# Patient Record
Sex: FEMALE | Race: WHITE | Employment: OTHER | RURAL
[De-identification: names, ages, dates, MRNs, and addresses within clinical notes are randomized per-mention and may not be internally consistent; named-entity substitution may affect disease eponyms.]

---

## 2017-01-16 RX ORDER — OXYCODONE AND ACETAMINOPHEN 5; 325 MG/1; MG/1
1 TABLET ORAL
Qty: 120 TAB | Refills: 0 | Status: CANCELLED | OUTPATIENT
Start: 2017-01-16

## 2017-01-17 DIAGNOSIS — G89.4 CHRONIC PAIN SYNDROME: Primary | ICD-10-CM

## 2017-01-17 RX ORDER — OXYCODONE AND ACETAMINOPHEN 5; 325 MG/1; MG/1
1 TABLET ORAL
Qty: 90 TAB | Refills: 0 | Status: SHIPPED | OUTPATIENT
Start: 2017-01-17 | End: 2017-08-15

## 2017-01-17 NOTE — TELEPHONE ENCOUNTER
Patient has been advised of med change per Dr Eveline Burton. Patient also advised that office notes have been faxed to Dr Hamilton Parent office to set up pain management appointment. I have asked her to call our office if she does not hear from them.

## 2017-01-17 NOTE — TELEPHONE ENCOUNTER
Needs to be set up for pain clinic can go to [facility] we will be slowly reducing her Percocet to take only 3 times a day now

## 2017-01-17 NOTE — TELEPHONE ENCOUNTER
Spoke with patient. She has not seen pain specialist yet stated someone here was going to set up appointment since she does not know where to go.

## 2017-02-13 ENCOUNTER — OFFICE VISIT (OUTPATIENT)
Dept: FAMILY MEDICINE CLINIC | Age: 77
End: 2017-02-13

## 2017-02-13 VITALS
HEIGHT: 66 IN | OXYGEN SATURATION: 95 % | SYSTOLIC BLOOD PRESSURE: 143 MMHG | HEART RATE: 96 BPM | WEIGHT: 167.2 LBS | BODY MASS INDEX: 26.87 KG/M2 | DIASTOLIC BLOOD PRESSURE: 59 MMHG | TEMPERATURE: 97.5 F | RESPIRATION RATE: 18 BRPM

## 2017-02-13 DIAGNOSIS — M54.5 CHRONIC BILATERAL LOW BACK PAIN, WITH SCIATICA PRESENCE UNSPECIFIED: ICD-10-CM

## 2017-02-13 DIAGNOSIS — M25.512 BILATERAL SHOULDER PAIN, UNSPECIFIED CHRONICITY: ICD-10-CM

## 2017-02-13 DIAGNOSIS — G89.29 CHRONIC BILATERAL LOW BACK PAIN, WITH SCIATICA PRESENCE UNSPECIFIED: ICD-10-CM

## 2017-02-13 DIAGNOSIS — Z79.4 TYPE 2 DIABETES MELLITUS WITH HYPERGLYCEMIA, WITH LONG-TERM CURRENT USE OF INSULIN (HCC): Primary | ICD-10-CM

## 2017-02-13 DIAGNOSIS — E11.65 TYPE 2 DIABETES MELLITUS WITH HYPERGLYCEMIA, WITH LONG-TERM CURRENT USE OF INSULIN (HCC): Primary | ICD-10-CM

## 2017-02-13 DIAGNOSIS — M25.511 BILATERAL SHOULDER PAIN, UNSPECIFIED CHRONICITY: ICD-10-CM

## 2017-02-13 DIAGNOSIS — Z71.6 ENCOUNTER FOR SMOKING CESSATION COUNSELING: ICD-10-CM

## 2017-02-13 RX ORDER — IBUPROFEN 200 MG
1 TABLET ORAL EVERY 24 HOURS
Qty: 30 PATCH | Refills: 0 | Status: SHIPPED | OUTPATIENT
Start: 2017-02-13 | End: 2017-03-15

## 2017-02-13 RX ORDER — GABAPENTIN 100 MG/1
100 CAPSULE ORAL 3 TIMES DAILY
Qty: 90 CAP | Refills: 1 | Status: SHIPPED | OUTPATIENT
Start: 2017-02-13 | End: 2017-05-17 | Stop reason: SDUPTHER

## 2017-02-13 NOTE — MR AVS SNAPSHOT
Visit Information Date & Time Provider Department Dept. Phone Encounter #  
 2/13/2017 11:00 AM Meseret Schroeder NP [address] [phone] [id number] Upcoming Health Maintenance Date Due  
 FOOT EXAM Q1 7/20/1950 EYE EXAM RETINAL OR DILATED Q1 7/20/1950 DTaP/Tdap/Td series (1 - Tdap) 7/20/1960 ZOSTER VACCINE AGE 60> 7/20/1999 GLAUCOMA SCREENING Q2Y 7/20/2004 OSTEOPOROSIS SCREENING (DEXA) 7/20/2004 Pneumococcal 65+ Low/Medium Risk (1 of 2 - PCV13) 7/20/2004 HEMOGLOBIN A1C Q6M 5/10/2017 MICROALBUMIN Q1 11/10/2017 LIPID PANEL Q1 11/10/2017 MEDICARE YEARLY EXAM 11/11/2017 COLONOSCOPY 6/8/2021 Allergies as of 2/13/2017  Review Complete On: 2/13/2017 By: Ashwin Giraldo LPN Severity Noted Reaction Type Reactions Morphine  05/24/2016    Itching Ultram [Tramadol]  05/24/2016    Not Reported This Time Current Immunizations  Reviewed on 9/29/2016 Name Date Influenza High Dose Vaccine PF 9/29/2016 Not reviewed this visit Vitals BP Pulse Temp Resp Height(growth percentile) 143/59 (BP 1 Location: Left arm, BP Patient Position: Sitting) 96 97.5 °F (36.4 °C) (Temporal) 18 5' 5.75\" (1.67 m) Weight(growth percentile) SpO2 BMI OB Status Smoking Status 167 lb 3.2 oz (75.8 kg) 95% 27.19 kg/m2 Postmenopausal Current Every Day Smoker Vitals History BMI and BSA Data Body Mass Index Body Surface Area  
 27.19 kg/m 2 1.88 m 2 Preferred Pharmacy Pharmacy Name Phone [address] [facility] [phone] Your Updated Medication List  
  
   
This list is accurate as of: 2/13/17 11:36 AM.  Always use your most recent med list.  
  
  
  
  
 acetaminophen 500 mg tablet Commonly known as:  TYLENOL Take  by mouth every six (6) hours as needed for Pain. ascorbic acid (vitamin C) 500 mg tablet Commonly known as:  VITAMIN C  
 Take  by mouth.  
  
 b complex vitamins tablet Take 1 Tab by mouth daily. B.infantis-B.ani-B.long-B.bifi 10-15 mg Tbec Take  by mouth. BENEFIBER (GUAR GUM) PO Take  by mouth. celecoxib 200 mg capsule Commonly known as:  CELEBREX Take  by mouth two (2) times a day. cholecalciferol 1,000 unit Cap Commonly known as:  VITAMIN D3 Take  by mouth daily. dilTIAZem  mg ER capsule Commonly known as:  CARDIZEM CD Take  by mouth daily. gemfibrozil 600 mg tablet Commonly known as:  LOPID Take 1 Tab by mouth two (2) times a day. glyBURIDE 5 mg tablet Commonly known as:  Larinda Manzanilla Take 1 Tab by mouth two (2) times daily (with meals). insulin glargine 100 unit/mL (3 mL) pen Commonly known as:  LANTUS SOLOSTAR  
40 Units by SubCUTAneous route daily. metFORMIN 1,000 mg tablet Commonly known as:  GLUCOPHAGE Take 1 Tab by mouth two (2) times a day. omega-3 fatty acids-vitamin e 1,000 mg Cap Take 1 Cap by mouth. 32a day  
  
 oxyCODONE-acetaminophen 5-325 mg per tablet Commonly known as:  PERCOCET Take 1 Tab by mouth every eight (8) hours as needed for Pain. Max Daily Amount: 3 Tabs. valsartan-hydroCHLOROthiazide 160-12.5 mg per tablet Commonly known as:  DIOVAN-HCT Take 1 Tab by mouth daily. venlafaxine 75 mg tablet Commonly known as:  [facility] [place] 75 mg by mouth two (2) times a day. zinc 50 mg Tab tablet Take  by mouth daily. Introducing [facility]! Irene Estrada introduces [employer] patient portal. Now you can access parts of your medical record, email your doctor's office, and request medication refills online. 1. In your internet browser, go to [email or web address] 2. Click on the First Time User? Click Here link in the Sign In box. You will see the New Member Sign Up page. 3. Enter your [employer] Access Code exactly as it appears below.  You will not need to use this code after youve completed the sign-up process. If you do not sign up before the expiration date, you must request a new code. · copygram Access Code: [email or web address] Expires: 5/14/2017 11:36 AM 
 
4. Enter the last four digits of your Social Security Number (xxxx) and Date of Birth (mm/dd/yyyy) as indicated and click Submit. You will be taken to the next sign-up page. 5. Create a copygram ID. This will be your copygram login ID and cannot be changed, so think of one that is secure and easy to remember. 6. Create a copygram password. You can change your password at any time. 7. Enter your Password Reset Question and Answer. This can be used at a later time if you forget your password. 8. Enter your e-mail address. You will receive e-mail notification when new information is available in [address] 9. Click Sign Up. You can now view and download portions of your medical record. 10. Click the Download Summary menu link to download a portable copy of your medical information. If you have questions, please visit the Frequently Asked Questions section of the copygram website. Remember, copygram is NOT to be used for urgent needs. For medical emergencies, dial 911. Now available from your iPhone and Android! Please provide this summary of care documentation to your next provider. Your primary care clinician is listed as Juan Shankar. If you have any questions after today's visit, please call [phone].

## 2017-02-13 NOTE — PROGRESS NOTES
Subjective:     Tim Harris is a 67 y.o. female who presents today with the following:  Chief Complaint   Patient presents with    Diabetes     follow up   Tim Harris is a 67 y.o. female who presents today for follow up on her chronic medical conditions as noted below. . Doing well no interval problems. No chest pain, no angina no shortness of breath. No orthopnea or PND. No dependent edema. No abdominal pain, change in bowel habits, no blood in stool or black stools. No urinary frequency, urgency, dysuria. No change in voiding pattern or stream, no significant nocturia. No problems with medications and is compliant. Long-term use of chronic cardiac pain medications, musculoskeletal complaints low back pain she states the last 10 years. Up-to-date on colonoscopy  Sees Dr. Dalia Fall in GYN up-to-date on Pap smear and mammogram  Continues to smoke down to a half a pack per  Day. Tried Chantix had instant nausea. Would like to try the patch. No ethanol use keeps active but no regular aerobic activity  Would like to have Hemoglobin A1C checked today. ROS:  Gen: denies fever, chills, fatigue, weight loss, weight gain  HEENT:denies blurry vision, nasal congestion, sore throat  Resp: denies dypsnea, cough, wheezing  CV: denies chest pain radiating to the jaws or arms, palpitations, lower extremity edema  Abd: denies nausea, vomiting, diarrhea, constipation  Neuro: denies numbness/tingling  Endo: denies polyuria, polydipsia, heat/cold intolerance  Heme: no lymphadenopathy    Allergies   Allergen Reactions    Morphine Itching    Ultram [Tramadol] Not Reported This Time         Current Outpatient Prescriptions:     oxyCODONE-acetaminophen (PERCOCET) 5-325 mg per tablet, Take 1 Tab by mouth every eight (8) hours as needed for Pain.  Max Daily Amount: 3 Tabs., Disp: 90 Tab, Rfl: 0    gemfibrozil (LOPID) 600 mg tablet, Take 1 Tab by mouth two (2) times a day., Disp: 180 Tab, Rfl: 3    glyBURIDE (DIABETA) 5 mg tablet, Take 1 Tab by mouth two (2) times daily (with meals). , Disp: 60 Tab, Rfl: 5    metFORMIN (GLUCOPHAGE) 1,000 mg tablet, Take 1 Tab by mouth two (2) times a day., Disp: 180 Tab, Rfl: 1    insulin glargine (LANTUS SOLOSTAR) 100 unit/mL (3 mL) pen, 40 Units by SubCUTAneous route daily. , Disp: 1 Each, Rfl: 3    omega-3 fatty acids-vitamin e 1,000 mg cap, Take 1 Cap by mouth. 32a day, Disp: , Rfl:     ascorbic acid, vitamin C, (VITAMIN C) 500 mg tablet, Take  by mouth., Disp: , Rfl:     b complex vitamins tablet, Take 1 Tab by mouth daily. , Disp: , Rfl:     acetaminophen (TYLENOL) 500 mg tablet, Take  by mouth every six (6) hours as needed for Pain., Disp: , Rfl:     zinc 50 mg tab tablet, Take  by mouth daily. , Disp: , Rfl:     cholecalciferol (VITAMIN D3) 1,000 unit cap, Take  by mouth daily. , Disp: , Rfl:     B.infantis-B.ani-B.long-B.bifi 10-15 mg TbEC, Take  by mouth., Disp: , Rfl:     BENEFIBER, GUAR GUM, PO, Take  by mouth., Disp: , Rfl:     valsartan-hydrochlorothiazide (DIOVAN-HCT) 160-12.5 mg per tablet, Take 1 Tab by mouth daily. , Disp: , Rfl:     diltiazem CD (CARDIZEM CD) 240 mg ER capsule, Take  by mouth daily. , Disp: , Rfl:     celecoxib (CELEBREX) 200 mg capsule, Take  by mouth two (2) times a day., Disp: , Rfl:     venlafaxine (EFFEXOR) 75 mg tablet, Take 75 mg by mouth two (2) times a day., Disp: , Rfl:     Past Medical History   Diagnosis Date    Arthritis     Chronic pain     Chronic pain     Diabetes ([address]     Hemorrhoid     Hypercholesterolemia     IBS (irritable bowel syndrome)        Past Surgical History   Procedure Laterality Date    Hx cholecystectomy      Hx hysterectomy      Hx cataract removal      Hx knee replacement       right    Hx hemorrhoidectomy  2009    Hx colonoscopy  2009    Hx colonoscopy  2016     2 adenomatous polyp       History   Smoking Status    Current Every Day Smoker   Smokeless Tobacco    Never Used       Social History     Social History    Marital status:      Spouse name: N/A    Number of children: N/A    Years of education: N/A     Social History Main Topics    Smoking status: Current Every Day Smoker    Smokeless tobacco: Never Used    Alcohol use No    Drug use: None    Sexual activity: Not Asked     Other Topics Concern     Service No    Blood Transfusions Yes    Caffeine Concern Yes    Occupational Exposure No    Hobby Hazards No    Sleep Concern Yes    Stress Concern Yes    Weight Concern No    Special Diet No    Back Care Yes    Exercise No    Bike Helmet No     n/a    Seat Belt Yes    Self-Exams Yes     Social History Narrative       Family History   Problem Relation Age of Onset    Colon Cancer Father          Objective:     Visit Vitals    /59 (BP 1 Location: Left arm, BP Patient Position: Sitting)    Pulse 96    Temp 97.5 °F (36.4 °C) (Temporal)    Resp 18    Ht 5' 5.75\" (1.67 m)    Wt 167 lb 3.2 oz (75.8 kg)    SpO2 95%    BMI 27.19 kg/m2     Body mass index is 27.19 kg/(m^2). General: Alert and oriented. No acute distress. Well nourished  HEENT :  Ears:TMs are normal. Canals are clear. Eyes: pupils equal, round, react to light and accommodation. Extra ocular movements intact. Nose: patent. Mouth and throat is clear. Neck:supple full range of motion no thyromegaly. Trachea midline, No carotid bruits. No significant lymphadenopathy  Lungs[de-identified] clear to auscultation without wheezes, rales, or rhonchi. Heart :RRR, S1 & S2 are normal intensity. No murmur; no gallop  Abdomen: bowel sounds active. No tenderness, guarding, rebound, masses, hepatic or spleen enlargement  Back: no CVA tenderness. Extremities: without clubbing, cyanosis, or edema  Pulses: radial and femoral pulses are normal  Neuro: HMF intact.  Cranial nerves II through XII grossly normal.  Motor: is 5 over 5 and symmetrical.   Deep tendon reflexes: +2 equal    Results for orders placed or performed in visit on 11/21/16   POTASSIUM   Result Value Ref Range    Potassium 5.5 (H) 3.5 - 5.2 mmol/L       No results found for this visit on 02/13/17. Assessment/ Plan:          1. Type 2 diabetes mellitus with hyperglycemia, with long-term current use of insulin (HCC)    Hemoglobin A1C  Continue current pan    2. Smoking Cessation: Nicotine patch as directed. Verbal and written instructions (see AVS) provided.  Patient expresses understanding of diagnosis and treatment plan.     Luis Pritchett, PANKAJP-C

## 2017-02-13 NOTE — ACP (ADVANCE CARE PLANNING)
Pt was given paper work last time but did not fill it out. She will return it next time she comes in.

## 2017-02-13 NOTE — PATIENT INSTRUCTIONS
Nicotine (Absorbed through the skin)   Nicotine (VINCENT-oh-teen)  Helps you quit smoking. Brand Name(s):[facility] Nicotine Transdermal System, [employer] Nicotine Transdermal System, Leader Nicotine Transdermal System, Nicoderm CQ, Nicoderm CQ Clear, Nicotrol, [facility] Nicotine, [employer] Nicotine Transdermal System   There may be other brand names for this medicine. When This Medicine Should Not Be Used: This medicine is not right for everyone. Do not use it if you had an allergic reaction to nicotine. How to Use This Medicine:   Patch  · Follow the instructions on the medicine label if you are using this medicine without a prescription. · Read and follow the patient instructions that come with this medicine. Talk to your doctor or pharmacist if you have any questions. · Wash your hands with soap and water before and after applying a patch. · Leave the patch in its sealed wrapper until you are ready to put it on. Tear the wrapper open carefully. NEVER CUT the wrapper or the patch with scissors. Do not use any patch that has been cut by accident. · NicoDerm® CQ:   ¨ This is a 3-step program. If you smoke more than 10 cigarettes per day, start with step 1 followed by step 2 and step 3. If you smoke 10 or fewer cigarettes per day, start with step 2 followed by step 3. ¨ Begin using the patch on the morning of your quit day, even if you are not able to stop smoking immediately. ¨ Apply the patch at about the same time every day to skin that is clean, dry, and free of hair. ¨ The patient instructions will show the body areas where you can wear the patch. When putting on each new patch, choose a different place within these areas. Do not put the new patch on the same place you wore the last one. Be sure to remove the old patch before applying a new one. ¨ Do not put the patch over irritated skin.  Do not use creams or lotions, including sunscreen, on the skin where you apply the patch, because it may not stick well.  ¨ Apply a new patch if one falls off. ¨ If you have vivid dreams or sleep problems, remove the patch at bedtime and apply a new one in the morning. · Keep using this medicine for the full treatment time. If you feel you need to use this medicine for a longer period of time, talk to your doctor. · Store the patches at room temperature in a closed container, away from heat, moisture, and direct light. · Fold the used patch in half with the sticky sides together. Throw any used patch away so that children or pets cannot get to it. You will also need to throw away old patches after the expiration date has passed. Drugs and Foods to Avoid:      Ask your doctor or pharmacist before using any other medicine, including over-the-counter medicines, vitamins, and herbal products. Warnings While Using This Medicine:   · Pregnant or breastfeeding women should only use this medicine as directed by a doctor. Smoking can seriously harm your unborn child. Try to stop smoking without using medicine. Although this medicine is believed to be safer than smoking, the risks of its use during pregnancy are not fully known. · Tell your doctor if you have heart disease, heart rhythm problems, high blood pressure, or you had a recent heart attack. Tell your doctor if you have an allergy to adhesive tape. · This medicine may cause the following problems:  ¨ High blood pressure  ¨ Increase in heart rate  · The opaque NicoDerm® CQ patch may cause skin burns if you have a procedure called a magnetic resonance imaging (MRI) scan. You must remove the patch before an MRI. · Keep all medicine out of the reach of children. Never share your medicine with anyone.   Possible Side Effects While Using This Medicine:   Call your doctor right away if you notice any of these side effects:  · Allergic reaction: Itching or hives, swelling in your face or hands, swelling or tingling in your mouth or throat, chest tightness, trouble breathing  · Dizziness, headache, upset stomach, drooling, vomiting, diarrhea, cold sweats, blurred vision, trouble hearing, confusion, fainting, or weakness  · Fast, slow, pounding, or uneven heartbeat  If you notice these less serious side effects, talk with your doctor:   · Mild skin redness, itching, burning, or tingling where you wear the patch  · Vivid dreams or trouble sleeping  If you notice other side effects that you think are caused by this medicine, tell your doctor. Call your doctor for medical advice about side effects. You may report side effects to FDA at 6-395-FDA-1853  © 2016 [address] is for End User's use only and may not be sold, redistributed or otherwise used for commercial purposes. The above information is an  only. It is not intended as medical advice for individual conditions or treatments. Talk to your doctor, nurse or pharmacist before following any medical regimen to see if it is safe and effective for you.

## 2017-02-14 LAB
EST. AVERAGE GLUCOSE BLD GHB EST-MCNC: 206 MG/DL
HBA1C MFR BLD: 8.8 % (ref 4.8–5.6)

## 2017-02-14 NOTE — PROGRESS NOTES
Essentially the same:  A healthy eating plan gives your body the nutrients it needs every day. A healthy eating plan also will lower your risk for heart disease and other health conditions.    A healthy eating plan:  \" Emphasizes vegetables, fruits, whole grains, and fat-free or low-fat dairy products  \" Includes lean meats, poultry, fish, beans, eggs, and nuts  \" Limits saturated and trans fats, sodium, and added sugars  \" Controls portion sizes

## 2017-03-16 RX ORDER — METFORMIN HYDROCHLORIDE 1000 MG/1
1000 TABLET ORAL 2 TIMES DAILY
Qty: 180 TAB | Refills: 1 | Status: SHIPPED | OUTPATIENT
Start: 2017-03-16 | End: 2017-10-08 | Stop reason: SDUPTHER

## 2017-03-16 RX ORDER — ATORVASTATIN CALCIUM 20 MG/1
TABLET, FILM COATED ORAL
Qty: 90 TAB | Refills: 0 | Status: SHIPPED | OUTPATIENT
Start: 2017-03-16 | End: 2017-06-25 | Stop reason: SDUPTHER

## 2017-04-04 NOTE — TELEPHONE ENCOUNTER
Patient is requesting a prescription for BD \"short\" insulin needles, 31G x 8 mm. I do not see this on her list but apparently Dr. Michelle Lazo had her use these sparingly. [place].

## 2017-04-06 RX ORDER — INSULIN GLARGINE 100 [IU]/ML
40 INJECTION, SOLUTION SUBCUTANEOUS DAILY
Qty: 1 EACH | Refills: 3 | Status: SHIPPED | OUTPATIENT
Start: 2017-04-06 | End: 2017-05-17 | Stop reason: SDUPTHER

## 2017-04-07 ENCOUNTER — TELEPHONE (OUTPATIENT)
Dept: FAMILY MEDICINE CLINIC | Age: 77
End: 2017-04-07

## 2017-04-07 DIAGNOSIS — E11.65 TYPE 2 DIABETES MELLITUS WITH HYPERGLYCEMIA, UNSPECIFIED LONG TERM INSULIN USE STATUS: Primary | ICD-10-CM

## 2017-04-07 RX ORDER — PEN NEEDLE, DIABETIC 30 GX3/16"
NEEDLE, DISPOSABLE MISCELLANEOUS
Qty: 1 PACKAGE | Refills: 11 | Status: SHIPPED | OUTPATIENT
Start: 2017-04-07 | End: 2018-06-30 | Stop reason: SDUPTHER

## 2017-04-17 ENCOUNTER — TELEPHONE (OUTPATIENT)
Dept: FAMILY MEDICINE CLINIC | Age: 77
End: 2017-04-17

## 2017-04-17 NOTE — TELEPHONE ENCOUNTER
Patient is concerned about a 10d+ \"pull feeling\" in the Lt groin and fever. She notes that Dr. Adalgisa Plaza told her she had diverticulum on her colonoscopy.

## 2017-04-19 ENCOUNTER — TELEPHONE (OUTPATIENT)
Dept: FAMILY MEDICINE CLINIC | Age: 77
End: 2017-04-19

## 2017-04-19 NOTE — TELEPHONE ENCOUNTER
Patient has not had us refill Valsartan-Hctz for her before but now she is getting low. Please send in 90 day supply to [facility].

## 2017-04-21 RX ORDER — VALSARTAN AND HYDROCHLOROTHIAZIDE 160; 12.5 MG/1; MG/1
1 TABLET, FILM COATED ORAL DAILY
Qty: 90 TAB | Refills: 1 | Status: SHIPPED | OUTPATIENT
Start: 2017-04-21 | End: 2017-10-08 | Stop reason: SDUPTHER

## 2017-04-26 ENCOUNTER — TELEPHONE (OUTPATIENT)
Dept: FAMILY MEDICINE CLINIC | Age: 77
End: 2017-04-26

## 2017-04-26 NOTE — TELEPHONE ENCOUNTER
Spoke with patient's . Seen in ER over a week ago still in a lot of pain. Has been taking medication as prescribed and drinking plenty fluids. Per  in a lot of pain. I have advised patient's  if patient is in such pain will need to be evaluated in local ER or Urgent Care.

## 2017-04-26 NOTE — TELEPHONE ENCOUNTER
MRS LIU WENT TO ER LAST Wednesday AND WAS DIAGNOSED WITH DIVERTICULITIS. THEY GAVE HER CIPRO AND FLAGYL,  STILL IN A LOT OF PAIN.   [phone]

## 2017-05-17 ENCOUNTER — OFFICE VISIT (OUTPATIENT)
Dept: FAMILY MEDICINE CLINIC | Age: 77
End: 2017-05-17

## 2017-05-17 VITALS
RESPIRATION RATE: 22 BRPM | DIASTOLIC BLOOD PRESSURE: 82 MMHG | HEART RATE: 107 BPM | TEMPERATURE: 96.5 F | BODY MASS INDEX: 26.03 KG/M2 | OXYGEN SATURATION: 97 % | WEIGHT: 162 LBS | HEIGHT: 66 IN | SYSTOLIC BLOOD PRESSURE: 112 MMHG

## 2017-05-17 DIAGNOSIS — K57.92 DIVERTICULITIS OF INTESTINE WITHOUT PERFORATION OR ABSCESS WITHOUT BLEEDING, UNSPECIFIED PART OF INTESTINAL TRACT: ICD-10-CM

## 2017-05-17 DIAGNOSIS — K58.9 IRRITABLE BOWEL SYNDROME WITHOUT DIARRHEA: ICD-10-CM

## 2017-05-17 DIAGNOSIS — B35.1 NAIL FUNGUS: ICD-10-CM

## 2017-05-17 DIAGNOSIS — E11.65 TYPE 2 DIABETES MELLITUS WITH HYPERGLYCEMIA, UNSPECIFIED LONG TERM INSULIN USE STATUS: Primary | ICD-10-CM

## 2017-05-17 DIAGNOSIS — M19.90 ARTHRITIS: ICD-10-CM

## 2017-05-17 DIAGNOSIS — E78.00 HYPERCHOLESTEROLEMIA: ICD-10-CM

## 2017-05-17 RX ORDER — INSULIN GLARGINE 100 [IU]/ML
40 INJECTION, SOLUTION SUBCUTANEOUS DAILY
Qty: 1 EACH | Refills: 11 | Status: SHIPPED | OUTPATIENT
Start: 2017-05-17 | End: 2017-05-24 | Stop reason: SDUPTHER

## 2017-05-17 RX ORDER — CICLOPIROX 80 MG/ML
1 SOLUTION TOPICAL
Qty: 6.6 ML | Refills: 0 | Status: SHIPPED | OUTPATIENT
Start: 2017-05-17 | End: 2017-08-15

## 2017-05-17 RX ORDER — GABAPENTIN 100 MG/1
200 CAPSULE ORAL 3 TIMES DAILY
Qty: 90 CAP | Refills: 1 | Status: SHIPPED | OUTPATIENT
Start: 2017-05-17 | End: 2017-05-25 | Stop reason: SDUPTHER

## 2017-05-17 NOTE — MR AVS SNAPSHOT
Visit Information Date & Time Provider Department Dept. Phone Encounter #  
 5/17/2017 11:00 AM Ree Borjas NP [address] [phone] [id number] Upcoming Health Maintenance Date Due  
 FOOT EXAM Q1 7/20/1950 EYE EXAM RETINAL OR DILATED Q1 7/20/1949 DTaP/Tdap/Td series (1 - Tdap) 7/20/1960 ZOSTER VACCINE AGE 60> 7/20/1999 GLAUCOMA SCREENING Q2Y 7/20/2004 OSTEOPOROSIS SCREENING (DEXA) 7/20/2004 Pneumococcal 65+ Low/Medium Risk (1 of 2 - PCV13) 7/20/2004 INFLUENZA AGE 9 TO ADULT 8/1/2017 HEMOGLOBIN A1C Q6M 8/13/2017 MICROALBUMIN Q1 11/10/2017 LIPID PANEL Q1 11/10/2017 MEDICARE YEARLY EXAM 11/11/2017 COLONOSCOPY 6/8/2021 Allergies as of 5/17/2017  Review Complete On: 5/17/2017 By: Ree Borjas NP Severity Noted Reaction Type Reactions Morphine  05/24/2016    Itching Ultram [Tramadol]  05/24/2016    Not Reported This Time Current Immunizations  Reviewed on 9/29/2016 Name Date Influenza High Dose Vaccine PF 9/29/2016 Not reviewed this visit Vitals BP Pulse Temp Resp Height(growth percentile) 112/82 (BP 1 Location: Left arm, BP Patient Position: Sitting) (!) 107 96.5 °F (35.8 °C) (Temporal) 22 5' 5.7\" (1.669 m) Weight(growth percentile) SpO2 BMI OB Status Smoking Status 162 lb (73.5 kg) 97% 26.39 kg/m2 Postmenopausal Current Every Day Smoker Vitals History BMI and BSA Data Body Mass Index Body Surface Area  
 26.39 kg/m 2 1.85 m 2 Preferred Pharmacy Pharmacy Name Phone [address] [facility] [phone] Your Updated Medication List  
  
   
This list is accurate as of: 5/17/17 12:32 PM.  Always use your most recent med list.  
  
  
  
  
 acetaminophen 500 mg tablet Commonly known as:  TYLENOL Take  by mouth every six (6) hours as needed for Pain. ascorbic acid (vitamin C) 500 mg tablet Commonly known as:  VITAMIN C Take  by mouth. atorvastatin 20 mg tablet Commonly known as:  LIPITOR  
TAKE 1 TABLET DAILY  
  
 b complex vitamins tablet Take 1 Tab by mouth daily. B.infantis-B.ani-B.long-B.bifi 10-15 mg Tbec Take  by mouth. BENEFIBER (GUAR GUM) PO Take  by mouth. celecoxib 200 mg capsule Commonly known as:  CELEBREX Take  by mouth two (2) times a day. cholecalciferol 1,000 unit Cap Commonly known as:  VITAMIN D3 Take  by mouth daily. dilTIAZem  mg ER capsule Commonly known as:  CARDIZEM CD Take  by mouth daily. gabapentin 100 mg capsule Commonly known as:  NEURONTIN Take 1 Cap by mouth three (3) times daily. Every 8 hours as needed  
  
 gemfibrozil 600 mg tablet Commonly known as:  LOPID Take 1 Tab by mouth two (2) times a day. glyBURIDE 5 mg tablet Commonly known as:  Venessa Fanning Take 1 Tab by mouth two (2) times daily (with meals). insulin glargine 100 unit/mL (3 mL) pen Commonly known as:  LANTUS SOLOSTAR  
40 Units by SubCUTAneous route daily. Insulin Needles (Disposable) 31 gauge x 5/16\" Ndle Use as directed  
  
 metFORMIN 1,000 mg tablet Commonly known as:  GLUCOPHAGE Take 1 Tab by mouth two (2) times a day. omega-3 fatty acids-vitamin e 1,000 mg Cap Take 1 Cap by mouth. 32a day  
  
 oxyCODONE-acetaminophen 5-325 mg per tablet Commonly known as:  PERCOCET Take 1 Tab by mouth every eight (8) hours as needed for Pain. Max Daily Amount: 3 Tabs. valsartan-hydroCHLOROthiazide 160-12.5 mg per tablet Commonly known as:  DIOVAN-HCT Take 1 Tab by mouth daily. venlafaxine 75 mg tablet Commonly known as:  [facility] Take 75 mg by mouth two (2) times a day. zinc 50 mg Tab tablet Take  by mouth daily. Introducing [facility]!    
 Kaitlyn Gil introduces [employer] patient portal. Now you can access parts of your medical record, email your doctor's office, and request medication refills online. 1. In your internet browser, go to [email or web address] 2. Click on the First Time User? Click Here link in the Sign In box. You will see the New Member Sign Up page. 3. Enter your Favor Access Code exactly as it appears below. You will not need to use this code after youve completed the sign-up process. If you do not sign up before the expiration date, you must request a new code. · Favor Access Code: [email or web address] Expires: 8/15/2017 12:32 PM 
 
4. Enter the last four digits of your Social Security Number (xxxx) and Date of Birth (mm/dd/yyyy) as indicated and click Submit. You will be taken to the next sign-up page. 5. Create a Favor ID. This will be your Favor login ID and cannot be changed, so think of one that is secure and easy to remember. 6. Create a Favor password. You can change your password at any time. 7. Enter your Password Reset Question and Answer. This can be used at a later time if you forget your password. 8. Enter your e-mail address. You will receive e-mail notification when new information is available in [address] 9. Click Sign Up. You can now view and download portions of your medical record. 10. Click the Download Summary menu link to download a portable copy of your medical information. If you have questions, please visit the Frequently Asked Questions section of the Favor website. Remember, Favor is NOT to be used for urgent needs. For medical emergencies, dial 911. Now available from your iPhone and Android! Please provide this summary of care documentation to your next provider. Your primary care clinician is listed as Tariq Coffey. If you have any questions after today's visit, please call [phone].

## 2017-05-18 LAB
ALBUMIN SERPL-MCNC: 4.5 G/DL (ref 3.5–4.8)
ALBUMIN/GLOB SERPL: 1.5 {RATIO} (ref 1.2–2.2)
ALP SERPL-CCNC: 76 IU/L (ref 39–117)
ALT SERPL-CCNC: 33 IU/L (ref 0–32)
AST SERPL-CCNC: 29 IU/L (ref 0–40)
BASOPHILS # BLD AUTO: 0.1 X10E3/UL (ref 0–0.2)
BASOPHILS NFR BLD AUTO: 0 %
BILIRUB SERPL-MCNC: 0.2 MG/DL (ref 0–1.2)
BUN SERPL-MCNC: 29 MG/DL (ref 8–27)
BUN/CREAT SERPL: 27 (ref 12–28)
CALCIUM SERPL-MCNC: 10.7 MG/DL (ref 8.7–10.3)
CHLORIDE SERPL-SCNC: 100 MMOL/L (ref 96–106)
CHOLEST SERPL-MCNC: 192 MG/DL (ref 100–199)
CO2 SERPL-SCNC: 19 MMOL/L (ref 18–29)
CREAT SERPL-MCNC: 1.08 MG/DL (ref 0.57–1)
EOSINOPHIL # BLD AUTO: 0.2 X10E3/UL (ref 0–0.4)
EOSINOPHIL NFR BLD AUTO: 1 %
ERYTHROCYTE [DISTWIDTH] IN BLOOD BY AUTOMATED COUNT: 13.3 % (ref 12.3–15.4)
EST. AVERAGE GLUCOSE BLD GHB EST-MCNC: 237 MG/DL
GLOBULIN SER CALC-MCNC: 3.1 G/DL (ref 1.5–4.5)
GLUCOSE SERPL-MCNC: 236 MG/DL (ref 65–99)
HBA1C MFR BLD: 9.9 % (ref 4.8–5.6)
HCT VFR BLD AUTO: 43 % (ref 34–46.6)
HDLC SERPL-MCNC: 45 MG/DL
HGB BLD-MCNC: 14.6 G/DL (ref 11.1–15.9)
IMM GRANULOCYTES # BLD: 0 X10E3/UL (ref 0–0.1)
IMM GRANULOCYTES NFR BLD: 0 %
INTERPRETATION: NORMAL
LDLC SERPL CALC-MCNC: 90 MG/DL (ref 0–99)
LYMPHOCYTES # BLD AUTO: 5.6 X10E3/UL (ref 0.7–3.1)
LYMPHOCYTES NFR BLD AUTO: 32 %
MCH RBC QN AUTO: 30.5 PG (ref 26.6–33)
MCHC RBC AUTO-ENTMCNC: 34 G/DL (ref 31.5–35.7)
MCV RBC AUTO: 90 FL (ref 79–97)
MONOCYTES # BLD AUTO: 0.7 X10E3/UL (ref 0.1–0.9)
MONOCYTES NFR BLD AUTO: 4 %
NEUTROPHILS # BLD AUTO: 10.8 X10E3/UL (ref 1.4–7)
NEUTROPHILS NFR BLD AUTO: 63 %
PLATELET # BLD AUTO: 425 X10E3/UL (ref 150–379)
POTASSIUM SERPL-SCNC: 5.4 MMOL/L (ref 3.5–5.2)
PROT SERPL-MCNC: 7.6 G/DL (ref 6–8.5)
RBC # BLD AUTO: 4.78 X10E6/UL (ref 3.77–5.28)
SODIUM SERPL-SCNC: 141 MMOL/L (ref 134–144)
TRIGL SERPL-MCNC: 284 MG/DL (ref 0–149)
VLDLC SERPL CALC-MCNC: 57 MG/DL (ref 5–40)
WBC # BLD AUTO: 17.4 X10E3/UL (ref 3.4–10.8)

## 2017-05-21 DIAGNOSIS — R79.89 ABNORMAL CBC: Primary | ICD-10-CM

## 2017-05-23 NOTE — TELEPHONE ENCOUNTER
Only got one box of Insulin from mail order. Needed 6 boxes, not one. Also only got a 15 day supply of Gabapentin. Also needs Glyburide 5mg  # 90 1 every day and Celecoxib 200mg bid # 180.

## 2017-05-23 NOTE — PROGRESS NOTES
Subjective:     Balaji Earl is a 67 y.o. female who presents today with the following:  Chief Complaint   Patient presents with    Pain (Chronic)     arthritis   Ria Gunter presents today for follow up for chronic medical conditions. In for follow-up continues to have chronic pain issues back wrist hands mostly arthritis but has never been diagnosed as rheumatoid arthritis. Is on chronic narcotic pain medications several years. Did discuss referral to pain clinic management with her she agrees we will go ahead and schedule. She is also smoking about a pack a day would like to stop will consider  Chantix  Otherwise doing well no interval problems. No chest pain, no angina no shortness of breath. No orthopnea or PND. No dependent edema. No abdominal pain, change in bowel habits, no blood in stool or black stools. No urinary frequency, urgency, dysuria. No change in voiding pattern  no significant nocturia. No problems with medications and is compliant. Does not monitor blood sugars or blood pressure she does have home glucometer and knows how to use it encouraged monitoring  Sees Dr. Bret Lloyd yearly for GYN exam mammogram. Up-to-date on colonoscopy most recent ones 2016. ROS:  Gen: denies fever, chills, fatigue, weight loss, weight gain  HEENT:denies blurry vision, nasal congestion, sore throat  Resp: denies dypsnea, cough, wheezing  CV: denies chest pain radiating to the jaws or arms, palpitations, lower extremity edema  Abd: denies nausea, vomiting, diarrhea, constipation  Neuro: denies numbness/tingling  Endo: denies polyuria, polydipsia, heat/cold intolerance  Heme: no lymphadenopathy    Allergies   Allergen Reactions    Morphine Itching    Ultram [Tramadol] Not Reported This Time         Current Outpatient Prescriptions:     gabapentin (NEURONTIN) 100 mg capsule, Take 2 Caps by mouth three (3) times daily.  Every 8 hours as needed  Indications: POSTHERPETIC NEURALGIA, Disp: 90 Cap, Rfl: 1    insulin glargine (LANTUS SOLOSTAR) 100 unit/mL (3 mL) pen, 40 Units by SubCUTAneous route daily. , Disp: 1 Each, Rfl: 11    ciclopirox (PENLAC) 8 % solution, Apply 1 Each to affected area nightly for 90 days. , Disp: 6.6 mL, Rfl: 0    valsartan-hydroCHLOROthiazide (DIOVAN-HCT) 160-12.5 mg per tablet, Take 1 Tab by mouth daily. , Disp: 90 Tab, Rfl: 1    Insulin Needles, Disposable, 31 gauge x 5/16\" ndle, Use as directed, Disp: 1 Package, Rfl: 11    metFORMIN (GLUCOPHAGE) 1,000 mg tablet, Take 1 Tab by mouth two (2) times a day., Disp: 180 Tab, Rfl: 1    oxyCODONE-acetaminophen (PERCOCET) 5-325 mg per tablet, Take 1 Tab by mouth every eight (8) hours as needed for Pain. Max Daily Amount: 3 Tabs., Disp: 90 Tab, Rfl: 0    glyBURIDE (DIABETA) 5 mg tablet, Take 1 Tab by mouth two (2) times daily (with meals). , Disp: 60 Tab, Rfl: 5    omega-3 fatty acids-vitamin e 1,000 mg cap, Take 1 Cap by mouth. 32a day, Disp: , Rfl:     ascorbic acid, vitamin C, (VITAMIN C) 500 mg tablet, Take  by mouth., Disp: , Rfl:     b complex vitamins tablet, Take 1 Tab by mouth daily. , Disp: , Rfl:     acetaminophen (TYLENOL) 500 mg tablet, Take  by mouth every six (6) hours as needed for Pain., Disp: , Rfl:     zinc 50 mg tab tablet, Take  by mouth daily. , Disp: , Rfl:     cholecalciferol (VITAMIN D3) 1,000 unit cap, Take  by mouth daily. , Disp: , Rfl:     B.infantis-B.ani-B.long-B.bifi 10-15 mg TbEC, Take  by mouth., Disp: , Rfl:     BENEFIBER, GUAR GUM, PO, Take  by mouth., Disp: , Rfl:     diltiazem CD (CARDIZEM CD) 240 mg ER capsule, Take  by mouth daily. , Disp: , Rfl:     celecoxib (CELEBREX) 200 mg capsule, Take  by mouth two (2) times a day., Disp: , Rfl:     venlafaxine (EFFEXOR) 75 mg tablet, Take 75 mg by mouth two (2) times a day., Disp: , Rfl:     atorvastatin (LIPITOR) 20 mg tablet, TAKE 1 TABLET DAILY, Disp: 90 Tab, Rfl: 0    gemfibrozil (LOPID) 600 mg tablet, Take 1 Tab by mouth two (2) times a day., Disp: 180 Tab, Rfl: 3    Past Medical History:   Diagnosis Date    Arthritis     Chronic pain     Chronic pain     Diabetes ([address]     Diverticular disease     Hemorrhoid     Hypercholesterolemia     IBS (irritable bowel syndrome)        Past Surgical History:   Procedure Laterality Date    HX CATARACT REMOVAL      HX CHOLECYSTECTOMY      HX COLONOSCOPY  2009    HX COLONOSCOPY  2016    2 adenomatous polyp    HX HEMORRHOIDECTOMY  2009    HX HYSTERECTOMY      HX KNEE REPLACEMENT      right       History   Smoking Status    Current Every Day Smoker   Smokeless Tobacco    Never Used       Social History     Social History    Marital status:      Spouse name: N/A    Number of children: N/A    Years of education: N/A     Social History Main Topics    Smoking status: Current Every Day Smoker    Smokeless tobacco: Never Used    Alcohol use No    Drug use: None    Sexual activity: Not Asked     Other Topics Concern     Service No    Blood Transfusions Yes    Caffeine Concern Yes    Occupational Exposure No    Hobby Hazards No    Sleep Concern Yes    Stress Concern Yes    Weight Concern No    Special Diet No    Back Care Yes    Exercise No    Bike Helmet No     n/a    Seat Belt Yes    Self-Exams Yes     Social History Narrative       Family History   Problem Relation Age of Onset    Colon Cancer Father          Objective:     Visit Vitals    /82 (BP 1 Location: Left arm, BP Patient Position: Sitting)    Pulse (!) 107    Temp 96.5 °F (35.8 °C) (Temporal)    Resp 22    Ht 5' 5.7\" (1.669 m)    Wt 162 lb (73.5 kg)    SpO2 97%    BMI 26.39 kg/m2     Body mass index is 26.39 kg/(m^2). General: Alert and oriented. No acute distress. Well nourished  HEENT :  Ears:TMs are normal. Canals are clear. Eyes: pupils equal, round, react to light and accommodation. Extra ocular movements intact. Nose: patent. Mouth and throat is clear. Neck:supple full range of motion no thyromegaly. Trachea midline, No carotid bruits. No significant lymphadenopathy  Lungs[de-identified] clear to auscultation without wheezes, rales, or rhonchi. Heart :RRR, S1 & S2 are normal intensity. No murmur; no gallop  Abdomen: bowel sounds active. No tenderness, guarding, rebound, masses, hepatic or spleen enlargement  Back: no CVA tenderness. Extremities: without clubbing, cyanosis, or edema  Pulses: radial and femoral pulses are normal  Neuro: HMF intact. Cranial nerves II through XII grossly normal.  Motor: is 5 over 5 and symmetrical.   Deep tendon reflexes: +2 equal    Results for orders placed or performed in visit on 05/17/17   CBC WITH AUTOMATED DIFF   Result Value Ref Range    WBC 17.4 (H) 3.4 - 10.8 x10E3/uL    RBC 4.78 3.77 - 5.28 x10E6/uL    HGB 14.6 11.1 - 15.9 g/dL    HCT 43.0 34.0 - 46.6 %    MCV 90 79 - 97 fL    MCH 30.5 26.6 - 33.0 pg    MCHC 34.0 31.5 - 35.7 g/dL    RDW 13.3 12.3 - 15.4 %    PLATELET 280 (H) 065 - 379 x10E3/uL    NEUTROPHILS 63 %    Lymphocytes 32 %    MONOCYTES 4 %    EOSINOPHILS 1 %    BASOPHILS 0 %    ABS. NEUTROPHILS 10.8 (H) 1.4 - 7.0 x10E3/uL    Abs Lymphocytes 5.6 (H) 0.7 - 3.1 x10E3/uL    ABS. MONOCYTES 0.7 0.1 - 0.9 x10E3/uL    ABS. EOSINOPHILS 0.2 0.0 - 0.4 x10E3/uL    ABS. BASOPHILS 0.1 0.0 - 0.2 x10E3/uL    IMMATURE GRANULOCYTES 0 %    ABS. IMM.  GRANS. 0.0 0.0 - 0.1 x10E3/uL   LIPID PANEL   Result Value Ref Range    Cholesterol, total 192 100 - 199 mg/dL    Triglyceride 284 (H) 0 - 149 mg/dL    HDL Cholesterol 45 >39 mg/dL    VLDL, calculated 57 (H) 5 - 40 mg/dL    LDL, calculated 90 0 - 99 mg/dL   METABOLIC PANEL, COMPREHENSIVE   Result Value Ref Range    Glucose 236 (H) 65 - 99 mg/dL    BUN 29 (H) 8 - 27 mg/dL    Creatinine 1.08 (H) 0.57 - 1.00 mg/dL    GFR est non-AA 50 (L) >59 mL/min/1.73    GFR est AA 57 (L) >59 mL/min/1.73    BUN/Creatinine ratio 27 12 - 28    Sodium 141 134 - 144 mmol/L    Potassium 5.4 (H) 3.5 - 5.2 mmol/L    Chloride 100 96 - 106 mmol/L    CO2 19 18 - 29 mmol/L Calcium 10.7 (H) 8.7 - 10.3 mg/dL    Protein, total 7.6 6.0 - 8.5 g/dL    Albumin 4.5 3.5 - 4.8 g/dL    GLOBULIN, TOTAL 3.1 1.5 - 4.5 g/dL    A-G Ratio 1.5 1.2 - 2.2    Bilirubin, total 0.2 0.0 - 1.2 mg/dL    Alk. phosphatase 76 39 - 117 IU/L    AST (SGOT) 29 0 - 40 IU/L    ALT (SGPT) 33 (H) 0 - 32 IU/L   HEMOGLOBIN A1C WITH EAG   Result Value Ref Range    Hemoglobin A1c 9.9 (H) 4.8 - 5.6 %    Estimated average glucose 237 mg/dL   CKD REPORT   Result Value Ref Range    Interpretation Note        Results for orders placed or performed in visit on 05/17/17   CBC WITH AUTOMATED DIFF   Result Value Ref Range    WBC 17.4 (H) 3.4 - 10.8 x10E3/uL    RBC 4.78 3.77 - 5.28 x10E6/uL    HGB 14.6 11.1 - 15.9 g/dL    HCT 43.0 34.0 - 46.6 %    MCV 90 79 - 97 fL    MCH 30.5 26.6 - 33.0 pg    MCHC 34.0 31.5 - 35.7 g/dL    RDW 13.3 12.3 - 15.4 %    PLATELET 001 (H) 857 - 379 x10E3/uL    NEUTROPHILS 63 %    Lymphocytes 32 %    MONOCYTES 4 %    EOSINOPHILS 1 %    BASOPHILS 0 %    ABS. NEUTROPHILS 10.8 (H) 1.4 - 7.0 x10E3/uL    Abs Lymphocytes 5.6 (H) 0.7 - 3.1 x10E3/uL    ABS. MONOCYTES 0.7 0.1 - 0.9 x10E3/uL    ABS. EOSINOPHILS 0.2 0.0 - 0.4 x10E3/uL    ABS. BASOPHILS 0.1 0.0 - 0.2 x10E3/uL    IMMATURE GRANULOCYTES 0 %    ABS. IMM.  GRANS. 0.0 0.0 - 0.1 x10E3/uL    Narrative    Performed at:  [address]  [id number]  : Rachel Mata MD, Phone:  [phone]   LIPID PANEL   Result Value Ref Range    Cholesterol, total 192 100 - 199 mg/dL    Triglyceride 284 (H) 0 - 149 mg/dL    HDL Cholesterol 45 >39 mg/dL    VLDL, calculated 57 (H) 5 - 40 mg/dL    LDL, calculated 90 0 - 99 mg/dL    Narrative    Performed at:  [address]  [id number]  : Rachel Mata MD, Phone:  [phone]   METABOLIC PANEL, COMPREHENSIVE   Result Value Ref Range    Glucose 236 (H) 65 - 99 mg/dL    BUN 29 (H) 8 - 27 mg/dL    Creatinine 1.08 (H) 0.57 - 1.00 mg/dL    GFR est non-AA 50 (L) >59 mL/min/1.73    GFR est AA 57 (L) >59 mL/min/1.73    BUN/Creatinine ratio 27 12 - 28    Sodium 141 134 - 144 mmol/L    Potassium 5.4 (H) 3.5 - 5.2 mmol/L    Chloride 100 96 - 106 mmol/L    CO2 19 18 - 29 mmol/L    Calcium 10.7 (H) 8.7 - 10.3 mg/dL    Protein, total 7.6 6.0 - 8.5 g/dL    Albumin 4.5 3.5 - 4.8 g/dL    GLOBULIN, TOTAL 3.1 1.5 - 4.5 g/dL    A-G Ratio 1.5 1.2 - 2.2    Bilirubin, total 0.2 0.0 - 1.2 mg/dL    Alk. phosphatase 76 39 - 117 IU/L    AST (SGOT) 29 0 - 40 IU/L    ALT (SGPT) 33 (H) 0 - 32 IU/L    Narrative    Performed at:  [address]  [id number]  : Vance Trevino MD, Phone:  [phone]   HEMOGLOBIN A1C WITH EAG   Result Value Ref Range    Hemoglobin A1c 9.9 (H) 4.8 - 5.6 %    Estimated average glucose 237 mg/dL    Narrative    Performed at:  [address]  [id number]  : Vance Trevino MD, Phone:  [phone]   CKD REPORT   Result Value Ref Range    Interpretation Note     Narrative    Performed at:  [address] [place] [address]  [id number]  : Marilu Harden PhD, Phone:  [phone]       Assessment/ Plan:     Chiqui Johnson was seen today for pain (chronic).     Diagnoses and all orders for this visit:    Type 2 diabetes mellitus with hyperglycemia, unspecified long term insulin use status  -     CBC WITH AUTOMATED DIFF  -     LIPID PANEL  -     METABOLIC PANEL, COMPREHENSIVE  -     COLLECTION VENOUS BLOOD,VENIPUNCTURE  -     HEMOGLOBIN A1C WITH EAG    Hypercholesterolemia  -     CBC WITH AUTOMATED DIFF  -     LIPID PANEL  -     METABOLIC PANEL, COMPREHENSIVE  -     COLLECTION VENOUS BLOOD,VENIPUNCTURE  -     HEMOGLOBIN A1C WITH EAG    Arthritis  -     CBC WITH AUTOMATED DIFF  -     LIPID PANEL  -     METABOLIC PANEL, COMPREHENSIVE  -     COLLECTION VENOUS BLOOD,VENIPUNCTURE  -     HEMOGLOBIN A1C WITH EAG    Irritable bowel syndrome without diarrhea  -     CBC WITH AUTOMATED DIFF  -     LIPID PANEL  -     METABOLIC PANEL, COMPREHENSIVE  -     COLLECTION VENOUS BLOOD,VENIPUNCTURE  -     HEMOGLOBIN A1C WITH EAG    Diverticulitis of intestine without perforation or abscess without bleeding, unspecified part of intestinal tract  -     CBC WITH AUTOMATED DIFF  -     LIPID PANEL  -     METABOLIC PANEL, COMPREHENSIVE  -     COLLECTION VENOUS BLOOD,VENIPUNCTURE  -     HEMOGLOBIN A1C WITH EAG    Nail fungus    Other orders  -     gabapentin (NEURONTIN) 100 mg capsule; Take 2 Caps by mouth three (3) times daily. Every 8 hours as needed  Indications: POSTHERPETIC NEURALGIA  -     insulin glargine (LANTUS SOLOSTAR) 100 unit/mL (3 mL) pen; 40 Units by SubCUTAneous route daily. -     ciclopirox (PENLAC) 8 % solution; Apply 1 Each to affected area nightly for 90 days. -     CKD REPORT         1. Type 2 diabetes mellitus with hyperglycemia, unspecified long term insulin use status    2. Hypercholesterolemia    3. Arthritis    4. Irritable bowel syndrome without diarrhea    5. Diverticulitis of intestine without perforation or abscess without bleeding, unspecified part of intestinal tract    6. Nail fungus        Orders Placed This Encounter    COLLECTION VENOUS BLOOD,VENIPUNCTURE    CBC WITH AUTOMATED DIFF    LIPID PANEL    METABOLIC PANEL, COMPREHENSIVE    HEMOGLOBIN A1C WITH EAG    CKD REPORT    gabapentin (NEURONTIN) 100 mg capsule     Sig: Take 2 Caps by mouth three (3) times daily. Every 8 hours as needed  Indications: POSTHERPETIC NEURALGIA     Dispense:  90 Cap     Refill:  1    insulin glargine (LANTUS SOLOSTAR) 100 unit/mL (3 mL) pen     Si Units by SubCUTAneous route daily. Dispense:  1 Each     Refill:  11    ciclopirox (PENLAC) 8 % solution     Sig: Apply 1 Each to affected area nightly for 90 days. Dispense:  6.6 mL     Refill:  0     RTO as needed.   Jennifer Police NP-C    Verbal and written instructions (see AVS) provided.  Patient expresses understanding of diagnosis and treatment plan.     Claudia Barnett, FNP-C

## 2017-05-24 RX ORDER — INSULIN GLARGINE 100 [IU]/ML
40 INJECTION, SOLUTION SUBCUTANEOUS DAILY
Qty: 6 PEN | Refills: 11 | Status: SHIPPED | OUTPATIENT
Start: 2017-05-24 | End: 2017-05-31 | Stop reason: SDUPTHER

## 2017-05-25 RX ORDER — GLYBURIDE 5 MG/1
5 TABLET ORAL 2 TIMES DAILY WITH MEALS
Qty: 180 TAB | Refills: 3 | Status: SHIPPED | OUTPATIENT
Start: 2017-05-25 | End: 2018-04-11 | Stop reason: SDUPTHER

## 2017-05-25 RX ORDER — GABAPENTIN 100 MG/1
200 CAPSULE ORAL 3 TIMES DAILY
Qty: 540 CAP | Refills: 3 | Status: SHIPPED | OUTPATIENT
Start: 2017-05-25 | End: 2017-10-28

## 2017-05-25 RX ORDER — CELECOXIB 200 MG/1
200 CAPSULE ORAL 2 TIMES DAILY
Qty: 180 CAP | Refills: 3 | Status: SHIPPED | OUTPATIENT
Start: 2017-05-25 | End: 2018-01-19 | Stop reason: SDUPTHER

## 2017-05-31 NOTE — TELEPHONE ENCOUNTER
Could you please resend the supply to mail order [facility]. I tried to call rx in and was unable to get through.

## 2017-05-31 NOTE — TELEPHONE ENCOUNTER
The pharmacy requests for Lantus pens were mixed up last week. The large amount went locally to [facility] and the small amount went to [facility]. It's the reverse. Patient would like us to call [facility] with the large amount.

## 2017-06-01 RX ORDER — INSULIN GLARGINE 100 [IU]/ML
40 INJECTION, SOLUTION SUBCUTANEOUS DAILY
Qty: 6 PEN | Refills: 11 | Status: SHIPPED | OUTPATIENT
Start: 2017-06-01 | End: 2018-06-30 | Stop reason: SDUPTHER

## 2017-06-05 DIAGNOSIS — D72.820 ELEVATED LYMPHOCYTE COUNT: Primary | ICD-10-CM

## 2017-06-15 ENCOUNTER — TELEPHONE (OUTPATIENT)
Dept: FAMILY MEDICINE CLINIC | Age: 77
End: 2017-06-15

## 2017-06-15 NOTE — TELEPHONE ENCOUNTER
Patient hasn't been contacted by Dr. Kami Quiroz office about the lab referral.  Are we suppose to set this up or is his office doing this? His wife has to go to [facility] or somewhere in that campus today.

## 2017-06-20 DIAGNOSIS — D72.9 NEUTROPHILIC LEUKOCYTOSIS: Primary | ICD-10-CM

## 2017-06-21 ENCOUNTER — OFFICE VISIT (OUTPATIENT)
Dept: ONCOLOGY | Age: 77
End: 2017-06-21

## 2017-06-21 VITALS
SYSTOLIC BLOOD PRESSURE: 144 MMHG | BODY MASS INDEX: 26.65 KG/M2 | DIASTOLIC BLOOD PRESSURE: 69 MMHG | WEIGHT: 165.8 LBS | TEMPERATURE: 98.6 F | HEART RATE: 92 BPM | OXYGEN SATURATION: 97 % | HEIGHT: 66 IN | RESPIRATION RATE: 18 BRPM

## 2017-06-21 DIAGNOSIS — R06.02 SOB (SHORTNESS OF BREATH): ICD-10-CM

## 2017-06-21 DIAGNOSIS — R10.84 ABDOMINAL PAIN, GENERALIZED: ICD-10-CM

## 2017-06-21 DIAGNOSIS — R19.7 DIARRHEA IN ADULT PATIENT: ICD-10-CM

## 2017-06-21 DIAGNOSIS — D72.9 NEUTROPHILIC LEUKOCYTOSIS: Primary | ICD-10-CM

## 2017-06-21 DIAGNOSIS — F17.210 CIGARETTE SMOKER: ICD-10-CM

## 2017-06-21 RX ORDER — LOPERAMIDE HCL 2 MG
2 TABLET ORAL
COMMUNITY
End: 2017-10-28

## 2017-06-21 NOTE — MR AVS SNAPSHOT
Visit Information Date & Time Provider Department Dept. Phone Encounter #  
 6/21/2017 10:30 AM Santhosh Azevedo MD [facility] 21 [phone] Follow-up Instructions Return in about 2 weeks (around 7/6/2017). Your Appointments 7/6/2017 11:00 AM  
Follow Up with Santhosh Azevedo MD  
[facility] [address]) [address] [place]  [address] [phone]  
  
   
 [address] 8/15/2017  1:30 PM  
ESTABLISHED PATIENT with Omero Greenwood, SHARATH  
[address] ([address]) Appt Note: 3 mo f/u  
 [address] [facility] [address]  
[address] [facility] [address] [facility] [address] Upcoming Health Maintenance Date Due  
 FOOT EXAM Q1 7/20/1950 EYE EXAM RETINAL OR DILATED Q1 7/20/1949 DTaP/Tdap/Td series (1 - Tdap) 7/20/1960 ZOSTER VACCINE AGE 60> 7/20/1999 GLAUCOMA SCREENING Q2Y 7/20/2004 OSTEOPOROSIS SCREENING (DEXA) 7/20/2004 Pneumococcal 65+ High/Highest Risk (1 of 2 - PCV13) 7/20/2004 INFLUENZA AGE 9 TO ADULT 8/1/2017 MICROALBUMIN Q1 11/10/2017 MEDICARE YEARLY EXAM 11/11/2017 HEMOGLOBIN A1C Q6M 11/17/2017 LIPID PANEL Q1 5/17/2018 COLONOSCOPY 6/8/2021 Allergies as of 6/21/2017  Review Complete On: 6/21/2017 By: Santhosh Azevedo MD  
  
 Severity Noted Reaction Type Reactions Morphine  05/24/2016    Itching Ultram [Tramadol]  05/24/2016    Not Reported This Time Current Immunizations  Reviewed on 6/21/2017 Name Date Influenza High Dose Vaccine PF 9/29/2016, 10/16/2015 12:00 AM  
 Pneumococcal Conjugate (PCV-13) 2/3/2016 12:00 AM  
 Zoster Vaccine, Live 12/2/2009 12:00 AM  
  
 Reviewed by Cayla Kruse RN on 6/21/2017 at 11:01 AM  
You Were Diagnosed With   
  
 Codes Comments Neutrophilic leukocytosis    -  Primary ICD-10-CM: D72.9 ICD-9-CM: 288.8 SOB (shortness of breath)     ICD-10-CM: R06.02 
ICD-9-CM: 786.05 Abdominal pain, generalized     ICD-10-CM: R10.84 ICD-9-CM: 789.07 Diarrhea in adult patient     ICD-10-CM: R19.7 ICD-9-CM: 787.91 Cigarette smoker     ICD-10-CM: F17.210 ICD-9-CM: 305.1 Vitals BP Pulse Temp Resp Height(growth percentile) Weight(growth percentile) 144/69 92 98.6 °F (37 °C) (Oral) 18 5' 5.7\" (1.669 m) 165 lb 12.8 oz (75.2 kg) SpO2 BMI OB Status Smoking Status 97% 27.01 kg/m2 Postmenopausal Current Every Day Smoker BMI and BSA Data Body Mass Index Body Surface Area  
 27.01 kg/m 2 1.87 m 2 Preferred Pharmacy Pharmacy Name Phone [address] [facility] [phone] Your Updated Medication List  
  
   
This list is accurate as of: 6/21/17 11:50 AM.  Always use your most recent med list.  
  
  
  
  
 acetaminophen 500 mg tablet Commonly known as:  TYLENOL Take  by mouth every six (6) hours as needed for Pain. ascorbic acid (vitamin C) 500 mg tablet Commonly known as:  VITAMIN C Take  by mouth. atorvastatin 20 mg tablet Commonly known as:  LIPITOR  
TAKE 1 TABLET DAILY  
  
 b complex vitamins tablet Take 1 Tab by mouth daily. B.infantis-B.ani-B.long-B.bifi 10-15 mg Tbec Take  by mouth. BENEFIBER (GUAR GUM) PO Take  by mouth. celecoxib 200 mg capsule Commonly known as:  CELEBREX Take 1 Cap by mouth two (2) times a day. cholecalciferol 1,000 unit Cap Commonly known as:  VITAMIN D3 Take  by mouth daily. ciclopirox 8 % solution Commonly known as:  PENLAC Apply 1 Each to affected area nightly for 90 days. dilTIAZem  mg ER capsule Commonly known as:  CARDIZEM CD Take  by mouth daily. gabapentin 100 mg capsule Commonly known as:  NEURONTIN  
 Take 2 Caps by mouth three (3) times daily. Every 8 hours  Indications: POSTHERPETIC NEURALGIA  
  
 gemfibrozil 600 mg tablet Commonly known as:  LOPID Take 1 Tab by mouth two (2) times a day. glyBURIDE 5 mg tablet Commonly known as:  Ezella Diones Take 1 Tab by mouth two (2) times daily (with meals). insulin glargine 100 unit/mL (3 mL) Inpn Commonly known as:  LANTUSBASAGLAR  
40 Units by SubCUTAneous route daily. Insulin Needles (Disposable) 31 gauge x 5/16\" Ndle Use as directed  
  
 loperamide 2 mg tablet Commonly known as:  IMMODIUM Take 2 mg by mouth four (4) times daily as needed for Diarrhea. metFORMIN 1,000 mg tablet Commonly known as:  GLUCOPHAGE Take 1 Tab by mouth two (2) times a day. omega-3 fatty acids-vitamin e 1,000 mg Cap Take 1 Cap by mouth. 32a day  
  
 oxyCODONE-acetaminophen 5-325 mg per tablet Commonly known as:  PERCOCET Take 1 Tab by mouth every eight (8) hours as needed for Pain. Max Daily Amount: 3 Tabs. valsartan-hydroCHLOROthiazide 160-12.5 mg per tablet Commonly known as:  DIOVAN-HCT Take 1 Tab by mouth daily. venlafaxine 75 mg tablet Commonly known as:  [facility] Take 75 mg by mouth two (2) times a day. zinc 50 mg Tab tablet Take  by mouth daily. Follow-up Instructions Return in about 2 weeks (around 7/6/2017). To-Do List   
 06/22/2017 Imaging:  CT ABD PELV WO CONT   
  
 06/22/2017 Imaging:  CT CHEST WO CONT Introducing [facility] & HEALTH SERVICES! Dear Abby Torres: Thank you for requesting a Rapportive account. Our records indicate that you already have an active Rapportive account. You can access your account anytime at [email or web address] Did you know that you can access your hospital and ER discharge instructions at any time in Rapportive? You can also review all of your test results from your hospital stay or ER visit. Additional Information If you have questions, please visit the Frequently Asked Questions section of the Pixelpipehart website at [email or web address] Remember, Kaizen Platform is NOT to be used for urgent needs. For medical emergencies, dial 911. Now available from your iPhone and Android! Please provide this summary of care documentation to your next provider. Your primary care clinician is listed as Erin Kruse. If you have any questions after today's visit, please call [phone].

## 2017-06-21 NOTE — PROGRESS NOTES
Trisha Huynh is a 67 y.o. female new patient here to see Dr Harry Rahman, Neutrophilic Leukocytosis. Currently under stress, having problems with loose stools. Arthritis pain in joints. States there is a lump in back on L lower kidney area, not painful at this time.

## 2017-06-22 NOTE — PROGRESS NOTES
Referring Physician:  Ward Escobar NP. Subjective: The patient is a 65year old  female referred for hematologic evaluation by Ward Escobar NP. The reason for the consultation is leukocytosis. I have obtained copies of the old medical record and have reviewed them. I have been requested by Ward Escobar NP to evaluate this patient and to recommend therapeutic options. History of Present Illness: The patient has had a several month history of \"feeling bad\", dizziness and stomach discomfort. She also has chronic pain secondary to arthritis. The patient was seen in the emergency room on 04/18/17 with abdominal pain. Pain was located in the LLQ. On physical examination there was no tenderness, rebound or guarding. A non contrast CT scan of the abdomen and pelvis done on that date showed  stranding associated with sigmoid diverticula in the central pelvis. It was felt that the patient had mild sigmoid diverticulitis. There was a prior cholecystectomy and hysterectomy. The patient was placed on oral Cipro and Flagyl. The patient states that the pain did improve. On 04/18/17 the WBC was 18.6, HGB 14.7, HCT 42.9, plat 374k, but no differential count was done. On 05/17/17 the WBC was 17.4 with 62 segs and 32 lymphocytes. Today CBC reveals a WBC of 13.7, HGB 13.1, HCT 38.6, plat 337k and 56 segs and 36 lymphocytes. The white count has been slowly drifting down. The patient still complains of occasional abdominal discomfort. Past Medical History:  Medical - there is a history of arthritis, diabetes mellitus, hemorrhoids, elevated cholesterol, irritable bowel syndrome. Surgery - cataract removal, cholecystectomy, colonoscopy, hemorrhoidectomy, hysterectomy and right knee replacement. Medications:  Imodium, insulin, Neurontin, Diabeta, Celebrex, Percocet, Lopid, Diovan, Hydrochlorothiazide, Lipitor, Glucophage, Cardizem and Effexor.     Allergies:  Morphine and Ultram.      Transfusions: During birth of her children. Personal/Social History:  The patient is . One son and two daughters are alive and well. She has worked as an LPN. She smokes one pack of cigarettes a day and drinks alcohol occasionally. Family Medical History:  One brother committed suicide. One brother and one sister are alive and well. Patient's mother  with diabetes mellitus at age 78 and father  with prostate carcinoma at age 80. There is no other history of anemia or malignancy within the family. Review of Systems: Weight has been stable, no fevers or night sweats, denies headache, seizures, eye or ear changes, no chronic cough, no sputum production, hemoptysis, chest pain, PND, angina. No nausea, vomiting. The patient does have chronic diarrhea. Abdominal pain is as described above. No melena, hematochezia or hematemesis. There has been no problem with hematuria, nocturia, dysuria, urinary frequency, urinary hesitancy, musculoskeletal aching, temperature preferences, polyuria, polydipsia or easy bruisability. No bleeding, no allergies, no psychiatric issues. Physical Examination:   GENERAL: Patient is an elderly white female in NAD. VITALS: /65, P 92 and regular, T 98.6 degrees,  lb, R 18 and regular. HEENT: Head normocephalic, atraumatic. Pupils equal, round and reactive to light and accommodation. Extraocular muscles were intact. Fundi not visualized. Conjunctivae were normal. Sclerae non icteric. Ears, nose and throat were normal. Tongue was papillated. NECK: Supple. Thyroid normal. No bruits heard over the carotid arteries. No jugular venous distention was demonstrated. Trachea was midline. NODES: None appreciated. BACK: No CVA tenderness. There is no tenderness over the spine. CHEST: Clear to auscultation and percussion. BREASTS:  Deferred. HEART: Regular rate and rhythm without murmurs, rubs, thrills or gallops. ABDOMEN: Soft, no liver or spleen appreciated.  Bowel sounds were normal. Minimal tenderness in the LLQ. No masses noted. No tenderness in the RLQ. RECTAL: Deferred. PELVIC:  Deferred. SKIN: Warm and dry without petechiae or purpura. EXTREMITIES: No clubbing, cyanosis or edema. NEURO: Intact without focal motor or sensory abnormalities. Laboratory:  IgA is 211, IgM 41, IgG 924. Flow cytometry is pending. JAK2 mutation is pending. BCR-ABL by PCR is pending. CMP is normal except for a sugar of 281, BUN 20, creatinine 1.3. Globulin is 4.1. CBC - WBC 13.7, HGB 13.1, HCT 38.6, MCV 89.1, MCH 30.3, MCHC 33.9, plat 337k, 58 segs, 36 lymphs, 5 monos and 1 eosinophil. Impression:  1. Neutrophilic leukocytosis, most likely secondary to chronic diverticulosis/diverticulitis. R/O myeloproliferative neoplasm but doubt. 2. Type 2 diabetes mellitus. 3. Elevated cholesterol. 4. Arthritis  5. Irritable bowel syndrome. Comment: This patient is noted to have had an elevated white count with a predominant neutrophilia. She was diagnosed in mid April with acute diverticulitis and was treated appropriately with Cipro and Flagyl. A CT scan at that time confirmed the diagnosis of  diverticulitis. The white count has drifted down, but remains slightly elevated with a shift to the left. I think this is most likely secondary to a slowly resolving diverticulitis. The possibility of a myeloproliferative syndrome should also be considered. I have discussed this case in detail with Jennifer Espinoza NP and have discussed the situation with the patient. I have taken the liberty of ordering a non contrast CT scan of the chest, abdomen and pelvis and the patient will return in one week's time. At that point further recommendations will be made in terms of additional evaluation and possible treatment. If there is evidence of residual diverticulitis I plan to treat the patient with a course of a single agent, Avalox, which is very effective in this type of disorder.   A bone marrow aspiration and biopsy may be indicated. I would like to thank Donna Gresham NP for referring this patient for consultation and permitting me to partake in this patient's care. I will keep Donna Gresham NP informed of this patient's hematologic progress.

## 2017-06-23 ENCOUNTER — DOCUMENTATION ONLY (OUTPATIENT)
Dept: ONCOLOGY | Age: 77
End: 2017-06-23

## 2017-06-23 NOTE — PROGRESS NOTES
Contacted insurance as per previous [id number], need Preauthorizations for WO contrast not w/wo.   New authorization codes are [id number] and [id number]

## 2017-06-23 NOTE — PROGRESS NOTES
Spoke with Juan Jose Snyder, Ms Ta Child and Mrs Domenico Gomez all with [facility] Medicare Replacement/Advantage PPO to obtain pre authorizations for CT for chest and abdomen both WO contrast.      Provided information as requested, Pre- authorizations obtained thorax [id number] and ABD/Pelvis [id number].

## 2017-06-26 RX ORDER — ATORVASTATIN CALCIUM 20 MG/1
TABLET, FILM COATED ORAL
Qty: 90 TAB | Refills: 1 | Status: SHIPPED | OUTPATIENT
Start: 2017-06-26 | End: 2018-06-01 | Stop reason: SDUPTHER

## 2017-06-27 RX ORDER — DILTIAZEM HYDROCHLORIDE 240 MG/1
240 CAPSULE, COATED, EXTENDED RELEASE ORAL DAILY
Qty: 90 CAP | Refills: 3 | Status: SHIPPED | OUTPATIENT
Start: 2017-06-27 | End: 2018-06-01 | Stop reason: SDUPTHER

## 2017-07-06 ENCOUNTER — OFFICE VISIT (OUTPATIENT)
Dept: ONCOLOGY | Age: 77
End: 2017-07-06

## 2017-07-06 VITALS
DIASTOLIC BLOOD PRESSURE: 64 MMHG | SYSTOLIC BLOOD PRESSURE: 139 MMHG | HEART RATE: 94 BPM | BODY MASS INDEX: 27.49 KG/M2 | RESPIRATION RATE: 16 BRPM | TEMPERATURE: 99.5 F | WEIGHT: 168.8 LBS | OXYGEN SATURATION: 97 %

## 2017-07-06 DIAGNOSIS — D72.9 NEUTROPHILIC LEUKOCYTOSIS: Primary | ICD-10-CM

## 2017-07-06 DIAGNOSIS — K57.32 DIVERTICULITIS LARGE INTESTINE W/O PERFORATION OR ABSCESS W/O BLEEDING: ICD-10-CM

## 2017-07-06 RX ORDER — MOXIFLOXACIN HYDROCHLORIDE 400 MG/1
400 TABLET ORAL DAILY
Qty: 14 TAB | Refills: 0 | Status: SHIPPED | OUTPATIENT
Start: 2017-07-06 | End: 2017-08-15 | Stop reason: ALTCHOICE

## 2017-07-06 NOTE — MR AVS SNAPSHOT
Visit Information Date & Time Provider Department Dept. Phone Encounter #  
 7/6/2017 11:00 AM Estefany Riggins MD [facility] [phone] [id number] Follow-up Instructions Return in about 2 weeks (around 7/20/2017) for office visit and evaluation. Your Appointments 7/20/2017 10:00 AM  
Follow Up with Estefany Riggins MD  
[facility] [address]) Appt Note: 2Wk F/U  
 [address] [facility] [address] [address] [place] [address] [address] [phone]  
  
   
 [address] [facility] [address] 8/15/2017  1:30 PM  
ESTABLISHED PATIENT with Mackenzie Fox NP  
[address] ([address] [facility]) Appt Note: 3 mo f/u  
 [address] [address]  
[address] [facility] [address] [facility] [address] Upcoming Health Maintenance Date Due  
 FOOT EXAM Q1 7/20/1950 EYE EXAM RETINAL OR DILATED Q1 7/20/1949 DTaP/Tdap/Td series (1 - Tdap) 7/20/1960 GLAUCOMA SCREENING Q2Y 7/20/2004 OSTEOPOROSIS SCREENING (DEXA) 7/20/2004 Pneumococcal 65+ High/Highest Risk (2 of 2 - PPSV23) 3/30/2016 INFLUENZA AGE 9 TO ADULT 8/1/2017 MICROALBUMIN Q1 11/10/2017 MEDICARE YEARLY EXAM 11/11/2017 HEMOGLOBIN A1C Q6M 11/17/2017 LIPID PANEL Q1 5/17/2018 COLONOSCOPY 6/8/2021 Allergies as of 7/6/2017  Review Complete On: 6/21/2017 By: Estefany Riggins MD  
  
 Severity Noted Reaction Type Reactions Morphine  05/24/2016    Itching Ultram [Tramadol]  05/24/2016    Not Reported This Time Current Immunizations  Reviewed on 6/21/2017 Name Date Influenza High Dose Vaccine PF 9/29/2016, 10/16/2015 12:00 AM  
 Pneumococcal Conjugate (PCV-13) 2/3/2016 12:00 AM  
 Zoster Vaccine, Live 12/2/2009 12:00 AM  
  
 Not reviewed this visit You Were Diagnosed With   
  
 Codes Comments Neutrophilic leukocytosis    -  Primary ICD-10-CM: D72.9 ICD-9-CM: 947. 8 Diverticulitis large intestine w/o perforation or abscess w/o bleeding     ICD-10-CM: K57.32 
ICD-9-CM: 562.11 Vitals BP Pulse Temp Resp Weight(growth percentile) SpO2  
 139/64 (BP 1 Location: Left arm, BP Patient Position: Sitting) 94 99.5 °F (37.5 °C) 16 168 lb 12.8 oz (76.6 kg) 97% BMI OB Status Smoking Status 27.49 kg/m2 Postmenopausal Current Every Day Smoker BMI and BSA Data Body Mass Index Body Surface Area  
 27.49 kg/m 2 1.88 m 2 Preferred Pharmacy Pharmacy Name Phone [address], [facility] [place] [address] [place] [phone] Your Updated Medication List  
  
   
This list is accurate as of: 7/6/17 11:47 AM.  Always use your most recent med list.  
  
  
  
  
 acetaminophen 500 mg tablet Commonly known as:  TYLENOL Take  by mouth every six (6) hours as needed for Pain. ascorbic acid (vitamin C) 500 mg tablet Commonly known as:  VITAMIN C Take  by mouth. atorvastatin 20 mg tablet Commonly known as:  LIPITOR  
TAKE 1 TABLET DAILY  
  
 b complex vitamins tablet Take 1 Tab by mouth daily. B.infantis-B.ani-B.long-B.bifi 10-15 mg Tbec Take  by mouth. BENEFIBER (GUAR GUM) PO Take  by mouth. celecoxib 200 mg capsule Commonly known as:  CELEBREX Take 1 Cap by mouth two (2) times a day. cholecalciferol 1,000 unit Cap Commonly known as:  VITAMIN D3 Take  by mouth daily. ciclopirox 8 % solution Commonly known as:  PENLAC Apply 1 Each to affected area nightly for 90 days. dilTIAZem  mg ER capsule Commonly known as:  CARDIZEM CD Take 1 Cap by mouth daily. gabapentin 100 mg capsule Commonly known as:  NEURONTIN Take 2 Caps by mouth three (3) times daily. Every 8 hours  Indications: POSTHERPETIC NEURALGIA  
  
 gemfibrozil 600 mg tablet Commonly known as:  LOPID  
 Take 1 Tab by mouth two (2) times a day. glyBURIDE 5 mg tablet Commonly known as:  Alison Deaminal Take 1 Tab by mouth two (2) times daily (with meals). insulin glargine 100 unit/mL (3 mL) Inpn Commonly known as:  LANTUSBASAGLAR  
40 Units by SubCUTAneous route daily. Insulin Needles (Disposable) 31 gauge x 5/16\" Ndle Use as directed  
  
 loperamide 2 mg tablet Commonly known as:  IMMODIUM Take 2 mg by mouth four (4) times daily as needed for Diarrhea. metFORMIN 1,000 mg tablet Commonly known as:  GLUCOPHAGE Take 1 Tab by mouth two (2) times a day. moxifloxacin 400 mg tablet Commonly known as:  Barnet Acron Take 1 Tab by mouth daily. Start Today. omega-3 fatty acids-vitamin e 1,000 mg Cap Take 1 Cap by mouth. 32a day  
  
 oxyCODONE-acetaminophen 5-325 mg per tablet Commonly known as:  PERCOCET Take 1 Tab by mouth every eight (8) hours as needed for Pain. Max Daily Amount: 3 Tabs. valsartan-hydroCHLOROthiazide 160-12.5 mg per tablet Commonly known as:  DIOVAN-HCT Take 1 Tab by mouth daily. venlafaxine 75 mg tablet Commonly known as:  [facility] Take 75 mg by mouth two (2) times a day. zinc 50 mg Tab tablet Take  by mouth daily. Prescriptions Sent to Pharmacy Refills  
 moxifloxacin (AVELOX) 400 mg tablet 0 Sig: Take 1 Tab by mouth daily. Start Today. Class: Normal  
 Pharmacy: [address] [place], [address] [facility] Ph #: [phone] Route: Oral  
  
Follow-up Instructions Return in about 2 weeks (around 7/20/2017) for office visit and evaluation. Introducing [facility]! Dear Yolande Gonzales: Thank you for requesting a "ChargePoint[employer] account. Our records indicate that you already have an active "[employer] account. You can access your account anytime at [email or web address] Did you know that you can access your hospital and ER discharge instructions at any time in "Ripl.io[employer] Inc."? You can also review all of your test results from your hospital stay or ER visit. Additional Information If you have questions, please visit the Frequently Asked Questions section of the "[email or web address] website at [email or web address] Remember, "Ripl.[email or web address][employer] Inc." is NOT to be used for urgent needs. For medical emergencies, dial 911. Now available from your iPhone and Android! Please provide this summary of care documentation to your next provider. Your primary care clinician is listed as Jerrye Goodpasture. If you have any questions after today's visit, please call [phone].

## 2017-07-09 NOTE — PROGRESS NOTES
Subjective: The patient is a 77year old  female, originally referred because of leukocytosis. She now returns for follow up.     History of Present Illness: The patient has had a several month history of \"feeling bad\", dizziness and stomach discomfort. She also has chronic pain secondary to arthritis. The patient was seen in the emergency room on 04/18/17 with abdominal pain. Pain was located in the LLQ. On physical examination there was no tenderness, rebound or guarding. A non contrast CT scan of the abdomen and pelvis done on that date showed  stranding associated with sigmoid diverticula in the central pelvis. It was felt that the patient had mild sigmoid diverticulitis. There was a prior cholecystectomy and hysterectomy.     The patient was placed on oral Cipro and Flagyl. The patient states that the pain did improve. On 04/18/17 the WBC was 18.6, HGB 14.7, HCT 42.9, plat 374k, but no differential count was done. On 05/17/17 the WBC was 17.4 with 62 segs and 32 lymphocytes. CT scan of the abdomen and pelvis done without contrast on 06/21/17 showed no acute intraabdominal findings. The liver was normal. The kidneys were without hydronephrosis. Colonic diverticula were present without convincing findings for diverticulitis, but no contrast was used in this study. The patient remains afebrile. CBC today - WBC 14.2, HGB 13.9, HCT 40.7, plat 353k, 56 segs, 37 lymphs and 5 monos. The CMP shows a creatinine of 1.3, sugar of 140, BUN 21, calcium is 10.3, which is somewhat elevated, albumin is 3.6. IgA level was 211, IgM level was 41 and IgG was normal at 924. Flow cytometry showed no significant abnormalities. LAYLA mutation is negative. BCR-ABL by PCR is negative. The patient does not have CML. On 06/21/17 the WBC was 13.7.   The patient continues to have a low grade leukocytosis.       Past Medical History:  Medical - there is a history of arthritis, diabetes mellitus, hemorrhoids, elevated cholesterol, irritable bowel syndrome. Surgery - cataract removal, cholecystectomy, colonoscopy, hemorrhoidectomy, hysterectomy and right knee replacement.     Medications:  Imodium, insulin, Neurontin, Diabeta, Celebrex, Percocet, Lopid, Diovan, Hydrochlorothiazide, Lipitor, Glucophage, Cardizem and Effexor.     Allergies:  Morphine and Ultram.       Transfusions:  During birth of her children.     Personal/Social History:  The patient is . One son and two daughters are alive and well. She has worked as an LPN. She smokes one pack of cigarettes a day and drinks alcohol occasionally.     Family Medical History:  One brother committed suicide. One brother and one sister are alive and well. Patient's mother  with diabetes mellitus at age 78 and father  with prostate carcinoma at age 80. There is no other history of anemia or malignancy within the family.     Review of Systems: Weight has been stable, no fevers or night sweats, denies headache, seizures, eye or ear changes, no chronic cough, no sputum production, hemoptysis, chest pain, PND, angina. No nausea, vomiting. The patient does have chronic diarrhea. Abdominal pain is as described above. No melena, hematochezia or hematemesis. There has been no problem with hematuria, nocturia, dysuria, urinary frequency, urinary hesitancy, musculoskeletal aching, temperature preferences, polyuria, polydipsia or easy bruisability. No bleeding, no allergies, no psychiatric issues.     Physical Examination:   GENERAL: Patient is WDWN white female in [place]. VITALS: /64, P 94 and regular, T 99.5, R 16, .8 lb. HEENT: Head normocephalic, atraumatic. Pupils equal, round and reactive to light and accommodation. Extraocular muscles were intact. Fundi not visualized. Conjunctivae were normal. Sclerae non icteric. Ears, nose and throat were normal. Tongue was papillated. NECK: Supple.  Thyroid normal. No bruits heard over the carotid arteries. No jugular venous distention was demonstrated. Trachea was midline. NODES: No inguinal, cervical, supraclavicular or axillary adenopathy. BACK:  There is no tenderness over the lumbosacral spine area. CHEST: Clear. No rales, rhonchi or wheezes are heard. Breath sounds adequate at the bases. BREASTS:  Deferred. HEART: Regular rate and rhythm without murmurs, rubs, thrills or gallops. ABDOMEN: Soft, no liver edge or spleen tip. There is point tenderness in the LLQ without rebound. No tenderness in the RLQ. No masses. RECTAL: Deferred. PELVIC:  Deferred. SKIN: Warm and dry without petechiae or purpura. EXTREMITIES: No edema. NEURO: Cranial nerves intact. Plantars are downgoing. Motor and sensory exam is physiologic.     Laboratory:  See above. Impression:  1. Neutrophilic leukocytosis, most likely secondary to low grade diverticulitis. 2. Type 2 diabetes mellitus. 3. Elevated cholesterol. 4. Arthritis  5. Irritable bowel syndrome. 6. Hypercalcemia, which will need further evaluation.     Plan:  1. I have begun the patient on Avalox 400 mg daily for 14 days. 2. She will return in two weeks time, at which point I will repeat the calcium level and CBC. Hopefully the CBC will have normalized. 3. At some point she needs a follow up colonoscopy.

## 2017-07-11 RX ORDER — VENLAFAXINE 75 MG/1
75 TABLET ORAL 2 TIMES DAILY
Qty: 180 TAB | Refills: 3 | Status: SHIPPED | OUTPATIENT
Start: 2017-07-11 | End: 2017-10-09 | Stop reason: ALTCHOICE

## 2017-07-19 DIAGNOSIS — D72.9 NEUTROPHILIC LEUKOCYTOSIS: Primary | ICD-10-CM

## 2017-07-20 ENCOUNTER — OFFICE VISIT (OUTPATIENT)
Dept: ONCOLOGY | Age: 77
End: 2017-07-20

## 2017-07-20 VITALS
BODY MASS INDEX: 27.53 KG/M2 | RESPIRATION RATE: 18 BRPM | TEMPERATURE: 99 F | OXYGEN SATURATION: 97 % | HEART RATE: 101 BPM | SYSTOLIC BLOOD PRESSURE: 148 MMHG | DIASTOLIC BLOOD PRESSURE: 70 MMHG | WEIGHT: 169 LBS

## 2017-07-20 DIAGNOSIS — D72.9 NEUTROPHILIC LEUKOCYTOSIS: Primary | ICD-10-CM

## 2017-07-20 NOTE — MR AVS SNAPSHOT
Visit Information Date & Time Provider Department Dept. Phone Encounter #  
 7/20/2017 10:00 AM Deny Chacko MD [facility] [phone] [id number] Follow-up Instructions Return in about 6 weeks (around 8/31/2017) for office visit and evaluation. Your Appointments 8/15/2017  1:30 PM  
ESTABLISHED PATIENT with Maria Batista NP  
[address] ([address]) Appt Note: 3 mo f/u  
 [address] [address]  
[address] [facility] [address] [facility] [address] Upcoming Health Maintenance Date Due  
 FOOT EXAM Q1 7/20/1950 EYE EXAM RETINAL OR DILATED Q1 7/20/1949 DTaP/Tdap/Td series (1 - Tdap) 7/20/1960 GLAUCOMA SCREENING Q2Y 7/20/2004 OSTEOPOROSIS SCREENING (DEXA) 7/20/2004 Pneumococcal 65+ High/Highest Risk (2 of 2 - PPSV23) 3/30/2016 INFLUENZA AGE 9 TO ADULT 8/1/2017 MICROALBUMIN Q1 11/10/2017 MEDICARE YEARLY EXAM 11/11/2017 HEMOGLOBIN A1C Q6M 11/17/2017 LIPID PANEL Q1 5/17/2018 COLONOSCOPY 6/8/2021 Allergies as of 7/20/2017  Review Complete On: 7/20/2017 By: Deny Chacko MD  
  
 Severity Noted Reaction Type Reactions Morphine  05/24/2016    Itching Ultram [Tramadol]  05/24/2016    Not Reported This Time Current Immunizations  Reviewed on 6/21/2017 Name Date Influenza High Dose Vaccine PF 9/29/2016, 10/16/2015 12:00 AM  
 Pneumococcal Conjugate (PCV-13) 2/3/2016 12:00 AM  
 Zoster Vaccine, Live 12/2/2009 12:00 AM  
  
 Not reviewed this visit You Were Diagnosed With   
  
 Codes Comments Neutrophilic leukocytosis    -  Primary ICD-10-CM: D72.9 ICD-9-CM: 837. 8 Vitals BP Pulse Temp Resp Weight(growth percentile) SpO2  
 148/70 (!) 101 99 °F (37.2 °C) 18 169 lb (76.7 kg) 97% BMI OB Status Smoking Status 27.53 kg/m2 Postmenopausal Current Every Day Smoker BMI and BSA Data Body Mass Index Body Surface Area  
 27.53 kg/m 2 1.89 m 2 Preferred Pharmacy Pharmacy Name Phone [address] [facility] [phone] Your Updated Medication List  
  
   
This list is accurate as of: 7/20/17 10:44 AM.  Always use your most recent med list.  
  
  
  
  
 acetaminophen 500 mg tablet Commonly known as:  TYLENOL Take  by mouth every six (6) hours as needed for Pain. ascorbic acid (vitamin C) 500 mg tablet Commonly known as:  VITAMIN C Take  by mouth. atorvastatin 20 mg tablet Commonly known as:  LIPITOR  
TAKE 1 TABLET DAILY  
  
 b complex vitamins tablet Take 1 Tab by mouth daily. B.infantis-B.ani-B.long-B.bifi 10-15 mg Tbec Take  by mouth. BENEFIBER (GUAR GUM) PO Take  by mouth. celecoxib 200 mg capsule Commonly known as:  CELEBREX Take 1 Cap by mouth two (2) times a day. cholecalciferol 1,000 unit Cap Commonly known as:  VITAMIN D3 Take  by mouth daily. ciclopirox 8 % solution Commonly known as:  PENLAC Apply 1 Each to affected area nightly for 90 days. dilTIAZem  mg ER capsule Commonly known as:  CARDIZEM CD Take 1 Cap by mouth daily. gabapentin 100 mg capsule Commonly known as:  NEURONTIN Take 2 Caps by mouth three (3) times daily. Every 8 hours  Indications: POSTHERPETIC NEURALGIA  
  
 gemfibrozil 600 mg tablet Commonly known as:  LOPID Take 1 Tab by mouth two (2) times a day. glyBURIDE 5 mg tablet Commonly known as:  Sonda Darron Take 1 Tab by mouth two (2) times daily (with meals). insulin glargine 100 unit/mL (3 mL) Inpn Commonly known as:  LANTUSBASAGLAR  
40 Units by SubCUTAneous route daily. Insulin Needles (Disposable) 31 gauge x 5/16\" Ndle Use as directed  
  
 loperamide 2 mg tablet Commonly known as:  IMMODIUM Take 2 mg by mouth four (4) times daily as needed for Diarrhea. metFORMIN 1,000 mg tablet Commonly known as:  GLUCOPHAGE Take 1 Tab by mouth two (2) times a day. moxifloxacin 400 mg tablet Commonly known as:  Merle Kun Take 1 Tab by mouth daily. Start Today. omega-3 fatty acids-vitamin e 1,000 mg Cap Take 1 Cap by mouth. 32a day  
  
 oxyCODONE-acetaminophen 5-325 mg per tablet Commonly known as:  PERCOCET Take 1 Tab by mouth every eight (8) hours as needed for Pain. Max Daily Amount: 3 Tabs. valsartan-hydroCHLOROthiazide 160-12.5 mg per tablet Commonly known as:  DIOVAN-HCT Take 1 Tab by mouth daily. venlafaxine 75 mg tablet Commonly known as:  [facility] Take 1 Tab by mouth two (2) times a day. zinc 50 mg Tab tablet Take  by mouth daily. Follow-up Instructions Return in about 6 weeks (around 8/31/2017) for office visit and evaluation. Introducing [facility]! Dear Hetal Quinn: Thank you for requesting a [employer] account. Our records indicate that you already have an active [employer] account. You can access your account anytime at [email or web address] Did you know that you can access your hospital and ER discharge instructions at any time in [employer]? You can also review all of your test results from your hospital stay or ER visit. Additional Information If you have questions, please visit the Frequently Asked Questions section of the [employer] website at [email or web address] Remember, [employer] is NOT to be used for urgent needs. For medical emergencies, dial 911. Now available from your iPhone and Android! Please provide this summary of care documentation to your next provider. Your primary care clinician is listed as Gretchen Greenwood. If you have any questions after today's visit, please call [phone].

## 2017-07-23 NOTE — PROGRESS NOTES
Subjective:  The patient is a 65year old  female, originally referred because of leukocytosis. Ant Liz is being seen in follow up today.      History of Present Illness:  The patient has had a several month history of \"feeling bad\", dizziness and stomach discomfort.  She also has chronic pain secondary to arthritis.  The patient was seen in the emergency room on 04/18/17 with abdominal pain.  Pain was located in the LLQ.  On physical examination there was no tenderness, rebound or guarding.  A non contrast CT scan of the abdomen and pelvis done on that date showed  stranding associated with sigmoid diverticula in the central pelvis.  It was felt that the patient had mild sigmoid diverticulitis. Rondi Fails was a prior cholecystectomy and hysterectomy.      The patient was placed on oral Cipro and Flagyl.  The patient states that the pain did improve.  On 04/18/17 the WBC was 18.6, HGB 14.7, HCT 42.9, plat 374k, but no differential count was done.  On 05/17/17 the WBC was 17.4 with 62 segs and 32 lymphocytes.       CT scan of the abdomen and pelvis done without contrast on 06/21/17 showed no acute intraabdominal findings. The liver was normal. The kidneys were without hydronephrosis. Colonic diverticula were present without convincing findings for diverticulitis, but no contrast was used in this study.     The patient remains afebrile. At the last visit the CBC was - WBC 14.2, HGB 13.9, HCT 40.7, plat 353k, 56 segs, 37 lymphs and 5 monos. The CMP showed a creatinine of 1.3, sugar of 140, BUN 21, calcium is 10.3, which was somewhat elevated, albumin is 3.6. IgA level was 211, IgM level was 41 and IgG was normal at 924. Flow cytometry showed no significant abnormalities. LAYLA mutation was negative. BCR-ABL by PCR was negative. The patient does not have CML. On 06/21/17 the WBC was 13.7. The patient continues to have a low grade leukocytosis.       When seen at the last visit I began the patient on Avalox 400 mg daily for 14 days with a working diagnosis of low grade diverticulitis. Today she's actually feeling improved with no diarrhea and no abdominal pain. However, CBC shows a WBC of 12.6, HGB 13.3, HCT 39.9, plat 303k with 56 segs, 35 lymphs and 6 monos. The white count remains elevated. On 17 the WBC was 14.2 and on 17 the WBC was 13.7. The patient may be having a slow response to the antibiotics or she does have an early myeloproliferative syndrome.       Past Medical History:  Medical - there is a history of arthritis, diabetes mellitus, hemorrhoids, elevated cholesterol, irritable bowel syndrome.   Surgery - cataract removal, cholecystectomy, colonoscopy, hemorrhoidectomy, hysterectomy and right knee replacement.      Medications:  Imodium, insulin, Neurontin, Diabeta, Celebrex, Percocet, Lopid, Diovan, Hydrochlorothiazide, Lipitor, Glucophage, Cardizem and Effexor.      Allergies:  Morphine and Ultram.        Transfusions:  During birth of her children.      Personal/Social History:  The patient is . One son and two daughters are alive and well.  She has worked as an LPN.  She smokes one pack of cigarettes a day and drinks alcohol occasionally.  [address] [facility] brother committed suicide.  One brother and one sister are alive and well.  Patient's mother  with diabetes mellitus at age 78 and father  with prostate carcinoma at age 80. Yao Hogan is no other history of anemia or malignancy within the family.      Review of Systems: Weight has been stable, no fevers or night sweats, denies headache, seizures, eye or ear changes, no chronic cough, no sputum production, hemoptysis, chest pain, PND, angina.  No nausea, vomiting.  The patient does have chronic diarrhea.  Abdominal pain is as described above.  No melena, hematochezia or hematemesis. Yao Hogan has been no problem with hematuria, nocturia, dysuria, urinary frequency, urinary hesitancy, musculoskeletal aching, temperature preferences, polyuria, polydipsia or easy bruisability. No bleeding, no allergies, no psychiatric issues.      Physical Examination:   GENERAL: Patient is WDWN white female in [employer]. VITALS: /70, P 101 and regular, T 99 degrees,  lb, R 18 and regular. HEENT: Head normocephalic, atraumatic. Pupils equal, round and reactive to light and accommodation. Extraocular muscles were intact. Fundi not visualized. Conjunctivae were normal. Sclerae non icteric. Ears, nose and throat were normal. Tongue was papillated. NECK: Supple. Thyroid normal. No bruits heard over the carotid arteries. No jugular venous distention was demonstrated. Trachea was midline. NODES: No inguinal, cervical, supraclavicular or axillary adenopathy. BACK:  There is no tenderness over the cervical or thoracic spine area. CHEST: Clear. No rales, rhonchi or wheezes are heard. Breath sounds adequate at the bases. BREASTS:  Deferred. HEART: Regular rate and rhythm without murmurs, rubs, thrills or gallops. ABDOMEN: Soft, no tenderness. No LLQ tenderness, no RLQ tenderness. Bowel sounds normal  RECTAL: Deferred. PELVIC:  Deferred. SKIN: Warm and dry without petechiae or purpura. EXTREMITIES: No edema. NEURO: Cranial nerves intact. Plantars are downgoing. Motor and sensory exam is physiologic.      Laboratory:  See above.     Impression:  1. Neutrophilic leukocytosis, possibly an early myeloproliferative syndrome, cannot rule out a slowly resolving diverticulitis. 2. Type 2 diabetes mellitus. 3. Elevated cholesterol. 4. Arthritis  5. Irritable bowel syndrome. 6. Hypercalcemia, which will need further evaluation. Plan:  1. Complete the Avalox, she has two more days. 2. I will recheck her at six weeks time and if the WBC remains elevated will consider a bone marrow aspiration and biopsy.     Margarita Nova NP

## 2017-08-15 ENCOUNTER — OFFICE VISIT (OUTPATIENT)
Dept: FAMILY MEDICINE CLINIC | Age: 77
End: 2017-08-15

## 2017-08-15 VITALS
BODY MASS INDEX: 26.36 KG/M2 | HEART RATE: 97 BPM | OXYGEN SATURATION: 98 % | TEMPERATURE: 96 F | RESPIRATION RATE: 18 BRPM | WEIGHT: 164 LBS | SYSTOLIC BLOOD PRESSURE: 112 MMHG | HEIGHT: 66 IN | DIASTOLIC BLOOD PRESSURE: 70 MMHG

## 2017-08-15 DIAGNOSIS — E11.65 TYPE 2 DIABETES MELLITUS WITH HYPERGLYCEMIA, UNSPECIFIED LONG TERM INSULIN USE STATUS: Primary | ICD-10-CM

## 2017-08-15 NOTE — MR AVS SNAPSHOT
Visit Information Date & Time Provider Department Dept. Phone Encounter #  
 8/15/2017  1:30 PM Gretchen Greenwood NP [address] [phone] [id number] Your Appointments 8/31/2017 10:00 AM  
Follow Up with Radha Centeno MD  
[facility]) Appt Note: 6Wk F/U  
 [address] [id number] [phone]  
  
   
 [address] [facility] [address] [place] [address] Upcoming Health Maintenance Date Due  
 FOOT EXAM Q1 7/20/1950 EYE EXAM RETINAL OR DILATED Q1 7/20/1949 DTaP/Tdap/Td series (1 - Tdap) 7/20/1960 GLAUCOMA SCREENING Q2Y 7/20/2004 OSTEOPOROSIS SCREENING (DEXA) 7/20/2004 Pneumococcal 65+ High/Highest Risk (2 of 2 - PPSV23) 3/30/2016 INFLUENZA AGE 9 TO ADULT 8/1/2017 MICROALBUMIN Q1 11/10/2017 MEDICARE YEARLY EXAM 11/11/2017 HEMOGLOBIN A1C Q6M 11/17/2017 LIPID PANEL Q1 5/17/2018 COLONOSCOPY 6/8/2021 Allergies as of 8/15/2017  Review Complete On: 8/15/2017 By: Annalisa Lei RN Severity Noted Reaction Type Reactions Morphine  05/24/2016    Itching Ultram [Tramadol]  05/24/2016    Not Reported This Time Current Immunizations  Reviewed on 6/21/2017 Name Date Influenza High Dose Vaccine PF 9/29/2016, 10/16/2015 12:00 AM  
 Pneumococcal Conjugate (PCV-13) 2/3/2016 12:00 AM  
 Zoster Vaccine, Live 12/2/2009 12:00 AM  
  
 Not reviewed this visit You Were Diagnosed With   
  
 Codes Comments Type 2 diabetes mellitus with hyperglycemia, unspecified long term insulin use status (HCC)    -  Primary ICD-10-CM: E11.65 ICD-9-CM: 250.00 Vitals BP Pulse Temp Resp Height(growth percentile) Weight(growth percentile) 112/70 (BP 1 Location: Left arm, BP Patient Position: Sitting) 97 96 °F (35.6 °C) (Temporal) 18 5' 5.7\" (1.669 m) 164 lb (74.4 kg) SpO2 BMI OB Status Smoking Status 98% 26.71 kg/m2 Postmenopausal Current Every Day Smoker Vitals History BMI and BSA Data Body Mass Index Body Surface Area  
 26.71 kg/m 2 1.86 m 2 Preferred Pharmacy Pharmacy Name Phone [address] [facility] [phone] Your Updated Medication List  
  
   
This list is accurate as of: 8/15/17  3:17 PM.  Always use your most recent med list.  
  
  
  
  
 acetaminophen 500 mg tablet Commonly known as:  TYLENOL Take  by mouth every six (6) hours as needed for Pain. ascorbic acid (vitamin C) 500 mg tablet Commonly known as:  VITAMIN C Take  by mouth. atorvastatin 20 mg tablet Commonly known as:  LIPITOR  
TAKE 1 TABLET DAILY  
  
 b complex vitamins tablet Take 1 Tab by mouth daily. B.infantis-B.ani-B.long-B.bifi 10-15 mg Tbec Take  by mouth. BENEFIBER (GUAR GUM) PO Take  by mouth. celecoxib 200 mg capsule Commonly known as:  CELEBREX Take 1 Cap by mouth two (2) times a day. cholecalciferol 1,000 unit Cap Commonly known as:  VITAMIN D3 Take  by mouth daily. ciclopirox 8 % solution Commonly known as:  PENLAC Apply 1 Each to affected area nightly for 90 days. dilTIAZem  mg ER capsule Commonly known as:  CARDIZEM CD Take 1 Cap by mouth daily. gabapentin 100 mg capsule Commonly known as:  NEURONTIN Take 2 Caps by mouth three (3) times daily. Every 8 hours  Indications: POSTHERPETIC NEURALGIA  
  
 gemfibrozil 600 mg tablet Commonly known as:  LOPID Take 1 Tab by mouth two (2) times a day. glyBURIDE 5 mg tablet Commonly known as:  Sonda Darron Take 1 Tab by mouth two (2) times daily (with meals). insulin glargine 100 unit/mL (3 mL) Inpn Commonly known as:  LANIDABASAGLAR  
40 Units by SubCUTAneous route daily. Insulin Needles (Disposable) 31 gauge x 5/16\" Ndle Use as directed  
  
 loperamide 2 mg tablet Commonly known as:  IMMODIUM Take 2 mg by mouth four (4) times daily as needed for Diarrhea. metFORMIN 1,000 mg tablet Commonly known as:  GLUCOPHAGE Take 1 Tab by mouth two (2) times a day. omega-3 fatty acids-vitamin e 1,000 mg Cap Take 1 Cap by mouth. 32a day  
  
 valsartan-hydroCHLOROthiazide 160-12.5 mg per tablet Commonly known as:  DIOVAN-HCT Take 1 Tab by mouth daily. venlafaxine 75 mg tablet Commonly known as:  [place] [facility] Take 1 Tab by mouth two (2) times a day. zinc 50 mg Tab tablet Take  by mouth daily. We Performed the Following COLLECTION VENOUS BLOOD,VENIPUNCTURE [id number] CPT(R)] HEMOGLOBIN A1C WITH EAG [99127 CPT(R)] METABOLIC PANEL, COMPREHENSIVE [50320 CPT(R)] Introducing [facility]! Dear Aniya Share: Thank you for requesting a [employer] account. Our records indicate that you already have an active [employer] account. You can access your account anytime at [email or web address] Did you know that you can access your hospital and ER discharge instructions at any time in [employer]? You can also review all of your test results from your hospital stay or ER visit. Additional Information If you have questions, please visit the Frequently Asked Questions section of the [employer] website at [email or web address] Remember, [employer] is NOT to be used for urgent needs. For medical emergencies, dial 911. Now available from your iPhone and Android! Please provide this summary of care documentation to your next provider. Your primary care clinician is listed as Maria Batista. If you have any questions after today's visit, please call [phone].

## 2017-08-16 LAB
ALBUMIN SERPL-MCNC: 4.4 G/DL (ref 3.5–4.8)
ALBUMIN/GLOB SERPL: 1.4 {RATIO} (ref 1.2–2.2)
ALP SERPL-CCNC: 76 IU/L (ref 39–117)
ALT SERPL-CCNC: 27 IU/L (ref 0–32)
AST SERPL-CCNC: 21 IU/L (ref 0–40)
BILIRUB SERPL-MCNC: <0.2 MG/DL (ref 0–1.2)
BUN SERPL-MCNC: 30 MG/DL (ref 8–27)
BUN/CREAT SERPL: 28 (ref 12–28)
CALCIUM SERPL-MCNC: 10.7 MG/DL (ref 8.7–10.3)
CHLORIDE SERPL-SCNC: 96 MMOL/L (ref 96–106)
CO2 SERPL-SCNC: 22 MMOL/L (ref 18–29)
CREAT SERPL-MCNC: 1.08 MG/DL (ref 0.57–1)
EST. AVERAGE GLUCOSE BLD GHB EST-MCNC: 200 MG/DL
GLOBULIN SER CALC-MCNC: 3.1 G/DL (ref 1.5–4.5)
GLUCOSE SERPL-MCNC: 223 MG/DL (ref 65–99)
HBA1C MFR BLD: 8.6 % (ref 4.8–5.6)
INTERPRETATION: NORMAL
POTASSIUM SERPL-SCNC: 5.2 MMOL/L (ref 3.5–5.2)
PROT SERPL-MCNC: 7.5 G/DL (ref 6–8.5)
SODIUM SERPL-SCNC: 137 MMOL/L (ref 134–144)

## 2017-08-16 NOTE — PROGRESS NOTES
Metabolic panel overall seeing an improvement in kidney function good liver function  Hypercalcemia ( please send labs to hematologist(s))  He may want to check parathyroid glands with a PTH serum level or we can draw lab here. HGBA1C improving.  8.6 down from 9.9  CBC will be drawn at hematologists office

## 2017-08-16 NOTE — PROGRESS NOTES
Subjective:     Christine Black is a 65 y.o. female who presents today with the following:  Chief Complaint   Patient presents with    Diabetes    Hip Pain     left     [facility] followed by Dr. Jordan Cooley Neutrophilic Leukocytosis. Next appointment August 31st.    Seen by Dr. Valerie Holland prefers to return to him for surgery if indicated. Left hip pain. Knee replacement 5 or 6 years ago due to bad left hip. DM: Episodes of dizziness, washed , weak in the knees and heart racing. Guillermina Akins Has not changed batteries in glucometer to check blood glucose. Relieved by eating within 15 minutes of rest and a snack. DM:   Diabetes. Sugars controlled moderately  Hypoglycemia: mild unconfirmed not using glucometer  Tolerating current treatment moderately  Current medications include insulin and glyburide    Lab Results   Component Value Date/Time    Hemoglobin A1c 9.9 05/17/2017 12:52 PM    Hemoglobin A1c 8.8 02/13/2017 11:59 AM    Hemoglobin A1c 8.9 11/10/2016 11:17 AM    Glucose 140 07/06/2017 11:15 AM    Microalb/Creat ratio (ug/mg creat.) 1219.9 11/10/2016 11:17 AM    LDL, calculated 90 05/17/2017 12:52 PM    Creatinine 1.32 07/06/2017 11:15 AM     Lab Results   Component Value Date/Time    Microalb/Creat ratio (ug/mg creat.) 1219.9 11/10/2016 11:17 AM       Last eye exam performed  greather than 1 year ago and was normal.    Last foot exam performed greather than 1 year ago. warm, good capillary refill     COMPLIANT WITH MEDICATION:   HTN; Denies chest pain, dyspnea, palpitations, headache and blurred vision. Blood pressure normotensive.       ROS:  Gen: denies fever, chills, fatigue, weight loss, weight gain  HEENT:denies blurry vision, nasal congestion, sore throat  Resp: denies dypsnea, cough, wheezing  CV: denies chest pain radiating to the jaws or arms, palpitations, lower extremity edema  Abd: denies nausea, vomiting, diarrhea, constipation  Neuro: denies numbness/tingling  Endo: denies polyuria, polydipsia, heat/cold intolerance  Heme: no lymphadenopathy    Allergies   Allergen Reactions    Morphine Itching    Ultram [Tramadol] Not Reported This Time         Current Outpatient Prescriptions:     venlafaxine (EFFEXOR) 75 mg tablet, Take 1 Tab by mouth two (2) times a day., Disp: 180 Tab, Rfl: 3    dilTIAZem CD (CARDIZEM CD) 240 mg ER capsule, Take 1 Cap by mouth daily. , Disp: 90 Cap, Rfl: 3    atorvastatin (LIPITOR) 20 mg tablet, TAKE 1 TABLET DAILY, Disp: 90 Tab, Rfl: 1    insulin glargine (LANTUS,BASAGLAR) 100 unit/mL (3 mL) inpn, 40 Units by SubCUTAneous route daily. (Patient taking differently: 40 Units by SubCUTAneous route daily. Indications: takes 45 units daily), Disp: 6 Pen, Rfl: 11    gabapentin (NEURONTIN) 100 mg capsule, Take 2 Caps by mouth three (3) times daily. Every 8 hours  Indications: POSTHERPETIC NEURALGIA, Disp: 540 Cap, Rfl: 3    glyBURIDE (DIABETA) 5 mg tablet, Take 1 Tab by mouth two (2) times daily (with meals). , Disp: 180 Tab, Rfl: 3    celecoxib (CELEBREX) 200 mg capsule, Take 1 Cap by mouth two (2) times a day., Disp: 180 Cap, Rfl: 3    ciclopirox (PENLAC) 8 % solution, Apply 1 Each to affected area nightly for 90 days. , Disp: 6.6 mL, Rfl: 0    valsartan-hydroCHLOROthiazide (DIOVAN-HCT) 160-12.5 mg per tablet, Take 1 Tab by mouth daily. , Disp: 90 Tab, Rfl: 1    Insulin Needles, Disposable, 31 gauge x 5/16\" ndle, Use as directed, Disp: 1 Package, Rfl: 11    metFORMIN (GLUCOPHAGE) 1,000 mg tablet, Take 1 Tab by mouth two (2) times a day., Disp: 180 Tab, Rfl: 1    gemfibrozil (LOPID) 600 mg tablet, Take 1 Tab by mouth two (2) times a day., Disp: 180 Tab, Rfl: 3    ascorbic acid, vitamin C, (VITAMIN C) 500 mg tablet, Take  by mouth., Disp: , Rfl:     b complex vitamins tablet, Take 1 Tab by mouth daily. , Disp: , Rfl:     acetaminophen (TYLENOL) 500 mg tablet, Take  by mouth every six (6) hours as needed for Pain., Disp: , Rfl:     zinc 50 mg tab tablet, Take  by mouth daily. , Disp: , Rfl:     B.infantis-B.ani-B.long-B.bifi 10-15 mg TbEC, Take  by mouth., Disp: , Rfl:     BENEFIBER, GUAR GUM, PO, Take  by mouth., Disp: , Rfl:     loperamide (IMMODIUM) 2 mg tablet, Take 2 mg by mouth four (4) times daily as needed for Diarrhea., Disp: , Rfl:     omega-3 fatty acids-vitamin e 1,000 mg cap, Take 1 Cap by mouth. 32a day, Disp: , Rfl:     cholecalciferol (VITAMIN D3) 1,000 unit cap, Take  by mouth daily. , Disp: , Rfl:     Past Medical History:   Diagnosis Date    Arthritis     Chronic pain     Chronic pain     Diabetes ([address]     Diverticular disease     Hemorrhoid     Hypercholesterolemia     IBS (irritable bowel syndrome)        Past Surgical History:   Procedure Laterality Date    HX CATARACT REMOVAL      HX CHOLECYSTECTOMY      HX COLONOSCOPY  2009    HX COLONOSCOPY  2016    2 adenomatous polyp    HX HEMORRHOIDECTOMY  2009    HX HYSTERECTOMY      HX KNEE REPLACEMENT      right       History   Smoking Status    Current Every Day Smoker   Smokeless Tobacco    Never Used       Social History     Social History    Marital status:      Spouse name: N/A    Number of children: N/A    Years of education: N/A     Social History Main Topics    Smoking status: Current Every Day Smoker    Smokeless tobacco: Never Used    Alcohol use No    Drug use: None    Sexual activity: Not Asked     Other Topics Concern     Service No    Blood Transfusions Yes    Caffeine Concern Yes    Occupational Exposure No    Hobby Hazards No    Sleep Concern Yes    Stress Concern Yes    Weight Concern No    Special Diet No    Back Care Yes    Exercise No    Bike Helmet No     n/a    Seat Belt Yes    Self-Exams Yes     Social History Narrative       Family History   Problem Relation Age of Onset    Colon Cancer Father          Objective:     Visit Vitals    /70 (BP 1 Location: Left arm, BP Patient Position: Sitting)    Pulse 97    Temp 96 °F (35.6 °C) (Temporal)    Resp 18    Ht 5' 5.7\" (1.669 m)    Wt 164 lb (74.4 kg)    SpO2 98%    BMI 26.71 kg/m2     Body mass index is 26.71 kg/(m^2). General: Alert and oriented. No acute distress. Well nourished  HEENT :  Ears:TMs are normal. Canals are clear. Eyes: pupils equal, round, react to light and accommodation. Extra ocular movements intact. Nose: patent. Mouth and throat is clear. Neck:supple full range of motion no thyromegaly. Trachea midline, No carotid bruits. No significant lymphadenopathy  Lungs[de-identified] clear to auscultation without wheezes, rales, or rhonchi. Heart :RRR, S1 & S2 are normal intensity. No murmur; no gallop  Abdomen: bowel sounds active. No tenderness, guarding, rebound, masses, hepatic or spleen enlargement  Back: no CVA tenderness. Extremities: without clubbing, cyanosis, 2 + symmetric  edema  Pulses: radial and femoral pulses are normal  Neuro: HMF intact. Cranial nerves II through XII grossly normal.  Motor: is 5 over 5 and symmetrical.   Deep tendon reflexes: +2 equal    Results for orders placed or performed during the hospital encounter of 07/20/17   CBC WITH AUTOMATED DIFF   Result Value Ref Range    WBC 12.6 (H) 3.6 - 11.0 K/uL    RBC 4.34 3.80 - 5.20 M/uL    HGB 13.3 11.5 - 16.0 g/dL    HCT 38.9 35.0 - 47.0 %    MCV 89.6 80.0 - 99.0 FL    MCH 30.6 26.0 - 34.0 PG    MCHC 34.2 30.0 - 36.5 g/dL    RDW 13.7 11.5 - 14.5 %    PLATELET 150 559 - 198 K/uL    NEUTROPHILS 56 32 - 75 %    LYMPHOCYTES 35 12 - 49 %    MONOCYTES 6 5 - 13 %    EOSINOPHILS 2 0 - 7 %    BASOPHILS 1 0 - 1 %    ABS. NEUTROPHILS 7.2 1.8 - 8.0 K/UL    ABS. LYMPHOCYTES 4.4 (H) 0.8 - 3.5 K/UL    ABS. MONOCYTES 0.7 0.0 - 1.0 K/UL    ABS. EOSINOPHILS 0.2 0.0 - 0.4 K/UL    ABS. BASOPHILS 0.1 0.0 - 0.1 K/UL       No results found for this visit on 08/15/17. Assessment/ Plan:     Diagnoses and all orders for this visit:    1.  Type 2 diabetes mellitus with hyperglycemia, unspecified long term insulin use status  - METABOLIC PANEL, COMPREHENSIVE  -     HEMOGLOBIN A1C WITH EAG  -     COLLECTION VENOUS BLOOD,VENIPUNCTURE         1. Type 2 diabetes mellitus with hyperglycemia, unspecified long term insulin use status        Orders Placed This Encounter    COLLECTION VENOUS BLOOD,VENIPUNCTURE    METABOLIC PANEL, COMPREHENSIVE    HEMOGLOBIN A1C WITH EAG         Verbal and written instructions (see AVS) provided.  Patient expresses understanding of diagnosis and treatment plan.     Haris Hitchcock, KRIS-C

## 2017-08-22 ENCOUNTER — TELEPHONE (OUTPATIENT)
Dept: FAMILY MEDICINE CLINIC | Age: 77
End: 2017-08-22

## 2017-08-22 RX ORDER — VENLAFAXINE HYDROCHLORIDE 75 MG/1
TABLET, EXTENDED RELEASE ORAL
COMMUNITY
End: 2017-08-31 | Stop reason: SDUPTHER

## 2017-08-22 NOTE — TELEPHONE ENCOUNTER
Patient called spoke with  Aurther,her venlafaxine 75 mg ordered last time was not the ER  as the time before ordered by her last DR Brianna Israel at [facility].  Does not need now but would like this changed if you agree for the next time

## 2017-08-31 ENCOUNTER — OFFICE VISIT (OUTPATIENT)
Dept: ONCOLOGY | Age: 77
End: 2017-08-31

## 2017-08-31 VITALS
BODY MASS INDEX: 27.58 KG/M2 | WEIGHT: 171.6 LBS | OXYGEN SATURATION: 96 % | HEIGHT: 66 IN | DIASTOLIC BLOOD PRESSURE: 63 MMHG | TEMPERATURE: 98.5 F | SYSTOLIC BLOOD PRESSURE: 131 MMHG | HEART RATE: 88 BPM | RESPIRATION RATE: 16 BRPM

## 2017-08-31 DIAGNOSIS — D72.9 NEUTROPHILIC LEUKOCYTOSIS: Primary | ICD-10-CM

## 2017-08-31 NOTE — PROGRESS NOTES
Angeles  is a 65 y.o. female has seen Dr Dee Garrison for R hip and leg pain. XR lumbar spine showed \"moderate multilevel degenerative disc changes lumbar scoliolis and L3-4 spondylolisthesis. Patient continues to smoke 1 pack of cigarettes daily.

## 2017-08-31 NOTE — MR AVS SNAPSHOT
Visit Information Date & Time Provider Department Dept. Phone Encounter #  
 8/31/2017 10:00 AM Beverley Meyer MD [facility] [phone] [id number] Follow-up Instructions Return in about 2 months (around 11/13/2017) for office visit and evaluation. Your Appointments 11/15/2017 11:00 AM  
ESTABLISHED PATIENT with Lisa Parker, NP  
[address] ([address]) Appt Note: 3 mo F/U  
 [address] [address]  
[address] [facility] [address] [address] Upcoming Health Maintenance Date Due  
 FOOT EXAM Q1 7/20/1950 EYE EXAM RETINAL OR DILATED Q1 7/20/1950 DTaP/Tdap/Td series (1 - Tdap) 7/20/1960 GLAUCOMA SCREENING Q2Y 7/20/2004 OSTEOPOROSIS SCREENING (DEXA) 7/20/2004 Pneumococcal 65+ High/Highest Risk (2 of 2 - PPSV23) 3/30/2016 INFLUENZA AGE 9 TO ADULT 8/1/2017 MICROALBUMIN Q1 11/10/2017 MEDICARE YEARLY EXAM 11/11/2017 HEMOGLOBIN A1C Q6M 2/15/2018 LIPID PANEL Q1 5/17/2018 COLONOSCOPY 6/8/2021 Allergies as of 8/31/2017  Review Complete On: 8/31/2017 By: Beverley Meyer MD  
  
 Severity Noted Reaction Type Reactions Morphine  05/24/2016    Itching Ultram [Tramadol]  05/24/2016    Not Reported This Time Current Immunizations  Reviewed on 8/31/2017 Name Date Influenza High Dose Vaccine PF 9/29/2016, 10/16/2015 12:00 AM  
 Pneumococcal Conjugate (PCV-13) 2/3/2016 12:00 AM  
 Zoster Vaccine, Live 12/2/2009 12:00 AM  
  
 Reviewed by Andra Hamm RN on 8/31/2017 at 10:33 AM  
You Were Diagnosed With   
  
 Codes Comments Neutrophilic leukocytosis    -  Primary ICD-10-CM: D72.9 ICD-9-CM: 926. 8 Vitals BP Pulse Temp Resp Height(growth percentile) Weight(growth percentile) 131/63 88 98.5 °F (36.9 °C) (Oral) 16 5' 5.7\" (1.669 m) 171 lb 9.6 oz (77.8 kg) SpO2 BMI OB Status Smoking Status 96% 27.95 kg/m2 Postmenopausal Current Every Day Smoker BMI and BSA Data Body Mass Index Body Surface Area  
 27.95 kg/m 2 1.9 m 2 Preferred Pharmacy Pharmacy Name Phone [address] [facility] [phone] Your Updated Medication List  
  
   
This list is accurate as of: 8/31/17 11:32 AM.  Always use your most recent med list.  
  
  
  
  
 acetaminophen 500 mg tablet Commonly known as:  TYLENOL Take  by mouth every six (6) hours as needed for Pain (taking every 4-5 hours for hip leg pain). ascorbic acid (vitamin C) 500 mg tablet Commonly known as:  VITAMIN C Take  by mouth. atorvastatin 20 mg tablet Commonly known as:  LIPITOR  
TAKE 1 TABLET DAILY  
  
 b complex vitamins tablet Take 1 Tab by mouth daily. B.infantis-B.ani-B.long-B.bifi 10-15 mg Tbec Take  by mouth. BENEFIBER (GUAR GUM) PO Take  by mouth. celecoxib 200 mg capsule Commonly known as:  CELEBREX Take 1 Cap by mouth two (2) times a day. cholecalciferol 1,000 unit Cap Commonly known as:  VITAMIN D3 Take  by mouth daily. dilTIAZem  mg ER capsule Commonly known as:  CARDIZEM CD Take 1 Cap by mouth daily. gabapentin 100 mg capsule Commonly known as:  NEURONTIN Take 2 Caps by mouth three (3) times daily. Every 8 hours  Indications: POSTHERPETIC NEURALGIA  
  
 gemfibrozil 600 mg tablet Commonly known as:  LOPID Take 1 Tab by mouth two (2) times a day. glyBURIDE 5 mg tablet Commonly known as:  Starlette Tristan Take 1 Tab by mouth two (2) times daily (with meals). insulin glargine 100 unit/mL (3 mL) Inpn Commonly known as:  LANTUS,BASAGLAR  
40 Units by SubCUTAneous route daily. Insulin Needles (Disposable) 31 gauge x 5/16\" Ndle Use as directed  
  
 loperamide 2 mg tablet Commonly known as:  IMMODIUM  
 Take 2 mg by mouth four (4) times daily as needed for Diarrhea. metFORMIN 1,000 mg tablet Commonly known as:  GLUCOPHAGE Take 1 Tab by mouth two (2) times a day. omega-3 fatty acids-vitamin e 1,000 mg Cap Take 1 Cap by mouth. 32a day  
  
 valsartan-hydroCHLOROthiazide 160-12.5 mg per tablet Commonly known as:  DIOVAN-HCT Take 1 Tab by mouth daily. venlafaxine 75 mg tablet Commonly known as:  [facility] Take 1 Tab by mouth two (2) times a day. zinc 50 mg Tab tablet Take  by mouth daily. Follow-up Instructions Return in about 2 months (around 11/13/2017) for office visit and evaluation. Introducing [facility]! Dear Carrie Johnson: Thank you for requesting a [employer] account. Our records indicate that you already have an active [employer] account. You can access your account anytime at [email or web address] [place]/[email or web address] Did you know that you can access your hospital and ER discharge instructions at any time in [employer]? You can also review all of your test results from your hospital stay or ER visit. Additional Information If you have questions, please visit the Frequently Asked Questions section of the [employer] website at [email or web address]/. Remember, [employer] is NOT to be used for urgent needs. For medical emergencies, dial 911. Now available from your iPhone and Android! Please provide this summary of care documentation to your next provider. Your primary care clinician is listed as Jerrye Goodpasture. If you have any questions after today's visit, please call [phone].

## 2017-09-03 NOTE — PROGRESS NOTES
Subjective:  The patient is a 65year old  female, originally referred because of leukocytosis.  She now returns for follow up.      History of Present Illness:  The patient has had a several month history of \"feeling bad\", dizziness and stomach discomfort.  She also has chronic pain secondary to arthritis.  The patient was seen in the emergency room on 04/18/17 with abdominal pain.  Pain was located in the LLQ.  On physical examination there was no tenderness, rebound or guarding.  A non contrast CT scan of the abdomen and pelvis done on that date showed  stranding associated with sigmoid diverticula in the central pelvis.  It was felt that the patient had mild sigmoid diverticulitis. Curvin Whitt was a prior cholecystectomy and hysterectomy.      The patient was placed on oral Cipro and Flagyl.  The patient states that the pain did improve.  On 04/18/17 the WBC was 18.6, HGB 14.7, HCT 42.9, plat 374k, but no differential count was done.  On 05/17/17 the WBC was 17.4 with 62 segs and 32 lymphocytes.        CT scan of the abdomen and pelvis done without contrast on 06/21/17 showed no acute intraabdominal findings.  The liver was normal. The kidneys were without hydronephrosis.  Colonic diverticula were present without convincing findings for diverticulitis, but no contrast was used in this study.      The patient remains afebrile.  At the last visit the CBC was - WBC 14.2, HGB 13.9, HCT 40.7, plat 353k, 56 segs, 37 lymphs and 5 monos.  The CMP showed a creatinine of 1.3, sugar of 140, BUN 21, calcium is 10.3, which was somewhat elevated, albumin is 3.6.  IgA level was 211, IgM level was 41 and IgG was normal at 924.  Flow cytometry showed no significant abnormalities.  LAYLA mutation was negative.  BCR-ABL by PCR was negative.  The patient does not have CML.  On 06/21/17 the WBC was 13.7.  The patient continues to have a low grade leukocytosis.        When seen at the last visit I began the patient on Avalox 400 mg daily for 14 days with a working diagnosis of low grade diverticulitis. Today she's actually feeling improved with no diarrhea and no abdominal pain. However, CBC shows a WBC of 12.6, HGB 13.3, HCT 39.9, plat 303k with 56 segs, 35 lymphs and 6 monos. The white count remains elevated. On 17 the WBC was 14.2 and on 17 the WBC was 13.7. The patient may be having a slow response to the antibiotics or she does have an early myeloproliferative syndrome. The patient returns today. She has completed her Avalox antibiotic. CBC today - WBC 15.2, HGB 13.2, HCT 38.8, plat 321k, 60 segs, 31 lymphs and 6 monos. The white count remains elevated despite antibiotic treatment. She has no abdominal complaints. A JAK2 mutation done on 17 was negative. Leukocytosis remains unexplained. I would be concerned that this is an early manifestation of a myeloproliferative syndrome.        Past Medical History:  Medical - there is a history of arthritis, diabetes mellitus, hemorrhoids, elevated cholesterol, irritable bowel syndrome.   Surgery - cataract removal, cholecystectomy, colonoscopy, hemorrhoidectomy, hysterectomy and right knee replacement.      Medications:  Imodium, insulin, Neurontin, Diabeta, Celebrex, Percocet, Lopid, Diovan, Hydrochlorothiazide, Lipitor, Glucophage, Cardizem and Effexor.      Allergies:  Morphine and Ultram.        Transfusions:  During birth of her children.      Personal/Social History:  The patient is .  One son and two daughters are alive and well.  She has worked as an LPN.  She smokes one pack of cigarettes a day and drinks alcohol occasionally.  [address] [facility] brother committed suicide.  One brother and one sister are alive and well.  Patient's mother  with diabetes mellitus at age 78 and father  with prostate carcinoma at age 80. Darral Medal is no other history of anemia or malignancy within the family.      Review of Systems: Weight has been stable, no fevers or night sweats, denies headache, seizures, eye or ear changes, no chronic cough, no sputum production, hemoptysis, chest pain, PND, angina. No nausea, vomiting.  The patient does have chronic diarrhea.  Abdominal pain is as described above.  No melena, hematochezia or hematemesis. Espiridion Amel has been no problem with hematuria, nocturia, dysuria, urinary frequency, urinary hesitancy, musculoskeletal aching, temperature preferences, polyuria, polydipsia or easy bruisability. No bleeding, no allergies, no psychiatric issues.      Physical Examination:   GENERAL: Patient is WDWN white female in NAD. VITALS:  lbs, /63, P 88 and regular, T 98.3, R 16 and regular. HEENT: Head normocephalic, atraumatic. Pupils equal, round and reactive to light and accommodation. Extraocular muscles were intact. Fundi not visualized. Conjunctivae were normal. Sclerae non icteric. Ears, nose and throat were normal. Tongue was papillated. NECK: Supple. Thyroid normal. No bruits heard over the carotid arteries. No jugular venous distention was demonstrated. Trachea was midline. NODES: No inguinal, anterior, posterior cervical, supraclavicular or axillary adenopathy. BACK:  There is no tenderness over the LS spine area. CHEST: Clear. No rales, rhonchi or wheezes are heard.  Breath sounds adequate at the bases. BREASTS:  Deferred. HEART: Regular rate and rhythm without murmurs, rubs, thrills or gallops. ABDOMEN: Soft, no liver edge, spleen tip, masses or tenderness. Bowel sounds normal.  RECTAL: Deferred. PELVIC:  Deferred. SKIN: Warm and dry without petechiae or purpura. EXTREMITIES: No edema. NEURO: Cranial nerves intact. Plantars are downgoing.  Motor and sensory exam is physiologic.      Laboratory:  See above.      Impression:  1. Neutrophilic leukocytosis, possibly an early myeloproliferative syndrome, cannot rule out a slowly resolving diverticulitis. 2. Type 2 diabetes mellitus.   3. Elevated cholesterol. 4. Arthritis  5. Irritable bowel syndrome. 6. Hypercalcemia, which needs further evaluation.     Plan:  1. I am going to hold off on a  bone marrow aspiration and biopsy for now. 2. Return in two months time, at which point we will check the CBC and consider a bone marrow study.   3. I doubt the leukocytosis is secondary to an infectious process and I suspect this patient is manifesting an early myeloproliferative neoplasm.     C:     Matheus Vega NP

## 2017-10-02 ENCOUNTER — CLINICAL SUPPORT (OUTPATIENT)
Dept: FAMILY MEDICINE CLINIC | Age: 77
End: 2017-10-02

## 2017-10-02 DIAGNOSIS — Z23 ENCOUNTER FOR IMMUNIZATION: Primary | ICD-10-CM

## 2017-10-02 NOTE — MR AVS SNAPSHOT
Visit Information Date & Time Provider Department Dept. Phone Encounter #  
 10/2/2017  1:30 PM [id number] [phone] [id number] Your Appointments 11/15/2017 11:00 AM  
ESTABLISHED PATIENT with Mercedes Vargas NP  
[address] ([address]) Appt Note: 3 mo F/U  
 [address] [address]  
[address] [facility] [address] [address] Upcoming Health Maintenance Date Due  
 FOOT EXAM Q1 7/20/1950 EYE EXAM RETINAL OR DILATED Q1 7/20/1950 DTaP/Tdap/Td series (1 - Tdap) 7/20/1960 GLAUCOMA SCREENING Q2Y 7/20/2004 OSTEOPOROSIS SCREENING (DEXA) 7/20/2004 Pneumococcal 65+ High/Highest Risk (2 of 2 - PPSV23) 3/30/2016 INFLUENZA AGE 9 TO ADULT 8/1/2017 MICROALBUMIN Q1 11/10/2017 MEDICARE YEARLY EXAM 11/11/2017 HEMOGLOBIN A1C Q6M 2/15/2018 LIPID PANEL Q1 5/17/2018 COLONOSCOPY 6/8/2021 Allergies as of 10/2/2017  Review Complete On: 8/31/2017 By: Tamika Espino MD  
  
 Severity Noted Reaction Type Reactions Morphine  05/24/2016    Itching Ultram [Tramadol]  05/24/2016    Not Reported This Time Current Immunizations  Reviewed on 8/31/2017 Name Date Influenza High Dose Vaccine PF 10/2/2017, 9/29/2016, 10/16/2015 12:00 AM  
 Pneumococcal Conjugate (PCV-13) 2/3/2016 12:00 AM  
 Zoster Vaccine, Live 12/2/2009 12:00 AM  
  
 Not reviewed this visit You Were Diagnosed With   
  
 Codes Comments Encounter for immunization    -  Primary ICD-10-CM: C01 ICD-9-CM: V03.89 Vitals OB Status Smoking Status Postmenopausal Current Every Day Smoker Preferred Pharmacy Pharmacy Name Phone [address] [facility] [phone] Your Updated Medication List  
  
   
 This list is accurate as of: 10/2/17  2:04 PM.  Always use your most recent med list.  
  
  
  
  
 acetaminophen 500 mg tablet Commonly known as:  TYLENOL Take  by mouth every six (6) hours as needed for Pain (taking every 4-5 hours for hip leg pain). ascorbic acid (vitamin C) 500 mg tablet Commonly known as:  VITAMIN C Take  by mouth. atorvastatin 20 mg tablet Commonly known as:  LIPITOR  
TAKE 1 TABLET DAILY  
  
 b complex vitamins tablet Take 1 Tab by mouth daily. B.infantis-B.ani-B.long-B.bifi 10-15 mg Tbec Take  by mouth. BENEFIBER (GUAR GUM) PO Take  by mouth. celecoxib 200 mg capsule Commonly known as:  CELEBREX Take 1 Cap by mouth two (2) times a day. cholecalciferol 1,000 unit Cap Commonly known as:  VITAMIN D3 Take  by mouth daily. dilTIAZem  mg ER capsule Commonly known as:  CARDIZEM CD Take 1 Cap by mouth daily. gabapentin 100 mg capsule Commonly known as:  NEURONTIN Take 2 Caps by mouth three (3) times daily. Every 8 hours  Indications: POSTHERPETIC NEURALGIA  
  
 gemfibrozil 600 mg tablet Commonly known as:  LOPID Take 1 Tab by mouth two (2) times a day. glyBURIDE 5 mg tablet Commonly known as:  Arzella Arn Take 1 Tab by mouth two (2) times daily (with meals). insulin glargine 100 unit/mL (3 mL) Inpn Commonly known as:  LANTUSBASAGLAR  
40 Units by SubCUTAneous route daily. Insulin Needles (Disposable) 31 gauge x 5/16\" Ndle Use as directed  
  
 loperamide 2 mg tablet Commonly known as:  IMMODIUM Take 2 mg by mouth four (4) times daily as needed for Diarrhea. metFORMIN 1,000 mg tablet Commonly known as:  GLUCOPHAGE Take 1 Tab by mouth two (2) times a day. omega-3 fatty acids-vitamin e 1,000 mg Cap Take 1 Cap by mouth. 32a day  
  
 valsartan-hydroCHLOROthiazide 160-12.5 mg per tablet Commonly known as:  DIOVAN-HCT Take 1 Tab by mouth daily. venlafaxine 75 mg tablet Commonly known as:  [facility] Take 1 Tab by mouth two (2) times a day. zinc 50 mg Tab tablet Take  by mouth daily. We Performed the Following ADMIN INFLUENZA VIRUS VAC [ [facility]] INFLUENZA VIRUS VACCINE, HIGH DOSE SEASONAL, PRESERVATIVE FREE [52558 CPT(R)] Introducing [facility] SERVICES! Dear Kelly Greenberg: Thank you for requesting a [employer] account. Our records indicate that you already have an active SocialDeck account. You can access your account anytime at [email or web address] Did you know that you can access your hospital and ER discharge instructions at any time in SocialDeck? You can also review all of your test results from your hospital stay or ER visit. Additional Information If you have questions, please visit the Frequently Asked Questions section of the SocialDeck website at [email or web address] Remember, SocialDeck is NOT to be used for urgent needs. For medical emergencies, dial 911. Now available from your iPhone and Android! Please provide this summary of care documentation to your next provider. Your primary care clinician is listed as Destiny Little. If you have any questions after today's visit, please call [phone].

## 2017-10-08 RX ORDER — METFORMIN HYDROCHLORIDE 1000 MG/1
TABLET ORAL
Qty: 180 TAB | Refills: 1 | Status: SHIPPED | COMMUNITY
Start: 2017-10-08 | End: 2018-03-14 | Stop reason: SDUPTHER

## 2017-10-09 RX ORDER — DILTIAZEM HYDROCHLORIDE 240 MG/1
240 CAPSULE, COATED, EXTENDED RELEASE ORAL DAILY
Qty: 90 CAP | Refills: 3 | Status: CANCELLED | OUTPATIENT
Start: 2017-10-09

## 2017-10-09 NOTE — TELEPHONE ENCOUNTER
ANNABELLA :    Please review med is ER dose before filling Thanks. Also please review dose instructions.

## 2017-10-09 NOTE — TELEPHONE ENCOUNTER
Patient is requesting a refill on venlafaxine ER. This has not been corrected on her medication list.  [facility].

## 2017-10-10 RX ORDER — VENLAFAXINE HYDROCHLORIDE 75 MG/1
75 TABLET, EXTENDED RELEASE ORAL DAILY
Qty: 30 TAB | Refills: 2 | Status: SHIPPED | OUTPATIENT
Start: 2017-10-10 | End: 2017-10-28

## 2017-10-13 RX ORDER — VALSARTAN AND HYDROCHLOROTHIAZIDE 160; 12.5 MG/1; MG/1
TABLET, FILM COATED ORAL
Qty: 90 TAB | Refills: 1 | Status: SHIPPED | OUTPATIENT
Start: 2017-10-13 | End: 2017-10-28

## 2017-10-19 ENCOUNTER — TELEPHONE (OUTPATIENT)
Dept: FAMILY MEDICINE CLINIC | Age: 77
End: 2017-10-19

## 2017-10-19 NOTE — TELEPHONE ENCOUNTER
Patient would like something for severe pain. She saw Dr. Vivienne Tesfaye for spinal stenosis who has referred her to Dr. Heriberto Stahl for epidural steroid injections to begin on October 26th. In the meantime, she is taking ibuprofen 2 qam and 2 at supper,  Tylenol and 10 tabs of Gabapentin which are not working.

## 2017-10-20 ENCOUNTER — HOSPITAL ENCOUNTER (INPATIENT)
Age: 77
LOS: 8 days | Discharge: HOME HEALTH | DRG: 917 | End: 2017-10-28
Attending: INTERNAL MEDICINE | Admitting: INTERNAL MEDICINE
Payer: MEDICARE

## 2017-10-20 DIAGNOSIS — T40.601D: ICD-10-CM

## 2017-10-20 DIAGNOSIS — G89.4 CHRONIC PAIN SYNDROME: ICD-10-CM

## 2017-10-20 DIAGNOSIS — R53.81 PHYSICAL DEBILITY: ICD-10-CM

## 2017-10-20 DIAGNOSIS — M54.42 ACUTE LEFT-SIDED LOW BACK PAIN WITH LEFT-SIDED SCIATICA: ICD-10-CM

## 2017-10-20 DIAGNOSIS — E11.65 TYPE 2 DIABETES MELLITUS WITH HYPERGLYCEMIA, UNSPECIFIED LONG TERM INSULIN USE STATUS: ICD-10-CM

## 2017-10-20 DIAGNOSIS — R40.1 STUPOR: ICD-10-CM

## 2017-10-20 DIAGNOSIS — T40.604A OPIATE OVERDOSE, UNDETERMINED INTENT, INITIAL ENCOUNTER (HCC): ICD-10-CM

## 2017-10-20 DIAGNOSIS — F17.210 CIGARETTE SMOKER: ICD-10-CM

## 2017-10-20 PROBLEM — E87.5 HYPERKALEMIA: Status: ACTIVE | Noted: 2017-10-20

## 2017-10-20 PROBLEM — R41.82 ALTERED MENTAL STATUS: Status: ACTIVE | Noted: 2017-10-20

## 2017-10-20 PROBLEM — N17.9 ACUTE RENAL FAILURE (ARF) (HCC): Status: ACTIVE | Noted: 2017-10-20

## 2017-10-20 PROBLEM — R74.01 TRANSAMINITIS: Status: ACTIVE | Noted: 2017-10-20

## 2017-10-20 PROCEDURE — 65270000029 HC RM PRIVATE

## 2017-10-20 RX ORDER — MUPIROCIN 20 MG/G
OINTMENT TOPICAL 2 TIMES DAILY
Status: DISPENSED | OUTPATIENT
Start: 2017-10-21 | End: 2017-10-26

## 2017-10-20 RX ORDER — SODIUM CHLORIDE 9 MG/ML
100 INJECTION, SOLUTION INTRAVENOUS CONTINUOUS
Status: DISCONTINUED | OUTPATIENT
Start: 2017-10-21 | End: 2017-10-21

## 2017-10-20 RX ORDER — FACIAL-BODY WIPES
10 EACH TOPICAL DAILY PRN
Status: DISCONTINUED | OUTPATIENT
Start: 2017-10-20 | End: 2017-10-28 | Stop reason: HOSPADM

## 2017-10-20 RX ORDER — ONDANSETRON 2 MG/ML
4 INJECTION INTRAMUSCULAR; INTRAVENOUS
Status: DISCONTINUED | OUTPATIENT
Start: 2017-10-20 | End: 2017-10-28 | Stop reason: HOSPADM

## 2017-10-20 RX ORDER — SODIUM CHLORIDE 0.9 % (FLUSH) 0.9 %
5-10 SYRINGE (ML) INJECTION AS NEEDED
Status: DISCONTINUED | OUTPATIENT
Start: 2017-10-20 | End: 2017-10-28 | Stop reason: HOSPADM

## 2017-10-20 RX ORDER — THIAMINE HYDROCHLORIDE 100 MG/ML
100 INJECTION, SOLUTION INTRAMUSCULAR; INTRAVENOUS DAILY
Status: DISCONTINUED | OUTPATIENT
Start: 2017-10-21 | End: 2017-10-21 | Stop reason: DRUGHIGH

## 2017-10-20 RX ORDER — SODIUM CHLORIDE 0.9 % (FLUSH) 0.9 %
5-10 SYRINGE (ML) INJECTION EVERY 8 HOURS
Status: DISCONTINUED | OUTPATIENT
Start: 2017-10-21 | End: 2017-10-28 | Stop reason: HOSPADM

## 2017-10-20 RX ORDER — ENOXAPARIN SODIUM 100 MG/ML
30 INJECTION SUBCUTANEOUS EVERY 24 HOURS
Status: DISCONTINUED | OUTPATIENT
Start: 2017-10-21 | End: 2017-10-23

## 2017-10-20 RX ORDER — NALOXONE HYDROCHLORIDE 0.4 MG/ML
0.4 INJECTION, SOLUTION INTRAMUSCULAR; INTRAVENOUS; SUBCUTANEOUS AS NEEDED
Status: DISCONTINUED | OUTPATIENT
Start: 2017-10-20 | End: 2017-10-28 | Stop reason: HOSPADM

## 2017-10-20 NOTE — TELEPHONE ENCOUNTER
Spoke with , he called Dr Damon Courser office and he is out of the office.  said that she is in severe pain so I recommended to him to take her to the ED. He agreed.

## 2017-10-20 NOTE — IP AVS SNAPSHOT
[address] [facility] 
[phone] Patient: Santiago Jasso MRN: [id number] VHL:7/98/7588 About your hospitalization You were admitted on:  October 20, 2017 You last received care in the:  [facility] 2 CARDIOPULMONARY CARE You were discharged on:  October 28, 2017 Why you were hospitalized Your primary diagnosis was:  Not on File Your diagnoses also included:  Acute Encephalopathy, Overdose Of Opiate Or Related Narcotic, Accidental Or Unintentional, Subsequent Encounter, Acute Left-Sided Low Back Pain With Sciatica, Physical Debility Things You Need To Do (next 8 weeks) Follow up with Baldemar Britton NP Phone:  [phone] Where:  [facility] [address] Schedule an appointment with Baldemar Britton NP as soon as possible for a visit in 1 week(s) Phone:  [phone] Where:  [facility] [address] [facility] [address] Follow up with Saroj Bailey MD  
Follow-up in two weeks. Phone:  [phone] Where:  [address] [address] Wednesday Nov 15, 2017 ESTABLISHED PATIENT with Baldemar Britton NP at 11:00 AM  
Where:  [address] [place] [address]) Discharge Orders None A check miguel angel indicates which time of day the medication should be taken. My Medications STOP taking these medications   
 acetaminophen 500 mg tablet Commonly known as:  TYLENOL  
   
  
 loperamide 2 mg tablet Commonly known as:  IMMODIUM  
   
  
 valsartan-hydroCHLOROthiazide 160-12.5 mg per tablet Commonly known as:  DIOVAN-HCT Venlafaxine 75 mg Tr24 Replaced by:  venlafaxine 75 mg tablet TAKE these medications as instructed Instructions Each Dose to Equal  
 Morning Noon Evening Bedtime  
 ascorbic acid (vitamin C) 500 mg tablet Commonly known as:  VITAMIN C  
   
 Your last dose was: Your next dose is: Take  by mouth. atorvastatin 20 mg tablet Commonly known as:  LIPITOR Your last dose was: Your next dose is: TAKE 1 TABLET DAILY  
     
   
   
   
  
 b complex vitamins tablet Your last dose was: Your next dose is: Take 1 Tab by mouth daily. 1 Tab B.infantis-B.ani-B.long-B.bifi 10-15 mg Tbec Your last dose was: Your next dose is: Take  by mouth. BENEFIBER (GUAR GUM) PO Your last dose was: Your next dose is: Take  by mouth. celecoxib 200 mg capsule Commonly known as:  CELEBREX Your last dose was: Your next dose is: Take 1 Cap by mouth two (2) times a day. 200 mg  
    
   
   
   
  
 cholecalciferol 1,000 unit Cap Commonly known as:  VITAMIN D3 Your last dose was: Your next dose is: Take  by mouth daily. dilTIAZem  mg ER capsule Commonly known as:  CARDIZEM CD Your last dose was: Your next dose is: Take 1 Cap by mouth daily. 240 mg  
    
   
   
   
  
 gabapentin 300 mg capsule Commonly known as:  NEURONTIN Your last dose was: Your next dose is: Take 1 Cap by mouth two (2) times a day. 200 mg  
    
   
   
   
  
 gemfibrozil 600 mg tablet Commonly known as:  LOPID Your last dose was: Your next dose is: Take 1 Tab by mouth two (2) times a day. 600 mg  
    
   
   
   
  
 glyBURIDE 5 mg tablet Commonly known as:  Kuldip Arechiga Your last dose was: Your next dose is: Take 1 Tab by mouth two (2) times daily (with meals). 5 mg  
    
   
   
   
  
 insulin glargine 100 unit/mL (3 mL) Inpn Commonly known as:  [place]  
   
 Your last dose was: Your next dose is:    
   
   
 40 Units by SubCUTAneous route daily. 40 Units Insulin Needles (Disposable) 31 gauge x 5/16\" Ndle Your last dose was: Your next dose is:    
   
   
 Use as directed  
     
   
   
   
  
 metFORMIN 1,000 mg tablet Commonly known as:  GLUCOPHAGE Your last dose was: Your next dose is: TAKE 1 TABLET TWICE A DAY  
     
   
   
   
  
 metroNIDAZOLE 250 mg tablet Commonly known as:  FLAGYL Your last dose was: Your next dose is: Take 1 Tab by mouth three (3) times daily for 3 days. 250 mg  
    
   
   
   
  
 omega-3 fatty acids-vitamin e 1,000 mg Cap Your last dose was: Your next dose is: Take 1 Cap by mouth. 32a day 1 Cap  
    
   
   
   
  
 oxyCODONE-acetaminophen  mg per tablet Commonly known as:  PERCOCET 10 Your last dose was: Your next dose is: Take 1 Tab by mouth every four (4) hours as needed. Max Daily Amount: 6 Tabs. 1 Tab  
    
   
   
   
  
 thiamine 100 mg tablet Commonly known as:  B-1 Start taking on:  10/29/2017 Your last dose was: Your next dose is: Take 1 Tab by mouth daily. 100 mg  
    
   
   
   
  
 valsartan 160 mg tablet Commonly known as:  DIOVAN Your last dose was: Your next dose is: Take 1 Tab by mouth daily. 160 mg  
    
   
   
   
  
 venlafaxine 75 mg tablet Commonly known as:  [facility] Your last dose was: Your next dose is: Take 1 Tab by mouth two (2) times daily (with meals). 75 mg  
    
   
   
   
  
 zinc 50 mg Tab tablet Your last dose was: Your next dose is: Take  by mouth daily. Where to Get Your Medications Information on where to get these meds will be given to you by the nurse or doctor. ! Ask your nurse or doctor about these medications  
  gabapentin 300 mg capsule  
 metroNIDAZOLE 250 mg tablet  
 oxyCODONE-acetaminophen  mg per tablet  
 thiamine 100 mg tablet  
 valsartan 160 mg tablet  
 venlafaxine 75 mg tablet Discharge Instructions 1. Carbohydrate-modified Diet. 2. Activity: as tolerated. Also as directed by Home Health PT/OT. 3. Please call Dr Emilie Winter office for appointment. 4. Outpatient Psychiatric follow up, with Dr Thanh Napier. Back Pain: Care Instructions Your Care Instructions Back pain has many possible causes. It is often related to problems with muscles and ligaments of the back. It may also be related to problems with the nerves, discs, or bones of the back. Moving, lifting, standing, sitting, or sleeping in an awkward way can strain the back. Sometimes you don't notice the injury until later. Arthritis is another common cause of back pain. Although it may hurt a lot, back pain usually improves on its own within several weeks. Most people recover in 12 weeks or less. Using good home treatment and being careful not to stress your back can help you feel better sooner. Follow-up care is a key part of your treatment and safety. Be sure to make and go to all appointments, and call your doctor if you are having problems. It's also a good idea to know your test results and keep a list of the medicines you take. How can you care for yourself at home? · Sit or lie in positions that are most comfortable and reduce your pain. Try one of these positions when you lie down: ¨ Lie on your back with your knees bent and supported by large pillows. ¨ Lie on the floor with your legs on the seat of a sofa or chair. Rommie Filler on your side with your knees and hips bent and a pillow between your legs. ¨ Lie on your stomach if it does not make pain worse. · Do not sit up in bed, and avoid soft couches and twisted positions.  Bed rest can help relieve pain at first, but it delays healing. Avoid bed rest after the first day of back pain. · Change positions every 30 minutes. If you must sit for long periods of time, take breaks from sitting. Get up and walk around, or lie in a comfortable position. · Try using a heating pad on a low or medium setting for 15 to 20 minutes every 2 or 3 hours. Try a warm shower in place of one session with the heating pad. · You can also try an ice pack for 10 to 15 minutes every 2 to 3 hours. Put a thin cloth between the ice pack and your skin. · Take pain medicines exactly as directed. ¨ If the doctor gave you a prescription medicine for pain, take it as prescribed. ¨ If you are not taking a prescription pain medicine, ask your doctor if you can take an over-the-counter medicine. · Take short walks several times a day. You can start with 5 to 10 minutes, 3 or 4 times a day, and work up to longer walks. Walk on level surfaces and avoid hills and stairs until your back is better. · Return to work and other activities as soon as you can. Continued rest without activity is usually not good for your back. · To prevent future back pain, do exercises to stretch and strengthen your back and stomach. Learn how to use good posture, safe lifting techniques, and proper body mechanics. When should you call for help? Call your doctor now or seek immediate medical care if: 
? · You have new or worsening numbness in your legs. ? · You have new or worsening weakness in your legs. (This could make it hard to stand up.) ? · You lose control of your bladder or bowels. ? Watch closely for changes in your health, and be sure to contact your doctor if: 
? · Your pain gets worse. ? · You are not getting better after 2 weeks. Where can you learn more? Go to http://josh-eleonora.info/. Enter M908 in the search box to learn more about \"Back Pain: Care Instructions. \" Current as of: March 21, 2017 Content Version: 11.4 © 8706-1406 [employer]. Care instructions adapted under license by [employer] (which disclaims liability or warranty for this information). If you have questions about a medical condition or this instruction, always ask your healthcare professional. [address] any warranty or liability for your use of this information. [employer] ([employer] Announcement We are excited to announce that we are making your provider's discharge notes available to you in [employer] ([employer] You will see these notes when they are completed and signed by the physician that discharged you from your recent hospital stay. If you have any questions or concerns about any information you see in [employer] ([employer] please call the Health Information Department where you were seen or reach out to your Primary Care Provider for more information about your plan of care. Introducing [facility] & HEALTH SERVICES! Dear Meryl Slade: Thank you for requesting a [employer] ([employer].H.L.) account. Our records indicate that you already have an active [employer] ([employer]O.H.L.) account. You can access your account anytime at [email or web address] Did you know that you can access your hospital and ER discharge instructions at any time in [employer] ([employer].H.L.)? You can also review all of your test results from your hospital stay or ER visit. Additional Information If you have questions, please visit the Frequently Asked Questions section of the [employer] ([employer] website at [email or web address] Remember, [employer] ([employer].H.[employer].) is NOT to be used for urgent needs. For medical emergencies, dial 911. Now available from your iPhone and Android! Providers Seen During Your Hospitalization Provider Specialty Primary office phone Karen Funk MD Internal Medicine [phone] Elidia Boone MD Internal Medicine [phone] Cara Gonzalez MD Internal Medicine [phone] Your Primary Care Physician (PCP) Primary Care Physician Office Phone Office Fax Deny Molina [phone] You are allergic to the following Allergen Reactions Morphine Itching Ultram (Tramadol) Palpitations Recent Documentation Weight BMI OB Status Smoking Status 81.1 kg 28.43 kg/m2 Postmenopausal Current Every Day Smoker Emergency Contacts Name Discharge Info Relation Home Work Mobile Davy Alberto DISCHARGE CAREGIVER [3] Spouse [3] [phone] Patient Belongings The following personal items are in your possession at time of discharge: 
  Dental Appliances: None  Visual Aid: None      Home Medications: None   Jewelry: None  Clothing: Shirt (Threw away upon pts request - soiled)    Other Valuables: Wallet, With patient Please provide this summary of care documentation to your next provider. Signatures-by signing, you are acknowledging that this After Visit Summary has been reviewed with you and you have received a copy. Patient Signature:  ____________________________________________________________ Date:  ____________________________________________________________  
  
Rudy Yao Provider Signature:  ____________________________________________________________ Date:  ____________________________________________________________

## 2017-10-20 NOTE — IP AVS SNAPSHOT
[address] [facility] 
[phone] Patient: Elijah Deluca MRN: [id number] HNK:[birth year] My Medications STOP taking these medications   
 acetaminophen 500 mg tablet Commonly known as:  TYLENOL  
   
  
 loperamide 2 mg tablet Commonly known as:  IMMODIUM  
   
  
 valsartan-hydroCHLOROthiazide 160-12.5 mg per tablet Commonly known as:  DIOVAN-HCT Venlafaxine 75 mg Tr24 Replaced by:  venlafaxine 75 mg tablet TAKE these medications as instructed Instructions Each Dose to Equal  
 Morning Noon Evening Bedtime  
 ascorbic acid (vitamin C) 500 mg tablet Commonly known as:  VITAMIN C Your last dose was: Your next dose is: Take  by mouth. atorvastatin 20 mg tablet Commonly known as:  LIPITOR Your last dose was: Your next dose is: TAKE 1 TABLET DAILY  
     
   
   
   
  
 b complex vitamins tablet Your last dose was: Your next dose is: Take 1 Tab by mouth daily. 1 Tab B.infantis-B.ani-B.long-B.bifi 10-15 mg Tbec Your last dose was: Your next dose is: Take  by mouth. BENEFIBER (GUAR GUM) PO Your last dose was: Your next dose is: Take  by mouth. celecoxib 200 mg capsule Commonly known as:  CELEBREX Your last dose was: Your next dose is: Take 1 Cap by mouth two (2) times a day. 200 mg  
    
   
   
   
  
 cholecalciferol 1,000 unit Cap Commonly known as:  VITAMIN D3 Your last dose was: Your next dose is: Take  by mouth daily. dilTIAZem  mg ER capsule Commonly known as:  CARDIZEM CD Your last dose was: Your next dose is: Take 1 Cap by mouth daily. 240 mg  
    
   
   
   
  
 gabapentin 300 mg capsule Commonly known as:  NEURONTIN Your last dose was: Your next dose is: Take 1 Cap by mouth two (2) times a day. 200 mg  
    
   
   
   
  
 gemfibrozil 600 mg tablet Commonly known as:  LOPID Your last dose was: Your next dose is: Take 1 Tab by mouth two (2) times a day. 600 mg  
    
   
   
   
  
 glyBURIDE 5 mg tablet Commonly known as:  Dana Camargo Your last dose was: Your next dose is: Take 1 Tab by mouth two (2) times daily (with meals). 5 mg  
    
   
   
   
  
 insulin glargine 100 unit/mL (3 mL) Inpn Commonly known as:  Mima Wallace Your last dose was: Your next dose is:    
   
   
 40 Units by SubCUTAneous route daily. 40 Units Insulin Needles (Disposable) 31 gauge x 5/16\" Ndle Your last dose was: Your next dose is:    
   
   
 Use as directed  
     
   
   
   
  
 metFORMIN 1,000 mg tablet Commonly known as:  GLUCOPHAGE Your last dose was: Your next dose is: TAKE 1 TABLET TWICE A DAY  
     
   
   
   
  
 metroNIDAZOLE 250 mg tablet Commonly known as:  FLAGYL Your last dose was: Your next dose is: Take 1 Tab by mouth three (3) times daily for 3 days. 250 mg  
    
   
   
   
  
 omega-3 fatty acids-vitamin e 1,000 mg Cap Your last dose was: Your next dose is: Take 1 Cap by mouth. 32a day 1 Cap  
    
   
   
   
  
 oxyCODONE-acetaminophen  mg per tablet Commonly known as:  PERCOCET 10 Your last dose was: Your next dose is: Take 1 Tab by mouth every four (4) hours as needed. Max Daily Amount: 6 Tabs. 1 Tab  
    
   
   
   
  
 thiamine 100 mg tablet Commonly known as:  B-1 Start taking on:  10/29/2017 Your last dose was: Your next dose is: Take 1 Tab by mouth daily. 100 mg  
    
   
   
   
  
 valsartan 160 mg tablet Commonly known as:  DIOVAN Your last dose was: Your next dose is: Take 1 Tab by mouth daily. 160 mg  
    
   
   
   
  
 venlafaxine 75 mg tablet Commonly known as:  [facility] Your last dose was: Your next dose is: Take 1 Tab by mouth two (2) times daily (with meals). 75 mg  
    
   
   
   
  
 zinc 50 mg Tab tablet Your last dose was: Your next dose is: Take  by mouth daily. Where to Get Your Medications Information on where to get these meds will be given to you by the nurse or doctor. ! Ask your nurse or doctor about these medications  
  gabapentin 300 mg capsule  
 metroNIDAZOLE 250 mg tablet  
 oxyCODONE-acetaminophen  mg per tablet  
 thiamine 100 mg tablet  
 valsartan 160 mg tablet  
 venlafaxine 75 mg tablet

## 2017-10-20 NOTE — TELEPHONE ENCOUNTER
Recommend getting her appointment moved up with Dr. Ji Velasquez  Location of pain  Bowel and bladder control? Medications are missing dosages. Need to be careful regarding kidney /liver functions.      Trying heat/ice , rest

## 2017-10-21 ENCOUNTER — APPOINTMENT (OUTPATIENT)
Dept: CT IMAGING | Age: 77
DRG: 917 | End: 2017-10-21
Attending: INTERNAL MEDICINE
Payer: MEDICARE

## 2017-10-21 ENCOUNTER — APPOINTMENT (OUTPATIENT)
Dept: ULTRASOUND IMAGING | Age: 77
DRG: 917 | End: 2017-10-21
Attending: INTERNAL MEDICINE
Payer: MEDICARE

## 2017-10-21 LAB
ALBUMIN SERPL-MCNC: 2.6 G/DL (ref 3.5–5)
ALBUMIN/GLOB SERPL: 0.7 {RATIO} (ref 1.1–2.2)
ALP SERPL-CCNC: 159 U/L (ref 45–117)
ALT SERPL-CCNC: 508 U/L (ref 12–78)
ANION GAP SERPL CALC-SCNC: 8 MMOL/L (ref 5–15)
ANION GAP SERPL CALC-SCNC: 9 MMOL/L (ref 5–15)
APAP SERPL-MCNC: 4 UG/ML (ref 10–30)
ARTERIAL PATENCY WRIST A: ABNORMAL
ARTERIAL PATENCY WRIST A: ABNORMAL
AST SERPL-CCNC: 375 U/L (ref 15–37)
ATRIAL RATE: 106 BPM
BASE DEFICIT BLDA-SCNC: 4.2 MMOL/L
BASE DEFICIT BLDA-SCNC: 7.4 MMOL/L
BASOPHILS # BLD: 0 K/UL
BASOPHILS NFR BLD: 0 %
BDY SITE: ABNORMAL
BDY SITE: ABNORMAL
BILIRUB SERPL-MCNC: 0.3 MG/DL (ref 0.2–1)
BREATHS.SPONTANEOUS ON VENT: 8
BUN SERPL-MCNC: 38 MG/DL (ref 6–20)
BUN SERPL-MCNC: 40 MG/DL (ref 6–20)
BUN/CREAT SERPL: 15 (ref 12–20)
BUN/CREAT SERPL: 24 (ref 12–20)
CALCIUM SERPL-MCNC: 7.8 MG/DL (ref 8.5–10.1)
CALCIUM SERPL-MCNC: 7.8 MG/DL (ref 8.5–10.1)
CALCULATED P AXIS, ECG09: 22 DEGREES
CALCULATED R AXIS, ECG10: 56 DEGREES
CALCULATED T AXIS, ECG11: 66 DEGREES
CHLORIDE SERPL-SCNC: 101 MMOL/L (ref 97–108)
CHLORIDE SERPL-SCNC: 104 MMOL/L (ref 97–108)
CK MB CFR SERPL CALC: 0.5 % (ref 0–2.5)
CK MB SERPL-MCNC: 18.3 NG/ML (ref 5–25)
CK SERPL-CCNC: 3973 U/L (ref 26–192)
CO2 SERPL-SCNC: 20 MMOL/L (ref 21–32)
CO2 SERPL-SCNC: 25 MMOL/L (ref 21–32)
CREAT SERPL-MCNC: 1.61 MG/DL (ref 0.55–1.02)
CREAT SERPL-MCNC: 2.59 MG/DL (ref 0.55–1.02)
DIAGNOSIS, 93000: NORMAL
DIFFERENTIAL METHOD BLD: ABNORMAL
EOSINOPHIL # BLD: 0 K/UL
EOSINOPHIL NFR BLD: 0 %
ERYTHROCYTE [DISTWIDTH] IN BLOOD BY AUTOMATED COUNT: 13.9 % (ref 11.5–14.5)
FIO2 ON VENT: 21 %
GAS FLOW.O2 O2 DELIVERY SYS: 2.5 L/MIN
GLOBULIN SER CALC-MCNC: 3.9 G/DL (ref 2–4)
GLUCOSE BLD STRIP.AUTO-MCNC: 162 MG/DL (ref 65–100)
GLUCOSE BLD STRIP.AUTO-MCNC: 250 MG/DL (ref 65–100)
GLUCOSE BLD STRIP.AUTO-MCNC: 265 MG/DL (ref 65–100)
GLUCOSE BLD STRIP.AUTO-MCNC: 277 MG/DL (ref 65–100)
GLUCOSE SERPL-MCNC: 236 MG/DL (ref 65–100)
GLUCOSE SERPL-MCNC: 257 MG/DL (ref 65–100)
HCO3 BLDA-SCNC: 19 MMOL/L (ref 22–26)
HCO3 BLDA-SCNC: 21 MMOL/L (ref 22–26)
HCT VFR BLD AUTO: 35.7 % (ref 35–47)
HGB BLD-MCNC: 11.9 G/DL (ref 11.5–16)
LACTATE SERPL-SCNC: 1.5 MMOL/L (ref 0.4–2)
LYMPHOCYTES # BLD: 1.3 K/UL
LYMPHOCYTES NFR BLD: 11 %
MCH RBC QN AUTO: 30.4 PG (ref 26–34)
MCHC RBC AUTO-ENTMCNC: 33.3 G/DL (ref 30–36.5)
MCV RBC AUTO: 91.3 FL (ref 80–99)
MONOCYTES # BLD: 0.6 K/UL
MONOCYTES NFR BLD: 5 %
NEUTS BAND NFR BLD MANUAL: 10 %
NEUTS SEG # BLD: 9.8 K/UL
NEUTS SEG NFR BLD: 74 %
P-R INTERVAL, ECG05: 166 MS
PCO2 BLDA: 38 MMHG (ref 35–45)
PCO2 BLDA: 41 MMHG (ref 35–45)
PH BLDA: 7.28 [PH] (ref 7.35–7.45)
PH BLDA: 7.36 [PH] (ref 7.35–7.45)
PLATELET # BLD AUTO: 296 K/UL (ref 150–400)
PO2 BLDA: 56 MMHG (ref 80–100)
PO2 BLDA: 78 MMHG (ref 80–100)
POTASSIUM SERPL-SCNC: 3.8 MMOL/L (ref 3.5–5.1)
POTASSIUM SERPL-SCNC: 5.4 MMOL/L (ref 3.5–5.1)
PROT SERPL-MCNC: 6.5 G/DL (ref 6.4–8.2)
Q-T INTERVAL, ECG07: 334 MS
QRS DURATION, ECG06: 92 MS
QTC CALCULATION (BEZET), ECG08: 443 MS
RBC # BLD AUTO: 3.91 M/UL (ref 3.8–5.2)
RBC MORPH BLD: ABNORMAL
SAO2 % BLD: 85 % (ref 92–97)
SAO2 % BLD: 95 % (ref 92–97)
SAO2% DEVICE SAO2% SENSOR NAME: ABNORMAL
SAO2% DEVICE SAO2% SENSOR NAME: ABNORMAL
SERVICE CMNT-IMP: ABNORMAL
SODIUM SERPL-SCNC: 133 MMOL/L (ref 136–145)
SODIUM SERPL-SCNC: 134 MMOL/L (ref 136–145)
SPECIMEN SITE: ABNORMAL
SPECIMEN SITE: ABNORMAL
TROPONIN I SERPL-MCNC: <0.04 NG/ML
VENTRICULAR RATE, ECG03: 106 BPM
WBC # BLD AUTO: 11.7 K/UL (ref 3.6–11)

## 2017-10-21 PROCEDURE — 85025 COMPLETE CBC W/AUTO DIFF WBC: CPT | Performed by: INTERNAL MEDICINE

## 2017-10-21 PROCEDURE — 74011000258 HC RX REV CODE- 258: Performed by: INTERNAL MEDICINE

## 2017-10-21 PROCEDURE — 74011000250 HC RX REV CODE- 250: Performed by: INTERNAL MEDICINE

## 2017-10-21 PROCEDURE — 36415 COLL VENOUS BLD VENIPUNCTURE: CPT | Performed by: INTERNAL MEDICINE

## 2017-10-21 PROCEDURE — 74011636637 HC RX REV CODE- 636/637: Performed by: INTERNAL MEDICINE

## 2017-10-21 PROCEDURE — 74011250636 HC RX REV CODE- 250/636: Performed by: INTERNAL MEDICINE

## 2017-10-21 PROCEDURE — 65620000000 HC RM CCU GENERAL

## 2017-10-21 PROCEDURE — 83605 ASSAY OF LACTIC ACID: CPT | Performed by: INTERNAL MEDICINE

## 2017-10-21 PROCEDURE — 93005 ELECTROCARDIOGRAM TRACING: CPT

## 2017-10-21 PROCEDURE — 77010033678 HC OXYGEN DAILY

## 2017-10-21 PROCEDURE — 36600 WITHDRAWAL OF ARTERIAL BLOOD: CPT | Performed by: INTERNAL MEDICINE

## 2017-10-21 PROCEDURE — 80048 BASIC METABOLIC PNL TOTAL CA: CPT | Performed by: INTERNAL MEDICINE

## 2017-10-21 PROCEDURE — 82962 GLUCOSE BLOOD TEST: CPT

## 2017-10-21 PROCEDURE — 74011250637 HC RX REV CODE- 250/637: Performed by: INTERNAL MEDICINE

## 2017-10-21 PROCEDURE — 77030018786 HC NDL GD F/USND BARD -B

## 2017-10-21 PROCEDURE — 82803 BLOOD GASES ANY COMBINATION: CPT | Performed by: INTERNAL MEDICINE

## 2017-10-21 PROCEDURE — 80307 DRUG TEST PRSMV CHEM ANLYZR: CPT | Performed by: INTERNAL MEDICINE

## 2017-10-21 PROCEDURE — C1751 CATH, INF, PER/CENT/MIDLINE: HCPCS

## 2017-10-21 PROCEDURE — 77030018719 HC DRSG PTCH ANTIMIC J&J -A

## 2017-10-21 PROCEDURE — 77030034848

## 2017-10-21 PROCEDURE — 84484 ASSAY OF TROPONIN QUANT: CPT | Performed by: INTERNAL MEDICINE

## 2017-10-21 PROCEDURE — 02HV33Z INSERTION OF INFUSION DEVICE INTO SUPERIOR VENA CAVA, PERCUTANEOUS APPROACH: ICD-10-PCS | Performed by: INTERNAL MEDICINE

## 2017-10-21 PROCEDURE — 77030037878 HC DRSG MEPILEX >48IN BORD MOLN -B

## 2017-10-21 PROCEDURE — 74011000250 HC RX REV CODE- 250

## 2017-10-21 PROCEDURE — 76770 US EXAM ABDO BACK WALL COMP: CPT

## 2017-10-21 PROCEDURE — 70450 CT HEAD/BRAIN W/O DYE: CPT

## 2017-10-21 PROCEDURE — 82550 ASSAY OF CK (CPK): CPT | Performed by: INTERNAL MEDICINE

## 2017-10-21 PROCEDURE — 80053 COMPREHEN METABOLIC PANEL: CPT | Performed by: INTERNAL MEDICINE

## 2017-10-21 PROCEDURE — 76937 US GUIDE VASCULAR ACCESS: CPT

## 2017-10-21 PROCEDURE — 36569 INSJ PICC 5 YR+ W/O IMAGING: CPT | Performed by: INTERNAL MEDICINE

## 2017-10-21 RX ORDER — OXYCODONE AND ACETAMINOPHEN 10; 325 MG/1; MG/1
1 TABLET ORAL
Status: DISCONTINUED | OUTPATIENT
Start: 2017-10-21 | End: 2017-10-28 | Stop reason: HOSPADM

## 2017-10-21 RX ORDER — INSULIN LISPRO 100 [IU]/ML
INJECTION, SOLUTION INTRAVENOUS; SUBCUTANEOUS EVERY 6 HOURS
Status: DISCONTINUED | OUTPATIENT
Start: 2017-10-21 | End: 2017-10-27

## 2017-10-21 RX ORDER — SODIUM CHLORIDE 0.9 % (FLUSH) 0.9 %
10 SYRINGE (ML) INJECTION AS NEEDED
Status: DISCONTINUED | OUTPATIENT
Start: 2017-10-21 | End: 2017-10-28 | Stop reason: HOSPADM

## 2017-10-21 RX ORDER — CELECOXIB 200 MG/1
200 CAPSULE ORAL 2 TIMES DAILY
Status: DISCONTINUED | OUTPATIENT
Start: 2017-10-21 | End: 2017-10-26

## 2017-10-21 RX ORDER — DEXTROSE 50 % IN WATER (D50W) INTRAVENOUS SYRINGE
12.5-25 AS NEEDED
Status: DISCONTINUED | OUTPATIENT
Start: 2017-10-21 | End: 2017-10-28 | Stop reason: HOSPADM

## 2017-10-21 RX ORDER — SODIUM CHLORIDE 0.9 % (FLUSH) 0.9 %
10 SYRINGE (ML) INJECTION EVERY 24 HOURS
Status: DISCONTINUED | OUTPATIENT
Start: 2017-10-21 | End: 2017-10-28 | Stop reason: HOSPADM

## 2017-10-21 RX ORDER — SODIUM CHLORIDE 0.9 % (FLUSH) 0.9 %
20 SYRINGE (ML) INJECTION AS NEEDED
Status: DISCONTINUED | OUTPATIENT
Start: 2017-10-21 | End: 2017-10-28 | Stop reason: HOSPADM

## 2017-10-21 RX ORDER — DILTIAZEM HYDROCHLORIDE 120 MG/1
240 CAPSULE, COATED, EXTENDED RELEASE ORAL DAILY
Status: DISCONTINUED | OUTPATIENT
Start: 2017-10-21 | End: 2017-10-21

## 2017-10-21 RX ORDER — HEPARIN 100 UNIT/ML
300 SYRINGE INTRAVENOUS AS NEEDED
Status: DISCONTINUED | OUTPATIENT
Start: 2017-10-21 | End: 2017-10-28 | Stop reason: HOSPADM

## 2017-10-21 RX ORDER — HYDROMORPHONE HYDROCHLORIDE 1 MG/ML
0.5 INJECTION, SOLUTION INTRAMUSCULAR; INTRAVENOUS; SUBCUTANEOUS
Status: DISCONTINUED | OUTPATIENT
Start: 2017-10-21 | End: 2017-10-25

## 2017-10-21 RX ORDER — METOPROLOL TARTRATE 5 MG/5ML
5 INJECTION INTRAVENOUS
Status: DISCONTINUED | OUTPATIENT
Start: 2017-10-21 | End: 2017-10-28 | Stop reason: HOSPADM

## 2017-10-21 RX ORDER — SODIUM CHLORIDE 0.9 % (FLUSH) 0.9 %
10-40 SYRINGE (ML) INJECTION EVERY 8 HOURS
Status: DISCONTINUED | OUTPATIENT
Start: 2017-10-21 | End: 2017-10-28 | Stop reason: HOSPADM

## 2017-10-21 RX ORDER — METOPROLOL TARTRATE 5 MG/5ML
INJECTION INTRAVENOUS
Status: COMPLETED
Start: 2017-10-21 | End: 2017-10-21

## 2017-10-21 RX ORDER — SODIUM CHLORIDE 0.9 % (FLUSH) 0.9 %
10-30 SYRINGE (ML) INJECTION AS NEEDED
Status: DISCONTINUED | OUTPATIENT
Start: 2017-10-21 | End: 2017-10-28 | Stop reason: HOSPADM

## 2017-10-21 RX ORDER — HYDRALAZINE HYDROCHLORIDE 20 MG/ML
20 INJECTION INTRAMUSCULAR; INTRAVENOUS
Status: DISCONTINUED | OUTPATIENT
Start: 2017-10-21 | End: 2017-10-28 | Stop reason: HOSPADM

## 2017-10-21 RX ORDER — LORAZEPAM 1 MG/1
1 TABLET ORAL
Status: DISCONTINUED | OUTPATIENT
Start: 2017-10-21 | End: 2017-10-28 | Stop reason: HOSPADM

## 2017-10-21 RX ORDER — MAGNESIUM SULFATE 100 %
4 CRYSTALS MISCELLANEOUS AS NEEDED
Status: DISCONTINUED | OUTPATIENT
Start: 2017-10-21 | End: 2017-10-28 | Stop reason: HOSPADM

## 2017-10-21 RX ADMIN — SODIUM CHLORIDE 100 ML/HR: 900 INJECTION, SOLUTION INTRAVENOUS at 00:58

## 2017-10-21 RX ADMIN — CELECOXIB 200 MG: 100 CAPSULE ORAL at 21:22

## 2017-10-21 RX ADMIN — WATER: 1000 INJECTION, SOLUTION INTRAVENOUS at 12:08

## 2017-10-21 RX ADMIN — THIAMINE HYDROCHLORIDE 100 MG: 100 INJECTION, SOLUTION INTRAMUSCULAR; INTRAVENOUS at 13:27

## 2017-10-21 RX ADMIN — ACETYLCYSTEINE 12220 MG: 200 INJECTION, SOLUTION INTRAVENOUS at 04:20

## 2017-10-21 RX ADMIN — WATER: 1000 INJECTION, SOLUTION INTRAVENOUS at 04:43

## 2017-10-21 RX ADMIN — LORAZEPAM 1 MG: 1 TABLET ORAL at 21:22

## 2017-10-21 RX ADMIN — Medication 10 ML: at 05:29

## 2017-10-21 RX ADMIN — SODIUM CHLORIDE 15 MG/HR: 900 INJECTION, SOLUTION INTRAVENOUS at 23:31

## 2017-10-21 RX ADMIN — OXYCODONE HYDROCHLORIDE AND ACETAMINOPHEN 1 TABLET: 10; 325 TABLET ORAL at 20:47

## 2017-10-21 RX ADMIN — INSULIN LISPRO 3 UNITS: 100 INJECTION, SOLUTION INTRAVENOUS; SUBCUTANEOUS at 01:11

## 2017-10-21 RX ADMIN — ENOXAPARIN SODIUM 30 MG: 30 INJECTION SUBCUTANEOUS at 08:51

## 2017-10-21 RX ADMIN — ACETYLCYSTEINE 4080 MG: 200 INJECTION, SOLUTION INTRAVENOUS at 05:27

## 2017-10-21 RX ADMIN — MUPIROCIN: 20 OINTMENT TOPICAL at 18:00

## 2017-10-21 RX ADMIN — Medication 10 ML: at 23:10

## 2017-10-21 RX ADMIN — Medication 10 ML: at 00:58

## 2017-10-21 RX ADMIN — Medication 10 ML: at 17:03

## 2017-10-21 RX ADMIN — METOPROLOL TARTRATE 5 MG: 5 INJECTION INTRAVENOUS at 15:10

## 2017-10-21 RX ADMIN — MUPIROCIN: 20 OINTMENT TOPICAL at 09:58

## 2017-10-21 RX ADMIN — ACETYLCYSTEINE 8160 MG: 200 INJECTION, SOLUTION INTRAVENOUS at 09:58

## 2017-10-21 RX ADMIN — Medication 10 ML: at 13:27

## 2017-10-21 RX ADMIN — HYDROMORPHONE HYDROCHLORIDE 0.5 MG: 1 INJECTION, SOLUTION INTRAMUSCULAR; INTRAVENOUS; SUBCUTANEOUS at 21:22

## 2017-10-21 RX ADMIN — WATER: 1000 INJECTION, SOLUTION INTRAVENOUS at 20:47

## 2017-10-21 RX ADMIN — INSULIN LISPRO 3 UNITS: 100 INJECTION, SOLUTION INTRAVENOUS; SUBCUTANEOUS at 05:46

## 2017-10-21 RX ADMIN — INSULIN LISPRO 3 UNITS: 100 INJECTION, SOLUTION INTRAVENOUS; SUBCUTANEOUS at 12:08

## 2017-10-21 RX ADMIN — NALOXONE HYDROCHLORIDE 0.4 MG: 0.4 INJECTION, SOLUTION INTRAMUSCULAR; INTRAVENOUS; SUBCUTANEOUS at 06:12

## 2017-10-21 RX ADMIN — SODIUM CHLORIDE 5 MG/HR: 900 INJECTION, SOLUTION INTRAVENOUS at 17:04

## 2017-10-21 NOTE — PROGRESS NOTES
TRANSFER - IN REPORT:    Verbal report received from [facility] RN(name) on Tim Harris  being received from Medical Tele(unit) for change in patient condition(AMS, durg overdose for more monitoring )      Report consisted of patients Situation, Background, Assessment and   Recommendations(SBAR). Information from the following report(s) SBAR, Kardex, ED Summary, Intake/Output, MAR, Accordion, Recent Results, Med Rec Status and Cardiac Rhythm Sinus Tachycardia 108 was reviewed with the receiving nurse. Opportunity for questions and clarification was provided. Assessment completed upon patients arrival to unit and care assumed.

## 2017-10-21 NOTE — PROGRESS NOTES
Physical Therapy  Consult received and chart reviewed. Nursing reporting patient remains drowsy and is not able to maintain alert state at this time. Nursing requesting therapy be hedl today and follow back tomorrow.   Rl Payne, PT

## 2017-10-21 NOTE — CONSULTS
Pulmonology Consult - [facility]    Subjective:   Consult Note: 10/21/2017 9:28 AM    This patient has been seen and evaluated at the request of Dr. Arlette Rosado for altered mental status. Patient is a 65 y.o. female    transferred from [facility] after being found unresponsive at home         Presents to outside hospital for acute unresponsiveness  Reportedly with chronic back pain   Had taken husbands MS Contin  Also tylenol and neurontin  Presents to this facility poorly responsive   Some response/improvement  with narcan   Overnight has remained with altered mental status   No respiratory distress or hemodynamic problems  On NAC protocol tylenol level initially 10 then 4  Unable to obtain history from patient currently and  not available       Past Medical History:   Diagnosis Date    Arthritis     Chronic pain     Chronic pain     Diabetes ([address]     Diverticular disease     Hemorrhoid     Hypercholesterolemia     IBS (irritable bowel syndrome)       Past Surgical History:   Procedure Laterality Date    HX CATARACT REMOVAL      HX CHOLECYSTECTOMY      HX COLONOSCOPY  2009    HX COLONOSCOPY  2016    2 adenomatous polyp    HX HEMORRHOIDECTOMY  2009    HX HYSTERECTOMY      HX KNEE REPLACEMENT      right      Prior to Admission medications    Medication Sig Start Date End Date Taking? Authorizing Provider   valsartan-hydroCHLOROthiazide (DIOVAN-HCT) 160-12.5 mg per tablet TAKE 1 TABLET DAILY 10/13/17   JONI Caicedo   Venlafaxine 75 mg tr24 Take 75 mg by mouth daily. Indications: ANXIETY WITH DEPRESSION  Patient taking differently: Take 75 mg by mouth two (2) times a day. Indications: ANXIETY WITH DEPRESSION 10/10/17   Brigette Christiansen NP   metFORMIN (GLUCOPHAGE) 1,000 mg tablet TAKE 1 TABLET TWICE A DAY 10/8/17   Brigette Christiansen NP   dilTIAZem CD (CARDIZEM CD) 240 mg ER capsule Take 1 Cap by mouth daily.  6/27/17   Brigette Christiansen NP   atorvastatin (LIPITOR) 20 mg tablet TAKE 1 TABLET DAILY 6/26/17   Hayden Armstrong NP   loperamide (IMMODIUM) 2 mg tablet Take 2 mg by mouth four (4) times daily as needed for Diarrhea. Historical Provider   insulin glargine (LANTUS,BASAGLAR) 100 unit/mL (3 mL) inpn 40 Units by SubCUTAneous route daily. Patient taking differently: 45 Units by SubCUTAneous route daily. Indications: takes 45 units daily 6/1/17   Haydenmateus Armstrong NP   gabapentin (NEURONTIN) 100 mg capsule Take 2 Caps by mouth three (3) times daily. Every 8 hours  Indications: POSTHERPETIC NEURALGIA  Patient taking differently: Take 200 mg by mouth three (3) times daily. Every 8 hours  Indications: POSTHERPETIC NEURALGIA, taking 2 caps every 4-5 hours for back pain 5/25/17   Hayden Armstrong NP   glyBURIDE (DIABETA) 5 mg tablet Take 1 Tab by mouth two (2) times daily (with meals). 5/25/17   Haydenserene Armstrong NP   celecoxib (CELEBREX) 200 mg capsule Take 1 Cap by mouth two (2) times a day. 5/25/17   Haydenserene Armstrong NP   Insulin Needles, Disposable, 31 gauge x 5/16\" ndle Use as directed 4/7/17   JONI Rai   gemfibrozil (LOPID) 600 mg tablet Take 1 Tab by mouth two (2) times a day. 11/21/16   Daylin Pereira MD   omega-3 fatty acids-vitamin e 1,000 mg cap Take 1 Cap by mouth. 32a day    Historical Provider   ascorbic acid, vitamin C, (VITAMIN C) 500 mg tablet Take  by mouth. Historical Provider   b complex vitamins tablet Take 1 Tab by mouth daily. Historical Provider   acetaminophen (TYLENOL) 500 mg tablet Take  by mouth every six (6) hours as needed for Pain (taking every 4-5 hours for hip leg pain). Historical Provider   zinc 50 mg tab tablet Take  by mouth daily. Historical Provider   cholecalciferol (VITAMIN D3) 1,000 unit cap Take  by mouth daily. Historical Provider   B.infantis-B.ani-B.long-B.bifi 10-15 mg TbEC Take  by mouth. Historical Provider   Edilma Rosa GUM, PO Take  by mouth.     Historical Provider     Allergies   Allergen Reactions    Morphine Itching    Ultram [Tramadol] Not Reported This Time      Social History   Substance Use Topics    Smoking status: Current Every Day Smoker    Smokeless tobacco: Never Used    Alcohol use No      Family History   Problem Relation Age of Onset    Colon Cancer Father         Current Facility-Administered Medications   Medication Dose Route Frequency    insulin lispro (HUMALOG) injection   SubCUTAneous Q6H    glucose chewable tablet 16 g  4 Tab Oral PRN    dextrose (D50W) injection syrg 12.5-25 g  12.5-25 g IntraVENous PRN    glucagon (GLUCAGEN) injection 1 mg  1 mg IntraMUSCular PRN    hydrALAZINE (APRESOLINE) 20 mg/mL injection 20 mg  20 mg IntraVENous Q6H PRN    sodium bicarbonate (8.4%) 150 mEq in sterile water 1,000 mL infusion   IntraVENous CONTINUOUS    acetylcysteine (ACETADOTE) 8,160 mg in dextrose 5% 1,000 mL infusion  100 mg/kg IntraVENous ONCE    sodium chloride (NS) flush 5-10 mL  5-10 mL IntraVENous Q8H    sodium chloride (NS) flush 5-10 mL  5-10 mL IntraVENous PRN    naloxone (NARCAN) injection 0.4 mg  0.4 mg IntraVENous PRN    ondansetron (ZOFRAN) injection 4 mg  4 mg IntraVENous Q4H PRN    bisacodyl (DULCOLAX) suppository 10 mg  10 mg Rectal DAILY PRN    enoxaparin (LOVENOX) injection 30 mg  30 mg SubCUTAneous Q24H    thiamine (B-1) injection 100 mg  100 mg IntraVENous DAILY    mupirocin (BACTROBAN) 2 % ointment   Both Nostrils BID       Review of Systems:  Constitutional HEENT Cardiovascular Pulmonary Gastrointestinal Genitourinary Musculoskeletal Integument Neurologic Endocrinologic Hematologic ROS unremarkable except for    Objective:   Vital Signs:    Visit Vitals    /65    Pulse (!) 124    Temp 99 °F (37.2 °C)    Resp 15    Wt 81.5 kg (179 lb 10.8 oz)    SpO2 98%    BMI 28.57 kg/m2       O2 Device: Nasal cannula   O2 Flow Rate (L/min): 3 l/min   Temp (24hrs), Av.1 °F (37.3 °C), Min:98.6 °F (37 °C), Max:100 °F (37.8 °C)       Intake/Output:   Last shift: 10/21 0701 - 10/21 1900  In: 510.2 [I.V.:510.2]  Out: 180 [Urine:180]  Last 3 shifts: 10/19 1901 - 10/21 0700  In: 951.4 [I.V.:951.4]  Out: 780 [Urine:780]    Intake/Output Summary (Last 24 hours) at 10/21/17 0928  Last data filed at 10/21/17 0900   Gross per 24 hour   Intake          1461.61 ml   Output              960 ml   Net           501.61 ml       Physical Exam:   General:  Poorly responsive  NAD. Head:  Normocephalic, atraumatic without obvious abnormality   Eyes:  Sclera non injected Conjunctivae. PERRL, EOMs cannot be assesses   Nose: Nares normal. Septum midline. Mucosa normal. No drainage or sinus tenderness. Mouth: Moist mucus membranes  Dentition             Neck: Supple, symmetrical, trachea midline, no adenopathy,   No stridor   Back:   Not examined   Lungs:   No acessory muscle use wheeze rhonchi rales       Heart:  Regular rate and rhythm, S1, S2 normal, no murmur, click, rub or gallop. Abdomen:   Soft, non-tender. Bowel sounds normal. No masses, tenderness, guarding rebound No organomegaly. Extremities: no cyanosis  edema clubbing       Skin: Warm dry.  No rashes or lesions   Lymph nodes: Cervical, supraclavicular, and axillary nodes normal.   Neurologic: Nods to questions  Will not support arms  Moves feet to commnnd       Data Review     Recent Results (from the past 24 hour(s))   EKG, 12 LEAD, INITIAL    Collection Time: 10/20/17  3:11 PM   Result Value Ref Range    Ventricular Rate 68 BPM    Atrial Rate 61 BPM    QRS Duration 90 ms    Q-T Interval 402 ms    QTC Calculation (Bezet) 427 ms    Calculated R Axis 83 degrees    Calculated T Axis 73 degrees    Diagnosis       Accelerated Junctional rhythm  Abnormal ECG  No previous ECGs available  Confirmed by Lobo Guerrero MD, --- ([id number]) on 10/21/2017 6:56:41 AM     DRUG SCREEN, URINE    Collection Time: 10/20/17  3:15 PM   Result Value Ref Range    AMPHETAMINES NEGATIVE  NEG      BARBITURATES NEGATIVE  NEG      BENZODIAZEPINES NEGATIVE NEG      COCAINE NEGATIVE  NEG      OPIATES POSITIVE (A) NEG      PCP(PHENCYCLIDINE) NEGATIVE  NEG      THC (TH-CANNABINOL) NEGATIVE  NEG      TRICYCLICS NEGATIVE  NEG      DRUG SCREEN COMMENT (NOTE)    CBC WITH AUTOMATED DIFF    Collection Time: 10/20/17  3:15 PM   Result Value Ref Range    WBC 17.9 (H) 3.6 - 11.0 K/uL    RBC 4.43 3.80 - 5.20 M/uL    HGB 13.4 11.5 - 16.0 g/dL    HCT 41.2 35.0 - 47.0 %    MCV 93.0 80.0 - 99.0 FL    MCH 30.2 26.0 - 34.0 PG    MCHC 32.5 30.0 - 36.5 g/dL    RDW 13.9 11.5 - 14.5 %    PLATELET 394 386 - 591 K/uL    NEUTROPHILS 79 (H) 32 - 75 %    LYMPHOCYTES 13 12 - 49 %    MONOCYTES 7 5 - 13 %    EOSINOPHILS 1 0 - 7 %    BASOPHILS 0 0 - 1 %    ABS. NEUTROPHILS 14.1 (H) 1.8 - 8.0 K/UL    ABS. LYMPHOCYTES 2.3 0.8 - 3.5 K/UL    ABS. MONOCYTES 1.2 (H) 0.0 - 1.0 K/UL    ABS. EOSINOPHILS 0.2 0.0 - 0.4 K/UL    ABS. BASOPHILS 0.0 0.0 - 0.1 K/UL   METABOLIC PANEL, COMPREHENSIVE    Collection Time: 10/20/17  3:15 PM   Result Value Ref Range    Sodium 134 (L) 136 - 145 mmol/L    Potassium 5.8 (H) 3.5 - 5.1 mmol/L    Chloride 99 97 - 108 mmol/L    CO2 20 (L) 21 - 32 mmol/L    Anion gap 15 5 - 15 mmol/L    Glucose 270 (H) 65 - 100 mg/dL    BUN 36 (H) 7.0 - 18.0 MG/DL    Creatinine 2.89 (H) 0.55 - 1.02 MG/DL    BUN/Creatinine ratio 12 12 - 20      GFR est AA 19 (L) >60 ml/min/1.73m2    GFR est non-AA 16 (L) >60 ml/min/1.73m2    Calcium 8.7 8.5 - 10.1 MG/DL    Bilirubin, total 0.4 0.2 - 1.0 MG/DL    ALT (SGPT) 750 (H) 14 - 63 U/L    AST (SGOT) 796 (H) 15 - 37 U/L    Alk.  phosphatase 192 (H) 45 - 117 U/L    Protein, total 7.2 6.4 - 8.2 g/dL    Albumin 3.0 (L) 3.5 - 5.0 g/dL    Globulin 4.2 (H) 2.0 - 4.0 g/dL    A-G Ratio 0.7 (L) 1.1 - 2.2     MAGNESIUM    Collection Time: 10/20/17  3:15 PM   Result Value Ref Range    Magnesium 1.6 1.6 - 2.4 mg/dL   URINALYSIS W/MICROSCOPIC    Collection Time: 10/20/17  3:15 PM   Result Value Ref Range    Color CLAIRE      Appearance CLEAR CLEAR      Specific gravity 33.3 30.0 - 36.5 g/dL    RDW 13.9 11.5 - 14.5 %    PLATELET 269 902 - 559 K/uL    NEUTROPHILS 74 %    BAND NEUTROPHILS 10 %    LYMPHOCYTES 11 %    MONOCYTES 5 %    EOSINOPHILS 0 %    BASOPHILS 0 %    ABS. NEUTROPHILS 9.8 K/UL    ABS. LYMPHOCYTES 1.3 K/UL    ABS. MONOCYTES 0.6 K/UL    ABS. EOSINOPHILS 0.0 K/UL    ABS. BASOPHILS 0.0 K/UL    DF MANUAL      RBC COMMENTS NORMOCYTIC, NORMOCHROMIC     METABOLIC PANEL, COMPREHENSIVE    Collection Time: 10/21/17 12:47 AM   Result Value Ref Range    Sodium 133 (L) 136 - 145 mmol/L    Potassium 5.4 (H) 3.5 - 5.1 mmol/L    Chloride 104 97 - 108 mmol/L    CO2 20 (L) 21 - 32 mmol/L    Anion gap 9 5 - 15 mmol/L    Glucose 257 (H) 65 - 100 mg/dL    BUN 40 (H) 6 - 20 MG/DL    Creatinine 2.59 (H) 0.55 - 1.02 MG/DL    BUN/Creatinine ratio 15 12 - 20      GFR est AA 22 (L) >60 ml/min/1.73m2    GFR est non-AA 18 (L) >60 ml/min/1.73m2    Calcium 7.8 (L) 8.5 - 10.1 MG/DL    Bilirubin, total 0.3 0.2 - 1.0 MG/DL    ALT (SGPT) 508 (H) 12 - 78 U/L    AST (SGOT) 375 (H) 15 - 37 U/L    Alk.  phosphatase 159 (H) 45 - 117 U/L    Protein, total 6.5 6.4 - 8.2 g/dL    Albumin 2.6 (L) 3.5 - 5.0 g/dL    Globulin 3.9 2.0 - 4.0 g/dL    A-G Ratio 0.7 (L) 1.1 - 2.2     LACTIC ACID    Collection Time: 10/21/17 12:47 AM   Result Value Ref Range    Lactic acid 1.5 0.4 - 2.0 MMOL/L   ACETAMINOPHEN    Collection Time: 10/21/17 12:47 AM   Result Value Ref Range    Acetaminophen level 4 (L) 10 - 30 ug/mL   CK W/ CKMB & INDEX    Collection Time: 10/21/17 12:47 AM   Result Value Ref Range    CK 3973 (H) 26 - 192 U/L    CK - MB 18.3 (H) <3.6 NG/ML    CK-MB Index 0.5 0 - 2.5     TROPONIN I    Collection Time: 10/21/17 12:47 AM   Result Value Ref Range    Troponin-I, Qt. <0.04 <0.05 ng/mL   GLUCOSE, POC    Collection Time: 10/21/17  1:03 AM   Result Value Ref Range    Glucose (POC) 265 (H) 65 - 100 mg/dL    Performed by Yasir Mancini    EKG, 12 LEAD, INITIAL    Collection Time: 10/21/17  3:02 AM   Result Value Ref Range    Ventricular Rate 106 BPM    Atrial Rate 106 BPM    P-R Interval 166 ms    QRS Duration 92 ms    Q-T Interval 334 ms    QTC Calculation (Bezet) 443 ms    Calculated P Axis 22 degrees    Calculated R Axis 56 degrees    Calculated T Axis 66 degrees    Diagnosis       Sinus tachycardia  Otherwise normal ECG  When compared with ECG of 20-OCT-2017 15:11,  Sinus rhythm has replaced Junctional rhythm  Vent. rate has increased BY  38 BPM     GLUCOSE, POC    Collection Time: 10/21/17  5:33 AM   Result Value Ref Range    Glucose (POC) 277 (H) 65 - 100 mg/dL    Performed by Christian Cheung    BLOOD GAS, ARTERIAL    Collection Time: 10/21/17  8:29 AM   Result Value Ref Range    pH 7.36 7.35 - 7.45      PCO2 38 35.0 - 45.0 mmHg    PO2 78 (L) 80 - 100 mmHg    O2 SAT 95 92 - 97 %    BICARBONATE 21 (L) 22 - 26 mmol/L    BASE DEFICIT 4.2 mmol/L    O2 METHOD NASAL O2      O2 FLOW RATE 2.50 L/min    Sample source ARTERIAL      SITE LEFT RADIAL      TORO'S TEST N/A         Chest X-Ray:images and report reviewed  Interstitial prominence    Neg Head CT  Assessment:   AMS  Probable narcotic overdose  Tobacco use  DM  Hypoxemia        ·     Recommendations:     1. Monitor neuro exam  2. Follow spo2 and respiratory pattern  3. O2  4. Could probably DC NAC protocol doubt acute acetaminophen toxicity  5. DVT ppx  6.  Smoking cessation    Discussed with nurses    Billy Huang MD

## 2017-10-21 NOTE — PROGRESS NOTES
PICC (Peripherally Inserted Central Catheter) line insertion  procedure note :     Procedure explained to patient'  Xin Mohamud) along with risks and benefits  and patient's  agreed to proceed. Informed consent obtained from  Patient's . Patient teaching completed. Timeout completed. Pre-procedure assessment done. Maximum sterile barrier precautions observed throughout procedure. Lidocaine 1%  3.0ml sq given prior to cannulation. Cannulated Basilic vein using ultrasound guidance and modified seldinger technique. Inserted 5  Pashto double  lumen PICC to Right arm using [email or web address] Tip Location System and  [address]. Pt has  sinus   rhythm. PICC tip location was confirmed by 3 CG tip positioning system, indicating tall P wave and no negative deflection before P wave which would indicate that the PICC tip is properly placed in the distal SVC or at the [place]. PICC tip location was  confirmed by 2 PICC nurses and 3CG printout placed on patient's chart. Blood return verified and flushed with 20 ml normal saline in each port. Sterile dressing applied with biopatch, statLock and occlusive dressing as per protocol. Curos caps applied to each port. Patient tolerated procedure well with minimal blood loss ( less than 5 ml.)   Patient education material provided. PICC procedure performed by  : Idolina Severin, RN. PICC nurse  Assisted by :   Gillian Sargent  RN  PICC nurse  Reason for access :  Hemodynamically unstable  Complications related to insertion  : none  X-Ray : not applicable  Notified primary nurse    that  PICC line can be used.    Total Trimmed Length :  40  cm   External Length : 0  cm   PICC line site arm circumference:  29    cm   PICC catheter occupies  17 % of vein  Type of PICC: Bard Solo Power PICC   Ref # :    [id number]     Lot # :  [id number]   Expiration Date : 10/31/2018    Idolina Severin, [address], PICC Nurse, Vascular Access Team.

## 2017-10-21 NOTE — H&P
Hospitalist Admission Note    NAME:  Rocio Sneed   :   1940   MRN:   [id number]     Date of admit: 10/20/2017    PCP: Shanon Regalado NP    Assessment/Plan:     Acute encephalopathy from drug overdose POA  Took husbands oral narcotics, ? MS contin, for severe low back pain x 2 days  Found on day of admit unresponsive on floor  Woke with narcan by EMS, then more lethargic since, admit to ICU  Ventilating fine  Elevate HOB  NPO till more awake  Hold all sedatives  PRN narcan  Check head CT in AM    Acute kidney injury POA admit BUN/creat 36/2.89  Rhabdomyolysis POA CPK 3973 due to immobility  Baseline creatinine 1.08  Suspect hypovolemia   No blood on UA dipstick  IVF, add HCO3 with the acidosis  Serial CPKs  Hold ACE Inhibitor  Check renal US    Abnormal LFTs POA   Possible tylenol overdose POA poison control recommends NAC  Taking tylenol regularly  Rhabdo may also account for the increased LFTs  Recheck LFTs and tylenol level  NAC per poison control  Serial labs  Renal us to rule out hydronephrosis  Hold nephrotoxins    Dm type 2 POA  Lantus 20 units plus SSI  Check HgBa1c  Will be NPO    Acute respiratory failure with hypoxia POA  RA PaO2 was 56, CXR no ASD  Suspect related to respiratory depression  ICU, pulmonary to see in AM  O2, narcan gtt as needed    Essential HTN POA  Hopld home BP meds  PRN hydralazine    DVT prophylaxis with lovenox    Code status full code,  is NOK      History     CHIEF COMPLAINT: transferred from [address] after being found unresponsive at home    HISTORY OF PRESENT ILLNESS:  65 Y. O WF  Pt is currently very lethargic, will open eyes then quickly falls asleep, not able to hold a conversation or provide history  HTN, DM type 2, spinal stenosis  Chronic back pain, apparently taking regularly scheduled tylenol ,also taking husbands oral morphine past few days  May also been taking neurontin more than prescribed    Found by aubrey unresponsive, EMS called and brought to [address] [facility] ED  Alert after IV narcan, then gradually more lethargic  Now acute renal failure and abnormal LTFs  We were called to admit the patient      Past Medical History:   Diagnosis Date    Arthritis     Chronic pain     Chronic pain     Diabetes ([address]     Diverticular disease     Hemorrhoid     Hypercholesterolemia     IBS (irritable bowel syndrome)         Past Surgical History:   Procedure Laterality Date    HX CATARACT REMOVAL      HX CHOLECYSTECTOMY      HX COLONOSCOPY  2009    HX COLONOSCOPY  2016    2 adenomatous polyp    HX HEMORRHOIDECTOMY  2009    HX HYSTERECTOMY      HX KNEE REPLACEMENT      right       Social History   Substance Use Topics    Smoking status: Current Every Day Smoker    Smokeless tobacco: Never Used    Alcohol use No    lives with     Family History   Problem Relation Age of Onset    Colon Cancer Father         Allergies   Allergen Reactions    Morphine Itching    Ultram [Tramadol] Not Reported This Time        Prior to Admission medications    Medication Sig Start Date End Date Taking? Authorizing Provider   valsartan-hydroCHLOROthiazide (DIOVAN-HCT) 160-12.5 mg per tablet TAKE 1 TABLET DAILY 10/13/17   JONI Song   Venlafaxine 75 mg tr24 Take 75 mg by mouth daily. Indications: ANXIETY WITH DEPRESSION  Patient taking differently: Take 75 mg by mouth two (2) times a day. Indications: ANXIETY WITH DEPRESSION 10/10/17   Destiny Little NP   metFORMIN (GLUCOPHAGE) 1,000 mg tablet TAKE 1 TABLET TWICE A DAY 10/8/17   Destiny Little NP   dilTIAZem CD (CARDIZEM CD) 240 mg ER capsule Take 1 Cap by mouth daily. 6/27/17   Destiny Little NP   atorvastatin (LIPITOR) 20 mg tablet TAKE 1 TABLET DAILY 6/26/17   Destiny Little NP   loperamide (IMMODIUM) 2 mg tablet Take 2 mg by mouth four (4) times daily as needed for Diarrhea.     Historical Provider   insulin glargine (LANTUS,BASAGLAR) 100 unit/mL (3 mL) inpn 40 Units by SubCUTAneous route daily. Patient taking differently: 45 Units by SubCUTAneous route daily. Indications: takes 45 units daily 6/1/17   Sona Ventura NP   gabapentin (NEURONTIN) 100 mg capsule Take 2 Caps by mouth three (3) times daily. Every 8 hours  Indications: POSTHERPETIC NEURALGIA  Patient taking differently: Take 200 mg by mouth three (3) times daily. Every 8 hours  Indications: POSTHERPETIC NEURALGIA, taking 2 caps every 4-5 hours for back pain 5/25/17   Sona Ventura NP   glyBURIDE (DIABETA) 5 mg tablet Take 1 Tab by mouth two (2) times daily (with meals). 5/25/17   Sona Ventura NP   celecoxib (CELEBREX) 200 mg capsule Take 1 Cap by mouth two (2) times a day. 5/25/17   Sona Ventura NP   Insulin Needles, Disposable, 31 gauge x 5/16\" ndle Use as directed 4/7/17   JONI De La Rosa   gemfibrozil (LOPID) 600 mg tablet Take 1 Tab by mouth two (2) times a day. 11/21/16   Jonah Anne MD   omega-3 fatty acids-vitamin e 1,000 mg cap Take 1 Cap by mouth. 32a day    Historical Provider   ascorbic acid, vitamin C, (VITAMIN C) 500 mg tablet Take  by mouth. Historical Provider   b complex vitamins tablet Take 1 Tab by mouth daily. Historical Provider   acetaminophen (TYLENOL) 500 mg tablet Take  by mouth every six (6) hours as needed for Pain (taking every 4-5 hours for hip leg pain). Historical Provider   zinc 50 mg tab tablet Take  by mouth daily. Historical Provider   cholecalciferol (VITAMIN D3) 1,000 unit cap Take  by mouth daily. Historical Provider   B.infantis-B.ani-B.long-B.bifi 10-15 mg TbEC Take  by mouth. Historical Provider   Westley Quails GUM, PO Take  by mouth.     Historical Provider       Review of symptoms:  Not obtainable due to lethargy, AMS      Objective:   VITALS:    Patient Vitals for the past 24 hrs:   Temp Pulse Resp BP SpO2   10/21/17 0025 - (!) 107 12 124/47 (!) 88 %   10/21/17 0019 - (!) 107 8 - 91 %   10/21/17 0005 99.3 °F (37.4 °C) (!) 105 11 128/46 (!) 88 %   10/20/17 2305 100 °F (37.8 °C) 72 20 120/41 91 %   10/20/17 2220 98.6 °F (37 °C) 69 18 97/41 90 %     Temp (24hrs), Av.1 °F (37.3 °C), Min:98.6 °F (37 °C), Max:100 °F (37.8 °C)           PHYSICAL EXAM:   General:    Very lethargic, if arouses, quickly falls asleep, cooperative in no distress     HEENT: Normocephalic, atraumatic    PERRL, EOMI  Sclera no icterus    Nasal mucosa without masses or discharge  Hearing intact to voice    Oropharynx without erythema or exudate  No thrush  Dry MM  Neck:  No meningismus, trachea midline, no carotid bruits     Thyroid not enlarged, no nodules or tenderness  Lungs:   Clear to auscultation bilaterally. No wheezing or rales    No accessory muscle use or retractions. Heart:   Regular rate and rhythm,  no murmur or gallop. No LE edema     Dorsal pedis, Posterior tibial and radial pulses 2+ and symmetric  Abdomen:   Soft, non-tender. Not distended. Bowel sounds normal.     No masses, No Hepatosplenomegaly, No Rebound or guarding  Lymph nodes: No cervical or inguinal SAI  Musculoskeletal:  No Joint swelling, erythema, warmth.  No Cyanosis or clubbing  Skin:      No rashes     Not Jaundiced   No nodules or thickening    Capillary refill normal  Neurologic: Very lethargic, will arouse, then quickly fall asleep, even in mid-sentence    Some times barely arouses, mumbles then falls a sleep    Cranial nerves 2 to 12 intact    Symmetric motor strength bilaterally when cooperating       LAB DATA REVIEWED:    Recent Results (from the past 12 hour(s))   EKG, 12 LEAD, INITIAL    Collection Time: 10/20/17  3:11 PM   Result Value Ref Range    Ventricular Rate 68 BPM    Atrial Rate 61 BPM    QRS Duration 90 ms    Q-T Interval 402 ms    QTC Calculation (Bezet) 427 ms    Calculated R Axis 83 degrees    Calculated T Axis 73 degrees    Diagnosis       Accelerated Junctional rhythm  Nonspecific ST abnormality  Abnormal ECG  No previous ECGs available     DRUG SCREEN, URINE    Collection Time: 10/20/17 3:15 PM   Result Value Ref Range    AMPHETAMINES NEGATIVE  NEG      BARBITURATES NEGATIVE  NEG      BENZODIAZEPINES NEGATIVE  NEG      COCAINE NEGATIVE  NEG      OPIATES POSITIVE (A) NEG      PCP(PHENCYCLIDINE) NEGATIVE  NEG      THC (TH-CANNABINOL) NEGATIVE  NEG      TRICYCLICS NEGATIVE  NEG      DRUG SCREEN COMMENT (NOTE)    CBC WITH AUTOMATED DIFF    Collection Time: 10/20/17  3:15 PM   Result Value Ref Range    WBC 17.9 (H) 3.6 - 11.0 K/uL    RBC 4.43 3.80 - 5.20 M/uL    HGB 13.4 11.5 - 16.0 g/dL    HCT 41.2 35.0 - 47.0 %    MCV 93.0 80.0 - 99.0 FL    MCH 30.2 26.0 - 34.0 PG    MCHC 32.5 30.0 - 36.5 g/dL    RDW 13.9 11.5 - 14.5 %    PLATELET 071 107 - 059 K/uL    NEUTROPHILS 79 (H) 32 - 75 %    LYMPHOCYTES 13 12 - 49 %    MONOCYTES 7 5 - 13 %    EOSINOPHILS 1 0 - 7 %    BASOPHILS 0 0 - 1 %    ABS. NEUTROPHILS 14.1 (H) 1.8 - 8.0 K/UL    ABS. LYMPHOCYTES 2.3 0.8 - 3.5 K/UL    ABS. MONOCYTES 1.2 (H) 0.0 - 1.0 K/UL    ABS. EOSINOPHILS 0.2 0.0 - 0.4 K/UL    ABS. BASOPHILS 0.0 0.0 - 0.1 K/UL   METABOLIC PANEL, COMPREHENSIVE    Collection Time: 10/20/17  3:15 PM   Result Value Ref Range    Sodium 134 (L) 136 - 145 mmol/L    Potassium 5.8 (H) 3.5 - 5.1 mmol/L    Chloride 99 97 - 108 mmol/L    CO2 20 (L) 21 - 32 mmol/L    Anion gap 15 5 - 15 mmol/L    Glucose 270 (H) 65 - 100 mg/dL    BUN 36 (H) 7.0 - 18.0 MG/DL    Creatinine 2.89 (H) 0.55 - 1.02 MG/DL    BUN/Creatinine ratio 12 12 - 20      GFR est AA 19 (L) >60 ml/min/1.73m2    GFR est non-AA 16 (L) >60 ml/min/1.73m2    Calcium 8.7 8.5 - 10.1 MG/DL    Bilirubin, total 0.4 0.2 - 1.0 MG/DL    ALT (SGPT) 750 (H) 14 - 63 U/L    AST (SGOT) 796 (H) 15 - 37 U/L    Alk.  phosphatase 192 (H) 45 - 117 U/L    Protein, total 7.2 6.4 - 8.2 g/dL    Albumin 3.0 (L) 3.5 - 5.0 g/dL    Globulin 4.2 (H) 2.0 - 4.0 g/dL    A-G Ratio 0.7 (L) 1.1 - 2.2     MAGNESIUM    Collection Time: 10/20/17  3:15 PM   Result Value Ref Range    Magnesium 1.6 1.6 - 2.4 mg/dL   URINALYSIS W/MICROSCOPIC Collection Time: 10/20/17  3:15 PM   Result Value Ref Range    Color CLAIRE      Appearance CLEAR CLEAR      Specific gravity 1.020 1.003 - 1.030      pH (UA) 5.5 5.0 - 8.0      Protein 100 (A) NEG mg/dL    Glucose 500 (A) NEG mg/dL    Ketone NEGATIVE  NEG mg/dL    Blood NEGATIVE  NEG      Urobilinogen 0.2 0.2 - 1.0 EU/dL    Nitrites NEGATIVE  NEG      Leukocyte Esterase NEGATIVE  NEG      WBC 5-10 0 - 4 /hpf    RBC 0-5 0 - 5 /hpf    Epithelial cells FEW FEW /lpf    Bacteria 3+ (A) NEG /hpf    UA:UC IF INDICATED URINE CULTURE ORDERED     ETHYL ALCOHOL    Collection Time: 10/20/17  3:15 PM   Result Value Ref Range    ALCOHOL(ETHYL),SERUM <10 <10 MG/DL   ACETAMINOPHEN    Collection Time: 10/20/17  3:15 PM   Result Value Ref Range    Acetaminophen level 10 10.0 - 00.2 ug/mL   SALICYLATE    Collection Time: 10/20/17  3:15 PM   Result Value Ref Range    Salicylate level 4.0 2.8 - 20.0 MG/DL   BILIRUBIN, CONFIRM    Collection Time: 10/20/17  3:15 PM   Result Value Ref Range    Bilirubin UA, confirm POSITIVE (A) NEG     PROTHROMBIN TIME + INR    Collection Time: 10/20/17  4:45 PM   Result Value Ref Range    INR 1.0 0.9 - 1.1      Prothrombin time 12.1 10.0 - 13.0 sec   LACTIC ACID    Collection Time: 10/20/17  4:45 PM   Result Value Ref Range    Lactic acid 2.7 (HH) 0.4 - 2.0 MMOL/L   POTASSIUM    Collection Time: 10/20/17  8:00 PM   Result Value Ref Range    Potassium 5.9 (H) 3.5 - 5.1 mmol/L   BLOOD GAS, ARTERIAL    Collection Time: 10/21/17 12:18 AM   Result Value Ref Range    pH 7.28 (L) 7.35 - 7.45      PCO2 41 35.0 - 45.0 mmHg    PO2 56 (L) 80 - 100 mmHg    O2 SAT 85 (L) 92 - 97 %    BICARBONATE 19 (L) 22 - 26 mmol/L    BASE DEFICIT 7.4 mmol/L    O2 METHOD ROOM AIR      FIO2 21 %    SPONTANEOUS RATE 8.0      Sample source ARTERIAL      SITE LEFT RADIAL      TORO'S TEST N/A         EKG pending    CXR read by radiology and reviewed by myself shows FINDINGS:   The heart is at the upper limits of normal for size.   There is no acute airspace  consolidation, pleural fluid or pneumothorax. Pulmonary vascularity is normal.  No acute osseous findings. IMPRESSION:    No acute cardiopulmonary process. CT scan abdomen/pelvis FINDINGS:  There is a minimal atelectasis or scar in the left lower lobe. There are no focal abnormalities within the liver, spleen, pancreas, adrenal  glands or kidneys. There is fatty infiltration of the liver. The patient is  status post cholecystectomy. There is a right hepatic cyst.  The aorta tapers without aneurysm. There is no retroperitoneal adenopathy or  mass. The bowel is normal. The appendix is unremarkable. There is no ascites or free  intraperitoneal air. The uterus is been surgically removed. There is no pelvic mass or adenopathy. IMPRESSION: No acute findings.       Inpatient is warranted for this patient because they presents with  AMS, drug overdose  I have a high level of concern for encephalopathy, rhabdomyolysis, acute kidney injury  I anticipate the stay in the hospital will span at least 2 midnights. My assessment of the clinical condition and my plan of care is as outlined above    I saw the patient personally, took a history and did a complete physical exam at the bedside. I performed complex decision making in coming up with a diagnostic and treatment plan for the patient. I reviewed the patient's past medical records, current laboratory and radiology results, and actual Xray films/EKG. I have also discussed this case with the involved ED physician.     Care Plan discussed with:    Patient,  ED Doc Dr Amy Gonzalez of deterioration:  High    Total Time Coordinating Admission:  65   minutes    Total Critical Care Time:         Myesha Gibbs MD

## 2017-10-21 NOTE — PROGRESS NOTES
Occupational Therapy  OT consult received and chart reviewed. Nursing reporting patient remains drowsy and is not able to maintain alert state at this time. Nursing requesting therapy be held today and follow back Monday.

## 2017-10-21 NOTE — PROGRESS NOTES
TRANSFER - IN REPORT:    Verbal report received from [facility]) on Dannis Cadet  being received from [facility]) for urgent transfer      Report consisted of patients Situation, Background, Assessment and   Recommendations(SBAR). Information from the following report(s) SBAR, Kardex, ED Summary, OR Summary, Intake/Output, MAR, Accordion, Recent Results and Med Rec Status was reviewed with the receiving nurse. Opportunity for questions and clarification was provided. Assessment completed upon patients arrival to unit and care assumed.

## 2017-10-21 NOTE — PROGRESS NOTES
TRANSFER - OUT REPORT:    Verbal report given to CCU staff(name) on Aretha Later  being transferred to CCU(unit) for urgent transfer       Report consisted of patients Situation, Background, Assessment and   Recommendations(SBAR). Information from the following report(s) SBAR, Kardex, ED Summary, OR Summary, Intake/Output, MAR, Accordion, Recent Results and Med Rec Status was reviewed with the receiving nurse. Lines:       Opportunity for questions and clarification was provided.       Patient transported with:   Patients medications from home  Registered Nurse  Tech

## 2017-10-21 NOTE — PROGRESS NOTES
Summary,   Patient transferred in yesterday from Medical tele unit for   Acute encephalopathy from drug overdose POA  Acute kidney injury POA   Rhabdomyolysis POA   Abnormal LFTs POA   Possible tylenol overdose POA   Dm type 2 POA  Acute respiratory failure with hypoxia POA    Temp 99.3 orally,  Neuro; confused and drowsy sleep > respond to voice quickly drift to sleep status, decraesed follow command, no eyes contact, pupils  Reactive to light, GCS;  Eye; 3, verbal; 4, motor; 6.  Respiratory; Sat 88% on RA > ABG done > metabolic acidosis with hypoxemia > NC applied at 3 l/min, sat up to 96%, clear upper diminished lower  lung sounds shallow. Cardiac; Sinus Tachycardia, 105 B/min increased in morning to 120, NIBP 128/46 mmHg, NS at 100 ml/hr changed in morning to water wuth Bicarb 150 mEq/1L at 125 ml/hr. GI; NPO, obese Abd soft with active bowel sound, loose stool 3 time over night  Renal; allen cath with adequate urine out put. Skin; Primary Nurse Jaime Dubon and Vito Magaña RN performed a dual skin assessment on this patient No impairment noted> wart at vagina, scratch at left elbow . Bryan score is 13    Endo; SSI every 6 hours,   Lines; PIV X 2, Allen. Code; full. Lab; WBC 11.7, HGB; 11.9, platelet; 062, Na; 040, K; 5.4, BUN; 40, Crea; 2.59,  DVT; Lovenox. Plan; frequent Neuro check, reorient the patient, follow poison center recommendations > acetylcysteine as protocol started, follow lab BMP, ABG at am back normal, CT head, Abd US, ,    Bedside and Verbal shift change report given to Zahra Guzman RN (oncoming nurse) by Mariano Jett RN (offgoing nurse). Report included the following information SBAR, Kardex, Intake/Output, MAR, Accordion, Recent Results, Med Rec Status and Cardiac Rhythm Sinus Tachycardia 120.

## 2017-10-21 NOTE — PROGRESS NOTES
Hospitalist Progress Note    NAME: Kurt Arndt   :  1940   MRN:  [id number]       Interim Hospital Summary: 65 y.o. female whom presented on 10/20/2017 with      Assessment / Plan:  Acute encephalopathy from drug overdose POA slowly improving   Took husbands oral narcotics, ? MS contin, for severe low back pain x 2 days    Ventilating fine  Elevate HOB  NPO till more awake  Hold all sedatives  PRN narcan  Head  CT  ngt  Neuro  to see    Leukocytosis  likely reactive  Improving no sx of infection  Lactic acid 1.5    ? h/o afib- Cardizem drip for now   Cardi evl in am     Acute kidney injury POA  much improved @  Cr 1.61  Rhabdomyolysis POA CPK in am  Baseline creatinine 1.08  Suspect hypovolemia   No blood on UA dipstick  IVF, add HCO3 with the acidosis  Serial CPKs  Hold ACE Inhibitor  Check renal US  replace  k per icu protocol    Abnormal LFTs POA improving  Possible tylenol overdose POA - ? S/p NAC per poison control  Serial labs  Renal us  Unremarkable retroperitoneal ultrasound exam. Incidental echogenic  liver. Hold nephrotoxins     Dm type 2 POA  Lantus 20 units plus SSI  HgBa1c  Will be NPO     Acute respiratory failure with hypoxia POA resolving  O2, narcan gtt as needed     Essential HTN POA  Hopld home BP meds  PRN hydralazine     DVT prophylaxis with lovenox     Code status full code,  is NOK                    Subjective:     Chief Complaint / Reason for Physician Visit sleepy/droussy responds to simple commands     Discussed with RN events overnight. Review of Systems:  Symptom Y/N Comments  Symptom Y/N Comments   Fever/Chills    Chest Pain     Poor Appetite    Edema     Cough    Abdominal Pain     Sputum    Joint Pain     SOB/STANTON    Pruritis/Rash     Nausea/vomit    Tolerating PT/OT     Diarrhea    Tolerating Diet     Constipation    Other       Could NOT obtain due to:      Objective:     VITALS:   Last 24hrs VS reviewed since prior progress note.  Most recent are:  Patient Vitals for the past 24 hrs:   Temp Pulse Resp BP SpO2   10/21/17 0500 - (!) 108 12 131/51 98 %   10/21/17 0415 - (!) 106 12 132/47 98 %   10/21/17 0300 98.9 °F (37.2 °C) (!) 106 14 127/50 97 %   10/21/17 0200 - (!) 109 16 130/45 94 %   10/21/17 0100 - (!) 108 14 125/46 93 %   10/21/17 0031 - (!) 104 16 - 92 %   10/21/17 0025 - (!) 107 12 124/47 (!) 88 %   10/21/17 0019 - (!) 107 8 - 91 %   10/21/17 0005 99.3 °F (37.4 °C) (!) 105 11 128/46 (!) 88 %   10/20/17 2305 100 °F (37.8 °C) 72 20 120/41 91 %   10/20/17 2220 98.6 °F (37 °C) 69 18 97/41 90 %       Intake/Output Summary (Last 24 hours) at 10/21/17 1626  Last data filed at 10/21/17 0500   Gross per 24 hour   Intake           499.75 ml   Output              415 ml   Net            84.75 ml        PHYSICAL EXAM:  General:                     lethargic,falls asleep,  in no distress     HEENT:                     Normocephalic, atraumatic    PERRL, EOMI  Sclera no icterus                                              Nasal mucosa without masses or discharge  Hearing intact to voice                                              Oropharynx without erythema or exudate  No thrush  Dry MM  Neck:                                   No meningismus, trachea midline, no carotid bruits                                               Thyroid not enlarged, no nodules or tenderness no meningismus sing   Lungs:                       Clear to auscultation bilaterally. No wheezing or rales                                              No accessory muscle use or retractions. Heart:                                  Regular rate and rhythm,  no murmur or gallop. No LE edema                                           Abdomen:                  Soft, non-tender. Not distended.   Bowel sounds normal.                                               No masses, No Hepatosplenomegaly, No Rebound or guarding  Musculoskeletal:  No Joint swelling, erythema, warmth. No Cyanosis or clubbing  Skin:                                     No rashes                                               Not Jaundiced   No nodules or thickening                                              Capillary refill normal  Neurologic:                Vethargic,,nods to simple questions   Cranial nerves 2 to 12 intact                                              Symmetric motor strength bilaterally when cooperating            Reviewed most current lab test results and cultures  YES  Reviewed most current radiology test results   YES  Review and summation of old records today    NO  Reviewed patient's current orders and MAR    YES  PMH/SH reviewed - no change compared to H&P  ________________________________________________________________________  Care Plan discussed with:    Comments   Patient x    Family      RN x    Care Manager     Consultant  x pharmacy                      Multidiciplinary team rounds were held today with , nursing, pharmacist and clinical coordinator. Patient's plan of care was discussed; medications were reviewed and discharge planning was addressed. ________________________________________________________________________  Total NON critical care TIME:  30   Minutes    Total CRITICAL CARE TIME Spent:   Minutes non procedure based      Comments   >50% of visit spent in counseling and coordination of care x    ________________________________________________________________________  Wilmer Conner MD     Procedures: see electronic medical records for all procedures/Xrays and details which were not copied into this note but were reviewed prior to creation of Plan. LABS:  I reviewed today's most current labs and imaging studies.   Pertinent labs include:  Recent Labs      10/21/17   0047  10/20/17   1515   WBC  11.7*  17.9*   HGB  11.9  13.4   HCT  35.7  41.2   PLT  296  349     Recent Labs      10/21/17   0047  10/20/17   2000  10/20/17 1645  10/20/17   1515   NA  133*   --    --   134*   K  5.4*  5.9*   --   5.8*   CL  104   --    --   99   CO2  20*   --    --   20*   GLU  257*   --    --   270*   BUN  40*   --    --   36*   CREA  2.59*   --    --   2.89*   CA  7.8*   --    --   8.7   MG   --    --    --   1.6   ALB  2.6*   --    --   3.0*   TBILI  0.3   --    --   0.4   SGOT  375*   --    --   796*   ALT  508*   --    --   750*   INR   --    --   1.0   --        Signed: Maverick Guerra MD

## 2017-10-21 NOTE — PROGRESS NOTES
0715 Report received from Myron Ferraro RN.    1115 Assessment completed. Patient responds to voice, oriented to person and place only. Speech is clear but delayed. Patient follows commands at times, able to squeeze bilateral hands and wiggle bilateral feet when asked. Pupils round and brisk. Sitter present. No signs of respiratory distress noted. Respirations nonlabored on 3LNC at rest. Sodium Bicarb IV infusing at 125ml/hr and Acetylcysteine IV infusing per order. Patient is being monitored closely for acute changes. 0825 Repeat ABG's drawn per doctors order. 0474 Dr. Kike Ramirez made aware of ABG results and Sinus Tachycardia. 0930 Patient transported via bed to CT for ordered Head CT. 1030 BMP lab work sent to lab per order. 1200 Assessment unchanged. Patient opens eyes to voice, follows some commands. No signs of distress noted at present. 1430 Patient's tachycardic with HR sustained in the 140s and 150s. Dr. Kike Ramirez paged. /70.    1440 Talked with Dr. Kike Ramirez about patient HR in the 140s and 150s, showing Sinus Tachycardia. New orders received for Metoprolol 5mg IV PRN. EKG shows Sinus Tachycardia. 1510 Metoprolol 5mg IV PRN given due to .     1515 Second EKG done, showing AFIB. Dr. Kike Ramirze repaged. [address] [facility] Talked with Dr. Genie Goodell about AFIB and HR, Cardizem IV drip and PICC line ordered. [address] Team in room for PICC line insertion. 1704 Cardizem IV started at 5mg/hr and being titrated to keep HR<100. Will closely monitor. 1800 Talked with [facility] regarding patient's condition. 1810 Patient more alert, confused and calling out for Som Padilla which is patient's sister. Patient able to be redirected by Staff RN and sitter. 19 Camilo Iqbal call cardiovascular doctor paged for patient's new onset AFIB.    1900 Report given to HO Zaragoza.

## 2017-10-22 LAB
ALBUMIN SERPL-MCNC: 2 G/DL (ref 3.5–5)
ALBUMIN/GLOB SERPL: 0.6 {RATIO} (ref 1.1–2.2)
ALP SERPL-CCNC: 103 U/L (ref 45–117)
ALT SERPL-CCNC: 231 U/L (ref 12–78)
ANION GAP SERPL CALC-SCNC: 7 MMOL/L (ref 5–15)
APAP SERPL-MCNC: 5 UG/ML (ref 10–30)
AST SERPL-CCNC: 86 U/L (ref 15–37)
BASOPHILS # BLD: 0 K/UL (ref 0–0.1)
BASOPHILS NFR BLD: 0 % (ref 0–1)
BILIRUB SERPL-MCNC: 0.3 MG/DL (ref 0.2–1)
BUN SERPL-MCNC: 24 MG/DL (ref 6–20)
BUN/CREAT SERPL: 28 (ref 12–20)
CALCIUM SERPL-MCNC: 8 MG/DL (ref 8.5–10.1)
CHLORIDE SERPL-SCNC: 99 MMOL/L (ref 97–108)
CK MB CFR SERPL CALC: 0.3 % (ref 0–2.5)
CK MB SERPL-MCNC: 3.3 NG/ML (ref 5–25)
CK SERPL-CCNC: 1206 U/L (ref 26–192)
CO2 SERPL-SCNC: 32 MMOL/L (ref 21–32)
CREAT SERPL-MCNC: 0.85 MG/DL (ref 0.55–1.02)
EOSINOPHIL # BLD: 0.2 K/UL (ref 0–0.4)
EOSINOPHIL NFR BLD: 2 % (ref 0–7)
ERYTHROCYTE [DISTWIDTH] IN BLOOD BY AUTOMATED COUNT: 13.3 % (ref 11.5–14.5)
GLOBULIN SER CALC-MCNC: 3.6 G/DL (ref 2–4)
GLUCOSE BLD STRIP.AUTO-MCNC: 169 MG/DL (ref 65–100)
GLUCOSE BLD STRIP.AUTO-MCNC: 176 MG/DL (ref 65–100)
GLUCOSE BLD STRIP.AUTO-MCNC: 181 MG/DL (ref 65–100)
GLUCOSE BLD STRIP.AUTO-MCNC: 186 MG/DL (ref 65–100)
GLUCOSE BLD STRIP.AUTO-MCNC: 223 MG/DL (ref 65–100)
GLUCOSE BLD STRIP.AUTO-MCNC: 235 MG/DL (ref 65–100)
GLUCOSE SERPL-MCNC: 167 MG/DL (ref 65–100)
HCT VFR BLD AUTO: 31.8 % (ref 35–47)
HGB BLD-MCNC: 10.7 G/DL (ref 11.5–16)
LYMPHOCYTES # BLD: 3.4 K/UL (ref 0.8–3.5)
LYMPHOCYTES NFR BLD: 31 % (ref 12–49)
MAGNESIUM SERPL-MCNC: 1.5 MG/DL (ref 1.6–2.4)
MCH RBC QN AUTO: 30 PG (ref 26–34)
MCHC RBC AUTO-ENTMCNC: 33.6 G/DL (ref 30–36.5)
MCV RBC AUTO: 89.1 FL (ref 80–99)
MONOCYTES # BLD: 0.7 K/UL (ref 0–1)
MONOCYTES NFR BLD: 7 % (ref 5–13)
NEUTS SEG # BLD: 6.5 K/UL (ref 1.8–8)
NEUTS SEG NFR BLD: 60 % (ref 32–75)
PLATELET # BLD AUTO: 234 K/UL (ref 150–400)
POTASSIUM SERPL-SCNC: 3 MMOL/L (ref 3.5–5.1)
POTASSIUM SERPL-SCNC: 3.5 MMOL/L (ref 3.5–5.1)
PROT SERPL-MCNC: 5.6 G/DL (ref 6.4–8.2)
RBC # BLD AUTO: 3.57 M/UL (ref 3.8–5.2)
SERVICE CMNT-IMP: ABNORMAL
SODIUM SERPL-SCNC: 138 MMOL/L (ref 136–145)
TROPONIN I SERPL-MCNC: 0.05 NG/ML
WBC # BLD AUTO: 10.8 K/UL (ref 3.6–11)

## 2017-10-22 PROCEDURE — 74011000250 HC RX REV CODE- 250: Performed by: INTERNAL MEDICINE

## 2017-10-22 PROCEDURE — 83735 ASSAY OF MAGNESIUM: CPT | Performed by: INTERNAL MEDICINE

## 2017-10-22 PROCEDURE — 74011000258 HC RX REV CODE- 258: Performed by: INTERNAL MEDICINE

## 2017-10-22 PROCEDURE — 74011636637 HC RX REV CODE- 636/637: Performed by: INTERNAL MEDICINE

## 2017-10-22 PROCEDURE — 74011250636 HC RX REV CODE- 250/636: Performed by: INTERNAL MEDICINE

## 2017-10-22 PROCEDURE — 36415 COLL VENOUS BLD VENIPUNCTURE: CPT | Performed by: INTERNAL MEDICINE

## 2017-10-22 PROCEDURE — 74011250637 HC RX REV CODE- 250/637: Performed by: INTERNAL MEDICINE

## 2017-10-22 PROCEDURE — 77010033678 HC OXYGEN DAILY

## 2017-10-22 PROCEDURE — 84484 ASSAY OF TROPONIN QUANT: CPT | Performed by: INTERNAL MEDICINE

## 2017-10-22 PROCEDURE — 65610000006 HC RM INTENSIVE CARE

## 2017-10-22 PROCEDURE — 85025 COMPLETE CBC W/AUTO DIFF WBC: CPT | Performed by: INTERNAL MEDICINE

## 2017-10-22 PROCEDURE — 84132 ASSAY OF SERUM POTASSIUM: CPT | Performed by: INTERNAL MEDICINE

## 2017-10-22 PROCEDURE — 82962 GLUCOSE BLOOD TEST: CPT

## 2017-10-22 PROCEDURE — 80053 COMPREHEN METABOLIC PANEL: CPT | Performed by: INTERNAL MEDICINE

## 2017-10-22 PROCEDURE — 95816 EEG AWAKE AND DROWSY: CPT | Performed by: PSYCHIATRY & NEUROLOGY

## 2017-10-22 PROCEDURE — 82550 ASSAY OF CK (CPK): CPT | Performed by: INTERNAL MEDICINE

## 2017-10-22 PROCEDURE — 80307 DRUG TEST PRSMV CHEM ANLYZR: CPT | Performed by: INTERNAL MEDICINE

## 2017-10-22 RX ORDER — POTASSIUM CHLORIDE 29.8 MG/ML
20 INJECTION INTRAVENOUS ONCE
Status: COMPLETED | OUTPATIENT
Start: 2017-10-22 | End: 2017-10-23

## 2017-10-22 RX ORDER — INSULIN GLARGINE 100 [IU]/ML
5 INJECTION, SOLUTION SUBCUTANEOUS
Status: DISCONTINUED | OUTPATIENT
Start: 2017-10-22 | End: 2017-10-26 | Stop reason: SDUPTHER

## 2017-10-22 RX ADMIN — Medication 10 ML: at 05:16

## 2017-10-22 RX ADMIN — SODIUM CHLORIDE 0.2 MCG/KG/HR: 900 INJECTION, SOLUTION INTRAVENOUS at 14:28

## 2017-10-22 RX ADMIN — Medication 10 ML: at 21:47

## 2017-10-22 RX ADMIN — INSULIN LISPRO 2 UNITS: 100 INJECTION, SOLUTION INTRAVENOUS; SUBCUTANEOUS at 00:43

## 2017-10-22 RX ADMIN — LORAZEPAM 1 MG: 1 TABLET ORAL at 12:09

## 2017-10-22 RX ADMIN — MUPIROCIN: 20 OINTMENT TOPICAL at 17:58

## 2017-10-22 RX ADMIN — HYDRALAZINE HYDROCHLORIDE 20 MG: 20 INJECTION INTRAMUSCULAR; INTRAVENOUS at 05:17

## 2017-10-22 RX ADMIN — ENOXAPARIN SODIUM 30 MG: 30 INJECTION SUBCUTANEOUS at 09:25

## 2017-10-22 RX ADMIN — Medication 10 ML: at 17:58

## 2017-10-22 RX ADMIN — HYDROMORPHONE HYDROCHLORIDE 0.5 MG: 1 INJECTION, SOLUTION INTRAMUSCULAR; INTRAVENOUS; SUBCUTANEOUS at 12:10

## 2017-10-22 RX ADMIN — Medication 10 ML: at 14:00

## 2017-10-22 RX ADMIN — POTASSIUM CHLORIDE 20 MEQ: 400 INJECTION, SOLUTION INTRAVENOUS at 07:58

## 2017-10-22 RX ADMIN — HYDROMORPHONE HYDROCHLORIDE 0.5 MG: 1 INJECTION, SOLUTION INTRAMUSCULAR; INTRAVENOUS; SUBCUTANEOUS at 05:16

## 2017-10-22 RX ADMIN — OXYCODONE HYDROCHLORIDE AND ACETAMINOPHEN 1 TABLET: 10; 325 TABLET ORAL at 12:09

## 2017-10-22 RX ADMIN — SODIUM CHLORIDE 10 MG/HR: 900 INJECTION, SOLUTION INTRAVENOUS at 12:17

## 2017-10-22 RX ADMIN — POTASSIUM CHLORIDE 20 MEQ: 400 INJECTION, SOLUTION INTRAVENOUS at 05:53

## 2017-10-22 RX ADMIN — INSULIN GLARGINE 5 UNITS: 100 INJECTION, SOLUTION SUBCUTANEOUS at 21:46

## 2017-10-22 RX ADMIN — HYDROMORPHONE HYDROCHLORIDE 0.5 MG: 1 INJECTION, SOLUTION INTRAMUSCULAR; INTRAVENOUS; SUBCUTANEOUS at 23:05

## 2017-10-22 RX ADMIN — HYDROMORPHONE HYDROCHLORIDE 0.5 MG: 1 INJECTION, SOLUTION INTRAMUSCULAR; INTRAVENOUS; SUBCUTANEOUS at 09:25

## 2017-10-22 RX ADMIN — WATER: 1000 INJECTION, SOLUTION INTRAVENOUS at 04:43

## 2017-10-22 RX ADMIN — MUPIROCIN: 20 OINTMENT TOPICAL at 09:25

## 2017-10-22 RX ADMIN — THIAMINE HYDROCHLORIDE 100 MG: 100 INJECTION, SOLUTION INTRAMUSCULAR; INTRAVENOUS at 09:26

## 2017-10-22 RX ADMIN — HYDROMORPHONE HYDROCHLORIDE 0.5 MG: 1 INJECTION, SOLUTION INTRAMUSCULAR; INTRAVENOUS; SUBCUTANEOUS at 19:09

## 2017-10-22 RX ADMIN — SODIUM CHLORIDE 10 MG/HR: 900 INJECTION, SOLUTION INTRAVENOUS at 04:43

## 2017-10-22 RX ADMIN — INSULIN LISPRO 2 UNITS: 100 INJECTION, SOLUTION INTRAVENOUS; SUBCUTANEOUS at 12:29

## 2017-10-22 NOTE — ROUTINE PROCESS
1130: Report received, orders and chart reviewed. 1200: Assessment completed and noted. Pt very restless and agitated. Pulled out PIVs and twisting and turning in bed and in danger of pulling out lines and allen catheter. Dr. Tootie Flowers paged. Pt had BM and bathed with PCT @ bedside. Pt remains with sitter, RN medicated with 0.5mg Dilaudid and oral Ativan. 1230: Dr. Tootie Flowers @ bedside, orders received for Precedex and to D/C NaHCO3 gtt. Pt resting comfortably in bed. RN remains @ bedside. Will continue to monitor.

## 2017-10-22 NOTE — PROGRESS NOTES
Bedside shift change report given to Monique Qureshi (oncoming nurse) by Leatha José (offgoing nurse). Report included the following information SBAR, Kardex, ED Summary, Intake/Output, MAR, Recent Results and Cardiac Rhythm afib/stach with pacs.

## 2017-10-22 NOTE — PROGRESS NOTES
1900: Report received, orders and chart reviewed. 2000: Assessment completed and noted. Pt very restless and moaning in bed. Nonverbal pain scale 9/10. Sitter @ bedside. Dr. Mara Ramos paged and updated; orders received. 2105: Pt remains restless and loudly moaning and begging for pain relief. RN notified Dr. Mara Ramos of patient status. Orders received. Sitter remains @ bedside. BP elevated See flowsheet for findings. 2345: Bedside shift change report given to Butch George RN by Rosy Coffey RN. Report included the following information SBAR, Kardex, ED Summary, Intake/Output, MAR, Recent Results and Cardiac Rhythm Afib.

## 2017-10-22 NOTE — CONSULTS
[address]   0 [facility]       Name:  Megha Chambers   MR#:  [id number]   :  1940   Account #:  [de-identified]    Date of Consultation:  10/21/2017   Date of Adm:  10/20/2017       REASON FOR CONSULTATION: Atrial fibrillation. REQUESTING: Nas Taylor MD    PRIMARY CARE: Dontrell Ortiz. Rakesh Carrasco NP    HISTORY OF PRESENT ILLNESS: She is a 65-year-old female   admitted on 10/20 with acute encephalopathy from accidental   overdose of MS Contin. She has severe diffuse arthritis and was   having severe pain and took excessive dose of therapeutic MS Contin   and became unresponsive. EMS was called. She received some   Narcan. She woke up. She is admitted to ICU for further management. She was initially in sinus rhythm. Today she went into paroxysmal atrial   fibrillation, rapid ventricular response. She was started on intravenous   Cardizem and she currently appears to be in sinus rhythm with   frequent PACs at the present time. Additional history includes acute kidney failure with creatinine up to   2.89 on admission, also with some rhabdomyolysis with a CK level of   3973, noncardiac. PAST HISTORY: Includes diabetes mellitus type 2, hypertension,   diffuse arthritis, hyperlipidemia, chronic pain, and history of cigarette   smoking. HOME MEDICATIONS   Listed as    1. Valsartan/hydrochlorothiazide 160/12.5 mg daily. 2. Venlafaxine 75 mg daily, anxiety. 3. Metformin 1000 mg b.i.d.   4. Diltiazem  mg daily. 5. Lovastatin 20 mg daily. 6. Imodium p.r.n.   7. Lantus insulin 40 units daily. 8. Gabapentin   b.i.d.   9. Glyburide 5 mg b.i.d. 10. Celebrex 200 mg b.i.d.   11. Gemfibrozil 600 mg b.i.d.   12. Fish oil daily. 13. Vitamin C.   14. Vitamin B  daily. 14. Vitamin D. ALLERGIES   1. MORPHINE. 2. Vita Riding.     SOCIAL HISTORY: Current everyday smoker.     FAMILY HISTORY: Negative for heart disease. REVIEW OF SYSTEMS: Comprehensive review of systems shows the   above findings     PHYSICAL EXAMINATION   GENERAL: Elderly female, her stated age. She is verbalizing moaning   from acute pain. VITAL SIGNS: She is afebrile. Pulse rate presently is about 110 per   minute. Blood pressure running about 150/65, saturation 96% nasal   cannula at 3 liters per minute. HEENT: Atraumatic. SKIN: Warm, dry. LUNGS: Clear to auscultation. HEART: Regular, with ectopic beats. ABDOMEN: Soft, nontender. EXTREMITIES: No edema. LABORATORY STUDIES: CBC was normal. Chemistries showed   normal electrolytes. Her creatinine was 2.9 on admission, 2.6   yesterday, 1.6 today so that is improving, and her CK exam was   elevated on admission. Current EKG is sinus rhythm with frequent   PACs. ASSESSMENT   1. New onset paroxysmal atrial fibrillation. 2. Chronic pain. 3. Encephalopathy secondary to accidental overdose of pain   medications. 4. Diabetes mellitus type 2 requiring insulin. 5. Hyperlipidemia. 6. Hypertension. 7. Rhabdomyolysis. 8. Acute kidney injury. PLAN: At this point, I would continue her on the IV Cardizem, which   seems to be helping with the rhythm management. Once she is more stable, will try to get her back on her oral   medications.         Gualberto Keita MD      [address] [facility]   D:  10/21/2017   21:26   T:  10/22/2017   02:52   Job #:  [id number]

## 2017-10-22 NOTE — PROGRESS NOTES
Hospitalist Progress Note    NAME: Alicia De Souza   :  1940   MRN:  [id number]       Interim Hospital Summary: 65 y.o. female whom presented on 10/20/2017 with      Assessment / Plan:    Acute encephalopathy from drug overdose POA slowly improving   Took husbands oral narcotics, ? MS contin, for severe low back pain x 2 days  No h/o si   Will get psychiatry to see pt-     Ventilating fine  Elevate HOB  NPO till more awake  PRN narcan  Head  CT  ngt  Neuro  following     Leukocytosis  likely reactive  Improving no sx of infection  Lactic acid 1.5  replace k prn per protocol     ?h/o afib- Cardizem drip for now   [place] following   Will need ac       Acute kidney injury POA  resolved  Rhabdomyolysis POA CPK  Down today  Suspect hypovolemia   No blood on UA dipstick  IVF, add HCO3 with the acidosis  Serial CPKs  Hold ACE Inhibitor   renal US   Unremarkable retroperitoneal ultrasound exam. Incidental echogenic  liver. replace  k per icu protocol     Abnormal LFTs POA improving  Possible tylenol overdose POA - ? S/p NAC per poison control  Serial labs  Renal us  Unremarkable retroperitoneal ultrasound exam. Incidental echogenic  liver. Hold nephrotoxins      Dm type 2 POA  Lantus 5 units plus SSI   HgBa1c  Will be NPO      Acute respiratory failure with hypoxia POA resolving  O2, narcan gtt as needed      Essential HTN POA  Hopld home BP meds  PRN hydralazine      DVT prophylaxis with lovenox      Code status full code,  is NOK                 Subjective:     Chief Complaint / Reason for Physician Visit sedated/neruology evaluating pt in room  Discussed with RN events overnight.      Review of Systems:  Symptom Y/N Comments  Symptom Y/N Comments   Fever/Chills    Chest Pain     Poor Appetite    Edema     Cough    Abdominal Pain     Sputum    Joint Pain     SOB/STANTON    Pruritis/Rash     Nausea/vomit    Tolerating PT/OT     Diarrhea    Tolerating Diet     Constipation    Other       Could NOT obtain due to:      Objective:     VITALS:   Last 24hrs VS reviewed since prior progress note. Most recent are:  Patient Vitals for the past 24 hrs:   Temp Pulse Resp BP SpO2   10/22/17 0500 - 93 (!) 7 171/56 97 %   10/22/17 0400 - 89 24 - 98 %   10/22/17 0300 - 95 18 148/69 96 %   10/22/17 0200 - 86 12 - 97 %   10/22/17 0100 - 89 12 137/52 98 %   10/22/17 0000 - 96 14 - 97 %   10/21/17 2300 - (!) 102 13 153/49 96 %   10/21/17 2200 - (!) 109 13 155/76 95 %   10/21/17 2100 - (!) 108 23 156/48 97 %   10/21/17 2000 98.9 °F (37.2 °C) (!) 110 19 (!) 181/26 97 %   10/21/17 1900 - (!) 112 17 149/64 97 %   10/21/17 1835 - - - - 96 %   10/21/17 1800 - (!) 122 19 - 97 %   10/21/17 1700 - (!) 119 13 141/53 99 %   10/21/17 1645 - (!) 117 15 - 99 %   10/21/17 1630 - (!) 121 15 - 98 %   10/21/17 1600 98.9 °F (37.2 °C) (!) 146 22 140/67 97 %   10/21/17 1530 - (!) 140 21 - 98 %   10/21/17 1500 - (!) 137 13 137/74 97 %   10/21/17 1450 - (!) 142 - 141/50 -   10/21/17 1400 - (!) 110 18 145/70 97 %   10/21/17 1300 - (!) 121 16 160/61 98 %   10/21/17 1200 98.6 °F (37 °C) (!) 118 16 146/56 97 %   10/21/17 1100 - (!) 121 15 135/77 97 %   10/21/17 1000 - (!) 121 15 150/62 98 %   10/21/17 0900 - (!) 124 15 165/65 98 %   10/21/17 0800 99 °F (37.2 °C) (!) 119 15 138/48 96 %   10/21/17 0745 - - - - 97 %   10/21/17 0700 - (!) 121 15 155/43 99 %       Intake/Output Summary (Last 24 hours) at 10/22/17 0650  Last data filed at 10/22/17 0500   Gross per 24 hour   Intake          4197.94 ml   Output             3700 ml   Net           497.94 ml        PHYSICAL EXAM:  General:                     sedated  in no distress     HEENT:                     Normocephalic, atraumatic    PERRL, EOMI  Sclera no icterus                                            Neck:        No meningismus, trachea midline, no carotid bruits                                               Lungs:       Clear to auscultation bilaterally.  No wheezing or rales                                              No accessory muscle use or retractions. Heart:                                  Regular rate and rhythm,  no murmur or gallop.                                              No LE edema                                           Abdomen:      Soft, obese  non-tender. Not distended.  Bowel sounds normal.                                               No masses, No Hepatosplenomegaly, No Rebound or guarding  Musculoskeletal:  No Joint swelling, erythema, warmth. No Cyanosis or clubbing  Skin:                                     No rashes                                               Not Jaundiced   No nodules or thickening                                              Capillary refill normal  Neurologic:           sedated  sleeping     Reviewed most current lab test results and cultures  YES  Reviewed most current radiology test results   YES  Review and summation of old records today    NO  Reviewed patient's current orders and MAR    YES  PMH/SH reviewed - no change compared to H&P  ________________________________________________________________________  Care Plan discussed with:    Comments   Patient x    Family      RN x    Care Manager     Consultant  x                      Multidiciplinary team rounds were held today with , nursing, pharmacist and clinical coordinator. Patient's plan of care was discussed; medications were reviewed and discharge planning was addressed.      ________________________________________________________________________  Total NON critical care TIME:  15  Minutes    Total CRITICAL CARE TIME Spent:   Minutes non procedure based      Comments   >50% of visit spent in counseling and coordination of care     ________________________________________________________________________  Nathaly Jara MD     Procedures: see electronic medical records for all procedures/Xrays and details which were not copied into this note but were reviewed prior to creation of Plan. LABS:  I reviewed today's most current labs and imaging studies. Pertinent labs include:  Recent Labs      10/22/17   0356  10/21/17   0047  10/20/17   1515   WBC  10.8  11.7*  17.9*   HGB  10.7*  11.9  13.4   HCT  31.8*  35.7  41.2   PLT  234  296  349     Recent Labs      10/22/17   0356  10/21/17   1007  10/21/17   0047   10/20/17   1645  10/20/17   1515   NA  138  134*  133*   --    --   134*   K  3.0*  3.8  5.4*   < >   --   5.8*   CL  99  101  104   --    --   99   CO2  32  25  20*   --    --   20*   GLU  167*  236*  257*   --    --   270*   BUN  24*  38*  40*   --    --   36*   CREA  0.85  1.61*  2.59*   --    --   2.89*   CA  8.0*  7.8*  7.8*   --    --   8.7   MG  1.5*   --    --    --    --   1.6   ALB  2.0*   --   2.6*   --    --   3.0*   TBILI  0.3   --   0.3   --    --   0.4   SGOT  86*   --   375*   --    --   796*   ALT  231*   --   508*   --    --   750*   INR   --    --    --    --   1.0   --     < > = values in this interval not displayed.        Signed: Marisa Levy MD

## 2017-10-22 NOTE — CONSULTS
IP CONSULT TO NEUROLOGY  Consult performed by: Christianne Valencia  Consult ordered by: Floyd Wong        Chief Complaint: altered mental status    History is from the chart and the accounts of her nurse. 65year old femal with a medical history of chronic pain who is admitted after over dosing on 90mg of MS contin supplied by her disabled  to treat her chronic back pain. She was given several doses of narcan and is not in respiratory suppression but needs constant sedation for agitation. Assesment and Plan  1. Opiate overdose. Possibly ombined with neurontin overdose which would explain the prolonged sedation. Out of withdrawal window  May use benzodiazepine or continue precedex for agitation    2. Altered mental status  EEG    3. Diabetes  Continue insulin    4. Chronic pain  On percocet prn    Allergies  Morphine and Ultram [tramadol]     Medications  Current Facility-Administered Medications   Medication Dose Route Frequency    dexmedeTOMidine (PRECEDEX) 400 mcg in 0.9% sodium chloride 100 mL infusion  0.2-1.4 mcg/kg/hr IntraVENous TITRATE    insulin lispro (HUMALOG) injection   SubCUTAneous Q6H    glucose chewable tablet 16 g  4 Tab Oral PRN    dextrose (D50W) injection syrg 12.5-25 g  12.5-25 g IntraVENous PRN    glucagon (GLUCAGEN) injection 1 mg  1 mg IntraMUSCular PRN    hydrALAZINE (APRESOLINE) 20 mg/mL injection 20 mg  20 mg IntraVENous Q6H PRN    thiamine (B-1) 100 mg in dextrose 5% 50 mL IVPB  100 mg IntraVENous DAILY    metoprolol (LOPRESSOR) injection 5 mg  5 mg IntraVENous Q6H PRN    sodium chloride (NS) flush 10-30 mL  10-30 mL InterCATHeter PRN    sodium chloride (NS) flush 10 mL  10 mL InterCATHeter Q24H    sodium chloride (NS) flush 10 mL  10 mL InterCATHeter PRN    sodium chloride (NS) flush 10-40 mL  10-40 mL InterCATHeter Q8H    sodium chloride (NS) flush 20 mL  20 mL InterCATHeter PRN    heparin (porcine) pf 300 Units  300 Units InterCATHeter PRN    dilTIAZem (CARDIZEM) 100 mg in 0.9% sodium chloride (MBP/ADV) 100 mL infusion  0-15 mg/hr IntraVENous TITRATE    celecoxib (CELEBREX) capsule 200 mg  200 mg Oral BID    oxyCODONE-acetaminophen (PERCOCET 10)  mg per tablet 1 Tab  1 Tab Oral Q4H PRN    HYDROmorphone (PF) (DILAUDID) injection 0.5 mg  0.5 mg IntraVENous Q3H PRN    LORazepam (ATIVAN) tablet 1 mg  1 mg Oral Q4H PRN    sodium chloride (NS) flush 5-10 mL  5-10 mL IntraVENous Q8H    sodium chloride (NS) flush 5-10 mL  5-10 mL IntraVENous PRN    naloxone (NARCAN) injection 0.4 mg  0.4 mg IntraVENous PRN    ondansetron (ZOFRAN) injection 4 mg  4 mg IntraVENous Q4H PRN    bisacodyl (DULCOLAX) suppository 10 mg  10 mg Rectal DAILY PRN    enoxaparin (LOVENOX) injection 30 mg  30 mg SubCUTAneous Q24H    mupirocin (BACTROBAN) 2 % ointment   Both Nostrils BID        Medical History  Past Medical History:   Diagnosis Date    Arthritis     Chronic pain     Chronic pain     Diabetes ([address]     Diverticular disease     Hemorrhoid     Hypercholesterolemia     IBS (irritable bowel syndrome)      Review of Systems   Unable to perform ROS: Critical illness         Exam:    Visit Vitals    /55    Pulse 89    Temp 99.2 °F (37.3 °C)    Resp 14    Wt 179 lb 10.8 oz (81.5 kg)    SpO2 95%    BMI 28.57 kg/m2        General: stuporous   Head: Normocephalic, atraumatic, anicteric sclera   Neck Normal ROM,  thyromegally   Lungs:  Clear to auscultation bilaterally, No wheezes or rubs   Cardiac: Regular rate and rhythm with no murmurs. Abd: Bowel sounds were audible. No tenderness on palpation   Ext: No pedal edema   Skin: Supple no rash     NeurologicExam:  Mental Status: stuporous   Speech: Fluent no aphasia or dysarthria. responds to questions inconsistently \"yes\"  \"no\"   Cranial Nerves:  Opens Eyes Spontaneously  Tracks appropriately  PERRL  Corneal reflex intact  Symmetric grimmace   gag reflex intact   Motor:  Symmetric movement of all extremities Reflexes:   Deep tendon reflexes 1+/4 and symmetric. Sensory:   Responds to tactile stimulation. Tremor:   No tremor noted. Neurovascular: No carotid bruits. No JVD         Imaging    CT Results (most recent):    Results from Hospital Encounter encounter on 10/20/17   CT HEAD WO CONT   Narrative EXAM:  CT HEAD WO CONT    INDICATION:   encephalopathy    COMPARISON: October 3. CONTRAST:  None. TECHNIQUE: Unenhanced CT of the head was performed using 5 mm images. Brain and  bone windows were generated. CT dose reduction was achieved through use of a  standardized protocol tailored for this examination and automatic exposure  control for dose modulation. FINDINGS:  Ventricles and sulci are enlarged. There is no significant white matter disease. There is no intracranial hemorrhage, extra-axial collection, mass, mass effect  or midline shift. The basilar cisterns are open. No acute infarct is  identified. The bone windows demonstrate no abnormalities. The visualized  portions of the paranasal sinuses and mastoid air cells are clear. Impression IMPRESSION: No change. MRI Results (most recent):    Results from [place] encounter on 09/29/17   MRI LUMB SPINE WO CONT   Narrative Indication: Lumbosacral spondylosis without myelopathy. Spondylolisthesis of the  lumbar region. TECHNIQUE: Multiplanar, multisequence noncontrast lumbar spine MRI per standard  protocol. COMPARISON: CT abdomen/pelvis July 26, 2017. FINDINGS:    For discussion purposes, the first axial T2-weighted images defined the  midportion of the L1 vertebral body. There is left-sided L5-S1 transitional  lumbosacral anatomy with pseudoarthrosis. Slight grade 1 anterolisthesis of L2  with respect to L3. There is grade 1 anterolisthesis of L3 with respect to L4 on  the basis of advanced facet arthropathy. Levoconvex scoliotic curvature of the  lumbar spine.  There is Alignment of the lumbar spine is otherwise normal. The  spinal cord terminates at a normal anatomic level. There is a slight superior  endplate compression fracture deformity of L1, chronic finding. No active marrow  edema. Diffuse paraspinal muscle atrophy. At L1-L2, there is a mild broad-based disc bulge without a small central  protrusion type disc herniation. No significant central canal or foraminal  narrowing. At L2-L3, there is slight grade 1 anterolisthesis of L2 with respect to L3 with  uncovering of the posterior disc. Spondylolisthesis is on the basis of  moderate/severe right facet arthropathy/ligamentum flavum hypertrophy and  moderate left facet arthropathy and ligamentum flavum hypertrophy at this level. There is mild/moderate central canal narrowing. No significant foraminal  narrowing. L3-L4, there is grade 1 anterolisthesis of L3 with suspected L4 on the basis of  advanced bilateral facet arthropathy and ligamentum flavum hypertrophy. Severe  central canal narrowing. Mild/moderate bilateral foraminal narrowing, right  greater than left. Severe right subarticular recess narrowing at this level. At L4-L5, there is a mild broad-based disc bulge without disc herniation. Moderate bilateral facet arthropathy. No significant central canal or foraminal  narrowing. At L5-S1, there is no disc herniation. Left-sided L5-S1 transitional lumbosacral  anatomy with pseudoarthrosis. Moderate/severe bilateral facet arthropathy. No  significant central canal or foraminal narrowing. Impression IMPRESSION:  The patient has multilevel lumbar spondyloarthropathy as described, most  severely at L3-L4 with severe central canal narrowing. Please refer to the body  of the report for compromise of segmental analysis of the lumbar spinal column.         .  Lab Review    Recent Results (from the past 24 hour(s))   GLUCOSE, POC    Collection Time: 10/22/17 12:37 AM   Result Value Ref Range    Glucose (POC) 235 (H) 65 - 100 mg/dL Performed by Sea Winslow    CBC WITH AUTOMATED DIFF    Collection Time: 10/22/17  3:56 AM   Result Value Ref Range    WBC 10.8 3.6 - 11.0 K/uL    RBC 3.57 (L) 3.80 - 5.20 M/uL    HGB 10.7 (L) 11.5 - 16.0 g/dL    HCT 31.8 (L) 35.0 - 47.0 %    MCV 89.1 80.0 - 99.0 FL    MCH 30.0 26.0 - 34.0 PG    MCHC 33.6 30.0 - 36.5 g/dL    RDW 13.3 11.5 - 14.5 %    PLATELET 374 591 - 717 K/uL    NEUTROPHILS 60 32 - 75 %    LYMPHOCYTES 31 12 - 49 %    MONOCYTES 7 5 - 13 %    EOSINOPHILS 2 0 - 7 %    BASOPHILS 0 0 - 1 %    ABS. NEUTROPHILS 6.5 1.8 - 8.0 K/UL    ABS. LYMPHOCYTES 3.4 0.8 - 3.5 K/UL    ABS. MONOCYTES 0.7 0.0 - 1.0 K/UL    ABS. EOSINOPHILS 0.2 0.0 - 0.4 K/UL    ABS. BASOPHILS 0.0 0.0 - 0.1 K/UL   METABOLIC PANEL, COMPREHENSIVE    Collection Time: 10/22/17  3:56 AM   Result Value Ref Range    Sodium 138 136 - 145 mmol/L    Potassium 3.0 (L) 3.5 - 5.1 mmol/L    Chloride 99 97 - 108 mmol/L    CO2 32 21 - 32 mmol/L    Anion gap 7 5 - 15 mmol/L    Glucose 167 (H) 65 - 100 mg/dL    BUN 24 (H) 6 - 20 MG/DL    Creatinine 0.85 0.55 - 1.02 MG/DL    BUN/Creatinine ratio 28 (H) 12 - 20      GFR est AA >60 >60 ml/min/1.73m2    GFR est non-AA >60 >60 ml/min/1.73m2    Calcium 8.0 (L) 8.5 - 10.1 MG/DL    Bilirubin, total 0.3 0.2 - 1.0 MG/DL    ALT (SGPT) 231 (H) 12 - 78 U/L    AST (SGOT) 86 (H) 15 - 37 U/L    Alk.  phosphatase 103 45 - 117 U/L    Protein, total 5.6 (L) 6.4 - 8.2 g/dL    Albumin 2.0 (L) 3.5 - 5.0 g/dL    Globulin 3.6 2.0 - 4.0 g/dL    A-G Ratio 0.6 (L) 1.1 - 2.2     CK W/ CKMB & INDEX    Collection Time: 10/22/17  3:56 AM   Result Value Ref Range    CK 1206 (H) 26 - 192 U/L    CK - MB 3.3 <3.6 NG/ML    CK-MB Index 0.3 0 - 2.5     TROPONIN I    Collection Time: 10/22/17  3:56 AM   Result Value Ref Range    Troponin-I, Qt. 0.05 (H) <0.05 ng/mL   MAGNESIUM    Collection Time: 10/22/17  3:56 AM   Result Value Ref Range    Magnesium 1.5 (L) 1.6 - 2.4 mg/dL   GLUCOSE, POC    Collection Time: 10/22/17  5:11 AM   Result Value Ref Range    Glucose (POC) 169 (H) 65 - 100 mg/dL    Performed by Aidan Mast    POTASSIUM    Collection Time: 10/22/17 11:38 AM   Result Value Ref Range    Potassium 3.5 3.5 - 5.1 mmol/L   GLUCOSE, POC    Collection Time: 10/22/17 12:22 PM   Result Value Ref Range    Glucose (POC) 223 (H) 65 - 100 mg/dL    Performed by Eder Boogie    Critically ill 40 minutes spent on exam and reviewing chart

## 2017-10-22 NOTE — PROGRESS NOTES
PULMONARY/Critical Care/SLEEP MEDICINE  [facility]  Name: Verena Hernández   : 1940   MRN: [id number]   Date: 10/22/2017 8:40 AM       []      The patient is critically ill on      []      Mechanical ventilation []      Pressors   []      BiPAP []                       Remains encephalopathic  Moaning asks for help no other response   Eyes open and alert    Vital Signs:    Visit Vitals    /50    Pulse (!) 106    Temp 98.9 °F (37.2 °C)    Resp 18    Wt 81.5 kg (179 lb 10.8 oz)    SpO2 95%    BMI 28.57 kg/m2       O2 Device: Nasal cannula   O2 Flow Rate (L/min): 3 l/min   Temp (24hrs), Av.8 °F (37.1 °C), Min:98.6 °F (37 °C), Max:98.9 °F (37.2 °C)       Intake/Output:   Last shift:         Last 3 shifts: 10/20 1901 - 10/22 0700  In: 4894.3 [I.V.:4894.3]  Out: 2111 [Urine:4555]    Intake/Output Summary (Last 24 hours) at 10/22/17 0840  Last data filed at 10/22/17 3327   Gross per 24 hour   Intake          3687.74 ml   Output             3675 ml   Net            12.74 ml     Hemodynamics:   PAP:     Wedge:     CVP:      CO:     CI:     SVR:     PVR:        Ventilator Settings:                Physical Exam:    General: []      Intubated/sedated []      No acute distress []          []      Ill appearing [x]     agitated []            HEENT: []     ETT [x]      PERRL []     NGT     []      Anicteric Mucosa:[]      moist   [x]      dry []            Resp: []      Wheeze [x]      Clear to ascultation bilaterally []      Accessory muscle use    []      Rales []      Chest tube:  []      Air leak []          []      Ronchi []      Crepitus []         []      Coarse bilateral ventilator breath sounds      CV: []      Regular []      Tachycardia [x]          [x]      Irregular []      Bradycardic []          []      Murmur []      S1 []          []     RUB []    S2 []            GI: [x]      Soft  []      NG Tube Bowel sounds: []      present []      absent    []      Firm [] PEG [x]   - Tender      -      Distended  []            : []      Zeng []      Hematuria []          []      Clear urine []       []            MSK:    []      SCDs [x]    -  Edema [x]   --clubbing       []      No deformities [x] - -Cyanosis  []            Skin: [x]      Warm [x]      Dry  []   Mottled       []      Cool []      Moist []          []     Lesions []      Diaphoretic []          []      Rash  []      Cyanosis []            N-psych: []      Sedated  [x]      Agitated [x]     Moves all extremities     []      Alert  Follows commands:   []      Yes  [x]      No [x]   -  oriented       Devices: []      PA Catheter []      Tracheostomy []          []      Central Line []        []        Chest tube:  Air leak? []        Y/[]        N    []          []      PICC []       []            DATA:   Current Facility-Administered Medications   Medication Dose Route Frequency    potassium chloride 20 mEq in 50 ml IVPB  20 mEq IntraVENous ONCE    insulin lispro (HUMALOG) injection   SubCUTAneous Q6H    glucose chewable tablet 16 g  4 Tab Oral PRN    dextrose (D50W) injection syrg 12.5-25 g  12.5-25 g IntraVENous PRN    glucagon (GLUCAGEN) injection 1 mg  1 mg IntraMUSCular PRN    hydrALAZINE (APRESOLINE) 20 mg/mL injection 20 mg  20 mg IntraVENous Q6H PRN    sodium bicarbonate (8.4%) 150 mEq in sterile water 1,000 mL infusion   IntraVENous CONTINUOUS    thiamine (B-1) 100 mg in dextrose 5% 50 mL IVPB  100 mg IntraVENous DAILY    metoprolol (LOPRESSOR) injection 5 mg  5 mg IntraVENous Q6H PRN    sodium chloride (NS) flush 10-30 mL  10-30 mL InterCATHeter PRN    sodium chloride (NS) flush 10 mL  10 mL InterCATHeter Q24H    sodium chloride (NS) flush 10 mL  10 mL InterCATHeter PRN    sodium chloride (NS) flush 10-40 mL  10-40 mL InterCATHeter Q8H    sodium chloride (NS) flush 20 mL  20 mL InterCATHeter PRN    heparin (porcine) pf 300 Units  300 Units InterCATHeter PRN    dilTIAZem (CARDIZEM) 100 mg in 0.9% sodium chloride (MBP/ADV) 100 mL infusion  0-15 mg/hr IntraVENous TITRATE    celecoxib (CELEBREX) capsule 200 mg  200 mg Oral BID    oxyCODONE-acetaminophen (PERCOCET 10)  mg per tablet 1 Tab  1 Tab Oral Q4H PRN    HYDROmorphone (PF) (DILAUDID) injection 0.5 mg  0.5 mg IntraVENous Q3H PRN    LORazepam (ATIVAN) tablet 1 mg  1 mg Oral Q4H PRN    sodium chloride (NS) flush 5-10 mL  5-10 mL IntraVENous Q8H    sodium chloride (NS) flush 5-10 mL  5-10 mL IntraVENous PRN    naloxone (NARCAN) injection 0.4 mg  0.4 mg IntraVENous PRN    ondansetron (ZOFRAN) injection 4 mg  4 mg IntraVENous Q4H PRN    bisacodyl (DULCOLAX) suppository 10 mg  10 mg Rectal DAILY PRN    enoxaparin (LOVENOX) injection 30 mg  30 mg SubCUTAneous Q24H    mupirocin (BACTROBAN) 2 % ointment   Both Nostrils BID       Telemetry: []        Sinus []        A-flutter []        Paced    []        A-fib []        Multiple PVCs                  Labs:  Recent Labs      10/22/17   0356  10/21/17   0047  10/20/17   1515   WBC  10.8  11.7*  17.9*   HGB  10.7*  11.9  13.4   HCT  31.8*  35.7  41.2   PLT  234  296  349     Recent Labs      10/22/17   0356  10/21/17   1007  10/21/17   0047   10/20/17   1645  10/20/17   1515   NA  138  134*  133*   --    --   134*   K  3.0*  3.8  5.4*   < >   --   5.8*   CL  99  101  104   --    --   99   CO2  32  25  20*   --    --   20*   GLU  167*  236*  257*   --    --   270*   BUN  24*  38*  40*   --    --   36*   CREA  0.85  1.61*  2.59*   --    --   2.89*   CA  8.0*  7.8*  7.8*   --    --   8.7   MG  1.5*   --    --    --    --   1.6   ALB  2.0*   --   2.6*   --    --   3.0*   TBILI  0.3   --   0.3   --    --   0.4   SGOT  86*   --   375*   --    --   796*   ALT  231*   --   508*   --    --   750*   INR   --    --    --    --   1.0   --     < > = values in this interval not displayed.      Recent Labs      10/21/17   0829  10/21/17   0018   PH  7.36  7.28*   PCO2  38  41   PO2  78*  56*   HCO3  21*  19* FIO2   --   21       Imaging:  []      I have personally reviewed the patients radiographs     []      No change from prior, tubes and lines in adequate position  []      Improved   []      Worsening     IMPRESSION:     The patient is: []       acutely ill Risk of deterioration: []       moderate    []       critically ill  []       high     Active Problems:    * No active hospital problems. *    AMS  Probable narcotic overdose  Tobacco use  DM  Hypoxemia   afib ? chronicity  HTN  ·     PLAN:  1. Continue cardizem gtt  2. Follow neuro status  3. May need neurology to see  4. Cardiology to see   5. ?  Anticoagulation for afib  Resume effexor may need NGT --may show signs of sudden withdrawal    Transfer tele with sitter     []      See my orders for details    My assessment/plan was discussed with:  []      nursing []      PT/OT    []      respiratory therapy []         []      family []           []      Total critical care time exclusive of procedures       minutes  Denzel Klein MD

## 2017-10-22 NOTE — PROGRESS NOTES
Patient remains inappropriate for PT evaluation at this time 2/2 AMS. Will f/u tomorrow.      Fadi Martinez, PT, DPT, Maggie Denson

## 2017-10-22 NOTE — PROGRESS NOTES
Progress Note      10/22/2017 11:49 AM  NAME: Susan Cheung   MRN:  [id number]   Admit Diagnosis: Opiate Overdose/Acute Kidney Injury     Assessment:     ASSESSMENT   1. New onset paroxysmal atrial fibrillation. On IV Cardizem . Converted to NSR with PACs. 2. Chronic pain. 3. Encephalopathy secondary to accidental overdose of pain   medications. 4. Diabetes mellitus type 2 requiring insulin. 5. Hyperlipidemia. 6. Hypertension. 7. Rhabdomyolysis. 8. Acute kidney injury. 9. Acute confusion       Plan:     Continue IV Cardizem for now. Not able to take PO       [x]        High complexity decision making was performed    Subjective:     Susan Cheung denies chest pain, dyspnea. Discussed with RN events overnight. Patient Active Problem List   Diagnosis Code    Hypercholesterolemia E78.00    Arthritis M19.90    Diabetes ([facility] [address] E11.9    IBS (irritable bowel syndrome) K58.9    Hemorrhoid K64.9    Chronic pain N37.78    Neutrophilic leukocytosis Z79.0    SOB (shortness of breath) R06.02    Abdominal pain, generalized R10.84    Diarrhea in adult patient R19.7    Cigarette smoker F17.210    Diverticulitis large intestine w/o perforation or abscess w/o bleeding K57.32    Hyperkalemia E87.5    Altered mental status R41.82    Transaminitis R74.0    Acute renal failure (ARF) (HCC) N17.9       Review of Systems:    Symptom Y/N Comments  Symptom Y/N Comments   Fever/Chills N   Chest Pain N    Poor Appetite N   Edema N    Cough N   Abdominal Pain N    Sputum N   Joint Pain N    SOB/STANTON N   Pruritis/Rash N    Nausea/vomit N   Tolerating PT/OT Y    Diarrhea N   Tolerating Diet Y    Constipation N   Other       Could NOT obtain due to:      Objective:      Physical Exam:    Last 24hrs VS reviewed since prior progress note.  Most recent are:    Visit Vitals    /63    Pulse (!) 104    Temp 98.9 °F (37.2 °C)    Resp 26    Wt 81.5 kg (179 lb 10.8 oz)    SpO2 98%    BMI 28.57 kg/m2       Intake/Output Summary (Last 24 hours) at 10/22/17 1149  Last data filed at 10/22/17 0800   Gross per 24 hour   Intake          3444.35 ml   Output             3725 ml   Net          -280.65 ml        General Appearance: Well developed, well nourished, alert & oriented x 3,    no acute distress. Ears/Nose/Mouth/Throat: Hearing grossly normal.  Neck: Supple. Chest: Lungs clear to auscultation bilaterally. Cardiovascular: Regular rate and rhythm, S1S2 normal, no murmur. Abdomen: Soft, non-tender, bowel sounds are active. Extremities: No edema bilaterally. Skin: Warm and dry. PMH/SH reviewed - no change compared to H&P    Data Review    Telemetry: normal sinus rhythm     Lab Data Personally Reviewed:    Recent Labs      10/22/17   0356  10/21/17   0047   WBC  10.8  11.7*   HGB  10.7*  11.9   HCT  31.8*  35.7   PLT  234  296   LABRCNT(INR:3,PTP:3,APTT:3,)  Recent Labs      10/22/17   0356  10/21/17   1007  10/21/17   0047   10/20/17   1515   NA  138  134*  133*   --   134*   K  3.0*  3.8  5.4*   < >  5.8*   CL  99  101  104   --   99   CO2  32  25  20*   --   20*   BUN  24*  38*  40*   --   36*   CREA  0.85  1.61*  2.59*   --   2.89*   GLU  167*  236*  257*   --   270*   CA  8.0*  7.8*  7.8*   --   8.7   MG  1.5*   --    --    --   1.6    < > = values in this interval not displayed.    LABRCNT(CPK:3,CpKMB:3,ckndx:3,troiq:3)  Lab Results   Component Value Date/Time    Cholesterol, total 192 05/17/2017 12:52 PM    HDL Cholesterol 45 05/17/2017 12:52 PM    LDL, calculated 90 05/17/2017 12:52 PM    Triglyceride 284 05/17/2017 12:52 PM   LABRCNT(sgot:3,gpt:3,ap:3,tbiL:3,TP:3,ALB:3,GLOB:3,ggt:3,aml:3,amyp:3,lpse:3,hlpse:3)  Recent Labs      10/21/17   0829  10/21/17   0018   PH  7.36  7.28*   PCO2  38  41   PO2  78*  56*     Lab Results   Component Value Date/Time    Cholesterol, total 192 05/17/2017 12:52 PM    HDL Cholesterol 45 05/17/2017 12:52 PM    LDL, calculated 90 05/17/2017 12:52 PM    Triglyceride 284 05/17/2017 12:52 PM   MEDTABLEKofi Hylton MD  Recent Labs      10/21/17   0829  10/21/17   0018   PH  7.36  7.28*   PCO2  38  41   PO2  78*  56*       Medications Personally Reviewed:    Current Facility-Administered Medications   Medication Dose Route Frequency    insulin lispro (HUMALOG) injection   SubCUTAneous Q6H    glucose chewable tablet 16 g  4 Tab Oral PRN    dextrose (D50W) injection syrg 12.5-25 g  12.5-25 g IntraVENous PRN    glucagon (GLUCAGEN) injection 1 mg  1 mg IntraMUSCular PRN    hydrALAZINE (APRESOLINE) 20 mg/mL injection 20 mg  20 mg IntraVENous Q6H PRN    sodium bicarbonate (8.4%) 150 mEq in sterile water 1,000 mL infusion   IntraVENous CONTINUOUS    thiamine (B-1) 100 mg in dextrose 5% 50 mL IVPB  100 mg IntraVENous DAILY    metoprolol (LOPRESSOR) injection 5 mg  5 mg IntraVENous Q6H PRN    sodium chloride (NS) flush 10-30 mL  10-30 mL InterCATHeter PRN    sodium chloride (NS) flush 10 mL  10 mL InterCATHeter Q24H    sodium chloride (NS) flush 10 mL  10 mL InterCATHeter PRN    sodium chloride (NS) flush 10-40 mL  10-40 mL InterCATHeter Q8H    sodium chloride (NS) flush 20 mL  20 mL InterCATHeter PRN    heparin (porcine) pf 300 Units  300 Units InterCATHeter PRN    dilTIAZem (CARDIZEM) 100 mg in 0.9% sodium chloride (MBP/ADV) 100 mL infusion  0-15 mg/hr IntraVENous TITRATE    celecoxib (CELEBREX) capsule 200 mg  200 mg Oral BID    oxyCODONE-acetaminophen (PERCOCET 10)  mg per tablet 1 Tab  1 Tab Oral Q4H PRN    HYDROmorphone (PF) (DILAUDID) injection 0.5 mg  0.5 mg IntraVENous Q3H PRN    LORazepam (ATIVAN) tablet 1 mg  1 mg Oral Q4H PRN    sodium chloride (NS) flush 5-10 mL  5-10 mL IntraVENous Q8H    sodium chloride (NS) flush 5-10 mL  5-10 mL IntraVENous PRN    naloxone (NARCAN) injection 0.4 mg  0.4 mg IntraVENous PRN    ondansetron (ZOFRAN) injection 4 mg  4 mg IntraVENous Q4H PRN    bisacodyl (DULCOLAX) suppository 10 mg  10 mg Rectal DAILY PRN    enoxaparin (LOVENOX) injection 30 mg  30 mg SubCUTAneous Q24H    mupirocin (BACTROBAN) 2 % ointment   Both Nostrils BID         Dona Saez MD

## 2017-10-23 ENCOUNTER — TELEPHONE (OUTPATIENT)
Dept: FAMILY MEDICINE CLINIC | Age: 77
End: 2017-10-23

## 2017-10-23 PROBLEM — G93.40 ACUTE ENCEPHALOPATHY: Status: ACTIVE | Noted: 2017-10-23

## 2017-10-23 LAB
ANION GAP SERPL CALC-SCNC: 6 MMOL/L (ref 5–15)
ATRIAL RATE: 118 BPM
BUN SERPL-MCNC: 21 MG/DL (ref 6–20)
BUN/CREAT SERPL: 24 (ref 12–20)
CALCIUM SERPL-MCNC: 8.2 MG/DL (ref 8.5–10.1)
CALCULATED P AXIS, ECG09: 83 DEGREES
CALCULATED R AXIS, ECG10: 58 DEGREES
CALCULATED T AXIS, ECG11: 76 DEGREES
CHLORIDE SERPL-SCNC: 101 MMOL/L (ref 97–108)
CO2 SERPL-SCNC: 32 MMOL/L (ref 21–32)
CREAT SERPL-MCNC: 0.87 MG/DL (ref 0.55–1.02)
DIAGNOSIS, 93000: NORMAL
GLUCOSE BLD STRIP.AUTO-MCNC: 156 MG/DL (ref 65–100)
GLUCOSE BLD STRIP.AUTO-MCNC: 165 MG/DL (ref 65–100)
GLUCOSE BLD STRIP.AUTO-MCNC: 180 MG/DL (ref 65–100)
GLUCOSE BLD STRIP.AUTO-MCNC: 184 MG/DL (ref 65–100)
GLUCOSE SERPL-MCNC: 171 MG/DL (ref 65–100)
MAGNESIUM SERPL-MCNC: 1.6 MG/DL (ref 1.6–2.4)
P-R INTERVAL, ECG05: 174 MS
POTASSIUM SERPL-SCNC: 3.5 MMOL/L (ref 3.5–5.1)
Q-T INTERVAL, ECG07: 320 MS
QRS DURATION, ECG06: 86 MS
QTC CALCULATION (BEZET), ECG08: 448 MS
SERVICE CMNT-IMP: ABNORMAL
SODIUM SERPL-SCNC: 139 MMOL/L (ref 136–145)
VENTRICULAR RATE, ECG03: 118 BPM

## 2017-10-23 PROCEDURE — 74011000258 HC RX REV CODE- 258: Performed by: INTERNAL MEDICINE

## 2017-10-23 PROCEDURE — 36415 COLL VENOUS BLD VENIPUNCTURE: CPT | Performed by: INTERNAL MEDICINE

## 2017-10-23 PROCEDURE — 82962 GLUCOSE BLOOD TEST: CPT

## 2017-10-23 PROCEDURE — 74011636637 HC RX REV CODE- 636/637: Performed by: INTERNAL MEDICINE

## 2017-10-23 PROCEDURE — 80048 BASIC METABOLIC PNL TOTAL CA: CPT | Performed by: INTERNAL MEDICINE

## 2017-10-23 PROCEDURE — 74011250636 HC RX REV CODE- 250/636: Performed by: INTERNAL MEDICINE

## 2017-10-23 PROCEDURE — 83735 ASSAY OF MAGNESIUM: CPT | Performed by: INTERNAL MEDICINE

## 2017-10-23 PROCEDURE — 74011000250 HC RX REV CODE- 250: Performed by: INTERNAL MEDICINE

## 2017-10-23 PROCEDURE — 77010033678 HC OXYGEN DAILY

## 2017-10-23 PROCEDURE — 65610000006 HC RM INTENSIVE CARE

## 2017-10-23 RX ORDER — VENLAFAXINE 25 MG/1
50 TABLET ORAL 2 TIMES DAILY WITH MEALS
Status: DISCONTINUED | OUTPATIENT
Start: 2017-10-23 | End: 2017-10-24

## 2017-10-23 RX ORDER — ENOXAPARIN SODIUM 100 MG/ML
40 INJECTION SUBCUTANEOUS EVERY 24 HOURS
Status: DISCONTINUED | OUTPATIENT
Start: 2017-10-24 | End: 2017-10-28 | Stop reason: HOSPADM

## 2017-10-23 RX ADMIN — Medication 10 ML: at 14:34

## 2017-10-23 RX ADMIN — SODIUM CHLORIDE 10 MG/HR: 900 INJECTION, SOLUTION INTRAVENOUS at 17:30

## 2017-10-23 RX ADMIN — Medication 10 ML: at 22:21

## 2017-10-23 RX ADMIN — HYDROMORPHONE HYDROCHLORIDE 0.5 MG: 1 INJECTION, SOLUTION INTRAMUSCULAR; INTRAVENOUS; SUBCUTANEOUS at 09:00

## 2017-10-23 RX ADMIN — SODIUM CHLORIDE 0.4 MCG/KG/HR: 900 INJECTION, SOLUTION INTRAVENOUS at 21:27

## 2017-10-23 RX ADMIN — SODIUM CHLORIDE 250 ML: 900 INJECTION, SOLUTION INTRAVENOUS at 12:48

## 2017-10-23 RX ADMIN — SODIUM CHLORIDE 5 MG/HR: 900 INJECTION, SOLUTION INTRAVENOUS at 00:23

## 2017-10-23 RX ADMIN — HYDROMORPHONE HYDROCHLORIDE 0.5 MG: 1 INJECTION, SOLUTION INTRAMUSCULAR; INTRAVENOUS; SUBCUTANEOUS at 14:31

## 2017-10-23 RX ADMIN — HYDROMORPHONE HYDROCHLORIDE 0.5 MG: 1 INJECTION, SOLUTION INTRAMUSCULAR; INTRAVENOUS; SUBCUTANEOUS at 21:25

## 2017-10-23 RX ADMIN — METOPROLOL TARTRATE 5 MG: 5 INJECTION INTRAVENOUS at 20:23

## 2017-10-23 RX ADMIN — THIAMINE HYDROCHLORIDE 100 MG: 100 INJECTION, SOLUTION INTRAMUSCULAR; INTRAVENOUS at 10:17

## 2017-10-23 RX ADMIN — INSULIN GLARGINE 5 UNITS: 100 INJECTION, SOLUTION SUBCUTANEOUS at 22:20

## 2017-10-23 RX ADMIN — ENOXAPARIN SODIUM 30 MG: 30 INJECTION SUBCUTANEOUS at 09:02

## 2017-10-23 RX ADMIN — Medication 10 ML: at 16:35

## 2017-10-23 RX ADMIN — HYDROMORPHONE HYDROCHLORIDE 0.5 MG: 1 INJECTION, SOLUTION INTRAMUSCULAR; INTRAVENOUS; SUBCUTANEOUS at 12:06

## 2017-10-23 RX ADMIN — HYDROMORPHONE HYDROCHLORIDE 0.5 MG: 1 INJECTION, SOLUTION INTRAMUSCULAR; INTRAVENOUS; SUBCUTANEOUS at 18:17

## 2017-10-23 RX ADMIN — MUPIROCIN: 20 OINTMENT TOPICAL at 17:34

## 2017-10-23 RX ADMIN — MUPIROCIN: 20 OINTMENT TOPICAL at 09:04

## 2017-10-23 RX ADMIN — Medication 10 ML: at 05:04

## 2017-10-23 NOTE — PROGRESS NOTES
1530 Bedside and Verbal shift change report received from [facility] (offgoing nurse). Report included the following information SBAR, Kardex, Intake/Output, MAR, Accordion and Recent Results. 1600 Assessment complete. Patient is on a precedex gtt, at 0.4 mcg/kg/hr. Cardizem gtt also infusing. Patient responds to voice but is only oriented to self with decreased command following. Zeng draining yellow urine. Sitter at bedside. Will monitor closely. 1700 Clarified goals of precedex gtt with Dr. Lexi Tiwari. MD would like for patient to rest comfortably, possibly try to wean down to 0.2 mcg/kg/min overnight into the morning then reassess. 1900 Bedside and Verbal shift change report given to Alvino Olivo RN (oncoming nurse). Report included the following information SBAR, Kardex, Intake/Output, MAR, Accordion and Recent Results.

## 2017-10-23 NOTE — PROGRESS NOTES
Pressure Ulcer Prevention Alert Received for Bryan < 14 (moderate risk).         Place patient on a Progressa Bed if not already on one today. Care Plan/Interventions for Nursin. Complete Bryan Pressure Ulcer Risk Scale and use sub scores to identify appropriate interventions. 2. Perform Assessment: skin, changes in LOC, visual cues for pain, monitor skin under medical devices  3. Respond to Reduced Sensory Perception: changes in LOC, check visual cues for pain, float heels, suspension boots, pressure redistribution bed/mattress/chair cushion, turning and reposition approximately every 2 hours (pillows & wedges), pad between skin to skin, turn & reposition  4. Manage Moisture: absorbent under pads, internal / external urinary device, internal /  external fecal device, minimize layers, contain wound drainage, access need for specialty bed, limit adult briefs, maintain skin hydration (lotion/cream), moisture barrier, offer toileting every hour  5. Promote Activity: increase time out of bed, chair cushion, PT/OT evaluation, trapeze to reposition, pressure redistribution bed/mattress/chair  6. Address Reduced Mobility: float heels / suspension boot, HOB 30 degrees or less, pressure redistribution bed/mattress/cushion, PT / OT evaluation, turn and reposition approximately every 2 hours (pillows & wedges)  7. Promote Nutrition: document food / fluid / supplement intake, encourage/assist with meals as needed  8. Reduce Friction and Shear: transferring/repositioning devices (lift/draw sheet), lift team/ patient mobility team, feet elevated on foot rest, minimize layers, foam dressing / transparent film / skin sealants, protective barrier creams and emollients, transfer aides (board, Vinay lift, ceiling lift, stand assist), HOB 30 degrees or less, trapeze to reposition.   Wound Care Team

## 2017-10-23 NOTE — PROGRESS NOTES
1240: Dr. Wendi Baker notified of low urine output (25cc/hr) over last 3 hours/no maintenance fluids/patient refusing PO. Orders to give 250cc bolus. 1440: Restarted precedex gtt--patient restless and EEG tech at bedside. Also medicated with PRN dilaudid; pt turning side to side, c/o back pain and being \"miserable\". 1515: Bedside shift change report given to Brijesh Feliciano RN (oncoming nurse) by Suman Diaz (offgoing nurse). Report included the following information SBAR, Kardex, ED Summary, Procedure Summary, Intake/Output, MAR, Recent Results, Med Rec Status and Cardiac Rhythm NSR/ST/PACs.

## 2017-10-23 NOTE — PROGRESS NOTES
Chief Complaint: altered mental status    More awake today. Expressing pain but continues to be confused.  and daughter in the room. Explained he situation and working diagnosis. Discussed the dangers of opoid use in an opioid naive patent and the importance of not sharing medications. Apparently she was in significant pain and awaiting an epidural injection but was in so much pain that her  decided to relieve her pain with his left over medications. He states she took ten 100 mg Neurontin followed by  60 MS contin. Assesment and Plan  1. Opiate overdose. Possibly ombined with neurontin overdose which would explain the prolonged sedation. Out of withdrawal window  May use benzodiazepine or continue precedex for agitation     2. Altered mental status  EEG pending  Most likely secondary to medication overdose. Neurontin may take some time to clear. She is more awake and interactive     3. Diabetes  Continue insulin     4. Chronic pain  On Celebrex as needed. Agree with holding the opiates until she is more alert.     Allergies  Morphine and Ultram [tramadol]     Medications  Current Facility-Administered Medications   Medication Dose Route Frequency    venlafaxine (EFFEXOR) tablet 50 mg  50 mg Oral BID WITH MEALS    [START ON 10/24/2017] enoxaparin (LOVENOX) injection 40 mg  40 mg SubCUTAneous Q24H    sodium chloride 0.9 % bolus infusion 250 mL  250 mL IntraVENous ONCE    dexmedeTOMidine (PRECEDEX) 400 mcg in 0.9% sodium chloride 100 mL infusion  0.2-1.4 mcg/kg/hr IntraVENous TITRATE    insulin glargine (LANTUS) injection 5 Units  5 Units SubCUTAneous QHS    insulin lispro (HUMALOG) injection   SubCUTAneous Q6H    glucose chewable tablet 16 g  4 Tab Oral PRN    dextrose (D50W) injection syrg 12.5-25 g  12.5-25 g IntraVENous PRN    glucagon (GLUCAGEN) injection 1 mg  1 mg IntraMUSCular PRN    hydrALAZINE (APRESOLINE) 20 mg/mL injection 20 mg  20 mg IntraVENous Q6H PRN    thiamine (B-1) 100 mg in dextrose 5% 50 mL IVPB  100 mg IntraVENous DAILY    metoprolol (LOPRESSOR) injection 5 mg  5 mg IntraVENous Q6H PRN    sodium chloride (NS) flush 10-30 mL  10-30 mL InterCATHeter PRN    sodium chloride (NS) flush 10 mL  10 mL InterCATHeter Q24H    sodium chloride (NS) flush 10 mL  10 mL InterCATHeter PRN    sodium chloride (NS) flush 10-40 mL  10-40 mL InterCATHeter Q8H    sodium chloride (NS) flush 20 mL  20 mL InterCATHeter PRN    heparin (porcine) pf 300 Units  300 Units InterCATHeter PRN    dilTIAZem (CARDIZEM) 100 mg in 0.9% sodium chloride (MBP/ADV) 100 mL infusion  0-15 mg/hr IntraVENous TITRATE    celecoxib (CELEBREX) capsule 200 mg  200 mg Oral BID    oxyCODONE-acetaminophen (PERCOCET 10)  mg per tablet 1 Tab  1 Tab Oral Q4H PRN    HYDROmorphone (PF) (DILAUDID) injection 0.5 mg  0.5 mg IntraVENous Q3H PRN    LORazepam (ATIVAN) tablet 1 mg  1 mg Oral Q4H PRN    sodium chloride (NS) flush 5-10 mL  5-10 mL IntraVENous Q8H    sodium chloride (NS) flush 5-10 mL  5-10 mL IntraVENous PRN    naloxone (NARCAN) injection 0.4 mg  0.4 mg IntraVENous PRN    ondansetron (ZOFRAN) injection 4 mg  4 mg IntraVENous Q4H PRN    bisacodyl (DULCOLAX) suppository 10 mg  10 mg Rectal DAILY PRN    mupirocin (BACTROBAN) 2 % ointment   Both Nostrils BID        Medical History  Past Medical History:   Diagnosis Date    Arthritis     Chronic pain     Chronic pain     Diabetes ([facility] [address]     Diverticular disease     Hemorrhoid     Hypercholesterolemia     IBS (irritable bowel syndrome)      Review of Systems   Unable to perform ROS: stuporous    Exam:    Visit Vitals    /46    Pulse 79    Temp 98.7 °F (37.1 °C)    Resp 21    Wt 179 lb 10.8 oz (81.5 kg)    SpO2 90%    BMI 28.57 kg/m2      General: stuporous   Head: Normocephalic, atraumatic, anicteric sclera   Neck Normal ROM,  thyromegally   Lungs:  Clear to auscultation bilaterally, No wheezes or rubs   Cardiac: Regular rate and rhythm with no murmurs. Abd: Bowel sounds were audible. No tenderness on palpation   Ext: No pedal edema   Skin: Supple no rash      NeurologicExam:  Mental Status: stuporous   Speech: Fluent no aphasia or dysarthria. responds to questions inconsistently \"yes\"  \"no\" recognizes daughter states she misses her dog. Cranial Nerves:  Opens Eyes Spontaneously  Tracks appropriately  PERRL  Corneal reflex intact  Symmetric grimmace   gag reflex intact   Motor:  Symmetric movement of all extremities   Reflexes:   Deep tendon reflexes 1+/4 and symmetric. Sensory:   Responds to tactile stimulation. Tremor:   No tremor noted. Neurovascular: No carotid bruits. No JVD     Imaging    CT Results (most recent):    Results from Hospital Encounter encounter on 10/20/17   CT HEAD WO CONT   Narrative EXAM:  CT HEAD WO CONT    INDICATION:   encephalopathy    COMPARISON: October 3. CONTRAST:  None. TECHNIQUE: Unenhanced CT of the head was performed using 5 mm images. Brain and  bone windows were generated. CT dose reduction was achieved through use of a  standardized protocol tailored for this examination and automatic exposure  control for dose modulation. FINDINGS:  Ventricles and sulci are enlarged. There is no significant white matter disease. There is no intracranial hemorrhage, extra-axial collection, mass, mass effect  or midline shift. The basilar cisterns are open. No acute infarct is  identified. The bone windows demonstrate no abnormalities. The visualized  portions of the paranasal sinuses and mastoid air cells are clear. Impression IMPRESSION: No change. MRI Results (most recent):    Results from [place] encounter on 09/29/17   MRI LUMB SPINE WO CONT   Narrative Indication: Lumbosacral spondylosis without myelopathy. Spondylolisthesis of the  lumbar region. TECHNIQUE: Multiplanar, multisequence noncontrast lumbar spine MRI per standard  protocol.     COMPARISON: CT abdomen/pelvis July 26, 2017. FINDINGS:    For discussion purposes, the first axial T2-weighted images defined the  midportion of the L1 vertebral body. There is left-sided L5-S1 transitional  lumbosacral anatomy with pseudoarthrosis. Slight grade 1 anterolisthesis of L2  with respect to L3. There is grade 1 anterolisthesis of L3 with respect to L4 on  the basis of advanced facet arthropathy. Levoconvex scoliotic curvature of the  lumbar spine. There is Alignment of the lumbar spine is otherwise normal. The  spinal cord terminates at a normal anatomic level. There is a slight superior  endplate compression fracture deformity of L1, chronic finding. No active marrow  edema. Diffuse paraspinal muscle atrophy. At L1-L2, there is a mild broad-based disc bulge without a small central  protrusion type disc herniation. No significant central canal or foraminal  narrowing. At L2-L3, there is slight grade 1 anterolisthesis of L2 with respect to L3 with  uncovering of the posterior disc. Spondylolisthesis is on the basis of  moderate/severe right facet arthropathy/ligamentum flavum hypertrophy and  moderate left facet arthropathy and ligamentum flavum hypertrophy at this level. There is mild/moderate central canal narrowing. No significant foraminal  narrowing. L3-L4, there is grade 1 anterolisthesis of L3 with suspected L4 on the basis of  advanced bilateral facet arthropathy and ligamentum flavum hypertrophy. Severe  central canal narrowing. Mild/moderate bilateral foraminal narrowing, right  greater than left. Severe right subarticular recess narrowing at this level. At L4-L5, there is a mild broad-based disc bulge without disc herniation. Moderate bilateral facet arthropathy. No significant central canal or foraminal  narrowing. At L5-S1, there is no disc herniation. Left-sided L5-S1 transitional lumbosacral  anatomy with pseudoarthrosis. Moderate/severe bilateral facet arthropathy.  No  significant central canal or foraminal narrowing. Impression IMPRESSION:  The patient has multilevel lumbar spondyloarthropathy as described, most  severely at L3-L4 with severe central canal narrowing. Please refer to the body  of the report for compromise of segmental analysis of the lumbar spinal column.         .  Lab Review    Recent Results (from the past 24 hour(s))   ACETAMINOPHEN    Collection Time: 10/22/17  4:50 PM   Result Value Ref Range    Acetaminophen level 5 (L) 10 - 30 ug/mL   GLUCOSE, POC    Collection Time: 10/22/17  5:55 PM   Result Value Ref Range    Glucose (POC) 181 (H) 65 - 100 mg/dL    Performed by Milan Boogie    GLUCOSE, POC    Collection Time: 10/22/17  9:29 PM   Result Value Ref Range    Glucose (POC) 176 (H) 65 - 100 mg/dL    Performed by Joe Damon    GLUCOSE, POC    Collection Time: 10/22/17 11:45 PM   Result Value Ref Range    Glucose (POC) 186 (H) 65 - 100 mg/dL    Performed by Joe Damon    METABOLIC PANEL, BASIC    Collection Time: 10/23/17  3:43 AM   Result Value Ref Range    Sodium 139 136 - 145 mmol/L    Potassium 3.5 3.5 - 5.1 mmol/L    Chloride 101 97 - 108 mmol/L    CO2 32 21 - 32 mmol/L    Anion gap 6 5 - 15 mmol/L    Glucose 171 (H) 65 - 100 mg/dL    BUN 21 (H) 6 - 20 MG/DL    Creatinine 0.87 0.55 - 1.02 MG/DL    BUN/Creatinine ratio 24 (H) 12 - 20      GFR est AA >60 >60 ml/min/1.73m2    GFR est non-AA >60 >60 ml/min/1.73m2    Calcium 8.2 (L) 8.5 - 10.1 MG/DL   MAGNESIUM    Collection Time: 10/23/17  3:43 AM   Result Value Ref Range    Magnesium 1.6 1.6 - 2.4 mg/dL   GLUCOSE, POC    Collection Time: 10/23/17  5:03 AM   Result Value Ref Range    Glucose (POC) 180 (H) 65 - 100 mg/dL    Performed by Joe Damon    GLUCOSE, POC    Collection Time: 10/23/17 11:18 AM   Result Value Ref Range    Glucose (POC) 184 (H) 65 - 100 mg/dL    Performed by Radha Swanson    Critically ill 40 minutes spent with patient and family reviewing the chart and establishing plan of care

## 2017-10-23 NOTE — TELEPHONE ENCOUNTER
Ena King.  Mr Jazzy Garcia called to say Mrs. Jazzy Garcia is in CCU at [facility]  She was in a lot of pain and took Morphine and then took another dose one hour later and had to go to the hospital.  She is in CCU for detox.

## 2017-10-23 NOTE — PROGRESS NOTES
Pharmacy  Enoxaparin (Lovenox®) Dosing      Indication: VTE PPX  Current Dose: Enoxaparin 30 subcutaneously every 12 hours  Creatinine Clearance (mL/min): Greater than 30 mL/min  Current Weight: 81.5 kg  Current BMI: Less than 40 kg/m2    Labs:  Recent Labs      10/23/17   0343  10/22/17   0356  10/21/17   1007  10/21/17   0047  10/20/17   1645  10/20/17   1515   CREA  0.87  0.85  1.61*  2.59*   --   2.89*   HGB   --   10.7*   --   11.9   --   13.4   PLT   --   234   --   296   --   349   INR   --    --    --    --   1.0   --      Impression/Plan:   - Due to CrCl improvement over the last 48 hours, dose adjustment for enoxaparin PPX is indicated. Enoxaparin dose adjusted to 40 mg every 24 hours per [facility] approved protocol.      Thanks,  Zachary Zuluaga, PHARMD

## 2017-10-23 NOTE — PROGRESS NOTES
1900 Shift report received from [facility]. Pt assessment completed (refer to chart). Patient oriented to self only, moans continuously, follows minimum commands. Cardizem and Precedex infusing. Sitter at bedside. 2200 Patient remains restless, pt repositioned, incontinent care provided. 0000 Reassessment completed. No new changes.

## 2017-10-23 NOTE — INTERDISCIPLINARY ROUNDS
Interdisciplinary team rounds were held 10/23/2017 with the following team members:Care Management, Diabetes Treatment Specialist, Nursing, Nutrition, Pharmacy, Physician and Respiratory Therapy. Plan of care discussed. See clinical pathway and/or care plan for interventions and desired outcomes.

## 2017-10-23 NOTE — CONSULTS
Psychiatry  Consult    Subjective:     Date of Evaluation:  10/23/2017    Reason for Referral:  Nadeem Chapman was referred to the examiners from ICU for Overdose .     History of Presenting Problem: 65years old woman with long history of chronic pain and she is on Neurontin and has spinal stenosis , she was in pain and  called her doctor and the nurse recommended Advil, she was in pain and took extra pills of Neurontin to release the pain and  gave her two pills of his Morphine 30 mg and she went to sleep , he had difficulty to wake her up next day and he called the ambulance , he stated he went and count her pills and his pills to make sure that she did not take more medications and  Found everything the same , she is on Effexor 150 gm for her mood and she smoke one pack a day , he stated she has no self harm behavior and  She never been suicidal and he surely believe that this is not suicidal attempt and he stated it was mistake he gave her two extra pills  ,  She does not see a psychiatrist and managed by her PCP , patient was not able to engage in talking and only respond to painful stimuli     Patient Active Problem List    Diagnosis Date Noted    Acute encephalopathy 10/23/2017    Hyperkalemia 10/20/2017    Altered mental status 10/20/2017    Transaminitis 10/20/2017    Acute renal failure (ARF) ([address] 10/20/2017    Diverticulitis large intestine w/o perforation or abscess w/o bleeding 07/06/2017    SOB (shortness of breath) 06/21/2017    Abdominal pain, generalized 06/21/2017    Diarrhea in adult patient 06/21/2017    Cigarette smoker 70/48/4849    Neutrophilic leukocytosis 35/18/2321    Chronic pain     Hypercholesterolemia     Arthritis     Diabetes ([address]     IBS (irritable bowel syndrome)     Hemorrhoid      Past Medical History:   Diagnosis Date    Arthritis     Chronic pain     Chronic pain     Diabetes ([address]     Diverticular disease     Hemorrhoid     Hypercholesterolemia     IBS (irritable bowel syndrome)       Family History   Problem Relation Age of Onset    Colon Cancer Father       Social History   Substance Use Topics    Smoking status: Current Every Day Smoker    Smokeless tobacco: Never Used    Alcohol use No     Past Surgical History:   Procedure Laterality Date    HX CATARACT REMOVAL      HX CHOLECYSTECTOMY      HX COLONOSCOPY  2009    HX COLONOSCOPY  2016    2 adenomatous polyp    HX HEMORRHOIDECTOMY  2009    HX HYSTERECTOMY      HX KNEE REPLACEMENT      right      Prior to Admission medications    Medication Sig Start Date End Date Taking? Authorizing Provider   valsartan-hydroCHLOROthiazide (DIOVAN-HCT) 160-12.5 mg per tablet TAKE 1 TABLET DAILY 10/13/17   JONI Low   Venlafaxine 75 mg tr24 Take 75 mg by mouth daily. Indications: ANXIETY WITH DEPRESSION  Patient taking differently: Take 75 mg by mouth two (2) times a day. Indications: ANXIETY WITH DEPRESSION 10/10/17   Claudia Chopra NP   metFORMIN (GLUCOPHAGE) 1,000 mg tablet TAKE 1 TABLET TWICE A DAY 10/8/17   Claudia Chopra NP   dilTIAZem CD (CARDIZEM CD) 240 mg ER capsule Take 1 Cap by mouth daily. 6/27/17   Claudia Chopra NP   atorvastatin (LIPITOR) 20 mg tablet TAKE 1 TABLET DAILY 6/26/17   Claudia Chopra NP   loperamide (IMMODIUM) 2 mg tablet Take 2 mg by mouth four (4) times daily as needed for Diarrhea. Historical Provider   insulin glargine (LANTUS,BASAGLAR) 100 unit/mL (3 mL) inpn 40 Units by SubCUTAneous route daily. Patient taking differently: 45 Units by SubCUTAneous route daily. Indications: takes 45 units daily 6/1/17   Claudia Chopra NP   gabapentin (NEURONTIN) 100 mg capsule Take 2 Caps by mouth three (3) times daily. Every 8 hours  Indications: POSTHERPETIC NEURALGIA  Patient taking differently: Take 200 mg by mouth three (3) times daily.  Every 8 hours  Indications: POSTHERPETIC NEURALGIA, taking 2 caps every 4-5 hours for back pain 5/25/17   Tanisha MALDONADO Genoveva August, NP   glyBURIDE (DIABETA) 5 mg tablet Take 1 Tab by mouth two (2) times daily (with meals). 5/25/17   Babakade Cantu, NP   celecoxib (CELEBREX) 200 mg capsule Take 1 Cap by mouth two (2) times a day. 5/25/17   Babak Pepe, NP   Insulin Needles, Disposable, 31 gauge x 5/16\" ndle Use as directed 4/7/17   JONI Ozuna   gemfibrozil (LOPID) 600 mg tablet Take 1 Tab by mouth two (2) times a day. 11/21/16   Rusty Jackson MD   omega-3 fatty acids-vitamin e 1,000 mg cap Take 1 Cap by mouth. 32a day    Historical Provider   ascorbic acid, vitamin C, (VITAMIN C) 500 mg tablet Take  by mouth. Historical Provider   b complex vitamins tablet Take 1 Tab by mouth daily. Historical Provider   acetaminophen (TYLENOL) 500 mg tablet Take  by mouth every six (6) hours as needed for Pain (taking every 4-5 hours for hip leg pain). Historical Provider   zinc 50 mg tab tablet Take  by mouth daily. Historical Provider   cholecalciferol (VITAMIN D3) 1,000 unit cap Take  by mouth daily. Historical Provider   B.infantis-B.ani-B.long-B.bifi 10-15 mg TbEC Take  by mouth. Historical Provider   Edmond Catching GUM, PO Take  by mouth.     Historical Provider     Allergies   Allergen Reactions    Morphine Itching    Ultram [Tramadol] Palpitations          Objective:     Patient Vitals for the past 8 hrs:   BP Temp Pulse Resp SpO2   10/23/17 1300 165/63 - 89 17 96 %   10/23/17 1200 123/46 - 79 21 90 %   10/23/17 1100 129/56 98.7 °F (37.1 °C) 77 21 95 %   10/23/17 1000 121/50 - 76 20 96 %   10/23/17 0900 149/60 - 89 26 96 %   10/23/17 0800 138/58 100.3 °F (37.9 °C) 83 21 94 %   10/23/17 0700 128/56 - 76 15 97 %   10/23/17 0600 117/47 - 77 20 94 %       Mental Status exam: could not assess         Impression: Overdose by accident , Anxiety disorder by history        Active Problems:    Acute encephalopathy (10/23/2017)          Plan:     Recommendations for Treatment/Conditions:  Outpatient follow up recommended after release    Referral To:    pain management and supportive therapy     Competency Statement: It is recommended that the family or other concerned individuals be consulted for substituted judgement if deemed incompetent.  [email or web address]

## 2017-10-23 NOTE — PROGRESS NOTES
PULMONARY/Critical Care/SLEEP MEDICINE  [facility]  Name: Kasia Hill   : 1940   MRN: [id number]   Date: 10/23/2017 8:40 AM       AMS  Probable narcotic overdose  Tobacco use  DM  Hypoxemia   afib ? chronicity  HTN  ·     PLAN:  1. wean cardizem gtt  2. flexiseal  3. Wound care consult  4. Reorient  5. Appreciate neurology's help  6. Discussed management with pts family- supportive care; chronic pain is a big issue but we will not be able to fix that longterm; will try to support but pt took a lot more meds than expected  7. Cardiology to see   8. ? Anticoagulation for afib  Resume effexor may need NGT --may show signs of sudden withdrawal               Events of weekend reviewed    Remains encephalopathic; getting some dilaudid; on precedex  Moaning and restless. Eyes open and easily agitated. Diarrhea. Vaginal tenderness per nursing.      Vital Signs:    Visit Vitals    /49    Pulse 75    Temp 99.4 °F (37.4 °C)    Resp 22    Wt 81.5 kg (179 lb 10.8 oz)    SpO2 96%    BMI 28.57 kg/m2       O2 Device: Nasal cannula   O2 Flow Rate (L/min): 4 l/min   Temp (24hrs), Av.9 °F (37.2 °C), Min:97.8 °F (36.6 °C), Max:99.4 °F (37.4 °C)       Intake/Output:   Last shift:         Last 3 shifts: 10/21 1901 - 10/23 0700  In: 2715.6 [I.V.:2715.6]  Out: 6274 [Urine:3345]    Intake/Output Summary (Last 24 hours) at 10/23/17 0846  Last data filed at 10/23/17 0500   Gross per 24 hour   Intake           813.09 ml   Output             1380 ml   Net          -566.91 ml       Physical Exam:    General: []      Intubated/sedated []      No acute distress []          []      Ill appearing [x]     agitated []            HEENT: []     ETT [x]      PERRL []     NGT     []      Anicteric Mucosa:[]      moist   [x]      dry []            Resp: []      Wheeze [x]      Clear to ascultation bilaterally []      Accessory muscle use    []      Rales []      Chest tube:  []      Air leak [] []      Ronchi []      Crepitus []         []      Coarse bilateral ventilator breath sounds      CV: []      Regular []      Tachycardia [x]          [x]      Irregular []      Bradycardic []          []      Murmur []      S1 []          []     RUB []    S2 []            GI: [x]      Soft  []      NG Tube Bowel sounds: []      present []      absent    []      Firm []      PEG [x]   - Tender      -      Distended  []            : []      Zeng []      Hematuria []          []      Clear urine []       []            MSK:    []      SCDs [x]    -  Edema [x]   --clubbing       []      No deformities [x] - -Cyanosis  []            Skin: [x]      Warm [x]      Dry  []   Mottled       []      Cool []      Moist []          []     Lesions []      Diaphoretic []          []      Rash  []      Cyanosis []            N-psych: []      Sedated  [x]      Agitated [x]     Moves all extremities     []      Alert  Follows commands:   []      Yes  [x]      No [x]   -  oriented       Devices: []      PA Catheter []      Tracheostomy []          []      Central Line []        []        Chest tube:  Air leak? []        Y/[]        N    []          []      PICC []       []            DATA:   Current Facility-Administered Medications   Medication Dose Route Frequency    dexmedeTOMidine (PRECEDEX) 400 mcg in 0.9% sodium chloride 100 mL infusion  0.2-1.4 mcg/kg/hr IntraVENous TITRATE    insulin glargine (LANTUS) injection 5 Units  5 Units SubCUTAneous QHS    insulin lispro (HUMALOG) injection   SubCUTAneous Q6H    glucose chewable tablet 16 g  4 Tab Oral PRN    dextrose (D50W) injection syrg 12.5-25 g  12.5-25 g IntraVENous PRN    glucagon (GLUCAGEN) injection 1 mg  1 mg IntraMUSCular PRN    hydrALAZINE (APRESOLINE) 20 mg/mL injection 20 mg  20 mg IntraVENous Q6H PRN    thiamine (B-1) 100 mg in dextrose 5% 50 mL IVPB  100 mg IntraVENous DAILY    metoprolol (LOPRESSOR) injection 5 mg  5 mg IntraVENous Q6H PRN    sodium chloride (NS) flush 10-30 mL  10-30 mL InterCATHeter PRN    sodium chloride (NS) flush 10 mL  10 mL InterCATHeter Q24H    sodium chloride (NS) flush 10 mL  10 mL InterCATHeter PRN    sodium chloride (NS) flush 10-40 mL  10-40 mL InterCATHeter Q8H    sodium chloride (NS) flush 20 mL  20 mL InterCATHeter PRN    heparin (porcine) pf 300 Units  300 Units InterCATHeter PRN    dilTIAZem (CARDIZEM) 100 mg in 0.9% sodium chloride (MBP/ADV) 100 mL infusion  0-15 mg/hr IntraVENous TITRATE    celecoxib (CELEBREX) capsule 200 mg  200 mg Oral BID    oxyCODONE-acetaminophen (PERCOCET 10)  mg per tablet 1 Tab  1 Tab Oral Q4H PRN    HYDROmorphone (PF) (DILAUDID) injection 0.5 mg  0.5 mg IntraVENous Q3H PRN    LORazepam (ATIVAN) tablet 1 mg  1 mg Oral Q4H PRN    sodium chloride (NS) flush 5-10 mL  5-10 mL IntraVENous Q8H    sodium chloride (NS) flush 5-10 mL  5-10 mL IntraVENous PRN    naloxone (NARCAN) injection 0.4 mg  0.4 mg IntraVENous PRN    ondansetron (ZOFRAN) injection 4 mg  4 mg IntraVENous Q4H PRN    bisacodyl (DULCOLAX) suppository 10 mg  10 mg Rectal DAILY PRN    enoxaparin (LOVENOX) injection 30 mg  30 mg SubCUTAneous Q24H    mupirocin (BACTROBAN) 2 % ointment   Both Nostrils BID       Telemetry: []        Sinus []        A-flutter []        Paced    []        A-fib []        Multiple PVCs                  Labs:  Recent Labs      10/22/17   0356  10/21/17   0047  10/20/17   1515   WBC  10.8  11.7*  17.9*   HGB  10.7*  11.9  13.4   HCT  31.8*  35.7  41.2   PLT  234  296  349     Recent Labs      10/23/17   0343  10/22/17   1138  10/22/17   0356  10/21/17   1007  10/21/17   0047   10/20/17   1645  10/20/17   1515   NA  139   --   138  134*  133*   --    --   134*   K  3.5  3.5  3.0*  3.8  5.4*   < >   --   5.8*   CL  101   --   99  101  104   --    --   99   CO2  32   --   32  25  20*   --    --   20*   GLU  171*   --   167*  236*  257*   --    --   270*   BUN  21*   --   24*  38*  40*   --    -- 36*   CREA  0.87   --   0.85  1.61*  2.59*   --    --   2.89*   CA  8.2*   --   8.0*  7.8*  7.8*   --    --   8.7   MG  1.6   --   1.5*   --    --    --    --   1.6   ALB   --    --   2.0*   --   2.6*   --    --   3.0*   TBILI   --    --   0.3   --   0.3   --    --   0.4   SGOT   --    --   86*   --   375*   --    --   796*   ALT   --    --   231*   --   508*   --    --   750*   INR   --    --    --    --    --    --   1.0   --     < > = values in this interval not displayed. Recent Labs      10/21/17   0829  10/21/17   0018   PH  7.36  7.28*   PCO2  38  41   PO2  78*  56*   HCO3  21*  19*   FIO2   --   21       Imaging:  []      I have personally reviewed the patients radiographs     []      No change from prior, tubes and lines in adequate position  []      Improved   []      Worsening     IMPRESSION:     The patient is: []       acutely ill Risk of deterioration: [x]       moderate    []       critically ill  []       high     Active Problems:    * No active hospital problems.  *      []      See my orders for details    My assessment/plan was discussed with:  []      nursing []      PT/OT    []      respiratory therapy []         []      family []           []      Total critical care time exclusive of procedures       minutes  Lynn Bailey MD

## 2017-10-23 NOTE — PROGRESS NOTES
Attempted to see patient for PT evaluation this morning, but nursing reported patient to be too drowsy and confused for participation in therapy. Will f/u tomorrow.      Melonie Gutierrez, PT, DPT, [facility]

## 2017-10-23 NOTE — WOUND CARE
Wound care consult unable to be completed today secondary to patient undergoing EEG in CCU and very confused and restless and per staff nurse request. Spoke with Dr Fred Chadwick who placed consult for Vaginal wound ? ?   Huang Bansal RN, CWON, zone ph# [phone]

## 2017-10-23 NOTE — PROGRESS NOTES
Hospitalist Progress Note    NAME: Yusuf Richmond   :  1940   MRN:  [id number]       Interim Hospital Summary: 65 y.o. female whom presented on 10/20/2017 with      Assessment / Plan:  Acute encephalopathy from drug overdose POA slowly improving   Took husbands oral narcotics, ? MS contin, for severe low back pain x 2 days     Altered mental status  EEG pending  Most likely secondary to medication overdose. Neurontin may take some time to clear. She is more awake and interactive   Neurology/psychiatry  following         Leukocytosis  likely reactive  Improving no sx of infection  Lactic acid 1.5     ?h/o afib- Cardizem drip for now   Cardi evl in am      Acute kidney injury POA  resolved  Rhabdomyolysis POA CPK in am  Baseline creatinine 1.08  Suspect hypovolemia   No blood on UA dipstick  IVF, add HCO3 with the acidosis  Serial CPKs  Hold ACE Inhibitor  Check renal US  replace  k per icu protocol     Abnormal LFTs POA improving  Possible tylenol overdose POA - doubt  S/p NAC per poison control  Serial labs  Renal us  Unremarkable retroperitoneal ultrasound exam. Incidental echogenic  liver. Hold nephrotoxins      Dm type 2 POA  Lantus 5 units plus SSI  HgBa1c 8.6  Will be NPO      Acute respiratory failure with hypoxia POA resolving  O2, narcan gtt as needed      Essential HTN POA  Hopld home BP meds  PRN hydralazine      DVT prophylaxis with lovenox      Code status full code,  is NOK                         Subjective:     Chief Complaint / Reason for Physician Visit more awake and interactive /family in room    Discussed with RN events overnight.      Review of Systems:  Symptom Y/N Comments  Symptom Y/N Comments   Fever/Chills    Chest Pain     Poor Appetite    Edema     Cough    Abdominal Pain     Sputum    Joint Pain     SOB/STANTON    Pruritis/Rash     Nausea/vomit    Tolerating PT/OT     Diarrhea    Tolerating Diet     Constipation    Other       Could NOT obtain due to: Objective:     VITALS:   Last 24hrs VS reviewed since prior progress note. Most recent are:  Patient Vitals for the past 24 hrs:   Temp Pulse Resp BP SpO2   10/23/17 0500 - 75 22 115/49 96 %   10/23/17 0400 99.4 °F (37.4 °C) 76 21 117/54 95 %   10/23/17 0300 - 75 20 111/53 95 %   10/23/17 0200 - 73 19 (!) 98/36 96 %   10/23/17 0100 - 75 19 120/49 97 %   10/23/17 0037 - - - - 95 %   10/23/17 0026 - 75 19 113/53 96 %   10/23/17 0000 98.9 °F (37.2 °C) - - - -   10/22/17 2300 - 88 13 93/69 97 %   10/22/17 2200 - 83 23 122/52 99 %   10/22/17 2128 - 85 16 140/46 96 %   10/22/17 2000 97.8 °F (36.6 °C) 84 13 125/53 93 %   10/22/17 1800 - 88 14 144/54 96 %   10/22/17 1700 - 86 14 137/48 95 %   10/22/17 1600 99.2 °F (37.3 °C) 89 14 129/64 95 %   10/22/17 1500 - 89 12 150/55 96 %   10/22/17 1400 - 92 11 120/67 96 %   10/22/17 1300 - 94 15 108/65 97 %   10/22/17 1200 99.2 °F (37.3 °C) 97 17 132/47 98 %   10/22/17 1100 - (!) 108 16 191/62 96 %   10/22/17 1000 - (!) 104 26 194/63 98 %   10/22/17 0900 - (!) 104 26 155/50 98 %   10/22/17 0803 - - - - 95 %   10/22/17 0800 98.9 °F (37.2 °C) (!) 108 20 165/48 96 %       Intake/Output Summary (Last 24 hours) at 10/23/17 0707  Last data filed at 10/23/17 0500   Gross per 24 hour   Intake          1272.17 ml   Output             1680 ml   Net          -407.83 ml        PHYSICAL EXAM:  General:                     more alert  in no distress     HEENT:                     Normocephalic, atraumatic    PERRL, EOMI  Sclera no icterus                                            Neck:        No meningismus, trachea midline, no carotid bruits                                               Lungs:       Clear to auscultation bilaterally. No wheezing or rales                                              No accessory muscle use or retractions.   Heart:                                  Regular rate and rhythm,  no murmur or gallop.                                              No LE edema                                           Abdomen:      Soft, obese  non-tender. Not distended.  Bowel sounds normal.                                               No masses, No Hepatosplenomegaly, No Rebound or guarding  Musculoskeletal:  No Joint swelling, erythema, warmth. No Cyanosis or clubbing  Skin:                                     No rashes                                               Not Jaundiced   No nodules or thickening                                              Capillary refill normal  Neurologic:           more alert   Reviewed most current lab test results and cultures  YES  Reviewed most current radiology test results   YES  Review and summation of old records today    NO  Reviewed patient's current orders and MAR    YES  PMH/SH reviewed - no change compared to H&P  ________________________________________________________________________  Care Plan discussed with:    Comments   Patient x    Family  x    RN x    Care Manager     Consultant  x                      Multidiciplinary team rounds were held today with , nursing, pharmacist and clinical coordinator. Patient's plan of care was discussed; medications were reviewed and discharge planning was addressed. ________________________________________________________________________  Total NON critical care TIME:  30    Minutes    Total CRITICAL CARE TIME Spent:   Minutes non procedure based      Comments   >50% of visit spent in counseling and coordination of care x    ________________________________________________________________________  Maverick Guerra MD     Procedures: see electronic medical records for all procedures/Xrays and details which were not copied into this note but were reviewed prior to creation of Plan. LABS:  I reviewed today's most current labs and imaging studies.   Pertinent labs include:  Recent Labs      10/22/17   0356  10/21/17   0047  10/20/17   1515   WBC  10.8  11.7*  17.9*   HGB 10.7*  11.9  13.4   HCT  31.8*  35.7  41.2   PLT  234  296  349     Recent Labs      10/23/17   0343  10/22/17   1138  10/22/17   0356  10/21/17   1007  10/21/17   0047   10/20/17   1645  10/20/17   1515   NA  139   --   138  134*  133*   --    --   134*   K  3.5  3.5  3.0*  3.8  5.4*   < >   --   5.8*   CL  101   --   99  101  104   --    --   99   CO2  32   --   32  25  20*   --    --   20*   GLU  171*   --   167*  236*  257*   --    --   270*   BUN  21*   --   24*  38*  40*   --    --   36*   CREA  0.87   --   0.85  1.61*  2.59*   --    --   2.89*   CA  8.2*   --   8.0*  7.8*  7.8*   --    --   8.7   MG  1.6   --   1.5*   --    --    --    --   1.6   ALB   --    --   2.0*   --   2.6*   --    --   3.0*   TBILI   --    --   0.3   --   0.3   --    --   0.4   SGOT   --    --   86*   --   375*   --    --   796*   ALT   --    --   231*   --   508*   --    --   750*   INR   --    --    --    --    --    --   1.0   --     < > = values in this interval not displayed.        Signed: Dodie Damon MD

## 2017-10-23 NOTE — PROGRESS NOTES
Attempted to see patient for OT evaluation this morning, but nursing reported patient to be too drowsy and confused for participation in therapy. OT eval deferred this morning, but we will follow back this afternoon or tomorrow to reattempt evaluation.

## 2017-10-24 LAB
ANION GAP SERPL CALC-SCNC: 11 MMOL/L (ref 5–15)
BUN SERPL-MCNC: 18 MG/DL (ref 6–20)
BUN/CREAT SERPL: 30 (ref 12–20)
CALCIUM SERPL-MCNC: 8.5 MG/DL (ref 8.5–10.1)
CHLORIDE SERPL-SCNC: 105 MMOL/L (ref 97–108)
CO2 SERPL-SCNC: 26 MMOL/L (ref 21–32)
CREAT SERPL-MCNC: 0.61 MG/DL (ref 0.55–1.02)
ERYTHROCYTE [DISTWIDTH] IN BLOOD BY AUTOMATED COUNT: 13 % (ref 11.5–14.5)
GLUCOSE BLD STRIP.AUTO-MCNC: 154 MG/DL (ref 65–100)
GLUCOSE BLD STRIP.AUTO-MCNC: 154 MG/DL (ref 65–100)
GLUCOSE BLD STRIP.AUTO-MCNC: 182 MG/DL (ref 65–100)
GLUCOSE BLD STRIP.AUTO-MCNC: 183 MG/DL (ref 65–100)
GLUCOSE BLD STRIP.AUTO-MCNC: 232 MG/DL (ref 65–100)
GLUCOSE SERPL-MCNC: 157 MG/DL (ref 65–100)
HCT VFR BLD AUTO: 32.4 % (ref 35–47)
HGB BLD-MCNC: 11 G/DL (ref 11.5–16)
MCH RBC QN AUTO: 31 PG (ref 26–34)
MCHC RBC AUTO-ENTMCNC: 34 G/DL (ref 30–36.5)
MCV RBC AUTO: 91.3 FL (ref 80–99)
PLATELET # BLD AUTO: 243 K/UL (ref 150–400)
POTASSIUM SERPL-SCNC: 3.2 MMOL/L (ref 3.5–5.1)
RBC # BLD AUTO: 3.55 M/UL (ref 3.8–5.2)
SERVICE CMNT-IMP: ABNORMAL
SODIUM SERPL-SCNC: 142 MMOL/L (ref 136–145)
WBC # BLD AUTO: 11.6 K/UL (ref 3.6–11)

## 2017-10-24 PROCEDURE — 97161 PT EVAL LOW COMPLEX 20 MIN: CPT

## 2017-10-24 PROCEDURE — 74011000250 HC RX REV CODE- 250: Performed by: INTERNAL MEDICINE

## 2017-10-24 PROCEDURE — 65610000006 HC RM INTENSIVE CARE

## 2017-10-24 PROCEDURE — 74011250636 HC RX REV CODE- 250/636: Performed by: INTERNAL MEDICINE

## 2017-10-24 PROCEDURE — 36415 COLL VENOUS BLD VENIPUNCTURE: CPT | Performed by: INTERNAL MEDICINE

## 2017-10-24 PROCEDURE — 74011250637 HC RX REV CODE- 250/637: Performed by: INTERNAL MEDICINE

## 2017-10-24 PROCEDURE — 80048 BASIC METABOLIC PNL TOTAL CA: CPT | Performed by: INTERNAL MEDICINE

## 2017-10-24 PROCEDURE — 97165 OT EVAL LOW COMPLEX 30 MIN: CPT | Performed by: OCCUPATIONAL THERAPIST

## 2017-10-24 PROCEDURE — 85027 COMPLETE CBC AUTOMATED: CPT | Performed by: INTERNAL MEDICINE

## 2017-10-24 PROCEDURE — 97530 THERAPEUTIC ACTIVITIES: CPT | Performed by: OCCUPATIONAL THERAPIST

## 2017-10-24 PROCEDURE — G8987 SELF CARE CURRENT STATUS: HCPCS | Performed by: OCCUPATIONAL THERAPIST

## 2017-10-24 PROCEDURE — 74011636637 HC RX REV CODE- 636/637: Performed by: INTERNAL MEDICINE

## 2017-10-24 PROCEDURE — 97530 THERAPEUTIC ACTIVITIES: CPT

## 2017-10-24 PROCEDURE — 77030019607 HC DSG BURN S&N -A

## 2017-10-24 PROCEDURE — G8988 SELF CARE GOAL STATUS: HCPCS | Performed by: OCCUPATIONAL THERAPIST

## 2017-10-24 PROCEDURE — 82962 GLUCOSE BLOOD TEST: CPT

## 2017-10-24 PROCEDURE — 93306 TTE W/DOPPLER COMPLETE: CPT

## 2017-10-24 PROCEDURE — 74011000258 HC RX REV CODE- 258: Performed by: INTERNAL MEDICINE

## 2017-10-24 RX ORDER — POTASSIUM CHLORIDE 29.8 MG/ML
20 INJECTION INTRAVENOUS
Status: COMPLETED | OUTPATIENT
Start: 2017-10-24 | End: 2017-10-26

## 2017-10-24 RX ORDER — VENLAFAXINE 37.5 MG/1
75 TABLET ORAL 2 TIMES DAILY WITH MEALS
Status: DISCONTINUED | OUTPATIENT
Start: 2017-10-24 | End: 2017-10-26

## 2017-10-24 RX ORDER — ACYCLOVIR 50 MG/G
OINTMENT TOPICAL 4 TIMES DAILY
Status: DISCONTINUED | OUTPATIENT
Start: 2017-10-24 | End: 2017-10-28 | Stop reason: HOSPADM

## 2017-10-24 RX ORDER — MAGNESIUM SULFATE HEPTAHYDRATE 40 MG/ML
2 INJECTION, SOLUTION INTRAVENOUS ONCE
Status: COMPLETED | OUTPATIENT
Start: 2017-10-24 | End: 2017-10-26

## 2017-10-24 RX ADMIN — Medication 10 ML: at 15:49

## 2017-10-24 RX ADMIN — SODIUM CHLORIDE 0.37 MCG/KG/HR: 900 INJECTION, SOLUTION INTRAVENOUS at 13:30

## 2017-10-24 RX ADMIN — SODIUM CHLORIDE 10 MG/HR: 900 INJECTION, SOLUTION INTRAVENOUS at 02:21

## 2017-10-24 RX ADMIN — HYDROMORPHONE HYDROCHLORIDE 0.5 MG: 1 INJECTION, SOLUTION INTRAMUSCULAR; INTRAVENOUS; SUBCUTANEOUS at 02:16

## 2017-10-24 RX ADMIN — POTASSIUM CHLORIDE 20 MEQ: 400 INJECTION, SOLUTION INTRAVENOUS at 17:10

## 2017-10-24 RX ADMIN — INSULIN GLARGINE 5 UNITS: 100 INJECTION, SOLUTION SUBCUTANEOUS at 22:04

## 2017-10-24 RX ADMIN — CELECOXIB 200 MG: 100 CAPSULE ORAL at 10:23

## 2017-10-24 RX ADMIN — Medication 10 ML: at 18:00

## 2017-10-24 RX ADMIN — Medication 10 ML: at 06:10

## 2017-10-24 RX ADMIN — POTASSIUM CHLORIDE 20 MEQ: 400 INJECTION, SOLUTION INTRAVENOUS at 17:58

## 2017-10-24 RX ADMIN — Medication 10 ML: at 22:04

## 2017-10-24 RX ADMIN — SODIUM CHLORIDE 0.5 MCG/KG/HR: 900 INJECTION, SOLUTION INTRAVENOUS at 23:52

## 2017-10-24 RX ADMIN — SODIUM CHLORIDE 0.41 MCG/KG/HR: 900 INJECTION, SOLUTION INTRAVENOUS at 10:26

## 2017-10-24 RX ADMIN — ENOXAPARIN SODIUM 40 MG: 40 INJECTION SUBCUTANEOUS at 10:25

## 2017-10-24 RX ADMIN — VENLAFAXINE 75 MG: 25 TABLET ORAL at 18:11

## 2017-10-24 RX ADMIN — HYDROMORPHONE HYDROCHLORIDE 0.5 MG: 1 INJECTION, SOLUTION INTRAMUSCULAR; INTRAVENOUS; SUBCUTANEOUS at 20:45

## 2017-10-24 RX ADMIN — MUPIROCIN: 20 OINTMENT TOPICAL at 18:02

## 2017-10-24 RX ADMIN — MAGNESIUM SULFATE HEPTAHYDRATE 2 G: 40 INJECTION, SOLUTION INTRAVENOUS at 21:58

## 2017-10-24 RX ADMIN — HYDRALAZINE HYDROCHLORIDE 20 MG: 20 INJECTION INTRAMUSCULAR; INTRAVENOUS at 18:49

## 2017-10-24 RX ADMIN — SODIUM CHLORIDE 0.41 MCG/KG/HR: 900 INJECTION, SOLUTION INTRAVENOUS at 09:38

## 2017-10-24 RX ADMIN — POTASSIUM CHLORIDE 20 MEQ: 400 INJECTION, SOLUTION INTRAVENOUS at 19:02

## 2017-10-24 RX ADMIN — THIAMINE HYDROCHLORIDE 100 MG: 100 INJECTION, SOLUTION INTRAMUSCULAR; INTRAVENOUS at 09:37

## 2017-10-24 RX ADMIN — CELECOXIB 200 MG: 100 CAPSULE ORAL at 18:11

## 2017-10-24 RX ADMIN — HYDROMORPHONE HYDROCHLORIDE 0.5 MG: 1 INJECTION, SOLUTION INTRAMUSCULAR; INTRAVENOUS; SUBCUTANEOUS at 06:16

## 2017-10-24 RX ADMIN — SODIUM CHLORIDE 10 MG/HR: 900 INJECTION, SOLUTION INTRAVENOUS at 12:25

## 2017-10-24 RX ADMIN — POTASSIUM CHLORIDE 20 MEQ: 400 INJECTION, SOLUTION INTRAVENOUS at 15:48

## 2017-10-24 RX ADMIN — INSULIN LISPRO 2 UNITS: 100 INJECTION, SOLUTION INTRAVENOUS; SUBCUTANEOUS at 23:54

## 2017-10-24 RX ADMIN — Medication 10 ML: at 22:03

## 2017-10-24 RX ADMIN — MUPIROCIN: 20 OINTMENT TOPICAL at 09:00

## 2017-10-24 RX ADMIN — OXYCODONE HYDROCHLORIDE AND ACETAMINOPHEN 1 TABLET: 10; 325 TABLET ORAL at 17:20

## 2017-10-24 RX ADMIN — ACYCLOVIR: 50 OINTMENT TOPICAL at 22:07

## 2017-10-24 NOTE — PROGRESS NOTES
Problem: Self Care Deficits Care Plan (Adult)  Goal: *Acute Goals and Plan of Care (Insert Text)  Occupational Therapy Goals:  Initiated 10/24/2017  1. Patient will perform grooming standing with supervision/set-up within 7 days. 2. Patient will perform toileting with supervision/set-up within 7 days. 3. Patient will perform lower body dressing with supervision/set-up within 7 days. 4. Patient will transfer from toilet with supervision/set-up using the least restrictive device and appropriate durable medical equipment within 7 days. Occupational Therapy EVALUATION  Patient: Dulce Burgos (86 y.o. female)  Date: 10/24/2017  Primary Diagnosis: Opiate Overdose/Acute Kidney Injury  Acute encephalopathy        Precautions:   Fall    ASSESSMENT :  Based on the objective data described below, the patient presents with overall irritability and was hostile at times. Pt was evasive about PLF and became angry with questioning so this was terminated. Pt was adamant that she has no back pain at baseline and had constant questions about her meds and lines and leads. CGA overall for bed mobility with good seated balance. Pt initially stated that she could not stand up and when questioned pt angrily stated that she could not then pt realized that we were attempting to assist her to the chair and she quickly mobilized to chair with RW with CGA. Unable to complete full ADL assessment due to pts demeanor and irritability. Recommend return to home with family assist and most likely no services are needed at discharge. Patient will benefit from skilled intervention to address the above impairments.   Patients rehabilitation potential is considered to be Fair  Factors which may influence rehabilitation potential include:   []             None noted  [x]             Mental ability/status  []             Medical condition  []             Home/family situation and support systems  []             Safety awareness  [] Pain tolerance/management  []             Other:      PLAN :  Recommendations and Planned Interventions:  [x]               Self Care Training                  [x]        Therapeutic Activities  [x]               Functional Mobility Training    []        Cognitive Retraining  [x]               Therapeutic Exercises           []        Endurance Activities  [x]               Balance Training                   []        Neuromuscular Re-Education  []               Visual/Perceptual Training     [x]   Home Safety Training  [x]               Patient Education                 [x]        Family Training/Education  []               Other (comment):    Frequency/Duration: Patient will be followed by occupational therapy 3 times a week to address goals. Discharge Recommendations: home with family assist  Further Equipment Recommendations for Discharge: TBD     SUBJECTIVE:   Patient stated [facility] do you ask! I am not doing it!     OBJECTIVE DATA SUMMARY:   HISTORY:   Past Medical History:   Diagnosis Date    Arthritis     Chronic pain     Chronic pain     Diabetes ([address]     Diverticular disease     Hemorrhoid     Hypercholesterolemia     IBS (irritable bowel syndrome)      Past Surgical History:   Procedure Laterality Date    HX CATARACT REMOVAL      HX CHOLECYSTECTOMY      HX COLONOSCOPY  2009    HX COLONOSCOPY  2016    2 adenomatous polyp    HX HEMORRHOIDECTOMY  2009    HX HYSTERECTOMY      HX KNEE REPLACEMENT      right       Prior Level of Function/Home Situation: unable to accurately obtain from pt;   She did report that she ambulated with rolling walker and ADL status is unknown  Expanded or extensive additional review of patient history:     Home Situation  Home Environment: Private residence  Living Alone: No  Support Systems: Spouse/Significant Other/Partner  Current DME Used/Available at Home: Walker, rolling  [x]  Right hand dominant   []  Left hand dominant    EXAMINATION OF PERFORMANCE DEFICITS:  Cognitive/Behavioral Status:  Neurologic State: Alert     Cognition: Decreased attention/concentration;Decreased command following; Impaired decision making; Impulsive;Poor safety awareness  Perception: Appears intact  Perseveration: No perseveration noted  Safety/Judgement: Fall prevention        Hearing:   intact    Vision/Perceptual:                           Acuity: Within Defined Limits         Range of Motion:    AROM: Within functional limits                         Strength:    Strength: Generally decreased, functional                Coordination:   WFL              Balance:  Sitting: Impaired  Sitting - Static: Good (unsupported)  Sitting - Dynamic: Fair (occasional)  Standing: Impaired; With support  Standing - Static: Fair;Constant support  Standing - Dynamic : Fair    Functional Mobility and Transfers for ADLs:  Bed Mobility:  Supine to Sit: Contact guard assistance    Transfers:  Sit to Stand: Contact guard assistance;Minimum assistance  Stand to Sit: Contact guard assistance  Bed to Chair: Contact guard assistance;Minimum assistance  Toilet Transfer : Contact guard assistance    ADL Assessment:  Feeding: Independent    Oral Facial Hygiene/Grooming: Contact guard assistance    Bathing: Contact guard assistance    Upper Body Dressing: Contact guard assistance    Lower Body Dressing: Minimum assistance    Toileting: Minimum assistance               Cognitive Retraining  Safety/Judgement: Fall prevention      Functional Measure:  Barthel Index:    Bathin  Bladder: 0  Bowels: 10  Groomin  Dressin  Feeding: 10  Mobility: 0  Stairs: 0  Toilet Use: 5  Transfer (Bed to Chair and Back): 10  Total: 45       Barthel and G-code impairment scale:  Percentage of impairment CH  0% CI  1-19% CJ  20-39% CK  40-59% CL  60-79% CM  80-99% CN  100%   Barthel Score 0-100 100 99-80 79-60 59-40 20-39 1-19   0   Barthel Score 0-20 20 17-19 13-16 9-12 5-8 1-4 0      The Barthel ADL Index: Guidelines  1.  The index should be used as a record of what a patient does, not as a record of what a patient could do. 2. The main aim is to establish degree of independence from any help, physical or verbal, however minor and for whatever reason. 3. The need for supervision renders the patient not independent. 4. A patient's performance should be established using the best available evidence. Asking the patient, friends/relatives and nurses are the usual sources, but direct observation and common sense are also important. However direct testing is not needed. 5. Usually the patient's performance over the preceding 24-48 hours is important, but occasionally longer periods will be relevant. 6. Middle categories imply that the patient supplies over 50 per cent of the effort. 7. Use of aids to be independent is allowed. Momo Kilpatrick., Barthel, D.W. (2921). Functional evaluation: the Barthel Index. [address] [facility] (14)2. Willow Ma carmen ALISA Ornelas, Tania Sanchez., Bernadette Casanova., [facility], [facility] (1999). Measuring the change indisability after inpatient rehabilitation; comparison of the responsiveness of the Barthel Index and Functional Nicholson Measure. Journal of Neurology, Neurosurgery, and Psychiatry, 66(4), 353-466. Shweta Juarez, N.J.A, MONIQUE GarrettJ.TANNA, & Harvinder Kan, M.A. (2004.) Assessment of post-stroke quality of life in cost-effectiveness studies: The usefulness of the Barthel Index and the EuroQoL-5D. Quality of Life Research, 13, 298-43         G codes: In compliance with CMSs Claims Based Outcome Reporting, the following G-code set was chosen for this patient based on their primary functional limitation being treated: The outcome measure chosen to determine the severity of the functional limitation was the barthel with a score of 45/100 which was correlated with the impairment scale. ?  Self Care:     - CURRENT STATUS: CK - 40%-59% impaired, limited or restricted    - GOAL STATUS: CJ - 20%-39% impaired, limited or restricted    - D/C STATUS:  ---------------To be determined---------------     Occupational Therapy Evaluation Charge Determination   History Examination Decision-Making   LOW Complexity : Brief history review  MEDIUM Complexity : 3-5 performance deficits relating to physical, cognitive , or psychosocial skils that result in activity limitations and / or participation restrictions MEDIUM Complexity : Patient may present with comorbidities that affect occupational performnce. Miniml to moderate modification of tasks or assistance (eg, physical or verbal ) with assesment(s) is necessary to enable patient to complete evaluation       Based on the above components, the patient evaluation is determined to be of the following complexity level: LOW   Pain:  Pain Scale 1: Numeric (0 - 10)  Pain Intensity 1: 0           Pain Intervention(s) 1: Medication (see MAR)  Activity Tolerance:     Please refer to the flowsheet for vital signs taken during this treatment. After treatment:   [x] Patient left in no apparent distress sitting up in chair  [] Patient left in no apparent distress in bed  [x] Call bell left within reach  [x] Nursing notified  [x] Caregiver present/sitter  [] Bed alarm activated    COMMUNICATION/EDUCATION:   The patients plan of care was discussed with: Physical Therapist, Registered Nurse and patient. [] Home safety education was provided and the patient/caregiver indicated understanding. [] Patient/family have participated as able in goal setting and plan of care. [] Patient/family agree to work toward stated goals and plan of care. [x] Patient understands intent and goals of therapy, but is neutral about his/her participation. [] Patient is unable to participate in goal setting and plan of care. This patients plan of care is appropriate for delegation to [facility].     Thank you for this referral.  Vineet Ren, OTR/L  Time Calculation: 23 mins

## 2017-10-24 NOTE — PROGRESS NOTES
Chief Complaint: altered mental status    Awake verbal and lucid. No new issues. Assesment and Plan  1. Opiate overdose. Possibly combined with neurontin overdose which would explain the prolonged sedation. Out of withdrawal window  Resolved encephalopathy     2. Altered mental status  EEG -read  Most likely secondary to medication overdose. Neurontin may take some time to clear. She is more awake and interactive     3. Diabetes  Continue insulin     4. Chronic pain  On Celebrex as needed. Agree with holding the opiates until she is more alert.     Signing off    Allergies  Morphine and Ultram [tramadol]     Medications  Current Facility-Administered Medications   Medication Dose Route Frequency    venlafaxine (EFFEXOR) tablet 75 mg  75 mg Oral BID WITH MEALS    acyclovir (ZOVIRAX) 5 % ointment   Topical QID    potassium chloride 20 mEq in 50 ml IVPB  20 mEq IntraVENous Q1H    magnesium sulfate 2 g/50 ml IVPB (premix or compounded)  2 g IntraVENous ONCE    enoxaparin (LOVENOX) injection 40 mg  40 mg SubCUTAneous Q24H    dexmedeTOMidine (PRECEDEX) 400 mcg in 0.9% sodium chloride 100 mL infusion  0.2-1.4 mcg/kg/hr IntraVENous TITRATE    insulin glargine (LANTUS) injection 5 Units  5 Units SubCUTAneous QHS    insulin lispro (HUMALOG) injection   SubCUTAneous Q6H    glucose chewable tablet 16 g  4 Tab Oral PRN    dextrose (D50W) injection syrg 12.5-25 g  12.5-25 g IntraVENous PRN    glucagon (GLUCAGEN) injection 1 mg  1 mg IntraMUSCular PRN    hydrALAZINE (APRESOLINE) 20 mg/mL injection 20 mg  20 mg IntraVENous Q6H PRN    thiamine (B-1) 100 mg in dextrose 5% 50 mL IVPB  100 mg IntraVENous DAILY    metoprolol (LOPRESSOR) injection 5 mg  5 mg IntraVENous Q6H PRN    sodium chloride (NS) flush 10-30 mL  10-30 mL InterCATHeter PRN    sodium chloride (NS) flush 10 mL  10 mL InterCATHeter Q24H    sodium chloride (NS) flush 10 mL  10 mL InterCATHeter PRN    sodium chloride (NS) flush 10-40 mL  10-40 mL InterCATHeter Q8H    sodium chloride (NS) flush 20 mL  20 mL InterCATHeter PRN    heparin (porcine) pf 300 Units  300 Units InterCATHeter PRN    dilTIAZem (CARDIZEM) 100 mg in 0.9% sodium chloride (MBP/ADV) 100 mL infusion  0-15 mg/hr IntraVENous TITRATE    celecoxib (CELEBREX) capsule 200 mg  200 mg Oral BID    oxyCODONE-acetaminophen (PERCOCET 10)  mg per tablet 1 Tab  1 Tab Oral Q4H PRN    HYDROmorphone (PF) (DILAUDID) injection 0.5 mg  0.5 mg IntraVENous Q3H PRN    LORazepam (ATIVAN) tablet 1 mg  1 mg Oral Q4H PRN    sodium chloride (NS) flush 5-10 mL  5-10 mL IntraVENous Q8H    sodium chloride (NS) flush 5-10 mL  5-10 mL IntraVENous PRN    naloxone (NARCAN) injection 0.4 mg  0.4 mg IntraVENous PRN    ondansetron (ZOFRAN) injection 4 mg  4 mg IntraVENous Q4H PRN    bisacodyl (DULCOLAX) suppository 10 mg  10 mg Rectal DAILY PRN    mupirocin (BACTROBAN) 2 % ointment   Both Nostrils BID        Medical History  Past Medical History:   Diagnosis Date    Arthritis     Chronic pain     Chronic pain     Diabetes (HCC)     Diverticular disease     Hemorrhoid     Hypercholesterolemia     IBS (irritable bowel syndrome)      Review of Systems   Unable to perform ROS: stuporous    Exam:    Visit Vitals    /65    Pulse 81    Temp 98 °F (36.7 °C)    Resp 23    Wt 179 lb 10.8 oz (81.5 kg)    SpO2 98%    BMI 28.57 kg/m2      General: stuporous   Head: Normocephalic, atraumatic, anicteric sclera   Neck Normal ROM,  thyromegally   Lungs:  Clear to auscultation bilaterally, No wheezes or rubs   Cardiac: Regular rate and rhythm with no murmurs. Abd: Bowel sounds were audible. No tenderness on palpation   Ext: No pedal edema   Skin: Supple no rash      NeurologicExam:  Mental Status: Alert and oriented x 2   Speech: Fluent no aphasia or dysarthria.     Cranial Nerves:  Opens Eyes Spontaneously  Tracks appropriately  PERRL  Corneal reflex intact  Symmetric grimmace  Gag reflex intact   Motor: Symmetric movement of all extremities   Reflexes:   Deep tendon reflexes 1+/4 and symmetric. Sensory:   Responds to tactile stimulation. Tremor:   No tremor noted. Neurovascular: No carotid bruits. No JVD     Imaging    CT Results (most recent):    Results from Hospital Encounter encounter on 10/20/17   CT HEAD WO CONT   Narrative EXAM:  CT HEAD WO CONT    INDICATION:   encephalopathy    COMPARISON: October 3. CONTRAST:  None. TECHNIQUE: Unenhanced CT of the head was performed using 5 mm images. Brain and  bone windows were generated. CT dose reduction was achieved through use of a  standardized protocol tailored for this examination and automatic exposure  control for dose modulation. FINDINGS:  Ventricles and sulci are enlarged. There is no significant white matter disease. There is no intracranial hemorrhage, extra-axial collection, mass, mass effect  or midline shift. The basilar cisterns are open. No acute infarct is  identified. The bone windows demonstrate no abnormalities. The visualized  portions of the paranasal sinuses and mastoid air cells are clear. Impression IMPRESSION: No change. MRI Results (most recent):    Results from [place] encounter on 09/29/17   MRI LUMB SPINE WO CONT   Narrative Indication: Lumbosacral spondylosis without myelopathy. Spondylolisthesis of the  lumbar region. TECHNIQUE: Multiplanar, multisequence noncontrast lumbar spine MRI per standard  protocol. COMPARISON: CT abdomen/pelvis July 26, 2017. FINDINGS:    For discussion purposes, the first axial T2-weighted images defined the  midportion of the L1 vertebral body. There is left-sided L5-S1 transitional  lumbosacral anatomy with pseudoarthrosis. Slight grade 1 anterolisthesis of L2  with respect to L3. There is grade 1 anterolisthesis of L3 with respect to L4 on  the basis of advanced facet arthropathy. Levoconvex scoliotic curvature of the  lumbar spine.  There is Alignment of the lumbar spine is otherwise normal. The  spinal cord terminates at a normal anatomic level. There is a slight superior  endplate compression fracture deformity of L1, chronic finding. No active marrow  edema. Diffuse paraspinal muscle atrophy. At L1-L2, there is a mild broad-based disc bulge without a small central  protrusion type disc herniation. No significant central canal or foraminal  narrowing. At L2-L3, there is slight grade 1 anterolisthesis of L2 with respect to L3 with  uncovering of the posterior disc. Spondylolisthesis is on the basis of  moderate/severe right facet arthropathy/ligamentum flavum hypertrophy and  moderate left facet arthropathy and ligamentum flavum hypertrophy at this level. There is mild/moderate central canal narrowing. No significant foraminal  narrowing. L3-L4, there is grade 1 anterolisthesis of L3 with suspected L4 on the basis of  advanced bilateral facet arthropathy and ligamentum flavum hypertrophy. Severe  central canal narrowing. Mild/moderate bilateral foraminal narrowing, right  greater than left. Severe right subarticular recess narrowing at this level. At L4-L5, there is a mild broad-based disc bulge without disc herniation. Moderate bilateral facet arthropathy. No significant central canal or foraminal  narrowing. At L5-S1, there is no disc herniation. Left-sided L5-S1 transitional lumbosacral  anatomy with pseudoarthrosis. Moderate/severe bilateral facet arthropathy. No  significant central canal or foraminal narrowing. Impression IMPRESSION:  The patient has multilevel lumbar spondyloarthropathy as described, most  severely at L3-L4 with severe central canal narrowing. Please refer to the body  of the report for compromise of segmental analysis of the lumbar spinal column.         .  Lab Review    Recent Results (from the past 24 hour(s))   GLUCOSE, POC    Collection Time: 10/23/17  5:36 PM   Result Value Ref Range    Glucose (POC) 156 (H) 65 - 100 mg/dL    Performed by Maria Victoria Hope    GLUCOSE, POC    Collection Time: 10/23/17 10:16 PM   Result Value Ref Range    Glucose (POC) 165 (H) 65 - 100 mg/dL    Performed by Mathieu Abbasi    GLUCOSE, POC    Collection Time: 10/24/17 12:42 AM   Result Value Ref Range    Glucose (POC) 154 (H) 65 - 100 mg/dL    Performed by Ivone Mancini    CBC W/O DIFF    Collection Time: 10/24/17  4:42 AM   Result Value Ref Range    WBC 11.6 (H) 3.6 - 11.0 K/uL    RBC 3.55 (L) 3.80 - 5.20 M/uL    HGB 11.0 (L) 11.5 - 16.0 g/dL    HCT 32.4 (L) 35.0 - 47.0 %    MCV 91.3 80.0 - 99.0 FL    MCH 31.0 26.0 - 34.0 PG    MCHC 34.0 30.0 - 36.5 g/dL    RDW 13.0 11.5 - 14.5 %    PLATELET 136 245 - 594 K/uL   METABOLIC PANEL, BASIC    Collection Time: 10/24/17  4:42 AM   Result Value Ref Range    Sodium 142 136 - 145 mmol/L    Potassium 3.2 (L) 3.5 - 5.1 mmol/L    Chloride 105 97 - 108 mmol/L    CO2 26 21 - 32 mmol/L    Anion gap 11 5 - 15 mmol/L    Glucose 157 (H) 65 - 100 mg/dL    BUN 18 6 - 20 MG/DL    Creatinine 0.61 0.55 - 1.02 MG/DL    BUN/Creatinine ratio 30 (H) 12 - 20      GFR est AA >60 >60 ml/min/1.73m2    GFR est non-AA >60 >60 ml/min/1.73m2    Calcium 8.5 8.5 - 10.1 MG/DL   GLUCOSE, POC    Collection Time: 10/24/17  6:14 AM   Result Value Ref Range    Glucose (POC) 154 (H) 65 - 100 mg/dL    Performed by Mathieu Abbasi    GLUCOSE, POC    Collection Time: 10/24/17 12:25 PM   Result Value Ref Range    Glucose (POC) 182 (H) 65 - 100 mg/dL    Performed by Frank Thao

## 2017-10-24 NOTE — PROGRESS NOTES
2020 pt's bp elevated, pt writhing around the bed restless and hr elevated up to 130. ttr precedex up and 5mg metoprolol given iv.     2120 pt complaining of pain rolling around in the bed saying, \"please help me. \" attempting to get out of bed. 0.5mg dilaudid given iv and precedex ttr up. 0000 pt more calm and restful at this time. Otherwise assessment remains unchanged. Will monitor. 0215 pt attempting to get out of bed c/o pain- 0.5mg dilaudid given iv.     0440 labs drawn and sent. No acute changes. 0615 pt c/o 10/10 pain all over. 0.5 mg dilaudid given iv. Will monitor.

## 2017-10-24 NOTE — PROGRESS NOTES
0800  Report received from offgoing nurse at bedside  1200  Assisted patient back to bed  1500  Bedside and Verbal shift change report given to Shivani Martin RN Report included the following information SBAR, Kardex, Intake/Output, MAR and Recent Results.

## 2017-10-24 NOTE — PROGRESS NOTES
Problem: Mobility Impaired (Adult and Pediatric)  Goal: *Acute Goals and Plan of Care (Insert Text)  Physical Therapy Goals  Initiated 10/24/2017  1. Patient will move from supine to sit and sit to supine  in bed with modified independence within 7 day(s). 2.  Patient will transfer from bed to chair and chair to bed with modified independence using the least restrictive device within 7 day(s). 3.  Patient will perform sit to stand with modified independence within 7 day(s). 4.  Patient will ambulate with modified independence for 50 feet with the least restrictive device within 7 day(s). physical Therapy EVALUATION  Patient: Drew Vazquez (1 y.o. female)  Date: 10/24/2017  Primary Diagnosis: Opiate Overdose/Acute Kidney Injury  Acute encephalopathy        Precautions:   Fall    ASSESSMENT :  Based on the objective data described below, the patient presents with generalized weakness, decreased functional mobility, impaired balance and gait s/p admission for opiate overdose and acute encephalopathy. Patient received supine in bed with sitter and RN present. RN cleared pt to mobilize with therapy. Patient became agitated with questions regarding prior level of function and home set-up; will need to confirm once patient is more appropriate to answer questions. Patient agreeable to mobilize OOB to chair. Patient completed supine to sit edge of bed with CGA, sit<>stand with CGA and RW. Patient ambulated a short distance for bed to chair transfer with CGA-min assist. Once seated in chair pt with c/o buttock pain. Wound care RN present and provided pt with a waffle cushion for comfort. Patient will continue to benefit from PT to progress mobility, improve balance and gait as tolerated. Patient with history of chronic pain, despite pt denying during subjective portion of exam. Once patient is mobilizing more and responding appropriately, will then determine discharge recommendations.  .    Patient will benefit from skilled intervention to address the above impairments. Patients rehabilitation potential is considered to be Fair  Factors which may influence rehabilitation potential include:   []         None noted  []         Mental ability/status  [x]         Medical condition  []         Home/family situation and support systems  [x]         Safety awareness  [x]         Pain tolerance/management  []         Other:      PLAN :  Recommendations and Planned Interventions:  [x]           Bed Mobility Training             [x]    Neuromuscular Re-Education  [x]           Transfer Training                   []    Orthotic/Prosthetic Training  [x]           Gait Training                         []    Modalities  [x]           Therapeutic Exercises           []    Edema Management/Control  [x]           Therapeutic Activities            [x]    Patient and Family Training/Education  []           Other (comment):    Frequency/Duration: Patient will be followed by physical therapy  3 times a week to address goals. Discharge Recommendations: To Be Determined  Further Equipment Recommendations for Discharge: none at this time     SUBJECTIVE:   Patient stated [facility] do you need to know any of this? How am I supposed to do anything with this allen in? You know what I mean? Have you seen my ?     OBJECTIVE DATA SUMMARY:   HISTORY:    Past Medical History:   Diagnosis Date    Arthritis     Chronic pain     Chronic pain     Diabetes ([address]     Diverticular disease     Hemorrhoid     Hypercholesterolemia     IBS (irritable bowel syndrome)      Past Surgical History:   Procedure Laterality Date    HX CATARACT REMOVAL      HX CHOLECYSTECTOMY      HX COLONOSCOPY  2009    HX COLONOSCOPY  2016    2 adenomatous polyp    HX HEMORRHOIDECTOMY  2009    HX HYSTERECTOMY      HX KNEE REPLACEMENT      right     Prior Level of Function/Home Situation: pt reported she ambulates with a RW.  Pt became agitated when asked prior level of function and home set-up. Furthering questioning deferred at this time  Personal factors and/or comorbidities impacting plan of care:     Home Situation  Home Environment: Private residence  Living Alone: No  Support Systems: Spouse/Significant Other/Partner  Current DME Used/Available at Home: Walker, rolling    EXAMINATION/PRESENTATION/DECISION MAKING:   Critical Behavior:  Neurologic State: Alert  Orientation Level: Disoriented to place, Disoriented to situation, Disoriented to time, Oriented to person  Cognition: Decreased attention/concentration, Decreased command following, Impaired decision making, Impulsive     Hearing:     Skin:    Edema:   Range Of Motion:  AROM: Within functional limits      Strength:    Strength: Generally decreased, functional     Functional Mobility:  Bed Mobility:     Supine to Sit: Contact guard assistance        Transfers:  Sit to Stand: Contact guard assistance;Minimum assistance  Stand to Sit: Contact guard assistance        Bed to Chair: Contact guard assistance;Minimum assistance              Balance:   Sitting: Impaired  Sitting - Static: Good (unsupported)  Sitting - Dynamic: Fair (occasional)  Standing: Impaired; With support  Standing - Static: Fair;Constant support  Standing - Dynamic : Fair  Ambulation/Gait Training:  Distance (ft): 5 Feet (ft)  Assistive Device: Gait belt;Walker, rolling  Ambulation - Level of Assistance: Contact guard assistance;Minimal assistance        Gait Abnormalities: Decreased step clearance;Shuffling gait (forward flexed trunk)        Base of Support: Widened     Speed/Lata: Pace decreased (<100 feet/min); Shuffled  Step Length: Right shortened;Left shortened      Functional Measure:  Tinetti test:    Sitting Balance: 1  Arises: 1  Attempts to Rise: 1  Immediate Standing Balance: 1  Standing Balance: 0  Nudged: 0  Eyes Closed: 0  Turn 360 Degrees - Continuous/Discontinuous: 0  Turn 360 Degrees - Steady/Unsteady: 0  Sitting Down: 1  Balance Score: 5  Indication of Gait: 1  R Step Length/Height: 1  L Step Length/Height: 1  R Foot Clearance: 1  L Foot Clearance: 1  Step Symmetry: 1  Step Continuity: 0  Path: 0  Trunk: 0  Walking Time: 0  Gait Score: 6  Total Score: 11       Tinetti Test and G-code impairment scale:  Percentage of Impairment CH    0%   CI    1-19% CJ    20-39% CK    40-59% CL    60-79% CM    80-99% CN     100%   Tinetti  Score 0-28 28 23-27 17-22 12-16 6-11 1-5 0       Tinetti Tool Score Risk of Falls  <19 = High Fall Risk  19-24 = Moderate Fall Risk  25-28 = Low Fall Risk  Tinetti ME. Performance-Oriented Assessment of Mobility Problems in Elderly Patients. [address] 66; [id number]. (Scoring Description: PT Bulletin Feb. 10, 1993)    Older adults: Verner Nevin et al, 2009; n = 1000 [facility] elderly evaluated with ABC, ZAIN, ADL, and IADL)  · Mean ZAIN score for males aged 69-68 years = 26.21(3.40)  · Mean ZAIN score for females age 69-68 years = 25.16(4.30)  · Mean ZAIN score for males over 80 years = 23.29(6.02)  · Mean ZAIN score for females over 80 years = 17.20(8.32)         G codes: In compliance with CMSs Claims Based Outcome Reporting, the following G-code set was chosen for this patient based on their primary functional limitation being treated: The outcome measure chosen to determine the severity of the functional limitation was the Tinetti with a score of 11/28 which was correlated with the impairment scale. ? Mobility - Walking and Moving Around:     - CURRENT STATUS: CL - 60%-79% impaired, limited or restricted    - GOAL STATUS: CK - 40%-59% impaired, limited or restricted    - D/C STATUS:  ---------------To be determined---------------       Based on the above components, the patient evaluation is determined to be of the following complexity level: LOW     Pain:  Pain Scale 1: Visual  Pain Intensity 1: 0           Pain Intervention(s) 1: Medication (see MAR)  Activity Tolerance:   Fair.  Limited by reports of buttocks pain; pt agitated with questions  Please refer to the flowsheet for vital signs taken during this treatment. After treatment:   [x]         Patient left in no apparent distress sitting up in chair  []         Patient left in no apparent distress in bed  [x]         Call bell left within reach  [x]         Nursing notified  [x]         Sitter present  [x]         Bed alarm activated    COMMUNICATION/EDUCATION:   The patients plan of care was discussed with: Occupational Therapist and Registered Nurse.  []         Fall prevention education was provided and the patient/caregiver indicated understanding. []         Patient/family have participated as able in goal setting and plan of care. []         Patient/family agree to work toward stated goals and plan of care. [x]         Patient understands intent and goals of therapy, but is neutral about his/her participation. []         Patient is unable to participate in goal setting and plan of care.     Thank you for this referral.  Deloris Doan, PT , DPT   Time Calculation: 20 mins

## 2017-10-24 NOTE — WOUND CARE
Wound care consulted by Dr Sandhya Saul to assess a painful vaginal wound on patient. Patient is a 64 y/o CF found unresponsive after taking her husbands oral morphine and taking more Neurontin then prescribed for back pain. Also rhabdo  Past Medical History:   Diagnosis Date    Arthritis     Chronic pain     Chronic pain     Diabetes ([address]     Diverticular disease     Hemorrhoid     Hypercholesterolemia     IBS (irritable bowel syndrome)      Patient has encephalopathy, lethargic bu has impulsivity of trying to crawl out of bed. Sitter in room and PT/OT got her up to chair. Patient was examined standing up. She is incontinent of loose stools and some urine at this time and wearing a brief. Buttocks/sacrum are slightly red from irritation, but skin intact. Her vagina appears to have x2-3 small sores that are very painful and could be irritation of uknown etiology or herpes symplex virus. I relayed by findings and concerns to Dr Sandhya Saul and suggested that he order an HSV PCR test or try some acyclovir cream to see if it provides relief.   Andre Contreras RN, [employer]

## 2017-10-24 NOTE — PROGRESS NOTES
Interdisciplinary team rounds were held 10/24/2017  with the following team members:Care Management, Diabetes Treatment Specialist, Nursing, Nutrition, Pharmacy, Physician, Respiratory Therapy and Clinical Coordinator. Plan of care discussed. Goal: See MD orders and progress notes for further  interventions and desired outcomes.

## 2017-10-24 NOTE — PROGRESS NOTES
PULMONARY/Critical Care/SLEEP MEDICINE  [facility]  Name: Geoff Raymond   : 1940   MRN: [id number]   Date: 10/24/2017 8:40 AM     IMPRESSION:   1. AMS  2. Probable narcotic overdose  3. Chronic pain - spinal stenosis  4. Tobacco use  5. DM  6. Hypoxemia  7. Atrial fib ? Chronicity  8. HTN      RECOMMENDATIONS/PLAN:   1. wean cardizem gtt  2. Sitter prn  3. flexiseal  4. Wound care consult  5. Reorient  6. Appreciate neurology's help  7. Discussed management with pts family- supportive care; chronic pain is a big issue but we will not be able to fix that longterm; will try to support but pt took a lot more meds than expected  8. Resume effexor when able to take PO  9. DVT, SUP prophylaxis   10. Will be available to assist in medical management while in the CCU pending disposition          10/24 still easily agitiated but on less IV precedex    10/ 23 Remains encephalopathic; getting some dilaudid; on precedex  Moaning and restless. Eyes open and easily agitated. Diarrhea. Vaginal tenderness per nursing. Vital Signs:    Visit Vitals    /59    Pulse 63    Temp 98.4 °F (36.9 °C)    Resp 16    Wt 81.5 kg (179 lb 10.8 oz)    SpO2 92%    BMI 28.57 kg/m2       O2 Device: Room air   O2 Flow Rate (L/min): 4 l/min   Temp (24hrs), Av.8 °F (37.1 °C), Min:98.4 °F (36.9 °C), Max:99.3 °F (37.4 °C)       Intake/Output:   Last shift:         Last 3 shifts: 10/22 1901 - 10/24 0700  In: 214 [P.O.:100;  I.V.:883]  Out:  [Urine:]    Intake/Output Summary (Last 24 hours) at 10/24/17 0908  Last data filed at 10/24/17 07   Gross per 24 hour   Intake           742.08 ml   Output             1130 ml   Net          -387.92 ml       Physical Exam:    General: []      Intubated/sedated []      No acute distress []          []      Ill appearing [x]     agitated []            HEENT: []     ETT [x]      PERRL []     NGT     []      Anicteric Mucosa:[]      moist   [x]      dry [] Resp: []      Wheeze [x]      Clear to ascultation bilaterally []      Accessory muscle use    []      Rales []      Chest tube:  []      Air leak []          []      Ronchi []      Crepitus []         []      Coarse bilateral ventilator breath sounds      CV: []      Regular []      Tachycardia [x]          [x]      Irregular []      Bradycardic []          []      Murmur []      S1 []          []     RUB []    S2 []            GI: [x]      Soft  []      NG Tube Bowel sounds: []      present []      absent    []      Firm []      PEG [x]   - Tender      -      Distended  []            : []      Zeng []      Hematuria []          []      Clear urine []       []            MSK:    []      SCDs [x]    -  Edema [x]   --clubbing       []      No deformities [x] - -Cyanosis  []            Skin: [x]      Warm [x]      Dry  []   Mottled       []      Cool []      Moist []          []     Lesions []      Diaphoretic []          []      Rash  []      Cyanosis []            N-psych: []      Sedated  [x]      Agitated [x]     Moves all extremities     []      Alert  Follows commands:   []      Yes  [x]      No [x]   -  oriented       Devices: []      PA Catheter []      Tracheostomy []          []      Central Line []        []        Chest tube:  Air leak? []        Y/[]        N    []          []      PICC []       []            DATA:   Current Facility-Administered Medications   Medication Dose Route Frequency    venlafaxine (EFFEXOR) tablet 50 mg  50 mg Oral BID WITH MEALS    enoxaparin (LOVENOX) injection 40 mg  40 mg SubCUTAneous Q24H    dexmedeTOMidine (PRECEDEX) 400 mcg in 0.9% sodium chloride 100 mL infusion  0.2-1.4 mcg/kg/hr IntraVENous TITRATE    insulin glargine (LANTUS) injection 5 Units  5 Units SubCUTAneous QHS    insulin lispro (HUMALOG) injection   SubCUTAneous Q6H    glucose chewable tablet 16 g  4 Tab Oral PRN    dextrose (D50W) injection syrg 12.5-25 g  12.5-25 g IntraVENous PRN    glucagon (GLUCAGEN) injection 1 mg  1 mg IntraMUSCular PRN    hydrALAZINE (APRESOLINE) 20 mg/mL injection 20 mg  20 mg IntraVENous Q6H PRN    thiamine (B-1) 100 mg in dextrose 5% 50 mL IVPB  100 mg IntraVENous DAILY    metoprolol (LOPRESSOR) injection 5 mg  5 mg IntraVENous Q6H PRN    sodium chloride (NS) flush 10-30 mL  10-30 mL InterCATHeter PRN    sodium chloride (NS) flush 10 mL  10 mL InterCATHeter Q24H    sodium chloride (NS) flush 10 mL  10 mL InterCATHeter PRN    sodium chloride (NS) flush 10-40 mL  10-40 mL InterCATHeter Q8H    sodium chloride (NS) flush 20 mL  20 mL InterCATHeter PRN    heparin (porcine) pf 300 Units  300 Units InterCATHeter PRN    dilTIAZem (CARDIZEM) 100 mg in 0.9% sodium chloride (MBP/ADV) 100 mL infusion  0-15 mg/hr IntraVENous TITRATE    celecoxib (CELEBREX) capsule 200 mg  200 mg Oral BID    oxyCODONE-acetaminophen (PERCOCET 10)  mg per tablet 1 Tab  1 Tab Oral Q4H PRN    HYDROmorphone (PF) (DILAUDID) injection 0.5 mg  0.5 mg IntraVENous Q3H PRN    LORazepam (ATIVAN) tablet 1 mg  1 mg Oral Q4H PRN    sodium chloride (NS) flush 5-10 mL  5-10 mL IntraVENous Q8H    sodium chloride (NS) flush 5-10 mL  5-10 mL IntraVENous PRN    naloxone (NARCAN) injection 0.4 mg  0.4 mg IntraVENous PRN    ondansetron (ZOFRAN) injection 4 mg  4 mg IntraVENous Q4H PRN    bisacodyl (DULCOLAX) suppository 10 mg  10 mg Rectal DAILY PRN    mupirocin (BACTROBAN) 2 % ointment   Both Nostrils BID       Telemetry: []        Sinus []        A-flutter []        Paced    []        A-fib []        Multiple PVCs                  Labs:  Recent Labs      10/24/17   0442  10/22/17   0356   WBC  11.6*  10.8   HGB  11.0*  10.7*   HCT  32.4*  31.8*   PLT  243  234     Recent Labs      10/24/17   0442  10/23/17   0343  10/22/17   1138  10/22/17   0356   NA  142  139   --   138   K  3.2*  3.5  3.5  3.0*   CL  105  101   --   99   CO2  26  32   --   32   GLU  157*  171*   --   167*   BUN  18  21*   -- 24*   CREA  0.61  0.87   --   0.85   CA  8.5  8.2*   --   8.0*   MG   --   1.6   --   1.5*   ALB   --    --    --   2.0*   TBILI   --    --    --   0.3   SGOT   --    --    --   86*   ALT   --    --    --   231*     No results for input(s): PH, PCO2, PO2, HCO3, FIO2 in the last 72 hours.     Imaging:  []      I have personally reviewed the patients radiographs     []      No change from prior, tubes and lines in adequate position  []      Improved   []      Worsening     IMPRESSION:     The patient is: []       acutely ill Risk of deterioration: [x]       moderate    []       critically ill  []       high     Active Problems:    Acute encephalopathy (10/23/2017)        []      See my orders for details    My assessment/plan was discussed with:  []      nursing []      PT/OT    []      respiratory therapy []         []      family []           []      Total critical care time exclusive of procedures       minutes  Keshawn Anaya MD

## 2017-10-24 NOTE — PROGRESS NOTES
1500: Assumed care of patients from [facility]. Spoke with MD Sandhya Saul concerning patient's morning 3.2 potassium level; orders received for 4 runs of potassium. 1600: Initial assessment completed; patient AOx4 but some expressive aphasia noted at this time; lung sounds diminished; pupils equal and reactive to light; VSS; abdomen semi-soft; no c/o pain at this time. 1630: Family at bedside. 1720: PRN percocet given to patient for 4/10 headache pain. 1900: Change of shift report given to [facility], RN. Drips verified: diltiazem at 200mcg/hr, TPN at 42mL/hr, and propofol at 35 mcg/kg/min.

## 2017-10-24 NOTE — PROCEDURES
[address]   EEG       Name:  Cherelle Starkey   MR#:  [id number]   :  1940   Account #:  [de-identified]    Date of Procedure:  10/23/2017   Date of Adm:  10/20/2017       DATE OF SERVICE: 10/23/2017     EXAM NUMBER: [id number]. INDICATION: Altered mental status    DESCRIPTION OF PROCEDURE: Electrodes were applied in   accordance with the International 10-20 system for electrode   placement. EEG was reviewed in both bipolar and referential   montages. DESCRIPTION OF FINDINGS: Background consisted of generalized   theta activity. Frequent triphasics were noted on the study. No seizures   were noted on the study. IMPRESSION: This electroencephalogram is abnormal for generalized   slowing and frequent triphasics, which may elude to an underlying   metabolic or anoxic insult. No seizures were noted on the study. The absence of seizure of this study does not exclude a diagnosis of   epilepsy. Clinical correlation is advised.         Georjean Olszewski, MD      [facility]   D:  10/24/2017   09:18   T:  10/24/2017   14:15   Job #:  [id number]

## 2017-10-24 NOTE — PROGRESS NOTES
Hospitalist Progress Note    NAME: Drew Vazquez   :  1940   MRN:  [id number]       Assessment / Plan:  SUMMARY:   65 Y. O WF, with Hx HTN, DM-2, Dyslipidemia, IBS, Diverticular disease, spinal stenosis, Chronic back pain, apparently taking regularly scheduled Tylenol, also taking 's oral morphine past few days, may also been taking Neurontin more than prescribed, found by  unresponsive. EMS called and brought to [facility] ED, where patient became more alert after IV Narcan, then gradually more lethargic. Patient would open eyes, then quickly fall asleep, unable to hold a conversation or provide history. Initial evaluation revealed ARF and abnormal LFTs. Admitted for further evaluation and management. PROBLEMS:     1. Acute encephalopathy: Secondary to medication OD. Patient presented with marked lethargy, in the setting utilization of multiple sedating meds, including possibly spouse's MS Contin, for severe low back pain x 2 days. Head CT -no acute findings. Managing supportively, Narcan infusion as needed and mental status gradually improving. EEG pending. Now awake and interactive. Neurology/psychiatry following. 2. Acute respiratory failure with hypoxia: Noted at presentation, due to # 1. Managed as above, as well as with O2. Resolved.       3. Leukocytosis: Wcc 17.9 o admission. U/A-neg, afebrile, CXR-no acute findings, lactic acid 1.5. No obvious infective focus. Likely reactive. Following CBC. Trending down. 4. Possible Tylenol OD: Abnormal LFT noted on admission. Andres Burdick consulted, and N-Acetylcysteine recommended. Administered accordingly. Following LFTs-stable. Liver U/S-echogenic liver. 5. Rhabdomyolysis/Acute kidney injury: BUN 36, creatinine 2.89 on admission (baseline creatinine 1.08-1.35). Total CPK 3973. BNo blood on urine dipstick testing. Likely due to volume de[pletion. Managed with IV fluids/HCO3. ACE-I and other nephrotoxins on hold. Renal U/S-negative. Resolved. 6. A.Fib/RVR: No clear previous Hx. Managed with TIESHA Cardizem. Now in SR/Rate-controlled. Cardiology consult-pending. 2 D Echo.      7. HTN: Reasonably controlled, on prn IV Hydralazine.       8. DM-2: Managing with SSI/Basal insulin. A1C 8.6      Comment: Continue current care.           Body mass index is 28.57 kg/(m^2). DVT prophylaxis with lovenox      Code status full code.  is NOK     Subjective:     Chief Complaint / Reason for Physician Visit  No new issues. Discussed with RN events overnight. Review of Systems:  Symptom Y/N Comments  Symptom Y/N Comments   Fever/Chills N   Chest Pain N    Poor Appetite Y   Edema N    Cough N   Abdominal Pain N    Sputum N   Joint Pain Y    SOB/STANTON N   Pruritis/Rash N    Nausea/vomit N   Tolerating PT/OT     Diarrhea N   Tolerating Diet     Constipation N   Other       Could NOT obtain due to:      Objective:     VITALS:   Last 24hrs VS reviewed since prior progress note.  Most recent are:  Patient Vitals for the past 24 hrs:   Temp Pulse Resp BP SpO2   10/24/17 1000 - 67 20 149/47 94 %   10/24/17 0900 - 73 16 185/49 96 %   10/24/17 0800 - 63 16 140/59 92 %   10/24/17 0700 - 67 16 146/62 93 %   10/24/17 0600 - 78 17 138/53 94 %   10/24/17 0500 - 71 17 122/53 100 %   10/24/17 0400 98.4 °F (36.9 °C) 67 13 141/45 97 %   10/24/17 0300 - 73 13 158/65 94 %   10/24/17 0200 - 78 17 167/53 97 %   10/24/17 0100 - 66 18 132/63 99 %   10/24/17 0000 98.6 °F (37 °C) 70 16 136/58 98 %   10/23/17 2300 - 75 17 147/43 98 %   10/23/17 2200 - 73 19 131/52 97 %   10/23/17 2117 - 79 18 172/59 96 %   10/23/17 2015 - 93 24 194/83 94 %   10/23/17 2000 98.9 °F (37.2 °C) 91 20 185/62 97 %   10/23/17 1900 - 92 18 171/65 95 %   10/23/17 1800 - 82 25 149/65 98 %   10/23/17 1700 - 85 22 130/57 97 %   10/23/17 1630 - 94 15 163/63 96 %   10/23/17 1600 99.3 °F (37.4 °C) 89 17 141/60 95 %   10/23/17 1530 - 96 19 167/50 94 %   10/23/17 1515 - 100 17 179/83 96 %   10/23/17 1500 - (!) 106 18 174/64 95 %   10/23/17 1445 - (!) 103 14 155/68 95 %   10/23/17 1436 - (!) 111 15 107/66 95 %   10/23/17 1415 - (!) 111 21 - 96 %   10/23/17 1409 - (!) 118 22 172/54 94 %   10/23/17 1400 - (!) 113 17 175/62 96 %   10/23/17 1300 - 89 17 165/63 96 %   10/23/17 1200 - 79 21 123/46 90 %   10/23/17 1100 98.7 °F (37.1 °C) 77 21 129/56 95 %       Intake/Output Summary (Last 24 hours) at 10/24/17 1034  Last data filed at 10/24/17 0900   Gross per 24 hour   Intake           774.74 ml   Output             1105 ml   Net          -330.26 ml        PHYSICAL EXAM:  General: WD, WN. Alert, cooperative, no acute distress    EENT:  EOMI. Anicteric sclerae. MMM  Resp:  CTA bilaterally, no wheezing or rales. No accessory muscle use  CV:  Heart sounds, normal, regular, no murmurs.  No edema  GI:  Soft, Non distended, Non tender.  +Bowel sounds  Neurologic:  Disoriented, to pace and day, but interactive and communicative. No focal deficit. Psych:   Not anxious nor agitated  Skin:  No rashes. No jaundice    Reviewed most current lab test results and cultures  YES  Reviewed most current radiology test results   YES  Review and summation of old records today    NO  Reviewed patient's current orders and MAR    YES  PMH/SH reviewed - no change compared to H&P  ________________________________________________________________________  Care Plan discussed with:    Comments   Patient X    Family      RN X    Care Manager     Consultant                        Multidiciplinary team rounds were held today with , nursing, pharmacist and clinical coordinator. Patient's plan of care was discussed; medications were reviewed and discharge planning was addressed.      ________________________________________________________________________  Total NON critical care TIME:  <30   Minutes    Total CRITICAL CARE TIME Spent:   Minutes non procedure based      Comments   >50% of visit spent in counseling and coordination of care ________________________________________________________________________  General Boast, MD     Procedures: see electronic medical records for all procedures/Xrays and details which were not copied into this note but were reviewed prior to creation of Plan. LABS:  I reviewed today's most current labs and imaging studies.   Pertinent labs include:  Recent Labs      10/24/17   0442  10/22/17   0356   WBC  11.6*  10.8   HGB  11.0*  10.7*   HCT  32.4*  31.8*   PLT  243  234     Recent Labs      10/24/17   0442  10/23/17   0343  10/22/17   1138  10/22/17   0356   NA  142  139   --   138   K  3.2*  3.5  3.5  3.0*   CL  105  101   --   99   CO2  26  32   --   32   GLU  157*  171*   --   167*   BUN  18  21*   --   24*   CREA  0.61  0.87   --   0.85   CA  8.5  8.2*   --   8.0*   MG   --   1.6   --   1.5*   ALB   --    --    --   2.0*   TBILI   --    --    --   0.3   SGOT   --    --    --   86*   ALT   --    --    --   231*       Signed: General Boast, MD

## 2017-10-25 ENCOUNTER — TELEPHONE (OUTPATIENT)
Dept: FAMILY MEDICINE CLINIC | Age: 77
End: 2017-10-25

## 2017-10-25 LAB
ALBUMIN SERPL-MCNC: 1.9 G/DL (ref 3.5–5)
ALBUMIN/GLOB SERPL: 0.5 {RATIO} (ref 1.1–2.2)
ALP SERPL-CCNC: 78 U/L (ref 45–117)
ALT SERPL-CCNC: 69 U/L (ref 12–78)
ANION GAP SERPL CALC-SCNC: 9 MMOL/L (ref 5–15)
AST SERPL-CCNC: 22 U/L (ref 15–37)
BASOPHILS # BLD: 0 K/UL (ref 0–0.1)
BASOPHILS NFR BLD: 0 % (ref 0–1)
BILIRUB SERPL-MCNC: 0.3 MG/DL (ref 0.2–1)
BUN SERPL-MCNC: 15 MG/DL (ref 6–20)
BUN/CREAT SERPL: 21 (ref 12–20)
CALCIUM SERPL-MCNC: 8.4 MG/DL (ref 8.5–10.1)
CHLORIDE SERPL-SCNC: 107 MMOL/L (ref 97–108)
CO2 SERPL-SCNC: 24 MMOL/L (ref 21–32)
CREAT SERPL-MCNC: 0.73 MG/DL (ref 0.55–1.02)
EOSINOPHIL # BLD: 0.2 K/UL (ref 0–0.4)
EOSINOPHIL NFR BLD: 2 % (ref 0–7)
ERYTHROCYTE [DISTWIDTH] IN BLOOD BY AUTOMATED COUNT: 12.9 % (ref 11.5–14.5)
GLOBULIN SER CALC-MCNC: 3.8 G/DL (ref 2–4)
GLUCOSE BLD STRIP.AUTO-MCNC: 183 MG/DL (ref 65–100)
GLUCOSE BLD STRIP.AUTO-MCNC: 230 MG/DL (ref 65–100)
GLUCOSE BLD STRIP.AUTO-MCNC: 330 MG/DL (ref 65–100)
GLUCOSE SERPL-MCNC: 185 MG/DL (ref 65–100)
HCT VFR BLD AUTO: 29.6 % (ref 35–47)
HGB BLD-MCNC: 10.2 G/DL (ref 11.5–16)
LYMPHOCYTES # BLD: 3 K/UL (ref 0.8–3.5)
LYMPHOCYTES NFR BLD: 30 % (ref 12–49)
MAGNESIUM SERPL-MCNC: 1.8 MG/DL (ref 1.6–2.4)
MCH RBC QN AUTO: 30.7 PG (ref 26–34)
MCHC RBC AUTO-ENTMCNC: 34.5 G/DL (ref 30–36.5)
MCV RBC AUTO: 89.2 FL (ref 80–99)
MONOCYTES # BLD: 0.7 K/UL (ref 0–1)
MONOCYTES NFR BLD: 7 % (ref 5–13)
NEUTS SEG # BLD: 6.1 K/UL (ref 1.8–8)
NEUTS SEG NFR BLD: 61 % (ref 32–75)
PLATELET # BLD AUTO: 251 K/UL (ref 150–400)
POTASSIUM SERPL-SCNC: 3.7 MMOL/L (ref 3.5–5.1)
PROT SERPL-MCNC: 5.7 G/DL (ref 6.4–8.2)
RBC # BLD AUTO: 3.32 M/UL (ref 3.8–5.2)
SERVICE CMNT-IMP: ABNORMAL
SODIUM SERPL-SCNC: 140 MMOL/L (ref 136–145)
WBC # BLD AUTO: 10.1 K/UL (ref 3.6–11)

## 2017-10-25 PROCEDURE — 65610000006 HC RM INTENSIVE CARE

## 2017-10-25 PROCEDURE — 74011250637 HC RX REV CODE- 250/637: Performed by: INTERNAL MEDICINE

## 2017-10-25 PROCEDURE — 97116 GAIT TRAINING THERAPY: CPT

## 2017-10-25 PROCEDURE — 74011250636 HC RX REV CODE- 250/636: Performed by: INTERNAL MEDICINE

## 2017-10-25 PROCEDURE — 74011636637 HC RX REV CODE- 636/637: Performed by: INTERNAL MEDICINE

## 2017-10-25 PROCEDURE — 85025 COMPLETE CBC W/AUTO DIFF WBC: CPT | Performed by: INTERNAL MEDICINE

## 2017-10-25 PROCEDURE — 83735 ASSAY OF MAGNESIUM: CPT | Performed by: INTERNAL MEDICINE

## 2017-10-25 PROCEDURE — 77010033678 HC OXYGEN DAILY

## 2017-10-25 PROCEDURE — 97535 SELF CARE MNGMENT TRAINING: CPT | Performed by: OCCUPATIONAL THERAPIST

## 2017-10-25 PROCEDURE — 36415 COLL VENOUS BLD VENIPUNCTURE: CPT | Performed by: INTERNAL MEDICINE

## 2017-10-25 PROCEDURE — 82962 GLUCOSE BLOOD TEST: CPT

## 2017-10-25 PROCEDURE — 80053 COMPREHEN METABOLIC PANEL: CPT | Performed by: INTERNAL MEDICINE

## 2017-10-25 PROCEDURE — 74011000258 HC RX REV CODE- 258: Performed by: INTERNAL MEDICINE

## 2017-10-25 RX ORDER — LANOLIN ALCOHOL/MO/W.PET/CERES
100 CREAM (GRAM) TOPICAL DAILY
Status: DISCONTINUED | OUTPATIENT
Start: 2017-10-26 | End: 2017-10-28 | Stop reason: HOSPADM

## 2017-10-25 RX ORDER — DILTIAZEM HYDROCHLORIDE 30 MG/1
60 TABLET, FILM COATED ORAL
Status: DISCONTINUED | OUTPATIENT
Start: 2017-10-25 | End: 2017-10-25

## 2017-10-25 RX ORDER — GLYBURIDE 2.5 MG/1
2.5 TABLET ORAL
Status: DISCONTINUED | OUTPATIENT
Start: 2017-10-25 | End: 2017-10-26

## 2017-10-25 RX ORDER — HYDROMORPHONE HYDROCHLORIDE 1 MG/ML
0.2 INJECTION, SOLUTION INTRAMUSCULAR; INTRAVENOUS; SUBCUTANEOUS
Status: DISCONTINUED | OUTPATIENT
Start: 2017-10-25 | End: 2017-10-28 | Stop reason: HOSPADM

## 2017-10-25 RX ORDER — METRONIDAZOLE 250 MG/1
250 TABLET ORAL 3 TIMES DAILY
Status: DISCONTINUED | OUTPATIENT
Start: 2017-10-25 | End: 2017-10-28 | Stop reason: HOSPADM

## 2017-10-25 RX ORDER — DILTIAZEM HYDROCHLORIDE 30 MG/1
60 TABLET, FILM COATED ORAL EVERY 8 HOURS
Status: COMPLETED | OUTPATIENT
Start: 2017-10-25 | End: 2017-10-26

## 2017-10-25 RX ORDER — LANOLIN ALCOHOL/MO/W.PET/CERES
100 CREAM (GRAM) TOPICAL DAILY
Status: DISCONTINUED | OUTPATIENT
Start: 2017-10-25 | End: 2017-10-25

## 2017-10-25 RX ORDER — GABAPENTIN 100 MG/1
200 CAPSULE ORAL 3 TIMES DAILY
Status: DISCONTINUED | OUTPATIENT
Start: 2017-10-25 | End: 2017-10-28 | Stop reason: HOSPADM

## 2017-10-25 RX ADMIN — THIAMINE HYDROCHLORIDE 100 MG: 100 INJECTION, SOLUTION INTRAMUSCULAR; INTRAVENOUS at 08:54

## 2017-10-25 RX ADMIN — Medication 30 ML: at 06:00

## 2017-10-25 RX ADMIN — GABAPENTIN 200 MG: 100 CAPSULE ORAL at 12:40

## 2017-10-25 RX ADMIN — GABAPENTIN 200 MG: 100 CAPSULE ORAL at 21:03

## 2017-10-25 RX ADMIN — Medication 10 ML: at 17:39

## 2017-10-25 RX ADMIN — METRONIDAZOLE 250 MG: 250 TABLET ORAL at 21:03

## 2017-10-25 RX ADMIN — ACYCLOVIR: 50 OINTMENT TOPICAL at 21:04

## 2017-10-25 RX ADMIN — ENOXAPARIN SODIUM 40 MG: 40 INJECTION SUBCUTANEOUS at 08:54

## 2017-10-25 RX ADMIN — METRONIDAZOLE 250 MG: 250 TABLET ORAL at 17:37

## 2017-10-25 RX ADMIN — Medication 10 ML: at 06:00

## 2017-10-25 RX ADMIN — DILTIAZEM HYDROCHLORIDE 60 MG: 30 TABLET, FILM COATED ORAL at 21:03

## 2017-10-25 RX ADMIN — ACYCLOVIR: 50 OINTMENT TOPICAL at 17:40

## 2017-10-25 RX ADMIN — GLYBURIDE 2.5 MG: 2.5 TABLET ORAL at 17:36

## 2017-10-25 RX ADMIN — MUPIROCIN: 20 OINTMENT TOPICAL at 17:40

## 2017-10-25 RX ADMIN — VENLAFAXINE 75 MG: 25 TABLET ORAL at 08:54

## 2017-10-25 RX ADMIN — INSULIN LISPRO 4 UNITS: 100 INJECTION, SOLUTION INTRAVENOUS; SUBCUTANEOUS at 12:40

## 2017-10-25 RX ADMIN — CELECOXIB 200 MG: 100 CAPSULE ORAL at 17:41

## 2017-10-25 RX ADMIN — ACYCLOVIR: 50 OINTMENT TOPICAL at 08:57

## 2017-10-25 RX ADMIN — GABAPENTIN 200 MG: 100 CAPSULE ORAL at 17:36

## 2017-10-25 RX ADMIN — OXYCODONE HYDROCHLORIDE AND ACETAMINOPHEN 1 TABLET: 10; 325 TABLET ORAL at 21:03

## 2017-10-25 RX ADMIN — VENLAFAXINE 75 MG: 25 TABLET ORAL at 17:37

## 2017-10-25 RX ADMIN — INSULIN GLARGINE 5 UNITS: 100 INJECTION, SOLUTION SUBCUTANEOUS at 21:03

## 2017-10-25 RX ADMIN — Medication 10 ML: at 21:04

## 2017-10-25 RX ADMIN — INSULIN LISPRO 2 UNITS: 100 INJECTION, SOLUTION INTRAVENOUS; SUBCUTANEOUS at 18:00

## 2017-10-25 RX ADMIN — DILTIAZEM HYDROCHLORIDE 60 MG: 30 TABLET, FILM COATED ORAL at 12:40

## 2017-10-25 RX ADMIN — CELECOXIB 200 MG: 100 CAPSULE ORAL at 08:54

## 2017-10-25 NOTE — TELEPHONE ENCOUNTER
Needs authorization for home care. [facility] will take care of that before she is discharged. Patient's  advised.

## 2017-10-25 NOTE — PROGRESS NOTES
Problem: Self Care Deficits Care Plan (Adult)  Goal: *Acute Goals and Plan of Care (Insert Text)  Occupational Therapy Goals:  Initiated 10/24/2017  1. Patient will perform grooming standing with supervision/set-up within 7 days. 2. Patient will perform toileting with supervision/set-up within 7 days. 3. Patient will perform lower body dressing with supervision/set-up within 7 days. 4. Patient will transfer from toilet with supervision/set-up using the least restrictive device and appropriate durable medical equipment within 7 days. Occupational Therapy TREATMENT  Patient: Rocio Sneed (89 y.o. female)  Date: 10/25/2017  Diagnosis: Opiate Overdose/Acute Kidney Injury  Acute encephalopathy <principal problem not specified>       Precautions: Fall    ASSESSMENT:  Pt remains slightly confused and needed re-orientation for situation at times. She was more cooperative and less irritable. No assist needed for bed mobility and good seated balance. CGA for dynamic standing and pt was not terribly interested in ADLs. Pt did perform grooming standing (combing of hair) with CGA for balance. No report of pain this session but pt needed standing rest breaks needed at times. O2 sat on room air this session was 94%. Progression toward goals:  [x]       Improving appropriately and progressing toward goals  []       Improving slowly and progressing toward goals  []       Not making progress toward goals and plan of care will be adjusted     PLAN:  Patient continues to benefit from skilled intervention to address the above impairments. Continue treatment per established plan of care. Discharge Recommendations:  Home Health  Further Equipment Recommendations for Discharge:  none     SUBJECTIVE:   Patient stated Can I get rid of some of these tubes.     OBJECTIVE DATA SUMMARY:   Cognitive/Behavioral Status:  Neurologic State: Alert  Orientation Level: Oriented to person;Oriented to place; Disoriented to situation (confusion waxes and wanes)  Cognition: Decreased attention/concentration;Decreased command following (decreased safety awareness)  Perception: Appears intact  Perseveration:  (continuously asks about lines)  Safety/Judgement: Fall prevention    Functional Mobility and Transfers for ADLs:  Bed Mobility:  Rolling: Independent  Supine to Sit: Supervision    Transfers:  Sit to Stand: Contact guard assistance  Functional Transfers  Bathroom Mobility: Contact guard assistance (with rolling walker and verbal cues for safety)    Balance:  Sitting - Static: Good (unsupported)  Sitting - Dynamic: Good (unsupported)  Standing - Static: Good  Standing - Dynamic : Fair    ADL Intervention:       Grooming  Brushing/Combing Hair: Contact guard assistance (standing)       Cognitive Retraining  Safety/Judgement: Fall prevention      Pain:  Pain Scale 1: Numeric (0 - 10)  Pain Intensity 1: 0              Activity Tolerance:     Please refer to the flowsheet for vital signs taken during this treatment.   After treatment:   [x] Patient left in no apparent distress sitting up in chair  [] Patient left in no apparent distress in bed  [x] Call bell left within reach  [x] Nursing notified  [] Caregiver present  [x] Bed alarm activated; sitter at bedside    COMMUNICATION/COLLABORATION:   The patients plan of care was discussed with: Physical Therapist, Registered Nurse and patient    TABBY Church/L  Time Calculation: 17 mins

## 2017-10-25 NOTE — PROGRESS NOTES
Interdisciplinary team rounds were held  10/25/2017 with the following team members: Care Management, Diabetes Treatment Specialist, Nursing, Nutrition, Pharmacy, Physician and Respiratory therapy. Plan of care discussed. Goal: PT consult, adjust medications, continue to monitor and support. See MD orders and progress notes for further  interventions and desired outcomes.

## 2017-10-25 NOTE — PROGRESS NOTES
0132 -- Bedside and Verbal shift change report given to Dania Crane (oncoming nurse) by Jacinda Caro (offgoing nurse). Report included the following information SBAR, Kardex, Intake/Output, MAR, Recent Results and Cardiac Rhythm NSR w/ PAC's.   0200 -- Assessment completed. See chart for details. VSS. A&Ox4. Patient appropriate for situation. Pupils PERRL. Lung sounds diminished in bases bilaterally. SpO2 100% on 2L NC. Respirations with equal chest rise and fall. NSR on monitor. Zeng catheter patent and draining adequate amounts of urine. Precedex gtt @ 0.5 mcg/kg/hr; Cardizem gtt @ 2.5 mg/hr. Sitter at bedside. Will continue to closely monitor. 0400 -- Re-Assessment completed. See chart for details. VSS. A&Ox4. Patient appropriate for situation. Pupils PERRL. Lung sounds diminished in bases bilaterally. SpO2 100% on 2L NC. Respirations with equal chest rise and fall. NSR on monitor. Zeng catheter patent and draining adequate amounts of urine. Precedex gtt @ 0.5 mcg/kg/hr; Cardizem gtt @ 2.5 mg/hr. Sitter at bedside. Will continue to closely monitor. 7171 -- Patient sleeping in bed with sitter at bedside. Patient appears comfortable. VSS. Will continue to monitor. 0117 -- Bedside and Verbal shift change report given to Leoncio Galaviz (oncoming nurse) by Dania Crane (offgoing nurse). Report included the following information SBAR, Kardex, Intake/Output, MAR, Recent Results and Cardiac Rhythm NSR.

## 2017-10-25 NOTE — DIABETES MGMT
DTC Progress Note    Recommendations/ Comments: Pt discussed with rounding team and Dr. Lizy Carrillo. Plan to resume home med, Glyburide 2.5mg bid. Pt diet advanced today. Chart reviewed on Nolan Burkett during Multidisciplinary Rounds. A1c:   Lab Results   Component Value Date/Time    Hemoglobin A1c 8.6 08/15/2017 03:12 PM           Recent Glucose Results: Lab Results   Component Value Date/Time     (H) 10/25/2017 12:00 AM    GLUCPOC 183 (H) 10/25/2017 05:42 AM    GLUCPOC 232 (H) 10/24/2017 11:29 PM    GLUCPOC 183 (H) 10/24/2017 05:56 PM        Lab Results   Component Value Date/Time    Creatinine 0.73 10/25/2017 12:00 AM     Estimated Creatinine Clearance: 69.1 mL/min (based on Cr of 0.73). Active Orders   Diet    DIET CARDIAC Regular; 2 GM NA (House Low NA); Consistent Carb 1800kcal        PO intake: Patient Vitals for the past 72 hrs:   % Diet Eaten   10/24/17 1800 100 %   10/24/17 1345 75 %       Will continue to follow as needed. Thank you.     Beckie Recinos, [address] [place] [address], [address]  Office: [phone]

## 2017-10-25 NOTE — PROGRESS NOTES
Hospitalist Progress Note    NAME: Geoff Raymond   :  1940   MRN:  [id number]       Assessment / Plan:  SUMMARY:   65 Y. O WF, with Hx HTN, DM-2, Dyslipidemia, IBS, Diverticular disease, spinal stenosis, Chronic back pain, apparently taking regularly scheduled Tylenol, also taking 's oral morphine past few days, may also been taking Neurontin more than prescribed, found by  unresponsive. EMS called and brought to [facility] ED, where patient became more alert after IV Narcan, then gradually more lethargic. Patient would open eyes, then quickly fall asleep, unable to hold a conversation or provide history. Initial evaluation revealed ARF and abnormal LFTs. Admitted for further evaluation and management. PROBLEMS:     1. Acute encephalopathy: Secondary to medication OD. Patient presented with marked lethargy, in the setting utilization of multiple sedating meds, including possibly spouse's MS Contin, for severe low back pain x 2 days. Head CT -no acute findings. Managing supportively, Narcan infusion as needed and mental status gradually improving. EEG-abnormal for generalized slowing and frequent triphasics, which may elude to an underlying metabolic or anoxic insult. No seizures were noted on the study. Now awake and interactive, somewhat forgetful. Neurology/psychiatry following. 2. Acute respiratory failure with hypoxia: Noted at presentation, due to # 1. Managed as above, as well as with O2. Resolved. Sats 93%-96% RA.        3. Leukocytosis: Wcc 17.9 o admission. U/A-neg, afebrile, CXR-no acute findings, lactic acid 1.5. No obvious infective focus. Likely reactive. Following CBC. Trending down. 4. Possible Tylenol OD: Abnormal LFT noted on admission. Andres Burdick consulted, and N-Acetylcysteine recommended. Administered accordingly. Following LFTs-stable. Liver U/S-echogenic liver.     5. Rhabdomyolysis/Acute kidney injury: BUN 36, creatinine 2.89 on admission (baseline creatinine 1. 08-1.35). Total CPK 3973. BNo blood on urine dipstick testing. Likely due to volume de[pletion. Managed with IV fluids/HCO3. ACE-I and other nephrotoxins on hold. Renal U/S-negative. Resolved. 6. A.Fib/RVR: No clear previous Hx. Managed with TIESHA Cardizem. Now in SR/Rate-controlled. Cardiology consult-pending. 2 D Echo.      7. HTN: Reasonably controlled, on prn IV Hydralazine.       8. DM-2: Managing with SSI/Basal insulin. A1C 8.6      Comment: Nearing DC. PT/OT recommending Home Health Care.           Body mass index is 28.57 kg/(m^2). DVT prophylaxis with lovenox      Code status full code.  is NOK     Subjective:     Chief Complaint / Reason for Physician Visit  No new developments. Ambulated with PT/PT. Discussed with RN events overnight. Review of Systems:  Symptom Y/N Comments  Symptom Y/N Comments   Fever/Chills N   Chest Pain N    Poor Appetite Y   Edema N    Cough N   Abdominal Pain N    Sputum N   Joint Pain Y    SOB/STANTON N   Pruritis/Rash N    Nausea/vomit N   Tolerating PT/OT     Diarrhea N   Tolerating Diet     Constipation N   Other       Could NOT obtain due to:      Objective:     VITALS:   Last 24hrs VS reviewed since prior progress note.  Most recent are:  Patient Vitals for the past 24 hrs:   Temp Pulse Resp BP SpO2   10/25/17 1606 - 91 27 152/89 95 %   10/25/17 1500 - 91 23 157/76 94 %   10/25/17 1400 - 88 28 129/58 95 %   10/25/17 1300 - 92 24 (!) 133/110 94 %   10/25/17 1200 - 83 24 143/69 96 %   10/25/17 1100 97.8 °F (36.6 °C) 73 23 122/59 96 %   10/25/17 1000 - (!) 54 18 109/52 95 %   10/25/17 0900 - 66 17 136/67 94 %   10/25/17 0800 98 °F (36.7 °C) 73 22 150/72 95 %   10/25/17 0700 - 70 19 123/70 95 %   10/25/17 0600 - 69 17 112/61 97 %   10/25/17 0500 - 67 17 106/53 96 %   10/25/17 0400 98 °F (36.7 °C) 70 18 101/49 95 %   10/25/17 0300 - 68 17 95/59 96 %   10/25/17 0200 98.2 °F (36.8 °C) 64 17 (!) 89/50 95 %   10/25/17 0001 98.2 °F (36.8 °C) 84 22 99/63 96 %   10/24/17 2300 - 88 23 124/70 95 %   10/24/17 2200 - 85 24 134/76 91 %   10/24/17 2100 - 87 20 145/68 95 %   10/24/17 2001 - 92 23 - 97 %   10/24/17 2000 98.1 °F (36.7 °C) 93 24 (!) 159/27 (!) 66 %   10/24/17 1900 - 86 21 (!) 127/98 96 %   10/24/17 1800 - 93 22 189/67 97 %       Intake/Output Summary (Last 24 hours) at 10/25/17 1715  Last data filed at 10/25/17 1241   Gross per 24 hour   Intake           776.48 ml   Output             2665 ml   Net         -1888.52 ml        PHYSICAL EXAM:  General: WD, WN. Alert, cooperative, no acute distress    EENT:  EOMI. Anicteric sclerae. MMM  Resp:  CTA bilaterally, no wheezing or rales. No accessory muscle use  CV:  Heart sounds, normal, regular, no murmurs.  No edema  GI:  Soft, Non distended, Non tender.  +Bowel sounds  Neurologic:  Disoriented, to pace and day, but interactive and communicative. No focal deficit. Psych:   Not anxious nor agitated  Skin:  No rashes. No jaundice    Reviewed most current lab test results and cultures  YES  Reviewed most current radiology test results   YES  Review and summation of old records today    NO  Reviewed patient's current orders and MAR    YES  PMH/SH reviewed - no change compared to H&P  ________________________________________________________________________  Care Plan discussed with:    Comments   Patient X    Family      RN X    Care Manager     Consultant                        Multidiciplinary team rounds were held today with , nursing, pharmacist and clinical coordinator. Patient's plan of care was discussed; medications were reviewed and discharge planning was addressed.      ________________________________________________________________________  Total NON critical care TIME:  <30   Minutes    Total CRITICAL CARE TIME Spent:   Minutes non procedure based      Comments   >50% of visit spent in counseling and coordination of care ________________________________________________________________________  Autumn Hudson MD     Procedures: see electronic medical records for all procedures/Xrays and details which were not copied into this note but were reviewed prior to creation of Plan. LABS:  I reviewed today's most current labs and imaging studies.   Pertinent labs include:  Recent Labs      10/25/17   0436  10/24/17   0442   WBC  10.1  11.6*   HGB  10.2*  11.0*   HCT  29.6*  32.4*   PLT  251  243     Recent Labs      10/25/17   0436  10/25/17   0000  10/24/17   0442  10/23/17   0343   NA   --   140  142  139   K   --   3.7  3.2*  3.5   CL   --   107  105  101   CO2   --   24  26  32   GLU   --   185*  157*  171*   BUN   --   15  18  21*   CREA   --   0.73  0.61  0.87   CA   --   8.4*  8.5  8.2*   MG  1.8   --    --   1.6   ALB   --   1.9*   --    --    TBILI   --   0.3   --    --    SGOT   --   22   --    --    ALT   --   69   --    --        Signed: Autumn Hudson MD

## 2017-10-25 NOTE — PROGRESS NOTES
1900: Report received from Joanne Massey RN    2000: Pt assessed, A/Ox4b calm and following commands, pupils equal and reactive, lungs diminished, allen patent and draining, drip rates checked: Precedex @ 0.3mcg, Cardizem @ 2.5 mg, sitter at bedside    2045: Pt given 0.5 mg dilaudid IV    2145: Pt reassessed for pain, sleeping    2215: Pt awakens stating she is having trouble breathing, HOB elevated, pt placed on 2L NC, HR in 110s-120s Cardizem increased to 5mg, Precedex increased to 0.6mcg, lung sounds clear, pt 02 sats 95%     2331: Cardizem decreased to 2.5mg, pt states \"she feels better and can breathe easier\"    2352: Pt sleeping, Precedex rate decreased to 0.5mcg    0000: Pt reassesed, no changes noted    0130: Report given to HO GODWIN

## 2017-10-25 NOTE — PROGRESS NOTES
Problem: Mobility Impaired (Adult and Pediatric)  Goal: *Acute Goals and Plan of Care (Insert Text)  Physical Therapy Goals  Initiated 10/24/2017  1. Patient will move from supine to sit and sit to supine  in bed with modified independence within 7 day(s). 2.  Patient will transfer from bed to chair and chair to bed with modified independence using the least restrictive device within 7 day(s). 3.  Patient will perform sit to stand with modified independence within 7 day(s). 4.  Patient will ambulate with modified independence for 50 feet with the least restrictive device within 7 day(s). physical Therapy TREATMENT  Patient: Darling Spence (34 y.o. female)  Date: 10/25/2017  Diagnosis: Opiate Overdose/Acute Kidney Injury  Acute encephalopathy <principal problem not specified>       Precautions: Fall    ASSESSMENT:  Patient with improving mental status and safety awareness, as well as decreased irritability as seen apparently on evaluation. Received in supine, sitter present. She remains highly anxious over lines/tubes, with RN removing several allowing for easier patient demeanor and thus treatment session. Completed gait around majority of CCU using RW device, as brief egress test upon standing indicated need for device 2/2 instability. Otherwise, she is likely near baseline. She does state a history of fall, indoor, in the past year and was unable to state further 2/2 perceived confusion. Will continue to follow ahead in hopes that safety concerns will improve alongside improvements in mental status. HH therapy and family support recommended at [place]. Progression toward goals:  []    Improving appropriately and progressing toward goals  [x]    Improving slowly and progressing toward goals  []    Not making progress toward goals and plan of care will be adjusted     PLAN:  Patient continues to benefit from skilled intervention to address the above impairments.   Continue treatment per established plan of care. Discharge Recommendations:  Home Health  Further Equipment Recommendations for Discharge:  She states she has 2 RW's. .. SUBJECTIVE:   Patient stated I can't remember about that fall.     OBJECTIVE DATA SUMMARY:   Critical Behavior:  Neurologic State: Alert  Orientation Level: Oriented to person, Oriented to place, Disoriented to situation (confusion waxes and wanes)  Cognition: Decreased attention/concentration, Decreased command following (decreased safety awareness)  Safety/Judgement: Fall prevention  Functional Mobility Training:  Bed Mobility:  Rolling: Independent  Supine to Sit: Supervision              Transfers:  Sit to Stand: Contact guard assistance  Stand to Sit: Contact guard assistance        Bed to Chair: Contact guard assistance                    Balance:  Sitting: Intact; Without support  Sitting - Static: Good (unsupported)  Sitting - Dynamic: Good (unsupported)  Standing: Impaired; With support  Standing - Static: Good;Constant support  Standing - Dynamic : Fair  Ambulation/Gait Training:  Distance (ft): 300 Feet (ft)  Assistive Device: Gait belt;Walker, rolling  Ambulation - Level of Assistance: Contact guard assistance        Gait Abnormalities: Decreased step clearance; Path deviations (fair obstacle negotiation )              Speed/Lata: Pace decreased (<100 feet/min)                     VC's for obstacles in path, RW management in small spaces, increasing gait speed    Pain:  Pain Scale 1: Numeric (0 - 10)  Pain Intensity 1: 0              Activity Tolerance:   Improving. VSS    Please refer to the flowsheet for vital signs taken during this treatment.   After treatment:   [x]    Patient left in no apparent distress sitting up in chair  []    Patient left in no apparent distress in bed  [x]    Call bell left within reach  [x]    Nursing notified  [x]    Sitter present  [x]    Bed alarm activated    COMMUNICATION/COLLABORATION:   The patients plan of care was discussed with: Occupational Therapist and Registered Nurse    Hamlet Russell, PT, DPT, CEEAA      Time Calculation: 17 mins

## 2017-10-25 NOTE — PROGRESS NOTES
PULMONARY/Critical Care/SLEEP MEDICINE  [facility]  Name: See Gutiérrez   : 1940   MRN: [id number]   Date: 10/25/2017 8:40 AM     IMPRESSION:   1. AMS- better  2. Probable narcotic overdose  3. Chronic pain - spinal stenosis  4. Tobacco use  5. Vaginal sores-   6. DM  7. Hypoxemia  8. Atrial fib ? Chronicity  9. HTN      RECOMMENDATIONS/PLAN:   1. Sitter prn  2. Mobilize  3. advacne diet and restart glyburide  4. empric Acyclovir for vaginal sores  5. Flagyl for a few days for vaginitis   6. Wean off precedex  7. Restart gabapentin  8. Reorient  9. Appreciate neurology's help  10. Discussed management with pts family- supportive care; chronic pain is a big issue but we will not be able to fix that longterm; will try to support but pt took a lot more meds than expected  11. Resume effexor   12. DVT, SUP prophylaxis   13. Will be available to assist in medical management while in the CCU pending disposition       10/25 bright and conversant but not oriented to time. 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 Hungry. No recollection of events leading to her admission. Mild SOB and asked for more O2 but sats 95%. No congestion or cough. Off  Cardizem, still on IV precedex    10/24 still easily agitiated but on less IV precedex    10/ 23 Remains encephalopathic; getting some dilaudid; on precedex  Moaning and restless. Eyes open and easily agitated. Diarrhea. Vaginal tenderness per nursing. Vital Signs:    Visit Vitals    /52    Pulse (!) 54    Temp 98 °F (36.7 °C)    Resp 18    Wt 81.5 kg (179 lb 10.8 oz)    SpO2 95%    BMI 28.57 kg/m2       O2 Device: Room air   O2 Flow Rate (L/min): 2 l/min   Temp (24hrs), Av.1 °F (36.7 °C), Min:98 °F (36.7 °C), Max:98.2 °F (36.8 °C)       Intake/Output:   Last shift:      10/25 0701 - 10/25 1900  In: 44.7 [P.O.:40; I.V.:4.7]  Out: -   Last 3 shifts: 10/23 1901 - 10/25 0700  In: 1913.2 [P.O.:830;  I.V.:1083.2]  Out: 2689 [Urine:2370]    Intake/Output Summary (Last 24 hours) at 10/25/17 1144  Last data filed at 10/25/17 4203   Gross per 24 hour   Intake          1397.09 ml   Output             1665 ml   Net          -267.91 ml       Physical Exam:    General: []      Intubated/sedated [x]      No acute distress []          []      Ill appearing []     agitated []            HEENT: []     ETT [x]      PERRL []     NGT     []      Anicteric Mucosa:[]      moist   [x]      dry []            Resp: []      Wheeze [x]      Clear to ascultation bilaterally []      Accessory muscle use    []      Rales []      Chest tube:  []      Air leak []          []      Ronchi []      Crepitus []         []      Coarse bilateral ventilator breath sounds      CV: []      Regular []      Tachycardia [x]          [x]      Irregular []      Bradycardic []          []      Murmur []      S1 []          []     RUB []    S2 []            GI: [x]      Soft  []      NG Tube Bowel sounds: []      present []      absent    []      Firm []      PEG [x]   - Tender      -      Distended  []            : []      Zeng []      Hematuria []          []      Clear urine []       []            MSK:    []      SCDs [x]    -  Edema [x]   --clubbing       []      No deformities [x] - -Cyanosis  []            Skin: [x]      Warm [x]      Dry  []   Mottled       []      Cool []      Moist []          []     Lesions []      Diaphoretic []          []      Rash  []      Cyanosis []            N-psych: []      Sedated  []      Agitated [x]     Moves all extremities     [x]      Alert  Follows commands:   [x]      Yes  []      No [x]   -  oriented       Devices: []      PA Catheter []      Tracheostomy []          []      Central Line []        []        Chest tube:  Air leak? []        Y/[]        N    []          []      PICC []       []            DATA:   Current Facility-Administered Medications   Medication Dose Route Frequency    gabapentin (NEURONTIN) capsule 200 mg 200 mg Oral TID    HYDROmorphone (PF) (DILAUDID) injection 0.2 mg  0.2 mg IntraVENous Q6H PRN    glyBURIDE (DIABETA) tablet 2.5 mg  2.5 mg Oral ACB&D    dilTIAZem (CARDIZEM) IR tablet 60 mg  60 mg Oral Q8H    [START ON 10/26/2017] thiamine (B-1) tablet 100 mg  100 mg Oral DAILY    venlafaxine (EFFEXOR) tablet 75 mg  75 mg Oral BID WITH MEALS    acyclovir (ZOVIRAX) 5 % ointment   Topical QID    enoxaparin (LOVENOX) injection 40 mg  40 mg SubCUTAneous Q24H    dexmedeTOMidine (PRECEDEX) 400 mcg in 0.9% sodium chloride 100 mL infusion  0.2-1.4 mcg/kg/hr IntraVENous TITRATE    insulin glargine (LANTUS) injection 5 Units  5 Units SubCUTAneous QHS    insulin lispro (HUMALOG) injection   SubCUTAneous Q6H    glucose chewable tablet 16 g  4 Tab Oral PRN    dextrose (D50W) injection syrg 12.5-25 g  12.5-25 g IntraVENous PRN    glucagon (GLUCAGEN) injection 1 mg  1 mg IntraMUSCular PRN    hydrALAZINE (APRESOLINE) 20 mg/mL injection 20 mg  20 mg IntraVENous Q6H PRN    metoprolol (LOPRESSOR) injection 5 mg  5 mg IntraVENous Q6H PRN    sodium chloride (NS) flush 10-30 mL  10-30 mL InterCATHeter PRN    sodium chloride (NS) flush 10 mL  10 mL InterCATHeter Q24H    sodium chloride (NS) flush 10 mL  10 mL InterCATHeter PRN    sodium chloride (NS) flush 10-40 mL  10-40 mL InterCATHeter Q8H    sodium chloride (NS) flush 20 mL  20 mL InterCATHeter PRN    heparin (porcine) pf 300 Units  300 Units InterCATHeter PRN    celecoxib (CELEBREX) capsule 200 mg  200 mg Oral BID    oxyCODONE-acetaminophen (PERCOCET 10)  mg per tablet 1 Tab  1 Tab Oral Q4H PRN    LORazepam (ATIVAN) tablet 1 mg  1 mg Oral Q4H PRN    sodium chloride (NS) flush 5-10 mL  5-10 mL IntraVENous Q8H    sodium chloride (NS) flush 5-10 mL  5-10 mL IntraVENous PRN    naloxone (NARCAN) injection 0.4 mg  0.4 mg IntraVENous PRN    ondansetron (ZOFRAN) injection 4 mg  4 mg IntraVENous Q4H PRN    bisacodyl (DULCOLAX) suppository 10 mg  10 mg Rectal DAILY PRN    mupirocin (BACTROBAN) 2 % ointment   Both Nostrils BID       Telemetry: []        Sinus []        A-flutter []        Paced    []        A-fib []        Multiple PVCs                  Labs:  Recent Labs      10/25/17   0436  10/24/17   0442   WBC  10.1  11.6*   HGB  10.2*  11.0*   HCT  29.6*  32.4*   PLT  251  243     Recent Labs      10/25/17   0436  10/25/17   0000  10/24/17   0442  10/23/17   0343   NA   --   140  142  139   K   --   3.7  3.2*  3.5   CL   --   107  105  101   CO2   --   24  26  32   GLU   --   185*  157*  171*   BUN   --   15  18  21*   CREA   --   0.73  0.61  0.87   CA   --   8.4*  8.5  8.2*   MG  1.8   --    --   1.6   ALB   --   1.9*   --    --    TBILI   --   0.3   --    --    SGOT   --   22   --    --    ALT   --   69   --    --      No results for input(s): PH, PCO2, PO2, HCO3, FIO2 in the last 72 hours.     Imaging:  []      I have personally reviewed the patients radiographs     []      No change from prior, tubes and lines in adequate position  []      Improved   []      Worsening     IMPRESSION:     The patient is: []       acutely ill Risk of deterioration: [x]       moderate    []       critically ill  []       high     Active Problems:    Acute encephalopathy (10/23/2017)        []      See my orders for details    My assessment/plan was discussed with:  []      nursing []      PT/OT    []      respiratory therapy []         []      family []           []      Total critical care time exclusive of procedures       minutes  Leti Wray MD

## 2017-10-25 NOTE — PROGRESS NOTES
1530 - Bedside and Verbal shift change report given to Quiana Landaverde RN (oncoming nurse) by Anupama Butler RN (offgoing nurse). Report included the following information SBAR, Intake/Output and Cardiac Rhythm NSR.    1600 - Assessment completed. 1735 - Patient incontinent of urine. Patient bathed, linens and gown changed. 1900 - Bedside and Verbal shift change report given to Emre Espinoza RN (oncoming nurse) by Junior Lozano (offgoing nurse). Report included the following information SBAR, Intake/Output, MAR and Cardiac Rhythm NSR.

## 2017-10-25 NOTE — PROGRESS NOTES
1920 Report received from Stacy Pinto RN.     5372 Shift summary-pt rested well through the night. Pt alert & oriented x4, follows commands, up to commode with 1 assist. Medicated for headache x1 & left back, leg & flank pain x1.     0700 Report given to Stacy Pinto RN.

## 2017-10-25 NOTE — PROGRESS NOTES
9718   Bedside report completed with offgoing nurse, allison/CNA at bedside. Patient awake and remembers oncoming nurse from yesterday (first and last name etc)    5346  Sitter remains at bedside, morning medications administered. [place] with office of Candis Bernstein MD regarding previous consult and change in status of patient to being currently A + 0 x 4 about revisiting her situation at the cell phone # [phone]. Orders received to discontinue precautions and it was stated that according to his conversations with family, she gave no evidence of being suicidal on his original visit and to see note. Pending  Bedside and Verbal shift change report given to oncoming nurse. Report included the following information SBAR, Kardex, Intake/Output, MAR and Recent Results.

## 2017-10-26 LAB
GLUCOSE BLD STRIP.AUTO-MCNC: 174 MG/DL (ref 65–100)
GLUCOSE BLD STRIP.AUTO-MCNC: 189 MG/DL (ref 65–100)
GLUCOSE BLD STRIP.AUTO-MCNC: 264 MG/DL (ref 65–100)
GLUCOSE BLD STRIP.AUTO-MCNC: 285 MG/DL (ref 65–100)
GLUCOSE BLD STRIP.AUTO-MCNC: 337 MG/DL (ref 65–100)
GLUCOSE BLD STRIP.AUTO-MCNC: 407 MG/DL (ref 65–100)
GLUCOSE BLD STRIP.AUTO-MCNC: 436 MG/DL (ref 65–100)
SERVICE CMNT-IMP: ABNORMAL

## 2017-10-26 PROCEDURE — 74011250637 HC RX REV CODE- 250/637: Performed by: INTERNAL MEDICINE

## 2017-10-26 PROCEDURE — 97116 GAIT TRAINING THERAPY: CPT

## 2017-10-26 PROCEDURE — 65660000000 HC RM CCU STEPDOWN

## 2017-10-26 PROCEDURE — 74011250636 HC RX REV CODE- 250/636: Performed by: INTERNAL MEDICINE

## 2017-10-26 PROCEDURE — 82962 GLUCOSE BLOOD TEST: CPT

## 2017-10-26 PROCEDURE — 74011636637 HC RX REV CODE- 636/637: Performed by: INTERNAL MEDICINE

## 2017-10-26 RX ORDER — CELECOXIB 200 MG/1
200 CAPSULE ORAL 2 TIMES DAILY
Status: DISCONTINUED | OUTPATIENT
Start: 2017-10-26 | End: 2017-10-28 | Stop reason: HOSPADM

## 2017-10-26 RX ORDER — DILTIAZEM HYDROCHLORIDE 180 MG/1
180 CAPSULE, COATED, EXTENDED RELEASE ORAL DAILY
Status: DISCONTINUED | OUTPATIENT
Start: 2017-10-27 | End: 2017-10-28

## 2017-10-26 RX ORDER — GLYBURIDE 5 MG/1
5 TABLET ORAL
Status: DISCONTINUED | OUTPATIENT
Start: 2017-10-26 | End: 2017-10-28 | Stop reason: HOSPADM

## 2017-10-26 RX ORDER — VENLAFAXINE 37.5 MG/1
75 TABLET ORAL 2 TIMES DAILY WITH MEALS
Status: DISCONTINUED | OUTPATIENT
Start: 2017-10-26 | End: 2017-10-28 | Stop reason: HOSPADM

## 2017-10-26 RX ORDER — INSULIN GLARGINE 100 [IU]/ML
30 INJECTION, SOLUTION SUBCUTANEOUS DAILY
Status: DISCONTINUED | OUTPATIENT
Start: 2017-10-27 | End: 2017-10-27

## 2017-10-26 RX ADMIN — HYDRALAZINE HYDROCHLORIDE 20 MG: 20 INJECTION INTRAMUSCULAR; INTRAVENOUS at 09:02

## 2017-10-26 RX ADMIN — DILTIAZEM HYDROCHLORIDE 60 MG: 30 TABLET, FILM COATED ORAL at 05:55

## 2017-10-26 RX ADMIN — ACYCLOVIR: 50 OINTMENT TOPICAL at 08:54

## 2017-10-26 RX ADMIN — GLYBURIDE 5 MG: 5 TABLET ORAL at 17:49

## 2017-10-26 RX ADMIN — VENLAFAXINE 75 MG: 25 TABLET ORAL at 08:58

## 2017-10-26 RX ADMIN — Medication 10 ML: at 21:28

## 2017-10-26 RX ADMIN — INSULIN HUMAN 25 UNITS: 100 INJECTION, SUSPENSION SUBCUTANEOUS at 12:18

## 2017-10-26 RX ADMIN — INSULIN LISPRO 2 UNITS: 100 INJECTION, SOLUTION INTRAVENOUS; SUBCUTANEOUS at 23:38

## 2017-10-26 RX ADMIN — INSULIN LISPRO 5 UNITS: 100 INJECTION, SOLUTION INTRAVENOUS; SUBCUTANEOUS at 12:18

## 2017-10-26 RX ADMIN — ENOXAPARIN SODIUM 40 MG: 40 INJECTION SUBCUTANEOUS at 08:55

## 2017-10-26 RX ADMIN — METRONIDAZOLE 250 MG: 250 TABLET ORAL at 15:01

## 2017-10-26 RX ADMIN — GABAPENTIN 200 MG: 100 CAPSULE ORAL at 15:01

## 2017-10-26 RX ADMIN — ACYCLOVIR: 50 OINTMENT TOPICAL at 12:18

## 2017-10-26 RX ADMIN — VENLAFAXINE 75 MG: 37.5 TABLET ORAL at 17:49

## 2017-10-26 RX ADMIN — GLYBURIDE 2.5 MG: 2.5 TABLET ORAL at 08:56

## 2017-10-26 RX ADMIN — GABAPENTIN 200 MG: 100 CAPSULE ORAL at 08:55

## 2017-10-26 RX ADMIN — ACYCLOVIR: 50 OINTMENT TOPICAL at 17:51

## 2017-10-26 RX ADMIN — Medication 10 ML: at 13:12

## 2017-10-26 RX ADMIN — CELECOXIB 200 MG: 100 CAPSULE ORAL at 08:54

## 2017-10-26 RX ADMIN — OXYCODONE HYDROCHLORIDE AND ACETAMINOPHEN 1 TABLET: 10; 325 TABLET ORAL at 10:16

## 2017-10-26 RX ADMIN — METRONIDAZOLE 250 MG: 250 TABLET ORAL at 21:28

## 2017-10-26 RX ADMIN — INSULIN LISPRO 5 UNITS: 100 INJECTION, SOLUTION INTRAVENOUS; SUBCUTANEOUS at 17:49

## 2017-10-26 RX ADMIN — CELECOXIB 200 MG: 200 CAPSULE ORAL at 17:51

## 2017-10-26 RX ADMIN — ACYCLOVIR: 50 OINTMENT TOPICAL at 21:27

## 2017-10-26 RX ADMIN — METRONIDAZOLE 250 MG: 250 TABLET ORAL at 08:56

## 2017-10-26 RX ADMIN — DILTIAZEM HYDROCHLORIDE 60 MG: 30 TABLET, FILM COATED ORAL at 21:27

## 2017-10-26 RX ADMIN — DILTIAZEM HYDROCHLORIDE 60 MG: 30 TABLET, FILM COATED ORAL at 13:11

## 2017-10-26 RX ADMIN — Medication 10 ML: at 05:55

## 2017-10-26 RX ADMIN — Medication 100 MG: at 08:57

## 2017-10-26 RX ADMIN — HYDROMORPHONE HYDROCHLORIDE 0.2 MG: 1 INJECTION, SOLUTION INTRAMUSCULAR; INTRAVENOUS; SUBCUTANEOUS at 02:07

## 2017-10-26 RX ADMIN — GABAPENTIN 200 MG: 100 CAPSULE ORAL at 21:28

## 2017-10-26 NOTE — PROGRESS NOTES
Pt is a 64 yo female transferred from [facility] for evaluation/treatment of acute encephalopathy and IVAN as a result of overdose of 's pain medication. Pt has hx of severe spinal stenosis, was scheduled [facility] but was unable to tolerate pain and took 2 of 's MS Contin. Pt also has hx of DM type 2 and is a current 1 ppd smoker. Psychiatric Consult was performed per protocol but MD did not feel that pt was suicidal and suicide precautions were dc'd. Pt lives w/ her  in [place], [place]. She is current with NP, Howard Bello, for primary care. She has a rolling walker at home. PT/OT have evaluated and although she seems fully oriented now, pt has decreased safety awareness so recommendation is for possible Home PT at dc. Pt has orders to transfer to [address] [place] [address] for routine progression of care. Palliative Care is evaluating and Ortho consult is being considered to address pt's pain issues which were the cause of this admission. CM will remain avbl to assist with dc planning as appropriate. Care Management Interventions  PCP Verified by CM: Yes  Mode of Transport at Discharge:  Other (see comment) (spouse)  Transition of Care Consult (CM Consult): Discharge Planning (Pt was assessed for possible dc needs)  Discharge Durable Medical Equipment: No  Physical Therapy Consult: Yes  Occupational Therapy Consult: Yes  Current Support Network: Lives with Spouse  Plan discussed with Pt/Family/Caregiver: Yes  Freedom of Choice Offered: Yes  Discharge Location  Discharge Placement: Home with home health     Farhana Sow MSW  X1

## 2017-10-26 NOTE — PROGRESS NOTES
Problem: Mobility Impaired (Adult and Pediatric)  Goal: *Acute Goals and Plan of Care (Insert Text)  Physical Therapy Goals  Initiated 10/24/2017  1. Patient will move from supine to sit and sit to supine  in bed with modified independence within 7 day(s). 2.  Patient will transfer from bed to chair and chair to bed with modified independence using the least restrictive device within 7 day(s). 3.  Patient will perform sit to stand with modified independence within 7 day(s). 4.  Patient will ambulate with modified independence for 50 feet with the least restrictive device within 7 day(s). physical Therapy TREATMENT  Patient: Mervin Dotson (68 y.o. female)  Date: 10/26/2017  Diagnosis: Opiate Overdose/Acute Kidney Injury  Acute encephalopathy <principal problem not specified>       Precautions: Fall    ASSESSMENT:  Patient's decreased safety awareness remains despite full orientation. During functional activity, she requires frequent redirection and verbal cueing to facilitate safe transfers, gait and mobility as detailed below. This includes x1 gait period where patient struggled to follow not only directional cueing, but safety with use of device with several moments of resting forearms onto device 2/2 fatigue while in static and dynamic positions. This corresponded fairly well to tachycardia seen after approximately 165ft where then required several standing rest breaks with HR up to 140's maximum. HR improved with rest periods and normalized once seated at conclusion. O2 stable throughout while on RA. Of note, patient perseverated throughout session on LBP and how she was to have had a 'shot' at [facility] today for her report of severe stenosis. RN aware to address and consult pain management/palliative/orthopedics as needed going forwards. Recommend  therapies and 24/7 supervision at [place].      Progression toward goals:  [x]    Improving appropriately and progressing toward goals  []    Improving slowly and progressing toward goals  []    Not making progress toward goals and plan of care will be adjusted     PLAN:  Patient continues to benefit from skilled intervention to address the above impairments. Continue treatment per established plan of care. Discharge Recommendations:  Home Health and 24/7 supervision  Further Equipment Recommendations for Discharge:  Has a RW      SUBJECTIVE:   Patient stated I have severe andre. .. Nelly Greenwood  Patient reported having severe LBP from stenosis after working through possible spinal conditions    OBJECTIVE DATA SUMMARY:   Critical Behavior:  Neurologic State: Alert, Appropriate for age  Orientation Level: Oriented X4  Cognition: Follows commands  Safety/Judgement: Fall prevention  Functional Mobility Training:  Bed Mobility:  Rolling: Supervision  Supine to Sit: Supervision     Scooting: Supervision        Transfers:  Sit to Stand: Contact guard assistance (pulls from RW ignoring VCs)  Stand to Sit: Contact guard assistance                             Balance:  Sitting: Intact; Without support  Standing: Impaired; With support (RW)  Standing - Static: Good;Constant support  Standing - Dynamic : Fair  Ambulation/Gait Training:  Distance (ft): 220 Feet (ft)  Assistive Device: Gait belt;Walker, rolling  Ambulation - Level of Assistance: Contact guard assistance        Gait Abnormalities: Decreased step clearance; Other (poor safety awareness with RW (leans on device))              Speed/Lata: Fluctuations; Pace decreased (<100 feet/min) (x4 standing rest periods 10-30\" 2/2 fatigue)            Verbal cueing utilized throughout        Pain:  Pain Scale 1: Numeric (0 - 10)  Pain Intensity 1: 2  Pain Location 1: Back  Pain Orientation 1: Left  Pain Description 1: Constant  Pain Intervention(s) 1: Medication (see MAR)  Activity Tolerance:   Fair to poor 2/2 tachycardia and fatigue with activity with HR up to 140's sustained but improving with rest periods    Please refer to the flowsheet for vital signs taken during this treatment.   After treatment:   [x]    Patient left in no apparent distress sitting up in chair  []    Patient left in no apparent distress in bed  [x]    Call bell left within reach  [x]    Nursing present  []    Caregiver present  []    Bed alarm activated    COMMUNICATION/COLLABORATION:   The patients plan of care was discussed with: Registered Nurse    Marycarmen Rodriguez, PT, DPT, CEEAA      Time Calculation: 19 mins

## 2017-10-26 NOTE — PROGRESS NOTES
Hospitalist Progress Note    NAME: See Gutiérrez   :  1940   MRN:  [id number]       Assessment / Plan:  SUMMARY:   65 Y. O WF, with Hx HTN, DM-2, Dyslipidemia, IBS, Diverticular disease, spinal stenosis, Chronic back pain, apparently taking regularly scheduled Tylenol, also taking 's oral morphine past few days, may also been taking Neurontin more than prescribed, found by  unresponsive. EMS called and brought to [facility] ED, where patient became more alert after IV Narcan, then gradually more lethargic. Patient would open eyes, then quickly fall asleep, unable to hold a conversation or provide history. Initial evaluation revealed ARF and abnormal LFTs. Admitted for further evaluation and management. PROBLEMS:     1. Acute encephalopathy: Secondary to medication OD. Patient presented with marked lethargy, in the setting utilization of multiple sedating meds, including possibly spouse's MS Contin, for severe low back pain x 2 days. Head CT -no acute findings. Managing supportively, Narcan infusion as needed and mental status gradually improving. EEG-abnormal for generalized slowing and frequent triphasics, which may elude to an underlying metabolic or anoxic insult. No seizures were noted on the study. Now awake and interactive. Neurology/psychiatry following. Patient appears back to baseline mental status today. Dr Laura Alvarado recommended outpatient psychiatric F/U on DC. 2. Acute respiratory failure with hypoxia: Noted at presentation, due to # 1. Managed as above, as well as with O2. Resolved. Sats 93%-96% RA.        3. Leukocytosis: Wcc 17.9 o admission. U/A-neg, afebrile, CXR-no acute findings, lactic acid 1.5. No obvious infective focus. Likely reactive. Following CBC. Trended  Down and normalized at 10.1 today. 4. Possible Tylenol OD: Abnormal LFT noted on admission. Andres Burdick consulted, and N-Acetylcysteine recommended. Administered accordingly. Following LFTs-stable.  Now practically normalized. Liver U/S-echogenic liver. 5. Rhabdomyolysis/Acute kidney injury: BUN 36, creatinine 2.89 on admission (baseline creatinine 1.08-1.35). Total CPK 3973. BNo blood on urine dipstick testing. Likely due to volume de[pletion. Managed with IV fluids/HCO3. ACE-I and other nephrotoxins on hold. Renal U/S-negative. Resolved. 6. PAF/RVR: No clear previous Hx. Managed with TIESHA Cardizem. Now in SR/Rate-controlled on PO Cardizem. Dr Clarence Brumfield provided cardiology consult. 2D Echo-EF 55%. , mild LVH, no regional wall motion abnormalities. 7. HTN: Reasonably controlled, on prn IV Hydralazine.       8. DM-2: Insulin-requiring. Managing with Glyburide/SSI/Basal insulin. A1C 8.6    9. Vulvovaginitis: Commenced on 7 day course of Flagyl on 10/25/17, now day #2. 10. Spinal stenosis/Chronic back pain: MRI 9/29/17-multilevel lumbar spondyloarthropathy, most severely at L3-L,  with severe central canal narrowing. Per patient, she was scheduled to have a spinal injection today, at [facility] (Dr Delmar Curtis). Will likely need to reschedule.       Comment:  Stable for transfer off unit today. [address] [facility] [address]. PT/OT recommending Home Health Care.           Body mass index is 28.57 kg/(m^2). DVT prophylaxis with lovenox      Code status full code.  is NOK     Subjective:     Chief Complaint / Reason for Physician Visit  Lucid, communicative. Discussed with RN events overnight. Review of Systems:  Symptom Y/N Comments  Symptom Y/N Comments   Fever/Chills N   Chest Pain N    Poor Appetite Y   Edema N    Cough N   Abdominal Pain N    Sputum N   Joint Pain Y    SOB/STANTON N   Pruritis/Rash N    Nausea/vomit N   Tolerating PT/OT     Diarrhea N   Tolerating Diet     Constipation N   Other       Could NOT obtain due to:      Objective:     VITALS:   Last 24hrs VS reviewed since prior progress note.  Most recent are:  Patient Vitals for the past 24 hrs:   Temp Pulse Resp BP SpO2   10/26/17 1100 - (!) 114 20 147/55 97 %   10/26/17 1000 - (!) 104 27 171/52 96 %   10/26/17 0900 - (!) 105 26 177/73 95 %   10/26/17 0800 97 °F (36.1 °C) 89 21 167/73 94 %   10/26/17 0700 - (!) 103 26 166/74 94 %   10/26/17 0600 - 96 23 164/55 93 %   10/26/17 0500 - 88 22 153/57 93 %   10/26/17 0400 - 90 20 170/67 91 %   10/26/17 0300 - 90 23 171/69 93 %   10/26/17 0203 - 92 23 189/86 91 %   10/26/17 0100 - 88 19 180/72 90 %   10/26/17 0001 98.7 °F (37.1 °C) 85 17 169/76 92 %   10/25/17 2300 - 89 20 172/73 93 %   10/25/17 2223 - (!) 102 24 (!) 175/92 93 %   10/25/17 2100 - (!) 104 23 (!) 167/92 94 %   10/25/17 2000 98.7 °F (37.1 °C) (!) 127 30 (!) 133/96 97 %   10/25/17 1900 - 100 27 (!) 121/104 93 %   10/25/17 1800 - (!) 103 21 147/64 95 %   10/25/17 1700 - 98 27 155/79 95 %   10/25/17 1606 97.9 °F (36.6 °C) 91 27 152/89 95 %   10/25/17 1600 - 93 28 (!) 174/91 95 %   10/25/17 1500 - 91 23 157/76 94 %   10/25/17 1400 - 88 28 129/58 95 %   10/25/17 1300 - 92 24 (!) 133/110 94 %   10/25/17 1200 - 83 24 143/69 96 %       Intake/Output Summary (Last 24 hours) at 10/26/17 1140  Last data filed at 10/25/17 1400   Gross per 24 hour   Intake            50.11 ml   Output              750 ml   Net          -699.89 ml        PHYSICAL EXAM:  General: WD, WN. Alert, cooperative, no acute distress    EENT:  EOMI. Anicteric sclerae. MMM  Resp:  CTA bilaterally, no wheezing or rales. No accessory muscle use  CV:  Heart sounds, normal, regular, no murmurs.  No edema  GI:  Soft, Non distended, Non tender.  +Bowel sounds  Neurologic:  Fully alert/oriented. No focal deficit. Psych:   Not anxious nor agitated  Skin:  No rashes.   No jaundice    Reviewed most current lab test results and cultures  YES  Reviewed most current radiology test results   YES  Review and summation of old records today    NO  Reviewed patient's current orders and MAR    YES  PMH/SH reviewed - no change compared to H&P  ________________________________________________________________________  Care Plan discussed with:    Comments   Patient X    Family      RN X    Care Manager     Consultant                        Multidiciplinary team rounds were held today with , nursing, pharmacist and clinical coordinator. Patient's plan of care was discussed; medications were reviewed and discharge planning was addressed. ________________________________________________________________________  Total NON critical care TIME:  <30   Minutes    Total CRITICAL CARE TIME Spent:   Minutes non procedure based      Comments   >50% of visit spent in counseling and coordination of care     ________________________________________________________________________  Laura Barrios MD     Procedures: see electronic medical records for all procedures/Xrays and details which were not copied into this note but were reviewed prior to creation of Plan. LABS:  I reviewed today's most current labs and imaging studies.   Pertinent labs include:  Recent Labs      10/25/17   0436  10/24/17   0442   WBC  10.1  11.6*   HGB  10.2*  11.0*   HCT  29.6*  32.4*   PLT  251  243     Recent Labs      10/25/17   0436  10/25/17   0000  10/24/17   0442   NA   --   140  142   K   --   3.7  3.2*   CL   --   107  105   CO2   --   24  26   GLU   --   185*  157*   BUN   --   15  18   CREA   --   0.73  0.61   CA   --   8.4*  8.5   MG  1.8   --    --    ALB   --   1.9*   --    TBILI   --   0.3   --    SGOT   --   22   --    ALT   --   69   --        Signed: Laura Barrios MD

## 2017-10-26 NOTE — PROGRESS NOTES
0700 -Bedside and Verbal shift change report given to [address] (oncoming nurse) by Leno Ibarra (offgoing nurse). Report included the following information SBAR, Intake/Output, MAR, Recent Results and Cardiac Rhythm NSR.    0800 - Assessment completed. Patient A&Ox4. Resting quietly in bed. Pain 3 or 4 at this time, but doesn't want anything for it right now.     0902 - 20mg Hydralazine given for SBP>170.    1016 - Percocet 10 given for back pain 4/10.    1045 - Pain reassessment; \"I'm feeling a lot better\". 36 - Paged Dr. Rell Singh; pts BG was 436 and 407 with recheck. 18 - Dr. Rell Signh called back; will add NPH for today and increased lantus tomorrow. 1200 - Assessment completed. No changes at this time. 1450 - PICC removed. Pressure dressing applied. 1510 - Report called to [facility] - Prior to leaving unit; disposed of pts t-shirt found in possessions bag as it was soiled. Transported pt to room [phone] via wheelchair on monitor. Possessions (including wallet) taken with patient and chart/medications given to receiving nurse, [facility]. Assisted pt to bed and left in the care of [facility].

## 2017-10-26 NOTE — DIABETES MGMT
DTC Progress Note    Recommendations/ Comments: Pt discussed with rounding team and Dr. Pat Burkett. Plan to increase glyburide to 5mg ac b/d which is pt's home dose. Chart reviewed on Elijah Deluca during Multidisciplinary Rounds. A1c:   Lab Results   Component Value Date/Time    Hemoglobin A1c 8.6 08/15/2017 03:12 PM           Recent Glucose Results:   Lab Results   Component Value Date/Time    GLUCPOC 189 (H) 10/26/2017 05:54 AM    GLUCPOC 174 (H) 10/26/2017 02:03 AM    GLUCPOC 230 (H) 10/25/2017 05:46 PM        Lab Results   Component Value Date/Time    Creatinine 0.73 10/25/2017 12:00 AM     Estimated Creatinine Clearance: 69.1 mL/min (based on Cr of 0.73). Active Orders   Diet    DIET CARDIAC Regular; 2 GM NA (House Low NA); Consistent Carb 1800kcal        PO intake:   Patient Vitals for the past 72 hrs:   % Diet Eaten   10/24/17 1800 100 %   10/24/17 1345 75 %       Will continue to follow as needed. Thank you.   Rowdy Cordero, TESSYN, RN, [address]

## 2017-10-26 NOTE — CONSULTS
Palliative Medicine Consult  Yonny: [phone] ([phone])    Patient Name: Nolan Burkett  YOB: 1940    Date of Initial Consult: 10/26/17  Reason for Consult: To assist with pain management  Requesting Provider: Suzanne Toro MD   Primary Care Physician: Harry Kumar NP      SUMMARY:   Nolan Burkett is a 65 y.o. with a past history of arthritis, chronic pain, DM, hemorrhoid, IBS, h/o knee replacement, who was transferred from [facility] 10/20/2017  with a diagnosis of opioid overdose. Current medical issues leading to Palliative Medicine involvement include:  reported pt had been taking 1,000mg gabapentin q. 4 hours and 500mg acetaminophen every 4 hours due to pain. On day before admission, pt's  gave her two of his 30mg Morphine, she took extra gabapentin, then went to sleep, the following day he had difficulty waking her and called EMS . Per : pt has been filling gabapentin regularly since March 2017 prescribed by SHARATH Moore, and 10/20/17 she filled a prescription for Norco 5/325mg #30/5 day supply from Dr. Diandra Pittman in [place]. In 11/2016 and 1/2017 she received 30 day supply for each month of Norco 5/325mg from , who is in the same practice as SHARATH Moore. Psychosocial: pt worked as an LPN, retired. 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 They have 3 children. PALLIATIVE DIAGNOSES:   1. Accidental opioid overdose: UDS pos for opiates  2. Acute encephalopathy: EEG-per neuro results consistent with medication overdose, Head CT neg for acute findings  3. Chronic pain: spinal stenosis  MRI 9/29/17: multilevel lumbar spondyloarthopathy, most severely at L3-L4, with severe central canal narrowing. 4. Leukocytosis:  WBCs 17.9  5. Elevated liver enzymes:  , , alk phos 192. U/S shows echogenic liver. 6. Acute kidney injury: Bun/Cr 36/2.89  7.  Rhabdomyolysis: CPK 3973 due to immobility: renal U/S neg. Resolving  8. Tobacco use  9. Acute respiratory failure with hypoxia: resolved   10. Chronic back pain     PLAN:   1. Met with pt, obtained detailed history. Pt was all over the place with her story,ie, we would be talking about her pain and she would suddenly be talking about her glucose level, then her NP, etc.  Very excitable. Trying extra hard to convince me of her high level of pain, however during the visit and on exam she moved freely and with no objective signs of pain. 1. Per my discussion with Dr. Arron Ho, recommended:  2. See if pt can be seen by Dr. Mary Lau upon discharge for steroid injection as planned (pt missed appt on 10/26/17). 3. If pt cannot be seen in timely manner by , consult ; may need surgical intervention. 4. May need a repeat MRI or contact [facility] for images for  to review. 5. Counseled pt to stop smoking; she reports having 2 boxes of nicotine patches and Chantix. 2. Initial consult note routed to primary continuity provider  3.  Communicated plan of care with: Palliative IDT, RN     GOALS OF CARE / TREATMENT PREFERENCES:   [====Goals of Care====]  GOALS OF CARE:  Patient / health care proxy stated goals:     Optimal pain control       TREATMENT PREFERENCES:   Code Status: Full Code    Advance Care Planning:  Advance Care Planning 10/26/2017   Patient's Healthcare Decision Maker is: Legal Next of Kin   Confirm Advance Directive Yes, not on file       Other:    The palliative care team has discussed with patient / health care proxy about goals of care / treatment preferences for patient.  [====Goals of Care====]         HISTORY:     History obtained from: chart, patient    CHIEF COMPLAINT: unresponsiveness    HPI/SUBJECTIVE:    The patient is:   [x] Verbal and participatory  [] Non-participatory due to:     Per EMS, pt was found unresponsive by her  at home; she has a h/o chronic back pain from spinal stenosis and the day before admission took some of her 's morphine. It is unknown if she took more on the day of admission. She became awake and alert following Narcan administered by EMS. At hospitals ED, she was oriented x 3 but confused at times and repeated herself. Pt told RN in the ED that she was also taking overdoses of gabapentin, tylenol and her 's Morphine 30mg tabs. ED w/u showed elevated LFTs and creatinine, acetaminophen not elevated, UDS positive for opiates. WBCs 17.9, K+5.8, Bun/Cr 36/2.89, , , alk phos 192. Pt was [facility] to [facility] due to elevated liver enzymes. On 10/22/17, she was initially in sinus rhythm, then went into paroxysmal atrial fib with RVR, started on IV Cardizem which she responded by returning to sinus rhythm. 10/26: LFTs and Bun/Cr have returned to normal levels. Per patient:  On July 15th, with no known trauma or injury, she suddenly developed left hip pain, her only description was \"bad, bad, bad! \"  She went to see her provider NP Angelina Doll, who sent her to Dr. Mara Andrews. She saw him 8/6/17, and he ordered an MRI, which \"showed I have scoliosis and a mass growing out of my spine. \"  He subsequently sent her to see Dr. Erica Villegas, who scheduled her for \"some kind of injection, I think it is a steroid. \", which was scheduled for 10/26/17. It is unclear but at some point she had PT, which did not help. While waiting for 10/26/17, her pain became so severe that she made multiple calls to the doctors and an ED visit, but was only told to [facility] a couple of Tylenol. \"  She does not remember what happened on the day her  gave her the Morphine, but \"he should have known better because I am allergic to Morphine! \"  Pt reports her allergic reaction to morphine is itching. PAIN: low back radiates to left side and down left leg to the knee. At this time her level of pain is 2/10, but she just got IV dilaudid.      Clinical Pain Assessment (nonverbal scale for severity on nonverbal patients):   [++++ Clinical Pain Assessment++++]  [++++Pain Severity++++]: Pain: 2  [++++Pain Character++++]:   [++++Pain Duration++++]:   [++++Pain Effect++++]:   [++++Pain Factors++++]:   [++++Pain Frequency++++]:   [++++Pain Location++++]:   [++++ Clinical Pain Assessment++++]  Duration: for how long has pt been experiencing pain (e.g., 2 days, 1 month, years)  Frequency: how often pain is an issue (e.g., several times per day, once every few days, constant)     FUNCTIONAL ASSESSMENT:     Palliative Performance Scale (PPS):  PPS: 40       PSYCHOSOCIAL/SPIRITUAL SCREENING:     Advance Care Planning:  Advance Care Planning 10/26/2017   Patient's Healthcare Decision Maker is: Legal Next of Kin   Confirm Advance Directive Yes, not on file        Any spiritual / Mormon concerns:  [] Yes /  [x] No    Caregiver Burnout:  [] Yes /  [] No /  [x] No Caregiver Present      Anticipatory grief assessment:   [x] Normal  / [] Maladaptive       ESAS Anxiety: Anxiety: 0    ESAS Depression: Depression: 0        REVIEW OF SYSTEMS:     Positive and pertinent negative findings in ROS are noted above in HPI. The following systems were [x] reviewed / [] unable to be reviewed as noted in HPI  Other findings are noted below. Systems: constitutional, ears/nose/mouth/throat, respiratory, gastrointestinal, genitourinary, musculoskeletal, integumentary, neurologic, psychiatric, endocrine. Positive findings noted below. Modified ESAS Completed by: provider   Fatigue: 5 Drowsiness: 0   Depression: 0 Pain: 2   Anxiety: 0 Nausea: 0   Anorexia: 0 Dyspnea: 0     Constipation: No     Stool Occurrence(s): 1        PHYSICAL EXAM:     From RN flowsheet:  Wt Readings from Last 3 Encounters:   10/21/17 179 lb 10.8 oz (81.5 kg)   10/20/17 170 lb (77.1 kg)   10/03/17 168 lb (76.2 kg)     Blood pressure 179/87, pulse (!) 102, temperature 98.1 °F (36.7 °C), resp. rate 16, weight 179 lb 10.8 oz (81.5 kg), SpO2 94 %.     Pain Scale 1: Numeric (0 - 10)  Pain Intensity 1: 2     Pain Location 1: Back  Pain Orientation 1: Left  Pain Description 1: Constant  Pain Intervention(s) 1: Medication (see MAR)  Last bowel movement, if known:     Constitutional: WD, WN, NAD  Eyes: pupils equal, anicteric  ENMT: no nasal discharge, moist mucous membranes  Cardiovascular: regular rhythm, distal pulses intact  Respiratory: breathing not labored, symmetric  Gastrointestinal: soft non-tender, +bowel sounds  Musculoskeletal: no deformity, no tenderness to palpation, able to move right and left leg without pain  Skin: warm, dry  Neurologic: following commands, moving all extremities  Psychiatric: full affect,  Tangential thought process          HISTORY:     Active Problems:    Acute encephalopathy (10/23/2017)      Past Medical History:   Diagnosis Date    Arthritis     Chronic pain     Chronic pain     Diabetes (HCC)     Diverticular disease     Hemorrhoid     Hypercholesterolemia     IBS (irritable bowel syndrome)       Past Surgical History:   Procedure Laterality Date    HX CATARACT REMOVAL      HX CHOLECYSTECTOMY      HX COLONOSCOPY  2009    HX COLONOSCOPY  2016    2 adenomatous polyp    HX HEMORRHOIDECTOMY  2009    HX HYSTERECTOMY      HX KNEE REPLACEMENT      right      Family History   Problem Relation Age of Onset    Colon Cancer Father       History reviewed, no pertinent family history.   Social History   Substance Use Topics    Smoking status: Current Every Day Smoker    Smokeless tobacco: Never Used    Alcohol use No     Allergies   Allergen Reactions    Morphine Itching    Ultram [Tramadol] Palpitations      Current Facility-Administered Medications   Medication Dose Route Frequency    insulin lispro (HUMALOG) injection   SubCUTAneous AC&HS    glyBURIDE (DIABETA) tablet 5 mg  5 mg Oral ACB&D    celecoxib (CELEBREX) capsule 200 mg  200 mg Oral BID    venlafaxine (EFFEXOR) tablet 75 mg  75 mg Oral BID WITH MEALS    insulin glargine (LANTUS) injection 30 Units  30 Units SubCUTAneous DAILY    dilTIAZem CD (CARDIZEM CD) capsule 180 mg  180 mg Oral DAILY    gabapentin (NEURONTIN) capsule 200 mg  200 mg Oral TID    HYDROmorphone (PF) (DILAUDID) injection 0.2 mg  0.2 mg IntraVENous Q6H PRN    thiamine (B-1) tablet 100 mg  100 mg Oral DAILY    metroNIDAZOLE (FLAGYL) tablet 250 mg  250 mg Oral TID    acyclovir (ZOVIRAX) 5 % ointment   Topical QID    enoxaparin (LOVENOX) injection 40 mg  40 mg SubCUTAneous Q24H    glucose chewable tablet 16 g  4 Tab Oral PRN    dextrose (D50W) injection syrg 12.5-25 g  12.5-25 g IntraVENous PRN    glucagon (GLUCAGEN) injection 1 mg  1 mg IntraMUSCular PRN    hydrALAZINE (APRESOLINE) 20 mg/mL injection 20 mg  20 mg IntraVENous Q6H PRN    metoprolol (LOPRESSOR) injection 5 mg  5 mg IntraVENous Q6H PRN    sodium chloride (NS) flush 10-30 mL  10-30 mL InterCATHeter PRN    sodium chloride (NS) flush 10 mL  10 mL InterCATHeter Q24H    sodium chloride (NS) flush 10 mL  10 mL InterCATHeter PRN    sodium chloride (NS) flush 10-40 mL  10-40 mL InterCATHeter Q8H    sodium chloride (NS) flush 20 mL  20 mL InterCATHeter PRN    heparin (porcine) pf 300 Units  300 Units InterCATHeter PRN    oxyCODONE-acetaminophen (PERCOCET 10)  mg per tablet 1 Tab  1 Tab Oral Q4H PRN    LORazepam (ATIVAN) tablet 1 mg  1 mg Oral Q4H PRN    sodium chloride (NS) flush 5-10 mL  5-10 mL IntraVENous Q8H    sodium chloride (NS) flush 5-10 mL  5-10 mL IntraVENous PRN    naloxone (NARCAN) injection 0.4 mg  0.4 mg IntraVENous PRN    ondansetron (ZOFRAN) injection 4 mg  4 mg IntraVENous Q4H PRN    bisacodyl (DULCOLAX) suppository 10 mg  10 mg Rectal DAILY PRN          LAB AND IMAGING FINDINGS:     Lab Results   Component Value Date/Time    WBC 12.8 10/27/2017 04:26 AM    HGB 11.3 10/27/2017 04:26 AM    PLATELET 797 32/55/4592 04:26 AM     Lab Results   Component Value Date/Time    Sodium 139 10/27/2017 04:26 AM    Potassium 3.5 10/27/2017 04:26 AM    Chloride 109 10/27/2017 04:26 AM    CO2 20 10/27/2017 04:26 AM    BUN 18 10/27/2017 04:26 AM    Creatinine 0.89 10/27/2017 04:26 AM    Calcium 9.0 10/27/2017 04:26 AM    Magnesium 1.8 10/25/2017 04:36 AM      Lab Results   Component Value Date/Time    AST (SGOT) 22 10/25/2017 12:00 AM    Alk. phosphatase 78 10/25/2017 12:00 AM    Protein, total 5.7 10/25/2017 12:00 AM    Albumin 1.9 10/25/2017 12:00 AM    Globulin 3.8 10/25/2017 12:00 AM     Lab Results   Component Value Date/Time    INR 1.0 10/20/2017 04:45 PM    Prothrombin time 12.1 10/20/2017 04:45 PM      No results found for: IRON, FE, TIBC, IBCT, PSAT, FERR   Lab Results   Component Value Date/Time    pH 7.36 10/21/2017 08:29 AM    PCO2 38 10/21/2017 08:29 AM    PO2 78 10/21/2017 08:29 AM     No components found for: [place]   Lab Results   Component Value Date/Time    CK 1206 10/22/2017 03:56 AM    CK - MB 3.3 10/22/2017 03:56 AM                Total time: 75 min  Counseling / coordination time, spent as noted above: 65 min  > 50% counseling / coordination?: yes    Prolonged service was provided for  []30 min   []75 min in face to face time in the presence of the patient, spent as noted above. Time Start:   Time End:   Note: this can only be billed with 99340 (initial) or 09607 (follow up). If multiple start / stop times, list each separately.

## 2017-10-26 NOTE — PROGRESS NOTES
Nutrition Assessment:    RECOMMENDATIONS:   Continue diet as tolerated    DIETITIANS INTERVENTIONS/PLAN:   Continue diet as tolerated  Monitor appetite/PO intake  Monitor BG    ASSESSMENT:   Pt admitted with acute encephalopathy. PMH: HTN, DM, IBS. Chart reviewed, case discussed during CCU rounds. Pt lying in bed awake and alert. Encephalopathy has cleared. Observed lunch tray, pt has consumed 100%, with the exception of her salad. She reports a good appetite. Provided menu and explanation of room service. Labs reviewed, -436. DTC making medication recommendations. Current intake is likely adequate to meet est needs. SUBJECTIVE/OBJECTIVE:   \"Everything has been delicious\"   Diet Order: Consistent carb, Cardiac  % Eaten:  Patient Vitals for the past 72 hrs:   % Diet Eaten   10/24/17 1800 100 %   10/24/17 1345 75 %     Pertinent Medications:glyburide, lantus, humalog, flagyl, thiamin. Chemistries:  Lab Results   Component Value Date/Time    Sodium 140 10/25/2017 12:00 AM    Potassium 3.7 10/25/2017 12:00 AM    Chloride 107 10/25/2017 12:00 AM    CO2 24 10/25/2017 12:00 AM    Anion gap 9 10/25/2017 12:00 AM    Glucose 185 10/25/2017 12:00 AM    BUN 15 10/25/2017 12:00 AM    Creatinine 0.73 10/25/2017 12:00 AM    BUN/Creatinine ratio 21 10/25/2017 12:00 AM    GFR est AA >60 10/25/2017 12:00 AM    GFR est non-AA >60 10/25/2017 12:00 AM    Calcium 8.4 10/25/2017 12:00 AM    AST (SGOT) 22 10/25/2017 12:00 AM    Alk. phosphatase 78 10/25/2017 12:00 AM    Protein, total 5.7 10/25/2017 12:00 AM    Albumin 1.9 10/25/2017 12:00 AM    Globulin 3.8 10/25/2017 12:00 AM    A-G Ratio 0.5 10/25/2017 12:00 AM    ALT (SGPT) 69 10/25/2017 12:00 AM      Anthropometrics: Height:   Weight: 81.5 kg (179 lb 10.8 oz)    IBW (%IBW):   ( ) UBW (%UBW):   (  %)    BMI: Body mass index is 28.57 kg/(m^2).     This BMI is indicative of:  []Underweight   []Normal   [x]Overweight   [] Obesity   [] Extreme Obesity (BMI>40)  Estimated Nutrition Needs (Based on): 1716 Kcals/day (MSJ 1320 x 1.3) , 65 g (0.8gPro/kg) Protein  Carbohydrate: At Least 130 g/day  Fluids: 1800 mL/day    Last BM: 10/24   [x]Active     []Hyperactive  []Hypoactive       [] Absent   BS  Skin:    [] Intact   [] Incision  [] Breakdown   [] DTI   [] Tears/Excoriation/Abrasion  [x]Edema(trace-generalized; trace-BUE; +2-BLE) [] Other: Wt Readings from Last 30 Encounters:   10/21/17 81.5 kg (179 lb 10.8 oz)   10/20/17 77.1 kg (170 lb)   10/03/17 76.2 kg (168 lb)   08/31/17 77.8 kg (171 lb 9.6 oz)   08/15/17 74.4 kg (164 lb)   07/20/17 76.7 kg (169 lb)   07/06/17 76.6 kg (168 lb 12.8 oz)   06/21/17 75.2 kg (165 lb 12.8 oz)   05/17/17 73.5 kg (162 lb)   04/18/17 73 kg (161 lb)   02/13/17 75.8 kg (167 lb 3.2 oz)   11/10/16 74.8 kg (164 lb 12.8 oz)   08/10/16 73.5 kg (162 lb)   06/17/16 74.8 kg (165 lb)   05/24/16 74.8 kg (165 lb)      NUTRITION DIAGNOSES:   Problem:  Altered nutrition-related lab values      Etiology: related to hx of DM and medication regimen     Signs/Symptoms: as evidenced by -436. NUTRITION INTERVENTIONS:  Meals/Snacks: General/healthful diet                  GOAL:   Pt will consume >70% of meals with BG <140mg/dL in 5-7 days.      Cultural, Yazidi, or Ethnic Dietary Needs: None     LEARNING NEEDS (Diet, Food/Nutrient-Drug Interaction):    [x] None Identified   [] Identified and Education Provided/Documented   [] Identified and Pt declined/was not appropriate      [x] Interdisciplinary Care Plan Reviewed/Documented    [x] Participated in Discharge Planning: Diabetic/Cardiac diet    [x] Interdisciplinary Rounds     NUTRITION RISK:    [] High              [] Moderate           []  Low  [x]  Minimal/Uncompromised    PT SEEN FOR:    []  MD Consult: []Calorie Count      []Diabetic Diet Education        []Diet Education     []Electrolyte Management     []General Nutrition Management and Supplements     []Management of Tube Feeding     []TPN Recommendations    []  RN Referral:  []MST score >=2     []Enteral/Parenteral Nutrition PTA     []Pregnant: Gestational DM or Multigestation   []  Low BMI  []  DTR Referral   CRISTIAN Hardin, [address] [place] [address], [address] [place] [address] Dr   Pager [phone]  Weekend Pager [phone]

## 2017-10-26 NOTE — PROGRESS NOTES
Spiritual Care Assessment/Progress Notes    Fiona Shine [id number]  [id number]    1940  65 y.o.  female    Patient Telephone Number: [phone] (home)   Synagogue Affiliation: Anam Brooke   Language: English   Extended Emergency Contact Information  Primary Emergency Contact: [address] Phone: [phone]  Relation: Spouse   Patient Active Problem List    Diagnosis Date Noted    Acute encephalopathy 10/23/2017    Hyperkalemia 10/20/2017    Altered mental status 10/20/2017    Transaminitis 10/20/2017    Acute renal failure (ARF) ([facility] 10/20/2017    Diverticulitis large intestine w/o perforation or abscess w/o bleeding 07/06/2017    SOB (shortness of breath) 06/21/2017    Abdominal pain, generalized 06/21/2017    Diarrhea in adult patient 06/21/2017    Cigarette smoker 65/05/2019    Neutrophilic leukocytosis 21/30/9620    Chronic pain     Hypercholesterolemia     Arthritis     Diabetes ([facility]     IBS (irritable bowel syndrome)     Hemorrhoid         Date: 10/26/2017       Level of Synagogue/Spiritual Activity:  [x]         Involved in mallorie tradition/spiritual practice    []         Not involved in mallorie tradition/spiritual practice  []         Spiritually oriented    []         Claims no spiritual orientation    []         seeking spiritual identity  []         Feels alienated from Islam practice/tradition  []         Feels angry about Islam practice/tradition  [x]         Spirituality/Islam tradition is a resource for coping at this time.   []         Not able to assess due to medical condition    Services Provided Today:  []         crisis intervention    []         reading Scriptures  [x]         spiritual assessment    []         prayer  [x]         empathic listening/emotional support  []         rites and rituals (cite in comments)  []         life review     []         Islam support  []         theological development    [] advocacy  []         ethical dialog     []         blessing  []         bereavement support    []         support to family  []         anticipatory grief support   []         help with AMD  []         spiritual guidance    []         meditation      Spiritual Care Needs  []         Emotional Support  []         Spiritual/Holiness Care  []         Loss/Adjustment  []         Advocacy/Referral                /Ethics  [x]         No needs expressed at               this time  []         Other: (note in               comments)  [address]  []         Follow up visits with               pt/family  []         Provide materials  []         Schedule sacraments  []         Contact Community               Clergy  [x]         Follow up as needed  []         Other: (note in               comments)     Comments: initial visit with patient in 2536. Introduced self and explained role of chaplains in the hospital. Patient welcomed  in and shared her own [address] and that she was recently visited by a Yarsanism member from her Yarsanism and how meaningful this was for her. Provided supportive presence and empathetic listening as patient shared insight in to family dynamics and life review. Visit concluded when patient's physician arrived with information pertaining to patient's medical care. Advised patient of  availability and assured of ongoing support as needed and desired. WILNER KhouryDiv.    Paging Service [phone])

## 2017-10-26 NOTE — PROGRESS NOTES
PULMONARY/Critical Care/SLEEP MEDICINE  [facility]  Name: Elijah Deluca   : 1940   MRN: [id number]   Date: 10/26/2017 8:40 AM     IMPRESSION:   1. AMS- better  2. Probable narcotic overdose  3. Chronic pain - spinal stenosis  4. Tobacco use  5. Vaginal sores-   6. DM  7. Hypoxemia  8. Atrial fib ? Chronicity  9. HTN      RECOMMENDATIONS/PLAN:   1. Stable for transfer to floor  2. Mobilize  3. adjust glyburide  4. empric Acyclovir for vaginal sores  5. Flagyl for a few days for vaginitis   6. Gabapentin  7. D/c planning  8. Palliative care consult- pt to get [facility] at [facility] but no details  9. effexor   10. DVT, SUP prophylaxis   11. Will be available to assist in medical management while in the CCU pending disposition       10/26 minimal pain. On room air. Eating. Conversant    10/25 bright and conversant but not oriented to time. 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 Hungry. No recollection of events leading to her admission. Mild SOB and asked for more O2 but sats 95%. No congestion or cough. Off  Cardizem, still on IV precedex    10/24 still easily agitiated but on less IV precedex    10/ 23 Remains encephalopathic; getting some dilaudid; on precedex  Moaning and restless. Eyes open and easily agitated. Diarrhea. Vaginal tenderness per nursing.      Vital Signs:    Visit Vitals    /74    Pulse (!) 103    Temp 98.7 °F (37.1 °C)    Resp 26    Wt 81.5 kg (179 lb 10.8 oz)    SpO2 94%    BMI 28.57 kg/m2       O2 Device: Room air   O2 Flow Rate (L/min): 2 l/min   Temp (24hrs), Av.3 °F (36.8 °C), Min:97.8 °F (36.6 °C), Max:98.7 °F (37.1 °C)       Intake/Output:   Last shift:         Last 3 shifts: 10/24 1901 - 10/26 0700  In: 339.8 [P.O.:40; I.V.:299.8]  Out: 2415 [Urine:2415]    Intake/Output Summary (Last 24 hours) at 10/26/17 0856  Last data filed at 10/25/17 1400   Gross per 24 hour   Intake            50.11 ml   Output              750 ml   Net -699.89 ml       Physical Exam:    General: []      Intubated/sedated [x]      No acute distress []          []      Ill appearing []     agitated []            HEENT: []     ETT [x]      PERRL []     NGT     []      Anicteric Mucosa:[]      moist   [x]      dry []            Resp: []      Wheeze [x]      Clear to ascultation bilaterally []      Accessory muscle use    []      Rales []      Chest tube:  []      Air leak []          []      Ronchi []      Crepitus []         []      Coarse bilateral ventilator breath sounds      CV: []      Regular []      Tachycardia [x]          [x]      Irregular []      Bradycardic []          []      Murmur []      S1 []          []     RUB []    S2 []            GI: [x]      Soft  []      NG Tube Bowel sounds: []      present []      absent    []      Firm []      PEG [x]   - Tender      -      Distended  []            : []      Zeng []      Hematuria []          []      Clear urine []       []            MSK:    []      SCDs [x]    -  Edema [x]   --clubbing       []      No deformities [x] - -Cyanosis  []            Skin: [x]      Warm [x]      Dry  []   Mottled       []      Cool []      Moist []          []     Lesions []      Diaphoretic []          []      Rash  []      Cyanosis []            N-psych: []      Sedated  []      Agitated [x]     Moves all extremities     [x]      Alert  Follows commands:   [x]      Yes  []      No [x]   -  oriented       Devices: []      PA Catheter []      Tracheostomy []          []      Central Line []        []        Chest tube:  Air leak? []        Y/[]        N    []          []      PICC []       []            DATA:   Current Facility-Administered Medications   Medication Dose Route Frequency    gabapentin (NEURONTIN) capsule 200 mg  200 mg Oral TID    HYDROmorphone (PF) (DILAUDID) injection 0.2 mg  0.2 mg IntraVENous Q6H PRN    glyBURIDE (DIABETA) tablet 2.5 mg  2.5 mg Oral ACB&D    dilTIAZem (CARDIZEM) IR tablet 60 mg  60 mg Oral Q8H    thiamine (B-1) tablet 100 mg  100 mg Oral DAILY    metroNIDAZOLE (FLAGYL) tablet 250 mg  250 mg Oral TID    venlafaxine (EFFEXOR) tablet 75 mg  75 mg Oral BID WITH MEALS    acyclovir (ZOVIRAX) 5 % ointment   Topical QID    enoxaparin (LOVENOX) injection 40 mg  40 mg SubCUTAneous Q24H    insulin glargine (LANTUS) injection 5 Units  5 Units SubCUTAneous QHS    insulin lispro (HUMALOG) injection   SubCUTAneous Q6H    glucose chewable tablet 16 g  4 Tab Oral PRN    dextrose (D50W) injection syrg 12.5-25 g  12.5-25 g IntraVENous PRN    glucagon (GLUCAGEN) injection 1 mg  1 mg IntraMUSCular PRN    hydrALAZINE (APRESOLINE) 20 mg/mL injection 20 mg  20 mg IntraVENous Q6H PRN    metoprolol (LOPRESSOR) injection 5 mg  5 mg IntraVENous Q6H PRN    sodium chloride (NS) flush 10-30 mL  10-30 mL InterCATHeter PRN    sodium chloride (NS) flush 10 mL  10 mL InterCATHeter Q24H    sodium chloride (NS) flush 10 mL  10 mL InterCATHeter PRN    sodium chloride (NS) flush 10-40 mL  10-40 mL InterCATHeter Q8H    sodium chloride (NS) flush 20 mL  20 mL InterCATHeter PRN    heparin (porcine) pf 300 Units  300 Units InterCATHeter PRN    celecoxib (CELEBREX) capsule 200 mg  200 mg Oral BID    oxyCODONE-acetaminophen (PERCOCET 10)  mg per tablet 1 Tab  1 Tab Oral Q4H PRN    LORazepam (ATIVAN) tablet 1 mg  1 mg Oral Q4H PRN    sodium chloride (NS) flush 5-10 mL  5-10 mL IntraVENous Q8H    sodium chloride (NS) flush 5-10 mL  5-10 mL IntraVENous PRN    naloxone (NARCAN) injection 0.4 mg  0.4 mg IntraVENous PRN    ondansetron (ZOFRAN) injection 4 mg  4 mg IntraVENous Q4H PRN    bisacodyl (DULCOLAX) suppository 10 mg  10 mg Rectal DAILY PRN    mupirocin (BACTROBAN) 2 % ointment   Both Nostrils BID       Telemetry: []        Sinus []        A-flutter []        Paced    []        A-fib []        Multiple PVCs                  Labs:  Recent Labs      10/25/17   0436  10/24/17   0442   WBC  10.1  11.6*   HGB 10.2*  11.0*   HCT  29.6*  32.4*   PLT  251  243     Recent Labs      10/25/17   0436  10/25/17   0000  10/24/17   0442   NA   --   140  142   K   --   3.7  3.2*   CL   --   107  105   CO2   --   24  26   GLU   --   185*  157*   BUN   --   15  18   CREA   --   0.73  0.61   CA   --   8.4*  8.5   MG  1.8   --    --    ALB   --   1.9*   --    TBILI   --   0.3   --    SGOT   --   22   --    ALT   --   69   --      No results for input(s): PH, PCO2, PO2, HCO3, FIO2 in the last 72 hours.     Imaging:  []      I have personally reviewed the patients radiographs     []      No change from prior, tubes and lines in adequate position  []      Improved   []      Worsening     IMPRESSION:     The patient is: []       acutely ill Risk of deterioration: [x]       moderate    []       critically ill  []       high     Active Problems:    Acute encephalopathy (10/23/2017)        []      See my orders for details    My assessment/plan was discussed with:  []      nursing []      PT/OT    []      respiratory therapy [x]      Dr. Irene Hawley   []      family []           []      Total critical care time exclusive of procedures       minutes  Nataliya Marie MD

## 2017-10-26 NOTE — DISCHARGE SUMMARY
Hospitalist Discharge Summary     Patient ID:  Aneudy Driscoll  [id number]  33 y.o.  1940    PCP on record: Genny Melendez NP    Admit date: 10/20/2017  Discharge date and time: 10/28/2017      DISCHARGE DIAGNOSIS:    1. Acute encephalopathy.   2. Acute respiratory failure with hypoxia.     3. Leukocytosis.    4. Possible Tylenol OD.    5. Rhabdomyolysis/Acute kidney injury. 6. A.Fib/RVR.    7. HTN.     8. DM-2.           CONSULTATIONS:  IP CONSULT TO CARDIOLOGY  IP CONSULT TO NEUROLOGY  IP CONSULT TO PSYCHIATRY  IP CONSULT TO PALLIATIVE CARE - PROVIDER  IP CONSULT TO INTERVENTIONAL RADIOLOGY  IP CONSULT TO ORTHOPEDIC SURGERY    Excerpted HPI from H&P of Nathaly Jara MD:  65 Y. O WF, with Hx HTN, DM-2, Dyslipidemia, IBS, Diverticular disease, spinal stenosis, Chronic back pain, apparently taking regularly scheduled Tylenol, also taking 's oral morphine past few days, may also been taking Neurontin more than prescribed, found by  unresponsive. EMS called and brought to hospitals ED, where patient became more alert after IV Narcan, then gradually more lethargic. Patient would open eyes, then quickly fall asleep, unable to hold a conversation or provide history. Initial evaluation revealed ARF and abnormal LFTs. Admitted for further evaluation and management.       ______________________________________________________________________  DISCHARGE SUMMARY/HOSPITAL COURSE:  for full details see H&P, daily progress notes, labs, consult notes.        PROBLEMS:      1. Acute encephalopathy: Secondary to inadvertent medication overdose. Patient presented with marked lethargy, in the setting utilization of multiple sedating meds, including possibly spouse's MS Contin, for severe low back pain x 2 days. Head CT showed no acute findings. Managed supportively, Narcan infusion as needed and mental status gradually improved.  EEG was abnormal for generalized slowing and frequent triphasics, which may be related to an underlying metabolic or anoxic insult. No seizures were noted on the study. As of 10/24/17, patient awake, alert and interactive, albeit somewhat forgetful. Dr Zane Jacome provided psychiatric consultation, and Dr Clifton Gaines provided neurology consultation. Input much appreciated. Dr Zane Jacome has recommended outpatient psychiatry follow up.      2. Acute respiratory failure with hypoxia: Noted at presentation, due to # 1. Managed as above, as well as with O2. Resolved. Sats 93%-96% RA, as of 10/25/17.        3. Leukocytosis: Wcc 17.9 o admission. U/A-neg, afebrile, CXR-no acute findings, lactic acid 1.5. No obvious infective focus. Likely reactive. Following CBC. Trended down, and as of 10/25/17, had normalized at 10. 1.      4. Possible Tylenol OD: Abnormal LFTs noted on admission, with T. Valdez 0.4, , , A. Phos 192. [address] consulted, and N-Acetylcysteine recommended. Administered accordingly. Following LFTs, transaminitis trended down,, as LFTs have practically normalized as of 10/25/17. Liver U/S-echogenic liver.     5. Rhabdomyolysis/Acute kidney injury: BUN 36, creatinine 2.89 on admission (baseline creatinine 1.08-1.35). Total CPK 3973. No blood on urine dipstick testing. Likely due to volume depletion. Managed with IV fluids/HCO3. ARB and other nephrotoxins were placed on hold. Renal U/S-negative. Resolved.      6. A.Fib/RVR: Patient went into fast atrial fibrillation on 10/22/17. No clear previous Hx. Managed with TIESHA Cardizem, with reversion to SR. Dr Gabo Nath provided cardiology consult. 2D echo revealed EF 55%, no regional wall motion abnormalities, mild LVH. Now in SR/Rate-controlled on PO Cardizem.       7. HTN: Managed initially with Cardizem/prn IV Hydralazine. Now on Cardizem CD and ARB resumed 10/28/17.       8. DM-2: Insulin-requiring. Initially managed with SSI, and subsequently transitioned to Glyburide/SSI/Basal insulin. A1C 8.6.  Patient to resume preadmission diabetic regimen on discharge. 9. Vulvovaginitis: &-day course of Flagyl commenced 10/25/17, to conclude on 10/31/17. 10. Spinal stenosis/Chronic back pain: MRI 9/29/17-multilevel lumbar spondyloarthropathy, most severely at L3-L,  with severe central canal narrowing. Per patient, she was scheduled to have a spinal injection 10/26/17 at Dr Jonah Beeam office  ([facility]). The office was contacted, and appointment rescheduled for next available. Dr Kaykay Kevin provided orthopedic consult, and is s/p ASHLIE by IR 10/27/17.         Comment: Stable for discharge today with [facility] and outpatient psychiatry, PMD and orthopedic F/U.              _______________________________________________________________________  Patient seen and examined by me on discharge day. Pertinent Findings:  Gen:    Not in distress  Chest: Clear lungs  CVS:   Heart sounds, normal, regular, no murmurs. No edema  Abd:  Soft, not distended, not tender  Neuro:  Alert, no focal deficit.   _______________________________________________________________________  DISCHARGE MEDICATIONS:   Current Discharge Medication List      START taking these medications    Details   metroNIDAZOLE (FLAGYL) 250 mg tablet Take 1 Tab by mouth three (3) times daily for 3 days. Qty: 9 Tab, Refills: 0      oxyCODONE-acetaminophen (PERCOCET 10)  mg per tablet Take 1 Tab by mouth every four (4) hours as needed. Max Daily Amount: 6 Tabs. Qty: 20 Tab, Refills: 0      thiamine (B-1) 100 mg tablet Take 1 Tab by mouth daily. Qty: 30 Tab, Refills: 0      venlafaxine (EFFEXOR) 75 mg tablet Take 1 Tab by mouth two (2) times daily (with meals). Qty: 60 Tab, Refills: 0      valsartan (DIOVAN) 160 mg tablet Take 1 Tab by mouth daily. Qty: 30 Tab, Refills: 0         CONTINUE these medications which have CHANGED    Details   gabapentin (NEURONTIN) 300 mg capsule Take 1 Cap by mouth two (2) times a day.   Qty: 60 Cap, Refills: 0         CONTINUE these medications which have NOT CHANGED    Details   metFORMIN (GLUCOPHAGE) 1,000 mg tablet TAKE 1 TABLET TWICE A DAY  Qty: 180 Tab, Refills: 1      dilTIAZem CD (CARDIZEM CD) 240 mg ER capsule Take 1 Cap by mouth daily. Qty: 90 Cap, Refills: 3      atorvastatin (LIPITOR) 20 mg tablet TAKE 1 TABLET DAILY  Qty: 90 Tab, Refills: 1      insulin glargine (LANTUS,BASAGLAR) 100 unit/mL (3 mL) inpn 40 Units by SubCUTAneous route daily. Qty: 6 Pen, Refills: 11    Comments: 6 boxes at a time refill x 1 year      glyBURIDE (DIABETA) 5 mg tablet Take 1 Tab by mouth two (2) times daily (with meals). Qty: 180 Tab, Refills: 3      celecoxib (CELEBREX) 200 mg capsule Take 1 Cap by mouth two (2) times a day. Qty: 180 Cap, Refills: 3      Insulin Needles, Disposable, 31 gauge x 5/16\" ndle Use as directed  Qty: 1 Package, Refills: 11    Associated Diagnoses: Type 2 diabetes mellitus with hyperglycemia, unspecified long term insulin use status (HCC)      gemfibrozil (LOPID) 600 mg tablet Take 1 Tab by mouth two (2) times a day. Qty: 180 Tab, Refills: 3      omega-3 fatty acids-vitamin e 1,000 mg cap Take 1 Cap by mouth. 32a day      ascorbic acid, vitamin C, (VITAMIN C) 500 mg tablet Take  by mouth.      b complex vitamins tablet Take 1 Tab by mouth daily. zinc 50 mg tab tablet Take  by mouth daily. cholecalciferol (VITAMIN D3) 1,000 unit cap Take  by mouth daily. B.infantis-B.ani-B.long-B.bifi 10-15 mg TbEC Take  by mouth. BENEFIBER, GUAR GUM, PO Take  by mouth.          STOP taking these medications       valsartan-hydroCHLOROthiazide (DIOVAN-HCT) 160-12.5 mg per tablet Comments:   Reason for Stopping:         Venlafaxine 75 mg tr24 Comments:   Reason for Stopping:         loperamide (IMMODIUM) 2 mg tablet Comments:   Reason for Stopping:         acetaminophen (TYLENOL) 500 mg tablet Comments:   Reason for Stopping:               My Recommended Diet, Activity, Wound Care, and follow-up labs are listed in the patient's Discharge Insturctions which I have personally completed and reviewed.     _______________________________________________________________________  DISPOSITION:    Home with Family:    Home with HH/PT/OT/RN: X   SNF/LTC:    EMILY:    OTHER:        Condition at Discharge:  Stable  _______________________________________________________________________  Follow up with:   PCP : Jessica Valencia NP  Follow-up Information     Follow up With Details [address]   [facility] [address]  [address] [facility], NP Schedule an appointment as soon as possible for a visit in 1 week  [facility]  [address] [address] [phone]                Total time in minutes spent coordinating this discharge (includes going over instructions, follow-up, prescriptions, and preparing report for sign off to her PCP) :  <30 minutes    Signed:  Lanette Bella MD

## 2017-10-27 ENCOUNTER — APPOINTMENT (OUTPATIENT)
Dept: INTERVENTIONAL RADIOLOGY/VASCULAR | Age: 77
DRG: 917 | End: 2017-10-27
Attending: PHYSICIAN ASSISTANT
Payer: MEDICARE

## 2017-10-27 PROBLEM — M54.40 ACUTE LEFT-SIDED LOW BACK PAIN WITH SCIATICA: Status: ACTIVE | Noted: 2017-10-27

## 2017-10-27 PROBLEM — T40.601D: Status: ACTIVE | Noted: 2017-10-27

## 2017-10-27 PROBLEM — G89.4 CHRONIC PAIN SYNDROME: Status: ACTIVE | Noted: 2017-10-27

## 2017-10-27 PROBLEM — R53.81 PHYSICAL DEBILITY: Status: ACTIVE | Noted: 2017-10-27

## 2017-10-27 LAB
ANION GAP SERPL CALC-SCNC: 10 MMOL/L (ref 5–15)
BASOPHILS # BLD: 0.1 K/UL (ref 0–0.1)
BASOPHILS NFR BLD: 1 % (ref 0–1)
BUN SERPL-MCNC: 18 MG/DL (ref 6–20)
BUN/CREAT SERPL: 20 (ref 12–20)
CALCIUM SERPL-MCNC: 9 MG/DL (ref 8.5–10.1)
CHLORIDE SERPL-SCNC: 109 MMOL/L (ref 97–108)
CO2 SERPL-SCNC: 20 MMOL/L (ref 21–32)
CREAT SERPL-MCNC: 0.89 MG/DL (ref 0.55–1.02)
EOSINOPHIL # BLD: 0.3 K/UL (ref 0–0.4)
EOSINOPHIL NFR BLD: 2 % (ref 0–7)
ERYTHROCYTE [DISTWIDTH] IN BLOOD BY AUTOMATED COUNT: 13.3 % (ref 11.5–14.5)
GLUCOSE BLD STRIP.AUTO-MCNC: 209 MG/DL (ref 65–100)
GLUCOSE BLD STRIP.AUTO-MCNC: 217 MG/DL (ref 65–100)
GLUCOSE BLD STRIP.AUTO-MCNC: 402 MG/DL (ref 65–100)
GLUCOSE BLD STRIP.AUTO-MCNC: 409 MG/DL (ref 65–100)
GLUCOSE BLD STRIP.AUTO-MCNC: 463 MG/DL (ref 65–100)
GLUCOSE SERPL-MCNC: 213 MG/DL (ref 65–100)
HCT VFR BLD AUTO: 33 % (ref 35–47)
HGB BLD-MCNC: 11.3 G/DL (ref 11.5–16)
LYMPHOCYTES # BLD: 3.5 K/UL (ref 0.8–3.5)
LYMPHOCYTES NFR BLD: 27 % (ref 12–49)
MCH RBC QN AUTO: 30.2 PG (ref 26–34)
MCHC RBC AUTO-ENTMCNC: 34.2 G/DL (ref 30–36.5)
MCV RBC AUTO: 88.2 FL (ref 80–99)
MONOCYTES # BLD: 1 K/UL (ref 0–1)
MONOCYTES NFR BLD: 8 % (ref 5–13)
NEUTS SEG # BLD: 7.9 K/UL (ref 1.8–8)
NEUTS SEG NFR BLD: 62 % (ref 32–75)
PLATELET # BLD AUTO: 330 K/UL (ref 150–400)
POTASSIUM SERPL-SCNC: 3.5 MMOL/L (ref 3.5–5.1)
RBC # BLD AUTO: 3.74 M/UL (ref 3.8–5.2)
SERVICE CMNT-IMP: ABNORMAL
SODIUM SERPL-SCNC: 139 MMOL/L (ref 136–145)
WBC # BLD AUTO: 12.8 K/UL (ref 3.6–11)

## 2017-10-27 PROCEDURE — 74011250637 HC RX REV CODE- 250/637: Performed by: INTERNAL MEDICINE

## 2017-10-27 PROCEDURE — 36415 COLL VENOUS BLD VENIPUNCTURE: CPT | Performed by: INTERNAL MEDICINE

## 2017-10-27 PROCEDURE — 3E0R33Z INTRODUCTION OF ANTI-INFLAMMATORY INTO SPINAL CANAL, PERCUTANEOUS APPROACH: ICD-10-PCS | Performed by: RADIOLOGY

## 2017-10-27 PROCEDURE — 62323 NJX INTERLAMINAR LMBR/SAC: CPT

## 2017-10-27 PROCEDURE — 74011000250 HC RX REV CODE- 250: Performed by: RADIOLOGY

## 2017-10-27 PROCEDURE — 80048 BASIC METABOLIC PNL TOTAL CA: CPT | Performed by: INTERNAL MEDICINE

## 2017-10-27 PROCEDURE — 74011636637 HC RX REV CODE- 636/637: Performed by: INTERNAL MEDICINE

## 2017-10-27 PROCEDURE — 74011636320 HC RX REV CODE- 636/320: Performed by: RADIOLOGY

## 2017-10-27 PROCEDURE — 65660000000 HC RM CCU STEPDOWN

## 2017-10-27 PROCEDURE — 82962 GLUCOSE BLOOD TEST: CPT

## 2017-10-27 PROCEDURE — 76450000000

## 2017-10-27 PROCEDURE — 3E0R3BZ INTRODUCTION OF ANESTHETIC AGENT INTO SPINAL CANAL, PERCUTANEOUS APPROACH: ICD-10-PCS | Performed by: RADIOLOGY

## 2017-10-27 PROCEDURE — 85025 COMPLETE CBC W/AUTO DIFF WBC: CPT | Performed by: INTERNAL MEDICINE

## 2017-10-27 PROCEDURE — 74011250636 HC RX REV CODE- 250/636: Performed by: RADIOLOGY

## 2017-10-27 RX ORDER — DEXAMETHASONE SODIUM PHOSPHATE 10 MG/ML
10 INJECTION INTRAMUSCULAR; INTRAVENOUS ONCE
Status: COMPLETED | OUTPATIENT
Start: 2017-10-27 | End: 2017-10-27

## 2017-10-27 RX ORDER — INSULIN LISPRO 100 [IU]/ML
7 INJECTION, SOLUTION INTRAVENOUS; SUBCUTANEOUS ONCE
Status: COMPLETED | OUTPATIENT
Start: 2017-10-27 | End: 2017-10-27

## 2017-10-27 RX ORDER — SODIUM CHLORIDE 9 MG/ML
3 INJECTION INTRAMUSCULAR; INTRAVENOUS; SUBCUTANEOUS ONCE
Status: COMPLETED | OUTPATIENT
Start: 2017-10-27 | End: 2017-10-27

## 2017-10-27 RX ORDER — LIDOCAINE HYDROCHLORIDE 20 MG/ML
18 INJECTION, SOLUTION INFILTRATION; PERINEURAL ONCE
Status: COMPLETED | OUTPATIENT
Start: 2017-10-27 | End: 2017-10-27

## 2017-10-27 RX ORDER — INSULIN GLARGINE 100 [IU]/ML
40 INJECTION, SOLUTION SUBCUTANEOUS DAILY
Status: DISCONTINUED | OUTPATIENT
Start: 2017-10-28 | End: 2017-10-28 | Stop reason: HOSPADM

## 2017-10-27 RX ORDER — LIDOCAINE HYDROCHLORIDE 10 MG/ML
2 INJECTION, SOLUTION EPIDURAL; INFILTRATION; INTRACAUDAL; PERINEURAL ONCE
Status: COMPLETED | OUTPATIENT
Start: 2017-10-27 | End: 2017-10-27

## 2017-10-27 RX ORDER — POTASSIUM CHLORIDE 750 MG/1
20 TABLET, FILM COATED, EXTENDED RELEASE ORAL
Status: COMPLETED | OUTPATIENT
Start: 2017-10-27 | End: 2017-10-27

## 2017-10-27 RX ORDER — INSULIN LISPRO 100 [IU]/ML
INJECTION, SOLUTION INTRAVENOUS; SUBCUTANEOUS
Status: DISCONTINUED | OUTPATIENT
Start: 2017-10-27 | End: 2017-10-28 | Stop reason: HOSPADM

## 2017-10-27 RX ADMIN — SODIUM CHLORIDE 3 ML: 9 INJECTION, SOLUTION INTRAMUSCULAR; INTRAVENOUS; SUBCUTANEOUS at 13:15

## 2017-10-27 RX ADMIN — LIDOCAINE HYDROCHLORIDE 2 ML: 10 INJECTION, SOLUTION EPIDURAL; INFILTRATION; INTRACAUDAL; PERINEURAL at 13:13

## 2017-10-27 RX ADMIN — IOPAMIDOL 2 ML: 408 INJECTION, SOLUTION INTRATHECAL at 13:15

## 2017-10-27 RX ADMIN — VENLAFAXINE 75 MG: 37.5 TABLET ORAL at 10:11

## 2017-10-27 RX ADMIN — INSULIN LISPRO 7 UNITS: 100 INJECTION, SOLUTION INTRAVENOUS; SUBCUTANEOUS at 22:23

## 2017-10-27 RX ADMIN — CELECOXIB 200 MG: 200 CAPSULE ORAL at 18:14

## 2017-10-27 RX ADMIN — ACYCLOVIR: 50 OINTMENT TOPICAL at 13:53

## 2017-10-27 RX ADMIN — GABAPENTIN 200 MG: 100 CAPSULE ORAL at 18:14

## 2017-10-27 RX ADMIN — ACYCLOVIR: 50 OINTMENT TOPICAL at 10:13

## 2017-10-27 RX ADMIN — Medication 10 ML: at 06:00

## 2017-10-27 RX ADMIN — METRONIDAZOLE 250 MG: 250 TABLET ORAL at 22:02

## 2017-10-27 RX ADMIN — GLYBURIDE 5 MG: 5 TABLET ORAL at 18:14

## 2017-10-27 RX ADMIN — DILTIAZEM HYDROCHLORIDE 180 MG: 180 CAPSULE, COATED, EXTENDED RELEASE ORAL at 10:11

## 2017-10-27 RX ADMIN — GABAPENTIN 200 MG: 100 CAPSULE ORAL at 22:02

## 2017-10-27 RX ADMIN — Medication 100 MG: at 10:11

## 2017-10-27 RX ADMIN — METRONIDAZOLE 250 MG: 250 TABLET ORAL at 18:14

## 2017-10-27 RX ADMIN — VENLAFAXINE 75 MG: 37.5 TABLET ORAL at 18:14

## 2017-10-27 RX ADMIN — Medication 5 ML: at 13:31

## 2017-10-27 RX ADMIN — METRONIDAZOLE 250 MG: 250 TABLET ORAL at 10:10

## 2017-10-27 RX ADMIN — INSULIN HUMAN 30 UNITS: 100 INJECTION, SUSPENSION SUBCUTANEOUS at 19:34

## 2017-10-27 RX ADMIN — POTASSIUM CHLORIDE 20 MEQ: 750 TABLET, FILM COATED, EXTENDED RELEASE ORAL at 10:20

## 2017-10-27 RX ADMIN — LIDOCAINE HYDROCHLORIDE 200 MG: 20 INJECTION, SOLUTION INFILTRATION; PERINEURAL at 13:13

## 2017-10-27 RX ADMIN — GABAPENTIN 200 MG: 100 CAPSULE ORAL at 10:10

## 2017-10-27 RX ADMIN — Medication 10 ML: at 18:14

## 2017-10-27 RX ADMIN — OXYCODONE HYDROCHLORIDE AND ACETAMINOPHEN 1 TABLET: 10; 325 TABLET ORAL at 09:37

## 2017-10-27 RX ADMIN — INSULIN LISPRO 2 UNITS: 100 INJECTION, SOLUTION INTRAVENOUS; SUBCUTANEOUS at 14:35

## 2017-10-27 RX ADMIN — ACYCLOVIR: 50 OINTMENT TOPICAL at 18:15

## 2017-10-27 RX ADMIN — CELECOXIB 200 MG: 200 CAPSULE ORAL at 10:12

## 2017-10-27 RX ADMIN — GLYBURIDE 5 MG: 5 TABLET ORAL at 10:11

## 2017-10-27 RX ADMIN — DEXAMETHASONE SODIUM PHOSPHATE 10 MG: 10 INJECTION, SOLUTION INTRAMUSCULAR; INTRAVENOUS at 13:13

## 2017-10-27 NOTE — CONSULTS
ORTHOPAEDIC CONSULT NOTE    Subjective:     Date of Consultation:  October 26, 2017      Eric García is a 65 y.o. female who is being seen for LBP with radiation down the front of her LLE, no recent falls. Pt reports a several month hx of LBP due to stenosis and HNP for which she was seen as an outpt and scheduled for an ASHLIE for pain control. She unfortunately had been waiting for 6 weeks and the pain increased causing her to take to much medication leading to an overdose. Denies numbness/tingling, bowel/bladder incontinence.  States pain is well controled currently with Neurontin and percocet per attending     Patient Active Problem List    Diagnosis Date Noted    Acute encephalopathy 10/23/2017    Hyperkalemia 10/20/2017    Altered mental status 10/20/2017    Transaminitis 10/20/2017    Acute renal failure (ARF) ([address] 10/20/2017    Diverticulitis large intestine w/o perforation or abscess w/o bleeding 07/06/2017    SOB (shortness of breath) 06/21/2017    Abdominal pain, generalized 06/21/2017    Diarrhea in adult patient 06/21/2017    Cigarette smoker 96/61/2030    Neutrophilic leukocytosis 14/97/3656    Chronic pain     Hypercholesterolemia     Arthritis     Diabetes ([address]     IBS (irritable bowel syndrome)     Hemorrhoid      Family History   Problem Relation Age of Onset    Colon Cancer Father       Social History   Substance Use Topics    Smoking status: Current Every Day Smoker    Smokeless tobacco: Never Used    Alcohol use No     Past Medical History:   Diagnosis Date    Arthritis     Chronic pain     Chronic pain     Diabetes ([address]     Diverticular disease     Hemorrhoid     Hypercholesterolemia     IBS (irritable bowel syndrome)       Past Surgical History:   Procedure Laterality Date    HX CATARACT REMOVAL      HX CHOLECYSTECTOMY      HX COLONOSCOPY  2009    HX COLONOSCOPY  2016    2 adenomatous polyp    HX HEMORRHOIDECTOMY  2009    HX HYSTERECTOMY      HX KNEE REPLACEMENT      right      Prior to Admission medications    Medication Sig Start Date End Date Taking? Authorizing Provider   valsartan-hydroCHLOROthiazide (DIOVAN-HCT) 160-12.5 mg per tablet TAKE 1 TABLET DAILY 10/13/17   JONI Real   Venlafaxine 75 mg tr24 Take 75 mg by mouth daily. Indications: ANXIETY WITH DEPRESSION  Patient taking differently: Take 75 mg by mouth two (2) times a day. Indications: ANXIETY WITH DEPRESSION 10/10/17   Meseret Schroeder NP   metFORMIN (GLUCOPHAGE) 1,000 mg tablet TAKE 1 TABLET TWICE A DAY 10/8/17   Meseret Schroeder NP   dilTIAZem CD (CARDIZEM CD) 240 mg ER capsule Take 1 Cap by mouth daily. 6/27/17   Meseret Schroeder NP   atorvastatin (LIPITOR) 20 mg tablet TAKE 1 TABLET DAILY 6/26/17   Meseret Schroeder NP   loperamide (IMMODIUM) 2 mg tablet Take 2 mg by mouth four (4) times daily as needed for Diarrhea. Historical Provider   insulin glargine (LANTUS,BASAGLAR) 100 unit/mL (3 mL) inpn 40 Units by SubCUTAneous route daily. Patient taking differently: 45 Units by SubCUTAneous route daily. Indications: takes 45 units daily 6/1/17   Meseret Schroeder NP   gabapentin (NEURONTIN) 100 mg capsule Take 2 Caps by mouth three (3) times daily. Every 8 hours  Indications: POSTHERPETIC NEURALGIA  Patient taking differently: Take 200 mg by mouth three (3) times daily. Every 8 hours  Indications: POSTHERPETIC NEURALGIA, taking 2 caps every 4-5 hours for back pain 5/25/17   Meseret Schroeder NP   glyBURIDE (DIABETA) 5 mg tablet Take 1 Tab by mouth two (2) times daily (with meals). 5/25/17   Meseret Schroeder NP   celecoxib (CELEBREX) 200 mg capsule Take 1 Cap by mouth two (2) times a day. 5/25/17   Meseret Schroeder NP   Insulin Needles, Disposable, 31 gauge x 5/16\" ndle Use as directed 4/7/17   JONI Real   gemfibrozil (LOPID) 600 mg tablet Take 1 Tab by mouth two (2) times a day. 11/21/16   Otf Acuña MD   omega-3 fatty acids-vitamin e 1,000 mg cap Take 1 Cap by mouth.  32a day Historical Provider   ascorbic acid, vitamin C, (VITAMIN C) 500 mg tablet Take  by mouth. Historical Provider   b complex vitamins tablet Take 1 Tab by mouth daily. Historical Provider   acetaminophen (TYLENOL) 500 mg tablet Take  by mouth every six (6) hours as needed for Pain (taking every 4-5 hours for hip leg pain). Historical Provider   zinc 50 mg tab tablet Take  by mouth daily. Historical Provider   cholecalciferol (VITAMIN D3) 1,000 unit cap Take  by mouth daily. Historical Provider   B.infantis-B.ani-B.long-B.bifi 10-15 mg TbEC Take  by mouth. Historical Provider   Madhu Ore GUM, PO Take  by mouth.     Historical Provider     Current Facility-Administered Medications   Medication Dose Route Frequency    glyBURIDE (DIABETA) tablet 5 mg  5 mg Oral ACB&D    celecoxib (CELEBREX) capsule 200 mg  200 mg Oral BID    venlafaxine (EFFEXOR) tablet 75 mg  75 mg Oral BID WITH MEALS    [START ON 10/27/2017] insulin glargine (LANTUS) injection 30 Units  30 Units SubCUTAneous DAILY    [START ON 10/27/2017] dilTIAZem CD (CARDIZEM CD) capsule 180 mg  180 mg Oral DAILY    gabapentin (NEURONTIN) capsule 200 mg  200 mg Oral TID    HYDROmorphone (PF) (DILAUDID) injection 0.2 mg  0.2 mg IntraVENous Q6H PRN    thiamine (B-1) tablet 100 mg  100 mg Oral DAILY    metroNIDAZOLE (FLAGYL) tablet 250 mg  250 mg Oral TID    acyclovir (ZOVIRAX) 5 % ointment   Topical QID    enoxaparin (LOVENOX) injection 40 mg  40 mg SubCUTAneous Q24H    insulin lispro (HUMALOG) injection   SubCUTAneous Q6H    glucose chewable tablet 16 g  4 Tab Oral PRN    dextrose (D50W) injection syrg 12.5-25 g  12.5-25 g IntraVENous PRN    glucagon (GLUCAGEN) injection 1 mg  1 mg IntraMUSCular PRN    hydrALAZINE (APRESOLINE) 20 mg/mL injection 20 mg  20 mg IntraVENous Q6H PRN    metoprolol (LOPRESSOR) injection 5 mg  5 mg IntraVENous Q6H PRN    sodium chloride (NS) flush 10-30 mL  10-30 mL InterCATHeter PRN    sodium chloride (NS) flush 10 mL  10 mL InterCATHeter Q24H    sodium chloride (NS) flush 10 mL  10 mL InterCATHeter PRN    sodium chloride (NS) flush 10-40 mL  10-40 mL InterCATHeter Q8H    sodium chloride (NS) flush 20 mL  20 mL InterCATHeter PRN    heparin (porcine) pf 300 Units  300 Units InterCATHeter PRN    oxyCODONE-acetaminophen (PERCOCET 10)  mg per tablet 1 Tab  1 Tab Oral Q4H PRN    LORazepam (ATIVAN) tablet 1 mg  1 mg Oral Q4H PRN    sodium chloride (NS) flush 5-10 mL  5-10 mL IntraVENous Q8H    sodium chloride (NS) flush 5-10 mL  5-10 mL IntraVENous PRN    naloxone (NARCAN) injection 0.4 mg  0.4 mg IntraVENous PRN    ondansetron (ZOFRAN) injection 4 mg  4 mg IntraVENous Q4H PRN    bisacodyl (DULCOLAX) suppository 10 mg  10 mg Rectal DAILY PRN      Allergies   Allergen Reactions    Morphine Itching    Ultram [Tramadol] Palpitations        Review of Systems:  A comprehensive review of systems was negative except for that written in the HPI. Mental Status: no dementia    Objective:     Patient Vitals for the past 8 hrs:   BP Temp Pulse Resp SpO2   10/26/17 1959 136/70 97.9 °F (36.6 °C) 98 18 94 %   10/26/17 1539 134/57 98.6 °F (37 °C) 86 18 96 %   10/26/17 1508 152/61 - (!) 108 26 -   10/26/17 1400 148/58 - (!) 105 17 96 %     Temp (24hrs), Av °F (36.7 °C), Min:97 °F (36.1 °C), Max:98.7 °F (37.1 °C)      Gen: Well-developed,  in no acute distress   HEENT: Pink conjunctivae, PERRL, hearing intact to voice, moist mucous membranes   Neck: Supple  Musc: FROM of UE/LE, NVI    Skin: No skin breakdown noted. Skin warm, pink, dry  Neuro: Cranial nerves are grossly intact, no focal motor weakness, follows commands appropriately   Psych: Good insight, oriented to person, place and time, alert    Imaging Review: MRI from 17 reviewed For discussion purposes, the first axial T2-weighted images defined the  midportion of the L1 vertebral body.  There is left-sided L5-S1 transitional  lumbosacral anatomy with pseudoarthrosis. Slight grade 1 anterolisthesis of L2  with respect to L3. There is grade 1 anterolisthesis of L3 with respect to L4 on  the basis of advanced facet arthropathy. Levoconvex scoliotic curvature of the  lumbar spine. There is Alignment of the lumbar spine is otherwise normal. The  spinal cord terminates at a normal anatomic level. There is a slight superior  endplate compression fracture deformity of L1, chronic finding. No active marrow  edema. Diffuse paraspinal muscle atrophy.     At L1-L2, there is a mild broad-based disc bulge without a small central  protrusion type disc herniation. No significant central canal or foraminal  narrowing.     At L2-L3, there is slight grade 1 anterolisthesis of L2 with respect to L3 with  uncovering of the posterior disc. Spondylolisthesis is on the basis of  moderate/severe right facet arthropathy/ligamentum flavum hypertrophy and  moderate left facet arthropathy and ligamentum flavum hypertrophy at this level. There is mild/moderate central canal narrowing. No significant foraminal  narrowing.     L3-L4, there is grade 1 anterolisthesis of L3 with suspected L4 on the basis of  advanced bilateral facet arthropathy and ligamentum flavum hypertrophy. Severe  central canal narrowing. Mild/moderate bilateral foraminal narrowing, right  greater than left. Severe right subarticular recess narrowing at this level.     At L4-L5, there is a mild broad-based disc bulge without disc herniation. Moderate bilateral facet arthropathy. No significant central canal or foraminal  narrowing.     At L5-S1, there is no disc herniation. Left-sided L5-S1 transitional lumbosacral  anatomy with pseudoarthrosis. Moderate/severe bilateral facet arthropathy. No  significant central canal or foraminal narrowing.     IMPRESSION  IMPRESSION:  The patient has multilevel lumbar spondyloarthropathy as described, most  severely at L3-L4 with severe central canal narrowing.  Please refer to the body    Labs:   Recent Results (from the past 24 hour(s))   GLUCOSE, POC    Collection Time: 10/26/17  2:03 AM   Result Value Ref Range    Glucose (POC) 174 (H) 65 - 100 mg/dL    Performed by Nadeem Rivera" Layla    GLUCOSE, POC    Collection Time: 10/26/17  5:54 AM   Result Value Ref Range    Glucose (POC) 189 (H) 65 - 100 mg/dL    Performed by Nadeem Rivera" Layla    GLUCOSE, POC    Collection Time: 10/26/17 11:24 AM   Result Value Ref Range    Glucose (POC) 436 (H) 65 - 100 mg/dL    Performed by FLORENCIA PINA    GLUCOSE, POC    Collection Time: 10/26/17 11:26 AM   Result Value Ref Range    Glucose (POC) 407 (H) 65 - 100 mg/dL    Performed by FLORENCIA PINA    GLUCOSE, POC    Collection Time: 10/26/17  4:42 PM   Result Value Ref Range    Glucose (POC) 337 (H) 65 - 100 mg/dL    Performed by Deanne Carpenter, POC    Collection Time: 10/26/17  8:57 PM   Result Value Ref Range    Glucose (POC) 285 (H) 65 - 100 mg/dL    Performed by Nitish Villegas (PCT)          Impression:     Patient Active Problem List    Diagnosis Date Noted    Acute encephalopathy 10/23/2017    Hyperkalemia 10/20/2017    Altered mental status 10/20/2017    Transaminitis 10/20/2017    Acute renal failure (ARF) ([facility] 10/20/2017    Diverticulitis large intestine w/o perforation or abscess w/o bleeding 07/06/2017    SOB (shortness of breath) 06/21/2017    Abdominal pain, generalized 06/21/2017    Diarrhea in adult patient 06/21/2017    Cigarette smoker 74/70/2860    Neutrophilic leukocytosis 62/07/9927    Chronic pain     Hypercholesterolemia     Arthritis     Diabetes ([address]     IBS (irritable bowel syndrome)     Hemorrhoid      Active Problems:    Acute encephalopathy (10/23/2017)        Plan:   -  Pt is stable orthopaedically  -  Recommend consult to IR in AM for ASHLIE if she able and IR in agreement, please note if pt is not able to get the procedure on 10/27/17 and discharged pt may reschedule as an outpt with Dr. Wallace Menchaca. - NPO for possible procedure, hold lovenox in AM       Jonathan Ching NP  Orthopedic Nurse Practitioner   Chito Sanchez     This patient was seen and examined by be me personally with the findings as documented above by the NP/PA with the following changes/additions:    NONE - plan for ASHLIE if IR can do it while here. Otherwise, outpatient and follow-up with her spine surgeon. All pertinent medical history, medications, and test results and imaging were reviewed by me personally. Plan for treatment is as described and formulated by me after discussion with the patient and family personally.

## 2017-10-27 NOTE — PROGRESS NOTES
Ortho -   Just got her epidural steroid injection and is already feeling better. No neurologic changes. Will sign off. Follow up with her orthopedic spine surgeon.

## 2017-10-27 NOTE — PROGRESS NOTES
[facility] of patient from [facility]. ANGELA NGUYỄN, JOSE LUIS  Discussed at bedside. 80  Notified Dr. Angela Rodriguez of patients BG levels 402 and 409.   New orders for NPH to be written by MD.

## 2017-10-27 NOTE — PROGRESS NOTES
Bedside and Verbal shift change report given to  (oncoming nurse) by  Chloe garza). Report included the following information Kardex. SHIFT SUMMARY:Ortho NP in today ,discussed possible ASHLIE pain injection via IR in am ,she ordered NPO after 2400 and HOLD AM LOVENOX 10/27  Pt aware of NPO and observed . 0700 Blood sugars to be changed to ACHS pt is on a diet now           [address] [place] [address] NOTE   Admission Date 10/20/2017   Admission Diagnosis Opiate Overdose/Acute Kidney Injury  Acute encephalopathy   Consults IP CONSULT TO CARDIOLOGY  IP CONSULT TO NEUROLOGY  IP CONSULT TO PSYCHIATRY  IP CONSULT TO PALLIATIVE CARE - PROVIDER  IP CONSULT TO ORTHOPEDIC SURGERY      Cardiac Monitoring [x] Yes [] No      Purposeful Hourly Rounding [x] Yes    Jose Score Total Score: 3   Jose score 3 or > [x] Bed Alarm [] Avasys [] 1:1 sitter [] Patient refused (Place signed refusal form in chart)   Bryan Score Bryan Score: 18   Bryan score 14 or < [] PMT consult [] Wound Care consult    []  Specialty bed  [] Nutrition consult      Influenza Vaccine Received Flu Vaccine for Current Season (usually Sept-March): Yes           Oxygen needs? [] Room air Oxygen @  []1L    []2L    []3L   []4L    []5L   []6L     Use home O2? [] Yes [x] No  Perform O2 challenge test using  smartphrase (.Homeoxygen)      Last bowel movement Last Bowel Movement Date: 10/24/17      Urinary Catheter [REMOVED] Urinary Catheter 10/21/17 2- way; Zeng - Temperature-Criteria for Appropriate Use: Medically/surgically unstable     [REMOVED] Urinary Catheter 10/21/17 2- way; Zeng - Temperature-Urine Output (mL): 500 ml     LDAs               Peripheral IV 10/26/17 Left Wrist (Active)   Site Assessment Clean, dry, & intact 10/26/2017  7:53 PM   Phlebitis Assessment 0 10/26/2017  7:53 PM   Infiltration Assessment 0 10/26/2017  7:53 PM   Dressing Status Clean, dry, & intact 10/26/2017  7:53 PM   Dressing Type Tape;Transparent 10/26/2017  7:53 PM   Hub Color/Line Status Blue;Capped;Flushed;Patent 10/26/2017  7:53 PM                         Readmission Risk Assessment Tool Score Medium Risk            17       Total Score        3 Has Seen PCP in Last 6 Months (Yes=3, No=0)    2 . Living with Significant Other. Assisted Living. LTAC. SNF. or   Rehab    3 Patient Length of Stay (>5 days = 3)    5 Pt.  Coverage (Medicare=5 , Medicaid, or Self-Pay=4)    4 Charlson Comorbidity Score (Age + Comorbid Conditions)        Criteria that do not apply:    IP Visits Last 12 Months (1-3=4, 4=9, >4=11)       Expected Length of Stay 3d 12h   Actual Length of Stay 7

## 2017-10-27 NOTE — PROGRESS NOTES
Hospitalist Progress Note    NAME: Aretha Diaz   :  1940   MRN:  [id number]       Assessment / Plan:  SUMMARY:   65 Y. O WF, with Hx HTN, DM-2, Dyslipidemia, IBS, Diverticular disease, spinal stenosis, Chronic back pain, apparently taking regularly scheduled Tylenol, also taking 's oral morphine past few days, may also been taking Neurontin more than prescribed, found by  unresponsive. EMS called and brought to [facility] ED, where patient became more alert after IV Narcan, then gradually more lethargic. Patient would open eyes, then quickly fall asleep, unable to hold a conversation or provide history. Initial evaluation revealed ARF and abnormal LFTs. Admitted for further evaluation and management. PROBLEMS:     1. Acute encephalopathy: Secondary to medication OD. Patient presented with marked lethargy, in the setting utilization of multiple sedating meds, including possibly spouse's MS Contin, for severe low back pain x 2 days. Head CT -no acute findings. Managing supportively, Narcan infusion as needed and mental status gradually improving. EEG-abnormal for generalized slowing and frequent triphasics, which may elude to an underlying metabolic or anoxic insult. No seizures were noted on the study. Now awake and interactive. Neurology/psychiatry following. Patient appears back to baseline mental status today. Dr Saint Canning recommended outpatient psychiatric F/U on DC. 2. Acute respiratory failure with hypoxia: Noted at presentation, due to # 1. Managed as above, as well as with O2. Resolved. Sats 93%-96% RA.        3. Leukocytosis: Wcc 17.9 o admission. U/A-neg, afebrile, CXR-no acute findings, lactic acid 1.5. No obvious infective focus. Likely reactive. Following CBC. Trended  Down and normalized at 10.1 as of 10/26/17. 4. Possible Tylenol OD: Abnormal LFT noted on admission. Andres Burdikc consulted, and N-Acetylcysteine recommended. Administered accordingly. Following LFTs-stable. Now practically normalized. Liver U/S-echogenic liver. 5. Rhabdomyolysis/Acute kidney injury: BUN 36, creatinine 2.89 on admission (baseline creatinine 1.08-1.35). Total CPK 3973. BNo blood on urine dipstick testing. Likely due to volume de[pletion. Managed with IV fluids/HCO3. ACE-i and other nephrotoxins on hold. Renal U/S-negative. Resolved. 6. PAF/RVR: No clear previous Hx. Managed with TIESHA Cardizem. Now in SR/Rate-controlled on PO Cardizem. Dr Vannesa Patterson provided cardiology consult. 2D Echo-EF 55%, mild LVH, no regional wall motion abnormalities. 7. HTN: Reasonably controlled, on prn IV Hydralazine.       8. DM-2: Insulin-requiring. Managing with Glyburide/SSI/Basal insulin. A1C 8.6    9. Vulvovaginitis: Commenced on 7 day course of Flagyl on 10/25/17, now day #3. 10. Spinal stenosis/Chronic back pain: MRI 9/29/17-multilevel lumbar spondyloarthropathy, most severely at L3-L,  with severe central canal narrowing. Per patient, she was scheduled to have a spinal injection 10/26/17. Dr Beryl Bales office  ([facility]) contacted, and appointment rescheduled for next available. Dr Rafal Mistry provided orthopedic consult, and ASHLIE by IR, is scheduled for today.       Comment: [address]. PT/OT recommending [address] [employer]. Possibly on 10/28/17.           Body mass index is 28.57 kg/(m^2). DVT prophylaxis with lovenox      Code status full code.  is NOK     Subjective:     Chief Complaint / Reason for Physician Visit  No new issues. Discussed with RN events overnight. Review of Systems:  Symptom Y/N Comments  Symptom Y/N Comments   Fever/Chills N   Chest Pain N    Poor Appetite Y   Edema N    Cough N   Abdominal Pain N    Sputum N   Joint Pain Y    SOB/STANTON N   Pruritis/Rash N    Nausea/vomit N   Tolerating PT/OT     Diarrhea N   Tolerating Diet     Constipation N   Other       Could NOT obtain due to:      Objective:     VITALS:   Last 24hrs VS reviewed since prior progress note.  Most recent are:  Patient Vitals for the past 24 hrs:   Temp Pulse Resp BP SpO2   10/27/17 0753 98.1 °F (36.7 °C) (!) 102 16 179/87 94 %   10/27/17 0342 98 °F (36.7 °C) (!) 106 18 157/90 94 %   10/26/17 2249 98.1 °F (36.7 °C) 100 18 160/87 93 %   10/26/17 1959 97.9 °F (36.6 °C) 98 18 136/70 94 %   10/26/17 1539 98.6 °F (37 °C) 86 18 134/57 96 %   10/26/17 1508 - (!) 108 26 152/61 -   10/26/17 1400 - (!) 105 17 148/58 96 %   10/26/17 1200 98 °F (36.7 °C) 96 20 162/61 96 %   10/26/17 1100 - (!) 114 20 147/55 97 %       Intake/Output Summary (Last 24 hours) at 10/27/17 1014  Last data filed at 10/26/17 2249   Gross per 24 hour   Intake              400 ml   Output              300 ml   Net              100 ml        PHYSICAL EXAM:  General: WD, WN. Alert, cooperative, no acute distress    EENT:  EOMI. Anicteric sclerae. MMM  Resp:  CTA bilaterally, no wheezing or rales. No accessory muscle use  CV:  Heart sounds, normal, regular, no murmurs.  No edema  GI:  Soft, Non distended, Non tender.  +Bowel sounds  Neurologic:  Fully alert/oriented. No focal deficit. Psych:   Not anxious nor agitated  Skin:  No rashes. No jaundice    Reviewed most current lab test results and cultures  YES  Reviewed most current radiology test results   YES  Review and summation of old records today    NO  Reviewed patient's current orders and MAR    YES  PMH/SH reviewed - no change compared to H&P  ________________________________________________________________________  Care Plan discussed with:    Comments   Patient X    Family      RN X    Care Manager     Consultant                        Multidiciplinary team rounds were held today with , nursing, pharmacist and clinical coordinator. Patient's plan of care was discussed; medications were reviewed and discharge planning was addressed.      ________________________________________________________________________  Total NON critical care TIME:  <30   Minutes    Total CRITICAL CARE TIME Spent: Minutes non procedure based      Comments   >50% of visit spent in counseling and coordination of care     ________________________________________________________________________  Saint Lien, MD     Procedures: see electronic medical records for all procedures/Xrays and details which were not copied into this note but were reviewed prior to creation of Plan. LABS:  I reviewed today's most current labs and imaging studies.   Pertinent labs include:  Recent Labs      10/27/17   0426  10/25/17   0436   WBC  12.8*  10.1   HGB  11.3*  10.2*   HCT  33.0*  29.6*   PLT  330  251     Recent Labs      10/27/17   0426  10/25/17   0436  10/25/17   0000   NA  139   --   140   K  3.5   --   3.7   CL  109*   --   107   CO2  20*   --   24   GLU  213*   --   185*   BUN  18   --   15   CREA  0.89   --   0.73   CA  9.0   --   8.4*   MG   --   1.8   --    ALB   --    --   1.9*   TBILI   --    --   0.3   SGOT   --    --   22   ALT   --    --   69       Signed: Saint Lien, MD

## 2017-10-27 NOTE — PROGRESS NOTES
Bedside and Verbal shift change report given to  (oncoming nurse) by  Mayra Zavaleta nurse). Report included the following information Kardex. SHIFT SUMMARY:2200 Bedtime blood sugar  463 she is asymptomatic ,call to Hospitalist Dr Philippe Patel on call discussed SBAR and elevated blood sugars   She ordered 7 units Lispro and adjusted her am Lantus . This evening after pt learning from Md her possible discharge in am ,she has been impulsive and non compliant with ringing call bell. Pt is unsteady on feet as baseline. I have discussed with her safety and fall precautions and so has [employer] ,she than agreed to ring call bell first before getting OOB  Bed alarm on and door open to do hourly rounds . [address] NURSING NOTE   Admission Date 10/20/2017   Admission Diagnosis Opiate Overdose/Acute Kidney Injury  Acute encephalopathy   Consults IP CONSULT TO CARDIOLOGY  IP CONSULT TO NEUROLOGY  IP CONSULT TO PSYCHIATRY  IP CONSULT TO PALLIATIVE CARE - PROVIDER  IP CONSULT TO INTERVENTIONAL RADIOLOGY  IP CONSULT TO ORTHOPEDIC SURGERY      Cardiac Monitoring [x] Yes [] No      Purposeful Hourly Rounding [x] Yes    Jose Score Total Score: 3   Jose score 3 or > [x] Bed Alarm [] Avasys [] 1:1 sitter [] Patient refused (Place signed refusal form in chart)   Bryan Score Bryan Score: 18   Bryan score 14 or < [] PMT consult [] Wound Care consult    []  Specialty bed  [] Nutrition consult      Influenza Vaccine Received Flu Vaccine for Current Season (usually Sept-March): Yes           Oxygen needs? [x] Room air Oxygen @  []1L    []2L    []3L   []4L    []5L   []6L     Use home O2? [] Yes [x] No  Perform O2 challenge test using  smartphrase (.Homeoxygen)      Last bowel movement Last Bowel Movement Date: 10/24/17      Urinary Catheter [REMOVED] Urinary Catheter 10/21/17 2- way; Zeng - Temperature-Criteria for Appropriate Use: Medically/surgically unstable     [REMOVED] Urinary Catheter 10/21/17 2- way; Zeng - Temperature-Urine Output (mL): 500 ml     LDAs               Peripheral IV 10/26/17 Left Wrist (Active)   Site Assessment Clean, dry, & intact 10/27/2017  7:15 PM   Phlebitis Assessment 0 10/27/2017  7:15 PM   Infiltration Assessment 0 10/27/2017  7:15 PM   Dressing Status Clean, dry, & intact 10/27/2017  7:15 PM   Dressing Type Tape;Transparent 10/27/2017  7:15 PM   Hub Color/Line Status Blue;Capped;Flushed;Patent 10/27/2017  7:15 PM                         Readmission Risk Assessment Tool Score Medium Risk            17       Total Score        3 Has Seen PCP in Last 6 Months (Yes=3, No=0)    2 . Living with Significant Other. Assisted Living. LTAC. SNF. or   Rehab    3 Patient Length of Stay (>5 days = 3)    5 Pt.  Coverage (Medicare=5 , Medicaid, or Self-Pay=4)    4 Charlson Comorbidity Score (Age + Comorbid Conditions)        Criteria that do not apply:    IP Visits Last 12 Months (1-3=4, 4=9, >4=11)       Expected Length of Stay 3d 12h   Actual Length of Stay 7

## 2017-10-27 NOTE — PROGRESS NOTES
Bedside shift change report given to Reina (oncoming nurse) by Lance Pink (offgoing nurse). Report included the following information SBAR, Kardex, ED Summary, MAR, Med Rec Status, Cardiac Rhythm Nsr and Alarm Parameters .

## 2017-10-28 VITALS
SYSTOLIC BLOOD PRESSURE: 161 MMHG | HEART RATE: 110 BPM | TEMPERATURE: 98 F | RESPIRATION RATE: 18 BRPM | BODY MASS INDEX: 28.43 KG/M2 | WEIGHT: 178.79 LBS | OXYGEN SATURATION: 94 % | DIASTOLIC BLOOD PRESSURE: 100 MMHG

## 2017-10-28 LAB
ANION GAP SERPL CALC-SCNC: 9 MMOL/L (ref 5–15)
BASOPHILS # BLD: 0 K/UL (ref 0–0.1)
BASOPHILS NFR BLD: 0 % (ref 0–1)
BUN SERPL-MCNC: 31 MG/DL (ref 6–20)
BUN/CREAT SERPL: 28 (ref 12–20)
CALCIUM SERPL-MCNC: 8.9 MG/DL (ref 8.5–10.1)
CHLORIDE SERPL-SCNC: 107 MMOL/L (ref 97–108)
CO2 SERPL-SCNC: 21 MMOL/L (ref 21–32)
CREAT SERPL-MCNC: 1.11 MG/DL (ref 0.55–1.02)
EOSINOPHIL # BLD: 0 K/UL (ref 0–0.4)
EOSINOPHIL NFR BLD: 0 % (ref 0–7)
ERYTHROCYTE [DISTWIDTH] IN BLOOD BY AUTOMATED COUNT: 13.3 % (ref 11.5–14.5)
GLUCOSE BLD STRIP.AUTO-MCNC: 284 MG/DL (ref 65–100)
GLUCOSE BLD STRIP.AUTO-MCNC: 345 MG/DL (ref 65–100)
GLUCOSE SERPL-MCNC: 306 MG/DL (ref 65–100)
HCT VFR BLD AUTO: 32.3 % (ref 35–47)
HGB BLD-MCNC: 11 G/DL (ref 11.5–16)
LYMPHOCYTES # BLD: 2.4 K/UL (ref 0.8–3.5)
LYMPHOCYTES NFR BLD: 15 % (ref 12–49)
MCH RBC QN AUTO: 30.1 PG (ref 26–34)
MCHC RBC AUTO-ENTMCNC: 34.1 G/DL (ref 30–36.5)
MCV RBC AUTO: 88.5 FL (ref 80–99)
MONOCYTES # BLD: 0.9 K/UL (ref 0–1)
MONOCYTES NFR BLD: 5 % (ref 5–13)
NEUTS SEG # BLD: 13.1 K/UL (ref 1.8–8)
NEUTS SEG NFR BLD: 80 % (ref 32–75)
PLATELET # BLD AUTO: 358 K/UL (ref 150–400)
POTASSIUM SERPL-SCNC: 4.4 MMOL/L (ref 3.5–5.1)
RBC # BLD AUTO: 3.65 M/UL (ref 3.8–5.2)
SERVICE CMNT-IMP: ABNORMAL
SERVICE CMNT-IMP: ABNORMAL
SODIUM SERPL-SCNC: 137 MMOL/L (ref 136–145)
WBC # BLD AUTO: 16.4 K/UL (ref 3.6–11)

## 2017-10-28 PROCEDURE — 74011636637 HC RX REV CODE- 636/637: Performed by: INTERNAL MEDICINE

## 2017-10-28 PROCEDURE — 82962 GLUCOSE BLOOD TEST: CPT

## 2017-10-28 PROCEDURE — 74011250637 HC RX REV CODE- 250/637: Performed by: INTERNAL MEDICINE

## 2017-10-28 PROCEDURE — 80048 BASIC METABOLIC PNL TOTAL CA: CPT | Performed by: INTERNAL MEDICINE

## 2017-10-28 PROCEDURE — 85025 COMPLETE CBC W/AUTO DIFF WBC: CPT | Performed by: INTERNAL MEDICINE

## 2017-10-28 PROCEDURE — 74011250636 HC RX REV CODE- 250/636: Performed by: INTERNAL MEDICINE

## 2017-10-28 PROCEDURE — 36415 COLL VENOUS BLD VENIPUNCTURE: CPT | Performed by: INTERNAL MEDICINE

## 2017-10-28 RX ORDER — METRONIDAZOLE 250 MG/1
250 TABLET ORAL 3 TIMES DAILY
Qty: 9 TAB | Refills: 0 | Status: SHIPPED | OUTPATIENT
Start: 2017-10-28 | End: 2017-10-31

## 2017-10-28 RX ORDER — DILTIAZEM HYDROCHLORIDE 240 MG/1
240 CAPSULE, COATED, EXTENDED RELEASE ORAL DAILY
Status: DISCONTINUED | OUTPATIENT
Start: 2017-10-28 | End: 2017-10-28 | Stop reason: HOSPADM

## 2017-10-28 RX ORDER — VALSARTAN 160 MG/1
160 TABLET ORAL DAILY
Qty: 30 TAB | Refills: 0 | Status: SHIPPED | OUTPATIENT
Start: 2017-10-28 | End: 2017-11-07 | Stop reason: ALTCHOICE

## 2017-10-28 RX ORDER — VALSARTAN 160 MG/1
160 TABLET ORAL DAILY
Status: DISCONTINUED | OUTPATIENT
Start: 2017-10-28 | End: 2017-10-28 | Stop reason: HOSPADM

## 2017-10-28 RX ORDER — VENLAFAXINE 75 MG/1
75 TABLET ORAL 2 TIMES DAILY WITH MEALS
Qty: 60 TAB | Refills: 0 | Status: SHIPPED | OUTPATIENT
Start: 2017-10-28 | End: 2018-06-01 | Stop reason: SDUPTHER

## 2017-10-28 RX ORDER — OXYCODONE AND ACETAMINOPHEN 10; 325 MG/1; MG/1
1 TABLET ORAL
Qty: 20 TAB | Refills: 0 | Status: SHIPPED | OUTPATIENT
Start: 2017-10-28 | End: 2018-02-15

## 2017-10-28 RX ORDER — GABAPENTIN 300 MG/1
200 CAPSULE ORAL 2 TIMES DAILY
Qty: 60 CAP | Refills: 0 | Status: SHIPPED | OUTPATIENT
Start: 2017-10-28 | End: 2017-11-15 | Stop reason: SDUPTHER

## 2017-10-28 RX ORDER — DILTIAZEM HYDROCHLORIDE 30 MG/1
60 TABLET, FILM COATED ORAL ONCE
Status: DISCONTINUED | OUTPATIENT
Start: 2017-10-28 | End: 2017-10-28 | Stop reason: ALTCHOICE

## 2017-10-28 RX ORDER — LANOLIN ALCOHOL/MO/W.PET/CERES
100 CREAM (GRAM) TOPICAL DAILY
Qty: 30 TAB | Refills: 0 | Status: SHIPPED | OUTPATIENT
Start: 2017-10-29 | End: 2020-02-06

## 2017-10-28 RX ADMIN — ACYCLOVIR: 50 OINTMENT TOPICAL at 09:00

## 2017-10-28 RX ADMIN — DILTIAZEM HYDROCHLORIDE 240 MG: 240 CAPSULE, COATED, EXTENDED RELEASE ORAL at 13:06

## 2017-10-28 RX ADMIN — Medication 10 ML: at 00:25

## 2017-10-28 RX ADMIN — INSULIN LISPRO 3 UNITS: 100 INJECTION, SOLUTION INTRAVENOUS; SUBCUTANEOUS at 10:33

## 2017-10-28 RX ADMIN — INSULIN GLARGINE 40 UNITS: 100 INJECTION, SOLUTION SUBCUTANEOUS at 10:33

## 2017-10-28 RX ADMIN — CELECOXIB 200 MG: 200 CAPSULE ORAL at 10:34

## 2017-10-28 RX ADMIN — Medication 100 MG: at 10:34

## 2017-10-28 RX ADMIN — Medication 10 ML: at 05:57

## 2017-10-28 RX ADMIN — VENLAFAXINE 75 MG: 37.5 TABLET ORAL at 10:34

## 2017-10-28 RX ADMIN — GLYBURIDE 5 MG: 5 TABLET ORAL at 10:34

## 2017-10-28 RX ADMIN — METRONIDAZOLE 250 MG: 250 TABLET ORAL at 10:34

## 2017-10-28 RX ADMIN — GABAPENTIN 200 MG: 100 CAPSULE ORAL at 10:34

## 2017-10-28 RX ADMIN — VALSARTAN 160 MG: 160 TABLET ORAL at 13:06

## 2017-10-28 RX ADMIN — INSULIN LISPRO 4 UNITS: 100 INJECTION, SOLUTION INTRAVENOUS; SUBCUTANEOUS at 13:56

## 2017-10-28 RX ADMIN — ENOXAPARIN SODIUM 40 MG: 40 INJECTION SUBCUTANEOUS at 10:32

## 2017-10-28 RX ADMIN — ACYCLOVIR: 50 OINTMENT TOPICAL at 13:00

## 2017-10-28 RX ADMIN — OXYCODONE HYDROCHLORIDE AND ACETAMINOPHEN 1 TABLET: 10; 325 TABLET ORAL at 13:56

## 2017-10-28 NOTE — DISCHARGE INSTRUCTIONS
1. Carbohydrate-modified Diet. 2. Activity: as tolerated. Also as directed by Home Health PT/OT. 3. Please call Dr Randall Face office for appointment. 4. Outpatient Psychiatric follow up, with Dr Ernestine Harris. Back Pain: Care Instructions  Your Care Instructions    Back pain has many possible causes. It is often related to problems with muscles and ligaments of the back. It may also be related to problems with the nerves, discs, or bones of the back. Moving, lifting, standing, sitting, or sleeping in an awkward way can strain the back. Sometimes you don't notice the injury until later. Arthritis is another common cause of back pain. Although it may hurt a lot, back pain usually improves on its own within several weeks. Most people recover in 12 weeks or less. Using good home treatment and being careful not to stress your back can help you feel better sooner. Follow-up care is a key part of your treatment and safety. Be sure to make and go to all appointments, and call your doctor if you are having problems. It's also a good idea to know your test results and keep a list of the medicines you take. How can you care for yourself at home? · Sit or lie in positions that are most comfortable and reduce your pain. Try one of these positions when you lie down:  ¨ Lie on your back with your knees bent and supported by large pillows. ¨ Lie on the floor with your legs on the seat of a sofa or chair. Sweta Vásquez on your side with your knees and hips bent and a pillow between your legs. ¨ Lie on your stomach if it does not make pain worse. · Do not sit up in bed, and avoid soft couches and twisted positions. Bed rest can help relieve pain at first, but it delays healing. Avoid bed rest after the first day of back pain. · Change positions every 30 minutes. If you must sit for long periods of time, take breaks from sitting. Get up and walk around, or lie in a comfortable position.   · Try using a heating pad on a low or medium setting for 15 to 20 minutes every 2 or 3 hours. Try a warm shower in place of one session with the heating pad. · You can also try an ice pack for 10 to 15 minutes every 2 to 3 hours. Put a thin cloth between the ice pack and your skin. · Take pain medicines exactly as directed. ¨ If the doctor gave you a prescription medicine for pain, take it as prescribed. ¨ If you are not taking a prescription pain medicine, ask your doctor if you can take an over-the-counter medicine. · Take short walks several times a day. You can start with 5 to 10 minutes, 3 or 4 times a day, and work up to longer walks. Walk on level surfaces and avoid hills and stairs until your back is better. · Return to work and other activities as soon as you can. Continued rest without activity is usually not good for your back. · To prevent future back pain, do exercises to stretch and strengthen your back and stomach. Learn how to use good posture, safe lifting techniques, and proper body mechanics. When should you call for help? Call your doctor now or seek immediate medical care if:  ? · You have new or worsening numbness in your legs. ? · You have new or worsening weakness in your legs. (This could make it hard to stand up.)   ? · You lose control of your bladder or bowels. ? Watch closely for changes in your health, and be sure to contact your doctor if:  ? · Your pain gets worse. ? · You are not getting better after 2 weeks. Where can you learn more? Go to http://josh-eleonora.info/. Enter D184 in the search box to learn more about \"Back Pain: Care Instructions. \"  Current as of: March 21, 2017  Content Version: 11.4  © 6842-4423 [employer]. Care instructions adapted under license by [employer] (which disclaims liability or warranty for this information).  If you have questions about a medical condition or this instruction, always ask your healthcare professional. [employer] disclaims any warranty or liability for your use of this information.

## 2017-10-28 NOTE — PROGRESS NOTES
0726  Report received from Misty Councilman, [facility]. SBAR, Kardex, ED Summary, Procedure Summary, Intake/Output, MAR, Accordion, Recent Results, Med Rec Status and Cardiac Rhythm NSR were discussed. Reina Hall    3290  Discharged patient. Reviewed all discharge instructions with patient including new and current medications as well as all follow up appointments. Patient verbalized understanding of all instructions. IV and tele removed. Patient escorted to car by nurse.

## 2017-10-28 NOTE — PROGRESS NOTES
Case Management DC Note    CM asked to arrange [facility] PT/OT for Ms. Lanette George. FOC given, patient chose [facility] ()  Referral sent through [facility]. Unable to confirm with agency (no answer) when they could see patient. Carmel Paz, SAMIRA  Ext [phone]

## 2017-10-28 NOTE — PROGRESS NOTES
Hospitalist Progress Note    NAME: Darling Spence   :  1940   MRN:  [id number]       Assessment / Plan:  SUMMARY:   65 Y. O WF, with Hx HTN, DM-2, Dyslipidemia, IBS, Diverticular disease, spinal stenosis, Chronic back pain, apparently taking regularly scheduled Tylenol, also taking 's oral morphine past few days, may also been taking Neurontin more than prescribed, found by  unresponsive. EMS called and brought to [facility] ED, where patient became more alert after IV Narcan, then gradually more lethargic. Patient would open eyes, then quickly fall asleep, unable to hold a conversation or provide history. Initial evaluation revealed ARF and abnormal LFTs. Admitted for further evaluation and management. PROBLEMS:     1. Acute encephalopathy: Secondary to medication OD. Patient presented with marked lethargy, in the setting utilization of multiple sedating meds, including possibly spouse's MS Contin, for severe low back pain x 2 days. Head CT -no acute findings. Managing supportively, Narcan infusion as needed and mental status gradually improving. EEG-abnormal for generalized slowing and frequent triphasics, which may elude to an underlying metabolic or anoxic insult. No seizures were noted on the study. Now awake and interactive. Neurology/psychiatry following. Patient appears back to baseline mental status today. Dr Martha Mata recommended outpatient psychiatric F/U on DC. 2. Acute respiratory failure with hypoxia: Noted at presentation, due to # 1. Managed as above, as well as with O2. Resolved. Sats 93%-96% RA.        3. Leukocytosis: Wcc 17.9 o admission. U/A-neg, afebrile, CXR-no acute findings, lactic acid 1.5. No obvious infective focus. Likely reactive. Following CBC. Trended  Down and normalized at 10.1 as of 10/26/17. 4. Possible Tylenol OD: Abnormal LFT noted on admission. Andres Burdick consulted, and N-Acetylcysteine recommended. Administered accordingly. Following LFTs-stable. Now practically normalized. Liver U/S-echogenic liver. 5. Rhabdomyolysis/Acute kidney injury: BUN 36, creatinine 2.89 on admission (baseline creatinine 1.08-1.35). Total CPK 3973. BNo blood on urine dipstick testing. Likely due to volume de[pletion. Managed with IV fluids/HCO3. ACE-i and other nephrotoxins on hold. Renal U/S-negative. Resolved. 6. PAF/RVR: No clear previous Hx. Managed with TIESHA Cardizem. Now in SR/Rate-controlled on PO Cardizem. Dr Wallace Mabry provided cardiology consult. 2D Echo-EF 55%, mild LVH, no regional wall motion abnormalities. 7. HTN: Managed initially with Cardizem/prn IV Hydralazine. Now on Cardizem CD and ARB resumed today.       8. DM-2: Insulin-requiring. Managing with Glyburide/SSI/Basal insulin. A1C 8.6. Will continue preadmission diabetic regimen on DC. 9. Vulvovaginitis: Commenced on 7 day course of Flagyl on 10/25/17, now day #3. 10. Spinal stenosis/Chronic back pain: MRI 9/29/17-multilevel lumbar spondyloarthropathy, most severely at L3-L,  with severe central canal narrowing. Per patient, she was scheduled to have a spinal injection 10/26/17. Dr Miles Onofre office  ([facility]) contacted, and appointment rescheduled for next available. Dr Sridevi Clark provided orthopedic consult, and is s/p ASHLIE by IR 10/27/17.       Comment: Stable for DC today, with 2003 CogniFit.           Body mass index is 28.43 kg/(m^2). DVT prophylaxis with lovenox      Code status full code.  is NOK     Subjective:     Chief Complaint / Reason for Physician Visit  No new developments. Pain-free. Very keen to go home. Discussed with RN events overnight.      Review of Systems:  Symptom Y/N Comments  Symptom Y/N Comments   Fever/Chills N   Chest Pain N    Poor Appetite Y   Edema N    Cough N   Abdominal Pain N    Sputum N   Joint Pain Y    SOB/STANTON N   Pruritis/Rash N    Nausea/vomit N   Tolerating PT/OT     Diarrhea N   Tolerating Diet     Constipation N   Other       Could NOT obtain due to:      Objective:     VITALS:   Last 24hrs VS reviewed since prior progress note. Most recent are:  Patient Vitals for the past 24 hrs:   Temp Pulse Resp BP SpO2   10/28/17 0830 97.6 °F (36.4 °C) (!) 116 18 164/74 96 %   10/28/17 0400 98 °F (36.7 °C) 92 18 150/70 96 %   10/28/17 0200 - 90 - - -   10/27/17 2242 98.2 °F (36.8 °C) (!) 112 18 153/81 97 %   10/27/17 1915 98.1 °F (36.7 °C) (!) 108 18 145/65 96 %   10/27/17 1400 98.6 °F (37 °C) (!) 112 18 143/69 93 %       Intake/Output Summary (Last 24 hours) at 10/28/17 1002  Last data filed at 10/27/17 2242   Gross per 24 hour   Intake              200 ml   Output              350 ml   Net             -150 ml        PHYSICAL EXAM:  General: WD, WN. Alert, cooperative, no acute distress    EENT:  EOMI. Anicteric sclerae. MMM  Resp:  CTA bilaterally, no wheezing or rales. No accessory muscle use  CV:  Heart sounds, normal, regular, no murmurs.  No edema  GI:  Soft, Non distended, Non tender.  +Bowel sounds  Neurologic:  Fully alert/oriented. No focal deficit. Psych:   Not anxious nor agitated  Skin:  No rashes. No jaundice    Reviewed most current lab test results and cultures  YES  Reviewed most current radiology test results   YES  Review and summation of old records today    NO  Reviewed patient's current orders and MAR    YES  PMH/ reviewed - no change compared to H&P  ________________________________________________________________________  Care Plan discussed with:    Comments   Patient X    Family      RN X    Care Manager     Consultant                        Multidiciplinary team rounds were held today with , nursing, pharmacist and clinical coordinator. Patient's plan of care was discussed; medications were reviewed and discharge planning was addressed.      ________________________________________________________________________  Total NON critical care TIME:  <30   Minutes    Total CRITICAL CARE TIME Spent:   Minutes non procedure based      Comments   >50% of visit spent in counseling and coordination of care     ________________________________________________________________________  Gustavo Marie MD     Procedures: see electronic medical records for all procedures/Xrays and details which were not copied into this note but were reviewed prior to creation of Plan. LABS:  I reviewed today's most current labs and imaging studies.   Pertinent labs include:  Recent Labs      10/28/17   0601  10/27/17   0426   WBC  16.4*  12.8*   HGB  11.0*  11.3*   HCT  32.3*  33.0*   PLT  358  330     Recent Labs      10/28/17   0601  10/27/17   0426   NA  137  139   K  4.4  3.5   CL  107  109*   CO2  21  20*   GLU  306*  213*   BUN  31*  18   CREA  1.11*  0.89   CA  8.9  9.0       Signed: Gustavo Marie MD

## 2017-11-02 NOTE — ADT AUTH CERT NOTES
Neurology [address] - Care Day 8 (10/27/2017) by Leigh Pastor        Review Status Review Entered       Completed 11/1/2017       Details              Care Day: 8 Care Date: 10/27/2017 Level of Care: Telemetry       Guideline Day 3        Level Of Care       ( ) * Activity level acceptable       ( ) * Complete discharge planning              Clinical Status       ( ) * No infection, or status acceptable       11/1/2017 2:11 PM EDT by Alo Gonzales         Vulvovaginitis: Commenced on 7 day course of Flagyl on 10/25/17, now day #3              (X) * Isolation not needed, or status acceptable       (X) * Respiratory status acceptable       11/1/2017 2:11 PM EDT by Alo Gonzales         on RA              (X) * Pain and nausea absent or adequately managed       11/1/2017 2:11 PM EDT by Alo Gonzales         managed              (X) * Ventilatory status acceptable       (X) * Neurologic problems absent or stabilized       11/1/2017 2:11 PM EDT by Alo Gonzales         appears back to baseline today              (X) * Muscle or nerve damage absent or stable       ( ) * General Discharge Criteria met              Interventions       (X) * Intake acceptable       ( ) * No inpatient interventions needed                                   * Milestone              Additional Notes       98.1-145/-70-96% RA       Assessment / Plan:       SUMMARY:        65 Y. O WF, with Hx HTN, DM-2, Dyslipidemia, IBS, Diverticular disease, spinal stenosis, Chronic back pain, apparently taking regularly scheduled Tylenol, also taking 's oral morphine past few days, may also been taking Neurontin more than prescribed, found by  unresponsive. EMS called and brought to [facility] ED, where patient became more alert after IV Narcan, then gradually more lethargic. Patient would open eyes, then quickly fall asleep, unable to hold a conversation or provide history. Initial evaluation revealed ARF and abnormal LFTs.  Admitted for further evaluation and management.                PROBLEMS:                1. Acute encephalopathy: Secondary to medication OD. Patient presented with marked lethargy, in the setting utilization of multiple sedating meds, including possibly spouse's MS Contin, for severe low back pain x 2 days. Head CT -no acute findings. Managing supportively, Narcan infusion as needed and mental status gradually improving. EEG-abnormal for generalized slowing and frequent triphasics, which may elude to an underlying metabolic or anoxic insult. No seizures were noted on the study. Now awake and interactive. Neurology/psychiatry following. Patient appears back to baseline mental status today. Dr Lorna Kim recommended outpatient psychiatric F/U on DC.                2. Acute respiratory failure with hypoxia: Noted at presentation, due to # 1. Managed as above, as well as with O2. Resolved. Sats 93%-96% RA.                  3. Leukocytosis: Wcc 17.9 o admission. U/A-neg, afebrile, CXR-no acute findings, lactic acid 1.5. No obvious infective focus. Likely reactive. Following CBC. Trended  Down and normalized at 10.1 as of 10/26/17.                4. Possible Tylenol OD: Abnormal LFT noted on admission. Andres Burdick consulted, and N-Acetylcysteine recommended. Administered accordingly. Following LFTs-stable. Now practically normalized.  Liver U/S-echogenic liver.               5. Rhabdomyolysis/Acute kidney injury: BUN 36, creatinine 2.89 on admission (baseline creatinine 1.08-1.35). Total CPK 3973. BNo blood on urine dipstick testing. Likely due to volume de[pletion. Managed with IV fluids/HCO3. ACE-i and other nephrotoxins on hold. Renal U/S-negative. Resolved.                6. PAF/RVR: No clear previous Hx. Managed with TIESHA Cardizem. Now in SR/Rate-controlled on PO Cardizem. Dr Karla Adams provided cardiology consult. 2D Echo-EF 55%, mild LVH, no regional wall motion abnormalities.               7.  HTN: Reasonably controlled, on prn IV Hydralazine.                 8. DM-2: Insulin-requiring. Managing with Glyburide/SSI/Basal insulin. A1C 8.6               9. Vulvovaginitis: Commenced on 7 day course of Flagyl on 10/25/17, now day #3.                10. Spinal stenosis/Chronic back pain: MRI 9/29/17-multilevel lumbar spondyloarthropathy, most severely at L3-L,  with severe central canal narrowing. Per patient, she was scheduled to have a spinal injection 10/26/17. Dr Kerri Ruggiero office  ([facility]) contacted, and appointment rescheduled for next available. Dr Yogi Espinoza provided orthopedic consult, and ASHLIE by IR, is scheduled for today.                 Comment: [address]. PT/OT recommending [employer]. Possibly on 10/28/17.                        wbc 12.8 rbc 3.74 hgb 11.3 hct 33 glu 463        Successful epidural steroid injection       decadron iv x1       insulin sc x3       pot chlor po x1       acyclovir TP x3       Cardizem po x1       thiamine po x1       Percocet po x1       flagyl po x3           Neurology [address] - [facility] Day 7 (10/26/2017) by Ginna Bertrand        Review Status Review Entered       Completed 11/1/2017       Details              Care Day: 7 Care Date: 10/26/2017 Level of Care: Telemetry       Guideline Day 2        Level Of Care       ( ) Floor       11/1/2017 2:00 PM EDT by Jonathan Hall         remote telemetry                     Clinical Status       (X) * No ICU or intermediate care needs       11/1/2017 2:00 PM EDT by Jonathan Hall         Acute encephalopathy 2. Probable narcotic overdose                     Interventions       (X) Inpatient interventions continue       11/1/2017 2:00 PM EDT by Jonathan Hall         consult ortho surgery mobilize                                          * Milestone              Additional Notes       /-12-97% RA       IMPRESSION:       1. AMS- better       2. Probable narcotic overdose       3. Chronic pain - spinal stenosis       4. Tobacco use       5.  Vaginal sores-        6. DM       7. Hypoxemia       8. Atrial fib ? Chronicity       9. HTN               RECOMMENDATIONS/PLAN:       1. Stable for transfer to floor       2. Mobilize       3. adjust glyburide       4. empric Acyclovir for vaginal sores       5. Flagyl for a few days for vaginitis        6. Gabapentin       7. D/c planning       8. Palliative care consult- pt to get steroid injection today at [facility] but no details       9. effexor        10. DVT, SUP prophylaxis               Assessment / Plan:       SUMMARY:        65 Y. O WF, with Hx HTN, DM-2, Dyslipidemia, IBS, Diverticular disease, spinal stenosis, Chronic back pain, apparently taking regularly scheduled Tylenol, also taking 's oral morphine past few days, may also been taking Neurontin more than prescribed, found by  unresponsive. EMS called and brought to hospitals ED, where patient became more alert after IV Narcan, then gradually more lethargic. Patient would open eyes, then quickly fall asleep, unable to hold a conversation or provide history. Initial evaluation revealed ARF and abnormal LFTs. Admitted for further evaluation and management.                PROBLEMS:                1. Acute encephalopathy: Secondary to medication OD. Patient presented with marked lethargy, in the setting utilization of multiple sedating meds, including possibly spouse's MS Contin, for severe low back pain x 2 days. Head CT -no acute findings. Managing supportively, Narcan infusion as needed and mental status gradually improving. EEG-abnormal for generalized slowing and frequent triphasics, which may elude to an underlying metabolic or anoxic insult. No seizures were noted on the study. Now awake and interactive. Neurology/psychiatry following. Patient appears back to baseline mental status today. Dr Beatriz English recommended outpatient psychiatric F/U on DC.                2. Acute respiratory failure with hypoxia: Noted at presentation, due to # 1.  Managed as above, as well as with O2. Resolved. Sats 93%-96% RA.                  3. Leukocytosis: Wcc 17.9 o admission. U/A-neg, afebrile, CXR-no acute findings, lactic acid 1.5. No obvious infective focus. Likely reactive. Following CBC. Trended  Down and normalized at 10.1 today.                4. Possible Tylenol OD: Abnormal LFT noted on admission. Andres E Dana Burdick consulted, and N-Acetylcysteine recommended. Administered accordingly. Following LFTs-stable. Now practically normalized.  Liver U/S-echogenic liver.               5. Rhabdomyolysis/Acute kidney injury: BUN 36, creatinine 2.89 on admission (baseline creatinine 1.08-1.35). Total CPK 3973. BNo blood on urine dipstick testing. Likely due to volume de[pletion. Managed with IV fluids/HCO3. ACE-I and other nephrotoxins on hold. Renal U/S-negative. Resolved.                6. PAF/RVR: No clear previous Hx. Managed with TIESHA Cardizem. Now in SR/Rate-controlled on PO Cardizem. Dr Benita Santos provided cardiology consult. 2D Echo-EF 55%. , mild LVH, no regional wall motion abnormalities.               7. HTN: Reasonably controlled, on prn IV Hydralazine.                 8. DM-2: Insulin-requiring. Managing with Glyburide/SSI/Basal insulin. A1C 8.6               9. Vulvovaginitis: Commenced on 7 day course of Flagyl on 10/25/17, now day #2.                10. Spinal stenosis/Chronic back pain: MRI 9/29/17-multilevel lumbar spondyloarthropathy, most severely at L3-L,  with severe central canal narrowing. Per patient, she was scheduled to have a spinal injection today, at [facility] (Dr Lexus Schuster). Will likely need to reschedule.                 Comment:  Stable for transfer off unit today. [address]  PT/OT recommending [address]:       -  Pt is stable orthopaedically       -  Recommend consult to IR in AM for ASHLIE if she able and IR in agreement, please note if pt is not able to get the procedure on 10/27/17 and discharged pt may reschedule as an outpt with Dr. Julia Caputo.        - NPO for possible procedure, hold lovenox in AM               glu 436        Cardizem po x3       insulin sc x4       acyclovir TP x4       lovenox sc x1       hydralazine iv x1       dilaudid iv x1       flagyl po x3       Percocet po x1       thiamine po x1           Neurology [facility] - Care Day 6 (10/25/2017) by Christi Steward        Review Status Review Entered       Completed 11/1/2017       Details              Care Day: 6 Care Date: 10/25/2017 Level of Care: ICU       Guideline Day 2        Level Of Care       ( ) Floor       11/1/2017 1:52 PM EDT by Shereen Linn         ICU                     Clinical Status       ( ) * No ICU or intermediate care needs       11/1/2017 1:52 PM EDT by Shereen Linn         Acute encephalopathy Probable narcotic overdose                     Interventions       (X) Inpatient interventions continue       11/1/2017 1:52 PM EDT by Shereen Linn         PT/OT eval and treat sitter prn precedex drip                                          * Milestone              Additional Notes       98.7-133/-87-97% RA              bright and conversant but not oriented to time. 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 Hungry. No recollection of events leading to her admission. Mild SOB and asked for more O2 but sats 95%. No congestion or cough. Off  Cardizem, still on IV precedex              IMPRESSION:       1. AMS- better       2. Probable narcotic overdose       3. Chronic pain - spinal stenosis       4. Tobacco use       5. Vaginal sores-        6. DM       7. Hypoxemia       8. Atrial fib ? Chronicity       9. HTN               RECOMMENDATIONS/PLAN:       1. Sitter prn       2. Mobilize       3. advacne diet and restart glyburide       4. empric Acyclovir for vaginal sores       5. Flagyl for a few days for vaginitis        6. Wean off precedex       7. Restart gabapentin       8. Reorient       9. Appreciate neurology's help       10. Discussed management with pts family- supportive care; chronic pain is a big issue but we will not be able to fix that longterm; will try to support but pt took a lot more meds than expected       11. Resume effexor        12. DVT, SUP prophylaxis               Assessment / Plan:       SUMMARY:        65 Y. O WF, with Hx HTN, DM-2, Dyslipidemia, IBS, Diverticular disease, spinal stenosis, Chronic back pain, apparently taking regularly scheduled Tylenol, also taking 's oral morphine past few days, may also been taking Neurontin more than prescribed, found by  unresponsive. EMS called and brought to [facility] ED, where patient became more alert after IV Narcan, then gradually more lethargic. Patient would open eyes, then quickly fall asleep, unable to hold a conversation or provide history. Initial evaluation revealed ARF and abnormal LFTs. Admitted for further evaluation and management.                PROBLEMS:                1. Acute encephalopathy: Secondary to medication OD. Patient presented with marked lethargy, in the setting utilization of multiple sedating meds, including possibly spouse's MS Contin, for severe low back pain x 2 days. Head CT -no acute findings. Managing supportively, Narcan infusion as needed and mental status gradually improving. EEG-abnormal for generalized slowing and frequent triphasics, which may elude to an underlying metabolic or anoxic insult. No seizures were noted on the study. Now awake and interactive, somewhat forgetful. Neurology/psychiatry following.               2. Acute respiratory failure with hypoxia: Noted at presentation, due to # 1. Managed as above, as well as with O2. Resolved. Sats 93%-96% RA.                  3. Leukocytosis: Wbc 17.9 o admission. U/A-neg, afebrile, CXR-no acute findings, lactic acid 1.5. No obvious infective focus. Likely reactive. Following CBC. Trending down.                4. Possible Tylenol OD: Abnormal LFT noted on admission.  [facility] consulted, and N-Acetylcysteine recommended. Administered accordingly. Following LFTs-stable. Liver U/S-echogenic liver.               5. Rhabdomyolysis/Acute kidney injury: BUN 36, creatinine 2.89 on admission (baseline creatinine 1.08-1.35). Total CPK 3973. BNo blood on urine dipstick testing. Likely due to volume de[pletion. Managed with IV fluids/HCO3. ACE-I and other nephrotoxins on hold. Renal U/S-negative. Resolved.                6. A.Fib/RVR: No clear previous Hx. Managed with TIESHA Cardizem. Now in SR/Rate-controlled. Cardiology consult-pending. 2 D Echo.                 7. HTN: Reasonably controlled, on prn IV Hydralazine.                 8. DM-2: Managing with SSI/Basal insulin. A1C 8.6                Comment: Nearing DC.  PT/OT recommending Home Health Care.                        glu 330 rbc 3.32 hgb 10.2 hct 29.6        Cardizem po x2       acyclovir TP x3       precedex drip       lovenox sc x1       insulin sc x3       flagyl po x2       Percocet po x1       thiamine iv x1

## 2017-11-07 RX ORDER — VALSARTAN 160 MG/1
160 TABLET ORAL DAILY
Qty: 30 TAB | Refills: 0 | Status: CANCELLED | OUTPATIENT
Start: 2017-11-07

## 2017-11-07 RX ORDER — GABAPENTIN 300 MG/1
200 CAPSULE ORAL 2 TIMES DAILY
Qty: 60 CAP | Refills: 0 | OUTPATIENT
Start: 2017-11-07

## 2017-11-15 ENCOUNTER — OFFICE VISIT (OUTPATIENT)
Dept: FAMILY MEDICINE CLINIC | Age: 77
End: 2017-11-15

## 2017-11-15 VITALS
WEIGHT: 167 LBS | HEART RATE: 111 BPM | DIASTOLIC BLOOD PRESSURE: 76 MMHG | OXYGEN SATURATION: 94 % | TEMPERATURE: 96.3 F | HEIGHT: 65 IN | BODY MASS INDEX: 27.82 KG/M2 | SYSTOLIC BLOOD PRESSURE: 144 MMHG

## 2017-11-15 DIAGNOSIS — I10 ESSENTIAL HYPERTENSION: ICD-10-CM

## 2017-11-15 DIAGNOSIS — M54.42 CHRONIC MIDLINE LOW BACK PAIN WITH BILATERAL SCIATICA: ICD-10-CM

## 2017-11-15 DIAGNOSIS — Z09 HOSPITAL DISCHARGE FOLLOW-UP: ICD-10-CM

## 2017-11-15 DIAGNOSIS — Z00.00 ENCOUNTER FOR MEDICARE ANNUAL WELLNESS EXAM: ICD-10-CM

## 2017-11-15 DIAGNOSIS — M54.41 CHRONIC MIDLINE LOW BACK PAIN WITH BILATERAL SCIATICA: ICD-10-CM

## 2017-11-15 DIAGNOSIS — G89.29 CHRONIC MIDLINE LOW BACK PAIN WITH BILATERAL SCIATICA: ICD-10-CM

## 2017-11-15 DIAGNOSIS — Z01.818 PREOP EXAMINATION: Primary | ICD-10-CM

## 2017-11-15 DIAGNOSIS — E11.9 CONTROLLED TYPE 2 DIABETES MELLITUS WITHOUT COMPLICATION, WITHOUT LONG-TERM CURRENT USE OF INSULIN (HCC): ICD-10-CM

## 2017-11-15 DIAGNOSIS — Z23 ENCOUNTER FOR IMMUNIZATION: ICD-10-CM

## 2017-11-15 DIAGNOSIS — F11.10 DRUG ABUSE, OPIOID TYPE (HCC): ICD-10-CM

## 2017-11-15 RX ORDER — GABAPENTIN 300 MG/1
300 CAPSULE ORAL 3 TIMES DAILY
Qty: 90 CAP | Refills: 0
Start: 2017-11-15 | End: 2017-12-24 | Stop reason: SDUPTHER

## 2017-11-15 RX ORDER — HYDROCODONE BITARTRATE AND ACETAMINOPHEN 5; 325 MG/1; MG/1
1-2 TABLET ORAL
COMMUNITY
Start: 2017-10-20 | End: 2017-11-15 | Stop reason: CLARIF

## 2017-11-15 NOTE — MR AVS SNAPSHOT
Visit Information Date & Time Provider Department Dept. Phone Encounter #  
 11/15/2017 11:00 AM Jenelle Collier NP [address] [phone] [id number] Upcoming Health Maintenance Date Due  
 FOOT EXAM Q1 7/20/1950 EYE EXAM RETINAL OR DILATED Q1 7/20/1950 GLAUCOMA SCREENING Q2Y 7/20/2004 OSTEOPOROSIS SCREENING (DEXA) 7/20/2004 Pneumococcal 65+ High/Highest Risk (2 of 2 - PPSV23) 3/30/2016 MICROALBUMIN Q1 11/10/2017 HEMOGLOBIN A1C Q6M 2/15/2018 LIPID PANEL Q1 5/17/2018 MEDICARE YEARLY EXAM 11/16/2018 COLONOSCOPY 6/8/2021 DTaP/Tdap/Td series (2 - Td) 10/3/2027 Allergies as of 11/15/2017  Review Complete On: 11/15/2017 By: Malathi Carlson RN Severity Noted Reaction Type Reactions Morphine Low 05/24/2016    Itching Ultram [Tramadol] Low 05/24/2016    Palpitations Current Immunizations  Reviewed on 8/31/2017 Name Date Influenza High Dose Vaccine PF 10/2/2017, 9/29/2016, 10/16/2015 12:00 AM  
 Pneumococcal Conjugate (PCV-13) 2/3/2016 12:00 AM  
 Pneumococcal Polysaccharide (PPSV-23) 11/15/2017 Tdap 10/3/2017 11:10 AM  
 Zoster Vaccine, Live 12/2/2009 12:00 AM  
  
 Not reviewed this visit You Were Diagnosed With   
  
 Codes Comments Chronic midline low back pain with bilateral sciatica    -  Primary ICD-10-CM: M54.41, M54.42, G89.29 ICD-9-CM: 724.2, 724.3, 338.29 Encounter for immunization     ICD-10-CM: K03 ICD-9-CM: V03.89 BMI 27.0-27.9,adult     ICD-10-CM: V51.69 ICD-9-CM: V85.23 Encounter for Medicare annual wellness exam     ICD-10-CM: Z00.00 ICD-9-CM: V70.0 Controlled type 2 diabetes mellitus without complication, without long-term current use of insulin ([facility]     ICD-10-CM: E11.9 ICD-9-CM: 250.00 Hospital discharge follow-up     ICD-10-CM: [facility] ICD-9-CM: V67.59 Vitals BP Pulse Temp Height(growth percentile) Weight(growth percentile) 144/76 (BP 1 Location: Left arm, BP Patient Position: Sitting) (!) 111 96.3 °F (35.7 °C) (Temporal) 5' 5\" (1.651 m) 167 lb (75.8 kg) SpO2 BMI OB Status Smoking Status 94% 27.79 kg/m2 Postmenopausal Current Every Day Smoker BMI and BSA Data Body Mass Index Body Surface Area  
 27.79 kg/m 2 1.86 m 2 Preferred Pharmacy Pharmacy Name Phone [address] [facility] [phone] Your Updated Medication List  
  
   
This list is accurate as of: 11/15/17 12:20 PM.  Always use your most recent med list.  
  
  
  
  
 ascorbic acid (vitamin C) 500 mg tablet Commonly known as:  VITAMIN C Take  by mouth. atorvastatin 20 mg tablet Commonly known as:  LIPITOR  
TAKE 1 TABLET DAILY  
  
 b complex vitamins tablet Take 1 Tab by mouth daily. B.infantis-B.ani-B.long-B.bifi 10-15 mg Tbec Take  by mouth. BENEFIBER (GUAR GUM) PO Take  by mouth. celecoxib 200 mg capsule Commonly known as:  CELEBREX Take 1 Cap by mouth two (2) times a day. cholecalciferol 1,000 unit Cap Commonly known as:  VITAMIN D3 Take  by mouth daily. dilTIAZem  mg ER capsule Commonly known as:  CARDIZEM CD Take 1 Cap by mouth daily. gabapentin 300 mg capsule Commonly known as:  NEURONTIN Take 1 Cap by mouth three (3) times daily. gemfibrozil 600 mg tablet Commonly known as:  LOPID Take 1 Tab by mouth two (2) times a day. glyBURIDE 5 mg tablet Commonly known as:  Berdie Core Take 1 Tab by mouth two (2) times daily (with meals). HYDROcodone-acetaminophen 5-325 mg per tablet Commonly known as:  Rafiq Songster Take 1-2 Tabs by mouth. insulin glargine 100 unit/mL (3 mL) Inpn Commonly known as:  LANS[place]A PRIETOAR  
40 Units by SubCUTAneous route daily. Insulin Needles (Disposable) 31 gauge x 5/16\" Ndle Use as directed  
  
 metFORMIN 1,000 mg tablet Commonly known as:  GLUCOPHAGE  
TAKE 1 TABLET TWICE A DAY  
  
 omega-3 fatty acids-vitamin e 1,000 mg Cap Take 1 Cap by mouth. 32a day  
  
 oxyCODONE-acetaminophen  mg per tablet Commonly known as:  PERCOCET 10 Take 1 Tab by mouth every four (4) hours as needed. Max Daily Amount: 6 Tabs. thiamine 100 mg tablet Commonly known as:  B-1 Take 1 Tab by mouth daily. venlafaxine 75 mg tablet Commonly known as:  [facility] Take 1 Tab by mouth two (2) times daily (with meals). zinc 50 mg Tab tablet Take  by mouth daily. We Performed the Following CBC WITH AUTOMATED DIFF [66108 CPT(R)] COLLECTION VENOUS BLOOD,VENIPUNCTURE [id number] CPT(R)] HEMOGLOBIN A1C WITH EAG [32325 CPT(R)] METABOLIC PANEL, COMPREHENSIVE [25156 CPT(R)] MICROALBUMIN, UR, 24 HR [id number] CPT(R)] MONITOR 14-DRUG CLASS PROFILE [facility] MEDWATCH) [[id number] Custom] PNEUMOCOCCAL POLYSACCHARIDE VACCINE, 23-VALENT, ADULT OR IMMUNOSUPPRESSED PT DOSE, [71594 CPT(R)] HI IMMUNIZ ADMIN,1 SINGLE/COMB VAC/TOXOID [id number] CPT(R)] REFERRAL TO ORTHOPEDICS [QIR125 Custom] Comments:  
 Dr. Siddhartha Edwards Back pain seen by Dr. Marilia Prakash and Dr. Al Álvarez Referral Information Referral ID Referred By Referred To  
  
 [id number] Mary MALDONADO Not Available Visits Status Start Date End Date 1 New Request 11/15/17 11/15/18 If your referral has a status of pending review or denied, additional information will be sent to support the outcome of this decision. Introducing [facility]! Dear Dwaine Lemus: Thank you for requesting a [employer] account. Our records indicate that you already have an active [employer] account. You can access your account anytime at [email or web address] Did you know that you can access your hospital and ER discharge instructions at any time in [employer]? You can also review all of your test results from your hospital stay or ER visit. Additional Information If you have questions, please visit the Frequently Asked Questions section of the [employer] website at [email or web address] Remember, [employer] is NOT to be used for urgent needs. For medical emergencies, dial 911. Now available from your iPhone and Android! Please provide this summary of care documentation to your next provider. Your primary care clinician is listed as Susan Morelos. If you have any questions after today's visit, please call [phone].

## 2017-11-16 NOTE — PROGRESS NOTES
Subjective:     Vikas Headley is a 65 y.o. female who presents today with her daughter with  the following:  Chief Complaint   Patient presents with    Annual Wellness Visit     [facility] [place]    Back Pain     f/u [facility]] for reaction to pain medication   Tariq Saeed presents for follow up from what appears to be an accidental opoid and tylenol overdose on 10/20/ 2017. Acute encephalopathy: Secondary to inadvertent medication Daughter joneldo. Patient presented with marked lethargy, in the setting utilization of multiple sedating meds, including possibly spouse's MS Contin, for severe low back pain x 2 days. Head CT showed no acute findings. Managed supportively, Narcan infusion as needed and mental status gradually improved. EEG was abnormal for generalized slowing and frequent triphasics, which may be related to an underlying metabolic or anoxic insult per Dr. Lowe Grandchild note. Family currently  taking turns staying with parents to assist with safety , meals and overseeing health care (doctor's visits etc). Daughter from [place] staying for 1 month . Thereafter 3 week intervals. Pain management: Stressed to patient and daughter the importance of seeking medical care for severe pain. Never take medication that is not prescribed to you. Patient noted that Gabapentin is effective for pain.having breakthrough nerve pain . Norco not helping. D/C norco. Discussed trying TENS Unit ,increase gabapentin to tid. Follow up with orthopedic surgeon. Has seen Dr. Joslyn Lebron and Dr. Checo Louis. COMPLIANT WITH MEDICATION:   HTN; Denies chest pain, dyspnea, palpitations, headache and blurred vision. Blood pressure normotensive. DM: improved diet . BG 130s to 160's.        ROS:  Gen: denies fever, chills, fatigue, weight loss, weight gain  HEENT:denies blurry vision, nasal congestion, sore throat  Resp: denies dypsnea, cough, wheezing  CV: denies chest pain radiating to the jaws or arms, palpitations, lower extremity edema  Abd: denies nausea, vomiting, diarrhea, constipation  Neuro: denies numbness/tingling  Endo: denies polyuria, polydipsia, heat/cold intolerance  Heme: no lymphadenopathy    Allergies   Allergen Reactions    Morphine Itching    Ultram [Tramadol] Palpitations         Current Outpatient Prescriptions:     HYDROcodone-acetaminophen (NORCO) 5-325 mg per tablet, Take 1-2 Tabs by mouth., Disp: , Rfl:     gabapentin (NEURONTIN) 300 mg capsule, Take 1 Cap by mouth three (3) times daily. , Disp: 90 Cap, Rfl: 0    thiamine (B-1) 100 mg tablet, Take 1 Tab by mouth daily. , Disp: 30 Tab, Rfl: 0    venlafaxine (EFFEXOR) 75 mg tablet, Take 1 Tab by mouth two (2) times daily (with meals). , Disp: 60 Tab, Rfl: 0    metFORMIN (GLUCOPHAGE) 1,000 mg tablet, TAKE 1 TABLET TWICE A DAY, Disp: 180 Tab, Rfl: 1    dilTIAZem CD (CARDIZEM CD) 240 mg ER capsule, Take 1 Cap by mouth daily. , Disp: 90 Cap, Rfl: 3    atorvastatin (LIPITOR) 20 mg tablet, TAKE 1 TABLET DAILY, Disp: 90 Tab, Rfl: 1    insulin glargine (LANTUS,BASAGLAR) 100 unit/mL (3 mL) inpn, 40 Units by SubCUTAneous route daily. (Patient taking differently: 45 Units by SubCUTAneous route daily. Indications: takes 45 units daily), Disp: 6 Pen, Rfl: 11    glyBURIDE (DIABETA) 5 mg tablet, Take 1 Tab by mouth two (2) times daily (with meals). , Disp: 180 Tab, Rfl: 3    celecoxib (CELEBREX) 200 mg capsule, Take 1 Cap by mouth two (2) times a day., Disp: 180 Cap, Rfl: 3    Insulin Needles, Disposable, 31 gauge x 5/16\" ndle, Use as directed, Disp: 1 Package, Rfl: 11    gemfibrozil (LOPID) 600 mg tablet, Take 1 Tab by mouth two (2) times a day., Disp: 180 Tab, Rfl: 3    omega-3 fatty acids-vitamin e 1,000 mg cap, Take 1 Cap by mouth. 32a day, Disp: , Rfl:     ascorbic acid, vitamin C, (VITAMIN C) 500 mg tablet, Take  by mouth., Disp: , Rfl:     b complex vitamins tablet, Take 1 Tab by mouth daily. , Disp: , Rfl:     zinc 50 mg tab tablet, Take  by mouth daily. , Disp: , Rfl:     cholecalciferol (VITAMIN D3) 1,000 unit cap, Take  by mouth daily. , Disp: , Rfl:     B.infantis-B.ani-B.long-B.bifi 10-15 mg TbEC, Take  by mouth., Disp: , Rfl:     BENEFIBER, GUAR GUM, PO, Take  by mouth., Disp: , Rfl:     oxyCODONE-acetaminophen (PERCOCET 10)  mg per tablet, Take 1 Tab by mouth every four (4) hours as needed.  Max Daily Amount: 6 Tabs., Disp: 20 Tab, Rfl: 0    Past Medical History:   Diagnosis Date    Arthritis     Chronic pain     Chronic pain     Diabetes ([address]     Diverticular disease     Hemorrhoid     Hypercholesterolemia     IBS (irritable bowel syndrome)        Past Surgical History:   Procedure Laterality Date    HX CATARACT REMOVAL      HX CHOLECYSTECTOMY      HX COLONOSCOPY  2009    HX COLONOSCOPY  2016    2 adenomatous polyp    HX HEMORRHOIDECTOMY  2009    HX HYSTERECTOMY      HX KNEE REPLACEMENT      right       History   Smoking Status    Current Every Day Smoker   Smokeless Tobacco    Never Used       Social History     Social History    Marital status:      Spouse name: N/A    Number of children: N/A    Years of education: N/A     Social History Main Topics    Smoking status: Current Every Day Smoker    Smokeless tobacco: Never Used    Alcohol use No    Drug use: None    Sexual activity: Not Asked     Other Topics Concern     Service No    Blood Transfusions Yes    Caffeine Concern Yes    Occupational Exposure No    Hobby Hazards No    Sleep Concern Yes    Stress Concern Yes    Weight Concern No    Special Diet No    Back Care Yes    Exercise No    Bike Helmet No     n/a    Seat Belt Yes    Self-Exams Yes     Social History Narrative       Family History   Problem Relation Age of Onset    Colon Cancer Father          Objective:     Visit Vitals    /76 (BP 1 Location: Left arm, BP Patient Position: Sitting)    Pulse (!) 111    Temp 96.3 °F (35.7 °C) (Temporal)    Ht 5' 5\" (1.651 m)    Wt 167 lb (75.8 kg)    SpO2 94%    BMI 27.79 kg/m2     Body mass index is 27.79 kg/(m^2). General: Alert and oriented. No acute distress. Well nourished  HEENT :  Ears:TMs are normal. Canals are clear. Eyes: pupils equal, round, react to light and accommodation. Extra ocular movements intact. Nose: patent. Mouth and throat is clear. Neck:supple full range of motion no thyromegaly. Trachea midline, No carotid bruits. No significant lymphadenopathy  Lungs[de-identified] clear to auscultation without wheezes, rales, or rhonchi. Heart :RRR, S1 & S2 are normal intensity. No murmur; no gallop  Abdomen: bowel sounds active. No tenderness, guarding, rebound, masses, hepatic or spleen enlargement  Back: no CVA tenderness. Extremities: without clubbing, cyanosis, or edema  Pulses: radial and femoral pulses are normal  Neuro: HMF intact. Cranial nerves II through XII grossly normal.  Motor: is 5 over 5 and symmetrical.   Deep tendon reflexes: +2 equal        No results found for this visit on 11/15/17. Assessment/ Plan:     Diagnoses and all orders for this visit:    1. Chronic midline low back pain with bilateral sciatica  -     REFERRAL TO ORTHOPEDICS  -     MICROALBUMIN, UR, 24 HR  -     MONITOR 14-DRUG CLASS PROFILE ([facility])    2. Encounter for immunization  -     PNEUMOCOCCAL POLYSACCHARIDE VACCINE, 23-VALENT, ADULT OR IMMUNOSUPPRESSED PT DOSE,  -     MN IMMUNIZ ADMIN,1 SINGLE/COMB VAC/TOXOID  -     MICROALBUMIN, UR, 24 HR  -     MONITOR 14-DRUG CLASS PROFILE ([facility])    3. BMI 27.0-27.9,adult  -     MICROALBUMIN, UR, 24 HR  -     MONITOR 14-DRUG CLASS PROFILE ([facility])    4. Encounter for Medicare annual wellness exam  -     MICROALBUMIN, UR, 24 HR  -     MONITOR 14-DRUG CLASS PROFILE ([facility])    5.  Controlled type 2 diabetes mellitus without complication, without long-term current use of insulin (HCC)  -     CBC WITH AUTOMATED DIFF  -     HEMOGLOBIN A1C WITH EAG  - COLLECTION VENOUS BLOOD,VENIPUNCTURE  -     METABOLIC PANEL, COMPREHENSIVE  -     MICROALBUMIN, UR, 24 HR  -     MONITOR 14-DRUG CLASS PROFILE ([facility])    6. Hospital discharge follow-up  -     MICROALBUMIN, UR, 24 HR  -     MONITOR 14-DRUG CLASS PROFILE ([facility])    7. Drug abuse, opioid type    Other orders  -     gabapentin (NEURONTIN) 300 mg capsule; Take 1 Cap by mouth three (3) times daily. 1. Chronic midline low back pain with bilateral sciatica    2. Encounter for immunization    3. BMI 27.0-27.9,adult    4. Encounter for Medicare annual wellness exam    5. Controlled type 2 diabetes mellitus without complication, without long-term current use of insulin ([facility] [address]    6. Hospital discharge follow-up    7. Drug abuse, opioid type        Orders Placed This Encounter    COLLECTION VENOUS BLOOD,VENIPUNCTURE    PNEUMOCOCCAL POLYSACCHARIDE VACCINE, 23-VALENT, ADULT OR IMMUNOSUPPRESSED PT DOSE,    CBC WITH AUTOMATED DIFF    HEMOGLOBIN A1C WITH EAG    METABOLIC PANEL, COMPREHENSIVE    MICROALBUMIN, UR, 24 HR    MONITOR 14-DRUG CLASS PROFILE ([facility])    REFERRAL TO ORTHOPEDICS     Referral Priority:   Routine     Referral Type:   Consultation     Referral Reason:   Specialty Services Required    MI IMMUNIZ ADMIN,1 SINGLE/COMB VAC/TOXOID    HYDROcodone-acetaminophen (NORCO) 5-325 mg per tablet     Sig: Take 1-2 Tabs by mouth.  gabapentin (NEURONTIN) 300 mg capsule     Sig: Take 1 Cap by mouth three (3) times daily. Dispense:  90 Cap     Refill:  0         Verbal and written instructions (see AVS) provided.  Patient expresses understanding of diagnosis and treatment plan. Follow-up Disposition:  Return in about 2 months (around 1/15/2018). BERNARDO Steele          Nadeem Chapman is a 65 y.o. female and presents for annual Medicare Wellness Visit. Problem List: Reviewed with patient and discussed risk factors.     Patient Active Problem List   Diagnosis Code    Hypercholesterolemia E78.00    Arthritis M19.90    Diabetes ([facility] [address] E11.9    IBS (irritable bowel syndrome) K58.9    Hemorrhoid K64.9    Chronic pain N72.34    Neutrophilic leukocytosis V15.7    SOB (shortness of breath) R06.02    Abdominal pain, generalized R10.84    Diarrhea in adult patient R19.7    Cigarette smoker F17.210    Diverticulitis large intestine w/o perforation or abscess w/o bleeding K57.32    Hyperkalemia E87.5    Altered mental status R41.82    Transaminitis R74.0    Acute renal failure (ARF) (HCC) N17.9    Acute encephalopathy G93.40    Overdose of opiate or related narcotic, accidental or unintentional, subsequent encounter T40.601D    Acute left-sided low back pain with sciatica M54.40    Physical debility R53.81       Current medical providers:  Patient Care Team:  Jessica Valencia NP as PCP - General (Nurse Practitioner)  Bj Carpenter MD (Surgery)    PSH: Reviewed with patient  Past Surgical History:   Procedure Laterality Date    HX CATARACT REMOVAL      HX CHOLECYSTECTOMY      HX COLONOSCOPY  2009    HX COLONOSCOPY  2016    2 adenomatous polyp    HX HEMORRHOIDECTOMY  2009    HX HYSTERECTOMY      HX KNEE REPLACEMENT      right        SH: Reviewed with patient  Social History   Substance Use Topics    Smoking status: Current Every Day Smoker    Smokeless tobacco: Never Used    Alcohol use No       FH: Reviewed with patient  Family History   Problem Relation Age of Onset    Colon Cancer Father        Medications/Allergies: Reviewed with patient  Current Outpatient Prescriptions on File Prior to Visit   Medication Sig Dispense Refill    thiamine (B-1) 100 mg tablet Take 1 Tab by mouth daily. 30 Tab 0    venlafaxine (EFFEXOR) 75 mg tablet Take 1 Tab by mouth two (2) times daily (with meals). 60 Tab 0    metFORMIN (GLUCOPHAGE) 1,000 mg tablet TAKE 1 TABLET TWICE A  Tab 1    dilTIAZem CD (CARDIZEM CD) 240 mg ER capsule Take 1 Cap by mouth daily.  80 Cap 3    atorvastatin (LIPITOR) 20 mg tablet TAKE 1 TABLET DAILY 90 Tab 1    insulin glargine (LANTUS,BASAGLAR) 100 unit/mL (3 mL) inpn 40 Units by SubCUTAneous route daily. (Patient taking differently: 45 Units by SubCUTAneous route daily. Indications: takes 45 units daily) 6 Pen 11    glyBURIDE (DIABETA) 5 mg tablet Take 1 Tab by mouth two (2) times daily (with meals). 180 Tab 3    celecoxib (CELEBREX) 200 mg capsule Take 1 Cap by mouth two (2) times a day. 180 Cap 3    Insulin Needles, Disposable, 31 gauge x 5/16\" ndle Use as directed 1 Package 11    gemfibrozil (LOPID) 600 mg tablet Take 1 Tab by mouth two (2) times a day. 180 Tab 3    omega-3 fatty acids-vitamin e 1,000 mg cap Take 1 Cap by mouth. 32a day      ascorbic acid, vitamin C, (VITAMIN C) 500 mg tablet Take  by mouth.  b complex vitamins tablet Take 1 Tab by mouth daily.  zinc 50 mg tab tablet Take  by mouth daily.  cholecalciferol (VITAMIN D3) 1,000 unit cap Take  by mouth daily.  B.infantis-B.ani-B.long-B.bifi 10-15 mg TbEC Take  by mouth.  BENEFIBER, GUAR GUM, PO Take  by mouth.  oxyCODONE-acetaminophen (PERCOCET 10)  mg per tablet Take 1 Tab by mouth every four (4) hours as needed. Max Daily Amount: 6 Tabs. 20 Tab 0     No current facility-administered medications on file prior to visit. Allergies   Allergen Reactions    Morphine Itching    Ultram [Tramadol] Palpitations       Objective:  Visit Vitals    /76 (BP 1 Location: Left arm, BP Patient Position: Sitting)    Pulse (!) 111    Temp 96.3 °F (35.7 °C) (Temporal)    Ht 5' 5\" (1.651 m)    Wt 167 lb (75.8 kg)    SpO2 94%    BMI 27.79 kg/m2    Body mass index is 27.79 kg/(m^2).     Assessment of cognitive impairment: Alert and oriented x 3 pressured speech and confabulates, lucid     Depression Screen:   PHQ over the last two weeks 8/15/2017   Little interest or pleasure in doing things Not at all   Feeling down, depressed or hopeless -   Total Score PHQ 2 -   Trouble falling or staying asleep, or sleeping too much -   Feeling tired or having little energy -   Poor appetite or overeating -   Feeling bad about yourself - or that you are a failure or have let yourself or your family down -   Trouble concentrating on things such as school, work, reading or watching TV -   Moving or speaking so slowly that other people could have noticed; or the opposite being so fidgety that others notice -   Thoughts of being better off dead, or hurting yourself in some way -   PHQ 9 Score -   How difficult have these problems made it for you to do your work, take care of your home and get along with others -       Fall Risk Assessment:    Fall Risk Assessment, last 12 mths 11/15/2017   Able to walk? Yes   Fall in past 12 months? Yes   Fall with injury? No   Number of falls in past 12 months 8 or more   Fall Risk Score 8       Functional Ability:   Does the patient exhibit a steady gait? Uses a walker or a cane    How long did it take the patient to get up and walk from a sitting position? 4 seconds   Is the patient self reliant?  (ie can do own laundry, meals, household chores)  no     Does the patient handle his/her own medications?  no     Does the patient handle his/her own money? no     Is the patients home safe (ie good lighting, handrails on stairs and bath, etc.)? In progress of addressing     Did you notice or did patient express any hearing difficulties? no     Did you notice or did patient express any vision difficulties?   no     Were distance and reading eye charts used? no       Advance Care Planning:   Patient was offered the opportunity to discuss advance care planning:  no     Does patient have an Advance Directive:  no   If no, did you provide information on Caring Connections?   yes       Plan:      Orders Placed This Encounter    COLLECTION VENOUS BLOOD,VENIPUNCTURE    PNEUMOCOCCAL POLYSACCHARIDE VACCINE, 23-VALENT, ADULT OR IMMUNOSUPPRESSED PT DOSE,    CBC WITH AUTOMATED DIFF    HEMOGLOBIN A1C WITH EAG    METABOLIC PANEL, COMPREHENSIVE    MICROALBUMIN, UR, 24 HR    MONITOR 14-DRUG CLASS PROFILE ([facility])    REFERRAL TO ORTHOPEDICS    CO IMMUNIZ ADMIN,1 SINGLE/COMB VAC/TOXOID    DISCONTD: HYDROcodone-acetaminophen (NORCO) 5-325 mg per tablet    gabapentin (NEURONTIN) 300 mg capsule       Health Maintenance   Topic Date Due    FOOT EXAM Q1  07/20/1950    EYE EXAM RETINAL OR DILATED Q1  07/20/1950    GLAUCOMA SCREENING Q2Y  07/20/2004    OSTEOPOROSIS SCREENING (DEXA)  07/20/2004    Pneumococcal 65+ High/Highest Risk (2 of 2 - PPSV23) 03/30/2016    MICROALBUMIN Q1  11/10/2017    HEMOGLOBIN A1C Q6M  02/15/2018    LIPID PANEL Q1  05/17/2018    MEDICARE YEARLY EXAM  11/16/2018    COLONOSCOPY  06/08/2021    DTaP/Tdap/Td series (2 - Td) 10/03/2027    ZOSTER VACCINE AGE 60>  Completed    Influenza Age 5 to Adult  Completed       *Patient verbalized understanding and agreement with the plan. A copy of the After Visit Summary with personalized health plan was given to the patient today. Follow-up Disposition:  Return in about 2 months (around 1/15/2018).         ULYSSES Cuba

## 2017-11-22 ENCOUNTER — TELEPHONE (OUTPATIENT)
Dept: FAMILY MEDICINE CLINIC | Age: 77
End: 2017-11-22

## 2017-11-24 DIAGNOSIS — Z01.818 PREOP TESTING: Primary | ICD-10-CM

## 2017-11-24 NOTE — TELEPHONE ENCOUNTER
Last seen on 11/15/17 . If they need the exam within 30 days I will need to see her. Please let me know I. The note on the 15th evangelista need to have an addendum. Belinda Smith

## 2017-11-24 NOTE — TELEPHONE ENCOUNTER
Please ask Meryl Slade  to come in for a cbc, CMP , routine urinalysis, 12 lead EKG. Please put in the orders. I will write the preop note from her last visit. Surgery 12/11/17 with Dr. Feliciano Favre are close to out of date   Mild anemia  Need to check liver and kidney function .

## 2017-11-28 ENCOUNTER — CLINICAL SUPPORT (OUTPATIENT)
Dept: FAMILY MEDICINE CLINIC | Age: 77
End: 2017-11-28

## 2017-11-28 ENCOUNTER — TELEPHONE (OUTPATIENT)
Dept: FAMILY MEDICINE CLINIC | Age: 77
End: 2017-11-28

## 2017-11-28 DIAGNOSIS — Z01.818 PRE-OP EXAM: Primary | ICD-10-CM

## 2017-11-28 RX ORDER — VARENICLINE TARTRATE 1 MG/1
TABLET, FILM COATED ORAL
Qty: 60 TAB | Refills: 1
Start: 2017-11-28 | End: 2018-02-15 | Stop reason: ALTCHOICE

## 2017-11-28 NOTE — MR AVS SNAPSHOT
Visit Information Date & Time Provider Department Dept. Phone Encounter #  
 11/28/2017  3:30 PM Yuli Menard [id number] Your Appointments 2/15/2018  1:00 PM  
ESTABLISHED PATIENT with Shanon Regalado NP  
[address] [facility] Appt Note: 3 mo F/U  
 [address] [address]  
[address] [address] Upcoming Health Maintenance Date Due  
 FOOT EXAM Q1 7/20/1950 EYE EXAM RETINAL OR DILATED Q1 7/20/1950 GLAUCOMA SCREENING Q2Y 7/20/2004 OSTEOPOROSIS SCREENING (DEXA) 7/20/2004 MICROALBUMIN Q1 11/10/2017 HEMOGLOBIN A1C Q6M 2/15/2018 LIPID PANEL Q1 5/17/2018 MEDICARE YEARLY EXAM 11/16/2018 COLONOSCOPY 6/8/2021 DTaP/Tdap/Td series (2 - Td) 10/3/2027 Allergies as of 11/28/2017  Review Complete On: 11/28/2017 By: Nohelia Cali RN Severity Noted Reaction Type Reactions Morphine Low 05/24/2016    Itching Ultram [Tramadol] Low 05/24/2016    Palpitations Current Immunizations  Reviewed on 8/31/2017 Name Date Influenza High Dose Vaccine PF 10/2/2017, 9/29/2016, 10/16/2015 12:00 AM  
 Pneumococcal Conjugate (PCV-13) 2/3/2016 12:00 AM  
 Pneumococcal Polysaccharide (PPSV-23) 11/15/2017 Tdap 10/3/2017 11:10 AM  
 Zoster Vaccine, Live 12/2/2009 12:00 AM  
  
 Not reviewed this visit Vitals OB Status Smoking Status Postmenopausal Current Every Day Smoker Preferred Pharmacy Pharmacy Name Phone [address] [facility] [phone] Your Updated Medication List  
  
   
This list is accurate as of: 11/28/17  4:33 PM.  Always use your most recent med list.  
  
  
  
  
 ascorbic acid (vitamin C) 500 mg tablet Commonly known as:  VITAMIN C Take  by mouth. atorvastatin 20 mg tablet Commonly known as:  LIPITOR  
TAKE 1 TABLET DAILY  
  
 b complex vitamins tablet Take 1 Tab by mouth daily. B.infantis-B.ani-B.long-B.bifi 10-15 mg Tbec Take  by mouth. BENEFIBER (GUAR GUM) PO Take  by mouth. celecoxib 200 mg capsule Commonly known as:  CELEBREX Take 1 Cap by mouth two (2) times a day. cholecalciferol 1,000 unit Cap Commonly known as:  VITAMIN D3 Take  by mouth daily. dilTIAZem  mg ER capsule Commonly known as:  CARDIZEM CD Take 1 Cap by mouth daily. gabapentin 300 mg capsule Commonly known as:  NEURONTIN Take 1 Cap by mouth three (3) times daily. gemfibrozil 600 mg tablet Commonly known as:  LOPID Take 1 Tab by mouth two (2) times a day. glyBURIDE 5 mg tablet Commonly known as:  Inderjit Tabby Take 1 Tab by mouth two (2) times daily (with meals). insulin glargine 100 unit/mL (3 mL) Inpn Commonly known as:  KIRSTY HOLT  
40 Units by SubCUTAneous route daily. Insulin Needles (Disposable) 31 gauge x 5/16\" Ndle Use as directed  
  
 metFORMIN 1,000 mg tablet Commonly known as:  GLUCOPHAGE  
TAKE 1 TABLET TWICE A DAY  
  
 omega-3 fatty acids-vitamin e 1,000 mg Cap Take 1 Cap by mouth. 32a day  
  
 oxyCODONE-acetaminophen  mg per tablet Commonly known as:  PERCOCET 10 Take 1 Tab by mouth every four (4) hours as needed. Max Daily Amount: 6 Tabs. thiamine 100 mg tablet Commonly known as:  B-1 Take 1 Tab by mouth daily. venlafaxine 75 mg tablet Commonly known as:  [facility] Take 1 Tab by mouth two (2) times daily (with meals). zinc 50 mg Tab tablet Take  by mouth daily. Introducing [facility]! Dear Sarah Hampton: Thank you for requesting a [employer] account. Our records indicate that you already have an active [employer] account. You can access your account anytime at [email or web address]. [email or web address] Did you know that you can access your hospital and ER discharge instructions at any time in [employer]? You can also review all of your test results from your hospital stay or ER visit. Additional Information If you have questions, please visit the Frequently Asked Questions section of the [employer] website at [email or web address] Remember, [employer] is NOT to be used for urgent needs. For medical emergencies, dial 911. Now available from your iPhone and Android! Please provide this summary of care documentation to your next provider. Your primary care clinician is listed as Nathan Lucio. If you have any questions after today's visit, please call [phone].

## 2017-11-29 LAB
ALBUMIN 24H UR-MRATE: NORMAL MG/DAY
ALBUMIN SERPL-MCNC: 3.8 G/DL (ref 3.5–4.8)
ALBUMIN/GLOB SERPL: 1.2 {RATIO} (ref 1.2–2.2)
ALP SERPL-CCNC: 76 IU/L (ref 39–117)
ALT SERPL-CCNC: 19 IU/L (ref 0–32)
AMPHETAMINES UR QL SCN: NEGATIVE NG/ML
AST SERPL-CCNC: 17 IU/L (ref 0–40)
BARBITURATES UR QL SCN: NEGATIVE NG/ML
BASOPHILS # BLD AUTO: 0 X10E3/UL (ref 0–0.2)
BASOPHILS NFR BLD AUTO: 0 %
BENZODIAZ UR QL SCN: NEGATIVE NG/ML
BILIRUB SERPL-MCNC: <0.2 MG/DL (ref 0–1.2)
BUN SERPL-MCNC: 26 MG/DL (ref 8–27)
BUN/CREAT SERPL: 21 (ref 12–28)
BUPRENORPHINE UR QL: NEGATIVE NG/ML
BZE UR QL SCN: NEGATIVE NG/ML
CALCIUM SERPL-MCNC: 10.2 MG/DL (ref 8.7–10.3)
CANNABINOIDS UR QL SCN: NEGATIVE NG/ML
CHLORIDE SERPL-SCNC: 104 MMOL/L (ref 96–106)
CO2 SERPL-SCNC: 21 MMOL/L (ref 18–29)
CODEINE UR QL: NEGATIVE
CREAT SERPL-MCNC: 1.21 MG/DL (ref 0.57–1)
CREAT UR-MCNC: 134 MG/DL (ref 20–300)
EOSINOPHIL # BLD AUTO: 0.4 X10E3/UL (ref 0–0.4)
EOSINOPHIL NFR BLD AUTO: 3 %
ERYTHROCYTE [DISTWIDTH] IN BLOOD BY AUTOMATED COUNT: 13.6 % (ref 12.3–15.4)
EST. AVERAGE GLUCOSE BLD GHB EST-MCNC: 194 MG/DL
FENTANYL+NORFENTANYL UR QL SCN: NEGATIVE PG/ML
GFR SERPLBLD CREATININE-BSD FMLA CKD-EPI: 43 ML/MIN/1.73
GFR SERPLBLD CREATININE-BSD FMLA CKD-EPI: 50 ML/MIN/1.73
GLOBULIN SER CALC-MCNC: 3.3 G/DL (ref 1.5–4.5)
GLUCOSE SERPL-MCNC: 156 MG/DL (ref 65–99)
HBA1C MFR BLD: 8.4 % (ref 4.8–5.6)
HCT VFR BLD AUTO: 37.6 % (ref 34–46.6)
HGB BLD-MCNC: 12.6 G/DL (ref 11.1–15.9)
HYDROCODONE UR CFM-MCNC: 740 NG/ML
HYDROCODONE UR QL: POSITIVE
HYDROMORPHONE UR CFM-MCNC: 584 NG/ML
HYDROMORPHONE UR QL: POSITIVE
IMM GRANULOCYTES # BLD: 0 X10E3/UL (ref 0–0.1)
IMM GRANULOCYTES NFR BLD: 0 %
INTERPRETATION: NORMAL
LYMPHOCYTES # BLD AUTO: 4.4 X10E3/UL (ref 0.7–3.1)
LYMPHOCYTES NFR BLD AUTO: 34 %
MCH RBC QN AUTO: 30.7 PG (ref 26.6–33)
MCHC RBC AUTO-ENTMCNC: 33.5 G/DL (ref 31.5–35.7)
MCV RBC AUTO: 92 FL (ref 79–97)
MEPERIDINE UR QL: NEGATIVE NG/ML
METHADONE UR QL SCN: NEGATIVE NG/ML
MICROALBUMIN UR-MCNC: 1472.8 UG/ML
MONOCYTES # BLD AUTO: 0.8 X10E3/UL (ref 0.1–0.9)
MONOCYTES NFR BLD AUTO: 6 %
MORPHINE UR QL: NEGATIVE
NEUTROPHILS # BLD AUTO: 7.4 X10E3/UL (ref 1.4–7)
NEUTROPHILS NFR BLD AUTO: 57 %
OPIATES UR QL SCN: NORMAL NG/ML
OPIATES UR QL SCN: POSITIVE NG/ML
OXYCODONE+OXYMORPHONE UR QL SCN: NEGATIVE NG/ML
PCP UR QL: NEGATIVE NG/ML
PH UR: 5.4 [PH] (ref 4.5–8.9)
PLATELET # BLD AUTO: 438 X10E3/UL (ref 150–379)
PLEASE NOTE:, 733157: NORMAL
POTASSIUM SERPL-SCNC: 5.8 MMOL/L (ref 3.5–5.2)
PROPOXYPH UR QL SCN: NEGATIVE NG/ML
PROT SERPL-MCNC: 7.1 G/DL (ref 6–8.5)
RBC # BLD AUTO: 4.11 X10E6/UL (ref 3.77–5.28)
SODIUM SERPL-SCNC: 142 MMOL/L (ref 134–144)
SP GR UR: 1.02
TRAMADOL UR QL SCN: NEGATIVE NG/ML
WBC # BLD AUTO: 12.9 X10E3/UL (ref 3.4–10.8)

## 2017-11-29 NOTE — PROGRESS NOTES
Subjective:     Vikas Headley is a 65 y.o. female who presents today with the following:  Chief Complaint   Patient presents with    Annual Wellness Visit     subsequ    Back Pain     f/u [facility] for reaction to pain medication     Gareth Ellis presents today for pre op examination. Scheduled to have laminectomy decompression with Dr. Checo Louis. COMPLIANT WITH MEDICATION:   HTN; Denies chest pain, dyspnea, palpitations, headache and blurred vision. Blood pressure normotensive toda. ROS:  Gen: denies fever, chills, fatigue, weight loss, weight gain  HEENT:denies blurry vision, nasal congestion, sore throat  Resp: denies dypsnea, cough, wheezing  CV: denies chest pain radiating to the jaws or arms, palpitations, lower extremity edema  Abd: denies nausea, vomiting, diarrhea, constipation  Neuro: denies numbness/tingling  Endo: denies polyuria, polydipsia, heat/cold intolerance  Heme: no lymphadenopathy    Allergies   Allergen Reactions    Morphine Itching    Ultram [Tramadol] Palpitations         Current Outpatient Prescriptions:     gabapentin (NEURONTIN) 300 mg capsule, Take 1 Cap by mouth three (3) times daily. , Disp: 90 Cap, Rfl: 0    thiamine (B-1) 100 mg tablet, Take 1 Tab by mouth daily. , Disp: 30 Tab, Rfl: 0    venlafaxine (EFFEXOR) 75 mg tablet, Take 1 Tab by mouth two (2) times daily (with meals). , Disp: 60 Tab, Rfl: 0    metFORMIN (GLUCOPHAGE) 1,000 mg tablet, TAKE 1 TABLET TWICE A DAY, Disp: 180 Tab, Rfl: 1    dilTIAZem CD (CARDIZEM CD) 240 mg ER capsule, Take 1 Cap by mouth daily. , Disp: 90 Cap, Rfl: 3    atorvastatin (LIPITOR) 20 mg tablet, TAKE 1 TABLET DAILY, Disp: 90 Tab, Rfl: 1    insulin glargine (LANTUS,BASAGLAR) 100 unit/mL (3 mL) inpn, 40 Units by SubCUTAneous route daily. (Patient taking differently: 45 Units by SubCUTAneous route daily.  Indications: takes 45 units daily), Disp: 6 Pen, Rfl: 11    glyBURIDE (DIABETA) 5 mg tablet, Take 1 Tab by mouth two (2) times daily (with meals). , Disp: 180 Tab, Rfl: 3    celecoxib (CELEBREX) 200 mg capsule, Take 1 Cap by mouth two (2) times a day., Disp: 180 Cap, Rfl: 3    Insulin Needles, Disposable, 31 gauge x 5/16\" ndle, Use as directed, Disp: 1 Package, Rfl: 11    gemfibrozil (LOPID) 600 mg tablet, Take 1 Tab by mouth two (2) times a day., Disp: 180 Tab, Rfl: 3    omega-3 fatty acids-vitamin e 1,000 mg cap, Take 1 Cap by mouth. 32a day, Disp: , Rfl:     ascorbic acid, vitamin C, (VITAMIN C) 500 mg tablet, Take  by mouth., Disp: , Rfl:     b complex vitamins tablet, Take 1 Tab by mouth daily. , Disp: , Rfl:     zinc 50 mg tab tablet, Take  by mouth daily. , Disp: , Rfl:     cholecalciferol (VITAMIN D3) 1,000 unit cap, Take  by mouth daily. , Disp: , Rfl:     B.infantis-B.ani-B.long-B.bifi 10-15 mg TbEC, Take  by mouth., Disp: , Rfl:     BENEFIBER, GUAR GUM, PO, Take  by mouth., Disp: , Rfl:     varenicline (CHANTIX CONTINUING MONTH BOX) 1 mg tablet, 1 mg twice a day  Indications: Smoking Cessation, Disp: 60 Tab, Rfl: 1    oxyCODONE-acetaminophen (PERCOCET 10)  mg per tablet, Take 1 Tab by mouth every four (4) hours as needed.  Max Daily Amount: 6 Tabs., Disp: 20 Tab, Rfl: 0    Past Medical History:   Diagnosis Date    Arthritis     Chronic pain     Chronic pain     Diabetes ([address]     Diverticular disease     Hemorrhoid     Hypercholesterolemia     IBS (irritable bowel syndrome)        Past Surgical History:   Procedure Laterality Date    HX CATARACT REMOVAL      HX CHOLECYSTECTOMY      HX COLONOSCOPY  2009    HX COLONOSCOPY  2016    2 adenomatous polyp    HX HEMORRHOIDECTOMY  2009    HX HYSTERECTOMY      HX KNEE REPLACEMENT      right       History   Smoking Status    Current Every Day Smoker   Smokeless Tobacco    Never Used       Social History     Social History    Marital status:      Spouse name: N/A    Number of children: N/A    Years of education: N/A     Social History Main Topics    Smoking status: Current Every Day Smoker    Smokeless tobacco: Never Used    Alcohol use No    Drug use: None    Sexual activity: Not Asked     Other Topics Concern     Service No    Blood Transfusions Yes    Caffeine Concern Yes    Occupational Exposure No    Hobby Hazards No    Sleep Concern Yes    Stress Concern Yes    Weight Concern No    Special Diet No    Back Care Yes    Exercise No    Bike Helmet No     n/a    Seat Belt Yes    Self-Exams Yes     Social History Narrative       Family History   Problem Relation Age of Onset    Colon Cancer Father          Objective:     Visit Vitals    /76 (BP 1 Location: Left arm, BP Patient Position: Sitting)    Pulse (!) 111    Temp 96.3 °F (35.7 °C) (Temporal)    Ht 5' 5\" (1.651 m)    Wt 167 lb (75.8 kg)    SpO2 94%    BMI 27.79 kg/m2     Body mass index is 27.79 kg/(m^2). General: Alert and oriented. No acute distress. Well nourished  HEENT :  Ears:TMs are normal. Canals are clear. Eyes: pupils equal, round, react to light and accommodation. Extra ocular movements intact. Nose: patent. Mouth and throat is clear. Neck:supple full range of motion no thyromegaly. Trachea midline, No carotid bruits. No significant lymphadenopathy  Lungs[de-identified] clear to auscultation without wheezes, rales, or rhonchi. Heart :RRR, S1 & S2 are normal intensity. No murmur; no gallop  Abdomen: bowel sounds active. No tenderness, guarding, rebound, masses, hepatic or spleen enlargement  Back: no CVA tenderness. Extremities: without clubbing, cyanosis, or edema  Pulses: radial and femoral pulses are normal  Neuro: HMF intact. Cranial nerves II through XII grossly normal.  Motor: is 4over 5  Lower extremities and symmetrical., no joint  Tenderness, warmth or edema. Deep tendon reflexes: +2 equal  Skin: no rashes, no jaundice,  capillary refill 2 seconds. No results found for this visit on 11/15/17.     Assessment/ Plan:     Diagnoses and all orders for this visit:    1. Preop examination    2. Encounter for immunization  -     PNEUMOCOCCAL POLYSACCHARIDE VACCINE, 23-VALENT, ADULT OR IMMUNOSUPPRESSED PT DOSE,  -     PA IMMUNIZ ADMIN,1 SINGLE/COMB VAC/TOXOID  -     MICROALBUMIN, UR, 24 HR  -     MONITOR 14-DRUG CLASS PROFILE ([facility])    3. BMI 27.0-27.9,adult  -     MICROALBUMIN, UR, 24 HR  -     MONITOR 14-DRUG CLASS PROFILE ([facility])    4. Chronic midline low back pain with bilateral sciatica  -     REFERRAL TO ORTHOPEDICS  -     MICROALBUMIN, UR, 24 HR  -     MONITOR 14-DRUG CLASS PROFILE ([facility])    5. Encounter for Medicare annual wellness exam  -     MICROALBUMIN, UR, 24 HR  -     MONITOR 14-DRUG CLASS PROFILE ([facility])    6. Controlled type 2 diabetes mellitus without complication, without long-term current use of insulin ([facility])  -     CBC WITH AUTOMATED DIFF  -     HEMOGLOBIN A1C WITH EAG  -     COLLECTION VENOUS BLOOD,VENIPUNCTURE  -     METABOLIC PANEL, COMPREHENSIVE  -     MICROALBUMIN, UR, 24 HR  -     MONITOR 14-DRUG CLASS PROFILE ([facility])    7. Hospital discharge follow-up  -     MICROALBUMIN, UR, 24 HR  -     MONITOR 14-DRUG CLASS PROFILE ([facility])    8. Drug abuse, opioid type    9. Essential hypertension    Other orders  -     gabapentin (NEURONTIN) 300 mg capsule; Take 1 Cap by mouth three (3) times daily. 1. Preop examination    2. Encounter for immunization    3. BMI 27.0-27.9,adult    4. Chronic midline low back pain with bilateral sciatica    5. Encounter for Medicare annual wellness exam    6. Controlled type 2 diabetes mellitus without complication, without long-term current use of insulin ([facility] [address]    7. Hospital discharge follow-up    8. Drug abuse, opioid type    9.  Essential hypertension        Orders Placed This Encounter    COLLECTION VENOUS BLOOD,VENIPUNCTURE    PNEUMOCOCCAL POLYSACCHARIDE VACCINE, 23-VALENT, ADULT OR IMMUNOSUPPRESSED PT DOSE,    CBC WITH AUTOMATED DIFF  HEMOGLOBIN A1C WITH EAG    METABOLIC PANEL, COMPREHENSIVE    MICROALBUMIN, UR, 24 HR    MONITOR 14-DRUG CLASS PROFILE ([facility])    REFERRAL TO ORTHOPEDICS     Referral Priority:   Routine     Referral Type:   Consultation     Referral Reason:   Specialty Services Required    MN IMMUNIZ ADMIN,1 SINGLE/COMB VAC/TOXOID    DISCONTD: HYDROcodone-acetaminophen (NORCO) 5-325 mg per tablet     Sig: Take 1-2 Tabs by mouth.  gabapentin (NEURONTIN) 300 mg capsule     Sig: Take 1 Cap by mouth three (3) times daily. Dispense:  90 Cap     Refill:  0         Verbal and written instructions (see AVS) provided.  Patient expresses understanding of diagnosis and treatment plan. Follow-up Disposition:  Return in about 2 months (around 1/15/2018).       BERNARDO Christine    11/28/ 2017  EKG : attached   Labs attached     Cleared for surgery with no restrictions  Remberto Robbins NP-C

## 2017-12-01 ENCOUNTER — TELEPHONE (OUTPATIENT)
Dept: FAMILY MEDICINE CLINIC | Age: 77
End: 2017-12-01

## 2017-12-05 ENCOUNTER — TELEPHONE (OUTPATIENT)
Dept: FAMILY MEDICINE CLINIC | Age: 77
End: 2017-12-05

## 2017-12-05 NOTE — TELEPHONE ENCOUNTER
[facility]:    Called and wants us to fax last EKG and HgbAC faxed to [phone]  Or if a problem call [phone]

## 2017-12-15 ENCOUNTER — TELEPHONE (OUTPATIENT)
Dept: ONCOLOGY | Age: 77
End: 2017-12-15

## 2017-12-15 NOTE — TELEPHONE ENCOUNTER
KEVIN verified.  called, requesting to know if Mrs Larry Price has a follow up appointment with Dr Huang Ellis. Dr Huang Ellis is no longer with us, Dr Kamar Case is seeing patients at this time. Mr Nichol Ahuja was notified patient was last seen 8/31/17 and was to follow up in 2 months. Patient has not follow up due to recent back surgery. Patient also got on phone, states she does not want any appointments at this time. They both expressed difficulty in \"getting out\" during bad weather. Offered a follow up appointment for after the first of the year, which was refused by both. They will call to schedule when they can.

## 2017-12-26 RX ORDER — GABAPENTIN 300 MG/1
CAPSULE ORAL
Qty: 90 CAP | Refills: 0 | Status: SHIPPED | OUTPATIENT
Start: 2017-12-26 | End: 2020-09-29

## 2018-01-22 RX ORDER — CELECOXIB 200 MG/1
200 CAPSULE ORAL 2 TIMES DAILY
Qty: 180 CAP | Refills: 3 | Status: SHIPPED | OUTPATIENT
Start: 2018-01-22 | End: 2019-03-02 | Stop reason: SDUPTHER

## 2018-02-06 NOTE — TELEPHONE ENCOUNTER
Patient has changed mail order pharmacies and needs a new Rx for Accu-Chek Iris Plus test strips. She is requesting #100 with refills please. [facility].

## 2018-02-12 ENCOUNTER — TELEPHONE (OUTPATIENT)
Dept: FAMILY MEDICINE CLINIC | Age: 78
End: 2018-02-12

## 2018-02-12 NOTE — TELEPHONE ENCOUNTER
Patient's mail order supply of test strips won't come in time and she needs a short supply of #50 sent locally. [place].

## 2018-02-15 ENCOUNTER — OFFICE VISIT (OUTPATIENT)
Dept: FAMILY MEDICINE CLINIC | Age: 78
End: 2018-02-15

## 2018-02-15 VITALS
WEIGHT: 170.2 LBS | TEMPERATURE: 96.7 F | HEART RATE: 81 BPM | DIASTOLIC BLOOD PRESSURE: 74 MMHG | RESPIRATION RATE: 18 BRPM | HEIGHT: 65 IN | OXYGEN SATURATION: 99 % | BODY MASS INDEX: 28.36 KG/M2 | SYSTOLIC BLOOD PRESSURE: 136 MMHG

## 2018-02-15 DIAGNOSIS — E11.21 TYPE 2 DIABETES WITH NEPHROPATHY (HCC): Primary | ICD-10-CM

## 2018-02-15 DIAGNOSIS — E78.00 HYPERCHOLESTEROLEMIA: ICD-10-CM

## 2018-02-15 DIAGNOSIS — E11.9 COMPREHENSIVE DIABETIC FOOT EXAMINATION, TYPE 2 DM, ENCOUNTER FOR (HCC): ICD-10-CM

## 2018-02-15 PROBLEM — E11.40 TYPE 2 DIABETES MELLITUS WITH DIABETIC NEUROPATHY (HCC): Status: ACTIVE | Noted: 2018-02-15

## 2018-02-15 NOTE — MR AVS SNAPSHOT
Jere Garcia 
 
 
 [address] 
[phone] Patient: Funmilayo Lundberg MRN: [id number] Visit Information Date & Time Provider Department Dept. Phone Encounter #  
 2/15/2018  1:00 PM Arely De La Rosa NP [address] [phone] [id number] Upcoming Health Maintenance Date Due  
 FOOT EXAM Q1 1950 OSTEOPOROSIS SCREENING (DEXA) 2004 EYE EXAM RETINAL OR DILATED Q1 3/24/2018 HEMOGLOBIN A1C Q6M 5/15/2018 LIPID PANEL Q1 2018 MICROALBUMIN Q1 11/15/2018 MEDICARE YEARLY EXAM 2018 GLAUCOMA SCREENING Q2Y 3/24/2019 COLONOSCOPY 2021 DTaP/Tdap/Td series (2 - Td) 10/3/2027 Allergies as of 2/15/2018  Review Complete On: 2/15/2018 By: Arely De La Rosa NP Severity Noted Reaction Type Reactions Morphine High 2016    Anaphylaxis Ultram [Tramadol] Low 2016    Palpitations Current Immunizations  Reviewed on 2017 Name Date Influenza High Dose Vaccine PF 10/2/2017, 2016, 10/16/2015 12:00 AM  
 Influenza Vaccine 2014 12:00 AM, 2013 12:00 AM  
 Pneumococcal Conjugate (PCV-13) 2/3/2016 12:00 AM  
 Pneumococcal Polysaccharide (PPSV-23) 11/15/2017 Tdap 10/3/2017 11:10 AM  
 Zoster Vaccine, Live 2009 12:00 AM  
  
 Not reviewed this visit You Were Diagnosed With   
  
 Codes Comments Type 2 diabetes with nephropathy (HCC)    -  Primary ICD-10-CM: E11.21 
ICD-9-CM: 250.40, 583.81   
 BMI 28.0-28.9,adult     ICD-10-CM: H89.55 
ICD-9-CM: V85.24 Vitals BP Pulse Temp Resp Height(growth percentile) 136/74 (BP 1 Location: Left arm, BP Patient Position: Sitting) 81 96.7 °F (35.9 °C) (Temporal) 18 5' 5\" (1.651 m) Weight(growth percentile) SpO2 BMI OB Status Smoking Status 170 lb 3.2 oz (77.2 kg) 99% 28.32 kg/m2 Postmenopausal Current Every Day Smoker BMI and BSA Data Body Mass Index Body Surface Area  
 28.32 kg/m 2 1.88 m 2 Preferred Pharmacy Pharmacy Name Phone [address], [place] [phone] Your Updated Medication List  
  
   
This list is accurate as of: 2/15/18  2:10 PM.  Always use your most recent med list.  
  
  
  
  
 ascorbic acid (vitamin C) 500 mg tablet Commonly known as:  VITAMIN C Take  by mouth. atorvastatin 20 mg tablet Commonly known as:  LIPITOR  
TAKE 1 TABLET DAILY  
  
 b complex vitamins tablet Take 1 Tab by mouth daily. B.infantis-B.ani-B.long-B.bifi 10-15 mg Tbec Take  by mouth. BENEFIBER (GUAR GUM) PO Take  by mouth. celecoxib 200 mg capsule Commonly known as:  CELEBREX Take 1 Cap by mouth two (2) times a day. cholecalciferol 1,000 unit Cap Commonly known as:  VITAMIN D3 Take  by mouth daily. dilTIAZem  mg ER capsule Commonly known as:  CARDIZEM CD Take 1 Cap by mouth daily. gabapentin 300 mg capsule Commonly known as:  NEURONTIN  
TAKE 1 CAPSULE THREE TIMES A DAY  
  
 gemfibrozil 600 mg tablet Commonly known as:  LOPID Take 1 Tab by mouth two (2) times a day. glucose blood VI test strips strip Commonly known as:  ACCU-CHEK FRANCO PLUS TEST STRP  
DX E11.65  
  
 glyBURIDE 5 mg tablet Commonly known as:  Christin Paling Take 1 Tab by mouth two (2) times daily (with meals). insulin glargine 100 unit/mL (3 mL) Inpn Commonly known as:  KIRSTY HOLT  
40 Units by SubCUTAneous route daily. Insulin Needles (Disposable) 31 gauge x 5/16\" Ndle Use as directed  
  
 metFORMIN 1,000 mg tablet Commonly known as:  GLUCOPHAGE  
TAKE 1 TABLET TWICE A DAY  
  
 omega-3 fatty acids-vitamin e 1,000 mg Cap Take 1 Cap by mouth. 32a day  
  
 thiamine 100 mg tablet Commonly known as:  B-1 Take 1 Tab by mouth daily. venlafaxine 75 mg tablet Commonly known as:  [facility]  
 Take 1 Tab by mouth two (2) times daily (with meals). zinc 50 mg Tab tablet Take  by mouth daily. We Performed the Following CBC WITH AUTOMATED DIFF [14203 CPT(R)] COLLECTION VENOUS BLOOD,VENIPUNCTURE [id number] CPT(R)] HEMOGLOBIN A1C WITH EAG [32191 CPT(R)] METABOLIC PANEL, COMPREHENSIVE [27153 CPT(R)] Introducing [facility]! Dear Della Jose: Thank you for requesting a [employer] account. Our records indicate that you already have an active [employer] account. You can access your account anytime at [email or web address] Did you know that you can access your hospital and ER discharge instructions at any time in [employer]? You can also review all of your test results from your hospital stay or ER visit. Additional Information If you have questions, please visit the Frequently Asked Questions section of the [employer] website at [email or web address] Remember, [employer] is NOT to be used for urgent needs. For medical emergencies, dial 911. Now available from your iPhone and Android! Please provide this summary of care documentation to your next provider. Your primary care clinician is listed as Kerri Crawley. If you have any questions after today's visit, please call [phone].

## 2018-02-16 LAB
ALBUMIN SERPL-MCNC: 4 G/DL (ref 3.5–4.8)
ALBUMIN/GLOB SERPL: 1.5 {RATIO} (ref 1.2–2.2)
ALP SERPL-CCNC: 79 IU/L (ref 39–117)
ALT SERPL-CCNC: 21 IU/L (ref 0–32)
AST SERPL-CCNC: 20 IU/L (ref 0–40)
BASOPHILS # BLD AUTO: 0.1 X10E3/UL (ref 0–0.2)
BASOPHILS NFR BLD AUTO: 0 %
BILIRUB SERPL-MCNC: <0.2 MG/DL (ref 0–1.2)
BUN SERPL-MCNC: 21 MG/DL (ref 8–27)
BUN/CREAT SERPL: 19 (ref 12–28)
CALCIUM SERPL-MCNC: 10.2 MG/DL (ref 8.7–10.3)
CHLORIDE SERPL-SCNC: 99 MMOL/L (ref 96–106)
CO2 SERPL-SCNC: 24 MMOL/L (ref 18–29)
CREAT SERPL-MCNC: 1.11 MG/DL (ref 0.57–1)
EOSINOPHIL # BLD AUTO: 0.3 X10E3/UL (ref 0–0.4)
EOSINOPHIL NFR BLD AUTO: 2 %
ERYTHROCYTE [DISTWIDTH] IN BLOOD BY AUTOMATED COUNT: 14.2 % (ref 12.3–15.4)
EST. AVERAGE GLUCOSE BLD GHB EST-MCNC: 186 MG/DL
GFR SERPLBLD CREATININE-BSD FMLA CKD-EPI: 48 ML/MIN/1.73
GFR SERPLBLD CREATININE-BSD FMLA CKD-EPI: 55 ML/MIN/1.73
GLOBULIN SER CALC-MCNC: 2.6 G/DL (ref 1.5–4.5)
GLUCOSE SERPL-MCNC: 154 MG/DL (ref 65–99)
HBA1C MFR BLD: 8.1 % (ref 4.8–5.6)
HCT VFR BLD AUTO: 40.5 % (ref 34–46.6)
HGB BLD-MCNC: 13.5 G/DL (ref 11.1–15.9)
IMM GRANULOCYTES # BLD: 0 X10E3/UL (ref 0–0.1)
IMM GRANULOCYTES NFR BLD: 0 %
INTERPRETATION: NORMAL
LYMPHOCYTES # BLD AUTO: 5.2 X10E3/UL (ref 0.7–3.1)
LYMPHOCYTES NFR BLD AUTO: 38 %
MCH RBC QN AUTO: 29.5 PG (ref 26.6–33)
MCHC RBC AUTO-ENTMCNC: 33.3 G/DL (ref 31.5–35.7)
MCV RBC AUTO: 89 FL (ref 79–97)
MONOCYTES # BLD AUTO: 0.8 X10E3/UL (ref 0.1–0.9)
MONOCYTES NFR BLD AUTO: 6 %
NEUTROPHILS # BLD AUTO: 7.2 X10E3/UL (ref 1.4–7)
NEUTROPHILS NFR BLD AUTO: 54 %
PLATELET # BLD AUTO: 350 X10E3/UL (ref 150–379)
POTASSIUM SERPL-SCNC: 5.3 MMOL/L (ref 3.5–5.2)
PROT SERPL-MCNC: 6.6 G/DL (ref 6–8.5)
RBC # BLD AUTO: 4.57 X10E6/UL (ref 3.77–5.28)
SODIUM SERPL-SCNC: 139 MMOL/L (ref 134–144)
WBC # BLD AUTO: 13.5 X10E3/UL (ref 3.4–10.8)

## 2018-02-16 NOTE — PROGRESS NOTES
Subjective:     Nohemi Evans is a 65 y.o. female who presents today with the following:  Chief Complaint   Patient presents with    Diabetes     checkup   Enjoys going to physical therapy to help her back and knee pain. Takes gabapentin with good relief. Hypercholesterolemia. Will check levels today. Diabetes. Sugars controlled moderately  Hypoglycemia: none  Tolerating current treatment moderately  Current medications include diet  oral agent (monotherapy): Glucophage  And Insulin    Lab Results   Component Value Date/Time    Hemoglobin A1c 8.4 (H) 11/15/2017 12:12 PM    Hemoglobin A1c 8.6 (H) 08/15/2017 03:12 PM    Hemoglobin A1c 9.9 (H) 05/17/2017 12:52 PM    Glucose 156 (H) 11/15/2017 12:12 PM    Glucose (POC) 345 (H) 10/28/2017 01:05 PM    Microalb/Creat ratio (ug/mg creat.) 1219.9 (H) 11/10/2016 11:17 AM    MICROALBUMIN,MG/DAY Comment 11/15/2017 12:12 PM    LDL, calculated 90 05/17/2017 12:52 PM    Creatinine 1.21 (H) 11/15/2017 12:12 PM     Lab Results   Component Value Date/Time    Microalb/Creat ratio (ug/mg creat.) 1219.9 (H) 11/10/2016 11:17 AM    MICROALBUMIN,MG/DAY Comment 11/15/2017 12:12 PM       Last eye exam performed  less than 1 year 3/24/2017  ago and was normal.    Last foot exam performed greather than 1 year ago. warm, good capillary refill    BMIDiscussed the patient's BMI with her. The BMI follow up plan is as follows:     dietary management education, guidance, and counseling  encourage exercise  monitor weight  prescribed dietary intake    An After Visit Summary was printed and given to the patient.     COMPLIANT WITH MEDICATION:   ROS:  Gen: denies fever, chills, fatigue, weight loss, weight gain  HEENT:denies blurry vision, nasal congestion, sore throat  Resp: denies dypsnea, cough, wheezing  CV: denies chest pain radiating to the jaws or arms, palpitations, lower extremity edema  Abd: denies nausea, vomiting, diarrhea, constipation  Neuro: denies numbness/tingling  Endo: denies polyuria, polydipsia, heat/cold intolerance  Heme: no lymphadenopathy    Allergies   Allergen Reactions    Morphine Anaphylaxis    Ultram [Tramadol] Palpitations         Current Outpatient Prescriptions:     glucose blood VI test strips (ACCU-CHEK FRANCO PLUS TEST STRP) strip, DX E11.65, Disp: 50 Strip, Rfl: 0    celecoxib (CELEBREX) 200 mg capsule, Take 1 Cap by mouth two (2) times a day., Disp: 180 Cap, Rfl: 3    gabapentin (NEURONTIN) 300 mg capsule, TAKE 1 CAPSULE THREE TIMES A DAY, Disp: 90 Cap, Rfl: 0    thiamine (B-1) 100 mg tablet, Take 1 Tab by mouth daily. , Disp: 30 Tab, Rfl: 0    venlafaxine (EFFEXOR) 75 mg tablet, Take 1 Tab by mouth two (2) times daily (with meals). , Disp: 60 Tab, Rfl: 0    metFORMIN (GLUCOPHAGE) 1,000 mg tablet, TAKE 1 TABLET TWICE A DAY, Disp: 180 Tab, Rfl: 1    dilTIAZem CD (CARDIZEM CD) 240 mg ER capsule, Take 1 Cap by mouth daily. , Disp: 90 Cap, Rfl: 3    atorvastatin (LIPITOR) 20 mg tablet, TAKE 1 TABLET DAILY, Disp: 90 Tab, Rfl: 1    insulin glargine (LANTUS,BASAGLAR) 100 unit/mL (3 mL) inpn, 40 Units by SubCUTAneous route daily. (Patient taking differently: 45 Units by SubCUTAneous route daily. Indications: takes 45 units daily), Disp: 6 Pen, Rfl: 11    glyBURIDE (DIABETA) 5 mg tablet, Take 1 Tab by mouth two (2) times daily (with meals). , Disp: 180 Tab, Rfl: 3    Insulin Needles, Disposable, 31 gauge x 5/16\" ndle, Use as directed, Disp: 1 Package, Rfl: 11    gemfibrozil (LOPID) 600 mg tablet, Take 1 Tab by mouth two (2) times a day., Disp: 180 Tab, Rfl: 3    omega-3 fatty acids-vitamin e 1,000 mg cap, Take 1 Cap by mouth. 32a day, Disp: , Rfl:     ascorbic acid, vitamin C, (VITAMIN C) 500 mg tablet, Take  by mouth., Disp: , Rfl:     b complex vitamins tablet, Take 1 Tab by mouth daily. , Disp: , Rfl:     zinc 50 mg tab tablet, Take  by mouth daily. , Disp: , Rfl:     cholecalciferol (VITAMIN D3) 1,000 unit cap, Take  by mouth daily. , Disp: , Rfl:    B.infantis-B.ani-B.long-B.bifi 10-15 mg TbEC, Take  by mouth., Disp: , Rfl:     BENEFIBER, GUAR GUM, PO, Take  by mouth., Disp: , Rfl:     Past Medical History:   Diagnosis Date    Arthritis     Chronic pain     Chronic pain     Diabetes ([address]     Diverticular disease     Hemorrhoid     Hypercholesterolemia     IBS (irritable bowel syndrome)        Past Surgical History:   Procedure Laterality Date    HX CATARACT REMOVAL      HX CHOLECYSTECTOMY      HX COLONOSCOPY  2009    HX COLONOSCOPY  2016    2 adenomatous polyp    HX HEMORRHOIDECTOMY  2009    HX HYSTERECTOMY      HX KNEE REPLACEMENT      right    HX ORTHOPAEDIC  12/11/2017    back, laminectomy and decompression       History   Smoking Status    Current Every Day Smoker   Smokeless Tobacco    Never Used       Social History     Social History    Marital status:      Spouse name: N/A    Number of children: N/A    Years of education: N/A     Social History Main Topics    Smoking status: Current Every Day Smoker    Smokeless tobacco: Never Used    Alcohol use No    Drug use: None    Sexual activity: Not Asked     Other Topics Concern     Service No    Blood Transfusions Yes    Caffeine Concern Yes    Occupational Exposure No    Hobby Hazards No    Sleep Concern Yes    Stress Concern Yes    Weight Concern No    Special Diet No    Back Care Yes    Exercise No    Bike Helmet No     n/a    Seat Belt Yes    Self-Exams Yes     Social History Narrative       Family History   Problem Relation Age of Onset    Colon Cancer Father          Objective:     Visit Vitals    /74 (BP 1 Location: Left arm, BP Patient Position: Sitting)    Pulse 81    Temp 96.7 °F (35.9 °C) (Temporal)    Resp 18    Ht 5' 5\" (1.651 m)    Wt 170 lb 3.2 oz (77.2 kg)    SpO2 99%    BMI 28.32 kg/m2     Body mass index is 28.32 kg/(m^2). General: Alert and oriented. No acute distress.   Well nourished  HEENT :  Ears:TMs are normal. Canals are clear. Eyes: pupils equal, round, react to light and accommodation. Extra ocular movements intact. Nose: patent. Mouth and throat is clear. Neck:supple full range of motion no thyromegaly. Trachea midline, No carotid bruits. No significant lymphadenopathy  Lungs[de-identified] clear to auscultation without wheezes, rales, or rhonchi. Heart :RRR, S1 & S2 are normal intensity. No murmur; no gallop  Abdomen: bowel sounds active. No tenderness, guarding, rebound, masses, hepatic or spleen enlargement  Back: no CVA tenderness. Extremities: without clubbing, cyanosis, or edema  Pulses: radial and femoral pulses are normal  Neuro: HMF intact. Cranial nerves II through XII grossly normal.  Motor: is 5 over 5 and symmetrical.   Deep tendon reflexes: +2 equal       Diabetic foot exam performed by Kayla Hernandez NP     Measurement  Response Nurse Comment Physician Comment   Monofilament  R - normal sensation with micro filament  L - normal sensation with micro filament     Pulse DP R - present  L - present     Pulse TP R - present  L - present     Structural deformity R - None  L - None     Skin Integrity / Deformity R - None  L - None        Reviewed by:           Results for orders placed or performed in visit on 11/15/17   CBC WITH AUTOMATED DIFF   Result Value Ref Range    WBC 12.9 (H) 3.4 - 10.8 x10E3/uL    RBC 4.11 3.77 - 5.28 x10E6/uL    HGB 12.6 11.1 - 15.9 g/dL    HCT 37.6 34.0 - 46.6 %    MCV 92 79 - 97 fL    MCH 30.7 26.6 - 33.0 pg    MCHC 33.5 31.5 - 35.7 g/dL    RDW 13.6 12.3 - 15.4 %    PLATELET 068 (H) 284 - 379 x10E3/uL    NEUTROPHILS 57 Not Estab. %    Lymphocytes 34 Not Estab. %    MONOCYTES 6 Not Estab. %    EOSINOPHILS 3 Not Estab. %    BASOPHILS 0 Not Estab. %    ABS. NEUTROPHILS 7.4 (H) 1.4 - 7.0 x10E3/uL    Abs Lymphocytes 4.4 (H) 0.7 - 3.1 x10E3/uL    ABS. MONOCYTES 0.8 0.1 - 0.9 x10E3/uL    ABS. EOSINOPHILS 0.4 0.0 - 0.4 x10E3/uL    ABS.  BASOPHILS 0.0 0.0 - 0.2 x10E3/uL    IMMATURE GRANULOCYTES 0 Not Estab. %    ABS. IMM. GRANS. 0.0 0.0 - 0.1 x10E3/uL   HEMOGLOBIN A1C WITH EAG   Result Value Ref Range    Hemoglobin A1c 8.4 (H) 4.8 - 5.6 %    Estimated average glucose 667 mg/dL   METABOLIC PANEL, COMPREHENSIVE   Result Value Ref Range    Glucose 156 (H) 65 - 99 mg/dL    BUN 26 8 - 27 mg/dL    Creatinine 1.21 (H) 0.57 - 1.00 mg/dL    GFR est non-AA 43 (L) >59 mL/min/1.73    GFR est AA 50 (L) >59 mL/min/1.73    BUN/Creatinine ratio 21 12 - 28    Sodium 142 134 - 144 mmol/L    Potassium 5.8 (H) 3.5 - 5.2 mmol/L    Chloride 104 96 - 106 mmol/L    CO2 21 18 - 29 mmol/L    Calcium 10.2 8.7 - 10.3 mg/dL    Protein, total 7.1 6.0 - 8.5 g/dL    Albumin 3.8 3.5 - 4.8 g/dL    GLOBULIN, TOTAL 3.3 1.5 - 4.5 g/dL    A-G Ratio 1.2 1.2 - 2.2    Bilirubin, total <0.2 0.0 - 1.2 mg/dL    Alk. phosphatase 76 39 - 117 IU/L    AST (SGOT) 17 0 - 40 IU/L    ALT (SGPT) 19 0 - 32 IU/L   MICROALBUMIN, UR, 24 HR   Result Value Ref Range    Microalbumin, urine 1472.8 Not Estab. ug/mL    MICROALBUMIN,MG/DAY Comment <30.0 mg/day   MONITOR 14-DRUG CLASS PROFILE ([facility])   Result Value Ref Range    Amphetamine Screen, urine Negative Qpsjez=2204 ng/mL    Barbiturates Screen, urine Negative Cyvkuq=900 ng/mL    Benzodiazepines Screen, urine Negative Hltssw=500 ng/mL    Cannabinoid Screen, urine Negative Cutoff=20 ng/mL    Cocaine Metab.  Screen, urine Negative Fxxeqf=065 ng/mL    Opiate Screen, urine See Final Results Qousgr=781 ng/mL    Oxycodone/Oxymorphone, urine Negative Gylhnm=320 ng/mL    Phencyclidine Screen, urine Negative Cutoff=25 ng/mL    Methadone Screen, urine Negative Redepa=524 ng/mL    Propoxyphene Screen, urine Negative Fphmde=602 ng/mL    Meperidine screen Negative Ajlfiy=342 ng/mL    Tramadol Screen, urine Negative Wuwgvq=592 ng/mL    FENTANYL, URINE Negative Qfudzc=7719 pg/mL    Buprenorphine, urine Negative Cutoff=10 ng/mL    Creatinine, urine 134.0 20.0 - 300.0 mg/dL    Specific Gravity 1.018     pH, urine 5.4 4.5 - 8.9    Please Note Comment    OPIATES, CONFIRM   Result Value Ref Range    Opiates Positive (A) Cjojiz=571 ng/mL    Codeine Negative Woqwlc=390    Morphine Negative Jtqzci=288    Hydromorphone Positive (A)     Hydromorphone confirm 584 Vixdsu=764 ng/mL    Hydrocodone Positive (A)     Hydrocodone confirm 740 Rahfyi=735 ng/mL   CKD REPORT   Result Value Ref Range    Interpretation Note        No results found for this visit on 02/15/18. Assessment/ Plan:     Diagnoses and all orders for this visit:    1. Type 2 diabetes with nephropathy (HCC)  -     CBC WITH AUTOMATED DIFF  -     METABOLIC PANEL, COMPREHENSIVE  -     HEMOGLOBIN A1C WITH EAG  -     COLLECTION VENOUS BLOOD,VENIPUNCTURE  -      DIABETES FOOT EXAM    2. BMI 28.0-28.9,adult    3. Comprehensive diabetic foot examination, type 2 DM, encounter for ([facility]  -      DIABETES FOOT EXAM    4. Hypercholesterolemia         1. Type 2 diabetes with nephropathy ([facility]    2. BMI 28.0-28.9,adult    3. Comprehensive diabetic foot examination, type 2 DM, encounter for ([facility]    4. Hypercholesterolemia        Orders Placed This Encounter    COLLECTION VENOUS BLOOD,VENIPUNCTURE    CBC WITH AUTOMATED DIFF    METABOLIC PANEL, COMPREHENSIVE    HEMOGLOBIN A1C WITH EAG     DIABETES FOOT EXAM         Verbal and written instructions (see AVS) provided.  Patient expresses understanding of diagnosis and treatment plan. Follow-up Disposition:  Return in about 3 months (around 5/15/2018).       BERNARDO Singh

## 2018-03-14 RX ORDER — METFORMIN HYDROCHLORIDE 1000 MG/1
TABLET ORAL
Qty: 180 TAB | Refills: 1 | Status: SHIPPED | OUTPATIENT
Start: 2018-03-14 | End: 2018-10-08 | Stop reason: ALTCHOICE

## 2018-04-11 RX ORDER — GLYBURIDE 5 MG/1
5 TABLET ORAL 2 TIMES DAILY WITH MEALS
Qty: 180 TAB | Refills: 3 | Status: SHIPPED | OUTPATIENT
Start: 2018-04-11 | End: 2019-04-20 | Stop reason: SDUPTHER

## 2018-05-03 ENCOUNTER — OFFICE VISIT (OUTPATIENT)
Dept: FAMILY MEDICINE CLINIC | Age: 78
End: 2018-05-03

## 2018-05-03 VITALS
BODY MASS INDEX: 27.89 KG/M2 | HEART RATE: 86 BPM | OXYGEN SATURATION: 98 % | TEMPERATURE: 97 F | WEIGHT: 167.4 LBS | RESPIRATION RATE: 22 BRPM | SYSTOLIC BLOOD PRESSURE: 132 MMHG | HEIGHT: 65 IN | DIASTOLIC BLOOD PRESSURE: 70 MMHG

## 2018-05-03 DIAGNOSIS — E11.21 TYPE 2 DIABETES WITH NEPHROPATHY (HCC): Primary | ICD-10-CM

## 2018-05-03 DIAGNOSIS — E78.00 HYPERCHOLESTEROLEMIA: ICD-10-CM

## 2018-05-03 DIAGNOSIS — E11.9 COMPREHENSIVE DIABETIC FOOT EXAMINATION, TYPE 2 DM, ENCOUNTER FOR (HCC): ICD-10-CM

## 2018-05-03 NOTE — MR AVS SNAPSHOT
[address] [place] [address] [address]  
[phone] Patient: Bertha Colmenares MRN: [id number] IBJ:[id number] Visit Information Date & Time Provider Department Dept. Phone Encounter #  
 5/3/2018  2:00 PM Xiang Shirley NP [facility] [address] [facility] [phone] [id number] Upcoming Health Maintenance Date Due Bone Densitometry (Dexa) Screening 7/20/2004 EYE EXAM RETINAL OR DILATED Q1 3/24/2018 LIPID PANEL Q1 5/17/2018 Influenza Age 5 to Adult 8/1/2018 HEMOGLOBIN A1C Q6M 8/15/2018 MICROALBUMIN Q1 11/15/2018 MEDICARE YEARLY EXAM 11/16/2018 FOOT EXAM Q1 2/15/2019 GLAUCOMA SCREENING Q2Y 3/24/2019 COLONOSCOPY 6/8/2021 DTaP/Tdap/Td series (2 - Td) 10/3/2027 Allergies as of 5/3/2018  Review Complete On: 5/3/2018 By: Xiang Shirley NP Severity Noted Reaction Type Reactions Morphine High 05/24/2016    Anaphylaxis Ultram [Tramadol] Low 05/24/2016    Palpitations Current Immunizations  Reviewed on 8/31/2017 Name Date Influenza High Dose Vaccine PF 10/2/2017, 9/29/2016, 10/16/2015 12:00 AM  
 Influenza Vaccine 9/22/2014 12:00 AM, 9/17/2013 12:00 AM  
 Pneumococcal Conjugate (PCV-13) 2/3/2016 12:00 AM  
 Pneumococcal Polysaccharide (PPSV-23) 11/15/2017 Tdap 10/3/2017 11:10 AM  
 Zoster Vaccine, Live 12/2/2009 12:00 AM  
  
 Not reviewed this visit You Were Diagnosed With   
  
 Codes Comments Type 2 diabetes with nephropathy (HCC)    -  Primary ICD-10-CM: E11.21 
ICD-9-CM: 250.40, 583.81 Hypercholesterolemia     ICD-10-CM: E78.00 ICD-9-CM: 272.0 Comprehensive diabetic foot examination, type 2 DM, encounter for [facility])     ICD-10-CM: E11.9 ICD-9-CM: 250.00 Vitals BP Pulse Temp Resp Height(growth percentile) Weight(growth percentile)  132/70 (BP 1 Location: Left arm, BP Patient Position: Sitting) 86 97 °F (36.1 °C) (Temporal) 22 5' 5\" (1.651 m) 167 lb 6.4 oz (75.9 kg) SpO2 BMI OB Status Smoking Status 98% 27.86 kg/m2 Postmenopausal Current Every Day Smoker BMI and BSA Data Body Mass Index Body Surface Area  
 27.86 kg/m 2 1.87 m 2 Preferred Pharmacy Pharmacy Name Phone [address] [facility], [address] [facility] [address] Augusto Dunaway Your Updated Medication List  
  
   
This list is accurate as of 5/3/18  2:56 PM.  Always use your most recent med list.  
  
  
  
  
 ascorbic acid (vitamin C) 500 mg tablet Commonly known as:  VITAMIN C Take  by mouth. atorvastatin 20 mg tablet Commonly known as:  LIPITOR  
TAKE 1 TABLET DAILY  
  
 b complex vitamins tablet Take 1 Tab by mouth daily. B.infantis-B.ani-B.long-B.bifi 10-15 mg Tbec Take  by mouth. BENEFIBER (GUAR GUM) PO Take  by mouth. celecoxib 200 mg capsule Commonly known as:  CELEBREX Take 1 Cap by mouth two (2) times a day. cholecalciferol 1,000 unit Cap Commonly known as:  VITAMIN D3 Take  by mouth daily. dilTIAZem  mg ER capsule Commonly known as:  CARDIZEM CD Take 1 Cap by mouth daily. gabapentin 300 mg capsule Commonly known as:  NEURONTIN  
TAKE 1 CAPSULE THREE TIMES A DAY  
  
 gemfibrozil 600 mg tablet Commonly known as:  LOPID Take 1 Tab by mouth two (2) times a day. glucose blood VI test strips strip Commonly known as:  ACCU-CHEK FRANCO PLUS TEST STRP  
DX E11.65  
  
 glyBURIDE 5 mg tablet Commonly known as:  Afshan Sanket Take 1 Tab by mouth two (2) times daily (with meals). insulin glargine 100 unit/mL (3 mL) Inpn Commonly known as:  LANTUS,BASAGLAR  
40 Units by SubCUTAneous route daily. Insulin Needles (Disposable) 31 gauge x 5/16\" Ndle Use as directed  
  
 metFORMIN 1,000 mg tablet Commonly known as:  GLUCOPHAGE  
TAKE 1 TABLET TWICE A DAY  
  
 omega-3 fatty acids-vitamin e 1,000 mg Cap Take 1 Cap by mouth. 32a day  
  
 thiamine 100 mg tablet Commonly known as:  B-1 Take 1 Tab by mouth daily. venlafaxine 75 mg tablet Commonly known as:  [facility] Take 1 Tab by mouth two (2) times daily (with meals). zinc 50 mg Tab tablet Take  by mouth daily. We Performed the Following CBC WITH AUTOMATED DIFF [90639 CPT(R)] HEMOGLOBIN A1C WITH EAG [35326 CPT(R)]  DIABETES FOOT EXAM [HM7 Custom] LIPID PANEL [89940 CPT(R)] METABOLIC PANEL, COMPREHENSIVE [69350 CPT(R)] Introducing [facility]! Dear Brittaney Azevedo: Thank you for requesting a LinkedIn account. Our records indicate that you already have an active LinkedIn account. You can access your account anytime at [email or web address] Did you know that you can access your hospital and ER discharge instructions at any time in LinkedIn? You can also review all of your test results from your hospital stay or ER visit. Additional Information If you have questions, please visit the Frequently Asked Questions section of the LinkedIn website at [email or web address] Remember, LinkedIn is NOT to be used for urgent needs. For medical emergencies, dial 911. Now available from your iPhone and Android! Please provide this summary of care documentation to your next provider. Your primary care clinician is listed as Lee Guadalupe. If you have any questions after today's visit, please call [phone].

## 2018-05-03 NOTE — PROGRESS NOTES
1. Have you been to the ER, urgent care clinic since your last visit? Hospitalized since your last visit? No    2. Have you seen or consulted any other health care providers outside of the [facility] since your last visit? Include any pap smears or colon screening.  No      Patient expressed feeling depressed

## 2018-05-04 LAB
ALBUMIN SERPL-MCNC: 4.2 G/DL (ref 3.5–4.8)
ALBUMIN/GLOB SERPL: 1.4 {RATIO} (ref 1.2–2.2)
ALP SERPL-CCNC: 76 IU/L (ref 39–117)
ALT SERPL-CCNC: 21 IU/L (ref 0–32)
AST SERPL-CCNC: 21 IU/L (ref 0–40)
BASOPHILS # BLD AUTO: 0 X10E3/UL (ref 0–0.2)
BASOPHILS NFR BLD AUTO: 0 %
BILIRUB SERPL-MCNC: <0.2 MG/DL (ref 0–1.2)
BUN SERPL-MCNC: 29 MG/DL (ref 8–27)
BUN/CREAT SERPL: 21 (ref 12–28)
CALCIUM SERPL-MCNC: 10.2 MG/DL (ref 8.7–10.3)
CHLORIDE SERPL-SCNC: 103 MMOL/L (ref 96–106)
CHOLEST SERPL-MCNC: 189 MG/DL (ref 100–199)
CO2 SERPL-SCNC: 24 MMOL/L (ref 18–29)
CREAT SERPL-MCNC: 1.4 MG/DL (ref 0.57–1)
EOSINOPHIL # BLD AUTO: 0.3 X10E3/UL (ref 0–0.4)
EOSINOPHIL NFR BLD AUTO: 2 %
ERYTHROCYTE [DISTWIDTH] IN BLOOD BY AUTOMATED COUNT: 13.8 % (ref 12.3–15.4)
EST. AVERAGE GLUCOSE BLD GHB EST-MCNC: 169 MG/DL
GFR SERPLBLD CREATININE-BSD FMLA CKD-EPI: 36 ML/MIN/1.73
GFR SERPLBLD CREATININE-BSD FMLA CKD-EPI: 42 ML/MIN/1.73
GLOBULIN SER CALC-MCNC: 3.1 G/DL (ref 1.5–4.5)
GLUCOSE SERPL-MCNC: 63 MG/DL (ref 65–99)
HBA1C MFR BLD: 7.5 % (ref 4.8–5.6)
HCT VFR BLD AUTO: 40.8 % (ref 34–46.6)
HDLC SERPL-MCNC: 43 MG/DL
HGB BLD-MCNC: 13.8 G/DL (ref 11.1–15.9)
IMM GRANULOCYTES # BLD: 0 X10E3/UL (ref 0–0.1)
IMM GRANULOCYTES NFR BLD: 0 %
INTERPRETATION: NORMAL
LDLC SERPL CALC-MCNC: 78 MG/DL (ref 0–99)
LYMPHOCYTES # BLD AUTO: 5.9 X10E3/UL (ref 0.7–3.1)
LYMPHOCYTES NFR BLD AUTO: 37 %
MCH RBC QN AUTO: 30.6 PG (ref 26.6–33)
MCHC RBC AUTO-ENTMCNC: 33.8 G/DL (ref 31.5–35.7)
MCV RBC AUTO: 91 FL (ref 79–97)
MONOCYTES # BLD AUTO: 0.5 X10E3/UL (ref 0.1–0.9)
MONOCYTES NFR BLD AUTO: 3 %
NEUTROPHILS # BLD AUTO: 9.2 X10E3/UL (ref 1.4–7)
NEUTROPHILS NFR BLD AUTO: 58 %
PLATELET # BLD AUTO: 388 X10E3/UL (ref 150–379)
POTASSIUM SERPL-SCNC: 5.7 MMOL/L (ref 3.5–5.2)
PROT SERPL-MCNC: 7.3 G/DL (ref 6–8.5)
RBC # BLD AUTO: 4.51 X10E6/UL (ref 3.77–5.28)
SODIUM SERPL-SCNC: 143 MMOL/L (ref 134–144)
TRIGL SERPL-MCNC: 338 MG/DL (ref 0–149)
VLDLC SERPL CALC-MCNC: 68 MG/DL (ref 5–40)
WBC # BLD AUTO: 15.9 X10E3/UL (ref 3.4–10.8)

## 2018-05-04 NOTE — PROGRESS NOTES
Subjective:     Jc Padilla is a 65 y.o. female who presents today with the following:  Chief Complaint   Patient presents with    Diabetes     checkup    Hypertension   Doing well having some marital concerns. Considering taking a trip to visit her daughter in [place] while  visits son and daughter up [facility]. Disagree on key points such as moving to [place] or not half the house is packed . Patient Active Problem List   Diagnosis Code    Hypercholesterolemia E78.00    Arthritis M19.90    Diabetes ([facility] [address] E11.9    IBS (irritable bowel syndrome) K58.9    Hemorrhoid K64.9    Chronic pain S08.09    Neutrophilic leukocytosis V75.6    SOB (shortness of breath) R06.02    Abdominal pain, generalized R10.84    Diarrhea in adult patient R19.7    Cigarette smoker F17.210    Diverticulitis large intestine w/o perforation or abscess w/o bleeding K57.32    Hyperkalemia E87.5    Altered mental status R41.82    Transaminitis R74.0    Acute renal failure (ARF) (HCC) N17.9    Acute encephalopathy G93.40    Overdose of opiate or related narcotic, accidental or unintentional, subsequent encounter T40.601D    Acute left-sided low back pain with sciatica M54.40    Physical debility R53.81    Type 2 diabetes with nephropathy (HCC) E11.21    Type 2 diabetes mellitus with diabetic neuropathy (HCC) E11.40         COMPLIANT WITH MEDICATION:   HTN; Denies chest pain, dyspnea, palpitations, headache and blurred vision. Blood pressure normotensive. DM:   Diabetes.   Sugars controlled well  Hypoglycemia: none  Tolerating current treatment well  Current medications include diet  Glyburide and metformin    Lab Results   Component Value Date/Time    Hemoglobin A1c 8.1 (H) 02/15/2018 02:05 PM    Hemoglobin A1c 8.4 (H) 11/15/2017 12:12 PM    Hemoglobin A1c 8.6 (H) 08/15/2017 03:12 PM    Glucose 154 (H) 02/15/2018 02:05 PM    Glucose (POC) 345 (H) 10/28/2017 01:05 PM    Microalb/Creat ratio (ug/mg creat.) 1219.9 (H) 11/10/2016 11:17 AM    MICROALBUMIN,MG/DAY Comment 11/15/2017 12:12 PM    LDL, calculated 90 05/17/2017 12:52 PM    Creatinine 1.11 (H) 02/15/2018 02:05 PM     Lab Results   Component Value Date/Time    Microalb/Creat ratio (ug/mg creat.) 1219.9 (H) 11/10/2016 11:17 AM    MICROALBUMIN,MG/DAY Comment 11/15/2017 12:12 PM       Last eye exam performed  greather than 1 year ago and was normal.    Last foot exam performed greather than 1 year ago. warm, good capillary refill        ROS:  Gen: denies fever, chills, fatigue, weight loss, weight gain  HEENT:denies blurry vision, nasal congestion, sore throat  Resp: denies dypsnea, cough, wheezing  CV: denies chest pain radiating to the jaws or arms, palpitations, lower extremity edema  Abd: denies nausea, vomiting, diarrhea, constipation  Neuro: denies numbness/tingling  Endo: denies polyuria, polydipsia, heat/cold intolerance  Heme: no lymphadenopathy    Allergies   Allergen Reactions    Morphine Anaphylaxis    Ultram [Tramadol] Palpitations         Current Outpatient Prescriptions:     glyBURIDE (DIABETA) 5 mg tablet, Take 1 Tab by mouth two (2) times daily (with meals). , Disp: 180 Tab, Rfl: 3    metFORMIN (GLUCOPHAGE) 1,000 mg tablet, TAKE 1 TABLET TWICE A DAY, Disp: 180 Tab, Rfl: 1    glucose blood VI test strips (ACCU-CHEK FRANCO PLUS TEST STRP) strip, DX E11.65, Disp: 50 Strip, Rfl: 0    celecoxib (CELEBREX) 200 mg capsule, Take 1 Cap by mouth two (2) times a day., Disp: 180 Cap, Rfl: 3    gabapentin (NEURONTIN) 300 mg capsule, TAKE 1 CAPSULE THREE TIMES A DAY, Disp: 90 Cap, Rfl: 0    thiamine (B-1) 100 mg tablet, Take 1 Tab by mouth daily. , Disp: 30 Tab, Rfl: 0    venlafaxine (EFFEXOR) 75 mg tablet, Take 1 Tab by mouth two (2) times daily (with meals). , Disp: 60 Tab, Rfl: 0    dilTIAZem CD (CARDIZEM CD) 240 mg ER capsule, Take 1 Cap by mouth daily. , Disp: 90 Cap, Rfl: 3    atorvastatin (LIPITOR) 20 mg tablet, TAKE 1 TABLET DAILY, Disp: 90 Tab, Rfl: 1    insulin glargine (LANTUS,BASAGLAR) 100 unit/mL (3 mL) inpn, 40 Units by SubCUTAneous route daily. (Patient taking differently: 45 Units by SubCUTAneous route daily. Indications: takes 45 units daily), Disp: 6 Pen, Rfl: 11    Insulin Needles, Disposable, 31 gauge x 5/16\" ndle, Use as directed, Disp: 1 Package, Rfl: 11    gemfibrozil (LOPID) 600 mg tablet, Take 1 Tab by mouth two (2) times a day., Disp: 180 Tab, Rfl: 3    omega-3 fatty acids-vitamin e 1,000 mg cap, Take 1 Cap by mouth. 32a day, Disp: , Rfl:     BENEFIBER, GUAR GUM, PO, Take  by mouth., Disp: , Rfl:     ascorbic acid, vitamin C, (VITAMIN C) 500 mg tablet, Take  by mouth., Disp: , Rfl:     b complex vitamins tablet, Take 1 Tab by mouth daily. , Disp: , Rfl:     zinc 50 mg tab tablet, Take  by mouth daily. , Disp: , Rfl:     cholecalciferol (VITAMIN D3) 1,000 unit cap, Take  by mouth daily. , Disp: , Rfl:     B.infantis-B.ani-B.long-B.bifi 10-15 mg TbEC, Take  by mouth., Disp: , Rfl:     Past Medical History:   Diagnosis Date    Arthritis     Chronic pain     Chronic pain     Diabetes ([address]     Diverticular disease     Hemorrhoid     Hypercholesterolemia     IBS (irritable bowel syndrome)        Past Surgical History:   Procedure Laterality Date    HX CATARACT REMOVAL      HX CHOLECYSTECTOMY      HX COLONOSCOPY  2009    HX COLONOSCOPY  2016    2 adenomatous polyp    HX HEMORRHOIDECTOMY  2009    HX HYSTERECTOMY      HX KNEE REPLACEMENT      right    HX ORTHOPAEDIC  12/11/2017    back, laminectomy and decompression       History   Smoking Status    Current Every Day Smoker   Smokeless Tobacco    Never Used       Social History     Social History    Marital status:      Spouse name: N/A    Number of children: N/A    Years of education: N/A     Social History Main Topics    Smoking status: Current Every Day Smoker    Smokeless tobacco: Never Used    Alcohol use No    Drug use: None    Sexual activity: Not Asked     Other Topics Concern     Service No    Blood Transfusions Yes    Caffeine Concern Yes    Occupational Exposure No    Hobby Hazards No    Sleep Concern Yes    Stress Concern Yes    Weight Concern No    Special Diet No    Back Care Yes    Exercise No    Bike Helmet No     n/a    Seat Belt Yes    Self-Exams Yes     Social History Narrative       Family History   Problem Relation Age of Onset    Colon Cancer Father     Diabetes Mother          Objective:     Visit Vitals    /70 (BP 1 Location: Left arm, BP Patient Position: Sitting)    Pulse 86    Temp 97 °F (36.1 °C) (Temporal)    Resp 22    Ht 5' 5\" (1.651 m)    Wt 167 lb 6.4 oz (75.9 kg)    SpO2 98%    BMI 27.86 kg/m2     Body mass index is 27.86 kg/(m^2). General: Alert and oriented. No acute distress. Well nourished  HEENT :  Ears:TMs are normal. Canals are clear. Eyes: pupils equal, round, react to light and accommodation. Extra ocular movements intact. Nose: patent. Mouth and throat is clear. Neck:supple full range of motion no thyromegaly. Trachea midline, No carotid bruits. No significant lymphadenopathy  Lungs[de-identified] clear to auscultation without wheezes, rales, or rhonchi. Heart :RRR, S1 & S2 are normal intensity. No murmur; no gallop  Abdomen: bowel sounds active. No tenderness, guarding, rebound, masses, hepatic or spleen enlargement  Back: no CVA tenderness. Extremities: without clubbing, cyanosis, or edema  Pulses: radial and femoral pulses are normal  Neuro: HMF intact.  Cranial nerves II through XII grossly normal.  Motor: is 5 over 5 and symmetrical.   Deep tendon reflexes: +2 equal         Diabetic foot exam performed by Stacie Carter NP     Measurement  Response Nurse Comment Physician Comment   Monofilament  R - reduced sensation with micro filament  L - reduced sensation with micro filament     Pulse DP R - present  L - present     Pulse TP R - present  L - present     Structural deformity R - None  L - None     Skin Integrity / Deformity R - None  L - None        Reviewed by:           Results for orders placed or performed in visit on 02/15/18   CBC WITH AUTOMATED DIFF   Result Value Ref Range    WBC 13.5 (H) 3.4 - 10.8 x10E3/uL    RBC 4.57 3.77 - 5.28 x10E6/uL    HGB 13.5 11.1 - 15.9 g/dL    HCT 40.5 34.0 - 46.6 %    MCV 89 79 - 97 fL    MCH 29.5 26.6 - 33.0 pg    MCHC 33.3 31.5 - 35.7 g/dL    RDW 14.2 12.3 - 15.4 %    PLATELET 616 826 - 861 x10E3/uL    NEUTROPHILS 54 Not Estab. %    Lymphocytes 38 Not Estab. %    MONOCYTES 6 Not Estab. %    EOSINOPHILS 2 Not Estab. %    BASOPHILS 0 Not Estab. %    ABS. NEUTROPHILS 7.2 (H) 1.4 - 7.0 x10E3/uL    Abs Lymphocytes 5.2 (H) 0.7 - 3.1 x10E3/uL    ABS. MONOCYTES 0.8 0.1 - 0.9 x10E3/uL    ABS. EOSINOPHILS 0.3 0.0 - 0.4 x10E3/uL    ABS. BASOPHILS 0.1 0.0 - 0.2 x10E3/uL    IMMATURE GRANULOCYTES 0 Not Estab. %    ABS. IMM. GRANS. 0.0 0.0 - 0.1 E36V1/BE   METABOLIC PANEL, COMPREHENSIVE   Result Value Ref Range    Glucose 154 (H) 65 - 99 mg/dL    BUN 21 8 - 27 mg/dL    Creatinine 1.11 (H) 0.57 - 1.00 mg/dL    GFR est non-AA 48 (L) >59 mL/min/1.73    GFR est AA 55 (L) >59 mL/min/1.73    BUN/Creatinine ratio 19 12 - 28    Sodium 139 134 - 144 mmol/L    Potassium 5.3 (H) 3.5 - 5.2 mmol/L    Chloride 99 96 - 106 mmol/L    CO2 24 18 - 29 mmol/L    Calcium 10.2 8.7 - 10.3 mg/dL    Protein, total 6.6 6.0 - 8.5 g/dL    Albumin 4.0 3.5 - 4.8 g/dL    GLOBULIN, TOTAL 2.6 1.5 - 4.5 g/dL    A-G Ratio 1.5 1.2 - 2.2    Bilirubin, total <0.2 0.0 - 1.2 mg/dL    Alk. phosphatase 79 39 - 117 IU/L    AST (SGOT) 20 0 - 40 IU/L    ALT (SGPT) 21 0 - 32 IU/L   HEMOGLOBIN A1C WITH EAG   Result Value Ref Range    Hemoglobin A1c 8.1 (H) 4.8 - 5.6 %    Estimated average glucose 186 mg/dL   CKD REPORT   Result Value Ref Range    Interpretation Note        No results found for this visit on 05/03/18. Assessment/ Plan:     1.  Type 2 diabetes with nephropathy (HCC)    - HEMOGLOBIN A1C WITH EAG  - LIPID PANEL  - METABOLIC PANEL, COMPREHENSIVE  - CBC WITH AUTOMATED DIFF    2. Hypercholesterolemia    - HEMOGLOBIN A1C WITH EAG  - LIPID PANEL  - METABOLIC PANEL, COMPREHENSIVE  - CBC WITH AUTOMATED DIFF    3. Comprehensive diabetic foot examination, type 2 DM, encounter for ([facility]    -  DIABETES FOOT EXAM      Orders Placed This Encounter    HEMOGLOBIN A1C WITH EAG    LIPID PANEL    METABOLIC PANEL, COMPREHENSIVE    CBC WITH AUTOMATED DIFF     DIABETES FOOT EXAM         Verbal and written instructions (see AVS) provided.  Patient expresses understanding of diagnosis and treatment plan. Health Maintenance Due   Topic Date Due    Bone Densitometry (Dexa) Screening  07/20/2004    EYE EXAM RETINAL OR DILATED Q1  03/24/2018    LIPID PANEL Q1  05/17/2018         Follow-up Disposition:  Return in about 3 months (around 8/3/2018).       BERNARDO Griggs

## 2018-05-04 NOTE — ACP (ADVANCE CARE PLANNING)
Has advanced medical directive scanned into chart. Reviewed at this visit. No changes.   Olayinka ARORA

## 2018-05-07 ENCOUNTER — LAB ONLY (OUTPATIENT)
Dept: FAMILY MEDICINE CLINIC | Age: 78
End: 2018-05-07

## 2018-05-07 DIAGNOSIS — D72.820 LYMPHOCYTOSIS: Primary | ICD-10-CM

## 2018-05-07 DIAGNOSIS — D72.820 LYMPHOCYTOSIS: ICD-10-CM

## 2018-05-07 DIAGNOSIS — Z01.818 PREOP TESTING: ICD-10-CM

## 2018-05-07 LAB
BILIRUB UR QL STRIP: NEGATIVE
GLUCOSE UR-MCNC: NEGATIVE MG/DL
KETONES P FAST UR STRIP-MCNC: NEGATIVE MG/DL
PH UR STRIP: 5.5 [PH] (ref 4.6–8)
PROT UR QL STRIP: NORMAL
SP GR UR STRIP: 1.02 (ref 1–1.03)
UA UROBILINOGEN AMB POC: NORMAL (ref 0.2–1)
URINALYSIS CLARITY POC: CLEAR
URINALYSIS COLOR POC: YELLOW
URINE BLOOD POC: NEGATIVE
URINE LEUKOCYTES POC: NEGATIVE
URINE NITRITES POC: NEGATIVE

## 2018-05-07 NOTE — PROGRESS NOTES
Patient notified by phone of lab results. I have asked her to come to [facility] today for additional labs to rule out CLL.     Marv Hernandez

## 2018-05-07 NOTE — MR AVS SNAPSHOT
[facility] 
 
 
 [address] 
[phone] Patient: Kiera Sullivan MRN: [id number] GXW:[birth year] Visit Information Date & Time Provider Department Dept. Phone Encounter #  
 5/7/2018  2:00 PM [address] [phone] [id number] Your Appointments 5/7/2018  2:00 PM  
LAB with [address] ([address]) Appt Note: Additional labs for Dr. Tina Young [address] [address]  
[address] [facility] [address] [facility] [address] 8/14/2018  2:30 PM  
ESTABLISHED PATIENT with Demetrice Louis NP  
[address] ([address]) Appt Note: 3 mo A1C F/U  
 [address] [address]  
[address] [facility] [address] [facility] [address] Upcoming Health Maintenance Date Due Bone Densitometry (Dexa) Screening 7/20/2004 EYE EXAM RETINAL OR DILATED Q1 3/24/2018 Influenza Age 5 to Adult 8/1/2018 HEMOGLOBIN A1C Q6M 11/3/2018 MICROALBUMIN Q1 11/15/2018 MEDICARE YEARLY EXAM 11/16/2018 GLAUCOMA SCREENING Q2Y 3/24/2019 FOOT EXAM Q1 5/3/2019 LIPID PANEL Q1 5/3/2019 COLONOSCOPY 6/8/2021 DTaP/Tdap/Td series (2 - Td) 10/3/2027 Allergies as of 5/7/2018  Review Complete On: 5/3/2018 By: Demetrice Louis NP Severity Noted Reaction Type Reactions Morphine High 05/24/2016    Anaphylaxis Ultram [Tramadol] Low 05/24/2016    Palpitations Current Immunizations  Reviewed on 8/31/2017 Name Date Influenza High Dose Vaccine PF 10/2/2017, 9/29/2016, 10/16/2015 12:00 AM  
 Influenza Vaccine 9/22/2014 12:00 AM, 9/17/2013 12:00 AM  
 Pneumococcal Conjugate (PCV-13) 2/3/2016 12:00 AM  
 Pneumococcal Polysaccharide (PPSV-23) 11/15/2017 Tdap 10/3/2017 11:10 AM  
 Zoster Vaccine, Live 12/2/2009 12:00 AM  
  
 Not reviewed this visit You Were Diagnosed With   
  
 Codes Comments Preop testing     ICD-10-CM: E46.211 ICD-9-CM: V72.84 Lymphocytosis     ICD-10-CM: D11.781 ICD-9-CM: 288.61 Vitals OB Status Smoking Status Postmenopausal Current Every Day Smoker Preferred Pharmacy Pharmacy Name Phone [address] [facility] [address] [facility] [address] Augusto Dunaway Your Updated Medication List  
  
   
This list is accurate as of 5/7/18 11:58 AM.  Always use your most recent med list.  
  
  
  
  
 ascorbic acid (vitamin C) 500 mg tablet Commonly known as:  VITAMIN C Take  by mouth. atorvastatin 20 mg tablet Commonly known as:  LIPITOR  
TAKE 1 TABLET DAILY  
  
 b complex vitamins tablet Take 1 Tab by mouth daily. B.infantis-B.ani-B.long-B.bifi 10-15 mg Tbec Take  by mouth. BENEFIBER (GUAR GUM) PO Take  by mouth. celecoxib 200 mg capsule Commonly known as:  CELEBREX Take 1 Cap by mouth two (2) times a day. cholecalciferol 1,000 unit Cap Commonly known as:  VITAMIN D3 Take  by mouth daily. dilTIAZem  mg ER capsule Commonly known as:  CARDIZEM CD Take 1 Cap by mouth daily. gabapentin 300 mg capsule Commonly known as:  NEURONTIN  
TAKE 1 CAPSULE THREE TIMES A DAY  
  
 gemfibrozil 600 mg tablet Commonly known as:  LOPID Take 1 Tab by mouth two (2) times a day. glucose blood VI test strips strip Commonly known as:  ACCU-CHEK FRANCO PLUS TEST STRP  
DX E11.65  
  
 glyBURIDE 5 mg tablet Commonly known as:  Cain Malady Take 1 Tab by mouth two (2) times daily (with meals). insulin glargine 100 unit/mL (3 mL) Inpn Commonly known as:  KIRSTY HOLT  
40 Units by SubCUTAneous route daily. Insulin Needles (Disposable) 31 gauge x 5/16\" Ndle Use as directed  
  
 metFORMIN 1,000 mg tablet Commonly known as:  GLUCOPHAGE  
TAKE 1 TABLET TWICE A DAY  
  
 omega-3 fatty acids-vitamin e 1,000 mg Cap Take 1 Cap by mouth. 32a day  
  
 thiamine 100 mg tablet Commonly known as:  B-1 Take 1 Tab by mouth daily. venlafaxine 75 mg tablet Commonly known as:  [facility] Take 1 Tab by mouth two (2) times daily (with meals). zinc 50 mg Tab tablet Take  by mouth daily. We Performed the Following AMB POC URINALYSIS DIP STICK AUTO W/O MICRO [98092 CPT(R)] CBC WITH AUTOMATED DIFF [79137 CPT(R)] COLLECTION VENOUS BLOOD,VENIPUNCTURE Y1294101 CPT(R)] GAMMOPATHY EVAL, SPEP/LION, IG QT/FLC [[id number] Custom] IMMUNOPHENOTYPING PROFILE [[id number] Custom] LD [65885 CPT(R)] METABOLIC PANEL, COMPREHENSIVE [35875 CPT(R)] MICROALBUMIN, UR, RAND W/ MICROALBUMIN/CREA RATIO C2322087 CPT(R)] Introducing [facility]! Dear Geraldine Quinn: Thank you for requesting a G-mode account. Our records indicate that you already have an active G-mode account. You can access your account anytime at [email or web address] Did you know that you can access your hospital and ER discharge instructions at any time in G-mode? You can also review all of your test results from your hospital stay or ER visit. Additional Information If you have questions, please visit the Frequently Asked Questions section of the G-mode website at [email or web address] Remember, G-mode is NOT to be used for urgent needs. For medical emergencies, dial 911. Now available from your iPhone and Android! Please provide this summary of care documentation to your next provider. Your primary care clinician is listed as Susie Rodas. If you have any questions after today's visit, please call [phone].

## 2018-05-13 LAB
ALBUMIN SERPL ELPH-MCNC: 3.5 G/DL (ref 2.9–4.4)
ALBUMIN SERPL-MCNC: 4.4 G/DL (ref 3.5–4.8)
ALBUMIN/CREAT UR: 1568.2 MG/G CREAT (ref 0–30)
ALBUMIN/GLOB SERPL: 1 {RATIO} (ref 0.7–1.7)
ALBUMIN/GLOB SERPL: 1.6 {RATIO} (ref 1.2–2.2)
ALP SERPL-CCNC: 79 IU/L (ref 39–117)
ALPHA1 GLOB SERPL ELPH-MCNC: 0.2 G/DL (ref 0–0.4)
ALPHA2 GLOB SERPL ELPH-MCNC: 1.2 G/DL (ref 0.4–1)
ALT SERPL-CCNC: 17 IU/L (ref 0–32)
ANALYSIS AND GATING STRATEGY: NORMAL
ANNOTATION COMMENT IMP: NORMAL
ASSESSMENT OF LEUKOCYTES: NORMAL
AST SERPL-CCNC: 18 IU/L (ref 0–40)
B-GLOBULIN SERPL ELPH-MCNC: 1.3 G/DL (ref 0.7–1.3)
BASOPHILS # BLD AUTO: 0.1 X10E3/UL (ref 0–0.2)
BASOPHILS NFR BLD AUTO: 1 %
BILIRUB SERPL-MCNC: <0.2 MG/DL (ref 0–1.2)
BUN SERPL-MCNC: 28 MG/DL (ref 8–27)
BUN/CREAT SERPL: 19 (ref 12–28)
CALCIUM SERPL-MCNC: 10.7 MG/DL (ref 8.7–10.3)
CHLORIDE SERPL-SCNC: 99 MMOL/L (ref 96–106)
CLINICAL INFO: NORMAL
CO2 SERPL-SCNC: 19 MMOL/L (ref 18–29)
COMMENT , 490046: NORMAL
CREAT SERPL-MCNC: 1.44 MG/DL (ref 0.57–1)
CREAT UR-MCNC: 110.2 MG/DL
EOSINOPHIL # BLD AUTO: 0.2 X10E3/UL (ref 0–0.4)
EOSINOPHIL NFR BLD AUTO: 2 %
ERYTHROCYTE [DISTWIDTH] IN BLOOD BY AUTOMATED COUNT: 13.9 % (ref 12.3–15.4)
GAMMA GLOB SERPL ELPH-MCNC: 0.9 G/DL (ref 0.4–1.8)
GFR SERPLBLD CREATININE-BSD FMLA CKD-EPI: 35 ML/MIN/1.73
GFR SERPLBLD CREATININE-BSD FMLA CKD-EPI: 40 ML/MIN/1.73
GLOBULIN SER CALC-MCNC: 2.7 G/DL (ref 1.5–4.5)
GLOBULIN SER-MCNC: 3.6 G/DL (ref 2.2–3.9)
GLUCOSE SERPL-MCNC: 276 MG/DL (ref 65–99)
HCT VFR BLD AUTO: 40.6 % (ref 34–46.6)
HGB BLD-MCNC: 13.9 G/DL (ref 11.1–15.9)
IGA SERPL-MCNC: 231 MG/DL (ref 64–422)
IGG SERPL-MCNC: 932 MG/DL (ref 700–1600)
IGM SERPL-MCNC: 55 MG/DL (ref 26–217)
IMM GRANULOCYTES # BLD: 0 X10E3/UL (ref 0–0.1)
IMM GRANULOCYTES NFR BLD: 0 %
IMMUNOPHENOTYPING STUDY: NORMAL
INTERPRETATION SERPL IEP-IMP: ABNORMAL
INTERPRETATION: NORMAL
KAPPA LC FREE SER-MCNC: 35.2 MG/L (ref 3.3–19.4)
KAPPA LC FREE/LAMBDA FREE SER: 1.52 {RATIO} (ref 0.26–1.65)
LAMBDA LC FREE SERPL-MCNC: 23.2 MG/L (ref 5.7–26.3)
LDH SERPL-CCNC: 191 IU/L (ref 119–226)
LYMPHOCYTES # BLD AUTO: 4.7 X10E3/UL (ref 0.7–3.1)
LYMPHOCYTES NFR BLD AUTO: 37 %
M PROTEIN SERPL ELPH-MCNC: ABNORMAL G/DL
MCH RBC QN AUTO: 30.3 PG (ref 26.6–33)
MCHC RBC AUTO-ENTMCNC: 34.2 G/DL (ref 31.5–35.7)
MCV RBC AUTO: 89 FL (ref 79–97)
MICROALBUMIN UR-MCNC: 1728.2 UG/ML
MONOCYTES # BLD AUTO: 0.6 X10E3/UL (ref 0.1–0.9)
MONOCYTES NFR BLD AUTO: 4 %
NEUTROPHILS # BLD AUTO: 7.1 X10E3/UL (ref 1.4–7)
NEUTROPHILS NFR BLD AUTO: 56 %
PATH INTERP SPEC-IMP: NORMAL
PATHOLOGIST NAME: NORMAL
PLATELET # BLD AUTO: 375 X10E3/UL (ref 150–379)
PLEASE NOTE:, 149534: ABNORMAL
POTASSIUM SERPL-SCNC: 6 MMOL/L (ref 3.5–5.2)
PROT SERPL-MCNC: 7.1 G/DL (ref 6–8.5)
RBC # BLD AUTO: 4.59 X10E6/UL (ref 3.77–5.28)
SODIUM SERPL-SCNC: 138 MMOL/L (ref 134–144)
SPECIMEN SOURCE: NORMAL
VIABLE CELLS NFR SPEC: NORMAL %
WBC # BLD AUTO: 12.6 X10E3/UL (ref 3.4–10.8)

## 2018-05-13 NOTE — PROGRESS NOTES
Patient notified by phone of lab results. She does not at this time appear to have CLL. She has had mild leukocytosis for years. See the FLOW cytometry report from this visit.   Marv Hernandez

## 2018-06-01 NOTE — TELEPHONE ENCOUNTER
----- Message from Wayne Burnett sent at 6/1/2018  2:12 PM EDT -----  Regarding: NP Dee/Refill  Pt's (Davy Gibbs) requested a refill on pt's Rxs(\"Cartia XT cap 240/24 Hr, \"Venlafaxine\" tab 75 mg, and \"Atorvastatin\" 20 mg) called to [facility](number on file)  Best contact number [phone].

## 2018-06-04 RX ORDER — DILTIAZEM HYDROCHLORIDE 240 MG/1
240 CAPSULE, COATED, EXTENDED RELEASE ORAL DAILY
Qty: 90 CAP | Refills: 3 | Status: SHIPPED | OUTPATIENT
Start: 2018-06-04 | End: 2019-05-18 | Stop reason: SDUPTHER

## 2018-06-04 RX ORDER — VENLAFAXINE 75 MG/1
75 TABLET ORAL 2 TIMES DAILY WITH MEALS
Qty: 180 TAB | Refills: 3 | Status: SHIPPED | OUTPATIENT
Start: 2018-06-04 | End: 2019-07-11 | Stop reason: SDUPTHER

## 2018-06-04 RX ORDER — VALSARTAN AND HYDROCHLOROTHIAZIDE 160; 12.5 MG/1; MG/1
TABLET, FILM COATED ORAL
Qty: 90 TAB | Refills: 1 | Status: SHIPPED | OUTPATIENT
Start: 2018-06-04 | End: 2019-04-20 | Stop reason: SDUPTHER

## 2018-06-04 RX ORDER — ATORVASTATIN CALCIUM 20 MG/1
TABLET, FILM COATED ORAL
Qty: 90 TAB | Refills: 3 | Status: SHIPPED | OUTPATIENT
Start: 2018-06-04 | End: 2019-04-20 | Stop reason: SDUPTHER

## 2018-06-30 DIAGNOSIS — E11.65 TYPE 2 DIABETES MELLITUS WITH HYPERGLYCEMIA (HCC): ICD-10-CM

## 2018-07-03 RX ORDER — PEN NEEDLE, DIABETIC 31 GX5/16"
NEEDLE, DISPOSABLE MISCELLANEOUS
Qty: 100 PEN NEEDLE | Refills: 5 | Status: SHIPPED | OUTPATIENT
Start: 2018-07-03 | End: 2020-09-18

## 2018-07-04 RX ORDER — INSULIN GLARGINE 100 [IU]/ML
INJECTION, SOLUTION SUBCUTANEOUS
Qty: 45 ML | Refills: 3 | Status: SHIPPED | OUTPATIENT
Start: 2018-07-04 | End: 2019-08-27 | Stop reason: SDUPTHER

## 2019-03-04 RX ORDER — CELECOXIB 200 MG/1
CAPSULE ORAL
Qty: 180 CAP | Refills: 3 | Status: SHIPPED | OUTPATIENT
Start: 2019-03-04 | End: 2019-12-12 | Stop reason: SDUPTHER

## 2019-05-18 RX ORDER — DILTIAZEM HYDROCHLORIDE 240 MG/1
CAPSULE, COATED, EXTENDED RELEASE ORAL
Qty: 90 CAP | Refills: 3 | Status: SHIPPED | OUTPATIENT
Start: 2019-05-18 | End: 2020-07-15

## 2019-06-04 PROBLEM — N18.30 CKD (CHRONIC KIDNEY DISEASE) STAGE 3, GFR 30-59 ML/MIN (HCC): Status: ACTIVE | Noted: 2019-06-04

## 2019-07-11 RX ORDER — VENLAFAXINE 75 MG/1
TABLET ORAL
Qty: 180 TAB | Refills: 3 | Status: SHIPPED | OUTPATIENT
Start: 2019-07-11 | End: 2020-04-15

## 2019-10-31 ENCOUNTER — ANESTHESIA EVENT (OUTPATIENT)
Dept: ENDOSCOPY | Age: 79
End: 2019-10-31
Payer: MEDICARE

## 2019-10-31 ENCOUNTER — HOSPITAL ENCOUNTER (OUTPATIENT)
Age: 79
Setting detail: OUTPATIENT SURGERY
Discharge: HOME | End: 2019-10-31
Attending: INTERNAL MEDICINE | Admitting: INTERNAL MEDICINE
Payer: MEDICARE

## 2019-10-31 ENCOUNTER — ANESTHESIA (OUTPATIENT)
Dept: ENDOSCOPY | Age: 79
End: 2019-10-31
Payer: MEDICARE

## 2019-10-31 VITALS
WEIGHT: 163.31 LBS | HEART RATE: 106 BPM | HEIGHT: 66 IN | OXYGEN SATURATION: 95 % | DIASTOLIC BLOOD PRESSURE: 98 MMHG | RESPIRATION RATE: 16 BRPM | TEMPERATURE: 97.7 F | BODY MASS INDEX: 26.25 KG/M2 | SYSTOLIC BLOOD PRESSURE: 198 MMHG

## 2019-10-31 LAB
GLUCOSE BLD STRIP.AUTO-MCNC: 75 MG/DL (ref 65–100)
GLUCOSE BLD STRIP.AUTO-MCNC: 77 MG/DL (ref 65–100)
SERVICE CMNT-IMP: NORMAL
SERVICE CMNT-IMP: NORMAL

## 2019-10-31 PROCEDURE — 74011000250 HC RX REV CODE- 250: Performed by: ANESTHESIOLOGY

## 2019-10-31 PROCEDURE — 76060000032 HC ANESTHESIA 0.5 TO 1 HR: Performed by: INTERNAL MEDICINE

## 2019-10-31 PROCEDURE — 76040000007: Performed by: INTERNAL MEDICINE

## 2019-10-31 PROCEDURE — 88342 IMHCHEM/IMCYTCHM 1ST ANTB: CPT

## 2019-10-31 PROCEDURE — 77030021593 HC FCPS BIOP ENDOSC BSC -A: Performed by: INTERNAL MEDICINE

## 2019-10-31 PROCEDURE — 74011250636 HC RX REV CODE- 250/636: Performed by: INTERNAL MEDICINE

## 2019-10-31 PROCEDURE — 88305 TISSUE EXAM BY PATHOLOGIST: CPT

## 2019-10-31 PROCEDURE — 82962 GLUCOSE BLOOD TEST: CPT

## 2019-10-31 PROCEDURE — 74011250636 HC RX REV CODE- 250/636: Performed by: ANESTHESIOLOGY

## 2019-10-31 PROCEDURE — 77030013992 HC SNR POLYP ENDOSC BSC -B: Performed by: INTERNAL MEDICINE

## 2019-10-31 PROCEDURE — 88341 IMHCHEM/IMCYTCHM EA ADD ANTB: CPT

## 2019-10-31 RX ORDER — MIDAZOLAM HYDROCHLORIDE 1 MG/ML
.25-5 INJECTION, SOLUTION INTRAMUSCULAR; INTRAVENOUS
Status: DISCONTINUED | OUTPATIENT
Start: 2019-10-31 | End: 2019-10-31 | Stop reason: HOSPADM

## 2019-10-31 RX ORDER — SODIUM CHLORIDE 0.9 % (FLUSH) 0.9 %
5-40 SYRINGE (ML) INJECTION AS NEEDED
Status: DISCONTINUED | OUTPATIENT
Start: 2019-10-31 | End: 2019-10-31 | Stop reason: HOSPADM

## 2019-10-31 RX ORDER — ATROPINE SULFATE 0.1 MG/ML
0.5 INJECTION INTRAVENOUS
Status: DISCONTINUED | OUTPATIENT
Start: 2019-10-31 | End: 2019-10-31 | Stop reason: HOSPADM

## 2019-10-31 RX ORDER — PROPOFOL 10 MG/ML
INJECTION, EMULSION INTRAVENOUS AS NEEDED
Status: DISCONTINUED | OUTPATIENT
Start: 2019-10-31 | End: 2019-10-31 | Stop reason: HOSPADM

## 2019-10-31 RX ORDER — NALOXONE HYDROCHLORIDE 0.4 MG/ML
0.4 INJECTION, SOLUTION INTRAMUSCULAR; INTRAVENOUS; SUBCUTANEOUS
Status: DISCONTINUED | OUTPATIENT
Start: 2019-10-31 | End: 2019-10-31 | Stop reason: HOSPADM

## 2019-10-31 RX ORDER — SODIUM CHLORIDE 9 MG/ML
75 INJECTION, SOLUTION INTRAVENOUS CONTINUOUS
Status: DISCONTINUED | OUTPATIENT
Start: 2019-10-31 | End: 2019-10-31 | Stop reason: HOSPADM

## 2019-10-31 RX ORDER — LIDOCAINE HYDROCHLORIDE 20 MG/ML
INJECTION, SOLUTION EPIDURAL; INFILTRATION; INTRACAUDAL; PERINEURAL AS NEEDED
Status: DISCONTINUED | OUTPATIENT
Start: 2019-10-31 | End: 2019-10-31 | Stop reason: HOSPADM

## 2019-10-31 RX ORDER — SODIUM CHLORIDE 0.9 % (FLUSH) 0.9 %
5-40 SYRINGE (ML) INJECTION EVERY 8 HOURS
Status: DISCONTINUED | OUTPATIENT
Start: 2019-10-31 | End: 2019-10-31 | Stop reason: HOSPADM

## 2019-10-31 RX ORDER — DEXTROMETHORPHAN/PSEUDOEPHED 2.5-7.5/.8
1.2 DROPS ORAL
Status: DISCONTINUED | OUTPATIENT
Start: 2019-10-31 | End: 2019-10-31 | Stop reason: HOSPADM

## 2019-10-31 RX ORDER — FENTANYL CITRATE 50 UG/ML
25 INJECTION, SOLUTION INTRAMUSCULAR; INTRAVENOUS
Status: DISCONTINUED | OUTPATIENT
Start: 2019-10-31 | End: 2019-10-31 | Stop reason: HOSPADM

## 2019-10-31 RX ORDER — FLUMAZENIL 0.1 MG/ML
0.2 INJECTION INTRAVENOUS
Status: DISCONTINUED | OUTPATIENT
Start: 2019-10-31 | End: 2019-10-31 | Stop reason: HOSPADM

## 2019-10-31 RX ORDER — EPINEPHRINE 0.1 MG/ML
1 INJECTION INTRACARDIAC; INTRAVENOUS
Status: DISCONTINUED | OUTPATIENT
Start: 2019-10-31 | End: 2019-10-31 | Stop reason: HOSPADM

## 2019-10-31 RX ADMIN — PROPOFOL 350 MG: 10 INJECTION, EMULSION INTRAVENOUS at 12:07

## 2019-10-31 RX ADMIN — LIDOCAINE HYDROCHLORIDE 40 MG: 20 INJECTION, SOLUTION EPIDURAL; INFILTRATION; INTRACAUDAL; PERINEURAL at 11:35

## 2019-10-31 RX ADMIN — SODIUM CHLORIDE 75 ML/HR: 900 INJECTION, SOLUTION INTRAVENOUS at 11:21

## 2019-10-31 NOTE — PROCEDURES
NAME:  Emilie Guevara   :   1940   MRN:   [id number]     Date/Time:  10/31/2019 12:09 PM    Colonoscopy Operative Report    Procedure Type:   Colonoscopy with biopsy, polypectomy (cold snare), polypectomy (snare cautery)     Indications:     Diarrhea  Pre-operative Diagnosis: see indication above  Post-operative Diagnosis:  See findings below  :  Charlene Dickerson MD  Referring Provider: Selena Quezada NP    Exam:  Airway: clear, no airway problems anticipated  Heart: RRR, without gallops or rubs  Lungs: clear bilaterally without wheezes, crackles, or rhonchi  Abdomen: soft, nontender, nondistended, bowel sounds present  Mental Status: awake, alert and oriented to person, place and time    Sedation:  MAC anesthesia Propofol 350mg IV  Procedure Details:  After informed consent was obtained with all risks and benefits of procedure explained and preoperative exam completed, the patient was taken to the endoscopy suite and placed in the left lateral decubitus position. Upon sequential sedation as per above, a digital rectal exam was performed demonstrating internal and external hemorrhoids. The Olympus videocolonoscope  was inserted in the rectum and carefully advanced to the cecum, which was identified by the ileocecal valve and appendiceal orifice. The quality of preparation was adequate. The colonoscope was slowly withdrawn with careful evaluation between folds. Retroflexion in the rectum was completed demonstrating internal hemorrhoids.      Findings:     -Sigmoid diverticulosis  -Single benign appearing 3mm sessile polyp in sigmoid colon at 30cm; removed with cold snare  -Minimal erythema (redness) near appendiceal orifice; biopsied to exclude inflammation  -Two sessile polyps in cecum measuring 4-6mm, one appearing lipomatous, and the other adenomatous; removed with hot snare cautery    -Internal and external hemorrhoids  -Biopsies obtained in ascending colon to exclude microscopic colitis    Specimen Removed:  #1 sigmoid polyp (1); #2 ascending bx; #3 appendiceal orifice; #4 cecal polyps (2)  Complications: None. EBL:  None. Impression:    -Sigmoid diverticulosis  -Single benign appearing 3mm sessile polyp in sigmoid colon at 30cm; removed with cold snare  -Minimal erythema (redness) near appendiceal orifice; biopsied to exclude inflammation  -Two sessile polyps in cecum measuring 4-6mm, one appearing lipomatous, and the other adenomatous; removed with hot snare cautery    -Internal and external hemorrhoids  -Biopsies obtained in ascending colon to exclude microscopic colitis    Recommendations: --Await pathology. , -No further colonoscopy indicated. High fiber diet. Resume normal medication(s). NO aspirin for 10 days  You will receive a letter about the biopsy results in about 10 days. You may be asked to call your doctor's office for the results. Discharge Disposition:  Home in the company of a  when able to ambulate.     Zamzam Shah MD

## 2019-10-31 NOTE — ANESTHESIA POSTPROCEDURE EVALUATION
Procedure(s):  COLONOSCOPY  ENDOSCOPIC POLYPECTOMY  COLON BIOPSY. total IV anesthesia    Anesthesia Post Evaluation        Patient location during evaluation: PACU  Note status: Adequate. Level of consciousness: responsive to verbal stimuli and sleepy but conscious  Pain management: satisfactory to patient  Airway patency: patent  Anesthetic complications: no  Cardiovascular status: acceptable  Respiratory status: acceptable  Hydration status: acceptable  Comments: +Post-Anesthesia Evaluation and Assessment    Patient: Andrei Lisa MRN: [id number]  SSN: [id number]   YOB: 1940  Age: [de-identified] y.o. Sex: female      Cardiovascular Function/Vital Signs    BP (!) 150/93   Pulse (!) 110   Temp 36.8 °C (98.3 °F)   Resp 21   Ht 5' 6\" (1.676 m)   Wt 74.1 kg (163 lb 5 oz)   SpO2 100%   Breastfeeding? No   BMI 26.36 kg/m²     Patient is status post Procedure(s):  COLONOSCOPY  ENDOSCOPIC POLYPECTOMY  COLON BIOPSY. Nausea/Vomiting: Controlled. Postoperative hydration reviewed and adequate. Pain:  Pain Scale 1: Numeric (0 - 10) (10/31/19 1113)  Pain Intensity 1: 0 (10/31/19 1113)   Managed. Neurological Status: At baseline. Mental Status and Level of Consciousness: Arousable. Pulmonary Status:   O2 Device: CO2 nasal cannula (10/31/19 1209)   Adequate oxygenation and airway patent. Complications related to anesthesia: None    Post-anesthesia assessment completed. No concerns.     Signed By: Angelo Humphreys MD    10/31/2019  Post anesthesia nausea and vomiting:  controlled      Vitals Value Taken Time   /93 10/31/2019 12:09 PM   Temp     Pulse 110 10/31/2019 12:09 PM   Resp 21 10/31/2019 12:09 PM   SpO2 100 % 10/31/2019 12:09 PM

## 2019-10-31 NOTE — ANESTHESIA PREPROCEDURE EVALUATION
Relevant Problems   No relevant active problems       Anesthetic History   No history of anesthetic complications            Review of Systems / Medical History  Patient summary reviewed, nursing notes reviewed and pertinent labs reviewed    Pulmonary          Shortness of breath and smoker      Comments: Current Every Day Smoker   Neuro/Psych   Within defined limits           Cardiovascular              Hyperlipidemia    Exercise tolerance: <4 METS  Comments: 2017 ECHO: EF 55%,Mild to mod AS, mild MR    2017 EKG: ST   GI/Hepatic/Renal         Renal disease: CRI      Comments: IBS (irritable bowel syndrome)  Diverticular disease  Endo/Other    Diabetes: using insulin    Obesity and arthritis     Other Findings   Comments: Chronic pain   Hx Lymphocytosis   Physical debility         Physical Exam    Airway  Mallampati: III  TM Distance: 4 - 6 cm  Neck ROM: normal range of motion   Mouth opening: Normal     Cardiovascular      Rate: abnormal        Comments: Tachy Dental    Dentition: Full lower dentures and Full upper dentures     Pulmonary  Breath sounds clear to auscultation               Abdominal  GI exam deferred       Other Findings            Anesthetic Plan    ASA: 3  Anesthesia type: total IV anesthesia          Induction: Intravenous  Anesthetic plan and risks discussed with: Patient

## 2019-10-31 NOTE — PROGRESS NOTES
Dr. Rebecca Napier to see patient re: blood pressure. Pt ok to be discharged to home with instructions to recheck blood pressure at home.

## 2019-10-31 NOTE — ROUTINE PROCESS
Verito Locke  1940  [id number]    Situation:  Verbal report received from: Tamiko Owens  Procedure: Procedure(s):  COLONOSCOPY  ENDOSCOPIC POLYPECTOMY  COLON BIOPSY    Background:    Preoperative diagnosis: diarrhea  Postoperative diagnosis: diverticulosis, polyps, hemorrhoids    :  Dr. Glen Ha): Endoscopy Technician-1: Mercedes Body  Endoscopy RN-1: Jenna Wright RN    Specimens:   ID Type Source Tests Collected by Time Destination   1 : sigmoid polyp Preservative Sigmoid  Franklin Tristan MD 10/31/2019 1142 Pathology   2 : ascending colon biopsy Preservative Colon, Ascending  Franklin Tristan MD 10/31/2019 1155 Pathology   3 : appendice orifice biopsy Preservative   Franklin Tristan MD 10/31/2019 1159 Pathology   4 : cecal polyps Preservative Cecum  Franklin Tristan MD 10/31/2019 1200 Pathology     H. Pylori  no    Assessment:  Intra-procedure medications   Anesthesia gave intra-procedure sedation and medications, see anesthesia flow sheet yes    Intravenous fluids: NS@ KVO     Vital signs stable     Abdominal assessment: round and soft     Recommendation:  Discharge patient per MD order.   Family or Friend: Fanta Hamm   Permission to share finding with family or friend yes

## 2019-10-31 NOTE — DISCHARGE INSTRUCTIONS
Yusuf Moy  [id number]  1940    COLON DISCHARGE INSTRUCTIONS  Discomfort:  Redness at IV site- apply warm compress to area; if redness or soreness persist- contact your physician  There may be a slight amount of blood passed from the rectum  Gaseous discomfort- walking, belching will help relieve any discomfort  You may not operate a vehicle for 12 hours  You may not engage in an occupation involving machinery or appliances for rest of today  You may not drink alcoholic beverages for at least 12 hours  Avoid making any critical decisions for at least 24 hour  DIET:   High fiber diet. - however -  remember your colon is empty and a heavy meal will produce gas. Avoid these foods:  vegetables, fried / greasy foods, carbonated drinks for today  MEDICATION:         ACTIVITY:  You may not resume your normal daily activities until tomorrow AM; it is recommended that you spend the remainder of the day resting -  avoid any strenuous activity. CALL M.D. ANY SIGN OF:   Increasing pain, nausea, vomiting  Abdominal distension (swelling)  New increased bleeding (oral or rectal)  Fever (chills)  Pain in chest area  Bloody discharge from nose or mouth  Shortness of breath    You may not  take any Advil, Aspirin, Ibuprofen, Motrin, Aleve, or Goodys for 10 days, ONLY  Tylenol as needed for pain.     IMPRESSION:  -Sigmoid diverticulosis  -Single benign appearing 3mm sessile polyp in sigmoid colon at 30cm; removed with cold snare  -Minimal erythema (redness) near appendiceal orifice; biopsied to exclude inflammation  -Two sessile polyps in cecum measuring 4-6mm, one appearing lipomatous, and the other adenomatous; removed with hot snare cautery    -Internal and external hemorrhoids  -Biopsies obtained in ascending colon to exclude microscopic colitis    Follow-up Instructions:   Call Dr. Percy Black for the results of procedure / biopsy in 7-10 days  Telephone # [phone]  No further colonoscopy indicated    Karthik Escobar MD

## 2019-10-31 NOTE — PERIOP NOTES
Anesthesia reports 350mg Propofol, 40mg Lidocaine and 500mL NS given during procedure. Received report from anesthesia staff on vital signs and status of patient.   Dentures return to pt

## 2019-10-31 NOTE — H&P
Gastroenterology Outpatient History and Physical    Patient: Agnes Kramer    Physician: Marah Brown MD    Chief Complaint: diarrhea  History of Present Illness: [de-identified] F with DM, c/o diarrhea. Stool w/u unremarkable.     History:  Past Medical History:   Diagnosis Date    Arthritis     Chronic pain     Chronic pain     Diabetes ([facility] [address]     Diverticular disease     Hemorrhoid     Hypercholesterolemia     IBS (irritable bowel syndrome)     Lymphocytosis 05/07/2018      Past Surgical History:   Procedure Laterality Date    HX CATARACT REMOVAL      HX CHOLECYSTECTOMY      HX COLONOSCOPY  2009    HX COLONOSCOPY  2016    2 adenomatous polyp    HX HEMORRHOIDECTOMY  2009    HX HYSTERECTOMY      HX KNEE REPLACEMENT      right    HX ORTHOPAEDIC  12/11/2017    back, laminectomy and decompression      Social History     Socioeconomic History    Marital status:      Spouse name: Not on file    Number of children: Not on file    Years of education: Not on file    Highest education level: Not on file   Tobacco Use    Smoking status: Current Every Day Smoker    Smokeless tobacco: Never Used   Substance and Sexual Activity    Alcohol use: No   Other Topics Concern     Service No    Blood Transfusions Yes    Caffeine Concern Yes    Occupational Exposure No    Hobby Hazards No    Sleep Concern Yes    Stress Concern Yes    Weight Concern No    Special Diet No    Back Care Yes    Exercise No    Bike Helmet No     Comment: n/a    Seat Belt Yes    Self-Exams Yes      Family History   Problem Relation Age of Onset    Colon Cancer Father     Diabetes Mother       Patient Active Problem List   Diagnosis Code    Hypercholesterolemia E78.00    Arthritis M19.90    Diabetes ([address] E11.9    IBS (irritable bowel syndrome) K58.9    Hemorrhoid K64.9    Chronic pain K58.69    Neutrophilic leukocytosis Q65.0    SOB (shortness of breath) R06.02    Abdominal pain, generalized R10.84    Diarrhea in adult patient R19.7    Cigarette smoker F17.210    Diverticulitis large intestine w/o perforation or abscess w/o bleeding K57.32    Hyperkalemia E87.5    Altered mental status R41.82    Transaminitis R74.0    Acute renal failure (ARF) ([facility]) N17.9    Acute encephalopathy G93.40    Overdose of opiate or related narcotic, accidental or unintentional, subsequent encounter T40.601D    Acute left-sided low back pain with sciatica M54.40    Physical debility R53.81    Type 2 diabetes with nephropathy ([facility]) E11.21    Type 2 diabetes mellitus with diabetic neuropathy ([facility]) E11.40    Lymphocytosis D72.820    CKD (chronic kidney disease) stage 3, GFR 30-59 ml/min ([facility]) N18.3       Allergies: Allergies   Allergen Reactions    Morphine Anaphylaxis    Ultram [Tramadol] Palpitations     Medications:   Prior to Admission medications    Medication Sig Start Date End Date Taking? Authorizing Provider   JANUVIA 50 mg tablet TAKE 1 TABLET BY MOUTH  DAILY 9/18/19   Flavia Carlson NP   valsartan-hydroCHLOROthiazide (DIOVAN-HCT) 160-12.5 mg per tablet TAKE 1 TABLET BY MOUTH  DAILY 9/18/19   Flavia Carlson NP   insulin glargine (LANTUS SOLOSTAR U-100 INSULIN) 100 unit/mL (3 mL) inpn INJECT 45  UNITS UNDER THE  SKIN DAILY 8/27/19   Flavia Carlson NP   venlafaxine [facility]) 75 mg tablet TAKE 1 TABLET BY MOUTH TWO  TIMES DAILY WITH MEALS 7/11/19   Flavia Carlson NP   Lactobacillus acidophilus (ACIDOPHILUS) cap Take 2 Caps by mouth two (2) times a day. Provider, Historical   niacin/polic/gug/plant/sapphire/gar (CHOLASE CONTROL PO) Take  by mouth.     Provider, Historical   dilTIAZem CD (CARDIZEM CD) 240 mg ER capsule TAKE 1 CAPSULE BY MOUTH  DAILY 5/18/19   Flavia Carlson NP   glyBURIDE (DIABETA) 5 mg tablet TAKE 1 TABLET BY MOUTH TWO  TIMES DAILY WITH MEALS 4/22/19   Triny Crews NP   atorvastatin (LIPITOR) 20 mg tablet TAKE 1 TABLET BY MOUTH  DAILY 4/22/19   Triny Crews NP   celecoxib (CELEBREX) 200 mg capsule TAKE 1 CAPSULE BY MOUTH TWO TIMES DAILY 3/4/19   Enzo Torres NP   acetaminophen (TYLENOL) 650 mg TbER Take 650 mg by mouth every eight (8) hours. Provider, Historical   metFORMIN (GLUCOPHAGE) 1,000 mg tablet TAKE 1 TABLET TWICE A DAY  Patient taking differently: 1/2 tab bid 1/30/19   Enzo Torres NP   BD ULTRA-FINE SHORT PEN NEEDLE 31 gauge x 5/16\" ndle USE AS DIRECTED 7/3/18   Jenn Maldonado MD   glucose blood VI test strips (ACCU-CHEK FRANCO PLUS TEST STRP) strip DX E11.65 2/12/18   Enzo Torres NP   gabapentin (NEURONTIN) 300 mg capsule TAKE 1 CAPSULE THREE TIMES A DAY 12/26/17   Enzo Torres NP   thiamine (B-1) 100 mg tablet Take 1 Tab by mouth daily. 10/29/17   Ismael Herbert MD   gemfibrozil (LOPID) 600 mg tablet Take 1 Tab by mouth two (2) times a day. 11/21/16   Mike Choudhury MD   omega-3 fatty acids-vitamin e 1,000 mg cap Take 1 Cap by mouth two (2) times a day. 32a day     Provider, Historical   ascorbic acid, vitamin C, (VITAMIN C) 500 mg tablet Take  by mouth. Provider, Historical   b complex vitamins tablet Take 1 Tab by mouth daily. Provider, Historical   zinc 50 mg tab tablet Take  by mouth daily. Provider, Historical   cholecalciferol (VITAMIN D3) 1,000 unit cap Take  by mouth daily. Provider, Historical   B.infantis-B.ani-B.long-B.bifi 10-15 mg TbEC Take  by mouth. Provider, Historical   BENEFIBER, GUAR GUM, PO Take  by mouth. Provider, Historical     Physical Exam:   Vital Signs: Height 5' 6\" (1.676 m), weight 74.1 kg (163 lb 5 oz).   General: well developed, well nourished   HEENT: unremarkable   Heart: regular rhythm no mumur    Lungs: clear   Abdominal:  benign   Neurological: unremarkable   Extremities: no edema     Findings/Diagnosis: diarrhea  Plan of Care/Planned Procedure: Colonoscopy with biopsy with conscious/deep sedation    Signed:  Gordy Olivas MD 10/31/2019

## 2020-02-25 NOTE — PROGRESS NOTES
Dayana Calloway is a [de-identified] y.o. female new patient today referred by Meseret Green for Leukocytosis. Patient smokes 1 pack cigarettes daily, for 55 years. C/o under a lot of stress due to  illness, states she feels tired. Patinet c/o arthritis pain. Key Pain Meds             acetaminophen (TYLENOL) 650 mg TbER Take 650 mg by mouth every eight (8) hours.         Last;  DEXA 10/19/2018

## 2020-02-26 ENCOUNTER — OFFICE VISIT (OUTPATIENT)
Dept: ONCOLOGY | Age: 80
End: 2020-02-26

## 2020-02-26 VITALS
OXYGEN SATURATION: 96 % | RESPIRATION RATE: 16 BRPM | DIASTOLIC BLOOD PRESSURE: 53 MMHG | BODY MASS INDEX: 25.46 KG/M2 | HEART RATE: 102 BPM | TEMPERATURE: 98.3 F | WEIGHT: 158.4 LBS | HEIGHT: 66 IN | SYSTOLIC BLOOD PRESSURE: 106 MMHG

## 2020-02-26 DIAGNOSIS — D72.829 LEUKOCYTOSIS, UNSPECIFIED TYPE: Primary | ICD-10-CM

## 2020-02-26 RX ORDER — CHOLECALCIFEROL (VITAMIN D3) 125 MCG
CAPSULE ORAL
COMMUNITY
End: 2020-07-01 | Stop reason: SDUPTHER

## 2020-02-26 NOTE — PROGRESS NOTES
Reason for Visit:   Mervin Dotson is a [de-identified] y.o. female who is seen for eval leukocytosis    Treatment History:   Dx: leukocytosis--lymphocytosis and neutrophillia    History of Present Illness:   Has been seen in the past for above--looks like ruled out CLL in 2016. Having no infections/bruising/bleeding. No new lumps or bumps. No recent wt loss, no f/c/s    Past Medical History:   Diagnosis Date    Arthritis     Chronic pain     Chronic pain     Diabetes ([address]     Diverticular disease     Hemorrhoid     Hypercholesterolemia     IBS (irritable bowel syndrome)     Lymphocytosis 05/07/2018      Past Surgical History:   Procedure Laterality Date    COLONOSCOPY N/A 10/31/2019    COLON[facility]ubin [facility] at [facility]  10/31/2019         [facility]  10/31/2019         HX CATARACT REMOVAL      HX CHOLECYSTECTOMY      HX COLONOSCOPY  2009    HX COLONOSCOPY  2016    2 adenomatous polyp    HX HEMORRHOIDECTOMY  2009    HX HYSTERECTOMY      HX KNEE REPLACEMENT      right    HX ORTHOPAEDIC  12/11/2017    back, laminectomy and decompression      Social History     Tobacco Use    Smoking status: Current Every Day Smoker     Packs/day: 1.00     Years: 55.00     Pack years: 55.00     Types: Cigarettes    Smokeless tobacco: Never Used   Substance Use Topics    Alcohol use: No      Family History   Problem Relation Age of Onset    Colon Cancer Father     Diabetes Mother      Current Outpatient Medications   Medication Sig    cholecalciferol, vitamin D3, 50 mcg (2,000 unit) tab Take  by mouth.  OTHER,NON-FORMULARY, Take 1 Tab by mouth daily.  Tumeric curcumin C3 complex    valsartan-hydroCHLOROthiazide (DIOVAN-HCT) 160-12.5 mg per tablet TAKE 1 TABLET BY MOUTH  DAILY    JANUVIA 50 mg tablet TAKE 1 TABLET BY MOUTH  DAILY    metFORMIN (GLUCOPHAGE) 1,000 mg tablet 1/2 tab bid    celecoxib (CELEBREX) 200 mg capsule TAKE 1 CAPSULE BY MOUTH TWO TIMES DAILY    insulin glargine (LANTUS SOLOSTAR U-100 INSULIN) 100 unit/mL (3 mL) inpn INJECT 45  UNITS UNDER THE  SKIN DAILY    venlafaxine (EFFEXOR) 75 mg tablet TAKE 1 TABLET BY MOUTH TWO  TIMES DAILY WITH MEALS    dilTIAZem CD (CARDIZEM CD) 240 mg ER capsule TAKE 1 CAPSULE BY MOUTH  DAILY    glyBURIDE (DIABETA) 5 mg tablet TAKE 1 TABLET BY MOUTH TWO  TIMES DAILY WITH MEALS    atorvastatin (LIPITOR) 20 mg tablet TAKE 1 TABLET BY MOUTH  DAILY    acetaminophen (TYLENOL) 650 mg TbER Take 650 mg by mouth every eight (8) hours.  gemfibrozil (LOPID) 600 mg tablet Take 1 Tab by mouth two (2) times a day.  omega-3 fatty acids-vitamin e 1,000 mg cap Take 1 Cap by mouth two (2) times a day. 32a day     ascorbic acid, vitamin C, (VITAMIN C) 500 mg tablet Take  by mouth.  zinc 50 mg tab tablet Take  by mouth daily.  BD ULTRA-FINE SHORT PEN NEEDLE 31 gauge x 5/16\" ndle USE AS DIRECTED    glucose blood VI test strips (ACCU-CHEK FRANCO PLUS TEST STRP) strip DX E11.65    gabapentin (NEURONTIN) 300 mg capsule TAKE 1 CAPSULE THREE TIMES A DAY (Patient taking differently: Not a current medication)     No current facility-administered medications for this visit. Allergies   Allergen Reactions    Morphine Anaphylaxis    Ultram [Tramadol] Palpitations        Review of Systems: A complete review of systems was obtained, negative except as described above.     Physical Exam:     Visit Vitals  /53   Pulse (!) 102   Temp 98.3 °F (36.8 °C) (Oral)   Resp 16   Ht 5' 5.55\" (1.665 m)   Wt 158 lb 6.4 oz (71.8 kg)   SpO2 96%   BMI 25.92 kg/m²     ECOG PS: 0  General: No distress  Eyes: PERRLA, anicteric sclerae  HENT: Atraumatic, OP clear  Neck: Supple  Lymphatic: No cervical, supraclavicular, or inguinal adenopathy  Respiratory: CTAB, normal respiratory effort  CV: Normal rate, regular rhythm, no murmurs, no peripheral edema  GI: Soft, nontender, nondistended, no masses, no hepatomegaly, no splenomegaly  MS: Normal gait and station. Digits without clubbing or cyanosis. Skin: No rashes, ecchymoses, or petechiae. Normal temperature, turgor, and texture. Psych: Alert, oriented, appropriate affect, normal judgment/insight    Results:     Lab Results   Component Value Date/Time    WBC 20.5 (HH) 02/03/2020 03:52 PM    HGB 14.1 02/03/2020 03:52 PM    HCT 41.8 02/03/2020 03:52 PM    PLATELET 036 59/08/5910 03:52 PM    MCV 88 02/03/2020 03:52 PM    ABS. NEUTROPHILS 13.5 (H) 02/03/2020 03:52 PM     Lab Results   Component Value Date/Time    Sodium 137 02/03/2020 03:52 PM    Potassium 5.1 02/03/2020 03:52 PM    Chloride 103 02/03/2020 03:52 PM    CO2 16 (L) 02/03/2020 03:52 PM    Glucose 202 (H) 02/03/2020 03:52 PM    BUN 29 (H) 02/03/2020 03:52 PM    Creatinine 1.52 (H) 02/03/2020 03:52 PM    GFR est AA 37 (L) 02/03/2020 03:52 PM    GFR est non-AA 32 (L) 02/03/2020 03:52 PM    Calcium 10.2 02/03/2020 03:52 PM    Glucose (POC) 75 10/31/2019 12:06 PM     Lab Results   Component Value Date/Time    Bilirubin, total <0.2 02/03/2020 03:52 PM    ALT (SGPT) 19 02/03/2020 03:52 PM    AST (SGOT) 10 02/03/2020 03:52 PM    Alk.  phosphatase 100 02/03/2020 03:52 PM    Protein, total 7.7 02/03/2020 03:52 PM    Albumin 4.6 02/03/2020 03:52 PM    Globulin 3.8 10/25/2017 12:00 AM       Assessment:   1) Leukocytosis      Plan:   1) Negative work up in 2016--will repeat Immunophenotype to rule out CLL and BCR/ABL to rule out CML    Signed By: Ashley Hurst MD

## 2020-09-10 ENCOUNTER — OFFICE VISIT (OUTPATIENT)
Dept: CARDIOLOGY CLINIC | Age: 80
End: 2020-09-10

## 2020-09-10 VITALS
TEMPERATURE: 96.2 F | RESPIRATION RATE: 16 BRPM | HEIGHT: 65 IN | BODY MASS INDEX: 26.16 KG/M2 | DIASTOLIC BLOOD PRESSURE: 64 MMHG | OXYGEN SATURATION: 98 % | HEART RATE: 80 BPM | WEIGHT: 157 LBS | SYSTOLIC BLOOD PRESSURE: 124 MMHG

## 2020-09-10 DIAGNOSIS — I49.3 PVC'S (PREMATURE VENTRICULAR CONTRACTIONS): ICD-10-CM

## 2020-09-10 DIAGNOSIS — R07.2 PRECORDIAL PAIN: Primary | ICD-10-CM

## 2020-09-10 DIAGNOSIS — I10 ESSENTIAL HYPERTENSION: ICD-10-CM

## 2020-09-10 DIAGNOSIS — I48.0 PAF (PAROXYSMAL ATRIAL FIBRILLATION) (HCC): ICD-10-CM

## 2020-09-10 DIAGNOSIS — R09.89 CAROTID BRUIT, UNSPECIFIED LATERALITY: ICD-10-CM

## 2020-09-10 DIAGNOSIS — I51.9 LV DYSFUNCTION: ICD-10-CM

## 2020-09-10 DIAGNOSIS — R00.2 PALPITATIONS: ICD-10-CM

## 2020-09-10 DIAGNOSIS — N18.30 CKD (CHRONIC KIDNEY DISEASE) STAGE 3, GFR 30-59 ML/MIN (HCC): ICD-10-CM

## 2020-09-10 DIAGNOSIS — R94.39 ABNORMAL NUCLEAR STRESS TEST: ICD-10-CM

## 2020-09-10 DIAGNOSIS — E78.00 HYPERCHOLESTEROLEMIA: ICD-10-CM

## 2020-09-10 DIAGNOSIS — E11.21 TYPE 2 DIABETES WITH NEPHROPATHY (HCC): ICD-10-CM

## 2020-09-10 RX ORDER — LOPERAMIDE HCL 2 MG
2 TABLET ORAL AS NEEDED
Status: ON HOLD | COMMUNITY
End: 2021-07-19

## 2020-09-10 RX ORDER — CARVEDILOL 6.25 MG/1
6.25 TABLET ORAL 2 TIMES DAILY WITH MEALS
Qty: 180 TAB | Refills: 1 | Status: SHIPPED | OUTPATIENT
Start: 2020-09-10 | End: 2020-09-18

## 2020-09-10 RX ORDER — ASPIRIN 81 MG/1
81 TABLET ORAL DAILY
Qty: 90 TAB | Refills: 1
Start: 2020-09-10

## 2020-09-10 NOTE — PROGRESS NOTES
Verified patient with two patient identifiers. Medications reviewed/approved by Dr. Rober Martinez. Chief Complaint   Patient presents with    Chest Pain     new patient evaluation - referred by Ivory Jacome NP    Results     1. Have you been to the ER, urgent care clinic since your last visit? Hospitalized since your last visit? new pt.    2. Have you seen or consulted any other health care providers outside of the [address] [place] [address] since your last visit? Include any pap smears or colon screening. New pt.

## 2020-09-10 NOTE — PROGRESS NOTES
April Anderson is a 79 y.o. female is here for cardiac evaluation. Multiple medical problems including DM II, hypertension, CKD III, dyslipidemia, DJD, lumbar stenosis/radiculopathy, chronic pain, tobacco, leukocytosis, PAF during hospitalization with encephalopathy 10/17 (at [facility]), followed by Emigdio Wells at [facility]. Seen OV 6/29/20 with brief intermittent CP--atypical, EKG abnormal with NSST and sent for Lexiscan MPI 7/29/20:  · Baseline ECG: Normal EKG, PACs, non-specific ST-T wave abnormalities. · Inconclusive stress test.  · EKG stress test results correlate with an intermediate risk of inducible myocardial ischemia. · Non-limiting dyspnea, no EKG changes  · Nuclear images reported seperately  · FINDINGS: The rest and stress perfusion images a fixed defect in the inferior wall compatible with infarct. No reversible abnormality is seen to suggest myocardium at ischemic risk. The gated images demonstrate global hypokinesia and inferior akinesia. Left ventricular ejection fraction is 31 %. Impression: Fixed defect in the inferior wall is compatible with infarct. No evidence of myocardium at ischemic risk. Left ventricular ejection fraction is 31 %. Global hypokinesia and inferior akinesia. +STANTON, some fatigue, brief intermittent chest pain--not clearly exertional.  No recent Echo, had prior Echo 10/17 with LVEF 55%. No recurrence of afib known since 10/17. The patient denies  orthopnea, PND, LE edema, , syncope, presyncope.        Patient Active Problem List    Diagnosis Date Noted    PAF (paroxysmal atrial fibrillation) ([facility] 09/10/2020    CKD (chronic kidney disease) stage 3, GFR 30-59 ml/min ([facility]) 06/04/2019    Lymphocytosis 05/07/2018    Type 2 diabetes with nephropathy ([address] 02/15/2018    Type 2 diabetes mellitus with diabetic neuropathy ([facility] 02/15/2018    Overdose of opiate or related narcotic, accidental or unintentional, subsequent encounter 10/27/2017    Acute left-sided low back pain with sciatica 10/27/2017    Physical debility 10/27/2017    Acute encephalopathy 10/23/2017    Hyperkalemia 10/20/2017    Altered mental status 10/20/2017    Transaminitis 10/20/2017    Acute renal failure (ARF) ([facility] 10/20/2017    Diverticulitis large intestine w/o perforation or abscess w/o bleeding 07/06/2017    SOB (shortness of breath) 06/21/2017    Abdominal pain, generalized 06/21/2017    Diarrhea in adult patient 06/21/2017    Cigarette smoker 17/18/6796    Neutrophilic leukocytosis 41/75/7915    Chronic pain     Hypercholesterolemia     Arthritis     Diabetes ([address]     IBS (irritable bowel syndrome)     Hemorrhoid       Tamara Oneal NP  Past Medical History:   Diagnosis Date    Arthritis     Chronic pain     Chronic pain     Diabetes ([address]     Diverticular disease     Hemorrhoid     Hypercholesterolemia     IBS (irritable bowel syndrome)     Lymphocytosis 05/07/2018      Past Surgical History:   Procedure Laterality Date    COLONOSCOPY N/A 10/31/2019    COLONOSCOPY performed by Kim [facility] at [facility]  10/31/2019         Vester Ramsay  10/31/2019         HX CATARACT REMOVAL      HX CHOLECYSTECTOMY      HX COLONOSCOPY  2009    HX COLONOSCOPY  2016    2 adenomatous polyp    HX HEMORRHOIDECTOMY  2009    HX HYSTERECTOMY      HX KNEE REPLACEMENT      right    HX ORTHOPAEDIC  12/11/2017    back, laminectomy and decompression     Allergies   Allergen Reactions    Morphine Anaphylaxis    Ultram [Tramadol] Palpitations      Family History   Problem Relation Age of Onset    Colon Cancer Father     Diabetes Mother       Social History     Socioeconomic History    Marital status:      Spouse name: Not on file    Number of children: Not on file    Years of education: Not on file    Highest education level: Not on file   Occupational History    Occupation: retired     Comment: LPN   Social Needs    Financial resource strain: Not on file    Food insecurity     Worry: Not on file     Inability: Not on file    Transportation needs     Medical: Not on file     Non-medical: Not on file   Tobacco Use    Smoking status: Current Every Day Smoker     Packs/day: 1.00     Years: 55.00     Pack years: 55.00     Types: Cigarettes    Smokeless tobacco: Never Used   Substance and Sexual Activity    Alcohol use: No    Drug use: Never    Sexual activity: Not on file   Lifestyle    Physical activity     Days per week: Not on file     Minutes per session: Not on file    Stress: Not on file   Relationships    Social connections     Talks on phone: Not on file     Gets together: Not on file     Attends [employer] service: Not on file     Active member of club or organization: Not on file     Attends meetings of clubs or organizations: Not on file     Relationship status: Not on file    Intimate partner violence     Fear of current or ex partner: Not on file     Emotionally abused: Not on file     Physically abused: Not on file     Forced sexual activity: Not on file   Other Topics Concern     Service No    Blood Transfusions Yes    Caffeine Concern Yes    Occupational Exposure No    Hobby Hazards No    Sleep Concern Yes    Stress Concern Yes    Weight Concern No    Special Diet No    Back Care Yes    Exercise No    Bike Helmet No     Comment: n/a    Seat Belt Yes    Self-Exams Yes   Social History Narrative    Not on file      Current Outpatient Medications   Medication Sig    loperamide (IMMODIUM) 2 mg tablet Take 2 mg by mouth as needed for Diarrhea.     insulin glargine (Lantus Solostar U-100 Insulin) 100 unit/mL (3 mL) inpn INJECT 45  UNITS UNDER THE  SKIN DAILY    insulin glargine (Lantus Solostar U-100 Insulin) 100 unit/mL (3 mL) inpn INJECT SUBCUTANEOUSLY 45  UNITS DAILY    valsartan-hydroCHLOROthiazide (DIOVAN-HCT) 160-12.5 mg per tablet TAKE 1 TABLET BY MOUTH  DAILY    Januvia 50 mg tablet TAKE 1 TABLET BY MOUTH  DAILY    dilTIAZem ER (CARDIZEM CD) 240 mg capsule TAKE 1 CAPSULE BY MOUTH  DAILY    ergocalciferol (ERGOCALCIFEROL) 1,250 mcg (50,000 unit) capsule Take 1 Cap by mouth every seven (7) days. Indications: low vitamin D levels    venlafaxine (EFFEXOR) 75 mg tablet TAKE 1 TABLET BY MOUTH TWO  TIMES DAILY WITH MEALS    glyBURIDE (DIABETA) 5 mg tablet TAKE 1 TABLET BY MOUTH TWO  TIMES DAILY WITH MEALS    atorvastatin (LIPITOR) 20 mg tablet TAKE 1 TABLET BY MOUTH  DAILY    B.infantis-B.ani-B.long-B.bifi (PROBIOTIC 4X) 10-15 mg TbEC Take  by mouth.  metFORMIN (GLUCOPHAGE) 1,000 mg tablet 1/2 tab bid    celecoxib (CELEBREX) 200 mg capsule TAKE 1 CAPSULE BY MOUTH TWO TIMES DAILY    acetaminophen (TYLENOL) 650 mg TbER Take 650 mg by mouth every eight (8) hours.  BD ULTRA-FINE SHORT PEN NEEDLE 31 gauge x 5/16\" ndle USE AS DIRECTED    glucose blood VI test strips (ACCU-CHEK FRANCO PLUS TEST STRP) strip DX E11.65    ascorbic acid, vitamin C, (VITAMIN C) 500 mg tablet Take  by mouth.  zinc 50 mg tab tablet Take  by mouth daily.  vit C/E/Zn/coppr/lutein/zeaxan (PRESERVISION AREDS-2 PO) Take  by mouth.  carboxymethylcellulose sodium (REFRESH TEARS OP) Apply  to eye.  OTHER,NON-FORMULARY, Take 1 Tab by mouth daily. Tumeric curcumin C3 complex    gabapentin (NEURONTIN) 300 mg capsule TAKE 1 CAPSULE THREE TIMES A DAY (Patient taking differently: Not a current medication)    gemfibrozil (LOPID) 600 mg tablet Take 1 Tab by mouth two (2) times a day. (Patient taking differently: Take 600 mg by mouth two (2) times a day. Not a current medication)    omega-3 fatty acids-vitamin e 1,000 mg cap Take 1 Cap by mouth two (2) times a day. 32a day      No current facility-administered medications for this visit. Review of Symptoms:    CONST  No weight change.  No fever, chills, sweats    ENT No visual changes, URI sx, sore throat    CV  See HPI   RESP  No cough, or sputum, wheezing. Also see HPI   GI  No abdominal pain or change in bowel habits. No heartburn or dysphagia. No melena or rectal bleeding.   No dysuria, urgency, frequency, hematuria   MSKEL  No joint pain, swelling. No muscle pain. SKIN  No rash or lesions. NEURO  No headache, syncope, or seizure. No weakness, loss of sensation, or paresthesias. PSYCH  No low mood or depression  No anxiety. HE/LYMPH  No easy bruising, abnormal bleeding, or enlarged glands. Physical ExamPhysical Exam:    Visit Vitals  /64 (BP 1 Location: Left arm, BP Patient Position: Sitting)   Pulse 80   Temp (!) 96.2 °F (35.7 °C) (Temporal)   Resp 16   Ht 5' 5\" (1.651 m)   Wt 157 lb (71.2 kg)   SpO2 98% Comment: ra   BMI 26.13 kg/m²     Gen: NAD  HEENT:  PERRL, throat clear ?faint bruit  Neck: no adenopathy, no thyromegaly, no JVD   Heart:  sl irregular,Nl S1S2,  I/VI murmur, no gallop or rub. Lungs:  clear  Abdomen:   Soft, non-tender, bowel sounds are active.    Extremities:  No edema  Pulse: symmetric  Neuro: A&O times 3, No focal neuro deficits    Cardiographics    ECG: NSR, PVC\"s, NST      Labs:   Lab Results   Component Value Date/Time    Sodium 140 06/29/2020 12:03 PM    Sodium 137 02/03/2020 03:52 PM    Sodium 137 10/04/2019 08:26 AM    Sodium CANCELED 10/03/2019 04:29 PM    Sodium 137 06/04/2019 03:57 PM    Potassium 5.1 06/29/2020 12:03 PM    Potassium 5.1 02/03/2020 03:52 PM    Potassium 5.4 (H) 10/04/2019 08:26 AM    Potassium CANCELED 10/03/2019 04:29 PM    Potassium 5.1 06/04/2019 03:57 PM    Chloride 105 06/29/2020 12:03 PM    Chloride 103 02/03/2020 03:52 PM    Chloride 99 10/04/2019 08:26 AM    Chloride CANCELED 10/03/2019 04:29 PM    Chloride 102 06/04/2019 03:57 PM    CO2 18 (L) 06/29/2020 12:03 PM    CO2 16 (L) 02/03/2020 03:52 PM    CO2 15 (L) 10/04/2019 08:26 AM    CO2 CANCELED 10/03/2019 04:29 PM    CO2 20 06/04/2019 03:57 PM    Anion gap 9 10/28/2017 06:01 AM    Anion gap 10 10/27/2017 04:26 AM    Anion gap 9 10/25/2017 12:00 AM    Anion gap 11 10/24/2017 04:42 AM    Anion gap 6 10/23/2017 03:43 AM    Glucose 128 (H) 06/29/2020 12:03 PM    Glucose 202 (H) 02/03/2020 03:52 PM    Glucose 214 (H) 10/04/2019 08:26 AM    Glucose CANCELED 10/03/2019 04:29 PM    Glucose 260 (H) 06/04/2019 03:57 PM    BUN 33 (H) 06/29/2020 12:03 PM    BUN 29 (H) 02/03/2020 03:52 PM    BUN 32 (H) 10/04/2019 08:26 AM    BUN CANCELED 10/03/2019 04:29 PM    BUN 32 (H) 06/04/2019 03:57 PM    Creatinine 1.74 (H) 06/29/2020 12:03 PM    Creatinine 1.52 (H) 02/03/2020 03:52 PM    Creatinine 1.57 (H) 10/04/2019 08:26 AM    Creatinine CANCELED 10/03/2019 04:29 PM    Creatinine 1.49 (H) 06/04/2019 03:57 PM    BUN/Creatinine ratio 19 06/29/2020 12:03 PM    BUN/Creatinine ratio 19 02/03/2020 03:52 PM    BUN/Creatinine ratio 20 10/04/2019 08:26 AM    BUN/Creatinine ratio 21 06/04/2019 03:57 PM    BUN/Creatinine ratio 16 10/03/2018 04:30 PM    GFR est AA 31 (L) 06/29/2020 12:03 PM    GFR est AA 37 (L) 02/03/2020 03:52 PM    GFR est AA 36 (L) 10/04/2019 08:26 AM    GFR est AA 38 (L) 06/04/2019 03:57 PM    GFR est AA 37 (L) 10/03/2018 04:30 PM    GFR est non-AA 27 (L) 06/29/2020 12:03 PM    GFR est non-AA 32 (L) 02/03/2020 03:52 PM    GFR est non-AA 31 (L) 10/04/2019 08:26 AM    GFR est non-AA 33 (L) 06/04/2019 03:57 PM    GFR est non-AA 32 (L) 10/03/2018 04:30 PM    Calcium 9.6 06/29/2020 12:03 PM    Calcium 10.2 02/03/2020 03:52 PM    Calcium 10.0 10/04/2019 08:26 AM    Calcium CANCELED 10/03/2019 04:29 PM    Calcium 10.2 06/04/2019 03:57 PM    Bilirubin, total <0.2 06/29/2020 12:03 PM    Bilirubin, total <0.2 02/03/2020 03:52 PM    Bilirubin, total <0.2 10/04/2019 08:26 AM    Bilirubin, total CANCELED 10/03/2019 04:29 PM    Bilirubin, total 0.2 06/04/2019 03:57 PM    Alk. phosphatase 93 06/29/2020 12:03 PM    Alk. phosphatase 100 02/03/2020 03:52 PM    Alk. phosphatase 83 10/04/2019 08:26 AM    Alk.  phosphatase CANCELED 10/03/2019 04:29 PM Alk. phosphatase 70 06/04/2019 03:57 PM    Protein, total 6.9 06/29/2020 12:03 PM    Protein, total 7.7 02/03/2020 03:52 PM    Protein, total 7.5 10/04/2019 08:26 AM    Protein, total CANCELED 10/03/2019 04:29 PM    Protein, total 7.2 06/04/2019 03:57 PM    Albumin 4.2 06/29/2020 12:03 PM    Albumin 4.6 02/03/2020 03:52 PM    Albumin 4.3 10/04/2019 08:26 AM    Albumin CANCELED 10/03/2019 04:29 PM    Albumin 4.4 06/04/2019 03:57 PM    Globulin 3.8 10/25/2017 12:00 AM    Globulin 3.6 10/22/2017 03:56 AM    Globulin 3.9 10/21/2017 12:47 AM    Globulin 4.2 (H) 10/20/2017 03:15 PM    Globulin 4.2 (H) 07/06/2017 11:15 AM    A-G Ratio 1.6 06/29/2020 12:03 PM    A-G Ratio 1.5 02/03/2020 03:52 PM    A-G Ratio 1.3 10/04/2019 08:26 AM    A-G Ratio 1.6 06/04/2019 03:57 PM    A-G Ratio 1.6 10/03/2018 04:30 PM    ALT (SGPT) 18 06/29/2020 12:03 PM    ALT (SGPT) 19 02/03/2020 03:52 PM    ALT (SGPT) 27 10/04/2019 08:26 AM    ALT (SGPT) CANCELED 10/03/2019 04:29 PM    ALT (SGPT) 27 06/04/2019 03:57 PM     Lab Results   Component Value Date/Time    CK 1,206 (H) 10/22/2017 03:56 AM     Lab Results   Component Value Date/Time    Cholesterol, total 185 06/29/2020 12:03 PM    Cholesterol, total 217 (H) 02/03/2020 03:52 PM    Cholesterol, total 169 06/04/2019 03:57 PM    Cholesterol, total 214 (H) 10/03/2018 04:30 PM    Cholesterol, total 189 05/03/2018 02:59 PM    HDL Cholesterol 36 (L) 06/29/2020 12:03 PM    HDL Cholesterol 44 02/03/2020 03:52 PM    HDL Cholesterol 37 (L) 06/04/2019 03:57 PM    HDL Cholesterol 39 (L) 10/03/2018 04:30 PM    HDL Cholesterol 43 05/03/2018 02:59 PM    LDL, calculated Comment 06/29/2020 12:03 PM    LDL, calculated 106 (H) 02/03/2020 03:52 PM    LDL, calculated 75 06/04/2019 03:57 PM    LDL, calculated 101 (H) 10/03/2018 04:30 PM    LDL, calculated 78 05/03/2018 02:59 PM    Triglyceride 412 (H) 06/29/2020 12:03 PM    Triglyceride 336 (H) 02/03/2020 03:52 PM    Triglyceride 283 (H) 06/04/2019 03:57 PM Triglyceride 369 (H) 10/03/2018 04:30 PM    Triglyceride 338 (H) 05/03/2018 02:59 PM     No results found for this or any previous visit. Assessment:         Patient Active Problem List    Diagnosis Date Noted    PAF (paroxysmal atrial fibrillation) ([facility] 09/10/2020    CKD (chronic kidney disease) stage 3, GFR 30-59 ml/min ([facility]) 06/04/2019    Lymphocytosis 05/07/2018    Type 2 diabetes with nephropathy ([address] 02/15/2018    Type 2 diabetes mellitus with diabetic neuropathy ([address] 02/15/2018    Overdose of opiate or related narcotic, accidental or unintentional, subsequent encounter 10/27/2017    Acute left-sided low back pain with sciatica 10/27/2017    Physical debility 10/27/2017    Acute encephalopathy 10/23/2017    Hyperkalemia 10/20/2017    Altered mental status 10/20/2017    Transaminitis 10/20/2017    Acute renal failure (ARF) ([address] 10/20/2017    Diverticulitis large intestine w/o perforation or abscess w/o bleeding 07/06/2017    SOB (shortness of breath) 06/21/2017    Abdominal pain, generalized 06/21/2017    Diarrhea in adult patient 06/21/2017    Cigarette smoker 95/11/8835    Neutrophilic leukocytosis 58/60/2947    Chronic pain     Hypercholesterolemia     Arthritis     Diabetes ([address]     IBS (irritable bowel syndrome)     Hemorrhoid       Multiple medical problems including DM II, hypertension, CKD III, dyslipidemia, DJD, lumbar stenosis/radiculopathy, chronic pain, tobacco, leukocytosis, PAF during hospitalization with encephalopathy 10/17 (at [address]), followed by Hailee Torrez at [facility]. Seen OV 6/29/20 with brief intermittent CP--atypical, EKG abnormal with NSST and sent for Lexiscan MPI 7/29/20:  · Baseline ECG: Normal EKG, PACs, non-specific ST-T wave abnormalities. · Inconclusive stress test.  · EKG stress test results correlate with an intermediate risk of inducible myocardial ischemia.   · Non-limiting dyspnea, no EKG changes  · Nuclear images reported seperately  · FINDINGS: The rest and stress perfusion images a fixed defect in the inferior wall compatible with infarct. No reversible abnormality is seen to suggest myocardium at ischemic risk. The gated images demonstrate global hypokinesia and inferior akinesia. Left ventricular ejection fraction is 31 %. Impression: Fixed defect in the inferior wall is compatible with infarct. No evidence of myocardium at ischemic risk. Left ventricular ejection fraction is 31 %. Global hypokinesia and inferior akinesia. +STANTON, some fatigue, brief intermittent chest pain--not clearly exertional.  No recent Echo, had prior Echo 10/17 with LVEF 55%. No recurrence of afib known since 10/17.      Plan:     Echo/doppler--LVEF (low on recent Lexiscan with inferior infarct)  Holter monitor x 48 hrs--hx PAF (not on OAC)  Carotid dopplers--pulsatile sensation neck, multiple CV risks  Change Diltiazem to Coreg 6.25mg bid (LV dysfunction)  Continue Valsartan HCT  Continue DM meds  Continue statin  ASA 81  Smoking discussed  Fu after testing to review      Siva Valladares MD

## 2020-09-10 NOTE — LETTER
9/10/20 Patient: Tony Tapia YOB: 1940 Date of Visit: 9/10/2020 Tamara Pal NP 
[address] [facility] [address] VIA In Basket Dear Tamara Pal NP, Thank you for referring Ms. Aicha Brody to [facility] for evaluation. My notes for this consultation are attached. If you have questions, please do not hesitate to call me. I look forward to following your patient along with you.  
 
 
Sincerely, 
 
Siva Valladares MD

## 2020-09-15 ENCOUNTER — TELEPHONE (OUTPATIENT)
Dept: CARDIOLOGY CLINIC | Age: 80
End: 2020-09-15

## 2020-09-15 RX ORDER — DILTIAZEM HYDROCHLORIDE 240 MG/1
240 CAPSULE, EXTENDED RELEASE ORAL DAILY
COMMUNITY
End: 2020-09-23

## 2020-09-15 NOTE — TELEPHONE ENCOUNTER
L/S by Dr. Nayla Edward: 9/10/2020   Plan:      Echo/doppler--LVEF (low on recent [place] with inferior infarct) SCHEDULED FOR 9/21  Holter monitor x 48 hrs--hx PAF (not on [address]) SCHEDULED FOR 9/17  Carotid dopplers--pulsatile sensation neck, multiple CV risks  Change Diltiazem to Coreg 6.25mg bid (LV dysfunction)  Continue Valsartan HCT  Continue DM meds  Continue statin  ASA 81  Smoking discussed  Fu after testing to review     Pt c/o \"unable to take a deep breath since Saturday\". Taking coreg now? Yes, since Saturday. Noticed a difference w/ inhalation once coreg was started? Yes,  per the pt. Per Dr. Nayla Edward: stop coreg and restart dilTIAZem ER (CARDIZEM CD) 240 mg capsule every day. Pt verbalized understanding.

## 2020-09-15 NOTE — TELEPHONE ENCOUNTER
Pt called stating that she saw Dr. Jasmine Fox last Thursday and that for the last 2 days she has has a problem inhaling properly. She would like to speak to you Violet.   Please call [phone]

## 2020-09-17 ENCOUNTER — CLINICAL SUPPORT (OUTPATIENT)
Dept: CARDIOLOGY CLINIC | Age: 80
End: 2020-09-17

## 2020-09-17 ENCOUNTER — TELEPHONE (OUTPATIENT)
Dept: CARDIOLOGY CLINIC | Age: 80
End: 2020-09-17

## 2020-09-17 VITALS — TEMPERATURE: 95.4 F

## 2020-09-17 DIAGNOSIS — R00.2 PALPITATIONS: ICD-10-CM

## 2020-09-17 DIAGNOSIS — I48.0 PAF (PAROXYSMAL ATRIAL FIBRILLATION) (HCC): ICD-10-CM

## 2020-09-17 DIAGNOSIS — I49.3 PVC'S (PREMATURE VENTRICULAR CONTRACTIONS): ICD-10-CM

## 2020-09-17 RX ORDER — MONTELUKAST SODIUM 4 MG/1
1 TABLET, CHEWABLE ORAL 2 TIMES DAILY
COMMUNITY
End: 2020-10-06 | Stop reason: SDUPTHER

## 2020-09-17 NOTE — TELEPHONE ENCOUNTER
Pt reports that the inhalation/sob is better since going back on diltiazem vs coreg. Verified patient with two patient identifiers.

## 2020-09-17 NOTE — TELEPHONE ENCOUNTER
Verified patient with two patient identifiers. Pt came into the office today for Holter monitor application. Spent 15 minutes discussing medications with the pt bc she states, \"I want to get this straight now\". Pt was seen by Dr. Grabiel Lowery on 9/10/20. Pt reports that she left her medication list at [facility] on 7/29/20 while she was having nmst done. Pt assumed that Dr. Grabiel Lowery would be bringing it back with him to the office. Pt did not have her medication list during her appt on 9/10. We reviewed what Pineda Gamble NP had on file and I asked pt to call back with any discrepancies. PLAN from 9/10:  Echo/doppler--LVEF (low on recent Lexiscan with inferior infarct)  Holter monitor x 48 hrs--hx PAF (not on OAC)  Carotid dopplers--pulsatile sensation neck, multiple CV risks  Change Diltiazem to Coreg 6.25mg bid (LV dysfunction)  Continue Valsartan HCT  Continue DM meds  Continue statin  ASA 81  Smoking discussed  Fu after testing to review       Pt  called Pineda Gamble NP office on 9/14/20. Call note:  Delano Ernesto 3:59 PM   Note      Dr. Grabiel Lowery told patient that she needs to go back on gemfibrozil. Apparently he is not prescribing but saw it on a list that patient was taking when at [facility]. [facility]\"        Earnestine Davis NP     8:52 PM   Please notify MS Dc Barroso that lopid was called in per Dr. Grabiel Lowery  and  stop atorvastatin for now. Kb Sanchez NP sent the following RX on 9/14:  gemfibroziL (LOPID) 600 mg tablet  180 Tab  3  9/14/2020      Sig - Route: Take 1 Tab by mouth two (2) times a day. - Oral       Informed the pt that Dr. Grabiel Lowery note does not state that he wants her on the lopid vs statin. His note actually states to cont. Statin. I will run this by him and have him review lipid panel from June if not already done.

## 2020-09-17 NOTE — PROGRESS NOTES
48 hour Holter monitor only. Verified patient with two patient identifiers. Pt verbalized understanding of its use. Ordering GISSEL Jain  Reason: PVC's (premature ventricular contractions) [I49.3 (ICD-10-CM)]; Palpitations [R00.2 (ICD-10-CM)]; PAF (paroxysmal atrial fibrillation) ([facility] [I48.0 (ICD-10-CM)]   Start time: 2:55PM  Return date: 9/21/20        No LOS.

## 2020-09-18 ENCOUNTER — HOSPITAL ENCOUNTER (INPATIENT)
Age: 80
LOS: 5 days | Discharge: HOME | DRG: 226 | End: 2020-09-23
Attending: EMERGENCY MEDICINE | Admitting: INTERNAL MEDICINE
Payer: MEDICARE

## 2020-09-18 DIAGNOSIS — I48.0 PAF (PAROXYSMAL ATRIAL FIBRILLATION) (HCC): ICD-10-CM

## 2020-09-18 DIAGNOSIS — I50.22 CHRONIC SYSTOLIC CONGESTIVE HEART FAILURE (HCC): ICD-10-CM

## 2020-09-18 DIAGNOSIS — I42.0 DILATED CARDIOMYOPATHY (HCC): Chronic | ICD-10-CM

## 2020-09-18 DIAGNOSIS — I50.21 ACUTE SYSTOLIC CONGESTIVE HEART FAILURE (HCC): Primary | ICD-10-CM

## 2020-09-18 DIAGNOSIS — I16.1 HYPERTENSIVE EMERGENCY: ICD-10-CM

## 2020-09-18 DIAGNOSIS — I42.9 CARDIOMYOPATHY, UNSPECIFIED TYPE (HCC): ICD-10-CM

## 2020-09-18 DIAGNOSIS — I49.5 SSS (SICK SINUS SYNDROME) (HCC): Chronic | ICD-10-CM

## 2020-09-18 PROBLEM — J90 BILATERAL PLEURAL EFFUSION: Status: ACTIVE | Noted: 2020-09-18

## 2020-09-18 PROBLEM — R09.02 HYPOXIA: Status: ACTIVE | Noted: 2020-09-18

## 2020-09-18 PROBLEM — R77.8 ELEVATED TROPONIN: Status: ACTIVE | Noted: 2020-09-18

## 2020-09-18 PROBLEM — I50.9 CHF (CONGESTIVE HEART FAILURE) (HCC): Status: ACTIVE | Noted: 2020-09-18

## 2020-09-18 PROBLEM — J96.90 RESPIRATORY FAILURE (HCC): Status: ACTIVE | Noted: 2020-09-18

## 2020-09-18 LAB
APTT PPP: 25.2 SEC (ref 22.1–32)
BASOPHILS # BLD: 0 K/UL (ref 0–0.1)
BASOPHILS NFR BLD: 0 % (ref 0–1)
DIFFERENTIAL METHOD BLD: ABNORMAL
EOSINOPHIL # BLD: 0 K/UL (ref 0–0.4)
EOSINOPHIL NFR BLD: 0 % (ref 0–7)
ERYTHROCYTE [DISTWIDTH] IN BLOOD BY AUTOMATED COUNT: 13.8 % (ref 11.5–14.5)
GLUCOSE BLD STRIP.AUTO-MCNC: 221 MG/DL (ref 65–100)
GLUCOSE BLD STRIP.AUTO-MCNC: 262 MG/DL (ref 65–100)
GLUCOSE BLD STRIP.AUTO-MCNC: 312 MG/DL (ref 65–100)
GLUCOSE BLD STRIP.AUTO-MCNC: 323 MG/DL (ref 65–100)
HCT VFR BLD AUTO: 33.5 % (ref 35–47)
HGB BLD-MCNC: 11 G/DL (ref 11.5–16)
IMM GRANULOCYTES # BLD AUTO: 0.2 K/UL (ref 0–0.04)
IMM GRANULOCYTES NFR BLD AUTO: 1 % (ref 0–0.5)
LACTATE SERPL-SCNC: 2 MMOL/L (ref 0.4–2)
LYMPHOCYTES # BLD: 1.9 K/UL (ref 0.8–3.5)
LYMPHOCYTES NFR BLD: 8 % (ref 12–49)
MCH RBC QN AUTO: 30.8 PG (ref 26–34)
MCHC RBC AUTO-ENTMCNC: 32.8 G/DL (ref 30–36.5)
MCV RBC AUTO: 93.8 FL (ref 80–99)
MONOCYTES # BLD: 0.9 K/UL (ref 0–1)
MONOCYTES NFR BLD: 4 % (ref 5–13)
NEUTS SEG # BLD: 20.3 K/UL (ref 1.8–8)
NEUTS SEG NFR BLD: 87 % (ref 32–75)
NRBC # BLD: 0 K/UL (ref 0–0.01)
NRBC BLD-RTO: 0 PER 100 WBC
PLATELET # BLD AUTO: 362 K/UL (ref 150–400)
PMV BLD AUTO: 11.1 FL (ref 8.9–12.9)
RBC # BLD AUTO: 3.57 M/UL (ref 3.8–5.2)
RBC MORPH BLD: ABNORMAL
SERVICE CMNT-IMP: ABNORMAL
THERAPEUTIC RANGE,PTTT: NORMAL SECS (ref 58–77)
TROPONIN I SERPL-MCNC: 1.2 NG/ML
TROPONIN I SERPL-MCNC: 2.04 NG/ML
WBC # BLD AUTO: 23.3 K/UL (ref 3.6–11)

## 2020-09-18 PROCEDURE — 74011250636 HC RX REV CODE- 250/636: Performed by: INTERNAL MEDICINE

## 2020-09-18 PROCEDURE — 82962 GLUCOSE BLOOD TEST: CPT

## 2020-09-18 PROCEDURE — 84484 ASSAY OF TROPONIN QUANT: CPT

## 2020-09-18 PROCEDURE — 96374 THER/PROPH/DIAG INJ IV PUSH: CPT

## 2020-09-18 PROCEDURE — 74011636637 HC RX REV CODE- 636/637: Performed by: INTERNAL MEDICINE

## 2020-09-18 PROCEDURE — 83605 ASSAY OF LACTIC ACID: CPT

## 2020-09-18 PROCEDURE — 85730 THROMBOPLASTIN TIME PARTIAL: CPT

## 2020-09-18 PROCEDURE — 99284 EMERGENCY DEPT VISIT MOD MDM: CPT

## 2020-09-18 PROCEDURE — 65660000000 HC RM CCU STEPDOWN

## 2020-09-18 PROCEDURE — 87040 BLOOD CULTURE FOR BACTERIA: CPT

## 2020-09-18 PROCEDURE — 77010033678 HC OXYGEN DAILY

## 2020-09-18 PROCEDURE — 74011000250 HC RX REV CODE- 250: Performed by: EMERGENCY MEDICINE

## 2020-09-18 PROCEDURE — 85025 COMPLETE CBC W/AUTO DIFF WBC: CPT

## 2020-09-18 PROCEDURE — 36415 COLL VENOUS BLD VENIPUNCTURE: CPT

## 2020-09-18 PROCEDURE — 74011250637 HC RX REV CODE- 250/637: Performed by: NURSE PRACTITIONER

## 2020-09-18 PROCEDURE — 74011250636 HC RX REV CODE- 250/636: Performed by: NURSE PRACTITIONER

## 2020-09-18 PROCEDURE — 74011250637 HC RX REV CODE- 250/637: Performed by: INTERNAL MEDICINE

## 2020-09-18 RX ORDER — HYDROCHLOROTHIAZIDE 25 MG/1
12.5 TABLET ORAL DAILY
Status: DISCONTINUED | OUTPATIENT
Start: 2020-09-18 | End: 2020-09-18

## 2020-09-18 RX ORDER — ENOXAPARIN SODIUM 100 MG/ML
1 INJECTION SUBCUTANEOUS EVERY 24 HOURS
Status: DISCONTINUED | OUTPATIENT
Start: 2020-09-18 | End: 2020-09-18

## 2020-09-18 RX ORDER — FUROSEMIDE 10 MG/ML
40 INJECTION INTRAMUSCULAR; INTRAVENOUS DAILY
Status: DISCONTINUED | OUTPATIENT
Start: 2020-09-18 | End: 2020-09-18

## 2020-09-18 RX ORDER — CARVEDILOL 12.5 MG/1
12.5 TABLET ORAL 2 TIMES DAILY WITH MEALS
Status: DISCONTINUED | OUTPATIENT
Start: 2020-09-19 | End: 2020-09-22

## 2020-09-18 RX ORDER — LOSARTAN POTASSIUM 100 MG/1
100 TABLET ORAL DAILY
Status: DISCONTINUED | OUTPATIENT
Start: 2020-09-18 | End: 2020-09-18

## 2020-09-18 RX ORDER — MAGNESIUM SULFATE 100 %
4 CRYSTALS MISCELLANEOUS AS NEEDED
Status: DISCONTINUED | OUTPATIENT
Start: 2020-09-18 | End: 2020-09-23 | Stop reason: HOSPADM

## 2020-09-18 RX ORDER — GABAPENTIN 300 MG/1
300 CAPSULE ORAL 3 TIMES DAILY
Status: DISCONTINUED | OUTPATIENT
Start: 2020-09-18 | End: 2020-09-23 | Stop reason: HOSPADM

## 2020-09-18 RX ORDER — HEPARIN SODIUM 10000 [USP'U]/100ML
12-25 INJECTION, SOLUTION INTRAVENOUS
Status: DISCONTINUED | OUTPATIENT
Start: 2020-09-18 | End: 2020-09-21

## 2020-09-18 RX ORDER — MONTELUKAST SODIUM 4 MG/1
1 TABLET, CHEWABLE ORAL 2 TIMES DAILY
Status: DISCONTINUED | OUTPATIENT
Start: 2020-09-18 | End: 2020-09-23 | Stop reason: HOSPADM

## 2020-09-18 RX ORDER — LOPERAMIDE HYDROCHLORIDE 2 MG/1
2 CAPSULE ORAL
Status: DISCONTINUED | OUTPATIENT
Start: 2020-09-18 | End: 2020-09-23 | Stop reason: HOSPADM

## 2020-09-18 RX ORDER — INSULIN GLARGINE 100 [IU]/ML
45 INJECTION, SOLUTION SUBCUTANEOUS
Status: DISCONTINUED | OUTPATIENT
Start: 2020-09-18 | End: 2020-09-23 | Stop reason: HOSPADM

## 2020-09-18 RX ORDER — INSULIN LISPRO 100 [IU]/ML
INJECTION, SOLUTION INTRAVENOUS; SUBCUTANEOUS
Status: DISCONTINUED | OUTPATIENT
Start: 2020-09-18 | End: 2020-09-23 | Stop reason: HOSPADM

## 2020-09-18 RX ORDER — ATORVASTATIN CALCIUM 20 MG/1
20 TABLET, FILM COATED ORAL
Status: DISCONTINUED | OUTPATIENT
Start: 2020-09-18 | End: 2020-09-23 | Stop reason: HOSPADM

## 2020-09-18 RX ORDER — LABETALOL HYDROCHLORIDE 5 MG/ML
20 INJECTION, SOLUTION INTRAVENOUS
Status: DISCONTINUED | OUTPATIENT
Start: 2020-09-18 | End: 2020-09-23 | Stop reason: HOSPADM

## 2020-09-18 RX ORDER — NITROGLYCERIN 20 MG/100ML
0-200 INJECTION INTRAVENOUS
Status: DISCONTINUED | OUTPATIENT
Start: 2020-09-18 | End: 2020-09-21

## 2020-09-18 RX ORDER — HEPARIN SODIUM 5000 [USP'U]/ML
4000 INJECTION, SOLUTION INTRAVENOUS; SUBCUTANEOUS ONCE
Status: COMPLETED | OUTPATIENT
Start: 2020-09-18 | End: 2020-09-18

## 2020-09-18 RX ORDER — DILTIAZEM HYDROCHLORIDE 240 MG/1
240 CAPSULE, COATED, EXTENDED RELEASE ORAL DAILY
Status: DISCONTINUED | OUTPATIENT
Start: 2020-09-18 | End: 2020-09-18

## 2020-09-18 RX ORDER — DEXTROSE 50 % IN WATER (D50W) INTRAVENOUS SYRINGE
12.5-25 AS NEEDED
Status: DISCONTINUED | OUTPATIENT
Start: 2020-09-18 | End: 2020-09-23 | Stop reason: HOSPADM

## 2020-09-18 RX ORDER — CELECOXIB 200 MG/1
200 CAPSULE ORAL 2 TIMES DAILY
COMMUNITY
End: 2020-09-23

## 2020-09-18 RX ORDER — HEPARIN SODIUM 5000 [USP'U]/ML
4000 INJECTION, SOLUTION INTRAVENOUS; SUBCUTANEOUS AS NEEDED
Status: DISCONTINUED | OUTPATIENT
Start: 2020-09-18 | End: 2020-09-21

## 2020-09-18 RX ORDER — VENLAFAXINE 25 MG/1
75 TABLET ORAL 2 TIMES DAILY WITH MEALS
Status: DISCONTINUED | OUTPATIENT
Start: 2020-09-18 | End: 2020-09-23 | Stop reason: HOSPADM

## 2020-09-18 RX ORDER — ASPIRIN 81 MG/1
81 TABLET ORAL DAILY
Status: DISCONTINUED | OUTPATIENT
Start: 2020-09-18 | End: 2020-09-23 | Stop reason: HOSPADM

## 2020-09-18 RX ORDER — HEPARIN SODIUM 5000 [USP'U]/ML
2000 INJECTION, SOLUTION INTRAVENOUS; SUBCUTANEOUS AS NEEDED
Status: DISCONTINUED | OUTPATIENT
Start: 2020-09-18 | End: 2020-09-21

## 2020-09-18 RX ADMIN — FUROSEMIDE 40 MG: 10 INJECTION, SOLUTION INTRAMUSCULAR; INTRAVENOUS at 10:39

## 2020-09-18 RX ADMIN — INSULIN GLARGINE 45 UNITS: 100 INJECTION, SOLUTION SUBCUTANEOUS at 22:15

## 2020-09-18 RX ADMIN — GABAPENTIN 300 MG: 300 CAPSULE ORAL at 17:09

## 2020-09-18 RX ADMIN — INSULIN LISPRO 2 UNITS: 100 INJECTION, SOLUTION INTRAVENOUS; SUBCUTANEOUS at 23:41

## 2020-09-18 RX ADMIN — COLESTIPOL HYDROCHLORIDE 1 G: 1 TABLET, FILM COATED ORAL at 09:35

## 2020-09-18 RX ADMIN — VENLAFAXINE 75 MG: 37.5 TABLET ORAL at 10:39

## 2020-09-18 RX ADMIN — INSULIN LISPRO 7 UNITS: 100 INJECTION, SOLUTION INTRAVENOUS; SUBCUTANEOUS at 13:22

## 2020-09-18 RX ADMIN — NITROGLYCERIN 25 MCG/MIN: 20 INJECTION INTRAVENOUS at 07:15

## 2020-09-18 RX ADMIN — ASPIRIN 81 MG: 81 TABLET, DELAYED RELEASE ORAL at 10:36

## 2020-09-18 RX ADMIN — LOSARTAN POTASSIUM 100 MG: 100 TABLET, FILM COATED ORAL at 10:37

## 2020-09-18 RX ADMIN — GABAPENTIN 300 MG: 300 CAPSULE ORAL at 22:15

## 2020-09-18 RX ADMIN — VENLAFAXINE 75 MG: 37.5 TABLET ORAL at 17:08

## 2020-09-18 RX ADMIN — COLESTIPOL HYDROCHLORIDE 1 G: 1 TABLET, FILM COATED ORAL at 18:41

## 2020-09-18 RX ADMIN — HEPARIN SODIUM 4000 UNITS: 5000 INJECTION INTRAVENOUS; SUBCUTANEOUS at 13:25

## 2020-09-18 RX ADMIN — DILTIAZEM HYDROCHLORIDE 240 MG: 240 CAPSULE, COATED, EXTENDED RELEASE ORAL at 10:37

## 2020-09-18 RX ADMIN — HEPARIN SODIUM 12 UNITS/KG/HR: 10000 INJECTION, SOLUTION INTRAVENOUS at 20:09

## 2020-09-18 RX ADMIN — HYDROCHLOROTHIAZIDE 12.5 MG: 25 TABLET ORAL at 10:37

## 2020-09-18 RX ADMIN — INSULIN LISPRO 5 UNITS: 100 INJECTION, SOLUTION INTRAVENOUS; SUBCUTANEOUS at 17:09

## 2020-09-18 RX ADMIN — GABAPENTIN 300 MG: 300 CAPSULE ORAL at 10:37

## 2020-09-18 RX ADMIN — ATORVASTATIN CALCIUM 20 MG: 20 TABLET, FILM COATED ORAL at 22:15

## 2020-09-18 NOTE — CARDIO/PULMONARY
Cardiac Rehab Note: chart review HF Consult has been acknowledged Echo ordered. Smoking history assessed. Patient is a current smoker. Smoking Cessation Program link has been added to the AVS. Patient not seen at bedside due to risk of possible COVID-19 exposure. CP Rehab will continue to follow.

## 2020-09-18 NOTE — Clinical Note
Postural Assessment Scale for Stroke    Maintaining a posture  1. Sitting without support: 3  2. Standing with support: 3  3. Standing without support: 3  4. Standing on nonparetic le  5. Standing on paretic le    Changing posture  6. Supine to affected side lateral: 3  7. Supine to nonaffected side lateral: 3  8. Supine to sitting up on the edge of the table: 3  9. Sitting on the edge of the table to supine: 3  10. Sitting to standing up: 3  11. Standing up to sitting down: 3  12. Standing, picking up a pencil from the floor: 2    Total score:     30/36   Safe sheath split and removed.

## 2020-09-18 NOTE — PROGRESS NOTES
Pharmacy Clarification of the Prior to Admission Medication Regimen Retrospective to the Admission Medication Reconciliation    The patient was  interviewed regarding clarification of the prior to admission medication regimen. Patient  was questioned by phone regarding use of any other inhalers, topical products, over the counter medications, herbal medications, vitamin products or ophthalmic/nasal/otic medication use. Information Obtained From: query, patient    Recommendations/Findings: The following amendments were made to the patient's active medication list on file at [facility]:     1) Additions:       2) Removals:   Carvedilol 6.25 mg  Supplies for diabetic testing      3) Changes:      4) Pertinent Pharmacy Findings:Per patient medication Gemfibrozil 600mg was started back on, but waiting for medication to be delivered from Mail order. Updated patients preferred outpatient pharmacy to: [facility]  Identified High Alert Medication Information       PTA medication list was corrected to the following:     Prior to Admission Medications   Prescriptions Last Dose Informant Taking? B.infantis-B.ani-B.long-B.bifi (PROBIOTIC 4X) 10-15 mg TbEC 9/17/2020 at Unknown time  Yes   Sig: Take  by mouth daily. Januvia 50 mg tablet 9/18/2020 at Unknown time  Yes   Sig: TAKE 1 TABLET BY MOUTH  DAILY   OTHER,NON-FORMULARY, 9/17/2020 at Unknown time  Yes   Sig: Take 1 Tab by mouth daily. Tumeric curcumin C3 complex   acetaminophen (TYLENOL) 650 mg TbER 9/17/2020 at Unknown time  Yes   Sig: Take 650 mg by mouth every eight (8) hours. ascorbic acid, vitamin C, (VITAMIN C) 500 mg tablet 9/17/2020 at Unknown time  Yes   Sig: Take 500 mg by mouth daily. aspirin delayed-release 81 mg tablet 9/17/2020 at Unknown time  Yes   Sig: Take 1 Tab by mouth daily. carboxymethylcellulose sodium (REFRESH TEARS OP) 9/11/2020 at Unknown time  Yes   Sig: Apply  to eye daily.  2-4 drops both eyes   celecoxib (CELEBREX) 200 mg capsule 2020 at Unknown time  Yes   Sig: Take 200 mg by mouth two (2) times a day. colestipoL (Colestid) 1 gram tablet 2020 at Unknown time  Yes   Sig: Take 1 g by mouth two (2) times a day. dilTIAZem ER (TIAZAC) 240 mg capsule 2020  Yes   Sig: Take 240 mg by mouth daily. ergocalciferol (ERGOCALCIFEROL) 1,250 mcg (50,000 unit) capsule 2020  Yes   Sig: Take 1 Cap by mouth every seven (7) days. Indications: low vitamin D levels   gabapentin (NEURONTIN) 300 mg capsule 2020 at Unknown time  Yes   Sig: TAKE 1 CAPSULE THREE TIMES A DAY   gemfibroziL (LOPID) 600 mg tablet   Yes   Sig: Take 1 Tab by mouth two (2) times a day. glyBURIDE (DIABETA) 5 mg tablet 2020 at Unknown time  Yes   Sig: TAKE 1 TABLET BY MOUTH TWO  TIMES DAILY WITH MEALS   insulin glargine (Lantus Solostar U-100 Insulin) 100 unit/mL (3 mL) inpn 2020 at Unknown time  Yes   Sig: INJECT SUBCUTANEOUSLY 45  UNITS DAILY   loperamide (IMMODIUM) 2 mg tablet   Yes   Sig: Take 2 mg by mouth as needed for Diarrhea. metFORMIN (GLUCOPHAGE) 1,000 mg tablet 2020 at Unknown time  Yes   Si/2 tab bid   omega-3 fatty acids-vitamin e 1,000 mg cap 2020 at Unknown time  Yes   Sig: Take 1 Cap by mouth two (2) times a day. 32a day    valsartan-hydroCHLOROthiazide (DIOVAN-HCT) 160-12.5 mg per tablet 2020 at Unknown time  Yes   Sig: TAKE 1 TABLET BY MOUTH  DAILY   venlafaxine (EFFEXOR) 75 mg tablet 2020 at Unknown time  Yes   Sig: TAKE 1 TABLET BY MOUTH TWO  TIMES DAILY WITH MEALS   vit A/vit C/vit E/zinc/copper (ICAPS AREDS PO) 2020 at Unknown time  Yes   Sig: Take  by mouth two (2) times a day. vit C/E/Zn/coppr/lutein/zeaxan (PRESERVISION AREDS-2 PO) 2020 at Unknown time  Yes   Sig: Take  by mouth daily. zinc 50 mg tab tablet 2020 at Unknown time  Yes   Sig: Take  by mouth daily.       Facility-Administered Medications: None          Thank you,  Aliyah Rogers CPHT  Medication History Pharmacy Technician

## 2020-09-18 NOTE — CONSULTS
[facility]     Date of  Admission: 9/18/2020  7:02 AM     Admission type:Emergency    Consult for: possible HF   Consult by: hospitalist     Subjective:     Pursuant to the emergency declaration under the [facility] [address] [address] and the [employer] Act, this Virtual  Visit was conducted, with patient's consent, to reduce the patient's risk of exposure to COVID-19 and provide continuity of care for an established patient. Discussed patient with RN and had conversation over the phone with the patient. Anuel Elliott is a 79 y.o. female admitted for CHF (congestive heart failure) ([facility] [I50.9], Hypoxia [R09.02]. [facility] with c/o worsening feeling of inability to take a deep breath. States that she began with similar symptoms with some CP 3 months ago. Had neg nuclear Stress test 7/28/2020 with EF at 31%, echo was pending but not completed. Seen by Dr. Tiffanie Pruett on 9/10/2020, stopped Dilt and started BB, ASA. Currently denies CP, just c/o inability to take a deep breath. ECG SR with no acute changes  Troponin 0.05-2.04  NTproBNP 3468    PMH: PAF, HTN, CKD, dyslipidemia, tobacco use. Previous treatment/evaluation includes stress echocardiogram and stress thallium .  Cardiac risk factors: smoking/ tobacco exposure, dyslipidemia, sedentary life style, male gender, hypertension, post-menopausal.      Patient Active Problem List    Diagnosis Date Noted    Respiratory failure ([facility] [address] 09/18/2020    CHF (congestive heart failure) ([facility] [address] [facility] 09/18/2020    Hypoxia 09/18/2020    Bilateral pleural effusion 09/18/2020    Elevated troponin 09/18/2020    PAF (paroxysmal atrial fibrillation) ([facility] [address] 09/10/2020    CKD (chronic kidney disease) stage 3, GFR 30-59 ml/min ([facility]) 06/04/2019    Lymphocytosis 05/07/2018    Type 2 diabetes with nephropathy ([address] 02/15/2018  Type 2 diabetes mellitus with diabetic neuropathy ([facility] [address] 02/15/2018    Overdose of opiate or related narcotic, accidental or unintentional, subsequent encounter 10/27/2017    Acute left-sided low back pain with sciatica 10/27/2017    Physical debility 10/27/2017    Acute encephalopathy 10/23/2017    Hyperkalemia 10/20/2017    Altered mental status 10/20/2017    Transaminitis 10/20/2017    Acute renal failure (ARF) ([facility] [address] 10/20/2017    Diverticulitis large intestine w/o perforation or abscess w/o bleeding 07/06/2017    SOB (shortness of breath) 06/21/2017    Abdominal pain, generalized 06/21/2017    Diarrhea in adult patient 06/21/2017    Cigarette smoker 47/88/6105    Neutrophilic leukocytosis 78/28/4035    Chronic pain     Hypercholesterolemia     Arthritis     Diabetes (HCC)     IBS (irritable bowel syndrome)     Hemorrhoid       Leti Zeynep, NP  Past Medical History:   Diagnosis Date    A-fib [facility])     Arthritis     Bilateral pleural effusion 9/18/2020    Chronic pain     Chronic pain     Diabetes ([facility] [address]     Diverticular disease     Elevated troponin 9/18/2020    Hemorrhoid     Hypercholesterolemia     IBS (irritable bowel syndrome)     Lymphocytosis 05/07/2018      Social History     Socioeconomic History    Marital status:      Spouse name: Not on file    Number of children: Not on file    Years of education: Not on file    Highest education level: Not on file   Occupational History    Occupation: retired     Comment: LPN   Tobacco Use    Smoking status: Current Every Day Smoker     Packs/day: 1.00     Years: 55.00     Pack years: 55.00     Types: Cigarettes    Smokeless tobacco: Never Used   Substance and Sexual Activity    Alcohol use: No    Drug use: Never   Other Topics Concern     Service No    Blood Transfusions Yes    Caffeine Concern Yes    Occupational Exposure No    Hobby Hazards No    Sleep Concern Yes    Stress Concern Yes    Weight Concern No    Special Diet No    Back Care Yes    Exercise No    Bike Helmet No     Comment: n/a    Seat Belt Yes    Self-Exams Yes     Allergies   Allergen Reactions    Morphine Anaphylaxis    Ultram [Tramadol] Palpitations      Family History   Problem Relation Age of Onset    Colon Cancer Father     Diabetes Mother       Current Facility-Administered Medications   Medication Dose Route Frequency    nitroglycerin (Tridil) 200 mcg/ml infusion  0-200 mcg/min IntraVENous TITRATE    aspirin delayed-release tablet 81 mg  81 mg Oral DAILY    colestipoL (COLESTID) tablet 1 g  1 g Oral BID    gabapentin (NEURONTIN) capsule 300 mg  300 mg Oral TID    insulin glargine (LANTUS) injection 45 Units  45 Units SubCUTAneous QHS    loperamide (IMODIUM) capsule 2 mg  2 mg Oral BID PRN    venlafaxine (EFFEXOR) tablet 75 mg  75 mg Oral BID WITH MEALS    insulin lispro (HUMALOG) injection   SubCUTAneous AC&HS    glucose chewable tablet 16 g  4 Tab Oral PRN    dextrose (D50W) injection syrg 12.5-25 g  12.5-25 g IntraVENous PRN    glucagon (GLUCAGEN) injection 1 mg  1 mg IntraMUSCular PRN    losartan (COZAAR) tablet 100 mg  100 mg Oral DAILY    And    hydroCHLOROthiazide (HYDRODIURIL) tablet 12.5 mg  12.5 mg Oral DAILY    [START ON 9/19/2020] carvediloL (COREG) tablet 12.5 mg  12.5 mg Oral BID WITH MEALS    atorvastatin (LIPITOR) tablet 20 mg  20 mg Oral QHS    heparin (porcine) injection 4,000 Units  4,000 Units IntraVENous ONCE        Review of Symptoms:   11 systems reviewed, negative other than as stated in the HPI        Objective:      Visit Vitals  BP (!) 153/74 (BP 1 Location: Right arm, BP Patient Position: At rest)   Pulse (!) 106   Temp 99.3 °F (37.4 °C)   Resp 22   SpO2 97%       Physical: No PE performed due to COVID pending   General: older  female in no acute distress  Heart: RRR, no m/S3/JVD, no carotid bruits     Data Review:   Recent Labs     09/17/20  2345   WBC 20.0*   HGB 11.6 HCT 35.7        Recent Labs     09/17/20  2345      K 4.7      CO2 23   *   BUN 35*   CREA 2.05*   CA 9.1   MG 2.4   ALB 3.2*   TBILI 0.2   ALT 60       Recent Labs     09/18/20  1051 09/17/20  2345   TROIQ 2.04* <0.05         Intake/Output Summary (Last 24 hours) at 9/18/2020 1316  Last data filed at 9/18/2020 6446  Gross per 24 hour   Intake 15.13 ml   Output    Net 15.13 ml        Cardiographics    Telemetry: SR with no acute changes  ECG: SR    Echocardiogram:  pending  2017 normal EF, milid-mod AS    CXRAY: gaurav pl effusions    Nuclear Stress 7/2020  Fixed defect in the inferior wall is compatible with infarct. No evidence of myocardium at ischemic risk. Left ventricular ejection fraction is 31 %. Global hypokinesia and inferior akinesia. Assessment:       Principal Problem:    Bilateral pleural effusion (9/18/2020)    Active Problems:    CKD (chronic kidney disease) stage 3, GFR 30-59 ml/min ([facility]) (6/4/2019)      PAF (paroxysmal atrial fibrillation) ([facility] [address] [facility] (9/10/2020)      CHF (congestive heart failure) ([facility] [address] (9/18/2020)      Hypoxia (9/18/2020)      Elevated troponin (9/18/2020)         Plan:     Possible systolic HF: (EF 07% by nuclear stress study)  Echo pending  NTproBNP 3468  With the exception of the pl effusions, does not appear volume overloaded  Received diuretics with patient stating she is feeling better. I/Os inaccurate  Unfortunately, CR rising, holding diuretics, continues on Coreg and Losartan  During outpt visit with Dr. Evans Pablo 9/10/2020, patient was to stop Dilt CD and start Coreg - have adjusted medications now  Monitor I/Os, daily weights and labs. Elevated troponin:  Follow trend Q3H. If she continues to rise significantly will proceed to cardiac cath  Currently no c/o CP, but has had CP in the past and worsening SOB and breathing problems  Giving Heparin 4000u IV now. If troponin significantly increased, cardiac cath once renal function improves. If troponin \"flat\", will start Lovenox full dose daily with her CKD  Start Lipitor, continue on ASA, BB.    CKD:  As above, per hospitalist    Thank you for consulting RCA    Tammy Angel NP     Patient seen and examined by me with the above nurse practitioner. I personally performed all components of the history, physical, and medical decision making and agree with the assessment and plan with minor modifications as noted. Patient admitted with hypoxia with clear lungs by chest x-ray, possible small bilateral pleural effusion. Question COPD? She now appears dry by elevated creatinine. Noted troponin of 2 without chest pain. Trending troponin. IV heparin bolus now, convert to Lovenox (renal dosing) if troponin elevation is flat. Hypoxic respiratory failure, possible COPD/rule out COVID as per internal medicine. Will need eventual ischemic evaluation once COVID ruled out, unless evidence of rapidly rising troponin. Echo pending. The patient was seen by me today by synchronous (real-time) audio-video technology. General PE  Gen:  NAD  Mental Status - Alert. General Appearance - Not in acute distress. HEENT:  PER, EOM, no JVD  Chest and Lung Exam   Inspection: Accessory muscles - No use of accessory muscles in breathing. No audible wheezing. Normal effort in breathing. Symmetric excursion. Cardiovascular:  No JVD  Upper Extremity: Inspection - Bilateral - No Cyanotic nailbeds or Digital clubbing. Lower Extremity:   Palpation: Edema - Bilateral - No edema. Neuro: A&O times 3, CN and motor grossly WNL  Skin: no rashes or lesions on exposed skin, dry, intact  Psych: normal mood, affect. Speech clear.     Pursuant to the emergency declaration under the [facility], [address] [place] [address] and the [employer] and [employer] Act, this Virtual Visit was conducted, with patient's consent, to reduce the patient's risk of exposure to COVID-19 and provide continuity of care for an established patient. Services were provided through a video synchronous discussion virtually to substitute for in-person visit.

## 2020-09-18 NOTE — PROGRESS NOTES
Reason for Admission:   Patient was transfer from [facility] for complaint of sob and respiratory failure. She was recently dx with chf.                    RUR Score:  13                   Plan for utilizing home health:   She has had home health services thru [address] home health in the past. She has not been to inpatient rehab in the past.          PCP: First and Last name:  Ramone Lei NP     Name of Practice:  [address] [place] [address]     Are you a current patient: Yes/No:  Yes     Approximate date of last visit:  Virtual visit done on 8/10/20     Can you participate in a virtual visit with your PCP:   Yes                      Current Advanced Directive/Advance Care Plan:  Patient has ACP on file. She list Orin Chawla the third as primary and [address]                           Transition of Care Plan:  Patient  Lives in two story home with her  who is in a wheelchair so he stays on the first floor. Patient states she was independent prior to coming to the hospital. She drives. She denies using home oxygen or cpap. She has assistive devices at home for mobility however she does not use them. She plans to return home when discharged and she states her family would transport. Consult written for heart failure. Patient is recently dx with chf so referral sent to [email or web address] to see patient for nursing when discharged. Patient is agreeable to home health. She had [facility] home health in past and referral sent to [employer] and still awaiting their response. Information left for weekend cm to follow up with home health at [address] [facility] [employer]        [phone]

## 2020-09-18 NOTE — H&P
Hospitalist Admission Note    NAME: uLigi Sanabria   :  1940   MRN:  [id number]     Date/Time:  2020 7:15 AM    Patient PCP: Jonah Lee NP  ______________________________________________________________________  Given the patient's current clinical presentation, I have a high level of concern for decompensation if discharged from the emergency department. Complex decision making was performed, which includes reviewing the patient's available past medical records, laboratory results, and x-ray films. My assessment of this patient's clinical condition and my plan of care is as follows. Assessment / Plan:  Acute respiratory failure with hypoxia most likely secondary to acute on chronic systolic CHF POA  Hypertensive emergency  Covid r/o   Elevated troponin:? NSTEMI   We will admit to PCU, continue with IV Lasix, her EF on the nuclear stress test was 31%, will get 2D echo  Patient received IV Lasix at [facility],  discontinued by cardiology for rising creatinine  Due to the elevated troponin, patient was given heparin 4000 unit and started on heparin drip by cardiology, continue to check troponin every 3 hours   Cardiology input appreciated. May need cardiac cath  Continue with beta-blocker, aspirin and statin  Continue with nitro drip, titrate off once sbp<160  Follow-up COVID test  Stress test  On   · FINDINGS: The rest and stress perfusion images a fixed defect in the inferior wall compatible with infarct. No reversible abnormality is seen to suggest myocardium at ischemic risk. The gated images demonstrate global hypokinesia and inferior akinesia. Left ventricular ejection fraction is 31 %. Impression: Fixed defect in the inferior wall is compatible with infarct. No evidence of myocardium at ischemic risk. Left ventricular ejection fraction is 31 %. Global hypokinesia and inferior akinesia. Leucocytosis POA.  Seems to be chronic  Wbc 23k on admission , seems to be chronic after review of previous cbc  Pt has no fever , no signs of infection  Chest xray clear   Will get UA blood cx procalcitonin  Pt reported that she always have elevated wbc and work up has been done in the past and nothing was found   Repeat cbc, get peripheral smear   Will hold off on abx for now    Hyperglycemia in setting of diabetes mellitus  Diabetic neuropathy  BS in the 300's  Continue with home dose Lantus 45 units , adjust prn    start sliding scale insulin  Continue with Neurontin  Hold metformin , hold glyburide    Worsening CKD stage III  DC ACE inhibitor, hold Lasix  Monitor BMP    Depression  Continue with Effexor     I have spent critical care time involved in lab review, consultations with specialist, family decision-making, and documentation. During this entire length of time I was immediately available to the patient. The reason for providing this level of medical care for this critically ill patient was due to a critical illness that impaired one or more vital organ systems such that there was a high probability of imminent or life threatening deterioration in the patients condition. This care involved high complexity decision making to assess, manipulate, and support vital system functions, to treat this degreee vital organ system failure and to prevent further life threatening deterioration of the patients condition. Code Status: DNR   Surrogate Decision Maker:     DVT Prophylaxis: heparin drip   GI Prophylaxis: not indicated    Baseline: ambulatory       Subjective:   CHIEF COMPLAINT: Shortness of breath    HISTORY OF PRESENT ILLNESS:     Kameron Tavarez is a 79 y.o.  female with past medical history of A. fib, diabetes mellitus who was found to have from [facility] for shortness of breath and hypertensive emergency.   Patient reported being short of breath for few weeks, was followed by Dr. Bryan Rosado who is her cardiologist, had a  nuclear stress test end of July, and was supposed to get 2D echo next week. She also reported that she had Holter placed by her cardiologist to monitor  for arrhythmia. Per  pt was taken off coreg recently by dr Lissa Rowe and put on diltiazem because her SOB was attributed to the BB   Patient reported not able to take deep breath for the past few days, and was gradually more short of breath. In the ED at [facility], she was found to be in acute heart failure and in hypertensive emergency. SBP was 207 and pt was  in respiratory distress with a room air saturation of 82%. She was started on nitro drip, was given Lasix. In the ED, she was still on nitro drip but her blood pressure was controlled with systolic blood pressure around 150s and she was feeling much better. Patient denies any cough, fever, urinary symptoms  She was saturating at 94% on nasal cannula at 3 L, was tachycardic. Review of her labs showed elevated white blood cell count at 23k , worsening chronic kidney disease and her chest x-ray showed pleural effusion. Her troponin was elevated 2.04  We were asked to admit for work up and evaluation of the above problems.      Past Medical History:   Diagnosis Date    A-fib [facility])     Arthritis     Chronic pain     Chronic pain     Diabetes ([address]     Diverticular disease     Hemorrhoid     Hypercholesterolemia     IBS (irritable bowel syndrome)     Lymphocytosis 05/07/2018        Past Surgical History:   Procedure Laterality Date    COLONOSCOPY N/A 10/31/2019    COLONOSCOPY performed by Bina Connelly MD [facility]  10/31/2019         Marcha Levo  10/31/2019         HX CATARACT REMOVAL      HX CHOLECYSTECTOMY      HX COLONOSCOPY  2009    HX COLONOSCOPY  2016    2 adenomatous polyp    HX HEMORRHOIDECTOMY  2009    HX HYSTERECTOMY      HX KNEE REPLACEMENT      right    HX ORTHOPAEDIC  12/11/2017    back, laminectomy and decompression       Social History Tobacco Use    Smoking status: Current Every Day Smoker     Packs/day: 1.00     Years: 55.00     Pack years: 55.00     Types: Cigarettes    Smokeless tobacco: Never Used   Substance Use Topics    Alcohol use: No        Family History   Problem Relation Age of Onset    Colon Cancer Father     Diabetes Mother      Allergies   Allergen Reactions    Morphine Anaphylaxis    Ultram [Tramadol] Palpitations        Prior to Admission medications    Medication Sig Start Date End Date Taking? Authorizing Provider   colestipoL (Colestid) 1 gram tablet Take 1 g by mouth two (2) times a day. Provider, Historical   dilTIAZem ER (TIAZAC) 240 mg capsule Take 240 mg by mouth daily. Provider, Historical   gemfibroziL (LOPID) 600 mg tablet Take 1 Tab by mouth two (2) times a day. 9/14/20   Erwin Rapp NP   loperamide (IMMODIUM) 2 mg tablet Take 2 mg by mouth as needed for Diarrhea. Provider, Historical   carvediloL (COREG) 6.25 mg tablet Take 1 Tab by mouth two (2) times daily (with meals). Replaces Diltiazem 9/10/20   Meagan Mancilla MD   aspirin delayed-release 81 mg tablet Take 1 Tab by mouth daily. 9/10/20   Meagan Mancilla MD   insulin glargine (Lantus Solostar U-100 Insulin) 100 unit/mL (3 mL) inpn INJECT 45  UNITS UNDER THE  SKIN DAILY 9/2/20   Erwin Rapp NP   insulin glargine (Lantus Solostar U-100 Insulin) 100 unit/mL (3 mL) inpn INJECT SUBCUTANEOUSLY 45  UNITS DAILY 9/2/20   Erwin Rapp NP   valsartan-hydroCHLOROthiazide (DIOVAN-HCT) 160-12.5 mg per tablet TAKE 1 TABLET BY MOUTH  DAILY 8/11/20   Erwin Rapp NP   Januvia 50 mg tablet TAKE 1 TABLET BY MOUTH  DAILY 8/11/20   Erwin Rapp NP   ergocalciferol (ERGOCALCIFEROL) 1,250 mcg (50,000 unit) capsule Take 1 Cap by mouth every seven (7) days. Indications: low vitamin D levels 7/1/20   Erwin Rapp NP   vit C/E/Zn/coppr/lutein/zeaxan (PRESERVISION AREDS-2 PO) Take  by mouth.     Provider, Historical   carboxymethylcellulose sodium (REFRESH TEARS OP) Apply  to eye. Provider, Historical   venlafaxine (EFFEXOR) 75 mg tablet TAKE 1 TABLET BY MOUTH TWO  TIMES DAILY WITH MEALS 4/15/20   Marcella Humphries NP   glyBURIDE (DIABETA) 5 mg tablet TAKE 1 TABLET BY MOUTH TWO  TIMES DAILY WITH MEALS 3/9/20   Marcella Humphries NP   OTHER,NON-FORMULARY, Take 1 Tab by mouth daily. Tumeric curcumin C3 complex    Provider, Historical   B.infantis-B.ani-B.long-B.bifi (PROBIOTIC 4X) 10-15 mg TbEC Take  by mouth. Provider, Historical   metFORMIN (GLUCOPHAGE) 1,000 mg tablet 1/2 tab bid 1/13/20   Marcella Humphries NP   celecoxib (CELEBREX) 200 mg capsule TAKE 1 CAPSULE BY MOUTH TWO TIMES DAILY 12/12/19   Marcella Humphries NP   acetaminophen (TYLENOL) 650 mg TbER Take 650 mg by mouth every eight (8) hours. Provider, Historical   BD ULTRA-FINE SHORT PEN NEEDLE 31 gauge x 5/16\" ndle USE AS DIRECTED 7/3/18   Maria Luz Snell MD   glucose blood VI test strips (ACCU-CHEK FRANCO PLUS TEST STRP) strip DX E11.65 2/12/18   Marcella Humphries NP   gabapentin (NEURONTIN) 300 mg capsule TAKE 1 CAPSULE THREE TIMES A DAY 12/26/17   Marcella Humphries NP   omega-3 fatty acids-vitamin e 1,000 mg cap Take 1 Cap by mouth two (2) times a day. 32a day     Provider, Historical   ascorbic acid, vitamin C, (VITAMIN C) 500 mg tablet Take  by mouth. Provider, Historical   zinc 50 mg tab tablet Take  by mouth daily. Provider, Historical       REVIEW OF SYSTEMS:     I am not able to complete the review of systems because:    The patient is intubated and sedated    The patient has altered mental status due to his acute medical problems    The patient has baseline aphasia from prior stroke(s)    The patient has baseline dementia and is not reliable historian    The patient is in acute medical distress and unable to provide information           Total of 12 systems reviewed as follows:       POSITIVE= underlined text  Negative = text not underlined  General:  fever, chills, sweats, generalized weakness, weight loss/gain,      loss of appetite   Eyes:    blurred vision, eye pain, loss of vision, double vision  ENT:    rhinorrhea, pharyngitis   Respiratory:   cough, sputum production, SOB, STANTON, wheezing, pleuritic pain   Cardiology:   chest pain, palpitations, orthopnea, PND, edema, syncope   Gastrointestinal:  abdominal pain , N/V, diarrhea, dysphagia, constipation, bleeding   Genitourinary:  frequency, urgency, dysuria, hematuria, incontinence   Muskuloskeletal :  arthralgia, myalgia, back pain  Hematology:  easy bruising, nose or gum bleeding, lymphadenopathy   Dermatological: rash, ulceration, pruritis, color change / jaundice  Endocrine:   hot flashes or polydipsia   Neurological:  headache, dizziness, confusion, focal weakness, paresthesia,     Speech difficulties, memory loss, gait difficulty  Psychological: Feelings of anxiety, depression, agitation    Objective:   VITALS:    Visit Vitals  /69 (BP 1 Location: Right arm, BP Patient Position: At rest)   Pulse (!) 102   SpO2 94%       PHYSICAL EXAM:    General:    Alert, cooperative, no distress, appears stated age. HEENT: Atraumatic, anicteric sclerae, pink conjunctivae     No oral ulcers, mucosa moist, throat clear, dentition fair  Neck:  Supple, symmetrical,  thyroid: non tender  Lungs:   ilaterally. No Wheezing or Rhonchi. No rales. Chest wall:  No tenderness  No Accessory muscle use. Heart:   Regular  rhythm,  No  murmur   No edema  Abdomen:   Soft, non-tender. Not distended. Bowel sounds normal  Extremities: No cyanosis. No clubbing,      Skin turgor normal, Capillary refill normal, Radial dial pulse 2+  Skin:     Not pale. Not Jaundiced  No rashes   Psych:  Good insight. Not depressed. Not anxious or agitated. Neurologic: EOMs intact. No facial asymmetry. No aphasia or slurred speech. Symmetrical strength, Sensation grossly intact. Alert and oriented X 4. _______________________________________________________________________  Care Plan discussed with:    Comments   Patient x    Family  x     RN x    Care Manager                    Consultant:      _______________________________________________________________________  Expected  Disposition:   Home with Family x   HH/PT/OT/RN    SNF/LTC    EMILY    ________________________________________________________________________  TOTAL TIME:   Personal Critical Care Provided   65  Minutes non procedure based      Comments    x Reviewed previous records   >50% of visit spent in counseling and coordination of care x Discussion with patient and/or family and questions answered       ________________________________________________________________________  Signed: Solis Coker MD    Procedures: see electronic medical records for all procedures/Xrays and details which were not copied into this note but were reviewed prior to creation of Plan. LAB DATA REVIEWED:    Recent Results (from the past 24 hour(s))   CBC WITH AUTOMATED DIFF    Collection Time: 09/17/20 11:45 PM   Result Value Ref Range    WBC 20.0 (H) 3.6 - 11.0 K/uL    RBC 3.88 3.80 - 5.20 M/uL    HGB 11.6 11.5 - 16.0 g/dL    HCT 35.7 35.0 - 47.0 %    MCV 92.0 80.0 - 99.0 FL    MCH 29.9 26.0 - 34.0 PG    MCHC 32.5 30.0 - 36.5 g/dL    RDW 13.9 11.5 - 14.5 %    PLATELET 488 939 - 957 K/uL    MPV 10.6 8.9 - 12.9 FL    NRBC 0.0 0  WBC    ABSOLUTE NRBC 0.00 0.00 - 0.01 K/uL    NEUTROPHILS 73 32 - 75 %    LYMPHOCYTES 19 12 - 49 %    MONOCYTES 5 5 - 13 %    EOSINOPHILS 1 0 - 7 %    BASOPHILS 1 0 - 1 %    IMMATURE GRANULOCYTES 1 (H) 0.0 - 0.5 %    ABS. NEUTROPHILS 14.9 (H) 1.8 - 8.0 K/UL    ABS. LYMPHOCYTES 3.7 (H) 0.8 - 3.5 K/UL    ABS. MONOCYTES 1.0 0.0 - 1.0 K/UL    ABS. EOSINOPHILS 0.3 0.0 - 0.4 K/UL    ABS. BASOPHILS 0.1 0.0 - 0.1 K/UL    ABS. IMM.  GRANS. 0.1 (H) 0.00 - 0.04 K/UL    DF AUTOMATED     METABOLIC PANEL, COMPREHENSIVE    Collection Time: 09/17/20 11:45 PM   Result Value Ref Range    Sodium 139 136 - 145 mmol/L    Potassium 4.7 3.5 - 5.1 mmol/L    Chloride 103 97 - 108 mmol/L    CO2 23 21 - 32 mmol/L    Anion gap 13 5 - 15 mmol/L    Glucose 412 (H) 65 - 100 mg/dL    BUN 35 (H) 6 - 20 MG/DL    Creatinine 2.05 (H) 0.55 - 1.02 MG/DL    BUN/Creatinine ratio 17 12 - 20      GFR est AA 28 (L) >60 ml/min/1.73m2    GFR est non-AA 23 (L) >60 ml/min/1.73m2    Calcium 9.1 8.5 - 10.1 MG/DL    Bilirubin, total 0.2 0.2 - 1.0 MG/DL    ALT (SGPT) 60 12 - 78 U/L    AST (SGOT) 35 15 - 37 U/L    Alk.  phosphatase 77 45 - 117 U/L    Protein, total 7.3 6.4 - 8.2 g/dL    Albumin 3.2 (L) 3.5 - 5.0 g/dL    Globulin 4.1 (H) 2.0 - 4.0 g/dL    A-G Ratio 0.8 (L) 1.1 - 2.2     TROPONIN I    Collection Time: 09/17/20 11:45 PM   Result Value Ref Range    Troponin-I, Qt. <0.05 <0.05 ng/mL   NT-PRO BNP    Collection Time: 09/17/20 11:45 PM   Result Value Ref Range    NT pro-BNP 3,468 (H) 0 - 450 PG/ML   MAGNESIUM    Collection Time: 09/17/20 11:45 PM   Result Value Ref Range    Magnesium 2.4 1.6 - 2.4 mg/dL   GLUCOSE, POC    Collection Time: 09/18/20  1:19 AM   Result Value Ref Range    Glucose (POC) 480 (H) 65 - 100 mg/dL    Performed by Mitch Avalos    SARS-COV-2    Collection Time: 09/18/20  1:53 AM   Result Value Ref Range    Specimen source Nasopharyngeal      SARS-CoV-2 PENDING     SARS-CoV-2 PENDING     Specimen source Nasopharyngeal      COVID-19 rapid test PENDING     Specimen type NP Swab      Health status PENDING     COVID-19 PENDING    POC EG7    Collection Time: 09/18/20  3:10 AM   Result Value Ref Range    Calcium, ionized (POC) 1.14 1.12 - 1.32 mmol/L    FIO2 (POC) 34 %    pH (POC) 7.37 7.35 - 7.45      pCO2 (POC) 40.0 35.0 - 45.0 MMHG    pO2 (POC) 36 (LL) 80 - 100 MMHG    HCO3 (POC) 22.9 22 - 26 MMOL/L    Base deficit (POC) 2 mmol/L    sO2 (POC) 66 (L) 92 - 97 %    Site OTHER      Device: NASAL CANNULA      Flow rate (POC) 3.5 L/M    Allens test (POC) N/A Specimen type (POC) VENOUS BLOOD      Total resp.  rate 16     GLUCOSE, POC    Collection Time: 09/18/20  3:41 AM   Result Value Ref Range    Glucose (POC) 503 (H) 65 - 100 mg/dL    Performed by Gretchen Grimes, POC    Collection Time: 09/18/20  4:20 AM   Result Value Ref Range    Glucose (POC) 413 (H) 65 - 100 mg/dL    Performed by Anila Prieto

## 2020-09-18 NOTE — PROGRESS NOTES
0845:  TRANSFER - IN REPORT:    Verbal report received from HO Sullivan (name) on Kenyatta Rogers  being received from ED (unit) for routine progression of care      Report consisted of patients Situation, Background, Assessment and   Recommendations(SBAR). Information from the following report(s) SBAR, Kardex, ED Summary, Intake/Output, MAR, Recent Results and Cardiac Rhythm NSR, Sinus Tach was reviewed with the receiving nurse. Opportunity for questions and clarification was provided. Assessment completed upon patients arrival to unit and care assumed. Primary Nurse Elodia Zaragoza and Jackie Lemus RN performed a dual skin assessment on this patient No impairment noted  Bryan score is 22    1055:  Troponin iobtained, sentto lab. 1145:  Critical lab of 2.04 Troponin received from Krissy Cortez in lab. HO dumont served MD Marleen Sheets critical result. Rn will await orders. 1210:  Per MD Marleen Sheets RN contacted SHARATH Vila with troponin critical lab. No orders received from SHARATH Vila. 1330: After insulin given. 1400:  Standing weight obtained 73.2 kb (161lb 6oz) per order. 1600:  SHARATH Mehta called re: Troponin lab. 1640:  MD Carmichael (cell: [phone]) on phone. 1830:  RN contacted MD Carmichael via cell phone with troponin result of 1.20. MD to contact pharmacist re: lovenox dosage. 1845:  MD Carmichael called unit, relayed to RN to begin Heparin drip at shift change on lowest dose and titrate. 1900:  Bedside shift change report given to HO Kebede (oncoming nurse) by Luciano Heath RN (offgoing nurse). Report included the following information SBAR, Kardex, ED Summary, Intake/Output, MAR, Recent Results and Cardiac Rhythm Sinus Tach.

## 2020-09-18 NOTE — PROGRESS NOTES
Pharmacy Clarification of the Prior to Admission Medication Regimen Retrospective to the Admission Medication Reconciliation The patient was  interviewed regarding clarification of the prior to admission medication regimen. Patient  was questioned by phone regarding use of any other inhalers, topical products, over the counter medications, herbal medications, vitamin products or ophthalmic/nasal/otic medication use. Information Obtained From: query, patient Recommendations/Findings: The following amendments were made to the patient's active medication list on file at [facility]:  
 
1) Additions:  
 
 
2) Removals:  
Carvedilol 6.25 mg Supplies for diabetic testing 3) Changes: 
 
 
4) Pertinent Pharmacy Findings:Per patient medication Gemfibrozil 600mg was started back on, but waiting for medication to be delivered from Mail order. Updated patients preferred outpatient pharmacy to: [address] [place] [address] Identified High Alert Medication Information PTA medication list was corrected to the following:  
 
Prior to Admission Medications Prescriptions Last Dose Informant Taking? B.infantis-B.ani-B.long-B.bifi (PROBIOTIC 4X) 10-15 mg TbEC 9/17/2020 at Unknown time  Yes Sig: Take  by mouth daily. Januvia 50 mg tablet 9/18/2020 at Unknown time  Yes Sig: TAKE 1 TABLET BY MOUTH  DAILY  
OTHER,NON-FORMULARY, 9/17/2020 at Unknown time  Yes Sig: Take 1 Tab by mouth daily. Tumeric curcumin C3 complex  
acetaminophen (TYLENOL) 650 mg TbER 9/17/2020 at Unknown time  Yes Sig: Take 650 mg by mouth every eight (8) hours. ascorbic acid, vitamin C, (VITAMIN C) 500 mg tablet 9/17/2020 at Unknown time  Yes Sig: Take 500 mg by mouth daily. aspirin delayed-release 81 mg tablet 9/17/2020 at Unknown time  Yes Sig: Take 1 Tab by mouth daily. carboxymethylcellulose sodium (REFRESH TEARS OP) 9/11/2020 at Unknown time  Yes Sig: Apply  to eye daily. 2-4 drops both eyes celecoxib (CELEBREX) 200 mg capsule 2020 at Unknown time  Yes Sig: Take 200 mg by mouth two (2) times a day. colestipoL (Colestid) 1 gram tablet 2020 at Unknown time  Yes Sig: Take 1 g by mouth two (2) times a day. dilTIAZem ER (TIAZAC) 240 mg capsule 2020  Yes Sig: Take 240 mg by mouth daily. ergocalciferol (ERGOCALCIFEROL) 1,250 mcg (50,000 unit) capsule 2020  Yes Sig: Take 1 Cap by mouth every seven (7) days. Indications: low vitamin D levels  
gabapentin (NEURONTIN) 300 mg capsule 2020 at Unknown time  Yes Sig: TAKE 1 CAPSULE THREE TIMES A DAY  
gemfibroziL (LOPID) 600 mg tablet   Yes Sig: Take 1 Tab by mouth two (2) times a day. glyBURIDE (DIABETA) 5 mg tablet 2020 at Unknown time  Yes Sig: TAKE 1 TABLET BY MOUTH TWO  TIMES DAILY WITH MEALS  
insulin glargine (Lantus Solostar U-100 Insulin) 100 unit/mL (3 mL) inpn 2020 at Unknown time  Yes Sig: INJECT SUBCUTANEOUSLY 45  UNITS DAILY  
loperamide (IMMODIUM) 2 mg tablet   Yes Sig: Take 2 mg by mouth as needed for Diarrhea. metFORMIN (GLUCOPHAGE) 1,000 mg tablet 2020 at Unknown time  Yes Si/2 tab bid  
omega-3 fatty acids-vitamin e 1,000 mg cap 2020 at Unknown time  Yes Sig: Take 1 Cap by mouth two (2) times a day. 32a day   
valsartan-hydroCHLOROthiazide (DIOVAN-HCT) 160-12.5 mg per tablet 2020 at Unknown time  Yes Sig: TAKE 1 TABLET BY MOUTH  DAILY  
venlafaxine (EFFEXOR) 75 mg tablet 2020 at Unknown time  Yes Sig: TAKE 1 TABLET BY MOUTH TWO  TIMES DAILY WITH MEALS  
vit A/vit C/vit E/zinc/copper (ICAPS AREDS PO) 2020 at Unknown time  Yes Sig: Take  by mouth two (2) times a day. vit C/E/Zn/coppr/lutein/zeaxan (PRESERVISION AREDS-2 PO) 2020 at Unknown time  Yes Sig: Take  by mouth daily. zinc 50 mg tab tablet 2020 at Unknown time  Yes Sig: Take  by mouth daily. Facility-Administered Medications: None Thank you, Carol Diggs CPHT {[facility] positions:[id number]:::0}

## 2020-09-18 NOTE — TELEPHONE ENCOUNTER
PER DR. PARR:  I prefer her to be on statin rather than lopid unless unable to tolerate.  I will remove coreg from list as well.  Thanks Valentin Tristan pt but spouse states that pt has been admitted to [facility] for RF and COVID-19 rule out. Discussed with Dr. Kali Butler.

## 2020-09-18 NOTE — Clinical Note
TRANSFER - OUT REPORT:     Verbal report given to: HO Atkins. Report consisted of patient's Situation, Background, Assessment and   Recommendations(SBAR). Opportunity for questions and clarification was provided. Patient transported with a Registered Nurse. Patient transported to: [id number].

## 2020-09-18 NOTE — ACP (ADVANCE CARE PLANNING)
Advance Care Planning Note      NAME: Celestina Champagne   :  1940   MRN:  [id number]     Date/Time:  2020 2:23 PM    Active Diagnoses:  Hospital Problems  Date Reviewed: 2020          Codes Class Noted POA    CHF (congestive heart failure) ([facility] [address] ICD-10-CM: I50.9  ICD-9-CM: 428.0  2020 Unknown        Hypoxia ICD-10-CM: R09.02  ICD-9-CM: 799.02  2020 Unknown        * (Principal) Bilateral pleural effusion ICD-10-CM: J90  ICD-9-CM: 511.9  2020 Unknown        Elevated troponin ICD-10-CM: R79.89  ICD-9-CM: 790.6  2020 Unknown        PAF (paroxysmal atrial fibrillation) ([facility]) ICD-10-CM: I48.0  ICD-9-CM: 427.31  9/10/2020 Yes        CKD (chronic kidney disease) stage 3, GFR 30-59 ml/min ([facility]) ICD-10-CM: N18.3  ICD-9-CM: 585.3  2019 Yes              These active diagnoses are of sufficient risk that focused discussion on advance care planning is indicated in order to allow the patient to thoughtfully consider personal goals of care, and if situations arise that prevent the ability to personally give input, to ensure appropriate representation of their personal desires for different levels and aggressiveness of care. Discussion:   Code status addressed and wants to be a DNR / DNI. Patient wants central line and vasopressors if needed. Patient would also want a feeding tube, if needed, for nutritional support. Patient  would like to assign    Damaso Azul  [phone]       as the surrogate decision maker. Persons present and participating in discussion: Halle Padilla MD      Time Spent:   Total time spent face-to-face in education and discussion:   16 minutes.          Paras Weller MD   Hospitalist

## 2020-09-18 NOTE — Clinical Note
TRANSFER - IN REPORT:     Verbal report received from: Javy Rinaldi RN. Report consisted of patient's Situation, Background, Assessment and   Recommendations(SBAR). Opportunity for questions and clarification was provided. Assessment completed upon patient's arrival to unit and care assumed. Patient transported with a Registered Nurse.

## 2020-09-18 NOTE — ED PROVIDER NOTES
EMERGENCY DEPARTMENT HISTORY AND PHYSICAL EXAM      Date: 9/18/2020  Patient Name: Ozzy Mcmahon    History of Presenting Illness     Chief Complaint   Patient presents with    Transfer Of Care     Pt transferred from hospitals on a nitro drip for hypertension and acute respiratory failure. History Provided By: Patient, EMS and Sending hospital    HPI: Ozzy Mcmahon, 79 y.o. female  presents to the ED with cc of shortness of breath and hypertension. Patient presented to [facility] with shortness of breath and had a hypertensive emergency. She was placed on nitroglycerin infusion and given Lasix. Patient states she is feeling remarkably better. Blood pressures are controlled on arrival.  No events in route to our facility. Recently diagnosed with congestive heart failure. Cardiologist is Dr. Simran Sanabria. Physical Exam   Physical Exam  Vitals signs and nursing note reviewed. Constitutional:       General: She is not in acute distress. Appearance: She is well-developed. She is not ill-appearing. Cardiovascular:      Rate and Rhythm: Normal rate and regular rhythm. Pulmonary:      Effort: Pulmonary effort is normal. No accessory muscle usage or respiratory distress. Breath sounds: Normal breath sounds. Skin:     General: Skin is warm and dry. Neurological:      Mental Status: She is alert and oriented to person, place, and time. Medical Decision Making     I reviewed the vital signs, available nursing notes, past medical history, past surgical history, family history and social history. Vital Signs-I have reviewed the vital signs that have been made available during the patient's emergency department visit. The vital signs auto-populated below are obtained mostly by electronic means through monitoring devices that have been downloaded into the patient's chart by the nursing staff.   Some vital signs are not downloaded into the chart until after the patient has been discharged and this note has been completed, therefore some vital signs may not be available to the physician for review prior to patient's discharge or admission. The physician has reviewed the patient's triage vital signs, monitored the electronic monitoring devices remotely for any significant vital sign abnormalities, and have reviewed vital signs prior to discharge. Some vital signs reviewed at bedside or remotely utilizing electronic monitoring devices may be different than the vital signs downloaded into the electronic medical record. Some vital signs may be erroneous and inaccurate since they are obtained by electronic monitoring devices, and not all vital signs are verified for accuracy by nursing staff prior to downloading into the patient's chart. Patient Vitals for the past 24 hrs:   Pulse BP SpO2   09/18/20 0715 (!) 102 133/63    09/18/20 0711 (!) 102 125/69 94 %         Records Reviewed: Nursing notes for today's visit have been reviewed. I have also reviewed most recent medical records pertinent to today's complaints, if available in our medical record system. I have also reviewed all labs and imaging results from previous results in comparison to results obtained today. If an EKG was obtained today, it has been compared to previous EKGs, if available. If arriving via EMS, the EMS report has been reviewed if made available to us within the patient's time in the emergency department. Provider Notes (Medical Decision Making):   Patient was transferred from [facility] to our facility for admission for higher level of care. Patient is currently on a nitroglycerin infusion which is controlling her blood pressures. She is improved. She is not hypoxic. No chest pain. Will contact the hospitalist for admission.   I have performed a medical screening exam only given that she has been transferred from another emergency department and there has been no acute changes in her condition. ED Course:   Initial assessment performed. The patients presenting problems have been discussed, and they are in agreement with the care plan formulated and outlined with them. I have encouraged them to ask questions as they arise throughout their visit. Orders Placed This Encounter    Droplet Isolation    nitroglycerin (Tridil) 200 mcg/ml infusion       Procedures      Critical Care Time:   0    Disposition:  Admit    Diagnosis     Clinical Impression:   1. Acute systolic congestive heart failure ([facility] [address]    2. Hypertensive emergency            This note will not be viewable in [address]

## 2020-09-19 ENCOUNTER — APPOINTMENT (OUTPATIENT)
Dept: NON INVASIVE DIAGNOSTICS | Age: 80
DRG: 226 | End: 2020-09-19
Attending: INTERNAL MEDICINE
Payer: MEDICARE

## 2020-09-19 LAB
ALBUMIN SERPL-MCNC: 2.9 G/DL (ref 3.5–5)
ALBUMIN/GLOB SERPL: 0.7 {RATIO} (ref 1.1–2.2)
ALP SERPL-CCNC: 73 U/L (ref 45–117)
ALT SERPL-CCNC: 39 U/L (ref 12–78)
ANION GAP SERPL CALC-SCNC: 6 MMOL/L (ref 5–15)
APTT PPP: 29.6 SEC (ref 22.1–32)
APTT PPP: 42.1 SEC (ref 22.1–32)
APTT PPP: 42.1 SEC (ref 22.1–32)
APTT PPP: 65.4 SEC (ref 22.1–32)
AST SERPL-CCNC: 21 U/L (ref 15–37)
BASOPHILS # BLD: 0 K/UL (ref 0–0.1)
BASOPHILS NFR BLD: 0 % (ref 0–1)
BILIRUB SERPL-MCNC: 0.3 MG/DL (ref 0.2–1)
BUN SERPL-MCNC: 49 MG/DL (ref 6–20)
BUN/CREAT SERPL: 27 (ref 12–20)
CALCIUM SERPL-MCNC: 9.4 MG/DL (ref 8.5–10.1)
CHLORIDE SERPL-SCNC: 107 MMOL/L (ref 97–108)
CO2 SERPL-SCNC: 26 MMOL/L (ref 21–32)
CREAT SERPL-MCNC: 1.84 MG/DL (ref 0.55–1.02)
DIFFERENTIAL METHOD BLD: ABNORMAL
EOSINOPHIL # BLD: 0.2 K/UL (ref 0–0.4)
EOSINOPHIL NFR BLD: 1 % (ref 0–7)
ERYTHROCYTE [DISTWIDTH] IN BLOOD BY AUTOMATED COUNT: 13.8 % (ref 11.5–14.5)
GLOBULIN SER CALC-MCNC: 4.1 G/DL (ref 2–4)
GLUCOSE BLD STRIP.AUTO-MCNC: 121 MG/DL (ref 65–100)
GLUCOSE BLD STRIP.AUTO-MCNC: 132 MG/DL (ref 65–100)
GLUCOSE BLD STRIP.AUTO-MCNC: 228 MG/DL (ref 65–100)
GLUCOSE BLD STRIP.AUTO-MCNC: 252 MG/DL (ref 65–100)
GLUCOSE SERPL-MCNC: 127 MG/DL (ref 65–100)
HCT VFR BLD AUTO: 31.9 % (ref 35–47)
HGB BLD-MCNC: 10.3 G/DL (ref 11.5–16)
IMM GRANULOCYTES # BLD AUTO: 0 K/UL (ref 0–0.04)
IMM GRANULOCYTES NFR BLD AUTO: 0 % (ref 0–0.5)
LYMPHOCYTES # BLD: 6.6 K/UL (ref 0.8–3.5)
LYMPHOCYTES NFR BLD: 27 % (ref 12–49)
MAGNESIUM SERPL-MCNC: 2.4 MG/DL (ref 1.6–2.4)
MCH RBC QN AUTO: 30.2 PG (ref 26–34)
MCHC RBC AUTO-ENTMCNC: 32.3 G/DL (ref 30–36.5)
MCV RBC AUTO: 93.5 FL (ref 80–99)
MONOCYTES # BLD: 1.5 K/UL (ref 0–1)
MONOCYTES NFR BLD: 6 % (ref 5–13)
NEUTS SEG # BLD: 16.1 K/UL (ref 1.8–8)
NEUTS SEG NFR BLD: 66 % (ref 32–75)
NRBC # BLD: 0 K/UL (ref 0–0.01)
NRBC BLD-RTO: 0 PER 100 WBC
PLATELET # BLD AUTO: 322 K/UL (ref 150–400)
PMV BLD AUTO: 10.8 FL (ref 8.9–12.9)
POTASSIUM SERPL-SCNC: 3.9 MMOL/L (ref 3.5–5.1)
PROCALCITONIN SERPL-MCNC: 0.1 NG/ML
PROT SERPL-MCNC: 7 G/DL (ref 6.4–8.2)
RBC # BLD AUTO: 3.41 M/UL (ref 3.8–5.2)
RBC MORPH BLD: ABNORMAL
SERVICE CMNT-IMP: ABNORMAL
SODIUM SERPL-SCNC: 139 MMOL/L (ref 136–145)
THERAPEUTIC RANGE,PTTT: ABNORMAL SECS (ref 58–77)
THERAPEUTIC RANGE,PTTT: NORMAL SECS (ref 58–77)
TSH SERPL DL<=0.05 MIU/L-ACNC: 0.49 UIU/ML (ref 0.36–3.74)
WBC # BLD AUTO: 24.4 K/UL (ref 3.6–11)

## 2020-09-19 PROCEDURE — 83735 ASSAY OF MAGNESIUM: CPT

## 2020-09-19 PROCEDURE — 74011250637 HC RX REV CODE- 250/637: Performed by: INTERNAL MEDICINE

## 2020-09-19 PROCEDURE — 85730 THROMBOPLASTIN TIME PARTIAL: CPT

## 2020-09-19 PROCEDURE — 80053 COMPREHEN METABOLIC PANEL: CPT

## 2020-09-19 PROCEDURE — 84145 PROCALCITONIN (PCT): CPT

## 2020-09-19 PROCEDURE — 74011250637 HC RX REV CODE- 250/637: Performed by: NURSE PRACTITIONER

## 2020-09-19 PROCEDURE — 74011636637 HC RX REV CODE- 636/637: Performed by: INTERNAL MEDICINE

## 2020-09-19 PROCEDURE — 82962 GLUCOSE BLOOD TEST: CPT

## 2020-09-19 PROCEDURE — 36415 COLL VENOUS BLD VENIPUNCTURE: CPT

## 2020-09-19 PROCEDURE — 77010033678 HC OXYGEN DAILY

## 2020-09-19 PROCEDURE — 65660000000 HC RM CCU STEPDOWN

## 2020-09-19 PROCEDURE — 74011250636 HC RX REV CODE- 250/636: Performed by: NURSE PRACTITIONER

## 2020-09-19 PROCEDURE — 85025 COMPLETE CBC W/AUTO DIFF WBC: CPT

## 2020-09-19 PROCEDURE — 84443 ASSAY THYROID STIM HORMONE: CPT

## 2020-09-19 RX ADMIN — HEPARIN SODIUM 16 UNITS/KG/HR: 10000 INJECTION, SOLUTION INTRAVENOUS at 02:43

## 2020-09-19 RX ADMIN — HEPARIN SODIUM 20 UNITS/KG/HR: 10000 INJECTION, SOLUTION INTRAVENOUS at 19:49

## 2020-09-19 RX ADMIN — GABAPENTIN 300 MG: 300 CAPSULE ORAL at 16:07

## 2020-09-19 RX ADMIN — COLESTIPOL HYDROCHLORIDE 1 G: 1 TABLET, FILM COATED ORAL at 17:40

## 2020-09-19 RX ADMIN — GABAPENTIN 300 MG: 300 CAPSULE ORAL at 08:16

## 2020-09-19 RX ADMIN — ASPIRIN 81 MG: 81 TABLET, DELAYED RELEASE ORAL at 08:16

## 2020-09-19 RX ADMIN — CARVEDILOL 12.5 MG: 12.5 TABLET, FILM COATED ORAL at 16:06

## 2020-09-19 RX ADMIN — ATORVASTATIN CALCIUM 20 MG: 20 TABLET, FILM COATED ORAL at 22:14

## 2020-09-19 RX ADMIN — HEPARIN SODIUM 4000 UNITS: 5000 INJECTION INTRAVENOUS; SUBCUTANEOUS at 02:42

## 2020-09-19 RX ADMIN — VENLAFAXINE 75 MG: 37.5 TABLET ORAL at 08:16

## 2020-09-19 RX ADMIN — INSULIN LISPRO 3 UNITS: 100 INJECTION, SOLUTION INTRAVENOUS; SUBCUTANEOUS at 22:14

## 2020-09-19 RX ADMIN — INSULIN LISPRO 3 UNITS: 100 INJECTION, SOLUTION INTRAVENOUS; SUBCUTANEOUS at 11:35

## 2020-09-19 RX ADMIN — VENLAFAXINE 75 MG: 37.5 TABLET ORAL at 16:07

## 2020-09-19 RX ADMIN — CARVEDILOL 12.5 MG: 12.5 TABLET, FILM COATED ORAL at 08:16

## 2020-09-19 RX ADMIN — COLESTIPOL HYDROCHLORIDE 1 G: 1 TABLET, FILM COATED ORAL at 09:25

## 2020-09-19 RX ADMIN — INSULIN GLARGINE 45 UNITS: 100 INJECTION, SOLUTION SUBCUTANEOUS at 22:14

## 2020-09-19 RX ADMIN — HEPARIN SODIUM 2000 UNITS: 5000 INJECTION INTRAVENOUS; SUBCUTANEOUS at 16:07

## 2020-09-19 RX ADMIN — HEPARIN SODIUM 2000 UNITS: 5000 INJECTION INTRAVENOUS; SUBCUTANEOUS at 09:25

## 2020-09-19 RX ADMIN — GABAPENTIN 300 MG: 300 CAPSULE ORAL at 22:14

## 2020-09-19 NOTE — PROGRESS NOTES
Hospitalist Progress Note    NAME: Howard Carver   :  1940   MRN:  [id number]       Assessment / Plan:    Acute respiratory failure with hypoxia most likely secondary to acute on chronic systolic CHF POA  Hypertensive emergency  Covid r/o   Elevated troponin:? NSTEMI   admited to PCU,  2D echo pending  Patient received IV Lasix at [facility]  Due to the elevated troponin, patient was given heparin 4000 unit and started on heparin drip  Troponin <0.05-->2.04-->1.20  Cardiology input appreciated. May need cardiac cath  Continue with beta-blocker, aspirin and statin  off nitro drip  COVID test pending  Chest pain free  Stress test  On   · FINDINGS: The rest and stress perfusion images a fixed defect in the inferior wall compatible with infarct. No reversible abnormality is seen to suggest myocardium at ischemic risk. The gated images demonstrate global hypokinesia and inferior akinesia. Left ventricular ejection fraction is 31 %. Impression: Fixed defect in the inferior wall is compatible with infarct. No evidence of myocardium at ischemic risk. Left ventricular ejection fraction is 31 %. Global hypokinesia and inferior akinesia.        Leucocytosis POA. Seems to be chronic  Wbc 23k on admission , seems to be chronic after review of previous cbc.trending up today  Pt has no fever , no signs of infection  Chest xray clear   Pending UA blood cx   procalcitonin 0.10  Pt reported that she always have elevated wbc and work up has been done in the past and nothing was found   Cont holding  off on abx for now     Hyperglycemia in setting of diabetes mellitus  Diabetic neuropathy  BS in the 300's  Continue with home dose Lantus 45 units with correction scale, adjust prn   Continue with Neurontin  Hold metformin , hold glyburide     Worsening CKD stage III  DC ACE inhibitor, hold Lasix  Monitor BMP     Depression  Continue with Effexor    25.0 - 29.9 Overweight / Body mass index is 26.85 kg/m².     Code status: DNR  Prophylaxis: Heparin  Recommended Disposition: Home w/Family     Subjective:     Chief Complaint / Reason for Physician Visit  Discussed with RN events overnight. Patient was seen and examined. No acute events overnight. \" I am doing great today and thankful for you guys\"    Review of Systems:  Symptom Y/N Comments  Symptom Y/N Comments   Fever/Chills n   Chest Pain n    Poor Appetite    Edema     Cough n   Abdominal Pain n    Sputum    Joint Pain     SOB/STANTON n   Pruritis/Rash     Nausea/vomit n   Tolerating PT/OT     Diarrhea    Tolerating Diet y    Constipation    Other       Could NOT obtain due to:          Objective:     VITALS:   Last 24hrs VS reviewed since prior progress note. Most recent are:  Patient Vitals for the past 24 hrs:   Temp Pulse Resp BP SpO2   09/19/20 1124 97.7 °F (36.5 °C) 77 20 (!) 117/93 98 %   09/19/20 0817 98.1 °F (36.7 °C) 76 19 135/75 98 %   09/19/20 0400 97.9 °F (36.6 °C) 88 18 (!) 147/81 97 %   09/18/20 2323 98.1 °F (36.7 °C) 93 20 136/66 97 %   09/18/20 2100  91 24 (!) 142/68 96 %   09/18/20 2000  93 19 (!) 148/74 97 %   09/18/20 1945 98.7 °F (37.1 °C) 92 22 (!) 148/74 98 %   09/18/20 1900  97 23  94 %   09/18/20 1800  95 (!) 43  97 %   09/18/20 1700  98 17 (!) 75/56 96 %   09/18/20 1600  95  (!) 140/56    09/18/20 1500  95 27 (!) 139/58 97 %   09/18/20 1400  97 18 138/62 95 %   09/18/20 1300  (!) 110 20 (!) 147/125 96 %   09/18/20 1214 99.3 °F (37.4 °C) (!) 106 22 (!) 153/74 97 %   09/18/20 1200  (!) 106 17 (!) 120/103 93 %       Intake/Output Summary (Last 24 hours) at 9/19/2020 1136  Last data filed at 9/19/2020 0700  Gross per 24 hour   Intake 200 ml   Output 125 ml   Net 75 ml        PHYSICAL EXAM:  General: WD, WN. Alert, cooperative, no acute distress    EENT:  EOMI. Anicteric sclerae. MMM  Resp:  CTA bilaterally, no wheezing or rales.   No accessory muscle use  CV:  Regular  rhythm,  No edema  GI:  Soft, Non distended, Non tender.  +Bowel sounds  Neurologic:  Alert and oriented X 3, normal speech,   Psych:   Good insight. Not anxious nor agitated  Skin:  No rashes. No jaundice    Reviewed most current lab test results and cultures  YES  Reviewed most current radiology test results   YES  Review and summation of old records today    NO  Reviewed patient's current orders and MAR    YES  PMH/SH reviewed - no change compared to H&P  ________________________________________________________________________  Care Plan discussed with:    Comments   Patient x    Family      RN x    Care Manager     Consultant                        Multidiciplinary team rounds were held today with , nursing, pharmacist and clinical coordinator. Patient's plan of care was discussed; medications were reviewed and discharge planning was addressed. ________________________________________________________________________        Comments   >50% of visit spent in counseling and coordination of care     ________________________________________________________________________  Alma Garcia MD     Procedures: see electronic medical records for all procedures/Xrays and details which were not copied into this note but were reviewed prior to creation of Plan. LABS:  I reviewed today's most current labs and imaging studies.   Pertinent labs include:  Recent Labs     09/19/20  0202 09/18/20  1635 09/17/20  2345   WBC 24.4* 23.3* 20.0*   HGB 10.3* 11.0* 11.6   HCT 31.9* 33.5* 35.7    362 381     Recent Labs     09/19/20  0202 09/17/20  2345    139   K 3.9 4.7    103   CO2 26 23   * 412*   BUN 49* 35*   CREA 1.84* 2.05*   CA 9.4 9.1   MG 2.4 2.4   ALB 2.9* 3.2*   TBILI 0.3 0.2   ALT 39 60       Signed: Alma Garcia MD

## 2020-09-19 NOTE — PROGRESS NOTES
Pursuant to the emergency declaration under the [facility], [facility] waiver authority and the [employer] Act, this Virtual  Visit was conducted, with patient's consent, to reduce the patient's risk of exposure to COVID-19 and provide continuity of care for an established patient. Discussed patient with RN and had conversation over the phone with the patient.                      Cardiology Telemedicine Encounter    Celestina Champagne is a 79 y.o. female who was seen by synchronous (real-time) audio-video technology on 9/18/2020       Cardiology Progress Note      9/19/2020 12:22 PM    Admit Date: 9/18/2020    Admit Diagnosis: CHF (congestive heart failure) ([facility] [address] [I50.9]; Hypoxia [R09.02] denies any chest pain. Troponin trending down. Creatinine slightly better.       Subjective:     Celestina Champagne     Visit Vitals  BP (!) 117/93 (BP 1 Location: Right arm, BP Patient Position: At rest)   Pulse 77   Temp 97.7 °F (36.5 °C)   Resp 20   Wt 161 lb 6 oz (73.2 kg)   SpO2 98%   BMI 26.85 kg/m²       Current Facility-Administered Medications   Medication Dose Route Frequency    nitroglycerin (Tridil) 200 mcg/ml infusion  0-200 mcg/min IntraVENous TITRATE    aspirin delayed-release tablet 81 mg  81 mg Oral DAILY    colestipoL (COLESTID) tablet 1 g  1 g Oral BID    gabapentin (NEURONTIN) capsule 300 mg  300 mg Oral TID    insulin glargine (LANTUS) injection 45 Units  45 Units SubCUTAneous QHS    loperamide (IMODIUM) capsule 2 mg  2 mg Oral BID PRN    venlafaxine (EFFEXOR) tablet 75 mg  75 mg Oral BID WITH MEALS    insulin lispro (HUMALOG) injection   SubCUTAneous AC&HS    glucose chewable tablet 16 g  4 Tab Oral PRN    dextrose (D50W) injection syrg 12.5-25 g  12.5-25 g IntraVENous PRN    glucagon (GLUCAGEN) injection 1 mg  1 mg IntraMUSCular PRN    carvediloL (COREG) tablet 12.5 mg  12.5 mg Oral BID WITH MEALS    atorvastatin (LIPITOR) tablet 20 mg  20 mg Oral QHS    labetaloL (NORMODYNE;TRANDATE) injection 20 mg  20 mg IntraVENous Q4H PRN    heparin (porcine) 25,000 units in 0.45% saline 250 ml infusion  12-25 Units/kg/hr IntraVENous TITRATE    heparin (porcine) injection 2,000 Units  2,000 Units IntraVENous PRN    Or    heparin (porcine) injection 4,000 Units  4,000 Units IntraVENous PRN         Objective:      Physical Exam:  No exam performed today, COVID isolation. Data Review:   Labs:    Recent Results (from the past 24 hour(s))   GLUCOSE, POC    Collection Time: 09/18/20  4:22 PM   Result Value Ref Range    Glucose (POC) 262 (H) 65 - 100 mg/dL    Performed by Shona Lewis (PCT)    TROPONIN I    Collection Time: 09/18/20  4:35 PM   Result Value Ref Range    Troponin-I, Qt. 1.20 (H) <0.05 ng/mL   CULTURE, BLOOD, PAIRED    Collection Time: 09/18/20  4:35 PM    Specimen: Blood   Result Value Ref Range    Special Requests: NO SPECIAL REQUESTS      Culture result: NO GROWTH AFTER 15 HOURS     PTT    Collection Time: 09/18/20  4:35 PM   Result Value Ref Range    aPTT 25.2 22.1 - 32.0 sec    aPTT, therapeutic range     58.0 - 77.0 SECS   CBC WITH AUTOMATED DIFF    Collection Time: 09/18/20  4:35 PM   Result Value Ref Range    WBC 23.3 (H) 3.6 - 11.0 K/uL    RBC 3.57 (L) 3.80 - 5.20 M/uL    HGB 11.0 (L) 11.5 - 16.0 g/dL    HCT 33.5 (L) 35.0 - 47.0 %    MCV 93.8 80.0 - 99.0 FL    MCH 30.8 26.0 - 34.0 PG    MCHC 32.8 30.0 - 36.5 g/dL    RDW 13.8 11.5 - 14.5 %    PLATELET 614 702 - 927 K/uL    MPV 11.1 8.9 - 12.9 FL    NRBC 0.0 0  WBC    ABSOLUTE NRBC 0.00 0.00 - 0.01 K/uL    NEUTROPHILS 87 (H) 32 - 75 %    LYMPHOCYTES 8 (L) 12 - 49 %    MONOCYTES 4 (L) 5 - 13 %    EOSINOPHILS 0 0 - 7 %    BASOPHILS 0 0 - 1 %    IMMATURE GRANULOCYTES 1 (H) 0.0 - 0.5 %    ABS. NEUTROPHILS 20.3 (H) 1.8 - 8.0 K/UL    ABS. LYMPHOCYTES 1.9 0.8 - 3.5 K/UL    ABS. MONOCYTES 0.9 0.0 - 1.0 K/UL    ABS. EOSINOPHILS 0.0 0.0 - 0.4 K/UL    ABS.  BASOPHILS 0.0 0.0 - 0.1 K/UL    ABS. IMM. GRANS. 0.2 (H) 0.00 - 0.04 K/UL    DF SMEAR SCANNED      RBC COMMENTS NORMOCYTIC, NORMOCHROMIC     LACTIC ACID    Collection Time: 09/18/20  5:24 PM   Result Value Ref Range    Lactic acid 2.0 0.4 - 2.0 MMOL/L   GLUCOSE, POC    Collection Time: 09/18/20  9:29 PM   Result Value Ref Range    Glucose (POC) 221 (H) 65 - 100 mg/dL    Performed by Alexandria Calvillo RN    METABOLIC PANEL, COMPREHENSIVE    Collection Time: 09/19/20  2:02 AM   Result Value Ref Range    Sodium 139 136 - 145 mmol/L    Potassium 3.9 3.5 - 5.1 mmol/L    Chloride 107 97 - 108 mmol/L    CO2 26 21 - 32 mmol/L    Anion gap 6 5 - 15 mmol/L    Glucose 127 (H) 65 - 100 mg/dL    BUN 49 (H) 6 - 20 MG/DL    Creatinine 1.84 (H) 0.55 - 1.02 MG/DL    BUN/Creatinine ratio 27 (H) 12 - 20      GFR est AA 32 (L) >60 ml/min/1.73m2    GFR est non-AA 26 (L) >60 ml/min/1.73m2    Calcium 9.4 8.5 - 10.1 MG/DL    Bilirubin, total 0.3 0.2 - 1.0 MG/DL    ALT (SGPT) 39 12 - 78 U/L    AST (SGOT) 21 15 - 37 U/L    Alk. phosphatase 73 45 - 117 U/L    Protein, total 7.0 6.4 - 8.2 g/dL    Albumin 2.9 (L) 3.5 - 5.0 g/dL    Globulin 4.1 (H) 2.0 - 4.0 g/dL    A-G Ratio 0.7 (L) 1.1 - 2.2     TSH 3RD GENERATION    Collection Time: 09/19/20  2:02 AM   Result Value Ref Range    TSH 0.49 0.36 - 3.74 uIU/mL   CBC WITH AUTOMATED DIFF    Collection Time: 09/19/20  2:02 AM   Result Value Ref Range    WBC 24.4 (H) 3.6 - 11.0 K/uL    RBC 3.41 (L) 3.80 - 5.20 M/uL    HGB 10.3 (L) 11.5 - 16.0 g/dL    HCT 31.9 (L) 35.0 - 47.0 %    MCV 93.5 80.0 - 99.0 FL    MCH 30.2 26.0 - 34.0 PG    MCHC 32.3 30.0 - 36.5 g/dL    RDW 13.8 11.5 - 14.5 %    PLATELET 959 586 - 113 K/uL    MPV 10.8 8.9 - 12.9 FL    NRBC 0.0 0  WBC    ABSOLUTE NRBC 0.00 0.00 - 0.01 K/uL    NEUTROPHILS 66 32 - 75 %    LYMPHOCYTES 27 12 - 49 %    MONOCYTES 6 5 - 13 %    EOSINOPHILS 1 0 - 7 %    BASOPHILS 0 0 - 1 %    IMMATURE GRANULOCYTES 0 0.0 - 0.5 %    ABS.  NEUTROPHILS 16.1 (H) 1.8 - 8.0 K/UL ABS. LYMPHOCYTES 6.6 (H) 0.8 - 3.5 K/UL    ABS. MONOCYTES 1.5 (H) 0.0 - 1.0 K/UL    ABS. EOSINOPHILS 0.2 0.0 - 0.4 K/UL    ABS. BASOPHILS 0.0 0.0 - 0.1 K/UL    ABS. IMM. GRANS. 0.0 0.00 - 0.04 K/UL    DF MANUAL      RBC COMMENTS NORMOCYTIC, NORMOCHROMIC     PROCALCITONIN    Collection Time: 09/19/20  2:02 AM   Result Value Ref Range    Procalcitonin 0.10 ng/mL   PTT    Collection Time: 09/19/20  2:02 AM   Result Value Ref Range    aPTT 29.6 22.1 - 32.0 sec    aPTT, therapeutic range     58.0 - 77.0 SECS   MAGNESIUM    Collection Time: 09/19/20  2:02 AM   Result Value Ref Range    Magnesium 2.4 1.6 - 2.4 mg/dL   GLUCOSE, POC    Collection Time: 09/19/20  8:13 AM   Result Value Ref Range    Glucose (POC) 121 (H) 65 - 100 mg/dL    Performed by Messi Holder RN    PTT    Collection Time: 09/19/20  8:22 AM   Result Value Ref Range    aPTT 42.1 (H) 22.1 - 32.0 sec    aPTT, therapeutic range     58.0 - 77.0 SECS   GLUCOSE, POC    Collection Time: 09/19/20 11:23 AM   Result Value Ref Range    Glucose (POC) 228 (H) 65 - 100 mg/dL    Performed by Messi Holder RN        Telemetry: normal sinus rhythm      Assessment:     Hospital Problems  Date Reviewed: 9/18/2020          Codes Class Noted POA    [facility] ICD-10-CM: I50.9  ICD-9-CM: 428.0  9/18/2020 Unknown        Hypoxia ICD-10-CM: R09.02  ICD-9-CM: 799.02  9/18/2020 Unknown        * (Principal) Bilateral pleural effusion ICD-10-CM: J90  ICD-9-CM: 511.9  9/18/2020 Unknown        Elevated troponin ICD-10-CM: R79.89  ICD-9-CM: 790.6  9/18/2020 Unknown        PAF (paroxysmal atrial fibrillation) (H[facility]) ICD-10-CM: I48.0  ICD-9-CM: 427.31  9/10/2020 Yes        CKD (chronic kidney disease) stage 3, GFR 30-59 ml/min ([facility]) ICD-10-CM: N18.3  ICD-9-CM: 585.3  6/4/2019 Yes              Plan:     CHF- appears well compensated. Continue coreg. Consider entresto once creatinine is better. Troponin- no chest pain, EKG changes. Continue IV heparin.  Ischemic evaluation Monday.     Orquidea Patterson MD

## 2020-09-19 NOTE — PROGRESS NOTES
0700H- Verbal shift change report given to [facility] RN  (oncoming nurse) by [facility] RN (offgoing nurse). Report included the following information SBAR, Kardex, ED Summary, Procedure Summary, Intake/Output, MAR and Recent Results. 1900H- Verbal shift change report given to SNEHAL RN (oncoming nurse) by [facility] RN (offgoing nurse). Report included the following information SBAR, Kardex, OR Summary, Procedure Summary, Intake/Output, MAR, Recent Results and Med Rec Status.

## 2020-09-20 ENCOUNTER — APPOINTMENT (OUTPATIENT)
Dept: NON INVASIVE DIAGNOSTICS | Age: 80
DRG: 226 | End: 2020-09-20
Attending: INTERNAL MEDICINE
Payer: MEDICARE

## 2020-09-20 ENCOUNTER — APPOINTMENT (OUTPATIENT)
Dept: GENERAL RADIOLOGY | Age: 80
DRG: 226 | End: 2020-09-20
Attending: HOSPITALIST
Payer: MEDICARE

## 2020-09-20 LAB
ANION GAP SERPL CALC-SCNC: 0 MMOL/L (ref 5–15)
APTT PPP: 37 SEC (ref 22.1–32)
APTT PPP: 63 SEC (ref 22.1–32)
BUN SERPL-MCNC: 42 MG/DL (ref 6–20)
BUN/CREAT SERPL: 28 (ref 12–20)
CALCIUM SERPL-MCNC: 9.6 MG/DL (ref 8.5–10.1)
CHLORIDE SERPL-SCNC: 106 MMOL/L (ref 97–108)
CO2 SERPL-SCNC: 30 MMOL/L (ref 21–32)
CREAT SERPL-MCNC: 1.52 MG/DL (ref 0.55–1.02)
ECHO AO ROOT DIAM: 3.63 CM
ECHO AV AREA PEAK VELOCITY: 1.21 CM2
ECHO AV AREA VTI: 1.07 CM2
ECHO AV AREA/BSA PEAK VELOCITY: 0.7 CM2/M2
ECHO AV AREA/BSA VTI: 0.6 CM2/M2
ECHO AV MEAN GRADIENT: 4.88 MMHG
ECHO AV MEAN VELOCITY: 101.54 CM/S
ECHO AV PEAK GRADIENT: 10.15 MMHG
ECHO AV PEAK VELOCITY: 159.33 CM/S
ECHO AV VTI: 28.98 CM
ECHO LA AREA 4C: 17.46 CM2
ECHO LA MAJOR AXIS: 3.68 CM
ECHO LA MINOR AXIS: 2.04 CM
ECHO LA VOL 4C: 52.8 ML (ref 22–52)
ECHO LA VOLUME INDEX A4C: 29.27 ML/M2 (ref 16–28)
ECHO LV EDV A4C: 116.99 ML
ECHO LV EDV INDEX A4C: 64.9 ML/M2
ECHO LV EJECTION FRACTION A4C: 18 PERCENT
ECHO LV ESV A4C: 95.82 ML
ECHO LV ESV INDEX A4C: 53.1 ML/M2
ECHO LV INTERNAL DIMENSION DIASTOLIC MMODE: 5.14 CM
ECHO LV INTERNAL DIMENSION DIASTOLIC: 5.37 CM (ref 3.9–5.3)
ECHO LV INTERNAL DIMENSION SYSTOLIC MMODE: 4.11 CM
ECHO LV INTERNAL DIMENSION SYSTOLIC: 4.53 CM
ECHO LV IVSD: 1.04 CM (ref 0.6–0.9)
ECHO LV MASS 2D: 277.3 G (ref 67–162)
ECHO LV MASS INDEX 2D: 153.7 G/M2 (ref 43–95)
ECHO LV POSTERIOR WALL DIASTOLIC: 1.46 CM (ref 0.6–0.9)
ECHO LVOT CARDIAC OUTPUT: 2.32 LITER/MINUTE
ECHO LVOT DIAM: 1.99 CM
ECHO LVOT PEAK GRADIENT: 1.53 MMHG
ECHO LVOT PEAK VELOCITY: 61.92 CM/S
ECHO LVOT SV: 30.9 ML
ECHO LVOT VTI: 9.94 CM
ECHO MV A VELOCITY: 82.84 CM/S
ECHO MV AREA PHT: 3.79 CM2
ECHO MV AREA VTI: 0.94 CM2
ECHO MV E DECELERATION TIME (DT): 0.19 S
ECHO MV E VELOCITY: 119.4 CM/S
ECHO MV E/A RATIO: 1.44
ECHO MV MAX VELOCITY: 146.63 CM/S
ECHO MV MEAN GRADIENT: 3.13 MMHG
ECHO MV PEAK GRADIENT: 8.6 MMHG
ECHO MV PRESSURE HALF TIME (PHT): 0.06 S
ECHO MV VTI: 32.89 CM
ECHO PV MAX VELOCITY: 90.59 CM/S
ECHO PV MEAN GRADIENT: 1.53 MMHG
ECHO PV PEAK INSTANTANEOUS GRADIENT SYSTOLIC: 3.28 MMHG
ECHO PV REGURGITANT MAX VELOCITY: 479.28 CM/S
ECHO PV VTI: 15.76 CM
ERYTHROCYTE [DISTWIDTH] IN BLOOD BY AUTOMATED COUNT: 13.9 % (ref 11.5–14.5)
GLUCOSE BLD STRIP.AUTO-MCNC: 162 MG/DL (ref 65–100)
GLUCOSE BLD STRIP.AUTO-MCNC: 172 MG/DL (ref 65–100)
GLUCOSE BLD STRIP.AUTO-MCNC: 177 MG/DL (ref 65–100)
GLUCOSE BLD STRIP.AUTO-MCNC: 234 MG/DL (ref 65–100)
GLUCOSE BLD STRIP.AUTO-MCNC: 65 MG/DL (ref 65–100)
GLUCOSE SERPL-MCNC: 178 MG/DL (ref 65–100)
HCT VFR BLD AUTO: 35.1 % (ref 35–47)
HGB BLD-MCNC: 11.2 G/DL (ref 11.5–16)
LVOT MG: 0.62 MMHG
MCH RBC QN AUTO: 30.4 PG (ref 26–34)
MCHC RBC AUTO-ENTMCNC: 31.9 G/DL (ref 30–36.5)
MCV RBC AUTO: 95.1 FL (ref 80–99)
NRBC # BLD: 0 K/UL (ref 0–0.01)
NRBC BLD-RTO: 0 PER 100 WBC
PLATELET # BLD AUTO: 344 K/UL (ref 150–400)
PMV BLD AUTO: 10.9 FL (ref 8.9–12.9)
POTASSIUM SERPL-SCNC: 5 MMOL/L (ref 3.5–5.1)
RBC # BLD AUTO: 3.69 M/UL (ref 3.8–5.2)
SERVICE CMNT-IMP: ABNORMAL
SERVICE CMNT-IMP: NORMAL
SODIUM SERPL-SCNC: 136 MMOL/L (ref 136–145)
THERAPEUTIC RANGE,PTTT: ABNORMAL SECS (ref 58–77)
THERAPEUTIC RANGE,PTTT: ABNORMAL SECS (ref 58–77)
WBC # BLD AUTO: 17 K/UL (ref 3.6–11)

## 2020-09-20 PROCEDURE — 85730 THROMBOPLASTIN TIME PARTIAL: CPT

## 2020-09-20 PROCEDURE — 82962 GLUCOSE BLOOD TEST: CPT

## 2020-09-20 PROCEDURE — 74011000250 HC RX REV CODE- 250: Performed by: FAMILY MEDICINE

## 2020-09-20 PROCEDURE — 77030018846 HC SOL IRR STRL H20 ICUM -A

## 2020-09-20 PROCEDURE — 65660000000 HC RM CCU STEPDOWN

## 2020-09-20 PROCEDURE — 65270000029 HC RM PRIVATE

## 2020-09-20 PROCEDURE — 74011250637 HC RX REV CODE- 250/637: Performed by: INTERNAL MEDICINE

## 2020-09-20 PROCEDURE — 93306 TTE W/DOPPLER COMPLETE: CPT

## 2020-09-20 PROCEDURE — 80048 BASIC METABOLIC PNL TOTAL CA: CPT

## 2020-09-20 PROCEDURE — 74011250636 HC RX REV CODE- 250/636: Performed by: NURSE PRACTITIONER

## 2020-09-20 PROCEDURE — 77010033678 HC OXYGEN DAILY

## 2020-09-20 PROCEDURE — 36415 COLL VENOUS BLD VENIPUNCTURE: CPT

## 2020-09-20 PROCEDURE — 74011250637 HC RX REV CODE- 250/637: Performed by: NURSE PRACTITIONER

## 2020-09-20 PROCEDURE — 71045 X-RAY EXAM CHEST 1 VIEW: CPT

## 2020-09-20 PROCEDURE — 74011636637 HC RX REV CODE- 636/637: Performed by: INTERNAL MEDICINE

## 2020-09-20 PROCEDURE — 85027 COMPLETE CBC AUTOMATED: CPT

## 2020-09-20 PROCEDURE — 94640 AIRWAY INHALATION TREATMENT: CPT

## 2020-09-20 RX ORDER — IPRATROPIUM BROMIDE AND ALBUTEROL SULFATE 2.5; .5 MG/3ML; MG/3ML
3 SOLUTION RESPIRATORY (INHALATION)
Status: DISCONTINUED | OUTPATIENT
Start: 2020-09-20 | End: 2020-09-23 | Stop reason: HOSPADM

## 2020-09-20 RX ORDER — LORAZEPAM 2 MG/ML
0.5 INJECTION INTRAMUSCULAR ONCE
Status: COMPLETED | OUTPATIENT
Start: 2020-09-21 | End: 2020-09-21

## 2020-09-20 RX ORDER — FUROSEMIDE 10 MG/ML
20 INJECTION INTRAMUSCULAR; INTRAVENOUS ONCE
Status: COMPLETED | OUTPATIENT
Start: 2020-09-20 | End: 2020-09-21

## 2020-09-20 RX ADMIN — CARVEDILOL 12.5 MG: 12.5 TABLET, FILM COATED ORAL at 08:49

## 2020-09-20 RX ADMIN — GABAPENTIN 300 MG: 300 CAPSULE ORAL at 22:33

## 2020-09-20 RX ADMIN — INSULIN LISPRO 3 UNITS: 100 INJECTION, SOLUTION INTRAVENOUS; SUBCUTANEOUS at 13:09

## 2020-09-20 RX ADMIN — COLESTIPOL HYDROCHLORIDE 1 G: 1 TABLET, FILM COATED ORAL at 10:55

## 2020-09-20 RX ADMIN — VENLAFAXINE 75 MG: 37.5 TABLET ORAL at 08:49

## 2020-09-20 RX ADMIN — ATORVASTATIN CALCIUM 20 MG: 20 TABLET, FILM COATED ORAL at 22:33

## 2020-09-20 RX ADMIN — GABAPENTIN 300 MG: 300 CAPSULE ORAL at 08:49

## 2020-09-20 RX ADMIN — ASPIRIN 81 MG: 81 TABLET, DELAYED RELEASE ORAL at 08:49

## 2020-09-20 RX ADMIN — GABAPENTIN 300 MG: 300 CAPSULE ORAL at 18:04

## 2020-09-20 RX ADMIN — CARVEDILOL 12.5 MG: 12.5 TABLET, FILM COATED ORAL at 18:04

## 2020-09-20 RX ADMIN — COLESTIPOL HYDROCHLORIDE 1 G: 1 TABLET, FILM COATED ORAL at 18:04

## 2020-09-20 RX ADMIN — IPRATROPIUM BROMIDE AND ALBUTEROL SULFATE 3 ML: .5; 3 SOLUTION RESPIRATORY (INHALATION) at 22:30

## 2020-09-20 RX ADMIN — VENLAFAXINE 75 MG: 37.5 TABLET ORAL at 18:05

## 2020-09-20 RX ADMIN — HEPARIN SODIUM 4000 UNITS: 5000 INJECTION INTRAVENOUS; SUBCUTANEOUS at 18:06

## 2020-09-20 RX ADMIN — INSULIN LISPRO 2 UNITS: 100 INJECTION, SOLUTION INTRAVENOUS; SUBCUTANEOUS at 18:03

## 2020-09-20 RX ADMIN — INSULIN GLARGINE 45 UNITS: 100 INJECTION, SOLUTION SUBCUTANEOUS at 22:33

## 2020-09-20 NOTE — PROGRESS NOTES
0700:  Bedside shift change report given to Marly Hanson RN (oncoming nurse) by Xochilt Dunn RN (offgoing nurse). Report included the following information SBAR, Kardex, Intake/Output and Recent Results. 0494:  Morning meds administered. Pt denies CP, SOB. Cleaned pt up for urine occurrence. 0945:  Pt blood sugar checked, 65 reading. Treated with 4 oz orange juice, rechecked at 162. RN will continue to monitor. 1100:  Labs obtained, sent to lab via tube. 1329:  RN perfect served MD Carmona about pt Negative COVID test result. ECHO at bedside. 1338:  RN placed order per MD Carmona to transfer pt to tele unit. Rn placed transfer order. 1600:  PTT taken from right arm after heparin drip stopped for 10 minutes. Sample sent to lab. 1755:  RN confirmed heparin rate per latest PTT. Per Alexandrea Kirk PharmD, most recent PTT 37, give 4000unit bollus, increase rate by 4 to 24.      1900:  Bedside shift change report given to HO Crespo (oncoming nurse) by Marly Hanson RN (offgoing nurse). Report included the following information SBAR, Kardex, Intake/Output and Recent Results.

## 2020-09-20 NOTE — PROGRESS NOTES
Hospitalist Progress Note    NAME: Ozzy Mcmahon   :  1940   MRN:  [id number]       Assessment / Plan:    Acute respiratory failure with hypoxia most likely secondary to acute on chronic systolic CHF POA  Hypertensive emergency  Covid r/o   Elevated troponin:? NSTEMI   admited to PCU,  2D echo pending  Patient received IV Las[facility]  Due to the elevated troponin, patient was given heparin 4000 unit and started on heparin drip  Troponin <0.05-->2.04-->1.20  Cardiology input appreciated. Ischemic work-up hopefully on Monday  Continue with beta-blocker, aspirin and statin  off nitro drip  COVID test pending  Chest pain free  Stress test  On   · FINDINGS: The rest and stress perfusion images a fixed defect in the inferior wall compatible with infarct. No reversible abnormality is seen to suggest myocardium at ischemic risk. The gated images demonstrate global hypokinesia and inferior akinesia. Left ventricular ejection fraction is 31 %. Impression: Fixed defect in the inferior wall is compatible with infarct. No evidence of myocardium at ischemic risk. Left ventricular ejection fraction is 31 %. Global hypokinesia and inferior akinesia.        Leucocytosis POA.  Seems to be chronic  Wbc 23k on admission , seems to be chronic after review of previous cbc.trending down today  Pt has no fever , no signs of infection  Chest xray clear   Pending UA blood cx   procalcitonin 0.10  Pt reported that she always have elevated wbc and work up has been done in the past and nothing was found   Cont holding  off on abx for now  Still not showing any signs of active infection other than leukocytosis which might be reactive plus chronic     Hyperglycemia in setting of diabetes mellitus  Diabetic neuropathy  BS in the 300's  Continue with home dose Lantus 45 units with correction scale, adjust prn   Continue with Neurontin  Hold metformin , hold glyburide     Worsening CKD stage III  DC ACE inhibitor, hold Lasix  Monitor BMP     Depression  Continue with Effexor    25.0 - 29.9 Overweight / Body mass index is 26.85 kg/m². Code status: DNR  Prophylaxis: Heparin  Recommended Disposition: Home w/Family     Subjective:     Chief Complaint / Reason for Physician Visit  Discussed with RN events overnight. Patient was seen and examined. No acute events overnight. \" I am doing fabulous\"    Review of Systems:  Symptom Y/N Comments  Symptom Y/N Comments   Fever/Chills n   Chest Pain n    Poor Appetite    Edema     Cough n   Abdominal Pain n    Sputum    Joint Pain     SOB/STANTON n   Pruritis/Rash     Nausea/vomit n   Tolerating PT/OT     Diarrhea    Tolerating Diet y    Constipation    Other       Could NOT obtain due to:          Objective:     VITALS:   Last 24hrs VS reviewed since prior progress note. Most recent are:  Patient Vitals for the past 24 hrs:   Temp Pulse Resp BP SpO2   09/20/20 1043 98 °F (36.7 °C) 79 24 (!) 116/54 95 %   09/20/20 0812 97.2 °F (36.2 °C) 82 19 (!) 142/66 95 %   09/20/20 0525     95 %   09/20/20 0400 97.9 °F (36.6 °C) 84 18 124/66 97 %   09/19/20 2300 98.3 °F (36.8 °C) 86 21 (!) 156/74 96 %   09/1940 98.5 °F (36.9 °C) 89 16 124/65 97 %   09/19/20 1511 98.8 °F (37.1 °C) 79 19 (!) 143/82 99 %       Intake/Output Summary (Last 24 hours) at 9/20/2020 1140  Last data filed at 9/20/2020 1043  Gross per 24 hour   Intake 1286.18 ml   Output 1250 ml   Net 36.18 ml        PHYSICAL EXAM:  General: WD, WN. Alert, cooperative, no acute distress    EENT:  EOMI. Anicteric sclerae. MMM  Resp:  CTA bilaterally, no wheezing or rales. No accessory muscle use  CV:  Regular  rhythm,  No edema  GI:  Soft, Non distended, Non tender.  +Bowel sounds  Neurologic:  Alert and oriented X 3, normal speech,   Psych:   Good insight. Not anxious nor agitated  Skin:  No rashes.   No jaundice    Reviewed most current lab test results and cultures  YES  Reviewed most current radiology test results   YES  Review and summation of old records today    NO  Reviewed patient's current orders and MAR    YES  PMH/SH reviewed - no change compared to H&P  ________________________________________________________________________  Care Plan discussed with:    Comments   Patient x    Family      RN x    Care Manager     Consultant                        Multidiciplinary team rounds were held today with , nursing, pharmacist and clinical coordinator. Patient's plan of care was discussed; medications were reviewed and discharge planning was addressed. ________________________________________________________________________        Comments   >50% of visit spent in counseling and coordination of care     ________________________________________________________________________  Michelle Becerra MD     Procedures: see electronic medical records for all procedures/Xrays and details which were not copied into this note but were reviewed prior to creation of Plan. LABS:  I reviewed today's most current labs and imaging studies.   Pertinent labs include:  Recent Labs     09/20/20  1104 09/19/20  0202 09/18/20  1635   WBC 17.0* 24.4* 23.3*   HGB 11.2* 10.3* 11.0*   HCT 35.1 31.9* 33.5*    322 362     Recent Labs     09/19/20  0202 09/17/20  2345    139   K 3.9 4.7    103   CO2 26 23   * 412*   BUN 49* 35*   CREA 1.84* 2.05*   CA 9.4 9.1   MG 2.4 2.4   ALB 2.9* 3.2*   TBILI 0.3 0.2   ALT 39 60       Signed: Michelle Becerra MD

## 2020-09-20 NOTE — PROGRESS NOTES
Cardiology Progress Note      Pursuant to the emergency declaration under the [facility], [facility] waiver authority and the [employer] Act, this Virtual  Visit was conducted, with patient's consent, to reduce the patient's risk of exposure to COVID-19 and provide continuity of care for an established patient. Discussed patient with RN and had conversation over the phone with the patient.                                                                                                              Cardiology Telemedicine Encounter     Simba Acharya is a 79 y.o. female who was seen by synchronous (real-time) audio-video technology on 9/20/2020 9/20/2020 11:31 AM    Admit Date: 9/18/2020    Admit Diagnosis: CHF (congestive heart failure) ([facility] [address] [I50.9]; Hypoxia [R09.02]      Subjective:     Simba Acharya has no complaints. Creatinine down to 1.5.     Visit Vitals  BP (!) 116/54 (BP 1 Location: Right arm, BP Patient Position: At rest)   Pulse 79   Temp 98 °F (36.7 °C)   Resp 24   Wt 161 lb 6 oz (73.2 kg)   SpO2 95%   BMI 26.85 kg/m²       Current Facility-Administered Medications   Medication Dose Route Frequency    nitroglycerin (Tridil) 200 mcg/ml infusion  0-200 mcg/min IntraVENous TITRATE    aspirin delayed-release tablet 81 mg  81 mg Oral DAILY    colestipoL (COLESTID) tablet 1 g  1 g Oral BID    gabapentin (NEURONTIN) capsule 300 mg  300 mg Oral TID    insulin glargine (LANTUS) injection 45 Units  45 Units SubCUTAneous QHS    loperamide (IMODIUM) capsule 2 mg  2 mg Oral BID PRN    venlafaxine (EFFEXOR) tablet 75 mg  75 mg Oral BID WITH MEALS    insulin lispro (HUMALOG) injection   SubCUTAneous AC&HS    glucose chewable tablet 16 g  4 Tab Oral PRN    dextrose (D50W) injection syrg 12.5-25 g  12.5-25 g IntraVENous PRN    glucagon (GLUCAGEN) injection 1 mg  1 mg IntraMUSCular PRN    carvediloL (COREG) tablet 12.5 mg  12.5 mg Oral BID WITH MEALS    atorvastatin (LIPITOR) tablet 20 mg  20 mg Oral QHS    labetaloL (NORMODYNE;TRANDATE) injection 20 mg  20 mg IntraVENous Q4H PRN    heparin (porcine) 25,000 units in 0.45% saline 250 ml infusion  12-25 Units/kg/hr IntraVENous TITRATE    heparin (porcine) injection 2,000 Units  2,000 Units IntraVENous PRN    Or    heparin (porcine) injection 4,000 Units  4,000 Units IntraVENous PRN         Objective:      Physical Exam:  Deferred due to COVID isolation    Data Review:   Labs:    Recent Results (from the past 24 hour(s))   PTT    Collection Time: 09/19/20  2:57 PM   Result Value Ref Range    aPTT 42.1 (H) 22.1 - 32.0 sec    aPTT, therapeutic range     58.0 - 77.0 SECS   GLUCOSE, POC    Collection Time: 09/19/20  3:03 PM   Result Value Ref Range    Glucose (POC) 132 (H) 65 - 100 mg/dL    Performed by Gautam Del Rosario RN    GLUCOSE, POC    Collection Time: 09/19/20  9:05 PM   Result Value Ref Range    Glucose (POC) 252 (H) 65 - 100 mg/dL    Performed by Cosmo Kelly    PTT    Collection Time: 09/19/20 10:24 PM   Result Value Ref Range    aPTT 65.4 (H) 22.1 - 32.0 sec    aPTT, therapeutic range     58.0 - 77.0 SECS   PTT    Collection Time: 09/20/20  5:08 AM   Result Value Ref Range    aPTT 63.0 (H) 22.1 - 32.0 sec    aPTT, therapeutic range     58.0 - 77.0 SECS   GLUCOSE, POC    Collection Time: 09/20/20  8:15 AM   Result Value Ref Range    Glucose (POC) 65 65 - 100 mg/dL    Performed by Zonia Negro RN    GLUCOSE, POC    Collection Time: 09/20/20  9:42 AM   Result Value Ref Range    Glucose (POC) 162 (H) 65 - 100 mg/dL    Performed by Lata Burnham    GLUCOSE, POC    Collection Time: 09/20/20 10:58 AM   Result Value Ref Range    Glucose (POC) 234 (H) 65 - 100 mg/dL    Performed by Zonia Negro RN    CBC W/O DIFF    Collection Time: 09/20/20 11:04 AM   Result Value Ref Range    WBC 17.0 (H) 3.6 - 11.0 K/uL    RBC 3.69 (L) 3.80 - 5.20 M/uL    HGB 11.2 (L) 11.5 - 16.0 g/dL    HCT 35.1 35.0 - 47.0 %    MCV 95.1 80.0 - 99.0 FL    MCH 30.4 26.0 - 34.0 PG    MCHC 31.9 30.0 - 36.5 g/dL    RDW 13.9 11.5 - 14.5 %    PLATELET 876 620 - 323 K/uL    MPV 10.9 8.9 - 12.9 FL    NRBC 0.0 0  WBC    ABSOLUTE NRBC 0.00 0.00 - 0.01 K/uL       Telemetry: normal sinus rhythm      Assessment:     Hospital Problems  Date Reviewed: 9/18/2020          Codes Class Noted POA    CHF (congestive heart failure) ([facility] [address] ICD-10-CM: I50.9  ICD-9-CM: 428.0  9/18/2020 Unknown        Hypoxia ICD-10-CM: R09.02  ICD-9-CM: 799.02  9/18/2020 Unknown        * (Principal) Bilateral pleural effusion ICD-10-CM: J90  ICD-9-CM: 511.9  9/18/2020 Unknown        Elevated troponin ICD-10-CM: R79.89  ICD-9-CM: 790.6  9/18/2020 Unknown        PAF (paroxysmal atrial fibrillation) ([facility]) ICD-10-CM: I48.0  ICD-9-CM: 427.31  9/10/2020 Yes        CKD (chronic kidney disease) stage 3, GFR 30-59 ml/min (HCC) ICD-10-CM: N18.3  ICD-9-CM: 585.3  6/4/2019 Yes              Plan:     NPO. Ischemic evaluation tomorrow per Dr. Julia Nicolas.     Artemio Buckley MD

## 2020-09-20 NOTE — PROGRESS NOTES
1900: Bedside and Verbal shift change report given to Clara Pacheco RN/Mike LOPEZ RN (oncoming nurse) by Anjelica Bowman RN (offgoing nurse). Report included the following information SBAR, Kardex, Intake/Output, MAR, Recent Results and Med Rec Status. 1954: Patient sitting up in bed complaining of SOB. Oxygen saturation stable at 92%, patient is tachypneic and anxious. Practiced slow, deep breathing. Patient states she is feeling slightly better. Will continue to monitor oxygen saturation status and breathing status. Safety measures in place. 2130: Patient complaining of increased SOB, oxygen saturation is now 85% breathing at a rate of 38. Patient is very anxious and diaphoretic. Messaged tele-hospitalist for albuterol treatment. Placed patient on NRB until RT arrives. Patient states she feels \"a little bit better\" with the NRB on. 2230: Patient states her breathing is \"so much better\" after the albuterol treatment. RT placing patient on Hi-Flow oxygen therapy. 2356: Patient sleeping in bed, VSS, no complaints of pain at this time. Patient's respiratory rate is now decreased to 16, oxygen saturation levels stable at 95%. Safety measures in place, will continue to monitor. 0004: Received an order for Ativan 0.5 mg to help with the patient's anxiety as well as Lasix 20 mg to pull off some fluid from the lungs since RT stated she heard some crackles in the bases of the patient's lungs. Order also placed for portable CXR.    0200: Results of patient's PTT show a result of 55.8. Heparin drip changed to run at a rate of 25 units/kg/hr. Confirmed this rate with pharmacy. 3237: Patient sleeping in bed. VSS, no complaints of pain. Safety measures in place, will continue to monitor. 0700: Bedside and Verbal shift change report given to Joni Oliveira RN (oncoming nurse) by Clara Pacheco RN/Mike LOPEZ, RN (offgoing nurse).  Report included the following information SBAR, Kardex, Intake/Output, MAR, Recent Results and Med Rec Status.

## 2020-09-21 ENCOUNTER — HOME HEALTH ADMISSION (OUTPATIENT)
Dept: HOME HEALTH SERVICES | Facility: HOME HEALTH | Age: 80
End: 2020-09-21

## 2020-09-21 LAB
ANION GAP SERPL CALC-SCNC: 6 MMOL/L (ref 5–15)
APTT PPP: 55.8 SEC (ref 22.1–32)
APTT PPP: 73.8 SEC (ref 22.1–32)
BUN SERPL-MCNC: 48 MG/DL (ref 6–20)
BUN/CREAT SERPL: 30 (ref 12–20)
CALCIUM SERPL-MCNC: 9.4 MG/DL (ref 8.5–10.1)
CHLORIDE SERPL-SCNC: 104 MMOL/L (ref 97–108)
CO2 SERPL-SCNC: 25 MMOL/L (ref 21–32)
CREAT SERPL-MCNC: 1.59 MG/DL (ref 0.55–1.02)
ERYTHROCYTE [DISTWIDTH] IN BLOOD BY AUTOMATED COUNT: 13.4 % (ref 11.5–14.5)
GLUCOSE BLD STRIP.AUTO-MCNC: 111 MG/DL (ref 65–100)
GLUCOSE BLD STRIP.AUTO-MCNC: 139 MG/DL (ref 65–100)
GLUCOSE BLD STRIP.AUTO-MCNC: 153 MG/DL (ref 65–100)
GLUCOSE BLD STRIP.AUTO-MCNC: 70 MG/DL (ref 65–100)
GLUCOSE BLD STRIP.AUTO-MCNC: 70 MG/DL (ref 65–100)
GLUCOSE SERPL-MCNC: 199 MG/DL (ref 65–100)
HCT VFR BLD AUTO: 34.2 % (ref 35–47)
HGB BLD-MCNC: 11.1 G/DL (ref 11.5–16)
MCH RBC QN AUTO: 30.3 PG (ref 26–34)
MCHC RBC AUTO-ENTMCNC: 32.5 G/DL (ref 30–36.5)
MCV RBC AUTO: 93.4 FL (ref 80–99)
NRBC # BLD: 0 K/UL (ref 0–0.01)
NRBC BLD-RTO: 0 PER 100 WBC
PLATELET # BLD AUTO: 337 K/UL (ref 150–400)
PMV BLD AUTO: 10.9 FL (ref 8.9–12.9)
POTASSIUM SERPL-SCNC: 5.1 MMOL/L (ref 3.5–5.1)
RBC # BLD AUTO: 3.66 M/UL (ref 3.8–5.2)
SERVICE CMNT-IMP: ABNORMAL
SERVICE CMNT-IMP: NORMAL
SERVICE CMNT-IMP: NORMAL
SODIUM SERPL-SCNC: 135 MMOL/L (ref 136–145)
THERAPEUTIC RANGE,PTTT: ABNORMAL SECS (ref 58–77)
THERAPEUTIC RANGE,PTTT: ABNORMAL SECS (ref 58–77)
WBC # BLD AUTO: 18.6 K/UL (ref 3.6–11)

## 2020-09-21 PROCEDURE — 77010033678 HC OXYGEN DAILY

## 2020-09-21 PROCEDURE — 82962 GLUCOSE BLOOD TEST: CPT

## 2020-09-21 PROCEDURE — 65270000029 HC RM PRIVATE

## 2020-09-21 PROCEDURE — 74011250637 HC RX REV CODE- 250/637: Performed by: NURSE PRACTITIONER

## 2020-09-21 PROCEDURE — 80048 BASIC METABOLIC PNL TOTAL CA: CPT

## 2020-09-21 PROCEDURE — 74011636637 HC RX REV CODE- 636/637: Performed by: INTERNAL MEDICINE

## 2020-09-21 PROCEDURE — 74011250636 HC RX REV CODE- 250/636: Performed by: HOSPITALIST

## 2020-09-21 PROCEDURE — 74011000250 HC RX REV CODE- 250: Performed by: INTERNAL MEDICINE

## 2020-09-21 PROCEDURE — 74011250637 HC RX REV CODE- 250/637: Performed by: INTERNAL MEDICINE

## 2020-09-21 PROCEDURE — 85027 COMPLETE CBC AUTOMATED: CPT

## 2020-09-21 PROCEDURE — 36415 COLL VENOUS BLD VENIPUNCTURE: CPT

## 2020-09-21 PROCEDURE — 77010033711 HC HIGH FLOW OXYGEN

## 2020-09-21 PROCEDURE — 74011250636 HC RX REV CODE- 250/636: Performed by: NURSE PRACTITIONER

## 2020-09-21 PROCEDURE — 85730 THROMBOPLASTIN TIME PARTIAL: CPT

## 2020-09-21 RX ORDER — SODIUM CHLORIDE 0.9 % (FLUSH) 0.9 %
SYRINGE (ML) INJECTION
Status: COMPLETED
Start: 2020-09-21 | End: 2020-09-21

## 2020-09-21 RX ORDER — FUROSEMIDE 40 MG/1
40 TABLET ORAL DAILY
Status: DISCONTINUED | OUTPATIENT
Start: 2020-09-21 | End: 2020-09-23 | Stop reason: HOSPADM

## 2020-09-21 RX ORDER — ENOXAPARIN SODIUM 100 MG/ML
1 INJECTION SUBCUTANEOUS EVERY 24 HOURS
Status: DISCONTINUED | OUTPATIENT
Start: 2020-09-21 | End: 2020-09-22

## 2020-09-21 RX ADMIN — Medication: at 09:00

## 2020-09-21 RX ADMIN — LORAZEPAM 0.5 MG: 2 INJECTION INTRAMUSCULAR; INTRAVENOUS at 00:04

## 2020-09-21 RX ADMIN — HEPARIN SODIUM 25 UNITS/KG/HR: 10000 INJECTION, SOLUTION INTRAVENOUS at 10:08

## 2020-09-21 RX ADMIN — ATORVASTATIN CALCIUM 20 MG: 20 TABLET, FILM COATED ORAL at 23:01

## 2020-09-21 RX ADMIN — GABAPENTIN 300 MG: 300 CAPSULE ORAL at 23:00

## 2020-09-21 RX ADMIN — ENOXAPARIN SODIUM 80 MG: 80 INJECTION SUBCUTANEOUS at 12:46

## 2020-09-21 RX ADMIN — Medication: at 08:00

## 2020-09-21 RX ADMIN — INSULIN GLARGINE 45 UNITS: 100 INJECTION, SOLUTION SUBCUTANEOUS at 23:01

## 2020-09-21 RX ADMIN — CARVEDILOL 12.5 MG: 12.5 TABLET, FILM COATED ORAL at 19:08

## 2020-09-21 RX ADMIN — GABAPENTIN 300 MG: 300 CAPSULE ORAL at 19:09

## 2020-09-21 RX ADMIN — FUROSEMIDE 40 MG: 40 TABLET ORAL at 18:37

## 2020-09-21 RX ADMIN — FUROSEMIDE 20 MG: 10 INJECTION, SOLUTION INTRAMUSCULAR; INTRAVENOUS at 00:04

## 2020-09-21 RX ADMIN — VENLAFAXINE 75 MG: 37.5 TABLET ORAL at 18:37

## 2020-09-21 RX ADMIN — DEXTROSE MONOHYDRATE 25 G: 25 INJECTION, SOLUTION INTRAVENOUS at 07:58

## 2020-09-21 RX ADMIN — COLESTIPOL HYDROCHLORIDE 1 G: 1 TABLET, FILM COATED ORAL at 18:36

## 2020-09-21 RX ADMIN — DEXTROSE MONOHYDRATE 25 G: 25 INJECTION, SOLUTION INTRAVENOUS at 10:40

## 2020-09-21 NOTE — PROGRESS NOTES
[address]  [phone]      Cardiology Progress Note      9/21/2020 10:00PM    Admit Date: 9/18/2020    Admit Diagnosis:   CHF (congestive heart failure) ([facility] [address] [I50.9]  Hypoxia [R09.02]    Subjective:     Ebb Francisca has no c/o CP, but did experience overnight increased difficulty in taking a deep breath with decreased sats, required NRB and ativan which helped. CXray overall unchanged. COVID neg, moving to PCU. Visit Vitals  BP (!) 114/92 (BP 1 Location: Left arm, BP Patient Position: At rest)   Pulse 69   Temp 97.8 °F (36.6 °C)   Resp 16   Ht 5' 5\" (1.651 m)   Wt 175 lb 3.2 oz (79.5 kg)   SpO2 97%   BMI 29.15 kg/m²       Current Facility-Administered Medications   Medication Dose Route Frequency    albuterol-ipratropium (DUO-NEB) 2.5 MG-0.5 MG/3 ML  3 mL Nebulization Q4H PRN    nitroglycerin (Tridil) 200 mcg/ml infusion  0-200 mcg/min IntraVENous TITRATE    aspirin delayed-release tablet 81 mg  81 mg Oral DAILY    colestipoL (COLESTID) tablet 1 g  1 g Oral BID    gabapentin (NEURONTIN) capsule 300 mg  300 mg Oral TID    insulin glargine (LANTUS) injection 45 Units  45 Units SubCUTAneous QHS    loperamide (IMODIUM) capsule 2 mg  2 mg Oral BID PRN    venlafaxine (EFFEXOR) tablet 75 mg  75 mg Oral BID WITH MEALS    insulin lispro (HUMALOG) injection   SubCUTAneous AC&HS    glucose chewable tablet 16 g  4 Tab Oral PRN    dextrose (D50W) injection syrg 12.5-25 g  12.5-25 g IntraVENous PRN    glucagon (GLUCAGEN) injection 1 mg  1 mg IntraMUSCular PRN    carvediloL (COREG) tablet 12.5 mg  12.5 mg Oral BID WITH MEALS    atorvastatin (LIPITOR) tablet 20 mg  20 mg Oral QHS    labetaloL (NORMODYNE;TRANDATE) injection 20 mg  20 mg IntraVENous Q4H PRN       Objective:      Physical Exam:  General Appearance:  elderly  female in no acute distress  Chest:   basilar rales, decreased BS RLL  Cardiovascular:  Regular rate and rhythm, no murmur. Abdomen:   Soft, non-tender, bowel sounds are active. Extremities: no peripheral edema  Skin:  Warm and dry. Data Review:   Recent Labs     09/21/20  0043 09/20/20  1104 09/19/20  0202   WBC 18.6* 17.0* 24.4*   HGB 11.1* 11.2* 10.3*   HCT 34.2* 35.1 31.9*    344 322     Recent Labs     09/21/20  0043 09/20/20  1104 09/19/20  0202   * 136 139   K 5.1 5.0 3.9    106 107   CO2 25 30 26   * 178* 127*   BUN 48* 42* 49*   CREA 1.59* 1.52* 1.84*   CA 9.4 9.6 9.4   MG  --   --  2.4   ALB  --   --  2.9*   TBILI  --   --  0.3   ALT  --   --  39       Recent Labs     09/18/20  1635   TROIQ 1.20*         Intake/Output Summary (Last 24 hours) at 9/21/2020 1211  Last data filed at 9/21/2020 0800  Gross per 24 hour   Intake 550.31 ml   Output 1690 ml   Net -1139.69 ml        Telemetry: SR, SB, PVCs    Cxray: unchanged, gaurav pl effusions    Echo  · LV: Estimated LVEF is 25 - 30%. Moderately dilated left ventricle. Mild concentric hypertrophy. Moderate-to-severely reduced systolic function. · Pericardium: Trivial-to-small pericardial effusion. · LA: Moderately dilated left atrium. · MV: Mild to moderate mitral valve regurgitation is present. Assessment:     Principal Problem:    Bilateral pleural effusion (9/18/2020)    Active Problems:    CKD (chronic kidney disease) stage 3, GFR 30-59 ml/min ([facility]) (6/4/2019)      [facility] (9/10/2020)      CHF (congestive heart failure) ([facility] (9/18/2020)      Hypoxia (9/18/2020)      Elevated troponin (9/18/2020)        Plan:     Acute systolic HF: (EF 70-43%)  NTproBNP 3468  Diuresed 1.2 L since admission, weights inaccurate (bed weights)  With the exception of the pl effusions, does not appear volume overloaded  Additional lasix given last evening with her worsening SOB which did resolve - due to ativan or diuretics or both? CR slowly decreasing.    Continues on Coreg, ARB discontinued with rising CR  During outpt visit with Dr. Ibis Mosley 9/10/2020, patient was to stop Dilt CD and start Coreg - have adjusted medications now  Monitor I/Os, daily weights and labs. Nuclears lexiscan stress in AM to evaluate for possible ischemic CM     Elevated troponin/NSTEMI  As above, stress test  Continue on Lipitor, ASA, BB, changed IV heparin to lovenox     CKD:  Stable, as above, per hospitalist     PAF:  Remains in SR with SB and PVCs  Echo shows LAE, perhaps she is experiencing more PAF than noted  Event monitor placed last week prior to admission - no PAF noted on telemetry      Wally Griffin [facility]  Cardiology    Patient seen and examined by me with the above nurse practitioner. I personally performed all components of the history, physical, and medical decision making and agree with the assessment and plan with minor modifications as noted. Probably some COPD and likely some mild systolic HF. Will likely need daily lasix. Hopefully stress in AM if clinically stable.

## 2020-09-21 NOTE — PROGRESS NOTES
Telemedicine cross cover:  Pt w/hypoxic resp failure  - 100% on NRB  - crackles at bases  - check CXR  - lasix 20 mg IV once

## 2020-09-21 NOTE — PROGRESS NOTES
FRANCISCO PLAN--Home with home health services. Patient has been accepted by [facility] when discharged. Patient in agreement and foc completed and placed on chart. Patient transferred to room 2267 and handoff given to Britany Mantilla CM to follow. Mi Agarwal  RN BSN CRM        [phone]

## 2020-09-21 NOTE — PROGRESS NOTES
0730: blood glucose 70; dextrose given; blood glucose increased to 139.     0800: PTT sent. Heparin bag empty. Pharmacy message sent. 0915: still don't have a heparin bag. Pharmacy said they will get it up shortly. 1030: blood glucose 70 again. Dextrose given. Blood glucose increased to 111. Next PTT due at 1400. Cardiology is working on getting her scheduled for a stress test today.

## 2020-09-21 NOTE — PROGRESS NOTES
2140: Communication sent to on-call tele-hospitalist regarding desaturation of oxygen to 82%. Patient suddenly became anxious and oxygen saturation 82% on 1.5 L NC. Oxygen increased to 6L with saturations at 92%. Patient has audible wheezing and respiratory rate 30. NRB placed for comfort and oxygen needs. Awaiting orders. 2143: Post NRB, patient states, \"I feel a little bit better. \" Respiratory rate in 30's. Awaiting call back from MD.     2144: Order placed for albuterol. See [facility] RT, notified. Chris Gaviria, primary RN at bedside. 2233: Patient states her breathing has improved. Respiratory rate 28-30 with NRB in place and saturations 100%. RT at bedside to deliver albuterol treatment. RT stated she heard fine crackles in bilateral lung bases. Tele-communication sent to on-call hospitalist to request CXR. 2240: Patient states, \"I feel a lot better. \" RT placing patient on high flow. Will continue to monitor. 2243: Order placed for portable CXR and lasix 20 mg x1.

## 2020-09-21 NOTE — PROGRESS NOTES
ADULT PROTOCOL: JET AEROSOL ASSESSMENT    Patient  Marek Diggs     79 y.o.   female     9/21/2020  12:46 AM    Breath Sounds Pre Procedure: Right Breath Sounds: Crackles                               Left Breath Sounds: Crackles    Breath Sounds Post Procedure: Right Breath Sounds: Crackles                                 Left Breath Sounds: Crackles    Breathing pattern: Pre procedure Breathing Pattern: Tachypneic          Post procedure Breathing Pattern: Regular    Heart Rate: Pre procedure Pulse: 102           Post procedure Pulse: 98    Resp Rate: Pre procedure Respirations: 25           Post procedure Respirations: 20      Cough: Pre procedure Cough: Non-productive               Post procedure Cough: Non-productive    Suctioned: NO    Sputum: Pre procedure                   Post procedure      Oxygen: O2 Device: Hi flow nasal cannula, Humidifier   8L     Changed: YES    SpO2: Pre procedure SpO2: 96 %   with oxygen              Post procedure SpO2: 95 %  with oxygen    Nebulizer Therapy: Current medications Aerosolized Medications: DuoNeb      Changed: YES    Smoking History: former smoker    Problem List:   Patient Active Problem List   Diagnosis Code    Hypercholesterolemia E78.00    Arthritis M19.90    Diabetes ([facility] [address] E11.9    IBS (irritable bowel syndrome) K58.9    Hemorrhoid K64.9    Chronic pain V70.05    Neutrophilic leukocytosis I04.7    SOB (shortness of breath) R06.02    Abdominal pain, generalized R10.84    Diarrhea in adult patient R19.7    Cigarette smoker F17.210    Diverticulitis large intestine w/o perforation or abscess w/o bleeding K57.32    Hyperkalemia E87.5    Altered mental status R41.82    Transaminitis R74.0    Acute renal failure (ARF) (HCC) N17.9    Acute encephalopathy G93.40    Overdose of opiate or related narcotic, accidental or unintentional, subsequent encounter T40.601D    Acute left-sided low back pain with sciatica M54.40    Physical debility R53.81  Type 2 diabetes with nephropathy (HCC) E11.21    Type 2 diabetes mellitus with diabetic neuropathy (HCC) E11.40    Lymphocytosis D72.820    CKD (chronic kidney disease) stage 3, GFR 30-59 ml/min (HCC) N18.3    PAF (paroxysmal atrial fibrillation) (HCC) I48.0    Respiratory failure (HCC) J96.90    CHF (congestive heart failure) (HCC) I50.9    Hypoxia R09.02    Bilateral pleural effusion J90    Elevated troponin R79.89       Respiratory Therapist: Ivory Meyers, RT

## 2020-09-21 NOTE — PROGRESS NOTES
Pharmacy  Enoxaparin (Lovenox®) Monitoring      Indication: afib     Current Dose: Enoxaparin 80 mg subcutaneously every 12 hours    Creatinine Clearance (mL/min): 28    Current Weight: 79.5 Kg    Labs:  Recent Labs     09/21/20  0043 09/20/20  1104 09/19/20  0202   CREA 1.59* 1.52* 1.84*   HGB 11.1* 11.2* 10.3*    344 322     Wt Readings from Last 1 Encounters:   09/21/20 79.5 kg (175 lb 3.2 oz)     Ht Readings from Last 1 Encounters:   09/20/20 165.1 cm (65\")       Impression/Plan:   · Transitioning from heparin to lovenox, lovenox reduced to 1 mg/kg every 24 hours for CrCl < 30.        Thanks,  Perlita Gonzales, PHARMD

## 2020-09-21 NOTE — PROGRESS NOTES
TELE-HOSPITALIST CROSS COVER NOTE:    Visit Vitals  BP (!) 157/73   Pulse 99   Temp 98.9 °F (37.2 °C)   Resp 19   Ht 5' 5\" (1.651 m)   Wt 73 kg (161 lb)   SpO2 92%   BMI 26.79 kg/m²         D/W RN; medical records reviewed; DuoNebs ordered for symptomatic management per RT protocol.       Naresh Downs

## 2020-09-22 ENCOUNTER — APPOINTMENT (OUTPATIENT)
Dept: NUCLEAR MEDICINE | Age: 80
DRG: 226 | End: 2020-09-22
Attending: NURSE PRACTITIONER
Payer: MEDICARE

## 2020-09-22 ENCOUNTER — HOSPITAL ENCOUNTER (OUTPATIENT)
Dept: NON INVASIVE DIAGNOSTICS | Age: 80
Discharge: HOME | DRG: 226 | End: 2020-09-22
Attending: NURSE PRACTITIONER
Payer: MEDICARE

## 2020-09-22 LAB
ANION GAP SERPL CALC-SCNC: 2 MMOL/L (ref 5–15)
APTT PPP: 27.1 SEC (ref 22.1–32)
BUN SERPL-MCNC: 37 MG/DL (ref 6–20)
BUN/CREAT SERPL: 25 (ref 12–20)
CALCIUM SERPL-MCNC: 10.1 MG/DL (ref 8.5–10.1)
CHLORIDE SERPL-SCNC: 107 MMOL/L (ref 97–108)
CO2 SERPL-SCNC: 30 MMOL/L (ref 21–32)
CREAT SERPL-MCNC: 1.5 MG/DL (ref 0.55–1.02)
ERYTHROCYTE [DISTWIDTH] IN BLOOD BY AUTOMATED COUNT: 13.3 % (ref 11.5–14.5)
GLUCOSE BLD STRIP.AUTO-MCNC: 135 MG/DL (ref 65–100)
GLUCOSE BLD STRIP.AUTO-MCNC: 191 MG/DL (ref 65–100)
GLUCOSE BLD STRIP.AUTO-MCNC: 85 MG/DL (ref 65–100)
GLUCOSE BLD STRIP.AUTO-MCNC: 97 MG/DL (ref 65–100)
GLUCOSE SERPL-MCNC: 94 MG/DL (ref 65–100)
HCT VFR BLD AUTO: 37.6 % (ref 35–47)
HGB BLD-MCNC: 12.2 G/DL (ref 11.5–16)
MCH RBC QN AUTO: 29.8 PG (ref 26–34)
MCHC RBC AUTO-ENTMCNC: 32.4 G/DL (ref 30–36.5)
MCV RBC AUTO: 91.9 FL (ref 80–99)
NRBC # BLD: 0 K/UL (ref 0–0.01)
NRBC BLD-RTO: 0 PER 100 WBC
PLATELET # BLD AUTO: 365 K/UL (ref 150–400)
PMV BLD AUTO: 10.4 FL (ref 8.9–12.9)
POTASSIUM SERPL-SCNC: 4 MMOL/L (ref 3.5–5.1)
RBC # BLD AUTO: 4.09 M/UL (ref 3.8–5.2)
SERVICE CMNT-IMP: ABNORMAL
SERVICE CMNT-IMP: ABNORMAL
SERVICE CMNT-IMP: NORMAL
SERVICE CMNT-IMP: NORMAL
SODIUM SERPL-SCNC: 139 MMOL/L (ref 136–145)
STRESS BASELINE DIAS BP: 91 MMHG
STRESS BASELINE HR: 82 BPM
STRESS BASELINE SYS BP: 147 MMHG
STRESS ESTIMATED WORKLOAD: 1 METS
STRESS EXERCISE DUR MIN: NORMAL
STRESS O2 SAT PEAK: 98 %
STRESS O2 SAT REST: 96 %
STRESS PEAK DIAS BP: 64 MMHG
STRESS PEAK SYS BP: 150 MMHG
STRESS PERCENT HR ACHIEVED: 64 %
STRESS POST PEAK HR: 89 BPM
STRESS RATE PRESSURE PRODUCT: NORMAL BPM*MMHG
STRESS ST DEPRESSION: 0 MM
STRESS ST ELEVATION: 0 MM
STRESS TARGET HR: 139 BPM
THERAPEUTIC RANGE,PTTT: NORMAL SECS (ref 58–77)
WBC # BLD AUTO: 16.9 K/UL (ref 3.6–11)

## 2020-09-22 PROCEDURE — 74011250637 HC RX REV CODE- 250/637: Performed by: INTERNAL MEDICINE

## 2020-09-22 PROCEDURE — 85027 COMPLETE CBC AUTOMATED: CPT

## 2020-09-22 PROCEDURE — 65270000029 HC RM PRIVATE

## 2020-09-22 PROCEDURE — 74011250636 HC RX REV CODE- 250/636: Performed by: NURSE PRACTITIONER

## 2020-09-22 PROCEDURE — A9500 TC99M SESTAMIBI: HCPCS

## 2020-09-22 PROCEDURE — 74011636637 HC RX REV CODE- 636/637: Performed by: INTERNAL MEDICINE

## 2020-09-22 PROCEDURE — 85730 THROMBOPLASTIN TIME PARTIAL: CPT

## 2020-09-22 PROCEDURE — 93017 CV STRESS TEST TRACING ONLY: CPT

## 2020-09-22 PROCEDURE — 74011250637 HC RX REV CODE- 250/637: Performed by: NURSE PRACTITIONER

## 2020-09-22 PROCEDURE — 82962 GLUCOSE BLOOD TEST: CPT

## 2020-09-22 PROCEDURE — 80048 BASIC METABOLIC PNL TOTAL CA: CPT

## 2020-09-22 PROCEDURE — 36415 COLL VENOUS BLD VENIPUNCTURE: CPT

## 2020-09-22 PROCEDURE — 74011250636 HC RX REV CODE- 250/636: Performed by: INTERNAL MEDICINE

## 2020-09-22 RX ORDER — ENOXAPARIN SODIUM 100 MG/ML
40 INJECTION SUBCUTANEOUS EVERY 24 HOURS
Status: DISCONTINUED | OUTPATIENT
Start: 2020-09-22 | End: 2020-09-22 | Stop reason: DRUGHIGH

## 2020-09-22 RX ORDER — CARVEDILOL 12.5 MG/1
25 TABLET ORAL 2 TIMES DAILY WITH MEALS
Status: DISCONTINUED | OUTPATIENT
Start: 2020-09-22 | End: 2020-09-22

## 2020-09-22 RX ORDER — LOSARTAN POTASSIUM 50 MG/1
50 TABLET ORAL DAILY
Status: DISCONTINUED | OUTPATIENT
Start: 2020-09-22 | End: 2020-09-23 | Stop reason: HOSPADM

## 2020-09-22 RX ORDER — ENOXAPARIN SODIUM 100 MG/ML
30 INJECTION SUBCUTANEOUS EVERY 24 HOURS
Status: DISCONTINUED | OUTPATIENT
Start: 2020-09-22 | End: 2020-09-23 | Stop reason: HOSPADM

## 2020-09-22 RX ORDER — SODIUM CHLORIDE 0.9 % (FLUSH) 0.9 %
10 SYRINGE (ML) INJECTION AS NEEDED
Status: DISCONTINUED | OUTPATIENT
Start: 2020-09-22 | End: 2020-09-26 | Stop reason: HOSPADM

## 2020-09-22 RX ORDER — CARVEDILOL 12.5 MG/1
12.5 TABLET ORAL 2 TIMES DAILY WITH MEALS
Status: DISCONTINUED | OUTPATIENT
Start: 2020-09-22 | End: 2020-09-23 | Stop reason: HOSPADM

## 2020-09-22 RX ADMIN — GABAPENTIN 300 MG: 300 CAPSULE ORAL at 09:02

## 2020-09-22 RX ADMIN — ATORVASTATIN CALCIUM 20 MG: 20 TABLET, FILM COATED ORAL at 23:29

## 2020-09-22 RX ADMIN — LOSARTAN POTASSIUM 50 MG: 50 TABLET ORAL at 11:50

## 2020-09-22 RX ADMIN — COLESTIPOL HYDROCHLORIDE 1 G: 1 TABLET, FILM COATED ORAL at 09:02

## 2020-09-22 RX ADMIN — GABAPENTIN 300 MG: 300 CAPSULE ORAL at 16:20

## 2020-09-22 RX ADMIN — REGADENOSON 0.4 MG: 0.08 INJECTION, SOLUTION INTRAVENOUS at 12:28

## 2020-09-22 RX ADMIN — INSULIN GLARGINE 45 UNITS: 100 INJECTION, SOLUTION SUBCUTANEOUS at 23:29

## 2020-09-22 RX ADMIN — Medication 10 ML: at 12:28

## 2020-09-22 RX ADMIN — ENOXAPARIN SODIUM 30 MG: 40 INJECTION SUBCUTANEOUS at 17:45

## 2020-09-22 RX ADMIN — VENLAFAXINE 75 MG: 37.5 TABLET ORAL at 16:19

## 2020-09-22 RX ADMIN — GABAPENTIN 300 MG: 300 CAPSULE ORAL at 23:29

## 2020-09-22 RX ADMIN — COLESTIPOL HYDROCHLORIDE 1 G: 1 TABLET, FILM COATED ORAL at 17:45

## 2020-09-22 RX ADMIN — FUROSEMIDE 40 MG: 40 TABLET ORAL at 09:02

## 2020-09-22 RX ADMIN — INSULIN LISPRO 2 UNITS: 100 INJECTION, SOLUTION INTRAVENOUS; SUBCUTANEOUS at 16:30

## 2020-09-22 RX ADMIN — VENLAFAXINE 75 MG: 37.5 TABLET ORAL at 09:02

## 2020-09-22 NOTE — PROGRESS NOTES
Spiritual Care Assessment/Progress Note  [facility]      NAME: Tamra Avery      MRN: [id number]  AGE: 79 y.o.  SEX: female  Jew Affiliation: Temple   Language: English     9/22/2020     Total Time (in minutes): 16     Spiritual Assessment begun in [facility] PROGRESSIVE CARE through conversation with:         [x]Patient        [] Family    [] Friend(s)        Reason for Consult: Initial/Spiritual assessment, patient floor     Spiritual beliefs: (Please include comment if needed)     [x] Identifies with a mallorie tradition:    Ector Escalera     [] Supported by a mallorie community:            [] Claims no spiritual orientation:           [] Seeking spiritual identity:                [] Adheres to an individual form of spirituality:           [] Not able to assess:                           Identified resources for coping:      [x] Prayer                               [] Music                  [] Guided Imagery     [x] Family/friends                 [] Pet visits     [] Devotional reading                         [] Unknown     [] Other:                                             Interventions offered during this visit: (See comments for more details)    Patient Interventions: Affirmation of emotions/emotional suffering, Affirmation of mallorie, Catharsis/review of pertinent events in supportive environment, Iconic (affirming the presence of God/Higher Power), Initial/Spiritual assessment, patient floor, Prayer (assurance of), Jew beliefs/image of God discussed           Plan of Care:     [x] Support spiritual and/or cultural needs    [] Support AMD and/or advance care planning process      [] Support grieving process   [] Coordinate Rites and/or Rituals    [] Coordination with community clergy   [x] No spiritual needs identified at this time   [] Detailed Plan of Care below (See Comments)  [] Make referral to Music Therapy  [] Make referral to Pet Therapy     [] Make referral to Addiction services  [] Make referral to [facility]  [] Make referral to Spiritual Care Partner  [] No future visits requested        [x] Follow up visits as needed     Comments:   Initial visit on PCU for spiritual assessment. No family/friends present. Patient appeared to be in good spirits and was welcoming of visit. Provided bedside presence and supportive listening as patient shared her current medical challenges and possible options she may have. She has good support from her family. Day is an important means of coping for Ms. [email or web address]. She shared she talks to the [place] all the time and expressed that it is God who sees her through life's ups and downs. She shared her life has been filled with blessings-she recently welcomed her first great-grandchild. She expressed hope for a more empathic, compassionate world. Acknowledged and validated her hope. She was receptive to assurance of prayer.   Advised her of ongoing availability of pastoral support     SANTOS Bear, [facility], Staff [address] [place] [address] Paging Service  [phone]

## 2020-09-22 NOTE — PROGRESS NOTES
[address] [place] [address]  [phone]      Cardiology Progress Note      9/22/2020 10:00PM    Admit Date: 9/18/2020    Admit Diagnosis:   CHF (congestive heart failure) ([address] [I50.9]  Hypoxia [R09.02]    Subjective:   Dayana Calloway has no c/o CP, SOB better.   Completed nuclear lexiscan stress test.  She continue to had bradycardia at 50-60bpm    Visit Vitals  /66 (BP 1 Location: Right arm, BP Patient Position: At rest)   Pulse (!) 53   Temp 98.5 °F (36.9 °C)   Resp 18   Ht 5' 5\" (1.651 m)   Wt 154 lb 1.6 oz (69.9 kg)   SpO2 95%   BMI 25.64 kg/m²       Current Facility-Administered Medications   Medication Dose Route Frequency    losartan (COZAAR) tablet 50 mg  50 mg Oral DAILY    carvediloL (COREG) tablet 12.5 mg  12.5 mg Oral BID WITH MEALS    furosemide (LASIX) tablet 40 mg  40 mg Oral DAILY    albuterol-ipratropium (DUO-NEB) 2.5 MG-0.5 MG/3 ML  3 mL Nebulization Q4H PRN    aspirin delayed-release tablet 81 mg  81 mg Oral DAILY    colestipoL (COLESTID) tablet 1 g  1 g Oral BID    gabapentin (NEURONTIN) capsule 300 mg  300 mg Oral TID    insulin glargine (LANTUS) injection 45 Units  45 Units SubCUTAneous QHS    loperamide (IMODIUM) capsule 2 mg  2 mg Oral BID PRN    venlafaxine (EFFEXOR) tablet 75 mg  75 mg Oral BID WITH MEALS    insulin lispro (HUMALOG) injection   SubCUTAneous AC&HS    glucose chewable tablet 16 g  4 Tab Oral PRN    dextrose (D50W) injection syrg 12.5-25 g  12.5-25 g IntraVENous PRN    glucagon (GLUCAGEN) injection 1 mg  1 mg IntraMUSCular PRN    atorvastatin (LIPITOR) tablet 20 mg  20 mg Oral QHS    labetaloL (NORMODYNE;TRANDATE) injection 20 mg  20 mg IntraVENous Q4H PRN     Facility-Administered Medications Ordered in Other Encounters   Medication Dose Route Frequency    sodium chloride (NS) flush 10 mL  10 mL IntraVENous PRN       Objective:      Physical Exam:  General Appearance:  elderly  female in no acute distress  Chest:   basilar rales improved decreased BS RLL  Cardiovascular:  Regular rate and rhythm, no murmur. Abdomen:   Soft, non-tender, bowel sounds are active. Extremities: no peripheral edema  Skin:  Warm and dry. Data Review:   Recent Labs     09/22/20  0400 09/21/20  0043 09/20/20  1104   WBC 16.9* 18.6* 17.0*   HGB 12.2 11.1* 11.2*   HCT 37.6 34.2* 35.1    337 344     Recent Labs     09/22/20  0400 09/21/20  0043 09/20/20  1104    135* 136   K 4.0 5.1 5.0    104 106   CO2 30 25 30   GLU 94 199* 178*   BUN 37* 48* 42*   CREA 1.50* 1.59* 1.52*   CA 10.1 9.4 9.6       No results for input(s): TROIQ, CPK, CKMB in the last 72 hours. Intake/Output Summary (Last 24 hours) at 9/22/2020 1435  Last data filed at 9/21/2020 2306  Gross per 24 hour   Intake 250 ml   Output 200 ml   Net 50 ml        Telemetry: SR, SB, PVCs    Echo  · LV: Estimated LVEF is 25 - 30%. Moderately dilated left ventricle. Mild concentric hypertrophy. Moderate-to-severely reduced systolic function. · Pericardium: Trivial-to-small pericardial effusion. · LA: Moderately dilated left atrium. · MV: Mild to moderate mitral valve regurgitation is present. Assessment:     Principal Problem:    Bilateral pleural effusion (9/18/2020)    Active Problems:    CKD (chronic kidney disease) stage 3, GFR 30-59 ml/min ([facility]) (6/4/2019)      PAF (paroxysmal atrial fibrillation) ([facility] (9/10/2020)      CHF (congestive heart failure) ([facility] (9/18/2020)      Hypoxia (9/18/2020)      Elevated troponin (9/18/2020)        Plan:     Acute systolic HF: (EF 77-01%)  NTproBNP 3468  Diuresed 1 L since admission, weights inaccurate, trending down. Started on lasix 40mg daily yesterday, output low  CR slowly decreasing. Continues on Coreg, ARB discontinued with rising CR  Monitor I/Os, daily weights and labs.    Nuclears lexiscan stress today - still waiting for final results  Dr. Keyla Velasquez discussed with patient AICD - she has been on BB, ARB for 3months, low EF identified on 7/28/2020 stress test.  The patient is willing to discuss with Dr. Mely Hart.       Elevated troponin/NSTEMI  As above, stress test  Continue on Lipitor, ASA, BB, lovenox     CKD:  Stable, as above, per hospitalist     PAF:  Remains in [place] REUBEN Issa with SB and PVCs  Echo shows LAE, perhaps she is experiencing more PAF than noted      [facility]  Cardiology    Patient seen and examined by me with the above nurse practitioner. I personally performed all components of the history, physical, and medical decision making and agree with the assessment and plan with minor modifications as noted. Stress test shows inferior infarct without significant ischemia. Doing better after diuresis. Will need increased dose at DC. She is on maximal tolerated guideline directed medical therapy for heart failure being on ARB for greater than 3 months and being intolerant of BB (carvedilol), feeling much worse with it and today we saw borderline bradycardia while awake on telemetry, concerning for SSS. Consult Dr. Mely Hart to consider ICD.

## 2020-09-22 NOTE — PROGRESS NOTES
Problem: Risk for Spread of Infection  Goal: Prevent transmission of infectious organism to others  Description: Prevent the transmission of infectious organisms to other patients, staff members, and visitors. Outcome: Progressing Towards Goal     Problem: Patient Education:  Go to Education Activity  Goal: Patient/Family Education  Outcome: Progressing Towards Goal     Problem: Falls - Risk of  Goal: *Absence of Falls  Description: Document Rochele Amarilys Fall Risk and appropriate interventions in the flowsheet. Outcome: Progressing Towards Goal  Note: Fall Risk Interventions:  Mobility Interventions: Communicate number of staff needed for ambulation/transfer, Patient to call before getting OOB, Strengthening exercises (ROM-active/passive)         Medication Interventions: Teach patient to arise slowly, Patient to call before getting OOB, Assess postural VS orthostatic hypotension, Evaluate medications/consider consulting pharmacy    Elimination Interventions: Call light in reach, Patient to call for help with toileting needs              Problem: Patient Education: Go to Patient Education Activity  Goal: Patient/Family Education  Outcome: Progressing Towards Goal     Problem: Breathing Pattern - Ineffective  Goal: *Absence of hypoxia  Outcome: Progressing Towards Goal  Goal: *Use of effective breathing techniques  Outcome: Progressing Towards Goal  Goal: *PALLIATIVE CARE:  Alleviation of Dyspnea  Outcome: Progressing Towards Goal     Problem: Patient Education: Go to Patient Education Activity  Goal: Patient/Family Education  Outcome: Progressing Towards Goal     Problem: Pressure Injury - Risk of  Goal: *Prevention of pressure injury  Description: Document Bryan Scale and appropriate interventions in the flowsheet. Outcome: Progressing Towards Goal  Note: Pressure Injury Interventions:  Sensory Interventions: Minimize linen layers, Maintain/enhance activity level, Turn and reposition approx.  every two hours (pillows and wedges if needed), Keep linens dry and wrinkle-free    Moisture Interventions: Absorbent underpads, Apply protective barrier, creams and emollients, Minimize layers    Activity Interventions: Increase time out of bed, PT/OT evaluation    Mobility Interventions: PT/OT evaluation, HOB 30 degrees or less, Assess need for specialty bed, Turn and reposition approx.  every two hours(pillow and wedges)    Nutrition Interventions: Document food/fluid/supplement intake, Offer support with meals,snacks and hydration    Friction and Shear Interventions: Apply protective barrier, creams and emollients, Minimize layers, Transferring/repositioning devices                Problem: Patient Education: Go to Patient Education Activity  Goal: Patient/Family Education  Outcome: Progressing Towards Goal     Problem: Patient Education: Go to Patient Education Activity  Goal: Patient/Family Education  Outcome: Progressing Towards Goal     Problem: Heart Failure: Day 1  Goal: Off Pathway (Use only if patient is Off Pathway)  Outcome: Progressing Towards Goal  Goal: Activity/Safety  Outcome: Progressing Towards Goal  Goal: Consults, if ordered  Outcome: Progressing Towards Goal  Goal: Diagnostic Test/Procedures  Outcome: Progressing Towards Goal  Goal: Nutrition/Diet  Outcome: Progressing Towards Goal  Goal: Discharge Planning  Outcome: Progressing Towards Goal  Goal: Medications  Outcome: Progressing Towards Goal  Goal: Respiratory  Outcome: Progressing Towards Goal  Goal: Treatments/Interventions/Procedures  Outcome: Progressing Towards Goal  Goal: Psychosocial  Outcome: Progressing Towards Goal  Goal: *Oxygen saturation within defined limits  Outcome: Progressing Towards Goal  Goal: *Hemodynamically stable  Outcome: Progressing Towards Goal  Goal: *Optimal pain control at patient's stated goal  Outcome: Progressing Towards Goal  Goal: *Anxiety reduced or absent  Outcome: Progressing Towards Goal     Problem: Heart Failure: Day 2  Goal: Off Pathway (Use only if patient is Off Pathway)  Outcome: Progressing Towards Goal  Goal: Activity/Safety  Outcome: Progressing Towards Goal  Goal: Consults, if ordered  Outcome: Progressing Towards Goal  Goal: Diagnostic Test/Procedures  Outcome: Progressing Towards Goal  Goal: Nutrition/Diet  Outcome: Progressing Towards Goal  Goal: Discharge Planning  Outcome: Progressing Towards Goal  Goal: Medications  Outcome: Progressing Towards Goal  Goal: Respiratory  Outcome: Progressing Towards Goal  Goal: Treatments/Interventions/Procedures  Outcome: Progressing Towards Goal  Goal: Psychosocial  Outcome: Progressing Towards Goal  Goal: *Oxygen saturation within defined limits  Outcome: Progressing Towards Goal  Goal: *Hemodynamically stable  Outcome: Progressing Towards Goal  Goal: *Optimal pain control at patient's stated goal  Outcome: Progressing Towards Goal  Goal: *Anxiety reduced or absent  Outcome: Progressing Towards Goal  Goal: *Demonstrates progressive activity  Outcome: Progressing Towards Goal     Problem: Heart Failure: Day 3  Goal: Off Pathway (Use only if patient is Off Pathway)  Outcome: Progressing Towards Goal  Goal: Activity/Safety  Outcome: Progressing Towards Goal  Goal: Diagnostic Test/Procedures  Outcome: Progressing Towards Goal  Goal: Nutrition/Diet  Outcome: Progressing Towards Goal  Goal: Discharge Planning  Outcome: Progressing Towards Goal  Goal: Medications  Outcome: Progressing Towards Goal  Goal: Respiratory  Outcome: Progressing Towards Goal  Goal: Treatments/Interventions/Procedures  Outcome: Progressing Towards Goal  Goal: Psychosocial  Outcome: Progressing Towards Goal  Goal: *Oxygen saturation within defined limits  Outcome: Progressing Towards Goal  Goal: *Hemodynamically stable  Outcome: Progressing Towards Goal  Goal: *Optimal pain control at patient's stated goal  Outcome: Progressing Towards Goal  Goal: *Anxiety reduced or absent  Outcome: Progressing Towards Goal  Goal: *Demonstrates progressive activity  Outcome: Progressing Towards Goal     Problem: Heart Failure: Day 4  Goal: Off Pathway (Use only if patient is Off Pathway)  Outcome: Progressing Towards Goal  Goal: Activity/Safety  Outcome: Progressing Towards Goal  Goal: Diagnostic Test/Procedures  Outcome: Progressing Towards Goal  Goal: Nutrition/Diet  Outcome: Progressing Towards Goal  Goal: Discharge Planning  Outcome: Progressing Towards Goal  Goal: Medications  Outcome: Progressing Towards Goal  Goal: Respiratory  Outcome: Progressing Towards Goal  Goal: Treatments/Interventions/Procedures  Outcome: Progressing Towards Goal  Goal: Psychosocial  Outcome: Progressing Towards Goal  Goal: *Oxygen saturation within defined limits  Outcome: Progressing Towards Goal  Goal: *Hemodynamically stable  Outcome: Progressing Towards Goal  Goal: *Optimal pain control at patient's stated goal  Outcome: Progressing Towards Goal  Goal: *Anxiety reduced or absent  Outcome: Progressing Towards Goal  Goal: *Demonstrates progressive activity  Outcome: Progressing Towards Goal     Problem: Heart Failure: Day 5  Goal: Off Pathway (Use only if patient is Off Pathway)  Outcome: Progressing Towards Goal  Goal: Activity/Safety  Outcome: Progressing Towards Goal  Goal: Diagnostic Test/Procedures  Outcome: Progressing Towards Goal  Goal: Nutrition/Diet  Outcome: Progressing Towards Goal  Goal: Discharge Planning  Outcome: Progressing Towards Goal  Goal: Medications  Outcome: Progressing Towards Goal  Goal: Respiratory  Outcome: Progressing Towards Goal  Goal: Treatments/Interventions/Procedures  Outcome: Progressing Towards Goal  Goal: Psychosocial  Outcome: Progressing Towards Goal     Problem: Heart Failure: Discharge Outcomes  Goal: *Demonstrates ability to perform prescribed activity without shortness of breath or discomfort  Outcome: Progressing Towards Goal  Goal: *Left ventricular function assessment completed prior to or during stay, or planned for post-discharge  Outcome: Progressing Towards Goal  Goal: *ACEI prescribed if LVEF less than 40% and no contraindications or ARB prescribed  Outcome: Progressing Towards Goal  Goal: *Verbalizes understanding and describes prescribed diet  Outcome: Progressing Towards Goal  Goal: *Verbalizes understanding/describes prescribed medications  Outcome: Progressing Towards Goal  Goal: *Describes available resources and support systems  Description: (eg: Home Health, Palliative Care, Advanced Medical Directive)  Outcome: Progressing Towards Goal  Goal: *Describes smoking cessation resources  Outcome: Progressing Towards Goal  Goal: *Understands and describes signs and symptoms to report to providers(Stroke Metric)  Outcome: Progressing Towards Goal  Goal: *Describes/verbalizes understanding of follow-up/return appt  Description: (eg: to physicians, diabetes treatment coordinator, and other resources  Outcome: Progressing Towards Goal  Goal: *Describes importance of continuing daily weights and changes to report to physician  Outcome: Progressing Towards Goal

## 2020-09-22 NOTE — PROGRESS NOTES
Bedside shift change report given to Bradley Loaiza RN (oncoming nurse) by Dixie Tucker (offgoing nurse). Report included the following information SBAR, Intake/Output, MAR, Recent Results and Cardiac Rhythm NSR.    0658: Pt heart rate dropped down to the 30s and came right back up. Pt BP normal and asymptomatic. Bedside shift change report given to Bradley Loaiza RN (oncoming nurse) by Stiven Fernandes RN/HO Keys (offgoing nurse). Report included the following information SBAR, Intake/Output, MAR, Recent Results and Cardiac Rhythm NSR/SB.

## 2020-09-22 NOTE — PROGRESS NOTES
Adjusted lovenox 40 mg SQ every 24 hrs to lovenox 30 mg SQ every 24 hours per DVT prophylaxis protocol. Patient's ideal CrCl is 26.5 ml/min and qualifies for 30 mg dosing. Normal weight patient.

## 2020-09-22 NOTE — PROGRESS NOTES
I have reviewed and agree with documentation provided this shift by [facility] RN, orientee.

## 2020-09-22 NOTE — PROGRESS NOTES
0700 shift change report given to Tonio (oncoming nurse) by Felix Rios (offgoing nurse). Report included the following information SBAR, Kardex and ED Summary. Pt hr noted to be in the 30s during shift change with SOB. Pt hr did return to the 60s. Cardiology paged. 0930 pt off the unit for stress test    1042 attending paged regarding pt hr this am due to increase in coreg order. Pt returned to unit. 1058 per rounds with attending coreg will not be increased due to hr.     1100 pt off the floor for stress test    1420 pt returned to floor. 1640 pt has lovenox ordered. Pt has possible placement of defibrillator tomorrow. Attending ordered to give scheduled lovenox at 1700 but not after midnight.

## 2020-09-22 NOTE — PROGRESS NOTES
Hospitalist Progress Note    NAME: Emily Field   :  1940   MRN:  [id number]       Assessment / Plan:    Acute respiratory failure with hypoxia most likely secondary to acute on chronic systolic CHF POA  Hypertensive emergency  Covid ruled out   Elevated troponin:? NSTEMI   admited to PCU,  2D echo   · LV: Estimated LVEF is 25 - 30%. Moderately dilated left ventricle. Mild concentric hypertrophy. Moderate-to-severely reduced systolic function. · Pericardium: Trivial-to-small pericardial effusion. · LA: Moderately dilated left atrium. · MV: Mild to moderate mitral valve regurgitation is present. Patient received IV Lasix at [facility]  Due to the elevated troponin, patient was given heparin 4000 unit and started on heparin drip then changed to Lovenox on   Troponin <0.05-->2.04-->1.20  Cardiology input appreciated. Stress test results pending  Patient being considered for AICD  Continue with beta-blocker, aspirin and statin  off nitro drip  COVID test negative  Chest pain free  Stress test  On   · FINDINGS: The rest and stress perfusion images a fixed defect in the inferior wall compatible with infarct. No reversible abnormality is seen to suggest myocardium at ischemic risk. The gated images demonstrate global hypokinesia and inferior akinesia. Left ventricular ejection fraction is 31 %. Impression: Fixed defect in the inferior wall is compatible with infarct. No evidence of myocardium at ischemic risk. Left ventricular ejection fraction is 31 %. Global hypokinesia and inferior akinesia.        Leucocytosis POA.  Seems to be chronic  Wbc 23k on admission , seems to be chronic after review of previous cbc.trending down   Pt has no fever , no signs of infection  Chest xray clear   Pending UA blood cx   procalcitonin 0.10  Pt reported that she always have elevated wbc and work up has been done in the past and nothing was found   Cont holding  off on abx for now  Still not showing any signs of active infection other than leukocytosis which might be reactive plus chronic     Hyperglycemia in setting of diabetes mellitus  Diabetic neuropathy  BS in the 300's  Continue with home dose Lantus 45 units with correction scale, adjust prn   Continue with Neurontin  Hold metformin , hold glyburide     Worsening CKD stage III  Cr is improved  Resume ARB     Depression  Continue with Effexor    25.0 - 29.9 Overweight / Body mass index is 25.64 kg/m². Code status: DNR  Prophylaxis: Heparin  Recommended Disposition: Home w/Family     Subjective:     Chief Complaint / Reason for Physician Visit  Discussed with RN events overnight. Patient was seen and examined. No acute events overnight. \" I am feeling great, no chest pain or shortness of breath\"    Review of Systems:  Symptom Y/N Comments  Symptom Y/N Comments   Fever/Chills n   Chest Pain n    Poor Appetite    Edema     Cough n   Abdominal Pain n    Sputum    Joint Pain     SOB/STANTON n   Pruritis/Rash     Nausea/vomit n   Tolerating PT/OT     Diarrhea    Tolerating Diet y    Constipation    Other       Could NOT obtain due to:          Objective:     VITALS:   Last 24hrs VS reviewed since prior progress note. Most recent are:  Patient Vitals for the past 24 hrs:   Temp Pulse Resp BP SpO2   09/22/20 1131 98.5 °F (36.9 °C) (!) 53 18 127/66 95 %   09/22/20 0756 98.2 °F (36.8 °C) 73 17 (!) 150/64 95 %   09/22/20 0418 97.9 °F (36.6 °C) 70 16 (!) 153/72 97 %   09/21/20 2306 98.4 °F (36.9 °C) 85 16 (!) 142/86 92 %   09/1940 98.4 °F (36.9 °C) 93 18 (!) 158/85 93 %   09/21/20 1908  90      09/21/20 1650 98 °F (36.7 °C) 82 18 (!) 164/56 96 %       Intake/Output Summary (Last 24 hours) at 9/22/2020 1525  Last data filed at 9/21/2020 2306  Gross per 24 hour   Intake 250 ml   Output 200 ml   Net 50 ml        PHYSICAL EXAM:  General: WD, WN. Alert, cooperative, no acute distress    EENT:  EOMI. Anicteric sclerae.  MMM  Resp:  CTA bilaterally, no wheezing or rales. No accessory muscle use  CV:  Regular  rhythm,  No edema  GI:  Soft, Non distended, Non tender.  +Bowel sounds  Neurologic:  Alert and oriented X 3, normal speech,   Psych:   Good insight. Not anxious nor agitated  Skin:  No rashes. No jaundice    Reviewed most current lab test results and cultures  YES  Reviewed most current radiology test results   YES  Review and summation of old records today    NO  Reviewed patient's current orders and MAR    YES  PMH/SH reviewed - no change compared to H&P  ________________________________________________________________________  Care Plan discussed with:    Comments   Patient x    Family      RN x    Care Manager x    Consultant                       x Multidiciplinary team rounds were held today with , nursing, pharmacist and clinical coordinator. Patient's plan of care was discussed; medications were reviewed and discharge planning was addressed. ________________________________________________________________________        Comments   >50% of visit spent in counseling and coordination of care     ________________________________________________________________________  Malathi Masters MD     Procedures: see electronic medical records for all procedures/Xrays and details which were not copied into this note but were reviewed prior to creation of Plan. LABS:  I reviewed today's most current labs and imaging studies.   Pertinent labs include:  Recent Labs     09/22/20  0400 09/21/20  0043 09/20/20  1104   WBC 16.9* 18.6* 17.0*   HGB 12.2 11.1* 11.2*   HCT 37.6 34.2* 35.1    337 344     Recent Labs     09/22/20  0400 09/21/20  0043 09/20/20  1104    135* 136   K 4.0 5.1 5.0    104 106   CO2 30 25 30   GLU 94 199* 178*   BUN 37* 48* 42*   CREA 1.50* 1.59* 1.52*   CA 10.1 9.4 9.6       Signed: Malathi Masters MD

## 2020-09-23 ENCOUNTER — APPOINTMENT (OUTPATIENT)
Dept: GENERAL RADIOLOGY | Age: 80
DRG: 226 | End: 2020-09-23
Attending: INTERNAL MEDICINE
Payer: MEDICARE

## 2020-09-23 VITALS
OXYGEN SATURATION: 95 % | HEIGHT: 65 IN | RESPIRATION RATE: 18 BRPM | SYSTOLIC BLOOD PRESSURE: 138 MMHG | BODY MASS INDEX: 25.64 KG/M2 | WEIGHT: 153.88 LBS | TEMPERATURE: 98.1 F | HEART RATE: 93 BPM | DIASTOLIC BLOOD PRESSURE: 89 MMHG

## 2020-09-23 PROBLEM — I49.5 SSS (SICK SINUS SYNDROME) (HCC): Chronic | Status: ACTIVE | Noted: 2020-09-23

## 2020-09-23 PROBLEM — I42.9 CARDIOMYOPATHY (HCC): Status: ACTIVE | Noted: 2020-09-23

## 2020-09-23 PROBLEM — I42.0 DILATED CARDIOMYOPATHY (HCC): Chronic | Status: ACTIVE | Noted: 2020-09-23

## 2020-09-23 PROBLEM — Z95.810 AICD (AUTOMATIC CARDIOVERTER/DEFIBRILLATOR) PRESENT: Status: ACTIVE | Noted: 2020-09-23

## 2020-09-23 LAB
ANION GAP SERPL CALC-SCNC: 6 MMOL/L (ref 5–15)
APTT PPP: 28.1 SEC (ref 22.1–32)
BACTERIA SPEC CULT: NORMAL
BNP SERPL-MCNC: 7832 PG/ML
BUN SERPL-MCNC: 38 MG/DL (ref 6–20)
BUN/CREAT SERPL: 25 (ref 12–20)
CALCIUM SERPL-MCNC: 10.3 MG/DL (ref 8.5–10.1)
CHLORIDE SERPL-SCNC: 106 MMOL/L (ref 97–108)
CO2 SERPL-SCNC: 27 MMOL/L (ref 21–32)
CREAT SERPL-MCNC: 1.52 MG/DL (ref 0.55–1.02)
ERYTHROCYTE [DISTWIDTH] IN BLOOD BY AUTOMATED COUNT: 13.3 % (ref 11.5–14.5)
GLUCOSE BLD STRIP.AUTO-MCNC: 108 MG/DL (ref 65–100)
GLUCOSE BLD STRIP.AUTO-MCNC: 117 MG/DL (ref 65–100)
GLUCOSE BLD STRIP.AUTO-MCNC: 127 MG/DL (ref 65–100)
GLUCOSE BLD STRIP.AUTO-MCNC: 138 MG/DL (ref 65–100)
GLUCOSE SERPL-MCNC: 113 MG/DL (ref 65–100)
HCT VFR BLD AUTO: 37.1 % (ref 35–47)
HGB BLD-MCNC: 11.9 G/DL (ref 11.5–16)
MCH RBC QN AUTO: 29.5 PG (ref 26–34)
MCHC RBC AUTO-ENTMCNC: 32.1 G/DL (ref 30–36.5)
MCV RBC AUTO: 91.8 FL (ref 80–99)
NRBC # BLD: 0 K/UL (ref 0–0.01)
NRBC BLD-RTO: 0 PER 100 WBC
PLATELET # BLD AUTO: 382 K/UL (ref 150–400)
PMV BLD AUTO: 10.5 FL (ref 8.9–12.9)
POTASSIUM SERPL-SCNC: 4.5 MMOL/L (ref 3.5–5.1)
RBC # BLD AUTO: 4.04 M/UL (ref 3.8–5.2)
SERVICE CMNT-IMP: ABNORMAL
SERVICE CMNT-IMP: NORMAL
SODIUM SERPL-SCNC: 139 MMOL/L (ref 136–145)
THERAPEUTIC RANGE,PTTT: NORMAL SECS (ref 58–77)
WBC # BLD AUTO: 14.4 K/UL (ref 3.6–11)

## 2020-09-23 PROCEDURE — 74011250637 HC RX REV CODE- 250/637: Performed by: INTERNAL MEDICINE

## 2020-09-23 PROCEDURE — C1721 AICD, DUAL CHAMBER: HCPCS | Performed by: INTERNAL MEDICINE

## 2020-09-23 PROCEDURE — 83880 ASSAY OF NATRIURETIC PEPTIDE: CPT

## 2020-09-23 PROCEDURE — 2709999900 HC NON-CHARGEABLE SUPPLY: Performed by: INTERNAL MEDICINE

## 2020-09-23 PROCEDURE — 99223 1ST HOSP IP/OBS HIGH 75: CPT | Performed by: INTERNAL MEDICINE

## 2020-09-23 PROCEDURE — C1898 LEAD, PMKR, OTHER THAN TRANS: HCPCS | Performed by: INTERNAL MEDICINE

## 2020-09-23 PROCEDURE — 0JH608Z INSERTION OF DEFIBRILLATOR GENERATOR INTO CHEST SUBCUTANEOUS TISSUE AND FASCIA, OPEN APPROACH: ICD-10-PCS | Performed by: INTERNAL MEDICINE

## 2020-09-23 PROCEDURE — 74011250636 HC RX REV CODE- 250/636: Performed by: INTERNAL MEDICINE

## 2020-09-23 PROCEDURE — 74011250637 HC RX REV CODE- 250/637: Performed by: NURSE PRACTITIONER

## 2020-09-23 PROCEDURE — 77030002996 HC SUT SLK J&J -A: Performed by: INTERNAL MEDICINE

## 2020-09-23 PROCEDURE — 02H63KZ INSERTION OF DEFIBRILLATOR LEAD INTO RIGHT ATRIUM, PERCUTANEOUS APPROACH: ICD-10-PCS | Performed by: INTERNAL MEDICINE

## 2020-09-23 PROCEDURE — 33249 INSJ/RPLCMT DEFIB W/LEAD(S): CPT | Performed by: INTERNAL MEDICINE

## 2020-09-23 PROCEDURE — 82962 GLUCOSE BLOOD TEST: CPT

## 2020-09-23 PROCEDURE — 85027 COMPLETE CBC AUTOMATED: CPT

## 2020-09-23 PROCEDURE — 77030028698 HC BLD TISS PLSM MEDT -D: Performed by: INTERNAL MEDICINE

## 2020-09-23 PROCEDURE — C1893 INTRO/SHEATH, FIXED,NON-PEEL: HCPCS | Performed by: INTERNAL MEDICINE

## 2020-09-23 PROCEDURE — 99152 MOD SED SAME PHYS/QHP 5/>YRS: CPT | Performed by: INTERNAL MEDICINE

## 2020-09-23 PROCEDURE — 77030018729 HC ELECTRD DEFIB PAD CARD -B: Performed by: INTERNAL MEDICINE

## 2020-09-23 PROCEDURE — 77030037875 HC DRSG MEPILEX <16IN BORD MOLN -A: Performed by: INTERNAL MEDICINE

## 2020-09-23 PROCEDURE — 02HK3KZ INSERTION OF DEFIBRILLATOR LEAD INTO RIGHT VENTRICLE, PERCUTANEOUS APPROACH: ICD-10-PCS | Performed by: INTERNAL MEDICINE

## 2020-09-23 PROCEDURE — 36415 COLL VENOUS BLD VENIPUNCTURE: CPT

## 2020-09-23 PROCEDURE — 77030031139 HC SUT VCRL2 J&J -A: Performed by: INTERNAL MEDICINE

## 2020-09-23 PROCEDURE — 80048 BASIC METABOLIC PNL TOTAL CA: CPT

## 2020-09-23 PROCEDURE — 71045 X-RAY EXAM CHEST 1 VIEW: CPT

## 2020-09-23 PROCEDURE — C1777 LEAD, AICD, ENDO SINGLE COIL: HCPCS | Performed by: INTERNAL MEDICINE

## 2020-09-23 PROCEDURE — 74011000272 HC RX REV CODE- 272: Performed by: INTERNAL MEDICINE

## 2020-09-23 PROCEDURE — 85730 THROMBOPLASTIN TIME PARTIAL: CPT

## 2020-09-23 PROCEDURE — C1894 INTRO/SHEATH, NON-LASER: HCPCS | Performed by: INTERNAL MEDICINE

## 2020-09-23 PROCEDURE — 74011000250 HC RX REV CODE- 250: Performed by: INTERNAL MEDICINE

## 2020-09-23 PROCEDURE — 99153 MOD SED SAME PHYS/QHP EA: CPT | Performed by: INTERNAL MEDICINE

## 2020-09-23 PROCEDURE — 77030037713 HC CLOSR DEV INCIS ZIP STRY -B: Performed by: INTERNAL MEDICINE

## 2020-09-23 PROCEDURE — 2709999900 HC NON-CHARGEABLE SUPPLY

## 2020-09-23 DEVICE — LEAD DEFIB 64CM MODEL 0273 -- ENDOTAK RELIANCE S: Type: IMPLANTABLE DEVICE | Status: FUNCTIONAL

## 2020-09-23 DEVICE — IMPLANTABLE CARDIOVERTER DEFIBRILLATOR DR
Type: IMPLANTABLE DEVICE | Status: FUNCTIONAL
Brand: VIGILANT™ EL ICD DR

## 2020-09-23 DEVICE — ENDOCARDIAL STEROID-ELUTING MR CONDITIONAL STYLET DELIVERED PACE/SENSE LEAD
Type: IMPLANTABLE DEVICE | Status: FUNCTIONAL
Brand: INGEVITY™ MRI

## 2020-09-23 RX ORDER — ATORVASTATIN CALCIUM 20 MG/1
20 TABLET, FILM COATED ORAL
Qty: 30 TAB | Refills: 2 | Status: SHIPPED | OUTPATIENT
Start: 2020-09-23 | End: 2021-04-03

## 2020-09-23 RX ORDER — FENTANYL CITRATE 50 UG/ML
INJECTION, SOLUTION INTRAMUSCULAR; INTRAVENOUS AS NEEDED
Status: DISCONTINUED | OUTPATIENT
Start: 2020-09-23 | End: 2020-09-23 | Stop reason: HOSPADM

## 2020-09-23 RX ORDER — NALOXONE HYDROCHLORIDE 0.4 MG/ML
0.4 INJECTION, SOLUTION INTRAMUSCULAR; INTRAVENOUS; SUBCUTANEOUS AS NEEDED
Status: DISCONTINUED | OUTPATIENT
Start: 2020-09-23 | End: 2020-09-23 | Stop reason: HOSPADM

## 2020-09-23 RX ORDER — LIDOCAINE HYDROCHLORIDE 10 MG/ML
INJECTION INFILTRATION; PERINEURAL AS NEEDED
Status: DISCONTINUED | OUTPATIENT
Start: 2020-09-23 | End: 2020-09-23 | Stop reason: HOSPADM

## 2020-09-23 RX ORDER — FUROSEMIDE 40 MG/1
40 TABLET ORAL DAILY
Qty: 30 TAB | Refills: 2 | Status: SHIPPED | OUTPATIENT
Start: 2020-09-23 | End: 2020-10-12 | Stop reason: SDUPTHER

## 2020-09-23 RX ORDER — HEPARIN SODIUM 200 [USP'U]/100ML
INJECTION, SOLUTION INTRAVENOUS
Status: COMPLETED | OUTPATIENT
Start: 2020-09-23 | End: 2020-09-23

## 2020-09-23 RX ORDER — SODIUM CHLORIDE 0.9 % (FLUSH) 0.9 %
5-40 SYRINGE (ML) INJECTION EVERY 8 HOURS
Status: DISCONTINUED | OUTPATIENT
Start: 2020-09-23 | End: 2020-09-23 | Stop reason: HOSPADM

## 2020-09-23 RX ORDER — SODIUM CHLORIDE 0.9 % (FLUSH) 0.9 %
5-40 SYRINGE (ML) INJECTION AS NEEDED
Status: DISCONTINUED | OUTPATIENT
Start: 2020-09-23 | End: 2020-09-23 | Stop reason: HOSPADM

## 2020-09-23 RX ORDER — CARVEDILOL 25 MG/1
25 TABLET ORAL 2 TIMES DAILY WITH MEALS
Qty: 60 TAB | Refills: 2 | Status: SHIPPED | OUTPATIENT
Start: 2020-09-23 | End: 2020-10-12 | Stop reason: SDUPTHER

## 2020-09-23 RX ORDER — MIDAZOLAM HYDROCHLORIDE 1 MG/ML
INJECTION, SOLUTION INTRAMUSCULAR; INTRAVENOUS AS NEEDED
Status: DISCONTINUED | OUTPATIENT
Start: 2020-09-23 | End: 2020-09-23 | Stop reason: HOSPADM

## 2020-09-23 RX ADMIN — COLESTIPOL HYDROCHLORIDE 1 G: 1 TABLET, FILM COATED ORAL at 17:34

## 2020-09-23 RX ADMIN — ASPIRIN 81 MG: 81 TABLET, DELAYED RELEASE ORAL at 17:35

## 2020-09-23 RX ADMIN — CARVEDILOL 12.5 MG: 12.5 TABLET, FILM COATED ORAL at 17:34

## 2020-09-23 RX ADMIN — FUROSEMIDE 40 MG: 40 TABLET ORAL at 18:24

## 2020-09-23 RX ADMIN — LOSARTAN POTASSIUM 50 MG: 50 TABLET ORAL at 17:35

## 2020-09-23 NOTE — ROUTINE PROCESS
The following appointments have been successfully scheduled: 
 
Date/time Tuesday, September 29, 2020 12:40 PM 
Patient Iker Jaimes 1940 (33WI F) #[id number] K#[id number] Department [facility]-MAIN OFFICE Appointment type Established Patient Provider Amos Obregon

## 2020-09-23 NOTE — PROGRESS NOTES
Hospitalist Progress Note    NAME: Emliy Field   :  1940   MRN:  [id number]       Assessment / Plan:    Acute respiratory failure with hypoxia most likely secondary to acute on chronic systolic CHF POA  Hypertensive emergency  Covid ruled out   Elevated troponin:? NSTEMI   admited to PCU,  2D echo   · LV: Estimated LVEF is 25 - 30%. Moderately dilated left ventricle. Mild concentric hypertrophy. Moderate-to-severely reduced systolic function. · Pericardium: Trivial-to-small pericardial effusion. · LA: Moderately dilated left atrium. · MV: Mild to moderate mitral valve regurgitation is present. Patient received IV Lasix at \[facility]\""  Due to the elevated troponin, patient was given heparin 4000 unit and started on heparin drip then changed to Lovenox on   Troponin <0.05-->2.04-->1.20  Cardiology input appreciated. Stress test results reviewed showing fixed defect with no evidence of reversible ischemia. Patient is going for AICD today  Continue with beta-blocker, aspirin and statin  off nitro drip  COVID test negative  Chest pain free       Leucocytosis POA. Seems to be chronic  Wbc 23k on admission , seems to be chronic after review of previous cbc.trending down   Pt has no fever , no signs of infection  Chest xray clear   Blood cultures have been negative  procalcitonin 0.10  Pt reported that she always have elevated wbc and work up has been done in the past and nothing was found   Cont holding  off on abx for now  Still not showing any signs of active infection other than leukocytosis which might be reactive plus chronic     Hyperglycemia in setting of diabetes mellitus  Diabetic neuropathy  Continue with home dose Lantus 45 units with correction scale, adjust prn   Continue with Neurontin  Hold metformin , hold glyburide     Worsening CKD stage III  Cr is stable  Continue ARB     Depression  Continue with Effexor    25.0 - 29.9 Overweight / Body mass index is 25.61 kg/m².     Code status: DNR  Prophylaxis: Heparin  Recommended Disposition: Home w/Family     Subjective:     Chief Complaint / Reason for Physician Visit  Discussed with RN events overnight. Patient was seen and examined. No acute events overnight. \" I am feeling great, no chest pain or shortness of breath, plan for AICD placement today\"    Review of Systems:  Symptom Y/N Comments  Symptom Y/N Comments   Fever/Chills n   Chest Pain n    Poor Appetite    Edema     Cough n   Abdominal Pain n    Sputum    Joint Pain     SOB/STANTON n   Pruritis/Rash     Nausea/vomit n   Tolerating PT/OT     Diarrhea    Tolerating Diet y    Constipation    Other       Could NOT obtain due to:          Objective:     VITALS:   Last 24hrs VS reviewed since prior progress note. Most recent are:  Patient Vitals for the past 24 hrs:   Temp Pulse Resp BP SpO2   09/23/20 0756 97.9 °F (36.6 °C) 65 17 (!) 133/54 94 %   09/23/20 0401 97.7 °F (36.5 °C) (!) 58 16 (!) 115/44    09/22/20 2322 98.8 °F (37.1 °C) 89 16 (!) 153/68    09/22/20 1931 98.3 °F (36.8 °C) 91 18 (!) 150/67 93 %   09/22/20 1500 98 °F (36.7 °C) 60 17 130/65 97 %   09/22/20 1131 98.5 °F (36.9 °C) (!) 53 18 127/66 95 %       Intake/Output Summary (Last 24 hours) at 9/23/2020 1118  Last data filed at 9/22/2020 2322  Gross per 24 hour   Intake 125 ml   Output    Net 125 ml        PHYSICAL EXAM:  General: WD, WN. Alert, cooperative, no acute distress    EENT:  EOMI. Anicteric sclerae. MMM  Resp:  CTA bilaterally, no wheezing or rales. No accessory muscle use  CV:  Regular  rhythm,  No edema  GI:  Soft, Non distended, Non tender.  +Bowel sounds  Neurologic:  Alert and oriented X 3, normal speech,   Psych:   Good insight. Not anxious nor agitated  Skin:  No rashes.   No jaundice    Reviewed most current lab test results and cultures  YES  Reviewed most current radiology test results   YES  Review and summation of old records today    NO  Reviewed patient's current orders and MAR    YES  PMH/SH reviewed - no change compared to H&P  ________________________________________________________________________  Care Plan discussed with:    Comments   Patient x    Family      RN x    Care Manager x    Consultant                       x Multidiciplinary team rounds were held today with , nursing, pharmacist and clinical coordinator. Patient's plan of care was discussed; medications were reviewed and discharge planning was addressed. ________________________________________________________________________        Comments   >50% of visit spent in counseling and coordination of care     ________________________________________________________________________  Niru Anand MD     Procedures: see electronic medical records for all procedures/Xrays and details which were not copied into this note but were reviewed prior to creation of Plan. LABS:  I reviewed today's most current labs and imaging studies.   Pertinent labs include:  Recent Labs     09/23/20 0949 09/22/20 0400 09/21/20  0043   WBC 14.4* 16.9* 18.6*   HGB 11.9 12.2 11.1*   HCT 37.1 37.6 34.2*    365 337     Recent Labs     09/23/20 0400 09/22/20  0400 09/21/20  0043    139 135*   K 4.5 4.0 5.1    107 104   CO2 27 30 25   * 94 199*   BUN 38* 37* 48*   CREA 1.52* 1.50* 1.59*   CA 10.3* 10.1 9.4       Signed: Niru Anand MD

## 2020-09-23 NOTE — PROGRESS NOTES
Bedside shift change report given to Judie Brand RN (oncoming nurse) by Tiffany Fuentes RN/HO Raymond (offgoing nurse). Report included the following information SBAR, Intake/Output, MAR, Recent Results and Cardiac Rhythm NSR /SB.    0516: Pt rested quietly throughout the night. Pt up to bathroom ad jazz. Bedside shift change report given to Judie Brand RN (oncoming nurse) by Markell Low RN/HO Keys (offgoing nurse).  Report included the following information SBAR, MAR, Recent Results and Cardiac Rhythm NSR/with periods f SB.

## 2020-09-23 NOTE — DISCHARGE INSTRUCTIONS
[address] [place] [address]  [phone]        ICD INSERTION DISCHARGE INSTRUCTIONS    Patient ID:  Kenyatta Rogers  [id number]  39 y.o.  1940    Admit Date: 9/18/2020    Discharge Date: 9/23/2020     Admitting Physician: Sandhya Lovett MD     Discharge Physician: Sandhya Lovett MD    Admission Diagnoses:   CHF (congestive heart failure) ([facility] [I50.9]  Hypoxia [R09.02]    Discharge Diagnoses:   Principal Problem:    Bilateral pleural effusion (9/18/2020)    Active Problems:    CKD (chronic kidney disease) stage 3, GFR 30-59 ml/min ([facility]) (6/4/2019)      PAF (paroxysmal atrial fibrillation) ([facility] (9/10/2020)      CHF (congestive heart failure) ([facility] (9/18/2020)      Hypoxia (9/18/2020)      Elevated troponin (9/18/2020)      Dilated cardiomyopathy ([facility] (9/23/2020)      SSS (sick sinus syndrome) ([facility] (9/23/2020)        Discharge Condition: Good    Cardiology Procedures this Admission:  S/P ICD implantation. Disposition: home    Reference discharge instructions provided by nursing for diet and activity. Follow-up with ICD/PM clinic in 3 weeks. Call [phone] to make an appointment. Signed:  CARTER Oshea  9/23/2020  2:34 PM      DISCHARGE INSTRUCTIONS FOR PATIENTS WITH ICD'S    1. Remember to call for an appointment in 3 weeks [phone]. 2. Medic Alert Bracelets are available from your pharmacist to wear at all times if you choose to wear one. 3. Carry your ID card for your ICD with you at all times. This card will be given to you in the hospital or mailed to you. 4. The ICD will bulge slightly under your skin. The bulge will decrease in size over the next few weeks. Please notify the doctor's office if you notice any of the following around your ICD site:   A.  A bruise that does not go away. B.  Soreness or yellow, green, or brown drainage from the site. C. Any swelling from the site. D.   If you have a fever of 100 degrees or higher that lasts for a few days. INCISION CARE       1.  Leave the dressing over your site until your follow up visit. 2.  You may not shower until after follow up visit. 3.  For comfort, wear loose fitting clothing. 1. 4.  Ice pack to affected shoulder for first 24 hours, wear your sling for 2 days. 2. 5.  Report any signs of infection, fever, pain, swelling, redness, oozing, or heat at site especially if these symptoms increase after the first 3 to 4 days. ACTIVITY PRECAUTIONS     1. Avoid rough contact with the implant site. 2. No driving for 14 days. 3. Avoid lifting your arm over your head, carrying anything on the affected side, or lifting over 10 pounds for 90 days. For the first 2 days only bend your arm at the elbow. 4. Any extreme activity such as golf, weight lifting or exercise biking should be restricted for 60 days. 5. Do not carry objects by holding them against your implant site. 6.  No shooting rifles or any type of gun with the affected shoulder permanently. 7.  Welding and chainsaws are prohibited. SPECIAL PRECAUTIONS     1. You should avoid all strong magnetic fields, such as arc welding, large transformers, large motors. Some ICD devices will beep if it detects a strong magnet. If this occurs, move out of the area. 2.  You may or may not have an MRI which uses a strong magnet to take pictures. 3.  Treatments or surgery that requires diathermy or electrocautery should be discussed with your doctor before scheduled. 4.  Avoid radio frequency transmitters, including radar. 5.  Advise dentist or other medical personnel you see that you have an ICD. 6.  Cell phones and microwave oven use is okay. 7.  If you plan to move or take a trip to a new area, the doctor's office will give you a name of a doctor to contact for any problems. SPECIAL INSTRUCTIONS ON SHOCKS     1. Notify your doctor for any of the following:       A. Anytime a shock is received in a 24 hour period.   An office visit is not usually required for a single shock. B.  Two or more shocks in a row. If you do not feel well, call the [facility], otherwise call your doctor. This may require an office visit. C. Two or more shocks spaced apart by several hours. This may require an office visit. 2.  Keep a record of events. Include date, time, symptoms and activity at that time. ANTIBIOTIC THERAPY    During the first 8 weeks after your ICD insertion, you may need antibiotics before any dental work or certain tests or operations. Let the dentist or doctor who is caring for you know that you have had an implanted device.

## 2020-09-23 NOTE — PROGRESS NOTES
[address] [place] [address]  [phone]      Cardiology Progress Note      9/23/2020 10:00PM    Admit Date: 9/18/2020    Admit Diagnosis:   CHF (congestive heart failure) ([address] [I50.9]  Hypoxia [R09.02]    Subjective:   Edith Romero has no c/o CP, SOB better. Nuclear lexiscan negative for ischemia. She continues to had bradycardia at 50-60bpm.   Dr. Todd Parker consulted, plan for AICD today.      Visit Vitals  BP (!) 133/54 (BP 1 Location: Right arm, BP Patient Position: At rest)   Pulse 65   Temp 97.9 °F (36.6 °C)   Resp 17   Ht 5' 5\" (1.651 m)   Wt 153 lb 14.1 oz (69.8 kg)   SpO2 94%   BMI 25.61 kg/m²       Current Facility-Administered Medications   Medication Dose Route Frequency    losartan (COZAAR) tablet 50 mg  50 mg Oral DAILY    carvediloL (COREG) tablet 12.5 mg  12.5 mg Oral BID WITH MEALS    enoxaparin (LOVENOX) injection 30 mg  30 mg SubCUTAneous Q24H    furosemide (LASIX) tablet 40 mg  40 mg Oral DAILY    albuterol-ipratropium (DUO-NEB) 2.5 MG-0.5 MG/3 ML  3 mL Nebulization Q4H PRN    aspirin delayed-release tablet 81 mg  81 mg Oral DAILY    colestipoL (COLESTID) tablet 1 g  1 g Oral BID    gabapentin (NEURONTIN) capsule 300 mg  300 mg Oral TID    insulin glargine (LANTUS) injection 45 Units  45 Units SubCUTAneous QHS    loperamide (IMODIUM) capsule 2 mg  2 mg Oral BID PRN    venlafaxine (EFFEXOR) tablet 75 mg  75 mg Oral BID WITH MEALS    insulin lispro (HUMALOG) injection   SubCUTAneous AC&HS    glucose chewable tablet 16 g  4 Tab Oral PRN    dextrose (D50W) injection syrg 12.5-25 g  12.5-25 g IntraVENous PRN    glucagon (GLUCAGEN) injection 1 mg  1 mg IntraMUSCular PRN    atorvastatin (LIPITOR) tablet 20 mg  20 mg Oral QHS    labetaloL (NORMODYNE;TRANDATE) injection 20 mg  20 mg IntraVENous Q4H PRN     Facility-Administered Medications Ordered in Other Encounters   Medication Dose Route Frequency    sodium chloride (NS) flush 10 mL  10 mL IntraVENous PRN       Objective:      Physical Exam:  General Appearance:  elderly  female in no acute distress  Chest:   basilar rales improved decreased BS RLL  Cardiovascular:  Regular rate and rhythm, no murmur. Abdomen:   Soft, non-tender, bowel sounds are active. Extremities: no peripheral edema  Skin:  Warm and dry. Data Review:   Recent Labs     09/23/20  0949 09/22/20  0400 09/21/20  0043   WBC 14.4* 16.9* 18.6*   HGB 11.9 12.2 11.1*   HCT 37.1 37.6 34.2*    365 337     Recent Labs     09/23/20  0400 09/22/20  0400 09/21/20  0043    139 135*   K 4.5 4.0 5.1    107 104   CO2 27 30 25   * 94 199*   BUN 38* 37* 48*   CREA 1.52* 1.50* 1.59*   CA 10.3* 10.1 9.4       No results for input(s): TROIQ, CPK, CKMB in the last 72 hours. Intake/Output Summary (Last 24 hours) at 9/23/2020 1035  Last data filed at 9/22/2020 2322  Gross per 24 hour   Intake 125 ml   Output    Net 125 ml        Telemetry: SR, SB, PVCs    Echo  · LV: Estimated LVEF is 25 - 30%. Moderately dilated left ventricle. Mild concentric hypertrophy. Moderate-to-severely reduced systolic function. · Pericardium: Trivial-to-small pericardial effusion. · LA: Moderately dilated left atrium. · MV: Mild to moderate mitral valve regurgitation is present. Assessment:     Principal Problem:    Bilateral pleural effusion (9/18/2020)    Active Problems:    CKD (chronic kidney disease) stage 3, GFR 30-59 ml/min ([facility]) (6/4/2019)      PAF [facility]paroxysmal [facility] fibrillation) ([facility] (9/10/2020)      CHF (congestive heart failure) ([facility] (9/18/2020)      Hypoxia (9/18/2020)      Elevated troponin (9/18/2020)        Plan:     Acute systolic HF: (EF 66-52%) due to ischemic cardiomyopathy, with a large inferior infarction on images personally reviewed and no ischemia visualized  NTproBNP 3468, repeat today 7832! Continues on diuretics, output not documented, wt trending down. CR slowly decreasing.    Continues on Coreg, ARB  Monitor I/Os, daily weights and labs. AICD implant today       Elevated troponin likely due to ICM, CHF, and sheridan-infarct ischemia  Stress Myoview with large inferior wall infarction and no ischemia, no angina  Continue on Lipitor, ASA, BB     CKD:  Stable, as above, per hospitalist     PAF:  Remains in SR with SB and PVCs  Echo shows LAE, perhaps she is experiencing more PAF than noted      Maryam Noel [facility]  Cardiology    Patient seen and examined by me with the above nurse practitioner. I personally performed all components of the history, physical, and medical decision making and agree with the assessment and plan with minor modifications as noted. S/p ICD today. CHF improved. Repeat CXR after device. Hopefully home soon to f/u with Dr. Claire Marie.

## 2020-09-23 NOTE — PROGRESS NOTES
FRANCISCO: Home with  [facility]) and follow up appointments   Daughter to provide transport home   2nd IM Medicare letter given, copy signed and placed on bedside chart    CM met with patient who is alert and oriented and aware of plan to discharge this evening. Pt reports that her daughter, who resides in [place], had driven up and will transport her home and stay with her for a while to help out. Pt has been set up with [address] [place] [address] and follow up appointments with PCP and Cardiology. No other CM needs identified at this time and patient ready to discharge from a CM standpoint. Care Management Interventions  PCP Verified by CM: Yes  Transition of Care Consult (CM Consult): [address]: Yes  Discharge Durable Medical Equipment: (Patient has rolling walker and cane at home. )  Current Support Network: Lives with Spouse  Confirm Follow Up Transport: Family  The Plan for Transition of Care is Related to the Following Treatment Goals : home health nursing   The Patient and/or Patient Representative was Provided with a Choice of Provider and Agrees with the Discharge Plan?: Yes  Freedom of Choice List was Provided with Basic Dialogue that Supports the Patient's Individualized Plan of Care/Goals, Treatment Preferences and Shares the Quality Data Associated with the Providers?: Yes  Discharge Location  Discharge Placement: Home with home health    Laurent Cali.  Douglas Martinez, [address] - [facility]  Advanced Steps ACP Facilitator  Zone Phone: [phone]

## 2020-09-23 NOTE — CONSULTS
Subjective:                  [address], [place], [address] [facility]  [phone]    Date of  Admission: 9/18/2020  7:02 AM     Admission type:Emergency    Abby Ellis is a 79 y.o. female admitted for CHF (congestive heart failure) ([facility] [I50.9]; Hypoxia [R09.02]. Pt with dilated cardiomyopathy and class III CHF on med rx. She has been on ARB and bb prior. Not able to tolerate bb recently due to profound bradycardia. She has sob with minimal exertion.  Denies cp      Patient Active Problem List    Diagnosis Date Noted    Dilated cardiomyopathy ([facility] 09/23/2020    SSS (sick sinus syndrome) ([facility] 09/23/2020    Respiratory failure ([facility] 09/18/2020    CHF (congestive heart failure) ([facility] 09/18/2020    Hypoxia 09/18/2020    Bilateral pleural effusion 09/18/2020    Elevated troponin 09/18/2020    PAF (paroxysmal atrial fibrillation) ([facility] 09/10/2020    CKD (chronic kidney disease) stage 3, GFR 30-59 ml/min ([facility]) 06/04/2019    Lymphocytosis 05/07/2018    Type 2 diabetes with nephropathy ([facility] 02/15/2018    Type 2 diabetes mellitus with diabetic neuropathy ([facility] 02/15/2018    Overdose of opiate or related narcotic, accidental or unintentional, subsequent encounter 10/27/2017    Acute left-sided low back pain with sciatica 10/27/2017    Physical debility 10/27/2017    Acute encephalopathy 10/23/2017    Hyperkalemia 10/20/2017    Altered mental status 10/20/2017    Transaminitis 10/20/2017    Acute renal failure (ARF) ([address] 10/20/2017    Diverticulitis large intestine w/o perforation or abscess w/o bleeding 07/06/2017    SOB (shortness of breath) 06/21/2017    Abdominal pain, generalized 06/21/2017    Diarrhea in adult patient 06/21/2017    Cigarette smoker 04/51/9709    Neutrophilic leukocytosis 12/69/7660    Chronic pain     Hypercholesterolemia     Arthritis     Diabetes ([address]     IBS (irritable bowel syndrome)     Hemorrhoid       Colleen Lindsey NP  Past Medical History: Diagnosis Date    A-fib [facility])     Arthritis     Bilateral pleural effusion 9/18/2020    Chronic pain     Chronic pain     Diabetes ([address]     Diverticular disease     Elevated troponin 9/18/2020    Hemorrhoid     Hypercholesterolemia     IBS (irritable bowel syndrome)     Lymphocytosis 05/07/2018      Past Surgical History:   Procedure Laterality Date    COLONOSCOPY N/A 10/31/2019    COL[facility]  10/31/2019         COLONOSCOPY,PAKO LEUNG,SNARE  10/31/2019         HX CATARACT REMOVAL      HX CHOLECYSTECTOMY      HX COLONOSCOPY  2009    HX COLONOSCOPY  2016    2 adenomatous polyp    HX HEMORRHOIDECTOMY  2009    HX HYSTERECTOMY      HX KNEE REPLACEMENT      right    HX ORTHOPAEDIC  12/11/2017    back, laminectomy and decompression     Allergies   Allergen Reactions    Morphine Anaphylaxis    Ultram [Tramadol] Palpitations     Social History     Tobacco Use    Smoking status: Current Every Day Smoker     Packs/day: 1.00     Years: 55.00     Pack years: 55.00     Types: Cigarettes    Smokeless tobacco: Never Used   Substance Use Topics    Alcohol use: No    Drug use: Never     Family History   Problem Relation Age of Onset    Colon Cancer Father     Diabetes Mother       Current Facility-Administered Medications   Medication Dose Route Frequency    losartan (COZAAR) tablet 50 mg  50 mg Oral DAILY    carvediloL (COREG) tablet 12.5 mg  12.5 mg Oral BID WITH MEALS    enoxaparin (LOVENOX) injection 30 mg  30 mg SubCUTAneous Q24H    furosemide (LASIX) tablet 40 mg  40 mg Oral DAILY    albuterol-ipratropium (DUO-NEB) 2.5 MG-0.5 MG/3 ML  3 mL Nebulization Q4H PRN    aspirin delayed-release tablet 81 mg  81 mg Oral DAILY    colestipoL (COLESTID) tablet 1 g  1 g Oral BID    gabapentin (NEURONTIN) capsule 300 mg  300 mg Oral TID    insulin glargine (LANTUS) injection 45 Units  45 Units SubCUTAneous QHS    loperamide (IMODIUM) capsule 2 mg  2 mg Oral BID PRN    venlafaxine (EFFEXOR) tablet 75 mg  75 mg Oral BID WITH MEALS    insulin lispro (HUMALOG) injection   SubCUTAneous AC&HS    glucose chewable tablet 16 g  4 Tab Oral PRN    dextrose (D50W) injection syrg 12.5-25 g  12.5-25 g IntraVENous PRN    glucagon (GLUCAGEN) injection 1 mg  1 mg IntraMUSCular PRN    atorvastatin (LIPITOR) tablet 20 mg  20 mg Oral QHS    labetaloL (NORMODYNE;TRANDATE) injection 20 mg  20 mg IntraVENous Q4H PRN     Facility-Administered Medications Ordered in Other Encounters   Medication Dose Route Frequency    sodium chloride (NS) flush 10 mL  10 mL IntraVENous PRN         Review of Symptoms:  Constitutional: negative  Eyes: negative  Ears, nose, mouth, throat, and face: negative  Respiratory: sob with exertion  Cardiovascular: negative  Gastrointestinal: negative  Genitourinary: negative  Musculoskeletal: negative  Neurological: negative  Behvioral/Psych: negative  Endocrine: negative     Subjective:      Visit Vitals  BP (!) 133/54 (BP 1 Location: Right arm, BP Patient Position: At rest)   Pulse 65   Temp 97.9 °F (36.6 °C)   Resp 17   Ht 5' 5\" (1.651 m)   Wt 153 lb 14.1 oz (69.8 kg)   SpO2 94%   BMI 25.61 kg/m²       Physical Exam  Abdomen: soft, non-tender. Extremities: extremities normal  Heart: regular rate and rhythm  Lungs: clear to auscultation bilaterally  Pulses: 2+ and symmetric    Cardiographics    Telemetry: nsr    ECG: nsr    Echocardiogram: lvef 32%    Labs:  Recent Labs     09/23/20  0949 09/22/20  0400 09/21/20  0043   WBC 14.4* 16.9* 18.6*   HGB 11.9 12.2 11.1*   HCT 37.1 37.6 34.2*    365 337     Recent Labs     09/23/20  0400 09/22/20  0400 09/21/20  0043    139 135*   K 4.5 4.0 5.1    107 104   CO2 27 30 25   * 94 199*   BUN 38* 37* 48*   CREA 1.52* 1.50* 1.59*   CA 10.3* 10.1 9.4       No results for input(s): TROIQ, CPK, CKMB in the last 72 hours.       Intake/Output Summary (Last 24 hours) at 9/23/2020 1105  Last data filed at 9/22/2020 2322  Gross per 24 hour   Intake 125 ml   Output    Net 125 ml         Assessment:     Assessment:       Principal Problem:    Bilateral pleural effusion (9/18/2020)    Active Problems:    CKD (chronic kidney disease) stage 3, GFR 30-59 ml/min ([facility]) (6/4/2019)      PAF (paroxysmal atrial fibrillation) ([facility] (9/10/2020)      CHF (congestive heart failure) ([facility] (9/18/2020)      Hypoxia (9/18/2020)      Elevated troponin (9/18/2020)      Dilated cardiomyopathy ([facility] (9/23/2020)      SSS (sick sinus syndrome) ([facility] (9/23/2020)         Plan:     Ari Butler is a pleasant lady with a dilated cardiomyopathy on med rx and class III CHF. Based on risk of sudden cardiac death, a formal shared decision has been made to proceed with implantation of a cardiac defibrillator for primary prevention of sudden cardiac death using the [address] [place] [address] patient decision aid tool. The patient verbalizes understanding of indication, risks and benefits of ICD procedure.       Seun Jorge MD, [facility]    9/23/2020

## 2020-09-23 NOTE — PROGRESS NOTES
0700 shift change report given to Javad Jaimes and Justyna Berman (oncoming nurse) by Elaine Velasquez (offgoing nurse). Report included the following information SBAR, Kardex and [facility]     5980 consent signed for procedure. Pt continues to be NPO. Morning meds held.      1330 pt off the floor for procedure

## 2020-09-23 NOTE — PROGRESS NOTES
TRANSFER - IN REPORT:    Verbal report received from HO Jimenes (name) on Marek Diggs  being received from PCU (unit) for routine progression of care      Report consisted of patients Situation, Background, Assessment and   Recommendations(SBAR). Information from the following report(s) SBAR, Kardex, Intake/Output, MAR, Recent Results, Med Rec Status and Cardiac Rhythm NSR/SB/PACs was reviewed with the receiving nurse. Opportunity for questions and clarification was provided. Assessment completed upon patients arrival to unit and care assumed.

## 2020-09-23 NOTE — CARDIO/PULMONARY
Cardiac Rehab Note: chart review Pt is an 78 yo admitted with CHF. Requested to be seen by SULEMAN Dumont NP for pt education. Smoking history assessed. Patient is a current smoker. Smoking Cessation Program link has been added to the AVS. EF 30%  on 9/20/20 per echo. CAD education folder, with heart heathy diet, warning signs, heart facts, catheterization brochure, and out patient cardiac rehab program to bedside of Kenyatta Rogers. Patient was not available/able to meet at this time with no family present at bedside. Pt off unit in CCL for AICD placement at time of visit. CP Rehab will attempt to follow up.

## 2020-09-23 NOTE — DISCHARGE SUMMARY
Hospitalist Discharge Summary     Patient ID:  Emily Field  [id number]  6 y.o.  1940 9/18/2020    PCP on record: Levi Saul NP    Admit date: 9/18/2020  Discharge date and time: 9/23/2020    DISCHARGE DIAGNOSIS:    Acute respiratory failure with hypoxia most likely secondary to acute on chronic systolic CHF POA  Hypertensive emergency  Covid ruled out   NSTEMI likely type 2  Leucocytosis POA. Seems to be chronic  Hyperglycemia in setting of diabetes mellitus  Diabetic neuropathy  Worsening CKD stage III  Depression      CONSULTATIONS:  IP CONSULT TO CARDIOLOGY  IP CONSULT TO ELECTROPHYSIOLOGY    Excerpted HPI from H&P of Shanti Felipe MD:    Gabby Gomez is a 79 y.o.  female with past medical history of A. fib, diabetes mellitus who was found to have from [facility] for shortness of breath and hypertensive emergency. Patient reported being short of breath for few weeks, was followed by Dr. Ibis Mosley who is her cardiologist, had a  nuclear stress test end of July, and was supposed to get 2D echo next week. She also reported that she had Holter placed by her cardiologist to monitor  for arrhythmia. Per  pt was taken off coreg recently by dr Elena Mueller and put on diltiazem because her SOB was attributed to the BB   Patient reported not able to take deep breath for the past few days, and was gradually more short of breath. In the ED at [facility], she was found to be in acute heart failure and in hypertensive emergency. SBP was 207 and pt was  in respiratory distress with a room air saturation of 82%. She was started on nitro drip, was given Lasix. In the ED, she was still on nitro drip but her blood pressure was controlled with systolic blood pressure around 150s and she was feeling much better. Patient denies any cough, fever, urinary symptoms  She was saturating at 94% on nasal cannula at 3 L, was tachycardic.   Review of her labs showed elevated white blood cell count at 23k , worsening chronic kidney disease and her chest x-ray showed pleural effusion. Her troponin was elevated 2.04  We were asked to admit for work up and evaluation of the above problems.      ______________________________________________________________________  DISCHARGE SUMMARY/HOSPITAL COURSE:  for full details see H&P, daily progress notes, labs, consult notes. Acute respiratory failure with hypoxia most likely secondary to acute on chronic systolic CHF POA  Hypertensive emergency  Covid ruled out   Elevated troponin:? NSTEMI   admited to PCU,  2D echo   · LV: Estimated LVEF is 25 - 30%. Moderately dilated left ventricle. Mild concentric hypertrophy. Moderate-to-severely reduced systolic function. · Pericardium: Trivial-to-small pericardial effusion. · LA: Moderately dilated left atrium. · MV: Mild to moderate mitral valve regurgitation is present. Patient [facility]  Due to the elevated troponin, patient was given heparin 4000 unit and started on heparin drip then changed to Lovenox on September 21  Troponin <0.05-->2.04-->1.20  Cardiology input appreciated.      Stress test results reviewed showing fixed defect with no evidence of reversible ischemia. Patient  received AICD today  Continue with beta-blocker, aspirin and statin  Cardizem stopped and pt is started on Coreg  COVID test negative  Chest pain free  Restarted Valsartan and HCTZ  Started on lasix 40mg daily        Leucocytosis POA.  Seems to be chronic  Wbc 23k on admission , seems to be chronic after review of previous cbc.trending down   Pt has no fever , no signs of infection  Chest xray clear   Blood cultures have been negative       Hyperglycemia in setting of diabetes mellitus  Diabetic neuropathy  Continue with home dose Lantus 45 units with correction scale, adjust prn   Continue with Neurontin  stop metformin as GFR is less than 50 ,continue glyburide and Januvia     Worsening CKD stage III  Cr is stable  Resumed ARB  Discontinued Metformin     Depression  Continue with Effexor    _______________________________________________________________________  Patient seen and examined by me on discharge day. Pertinent Findings:  Gen:    Not in distress  Chest: Clear lungs  CVS:   Regular rhythm. No edema  Abd:  Soft, not distended, not tender  Neuro:  Alert, oriented x4  _______________________________________________________________________  DISCHARGE MEDICATIONS:   Current Discharge Medication List      START taking these medications    Details   furosemide (LASIX) 40 mg tablet Take 1 Tab by mouth daily. Qty: 30 Tab, Refills: 2      carvediloL (COREG) 25 mg tablet Take 1 Tab by mouth two (2) times daily (with meals). Qty: 60 Tab, Refills: 2      atorvastatin (LIPITOR) 20 mg tablet Take 1 Tab by mouth nightly. Qty: 30 Tab, Refills: 2         CONTINUE these medications which have NOT CHANGED    Details   vit A/vit C/vit E/zinc/copper (ICAPS AREDS PO) Take  by mouth two (2) times a day. colestipoL (Colestid) 1 gram tablet Take 1 g by mouth two (2) times a day. loperamide (IMMODIUM) 2 mg tablet Take 2 mg by mouth as needed for Diarrhea. aspirin delayed-release 81 mg tablet Take 1 Tab by mouth daily. Qty: 90 Tab, Refills: 1      insulin glargine (Lantus Solostar U-100 Insulin) 100 unit/mL (3 mL) inpn INJECT SUBCUTANEOUSLY 45  UNITS DAILY  Qty: 45 mL, Refills: 3    Comments: Requesting 1 year supply      valsartan-hydroCHLOROthiazide (DIOVAN-HCT) 160-12.5 mg per tablet TAKE 1 TABLET BY MOUTH  DAILY  Qty: 90 Tab, Refills: 3    Comments: Requesting 1 year supply      Januvia 50 mg tablet TAKE 1 TABLET BY MOUTH  DAILY  Qty: 90 Tab, Refills: 3    Comments: Requesting 1 year supply      ergocalciferol (ERGOCALCIFEROL) 1,250 mcg (50,000 unit) capsule Take 1 Cap by mouth every seven (7) days.  Indications: low vitamin D levels  Qty: 15 Cap, Refills: 1      vit C/E/Zn/coppr/lutein/zeaxan (PRESERVISION AREDS-2 PO) Take  by mouth daily. carboxymethylcellulose sodium (REFRESH TEARS OP) Apply  to eye daily. 2-4 drops both eyes      venlafaxine (EFFEXOR) 75 mg tablet TAKE 1 TABLET BY MOUTH TWO  TIMES DAILY WITH MEALS  Qty: 180 Tab, Refills: 3      glyBURIDE (DIABETA) 5 mg tablet TAKE 1 TABLET BY MOUTH TWO  TIMES DAILY WITH MEALS  Qty: 180 Tab, Refills: 3      OTHER,NON-FORMULARY, Take 1 Tab by mouth daily. Tumeric curcumin C3 complex      B.infantis-B.ani-B.long-B.bifi (PROBIOTIC 4X) 10-15 mg TbEC Take  by mouth daily. acetaminophen (TYLENOL) 650 mg TbER Take 650 mg by mouth every eight (8) hours. gabapentin (NEURONTIN) 300 mg capsule TAKE 1 CAPSULE THREE TIMES A DAY  Qty: 90 Cap, Refills: 0      omega-3 fatty acids-vitamin e 1,000 mg cap Take 1 Cap by mouth two (2) times a day. 32a day       ascorbic acid, vitamin C, (VITAMIN C) 500 mg tablet Take 500 mg by mouth daily. zinc 50 mg tab tablet Take  by mouth daily. STOP taking these medications       celecoxib (CELEBREX) 200 mg capsule Comments:   Reason for Stopping:         dilTIAZem ER (TIAZAC) 240 mg capsule Comments:   Reason for Stopping:         gemfibroziL (LOPID) 600 mg tablet Comments:   Reason for Stopping:         metFORMIN (GLUCOPHAGE) 1,000 mg tablet Comments:   Reason for Stopping:                 Patient Follow Up Instructions: Activity: Activity as tolerated  Diet: Cardiac Diet  Wound Care: Keep wound clean and dry    Follow-up with PCP and Cardiology in 1-2 weeks  Follow-up tests/labs none  Follow-up Information     Follow up With Specialties Details Why [facility]   [employer] will be your home health company when you are discharged. They will call you and set up time to come out and see you at home.   [address], [place], NP Nurse Practitioner Go on 9/29/2020 For hospital follow up appointment at 12:40PM  Tabitha [phone] [facility] [phone] ________________________________________________________________    Risk of deterioration: High    Condition at Discharge:  Stable  __________________________________________________________________    Disposition  Home with family, no needs    ____________________________________________________________________    Code Status: Full Code  ___________________________________________________________________      Total time in minutes spent coordinating this discharge (includes going over instructions, follow-up, prescriptions, and preparing report for sign off to her PCP) :  35 minutes    Signed:  Elida Khanna MD

## 2020-09-23 NOTE — PROGRESS NOTES
2:30 PM Patient arrived to room 2157 via bed from recovery. L chest ICD site c/d/i no bleeding and no hematoma. Ice pack present to incisional site. VSS. Pulses palpable all extremities. Assessment complete. Patient oriented to room and call bell system. Bed wheels locked and bed in lowest position. Bed alarm on. Call bell within reach. Will continue to monitor closely. 6:40 PM Bedrest complete. Patient ambulated to bathroom with RN x1, voided without difficulty and tolerated well. Patient ambulated in hallway with RN x1 and tolerated well. Patient specifically denies c/o pain, shortness of breath and/or dizziness. L chest ICD site remains c/d/i no bleeding and no hematoma post ambulation. 7:00 PM PIV removed, tip intact. Discharge instructions reviewed with patient and daughter including, but not limited to, medications, follow up appointments and post ICD site care. Patient and daughter given opportunity for questions and verbalized understanding of all instructions. Patient has all belongings and instructions on discharge. Patient to  prescriptions on discharge. Patient's L chest ICD site remains c/d/i no bleeding and no hematoma on discharge. Patient escorted to private vehicle via wheelchair. Patient's daughter to drive her home.

## 2020-09-24 NOTE — PROGRESS NOTES
Problem: Falls - Risk of  Goal: *Absence of Falls  Description: Document Edmonia Morning Fall Risk and appropriate interventions in the flowsheet. Outcome: Resolved/Met     Problem: Patient Education: Go to Patient Education Activity  Goal: Patient/Family Education  Outcome: Resolved/Met     Problem: Breathing Pattern - Ineffective  Goal: *Absence of hypoxia  Outcome: Resolved/Met  Goal: *Use of effective breathing techniques  Outcome: Resolved/Met  Goal: *PALLIATIVE CARE:  Alleviation of Dyspnea  Outcome: Resolved/Met     Problem: Patient Education: Go to Patient Education Activity  Goal: Patient/Family Education  Outcome: Resolved/Met     Problem: Pressure Injury - Risk of  Goal: *Prevention of pressure injury  Description: Document Bryan Scale and appropriate interventions in the flowsheet.   Outcome: Resolved/Met     Problem: Patient Education: Go to Patient Education Activity  Goal: Patient/Family Education  Outcome: Resolved/Met     Problem: Patient Education: Go to Patient Education Activity  Goal: Patient/Family Education  Outcome: Resolved/Met     Problem: Heart Failure: Day 1  Goal: Off Pathway (Use only if patient is Off Pathway)  Outcome: Resolved/Met  Goal: Activity/Safety  Outcome: Resolved/Met  Goal: Consults, if ordered  Outcome: Resolved/Met  Goal: Diagnostic Test/Procedures  Outcome: Resolved/Met  Goal: Nutrition/Diet  Outcome: Resolved/Met  Goal: Discharge Planning  Outcome: Resolved/Met  Goal: Medications  Outcome: Resolved/Met  Goal: Respiratory  Outcome: Resolved/Met  Goal: Treatments/Interventions/Procedures  Outcome: Resolved/Met  Goal: Psychosocial  Outcome: Resolved/Met  Goal: *Oxygen saturation within defined limits  Outcome: Resolved/Met  Goal: *Hemodynamically stable  Outcome: Resolved/Met  Goal: *Optimal pain control at patient's stated goal  Outcome: Resolved/Met  Goal: *Anxiety reduced or absent  Outcome: Resolved/Met     Problem: Heart Failure: Day 2  Goal: Off Pathway (Use only if patient is Off Pathway)  Outcome: Resolved/Met  Goal: Activity/Safety  Outcome: Resolved/Met  Goal: Consults, if ordered  Outcome: Resolved/Met  Goal: Diagnostic Test/Procedures  Outcome: Resolved/Met  Goal: Nutrition/Diet  Outcome: Resolved/Met  Goal: Discharge Planning  Outcome: Resolved/Met  Goal: Medications  Outcome: Resolved/Met  Goal: Respiratory  Outcome: Resolved/Met  Goal: Treatments/Interventions/Procedures  Outcome: Resolved/Met  Goal: Psychosocial  Outcome: Resolved/Met  Goal: *Oxygen saturation within defined limits  Outcome: Resolved/Met  Goal: *Hemodynamically stable  Outcome: Resolved/Met  Goal: *Optimal pain control at patient's stated goal  Outcome: Resolved/Met  Goal: *Anxiety reduced or absent  Outcome: Resolved/Met  Goal: *Demonstrates progressive activity  Outcome: Resolved/Met     Problem: Heart Failure: Day 3  Goal: Off Pathway (Use only if patient is Off Pathway)  Outcome: Resolved/Met  Goal: Activity/Safety  Outcome: Resolved/Met  Goal: Diagnostic Test/Procedures  Outcome: Resolved/Met  Goal: Nutrition/Diet  Outcome: Resolved/Met  Goal: Discharge Planning  Outcome: Resolved/Met  Goal: Medications  Outcome: Resolved/Met  Goal: Respiratory  Outcome: Resolved/Met  Goal: Treatments/Interventions/Procedures  Outcome: Resolved/Met  Goal: Psychosocial  Outcome: Resolved/Met  Goal: *Oxygen saturation within defined limits  Outcome: Resolved/Met  Goal: *Hemodynamically stable  Outcome: Resolved/Met  Goal: *Optimal pain control at patient's stated goal  Outcome: Resolved/Met  Goal: *Anxiety reduced or absent  Outcome: Resolved/Met  Goal: *Demonstrates progressive activity  Outcome: Resolved/Met     Problem: Heart Failure: Day 4  Goal: Off Pathway (Use only if patient is Off Pathway)  Outcome: Resolved/Met  Goal: Activity/Safety  Outcome: Resolved/Met  Goal: Diagnostic Test/Procedures  Outcome: Resolved/Met  Goal: Nutrition/Diet  Outcome: Resolved/Met  Goal: Discharge Planning  Outcome: Resolved/Met  Goal: Medications  Outcome: Resolved/Met  Goal: Respiratory  Outcome: Resolved/Met  Goal: Treatments/Interventions/Procedures  Outcome: Resolved/Met  Goal: Psychosocial  Outcome: Resolved/Met  Goal: *Oxygen saturation within defined limits  Outcome: Resolved/Met  Goal: *Hemodynamically stable  Outcome: Resolved/Met  Goal: *Optimal pain control at patient's stated goal  Outcome: Resolved/Met  Goal: *Anxiety reduced or absent  Outcome: Resolved/Met  Goal: *Demonstrates progressive activity  Outcome: Resolved/Met     Problem: Heart Failure: Day 5  Goal: Off Pathway (Use only if patient is Off Pathway)  Outcome: Resolved/Met  Goal: Activity/Safety  Outcome: Resolved/Met  Goal: Diagnostic Test/Procedures  Outcome: Resolved/Met  Goal: Nutrition/Diet  Outcome: Resolved/Met  Goal: Discharge Planning  Outcome: Resolved/Met  Goal: Medications  Outcome: Resolved/Met  Goal: Respiratory  Outcome: Resolved/Met  Goal: Treatments/Interventions/Procedures  Outcome: Resolved/Met  Goal: Psychosocial  Outcome: Resolved/Met     Problem: Heart Failure: Discharge Outcomes  Goal: *Demonstrates ability to perform prescribed activity without shortness of breath or discomfort  Outcome: Resolved/Met  Goal: *Left ventricular function assessment completed prior to or during stay, or planned for post-discharge  Outcome: Resolved/Met  Goal: *ACEI prescribed if LVEF less than 40% and no contraindications or ARB prescribed  Outcome: Resolved/Met  Goal: *Verbalizes understanding and describes prescribed diet  Outcome: Resolved/Met  Goal: *Verbalizes understanding/describes prescribed medications  Outcome: Resolved/Met  Goal: *Describes available resources and support systems  Description: (eg: Home Health, Palliative Care, Advanced Medical Directive)  Outcome: Resolved/Met  Goal: *Describes smoking cessation resources  Outcome: Resolved/Met  Goal: *Understands and describes signs and symptoms to report to providers(Stroke Metric)  Outcome: Resolved/Met  Goal: *Describes/verbalizes understanding of follow-up/return appt  Description: (eg: to physicians, diabetes treatment coordinator, and other resources  Outcome: Resolved/Met  Goal: *Describes importance of continuing daily weights and changes to report to physician  Outcome: Resolved/Met     Problem: Patient Education: Go to Patient Education Activity  Goal: Patient/Family Education  Outcome: Resolved/Met     Problem: Pacer/ICD: Pre-Procedure  Goal: Off Pathway (Use only if patient is Off Pathway)  Outcome: Resolved/Met  Goal: Activity/Safety  Outcome: Resolved/Met  Goal: Consults, if ordered  Outcome: Resolved/Met  Goal: Diagnostic Test/Procedures  Outcome: Resolved/Met  Goal: Nutrition/Diet  Outcome: Resolved/Met  Goal: Discharge Planning  Outcome: Resolved/Met  Goal: Medications  Outcome: Resolved/Met  Goal: Respiratory  Outcome: Resolved/Met  Goal: Treatments/Interventions/Procedures  Outcome: Resolved/Met  Goal: Psychosocial  Outcome: Resolved/Met  Goal: *Verbalize description of procedure  Outcome: Resolved/Met  Goal: *Consent signed  Outcome: Resolved/Met     Problem: Pacer/ICD: Post-Procedure  Goal: Off Pathway (Use only if patient is Off Pathway)  Outcome: Resolved/Met  Goal: Activity/Safety  Outcome: Resolved/Met  Goal: Consults, if ordered  Outcome: Resolved/Met  Goal: Diagnostic Test/Procedures  Outcome: Resolved/Met  Goal: Nutrition/Diet  Outcome: Resolved/Met  Goal: Discharge Planning  Outcome: Resolved/Met  Goal: Medications  Outcome: Resolved/Met  Goal: Respiratory  Outcome: Resolved/Met  Goal: Treatments/Interventions/Procedures  Outcome: Resolved/Met  Goal: Psychosocial  Outcome: Resolved/Met  Goal: *Hemodynamically stable  Outcome: Resolved/Met  Goal: *Optimal pain control at patient's stated goal  Outcome: Resolved/Met  Goal: *Absence of signs and symptoms of infection or wound complication  Outcome: Resolved/Met     Problem: Pacer/ICD: Day 1  Goal: Off Pathway (Use only if patient is Off Pathway)  Outcome: Resolved/Met  Goal: Activity/Safety  Outcome: Resolved/Met  Goal: Diagnostic Test/Procedures  Outcome: Resolved/Met  Goal: Nutrition/Diet  Outcome: Resolved/Met  Goal: Discharge Planning  Outcome: Resolved/Met  Goal: Medications  Outcome: Resolved/Met  Goal: Respiratory  Outcome: Resolved/Met  Goal: Treatments/Interventions/Procedures  Outcome: Resolved/Met  Goal: Psychosocial  Outcome: Resolved/Met     Problem: Pacer/ICD: Discharge Outcomes  Goal: *Hemodynamically stable  Outcome: Resolved/Met  Goal: *Stable cardiac rhythm  Outcome: Resolved/Met  Goal: *Lungs clear or at baseline  Outcome: Resolved/Met  Goal: *Demonstrates ability to perform prescribed activity without shortness of breath or discomfort  Outcome: Resolved/Met  Goal: *Verbalizes home exercise program, activity guidelines, cardiac precautions  Outcome: Resolved/Met  Goal: *Verbalizes understanding and describes prescribed diet  Outcome: Resolved/Met  Goal: *Verbalizes understanding and describes medication purposes and frequencies  Outcome: Resolved/Met  Goal: *Identifies cardiac risk factors  Outcome: Resolved/Met  Goal: *No signs and symptoms of infection or wound complications  Outcome: Resolved/Met  Goal: *Anxiety reduced or absent  Outcome: Resolved/Met  Goal: *Understands and describes signs and symptoms to report to providers(Stroke Metric)  Outcome: Resolved/Met  Goal: *Describes follow-up/return visits to physicians  Outcome: Resolved/Met  Goal: *Describes available resources and support systems  Outcome: Resolved/Met  Goal: *Influenza immunization  Outcome: Resolved/Met  Goal: *Pneumococcal immunization  Outcome: Resolved/Met  Goal: *Optimal pain control at patient's stated goal  Outcome: Resolved/Met

## 2020-10-01 PROBLEM — M47.817 SPONDYLOSIS OF LUMBOSACRAL REGION WITHOUT MYELOPATHY OR RADICULOPATHY: Status: ACTIVE | Noted: 2017-11-17

## 2020-10-01 PROBLEM — M48.062 SPINAL STENOSIS OF LUMBAR REGION WITH NEUROGENIC CLAUDICATION: Status: ACTIVE | Noted: 2017-11-17

## 2020-10-09 ENCOUNTER — OFFICE VISIT (OUTPATIENT)
Dept: CARDIOLOGY CLINIC | Age: 80
End: 2020-10-09

## 2020-10-09 VITALS
OXYGEN SATURATION: 97 % | DIASTOLIC BLOOD PRESSURE: 82 MMHG | TEMPERATURE: 96.9 F | SYSTOLIC BLOOD PRESSURE: 134 MMHG | RESPIRATION RATE: 16 BRPM | BODY MASS INDEX: 26.23 KG/M2 | HEIGHT: 65 IN | WEIGHT: 157.4 LBS | HEART RATE: 65 BPM

## 2020-10-09 DIAGNOSIS — N18.30 STAGE 3 CHRONIC KIDNEY DISEASE, UNSPECIFIED WHETHER STAGE 3A OR 3B CKD (HCC): ICD-10-CM

## 2020-10-09 DIAGNOSIS — E11.21 TYPE 2 DIABETES WITH NEPHROPATHY (HCC): ICD-10-CM

## 2020-10-09 DIAGNOSIS — I50.43 ACUTE ON CHRONIC COMBINED SYSTOLIC AND DIASTOLIC CONGESTIVE HEART FAILURE (HCC): Primary | ICD-10-CM

## 2020-10-09 DIAGNOSIS — I48.0 PAF (PAROXYSMAL ATRIAL FIBRILLATION) (HCC): ICD-10-CM

## 2020-10-09 DIAGNOSIS — I42.0 DILATED CARDIOMYOPATHY (HCC): ICD-10-CM

## 2020-10-09 DIAGNOSIS — I49.5 SSS (SICK SINUS SYNDROME) (HCC): ICD-10-CM

## 2020-10-09 DIAGNOSIS — Z95.810 AICD (AUTOMATIC CARDIOVERTER/DEFIBRILLATOR) PRESENT: ICD-10-CM

## 2020-10-09 DIAGNOSIS — E78.00 HYPERCHOLESTEROLEMIA: ICD-10-CM

## 2020-10-09 NOTE — PROGRESS NOTES
Identified pt with two pt identifiers(name and ). Reviewed record in preparation for visit and have obtained necessary documentation. Chief Complaint   Patient presents with    CHF      Vitals:    10/09/20 0919   BP: 134/82   Pulse: 65   Resp: 16   Temp: 96.9 °F (36.1 °C)   TempSrc: Temporal   SpO2: 97%   Weight: 157 lb 6.4 oz (71.4 kg)   Height: 5' 5\" (1.651 m)   PainSc:   0 - No pain       Health Maintenance Review: Patient reminded of \"due or due soon\" health maintenance. I have asked the patient to contact his/her primary care provider (PCP) for follow-up on his/her health maintenance. Coordination of Care Questionnaire:  :   1) Have you been to an emergency room, urgent care, or hospitalized since your last visit? If yes, where when, and reason for visit? no       2. Have seen or consulted any other health care provider since your last visit? If yes, where when, and reason for visit? NO      Patient is accompanied by self I have received verbal consent from Roselinda Nageotte to discuss any/all medical information while they are present in the room.

## 2020-10-09 NOTE — PROGRESS NOTES
Edmond Hill is a 79 y.o. female is here for  f/u. Multiple medical problems including DM II, hypertension, CKD III, dyslipidemia, DJD, lumbar stenosis/radiculopathy, chronic pain, tobacco, leukocytosis, PAF during hospitalization with encephalopathy 10/17 (at [facility]), followed by Reno Coyne at [facility]. Seen OV 6/29/20 with brief intermittent CP--atypical, EKG abnormal with NSST and sent for Lexiscan MPI 7/29/20:  · Baseline ECG: Normal EKG, PACs, non-specific ST-T wave abnormalities. · Inconclusive stress test.  · EKG stress test results correlate with an intermediate risk of inducible myocardial ischemia. · Non-limiting dyspnea, no EKG changes  · Nuclear images reported seperately  · FINDINGS: The rest and stress perfusion images a fixed defect in the inferior wall compatible with infarct. No reversible abnormality is seen to suggest myocardium at ischemic risk. The gated images demonstrate global hypokinesia and inferior akinesia. Left ventricular ejection fraction is 31 %. Impression: Fixed defect in the inferior wall is compatible with infarct. No evidence of myocardium at ischemic risk. Left ventricular ejection fraction is 31 %. Global hypokinesia and inferior akinesia. +STANTON, some fatigue, brief intermittent chest pain--not clearly exertional.  No recent Echo, had prior Echo 10/17 with LVEF 55%. No recurrence of afib known since 10/17. Seen in office here 9/10/20 with CHF--cardizem changed to beta blocker, on ARB, lasix. Developed worsening SOB, sx of CHF--admitted to [facility], BNP elevated, trop mildly increased. Seen by Cardiology in hospital, diurested, meds adjusted. Repeat cardiac testing:  LEXISCAN MPI 9/22/20:  · Baseline ECG: Normal EKG, rare PVCs. · There was no ST segment deviation noted during stress. · FINDINGS: The rest and stress perfusion images demonstrate left ventricular dilatation. There is a fixed defect in the inferior wall compatible with infarct.  No evidence of reversibility to suggest myocardium at ischemic risk. The gated images demonstrate global hypokinesia and inferior akinesia. Left ventricular ejection fraction is 32 %. Impression: Unchanged fixed defect in the inferior wall compatible with infarct. No evidence to suggest myocardium at ischemic risk. Left ventricular dilatation. Left ventricular ejection fraction is 32 %. Global hypokinesia and inferior akinesia. ECHO 9/22/20:    · LV: Estimated LVEF is 25 - 30%. Moderately dilated left ventricle. Mild concentric hypertrophy. Moderate-to-severely reduced systolic function. · Pericardium: Trivial-to-small pericardial effusion. · LA: Moderately dilated left atrium. · MV: Mild to moderate mitral valve regurgitation is present. Had AICD/PPM placed by Dr. Marita Laguerre on 1/55/24 without complication. Currently stable STANTON, overall ok. The patient denies  orthopnea, PND, LE edema, , syncope, presyncope.          Patient Active Problem List    Diagnosis Date Noted    Dilated cardiomyopathy ([address] 09/23/2020    SSS (sick sinus syndrome) ([address] 09/23/2020    Cardiomyopathy ([address] 09/23/2020    AICD (automatic cardioverter/defibrillator) present 09/23/2020    Respiratory failure ([address] 09/18/2020    CHF (congestive heart failure) ([address] 09/18/2020    Hypoxia 09/18/2020    Bilateral pleural effusion 09/18/2020    Elevated troponin 09/18/2020    PAF (paroxysmal atrial fibrillation) ([address] 09/10/2020    CKD (chronic kidney disease) stage 3, GFR 30-59 ml/min 06/04/2019    Lymphocytosis 05/07/2018    Type 2 diabetes with nephropathy ([address] 02/15/2018    Type 2 diabetes mellitus with diabetic neuropathy ([address] 02/15/2018    Spinal stenosis of lumbar region with neurogenic claudication 11/17/2017    Spondylosis of lumbosacral region without myelopathy or radiculopathy 11/17/2017    Overdose of opiate or related narcotic, accidental or unintentional, subsequent encounter 10/27/2017    Acute left-sided low back pain with sciatica 10/27/2017    Physical debility 10/27/2017    Acute encephalopathy 10/23/2017    Hyperkalemia 10/20/2017    Altered mental status 10/20/2017    Transaminitis 10/20/2017    Acute renal failure (ARF) ([facility] 10/20/2017    Diverticulitis large intestine w/o perforation or abscess w/o bleeding 07/06/2017    SOB (shortness of breath) 06/21/2017    Abdominal pain, generalized 06/21/2017    Diarrhea in adult patient 06/21/2017    Cigarette smoker 05/79/3581    Neutrophilic leukocytosis 91/48/9415    Chronic pain     Hypercholesterolemia     Arthritis     Diabetes ([facility]     IBS (irritable bowel syndrome)     Hemorrhoid       Samaria Baird NP  Past Medical History:   Diagnosis Date    A-fib [facility])     AICD (automatic cardioverter/defibrillator) present 9/23/2020 9/23/2020 [employer] AICD implant    Arthritis     Bilateral pleural effusion 9/18/2020    Chronic pain     Chronic pain     Diabetes ([facility]     Diverticular disease     Elevated troponin 9/18/2020    Hemorrhoid     Hypercholesterolemia     IBS (irritable bowel syndrome)     Lymphocytosis 05/07/2018      Past Surgical History:   Procedure Laterality Date    COLONOSCOPY N/A 10/31/2019    COLONOSCOPY performed by Elizabeth Saldivar [facility] at [facility]  10/31/2019         Jose R Gayleling  10/31/2019         HX CATARACT REMOVAL      HX CHOLECYSTECTOMY      HX COLONOSCOPY  2009    HX COLONOSCOPY  2016    2 adenomatous polyp    HX HEMORRHOIDECTOMY  2009    HX HYSTERECTOMY      HX KNEE REPLACEMENT      right    HX ORTHOPAEDIC  12/11/2017    back, laminectomy and decompression    OH INSJ/RPLCMT PERM DFB W/TRNSVNS LDS 1/DUAL CHMBR N/A 9/23/2020    INSERT ICD DUAL performed by Sherry Goss MD at [facility]     Allergies   Allergen Reactions    Morphine Anaphylaxis    Ultram [Tramadol] Palpitations      Family History   Problem Relation Age of Onset    Colon Cancer Father     Diabetes Mother       Social History     Socioeconomic History    Marital status:      Spouse name: Not on file    Number of children: Not on file    Years of education: Not on file    Highest education level: Not on file   Occupational History    Occupation: retired     Comment: LPN   Social Needs    Financial resource strain: Not on file    Food insecurity     Worry: Not on file     Inability: Not on file   [employer] needs     Medical: Not on file     Non-medical: Not on file   Tobacco Use    Smoking status: Current Every Day Smoker     Packs/day: 1.00     Years: 55.00     Pack years: 55.00     Types: Cigarettes    Smokeless tobacco: Never Used   Substance and Sexual Activity    Alcohol use: No    Drug use: Never    Sexual activity: Not on file   Lifestyle    Physical activity     Days per week: Not on file     Minutes per session: Not on file    Stress: Not on file   Relationships    Social connections     Talks on phone: Not on file     Gets together: Not on file     Attends Mandaen service: Not on file     Active member of club or organization: Not on file     Attends meetings of clubs or organizations: Not on file     Relationship status: Not on file    Intimate partner violence     Fear of current or ex partner: Not on file     Emotionally abused: Not on file     Physically abused: Not on file     Forced sexual activity: Not on file   Other Topics Concern     Service No    Blood Transfusions Yes    Caffeine Concern Yes    Occupational Exposure No    Hobby Hazards No    Sleep Concern Yes    Stress Concern Yes    Weight Concern No    Special Diet No    Back Care Yes    Exercise No    Bike Helmet No     Comment: n/a    Seat Belt Yes    Self-Exams Yes   Social History Narrative    Not on file      Current Outpatient Medications   Medication Sig    colestipoL (Colestid) 1 gram tablet Take 1 Tab by mouth two (2) times a day.     furosemide (LASIX) 40 mg tablet Take 1 Tab by mouth daily.  carvediloL (COREG) 25 mg tablet Take 1 Tab by mouth two (2) times daily (with meals).  atorvastatin (LIPITOR) 20 mg tablet Take 1 Tab by mouth nightly.  vit A/vit C/vit E/zinc/copper (ICAPS AREDS PO) Take  by mouth two (2) times a day.  loperamide (IMMODIUM) 2 mg tablet Take 2 mg by mouth as needed for Diarrhea.  aspirin delayed-release 81 mg tablet Take 1 Tab by mouth daily.  insulin glargine (Lantus Solostar U-100 Insulin) 100 unit/mL (3 mL) inpn INJECT SUBCUTANEOUSLY 45  UNITS DAILY    valsartan-hydroCHLOROthiazide (DIOVAN-HCT) 160-12.5 mg per tablet TAKE 1 TABLET BY MOUTH  DAILY    Januvia 50 mg tablet TAKE 1 TABLET BY MOUTH  DAILY    ergocalciferol (ERGOCALCIFEROL) 1,250 mcg (50,000 unit) capsule Take 1 Cap by mouth every seven (7) days. Indications: low vitamin D levels    vit C/E/Zn/coppr/lutein/zeaxan (PRESERVISION AREDS-2 PO) Take  by mouth daily.  carboxymethylcellulose sodium (REFRESH TEARS OP) Apply  to eye daily. 2-4 drops both eyes    venlafaxine (EFFEXOR) 75 mg tablet TAKE 1 TABLET BY MOUTH TWO  TIMES DAILY WITH MEALS (Patient taking differently: Takes 1/2 BID)    glyBURIDE (DIABETA) 5 mg tablet TAKE 1 TABLET BY MOUTH TWO  TIMES DAILY WITH MEALS    OTHER,NON-FORMULARY, Take 1 Tab by mouth daily. Tumeric curcumin C3 complex    B.infantis-B.ani-B.long-B.bifi (PROBIOTIC 4X) 10-15 mg TbEC Take  by mouth daily.  acetaminophen (TYLENOL) 650 mg TbER Take 650 mg by mouth every eight (8) hours.  omega-3 fatty acids-vitamin e 1,000 mg cap Take 1 Cap by mouth two (2) times a day. 32a day     ascorbic acid, vitamin C, (VITAMIN C) 500 mg tablet Take 500 mg by mouth daily.  zinc 50 mg tab tablet Take  by mouth daily. No current facility-administered medications for this visit. Review of Symptoms:    CONST  No weight change.  No fever, chills, sweats    ENT No visual changes, URI sx, sore throat    CV  See HPI   RESP No cough, or sputum, wheezing. Also see HPI   GI  No abdominal pain or change in bowel habits. No heartburn or dysphagia. No melena or rectal bleeding.   No dysuria, urgency, frequency, hematuria   MSKEL  No joint pain, swelling. No muscle pain. SKIN  No rash or lesions. NEURO  No headache, syncope, or seizure. No weakness, loss of sensation, or paresthesias. PSYCH  No low mood or depression  No anxiety. HE/LYMPH  No easy bruising, abnormal bleeding, or enlarged glands. Physical ExamPhysical Exam:    Visit Vitals  /82 (BP 1 Location: Right arm, BP Patient Position: Sitting)   Pulse 65   Temp 96.9 °F (36.1 °C) (Temporal)   Resp 16   Ht 5' 5\" (1.651 m)   Wt 157 lb 6.4 oz (71.4 kg)   SpO2 97%   BMI 26.19 kg/m²     Gen: NAD  HEENT:  PERRL, throat clear  Neck: no adenopathy, no thyromegaly, no JVD   Heart:  Regular,Nl S1S2,  no murmur, gallop or rub. Lungs:  clear  Abdomen:   Soft, non-tender, bowel sounds are active.    Extremities:  No edema  Pulse: symmetric  Neuro: A&O times 3, No focal neuro deficits      Labs:   Lab Results   Component Value Date/Time    Sodium 139 09/23/2020 04:00 AM    Sodium 139 09/22/2020 04:00 AM    Sodium 135 (L) 09/21/2020 12:43 AM    Sodium 136 09/20/2020 11:04 AM    Sodium 139 09/19/2020 02:02 AM    Potassium 4.5 09/23/2020 04:00 AM    Potassium 4.0 09/22/2020 04:00 AM    Potassium 5.1 09/21/2020 12:43 AM    Potassium 5.0 09/20/2020 11:04 AM    Potassium 3.9 09/19/2020 02:02 AM    Chloride 106 09/23/2020 04:00 AM    Chloride 107 09/22/2020 04:00 AM    Chloride 104 09/21/2020 12:43 AM    Chloride 106 09/20/2020 11:04 AM    Chloride 107 09/19/2020 02:02 AM    CO2 27 09/23/2020 04:00 AM    CO2 30 09/22/2020 04:00 AM    CO2 25 09/21/2020 12:43 AM    CO2 30 09/20/2020 11:04 AM    CO2 26 09/19/2020 02:02 AM    Anion gap 6 09/23/2020 04:00 AM    Anion gap 2 (L) 09/22/2020 04:00 AM    Anion gap 6 09/21/2020 12:43 AM    Anion gap 0 (L) 09/20/2020 11:04 AM Anion gap 6 09/19/2020 02:02 AM    Glucose 113 (H) 09/23/2020 04:00 AM    Glucose 94 09/22/2020 04:00 AM    Glucose 199 (H) 09/21/2020 12:43 AM    Glucose 178 (H) 09/20/2020 11:04 AM    Glucose 127 (H) 09/19/2020 02:02 AM    BUN 38 (H) 09/23/2020 04:00 AM    BUN 37 (H) 09/22/2020 04:00 AM    BUN 48 (H) 09/21/2020 12:43 AM    BUN 42 (H) 09/20/2020 11:04 AM    BUN 49 (H) 09/19/2020 02:02 AM    Creatinine 1.52 (H) 09/23/2020 04:00 AM    Creatinine 1.50 (H) 09/22/2020 04:00 AM    Creatinine 1.59 (H) 09/21/2020 12:43 AM    Creatinine 1.52 (H) 09/20/2020 11:04 AM    Creatinine 1.84 (H) 09/19/2020 02:02 AM    BUN/Creatinine ratio 25 (H) 09/23/2020 04:00 AM    BUN/Creatinine ratio 25 (H) 09/22/2020 04:00 AM    BUN/Creatinine ratio 30 (H) 09/21/2020 12:43 AM    BUN/Creatinine ratio 28 (H) 09/20/2020 11:04 AM    BUN/Creatinine ratio 27 (H) 09/19/2020 02:02 AM    GFR est AA 40 (L) 09/23/2020 04:00 AM    GFR est AA 40 (L) 09/22/2020 04:00 AM    GFR est AA 38 (L) 09/21/2020 12:43 AM    GFR est AA 40 (L) 09/20/2020 11:04 AM    GFR est AA 32 (L) 09/19/2020 02:02 AM    GFR est non-AA 33 (L) 09/23/2020 04:00 AM    GFR est non-AA 33 (L) 09/22/2020 04:00 AM    GFR est non-AA 31 (L) 09/21/2020 12:43 AM    GFR est non-AA 33 (L) 09/20/2020 11:04 AM    GFR est non-AA 26 (L) 09/19/2020 02:02 AM    Calcium 10.3 (H) 09/23/2020 04:00 AM    Calcium 10.1 09/22/2020 04:00 AM    Calcium 9.4 09/21/2020 12:43 AM    Calcium 9.6 09/20/2020 11:04 AM    Calcium 9.4 09/19/2020 02:02 AM    Bilirubin, total 0.3 09/19/2020 02:02 AM    Bilirubin, total 0.2 09/17/2020 11:45 PM    Bilirubin, total <0.2 06/29/2020 12:03 PM    Bilirubin, total <0.2 02/03/2020 03:52 PM    Bilirubin, total <0.2 10/04/2019 08:26 AM    Alk. phosphatase 73 09/19/2020 02:02 AM    Alk. phosphatase 77 09/17/2020 11:45 PM    Alk. phosphatase 93 06/29/2020 12:03 PM    Alk. phosphatase 100 02/03/2020 03:52 PM    Alk.  phosphatase 83 10/04/2019 08:26 AM    Protein, total 7.0 09/19/2020 02:02 AM    Protein, total 7.3 09/17/2020 11:45 PM    Protein, total 6.9 06/29/2020 12:03 PM    Protein, total 7.7 02/03/2020 03:52 PM    Protein, total 7.5 10/04/2019 08:26 AM    Albumin 2.9 (L) 09/19/2020 02:02 AM    Albumin 3.2 (L) 09/17/2020 11:45 PM    Albumin 4.2 06/29/2020 12:03 PM    Albumin 4.6 02/03/2020 03:52 PM    Albumin 4.3 10/04/2019 08:26 AM    Globulin 4.1 (H) 09/19/2020 02:02 AM    Globulin 4.1 (H) 09/17/2020 11:45 PM    Globulin 3.8 10/25/2017 12:00 AM    Globulin 3.6 10/22/2017 03:56 AM    Globulin 3.9 10/21/2017 12:47 AM    A-G Ratio 0.7 (L) 09/19/2020 02:02 AM    A-G Ratio 0.8 (L) 09/17/2020 11:45 PM    A-G Ratio 1.6 06/29/2020 12:03 PM    A-G Ratio 1.5 02/03/2020 03:52 PM    A-G Ratio 1.3 10/04/2019 08:26 AM    ALT (SGPT) 39 09/19/2020 02:02 AM    ALT (SGPT) 60 09/17/2020 11:45 PM    ALT (SGPT) 18 06/29/2020 12:03 PM    ALT (SGPT) 19 02/03/2020 03:52 PM    ALT (SGPT) 27 10/04/2019 08:26 AM     Lab Results   Component Value Date/Time    CK 1,206 (H) 10/22/2017 03:56 AM     Lab Results   Component Value Date/Time    Cholesterol, total 185 06/29/2020 12:03 PM    Cholesterol, total 217 (H) 02/03/2020 03:52 PM    Cholesterol, total 169 06/04/2019 03:57 PM    Cholesterol, total 214 (H) 10/03/2018 04:30 PM    Cholesterol, total 189 05/03/2018 02:59 PM    HDL Cholesterol 36 (L) 06/29/2020 12:03 PM    HDL Cholesterol 44 02/03/2020 03:52 PM    HDL Cholesterol 37 (L) 06/04/2019 03:57 PM    HDL Cholesterol 39 (L) 10/03/2018 04:30 PM    HDL Cholesterol 43 05/03/2018 02:59 PM    LDL, calculated Comment 06/29/2020 12:03 PM    LDL, calculated 106 (H) 02/03/2020 03:52 PM    LDL, calculated 75 06/04/2019 03:57 PM    LDL, calculated 101 (H) 10/03/2018 04:30 PM    LDL, calculated 78 05/03/2018 02:59 PM    Triglyceride 412 (H) 06/29/2020 12:03 PM    Triglyceride 336 (H) 02/03/2020 03:52 PM    Triglyceride 283 (H) 06/04/2019 03:57 PM    Triglyceride 369 (H) 10/03/2018 04:30 PM    Triglyceride 338 (H) 05/03/2018 02:59 PM     No results found for this or any previous visit.     Assessment:         Patient Active Problem List    Diagnosis Date Noted    Dilated cardiomyopathy ([facility] 09/23/2020    SSS (sick sinus syndrome) ([address] 09/23/2020    Cardiomyopathy ([facility] 09/23/2020    AICD (automatic cardioverter/defibrillator) present 09/23/2020    Respiratory failure ([address] 09/18/2020    CHF (congestive heart failure) ([facility] 09/18/2020    Hypoxia 09/18/2020    Bilateral pleural effusion 09/18/2020    Elevated troponin 09/18/2020    PAF (paroxysmal atrial fibrillation) ([address] 09/10/2020    CKD (chronic kidney disease) stage 3, GFR 30-59 ml/min 06/04/2019    Lymphocytosis 05/07/2018    Type 2 diabetes with nephropathy ([address] 02/15/2018    Type 2 diabetes mellitus with diabetic neuropathy ([facility] 02/15/2018    Spinal stenosis of lumbar region with neurogenic claudication 11/17/2017    Spondylosis of lumbosacral region without myelopathy or radiculopathy 11/17/2017    Overdose of opiate or related narcotic, accidental or unintentional, subsequent encounter 10/27/2017    Acute left-sided low back pain with sciatica 10/27/2017    Physical debility 10/27/2017    Acute encephalopathy 10/23/2017    Hyperkalemia 10/20/2017    Altered mental status 10/20/2017    Transaminitis 10/20/2017    Acute renal failure (ARF) ([address] 10/20/2017    Diverticulitis large intestine w/o perforation or abscess w/o bleeding 07/06/2017    SOB (shortness of breath) 06/21/2017    Abdominal pain, generalized 06/21/2017    Diarrhea in adult patient 06/21/2017    Cigarette smoker 36/28/2120    Neutrophilic leukocytosis 66/14/3801    Chronic pain     Hypercholesterolemia     Arthritis     Diabetes (HCC)     IBS (irritable bowel syndrome)     Hemorrhoid       Multiple medical problems including DM II, hypertension, CKD III, dyslipidemia, DJD, lumbar stenosis/radiculopathy, chronic pain, tobacco, leukocytosis, PAF during hospitalization with encephalopathy 10/17 (at [facility]), followed by Naveen Mckeon at [facility]. Seen OV 6/29/20 with brief intermittent CP--atypical, EKG abnormal with NSST and sent for Lexiscan MPI 7/29/20:  · Baseline ECG: Normal EKG, PACs, non-specific ST-T wave abnormalities. · Inconclusive stress test.  · EKG stress test results correlate with an intermediate risk of inducible myocardial ischemia. · Non-limiting dyspnea, no EKG changes  · Nuclear images reported seperately  · FINDINGS: The rest and stress perfusion images a fixed defect in the inferior wall compatible with infarct. No reversible abnormality is seen to suggest myocardium at ischemic risk. The gated images demonstrate global hypokinesia and inferior akinesia. Left ventricular ejection fraction is 31 %. Impression: Fixed defect in the inferior wall is compatible with infarct. No evidence of myocardium at ischemic risk. Left ventricular ejection fraction is 31 %. Global hypokinesia and inferior akinesia. +STANTON, some fatigue, brief intermittent chest pain--not clearly exertional.  No recent Echo, had prior Echo 10/17 with LVEF 55%. No recurrence of afib known since 10/17. Seen in office here 9/10/20 with CHF--cardizem changed to beta blocker, on ARB, lasix. Developed worsening SOB, sx of CHF--admitted to [facility], BNP elevated, trop mildly increased. Seen by Cardiology in hospital, diurested, meds adjusted. Repeat cardiac testing:  LEXISCAN MPI 9/22/20:  · Baseline ECG: Normal EKG, rare PVCs. · There was no ST segment deviation noted during stress. · FINDINGS: The rest and stress perfusion images demonstrate left ventricular dilatation. There is a fixed defect in the inferior wall compatible with infarct. No evidence of reversibility to suggest myocardium at ischemic risk. The gated images demonstrate global hypokinesia and inferior akinesia. Left ventricular ejection fraction is 32 %.  Impression: Unchanged fixed defect in the inferior wall compatible with infarct. No evidence to suggest myocardium at ischemic risk. Left ventricular dilatation. Left ventricular ejection fraction is 32 %. Global hypokinesia and inferior akinesia. ECHO 9/22/20:    · LV: Estimated LVEF is 25 - 30%. Moderately dilated left ventricle. Mild concentric hypertrophy. Moderate-to-severely reduced systolic function. · Pericardium: Trivial-to-small pericardial effusion. · LA: Moderately dilated left atrium. · MV: Mild to moderate mitral valve regurgitation is present. Had AICD/PPM placed by Dr. Luke Sommer on 4/50/37 without complication. Currently stable STANTON, overall ok. The patient denies  orthopnea, PND, LE edema, , syncope, presyncope.             Plan:     Curently stable on appropriate GDMT  PPM/AICD followup next week as planned  Fu with PCP as planned  RTC 3 mos, sooner isidoro Tristan MD

## 2020-10-12 ENCOUNTER — DOCUMENTATION ONLY (OUTPATIENT)
Dept: CARDIOLOGY CLINIC | Age: 80
End: 2020-10-12

## 2020-10-12 NOTE — PROGRESS NOTES
Pt returned Holter monitor on 10/9/20 (applied on 9/17/20 and was to be returned the following week). Pt was admitted to the hospital the day after HM appliation. No data found on HM. Discussed with Dr. Kuldip Argueta. Per Dr. Kuldip Argueta: PM/AICD placed during last admission. APPT w/ pm clinic tomorrow (histogram will be reviewed). No need to repeat HM.     Future Appointments      Provider  Marty Bautista    10/13/2020 10:00 AM  PACEMAKER, [facility]  BS AMB    10/29/2020 9:30 AM  Hair Colmenares NP  [address]    1/11/2021 10:30 AM  Arturo Mckeon MD  [facility] [place]  BS AMB

## 2020-10-13 ENCOUNTER — CLINICAL SUPPORT (OUTPATIENT)
Dept: CARDIOLOGY CLINIC | Age: 80
End: 2020-10-13
Payer: MEDICARE

## 2020-10-13 DIAGNOSIS — I42.0 DILATED CARDIOMYOPATHY (HCC): ICD-10-CM

## 2020-10-13 DIAGNOSIS — Z95.810 AICD (AUTOMATIC CARDIOVERTER/DEFIBRILLATOR) PRESENT: Primary | ICD-10-CM

## 2020-10-13 PROCEDURE — 93283 PRGRMG EVAL IMPLANTABLE DFB: CPT | Performed by: INTERNAL MEDICINE

## 2020-10-13 RX ORDER — FUROSEMIDE 40 MG/1
40 TABLET ORAL DAILY
Qty: 90 TAB | Refills: 1 | Status: SHIPPED | OUTPATIENT
Start: 2020-10-13 | End: 2021-01-25

## 2020-10-13 RX ORDER — CARVEDILOL 25 MG/1
25 TABLET ORAL 2 TIMES DAILY WITH MEALS
Qty: 180 TAB | Refills: 1 | Status: SHIPPED | OUTPATIENT
Start: 2020-10-13 | End: 2021-02-21 | Stop reason: SDUPTHER

## 2020-10-13 NOTE — PROGRESS NOTES
Patient feels well following ICD implantation. Subclavian ICD site approximated well, no discharge. No erythema or heat. Device interrogation WNL. Follow up as planned in 3 mo. Chargeable visit. See scanned ICD report in chart.       Hong Reynaga RN

## 2020-10-20 RX ORDER — GLIPIZIDE 5 MG/1
5 TABLET ORAL 2 TIMES DAILY
Qty: 180 TAB | Refills: 1 | Status: SHIPPED | OUTPATIENT
Start: 2020-10-20 | End: 2021-04-03

## 2020-12-11 ENCOUNTER — TELEPHONE (OUTPATIENT)
Dept: CARDIOLOGY CLINIC | Age: 80
End: 2020-12-11

## 2021-01-19 ENCOUNTER — OFFICE VISIT (OUTPATIENT)
Dept: PRIMARY CARE CLINIC | Age: 81
End: 2021-01-19

## 2021-01-19 DIAGNOSIS — Z20.822 ENCOUNTER FOR LABORATORY TESTING FOR COVID-19 VIRUS: Primary | ICD-10-CM

## 2021-01-19 NOTE — PROGRESS NOTES
Pt presents to the flu clinic for covid testing per Jenelle Rm NP. Pt reports mild sx and possible exposure.  RPG  Verbal consent received to perform test.

## 2021-01-25 RX ORDER — FUROSEMIDE 40 MG/1
TABLET ORAL
Qty: 90 TAB | Refills: 1 | Status: SHIPPED | OUTPATIENT
Start: 2021-01-25 | End: 2021-06-15

## 2021-01-28 ENCOUNTER — TELEPHONE (OUTPATIENT)
Dept: CARDIOLOGY CLINIC | Age: 81
End: 2021-01-28

## 2021-02-21 RX ORDER — CARVEDILOL 25 MG/1
25 TABLET ORAL 2 TIMES DAILY WITH MEALS
Qty: 28 TAB | Refills: 0 | Status: SHIPPED | OUTPATIENT
Start: 2021-02-21 | End: 2021-11-08

## 2021-02-21 RX ORDER — CARVEDILOL 25 MG/1
25 TABLET ORAL 2 TIMES DAILY WITH MEALS
Qty: 180 TAB | Refills: 3 | Status: SHIPPED | OUTPATIENT
Start: 2021-02-21 | End: 2021-02-21 | Stop reason: SDUPTHER

## 2021-02-23 ENCOUNTER — CLINICAL SUPPORT (OUTPATIENT)
Dept: CARDIOLOGY CLINIC | Age: 81
End: 2021-02-23

## 2021-02-23 ENCOUNTER — OFFICE VISIT (OUTPATIENT)
Dept: CARDIOLOGY CLINIC | Age: 81
End: 2021-02-23
Payer: MEDICARE

## 2021-02-23 VITALS
HEART RATE: 68 BPM | RESPIRATION RATE: 18 BRPM | SYSTOLIC BLOOD PRESSURE: 108 MMHG | BODY MASS INDEX: 26.49 KG/M2 | HEIGHT: 65 IN | DIASTOLIC BLOOD PRESSURE: 60 MMHG | OXYGEN SATURATION: 96 % | WEIGHT: 159 LBS

## 2021-02-23 DIAGNOSIS — N18.30 STAGE 3 CHRONIC KIDNEY DISEASE, UNSPECIFIED WHETHER STAGE 3A OR 3B CKD (HCC): ICD-10-CM

## 2021-02-23 DIAGNOSIS — I10 ESSENTIAL HYPERTENSION: ICD-10-CM

## 2021-02-23 DIAGNOSIS — Z95.810 AICD (AUTOMATIC CARDIOVERTER/DEFIBRILLATOR) PRESENT: Primary | ICD-10-CM

## 2021-02-23 DIAGNOSIS — E11.21 TYPE 2 DIABETES WITH NEPHROPATHY (HCC): ICD-10-CM

## 2021-02-23 DIAGNOSIS — I42.0 DILATED CARDIOMYOPATHY (HCC): ICD-10-CM

## 2021-02-23 DIAGNOSIS — I48.0 PAF (PAROXYSMAL ATRIAL FIBRILLATION) (HCC): Primary | ICD-10-CM

## 2021-02-23 DIAGNOSIS — I49.5 SSS (SICK SINUS SYNDROME) (HCC): ICD-10-CM

## 2021-02-23 DIAGNOSIS — I50.22 CHRONIC SYSTOLIC CONGESTIVE HEART FAILURE (HCC): ICD-10-CM

## 2021-02-23 PROCEDURE — 1090F PRES/ABSN URINE INCON ASSESS: CPT | Performed by: INTERNAL MEDICINE

## 2021-02-23 PROCEDURE — G8427 DOCREV CUR MEDS BY ELIG CLIN: HCPCS | Performed by: INTERNAL MEDICINE

## 2021-02-23 PROCEDURE — 99215 OFFICE O/P EST HI 40 MIN: CPT | Performed by: INTERNAL MEDICINE

## 2021-02-23 PROCEDURE — 1100F PTFALLS ASSESS-DOCD GE2>/YR: CPT | Performed by: INTERNAL MEDICINE

## 2021-02-23 PROCEDURE — G8510 SCR DEP NEG, NO PLAN REQD: HCPCS | Performed by: INTERNAL MEDICINE

## 2021-02-23 PROCEDURE — G8754 DIAS BP LESS 90: HCPCS | Performed by: INTERNAL MEDICINE

## 2021-02-23 PROCEDURE — 3288F FALL RISK ASSESSMENT DOCD: CPT | Performed by: INTERNAL MEDICINE

## 2021-02-23 PROCEDURE — G8536 NO DOC ELDER MAL SCRN: HCPCS | Performed by: INTERNAL MEDICINE

## 2021-02-23 PROCEDURE — G8752 SYS BP LESS 140: HCPCS | Performed by: INTERNAL MEDICINE

## 2021-02-23 PROCEDURE — G8399 PT W/DXA RESULTS DOCUMENT: HCPCS | Performed by: INTERNAL MEDICINE

## 2021-02-23 PROCEDURE — G8419 CALC BMI OUT NRM PARAM NOF/U: HCPCS | Performed by: INTERNAL MEDICINE

## 2021-02-23 PROCEDURE — 93283 PRGRMG EVAL IMPLANTABLE DFB: CPT | Performed by: INTERNAL MEDICINE

## 2021-02-23 RX ORDER — VALSARTAN AND HYDROCHLOROTHIAZIDE 160; 12.5 MG/1; MG/1
0.5 TABLET, FILM COATED ORAL DAILY
Qty: 90 TAB | Refills: 3
Start: 2021-02-23 | End: 2021-03-04 | Stop reason: DRUGHIGH

## 2021-02-23 RX ORDER — NITROGLYCERIN 0.4 MG/1
0.4 TABLET SUBLINGUAL
Qty: 25 TAB | Refills: 0 | Status: SHIPPED | OUTPATIENT
Start: 2021-02-23

## 2021-02-23 RX ORDER — LANOLIN ALCOHOL/MO/W.PET/CERES
100 CREAM (GRAM) TOPICAL DAILY
Status: ON HOLD | COMMUNITY
End: 2021-07-19

## 2021-02-23 NOTE — LETTER
2/23/2021    Patient: Mary Monte   YOB: 1940   Date of Visit: 2/23/2021     Arcenio Panda NP  [facility]  [address] [address]  V[address] In [address] [place] [address] Bryan, RN  [address] [place] [address]  Via     Dear SHARATH Thomas RN,      Thank you for referring Ms. Radha Burkett to [address] [place] [address] for evaluation. My notes for this consultation are attached. If you have questions, please do not hesitate to call me. I look forward to following your patient along with you.       Sincerely,    Nicky Stratton MD

## 2021-02-23 NOTE — PROGRESS NOTES
ELECTROPHYSIOLOGY        Subjective:      Siobhan Tuttle is a 79 y.o. female is here for 3 mo EP follow up. The patient denies shortness of breath, orthopnea, PND, LE edema, palpitations, syncope, presyncope or fatigue. One episode of chest pain while pulling her groceries into the house up and incline. Radiated from her left chest to her right, up both sides of her neck into her jaw. Has not had any sx prior or since. Nuclear stress test without ischemia 9/20.      Patient Active Problem List    Diagnosis Date Noted    Dilated cardiomyopathy ([address] 09/23/2020    SSS (sick sinus syndrome) ([address] 09/23/2020    Cardiomyopathy ([address] 09/23/2020    AICD (automatic cardioverter/defibrillator) present 09/23/2020    Respiratory failure ([address] 09/18/2020    CHF (congestive heart failure) ([address] 09/18/2020    Hypoxia 09/18/2020    Bilateral pleural effusion 09/18/2020    Elevated troponin 09/18/2020    PAF (paroxysmal atrial fibrillation) ([address] 09/10/2020    CKD (chronic kidney disease) stage 3, GFR 30-59 ml/min 06/04/2019    Lymphocytosis 05/07/2018    Type 2 diabetes with nephropathy ([address] 02/15/2018    Type 2 diabetes mellitus with diabetic neuropathy ([address] 02/15/2018    Spinal stenosis of lumbar region with neurogenic claudication 11/17/2017    Spondylosis of lumbosacral region without myelopathy or radiculopathy 11/17/2017    Overdose of opiate or related narcotic, accidental or unintentional, subsequent encounter 10/27/2017    Acute left-sided low back pain with sciatica 10/27/2017    Physical debility 10/27/2017    Acute encephalopathy 10/23/2017    Hyperkalemia 10/20/2017    Altered mental status 10/20/2017    Transaminitis 10/20/2017    Acute renal failure (ARF) ([address] 10/20/2017    Diverticulitis large intestine w/o perforation or abscess w/o bleeding 07/06/2017    SOB (shortness of breath) 06/21/2017    Abdominal pain, generalized 06/21/2017    Diarrhea in adult patient 06/21/2017  Cigarette smoker 30/14/0485    Neutrophilic leukocytosis 84/65/2314    Chronic pain     Hypercholesterolemia     Arthritis     Diabetes (HCC)     IBS (irritable bowel syndrome)     Hemorrhoid       Sia Leo NP  Past Medical History:   Diagnosis Date    A-fib [facility])     AICD (automatic cardioverter/defibrillator) present 9/23/2020 9/23/2020 [employer] AICD implant    Arthritis     Bilateral pleural effusion 9/18/2020    Chronic pain     Chronic pain     Diabetes ([address]     Diverticular disease     Elevated troponin 9/18/2020    Hemorrhoid     Hypercholesterolemia     IBS (irritable bowel syndrome)     Lymphocytosis 05/07/2018      Past Surgical History:   Procedure Laterality Date    COLONOSCOPY N/A 10/31/2019    COLON[facility] performed [facility] A [facility]wang [facility] at [facility]  10/31/2019         COLONOSCOPY,PAKO LEUNG,ALINE  10/31/2019         HX CATARACT REMOVAL      HX CHOLECYSTECTOMY      HX COLONOSCOPY  2009    HX COLONOSCOPY  2016    2 adenomatous polyp    HX HEMORRHOIDECTOMY  2009    HX HYSTERECTOMY      HX KNEE REPLACEMENT      right    HX ORTHOPAEDIC  12/11/2017    back, laminectomy and decompression    AK INSJ/RPLCMT PERM DFB W/TRNSVNS LDS 1/DUAL CHMBR N/A 9/23/2020    INSERT ICD DUAL performed by Naomie Ch MD at [facility] CARDIAC CATH LAB     Allergies   Allergen Reactions    Morphine Anaphylaxis    Ultram [Tramadol] Palpitations      Family History   Problem Relation Age of Onset    Colon Cancer Father     Diabetes Mother     negative for cardiac disease  Social History     Socioeconomic History    Marital status:      Spouse name: Not on file    Number of children: Not on file    Years of education: Not on file    Highest education level: Not on file   Occupational History    Occupation: retired     Comment: LPN   Tobacco Use    Smoking status: Current Every Day Smoker     Packs/day: 1.00     Years: 55.00     Pack years: 55.00     Types: Cigarettes    Smokeless tobacco: Never Used   Substance and Sexual Activity    Alcohol use: No    Drug use: Never   Other Topics Concern     Service No    Blood Transfusions Yes    Caffeine Concern Yes    Occupational Exposure No    Hobby Hazards No    Sleep Concern Yes    Stress Concern Yes    Weight Concern No    Special Diet No    Back Care Yes    Exercise No    Bike Helmet No     Comment: n/a    Seat Belt Yes    Self-Exams Yes     Current Outpatient Medications   Medication Sig    nitroglycerin (NITROSTAT) 0.4 mg SL tablet 1 Tab by SubLINGual route every five (5) minutes as needed for Chest Pain. Up to 3 doses.  valsartan-hydroCHLOROthiazide (DIOVAN-HCT) 160-12.5 mg per tablet Take 0.5 Tabs by mouth daily. Lowered 02/23/21 due to low bp    carvediloL (COREG) 25 mg tablet Take 1 Tab by mouth two (2) times daily (with meals).  furosemide (LASIX) 40 mg tablet TAKE 1 TABLET BY MOUTH  DAILY    Vitamin D2 1,250 mcg (50,000 unit) capsule TAKE 1 CAPSULE BY MOUTH  EVERY SEVEN DAYS FOR LOW  VITAMIN D LEVELS    glipiZIDE (GLUCOTROL) 5 mg tablet Take 1 Tab by mouth two (2) times a day.  colestipoL (Colestid) 1 gram tablet Take 1 Tab by mouth two (2) times a day.  atorvastatin (LIPITOR) 20 mg tablet Take 1 Tab by mouth nightly.  loperamide (IMMODIUM) 2 mg tablet Take 2 mg by mouth as needed for Diarrhea.  insulin glargine (Lantus Solostar U-100 Insulin) 100 unit/mL (3 mL) inpn INJECT SUBCUTANEOUSLY 45  UNITS DAILY    Januvia 50 mg tablet TAKE 1 TABLET BY MOUTH  DAILY    vit C/E/Zn/coppr/lutein/zeaxan (PRESERVISION AREDS-2 PO) Take  by mouth daily.  carboxymethylcellulose sodium (REFRESH TEARS OP) Apply  to eye daily. 2-4 drops both eyes    venlafaxine (EFFEXOR) 75 mg tablet TAKE 1 TABLET BY MOUTH TWO  TIMES DAILY WITH MEALS (Patient taking differently: Takes 1/2 BID)    OTHER,NON-FORMULARY, Take 1 Tab by mouth daily.  Tumeric curcumin C3 complex    B.infantis-B.ani-B.long-B.bifi (PROBIOTIC 4X) 10-15 mg TbEC Take  by mouth daily.  acetaminophen (TYLENOL) 650 mg TbER Take 650 mg by mouth every eight (8) hours.  omega-3 fatty acids-vitamin e 1,000 mg cap Take 1 Cap by mouth two (2) times a day. 32a day     ascorbic acid, vitamin C, (VITAMIN C) 500 mg tablet Take 500 mg by mouth daily.  zinc 50 mg tab tablet Take  by mouth daily.  thiamine HCL (B-1) 100 mg tablet Take 100 mg by mouth daily.  vit A/vit C/vit E/zinc/copper (ICAPS AREDS PO) Take  by mouth two (2) times a day.  aspirin delayed-release 81 mg tablet Take 1 Tab by mouth daily. No current facility-administered medications for this visit. Vitals:    02/23/21 1330 02/23/21 1342 02/23/21 1344   BP: (!) 140/60 112/60 108/60   Pulse: 60 68 68   Resp: 18     SpO2: 96%     Weight: 159 lb (72.1 kg)     Height: 5' 5\" (1.651 m)         I have reviewed the nurses notes, vitals, problem list, allergy list, medical history, family, social history and medications. Review of Symptoms:    General: Pt denies excessive weight gain or loss. Pt is able to conduct ADL's  HEENT: Denies blurred vision, headaches, epistaxis and difficulty swallowing. Respiratory: Denies shortness of breath, STANTON, wheezing or stridor. Cardiovascular: Reportsprecordial pain, Denies palpitations, edema or PND  Gastrointestinal: Denies poor appetite, indigestion, abdominal pain or blood in stool  Urinary: Denies dysuria, pyuria  Musculoskeletal: Denies pain or swelling from muscles or joints  Neurologic: Denies tremor, paresthesias, or sensory motor disturbance  Skin: Denies rash, itching or texture change. Psych: Denies depression      Physical Exam:      General: Well developed, in no acute distress. HEENT: Eyes - PERRL, no jvd  Heart:  Normal S1/S2 negative S3 or S4. Regular, no murmur, gallop or rub.    Respiratory: Clear bilaterally x 4, no wheezing or rales  Extremities:  No edema, normal cap refill, no cyanosis. Musculoskeletal: No clubbing  Neuro: A&Ox3, speech clear, gait stable. Skin: Skin color is normal. No rashes or lesions. Non diaphoretic. no ulcers  Vascular: 2+ pulses symmetric in all extremities  Psych - judgement intact and orientation is wnl       Cardiographics    Ekg: A paced    Results for orders placed or performed during the hospital encounter of 10/20/17   EKG, 12 LEAD, INITIAL   Result Value Ref Range    Ventricular Rate 106 BPM    Atrial Rate 106 BPM    P-R Interval 166 ms    QRS Duration 92 ms    Q-T Interval 334 ms    QTC Calculation (Bezet) 443 ms    Calculated P Axis 22 degrees    Calculated R Axis 56 degrees    Calculated T Axis 66 degrees    Diagnosis       Sinus tachycardia  Otherwise normal ECG  Confirmed by Torie Aguilera ([id number]) on 10/21/2017 10:34:02 PM           Lab Results   Component Value Date/Time    WBC 17.1 (H) 10/29/2020 10:22 AM    HGB 12.4 10/29/2020 10:22 AM    HCT 36.9 10/29/2020 10:22 AM    PLATELET 182 47/01/9857 10:22 AM    MCV 89 10/29/2020 10:22 AM      Lab Results   Component Value Date/Time    Sodium 137 10/29/2020 10:22 AM    Potassium 5.3 (H) 10/29/2020 10:22 AM    Chloride 103 10/29/2020 10:22 AM    CO2 20 10/29/2020 10:22 AM    Anion gap 6 09/23/2020 04:00 AM    Glucose 201 (H) 10/29/2020 10:22 AM    BUN 34 (H) 10/29/2020 10:22 AM    Creatinine 1.51 (H) 10/29/2020 10:22 AM    BUN/Creatinine ratio 23 10/29/2020 10:22 AM    GFR est AA 37 (L) 10/29/2020 10:22 AM    GFR est non-AA 32 (L) 10/29/2020 10:22 AM    Calcium 10.3 10/29/2020 10:22 AM    Bilirubin, total <0.2 10/29/2020 10:22 AM    Alk. phosphatase 78 10/29/2020 10:22 AM    Protein, total 6.8 10/29/2020 10:22 AM    Albumin 4.2 10/29/2020 10:22 AM    Globulin 4.1 (H) 09/19/2020 02:02 AM    A-G Ratio 1.6 10/29/2020 10:22 AM    ALT (SGPT) 13 10/29/2020 10:22 AM      Lab Results   Component Value Date/Time    TSH 0.49 09/19/2020 02:02 AM           Assessment:           ICD-10-CM ICD-9-CM    1.  PAF (paroxysmal atrial fibrillation) ([facility])  I48.0 427.31 AMB POC EKG ROUTINE W/ 12 LEADS, INTER & REP   2. Dilated cardiomyopathy (HCC)  I42.0 425.4    3. Type 2 diabetes with nephropathy ([facility])  E11.21 250.40      583.81    4. Stage 3 chronic kidney disease, unspecified whether stage 3a or 3b CKD  N18.30 585.3    5. SSS (sick sinus syndrome) ([facility])  I49.5 427.81    6. Chronic systolic congestive heart failure ([facility])  I50.22 428.22      428.0    7. Essential hypertension  I10 401.9      Orders Placed This Encounter    AMB POC EKG ROUTINE W/ 12 LEADS, INTER & REP     Order Specific Question:   Reason for Exam:     Answer:   routine    thiamine HCL (B-1) 100 mg tablet     Sig: Take 100 mg by mouth daily.  nitroglycerin (NITROSTAT) 0.4 mg SL tablet     Si Tab by SubLINGual route every five (5) minutes as needed for Chest Pain. Up to 3 doses. Dispense:  25 Tab     Refill:  0    valsartan-hydroCHLOROthiazide (DIOVAN-HCT) 160-12.5 mg per tablet     Sig: Take 0.5 Tabs by mouth daily. Lowered 21 due to low bp     Dispense:  90 Tab     Refill:  3     Requesting 1 year supply        Plan:     Ms Mari Rosales is here for 3 mo follow up with hx of dilated cardiomyopathy s/p ICD  20. She is 3%AP, <1% RVP with 1 episode of AT ( 6 sec). Rate response turned off per pt request.  Continue current medications except lower valsartan/hctz. I have sent rx for NTG. She will follow up with Dr Adrienne Benito for her orthostatics and angina like sx. .     She is enrolled in remote monitoring and I will see her back in 1 year. Continue medical management for CHF, AF and SSS. Thank you for allowing me to participate in iSobhan Tuttle 's care. Siria Pinto NP    Patient seen and examined by me with nurse practitioner. I personally performed all components of the history, physical, and medical decision making and agree with the assessment and plan with minor modifications as noted. Doing well. A paced 3% for sick sinus. On med rx for cardiomyopathy, chf and htn. Will lower dose of valsartan/hctz. She is seeing Dr Edu Walker on march 4th re about her episode of angina. F/u in one year    During this visit,  the patient and I had a comprehensive discussion of device management using principles of shared decision making. This included a discussion of anti-arrhythmic medications including class III agents (e.g. amiodarone, sotalol etc) and both class II agents (beta-blockers). We reviewed the potentially life-threatening side effects of these medications, including (but not limited to) fatal tachyarrhythmias, pulmonary toxicity, liver toxicity and thyroid disorders. We also reviewed the requisite monitoring required of some of these medications. In addition, we reviewed device therapy, including the potential risks and benefits of device management. These risks include death, myocardial infarction, stroke, cardiac perforation, vascular injury, injury, pacing induced cardiomyopathy, inappropriate shocks (defibrillator) and other less severe complications. The patient demonstrated a clear understanding of these issues during out discussion. Our plans, determined together after thorough consideration, are outlined else where in this note.       Indigo Collier MD, Sisi Nuñez

## 2021-02-23 NOTE — PROGRESS NOTES
1. Have you been to the ER, urgent care clinic since your last visit? Hospitalized since your last visit? No    2. Have you seen or consulted any other health care providers outside of the [address] [place] [address] since your last visit? Include any pap smears or colon screening. No     Chief Complaint   Patient presents with    Pacemaker Check     ICD implanted on 9/23/21- here for 4 month post op - C/O syncope.

## 2021-03-04 ENCOUNTER — OFFICE VISIT (OUTPATIENT)
Dept: CARDIOLOGY CLINIC | Age: 81
End: 2021-03-04
Payer: MEDICARE

## 2021-03-04 VITALS
OXYGEN SATURATION: 96 % | RESPIRATION RATE: 16 BRPM | TEMPERATURE: 97.9 F | HEIGHT: 65 IN | BODY MASS INDEX: 26.66 KG/M2 | WEIGHT: 160 LBS

## 2021-03-04 DIAGNOSIS — E11.21 TYPE 2 DIABETES WITH NEPHROPATHY (HCC): ICD-10-CM

## 2021-03-04 DIAGNOSIS — N18.30 STAGE 3 CHRONIC KIDNEY DISEASE, UNSPECIFIED WHETHER STAGE 3A OR 3B CKD (HCC): ICD-10-CM

## 2021-03-04 DIAGNOSIS — I49.5 SSS (SICK SINUS SYNDROME) (HCC): ICD-10-CM

## 2021-03-04 DIAGNOSIS — I48.0 PAF (PAROXYSMAL ATRIAL FIBRILLATION) (HCC): ICD-10-CM

## 2021-03-04 DIAGNOSIS — Z95.810 AICD (AUTOMATIC CARDIOVERTER/DEFIBRILLATOR) PRESENT: ICD-10-CM

## 2021-03-04 DIAGNOSIS — I50.22 CHRONIC SYSTOLIC CONGESTIVE HEART FAILURE (HCC): ICD-10-CM

## 2021-03-04 DIAGNOSIS — I42.0 DILATED CARDIOMYOPATHY (HCC): Primary | ICD-10-CM

## 2021-03-04 DIAGNOSIS — R07.2 PRECORDIAL PAIN: ICD-10-CM

## 2021-03-04 PROCEDURE — G8419 CALC BMI OUT NRM PARAM NOF/U: HCPCS | Performed by: INTERNAL MEDICINE

## 2021-03-04 PROCEDURE — G8427 DOCREV CUR MEDS BY ELIG CLIN: HCPCS | Performed by: INTERNAL MEDICINE

## 2021-03-04 PROCEDURE — 1100F PTFALLS ASSESS-DOCD GE2>/YR: CPT | Performed by: INTERNAL MEDICINE

## 2021-03-04 PROCEDURE — G8536 NO DOC ELDER MAL SCRN: HCPCS | Performed by: INTERNAL MEDICINE

## 2021-03-04 PROCEDURE — G8432 DEP SCR NOT DOC, RNG: HCPCS | Performed by: INTERNAL MEDICINE

## 2021-03-04 PROCEDURE — G8756 NO BP MEASURE DOC: HCPCS | Performed by: INTERNAL MEDICINE

## 2021-03-04 PROCEDURE — 99214 OFFICE O/P EST MOD 30 MIN: CPT | Performed by: INTERNAL MEDICINE

## 2021-03-04 PROCEDURE — 1090F PRES/ABSN URINE INCON ASSESS: CPT | Performed by: INTERNAL MEDICINE

## 2021-03-04 PROCEDURE — 3288F FALL RISK ASSESSMENT DOCD: CPT | Performed by: INTERNAL MEDICINE

## 2021-03-04 PROCEDURE — G8399 PT W/DXA RESULTS DOCUMENT: HCPCS | Performed by: INTERNAL MEDICINE

## 2021-03-04 RX ORDER — VALSARTAN 80 MG/1
80 TABLET ORAL DAILY
Qty: 90 TAB | Refills: 1 | Status: SHIPPED | OUTPATIENT
Start: 2021-03-04 | End: 2021-05-04 | Stop reason: SDUPTHER

## 2021-03-04 NOTE — PROGRESS NOTES
Niall Downs is a 79 y.o. female is here for routine f/u. Multiple medical problems including DM II, hypertension, CKD III, dyslipidemia, DJD, lumbar stenosis/radiculopathy, chronic pain, tobacco, leukocytosis, PAF during hospitalization with encephalopathy 10/17 (at [facility]), followed by Godfrey Tobar at [facility] 6/29/20 with brief intermittent CP--atypical, EKG abnormal with NSST and sent for Lexiscan MPI 7/29/20:  · Baseline ECG: Normal EKG, PACs, non-specific ST-T wave abnormalities. · Inconclusive stress test.  · EKG stress test results correlate with an intermediate risk of inducible myocardial ischemia. · Non-limiting dyspnea, no EKG changes  · Nuclear images reported seperately  · FINDINGS: The rest and stress perfusion images a fixed defect in the inferior wall compatible with infarct. No reversible abnormality is seen to suggest myocardium at ischemic risk. The gated images demonstrate global hypokinesia and inferior akinesia. Left ventricular ejection fraction is 31 %. Impression: Fixed defect in the inferior wall is compatible with infarct. No evidence of myocardium at ischemic risk. Left ventricular ejection fraction is 31 %. Global hypokinesia and inferior akinesia.   +STANTON, some fatigue, brief intermittent chest pain--not clearly exertional.  No recent Echo, had prior Echo 10/17 with LVEF 55%. No recurrence of afib known since 10/17. Seen in office here 9/10/20 with CHF--cardizem changed to beta blocker, on ARB, lasix. Developed worsening SOB, sx of CHF--admitted to [facility], BNP elevated, trop mildly increased. Seen by Cardiology in hospital, diurested, meds adjusted. Repeat cardiac testing:  LEXISCAN MPI 9/22/20:  · Baseline ECG: Normal EKG, rare PVCs. · There was no ST segment deviation noted during stress. · FINDINGS: The rest and stress perfusion images demonstrate left ventricular dilatation. There is a fixed defect in the inferior wall compatible with infarct.  No evidence of reversibility to suggest myocardium at ischemic risk. The gated images demonstrate global hypokinesia and inferior akinesia. Left ventricular ejection fraction is 32 %. Impression: Unchanged fixed defect in the inferior wall compatible with infarct. No evidence to suggest myocardium at ischemic risk. Left ventricular dilatation. Left ventricular ejection fraction is 32 %. Global hypokinesia and inferior akinesia. ECHO 9/22/20:     · LV: Estimated LVEF is 25 - 30%. Moderately dilated left ventricle. Mild concentric hypertrophy. Moderate-to-severely reduced systolic function. · Pericardium: Trivial-to-small pericardial effusion. · LA: Moderately dilated left atrium. · MV: Mild to moderate mitral valve regurgitation is present.     Had AICD/PPM placed by Dr. Phillip Lance on 2/78/33 without complication. Seen by Dr Phillip Lance in f/u on 2/23/21 with PPM check ok. One episode of chest pain while pulling her groceries into the house up and incline. Radiated from her left chest to her right, up both sides of her neck into her jaw. Has not had any sx prior or since. Nuclear stress test without ischemia 9/20. Currently stable STANTON, overall ok. Had some dizziness/orthostasis also when seen by EP team 2/23--valsartan /12.5 was cut in 1/2 at that point. The patient denies recurrent chest pain, orthopnea, PND, LE edema, palpitations, syncope, presyncope or fatigue.        Patient Active Problem List    Diagnosis Date Noted    Dilated cardiomyopathy ([address] 09/23/2020    SSS (sick sinus syndrome) ([address] 09/23/2020    Cardiomyopathy ([address] 09/23/2020    AICD (automatic cardioverter/defibrillator) present 09/23/2020    Respiratory failure ([address] 09/18/2020    CHF (congestive heart failure) ([address] 09/18/2020    Hypoxia 09/18/2020    Bilateral pleural effusion 09/18/2020    Elevated troponin 09/18/2020    PAF (paroxysmal atrial fibrillation) ([address] 09/10/2020    CKD (chronic kidney disease) stage 3, GFR 30-59 ml/min 06/04/2019    Lymphocytosis 05/07/2018    Type 2 diabetes with nephropathy ([address] 02/15/2018    Type 2 diabetes mellitus with diabetic neuropathy ([address] 02/15/2018    Spinal stenosis of lumbar region with neurogenic claudication 11/17/2017    Spondylosis of lumbosacral region without myelopathy or radiculopathy 11/17/2017    Overdose of opiate or related narcotic, accidental or unintentional, subsequent encounter 10/27/2017    Acute left-sided low back pain with sciatica 10/27/2017    Physical debility 10/27/2017    Acute encephalopathy 10/23/2017    Hyperkalemia 10/20/2017    Altered mental status 10/20/2017    Transaminitis 10/20/2017    Acute renal failure (ARF) ([address] 10/20/2017    Diverticulitis large intestine w/o perforation or abscess w/o bleeding 07/06/2017    SOB (shortness of breath) 06/21/2017    Abdominal pain, generalized 06/21/2017    Diarrhea in adult patient 06/21/2017    Cigarette smoker 01/36/0149    Neutrophilic leukocytosis 18/95/6187    Chronic pain     Hypercholesterolemia     Arthritis     Diabetes ([address]     IBS (irritable bowel syndrome)     Hemorrhoid       Kenna Tan NP  Past Medical History:   Diagnosis Date    A-fib [facility])     AICD (automatic cardioverter/defibrillator) present 9/23/2020 9/23/2020 [employer] AICD implant    Arthritis     Bilateral pleural effusion 9/18/2020    Chronic pain     Chronic pain     Diabetes ([address]     Diverticular disease     Elevated troponin 9/18/2020    Hemorrhoid     Hypercholesterolemia     IBS (irritable bowel syndrome)     Lymphocytosis 05/07/2018      Past Surgical History:   Procedure Laterality Date    COLONOSCOPY N/A 10/31/2019    COL[facility]COPY performed by Song [facility] at [facility]  10/31/2019         COLONOSCOPY,PAKO Vega  10/31/2019         HX CATARACT REMOVAL      HX CHOLECYSTECTOMY      HX COLONOSCOPY  2009    HX COLONOSCOPY  2016    2 adenomatous polyp    HX HEMORRHOIDECTOMY  2009    HX HYSTERECTOMY      HX KNEE REPLACEMENT      right    HX ORTHOPAEDIC  12/11/2017    back, laminectomy and decompression    VT INSJ/RPLCMT PERM DFB W/TRNSVNS LDS 1/DUAL CHMBR N/A 9/23/2020    INSERT ICD DUAL performed by Naomie Ch MD at hospitals CARDIAC CATH LAB     Allergies   Allergen Reactions    Morphine Anaphylaxis    Ultram [Tramadol] Palpitations      Family History   Problem Relation Age of Onset    Colon Cancer Father     Diabetes Mother       Social History     Socioeconomic History    Marital status:      Spouse name: Not on file    Number of children: Not on file    Years of education: Not on file    Highest education level: Not on file   Occupational History    Occupation: retired     Comment: LPN   Social Needs    Financial resource strain: Not on file    Food insecurity     Worry: Not on file     Inability: Not on file   [employer] needs     Medical: Not on file     Non-medical: Not on file   Tobacco Use    Smoking status: Current Every Day Smoker     Packs/day: 1.00     Years: 55.00     Pack years: 55.00     Types: Cigarettes    Smokeless tobacco: Never Used   Substance and Sexual Activity    Alcohol use: No    Drug use: Never    Sexual activity: Not on file   Lifestyle    Physical activity     Days per week: Not on file     Minutes per session: Not on file    Stress: Not on file   Relationships    Social connections     Talks on phone: Not on file     Gets together: Not on file     Attends [employer] service: Not on file     Active member of club or organization: Not on file     Attends meetings of clubs or organizations: Not on file     Relationship status: Not on file    Intimate partner violence     Fear of current or ex partner: Not on file     Emotionally abused: Not on file     Physically abused: Not on file     Forced sexual activity: Not on file   Other Topics Concern     Service No    Blood Transfusions Yes    Caffeine Concern Yes    Occupational Exposure No    Hobby Hazards No    Sleep Concern Yes    Stress Concern Yes    Weight Concern No    Special Diet No    Back Care Yes    Exercise No    Bike Helmet No     Comment: n/a    Seat Belt Yes    Self-Exams Yes   Social History Narrative    Not on file      Current Outpatient Medications   Medication Sig    thiamine HCL (B-1) 100 mg tablet Take 100 mg by mouth daily.  nitroglycerin (NITROSTAT) 0.4 mg SL tablet 1 Tab by SubLINGual route every five (5) minutes as needed for Chest Pain. Up to 3 doses.  valsartan-hydroCHLOROthiazide (DIOVAN-HCT) 160-12.5 mg per tablet Take 0.5 Tabs by mouth daily. Lowered 02/23/21 due to low bp    carvediloL (COREG) 25 mg tablet Take 1 Tab by mouth two (2) times daily (with meals).  furosemide (LASIX) 40 mg tablet TAKE 1 TABLET BY MOUTH  DAILY    Vitamin D2 1,250 mcg (50,000 unit) capsule TAKE 1 CAPSULE BY MOUTH  EVERY SEVEN DAYS FOR LOW  VITAMIN D LEVELS    glipiZIDE (GLUCOTROL) 5 mg tablet Take 1 Tab by mouth two (2) times a day.  colestipoL (Colestid) 1 gram tablet Take 1 Tab by mouth two (2) times a day.  atorvastatin (LIPITOR) 20 mg tablet Take 1 Tab by mouth nightly.  vit A/vit C/vit E/zinc/copper (ICAPS AREDS PO) Take  by mouth two (2) times a day.  loperamide (IMMODIUM) 2 mg tablet Take 2 mg by mouth as needed for Diarrhea.  aspirin delayed-release 81 mg tablet Take 1 Tab by mouth daily.  insulin glargine (Lantus Solostar U-100 Insulin) 100 unit/mL (3 mL) inpn INJECT SUBCUTANEOUSLY 45  UNITS DAILY    Januvia 50 mg tablet TAKE 1 TABLET BY MOUTH  DAILY    vit C/E/Zn/coppr/lutein/zeaxan (PRESERVISION AREDS-2 PO) Take  by mouth daily.  carboxymethylcellulose sodium (REFRESH TEARS OP) Apply  to eye daily.  2-4 drops both eyes    venlafaxine (EFFEXOR) 75 mg tablet TAKE 1 TABLET BY MOUTH TWO  TIMES DAILY WITH MEALS (Patient taking differently: Takes 1/2 BID)    OTHER,NON-FORMULARY, Take 1 Tab by mouth daily. Tumeric curcumin C3 complex    B.infantis-B.ani-B.long-B.bifi (PROBIOTIC 4X) 10-15 mg TbEC Take  by mouth daily.  acetaminophen (TYLENOL) 650 mg TbER Take 650 mg by mouth every eight (8) hours.  omega-3 fatty acids-vitamin e 1,000 mg cap Take 1 Cap by mouth two (2) times a day. 32a day     ascorbic acid, vitamin C, (VITAMIN C) 500 mg tablet Take 500 mg by mouth daily.  zinc 50 mg tab tablet Take  by mouth daily. No current facility-administered medications for this visit. Review of Symptoms:    CONST  No weight change. No fever, chills, sweats    ENT No visual changes, URI sx, sore throat    CV  See HPI   RESP  No cough, or sputum, wheezing. Also see HPI   GI  No abdominal pain or change in bowel habits. No heartburn or dysphagia. No melena or rectal bleeding.   No dysuria, urgency, frequency, hematuria   MSKEL  No joint pain, swelling. No muscle pain. SKIN  No rash or lesions. NEURO  No headache, syncope, or seizure. No weakness, loss of sensation, or paresthesias. PSYCH  No low mood or depression  No anxiety. HE/LYMPH  No easy bruising, abnormal bleeding, or enlarged glands. Physical ExamPhysical Exam:    There were no vitals taken for this visit. Gen: NAD  HEENT:  PERRL, throat clear  Neck: no adenopathy, no thyromegaly, no JVD   Heart:  Regular,Nl S1S2,  no murmur, gallop or rub. Lungs:  clear  Abdomen:   Soft, non-tender, bowel sounds are active.    Extremities:  No edema  Pulse: symmetric  Neuro: A&O times 3, No focal neuro deficits        Labs:   Lab Results   Component Value Date/Time    Sodium 137 10/29/2020 10:22 AM    Sodium 139 09/23/2020 04:00 AM    Sodium 139 09/22/2020 04:00 AM    Sodium 135 (L) 09/21/2020 12:43 AM    Sodium 136 09/20/2020 11:04 AM    Potassium 5.3 (H) 10/29/2020 10:22 AM    Potassium 4.5 09/23/2020 04:00 AM    Potassium 4.0 09/22/2020 04:00 AM    Potassium 5.1 09/21/2020 12:43 AM Potassium 5.0 09/20/2020 11:04 AM    Chloride 103 10/29/2020 10:22 AM    Chloride 106 09/23/2020 04:00 AM    Chloride 107 09/22/2020 04:00 AM    Chloride 104 09/21/2020 12:43 AM    Chloride 106 09/20/2020 11:04 AM    CO2 20 10/29/2020 10:22 AM    CO2 27 09/23/2020 04:00 AM    CO2 30 09/22/2020 04:00 AM    CO2 25 09/21/2020 12:43 AM    CO2 30 09/20/2020 11:04 AM    Anion gap 6 09/23/2020 04:00 AM    Anion gap 2 (L) 09/22/2020 04:00 AM    Anion gap 6 09/21/2020 12:43 AM    Anion gap 0 (L) 09/20/2020 11:04 AM    Anion gap 6 09/19/2020 02:02 AM    Glucose 201 (H) 10/29/2020 10:22 AM    Glucose 113 (H) 09/23/2020 04:00 AM    Glucose 94 09/22/2020 04:00 AM    Glucose 199 (H) 09/21/2020 12:43 AM    Glucose 178 (H) 09/20/2020 11:04 AM    BUN 34 (H) 10/29/2020 10:22 AM    BUN 38 (H) 09/23/2020 04:00 AM    BUN 37 (H) 09/22/2020 04:00 AM    BUN 48 (H) 09/21/2020 12:43 AM    BUN 42 (H) 09/20/2020 11:04 AM    Creatinine 1.51 (H) 10/29/2020 10:22 AM    Creatinine 1.52 (H) 09/23/2020 04:00 AM    Creatinine 1.50 (H) 09/22/2020 04:00 AM    Creatinine 1.59 (H) 09/21/2020 12:43 AM    Creatinine 1.52 (H) 09/20/2020 11:04 AM    BUN/Creatinine ratio 23 10/29/2020 10:22 AM    BUN/Creatinine ratio 25 (H) 09/23/2020 04:00 AM    BUN/Creatinine ratio 25 (H) 09/22/2020 04:00 AM    BUN/Creatinine ratio 30 (H) 09/21/2020 12:43 AM    BUN/Creatinine ratio 28 (H) 09/20/2020 11:04 AM    GFR est AA 37 (L) 10/29/2020 10:22 AM    GFR est AA 40 (L) 09/23/2020 04:00 AM    GFR est AA 40 (L) 09/22/2020 04:00 AM    GFR est AA 38 (L) 09/21/2020 12:43 AM    GFR est AA 40 (L) 09/20/2020 11:04 AM    GFR est non-AA 32 (L) 10/29/2020 10:22 AM    GFR est non-AA 33 (L) 09/23/2020 04:00 AM    GFR est non-AA 33 (L) 09/22/2020 04:00 AM    GFR est non-AA 31 (L) 09/21/2020 12:43 AM    GFR est non-AA 33 (L) 09/20/2020 11:04 AM    Calcium 10.3 10/29/2020 10:22 AM    Calcium 10.3 (H) 09/23/2020 04:00 AM    Calcium 10.1 09/22/2020 04:00 AM    Calcium 9.4 09/21/2020 12:43 AM    Calcium 9.6 09/20/2020 11:04 AM    Bilirubin, total <0.2 10/29/2020 10:22 AM    Bilirubin, total 0.3 09/19/2020 02:02 AM    Bilirubin, total 0.2 09/17/2020 11:45 PM    Bilirubin, total <0.2 06/29/2020 12:03 PM    Bilirubin, total <0.2 02/03/2020 03:52 PM    Alk. phosphatase 78 10/29/2020 10:22 AM    Alk. phosphatase 73 09/19/2020 02:02 AM    Alk. phosphatase 77 09/17/2020 11:45 PM    Alk. phosphatase 93 06/29/2020 12:03 PM    Alk.  phosphatase 100 02/03/2020 03:52 PM    Protein, total 6.8 10/29/2020 10:22 AM    Protein, total 7.0 09/19/2020 02:02 AM    Protein, total 7.3 09/17/2020 11:45 PM    Protein, total 6.9 06/29/2020 12:03 PM    Protein, total 7.7 02/03/2020 03:52 PM    Albumin 4.2 10/29/2020 10:22 AM    Albumin 2.9 (L) 09/19/2020 02:02 AM    Albumin 3.2 (L) 09/17/2020 11:45 PM    Albumin 4.2 06/29/2020 12:03 PM    Albumin 4.6 02/03/2020 03:52 PM    Globulin 4.1 (H) 09/19/2020 02:02 AM    Globulin 4.1 (H) 09/17/2020 11:45 PM    Globulin 3.8 10/25/2017 12:00 AM    Globulin 3.6 10/22/2017 03:56 AM    Globulin 3.9 10/21/2017 12:47 AM    A-G Ratio 1.6 10/29/2020 10:22 AM    A-G Ratio 0.7 (L) 09/19/2020 02:02 AM    A-G Ratio 0.8 (L) 09/17/2020 11:45 PM    A-G Ratio 1.6 06/29/2020 12:03 PM    A-G Ratio 1.5 02/03/2020 03:52 PM    ALT (SGPT) 13 10/29/2020 10:22 AM    ALT (SGPT) 39 09/19/2020 02:02 AM    ALT (SGPT) 60 09/17/2020 11:45 PM    ALT (SGPT) 18 06/29/2020 12:03 PM    ALT (SGPT) 19 02/03/2020 03:52 PM     Lab Results   Component Value Date/Time    CK 1,206 (H) 10/22/2017 03:56 AM     Lab Results   Component Value Date/Time    Cholesterol, total 198 10/29/2020 10:22 AM    Cholesterol, total 185 06/29/2020 12:03 PM    Cholesterol, total 217 (H) 02/03/2020 03:52 PM    Cholesterol, total 169 06/04/2019 03:57 PM    Cholesterol, total 214 (H) 10/03/2018 04:30 PM    HDL Cholesterol 35 (L) 10/29/2020 10:22 AM    HDL Cholesterol 36 (L) 06/29/2020 12:03 PM    HDL Cholesterol 44 02/03/2020 03:52 PM    HDL Cholesterol 37 (L) 06/04/2019 03:57 PM    HDL Cholesterol 39 (L) 10/03/2018 04:30 PM    LDL, calculated 110 (H) 10/29/2020 10:22 AM    LDL, calculated Comment 06/29/2020 12:03 PM    LDL, calculated 106 (H) 02/03/2020 03:52 PM    LDL, calculated 75 06/04/2019 03:57 PM    LDL, calculated 101 (H) 10/03/2018 04:30 PM    LDL, calculated 78 05/03/2018 02:59 PM    Triglyceride 305 (H) 10/29/2020 10:22 AM    Triglyceride 412 (H) 06/29/2020 12:03 PM    Triglyceride 336 (H) 02/03/2020 03:52 PM    Triglyceride 283 (H) 06/04/2019 03:57 PM    Triglyceride 369 (H) 10/03/2018 04:30 PM     No results found for this or any previous visit.     Assessment:         Patient Active Problem List    Diagnosis Date Noted    Dilated cardiomyopathy ([facility] 09/23/2020    SSS (sick sinus syndrome) ([facility] 09/23/2020    Cardiomyopathy ([facility] 09/23/2020    AICD (automatic cardioverter/defibrillator) present 09/23/2020    Respiratory failure ([address] 09/18/2020    CHF (congestive heart failure) ([address] 09/18/2020    Hypoxia 09/18/2020    Bilateral pleural effusion 09/18/2020    Elevated troponin 09/18/2020    PAF (paroxysmal atrial fibrillation) ([address] 09/10/2020    CKD (chronic kidney disease) stage 3, GFR 30-59 ml/min 06/04/2019    Lymphocytosis 05/07/2018    Type 2 diabetes with nephropathy ([address] 02/15/2018    Type 2 diabetes mellitus with diabetic neuropathy ([facility] 02/15/2018    Spinal stenosis of lumbar region with neurogenic claudication 11/17/2017    Spondylosis of lumbosacral region without myelopathy or radiculopathy 11/17/2017    Overdose of opiate or related narcotic, accidental or unintentional, subsequent encounter 10/27/2017    Acute left-sided low back pain with sciatica 10/27/2017    Physical debility 10/27/2017    Acute encephalopathy 10/23/2017    Hyperkalemia 10/20/2017    Altered mental status 10/20/2017    Transaminitis 10/20/2017    Acute renal failure (ARF) ([facility] 10/20/2017    Diverticulitis large intestine w/o perforation or abscess w/o bleeding 07/06/2017    SOB (shortness of breath) 06/21/2017    Abdominal pain, generalized 06/21/2017    Diarrhea in adult patient 06/21/2017    Cigarette smoker 43/80/3036    Neutrophilic leukocytosis 63/54/1576    Chronic pain     Hypercholesterolemia     Arthritis     Diabetes ([address]     IBS (irritable bowel syndrome)     Hemorrhoid      Multiple medical problems including DM II, hypertension, CKD III, dyslipidemia, DJD, lumbar stenosis/radiculopathy, chronic pain, tobacco, leukocytosis, PAF during hospitalization with encephalopathy 10/17 (at [facility]), followed by Jeovany Heck at [facility] 6/29/20 with brief intermittent CP--atypical, EKG abnormal with NSST and sent for Lexiscan MPI 7/29/20:  · Baseline ECG: Normal EKG, PACs, non-specific ST-T wave abnormalities. · Inconclusive stress test.  · EKG stress test results correlate with an intermediate risk of inducible myocardial ischemia. · Non-limiting dyspnea, no EKG changes  · Nuclear images reported seperately  · FINDINGS: The rest and stress perfusion images a fixed defect in the inferior wall compatible with infarct. No reversible abnormality is seen to suggest myocardium at ischemic risk. The gated images demonstrate global hypokinesia and inferior akinesia. Left ventricular ejection fraction is 31 %. Impression: Fixed defect in the inferior wall is compatible with infarct. No evidence of myocardium at ischemic risk. Left ventricular ejection fraction is 31 %. Global hypokinesia and inferior akinesia.   +STANTON, some fatigue, brief intermittent chest pain--not clearly exertional.  No recent Echo, had prior Echo 10/17 with LVEF 55%. No recurrence of afib known since 10/17. Seen in office here 9/10/20 with CHF--cardizem changed to beta blocker, on ARB, lasix. Developed worsening SOB, sx of CHF--admitted to [facility], BNP elevated, trop mildly increased. Seen by Cardiology in hospital, diurested, meds adjusted. Repeat cardiac testing:  LEXISCAN MPI 9/22/20:  · Baseline ECG: Normal EKG, rare PVCs. · There was no ST segment deviation noted during stress. · FINDINGS: The rest and stress perfusion images demonstrate left ventricular dilatation. There is a fixed defect in the inferior wall compatible with infarct. No evidence of reversibility to suggest myocardium at ischemic risk. The gated images demonstrate global hypokinesia and inferior akinesia. Left ventricular ejection fraction is 32 %. Impression: Unchanged fixed defect in the inferior wall compatible with infarct. No evidence to suggest myocardium at ischemic risk. Left ventricular dilatation. Left ventricular ejection fraction is 32 %. Global hypokinesia and inferior akinesia. ECHO 9/22/20:     · LV: Estimated LVEF is 25 - 30%. Moderately dilated left ventricle. Mild concentric hypertrophy. Moderate-to-severely reduced systolic function. · Pericardium: Trivial-to-small pericardial effusion. · LA: Moderately dilated left atrium. · MV: Mild to moderate mitral valve regurgitation is present.     Had AICD/PPM placed by Dr. Pauline Ricketts on 7/06/45 without complication. Seen by Dr Pauline Ricketts in f/u on 2/23/21 with PPM check ok. One episode of chest pain while pulling her groceries into the house up and incline. Radiated from her left chest to her right, up both sides of her neck into her jaw. Has not had any sx prior or since. Nuclear stress test without ischemia 9/20. Currently stable STANTON, overall ok. Had some dizziness/orthostasis also when seen by EP team 2/23--valsartan /12.5 was cut in 1/2 at that point. Plan:     Doing well with no adverse cardiac symptoms--one episode of CP/angina resolved, no recurrence. . Nuclear stress test without ischemia 9/20. Continue current meds/rx  Sl NTG prn (has, not using)  Less orthostatic on reduced diovan HCT--will change to valsartan 80 (without HCT) at this point  Home monitoring.   DM,  Lipids and labs followed by PCP.    Continue current care and f/u in 6 months.     Rakesh Jimenez MD

## 2021-03-04 NOTE — PROGRESS NOTES
Verified patient with two patient identifiers. Medications reviewed/approved by Dr. John Laird. Chief Complaint   Patient presents with    Irregular Heart Beat     3 month follow up    CHF    Cardiomyopathy     1. Have you been to the ER, urgent care clinic since your last visit? Hospitalized since your last visit?no    2. Have you seen or consulted any other health care providers outside of the [address] [place] [address] since your last visit? Include any pap smears or colon screening.  No

## 2021-04-05 NOTE — TELEPHONE ENCOUNTER
Pt's  called and said that they got a message from [facility] stating that they could not reach our office to get a new script for her Valsartan 80 mg and they need it refilled.   Pt's  would like a call back at [phone]

## 2021-05-04 RX ORDER — VALSARTAN 80 MG/1
80 TABLET ORAL DAILY
Qty: 90 TAB | Refills: 0 | Status: SHIPPED | OUTPATIENT
Start: 2021-05-04 | End: 2021-06-01

## 2021-05-15 ENCOUNTER — HOSPITAL ENCOUNTER (EMERGENCY)
Age: 81
Discharge: HOME | End: 2021-05-15
Attending: EMERGENCY MEDICINE
Payer: MEDICARE

## 2021-05-15 VITALS
RESPIRATION RATE: 20 BRPM | WEIGHT: 162 LBS | DIASTOLIC BLOOD PRESSURE: 83 MMHG | HEART RATE: 89 BPM | OXYGEN SATURATION: 93 % | TEMPERATURE: 99.3 F | HEIGHT: 66 IN | SYSTOLIC BLOOD PRESSURE: 161 MMHG | BODY MASS INDEX: 26.03 KG/M2

## 2021-05-15 DIAGNOSIS — R51.9 HEAD AND FACE PAIN: Primary | ICD-10-CM

## 2021-05-15 DIAGNOSIS — K08.89 PAIN, DENTAL: ICD-10-CM

## 2021-05-15 PROCEDURE — 74011250637 HC RX REV CODE- 250/637: Performed by: EMERGENCY MEDICINE

## 2021-05-15 PROCEDURE — 74011000250 HC RX REV CODE- 250: Performed by: EMERGENCY MEDICINE

## 2021-05-15 PROCEDURE — 99283 EMERGENCY DEPT VISIT LOW MDM: CPT

## 2021-05-15 RX ORDER — PENICILLIN V POTASSIUM 250 MG/1
500 TABLET, FILM COATED ORAL
Status: COMPLETED | OUTPATIENT
Start: 2021-05-15 | End: 2021-05-15

## 2021-05-15 RX ORDER — PENICILLIN V POTASSIUM 500 MG/1
500 TABLET, FILM COATED ORAL 4 TIMES DAILY
Qty: 40 TAB | Refills: 0 | Status: SHIPPED | OUTPATIENT
Start: 2021-05-15 | End: 2021-06-14 | Stop reason: ALTCHOICE

## 2021-05-15 RX ADMIN — PENICILLIN V POTASIUM 500 MG: 250 TABLET ORAL at 13:13

## 2021-05-15 RX ADMIN — LIDOCAINE HYDROCHLORIDE: 20 SOLUTION ORAL; TOPICAL at 13:07

## 2021-05-15 NOTE — ED PROVIDER NOTES
EMERGENCY DEPARTMENT HISTORY AND PHYSICAL EXAM      Date: 5/15/2021  Patient Name: Freddie Guthrie    History of Presenting Illness     Chief Complaint   Patient presents with    Dental Pain       History Provided By: Patient    HPI:   The history is provided by the patient. Dental Pain   This is a new problem. The current episode started yesterday. The problem occurs constantly. The problem has been resolved. The pain is located in the left lower mouth. The quality of the pain is throbbing. The pain is severe. Associated symptoms include a fever. There was no vomiting and no nausea. She has tried acetaminophen for the symptoms. The treatment provided significant relief. The patient has cardiac history. Freddie Guthrie, 79 y.o. female  presents to the ED with cc of left lower dental pain. She reports this started yesterday and reports she had severe pain, a radiated into the right side of her face and head. She reports had a fever up to 103F. She reports the tooth was tender and sensitive. Reports she had a previous dental procedure on the same tooth and currently has a cap. She reports in the past they attempted root canal but the tooth was still viable so the cap was placed. She felt like she had some swelling underneath the jaw. She took Bactrim that was her 's took 3 doses and Tylenol and reports the symptoms improved. She does wear a partial with her lower teeth. She had several extractions. She reports the pain is currently resolved. She denies any chest pain or shortness of breath. There are no other complaints, changes, or physical findings at this time. PCP: Bela Garcia NP    No current facility-administered medications on file prior to encounter. Current Outpatient Medications on File Prior to Encounter   Medication Sig Dispense Refill    valsartan (DIOVAN) 80 mg tablet Take 1 Tab by mouth daily.  90 Tab 0    Vitamin D2 1,250 mcg (50,000 unit) capsule TAKE 1 CAPSULE BY MOUTH  EVERY SEVEN DAYS FOR LOW  VITAMIN D LEVELS 13 Cap 0    venlafaxine (EFFEXOR) 75 mg tablet Takes 1/2  Tab 3    atorvastatin (LIPITOR) 20 mg tablet TAKE 1 TABLET BY MOUTH  DAILY 90 Tab 1    glipiZIDE (GLUCOTROL) 5 mg tablet TAKE 1 TABLET BY MOUTH  TWICE DAILY 180 Tab 1    thiamine HCL (B-1) 100 mg tablet Take 100 mg by mouth daily.  nitroglycerin (NITROSTAT) 0.4 mg SL tablet 1 Tab by SubLINGual route every five (5) minutes as needed for Chest Pain. Up to 3 doses. 25 Tab 0    carvediloL (COREG) 25 mg tablet Take 1 Tab by mouth two (2) times daily (with meals). 28 Tab 0    furosemide (LASIX) 40 mg tablet TAKE 1 TABLET BY MOUTH  DAILY 90 Tab 1    colestipoL (Colestid) 1 gram tablet Take 1 Tab by mouth two (2) times a day. 180 Tab 3    vit A/vit C/vit E/zinc/copper (ICAPS AREDS PO) Take  by mouth two (2) times a day.  loperamide (IMMODIUM) 2 mg tablet Take 2 mg by mouth as needed for Diarrhea.  aspirin delayed-release 81 mg tablet Take 1 Tab by mouth daily. 90 Tab 1    insulin glargine (Lantus Solostar U-100 Insulin) 100 unit/mL (3 mL) inpn INJECT SUBCUTANEOUSLY 45  UNITS DAILY 45 mL 3    Januvia 50 mg tablet TAKE 1 TABLET BY MOUTH  DAILY 90 Tab 3    vit C/E/Zn/coppr/lutein/zeaxan (PRESERVISION AREDS-2 PO) Take  by mouth daily.  carboxymethylcellulose sodium (REFRESH TEARS OP) Apply  to eye daily. 2-4 drops both eyes      OTHER,NON-FORMULARY, Take 1 Tab by mouth daily. Tumeric curcumin C3 complex      B.infantis-B.ani-B.long-B.bifi (PROBIOTIC 4X) 10-15 mg TbEC Take  by mouth daily.  acetaminophen (TYLENOL) 650 mg TbER Take 650 mg by mouth every eight (8) hours.  omega-3 fatty acids-vitamin e 1,000 mg cap Take 1 Cap by mouth two (2) times a day. 32a day       ascorbic acid, vitamin C, (VITAMIN C) 500 mg tablet Take 500 mg by mouth daily.  zinc 50 mg tab tablet Take  by mouth daily.          Past History     Past Medical History:  Past Medical History: Diagnosis Date    A-fib [facility])     AICD (automatic cardioverter/defibrillator) present 9/23/2020 9/23/2020 [employer] AICD implant    Arthritis     Bilateral pleural effusion 9/18/2020    Chronic pain     Chronic pain     Diabetes ([address]     Diverticular disease     Elevated troponin 9/18/2020    Hemorrhoid     Hypercholesterolemia     IBS (irritable bowel syndrome)     Lymphocytosis 05/07/2018       Past Surgical History:  Past Surgical History:   Procedure Laterality Date    COLONOSCOPY N/A 10/31/2019    COL[facility]  10/31/2019         [facility]  10/31/2019         HX CATARACT REMOVAL      HX CHOLECYSTECTOMY      HX COLONOSCOPY  2009    HX COLONOSCOPY  2016    2 adenomatous polyp    HX HEMORRHOIDECTOMY  2009    HX HYSTERECTOMY      HX KNEE REPLACEMENT      right    HX ORTHOPAEDIC  12/11/2017    back, laminectomy and decompression    OH INSJ/RPLCMT PERM DFB W/TRNSVNS LDS 1/DUAL CHMBR N/A 9/23/2020    INSERT ICD DUAL performed by Scarlett Anguiano MD at [facility] CARDIAC CATH LAB       Family History:  Family History   Problem Relation Age of Onset    Colon Cancer Father     Diabetes Mother        Social History:  Social History     Tobacco Use    Smoking status: Current Every Day Smoker     Packs/day: 1.00     Years: 55.00     Pack years: 55.00     Types: Cigarettes    Smokeless tobacco: Never Used   Substance Use Topics    Alcohol use: No    Drug use: Never       Allergies: Allergies   Allergen Reactions    Morphine Anaphylaxis    Ultram [Tramadol] Palpitations         Review of Systems   Review of Systems   Constitutional: Positive for fever. Negative for chills. HENT: Positive for dental problem and rhinorrhea. Negative for congestion and sore throat. Respiratory: Negative. Negative for cough and shortness of breath. Cardiovascular: Negative.   Negative for chest pain and palpitations. Gastrointestinal: Negative. Negative for abdominal pain, nausea and vomiting. Musculoskeletal: Negative. Negative for arthralgias and back pain. Skin: Negative. Negative for rash and wound. Allergic/Immunologic: Negative. Neurological: Negative. Negative for dizziness and headaches. Hematological: Negative. Negative for adenopathy. Does not bruise/bleed easily. Psychiatric/Behavioral: Negative. Negative for confusion. The patient is not nervous/anxious. All other systems reviewed and are negative. Physical Exam   Physical Exam  Vitals signs and nursing note reviewed. Constitutional:       General: She is not in acute distress. Appearance: Normal appearance. She is well-developed. She is not ill-appearing, toxic-appearing or diaphoretic. HENT:      Head: Normocephalic and atraumatic. Nose: Nose normal.      Mouth/Throat:      Lips: Pink. Mouth: Mucous membranes are moist.      Dentition: No dental tenderness or dental abscesses. Pharynx: Oropharynx is clear. Uvula midline. Eyes:      Extraocular Movements: Extraocular movements intact. Conjunctiva/sclera: Conjunctivae normal.      Pupils: Pupils are equal, round, and reactive to light. Neck:      Musculoskeletal: Normal range of motion and neck supple. No neck rigidity or muscular tenderness. Cardiovascular:      Rate and Rhythm: Normal rate and regular rhythm. Pulses: Normal pulses. Pulmonary:      Effort: Pulmonary effort is normal. No respiratory distress. Abdominal:      General: There is no distension. Palpations: Abdomen is soft. Musculoskeletal: Normal range of motion. General: No swelling or tenderness. Skin:     General: Skin is warm and dry. Capillary Refill: Capillary refill takes less than 2 seconds. Findings: No erythema or rash. Neurological:      General: No focal deficit present.       Mental Status: She is alert and oriented to person, place, and time. Mental status is at baseline. Cranial Nerves: No cranial nerve deficit. Sensory: No sensory deficit. Psychiatric:         Mood and Affect: Mood normal.         Behavior: Behavior normal.         Thought Content: Thought content normal.         Judgment: Judgment normal.         Diagnostic Study Results     Labs -   No results found for this or any previous visit (from the past 12 hour(s)). Radiologic Studies -   No orders to display     CT Results  (Last 48 hours)    None        CXR Results  (Last 48 hours)    None          Medical Decision Making   I am the first provider for this patient. I reviewed the vital signs, available nursing notes, past medical history, past surgical history, family history and social history. Vital Signs-Reviewed the patient's vital signs. Patient Vitals for the past 12 hrs:   Temp Pulse Resp BP SpO2   05/15/21 1243 99.3 °F (37.4 °C) 89 20 (!) 161/83 93 %                 Records Reviewed: Nursing Notes    Provider Notes (Medical Decision Making):   Patient presents with dental pain, will treat for dental infection. ED Course:   Initial assessment performed. The patients presenting problems have been discussed, and they are in agreement with the care plan formulated and outlined with them. I have encouraged them to ask questions as they arise throughout their visit. 1:11 PM    She presents with dental pain suspect dental infection underneath her Will place her on antibiotics give her dental balls to use for pain and she will see her dentist for follow-up    Pt has been re-examined and states that they are feeling better and have no new complaints. medications, diagnosis, follow up plan and return instructions have been reviewed and discussed with the patient and/or family. Pt and/or family were instructed on symptoms that may arise after discharge requiring re-evaluation by a physician.  Pt and/or family have had the opportunity to ask questions about their care. Patient and/or family verbalized understanding and agreement with care plan, follow up and return instructions. Patient and/or family agree to return in 50 hours if their symptoms are not improving or immediately if they have any change in their condition. I have also put together some discharge instructions for patient that include: 1) educational information regarding their diagnosis, 2) how to care for their diagnosis at home, as well a 3) list of reasons why they would want to return to the ED prior to their follow-up appointment, should their condition change. Anthony Frances MD      Procedures          Disposition:  Discharged        DISCHARGE PLAN:  1. Current Discharge Medication List      START taking these medications    Details   penicillin v potassium (VEETID) 500 mg tablet Take 1 Tab by mouth four (4) times daily. Qty: 40 Tab, Refills: 0  Start date: 5/15/2021           2. Follow-up Information     Follow up With Specialties Details Why Contact Info    Karey Wen NP Nurse Practitioner Schedule an appointment as soon as possible for a visit in 2 days For follow up Tabitha Camacho  [address] [employer]  [phone]      Dentist    See your dentist for follow-up as soon as possible        3. Return to ED if worse     Diagnosis     Clinical Impression:   1. Head and face pain    2. Pain, dental        Attestations: Anthony Frances MD    Please note that this dictation was completed with [employer], the computer voice recognition software. Quite often unanticipated grammatical, syntax, homophones, and other interpretive errors are inadvertently transcribed by the computer software. Please disregard these errors. Please excuse any errors that have escaped final proofreading. Thank you.

## 2021-05-15 NOTE — ED TRIAGE NOTES
LL dental pain starting last night with reported fever of 103.1, took tylenol ( and old Bactrim)which lowered temp to 101 and presented to ED PO V , nauseas with 99.4 PO temp

## 2021-06-01 RX ORDER — VALSARTAN 80 MG/1
TABLET ORAL
Qty: 90 TABLET | Refills: 3 | Status: ON HOLD | OUTPATIENT
Start: 2021-06-01 | End: 2021-07-16

## 2021-06-10 RX ORDER — ERGOCALCIFEROL 1.25 MG/1
CAPSULE ORAL
Qty: 13 CAPSULE | Refills: 3 | Status: ON HOLD | OUTPATIENT
Start: 2021-06-10 | End: 2021-07-19

## 2021-06-14 ENCOUNTER — OFFICE VISIT (OUTPATIENT)
Dept: FAMILY MEDICINE CLINIC | Age: 81
End: 2021-06-14
Payer: MEDICARE

## 2021-06-14 VITALS
HEIGHT: 66 IN | WEIGHT: 161.4 LBS | HEART RATE: 75 BPM | BODY MASS INDEX: 25.94 KG/M2 | SYSTOLIC BLOOD PRESSURE: 140 MMHG | RESPIRATION RATE: 18 BRPM | DIASTOLIC BLOOD PRESSURE: 70 MMHG | TEMPERATURE: 96.8 F | OXYGEN SATURATION: 96 %

## 2021-06-14 DIAGNOSIS — I73.9 CLAUDICATION (HCC): ICD-10-CM

## 2021-06-14 DIAGNOSIS — E11.49 TYPE II OR UNSPECIFIED TYPE DIABETES MELLITUS WITH NEUROLOGICAL MANIFESTATIONS, UNCONTROLLED(250.62) (HCC): Primary | ICD-10-CM

## 2021-06-14 DIAGNOSIS — R53.83 FATIGUE, UNSPECIFIED TYPE: ICD-10-CM

## 2021-06-14 DIAGNOSIS — F33.1 MODERATE EPISODE OF RECURRENT MAJOR DEPRESSIVE DISORDER (HCC): ICD-10-CM

## 2021-06-14 LAB
ALBUMIN UR QL STRIP: 150 MG/L
CREATININE, URINE POC: 300 MG/DL
MICROALBUMIN/CREAT RATIO POC: NORMAL MG/G

## 2021-06-14 PROCEDURE — 1100F PTFALLS ASSESS-DOCD GE2>/YR: CPT | Performed by: NURSE PRACTITIONER

## 2021-06-14 PROCEDURE — G8753 SYS BP > OR = 140: HCPCS | Performed by: NURSE PRACTITIONER

## 2021-06-14 PROCEDURE — 1090F PRES/ABSN URINE INCON ASSESS: CPT | Performed by: NURSE PRACTITIONER

## 2021-06-14 PROCEDURE — 99214 OFFICE O/P EST MOD 30 MIN: CPT | Performed by: NURSE PRACTITIONER

## 2021-06-14 PROCEDURE — G8754 DIAS BP LESS 90: HCPCS | Performed by: NURSE PRACTITIONER

## 2021-06-14 PROCEDURE — G8432 DEP SCR NOT DOC, RNG: HCPCS | Performed by: NURSE PRACTITIONER

## 2021-06-14 PROCEDURE — G8536 NO DOC ELDER MAL SCRN: HCPCS | Performed by: NURSE PRACTITIONER

## 2021-06-14 PROCEDURE — 3288F FALL RISK ASSESSMENT DOCD: CPT | Performed by: NURSE PRACTITIONER

## 2021-06-14 PROCEDURE — G8399 PT W/DXA RESULTS DOCUMENT: HCPCS | Performed by: NURSE PRACTITIONER

## 2021-06-14 PROCEDURE — G8419 CALC BMI OUT NRM PARAM NOF/U: HCPCS | Performed by: NURSE PRACTITIONER

## 2021-06-14 PROCEDURE — 82044 UR ALBUMIN SEMIQUANTITATIVE: CPT | Performed by: NURSE PRACTITIONER

## 2021-06-14 PROCEDURE — 36415 COLL VENOUS BLD VENIPUNCTURE: CPT | Performed by: NURSE PRACTITIONER

## 2021-06-14 PROCEDURE — G8427 DOCREV CUR MEDS BY ELIG CLIN: HCPCS | Performed by: NURSE PRACTITIONER

## 2021-06-14 NOTE — PROGRESS NOTES
1. Have you been to the ER, urgent care clinic since your last visit? Hospitalized since your last visit? No    2. Have you seen or consulted any other health care providers outside of the [address] [place] [address] since your last visit? Include any pap smears or colon screening.  No      Chief Complaint   Patient presents with    Diabetes    Lethargy         Visit Vitals  BP (!) 140/70 (BP 1 Location: Left upper arm, BP Patient Position: Sitting)   Pulse 75   Temp 96.8 °F (36 °C) (Temporal)   Resp 18   Ht 5' 6\" (1.676 m)   Wt 161 lb 6.4 oz (73.2 kg)   SpO2 96%   BMI 26.05 kg/m²       Pain Scale: 8/10  Pain Location: Generalized (arthritis)

## 2021-06-15 LAB
ALBUMIN SERPL-MCNC: 3.5 G/DL (ref 3.5–5)
ALBUMIN/GLOB SERPL: 0.9 {RATIO} (ref 1.1–2.2)
ALP SERPL-CCNC: 110 U/L (ref 45–117)
ALT SERPL-CCNC: 21 U/L (ref 12–78)
ANION GAP SERPL CALC-SCNC: 9 MMOL/L (ref 5–15)
AST SERPL-CCNC: 12 U/L (ref 15–37)
BASOPHILS # BLD: 0.1 K/UL (ref 0–0.1)
BASOPHILS NFR BLD: 1 % (ref 0–1)
BILIRUB SERPL-MCNC: 0.2 MG/DL (ref 0.2–1)
BUN SERPL-MCNC: 39 MG/DL (ref 6–20)
BUN/CREAT SERPL: 23 (ref 12–20)
CALCIUM SERPL-MCNC: 9.7 MG/DL (ref 8.5–10.1)
CHLORIDE SERPL-SCNC: 103 MMOL/L (ref 97–108)
CHOLEST SERPL-MCNC: 243 MG/DL
CO2 SERPL-SCNC: 23 MMOL/L (ref 21–32)
CREAT SERPL-MCNC: 1.71 MG/DL (ref 0.55–1.02)
DIFFERENTIAL METHOD BLD: ABNORMAL
EOSINOPHIL # BLD: 0.3 K/UL (ref 0–0.4)
EOSINOPHIL NFR BLD: 2 % (ref 0–7)
ERYTHROCYTE [DISTWIDTH] IN BLOOD BY AUTOMATED COUNT: 13.6 % (ref 11.5–14.5)
EST. AVERAGE GLUCOSE BLD GHB EST-MCNC: 214 MG/DL
GLOBULIN SER CALC-MCNC: 3.9 G/DL (ref 2–4)
GLUCOSE SERPL-MCNC: 294 MG/DL (ref 65–100)
HBA1C MFR BLD: 9.1 % (ref 4–5.6)
HCT VFR BLD AUTO: 39.8 % (ref 35–47)
HDLC SERPL-MCNC: 42 MG/DL
HDLC SERPL: 5.8 {RATIO} (ref 0–5)
HGB BLD-MCNC: 12.5 G/DL (ref 11.5–16)
IMM GRANULOCYTES # BLD AUTO: 0.1 K/UL (ref 0–0.04)
IMM GRANULOCYTES NFR BLD AUTO: 1 % (ref 0–0.5)
LDLC SERPL CALC-MCNC: ABNORMAL MG/DL (ref 0–100)
LDLC SERPL DIRECT ASSAY-MCNC: 137 MG/DL (ref 0–100)
LYMPHOCYTES # BLD: 3.8 K/UL (ref 0.8–3.5)
LYMPHOCYTES NFR BLD: 30 % (ref 12–49)
MCH RBC QN AUTO: 30.1 PG (ref 26–34)
MCHC RBC AUTO-ENTMCNC: 31.4 G/DL (ref 30–36.5)
MCV RBC AUTO: 95.9 FL (ref 80–99)
MONOCYTES # BLD: 0.8 K/UL (ref 0–1)
MONOCYTES NFR BLD: 6 % (ref 5–13)
NEUTS SEG # BLD: 8 K/UL (ref 1.8–8)
NEUTS SEG NFR BLD: 60 % (ref 32–75)
NRBC # BLD: 0 K/UL (ref 0–0.01)
NRBC BLD-RTO: 0 PER 100 WBC
PLATELET # BLD AUTO: 311 K/UL (ref 150–400)
PMV BLD AUTO: 11.4 FL (ref 8.9–12.9)
POTASSIUM SERPL-SCNC: 4.9 MMOL/L (ref 3.5–5.1)
PROT SERPL-MCNC: 7.4 G/DL (ref 6.4–8.2)
RBC # BLD AUTO: 4.15 M/UL (ref 3.8–5.2)
SODIUM SERPL-SCNC: 135 MMOL/L (ref 136–145)
TRIGL SERPL-MCNC: 493 MG/DL (ref ?–150)
VLDLC SERPL CALC-MCNC: ABNORMAL MG/DL
WBC # BLD AUTO: 12.9 K/UL (ref 3.6–11)

## 2021-06-15 RX ORDER — FUROSEMIDE 40 MG/1
TABLET ORAL
Qty: 90 TABLET | Refills: 1 | Status: ON HOLD | OUTPATIENT
Start: 2021-06-15 | End: 2021-09-07 | Stop reason: SDUPTHER

## 2021-06-17 NOTE — PROGRESS NOTES
Patient verified stating name and date of birth. Patient informed of lab results and states understanding.  Appontment given for Monday 6- 2:30 pm

## 2021-06-20 NOTE — PROGRESS NOTES
Subjective:     Ernesto Paez is a 79 y.o. female who presents today with the following:  Chief Complaint   Patient presents with    Diabetes    Lethargy       Patient Active Problem List   Diagnosis Code    Hypercholesterolemia E78.00    Arthritis M19.90    Diabetes ([facility] [address] E11.9    IBS (irritable bowel syndrome) K58.9    Hemorrhoid K64.9    Chronic pain V89.65    Neutrophilic leukocytosis Z95.7    SOB (shortness of breath) R06.02    Abdominal pain, generalized R10.84    Diarrhea in adult patient R19.7    Cigarette smoker F17.210    Diverticulitis large intestine w/o perforation or abscess w/o bleeding K57.32    Hyperkalemia E87.5    Altered mental status R41.82    Transaminitis R74.01    Acute renal failure (ARF) ([facility]) N17.9    Acute encephalopathy G93.40    Overdose of opiate or related narcotic, accidental or unintentional, subsequent encounter T40.601D    Acute left-sided low back pain with sciatica M54.40    Physical debility R53.81    Type 2 diabetes with nephropathy ([facility]) E11.21    Type 2 diabetes mellitus with diabetic neuropathy ([facility]) E11.40    Lymphocytosis D72.820    CKD (chronic kidney disease) stage 3, GFR 30-59 ml/min ([facility]) N18.30    PAF (paroxysmal atrial fibrillation) ([facility]) I48.0    Respiratory failure ([facility]) J96.90    CHF (congestive heart failure) ([facility]) I50.9    Hypoxia R09.02    Bilateral pleural effusion J90    Elevated troponin R77.8    Dilated cardiomyopathy ([facility]) I42.0    SSS (sick sinus syndrome) ([facility]) I49.5    Cardiomyopathy ([facility]) I42.9    AICD (automatic cardioverter/defibrillator) present Z95.810    Spinal stenosis of lumbar region with neurogenic claudication M48.062    Spondylosis of lumbosacral region without myelopathy or radiculopathy M47.817    Moderate episode of recurrent major depressive disorder ([facility]) F33.1         COMPLIANT WITH MEDICATION:   HTN; Denies chest pain, dyspnea, palpitations, headache and blurred vision.  Blood pressure near normotensive. DM;Diabetes. Sugars controlled poorly  Hypoglycemia: none  Tolerating current treatment well  Current medications include diet  Glucotrol, insulin 45 units per day and januvia    Lab Results   Component Value Date/Time    Hemoglobin A1c 9.1 (H) 06/14/2021 09:17 PM    Hemoglobin A1c 6.4 (H) 10/29/2020 10:22 AM    Hemoglobin A1c 7.8 (H) 06/29/2020 12:03 PM    Glucose 294 (H) 06/14/2021 09:17 PM    Glucose (POC) 108 (H) 09/23/2020 04:03 PM    Microalb/Creat ratio (ug/mg creat.) 1,568.2 (H) 05/07/2018 11:49 AM    MICROALBUMIN,MG/DAY Comment 11/15/2017 12:12 PM    Microalbumin/creat ratio (POC)  06/14/2021 04:31 PM    LDL,Direct 137 (H) 06/14/2021 09:17 PM    LDL, calculated Not calculated due to elevated triglyceride level 06/14/2021 09:17 PM    Creatinine 1.71 (H) 06/14/2021 09:17 PM     Lab Results   Component Value Date/Time    Microalb/Creat ratio (ug/mg creat.) 1,568.2 (H) 05/07/2018 11:49 AM    MICROALBUMIN,MG/DAY Comment 11/15/2017 12:12 PM       Last eye exam performed  less than 1 year 4/24/20  ago and was normal.    Last foot exam 7/1/2020   performed less than 1 year ago. warm, good capillary refill      Fatigue; lack of energy, no change in lifestyle or routine.      depression screening addressed not at risk    abuse screening addressed denies    learning assessment addressed reviewed nurses notes    fall risk addressed not at risk    HM: addressed    ROS:  Gen: denies fever, chills, fatigue, weight loss, weight gain  HEENT:denies blurry vision, nasal congestion, sore throat  Resp: denies dypsnea, cough, wheezing  CV: denies chest pain radiating to the jaws or arms, palpitations, lower extremity edema  Abd: denies nausea, vomiting, diarrhea, constipation  Neuro: denies numbness/tingling  Endo: denies polyuria, polydipsia, heat/cold intolerance  Heme: no lymphadenopathy    Allergies   Allergen Reactions    Morphine Anaphylaxis    Ultram [Tramadol] Palpitations         Current Outpatient Medications:     Vitamin D2 1,250 mcg (50,000 unit) capsule, TAKE 1 CAPSULE BY MOUTH  EVERY SEVEN DAYS FOR LOW  VITAMIN D LEVELS, Disp: 13 Capsule, Rfl: 3    valsartan (DIOVAN) 80 mg tablet, TAKE 1 TABLET BY MOUTH  DAILY, Disp: 90 Tablet, Rfl: 3    venlafaxine (EFFEXOR) 75 mg tablet, Takes 1/2 BID, Disp: 180 Tab, Rfl: 3    atorvastatin (LIPITOR) 20 mg tablet, TAKE 1 TABLET BY MOUTH  DAILY, Disp: 90 Tab, Rfl: 1    glipiZIDE (GLUCOTROL) 5 mg tablet, TAKE 1 TABLET BY MOUTH  TWICE DAILY, Disp: 180 Tab, Rfl: 1    thiamine HCL (B-1) 100 mg tablet, Take 100 mg by mouth daily. , Disp: , Rfl:     nitroglycerin (NITROSTAT) 0.4 mg SL tablet, 1 Tab by SubLINGual route every five (5) minutes as needed for Chest Pain. Up to 3 doses. , Disp: 25 Tab, Rfl: 0    carvediloL (COREG) 25 mg tablet, Take 1 Tab by mouth two (2) times daily (with meals). , Disp: 28 Tab, Rfl: 0    colestipoL (Colestid) 1 gram tablet, Take 1 Tab by mouth two (2) times a day., Disp: 180 Tab, Rfl: 3    vit A/vit C/vit E/zinc/copper (ICAPS AREDS PO), Take  by mouth two (2) times a day., Disp: , Rfl:     loperamide (IMMODIUM) 2 mg tablet, Take 2 mg by mouth as needed for Diarrhea., Disp: , Rfl:     aspirin delayed-release 81 mg tablet, Take 1 Tab by mouth daily. , Disp: 90 Tab, Rfl: 1    insulin glargine (Lantus Solostar U-100 Insulin) 100 unit/mL (3 mL) inpn, INJECT SUBCUTANEOUSLY 45  UNITS DAILY, Disp: 45 mL, Rfl: 3    Januvia 50 mg tablet, TAKE 1 TABLET BY MOUTH  DAILY, Disp: 90 Tab, Rfl: 3    vit C/E/Zn/coppr/lutein/zeaxan (PRESERVISION AREDS-2 PO), Take  by mouth daily. , Disp: , Rfl:     carboxymethylcellulose sodium (REFRESH TEARS OP), Apply  to eye daily. 2-4 drops both eyes, Disp: , Rfl:     OTHER,NON-FORMULARY,, Take 1 Tab by mouth daily. Tumeric curcumin C3 complex, Disp: , Rfl:     B.infantis-B.ani-B.long-B.bifi (PROBIOTIC 4X) 10-15 mg TbEC, Take  by mouth daily. , Disp: , Rfl:     acetaminophen (TYLENOL) 650 mg TbER, Take 650 mg by mouth every eight (8) hours. , Disp: , Rfl:     omega-3 fatty acids-vitamin e 1,000 mg cap, Take 1 Cap by mouth two (2) times a day. 32a day , Disp: , Rfl:     ascorbic acid, vitamin C, (VITAMIN C) 500 mg tablet, Take 500 mg by mouth daily. , Disp: , Rfl:     zinc 50 mg tab tablet, Take  by mouth daily. , Disp: , Rfl:     furosemide (LASIX) 40 mg tablet, TAKE 1 TABLET BY MOUTH  DAILY, Disp: 90 Tablet, Rfl: 1    Past Medical History:   Diagnosis Date    A-fib ([facility] [address]     AICD (automatic cardioverter/defibrillator) present 9/23/2020 9/23/2020 [employer] AICD implant    Arthritis     Bilateral pleural effusion 9/18/2020    Chronic pain     Chronic pain     Diabetes ([facility] [address]     Diverticular disease     Elevated troponin 9/18/2020    Hemorrhoid     Hypercholesterolemia     IBS (irritable bowel syndrome)     Lymphocytosis 05/07/2018       Past Surgical History:   Procedure Laterality Date    COLONOSCOPY N/A 10/31/2019    [facility]  10/31/2019         [facility]  10/31/2019         HX CATARACT REMOVAL      HX CHOLECYSTECTOMY      HX COLONOSCOPY  2009    HX COLONOSCOPY  2016    2 adenomatous polyp    HX HEMORRHOIDECTOMY  2009    HX HYSTERECTOMY      HX KNEE REPLACEMENT      right    HX ORTHOPAEDIC  12/11/2017    back, laminectomy and decompression    ND INSJ/RPLCMT PERM DFB W/TRNSVNS LDS 1/DUAL CHMBR N/A 9/23/2020    INSERT ICD DUAL performed by Jordan [facility]donado [facility] CARDIAC C[facility]       Social History     Tobacco Use   Smoking Status Current Every Day Smoker    Packs/day: 1.00    Years: 55.00    Pack years: 55.00    Types: Cigarettes   Smokeless Tobacco Never Used       Social History     Socioeconomic History    Marital status:      Spouse name: Not on file    Number of children: Not on file    Years of education: Not on file    Highest education level: Not on file Occupational History    Occupation: retired     Comment: LPN   Tobacco Use    Smoking status: Current Every Day Smoker     Packs/day: 1.00     Years: 55.00     Pack years: 55.00     Types: Cigarettes    Smokeless tobacco: Never Used   Substance and Sexual Activity    Alcohol use: No    Drug use: Never   Other Topics Concern     Service No    Blood Transfusions Yes    Caffeine Concern Yes    Occupational Exposure No    Hobby Hazards No    Sleep Concern Yes    Stress Concern Yes    Weight Concern No    Special Diet No    Back Care Yes    Exercise No    Bike Helmet No     Comment: n/a    Seat Belt Yes    Self-Exams Yes     Social Determinants of Health     Financial Resource Strain:     Difficulty of Paying Living Expenses:    Food Insecurity:     Worried About Running Out of Food in the Last Year:     Ran Out of Food in the Last Year:    Transportation Needs:     Lack of Transportation (Medical):  Lack of Transportation (Non-Medical):    Physical Activity:     Days of Exercise per Week:     Minutes of Exercise per Session:    Stress:     Feeling of Stress :    Social Connections:     Frequency of Communication with Friends and Family:     Frequency of Social Gatherings with Friends and Family:     Attends Bahai Services:     Active Member of Clubs or Organizations:     Attends Club or Organization Meetings:     Marital Status:        Family History   Problem Relation Age of Onset    Colon Cancer Father     Diabetes Mother          Objective:     Visit Vitals  BP (!) 140/70 (BP 1 Location: Left upper arm, BP Patient Position: Sitting)   Pulse 75   Temp 96.8 °F (36 °C) (Temporal)   Resp 18   Ht 5' 6\" (1.676 m)   Wt 161 lb 6.4 oz (73.2 kg)   SpO2 96%   BMI 26.05 kg/m²     Body mass index is 26.05 kg/m². General: Alert and oriented. No acute distress. Well nourished  HEENT :  Ears:TMs are normal. Canals are clear.    Eyes: pupils equal, round, react to light and accommodation. Extra ocular movements intact. Nose: patent. Mouth and throat is clear. Neck:supple full range of motion no thyromegaly. Trachea midline, No carotid bruits. No significant lymphadenopathy  Lungs[de-identified] clear to auscultation without wheezes, rales, or rhonchi. Heart :RRR, S1 & S2 are normal intensity. No murmur; no gallop  Abdomen: bowel sounds active. No tenderness, guarding, rebound, masses, hepatic or spleen enlargement  Back: no CVA tenderness. Extremities: without clubbing, cyanosis, or edema  Pulses: radial and femoral pulses are normal  Neuro: HMF intact. Cranial nerves II through XII grossly normal.  Motor: is 5 over 5 and symmetrical.   Deep tendon reflexes: +2 equal  Psych:appropriate behavior, mood, affect and judgement. Diabetic foot exam performed by Brenda Mishra NP     Measurement  Response Nurse Comment Physician Comment   Monofilament  R - normal sensation with micro filament  L - reduced sensation with micro filament     Pulse DP R - present  L - present     Pulse TP R - present  L - present     Structural deformity R - None  L - None     Skin Integrity / Deformity R - None  L - None        Reviewed by:         Results for orders placed or performed in visit on 04/45/69   METABOLIC PANEL, COMPREHENSIVE   Result Value Ref Range    Sodium 135 (L) 136 - 145 mmol/L    Potassium 4.9 3.5 - 5.1 mmol/L    Chloride 103 97 - 108 mmol/L    CO2 23 21 - 32 mmol/L    Anion gap 9 5 - 15 mmol/L    Glucose 294 (H) 65 - 100 mg/dL    BUN 39 (H) 6 - 20 MG/DL    Creatinine 1.71 (H) 0.55 - 1.02 MG/DL    BUN/Creatinine ratio 23 (H) 12 - 20      GFR est AA 35 (L) >60 ml/min/1.73m2    GFR est non-AA 29 (L) >60 ml/min/1.73m2    Calcium 9.7 8.5 - 10.1 MG/DL    Bilirubin, total 0.2 0.2 - 1.0 MG/DL    ALT (SGPT) 21 12 - 78 U/L    AST (SGOT) 12 (L) 15 - 37 U/L    Alk.  phosphatase 110 45 - 117 U/L    Protein, total 7.4 6.4 - 8.2 g/dL    Albumin 3.5 3.5 - 5.0 g/dL    Globulin 3.9 2.0 - 4.0 g/dL    A-G Ratio 0.9 (L) 1.1 - 2.2     LIPID PANEL   Result Value Ref Range    Cholesterol, total 243 (H) <200 MG/DL    Triglyceride 493 (H) <150 MG/DL    HDL Cholesterol 42 MG/DL    LDL, calculated Not calculated due to elevated triglyceride level 0 - 100 MG/DL    VLDL, calculated  MG/DL     Calculation not valid with this patient's other Lipid values. CHOL/HDL Ratio 5.8 (H) 0.0 - 5.0     HEMOGLOBIN A1C WITH EAG   Result Value Ref Range    Hemoglobin A1c 9.1 (H) 4.0 - 5.6 %    Est. average glucose 214 mg/dL   CBC WITH AUTOMATED DIFF   Result Value Ref Range    WBC 12.9 (H) 3.6 - 11.0 K/uL    RBC 4.15 3.80 - 5.20 M/uL    HGB 12.5 11.5 - 16.0 g/dL    HCT 39.8 35.0 - 47.0 %    MCV 95.9 80.0 - 99.0 FL    MCH 30.1 26.0 - 34.0 PG    MCHC 31.4 30.0 - 36.5 g/dL    RDW 13.6 11.5 - 14.5 %    PLATELET 130 791 - 209 K/uL    MPV 11.4 8.9 - 12.9 FL    NRBC 0.0 0  WBC    ABSOLUTE NRBC 0.00 0.00 - 0.01 K/uL    NEUTROPHILS 60 32 - 75 %    LYMPHOCYTES 30 12 - 49 %    MONOCYTES 6 5 - 13 %    EOSINOPHILS 2 0 - 7 %    BASOPHILS 1 0 - 1 %    IMMATURE GRANULOCYTES 1 (H) 0.0 - 0.5 %    ABS. NEUTROPHILS 8.0 1.8 - 8.0 K/UL    ABS. LYMPHOCYTES 3.8 (H) 0.8 - 3.5 K/UL    ABS. MONOCYTES 0.8 0.0 - 1.0 K/UL    ABS. EOSINOPHILS 0.3 0.0 - 0.4 K/UL    ABS. BASOPHILS 0.1 0.0 - 0.1 K/UL    ABS. IMM.  GRANS. 0.1 (H) 0.00 - 0.04 K/UL    DF AUTOMATED     LDL, DIRECT   Result Value Ref Range    LDL,Direct 137 (H) 0 - 100 mg/dl   AMB POC URINE, MICROALBUMIN, SEMIQUANT (3 RESULTS)   Result Value Ref Range    ALBUMIN, URINE  Negative mg/L    CREATININE, URINE  mg/dL    Microalbumin/creat ratio (POC)  <30 MG/G       Results for orders placed or performed in visit on 10/61/76   METABOLIC PANEL, COMPREHENSIVE   Result Value Ref Range    Sodium 135 (L) 136 - 145 mmol/L    Potassium 4.9 3.5 - 5.1 mmol/L    Chloride 103 97 - 108 mmol/L    CO2 23 21 - 32 mmol/L    Anion gap 9 5 - 15 mmol/L    Glucose 294 (H) 65 - 100 mg/dL    BUN 39 (H) 6 - 20 MG/DL Creatinine 1.71 (H) 0.55 - 1.02 MG/DL    BUN/Creatinine ratio 23 (H) 12 - 20      GFR est AA 35 (L) >60 ml/min/1.73m2    GFR est non-AA 29 (L) >60 ml/min/1.73m2    Calcium 9.7 8.5 - 10.1 MG/DL    Bilirubin, total 0.2 0.2 - 1.0 MG/DL    ALT (SGPT) 21 12 - 78 U/L    AST (SGOT) 12 (L) 15 - 37 U/L    Alk. phosphatase 110 45 - 117 U/L    Protein, total 7.4 6.4 - 8.2 g/dL    Albumin 3.5 3.5 - 5.0 g/dL    Globulin 3.9 2.0 - 4.0 g/dL    A-G Ratio 0.9 (L) 1.1 - 2.2     LIPID PANEL   Result Value Ref Range    Cholesterol, total 243 (H) <200 MG/DL    Triglyceride 493 (H) <150 MG/DL    HDL Cholesterol 42 MG/DL    LDL, calculated Not calculated due to elevated triglyceride level 0 - 100 MG/DL    VLDL, calculated  MG/DL     Calculation not valid with this patient's other Lipid values. CHOL/HDL Ratio 5.8 (H) 0.0 - 5.0     HEMOGLOBIN A1C WITH EAG   Result Value Ref Range    Hemoglobin A1c 9.1 (H) 4.0 - 5.6 %    Est. average glucose 214 mg/dL   CBC WITH AUTOMATED DIFF   Result Value Ref Range    WBC 12.9 (H) 3.6 - 11.0 K/uL    RBC 4.15 3.80 - 5.20 M/uL    HGB 12.5 11.5 - 16.0 g/dL    HCT 39.8 35.0 - 47.0 %    MCV 95.9 80.0 - 99.0 FL    MCH 30.1 26.0 - 34.0 PG    MCHC 31.4 30.0 - 36.5 g/dL    RDW 13.6 11.5 - 14.5 %    PLATELET 837 215 - 817 K/uL    MPV 11.4 8.9 - 12.9 FL    NRBC 0.0 0  WBC    ABSOLUTE NRBC 0.00 0.00 - 0.01 K/uL    NEUTROPHILS 60 32 - 75 %    LYMPHOCYTES 30 12 - 49 %    MONOCYTES 6 5 - 13 %    EOSINOPHILS 2 0 - 7 %    BASOPHILS 1 0 - 1 %    IMMATURE GRANULOCYTES 1 (H) 0.0 - 0.5 %    ABS. NEUTROPHILS 8.0 1.8 - 8.0 K/UL    ABS. LYMPHOCYTES 3.8 (H) 0.8 - 3.5 K/UL    ABS. MONOCYTES 0.8 0.0 - 1.0 K/UL    ABS. EOSINOPHILS 0.3 0.0 - 0.4 K/UL    ABS. BASOPHILS 0.1 0.0 - 0.1 K/UL    ABS. IMM.  GRANS. 0.1 (H) 0.00 - 0.04 K/UL    DF AUTOMATED     LDL, DIRECT   Result Value Ref Range    LDL,Direct 137 (H) 0 - 100 mg/dl   AMB POC URINE, MICROALBUMIN, SEMIQUANT (3 RESULTS)   Result Value Ref Range    ALBUMIN, URINE  Negative mg/L    CREATININE, URINE  mg/dL    Microalbumin/creat ratio (POC)  <30 MG/G       Assessment/ Plan:     1. 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 Fatigue, unspecified type  We discussed causes of increase fatigue and testing  Cardiac, diabetes  And sometimes thyroid   - CBC WITH AUTOMATED DIFF; Future  - HEMOGLOBIN A1C WITH EAG; Future  - LIPID PANEL; Future  - METABOLIC PANEL, COMPREHENSIVE; Future  - COLLECTION VENOUS BLOOD,VENIPUNCTURE; Future  - HEMOGLOBIN A1C WITH EAG; Future  - HEMOGLOBIN A1C WITH EAG  - COLLECTION VENOUS BLOOD,VENIPUNCTURE  - METABOLIC PANEL, COMPREHENSIVE  - LIPID PANEL  - HEMOGLOBIN A1C WITH EAG  - CBC WITH AUTOMATED DIFF    3. Moderate episode of recurrent major depressive disorder (HCC)  Continue Effexor 75 mg  (tke 1/2 tablet ) effective      4. Claudication [facility])  We discussed taking short walks with periods of rest. Elevate LE and compresion stockings for 6 to 12 hours per day.       Orders Placed This Encounter    COLLECTION VENOUS BLOOD,VENIPUNCTURE     Standing Status:   Future     Number of Occurrences:   1     Standing Expiration Date:   7/14/2021    CBC WITH AUTOMATED DIFF     Standing Status:   Future     Number of Occurrences:   1     Standing Expiration Date:   7/14/2021    HEMOGLOBIN A1C WITH EAG     Standing Status:   Future     Number of Occurrences:   1     Standing Expiration Date:   7/14/2021    LIPID PANEL     Standing Status:   Future Number of Occurrences:   1     Standing Expiration Date:   3/45/6021    METABOLIC PANEL, COMPREHENSIVE     Standing Status:   Future     Number of Occurrences:   1     Standing Expiration Date:   7/14/2021    HEMOGLOBIN A1C WITH EAG     Standing Status:   Future     Number of Occurrences:   1     Standing Expiration Date:   7/14/2021    LDL, DIRECT     ATLASSITEID:[id number]    AMB POC URINE, MICROALBUMIN, SEMIQUANT (3 RESULTS)         Verbal and written instructions (see AVS) provided. Patient expresses understanding of diagnosis and treatment plan.     Health Maintenance Due   Topic Date Due    Shingrix Vaccine Age 49> (2 of 2) 10/15/2019    Foot Exam Q1  07/01/2021               KRIS Hanley-C

## 2021-06-21 ENCOUNTER — OFFICE VISIT (OUTPATIENT)
Dept: FAMILY MEDICINE CLINIC | Age: 81
End: 2021-06-21
Payer: MEDICARE

## 2021-06-21 VITALS
HEIGHT: 66 IN | BODY MASS INDEX: 26.29 KG/M2 | TEMPERATURE: 96.8 F | WEIGHT: 163.6 LBS | DIASTOLIC BLOOD PRESSURE: 70 MMHG | SYSTOLIC BLOOD PRESSURE: 170 MMHG | RESPIRATION RATE: 20 BRPM | HEART RATE: 80 BPM | OXYGEN SATURATION: 97 %

## 2021-06-21 DIAGNOSIS — N18.30 TYPE 2 DIABETES MELLITUS WITH STAGE 3 CHRONIC KIDNEY DISEASE, WITH LONG-TERM CURRENT USE OF INSULIN, UNSPECIFIED WHETHER STAGE 3A OR 3B CKD (HCC): Primary | ICD-10-CM

## 2021-06-21 DIAGNOSIS — Z79.4 TYPE 2 DIABETES MELLITUS WITH STAGE 3 CHRONIC KIDNEY DISEASE, WITH LONG-TERM CURRENT USE OF INSULIN, UNSPECIFIED WHETHER STAGE 3A OR 3B CKD (HCC): Primary | ICD-10-CM

## 2021-06-21 DIAGNOSIS — I10 ESSENTIAL HYPERTENSION: ICD-10-CM

## 2021-06-21 DIAGNOSIS — E11.22 TYPE 2 DIABETES MELLITUS WITH STAGE 3 CHRONIC KIDNEY DISEASE, WITH LONG-TERM CURRENT USE OF INSULIN, UNSPECIFIED WHETHER STAGE 3A OR 3B CKD (HCC): Primary | ICD-10-CM

## 2021-06-21 PROCEDURE — 99213 OFFICE O/P EST LOW 20 MIN: CPT | Performed by: NURSE PRACTITIONER

## 2021-06-21 PROCEDURE — G8419 CALC BMI OUT NRM PARAM NOF/U: HCPCS | Performed by: NURSE PRACTITIONER

## 2021-06-21 PROCEDURE — 3288F FALL RISK ASSESSMENT DOCD: CPT | Performed by: NURSE PRACTITIONER

## 2021-06-21 PROCEDURE — G8754 DIAS BP LESS 90: HCPCS | Performed by: NURSE PRACTITIONER

## 2021-06-21 PROCEDURE — G8399 PT W/DXA RESULTS DOCUMENT: HCPCS | Performed by: NURSE PRACTITIONER

## 2021-06-21 PROCEDURE — G8753 SYS BP > OR = 140: HCPCS | Performed by: NURSE PRACTITIONER

## 2021-06-21 PROCEDURE — G8427 DOCREV CUR MEDS BY ELIG CLIN: HCPCS | Performed by: NURSE PRACTITIONER

## 2021-06-21 PROCEDURE — G9717 DOC PT DX DEP/BP F/U NT REQ: HCPCS | Performed by: NURSE PRACTITIONER

## 2021-06-21 PROCEDURE — G8536 NO DOC ELDER MAL SCRN: HCPCS | Performed by: NURSE PRACTITIONER

## 2021-06-21 PROCEDURE — 1100F PTFALLS ASSESS-DOCD GE2>/YR: CPT | Performed by: NURSE PRACTITIONER

## 2021-06-21 PROCEDURE — 1090F PRES/ABSN URINE INCON ASSESS: CPT | Performed by: NURSE PRACTITIONER

## 2021-06-21 NOTE — PROGRESS NOTES
1. Have you been to the ER, urgent care clinic since your last visit? Hospitalized since your last visit? No    2. Have you seen or consulted any other health care providers outside of the [address] [place] [address] since your last visit? Include any pap smears or colon screening.  No      Chief Complaint   Patient presents with    Diabetes     f/u         Visit Vitals  BP (!) 170/70 (BP 1 Location: Left upper arm, BP Patient Position: Sitting, BP Cuff Size: Large adult)   Pulse 80   Temp 96.8 °F (36 °C) (Temporal)   Resp 20   Ht 5' 6\" (1.676 m)   Wt 163 lb 9.6 oz (74.2 kg)   SpO2 97%   BMI 26.41 kg/m²       Pain Scale: 0 - No pain/10  Pain Location:

## 2021-06-27 NOTE — PROGRESS NOTES
Subjective:     Rhiannon Buckner is a 79 y.o. female who presents today with the following:  Chief Complaint   Patient presents with    Diabetes     f/u       Patient Active Problem List   Diagnosis Code    Hypercholesterolemia E78.00    Arthritis M19.90    Diabetes ([facility] [address] E11.9    IBS (irritable bowel syndrome) K58.9    Hemorrhoid K64.9    Chronic pain W20.67    Neutrophilic leukocytosis N59.6    SOB (shortness of breath) R06.02    Abdominal pain, generalized R10.84    Diarrhea in adult patient R19.7    Cigarette smoker F17.210    Diverticulitis large intestine w/o perforation or abscess w/o bleeding K57.32    Hyperkalemia E87.5    Altered mental status R41.82    Transaminitis R74.01    Acute renal failure (ARF) ([facility]) N17.9    Acute encephalopathy G93.40    Overdose of opiate or related narcotic, accidental or unintentional, subsequent encounter T40.601D    Acute left-sided low back pain with sciatica M54.40    Physical debility R53.81    Type 2 diabetes with nephropathy ([facility]) E11.21    Type 2 diabetes mellitus with diabetic neuropathy ([facility]) E11.40    Lymphocytosis D72.820    CKD (chronic kidney disease) stage 3, GFR 30-59 ml/min ([facility]) N18.30    PAF (paroxysmal atrial fibrillation) ([facility]) I48.0    Respiratory failure ([facility]) J96.90    CHF (congestive heart failure) ([facility]) I50.9    Hypoxia R09.02    Bilateral pleural effusion J90    Elevated troponin R77.8    Dilated cardiomyopathy ([facility]) I42.0    SSS (sick sinus syndrome) ([facility]) I49.5    Cardiomyopathy ([facility]) I42.9    AICD (automatic cardioverter/defibrillator) present Z95.810    Spinal stenosis of lumbar region with neurogenic claudication M48.062    Spondylosis of lumbosacral region without myelopathy or radiculopathy M47.817    Moderate episode of recurrent major depressive disorder ([facility]) F33.1         COMPLIANT WITH MEDICATION:   HTN; Denies chest pain, dyspnea, palpitations, headache and blurred vision. Blood pressure elevated. She is concerned about her recent labs. Current everyday smoker. DM; eating sweets and high carb diet . Sedentary lifestyle. We discussed the impact on her diabetes management. Ms. Addy Montano saw the labs on my chart and decided to reduce sweets and carbs. Taking glipizide, lantus insulin  And  januvia    depression screening addressed not at risk    abuse screening addressed denies    learning assessment addressed reviewed nurses notes    fall risk addressed not at risk    HM: addressed declines shingles vaccine today. ROS:  Gen: denies fever, chills, fatigue, weight loss, weight gain  HEENT:denies blurry vision, nasal congestion, sore throat  Resp: denies dypsnea, cough, wheezing  CV: denies chest pain radiating to the jaws or arms, palpitations, lower extremity edema  Abd: denies nausea, vomiting, diarrhea, constipation  Neuro: denies numbness/tingling  Endo: denies polyuria, polydipsia, heat/cold intolerance  Heme: no lymphadenopathy    Allergies   Allergen Reactions    Morphine Anaphylaxis    Ultram [Tramadol] Palpitations         Current Outpatient Medications:     semaglutide (OZEMPIC) 0.25 mg/0.2 mL (2 mg/1.5 mL) sub-q pen, 0.25 mg by SubCUTAneous route every seven (7) days. Indications: type 2 diabetes mellitus, Disp: 1 Box, Rfl: 1    furosemide (LASIX) 40 mg tablet, TAKE 1 TABLET BY MOUTH  DAILY, Disp: 90 Tablet, Rfl: 1    Vitamin D2 1,250 mcg (50,000 unit) capsule, TAKE 1 CAPSULE BY MOUTH  EVERY SEVEN DAYS FOR LOW  VITAMIN D LEVELS, Disp: 13 Capsule, Rfl: 3    valsartan (DIOVAN) 80 mg tablet, TAKE 1 TABLET BY MOUTH  DAILY, Disp: 90 Tablet, Rfl: 3    venlafaxine (EFFEXOR) 75 mg tablet, Takes 1/2 BID, Disp: 180 Tab, Rfl: 3    atorvastatin (LIPITOR) 20 mg tablet, TAKE 1 TABLET BY MOUTH  DAILY, Disp: 90 Tab, Rfl: 1    glipiZIDE (GLUCOTROL) 5 mg tablet, TAKE 1 TABLET BY MOUTH  TWICE DAILY, Disp: 180 Tab, Rfl: 1    thiamine HCL (B-1) 100 mg tablet, Take 100 mg by mouth daily. , Disp: , Rfl:    nitroglycerin (NITROSTAT) 0.4 mg SL tablet, 1 Tab by SubLINGual route every five (5) minutes as needed for Chest Pain. Up to 3 doses. , Disp: 25 Tab, Rfl: 0    carvediloL (COREG) 25 mg tablet, Take 1 Tab by mouth two (2) times daily (with meals). , Disp: 28 Tab, Rfl: 0    colestipoL (Colestid) 1 gram tablet, Take 1 Tab by mouth two (2) times a day., Disp: 180 Tab, Rfl: 3    vit A/vit C/vit E/zinc/copper (ICAPS AREDS PO), Take  by mouth two (2) times a day., Disp: , Rfl:     loperamide (IMMODIUM) 2 mg tablet, Take 2 mg by mouth as needed for Diarrhea., Disp: , Rfl:     aspirin delayed-release 81 mg tablet, Take 1 Tab by mouth daily. , Disp: 90 Tab, Rfl: 1    insulin glargine (Lantus Solostar U-100 Insulin) 100 unit/mL (3 mL) inpn, INJECT SUBCUTANEOUSLY 45  UNITS DAILY, Disp: 45 mL, Rfl: 3    Januvia 50 mg tablet, TAKE 1 TABLET BY MOUTH  DAILY, Disp: 90 Tab, Rfl: 3    vit C/E/Zn/coppr/lutein/zeaxan (PRESERVISION AREDS-2 PO), Take  by mouth daily. , Disp: , Rfl:     carboxymethylcellulose sodium (REFRESH TEARS OP), Apply  to eye daily. 2-4 drops both eyes, Disp: , Rfl:     OTHER,NON-FORMULARY,, Take 1 Tab by mouth daily. Tumeric curcumin C3 complex, Disp: , Rfl:     B.infantis-B.ani-B.long-B.bifi (PROBIOTIC 4X) 10-15 mg TbEC, Take  by mouth daily. , Disp: , Rfl:     acetaminophen (TYLENOL) 650 mg TbER, Take 650 mg by mouth every eight (8) hours. , Disp: , Rfl:     omega-3 fatty acids-vitamin e 1,000 mg cap, Take 1 Cap by mouth two (2) times a day. 32a day , Disp: , Rfl:     ascorbic acid, vitamin C, (VITAMIN C) 500 mg tablet, Take 500 mg by mouth daily. , Disp: , Rfl:     zinc 50 mg tab tablet, Take  by mouth daily. , Disp: , Rfl:     Past Medical History:   Diagnosis Date    A-fib [facility])     AICD (automatic cardioverter/defibrillator) present 9/23/2020 9/23/2020 [employer] AICD implant    Arthritis     Bilateral pleural effusion 9/18/2020    Chronic pain     Chronic pain     Diabetes ([facility] [address]     Diverticular disease     Elevated troponin 9/18/2020    Hemorrhoid     Hypercholesterolemia     IBS (irritable bowel syndrome)     Lymphocytosis 05/07/2018       Past Surgical History:   Procedure Laterality Date    COLONOSCOPY N/A 10/31/2019    [facility]  10/31/2019         Lorenza Coreaso  10/31/2019         HX CATARACT REMOVAL      HX CHOLECYSTECTOMY      HX COLONOSCOPY  2009    HX COLONOSCOPY  2016    2 adenomatous polyp    HX HEMORRHOIDECTOMY  2009    HX HYSTERECTOMY      HX KNEE REPLACEMENT      right    HX ORTHOPAEDIC  12/11/2017    back, laminectomy and decompression    NH INSJ/RPLCMT PERM DFB W/TRNSVNS LDS 1/DUAL CHMBR N/A 9/23/2020    INSERT ICD DUAL performed by Janiya Paez MD at \[facility]\"" CARDIAC CATH LAB       Social History     Tobacco Use   Smoking Status Current Every Day Smoker    Packs/day: 1.00    Years: 55.00    Pack years: 55.00    Types: Cigarettes   Smokeless Tobacco Never Used       Social History     Socioeconomic History    Marital status:      Spouse name: Not on file    Number of children: Not on file    Years of education: Not on file    Highest education level: Not on file   Occupational History    Occupation: retired     Comment: LPN   Tobacco Use    Smoking status: Current Every Day Smoker     Packs/day: 1.00     Years: 55.00     Pack years: 55.00     Types: Cigarettes    Smokeless tobacco: Never Used   Vaping Use    Vaping Use: Never used   Substance and Sexual Activity    Alcohol use: No    Drug use: Never   Other Topics Concern     Service No    Blood Transfusions Yes    Caffeine Concern Yes    Occupational Exposure No    Hobby Hazards No    Sleep Concern Yes    Stress Concern Yes    Weight Concern No    Special Diet No    Back Care Yes    Exercise No    Bike Helmet No     Comment: n/a    Seat Belt Yes    Self-Exams Yes     Social Determinants of Health     Financial Resource Strain:     Difficulty of Paying Living Expenses:    Food Insecurity:     Worried About Running Out of Food in the Last Year:     [address] in the Last Year:    Transportation Needs:     Lack of Transportation (Medical):  Lack of Transportation (Non-Medical):    Physical Activity:     Days of Exercise per Week:     Minutes of Exercise per Session:    Stress:     Feeling of Stress :    Social Connections:     Frequency of Communication with Friends and Family:     Frequency of Social Gatherings with Friends and Family:     Attends [employer] Services:     Active Member of Clubs or Organizations:     Attends Club or Organization Meetings:     Marital Status:        Family History   Problem Relation Age of Onset    Colon Cancer Father     Diabetes Mother          Objective:     Visit Vitals  BP (!) 170/70 (BP 1 Location: Left upper arm, BP Patient Position: Sitting, BP Cuff Size: Large adult)   Pulse 80   Temp 96.8 °F (36 °C) (Temporal)   Resp 20   Ht 5' 6\" (1.676 m)   Wt 163 lb 9.6 oz (74.2 kg)   SpO2 97%   BMI 26.41 kg/m²     Body mass index is 26.41 kg/m². General: Alert and oriented. No acute distress. Well nourished  HENT;   Eyes: pupils equal, round, react to light and accommodation. Extra ocular movements intact. Nose: patent. Mouth and throat is clear. Neck:supple full range of motion no thyromegaly. Trachea midline, No carotid bruits. No significant lymphadenopathy  Lungs[de-identified] clear to auscultation without wheezes, rales, or rhonchi. Heart :RRR, S1 & S2 are normal intensity. No murmur; no gallop  Extremities: without clubbing, cyanosis, or edema  Pulses: radial and femoral pulses are normal  Neuro: HMF intact. Cranial nerves II through XII grossly normal.  Motor: is 5 over 5 and symmetrical.   Deep tendon reflexes: +2 equal  Psych:appropriate behavior, mood, affect and judgement.    Results for orders placed or performed in visit on 06/14/21 METABOLIC PANEL, COMPREHENSIVE   Result Value Ref Range    Sodium 135 (L) 136 - 145 mmol/L    Potassium 4.9 3.5 - 5.1 mmol/L    Chloride 103 97 - 108 mmol/L    CO2 23 21 - 32 mmol/L    Anion gap 9 5 - 15 mmol/L    Glucose 294 (H) 65 - 100 mg/dL    BUN 39 (H) 6 - 20 MG/DL    Creatinine 1.71 (H) 0.55 - 1.02 MG/DL    BUN/Creatinine ratio 23 (H) 12 - 20      GFR est AA 35 (L) >60 ml/min/1.73m2    GFR est non-AA 29 (L) >60 ml/min/1.73m2    Calcium 9.7 8.5 - 10.1 MG/DL    Bilirubin, total 0.2 0.2 - 1.0 MG/DL    ALT (SGPT) 21 12 - 78 U/L    AST (SGOT) 12 (L) 15 - 37 U/L    Alk. phosphatase 110 45 - 117 U/L    Protein, total 7.4 6.4 - 8.2 g/dL    Albumin 3.5 3.5 - 5.0 g/dL    Globulin 3.9 2.0 - 4.0 g/dL    A-G Ratio 0.9 (L) 1.1 - 2.2     LIPID PANEL   Result Value Ref Range    Cholesterol, total 243 (H) <200 MG/DL    Triglyceride 493 (H) <150 MG/DL    HDL Cholesterol 42 MG/DL    LDL, calculated Not calculated due to elevated triglyceride level 0 - 100 MG/DL    VLDL, calculated  MG/DL     Calculation not valid with this patient's other Lipid values. CHOL/HDL Ratio 5.8 (H) 0.0 - 5.0     HEMOGLOBIN A1C WITH EAG   Result Value Ref Range    Hemoglobin A1c 9.1 (H) 4.0 - 5.6 %    Est. average glucose 214 mg/dL   CBC WITH AUTOMATED DIFF   Result Value Ref Range    WBC 12.9 (H) 3.6 - 11.0 K/uL    RBC 4.15 3.80 - 5.20 M/uL    HGB 12.5 11.5 - 16.0 g/dL    HCT 39.8 35.0 - 47.0 %    MCV 95.9 80.0 - 99.0 FL    MCH 30.1 26.0 - 34.0 PG    MCHC 31.4 30.0 - 36.5 g/dL    RDW 13.6 11.5 - 14.5 %    PLATELET 304 586 - 619 K/uL    MPV 11.4 8.9 - 12.9 FL    NRBC 0.0 0  WBC    ABSOLUTE NRBC 0.00 0.00 - 0.01 K/uL    NEUTROPHILS 60 32 - 75 %    LYMPHOCYTES 30 12 - 49 %    MONOCYTES 6 5 - 13 %    EOSINOPHILS 2 0 - 7 %    BASOPHILS 1 0 - 1 %    IMMATURE GRANULOCYTES 1 (H) 0.0 - 0.5 %    ABS. NEUTROPHILS 8.0 1.8 - 8.0 K/UL    ABS. LYMPHOCYTES 3.8 (H) 0.8 - 3.5 K/UL    ABS. MONOCYTES 0.8 0.0 - 1.0 K/UL    ABS.  EOSINOPHILS 0.3 0.0 - 0.4 K/UL ABS. BASOPHILS 0.1 0.0 - 0.1 K/UL    ABS. IMM. GRANS. 0.1 (H) 0.00 - 0.04 K/UL    DF AUTOMATED     LDL, DIRECT   Result Value Ref Range    LDL,Direct 137 (H) 0 - 100 mg/dl   AMB POC URINE, MICROALBUMIN, SEMIQUANT (3 RESULTS)   Result Value Ref Range    ALBUMIN, URINE  Negative mg/L    CREATININE, URINE  mg/dL    Microalbumin/creat ratio (POC)  <30 MG/G       No results found for this visit on 21. Assessment/ Plan:     1. Essential hypertension  Re check BP at next visit. Consider adjusting medication for HTN  If BP remains elevated. 2. Type 2 diabetes mellitus with stage 3 chronic kidney disease, with long-term current use of insulin, unspecified whether stage 3a or 3b CKD ([facility] [address]  We reviewed recent labs Decline in kidney function and significant jump in HGBA1C  From 6.4 to 9.1 Continue current diabetic medications and add Ozempic. We discussed the importance of diet and exercise. Orders Placed This Encounter    semaglutide (OZEMPIC) 0.25 mg/0.2 mL (2 mg/1.5 mL) sub-q pen     Si.25 mg by SubCUTAneous route every seven (7) days. Indications: type 2 diabetes mellitus     Dispense:  1 Box     Refill:  1         Verbal and written instructions (see AVS) provided. Patient expresses understanding of diagnosis and treatment plan.     Health Maintenance Due   Topic Date Due    Shingrix Vaccine Age 49> (2 of 2) 10/15/2019               BERNARDO Rutherford

## 2021-06-28 RX ORDER — ATORVASTATIN CALCIUM 20 MG/1
TABLET, FILM COATED ORAL
Qty: 90 TABLET | Refills: 1 | Status: SHIPPED | OUTPATIENT
Start: 2021-06-28 | End: 2021-08-06

## 2021-06-28 RX ORDER — GLIPIZIDE 5 MG/1
TABLET ORAL
Qty: 180 TABLET | Refills: 1 | Status: SHIPPED | OUTPATIENT
Start: 2021-06-28 | End: 2021-08-06

## 2021-07-14 ENCOUNTER — OFFICE VISIT (OUTPATIENT)
Dept: FAMILY MEDICINE CLINIC | Age: 81
End: 2021-07-14
Payer: MEDICARE

## 2021-07-14 VITALS
HEIGHT: 66 IN | WEIGHT: 160.4 LBS | BODY MASS INDEX: 25.78 KG/M2 | RESPIRATION RATE: 20 BRPM | DIASTOLIC BLOOD PRESSURE: 62 MMHG | SYSTOLIC BLOOD PRESSURE: 112 MMHG | OXYGEN SATURATION: 95 % | HEART RATE: 77 BPM | TEMPERATURE: 96.8 F

## 2021-07-14 DIAGNOSIS — Z79.4 DIABETES MELLITUS DUE TO UNDERLYING CONDITION WITH HYPEROSMOLARITY WITHOUT COMA, WITH LONG-TERM CURRENT USE OF INSULIN (HCC): Primary | ICD-10-CM

## 2021-07-14 DIAGNOSIS — E08.00 DIABETES MELLITUS DUE TO UNDERLYING CONDITION WITH HYPEROSMOLARITY WITHOUT COMA, WITH LONG-TERM CURRENT USE OF INSULIN (HCC): Primary | ICD-10-CM

## 2021-07-14 PROCEDURE — 1090F PRES/ABSN URINE INCON ASSESS: CPT | Performed by: NURSE PRACTITIONER

## 2021-07-14 PROCEDURE — 3288F FALL RISK ASSESSMENT DOCD: CPT | Performed by: NURSE PRACTITIONER

## 2021-07-14 PROCEDURE — G9717 DOC PT DX DEP/BP F/U NT REQ: HCPCS | Performed by: NURSE PRACTITIONER

## 2021-07-14 PROCEDURE — G8754 DIAS BP LESS 90: HCPCS | Performed by: NURSE PRACTITIONER

## 2021-07-14 PROCEDURE — G8427 DOCREV CUR MEDS BY ELIG CLIN: HCPCS | Performed by: NURSE PRACTITIONER

## 2021-07-14 PROCEDURE — G8419 CALC BMI OUT NRM PARAM NOF/U: HCPCS | Performed by: NURSE PRACTITIONER

## 2021-07-14 PROCEDURE — 1100F PTFALLS ASSESS-DOCD GE2>/YR: CPT | Performed by: NURSE PRACTITIONER

## 2021-07-14 PROCEDURE — G8399 PT W/DXA RESULTS DOCUMENT: HCPCS | Performed by: NURSE PRACTITIONER

## 2021-07-14 PROCEDURE — G8536 NO DOC ELDER MAL SCRN: HCPCS | Performed by: NURSE PRACTITIONER

## 2021-07-14 PROCEDURE — 99214 OFFICE O/P EST MOD 30 MIN: CPT | Performed by: NURSE PRACTITIONER

## 2021-07-14 PROCEDURE — G8752 SYS BP LESS 140: HCPCS | Performed by: NURSE PRACTITIONER

## 2021-07-14 NOTE — PROGRESS NOTES
1. Have you been to the ER, urgent care clinic since your last visit? Hospitalized since your last visit? No    2. Have you seen or consulted any other health care providers outside of the [address] [place] [address] since your last visit? Include any pap smears or colon screening.  No      Chief Complaint   Patient presents with    Diabetes     f/u         Visit Vitals  /62 (BP 1 Location: Right upper arm, BP Patient Position: Sitting, BP Cuff Size: Adult)   Pulse 77   Temp 96.8 °F (36 °C) (Temporal)   Resp 20   Ht 5' 6\" (1.676 m)   Wt 160 lb 6.4 oz (72.8 kg)   SpO2 95%   BMI 25.89 kg/m²       Pain Scale: 0 - No pain/10  Pain Location:

## 2021-07-15 ENCOUNTER — APPOINTMENT (OUTPATIENT)
Dept: CT IMAGING | Age: 81
End: 2021-07-15
Attending: FAMILY MEDICINE
Payer: MEDICARE

## 2021-07-15 ENCOUNTER — HOSPITAL ENCOUNTER (EMERGENCY)
Age: 81
Discharge: ANOTHER HOSPITAL | End: 2021-07-15
Attending: FAMILY MEDICINE
Payer: MEDICARE

## 2021-07-15 ENCOUNTER — APPOINTMENT (OUTPATIENT)
Dept: GENERAL RADIOLOGY | Age: 81
End: 2021-07-15
Attending: FAMILY MEDICINE
Payer: MEDICARE

## 2021-07-15 VITALS
SYSTOLIC BLOOD PRESSURE: 113 MMHG | DIASTOLIC BLOOD PRESSURE: 53 MMHG | RESPIRATION RATE: 20 BRPM | WEIGHT: 161.82 LBS | HEART RATE: 88 BPM | BODY MASS INDEX: 26.12 KG/M2 | TEMPERATURE: 101.3 F | OXYGEN SATURATION: 98 %

## 2021-07-15 DIAGNOSIS — A41.9 SEPSIS WITH ENCEPHALOPATHY WITHOUT SEPTIC SHOCK, DUE TO UNSPECIFIED ORGANISM (HCC): Primary | ICD-10-CM

## 2021-07-15 DIAGNOSIS — G93.40 SEPSIS WITH ENCEPHALOPATHY WITHOUT SEPTIC SHOCK, DUE TO UNSPECIFIED ORGANISM (HCC): Primary | ICD-10-CM

## 2021-07-15 DIAGNOSIS — I21.4 NSTEMI (NON-ST ELEVATED MYOCARDIAL INFARCTION) (HCC): ICD-10-CM

## 2021-07-15 DIAGNOSIS — R65.20 SEPSIS WITH ENCEPHALOPATHY WITHOUT SEPTIC SHOCK, DUE TO UNSPECIFIED ORGANISM (HCC): Primary | ICD-10-CM

## 2021-07-15 LAB
ALBUMIN SERPL-MCNC: 2.7 G/DL (ref 3.5–5)
ALBUMIN/GLOB SERPL: 0.6 {RATIO} (ref 1.1–2.2)
ALP SERPL-CCNC: 69 U/L (ref 45–117)
ALT SERPL-CCNC: 173 U/L (ref 12–78)
AMMONIA PLAS-SCNC: <10 UMOL/L
AMORPH CRY URNS QL MICRO: ABNORMAL
ANION GAP SERPL CALC-SCNC: 14 MMOL/L (ref 5–15)
APPEARANCE UR: CLEAR
APTT PPP: 31.9 SEC (ref 22.1–31)
AST SERPL-CCNC: 224 U/L (ref 15–37)
BACTERIA URNS QL MICRO: NEGATIVE /HPF
BASOPHILS # BLD: 0 K/UL (ref 0–0.1)
BASOPHILS NFR BLD: 0 % (ref 0–1)
BILIRUB SERPL-MCNC: 0.4 MG/DL (ref 0.2–1)
BILIRUB UR QL: NEGATIVE
BUN SERPL-MCNC: 46 MG/DL (ref 6–20)
BUN/CREAT SERPL: 19 (ref 12–20)
CALCIUM SERPL-MCNC: 9.3 MG/DL (ref 8.5–10.1)
CHLORIDE SERPL-SCNC: 98 MMOL/L (ref 97–108)
CO2 SERPL-SCNC: 19 MMOL/L (ref 21–32)
COLOR UR: ABNORMAL
CREAT SERPL-MCNC: 2.41 MG/DL (ref 0.55–1.02)
DIFFERENTIAL METHOD BLD: ABNORMAL
EOSINOPHIL # BLD: 0 K/UL (ref 0–0.4)
EOSINOPHIL NFR BLD: 0 % (ref 0–7)
EPITH CASTS URNS QL MICRO: ABNORMAL /LPF
ERYTHROCYTE [DISTWIDTH] IN BLOOD BY AUTOMATED COUNT: 14 % (ref 11.5–14.5)
FLUAV RNA SPEC QL NAA+PROBE: NOT DETECTED
FLUBV RNA SPEC QL NAA+PROBE: NOT DETECTED
GLOBULIN SER CALC-MCNC: 4.7 G/DL (ref 2–4)
GLUCOSE BLD STRIP.AUTO-MCNC: 201 MG/DL (ref 65–117)
GLUCOSE SERPL-MCNC: 199 MG/DL (ref 65–100)
GLUCOSE UR STRIP.AUTO-MCNC: NEGATIVE MG/DL
HCT VFR BLD AUTO: 39.8 % (ref 35–47)
HGB BLD-MCNC: 13.2 G/DL (ref 11.5–16)
HGB UR QL STRIP: ABNORMAL
IMM GRANULOCYTES # BLD AUTO: 0 K/UL (ref 0–0.04)
IMM GRANULOCYTES NFR BLD AUTO: 0 % (ref 0–0.5)
INR PPP: 1.1 (ref 0.9–1.1)
KETONES UR QL STRIP.AUTO: ABNORMAL MG/DL
LACTATE SERPL-SCNC: 1.9 MMOL/L (ref 0.4–2)
LEUKOCYTE ESTERASE UR QL STRIP.AUTO: NEGATIVE
LYMPHOCYTES # BLD: 1.3 K/UL (ref 0.8–3.5)
LYMPHOCYTES NFR BLD: 8 % (ref 12–49)
MAGNESIUM SERPL-MCNC: 1.8 MG/DL (ref 1.6–2.4)
MCH RBC QN AUTO: 29.9 PG (ref 26–34)
MCHC RBC AUTO-ENTMCNC: 33.2 G/DL (ref 30–36.5)
MCV RBC AUTO: 90 FL (ref 80–99)
MONOCYTES # BLD: 0.3 K/UL (ref 0–1)
MONOCYTES NFR BLD: 2 % (ref 5–13)
NEUTS SEG # BLD: 15.1 K/UL (ref 1.8–8)
NEUTS SEG NFR BLD: 90 % (ref 32–75)
NITRITE UR QL STRIP.AUTO: NEGATIVE
NRBC # BLD: 0 K/UL (ref 0–0.01)
NRBC BLD-RTO: 0 PER 100 WBC
PH UR STRIP: 5.5 [PH] (ref 5–8)
PLATELET # BLD AUTO: 170 K/UL (ref 150–400)
PLATELET COMMENTS,PCOM: ABNORMAL
PMV BLD AUTO: 10.4 FL (ref 8.9–12.9)
POTASSIUM SERPL-SCNC: 4.8 MMOL/L (ref 3.5–5.1)
PROT SERPL-MCNC: 7.4 G/DL (ref 6.4–8.2)
PROT UR STRIP-MCNC: >300 MG/DL
PROTHROMBIN TIME: 11.3 SEC (ref 9–11.1)
RBC # BLD AUTO: 4.42 M/UL (ref 3.8–5.2)
RBC #/AREA URNS HPF: ABNORMAL /HPF (ref 0–5)
RBC MORPH BLD: ABNORMAL
SARS-COV-2, COV2: NOT DETECTED
SERVICE CMNT-IMP: ABNORMAL
SODIUM SERPL-SCNC: 131 MMOL/L (ref 136–145)
SP GR UR REFRACTOMETRY: 1.02 (ref 1–1.03)
THERAPEUTIC RANGE,PTTT: ABNORMAL SECS (ref 58–77)
TROPONIN I SERPL-MCNC: 11.71 NG/ML
TSH SERPL DL<=0.05 MIU/L-ACNC: 1.16 UIU/ML (ref 0.36–3.74)
UA: UC IF INDICATED,UAUC: ABNORMAL
UROBILINOGEN UR QL STRIP.AUTO: 0.2 EU/DL (ref 0.2–1)
WBC # BLD AUTO: 16.7 K/UL (ref 3.6–11)
WBC URNS QL MICRO: ABNORMAL /HPF (ref 0–4)

## 2021-07-15 PROCEDURE — 83605 ASSAY OF LACTIC ACID: CPT

## 2021-07-15 PROCEDURE — 93005 ELECTROCARDIOGRAM TRACING: CPT

## 2021-07-15 PROCEDURE — 82140 ASSAY OF AMMONIA: CPT

## 2021-07-15 PROCEDURE — 70450 CT HEAD/BRAIN W/O DYE: CPT

## 2021-07-15 PROCEDURE — 99285 EMERGENCY DEPT VISIT HI MDM: CPT

## 2021-07-15 PROCEDURE — 74176 CT ABD & PELVIS W/O CONTRAST: CPT

## 2021-07-15 PROCEDURE — 85610 PROTHROMBIN TIME: CPT

## 2021-07-15 PROCEDURE — 87040 BLOOD CULTURE FOR BACTERIA: CPT

## 2021-07-15 PROCEDURE — 85730 THROMBOPLASTIN TIME PARTIAL: CPT

## 2021-07-15 PROCEDURE — 85025 COMPLETE CBC W/AUTO DIFF WBC: CPT

## 2021-07-15 PROCEDURE — 87186 SC STD MICRODIL/AGAR DIL: CPT

## 2021-07-15 PROCEDURE — 96365 THER/PROPH/DIAG IV INF INIT: CPT

## 2021-07-15 PROCEDURE — 74011250637 HC RX REV CODE- 250/637: Performed by: FAMILY MEDICINE

## 2021-07-15 PROCEDURE — 77030019903 HC CATH URET INT BARD -A

## 2021-07-15 PROCEDURE — 71045 X-RAY EXAM CHEST 1 VIEW: CPT

## 2021-07-15 PROCEDURE — 87636 SARSCOV2 & INF A&B AMP PRB: CPT

## 2021-07-15 PROCEDURE — 81001 URINALYSIS AUTO W/SCOPE: CPT

## 2021-07-15 PROCEDURE — 87077 CULTURE AEROBIC IDENTIFY: CPT

## 2021-07-15 PROCEDURE — 84484 ASSAY OF TROPONIN QUANT: CPT

## 2021-07-15 PROCEDURE — 84443 ASSAY THYROID STIM HORMONE: CPT

## 2021-07-15 PROCEDURE — 74011000258 HC RX REV CODE- 258: Performed by: FAMILY MEDICINE

## 2021-07-15 PROCEDURE — 87086 URINE CULTURE/COLONY COUNT: CPT

## 2021-07-15 PROCEDURE — 82962 GLUCOSE BLOOD TEST: CPT

## 2021-07-15 PROCEDURE — 36415 COLL VENOUS BLD VENIPUNCTURE: CPT

## 2021-07-15 PROCEDURE — 83735 ASSAY OF MAGNESIUM: CPT

## 2021-07-15 PROCEDURE — 80053 COMPREHEN METABOLIC PANEL: CPT

## 2021-07-15 PROCEDURE — 96361 HYDRATE IV INFUSION ADD-ON: CPT

## 2021-07-15 PROCEDURE — 74011250636 HC RX REV CODE- 250/636: Performed by: FAMILY MEDICINE

## 2021-07-15 RX ORDER — ACETAMINOPHEN 500 MG
1000 TABLET ORAL
Status: COMPLETED | OUTPATIENT
Start: 2021-07-15 | End: 2021-07-15

## 2021-07-15 RX ORDER — GUAIFENESIN 100 MG/5ML
324 LIQUID (ML) ORAL
Status: COMPLETED | OUTPATIENT
Start: 2021-07-15 | End: 2021-07-15

## 2021-07-15 RX ORDER — SODIUM CHLORIDE 0.9 % (FLUSH) 0.9 %
5-10 SYRINGE (ML) INJECTION AS NEEDED
Status: DISCONTINUED | OUTPATIENT
Start: 2021-07-15 | End: 2021-07-16 | Stop reason: HOSPADM

## 2021-07-15 RX ADMIN — SODIUM CHLORIDE 1000 ML: 9 INJECTION, SOLUTION INTRAVENOUS at 21:46

## 2021-07-15 RX ADMIN — SODIUM CHLORIDE 1000 ML: 9 INJECTION, SOLUTION INTRAVENOUS at 19:45

## 2021-07-15 RX ADMIN — ASPIRIN 81 MG CHEWABLE TABLET 324 MG: 81 TABLET CHEWABLE at 21:49

## 2021-07-15 RX ADMIN — PIPERACILLIN AND TAZOBACTAM 3.38 G: 3; .375 INJECTION, POWDER, LYOPHILIZED, FOR SOLUTION INTRAVENOUS at 20:57

## 2021-07-15 RX ADMIN — ACETAMINOPHEN 1000 MG: 500 TABLET ORAL at 20:08

## 2021-07-15 NOTE — ED PROVIDER NOTES
Patient is an 79-year-old female with history of A. fib status post AICD placement, chronic pain, diabetes hyperlipidemia and lymphocytosis who presents with altered mental status. Patient is unable to give a history or review of systems. History is obtained from EMS, symptoms started approximately 8:00 this morning. There is no known history of fever cough sweats or chills. Per EMS she is usually alert interactive and ambulatory. There is a history of renal failure also. Care discussed with , Ye Watters, his phone number is [phone]. He states that patient was doing well yesterday, was able to drive him go shopping. Today when he went to wake her up she was sleepy and hard time arousing her. He at her sleep for most of the day. She was able to drink water and hold the bottle normally today. She did not want to eat today. He noted that she is very sleepy and lethargic all day long. He did not note a fever at home. He did not note any vomiting or diarrhea or cough or shortness of breath. He has not noted any history of a fall or trauma. There is no history of headache or neck stiffness. He has not noted any history from her of abdominal pain. No history of dysuria urgency or frequency.   Patient is not had symptoms like this in the past.           Past Medical History:   Diagnosis Date    A-fib [facility])     AICD (automatic cardioverter/defibrillator) present 9/23/2020 9/23/2020 [employer] AICD implant    Arthritis     Bilateral pleural effusion 9/18/2020    Chronic pain     Chronic pain     Diabetes ([facility] [address]     Diverticular disease     Elevated troponin 9/18/2020    Hemorrhoid     Hypercholesterolemia     IBS (irritable bowel syndrome)     Lymphocytosis 05/07/2018       Past Surgical History:   Procedure Laterality Date    COLONOSCOPY N/A 10/31/2019    COLONOSCOPY performed by Mercy Proctor MD at [facility]  10/31/2019        Kimberly Galvan XOCHITL,ALINE  10/31/2019         HX CATARACT REMOVAL      HX CHOLECYSTECTOMY      HX COLONOSCOPY  2009    HX COLONOSCOPY  2016    2 adenomatous polyp    HX HEMORRHOIDECTOMY  2009    HX HYSTERECTOMY      HX KNEE REPLACEMENT      right    HX ORTHOPAEDIC  12/11/2017    back, laminectomy and decompression    NE INSJ/RPLCMT PERM DFB W/TRNSVNS LDS 1/DUAL CHMBR N/A 9/23/2020    INSERT ICD DUAL performed by Brook [facility]o [facility] at [facility] CARDIAC C[facility] LAB         Family History:   Problem Relation Age of Onset    Colon Cancer Father     Diabetes Mother        Social History     Socioeconomic History    Marital status:      Spouse name: Not on file    Number of children: Not on file    Years of education: Not on file    Highest education level: Not on file   Occupational History    Occupation: retired     Comment: LPN   Tobacco Use    Smoking status: Current Every Day Smoker     Packs/day: 1.00     Years: 55.00     Pack years: 55.00     Types: Cigarettes    Smokeless tobacco: Never Used   Vaping Use    Vaping Use: Never used   Substance and Sexual Activity    Alcohol use: No    Drug use: Never    Sexual activity: Not on file   Other Topics Concern     Service No    Blood Transfusions Yes    Caffeine Concern Yes    Occupational Exposure No    Hobby Hazards No    Sleep Concern Yes    Stress Concern Yes    Weight Concern No    Special Diet No    Back Care Yes    Exercise No    Bike Helmet No     Comment: n/a    Seat Belt Yes    Self-Exams Yes   Social History Narrative    Not on file     Social Determinants of Health     Financial Resource Strain:     Difficulty of Paying Living Expenses:    Food Insecurity:     Worried About Running Out of Food in the Last Year:     Ran Out of Food in the Last Year:    Transportation Needs:     Lack of Transportation (Medical):      Lack of Transportation (Non-Medical):    Physical Activity:     Days of Exercise per Week:     Minutes of Exercise per Session:    Stress:     Feeling of Stress :    Social Connections:     Frequency of Communication with Friends and Family:     Frequency of Social Gatherings with Friends and Family:     Attends Yarsani Services:     Active Member of Clubs or Organizations:     Attends Club or Organization Meetings:     Marital Status:    Intimate Partner Violence:     Fear of Current or Ex-Partner:     Emotionally Abused:     Physically Abused:     Sexually Abused: ALLERGIES: Morphine and Ultram [tramadol]    Review of Systems   Unable to perform ROS: Mental status change       Vitals:    07/15/21 2117 07/15/21 2138 07/15/21 2208 07/15/21 2236   BP:  (!) 122/56 (!) 93/44 (!) 113/53   Pulse:  92 88    Resp:  20 20    Temp: (!) 101.3 °F (38.5 °C)      SpO2:  96% 98%    Weight:                Physical Exam  Vitals and nursing note reviewed. Constitutional:       General: She is in acute distress. Appearance: She is ill-appearing. She is not toxic-appearing or diaphoretic. HENT:      Head: Normocephalic and atraumatic. Right Ear: External ear normal.      Left Ear: External ear normal.      Nose: Nose normal.      Mouth/Throat:      Mouth: Mucous membranes are dry. Comments: Dry mouth  Eyes:      Extraocular Movements: Extraocular movements intact. Conjunctiva/sclera: Conjunctivae normal.      Pupils: Pupils are equal, round, and reactive to light. Cardiovascular:      Rate and Rhythm: Regular rhythm. Tachycardia present. Heart sounds: Normal heart sounds. No murmur heard. No friction rub. No gallop. Pulmonary:      Effort: Pulmonary effort is normal. No respiratory distress. Breath sounds: Normal breath sounds. Abdominal:      General: There is no distension. Palpations: Abdomen is soft. Tenderness: There is abdominal tenderness.       Comments: Mild tenderness diffusely without obvious rebound or guarding   Musculoskeletal:         General: Normal range of motion. Cervical back: Normal range of motion and neck supple. Skin:     General: Skin is warm and dry. Capillary Refill: Capillary refill takes more than 3 seconds. Comments: Erythema over sacral area noted   Neurological:      Mental Status: She is lethargic and confused. Cranial Nerves: No cranial nerve deficit or facial asymmetry. Sensory: No sensory deficit. Motor: Weakness present. Comments: Patient's oriented to person only, does not know her birthdate. She moves extremities weakly but symmetrically. Sensation appeared to be intact to light touch throughout. Speech was clear but weak. No spontaneous conversation was noted. She would follow commands. Psychiatric:         Cognition and Memory: Cognition is impaired. Memory is impaired. Comments: Oriented to person only, flat affect, judgment impaired. Labs -  Recent Results (from the past 24 hour(s))   LACTIC ACID    Collection Time: 07/15/21  7:37 PM   Result Value Ref Range    Lactic acid 1.9 0.4 - 2.0 MMOL/L   METABOLIC PANEL, COMPREHENSIVE    Collection Time: 07/15/21  7:37 PM   Result Value Ref Range    Sodium 131 (L) 136 - 145 mmol/L    Potassium 4.8 3.5 - 5.1 mmol/L    Chloride 98 97 - 108 mmol/L    CO2 19 (L) 21 - 32 mmol/L    Anion gap 14 5 - 15 mmol/L    Glucose 199 (H) 65 - 100 mg/dL    BUN 46 (H) 6 - 20 MG/DL    Creatinine 2.41 (H) 0.55 - 1.02 MG/DL    BUN/Creatinine ratio 19 12 - 20      GFR est AA 23 (L) >60 ml/min/1.73m2    GFR est non-AA 19 (L) >60 ml/min/1.73m2    Calcium 9.3 8.5 - 10.1 MG/DL    Bilirubin, total 0.4 0.2 - 1.0 MG/DL    ALT (SGPT) 173 (H) 12 - 78 U/L    AST (SGOT) 224 (H) 15 - 37 U/L    Alk.  phosphatase 69 45 - 117 U/L    Protein, total 7.4 6.4 - 8.2 g/dL    Albumin 2.7 (L) 3.5 - 5.0 g/dL    Globulin 4.7 (H) 2.0 - 4.0 g/dL    A-G Ratio 0.6 (L) 1.1 - 2.2     CBC WITH AUTOMATED DIFF    Collection Time: 07/15/21  7:37 PM   Result Value Ref Range    WBC 16.7 (H) 3.6 - 11.0 K/uL    RBC 4.42 3.80 - 5.20 M/uL    HGB 13.2 11.5 - 16.0 g/dL    HCT 39.8 35.0 - 47.0 %    MCV 90.0 80.0 - 99.0 FL    MCH 29.9 26.0 - 34.0 PG    MCHC 33.2 30.0 - 36.5 g/dL    RDW 14.0 11.5 - 14.5 %    PLATELET 460 953 - 774 K/uL    MPV 10.4 8.9 - 12.9 FL    NRBC 0.0 0  WBC    ABSOLUTE NRBC 0.00 0.00 - 0.01 K/uL    NEUTROPHILS 90 (H) 32 - 75 %    LYMPHOCYTES 8 (L) 12 - 49 %    MONOCYTES 2 (L) 5 - 13 %    EOSINOPHILS 0 0 - 7 %    BASOPHILS 0 0 - 1 %    IMMATURE GRANULOCYTES 0 0.0 - 0.5 %    ABS. NEUTROPHILS 15.1 (H) 1.8 - 8.0 K/UL    ABS. LYMPHOCYTES 1.3 0.8 - 3.5 K/UL    ABS. MONOCYTES 0.3 0.0 - 1.0 K/UL    ABS. EOSINOPHILS 0.0 0.0 - 0.4 K/UL    ABS. BASOPHILS 0.0 0.0 - 0.1 K/UL    ABS. IMM.  GRANS. 0.0 0.00 - 0.04 K/UL    DF MANUAL      PLATELET COMMENTS ADEQUATE PLATELETS      RBC COMMENTS NORMOCYTIC, NORMOCHROMIC     TROPONIN I    Collection Time: 07/15/21  7:37 PM   Result Value Ref Range    Troponin-I, Qt. 11.71 (H) <0.05 ng/mL   TSH 3RD GENERATION    Collection Time: 07/15/21  7:37 PM   Result Value Ref Range    TSH 1.16 0.36 - 3.74 uIU/mL   MAGNESIUM    Collection Time: 07/15/21  7:37 PM   Result Value Ref Range    Magnesium 1.8 1.6 - 2.4 mg/dL   PROTHROMBIN TIME + INR    Collection Time: 07/15/21  7:37 PM   Result Value Ref Range    INR 1.1 0.9 - 1.1      Prothrombin time 11.3 (H) 9.0 - 11.1 sec   PTT    Collection Time: 07/15/21  7:37 PM   Result Value Ref Range    aPTT 31.9 (H) 22.1 - 31.0 sec    aPTT, therapeutic range     58.0 - 77.0 SECS   EKG, 12 LEAD, INITIAL    Collection Time: 07/15/21  7:49 PM   Result Value Ref Range    Ventricular Rate 101 BPM    Atrial Rate 101 BPM    P-R Interval 170 ms    QRS Duration 104 ms    Q-T Interval 360 ms    QTC Calculation (Bezet) 466 ms    Calculated P Axis -11 degrees    Calculated R Axis 61 degrees    Calculated T Axis 120 degrees    Diagnosis       Sinus tachycardia  ST depression, consider subendocardial injury  Nonspecific T wave abnormality  Abnormal ECG  When compared with ECG of 29-JUL-2020 07:36,  MANUAL COMPARISON REQUIRED, DATA IS UNCONFIRMED     URINALYSIS W/ REFLEX CULTURE    Collection Time: 07/15/21  7:50 PM    Specimen: Urine   Result Value Ref Range    Color YELLOW/STRAW      Appearance CLEAR CLEAR      Specific gravity 1.020 1.003 - 1.030      pH (UA) 5.5 5.0 - 8.0      Protein >300 (A) NEG mg/dL    Glucose Negative NEG mg/dL    Ketone TRACE (A) NEG mg/dL    Bilirubin Negative NEG      Blood TRACE (A) NEG      Urobilinogen 0.2 0.2 - 1.0 EU/dL    Nitrites Negative NEG      Leukocyte Esterase Negative NEG      WBC 0-4 0 - 4 /hpf    RBC 0-5 0 - 5 /hpf    Epithelial cells FEW FEW /lpf    Bacteria Negative NEG /hpf    UA:UC IF INDICATED CULTURE NOT INDICATED BY UA RESULT CNI      Amorphous Crystals 2+ (A) NEG   GLUCOSE, POC    Collection Time: 07/15/21  7:54 PM   Result Value Ref Range    Glucose (POC) 201 (H) 65 - 117 mg/dL    Performed by [employer]    AMMONIA    Collection Time: 07/15/21  8:41 PM   Result Value Ref Range    Ammonia <10 <32 UMOL/L   COVID-19 WITH INFLUENZA A/B    Collection Time: 07/15/21  9:15 PM   Result Value Ref Range    SARS-CoV-2 Not detected NOTD      Influenza A by PCR Not detected NOTD      Influenza B by PCR Not detected NOTD     POC LACTIC ACID    Collection Time: 07/16/21 12:17 AM   Result Value Ref Range    Lactic Acid (POC) 0.89 0.40 - 2.00 mmol/L   POC TROPONIN-I    Collection Time: 07/16/21 12:19 AM   Result Value Ref Range    Troponin-I (POC) 5.05 (H) 0.00 - 0.08 ng/mL   EKG, 12 LEAD, INITIAL    Collection Time: 07/16/21 12:22 AM   Result Value Ref Range    Ventricular Rate 81 BPM    Atrial Rate 81 BPM    P-R Interval 152 ms    QRS Duration 108 ms    Q-T Interval 426 ms    QTC Calculation (Bezet) 494 ms    Calculated R Axis 57 degrees    Calculated T Axis 107 degrees    Diagnosis       Sinus rhythm with premature atrial complexes  Nonspecific ST and T wave abnormality  Prolonged QT  When compared with ECG of 15-JUL-2021 19:49,  MANUAL COMPARISON REQUIRED, DATA IS UNCONFIRMED     SAMPLES BEING HELD    Collection Time: 07/16/21 12:23 AM   Result Value Ref Range    SAMPLES BEING HELD PST BL LV     COMMENT        Add-on orders for these samples will be processed based on acceptable specimen integrity and analyte stability, which may vary by analyte. Radiologic Studies -   CT Results  (Last 48 hours)               07/15/21 2025  CT ABD PELV WO CONT Final result    Impression:  No acute findings. Narrative:  EXAM: CT ABD PELV WO CONT       INDICATION: Fever, abdominal pain, altered mental status       COMPARISON: CT 10/20/2017. CT 6/26/2017. CONTRAST:  None. TECHNIQUE:    Thin axial images were obtained through the abdomen and pelvis. Coronal and   sagittal reformats were generated. Oral contrast was not administered. CT dose   reduction was achieved through use of a standardized protocol tailored for this   examination and automatic exposure control for dose modulation. The absence of intravenous contrast material reduces the sensitivity for   evaluation of the vasculature and solid organs. FINDINGS:    LOWER THORAX: Pacemaker and coronary artery calcifications noted. Visualized   lung bases are clear. LIVER: Lower pole right lobe cyst redemonstrated. Otherwise unremarkable. John Olden BILIARY TREE: Gallbladder is surgically absent. CBD is not dilated. SPLEEN: Unremarkable. PANCREAS: No focal abnormality. ADRENALS: No significant change in low density 1.8 x 2.0 cm left adrenal body   nodule. Right adrenal is unremarkable. KIDNEYS/URETERS: No calculus or hydronephrosis. STOMACH: Unremarkable. SMALL BOWEL: No dilatation or wall thickening. COLON: No dilatation or wall thickening. APPENDIX: No markable. PERITONEUM: No ascites or pneumoperitoneum. RETROPERITONEUM: Atherosclerotic calcination without aneurysm. No enlarged   lymphadenopathy.  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 REPRODUCTIVE ORGANS: Uterus and ovaries are surgically absent. URINARY BLADDER: No mass or calculus. BONES: Degenerative spine change. No acute fracture or aggressive lesion. ABDOMINAL WALL: No mass or hernia. ADDITIONAL COMMENTS: N/A           07/15/21 2025  CT HEAD WO CONT Final result    Impression:          No acute abnormality       Narrative:  EXAM: CT HEAD WO CONT       INDICATION: Altered mental status       COMPARISON: 10/21/2017. CONTRAST: None. TECHNIQUE: Unenhanced CT of the head was performed using 5 mm images. Brain and   bone windows were generated. Coronal and sagittal reformats. CT dose reduction   was achieved through use of a standardized protocol tailored for this   examination and automatic exposure control for dose modulation. FINDINGS:   The ventricles and sulci are normal in size, shape and configuration. . There is   no significant white matter disease. There is no intracranial hemorrhage,   extra-axial collection, or mass effect. The basilar cisterns are open. No CT   evidence of acute infarct. The bone windows demonstrate no abnormalities. The visualized portions of the   paranasal sinuses and mastoid air cells are clear. XR Results (most recent):  Results from Hospital Encounter encounter on 07/15/21    XR CHEST PORT    Narrative  EXAM: XR CHEST PORT    INDICATION: Eval for Infiltrate    COMPARISON: Chest x-ray 9/23/2020. FINDINGS: A portable AP radiograph of the chest was obtained at 20:19 hours. Pacemaker generator body projects over the left chest wall with intact appearing  leads traversing in expected course. The lungs are clear.  The cardiac and  mediastinal contours and pulmonary vascularity are normal.  The chest wall  structures and visualized upper abdomen show no acute findings with incidental  note of degenerative spine and shoulder changes as well as diffuse osteopenia. Impression  No acute findings. MDM  Number of Diagnoses or Management Options  NSTEMI (non-ST elevated myocardial infarction) [facility]): new and requires workup  Sepsis with encephalopathy without septic shock, due to unspecified organism [facility]): new and requires workup     Amount and/or Complexity of Data Reviewed  Clinical lab tests: ordered and reviewed  Tests in the radiology section of CPT®: ordered and reviewed  Tests in the medicine section of CPT®: ordered and reviewed  Decide to obtain previous medical records or to obtain history from someone other than the patient: yes  Obtain history from someone other than the patient: yes (, EMS)    Risk of Complications, Morbidity, and/or Mortality  Presenting problems: high  Diagnostic procedures: high  Management options: high  General comments: Differential includes sepsis, encephalopathy, metabolic abnormality, renal failure, liver failure, stroke, intracerebral hemorrhage    Patient Progress  Patient progress: stable    ED Course as of Jul 16 0237   Thu Jul 15, 2021   2037 EKG demonstrates sinus tachycardia with a rate of 101, CO interval normal at 170, QRS duration 104, QTc 466, nonspecific T wave abnormalities noted she has some ST depression in leads V4 through V6 consistent with possible subendocardial ischemia similar to 2/23/2021 but more pronounced. No evidence of acute infarction noted. [PS]   2145 Discussed with Dr. Berta Pérez at [facility] ED who accepts patient in transfer, discussed Ms. Vanessa Faye, ICU nurse practitioner who will evaluate patient in ED. [PS]   2147 Patient continues to be hemodynamically stable. She has evidence of multiorgan disease with renal impairment and liver impairment. Etiology of sepsis is not apparent. She was treated empirically with Zosyn.   She has chronic lymphocytosis although tonight's white count of 16,700 is slightly higher than usual.    [PS]      ED Course User Index  [PS] Colonel Antonio MD       Critical Care  Performed by: Colonel Antonio MD  Authorized by: Colonel Antonio MD     Critical care provider statement:     Critical care time (minutes):  45    Critical care time was exclusive of:  Separately billable procedures and treating other patients and teaching time    Critical care was necessary to treat or prevent imminent or life-threatening deterioration of the following conditions:  Cardiac failure, circulatory failure, CNS failure or compromise, dehydration, hepatic failure, metabolic crisis, renal failure, respiratory failure, sepsis and shock    Critical care was time spent personally by me on the following activities:  Development of treatment plan with patient or surrogate, discussions with consultants, evaluation of patient's response to treatment, examination of patient, obtaining history from patient or surrogate, ordering and performing treatments and interventions, ordering and review of laboratory studies, ordering and review of radiographic studies, pulse oximetry, re-evaluation of patient's condition and review of old charts    I assumed direction of critical care for this patient from another provider in my specialty: no        Orders Placed This Encounter    CULTURE, BLOOD    CULTURE, BLOOD    CULTURE, URINE    XR CHEST PORT    CT ABD PELV WO CONT    CT HEAD WO CONT    LACTIC ACID, PLASMA    LACTIC ACID    URINALYSIS W/ REFLEX CULTURE    METABOLIC PANEL, COMPREHENSIVE    CBC WITH AUTOMATED DIFF    TROPONIN I    TSH 3RD GENERATION    MAGNESIUM    AMMONIA    PROTHROMBIN TIME + INR    PTT    COVID-19 WITH INFLUENZA A/B    CRITICAL CARE (ASAP ONLY)    GLUCOSE, POC    EKG, 12 LEAD, INITIAL    DISCONTD: sodium chloride (NS) flush 5-10 mL    acetaminophen (TYLENOL) tablet 1,000 mg    DISCONTD: piperacillin-tazobactam (ZOSYN) 3.375 g in 0.9% sodium chloride (MBP/ADV) 100 mL MBP    FOLLOWED BY Linked Order Group     sodium chloride 0.9 % bolus infusion 1,000 mL     sodium chloride 0.9 % bolus infusion 1,000 mL    DISCONTD: sodium chloride 0.9 % bolus infusion 202 mL    aspirin chewable tablet 324 mg       Medications   acetaminophen (TYLENOL) tablet 1,000 mg (1,000 mg Oral Given 7/15/21 2008)   sodium chloride 0.9 % bolus infusion 1,000 mL (0 mL IntraVENous IV Completed 7/15/21 2146)     Followed by   sodium chloride 0.9 % bolus infusion 1,000 mL (0 mL IntraVENous Continued On External Transport 7/15/21 2238)   aspirin chewable tablet 324 mg (324 mg Oral Given 7/15/21 2149)       Patient Vitals for the past 8 hrs:   Temp Pulse Resp BP SpO2   07/15/21 2236    (!) 113/53    07/15/21 2208  88 20 (!) 93/44 98 %   07/15/21 2138  92 20 (!) 122/56 96 %   07/15/21 2117 (!) 101.3 °F (38.5 °C)       07/15/21 2103  97 22 (!) 114/56 94 %   07/15/21 2015  (!) 102 18 (!) 138/58 95 %   07/15/21 1932 (!) 104.8 °F (40.4 °C) (!) 103 30 (!) 149/63 94 %         DIAGNOSIS:      ICD-10-CM ICD-9-CM    1. Sepsis with encephalopathy without septic shock, due to unspecified organism ([facility]  A41.9 038.9     R65.20 995.91     G93.40 348.30    2. NSTEMI (non-ST elevated myocardial infarction) ([facility]  I21.4 410.70         I have reviewed all pertinent and currently available diagnostic test results for this visit including, but not limited to, labs, xrays, and EKGs. I have reviewed all pertinent and currently available medical records, past medical history, social history and family history. My plan of care and further evaluation and/or disposition is based on these results, as well as the initial, and subsequent, history and physical exam, as well as any additional complaints during the visit. Laboratory tests, medications, x-rays, diagnosis, and need for admission or transfer have been reviewed and discussed with the patient and/or family. Pt and/or family have had the opportunity to ask questions about their care.  Patient and/or family verbalized understanding and agreement with care plan. After review of patient's ED evaluation I feel patient will benefit from admission to hospital due to need to evaluate and treat sepsis and NSTEMI    Rebecca Rogers MD    Please note that this dictation was completed with Nightpro, the computer voice recognition software. Quite often unanticipated grammatical, syntax, homophones, and other interpretive errors are inadvertently transcribed by the computer software. Please disregard these errors. Please excuse any errors that have escaped final proofreading.

## 2021-07-16 ENCOUNTER — HOSPITAL ENCOUNTER (INPATIENT)
Age: 81
LOS: 8 days | Discharge: HOME HEALTH | DRG: 853 | End: 2021-07-24
Attending: EMERGENCY MEDICINE | Admitting: GENERAL ACUTE CARE HOSPITAL
Payer: MEDICARE

## 2021-07-16 ENCOUNTER — HOSPITAL ENCOUNTER (OUTPATIENT)
Dept: GENERAL RADIOLOGY | Age: 81
Discharge: HOME | DRG: 853 | End: 2021-07-16
Attending: INTERNAL MEDICINE
Payer: MEDICARE

## 2021-07-16 ENCOUNTER — TELEPHONE (OUTPATIENT)
Dept: FAMILY MEDICINE CLINIC | Age: 81
End: 2021-07-16

## 2021-07-16 ENCOUNTER — TELEPHONE (OUTPATIENT)
Dept: CARDIOLOGY CLINIC | Age: 81
End: 2021-07-16

## 2021-07-16 DIAGNOSIS — Z45.02 ICD (IMPLANTABLE CARDIOVERTER-DEFIBRILLATOR) BATTERY DEPLETION: ICD-10-CM

## 2021-07-16 DIAGNOSIS — G93.40 ENCEPHALOPATHY: Primary | ICD-10-CM

## 2021-07-16 DIAGNOSIS — G93.40 ACUTE ENCEPHALOPATHY: ICD-10-CM

## 2021-07-16 DIAGNOSIS — N17.9 ACUTE RENAL FAILURE, UNSPECIFIED ACUTE RENAL FAILURE TYPE (HCC): ICD-10-CM

## 2021-07-16 DIAGNOSIS — I21.4 NSTEMI (NON-ST ELEVATED MYOCARDIAL INFARCTION) (HCC): ICD-10-CM

## 2021-07-16 PROBLEM — I50.22 SYSTOLIC HEART FAILURE, CHRONIC (HCC): Status: ACTIVE | Noted: 2021-07-16

## 2021-07-16 PROBLEM — A41.9 SEPSIS (HCC): Status: ACTIVE | Noted: 2021-07-16

## 2021-07-16 PROBLEM — I21.A1 TYPE 2 MI (MYOCARDIAL INFARCTION) (HCC): Status: ACTIVE | Noted: 2021-07-16

## 2021-07-16 LAB
ALBUMIN SERPL-MCNC: 2 G/DL (ref 3.5–5)
ALBUMIN/GLOB SERPL: 0.5 {RATIO} (ref 1.1–2.2)
ALP SERPL-CCNC: 60 U/L (ref 45–117)
ALT SERPL-CCNC: 275 U/L (ref 12–78)
ANION GAP SERPL CALC-SCNC: 7 MMOL/L (ref 5–15)
APPEARANCE CSF: CLEAR
AST SERPL-CCNC: 370 U/L (ref 15–37)
ATRIAL RATE: 81 BPM
BACTERIA SPEC CULT: NORMAL
BASOPHILS # BLD: 0 K/UL (ref 0–0.1)
BASOPHILS NFR BLD: 0 % (ref 0–1)
BILIRUB SERPL-MCNC: 0.4 MG/DL (ref 0.2–1)
BUN SERPL-MCNC: 53 MG/DL (ref 6–20)
BUN/CREAT SERPL: 27 (ref 12–20)
CALCIUM SERPL-MCNC: 8.1 MG/DL (ref 8.5–10.1)
CALCULATED R AXIS, ECG10: 57 DEGREES
CALCULATED T AXIS, ECG11: 107 DEGREES
CC UR VC: NORMAL
CHLORIDE SERPL-SCNC: 111 MMOL/L (ref 97–108)
CO2 SERPL-SCNC: 18 MMOL/L (ref 21–32)
COLOR CSF: COLORLESS
COMMENT, HOLDF: NORMAL
CREAT SERPL-MCNC: 1.97 MG/DL (ref 0.55–1.02)
CRYPTOCOCCUS NEOFORMANS/GATTII, CRNEOG: NOT DETECTED
CYTOMEGALOVIRUS, CYMEG: NOT DETECTED
DIAGNOSIS, 93000: NORMAL
DIFFERENTIAL METHOD BLD: ABNORMAL
ENTEROVIRUS, ENTVIR: NOT DETECTED
EOSINOPHIL # BLD: 0 K/UL (ref 0–0.4)
EOSINOPHIL NFR BLD: 0 % (ref 0–7)
ERYTHROCYTE [DISTWIDTH] IN BLOOD BY AUTOMATED COUNT: 14.6 % (ref 11.5–14.5)
ESCHERICHIA COLI K1, ECK1: NOT DETECTED
GLOBULIN SER CALC-MCNC: 4.1 G/DL (ref 2–4)
GLUCOSE BLD STRIP.AUTO-MCNC: 132 MG/DL (ref 65–117)
GLUCOSE BLD STRIP.AUTO-MCNC: 157 MG/DL (ref 65–117)
GLUCOSE BLD STRIP.AUTO-MCNC: 165 MG/DL (ref 65–117)
GLUCOSE BLD STRIP.AUTO-MCNC: 178 MG/DL (ref 65–117)
GLUCOSE CSF-MCNC: 110 MG/DL (ref 40–70)
GLUCOSE SERPL-MCNC: 158 MG/DL (ref 65–100)
HAEMOPHILUS INFLUENZAE, HAEFLU: NOT DETECTED
HCT VFR BLD AUTO: 30.8 % (ref 35–47)
HERPES SIMPLEX VIRUS 2, HSIMV2: NOT DETECTED
HGB BLD-MCNC: 10.1 G/DL (ref 11.5–16)
HSV1 DNA CSF QL NAA+PROBE: NOT DETECTED
HUMAN HERPESVIRUS 6, HUHV6: NOT DETECTED
HUMAN PARECHOVIRUS, HUPARV: NOT DETECTED
IMM GRANULOCYTES # BLD AUTO: 0.1 K/UL (ref 0–0.04)
IMM GRANULOCYTES NFR BLD AUTO: 1 % (ref 0–0.5)
LACTATE BLD-SCNC: 0.89 MMOL/L (ref 0.4–2)
LISTERIA MONOCYTOGENES, LISMON: NOT DETECTED
LYMPHOCYTES # BLD: 0.1 K/UL (ref 0.8–3.5)
LYMPHOCYTES NFR BLD: 1 % (ref 12–49)
MCH RBC QN AUTO: 30.1 PG (ref 26–34)
MCHC RBC AUTO-ENTMCNC: 32.8 G/DL (ref 30–36.5)
MCV RBC AUTO: 91.7 FL (ref 80–99)
MONOCYTES # BLD: 0.3 K/UL (ref 0–1)
MONOCYTES NFR BLD: 2 % (ref 5–13)
NEISSERIA MENINGITIDIS, NEIMEN: NOT DETECTED
NEUTS BAND NFR BLD MANUAL: 5 %
NEUTS SEG # BLD: 12.5 K/UL (ref 1.8–8)
NEUTS SEG NFR BLD: 91 % (ref 32–75)
NRBC # BLD: 0 K/UL (ref 0–0.01)
NRBC BLD-RTO: 0 PER 100 WBC
P-R INTERVAL, ECG05: 152 MS
PLATELET # BLD AUTO: 115 K/UL (ref 150–400)
PMV BLD AUTO: 11.4 FL (ref 8.9–12.9)
POTASSIUM SERPL-SCNC: 4.5 MMOL/L (ref 3.5–5.1)
PROCALCITONIN SERPL-MCNC: 6.1 NG/ML
PROT CSF-MCNC: 53 MG/DL (ref 15–45)
PROT SERPL-MCNC: 6.1 G/DL (ref 6.4–8.2)
Q-T INTERVAL, ECG07: 426 MS
QRS DURATION, ECG06: 108 MS
QTC CALCULATION (BEZET), ECG08: 494 MS
RBC # BLD AUTO: 3.36 M/UL (ref 3.8–5.2)
RBC # CSF: 0 /CU MM
RBC MORPH BLD: ABNORMAL
SAMPLES BEING HELD,HOLD: NORMAL
SERVICE CMNT-IMP: ABNORMAL
SERVICE CMNT-IMP: NORMAL
SODIUM SERPL-SCNC: 136 MMOL/L (ref 136–145)
STREPTOCOCCUS AGALACTIAE, SAGA: NOT DETECTED
STREPTOCOCCUS PNEUMONIAE, STRPNE: NOT DETECTED
TROPONIN I BLD-MCNC: 5.05 NG/ML (ref 0–0.08)
TROPONIN I SERPL-MCNC: 11.9 NG/ML
TROPONIN I SERPL-MCNC: 9.3 NG/ML
TUBE # CSF: 1
TUBE # CSF: 1
TUBE # CSF: 3
VARICELLA ZOSTER VIRUS, VAZOVI: NOT DETECTED
VENTRICULAR RATE, ECG03: 81 BPM
WBC # BLD AUTO: 13 K/UL (ref 3.6–11)
WBC # CSF: 0 /CU MM (ref 0–5)

## 2021-07-16 PROCEDURE — 84484 ASSAY OF TROPONIN QUANT: CPT

## 2021-07-16 PROCEDURE — 65660000000 HC RM CCU STEPDOWN

## 2021-07-16 PROCEDURE — 83605 ASSAY OF LACTIC ACID: CPT

## 2021-07-16 PROCEDURE — 95816 EEG AWAKE AND DROWSY: CPT | Performed by: PSYCHIATRY & NEUROLOGY

## 2021-07-16 PROCEDURE — 36415 COLL VENOUS BLD VENIPUNCTURE: CPT

## 2021-07-16 PROCEDURE — 99223 1ST HOSP IP/OBS HIGH 75: CPT | Performed by: PSYCHIATRY & NEUROLOGY

## 2021-07-16 PROCEDURE — 77030014133 XR SPINAL PUNC LUMB DX

## 2021-07-16 PROCEDURE — 84145 PROCALCITONIN (PCT): CPT

## 2021-07-16 PROCEDURE — 74011636637 HC RX REV CODE- 636/637: Performed by: GENERAL ACUTE CARE HOSPITAL

## 2021-07-16 PROCEDURE — 74011250636 HC RX REV CODE- 250/636: Performed by: EMERGENCY MEDICINE

## 2021-07-16 PROCEDURE — 99223 1ST HOSP IP/OBS HIGH 75: CPT | Performed by: INTERNAL MEDICINE

## 2021-07-16 PROCEDURE — 87205 SMEAR GRAM STAIN: CPT

## 2021-07-16 PROCEDURE — 009U3ZX DRAINAGE OF SPINAL CANAL, PERCUTANEOUS APPROACH, DIAGNOSTIC: ICD-10-PCS | Performed by: RADIOLOGY

## 2021-07-16 PROCEDURE — 87483 CNS DNA AMP PROBE TYPE 12-25: CPT

## 2021-07-16 PROCEDURE — 99285 EMERGENCY DEPT VISIT HI MDM: CPT

## 2021-07-16 PROCEDURE — 74011250636 HC RX REV CODE- 250/636: Performed by: GENERAL ACUTE CARE HOSPITAL

## 2021-07-16 PROCEDURE — 74011000258 HC RX REV CODE- 258: Performed by: GENERAL ACUTE CARE HOSPITAL

## 2021-07-16 PROCEDURE — 74011250637 HC RX REV CODE- 250/637: Performed by: GENERAL ACUTE CARE HOSPITAL

## 2021-07-16 PROCEDURE — 89050 BODY FLUID CELL COUNT: CPT

## 2021-07-16 PROCEDURE — 82962 GLUCOSE BLOOD TEST: CPT

## 2021-07-16 PROCEDURE — 84157 ASSAY OF PROTEIN OTHER: CPT

## 2021-07-16 PROCEDURE — 85025 COMPLETE CBC W/AUTO DIFF WBC: CPT

## 2021-07-16 PROCEDURE — 80053 COMPREHEN METABOLIC PANEL: CPT

## 2021-07-16 PROCEDURE — 74011250636 HC RX REV CODE- 250/636: Performed by: INTERNAL MEDICINE

## 2021-07-16 PROCEDURE — 93005 ELECTROCARDIOGRAM TRACING: CPT

## 2021-07-16 PROCEDURE — 82945 GLUCOSE OTHER FLUID: CPT

## 2021-07-16 RX ORDER — ACETAMINOPHEN 325 MG/1
650 TABLET ORAL
Status: DISCONTINUED | OUTPATIENT
Start: 2021-07-16 | End: 2021-07-24 | Stop reason: HOSPADM

## 2021-07-16 RX ORDER — ACETAMINOPHEN 650 MG/1
650 SUPPOSITORY RECTAL
Status: DISCONTINUED | OUTPATIENT
Start: 2021-07-16 | End: 2021-07-24 | Stop reason: HOSPADM

## 2021-07-16 RX ORDER — VANCOMYCIN 1.75 GRAM/500 ML IN 0.9 % SODIUM CHLORIDE INTRAVENOUS
1750 ONCE
Status: COMPLETED | OUTPATIENT
Start: 2021-07-16 | End: 2021-07-16

## 2021-07-16 RX ORDER — SODIUM CHLORIDE 0.9 % (FLUSH) 0.9 %
5-40 SYRINGE (ML) INJECTION AS NEEDED
Status: DISCONTINUED | OUTPATIENT
Start: 2021-07-16 | End: 2021-07-24 | Stop reason: HOSPADM

## 2021-07-16 RX ORDER — ASPIRIN 81 MG/1
81 TABLET ORAL DAILY
Status: DISCONTINUED | OUTPATIENT
Start: 2021-07-16 | End: 2021-07-24 | Stop reason: HOSPADM

## 2021-07-16 RX ORDER — ZINC GLUCONATE 10 MG
400 LOZENGE ORAL
Status: ON HOLD | COMMUNITY
End: 2021-07-19

## 2021-07-16 RX ORDER — ONDANSETRON 4 MG/1
4 TABLET, ORALLY DISINTEGRATING ORAL
Status: DISCONTINUED | OUTPATIENT
Start: 2021-07-16 | End: 2021-07-24 | Stop reason: HOSPADM

## 2021-07-16 RX ORDER — ONDANSETRON 2 MG/ML
4 INJECTION INTRAMUSCULAR; INTRAVENOUS
Status: DISCONTINUED | OUTPATIENT
Start: 2021-07-16 | End: 2021-07-24 | Stop reason: HOSPADM

## 2021-07-16 RX ORDER — INSULIN LISPRO 100 [IU]/ML
INJECTION, SOLUTION INTRAVENOUS; SUBCUTANEOUS
Status: DISCONTINUED | OUTPATIENT
Start: 2021-07-16 | End: 2021-07-24 | Stop reason: HOSPADM

## 2021-07-16 RX ORDER — ATORVASTATIN CALCIUM 20 MG/1
20 TABLET, FILM COATED ORAL
Status: DISCONTINUED | OUTPATIENT
Start: 2021-07-16 | End: 2021-07-24 | Stop reason: HOSPADM

## 2021-07-16 RX ORDER — POLYETHYLENE GLYCOL 3350 17 G/17G
17 POWDER, FOR SOLUTION ORAL DAILY PRN
Status: DISCONTINUED | OUTPATIENT
Start: 2021-07-16 | End: 2021-07-24 | Stop reason: HOSPADM

## 2021-07-16 RX ORDER — SODIUM CHLORIDE 0.9 % (FLUSH) 0.9 %
5-40 SYRINGE (ML) INJECTION EVERY 8 HOURS
Status: DISCONTINUED | OUTPATIENT
Start: 2021-07-16 | End: 2021-07-24 | Stop reason: HOSPADM

## 2021-07-16 RX ORDER — ENOXAPARIN SODIUM 100 MG/ML
1 INJECTION SUBCUTANEOUS
Status: COMPLETED | OUTPATIENT
Start: 2021-07-16 | End: 2021-07-16

## 2021-07-16 RX ORDER — CARVEDILOL 12.5 MG/1
12.5 TABLET ORAL 2 TIMES DAILY WITH MEALS
Status: DISCONTINUED | OUTPATIENT
Start: 2021-07-16 | End: 2021-07-19

## 2021-07-16 RX ORDER — INSULIN GLARGINE 100 [IU]/ML
55 INJECTION, SOLUTION SUBCUTANEOUS DAILY
Status: ON HOLD | COMMUNITY
End: 2021-08-06 | Stop reason: SDUPTHER

## 2021-07-16 RX ORDER — SODIUM CHLORIDE 9 MG/ML
125 INJECTION, SOLUTION INTRAVENOUS CONTINUOUS
Status: DISCONTINUED | OUTPATIENT
Start: 2021-07-16 | End: 2021-07-17

## 2021-07-16 RX ORDER — MAGNESIUM SULFATE 100 %
4 CRYSTALS MISCELLANEOUS AS NEEDED
Status: DISCONTINUED | OUTPATIENT
Start: 2021-07-16 | End: 2021-07-24 | Stop reason: HOSPADM

## 2021-07-16 RX ORDER — DEXTROSE 50 % IN WATER (D50W) INTRAVENOUS SYRINGE
12.5-25 AS NEEDED
Status: DISCONTINUED | OUTPATIENT
Start: 2021-07-16 | End: 2021-07-24 | Stop reason: HOSPADM

## 2021-07-16 RX ORDER — VALSARTAN 80 MG/1
80 TABLET ORAL DAILY
COMMUNITY
End: 2021-08-06

## 2021-07-16 RX ADMIN — CEFTRIAXONE SODIUM 2 G: 2 INJECTION, POWDER, FOR SOLUTION INTRAMUSCULAR; INTRAVENOUS at 18:09

## 2021-07-16 RX ADMIN — VANCOMYCIN HYDROCHLORIDE 1750 MG: 10 INJECTION, POWDER, LYOPHILIZED, FOR SOLUTION INTRAVENOUS at 04:54

## 2021-07-16 RX ADMIN — AMPICILLIN SODIUM 2 G: 2 INJECTION, POWDER, FOR SOLUTION INTRAMUSCULAR; INTRAVENOUS at 10:44

## 2021-07-16 RX ADMIN — ENOXAPARIN SODIUM 70 MG: 80 INJECTION SUBCUTANEOUS at 03:54

## 2021-07-16 RX ADMIN — ACYCLOVIR SODIUM 750 MG: 50 INJECTION, SOLUTION INTRAVENOUS at 04:29

## 2021-07-16 RX ADMIN — ATORVASTATIN CALCIUM 20 MG: 20 TABLET, FILM COATED ORAL at 22:52

## 2021-07-16 RX ADMIN — AMPICILLIN SODIUM 2 G: 2 INJECTION, POWDER, FOR SOLUTION INTRAMUSCULAR; INTRAVENOUS at 22:50

## 2021-07-16 RX ADMIN — SODIUM CHLORIDE 125 ML/HR: 9 INJECTION, SOLUTION INTRAVENOUS at 10:43

## 2021-07-16 RX ADMIN — Medication 10 ML: at 22:52

## 2021-07-16 RX ADMIN — Medication 10 ML: at 06:49

## 2021-07-16 RX ADMIN — Medication 10 ML: at 15:16

## 2021-07-16 RX ADMIN — INSULIN LISPRO 2 UNITS: 100 INJECTION, SOLUTION INTRAVENOUS; SUBCUTANEOUS at 18:21

## 2021-07-16 RX ADMIN — ASPIRIN 81 MG: 81 TABLET, COATED ORAL at 10:44

## 2021-07-16 RX ADMIN — CARVEDILOL 12.5 MG: 12.5 TABLET, FILM COATED ORAL at 10:44

## 2021-07-16 RX ADMIN — AMPICILLIN SODIUM 2 G: 2 INJECTION, POWDER, FOR SOLUTION INTRAMUSCULAR; INTRAVENOUS at 17:51

## 2021-07-16 RX ADMIN — CEFTRIAXONE SODIUM 2 G: 2 INJECTION, POWDER, FOR SOLUTION INTRAMUSCULAR; INTRAVENOUS at 06:49

## 2021-07-16 RX ADMIN — CARVEDILOL 12.5 MG: 12.5 TABLET, FILM COATED ORAL at 18:09

## 2021-07-16 RX ADMIN — AMPICILLIN SODIUM 2 G: 2 INJECTION, POWDER, FOR SOLUTION INTRAMUSCULAR; INTRAVENOUS at 03:54

## 2021-07-16 NOTE — CONSULTS
Neurology Note    Patient ID:  Main Nye  [id number]  60 y.o.  1940      Date of Consultation:  July 16, 2021    Referring Physician: Dr. Wilson Phillips    Reason for Consultation:  Altered mental status    Subjective:i feel better and do not know what happened       History of Present Illness:     Main Nye is a 79 y.o. female with a history of atrial fibrillation, diabetes, dyslipidemia, defibrillator placement who initially presented to [facility] with altered mental status. Her temperature was 104. She was transferred to [facility].  Upon arrival, she was afebrile. It was noted that the patient's  stated that she was doing well until yesterday when she was not acting like herself and very lethargic. She was also very drowsy and did not want to do anything. There was no fever or chills. There was no reported neck stiffness or photophobia. A concern for meningitis was high and the patient was started on broad-spectrum antibiotics. The patient was also noted to have an elevated troponin and was started on Lovenox. This a.m., the patient nerve that she is in the hospital.  She has very little in the way of recollection from the events of yesterday. She cannot remember anything that led to the visit to the outside hospital or being transferred to [facility].  She denies any numbness, tingling, or pain.     Past Medical History:   Diagnosis Date    A-fib [facility])     AICD (automatic cardioverter/defibrillator) present 9/23/2020 9/23/2020 [employer] AICD implant    Arthritis     Bilateral pleural effusion 9/18/2020    Chronic pain     Chronic pain     Diabetes ([facility] [address]     Diverticular disease     Elevated troponin 9/18/2020    Hemorrhoid     Hypercholesterolemia     IBS (irritable bowel syndrome)     Lymphocytosis 05/07/2018        Past Surgical History:   Procedure Laterality Date    COLONOSCOPY N/A 10/31/2019    COLONOSCOPY [facility] Doyne Fabry, [facility]  10/31/2019         Freddy Power  10/31/2019         HX CATARACT REMOVAL      HX CHOLECYSTECTOMY      HX COLONOSCOPY  2009    HX COLONOSCOPY  2016    2 adenomatous polyp    HX HEMORRHOIDECTOMY  2009    HX HYSTERECTOMY      HX KNEE REPLACEMENT      right    HX ORTHOPAEDIC  12/11/2017    back, laminectomy and decompression    DC INSJ/RPLCMT PERM DFB W/TRNSVNS LDS 1/DUAL CHMBR N/A 9/23/2020    INSERT ICD DUAL performed by Jorge Wolf MD at [facility] CARDIAC CATH LAB        Family History   Problem Relation Age of Onset    Colon Cancer Father     Diabetes Mother         Social History     Tobacco Use    Smoking status: Current Every Day Smoker     Packs/day: 1.00     Years: 55.00     Pack years: 55.00     Types: Cigarettes    Smokeless tobacco: Never Used   Substance Use Topics    Alcohol use: No        Allergies   Allergen Reactions    Morphine Anaphylaxis    Ultram [Tramadol] Palpitations        Prior to Admission medications    Medication Sig Start Date End Date Taking? Authorizing Provider   semaglutide (OZEMPIC) 0.25 mg/0.2 mL (2 mg/1.5 mL) sub-q pen 0.25 mg by SubCUTAneous route every seven (7) days.  Indications: type 2 diabetes mellitus 6/30/21   Maria Dolores Stevens NP   atorvastatin (LIPITOR) 20 mg tablet TAKE 1 TABLET BY MOUTH  DAILY 6/28/21   Maria Dolores Stevens NP   glipiZIDE (GLUCOTROL) 5 mg tablet TAKE 1 TABLET BY MOUTH  TWICE DAILY 6/28/21   Maria Dolores Stevens NP   furosemide (LASIX) 40 mg tablet TAKE 1 TABLET BY MOUTH  DAILY 6/15/21   Sanam Spicer MD   Vitamin D2 1,250 mcg (50,000 unit) capsule TAKE 1 CAPSULE BY MOUTH  EVERY SEVEN DAYS FOR LOW  VITAMIN D LEVELS 6/10/21   Julio César Tejada NP   valsartan (DIOVAN) 80 mg tablet TAKE 1 TABLET BY MOUTH  DAILY 6/1/21   Sanam Spicer MD   [facility] 75 mg tablet Takes 1/2 BID 4/3/21   Maria Dolores Stevens NP   thiamine HCL (B-1) 100 mg tablet Take 100 mg by mouth daily. Provider, Historical   nitroglycerin (NITROSTAT) 0.4 mg SL tablet 1 Tab by SubLINGual route every five (5) minutes as needed for Chest Pain. Up to 3 doses. 2/23/21   Violet Rosales ANP   carvediloL (COREG) 25 mg tablet Take 1 Tab by mouth two (2) times daily (with meals). 2/21/21   Clarence Stewart MD   colestipoL (Colestid) 1 gram tablet Take 1 Tab by mouth two (2) times a day. 10/7/20   Vandana Chan,    vit A/vit C/vit E/zinc/copper (ICAPS AREDS PO) Take  by mouth two (2) times a day. Provider, Historical   loperamide (IMMODIUM) 2 mg tablet Take 2 mg by mouth as needed for Diarrhea. Provider, Historical   aspirin delayed-release 81 mg tablet Take 1 Tab by mouth daily. 9/10/20   Clarence Stewart MD   insulin glargine (Lantus Solostar U-100 Insulin) 100 unit/mL (3 mL) inpn INJECT SUBCUTANEOUSLY 45  UNITS DAILY 9/2/20   Camila Cooks, NP   Januvia 50 mg tablet TAKE 1 TABLET BY MOUTH  DAILY 8/11/20   Camila Cooks, NP   vit C/E/Zn/coppr/lutein/zeaxan (PRESERVISION AREDS-2 PO) Take  by mouth daily. Provider, Historical   carboxymethylcellulose sodium (REFRESH TEARS OP) Apply  to eye daily. 2-4 drops both eyes    Provider, Historical   OTHER,NON-FORMULARY, Take 1 Tab by mouth daily. Tumeric curcumin C3 complex    Provider, Historical   B.infantis-B.ani-B.long-B.bifi (PROBIOTIC 4X) 10-15 mg TbEC Take  by mouth daily. Provider, Historical   acetaminophen (TYLENOL) 650 mg TbER Take 650 mg by mouth every eight (8) hours. Provider, Historical   omega-3 fatty acids-vitamin e 1,000 mg cap Take 1 Cap by mouth two (2) times a day. 32a day     Provider, Historical   ascorbic acid, vitamin C, (VITAMIN C) 500 mg tablet Take 500 mg by mouth daily. Provider, Historical   zinc 50 mg tab tablet Take  by mouth daily.     Provider, Historical     Current Facility-Administered Medications   Medication Dose Route Frequency    ampicillin (OMNIPEN) 2 g in 0.9% sodium chloride (MBP/ADV) 100 mL MBP  2 g IntraVENous Q6H    acyclovir (ZOVIRAX) 750 mg in 0.9% sodium chloride 250 mL IVPB  10 mg/kg IntraVENous Q24H    cefTRIAXone (ROCEPHIN) 2 g in 0.9% sodium chloride (MBP/ADV) 50 mL MBP  2 g IntraVENous Q12H    aspirin delayed-release tablet 81 mg  81 mg Oral DAILY    atorvastatin (LIPITOR) tablet 20 mg  20 mg Oral QHS    carvediloL (COREG) tablet 12.5 mg  12.5 mg Oral BID WITH MEALS    sodium chloride (NS) flush 5-40 mL  5-40 mL IntraVENous Q8H    sodium chloride (NS) flush 5-40 mL  5-40 mL IntraVENous PRN    acetaminophen (TYLENOL) tablet 650 mg  650 mg Oral Q6H PRN    Or    acetaminophen (TYLENOL) suppository 650 mg  650 mg Rectal Q6H PRN    polyethylene glycol (MIRALAX) packet 17 g  17 g Oral DAILY PRN    ondansetron (ZOFRAN ODT) tablet 4 mg  4 mg Oral Q8H PRN    Or    ondansetron (ZOFRAN) injection 4 mg  4 mg IntraVENous Q6H PRN    [START ON 7/17/2021] vancomycin (VANCOCIN) 1,000 mg in 0.9% sodium chloride 250 mL (VIAL-MATE)  1,000 mg IntraVENous Q36H    insulin lispro (HUMALOG) injection   SubCUTAneous AC&HS    glucose chewable tablet 16 g  4 Tablet Oral PRN    dextrose (D50W) injection syrg 12.5-25 g  12.5-25 g IntraVENous PRN    glucagon (GLUCAGEN) injection 1 mg  1 mg IntraMUSCular PRN    0.9% sodium chloride infusion  125 mL/hr IntraVENous CONTINUOUS       Review of Systems:    General, constitutional: negative  Eyes, vision: negative  Ears, nose, throat: negative  Cardiovascular, heart: negative  Respiratory: negative  Gastrointestinal: negative  Genitourinary: negative  Musculoskeletal: negative  Skin and integumentary: negative  Psychiatric: negative  Endocrine: negative  Neurological: negative, except for HPI  Hematologic/lymphatic: negative  Allergy/immunology: negative  Objective:     Visit Vitals  BP (!) 129/48   Pulse 91   Temp 99.6 °F (37.6 °C)   Resp 23   Ht 5' 6\" (1.676 m)   Wt 161 lb 13.1 oz (73.4 kg)   SpO2 100%   BMI 26.12 kg/m²       Physical Exam:      General: appears well nourished in no acute distress  Neck: no carotid bruits  Lungs: clear to auscultation  Heart:  no murmurs, regular rate  Lower extremity: peripheral pulses palpable. She does have mild edema in her left ankle. Skin: intact    Neurological exam:    She is awake and alert. She does know her name and the year. She does know the month correctly. She could follow 1 and two-step commands. She was drowsy and needed to be awakened but she could perform simple calculations. Her naming of objects was correct. Cranial nerves:   II-XII were tested    Perrrla  Fundoscopic examination revealed venous pulsations and no clear abnormalities  Visual fields were full  Eomi, no evidence of nystagmus  Facial sensation:  normal and symmetric  Facial motor: normal and symmetric  Hearing intact  SCM strength intact  Tongue: midline without fasciculations    Motor: Tone normal  Pronator drift was absent    No evidence of fasciculations    Strength testing:  She was at least a 4+ out of 5 throughout with no focal weakness. She could hold each extremity off the bed for 10 seconds without difficulty    Sensory:  Upper extremity: intact to pp,  Lower extremity: intact to pp,     Reflexes:    Right Left  Biceps  2 2  Triceps 2 2  Brachiorad. 2 2  Patella  2 2  Achilles 2 2    Plantar response:  flexor bilaterally    Cerebellar testing:  no tremor apparent, finger/nose and ata were intact    Gait: This was not assessed at the bedside this a.m.     Labs:     Lab Results   Component Value Date/Time    Hemoglobin A1c 9.1 (H) 06/14/2021 09:17 PM    Sodium 131 (L) 07/15/2021 07:37 PM    Potassium 4.8 07/15/2021 07:37 PM    Chloride 98 07/15/2021 07:37 PM    Glucose 199 (H) 07/15/2021 07:37 PM    BUN 46 (H) 07/15/2021 07:37 PM    Creatinine 2.41 (H) 07/15/2021 07:37 PM    Calcium 9.3 07/15/2021 07:37 PM    WBC 16.7 (H) 07/15/2021 07:37 PM    HCT 39.8 07/15/2021 07:37 PM    HGB 13.2 07/15/2021 07:37 PM    PLATELET 286 07/15/2021 07:37 PM    LDL,Direct 137 (H) 06/14/2021 09:17 PM       Imaging:    Results from [place] encounter on 09/29/17    MRI LUMB SPINE WO CONT    Narrative  Indication: Lumbosacral spondylosis without myelopathy. Spondylolisthesis of the  lumbar region. TECHNIQUE: Multiplanar, multisequence noncontrast lumbar spine MRI per standard  protocol. COMPARISON: CT abdomen/pelvis July 26, 2017. FINDINGS:    For discussion purposes, the first axial T2-weighted images defined the  midportion of the L1 vertebral body. There is left-sided L5-S1 transitional  lumbosacral anatomy with pseudoarthrosis. Slight grade 1 anterolisthesis of L2  with respect to L3. There is grade 1 anterolisthesis of L3 with respect to L4 on  the basis of advanced facet arthropathy. Levoconvex scoliotic curvature of the  lumbar spine. There is Alignment of the lumbar spine is otherwise normal. The  spinal cord terminates at a normal anatomic level. There is a slight superior  endplate compression fracture deformity of L1, chronic finding. No active marrow  edema. Diffuse paraspinal muscle atrophy. At L1-L2, there is a mild broad-based disc bulge without a small central  protrusion type disc herniation. No significant central canal or foraminal  narrowing. At L2-L3, there is slight grade 1 anterolisthesis of L2 with respect to L3 with  uncovering of the posterior disc. Spondylolisthesis is on the basis of  moderate/severe right facet arthropathy/ligamentum flavum hypertrophy and  moderate left facet arthropathy and ligamentum flavum hypertrophy at this level. There is mild/moderate central canal narrowing. No significant foraminal  narrowing. L3-L4, there is grade 1 anterolisthesis of L3 with suspected L4 on the basis of  advanced bilateral facet arthropathy and ligamentum flavum hypertrophy. Severe  central canal narrowing. Mild/moderate bilateral foraminal narrowing, right  greater than left.  Severe right subarticular recess narrowing at this level. At L4-L5, there is a mild broad-based disc bulge without disc herniation. Moderate bilateral facet arthropathy. No significant central canal or foraminal  narrowing. At L5-S1, there is no disc herniation. Left-sided L5-S1 transitional lumbosacral  anatomy with pseudoarthrosis. Moderate/severe bilateral facet arthropathy. No  significant central canal or foraminal narrowing. Impression  IMPRESSION:  The patient has multilevel lumbar spondyloarthropathy as described, most  severely at L3-L4 with severe central canal narrowing. Please refer to the body  of the report for compromise of segmental analysis of the lumbar spinal column. Results from [place] encounter on 07/15/21    CT HEAD WO CONT    Narrative  EXAM: CT HEAD WO CONT    INDICATION: Altered mental status    COMPARISON: 10/21/2017. CONTRAST: None. TECHNIQUE: Unenhanced CT of the head was performed using 5 mm images. Brain and  bone windows were generated. Coronal and sagittal reformats. CT dose reduction  was achieved through use of a standardized protocol tailored for this  examination and automatic exposure control for dose modulation. FINDINGS:  The ventricles and sulci are normal in size, shape and configuration. . There is  no significant white matter disease. There is no intracranial hemorrhage,  extra-axial collection, or mass effect. The basilar cisterns are open. No CT  evidence of acute infarct. The bone windows demonstrate no abnormalities. The visualized portions of the  paranasal sinuses and mastoid air cells are clear. Impression  No acute abnormality    Creatinine 2.41         A1c from June 14, 2021 was 9.1  Troponin 11.71    I did independently review the head CT from July 15, 2021. There is no acute abnormalities noted.     Assessment and Plan:    The patient is a pleasant 25-year-old female who was admitted with altered mental status, confusion and high-grade fever. She was found to have worsening renal function, worsening liver function, elevated white blood cell count and an elevated troponin. Her neurological examination with no focal limb weakness. Mentation improving. Acute encephalopathy:  The differential was broad and most likely multifactorial.  Systemic impact of sepsis on cognitive functioning, acute kidney injury, abnormal liver function testing all can contribute to worsening mental status. Possible seizure with postictal state or TIA are also in the differential.    I will obtain an EEG for possible seizure focus. Continue abx for infection and follow cultures    Head CT with no focal abnormalities. She does have risk factors for stroke including atrial fibrillation, diabetes, dyslipidemia. Continue on secondary stroke risk factor modification including antiplatelets and lipid-lowering medication. Diabetes: continue with Aggressive control. Last a1c was 9.1    Elevated troponin:  On lovenox  Cardiology consult and echo are pending.            Active Problems:    Sepsis ([facility] [address] (7/16/2021)                   Signed By:  Matheus Louise DO FAAN    July 16, 2021

## 2021-07-16 NOTE — TELEPHONE ENCOUNTER
Pt's  called to let us know that Mrs. Miesha Kwon was in [facility] being treated for Meningitis.

## 2021-07-16 NOTE — ED PROVIDER NOTES
EMERGENCY DEPARTMENT HISTORY AND PHYSICAL EXAM     ------------------------------------------------------------------------------------------------------  Please note that this dictation was completed with [employer], the [employer] voice recognition software. Quite often unanticipated grammatical, syntax, homophones, and other interpretive errors are inadvertently transcribed by the computer software. Please disregard these errors. Please excuse any errors that have escaped final proofreading.  -----------------------------------------------------------------------------------------------------------------    Date: 7/16/2021  Patient Name: Titus Rivera    History of Presenting Illness     Chief Complaint   Patient presents with    Transfer Of Care     Patient arrives via [facility] from [facility]. She went to [facility] for AMS with temp of 103.8. On arrival, temp is 98.1       History Provided By: Outside hospital physician and records    HPI: Titus Rivera is a 79 y.o. female, with significant pmhx of diabetes, chronic shoulder pain, atrial fibrillation, IBS, who presents via EMS  From [facility] to the ED with report of altered mental status. Patient reportedly alert and oriented at baseline and has been more lethargic throughout today per . See note below from outside hospital. On arrival to emergency department patient is no longer  febrile. Patient is resting comfortably and wakes easily to voice. Attempts to answer questions provided but often falls asleep midsentence. Patient notes no pain and able to move neck freely. HPI per OSH:  Patient is an 79-year-old female with history of A. fib status post AICD placement, chronic pain, diabetes hyperlipidemia and lymphocytosis who presents with altered mental status. Patient is unable to give a history or review of systems. History is obtained from EMS, symptoms started approximately 8:00 this morning.   There is no known history of fever cough sweats or chills. Per EMS she is usually alert interactive and ambulatory. There is a history of renal failure also.     Care discussed with , Dov Hall, his phone number is [phone]. He states that patient was doing well yesterday, was able to drive him go shopping. Today when he went to wake her up she was sleepy and hard time arousing her. He at her sleep for most of the day. She was able to drink water and hold the bottle normally today. She did not want to eat today. He noted that she is very sleepy and lethargic all day long. He did not note a fever at home. He did not note any vomiting or diarrhea or cough or shortness of breath. He has not noted any history of a fall or trauma. There is no history of headache or neck stiffness. He has not noted any history from her of abdominal pain. No history of dysuria urgency or frequency.   Patient is not had symptoms like this in the past.      Past History     Past Medical History:  Past Medical History:   Diagnosis Date    A-fib [facility])     AICD (automatic cardioverter/defibrillator) present 9/23/2020 9/23/2020 [employer] AICD implant    Arthritis     Bilateral pleural effusion 9/18/2020    Chronic pain     Chronic pain     Diabetes ([address]     Diverticular disease     Elevated troponin 9/18/2020    Hemorrhoid     Hypercholesterolemia     IBS (irritable bowel syndrome)     Lymphocytosis 05/07/2018       Past Surgical History:  Past Surgical History:   Procedure Laterality Date    COLONOSCOPY N/A 10/31/2019    COLON[facility] performed by [facility] at [facility]  10/31/2019         Racheal Pulido  10/31/2019         HX CATARACT REMOVAL      HX CHOLECYSTECTOMY      HX COLONOSCOPY  2009    HX COLONOSCOPY  2016    2 adenomatous polyp    HX HEMORRHOIDECTOMY  2009    HX HYSTERECTOMY      HX KNEE REPLACEMENT      right    HX ORTHOPAEDIC  12/11/2017    back, laminectomy and decompression    MS INSJ/RPLCMT PERM DFB W/TRNSVNS LDS 1/DUAL CHMBR N/A 9/23/2020    INSERT ICD DUAL performed by Mehnaz Panda MD at [facility] LAB       Family History:  Family History   Problem Relation Age of Onset    Colon Cancer Father     Diabetes Mother        Social History:  Social History     Tobacco Use    Smoking status: Current Every Day Smoker     Packs/day: 1.00     Years: 55.00     Pack years: 55.00     Types: Cigarettes    Smokeless tobacco: Never Used   Vaping Use    Vaping Use: Never used   Substance Use Topics    Alcohol use: No    Drug use: Never       Allergies: Allergies   Allergen Reactions    Morphine Anaphylaxis    Ultram [Tramadol] Palpitations         Review of Systems   Review of Systems   Unable to perform ROS: Mental status change       Physical Exam   Physical Exam  Vitals and nursing note reviewed. Constitutional:       General: She is not in acute distress. Appearance: She is well-developed. She is not diaphoretic. HENT:      Head: Normocephalic and atraumatic. Nose: Nose normal.   Eyes:      General: No scleral icterus. Conjunctiva/sclera: Conjunctivae normal.   Neck:      Trachea: No tracheal deviation. Cardiovascular:      Rate and Rhythm: Normal rate and regular rhythm. Heart sounds: Normal heart sounds. No murmur heard. No friction rub. Pulmonary:      Effort: Pulmonary effort is normal. No respiratory distress. Breath sounds: Normal breath sounds. No stridor. No wheezing or rales. Abdominal:      General: Bowel sounds are normal. There is no distension. Palpations: Abdomen is soft. Tenderness: There is no abdominal tenderness. There is no rebound. Musculoskeletal:         General: No tenderness. Normal range of motion. Cervical back: Normal range of motion. Skin:     General: Skin is warm and dry. Findings: No rash. Neurological:      Mental Status: She is alert. She is disoriented.       GCS: GCS eye subscore is 4. GCS verbal subscore is 4. GCS motor subscore is 6. Cranial Nerves: Cranial nerves are intact. No cranial nerve deficit. Sensory: Sensation is intact. Motor: Motor function is intact. Psychiatric:         Speech: Speech normal.           Diagnostic Study Results     Labs -     Recent Results (from the past 12 hour(s))   LACTIC ACID    Collection Time: 07/15/21  7:37 PM   Result Value Ref Range    Lactic acid 1.9 0.4 - 2.0 MMOL/L   METABOLIC PANEL, COMPREHENSIVE    Collection Time: 07/15/21  7:37 PM   Result Value Ref Range    Sodium 131 (L) 136 - 145 mmol/L    Potassium 4.8 3.5 - 5.1 mmol/L    Chloride 98 97 - 108 mmol/L    CO2 19 (L) 21 - 32 mmol/L    Anion gap 14 5 - 15 mmol/L    Glucose 199 (H) 65 - 100 mg/dL    BUN 46 (H) 6 - 20 MG/DL    Creatinine 2.41 (H) 0.55 - 1.02 MG/DL    BUN/Creatinine ratio 19 12 - 20      GFR est AA 23 (L) >60 ml/min/1.73m2    GFR est non-AA 19 (L) >60 ml/min/1.73m2    Calcium 9.3 8.5 - 10.1 MG/DL    Bilirubin, total 0.4 0.2 - 1.0 MG/DL    ALT (SGPT) 173 (H) 12 - 78 U/L    AST (SGOT) 224 (H) 15 - 37 U/L    Alk. phosphatase 69 45 - 117 U/L    Protein, total 7.4 6.4 - 8.2 g/dL    Albumin 2.7 (L) 3.5 - 5.0 g/dL    Globulin 4.7 (H) 2.0 - 4.0 g/dL    A-G Ratio 0.6 (L) 1.1 - 2.2     CBC WITH AUTOMATED DIFF    Collection Time: 07/15/21  7:37 PM   Result Value Ref Range    WBC 16.7 (H) 3.6 - 11.0 K/uL    RBC 4.42 3.80 - 5.20 M/uL    HGB 13.2 11.5 - 16.0 g/dL    HCT 39.8 35.0 - 47.0 %    MCV 90.0 80.0 - 99.0 FL    MCH 29.9 26.0 - 34.0 PG    MCHC 33.2 30.0 - 36.5 g/dL    RDW 14.0 11.5 - 14.5 %    PLATELET 315 687 - 390 K/uL    MPV 10.4 8.9 - 12.9 FL    NRBC 0.0 0  WBC    ABSOLUTE NRBC 0.00 0.00 - 0.01 K/uL    NEUTROPHILS 90 (H) 32 - 75 %    LYMPHOCYTES 8 (L) 12 - 49 %    MONOCYTES 2 (L) 5 - 13 %    EOSINOPHILS 0 0 - 7 %    BASOPHILS 0 0 - 1 %    IMMATURE GRANULOCYTES 0 0.0 - 0.5 %    ABS. NEUTROPHILS 15.1 (H) 1.8 - 8.0 K/UL    ABS.  LYMPHOCYTES 1.3 0.8 - 3.5 K/UL    ABS. MONOCYTES 0.3 0.0 - 1.0 K/UL    ABS. EOSINOPHILS 0.0 0.0 - 0.4 K/UL    ABS. BASOPHILS 0.0 0.0 - 0.1 K/UL    ABS. IMM.  GRANS. 0.0 0.00 - 0.04 K/UL    DF MANUAL      PLATELET COMMENTS ADEQUATE PLATELETS      RBC COMMENTS NORMOCYTIC, NORMOCHROMIC     TROPONIN I    Collection Time: 07/15/21  7:37 PM   Result Value Ref Range    Troponin-I, Qt. 11.71 (H) <0.05 ng/mL   TSH 3RD GENERATION    Collection Time: 07/15/21  7:37 PM   Result Value Ref Range    TSH 1.16 0.36 - 3.74 uIU/mL   MAGNESIUM    Collection Time: 07/15/21  7:37 PM   Result Value Ref Range    Magnesium 1.8 1.6 - 2.4 mg/dL   PROTHROMBIN TIME + INR    Collection Time: 07/15/21  7:37 PM   Result Value Ref Range    INR 1.1 0.9 - 1.1      Prothrombin time 11.3 (H) 9.0 - 11.1 sec   PTT    Collection Time: 07/15/21  7:37 PM   Result Value Ref Range    aPTT 31.9 (H) 22.1 - 31.0 sec    aPTT, therapeutic range     58.0 - 77.0 SECS   EKG, 12 LEAD, INITIAL    Collection Time: 07/15/21  7:49 PM   Result Value Ref Range    Ventricular Rate 101 BPM    Atrial Rate 101 BPM    P-R Interval 170 ms    QRS Duration 104 ms    Q-T Interval 360 ms    QTC Calculation (Bezet) 466 ms    Calculated P Axis -11 degrees    Calculated R Axis 61 degrees    Calculated T Axis 120 degrees    Diagnosis       Sinus tachycardia  ST depression, consider subendocardial injury  Nonspecific T wave abnormality  Abnormal ECG  When compared with ECG of 29-JUL-2020 07:36,  MANUAL COMPARISON REQUIRED, DATA IS UNCONFIRMED     URINALYSIS W/ REFLEX CULTURE    Collection Time: 07/15/21  7:50 PM    Specimen: Urine   Result Value Ref Range    Color YELLOW/STRAW      Appearance CLEAR CLEAR      Specific gravity 1.020 1.003 - 1.030      pH (UA) 5.5 5.0 - 8.0      Protein >300 (A) NEG mg/dL    Glucose Negative NEG mg/dL    Ketone TRACE (A) NEG mg/dL    Bilirubin Negative NEG      Blood TRACE (A) NEG      Urobilinogen 0.2 0.2 - 1.0 EU/dL    Nitrites Negative NEG      Leukocyte Esterase Negative NEG      WBC 0-4 0 - 4 /hpf    RBC 0-5 0 - 5 /hpf    Epithelial cells FEW FEW /lpf    Bacteria Negative NEG /hpf    UA:UC IF INDICATED CULTURE NOT INDICATED BY UA RESULT CNI      Amorphous Crystals 2+ (A) NEG   GLUCOSE, POC    Collection Time: 07/15/21  7:54 PM   Result Value Ref Range    Glucose (POC) 201 (H) 65 - 117 mg/dL    Performed by Rut Juares    AMMONIA    Collection Time: 07/15/21  8:41 PM   Result Value Ref Range    Ammonia <10 <32 UMOL/L   COVID-19 WITH INFLUENZA A/B    Collection Time: 07/15/21  9:15 PM   Result Value Ref Range    SARS-CoV-2 Not detected NOTD      Influenza A by PCR Not detected NOTD      Influenza B by PCR Not detected NOTD     POC LACTIC ACID    Collection Time: 07/16/21 12:17 AM   Result Value Ref Range    Lactic Acid (POC) 0.89 0.40 - 2.00 mmol/L   POC TROPONIN-I    Collection Time: 07/16/21 12:19 AM   Result Value Ref Range    Troponin-I (POC) 5.05 (H) 0.00 - 0.08 ng/mL   EKG, 12 LEAD, INITIAL    Collection Time: 07/16/21 12:22 AM   Result Value Ref Range    Ventricular Rate 81 BPM    Atrial Rate 81 BPM    P-R Interval 152 ms    QRS Duration 108 ms    Q-T Interval 426 ms    QTC Calculation (Bezet) 494 ms    Calculated R Axis 57 degrees    Calculated T Axis 107 degrees    Diagnosis       Sinus rhythm with premature atrial complexes  Nonspecific ST and T wave abnormality  Prolonged QT  When compared with ECG of 15-JUL-2021 19:49,  MANUAL COMPARISON REQUIRED, DATA IS UNCONFIRMED     SAMPLES BEING HELD    Collection Time: 07/16/21 12:23 AM   Result Value Ref Range    SAMPLES BEING HELD PST BL LV     COMMENT        Add-on orders for these samples will be processed based on acceptable specimen integrity and analyte stability, which may vary by analyte. Radiologic Studies -   No orders to display     CT Results  (Last 48 hours)               07/15/21 2025  CT ABD PELV WO CONT Final result    Impression:  No acute findings.        Narrative:  EXAM: CT ABD PELV WO CONT INDICATION: Fever, abdominal pain, altered mental status       COMPARISON: CT 10/20/2017. CT 6/26/2017. CONTRAST:  None. TECHNIQUE:    Thin axial images were obtained through the abdomen and pelvis. Coronal and   sagittal reformats were generated. Oral contrast was not administered. CT dose   reduction was achieved through use of a standardized protocol tailored for this   examination and automatic exposure control for dose modulation. The absence of intravenous contrast material reduces the sensitivity for   evaluation of the vasculature and solid organs. FINDINGS:    LOWER THORAX: Pacemaker and coronary artery calcifications noted. Visualized   lung bases are clear. LIVER: Lower pole right lobe cyst redemonstrated. Otherwise unremarkable. Alicia Maid BILIARY TREE: Gallbladder is surgically absent. CBD is not dilated. SPLEEN: Unremarkable. PANCREAS: No focal abnormality. ADRENALS: No significant change in low density 1.8 x 2.0 cm left adrenal body   nodule. Right adrenal is unremarkable. KIDNEYS/URETERS: No calculus or hydronephrosis. STOMACH: Unremarkable. SMALL BOWEL: No dilatation or wall thickening. COLON: No dilatation or wall thickening. APPENDIX: No markable. PERITONEUM: No ascites or pneumoperitoneum. RETROPERITONEUM: Atherosclerotic calcination without aneurysm. No enlarged   lymphadenopathy. 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 REPRODUCTIVE ORGANS: Uterus and ovaries are surgically absent. URINARY BLADDER: No mass or calculus. BONES: Degenerative spine change. No acute fracture or aggressive lesion. ABDOMINAL WALL: No mass or hernia. ADDITIONAL COMMENTS: N/A           07/15/21 2025  CT HEAD WO CONT Final result    Impression:          No acute abnormality       Narrative:  EXAM: CT HEAD WO CONT       INDICATION: Altered mental status       COMPARISON: 10/21/2017. CONTRAST: None.        TECHNIQUE: Unenhanced CT of the head was performed using 5 mm images. Brain and   bone windows were generated. Coronal and sagittal reformats. CT dose reduction   was achieved through use of a standardized protocol tailored for this   examination and automatic exposure control for dose modulation. FINDINGS:   The ventricles and sulci are normal in size, shape and configuration. . There is   no significant white matter disease. There is no intracranial hemorrhage,   extra-axial collection, or mass effect. The basilar cisterns are open. No CT   evidence of acute infarct. The bone windows demonstrate no abnormalities. The visualized portions of the   paranasal sinuses and mastoid air cells are clear. CXR Results  (Last 48 hours)               07/15/21 2021  XR CHEST PORT Final result    Impression:  No acute findings. Narrative:  EXAM: XR CHEST PORT       INDICATION: Eval for Infiltrate       COMPARISON: Chest x-ray 9/23/2020. FINDINGS: A portable AP radiograph of the chest was obtained at 20:19 hours. Pacemaker generator body projects over the left chest wall with intact appearing   leads traversing in expected course. The lungs are clear. The cardiac and   mediastinal contours and pulmonary vascularity are normal.  The chest wall   structures and visualized upper abdomen show no acute findings with incidental   note of degenerative spine and shoulder changes as well as diffuse osteopenia. Medical Decision Making   I am the first provider for this patient. I reviewed the vital signs, available nursing notes, past medical history, past surgical history, family history and social history. Vital Signs-Reviewed the patient's vital signs.   Patient Vitals for the past 12 hrs:   Temp Pulse Resp BP SpO2   07/16/21 0218  87 25 (!) 117/43 96 %   07/16/21 0145  88 21 (!) 130/51 95 %   07/16/21 0130  85 23 (!) 119/47 93 %   07/16/21 0115  85 24 (!) 128/52 93 %   07/16/21 0045 98.1 °F (36.7 °C) 83 27 (!) 149/65 94 %   07/16/21 0033  84 26 (!) 130/57 94 %   07/16/21 0015  84 28 (!) 113/47 95 %         Provider Notes (Medical Decision Making): Impression:  encephalopathy, NSTEMI      CONSULT NOTE:   3:10 AM  Renzo Gonzalez MD spoke with Dr. Peggy Servin,   Specialty: Yuan Jj Servin due to encephalopathy, NSTEMI. Discussed pt's HPI and available diagnostic results thus far. Expressed concerns for needed admission. Consultant will evaluate for admission. Recommends providing Lovenox at this time and deferring lumbar puncture with coverage for meningitis. Renzo Gonzalez MD    ADMISSION NOTE:  3:10 AM  Patient is being admitted to the hospital by Dr. Peggy Servin. The results of their tests and reasons for their admission have been discussed with them and/or available family. They convey agreement and understanding for the need to be admitted and for their admission diagnosis. Renzo Gonzalez MD                   Critical Care Time:     none      Diagnosis     Clinical Impression:   1. Encephalopathy    2. NSTEMI (non-ST elevated myocardial infarction) ([address]    3. Acute renal failure, unspecified acute renal failure type ([address]        PLAN:  1.  Admit to hospitalist service

## 2021-07-16 NOTE — Clinical Note
TRANSFER - OUT REPORT:     Verbal report given to: Kathy Tucker RN PCU. Report consisted of patient's Situation, Background, Assessment and   Recommendations(SBAR). Opportunity for questions and clarification was provided. Patient transported with a Registered Nurse and [address] [place] [address] / [facility]. Patient transported to: Recovery.

## 2021-07-16 NOTE — ED NOTES
Patient is being transferred to [address], Room # [phone]. Report given to Harmony Garcia RN on [facility] for routine progression of care. Report consisted of the following information SBAR, Kardex and ED Summary. Patient transferred to receiving unit by: Ayaka Euceda (RN or tech name). Outstanding consults needed: Yes    Next labs due: Yes    The following personal items will be sent with the patient during transfer to the floor: All valuables:    Cardiac monitoring ordered: Yes    The following CURRENT information was reported to the receiving RN:    Code status: DNR at time of transfer    Last set of vital signs:  Vital Signs  Level of Consciousness: Alert (0) (07/16/21 0500)  Temp: 98.7 °F (37.1 °C) (07/16/21 0500)  Pulse (Heart Rate): 95 (07/16/21 0515)  Resp Rate: 26 (07/16/21 0515)  BP: (!) 114/52 (07/16/21 0515)  MAP (Monitor): 65 (07/16/21 0515)  MAP (Calculated): 73 (07/16/21 0515)  MEWS Score: 2 (07/16/21 0500)         Oxygen Therapy  O2 Sat (%): 94 % (07/16/21 0515)  Pulse via Oximetry: 95 beats per minute (07/16/21 0515)      Last pain assessment:  Pain 1  Pain Scale 1: Numeric (0 - 10)  Pain Intensity 1: 0  Patient Stated Pain Goal: 0      Wounds: No     Urinary catheter: voiding  Is there a allen order: No     LDAs:       Peripheral IV Anterior;Left;Proximal Forearm (Active)       Peripheral IV 07/16/21 Right Hand (Active)   Site Assessment Clean, dry, & intact 07/16/21 0443   Phlebitis Assessment 0 07/16/21 0443   Infiltration Assessment 0 07/16/21 0443   Dressing Status Clean, dry, & intact 07/16/21 0443   Dressing Type Transparent;Tape 07/16/21 0443   Hub Color/Line Status Pink;Patent; Flushed 07/16/21 0443   Action Taken Blood drawn 07/16/21 0443         Opportunity for questions and clarification was provided.     Tanya Rajan

## 2021-07-16 NOTE — PROGRESS NOTES
Problem: Pressure Injury - Risk of  Goal: *Prevention of pressure injury  Description: Document Bryan Scale and appropriate interventions in the flowsheet.   Outcome: Progressing Towards Goal  Note: Pressure Injury Interventions:  Sensory Interventions: Assess changes in LOC    Moisture Interventions: Absorbent underpads    Activity Interventions: Assess need for specialty bed    Mobility Interventions: Assess need for specialty bed    Nutrition Interventions: Document food/fluid/supplement intake    Friction and Shear Interventions: Apply protective barrier, creams and emollients

## 2021-07-16 NOTE — PROGRESS NOTES
Transition of Care Plan:    RUR:19% \"moderate\"  Disposition: Home with family assistance? Follow up appointments: PCP, Neurology  DME needed: none identified currently  Transportation at Discharge: Pt will need w/c [place] or BLS. [place] or means to access home:   Spouse will provide  IM Medicare letter: needed at d/c  [address] [phone]  Discharge Caregiver contacted prior to discharge? CM spoke with pt's spouse today to discuss d/c.    Reason for Admission:   Sepsis                    RUR Score:     19%             PCP: First and Last name:   Karol Polk NP     Name of Practice:    Are you a current patient: Yes/No: yes   Approximate date of last visit:  July 13, 2021   Can you participate in a virtual visit if needed: Prefers in office    Do you (patient/family) have any concerns for transition/discharge? None identified at present                 Plan for utilizing home health:   tbd    Current Advanced Directive/Advance Care Plan:  DNR      Healthcare Decision Maker:   Click here to complete [address] including selection of the Healthcare Decision Maker Relationship (ie \"Primary\")            Primary Decision Maker: Davy Alberto - Spouse - [address] [facility] (ACP) Conversation      Date of Conversation: 7/16/2021  Conducted with: Patient with Decision Making Capacity and Healthcare Decision Maker: Named in Advance Directive or [address] of [address] [facility]:     Primary Decision Maker: Bharati Christina - Spouse - [phone]  Click here to [address] including selection of the Healthcare Decision Maker Relationship (ie \"Primary\")    Content/Action Overview:    Has ACP document(s) on file - reflects the patient's care preferences  Reviewed DNR/DNI and patient confirms current DNR status - completed forms on file (place new order if needed)    Length of Voluntary ACP Conversation in minutes:  <16 minutes (Non-Billable)    Andres Cervantes           Transition of Care Plan:   Home with family assistance    CM spoke with pt's  via phone as pt is experiencing AMS. CM introduced self/role, verified demographics, insurance and PCP. CM also discussed d/c plan. Pt is a 80 yo, ,  female who was admitted to [facility] today with the dx of Sepsis. Pt sees her NP every 3 months. Pt uses the [address] Pt lives with her spouse in a 2 fl home with a ramp.

## 2021-07-16 NOTE — PROGRESS NOTES
Dr. Lucille Brown notified-pt's daughter reports possible source of sepsis is untreated tooth infection.

## 2021-07-16 NOTE — ED NOTES
TRANSFER - OUT REPORT:    Verbal report given to Fracisco Hebert RN on Fermín Prescott  being transferred to [facility] for urgent transfer       Report consisted of patients Situation, Background, Assessment and   Recommendations(SBAR). Information from the following report(s) ED Summary, MAR, Recent Results and Cardiac Rhythm (sinus tach), Pain Lvl & Fall Risk was reviewed with the receiving nurse. Lines:   Peripheral IV 07/15/21 Left Antecubital (Active)        Opportunity for questions and clarification was provided.       Patient transported with:   Monitor

## 2021-07-16 NOTE — PROGRESS NOTES
1900: Bedside shift change report given to [address] [facility] (oncoming nurse) by Frances Sams (offgoing nurse). Report included the following information SBAR, Kardex, Intake/Output, MAR, Recent Results and Cardiac Rhythm NSR.     2100: Pt called c/o being winded. 2L NC placed on Pt and Pt stated she felt significantly better. 0240: RRT called for increased work of breathing, desatting to low 60s, increased confusion and restlessness. See RRT Deann's note. 3414: Purveyor notified about low urine output after lasixs, Also about pt's increasing restlessness and agitation despite the 3 mg ativan and 2.5 valium. Concern about dayshift potentially not having sitter and pt continuing to pull off BIPAP and at lines. 0700: End of Shift Note    Bedside shift change report given to Frances Sams (oncoming nurse) by Sheba Burden RN (offgoing nurse). Report included the following information SBAR, Kardex, Intake/Output, MAR, Recent Results and Cardiac Rhythm NSR-Sinus Tachy    Shift worked:  1900-700     Shift summary and any significant changes:     RRT called, see Saniya Black RN with Rapid response's note. Concerns for physician to address:  Pt's increased confusion and low output after having lasixs. Zone phone for [facility] shift:   [phone]       Activity:  Activity Level: Bed Rest  Number times ambulated in hallways past shift: 0  Number of times OOB to chair past shift: 0    Cardiac:   Cardiac Monitoring: Yes      Cardiac Rhythm: Sinus Rhythm    Access:   Current line(s): PIV     Genitourinary:   Urinary status: allen    Respiratory:   O2 Device: BIPAP  Chronic home O2 use?: NO  Incentive spirometer at bedside: YES     GI:     Current diet:  ADULT DIET Regular  Passing flatus: YES  Tolerating current diet: YES       Pain Management:   Patient states pain is manageable on current regimen: YES    Skin:  Bryan Score: 15  Interventions: increase time out of bed    Patient Safety:  Fall Score:  Total Score: 4  Interventions: bed/chair alarm and pt to call before getting OOB  High Fall Risk: Yes    Length of Stay:  Expected LOS: 4d 19h  Actual LOS: 1      Lindsay Gonsales RN

## 2021-07-16 NOTE — PROGRESS NOTES
Lumbar puncture was completed by Lorenzo Burrell NP, at 1433 hrs and collected fluid was taken to the lab for testing. Opening pressure was 6. Please keep patient recumbent for several hours to avoid headache.

## 2021-07-16 NOTE — ED NOTES
Call to [facility] for transport to ED [facility] ED.  ALS.  ETA 15 mins

## 2021-07-16 NOTE — TELEPHONE ENCOUNTER
Spoke with spouse . He just wanted Aaron Burns patient's MD to be aware that patient was admitted to [facility].

## 2021-07-16 NOTE — PROGRESS NOTES
This patient was admitted earlier this morning with a high-grade fever and encephalopathy with suspected meningitis. She also noted to have elevated troponin and procalcitonin. Patient was started on broad-spectrum antibiotics for meningitis  She was also started on Lovenox, beta-blocker and aspirin for possible NSTEMI    At the time of evaluation patient is alert awake and oriented.   Her mentation is significantly improved  I spoke to the neurology team  We will obtain IR guided spinal fluid analysis  Cardiology team to evaluate for NSTEMI    Patient's blood culture at outside facility is reported to be was due for gram-positive cocci    We will obtain echocardiogram    We will add IV fluids normal saline at 125 mill per hour as patient clinically looks dehydrated    Rest of the management as per HPI

## 2021-07-16 NOTE — TELEPHONE ENCOUNTER
----- Message from Chele Proctor sent at 7/16/2021  9:10 AM EDT -----  Regarding: Np razo/telephone  Contact: [phone]  General Message/Vendor Calls    Caller's first and last name:Davy/      Reason for call:states wife is in [facility] with Meningitis. Please call  to advise of any senior assistance like shopping.       Callback required yes/no and why:yes      Best contact number(s):[phone]      Details to clarify the request:      Chele Proctor

## 2021-07-16 NOTE — Clinical Note
TRANSFER - IN REPORT:     Verbal report received from: [id number]. Report consisted of patient's Situation, Background, Assessment and   Recommendations(SBAR). Opportunity for questions and clarification was provided. Assessment completed upon patient's arrival to unit and care assumed. Patient transported with a Registered Nurse.

## 2021-07-16 NOTE — H&P
Hospitalist Admission Note    NAME: Jaimie Petty   :  1940   MRN:  [id number]     Date/Time:  2021 3:44 AM    Patient PCP: Stephon Bertrand, NP  ______________________________________________________________________  Given the patient's current clinical presentation, I have a high level of concern for decompensation if discharged from the emergency department. Complex decision making was performed, which includes reviewing the patient's available past medical records, laboratory results, and x-ray films. My assessment of this patient's clinical condition and my plan of care is as follows. Assessment / Plan:  Severe sepsis, etiology unclear but given lethargy and encephalopathy, concern for meningitis is high  Admit to PCU  Initial sepsis work up did not reveal source of infection - UA negative, CXR negative, CT Abdo/Pelv negative. Pt was given 1 dose of Zosyn at outside facility and transferred here. Head CT is negative for acute abnormality  Will upgrade antibiotics to cover for meningitis empirically  Neurology Consult  Hopeful that LP can be obtained today  Follow Cx results  Ammonia negative  Anti-pyretics PRN  Send Procal    Elevated troponin, concerning for NSTEMI  Cardiology Consult  Echo  Will give 1 dose of Lovenox   Trend troponin, initially 11.71, down to 5.05 - ?related to sepsis  Will keep NPO     IVAN  Likely related to sepsis  Hold nephrotoxic meds  IVF  Trend    Abnormal LFTs  Likely secondary to sepsis, trend  Would pursue further workup if worsens    Hx of Afib  HLD  DMII  Diverticular disease  IBS  S/p AICD  Resume home meds  FS monitoring, SS    Code Status: DNR  Surrogate Decision Maker:   DVT Prophylaxis: Therapeutic lovenox dose x1  GI Prophylaxis: not indicated  Baseline: Independent      Subjective:   CHIEF COMPLAINT: confused    HISTORY OF PRESENT ILLNESS:     Cecilia Thompson is a 79 y.o.  female who presents with CC listed above.  Pt initially presented to [facility] today with altered mental status. Apparently her temp there was 104F. Since her arrival to the [address] [facility], she has been afebrile. I called her  to inform him that the pt was being admitted to the hospital for possible meningitis and possible NSTEMI. He states that she was doing well until yesterday when she was \"not like herself\" and \"very lethargic. \" He also states that she was \"very drowsy\" and did not want to do anything. He denies noticing any fever or chills. Currently the pt states that she is doing well - other than being tired and needing sleep. She denies photophobia or neck stiffness. She denies any cough, SOB, chest pain, syncope or burning upon urination. We were asked to admit for work up and evaluation of the above problems.      Past Medical History:   Diagnosis Date    A-fib [facility])     AICD (automatic cardioverter/defibrillator) present 9/23/2020 9/23/2020 [employer] AICD implant    Arthritis     Bilateral pleural effusion 9/18/2020    Chronic pain     Chronic pain     Diabetes ([facility] [address]     Diverticular disease     Elevated troponin 9/18/2020    Hemorrhoid     Hypercholesterolemia     IBS (irritable bowel syndrome)     Lymphocytosis 05/07/2018        Past Surgical History:   Procedure Laterality Date    COLONOSCOPY N/A 10/31/2019    COLONOSCOPY performed by Shahram Murphy [facility] at [facility]  10/31/2019         Karie Qureshi  10/31/2019         HX CATARACT REMOVAL      HX CHOLECYSTECTOMY      HX COLONOSCOPY  2009    HX COLONOSCOPY  2016    2 adenomatous polyp    HX HEMORRHOIDECTOMY  2009    HX HYSTERECTOMY      HX KNEE REPLACEMENT      right    HX ORTHOPAEDIC  12/11/2017    back, laminectomy and decompression    GA INSJ/RPLCMT PERM DFB W/TRNSVNS LDS 1/DUAL CHMBR N/A 9/23/2020    INSERT ICD DUAL performed by Alexis Gorman MD at [address] [place] [address] 28 CATH LAB       Social History Tobacco Use    Smoking status: Current Every Day Smoker     Packs/day: 1.00     Years: 55.00     Pack years: 55.00     Types: Cigarettes    Smokeless tobacco: Never Used   Substance Use Topics    Alcohol use: No        Family History   Problem Relation Age of Onset    Colon Cancer Father     Diabetes Mother      Allergies   Allergen Reactions    Morphine Anaphylaxis    Ultram [Tramadol] Palpitations        Prior to Admission medications    Medication Sig Start Date End Date Taking? Authorizing Provider   semaglutide (OZEMPIC) 0.25 mg/0.2 mL (2 mg/1.5 mL) sub-q pen 0.25 mg by SubCUTAneous route every seven (7) days. Indications: type 2 diabetes mellitus 6/30/21   Mohit Campa NP   atorvastatin (LIPITOR) 20 mg tablet TAKE 1 TABLET BY MOUTH  DAILY 6/28/21   Mohit Campa NP   glipiZIDE (GLUCOTROL) 5 mg tablet TAKE 1 TABLET BY MOUTH  TWICE DAILY 6/28/21   Mohti Campa NP   furosemide (LASIX) 40 mg tablet TAKE 1 TABLET BY MOUTH  DAILY 6/15/21   Kiera Perdomo MD   Vitamin D2 1,250 mcg (50,000 unit) capsule TAKE 1 CAPSULE BY MOUTH  EVERY SEVEN DAYS FOR LOW  VITAMIN D LEVELS 6/10/21   Giacomo Godinez NP   valsartan (DIOVAN) 80 mg tablet TAKE 1 TABLET BY MOUTH  DAILY 6/1/21   Kiera Perdomo MD   venlafaxine [facility]) 75 mg tablet Takes 1/2 BID 4/3/21   Mohit Campa NP   thiamine HCL (B-1) 100 mg tablet Take 100 mg by mouth daily. Provider, Historical   nitroglycerin (NITROSTAT) 0.4 mg SL tablet 1 Tab by SubLINGual route every five (5) minutes as needed for Chest Pain. Up to 3 doses. 2/23/21   Violet Rosales ANP   carvediloL (COREG) 25 mg tablet Take 1 Tab by mouth two (2) times daily (with meals). 2/21/21   Kiera Perdomo MD   colestipoL (Colestid) 1 gram tablet Take 1 Tab by mouth two (2) times a day. 10/7/20   Stone Chan,    vit A/vit C/vit E/zinc/copper (ICAPS AREDS PO) Take  by mouth two (2) times a day.     Provider, Historical   loperamide (IMMODIUM) 2 mg tablet Take 2 mg by mouth as needed for Diarrhea. Provider, Historical   aspirin delayed-release 81 mg tablet Take 1 Tab by mouth daily. 9/10/20   Rajan Henriquez MD   insulin glargine (Lantus Solostar U-100 Insulin) 100 unit/mL (3 mL) inpn INJECT SUBCUTANEOUSLY 45  UNITS DAILY 9/2/20   Eliseo Will NP   Januvia 50 mg tablet TAKE 1 TABLET BY MOUTH  DAILY 8/11/20   Eliseo Will NP   vit C/E/Zn/coppr/lutein/zeaxan (PRESERVISION AREDS-2 PO) Take  by mouth daily. Provider, Historical   carboxymethylcellulose sodium (REFRESH TEARS OP) Apply  to eye daily. 2-4 drops both eyes    Provider, Historical   OTHER,NON-FORMULARY, Take 1 Tab by mouth daily. Tumeric curcumin C3 complex    Provider, Historical   B.infantis-B.ani-B.long-B.bifi (PROBIOTIC 4X) 10-15 mg TbEC Take  by mouth daily. Provider, Historical   acetaminophen (TYLENOL) 650 mg TbER Take 650 mg by mouth every eight (8) hours. Provider, Historical   omega-3 fatty acids-vitamin e 1,000 mg cap Take 1 Cap by mouth two (2) times a day. 32a day     Provider, Historical   ascorbic acid, vitamin C, (VITAMIN C) 500 mg tablet Take 500 mg by mouth daily. Provider, Historical   zinc 50 mg tab tablet Take  by mouth daily. Provider, Historical       REVIEW OF SYSTEMS:     I am not able to complete the review of systems because:    The patient is intubated and sedated    The patient has altered mental status due to his acute medical problems    The patient has baseline aphasia from prior stroke(s)    The patient has baseline dementia and is not reliable historian    The patient is in acute medical distress and unable to provide information           Total of 12 systems reviewed as follows:       POSITIVE= underlined text  Negative = text not underlined  General:  fever, chills, sweats, generalized weakness, weight loss/gain,      loss of appetite   Eyes:    blurred vision, eye pain, loss of vision, double vision  ENT:    rhinorrhea, pharyngitis   Respiratory:   cough, sputum production, SOB, STANTON, wheezing, pleuritic pain   Cardiology:   chest pain, palpitations, orthopnea, PND, edema, syncope   Gastrointestinal:  abdominal pain , N/V, diarrhea, dysphagia, constipation, bleeding   Genitourinary:  frequency, urgency, dysuria, hematuria, incontinence   Muskuloskeletal :  arthralgia, myalgia, back pain  Hematology:  easy bruising, nose or gum bleeding, lymphadenopathy   Dermatological: rash, ulceration, pruritis, color change / jaundice  Endocrine:   hot flashes or polydipsia   Neurological:  headache, dizziness, confusion, focal weakness, paresthesia,     Speech difficulties, memory loss, gait difficulty  Psychological: Feelings of anxiety, depression, agitation    Objective:   VITALS:    Visit Vitals  /64   Pulse 92   Temp 98.1 °F (36.7 °C)   Resp 27   Ht 5' 6\" (1.676 m)   Wt 73.4 kg (161 lb 13.1 oz)   SpO2 97%   BMI 26.12 kg/m²       PHYSICAL EXAM:    General:    Drowsy, arousable, cooperative, no distress, appears stated age. HEENT: Atraumatic, anicteric sclerae, pink conjunctivae  Neck:  Supple, symmetrical  Lungs:   Clear to auscultation bilaterally. No Wheezing or Rhonchi. No rales. Chest wall:  No tenderness  No Accessory muscle use. Heart:   Regular  rhythm,  No  murmur   No edema  Abdomen:   Soft, non-tender. Not distended. Bowel sounds normal  Extremities: No cyanosis. No clubbing,      Skin turgor normal, Capillary refill normal, Radial dial pulse 2+  Skin:     Not pale. Not Jaundiced  No rashes   Psych:  Fair insight. Not depressed. Not anxious or agitated. Neurologic: EOMs intact. No facial asymmetry. No aphasia or slurred speech. Symmetrical strength, Sensation grossly intact. Alert and oriented to person, place and time. Follows commands.      _______________________________________________________________________  Care Plan discussed with:    Comments   Patient x    Family  x    RN x    Care Manager                    Consultant: _______________________________________________________________________  Expected  Disposition:   Home with Family    HH/PT/OT/RN x   SNF/LTC    EMILY    ________________________________________________________________________  TOTAL TIME:  54 Minutes    Critical Care Provided     Minutes non procedure based      Comments     Reviewed previous records   >50% of visit spent in counseling and coordination of care  Discussion with patient and/or family and questions answered       ________________________________________________________________________  Signed: Michelle Campa MD    Procedures: see electronic medical records for all procedures/Xrays and details which were not copied into this note but were reviewed prior to creation of Plan. LAB DATA REVIEWED:    Recent Results (from the past 24 hour(s))   LACTIC ACID    Collection Time: 07/15/21  7:37 PM   Result Value Ref Range    Lactic acid 1.9 0.4 - 2.0 MMOL/L   METABOLIC PANEL, COMPREHENSIVE    Collection Time: 07/15/21  7:37 PM   Result Value Ref Range    Sodium 131 (L) 136 - 145 mmol/L    Potassium 4.8 3.5 - 5.1 mmol/L    Chloride 98 97 - 108 mmol/L    CO2 19 (L) 21 - 32 mmol/L    Anion gap 14 5 - 15 mmol/L    Glucose 199 (H) 65 - 100 mg/dL    BUN 46 (H) 6 - 20 MG/DL    Creatinine 2.41 (H) 0.55 - 1.02 MG/DL    BUN/Creatinine ratio 19 12 - 20      GFR est AA 23 (L) >60 ml/min/1.73m2    GFR est non-AA 19 (L) >60 ml/min/1.73m2    Calcium 9.3 8.5 - 10.1 MG/DL    Bilirubin, total 0.4 0.2 - 1.0 MG/DL    ALT (SGPT) 173 (H) 12 - 78 U/L    AST (SGOT) 224 (H) 15 - 37 U/L    Alk.  phosphatase 69 45 - 117 U/L    Protein, total 7.4 6.4 - 8.2 g/dL    Albumin 2.7 (L) 3.5 - 5.0 g/dL    Globulin 4.7 (H) 2.0 - 4.0 g/dL    A-G Ratio 0.6 (L) 1.1 - 2.2     CBC WITH AUTOMATED DIFF    Collection Time: 07/15/21  7:37 PM   Result Value Ref Range    WBC 16.7 (H) 3.6 - 11.0 K/uL    RBC 4.42 3.80 - 5.20 M/uL    HGB 13.2 11.5 - 16.0 g/dL    HCT 39.8 35.0 - 47.0 %    MCV 90.0 80.0 - 99.0 FL    MCH 29.9 26.0 - 34.0 PG    MCHC 33.2 30.0 - 36.5 g/dL    RDW 14.0 11.5 - 14.5 %    PLATELET 236 225 - 136 K/uL    MPV 10.4 8.9 - 12.9 FL    NRBC 0.0 0  WBC    ABSOLUTE NRBC 0.00 0.00 - 0.01 K/uL    NEUTROPHILS 90 (H) 32 - 75 %    LYMPHOCYTES 8 (L) 12 - 49 %    MONOCYTES 2 (L) 5 - 13 %    EOSINOPHILS 0 0 - 7 %    BASOPHILS 0 0 - 1 %    IMMATURE GRANULOCYTES 0 0.0 - 0.5 %    ABS. NEUTROPHILS 15.1 (H) 1.8 - 8.0 K/UL    ABS. LYMPHOCYTES 1.3 0.8 - 3.5 K/UL    ABS. MONOCYTES 0.3 0.0 - 1.0 K/UL    ABS. EOSINOPHILS 0.0 0.0 - 0.4 K/UL    ABS. BASOPHILS 0.0 0.0 - 0.1 K/UL    ABS. IMM.  GRANS. 0.0 0.00 - 0.04 K/UL    DF MANUAL      PLATELET COMMENTS ADEQUATE PLATELETS      RBC COMMENTS NORMOCYTIC, NORMOCHROMIC     TROPONIN I    Collection Time: 07/15/21  7:37 PM   Result Value Ref Range    Troponin-I, Qt. 11.71 (H) <0.05 ng/mL   TSH 3RD GENERATION    Collection Time: 07/15/21  7:37 PM   Result Value Ref Range    TSH 1.16 0.36 - 3.74 uIU/mL   MAGNESIUM    Collection Time: 07/15/21  7:37 PM   Result Value Ref Range    Magnesium 1.8 1.6 - 2.4 mg/dL   PROTHROMBIN TIME + INR    Collection Time: 07/15/21  7:37 PM   Result Value Ref Range    INR 1.1 0.9 - 1.1      Prothrombin time 11.3 (H) 9.0 - 11.1 sec   PTT    Collection Time: 07/15/21  7:37 PM   Result Value Ref Range    aPTT 31.9 (H) 22.1 - 31.0 sec    aPTT, therapeutic range     58.0 - 77.0 SECS   EKG, 12 LEAD, INITIAL    Collection Time: 07/15/21  7:49 PM   Result Value Ref Range    Ventricular Rate 101 BPM    Atrial Rate 101 BPM    P-R Interval 170 ms    QRS Duration 104 ms    Q-T Interval 360 ms    QTC Calculation (Bezet) 466 ms    Calculated P Axis -11 degrees    Calculated R Axis 61 degrees    Calculated T Axis 120 degrees    Diagnosis       Sinus tachycardia  ST depression, consider subendocardial injury  Nonspecific T wave abnormality  Abnormal ECG  When compared with ECG of 29-JUL-2020 07:36,  MANUAL COMPARISON REQUIRED, DATA IS UNCONFIRMED     URINALYSIS W/ REFLEX CULTURE    Collection Time: 07/15/21  7:50 PM    Specimen: Urine   Result Value Ref Range    Color YELLOW/STRAW      Appearance CLEAR CLEAR      Specific gravity 1.020 1.003 - 1.030      pH (UA) 5.5 5.0 - 8.0      Protein >300 (A) NEG mg/dL    Glucose Negative NEG mg/dL    Ketone TRACE (A) NEG mg/dL    Bilirubin Negative NEG      Blood TRACE (A) NEG      Urobilinogen 0.2 0.2 - 1.0 EU/dL    Nitrites Negative NEG      Leukocyte Esterase Negative NEG      WBC 0-4 0 - 4 /hpf    RBC 0-5 0 - 5 /hpf    Epithelial cells FEW FEW /lpf    Bacteria Negative NEG /hpf    UA:UC IF INDICATED CULTURE NOT INDICATED BY UA RESULT CNI      Amorphous Crystals 2+ (A) NEG   GLUCOSE, POC    Collection Time: 07/15/21  7:54 PM   Result Value Ref Range    Glucose (POC) 201 (H) 65 - 117 mg/dL    Performed by Gloria Rodriguez    AMMONIA    Collection Time: 07/15/21  8:41 PM   Result Value Ref Range    Ammonia <10 <32 UMOL/L   COVID-19 WITH INFLUENZA A/B    Collection Time: 07/15/21  9:15 PM   Result Value Ref Range    SARS-CoV-2 Not detected NOTD      Influenza A by PCR Not detected NOTD      Influenza B by PCR Not detected NOTD     POC LACTIC ACID    Collection Time: 07/16/21 12:17 AM   Result Value Ref Range    Lactic Acid (POC) 0.89 0.40 - 2.00 mmol/L   POC TROPONIN-I    Collection Time: 07/16/21 12:19 AM   Result Value Ref Range    Troponin-I (POC) 5.05 (H) 0.00 - 0.08 ng/mL   EKG, 12 LEAD, INITIAL    Collection Time: 07/16/21 12:22 AM   Result Value Ref Range    Ventricular Rate 81 BPM    Atrial Rate 81 BPM    P-R Interval 152 ms    QRS Duration 108 ms    Q-T Interval 426 ms    QTC Calculation (Bezet) 494 ms    Calculated R Axis 57 degrees    Calculated T Axis 107 degrees    Diagnosis       Sinus rhythm with premature atrial complexes  Nonspecific ST and T wave abnormality  Prolonged QT  When compared with ECG of 15-JUL-2021 19:49,  MANUAL COMPARISON REQUIRED, DATA IS UNCONFIRMED     SAMPLES BEING HELD    Collection Time: 07/16/21 12:23 AM   Result Value Ref Range    SAMPLES BEING HELD PST BL LV     COMMENT        Add-on orders for these samples will be processed based on acceptable specimen integrity and analyte stability, which may vary by analyte.

## 2021-07-16 NOTE — PROGRESS NOTES
36: TRANSFER - IN REPORT:    Verbal report received from [employer] (name) on Inez Davis  being received from ED (unit) for routine progression of care      Report consisted of patients Situation, Background, Assessment and   Recommendations(SBAR). Information from the following report(s) SBAR, Kardex, Intake/Output, MAR and Recent Results was reviewed with the receiving nurse. Opportunity for questions and clarification was provided. Assessment completed upon patients arrival to unit and care assumed. 0700: Bedside shift change report given to Estephanie Terrazas (oncoming nurse) by Kya Flannery (offgoing nurse). Report included the following information SBAR, Kardex, Intake/Output, MAR and Recent Results.

## 2021-07-16 NOTE — PROGRESS NOTES
Physician Progress Note      PATIENT:               Gary Person  CSN #:                  [id number]  :                       1940  ADMIT DATE:       2021 12:05 AM  DISCH DATE:  RESPONDING  PROVIDER #:        Reginaldo Beaulieu MD          QUERY TEXT:    Pt admitted with sepsis and has encephalopathy documented. If possible, please document in progress notes and discharge summary further specificity regarding the type of encephalopathy:    The medical record reflects the following:  Risk Factors: sepsis, possible meningitis, magnolia  Clinical Indicators:  neuro-\"i feel better and do not know what happened\"  H&P-Severe sepsis, etiology unclear but given lethargy and encephalopathy  Treatment: LP, neuro cx, EEG ordered, Zovirax, Omnipen, Rocephin, vanc  Thanks,  Bryan Garcia RN/CDI   Options provided:  -- Metabolic encephalopathy  -- Septic encephalopathy  -- Toxic encephalopathy  -- Toxic metabolic encephalopathy  -- Wernicke?s encephalopathy  -- Other - I will add my own diagnosis  -- Disagree - Not applicable / Not valid  -- Disagree - Clinically unable to determine / Unknown  -- Refer to Clinical Documentation Reviewer    PROVIDER RESPONSE TEXT:    Provider is clinically unable to determine a response to this query.     Query created by: Kajal Monk on 2021 12:22 PM      Electronically signed by:  Reginaldo Beaulieu MD 2021 1:44 PM

## 2021-07-16 NOTE — CONSULTS
Full note to follow. Troponin elevation in the absence of CP in the setting of sepsis. Likely type 2 MI. Prior CM with EF 25%. Will follow with you.

## 2021-07-17 ENCOUNTER — APPOINTMENT (OUTPATIENT)
Dept: GENERAL RADIOLOGY | Age: 81
DRG: 853 | End: 2021-07-17
Attending: NURSE PRACTITIONER
Payer: MEDICARE

## 2021-07-17 ENCOUNTER — APPOINTMENT (OUTPATIENT)
Dept: NON INVASIVE DIAGNOSTICS | Age: 81
DRG: 853 | End: 2021-07-17
Attending: INTERNAL MEDICINE
Payer: MEDICARE

## 2021-07-17 LAB
ALBUMIN SERPL-MCNC: 2.2 G/DL (ref 3.5–5)
ALBUMIN/GLOB SERPL: 0.5 {RATIO} (ref 1.1–2.2)
ALP SERPL-CCNC: 84 U/L (ref 45–117)
ALT SERPL-CCNC: 229 U/L (ref 12–78)
ANION GAP SERPL CALC-SCNC: 7 MMOL/L (ref 5–15)
ARTERIAL PATENCY WRIST A: YES
AST SERPL-CCNC: 255 U/L (ref 15–37)
ATRIAL RATE: 127 BPM
BASE DEFICIT BLDA-SCNC: 8 MMOL/L
BASE DEFICIT BLDV-SCNC: 13.8 MMOL/L
BASOPHILS # BLD: 0.2 K/UL (ref 0–0.1)
BASOPHILS NFR BLD: 1 % (ref 0–1)
BDY SITE: ABNORMAL
BDY SITE: ABNORMAL
BILIRUB SERPL-MCNC: 0.3 MG/DL (ref 0.2–1)
BNP SERPL-MCNC: ABNORMAL PG/ML
BNP SERPL-MCNC: ABNORMAL PG/ML
BUN SERPL-MCNC: 50 MG/DL (ref 6–20)
BUN/CREAT SERPL: 28 (ref 12–20)
CALCIUM SERPL-MCNC: 8 MG/DL (ref 8.5–10.1)
CALCULATED P AXIS, ECG09: 76 DEGREES
CALCULATED R AXIS, ECG10: -8 DEGREES
CALCULATED T AXIS, ECG11: 59 DEGREES
CHLORIDE SERPL-SCNC: 110 MMOL/L (ref 97–108)
CK MB CFR SERPL CALC: 3.2 % (ref 0–2.5)
CK MB SERPL-MCNC: 7.1 NG/ML (ref 5–25)
CK SERPL-CCNC: 219 U/L (ref 26–192)
CO2 SERPL-SCNC: 17 MMOL/L (ref 21–32)
COMMENT, HOLDF: NORMAL
CREAT SERPL-MCNC: 1.81 MG/DL (ref 0.55–1.02)
DIAGNOSIS, 93000: NORMAL
DIFFERENTIAL METHOD BLD: ABNORMAL
EOSINOPHIL # BLD: 0.4 K/UL (ref 0–0.4)
EOSINOPHIL NFR BLD: 2 % (ref 0–7)
ERYTHROCYTE [DISTWIDTH] IN BLOOD BY AUTOMATED COUNT: 14.6 % (ref 11.5–14.5)
FIO2 ON VENT: 50 %
GLOBULIN SER CALC-MCNC: 4.4 G/DL (ref 2–4)
GLUCOSE BLD STRIP.AUTO-MCNC: 206 MG/DL (ref 65–117)
GLUCOSE BLD STRIP.AUTO-MCNC: 234 MG/DL (ref 65–117)
GLUCOSE BLD STRIP.AUTO-MCNC: 237 MG/DL (ref 65–117)
GLUCOSE BLD STRIP.AUTO-MCNC: 249 MG/DL (ref 65–117)
GLUCOSE SERPL-MCNC: 248 MG/DL (ref 65–100)
HCO3 BLDA-SCNC: 17 MMOL/L (ref 22–26)
HCO3 BLDV-SCNC: 17 MMOL/L (ref 23–28)
HCT VFR BLD AUTO: 36.8 % (ref 35–47)
HGB BLD-MCNC: 11.7 G/DL (ref 11.5–16)
IMM GRANULOCYTES # BLD AUTO: 0 K/UL (ref 0–0.04)
IMM GRANULOCYTES NFR BLD AUTO: 0 % (ref 0–0.5)
LACTATE SERPL-SCNC: 1.4 MMOL/L (ref 0.4–2)
LYMPHOCYTES # BLD: 2.4 K/UL (ref 0.8–3.5)
LYMPHOCYTES NFR BLD: 12 % (ref 12–49)
MCH RBC QN AUTO: 30 PG (ref 26–34)
MCHC RBC AUTO-ENTMCNC: 31.8 G/DL (ref 30–36.5)
MCV RBC AUTO: 94.4 FL (ref 80–99)
MONOCYTES # BLD: 1 K/UL (ref 0–1)
MONOCYTES NFR BLD: 5 % (ref 5–13)
NEUTS SEG # BLD: 15.7 K/UL (ref 1.8–8)
NEUTS SEG NFR BLD: 80 % (ref 32–75)
NRBC # BLD: 0 K/UL (ref 0–0.01)
NRBC BLD-RTO: 0 PER 100 WBC
P-R INTERVAL, ECG05: 80 MS
PCO2 BLDA: 33 MMHG (ref 35–45)
PCO2 BLDV: 60.5 MMHG (ref 41–51)
PH BLDA: 7.33 [PH] (ref 7.35–7.45)
PH BLDV: 7.07 [PH] (ref 7.32–7.42)
PLATELET # BLD AUTO: 126 K/UL (ref 150–400)
PMV BLD AUTO: 11.1 FL (ref 8.9–12.9)
PO2 BLDA: 102 MMHG (ref 80–100)
PO2 BLDV: 45 MMHG (ref 25–40)
POTASSIUM SERPL-SCNC: 4.5 MMOL/L (ref 3.5–5.1)
PROT SERPL-MCNC: 6.6 G/DL (ref 6.4–8.2)
Q-T INTERVAL, ECG07: 302 MS
QRS DURATION, ECG06: 118 MS
QTC CALCULATION (BEZET), ECG08: 438 MS
RBC # BLD AUTO: 3.9 M/UL (ref 3.8–5.2)
RBC MORPH BLD: ABNORMAL
SAMPLES BEING HELD,HOLD: NORMAL
SAO2 % BLD: 97 % (ref 92–97)
SAO2 % BLDV: 62 % (ref 65–88)
SAO2% DEVICE SAO2% SENSOR NAME: ABNORMAL
SAO2% DEVICE SAO2% SENSOR NAME: ABNORMAL
SERVICE CMNT-IMP: ABNORMAL
SODIUM SERPL-SCNC: 134 MMOL/L (ref 136–145)
SPECIMEN SITE: ABNORMAL
SPECIMEN SITE: ABNORMAL
TROPONIN I SERPL-MCNC: 7 NG/ML
VENTRICULAR RATE, ECG03: 127 BPM
WBC # BLD AUTO: 19.7 K/UL (ref 3.6–11)

## 2021-07-17 PROCEDURE — 82550 ASSAY OF CK (CPK): CPT

## 2021-07-17 PROCEDURE — 74011000250 HC RX REV CODE- 250: Performed by: NURSE PRACTITIONER

## 2021-07-17 PROCEDURE — 65660000000 HC RM CCU STEPDOWN

## 2021-07-17 PROCEDURE — 74011250636 HC RX REV CODE- 250/636: Performed by: INTERNAL MEDICINE

## 2021-07-17 PROCEDURE — 93005 ELECTROCARDIOGRAM TRACING: CPT

## 2021-07-17 PROCEDURE — 36600 WITHDRAWAL OF ARTERIAL BLOOD: CPT

## 2021-07-17 PROCEDURE — 83605 ASSAY OF LACTIC ACID: CPT

## 2021-07-17 PROCEDURE — 99233 SBSQ HOSP IP/OBS HIGH 50: CPT | Performed by: INTERNAL MEDICINE

## 2021-07-17 PROCEDURE — 80053 COMPREHEN METABOLIC PANEL: CPT

## 2021-07-17 PROCEDURE — 87040 BLOOD CULTURE FOR BACTERIA: CPT

## 2021-07-17 PROCEDURE — 94660 CPAP INITIATION&MGMT: CPT

## 2021-07-17 PROCEDURE — 74011250636 HC RX REV CODE- 250/636: Performed by: GENERAL ACUTE CARE HOSPITAL

## 2021-07-17 PROCEDURE — 5A09457 ASSISTANCE WITH RESPIRATORY VENTILATION, 24-96 CONSECUTIVE HOURS, CONTINUOUS POSITIVE AIRWAY PRESSURE: ICD-10-PCS | Performed by: GENERAL ACUTE CARE HOSPITAL

## 2021-07-17 PROCEDURE — 77010033678 HC OXYGEN DAILY

## 2021-07-17 PROCEDURE — 83880 ASSAY OF NATRIURETIC PEPTIDE: CPT

## 2021-07-17 PROCEDURE — 82962 GLUCOSE BLOOD TEST: CPT

## 2021-07-17 PROCEDURE — 93306 TTE W/DOPPLER COMPLETE: CPT

## 2021-07-17 PROCEDURE — 74011250637 HC RX REV CODE- 250/637: Performed by: GENERAL ACUTE CARE HOSPITAL

## 2021-07-17 PROCEDURE — 71045 X-RAY EXAM CHEST 1 VIEW: CPT

## 2021-07-17 PROCEDURE — 82803 BLOOD GASES ANY COMBINATION: CPT

## 2021-07-17 PROCEDURE — 93306 TTE W/DOPPLER COMPLETE: CPT | Performed by: INTERNAL MEDICINE

## 2021-07-17 PROCEDURE — 36415 COLL VENOUS BLD VENIPUNCTURE: CPT

## 2021-07-17 PROCEDURE — 84484 ASSAY OF TROPONIN QUANT: CPT

## 2021-07-17 PROCEDURE — 95816 EEG AWAKE AND DROWSY: CPT | Performed by: PSYCHIATRY & NEUROLOGY

## 2021-07-17 PROCEDURE — 85025 COMPLETE CBC W/AUTO DIFF WBC: CPT

## 2021-07-17 PROCEDURE — 74011636637 HC RX REV CODE- 636/637: Performed by: GENERAL ACUTE CARE HOSPITAL

## 2021-07-17 PROCEDURE — 74011250636 HC RX REV CODE- 250/636: Performed by: NURSE PRACTITIONER

## 2021-07-17 PROCEDURE — 74011000258 HC RX REV CODE- 258: Performed by: GENERAL ACUTE CARE HOSPITAL

## 2021-07-17 RX ORDER — HEPARIN SODIUM 5000 [USP'U]/ML
5000 INJECTION, SOLUTION INTRAVENOUS; SUBCUTANEOUS EVERY 8 HOURS
Status: DISCONTINUED | OUTPATIENT
Start: 2021-07-17 | End: 2021-07-24 | Stop reason: HOSPADM

## 2021-07-17 RX ORDER — LORAZEPAM 2 MG/ML
1 INJECTION INTRAMUSCULAR
Status: DISCONTINUED | OUTPATIENT
Start: 2021-07-17 | End: 2021-07-24 | Stop reason: HOSPADM

## 2021-07-17 RX ORDER — LORAZEPAM 2 MG/ML
1 INJECTION INTRAMUSCULAR ONCE
Status: COMPLETED | OUTPATIENT
Start: 2021-07-17 | End: 2021-07-17

## 2021-07-17 RX ORDER — HALOPERIDOL 5 MG/ML
3 INJECTION INTRAMUSCULAR ONCE
Status: DISPENSED | OUTPATIENT
Start: 2021-07-18 | End: 2021-07-18

## 2021-07-17 RX ORDER — FUROSEMIDE 10 MG/ML
80 INJECTION INTRAMUSCULAR; INTRAVENOUS
Status: COMPLETED | OUTPATIENT
Start: 2021-07-17 | End: 2021-07-17

## 2021-07-17 RX ORDER — FUROSEMIDE 10 MG/ML
20 INJECTION INTRAMUSCULAR; INTRAVENOUS ONCE
Status: COMPLETED | OUTPATIENT
Start: 2021-07-17 | End: 2021-07-17

## 2021-07-17 RX ORDER — HALOPERIDOL 5 MG/ML
3 INJECTION INTRAMUSCULAR ONCE
Status: DISCONTINUED | OUTPATIENT
Start: 2021-07-18 | End: 2021-07-17

## 2021-07-17 RX ORDER — HALOPERIDOL 5 MG/ML
3 INJECTION INTRAMUSCULAR ONCE
Status: ACTIVE | OUTPATIENT
Start: 2021-07-17 | End: 2021-07-17

## 2021-07-17 RX ORDER — SODIUM BICARBONATE 1 MEQ/ML
50 SYRINGE (ML) INTRAVENOUS ONCE
Status: COMPLETED | OUTPATIENT
Start: 2021-07-17 | End: 2021-07-17

## 2021-07-17 RX ORDER — DIAZEPAM 10 MG/2ML
2.5 INJECTION INTRAMUSCULAR
Status: COMPLETED | OUTPATIENT
Start: 2021-07-17 | End: 2021-07-17

## 2021-07-17 RX ORDER — METOPROLOL TARTRATE 5 MG/5ML
5 INJECTION INTRAVENOUS ONCE
Status: COMPLETED | OUTPATIENT
Start: 2021-07-17 | End: 2021-07-17

## 2021-07-17 RX ORDER — LORAZEPAM 2 MG/ML
INJECTION INTRAMUSCULAR
Status: DISPENSED
Start: 2021-07-17 | End: 2021-07-17

## 2021-07-17 RX ORDER — METOPROLOL TARTRATE 5 MG/5ML
INJECTION INTRAVENOUS
Status: DISPENSED
Start: 2021-07-17 | End: 2021-07-17

## 2021-07-17 RX ORDER — SODIUM BICARBONATE 1 MEQ/ML
SYRINGE (ML) INTRAVENOUS
Status: DISPENSED
Start: 2021-07-17 | End: 2021-07-17

## 2021-07-17 RX ADMIN — SODIUM BICARBONATE 50 MEQ: 84 INJECTION, SOLUTION INTRAVENOUS at 03:35

## 2021-07-17 RX ADMIN — LORAZEPAM 1 MG: 2 INJECTION INTRAMUSCULAR; INTRAVENOUS at 04:17

## 2021-07-17 RX ADMIN — CEFTRIAXONE SODIUM 2 G: 2 INJECTION, POWDER, FOR SOLUTION INTRAMUSCULAR; INTRAVENOUS at 06:35

## 2021-07-17 RX ADMIN — Medication 10 ML: at 18:12

## 2021-07-17 RX ADMIN — ASPIRIN 81 MG: 81 TABLET, COATED ORAL at 10:27

## 2021-07-17 RX ADMIN — ATORVASTATIN CALCIUM 20 MG: 20 TABLET, FILM COATED ORAL at 21:36

## 2021-07-17 RX ADMIN — INSULIN LISPRO 3 UNITS: 100 INJECTION, SOLUTION INTRAVENOUS; SUBCUTANEOUS at 12:31

## 2021-07-17 RX ADMIN — FUROSEMIDE 20 MG: 10 INJECTION, SOLUTION INTRAMUSCULAR; INTRAVENOUS at 04:33

## 2021-07-17 RX ADMIN — LORAZEPAM 1 MG: 2 INJECTION INTRAMUSCULAR; INTRAVENOUS at 03:34

## 2021-07-17 RX ADMIN — VANCOMYCIN HYDROCHLORIDE 750 MG: 750 INJECTION, POWDER, LYOPHILIZED, FOR SOLUTION INTRAVENOUS at 18:11

## 2021-07-17 RX ADMIN — AMPICILLIN SODIUM 2 G: 2 INJECTION, POWDER, FOR SOLUTION INTRAMUSCULAR; INTRAVENOUS at 10:27

## 2021-07-17 RX ADMIN — AMPICILLIN SODIUM 2 G: 2 INJECTION, POWDER, FOR SOLUTION INTRAMUSCULAR; INTRAVENOUS at 04:15

## 2021-07-17 RX ADMIN — FUROSEMIDE 80 MG: 10 INJECTION, SOLUTION INTRAMUSCULAR; INTRAVENOUS at 22:31

## 2021-07-17 RX ADMIN — INSULIN LISPRO 3 UNITS: 100 INJECTION, SOLUTION INTRAVENOUS; SUBCUTANEOUS at 10:27

## 2021-07-17 RX ADMIN — FUROSEMIDE 20 MG: 10 INJECTION, SOLUTION INTRAMUSCULAR; INTRAVENOUS at 03:23

## 2021-07-17 RX ADMIN — Medication 10 ML: at 22:33

## 2021-07-17 RX ADMIN — INSULIN LISPRO 3 UNITS: 100 INJECTION, SOLUTION INTRAVENOUS; SUBCUTANEOUS at 18:11

## 2021-07-17 RX ADMIN — LORAZEPAM 1 MG: 2 INJECTION INTRAMUSCULAR; INTRAVENOUS at 21:25

## 2021-07-17 RX ADMIN — ACETAMINOPHEN 650 MG: 650 SUPPOSITORY RECTAL at 04:33

## 2021-07-17 RX ADMIN — INSULIN LISPRO 2 UNITS: 100 INJECTION, SOLUTION INTRAVENOUS; SUBCUTANEOUS at 21:35

## 2021-07-17 RX ADMIN — CARVEDILOL 12.5 MG: 12.5 TABLET, FILM COATED ORAL at 10:27

## 2021-07-17 RX ADMIN — DIAZEPAM 2.5 MG: 5 INJECTION, SOLUTION INTRAMUSCULAR; INTRAVENOUS at 03:03

## 2021-07-17 RX ADMIN — HEPARIN SODIUM 5000 UNITS: 5000 INJECTION INTRAVENOUS; SUBCUTANEOUS at 18:11

## 2021-07-17 RX ADMIN — ACYCLOVIR SODIUM 750 MG: 50 INJECTION, SOLUTION INTRAVENOUS at 04:58

## 2021-07-17 RX ADMIN — METOPROLOL TARTRATE 5 MG: 5 INJECTION INTRAVENOUS at 03:27

## 2021-07-17 RX ADMIN — CARVEDILOL 12.5 MG: 12.5 TABLET, FILM COATED ORAL at 18:11

## 2021-07-17 NOTE — PROGRESS NOTES
7/17/2021 9:04 AM    Admit Date: 7/16/2021    Admit Diagnosis: Sepsis ([facility] [A41.9]    Subjective:     Teri Sanz remains confused. Needed BiPAP overnight due to resp distress. Now better.      Visit Vitals  /66 (BP 1 Location: Left upper arm)   Pulse 86   Temp 99.2 °F (37.3 °C)   Resp 29   Ht 5' 6\" (1.676 m)   Wt 169 lb 12.1 oz (77 kg)   SpO2 93%   BMI 27.40 kg/m²     Current Facility-Administered Medications   Medication Dose Route Frequency    metoprolol (LOPRESSOR) 5 mg/5 mL injection        LORazepam (ATIVAN) 2 mg/mL injection        sodium bicarbonate 8.4 % (1 mEq/mL) injection        LORazepam (ATIVAN) injection 1 mg  1 mg IntraVENous Q6H PRN    haloperidol lactate (HALDOL) injection 3 mg  3 mg IntraVENous ONCE    ampicillin (OMNIPEN) 2 g in 0.9% sodium chloride (MBP/ADV) 100 mL MBP  2 g IntraVENous Q6H    acyclovir (ZOVIRAX) 750 mg in 0.9% sodium chloride 250 mL IVPB  10 mg/kg IntraVENous Q24H    cefTRIAXone (ROCEPHIN) 2 g in 0.9% sodium chloride (MBP/ADV) 50 mL MBP  2 g IntraVENous Q12H    aspirin delayed-release tablet 81 mg  81 mg Oral DAILY    atorvastatin (LIPITOR) tablet 20 mg  20 mg Oral QHS    carvediloL (COREG) tablet 12.5 mg  12.5 mg Oral BID WITH MEALS    sodium chloride (NS) flush 5-40 mL  5-40 mL IntraVENous Q8H    sodium chloride (NS) flush 5-40 mL  5-40 mL IntraVENous PRN    acetaminophen (TYLENOL) tablet 650 mg  650 mg Oral Q6H PRN    Or    acetaminophen (TYLENOL) suppository 650 mg  650 mg Rectal Q6H PRN    polyethylene glycol (MIRALAX) packet 17 g  17 g Oral DAILY PRN    ondansetron (ZOFRAN ODT) tablet 4 mg  4 mg Oral Q8H PRN    Or    ondansetron (ZOFRAN) injection 4 mg  4 mg IntraVENous Q6H PRN    vancomycin (VANCOCIN) 1,000 mg in 0.9% sodium chloride 250 mL (VIAL-MATE)  1,000 mg IntraVENous Q36H    insulin lispro (HUMALOG) injection   SubCUTAneous AC&HS    glucose chewable tablet 16 g  4 Tablet Oral PRN    dextrose (D50W) injection syrg 12.5-25 g  12.5-25 g IntraVENous PRN    glucagon (GLUCAGEN) injection 1 mg  1 mg IntraMUSCular PRN         Objective:      Visit Vitals  /66 (BP 1 Location: Left upper arm)   Pulse 86   Temp 99.2 °F (37.3 °C)   Resp 29   Ht 5' 6\" (1.676 m)   Wt 169 lb 12.1 oz (77 kg)   SpO2 93%   BMI 27.40 kg/m²       Physical Exam:  Resting comfortably. Abdomen: soft, non-tender. Bowel sounds normal.  Extremities: no cyanosis or edema  Heart: regular rate and rhythm, S1, S2 normal, no murmur, click, rub or gallop  Lungs: clear to auscultation bilaterally  Neurologic: drowsy and sleepy. Data Review:   Labs:    Recent Results (from the past 24 hour(s))   METABOLIC PANEL, COMPREHENSIVE    Collection Time: 07/16/21 10:01 AM   Result Value Ref Range    Sodium 136 136 - 145 mmol/L    Potassium 4.5 3.5 - 5.1 mmol/L    Chloride 111 (H) 97 - 108 mmol/L    CO2 18 (L) 21 - 32 mmol/L    Anion gap 7 5 - 15 mmol/L    Glucose 158 (H) 65 - 100 mg/dL    BUN 53 (H) 6 - 20 MG/DL    Creatinine 1.97 (H) 0.55 - 1.02 MG/DL    BUN/Creatinine ratio 27 (H) 12 - 20      GFR est AA 29 (L) >60 ml/min/1.73m2    GFR est non-AA 24 (L) >60 ml/min/1.73m2    Calcium 8.1 (L) 8.5 - 10.1 MG/DL    Bilirubin, total 0.4 0.2 - 1.0 MG/DL    ALT (SGPT) 275 (H) 12 - 78 U/L    AST (SGOT) 370 (H) 15 - 37 U/L    Alk.  phosphatase 60 45 - 117 U/L    Protein, total 6.1 (L) 6.4 - 8.2 g/dL    Albumin 2.0 (L) 3.5 - 5.0 g/dL    Globulin 4.1 (H) 2.0 - 4.0 g/dL    A-G Ratio 0.5 (L) 1.1 - 2.2     CBC WITH AUTOMATED DIFF    Collection Time: 07/16/21 10:01 AM   Result Value Ref Range    WBC 13.0 (H) 3.6 - 11.0 K/uL    RBC 3.36 (L) 3.80 - 5.20 M/uL    HGB 10.1 (L) 11.5 - 16.0 g/dL    HCT 30.8 (L) 35.0 - 47.0 %    MCV 91.7 80.0 - 99.0 FL    MCH 30.1 26.0 - 34.0 PG    MCHC 32.8 30.0 - 36.5 g/dL    RDW 14.6 (H) 11.5 - 14.5 %    PLATELET 302 (L) 508 - 400 K/uL    MPV 11.4 8.9 - 12.9 FL    NRBC 0.0 0  WBC    ABSOLUTE NRBC 0.00 0.00 - 0.01 K/uL    NEUTROPHILS 91 (H) 32 - 75 % BAND NEUTROPHILS 5 %    LYMPHOCYTES 1 (L) 12 - 49 %    MONOCYTES 2 (L) 5 - 13 %    EOSINOPHILS 0 0 - 7 %    BASOPHILS 0 0 - 1 %    IMMATURE GRANULOCYTES 1 (H) 0.0 - 0.5 %    ABS. NEUTROPHILS 12.5 (H) 1.8 - 8.0 K/UL    ABS. LYMPHOCYTES 0.1 (L) 0.8 - 3.5 K/UL    ABS. MONOCYTES 0.3 0.0 - 1.0 K/UL    ABS. EOSINOPHILS 0.0 0.0 - 0.4 K/UL    ABS. BASOPHILS 0.0 0.0 - 0.1 K/UL    ABS. IMM.  GRANS. 0.1 (H) 0.00 - 0.04 K/UL    DF MANUAL      RBC COMMENTS NORMOCYTIC, NORMOCHROMIC     GLUCOSE, POC    Collection Time: 07/16/21 10:38 AM   Result Value Ref Range    Glucose (POC) 165 (H) 65 - 117 mg/dL    Performed by Nazario Bay PCT    GLUCOSE, POC    Collection Time: 07/16/21 11:58 AM   Result Value Ref Range    Glucose (POC) 132 (H) 65 - 117 mg/dL    Performed by Daniel Maria RN    PROTEIN, CSF    Collection Time: 07/16/21  2:25 PM   Result Value Ref Range    Tube No. 1      Protein,CSF 53 (H) 15 - 45 MG/DL   GLUCOSE, CSF    Collection Time: 07/16/21  2:25 PM   Result Value Ref Range    Tube No. 1      Glucose, (H) 40 - 70 MG/DL   CULTURE, CSF W GRAM STAIN    Collection Time: 07/16/21  2:25 PM    Specimen: Cerebrospinal Fluid   Result Value Ref Range    Special Requests: NO SPECIAL REQUESTS      GRAM STAIN NO WBC'S SEEN      GRAM STAIN NO ORGANISMS SEEN      Culture result: PENDING    CELL COUNT, CSF    Collection Time: 07/16/21  2:25 PM   Result Value Ref Range    CSF TUBE NO. 3      CSF COLOR COLORLESS COL      CSF APPEARANCE CLEAR CLEAR      CSF RBCs 0 0 /cu mm    CSF WBCs 0 0 - 5 /cu mm   MENINGITIS PATHOGENS PANEL, CSF (BY PCR)    Collection Time: 07/16/21  2:25 PM   Result Value Ref Range    Escherichia coli K1 Not detected NOTD      Haemophilus Influenzae Not detected NOTD      Listeria Monocytogenes Not detected NOTD      Neisseria Meningitidis Not detected NOTD      Streptococcus Agalactiae Not detected NOTD      Streptococcus Pneumoniae Not detected NOTD      Cytomegalovirus Not detected NOTD      Enterovirus Not detected NOTD      Herpes Simplex Virus 1 Not detected NOTD      Herpes Simplex Virus 2 Not detected NOTD      Human Herpesvirus 6 Not detected NOTD      Human Parechovirus Not detected NOTD      Varicella Zoster Virus Not detected NOTD      Crypto. neoformans/gattii Not detected NOTD     GLUCOSE, POC    Collection Time: 07/16/21  4:29 PM   Result Value Ref Range    Glucose (POC) 157 (H) 65 - 117 mg/dL    Performed by Rowdy Neal PCT    TROPONIN I    Collection Time: 07/16/21  7:34 PM   Result Value Ref Range    Troponin-I, Qt. 9.30 (H) <0.05 ng/mL   GLUCOSE, POC    Collection Time: 07/16/21  9:02 PM   Result Value Ref Range    Glucose (POC) 178 (H) 65 - 117 mg/dL    Performed by Elie Parsons RN    METABOLIC PANEL, COMPREHENSIVE    Collection Time: 07/17/21  3:02 AM   Result Value Ref Range    Sodium 134 (L) 136 - 145 mmol/L    Potassium 4.5 3.5 - 5.1 mmol/L    Chloride 110 (H) 97 - 108 mmol/L    CO2 17 (L) 21 - 32 mmol/L    Anion gap 7 5 - 15 mmol/L    Glucose 248 (H) 65 - 100 mg/dL    BUN 50 (H) 6 - 20 MG/DL    Creatinine 1.81 (H) 0.55 - 1.02 MG/DL    BUN/Creatinine ratio 28 (H) 12 - 20      GFR est AA 33 (L) >60 ml/min/1.73m2    GFR est non-AA 27 (L) >60 ml/min/1.73m2    Calcium 8.0 (L) 8.5 - 10.1 MG/DL    Bilirubin, total 0.3 0.2 - 1.0 MG/DL    ALT (SGPT) 229 (H) 12 - 78 U/L    AST (SGOT) 255 (H) 15 - 37 U/L    Alk.  phosphatase 84 45 - 117 U/L    Protein, total 6.6 6.4 - 8.2 g/dL    Albumin 2.2 (L) 3.5 - 5.0 g/dL    Globulin 4.4 (H) 2.0 - 4.0 g/dL    A-G Ratio 0.5 (L) 1.1 - 2.2     NT-PRO BNP    Collection Time: 07/17/21  3:02 AM   Result Value Ref Range    NT pro-BNP 14,960 (H) <450 PG/ML   CBC WITH AUTOMATED DIFF    Collection Time: 07/17/21  3:02 AM   Result Value Ref Range    WBC 19.7 (H) 3.6 - 11.0 K/uL    RBC 3.90 3.80 - 5.20 M/uL    HGB 11.7 11.5 - 16.0 g/dL    HCT 36.8 35.0 - 47.0 %    MCV 94.4 80.0 - 99.0 FL    MCH 30.0 26.0 - 34.0 PG    MCHC 31.8 30.0 - 36.5 g/dL    RDW 14.6 (H) 11.5 - 14.5 % PLATELET 042 (L) 684 - 400 K/uL    MPV 11.1 8.9 - 12.9 FL    NRBC 0.0 0  WBC    ABSOLUTE NRBC 0.00 0.00 - 0.01 K/uL    NEUTROPHILS 80 (H) 32 - 75 %    LYMPHOCYTES 12 12 - 49 %    MONOCYTES 5 5 - 13 %    EOSINOPHILS 2 0 - 7 %    BASOPHILS 1 0 - 1 %    IMMATURE GRANULOCYTES 0 0.0 - 0.5 %    ABS. NEUTROPHILS 15.7 (H) 1.8 - 8.0 K/UL    ABS. LYMPHOCYTES 2.4 0.8 - 3.5 K/UL    ABS. MONOCYTES 1.0 0.0 - 1.0 K/UL    ABS. EOSINOPHILS 0.4 0.0 - 0.4 K/UL    ABS. BASOPHILS 0.2 (H) 0.0 - 0.1 K/UL    ABS. IMM. GRANS. 0.0 0.00 - 0.04 K/UL    DF MANUAL      RBC COMMENTS NORMOCYTIC, NORMOCHROMIC     SAMPLES BEING HELD    Collection Time: 07/17/21  3:02 AM   Result Value Ref Range    SAMPLES BEING HELD PST     COMMENT        Add-on orders for these samples will be processed based on acceptable specimen integrity and analyte stability, which may vary by analyte.    CK W/ CKMB & INDEX    Collection Time: 07/17/21  3:02 AM   Result Value Ref Range    CK - MB 7.1 (H) <3.6 NG/ML    CK-MB Index 3.2 (H) 0.0 - 2.5       (H) 26 - 192 U/L   TROPONIN I    Collection Time: 07/17/21  3:02 AM   Result Value Ref Range    Troponin-I, Qt. 7.00 (H) <0.05 ng/mL   LACTIC ACID    Collection Time: 07/17/21  3:11 AM   Result Value Ref Range    Lactic acid 1.4 0.4 - 2.0 MMOL/L   BLOOD GAS, VENOUS    Collection Time: 07/17/21  3:12 AM   Result Value Ref Range    VENOUS PH 7.07 (LL) 7.32 - 7.42      VENOUS PCO2 60.5 (H) 41 - 51 mmHg    VENOUS PO2 45 (H) 25 - 40 mmHg    VENOUS BICARBONATE 17 (L) 23 - 28 mmol/L    VENOUS BASE DEFICIT 13.8 mmol/L    VENOUS O2 SATURATION 62 (L) 65 - 88 %    O2 METHOD NONREBREATHER MASK      Sample source VENOUS BLOOD      SITE RIGHT RADIAL      Critical value read back       Called to YinYangMapMelissa ACNP on 07/17/2021 at 03:11   BLOOD GAS, ARTERIAL    Collection Time: 07/17/21  4:57 AM   Result Value Ref Range    pH 7.33 (L) 7.35 - 7.45      PCO2 33 (L) 35 - 45 mmHg    PO2 102 (H) 80 - 100 mmHg    O2 SAT 97 92 - 97 % BICARBONATE 17 (L) 22 - 26 mmol/L    BASE DEFICIT 8.0 mmol/L    O2 METHOD BIPAP      FIO2 50 %    Sample source ARTERIAL      SITE LEFT RADIAL      TORO'S TEST YES     GLUCOSE, POC    Collection Time: 07/17/21  7:20 AM   Result Value Ref Range    Glucose (POC) 234 (H) 65 - 117 mg/dL    Performed by Nolan Zhang PCT        Telemetry: SR      Assessment:     Principal Problem:    Sepsis ([address] (7/16/2021)    Active Problems:    Physical debility (10/27/2017)      Type 2 diabetes with nephropathy ([address] (2/15/2018)      CKD (chronic kidney disease) stage 3, GFR 30-59 ml/min ([facility]) (6/4/2019)      PAF (paroxysmal atrial fibrillation) ([address] (9/10/2020)      Dilated cardiomyopathy ([address] (9/23/2020)      AICD (automatic cardioverter/defibrillator) present (9/23/2020)      Overview: 9/23/2020 [employer] AICD implant      Type 2 MI (myocardial infarction) ([address] (7/16/2021)      Overview: 7/162021 r/t sepsis      Systolic heart failure, chronic ([address] (7/16/2021)        Plan:     Non specific troponin due to sespsis  Echo pending.      Pulmonary edema, DCM dilated with AICD: (EF 25-30%)  Avoid IV fluids. Use lasix sparingly. Follow I&O. Continue on Coreg.  ARB on hold with ARF

## 2021-07-17 NOTE — PROGRESS NOTES
07/17/21 0332   Vital Signs   Temp 100.4 °F (38 °C)   Pulse (Heart Rate) (!) 115   Resp Rate 30   O2 Sat (%) 100 %   Level of Consciousness (!) New Confusion or Agitation (1)   BP (!) 155/71   MAP (Calculated) 99   MEWS Score 5     RRT nurse Deann at bedside

## 2021-07-17 NOTE — PROGRESS NOTES
Consult received. Chart reviewed. Staph aureus bacteremia. Patient with pacemaker.    - LP with no pleocytosis. Stop ampicillin, ceftriaxone and acyclovir. - Send repeat Blood cultures today (ordered)  - Await susceptibilities of the Staph aureus; continue vancomycin goal trough 15-20 till then. - VALERIA is needed. Even if no leads vegetation present, the pacemaker is likely needed to be removed as it is presumed infected given Staph aureus bacteremia. Please have cardiology to evaluate. D/w Dr. Kirsten Raza. Call with questions.          Dewey Meadows MD  Infectious Diseases

## 2021-07-17 NOTE — PROGRESS NOTES
Rapid Response Team Note    Called by RN at 0063 to see patient for Hypoxia stat. Regan Sampson is a 79 y.o. WHITE/NON- female admitted for encephalopathy and fever, suspected meningitis. Cerebrospinal fluid so far negative for bacterial meningitis however viral meningitis continues to be suspected. Preliminary blood culture positive for gram-positive cocci. She is currently on acyclovir, ampicillin, Rocephin, and vancomycin IV. She was also started on IV fluid as she is seems clinically dehydrated. Upon entering the room the patient is noted to be in respiratory distress, tachypneic, increased work of breathing with accessory muscle use. Respiratory rate 30 to 40 breaths/min, blood pressure 116/69, heart rate 129, oxygen saturations 97% on nonrebreather mask. She is also pale and diaphoretic. She is agitated and restless. Patient's nurse reports that she had been having oxygen saturations in the high 90s on room air beginning of her shift and had been very much alert. However around 2 AM patient's oxygen saturation dropped into the 60s and she placed patient on 2 L via nasal cannula which did improve oxygen saturation however it dropped again into the high 70s low 80s with new confusion and agitation.            Code status: DNR       Allergies   Allergen Reactions    Morphine Anaphylaxis    Ultram [Tramadol] Palpitations        Current Facility-Administered Medications   Medication Dose Route Frequency    metoprolol (LOPRESSOR) 5 mg/5 mL injection        LORazepam (ATIVAN) 2 mg/mL injection        sodium bicarbonate 8.4 % (1 mEq/mL) injection        ampicillin (OMNIPEN) 2 g in 0.9% sodium chloride (MBP/ADV) 100 mL MBP  2 g IntraVENous Q6H    acyclovir (ZOVIRAX) 750 mg in 0.9% sodium chloride 250 mL IVPB  10 mg/kg IntraVENous Q24H    cefTRIAXone (ROCEPHIN) 2 g in 0.9% sodium chloride (MBP/ADV) 50 mL MBP  2 g IntraVENous Q12H    aspirin delayed-release tablet 81 mg  81 mg Oral DAILY    atorvastatin (LIPITOR) tablet 20 mg  20 mg Oral QHS    carvediloL (COREG) tablet 12.5 mg  12.5 mg Oral BID WITH MEALS    sodium chloride (NS) flush 5-40 mL  5-40 mL IntraVENous Q8H    sodium chloride (NS) flush 5-40 mL  5-40 mL IntraVENous PRN    acetaminophen (TYLENOL) tablet 650 mg  650 mg Oral Q6H PRN    Or    acetaminophen (TYLENOL) suppository 650 mg  650 mg Rectal Q6H PRN    polyethylene glycol (MIRALAX) packet 17 g  17 g Oral DAILY PRN    ondansetron (ZOFRAN ODT) tablet 4 mg  4 mg Oral Q8H PRN    Or    ondansetron (ZOFRAN) injection 4 mg  4 mg IntraVENous Q6H PRN    vancomycin (VANCOCIN) 1,000 mg in 0.9% sodium chloride 250 mL (VIAL-MATE)  1,000 mg IntraVENous Q36H    insulin lispro (HUMALOG) injection   SubCUTAneous AC&HS    glucose chewable tablet 16 g  4 Tablet Oral PRN    dextrose (D50W) injection syrg 12.5-25 g  12.5-25 g IntraVENous PRN    glucagon (GLUCAGEN) injection 1 mg  1 mg IntraMUSCular PRN    0.9% sodium chloride infusion  125 mL/hr IntraVENous CONTINUOUS          Visit Vitals  BP (!) 147/73   Pulse (!) 133   Temp 100.4 °F (38 °C)   Resp (!) 34   Ht 5' 6\" (1.676 m)   Wt 73.4 kg (161 lb 13.1 oz)   SpO2 100%   BMI 26.12 kg/m²          Review of Systems   Unable to perform ROS: Acuity of condition          Physical Exam  Constitutional:       General: She is in acute distress. Appearance: She is ill-appearing and diaphoretic. HENT:      Head: Normocephalic. Cardiovascular:      Rate and Rhythm: Tachycardia present. Pulmonary:      Effort: Tachypnea, accessory muscle usage and respiratory distress present. Breath sounds: Examination of the right-middle field reveals rales. Examination of the left-middle field reveals rales. Examination of the right-lower field reveals decreased breath sounds and rales. Examination of the left-lower field reveals decreased breath sounds and rales. Decreased breath sounds and rales present.    Abdominal:      Palpations: Abdomen is soft. Musculoskeletal:      Left lower leg: Edema (Pedal edema only) present. Neurological:      Mental Status: She is alert. Psychiatric:         Mood and Affect: Mood is anxious. Behavior: Behavior is uncooperative and agitated. Comments: restless              Pertinent Recent Labs and Xrays:    LAB DATA REVIEWED:    Recent Results (from the past 24 hour(s))   TROPONIN I    Collection Time: 07/16/21  4:41 AM   Result Value Ref Range    Troponin-I, Qt. 11.90 (H) <0.05 ng/mL   PROCALCITONIN    Collection Time: 07/16/21  4:41 AM   Result Value Ref Range    Procalcitonin 3.56 ng/mL   METABOLIC PANEL, COMPREHENSIVE    Collection Time: 07/16/21 10:01 AM   Result Value Ref Range    Sodium 136 136 - 145 mmol/L    Potassium 4.5 3.5 - 5.1 mmol/L    Chloride 111 (H) 97 - 108 mmol/L    CO2 18 (L) 21 - 32 mmol/L    Anion gap 7 5 - 15 mmol/L    Glucose 158 (H) 65 - 100 mg/dL    BUN 53 (H) 6 - 20 MG/DL    Creatinine 1.97 (H) 0.55 - 1.02 MG/DL    BUN/Creatinine ratio 27 (H) 12 - 20      GFR est AA 29 (L) >60 ml/min/1.73m2    GFR est non-AA 24 (L) >60 ml/min/1.73m2    Calcium 8.1 (L) 8.5 - 10.1 MG/DL    Bilirubin, total 0.4 0.2 - 1.0 MG/DL    ALT (SGPT) 275 (H) 12 - 78 U/L    AST (SGOT) 370 (H) 15 - 37 U/L    Alk.  phosphatase 60 45 - 117 U/L    Protein, total 6.1 (L) 6.4 - 8.2 g/dL    Albumin 2.0 (L) 3.5 - 5.0 g/dL    Globulin 4.1 (H) 2.0 - 4.0 g/dL    A-G Ratio 0.5 (L) 1.1 - 2.2     CBC WITH AUTOMATED DIFF    Collection Time: 07/16/21 10:01 AM   Result Value Ref Range    WBC 13.0 (H) 3.6 - 11.0 K/uL    RBC 3.36 (L) 3.80 - 5.20 M/uL    HGB 10.1 (L) 11.5 - 16.0 g/dL    HCT 30.8 (L) 35.0 - 47.0 %    MCV 91.7 80.0 - 99.0 FL    MCH 30.1 26.0 - 34.0 PG    MCHC 32.8 30.0 - 36.5 g/dL    RDW 14.6 (H) 11.5 - 14.5 %    PLATELET 309 (L) 156 - 400 K/uL    MPV 11.4 8.9 - 12.9 FL    NRBC 0.0 0  WBC    ABSOLUTE NRBC 0.00 0.00 - 0.01 K/uL    NEUTROPHILS 91 (H) 32 - 75 %    BAND NEUTROPHILS 5 %    LYMPHOCYTES 1 (L) 12 - 49 %    MONOCYTES 2 (L) 5 - 13 %    EOSINOPHILS 0 0 - 7 %    BASOPHILS 0 0 - 1 %    IMMATURE GRANULOCYTES 1 (H) 0.0 - 0.5 %    ABS. NEUTROPHILS 12.5 (H) 1.8 - 8.0 K/UL    ABS. LYMPHOCYTES 0.1 (L) 0.8 - 3.5 K/UL    ABS. MONOCYTES 0.3 0.0 - 1.0 K/UL    ABS. EOSINOPHILS 0.0 0.0 - 0.4 K/UL    ABS. BASOPHILS 0.0 0.0 - 0.1 K/UL    ABS. IMM.  GRANS. 0.1 (H) 0.00 - 0.04 K/UL    DF MANUAL      RBC COMMENTS NORMOCYTIC, NORMOCHROMIC     GLUCOSE, POC    Collection Time: 07/16/21 10:38 AM   Result Value Ref Range    Glucose (POC) 165 (H) 65 - 117 mg/dL    Performed by Lucrecia Do PCT    GLUCOSE, POC    Collection Time: 07/16/21 11:58 AM   Result Value Ref Range    Glucose (POC) 132 (H) 65 - 117 mg/dL    Performed by Pb Panda RN    PROTEIN, CSF    Collection Time: 07/16/21  2:25 PM   Result Value Ref Range    Tube No. 1      Protein,CSF 53 (H) 15 - 45 MG/DL   GLUCOSE, CSF    Collection Time: 07/16/21  2:25 PM   Result Value Ref Range    Tube No. 1      Glucose, (H) 40 - 70 MG/DL   CULTURE, CSF W GRAM STAIN    Collection Time: 07/16/21  2:25 PM    Specimen: Cerebrospinal Fluid   Result Value Ref Range    Special Requests: NO SPECIAL REQUESTS      GRAM STAIN NO WBC'S SEEN      GRAM STAIN NO ORGANISMS SEEN      Culture result: PENDING    CELL COUNT, CSF    Collection Time: 07/16/21  2:25 PM   Result Value Ref Range    CSF TUBE NO. 3      CSF COLOR COLORLESS COL      CSF APPEARANCE CLEAR CLEAR      CSF RBCs 0 0 /cu mm    CSF WBCs 0 0 - 5 /cu mm   MENINGITIS PATHOGENS PANEL, CSF (BY PCR)    Collection Time: 07/16/21  2:25 PM   Result Value Ref Range    Escherichia coli K1 Not detected NOTD      Haemophilus Influenzae Not detected NOTD      Listeria Monocytogenes Not detected NOTD      Neisseria Meningitidis Not detected NOTD      Streptococcus Agalactiae Not detected NOTD      Streptococcus Pneumoniae Not detected NOTD      Cytomegalovirus Not detected NOTD      Enterovirus Not detected NOTD      Herpes Simplex Virus 1 Not detected NOTD      Herpes Simplex Virus 2 Not detected NOTD      Human Herpesvirus 6 Not detected NOTD      Human Parechovirus Not detected NOTD      Varicella Zoster Virus Not detected NOTD      Crypto. neoformans/gattii Not detected NOTD     GLUCOSE, POC    Collection Time: 07/16/21  4:29 PM   Result Value Ref Range    Glucose (POC) 157 (H) 65 - 117 mg/dL    Performed by [facility] PCT    TROPONIN I    Collection Time: 07/16/21  7:34 PM   Result Value Ref Range    Troponin-I, Qt. 9.30 (H) <0.05 ng/mL   GLUCOSE, POC    Collection Time: 07/16/21  9:02 PM   Result Value Ref Range    Glucose (POC) 178 (H) 65 - 117 mg/dL    Performed by Ashtyn Briscoe RN    CBC WITH AUTOMATED DIFF    Collection Time: 07/17/21  3:02 AM   Result Value Ref Range    WBC 19.7 (H) 3.6 - 11.0 K/uL    RBC 3.90 3.80 - 5.20 M/uL    HGB 11.7 11.5 - 16.0 g/dL    HCT 36.8 35.0 - 47.0 %    MCV 94.4 80.0 - 99.0 FL    MCH 30.0 26.0 - 34.0 PG    MCHC 31.8 30.0 - 36.5 g/dL    RDW 14.6 (H) 11.5 - 14.5 %    PLATELET 748 (L) 211 - 400 K/uL    MPV 11.1 8.9 - 12.9 FL    NRBC 0.0 0  WBC    ABSOLUTE NRBC 0.00 0.00 - 0.01 K/uL    NEUTROPHILS PENDING %    LYMPHOCYTES PENDING %    MONOCYTES PENDING %    EOSINOPHILS PENDING %    BASOPHILS PENDING %    IMMATURE GRANULOCYTES PENDING %    ABS. NEUTROPHILS PENDING K/UL    ABS. LYMPHOCYTES PENDING K/UL    ABS. MONOCYTES PENDING K/UL    ABS. EOSINOPHILS PENDING K/UL    ABS. BASOPHILS PENDING K/UL    ABS. IMM. GRANS. PENDING K/UL    DF PENDING    SAMPLES BEING HELD    Collection Time: 07/17/21  3:02 AM   Result Value Ref Range    SAMPLES BEING HELD PST     COMMENT        Add-on orders for these samples will be processed based on acceptable specimen integrity and analyte stability, which may vary by analyte.    BLOOD GAS, VENOUS    Collection Time: 07/17/21  3:12 AM   Result Value Ref Range    VENOUS PH 7.07 (LL) 7.32 - 7.42      VENOUS PCO2 60.5 (H) 41 - 51 mmHg    VENOUS PO2 45 (H) 25 - 40 mmHg    VENOUS BICARBONATE 17 (L) 23 - 28 mmol/L    VENOUS BASE DEFICIT 13.8 mmol/L    VENOUS O2 SATURATION 62 (L) 65 - 88 %    O2 METHOD NONREBREATHER MASK      Sample source VENOUS BLOOD      SITE RIGHT RADIAL      Critical value read back       Called to [employer], Larkin Sandhoff, ACNP on 07/17/2021 at 03:11        Recent Labs     07/17/21  0302 07/16/21  1001   WBC 19.7* 13.0*   HGB 11.7 10.1*   HCT 36.8 30.8*   * 115*     Recent Labs     07/16/21  1001 07/15/21  1937    131*   K 4.5 4.8   * 98   CO2 18* 19*   BUN 53* 46*   CREA 1.97* 2.41*   * 199*   CA 8.1* 9.3   MG  --  1.8     Recent Labs     07/15/21  1937   INR 1.1     No results for input(s): PH, PCO2, PO2 in the last 72 hours. No results for input(s): PHI, PO2I, PCO2I in the last 72 hours. Recent Labs     07/16/21  1934 07/16/21  0441   TROIQ 9.30* 11.90*     Lab Results   Component Value Date/Time    Glucose (POC) 178 (H) 07/16/2021 09:02 PM    Glucose (POC) 157 (H) 07/16/2021 04:29 PM    Glucose (POC) 132 (H) 07/16/2021 11:58 AM    Glucose (POC) 165 (H) 07/16/2021 10:38 AM    Glucose (POC) 201 (H) 07/15/2021 07:54 PM          Recent Imaging studies(If Any)    CXR Results  (Last 48 hours)               07/15/21 2021  XR CHEST PORT Final result    Impression:  No acute findings. Narrative:  EXAM: XR CHEST PORT       INDICATION: Eval for Infiltrate       COMPARISON: Chest x-ray 9/23/2020. FINDINGS: A portable AP radiograph of the chest was obtained at 20:19 hours. Pacemaker generator body projects over the left chest wall with intact appearing   leads traversing in expected course. The lungs are clear. The cardiac and   mediastinal contours and pulmonary vascularity are normal.  The chest wall   structures and visualized upper abdomen show no acute findings with incidental   note of degenerative spine and shoulder changes as well as diffuse osteopenia.                    Echo Results  (Last 48 hours)    None           CT Results  (Last 48 hours)               07/15/21 2025  CT ABD PELV WO CONT Final result    Impression:  No acute findings. Narrative:  EXAM: CT ABD PELV WO CONT       INDICATION: Fever, abdominal pain, altered mental status       COMPARISON: CT 10/20/2017. CT 6/26/2017. CONTRAST:  None. TECHNIQUE:    Thin axial images were obtained through the abdomen and pelvis. Coronal and   sagittal reformats were generated. Oral contrast was not administered. CT dose   reduction was achieved through use of a standardized protocol tailored for this   examination and automatic exposure control for dose modulation. The absence of intravenous contrast material reduces the sensitivity for   evaluation of the vasculature and solid organs. FINDINGS:    LOWER THORAX: Pacemaker and coronary artery calcifications noted. Visualized   lung bases are clear. LIVER: Lower pole right lobe cyst redemonstrated. Otherwise unremarkable. [employer] BILIARY TREE: Gallbladder is surgically absent. CBD is not dilated. SPLEEN: Unremarkable. PANCREAS: No focal abnormality. ADRENALS: No significant change in low density 1.8 x 2.0 cm left adrenal body   nodule. Right adrenal is unremarkable. KIDNEYS/URETERS: No calculus or hydronephrosis. STOMACH: Unremarkable. SMALL BOWEL: No dilatation or wall thickening. COLON: No dilatation or wall thickening. APPENDIX: No markable. PERITONEUM: No ascites or pneumoperitoneum. RETROPERITONEUM: Atherosclerotic calcination without aneurysm. No enlarged   lymphadenopathy. 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 REPRODUCTIVE ORGANS: Uterus and ovaries are surgically absent. URINARY BLADDER: No mass or calculus. BONES: Degenerative spine change. No acute fracture or aggressive lesion. ABDOMINAL WALL: No mass or hernia.    ADDITIONAL COMMENTS: N/A           07/15/21 2025  CT HEAD WO CONT Final result    Impression:          No acute abnormality Narrative:  EXAM: CT HEAD WO CONT       INDICATION: Altered mental status       COMPARISON: 10/21/2017. CONTRAST: None. TECHNIQUE: Unenhanced CT of the head was performed using 5 mm images. Brain and   bone windows were generated. Coronal and sagittal reformats. CT dose reduction   was achieved through use of a standardized protocol tailored for this   examination and automatic exposure control for dose modulation. FINDINGS:   The ventricles and sulci are normal in size, shape and configuration. . There is   no significant white matter disease. There is no intracranial hemorrhage,   extra-axial collection, or mass effect. The basilar cisterns are open. No CT   evidence of acute infarct. The bone windows demonstrate no abnormalities. The visualized portions of the   paranasal sinuses and mastoid air cells are clear.                   EKG RESULTS     Procedure Component Value Units Date/Time    EKG, 12 LEAD, INITIAL [[id number]]     Order Status: Sent     EKG, 12 LEAD, INITIAL [[id number]] Collected: 07/16/21 0022    Order Status: Completed Updated: 07/16/21 0910     Ventricular Rate 81 BPM      Atrial Rate 81 BPM      P-R Interval 152 ms      QRS Duration 108 ms      Q-T Interval 426 ms      QTC Calculation (Bezet) 494 ms      Calculated R Axis 57 degrees      Calculated T Axis 107 degrees      Diagnosis --     Sinus rhythm with premature atrial complexes  Nonspecific ST and T wave abnormality    Confirmed by Veronika Wood ([id number]) on 7/16/2021 9:09:57 AM      EKG, 12 LEAD, INITIAL [[id number]] Collected: 07/15/21 1949    Order Status: Completed Updated: 07/15/21 1953     Ventricular Rate 101 BPM      Atrial Rate 101 BPM      P-R Interval 170 ms      QRS Duration 104 ms      Q-T Interval 360 ms      QTC Calculation (Bezet) 466 ms      Calculated P Axis -11 degrees      Calculated R Axis 61 degrees      Calculated T Axis 120 degrees      Diagnosis --     Sinus tachycardia  ST depression, consider subendocardial injury  Nonspecific T wave abnormality  Abnormal ECG  When compared with ECG of 29-JUL-2020 07:36,  MANUAL COMPARISON REQUIRED, DATA IS UNCONFIRMED             09/18/20    ECHO ADULT COMPLETE 09/20/2020 9/20/2020    Interpretation Summary  · LV: Estimated LVEF is 25 - 30%. Moderately dilated left ventricle. Mild concentric hypertrophy. Moderate-to-severely reduced systolic function. · Pericardium: Trivial-to-small pericardial effusion. · LA: Moderately dilated left atrium. · MV: Mild to moderate mitral valve regurgitation is present. Signed by: Martha Ireland MD on 9/20/2020  8:57 PM         Recent Microbiology Data(If Any)  . All Micro Results     Procedure Component Value Units Date/Time    MENINGITIS PATHOGENS PANEL, CSF (BY PCR) [[id number]] Collected: 07/16/21 1425    Order Status: Completed Specimen: Cerebrospinal Fluid Updated: 07/16/21 2006     Escherichia coli K1 Not detected        Haemophilus Influenzae Not detected        Listeria Monocytogenes Not detected        Neisseria Meningitidis Not detected        Streptococcus Agalactiae Not detected        Streptococcus Pneumoniae Not detected        Cytomegalovirus Not detected        Enterovirus Not detected        Herpes Simplex Virus 1 Not detected        Comment: In patients who have negative herpes simplex 1 and 2 PCR results, do not modify treatment, confirm with alternate testing. Herpes Simplex Virus 2 Not detected        Comment: In patients who have negative herpes simplex 1 and 2 PCR results, do not modify treatment, confirm with alternate testing. Human Herpesvirus 6 Not detected        Human Parechovirus Not detected        Varicella Zoster Virus Not detected        Crypto.  neoformans/gattii Not detected       CULTURE, CSF  TUBE 2 [[id number]] Collected: 07/16/21 1425    Order Status: Completed Specimen: Cerebrospinal Fluid Updated: 07/16/21 1913     Special Requests: NO SPECIAL REQUESTS GRAM STAIN NO WBC'S SEEN         NO ORGANISMS SEEN        Culture result: PENDING               Assessment, action, and plan    Acute respiratory failure with hypoxia  Tachycardia  Pulmonary edema    · EKG ordered stat and was done and is consistent with sinus tachycardia  · Stat chest x-ray  · Stat arterial blood gas  · Cardiac enzymes and troponin level stat  · Stat proBNP, BMP, CBC  · Lasix 20 mg IV x1  · Metoprolol 5 mg IV x1  · Valium 2.5 mg IV x1 was ordered and given however it had no effect on patient  · ABG was done with following results:  PH                           7.134  PCO2                    51.5  PO2                     144  HCO3                     16.9  Base excess         -12.4   · Place patient on BiPAP  · Ativan 1 mg IV x1 and then every 6 hours as needed for anxiety/agitation  · 1 amp sodium bicarb  · Hospital vacuum/suction system down at this time and therefore pure wick not working and patient is frequently incontinent of urine-Zeng catheter inserted until vacuum system back on  ·  to bedside as patient is still slightly restless and pulling at lines and BiPAP mask    0413  Received the following chest x-ray result    CXR Results  (Last 48 hours)               07/17/21 0323  XR CHEST PORT Final result    Impression:  Interstitial pulmonary edema. Cardiomegaly. Narrative:  PORTABLE CHEST RADIOGRAPH/S: 7/17/2021 3:29 AM       INDICATION: Respiratory distress. COMPARISON: 7/15/2021, 9/23/2020. TECHNIQUE: Portable frontal semiupright radiograph/s of the chest.       FINDINGS:    There is interstitial pulmonary edema. The heart is enlarged, even given   portable technique. A pacemaker/AICD is in place. The central airways are   patent. No pneumothorax and no large pleural effusion.                 · Patient's blood pressure currently 118/84  · Ordered another dose of Lasix 20 mg IV  · Discontinued IV fluid           Signed by:  [place]on DNP, ACN-BC    Critical care time unrelated to prior notes: 90  minutes    Please note that this note was dictated using Dragon computer voice recognition software. Quite often unanticipated grammatical, syntax, homophones, and other interpretive errors are inadvertently transcribed by the computer software. Please disregard these errors. Please excuse any errors that have escaped final proofreading.

## 2021-07-17 NOTE — PROGRESS NOTES
Pharmacy Antimicrobial Kinetic Dosing    Indication for Antimicrobials: CNS infection    Current Regimen of Each Antimicrobial:  Vancomycin 1000 mg IV q36h (Start Date ; Day 2)    Previous Antimicrobial Therapy:  Ampicillin 2g IV q6h (Start Date ; Day 2)  Ceftriaxone 2g IV q12h (Start Date ; Day 1)  Acyclovir    Goal Level: AUC: 400-600 mg/hr/Liter/day    Date Dose & Interval Measured (mcg/mL) Extrapolated (mcg/mL)                       Date & time of next level:  @ 1500    Significant Positive Cultures:   7/15 Blood: staph aureus in both bottles- pending   CSF: pending    Conditions for Dosing Consideration: None    Labs:  Recent Labs     21  0302 21  1001 07/15/21  193   CREA 1.81* 1.97* 2.41*   BUN 50* 53* 46*     Recent Labs     21  0302 21  1001 07/15/21  1937   WBC 19.7* 13.0* 16.7*   BANDS  --  5  --      Temp (24hrs), Av.4 °F (37.4 °C), Min:98 °F (36.7 °C), Max:101.4 °F (38.6 °C)        Creatinine Clearance (mL/min):   CrCl (Ideal Body Weight): 22.8   If actual weight < IBW: CrCl (Actual Body Weight) 29.6    Impression/Plan:   Renal function improved, will change to 750 mg IV every 24 hours for an estimated trough of 16.2 mcg/mL. ID following  Antimicrobial stop date TBD     Pharmacy will follow daily and adjust medications as appropriate for renal function and/or serum levels.     Thank you,  Violet Kaye, PHARMD    Vancomycin Dosing Document    Documents located on pharmacy Teams site: Clinical Practice -> Antimicrobial Stewardship -> Antibiotics_Vancomycin     Aminoglycoside Dosing Document    Documents located on pharmacy Teams site: Clinical Practice -> Antimicrobial Stewardship -> Antibiotics_Aminoglycosides

## 2021-07-17 NOTE — PROGRESS NOTES
Received message from patient's nurse stating: Pt is still very agitated and pulling at her BIPAP and other lines and working her respiratory rate back to the 30s-40s. We can try more medication however The Ativan seemed to have only worked for a short period of time. Discussion / orders:    Haldol 3 mg IV x1           Please note that this note was dictated using Dragon computer voice recognition software. Quite often unanticipated grammatical, syntax, homophones, and other interpretive errors are inadvertently transcribed by the computer software. Please disregard these errors. Please excuse any errors that have escaped final proofreading.

## 2021-07-17 NOTE — PROCEDURES
Patient Name: Raymond Comer  : 1940  Age: 79 y.o. Ordering physician: No ref. provider found  Date of EE2021  EEG procedure number: [id number]  Diagnosis: alt mental status  Interpreting physician: Brenda Strange MD    ELECTROENCEPHALOGRAM REPORT     PROCEDURE: EEG. CLINICAL INDICATION: The patient is a 79 y.o. female with a history of possible seizures. EEG to rule out seizures, rule out stroke, rule out cortical abnormality. EEG CLASSIFICATION: Abnormal     DESCRIPTION OF THE RECORD:   The background of this recording contains a posteriorly-located occipital alpha rhythm of 7 Hz that attenuates with eye opening. Throughout the recording, there were no clear areas of focal slowing nor spike or spike-and-wave discharges seen. Hyperventilation was not performed. Photic stimulation produced no response. During the recording the patient did not achieve stage II sleep    INTERPRETATION:   Abnormal. Mild diffuse cerebral dysfunction which is non specific for etiology but can be seen in toxic/metabolic states. No seizures. Clinical correlation recommended.       Brenda Strange MD  Neurologist

## 2021-07-17 NOTE — PROGRESS NOTES
Hospitalist Progress Note    NAME: Jorje Stewart   :  1940   MRN:  [id number]       Assessment / Plan:  Staph aureus bacteremia  Severe sepsis,   Rule out meningitis  Septic metabolic encephalopathy  Initial sepsis work up did not reveal source of infection - UA negative, CXR negative, CT Abdo/Pelv negative. Pt was given 1 dose of Zosyn at outside facility and transferred here. Head CT is negative for acute abnormality  Status post lumbar puncture, no organisms seen, ampicillin acyclovir and ceftriaxone stopped. Continue vancomycin, follow-up blood culture sensitivity  Repeat blood cultures  2D echo is pending, if negative most likely will need VALERIA  Patient has a pacemaker, most likely will need to come out. ID consulted, appreciate input   patient still lethargic, patient still spiking fevers this morning    Acute respiratory failure with hypoxia secondary to pulmonary edema on   Overnight event noted, patient became hypoxic overnight, her chest x-ray showed pulmonary edema   fluid stopped, received IV Lasix overnight, currently on 2 L of oxygen via nasal cannula  Cards on board  Will give another dose of Lasix tomorrow if patient still hypoxic    elevated troponin, concerning for NSTEMI  NSTEMI ruled out by cardiology, troponin leak most likely secondary to sepsis  Get 2D echo    IVAN CKD stage III  Likely related to sepsis  Hold nephrotoxic meds  IVF    Creatinine in  was 1.7    Abnormal LFTs  Likely secondary to sepsis   LFTs are trending down    Hx of Afib  HLD  DMII  Diverticular disease  IBS  S/p AICD  Resume home meds  FS monitoring, SS     Code Status: DNR  Surrogate Decision Maker:   DVT Prophylaxis:   GI Prophylaxis: not indicated  Baseline: Independent    25.0 - 29.9 Overweight / Body mass index is 27.4 kg/m².     Estimated discharge date:   Barriers: Clinical improvement       Subjective:     Chief Complaint / Reason for Physician Visit  Follow-up staff aureus bacteremia. Overnight event noted, patient became hypoxic  Patient had fever earlier this morning  Discussed with RN events overnight. Review of Systems:  Symptom Y/N Comments  Symptom Y/N Comments   Fever/Chills    Chest Pain     Poor Appetite    Edema     Cough    Abdominal Pain     Sputum    Joint Pain     SOB/STANTON    Pruritis/Rash     Nausea/vomit    Tolerating PT/OT     Diarrhea    Tolerating Diet     Constipation    Other       Could NOT obtain due to:  Lethargic     Objective:     VITALS:   Last 24hrs VS reviewed since prior progress note. Most recent are:  Patient Vitals for the past 24 hrs:   Temp Pulse Resp BP SpO2   07/17/21 0754     93 %   07/17/21 0733 99.2 °F (37.3 °C) 86 29 137/66 99 %   07/17/21 0632 99.2 °F (37.3 °C) 97 (!) 32 114/72 100 %   07/17/21 0433 (!) 101.4 °F (38.6 °C) (!) 101  (!) 120/53    07/17/21 0413  (!) 109 (!) 31 118/84 100 %   07/17/21 0352  (!) 112 27  97 %   07/17/21 0350  (!) 112 (!) 39 (!) 132/54 97 %   07/17/21 0346     100 %   07/17/21 0340  (!) 117 (!) 38 (!) 141/81 98 %   07/17/21 0336  (!) 114 (!) 48 (!) 165/77 100 %   07/17/21 0332 100.4 °F (38 °C) (!) 115 30 (!) 155/71 100 %   07/17/21 0318  (!) 133 (!) 34 (!) 147/73 100 %   07/17/21 0252 100.4 °F (38 °C)       07/17/21 0250  (!) 129 30 116/69 97 %   07/17/21 0242  (!) 132 28  99 %   07/17/21 0231  (!) 107 (!) 37  (!) 63 %   07/16/21 2313 98.2 °F (36.8 °C) (!) 106 30 (!) 153/64 91 %   07/16/21 1946 98.9 °F (37.2 °C) 92 21 (!) 132/56 96 %   07/16/21 1514 98.9 °F (37.2 °C) 83 21 (!) 121/44 97 %       Intake/Output Summary (Last 24 hours) at 7/17/2021 1042  Last data filed at 7/17/2021 3111  Gross per 24 hour   Intake 2135 ml   Output 200 ml   Net 1935 ml        I had a face to face encounter and independently examined this patient on 7/17/2021, as outlined below:  PHYSICAL EXAM:  General: WD, WN. Lethargic  EENT:  EOMI. Anicteric sclerae. MMM  Resp:  CTA bilaterally, no wheezing or rales. No accessory muscle use  CV:  Regular  rhythm,  No edema  GI:  Soft, Non distended, Non tender. +Bowel sounds  Neurologic:  Sleepy, normal speech,   Psych:   Unable to assess   skin:  No rashes. No jaundice    Reviewed most current lab test results and cultures  YES  Reviewed most current radiology test results   YES  Review and summation of old records today    NO  Reviewed patient's current orders and MAR    YES  PMH/SH reviewed - no change compared to H&P  ________________________________________________________________________  Care Plan discussed with:    Comments   Patient x    Family      RN x    Care Manager     Consultant                        Multidiciplinary team rounds were held today with , nursing, pharmacist and clinical coordinator. Patient's plan of care was discussed; medications were reviewed and discharge planning was addressed. ________________________________________________________________________  Total NON critical care TIME:40   Minutes    Total CRITICAL CARE TIME Spent:   Minutes non procedure based      Comments   >50% of visit spent in counseling and coordination of care     ________________________________________________________________________  Elinor Ellison MD     Procedures: see electronic medical records for all procedures/Xrays and details which were not copied into this note but were reviewed prior to creation of Plan. LABS:  I reviewed today's most current labs and imaging studies.   Pertinent labs include:  Recent Labs     07/17/21  0302 07/16/21  1001 07/15/21  1937   WBC 19.7* 13.0* 16.7*   HGB 11.7 10.1* 13.2   HCT 36.8 30.8* 39.8   * 115* 170     Recent Labs     07/17/21  0302 07/16/21  1001 07/15/21  1937   * 136 131*   K 4.5 4.5 4.8   * 111* 98   CO2 17* 18* 19*   * 158* 199*   BUN 50* 53* 46*   CREA 1.81* 1.97* 2.41*   CA 8.0* 8.1* 9.3   MG  --   --  1.8   ALB 2.2* 2.0* 2.7*   TBILI 0.3 0.4 0.4   * 275* 173* INR  --   --  1.1       Signed: Jersey Lino MD

## 2021-07-17 NOTE — PROGRESS NOTES
RAPID RESPONSE TEAM    Responded to overhead adult rapid response for severe respiratory distress to room 2242. Admitted 7/16/21 for severe sepsis, was on 2 lpm nasal canula but had removed her O2 and became confused, SpO2 went as low as 60%, RR 40-50s, lungs coarse. Patient is confused and agitated, not allowing for any intervention. STAT EKG/ABG/CHEST XRAY/TROPONIN/CK/CBC/BMP/LACTIC ACID ordered. NP Purveyor arrived to bedside, orders received for valium 2.5 mg once. Writer requested lasix, order received for lasix 20 mg IV once. Did not respond to valium, order received for lorazepam 1 mg IV once and lopressor 5 mg IV once. ABG obtained: Ph 7.134, pc02 51.5, p02 144.0, hc03 16.9. One amp Sodium Bicarb given. Bipap placed with settings of 16/8, R4, 50% FiO2. Patient tolerating, however attempts to pull off mask. Order received for sitter. Patient to remain in room at this time. 0413:    XR Results (most recent):  Results from Hospital Encounter encounter on 07/16/21    XR CHEST PORT    Narrative  PORTABLE CHEST RADIOGRAPH/S: 7/17/2021 3:29 AM    INDICATION: Respiratory distress. COMPARISON: 7/15/2021, 9/23/2020. TECHNIQUE: Portable frontal semiupright radiograph/s of the chest.    FINDINGS:  There is interstitial pulmonary edema. The heart is enlarged, even given  portable technique. A pacemaker/AICD is in place. The central airways are  patent. No pneumothorax and no large pleural effusion. Impression  Interstitial pulmonary edema. Cardiomegaly. Notified NP Amieor, order received for additional lasix 20 mg IV. Primary RN Marti Bautista notified.      Patient Vitals for the past 12 hrs:   Temp Pulse Resp BP SpO2   07/17/21 0413  (!) 109 (!) 31 118/84 100 %   07/17/21 0352  (!) 112 27  97 %   07/17/21 0350  (!) 112 (!) 39 (!) 132/54 97 %   07/17/21 0346     100 %   07/17/21 0340  (!) 117 (!) 38 (!) 141/81 98 %   07/17/21 0336  (!) 114 (!) 48 (!) 165/77 100 %   07/17/21 0332 100.4 °F (38 °C) (!) 115 30 (!) 155/71 100 %   07/17/21 0318  (!) 133 (!) 34 (!) 147/73 100 %   07/17/21 0252 100.4 °F (38 °C)       07/17/21 0250  (!) 129 30 116/69 97 %   07/17/21 0242  (!) 132 28  99 %   07/17/21 0231  (!) 107 (!) 37  (!) 63 %   07/16/21 2313 98.2 °F (36.8 °C) (!) 106 30 (!) 153/64 91 %   07/16/21 1946 98.9 °F (37.2 °C) 92 21 (!) 132/56 96 %     Lab Results   Component Value Date/Time     (H) 07/17/2021 03:02 AM    CK - MB 7.1 (H) 07/17/2021 03:02 AM    CK-MB Index 3.2 (H) 07/17/2021 03:02 AM    Troponin-I, Qt. 7.00 (H) 07/17/2021 03:02 AM     Lab Results   Component Value Date/Time    WBC 19.7 (H) 07/17/2021 03:02 AM    HGB 11.7 07/17/2021 03:02 AM    HCT 36.8 07/17/2021 03:02 AM    PLATELET 748 (L) 08/13/5806 03:02 AM    MCV 94.4 07/17/2021 03:02 AM     Lab Results   Component Value Date/Time    Sodium 134 (L) 07/17/2021 03:02 AM    Potassium 4.5 07/17/2021 03:02 AM    Chloride 110 (H) 07/17/2021 03:02 AM    CO2 17 (L) 07/17/2021 03:02 AM    Anion gap 7 07/17/2021 03:02 AM    Glucose 248 (H) 07/17/2021 03:02 AM    BUN 50 (H) 07/17/2021 03:02 AM    Creatinine 1.81 (H) 07/17/2021 03:02 AM    BUN/Creatinine ratio 28 (H) 07/17/2021 03:02 AM    GFR est AA 33 (L) 07/17/2021 03:02 AM    GFR est non-AA 27 (L) 07/17/2021 03:02 AM    Calcium 8.0 (L) 07/17/2021 03:02 AM    Bilirubin, total 0.3 07/17/2021 03:02 AM    Alk. phosphatase 84 07/17/2021 03:02 AM    Protein, total 6.6 07/17/2021 03:02 AM    Albumin 2.2 (L) 07/17/2021 03:02 AM    Globulin 4.4 (H) 07/17/2021 03:02 AM    A-G Ratio 0.5 (L) 07/17/2021 03:02 AM    ALT (SGPT) 229 (H) 07/17/2021 03:02 AM    AST (SGOT) 255 (H) 07/17/2021 03:02 AM     Please call with any needs or concerns.      Jessica Nicholas  Rapid Response [facility]

## 2021-07-17 NOTE — PROGRESS NOTES
End of Shift Note    Bedside shift change report given to [address] (oncoming nurse) by Kade Galeas RN (offgoing nurse). Report included the following information SBAR, Kardex, ED Summary, Recent Results, Med Rec Status and Cardiac Rhythm NSR    Shift worked:  7a-7p     Shift summary and any significant changes:     diarhhea x2     Concerns for physician to address:       Zone phone for oncoming shift:          Activity:  Activity Level: Bed Rest  Number times ambulated in hallways past shift: 0  Number of times OOB to chair past shift: 0    Cardiac:   Cardiac Monitoring: Yes      Cardiac Rhythm: Sinus Rhythm    Access:   Current line(s): PIV     Genitourinary:   Urinary status: allen    Respiratory:   O2 Device: Nasal cannula  Chronic home O2 use?: NO  Incentive spirometer at bedside: NO     GI:  Last Bowel Movement Date: 07/17/21  Current diet:  ADULT DIET Regular  Passing flatus: YES  Tolerating current diet: YES       Pain Management:   Patient states pain is manageable on current regimen: YES    Skin:  Bryan Score: 15  Interventions: increase time out of bed    Patient Safety:  Fall Score:  Total Score: 4  Interventions: bed/chair alarm  High Fall Risk: Yes    Length of Stay:  Expected LOS: 4d 19h  Actual LOS: 1      Kade Galeas RN

## 2021-07-18 LAB
ALBUMIN SERPL-MCNC: 2 G/DL (ref 3.5–5)
ALBUMIN/GLOB SERPL: 0.5 {RATIO} (ref 1.1–2.2)
ALP SERPL-CCNC: 72 U/L (ref 45–117)
ALT SERPL-CCNC: 134 U/L (ref 12–78)
ANION GAP SERPL CALC-SCNC: 6 MMOL/L (ref 5–15)
AST SERPL-CCNC: 119 U/L (ref 15–37)
ATRIAL RATE: 101 BPM
BACTERIA SPEC CULT: ABNORMAL
BASOPHILS # BLD: 0.1 K/UL (ref 0–0.1)
BASOPHILS NFR BLD: 0 % (ref 0–1)
BILIRUB SERPL-MCNC: 0.4 MG/DL (ref 0.2–1)
BUN SERPL-MCNC: 48 MG/DL (ref 6–20)
BUN/CREAT SERPL: 29 (ref 12–20)
CALCIUM SERPL-MCNC: 8.2 MG/DL (ref 8.5–10.1)
CALCULATED P AXIS, ECG09: -11 DEGREES
CALCULATED R AXIS, ECG10: 61 DEGREES
CALCULATED T AXIS, ECG11: 120 DEGREES
CHLORIDE SERPL-SCNC: 111 MMOL/L (ref 97–108)
CO2 SERPL-SCNC: 22 MMOL/L (ref 21–32)
COMMENT, HOLDF: NORMAL
CREAT SERPL-MCNC: 1.68 MG/DL (ref 0.55–1.02)
DIAGNOSIS, 93000: NORMAL
DIFFERENTIAL METHOD BLD: ABNORMAL
ECHO AV AREA PEAK VELOCITY: 1.3 CM2
ECHO AV AREA PEAK VELOCITY: 1.31 CM2
ECHO AV AREA PEAK VELOCITY: 1.33 CM2
ECHO AV AREA PEAK VELOCITY: 1.33 CM2
ECHO AV AREA VTI: 1.57 CM2
ECHO AV AREA/BSA VTI: 0.8 CM2/M2
ECHO AV CUSP MM: 1.18 CM
ECHO AV MEAN GRADIENT: 10.17 MMHG
ECHO AV PEAK GRADIENT: 17.68 MMHG
ECHO AV PEAK GRADIENT: 18.26 MMHG
ECHO AV PEAK VELOCITY: 210.24 CM/S
ECHO AV PEAK VELOCITY: 213.66 CM/S
ECHO AV VTI: 33.27 CM
ECHO EST RA PRESSURE: 3 MMHG
ECHO LA AREA 4C: 19.21 CM2
ECHO LA MAJOR AXIS: 4.43 CM
ECHO LA MINOR AXIS: 2.37 CM
ECHO LA TO AORTIC ROOT RATIO: 0.86
ECHO LA TO AORTIC ROOT RATIO: 0.86
ECHO LA VOL 2C: 101.04 ML (ref 22–52)
ECHO LA VOL 4C: 58.01 ML (ref 22–52)
ECHO LA VOL BP: 80.49 ML (ref 22–52)
ECHO LA VOL/BSA BIPLANE: 43.04 ML/M2 (ref 16–28)
ECHO LA VOLUME INDEX A2C: 54.03 ML/M2 (ref 16–28)
ECHO LA VOLUME INDEX A4C: 31.02 ML/M2 (ref 16–28)
ECHO LV E' LATERAL VELOCITY: 5.55 CM/S
ECHO LV E' SEPTAL VELOCITY: 4.31 CM/S
ECHO LV INTERNAL DIMENSION DIASTOLIC: 5.59 CM (ref 3.9–5.3)
ECHO LV INTERNAL DIMENSION SYSTOLIC: 4.75 CM
ECHO LV IVSD: 1.3 CM (ref 0.6–0.9)
ECHO LV IVSS: 1.86 CM
ECHO LV MASS 2D: 332.7 G (ref 67–162)
ECHO LV MASS INDEX 2D: 177.9 G/M2 (ref 43–95)
ECHO LV POSTERIOR WALL DIASTOLIC: 1.42 CM (ref 0.6–0.9)
ECHO LV POSTERIOR WALL SYSTOLIC: 1.89 CM
ECHO LVOT DIAM: 2.12 CM
ECHO LVOT PEAK GRADIENT: 2.5 MMHG
ECHO LVOT PEAK GRADIENT: 2.52 MMHG
ECHO LVOT PEAK VELOCITY: 79 CM/S
ECHO LVOT PEAK VELOCITY: 79.39 CM/S
ECHO LVOT SV: 52.3 ML
ECHO LVOT VTI: 14.82 CM
ECHO MV A VELOCITY: 70.48 CM/S
ECHO MV AREA PHT: 2.48 CM2
ECHO MV AREA VTI: 1.36 CM2
ECHO MV E DECELERATION TIME (DT): 132.1 MS
ECHO MV E VELOCITY: 79.03 CM/S
ECHO MV E/A RATIO: 1.12
ECHO MV E/E' LATERAL: 14.24
ECHO MV E/E' RATIO (AVERAGED): 16.29
ECHO MV E/E' SEPTAL: 18.34
ECHO MV MAX VELOCITY: 168.79 CM/S
ECHO MV MEAN GRADIENT: 5.75 MMHG
ECHO MV PEAK GRADIENT: 11.41 MMHG
ECHO MV PRESSURE HALF TIME (PHT): 88.62 MS
ECHO MV VTI: 38.38 CM
ECHO PV MAX VELOCITY: 81.08 CM/S
ECHO PV PEAK INSTANTANEOUS GRADIENT SYSTOLIC: 2.63 MMHG
ECHO RA AREA 4C: 10.34 CM2
ECHO RIGHT VENTRICULAR SYSTOLIC PRESSURE (RVSP): 30.63 MMHG
ECHO TV REGURGITANT MAX VELOCITY: 257.75 CM/S
ECHO TV REGURGITANT PEAK GRADIENT: 27.63 MMHG
EOSINOPHIL # BLD: 0.1 K/UL (ref 0–0.4)
EOSINOPHIL NFR BLD: 1 % (ref 0–7)
ERYTHROCYTE [DISTWIDTH] IN BLOOD BY AUTOMATED COUNT: 14.6 % (ref 11.5–14.5)
GLOBULIN SER CALC-MCNC: 4 G/DL (ref 2–4)
GLUCOSE BLD STRIP.AUTO-MCNC: 190 MG/DL (ref 65–117)
GLUCOSE BLD STRIP.AUTO-MCNC: 243 MG/DL (ref 65–117)
GLUCOSE BLD STRIP.AUTO-MCNC: 259 MG/DL (ref 65–117)
GLUCOSE BLD STRIP.AUTO-MCNC: 287 MG/DL (ref 65–117)
GLUCOSE SERPL-MCNC: 268 MG/DL (ref 65–100)
HCT VFR BLD AUTO: 31.2 % (ref 35–47)
HGB BLD-MCNC: 10.1 G/DL (ref 11.5–16)
IMM GRANULOCYTES # BLD AUTO: 0.1 K/UL (ref 0–0.04)
IMM GRANULOCYTES NFR BLD AUTO: 1 % (ref 0–0.5)
LA VOL DISK BP: 77.75 ML (ref 22–52)
LVOT MG: 1.45 MMHG
LYMPHOCYTES # BLD: 1.9 K/UL (ref 0.8–3.5)
LYMPHOCYTES NFR BLD: 15 % (ref 12–49)
MCH RBC QN AUTO: 30.1 PG (ref 26–34)
MCHC RBC AUTO-ENTMCNC: 32.4 G/DL (ref 30–36.5)
MCV RBC AUTO: 92.9 FL (ref 80–99)
MONOCYTES # BLD: 1 K/UL (ref 0–1)
MONOCYTES NFR BLD: 8 % (ref 5–13)
NEUTS SEG # BLD: 9.2 K/UL (ref 1.8–8)
NEUTS SEG NFR BLD: 75 % (ref 32–75)
NRBC # BLD: 0 K/UL (ref 0–0.01)
NRBC BLD-RTO: 0 PER 100 WBC
P-R INTERVAL, ECG05: 170 MS
PLATELET # BLD AUTO: 106 K/UL (ref 150–400)
PMV BLD AUTO: 11.2 FL (ref 8.9–12.9)
POTASSIUM SERPL-SCNC: 3.8 MMOL/L (ref 3.5–5.1)
PROT SERPL-MCNC: 6 G/DL (ref 6.4–8.2)
Q-T INTERVAL, ECG07: 360 MS
QRS DURATION, ECG06: 104 MS
QTC CALCULATION (BEZET), ECG08: 466 MS
RBC # BLD AUTO: 3.36 M/UL (ref 3.8–5.2)
SAMPLES BEING HELD,HOLD: NORMAL
SERVICE CMNT-IMP: ABNORMAL
SODIUM SERPL-SCNC: 139 MMOL/L (ref 136–145)
VENTRICULAR RATE, ECG03: 101 BPM
WBC # BLD AUTO: 12.3 K/UL (ref 3.6–11)

## 2021-07-18 PROCEDURE — 65660000000 HC RM CCU STEPDOWN

## 2021-07-18 PROCEDURE — 99232 SBSQ HOSP IP/OBS MODERATE 35: CPT | Performed by: INTERNAL MEDICINE

## 2021-07-18 PROCEDURE — 74011000258 HC RX REV CODE- 258: Performed by: STUDENT IN AN ORGANIZED HEALTH CARE EDUCATION/TRAINING PROGRAM

## 2021-07-18 PROCEDURE — 82962 GLUCOSE BLOOD TEST: CPT

## 2021-07-18 PROCEDURE — 74011250637 HC RX REV CODE- 250/637: Performed by: GENERAL ACUTE CARE HOSPITAL

## 2021-07-18 PROCEDURE — 74011250637 HC RX REV CODE- 250/637: Performed by: INTERNAL MEDICINE

## 2021-07-18 PROCEDURE — 74011250636 HC RX REV CODE- 250/636: Performed by: INTERNAL MEDICINE

## 2021-07-18 PROCEDURE — 74011636637 HC RX REV CODE- 636/637: Performed by: INTERNAL MEDICINE

## 2021-07-18 PROCEDURE — 74011250636 HC RX REV CODE- 250/636: Performed by: STUDENT IN AN ORGANIZED HEALTH CARE EDUCATION/TRAINING PROGRAM

## 2021-07-18 PROCEDURE — 85025 COMPLETE CBC W/AUTO DIFF WBC: CPT

## 2021-07-18 PROCEDURE — 77010033678 HC OXYGEN DAILY

## 2021-07-18 PROCEDURE — 80053 COMPREHEN METABOLIC PANEL: CPT

## 2021-07-18 PROCEDURE — 74011636637 HC RX REV CODE- 636/637: Performed by: GENERAL ACUTE CARE HOSPITAL

## 2021-07-18 PROCEDURE — 36415 COLL VENOUS BLD VENIPUNCTURE: CPT

## 2021-07-18 PROCEDURE — 94660 CPAP INITIATION&MGMT: CPT

## 2021-07-18 RX ORDER — GLIPIZIDE 5 MG/1
5 TABLET ORAL
Status: DISCONTINUED | OUTPATIENT
Start: 2021-07-18 | End: 2021-07-24 | Stop reason: HOSPADM

## 2021-07-18 RX ORDER — FUROSEMIDE 10 MG/ML
40 INJECTION INTRAMUSCULAR; INTRAVENOUS 2 TIMES DAILY
Status: DISCONTINUED | OUTPATIENT
Start: 2021-07-18 | End: 2021-07-18

## 2021-07-18 RX ORDER — FUROSEMIDE 10 MG/ML
40 INJECTION INTRAMUSCULAR; INTRAVENOUS DAILY
Status: DISCONTINUED | OUTPATIENT
Start: 2021-07-19 | End: 2021-07-24 | Stop reason: HOSPADM

## 2021-07-18 RX ORDER — INSULIN GLARGINE 100 [IU]/ML
40 INJECTION, SOLUTION SUBCUTANEOUS
Status: DISCONTINUED | OUTPATIENT
Start: 2021-07-18 | End: 2021-07-19

## 2021-07-18 RX ADMIN — INSULIN GLARGINE 40 UNITS: 100 INJECTION, SOLUTION SUBCUTANEOUS at 22:10

## 2021-07-18 RX ADMIN — CARVEDILOL 12.5 MG: 12.5 TABLET, FILM COATED ORAL at 17:34

## 2021-07-18 RX ADMIN — HEPARIN SODIUM 5000 UNITS: 5000 INJECTION INTRAVENOUS; SUBCUTANEOUS at 00:32

## 2021-07-18 RX ADMIN — HEPARIN SODIUM 5000 UNITS: 5000 INJECTION INTRAVENOUS; SUBCUTANEOUS at 09:26

## 2021-07-18 RX ADMIN — ACETAMINOPHEN 650 MG: 325 TABLET ORAL at 22:10

## 2021-07-18 RX ADMIN — ASPIRIN 81 MG: 81 TABLET, COATED ORAL at 09:26

## 2021-07-18 RX ADMIN — HEPARIN SODIUM 5000 UNITS: 5000 INJECTION INTRAVENOUS; SUBCUTANEOUS at 17:35

## 2021-07-18 RX ADMIN — Medication 10 ML: at 17:35

## 2021-07-18 RX ADMIN — HEPARIN SODIUM 5000 UNITS: 5000 INJECTION INTRAVENOUS; SUBCUTANEOUS at 23:29

## 2021-07-18 RX ADMIN — INSULIN LISPRO 5 UNITS: 100 INJECTION, SOLUTION INTRAVENOUS; SUBCUTANEOUS at 11:31

## 2021-07-18 RX ADMIN — INSULIN LISPRO 2 UNITS: 100 INJECTION, SOLUTION INTRAVENOUS; SUBCUTANEOUS at 22:10

## 2021-07-18 RX ADMIN — INSULIN LISPRO 5 UNITS: 100 INJECTION, SOLUTION INTRAVENOUS; SUBCUTANEOUS at 17:35

## 2021-07-18 RX ADMIN — CARVEDILOL 12.5 MG: 12.5 TABLET, FILM COATED ORAL at 09:25

## 2021-07-18 RX ADMIN — ATORVASTATIN CALCIUM 20 MG: 20 TABLET, FILM COATED ORAL at 22:09

## 2021-07-18 RX ADMIN — Medication 10 ML: at 05:56

## 2021-07-18 RX ADMIN — CEFAZOLIN 1 G: 1 INJECTION, POWDER, FOR SOLUTION INTRAMUSCULAR; INTRAVENOUS at 22:07

## 2021-07-18 RX ADMIN — INSULIN LISPRO 2 UNITS: 100 INJECTION, SOLUTION INTRAVENOUS; SUBCUTANEOUS at 09:48

## 2021-07-18 RX ADMIN — Medication 10 ML: at 22:16

## 2021-07-18 RX ADMIN — FUROSEMIDE 40 MG: 10 INJECTION, SOLUTION INTRAMUSCULAR; INTRAVENOUS at 11:30

## 2021-07-18 RX ADMIN — GLIPIZIDE 5 MG: 5 TABLET ORAL at 17:50

## 2021-07-18 RX ADMIN — CEFAZOLIN 1 G: 1 INJECTION, POWDER, FOR SOLUTION INTRAMUSCULAR; INTRAVENOUS at 09:25

## 2021-07-18 NOTE — PROGRESS NOTES
Received message from patient's nurse stating:  pt had breathing treatment earlier. still increased work of breathing. NT pro BNP sent. coarse lung sounds           Discussion / orders:    proBNP further deteriorated and is now 18,351  Chest x-ray shows unchanged pulmonary edema  · Ordered Lasix 80 mg IV x1 now         Please note that this note was dictated using Dragon computer voice recognition software. Quite often unanticipated grammatical, syntax, homophones, and other interpretive errors are inadvertently transcribed by the computer software. Please disregard these errors. Please excuse any errors that have escaped final proofreading.

## 2021-07-18 NOTE — PROGRESS NOTES
MSSA in blood cultures. Stopped vanc. Start cefazolin 1gm q8h  CBC and CMP ordered for today as no labs for today present. TTE pending read. VALERIA is needed. Even if no leads vegetation present, the pacemaker is likely needed to be removed as it is presumed infected given Staph aureus bacteremia. Please have cardiology to evaluate. F/u repeat blood cultures. Perferctserve with Qs.        Jeff Paez MD  Infectious Diseases

## 2021-07-18 NOTE — PROGRESS NOTES
7/18/2021 11:33 AM    Admit Date: 7/16/2021    Admit Diagnosis: [facility]epsis ([facility] [A41.9]    Subjective:     No cardiac events.     Visit Vitals  /60 (BP 1 Location: Left upper arm, BP Patient Position: At rest)   Pulse 79   Temp 97.3 °F (36.3 °C)   Resp 25   Ht 5' 6\" (1.676 m)   Wt 164 lb 0.4 oz (74.4 kg)   SpO2 94%   BMI 26.47 kg/m²     Current Facility-Administered Medications   Medication Dose Route Frequency    ceFAZolin (ANCEF) 1 g in 0.9% sodium chloride (MBP/ADV) 50 mL MBP  1 g IntraVENous Q12H    furosemide (LASIX) injection 40 mg  40 mg IntraVENous BID    LORazepam (ATIVAN) injection 1 mg  1 mg IntraVENous Q6H PRN    heparin (porcine) injection 5,000 Units  5,000 Units SubCUTAneous Q8H    haloperidol lactate (HALDOL) injection 3 mg  3 mg IntraVENous ONCE    aspirin delayed-release tablet 81 mg  81 mg Oral DAILY    atorvastatin (LIPITOR) tablet 20 mg  20 mg Oral QHS    carvediloL (COREG) tablet 12.5 mg  12.5 mg Oral BID WITH MEALS    sodium chloride (NS) flush 5-40 mL  5-40 mL IntraVENous Q8H    sodium chloride (NS) flush 5-40 mL  5-40 mL IntraVENous PRN    acetaminophen (TYLENOL) tablet 650 mg  650 mg Oral Q6H PRN    Or    acetaminophen (TYLENOL) suppository 650 mg  650 mg Rectal Q6H PRN    polyethylene glycol (MIRALAX) packet 17 g  17 g Oral DAILY PRN    ondansetron (ZOFRAN ODT) tablet 4 mg  4 mg Oral Q8H PRN    Or    ondansetron (ZOFRAN) injection 4 mg  4 mg IntraVENous Q6H PRN    insulin lispro (HUMALOG) injection   SubCUTAneous AC&HS    glucose chewable tablet 16 g  4 Tablet Oral PRN    dextrose (D50W) injection syrg 12.5-25 g  12.5-25 g IntraVENous PRN    glucagon (GLUCAGEN) injection 1 mg  1 mg IntraMUSCular PRN         Objective:      Visit Vitals  /60 (BP 1 Location: Left upper arm, BP Patient Position: At rest)   Pulse 79   Temp 97.3 °F (36.3 °C)   Resp 25   Ht 5' 6\" (1.676 m)   Wt 164 lb 0.4 oz (74.4 kg)   SpO2 94%   BMI 26.47 kg/m²       Physical Exam:  Abdomen: soft, non-tender.  Bowel sounds normal.   Heart: regular rate and rhythm, S1, S2 normal, no murmur, click, rub or gallop  Lungs: clear to auscultation bilaterally    Data Review:   Labs:    Recent Results (from the past 24 hour(s))   ECHO ADULT COMPLETE    Collection Time: 07/17/21 11:45 AM   Result Value Ref Range    IVSd 1.57 (A) 0.60 - 0.90 cm    IVSs 1.86 cm    LVIDd 5.59 (A) 3.90 - 5.30 cm    LVIDs 4.75 cm    LVOT d 2.12 cm    LVPWd 1.42 (A) 0.60 - 0.90 cm    LVPWs 1.89 cm    LV Mass .8 (A) 67.0 - 162.0 g    LVOT Peak Gradient 2.52 mmHg    LVOT Peak Gradient 2.50 mmHg    Left Ventricular Outflow Tract Mean Gradient 1.45 mmHg    LVOT SV 52.3 mL    LVOT Peak Velocity 79.00 cm/s    LVOT Peak Velocity 79.39 cm/s    LVOT VTI 14.82 cm    RVSP 30.63 mmHg    Left Atrium Major Axis 4.43 cm    LA Volume 80.49 22.0 - 52.0 mL    LA Area 4C 19.21 cm2    LA Vol 2C 101.04 (A) 22.00 - 52.00 mL    LA Vol 4C 58.01 (A) 22.00 - 52.00 mL    LA Volume DISK BP 77.75 22.0 - 52.0 mL    Left Atrium to Aortic Root Ratio 0.86     Left Atrium to Aortic Root Ratio 0.86     Right Atrial Area 4C 10.34 cm2    Est. RA Pressure 3.00 mmHg    AV Cusp 1.18 cm    Aortic Valve Area by Continuity of Peak Velocity 1.33 cm2    Aortic Valve Area by Continuity of Peak Velocity 1.33 cm2    Aortic Valve Area by Continuity of Peak Velocity 1.31 cm2    Aortic Valve Area by Continuity of Peak Velocity 1.30 cm2    Aortic Valve Area by Continuity of VTI 1.57 cm2    AoV PG 18.26 mmHg    AoV PG 17.68 mmHg    Aortic Valve Systolic Mean Gradient 18.12 mmHg    Aortic Valve Systolic Peak Velocity 702.04 cm/s    Aortic Valve Systolic Peak Velocity 132.61 cm/s    AoV VTI 33.27 cm    MV A Arsalan 70.48 cm/s    Mitral Valve E Wave Deceleration Time 132.10 ms    MV E Arsalan 79.03 cm/s    E/E' ratio (averaged) 16.29     E/E' lateral 14.24     E/E' septal 18.34     LV E' Lateral Velocity 5.55 cm/s    LV E' Septal Velocity 4.31 cm/s    Mitral Valve Pressure Half-time 88.62 ms    MVA (PHT) 2.48 cm2    MVA VTI 1.36 cm2    MV Peak Gradient 11.41 mmHg    MV Mean Gradient 5.75 mmHg    Mitral Valve Max Velocity 168.79 cm/s    Mitral Valve Annulus Velocity Time Integral 38.38 cm    Pulmonic Valve Systolic Peak Instantaneous Gradient 2.63 mmHg    Pulmonic Valve Max Velocity 81.08 cm/s    Triscuspid Valve Regurgitation Peak Gradient 27.63 mmHg    TR Max Velocity 257.75 cm/s    MV E/A 1.12     LV Mass AL Index 203.6 43.0 - 95.0 g/m2    Left Atrium Minor Axis 2.37 cm    LA Vol Index 43.04 16.00 - 28.00 ml/m2    LA Vol Index 54.03 16.00 - 28.00 ml/m2    LA Vol Index 31.02 16.00 - 28.00 ml/m2    BRAYDON/BSA VTI 0.8 cm2/m2   CULTURE, BLOOD    Collection Time: 07/17/21  2:17 PM    Specimen: Blood   Result Value Ref Range    Special Requests: NO SPECIAL REQUESTS      Culture result: NO GROWTH AFTER 17 HOURS     CULTURE, BLOOD    Collection Time: 07/17/21  2:17 PM    Specimen: Blood   Result Value Ref Range    Special Requests: NO SPECIAL REQUESTS      Culture result: NO GROWTH AFTER 17 HOURS     GLUCOSE, POC    Collection Time: 07/17/21  4:30 PM   Result Value Ref Range    Glucose (POC) 237 (H) 65 - 117 mg/dL    Performed by Luzma Leija RN    GLUCOSE, POC    Collection Time: 07/17/21  8:33 PM   Result Value Ref Range    Glucose (POC) 249 (H) 65 - 117 mg/dL    Performed by Scarlett KING    NT-PRO BNP    Collection Time: 07/17/21  9:05 PM   Result Value Ref Range    NT pro-BNP 18,351 (H) <450 PG/ML   GLUCOSE, POC    Collection Time: 07/18/21  7:38 AM   Result Value Ref Range    Glucose (POC) 190 (H) 65 - 117 mg/dL    Performed by Kush Howard RN    GLUCOSE, POC    Collection Time: 07/18/21 10:50 AM   Result Value Ref Range    Glucose (POC) 287 (H) 65 - 117 mg/dL    Performed by Kush Howard RN    CBC WITH AUTOMATED DIFF    Collection Time: 07/18/21 11:15 AM   Result Value Ref Range    WBC 12.3 (H) 3.6 - 11.0 K/uL    RBC 3.36 (L) 3.80 - 5.20 M/uL    HGB 10.1 (L) 11.5 - 16.0 g/dL HCT 31.2 (L) 35.0 - 47.0 %    MCV 92.9 80.0 - 99.0 FL    MCH 30.1 26.0 - 34.0 PG    MCHC 32.4 30.0 - 36.5 g/dL    RDW 14.6 (H) 11.5 - 14.5 %    PLATELET 565 (L) 568 - 400 K/uL    MPV 11.2 8.9 - 12.9 FL    NRBC 0.0 0  WBC    ABSOLUTE NRBC 0.00 0.00 - 0.01 K/uL    NEUTROPHILS 75 32 - 75 %    LYMPHOCYTES 15 12 - 49 %    MONOCYTES 8 5 - 13 %    EOSINOPHILS 1 0 - 7 %    BASOPHILS 0 0 - 1 %    IMMATURE GRANULOCYTES 1 (H) 0.0 - 0.5 %    ABS. NEUTROPHILS 9.2 (H) 1.8 - 8.0 K/UL    ABS. LYMPHOCYTES 1.9 0.8 - 3.5 K/UL    ABS. MONOCYTES 1.0 0.0 - 1.0 K/UL    ABS. EOSINOPHILS 0.1 0.0 - 0.4 K/UL    ABS. BASOPHILS 0.1 0.0 - 0.1 K/UL    ABS. IMM. GRANS. 0.1 (H) 0.00 - 0.04 K/UL    DF AUTOMATED         Telemetry: SR      Assessment:     Principal Problem:    Sepsis ([facility] (7/16/2021)    Active Problems:    Physical debility (10/27/2017)      Type 2 diabetes with nephropathy ([facility] (2/15/2018)      CKD (chronic kidney disease) stage 3, GFR 30-59 ml/min ([facility]) (6/4/2019)      PAF (paroxysmal atrial fibrillation) ([facility] (9/10/2020)      Dilated cardiomyopathy ([facility] (9/23/2020)      AICD (automatic cardioverter/defibrillator) present (9/23/2020)      Overview: 9/23/2020 [employer] AICD implant      Type 2 MI (myocardial infarction) ([facility] (7/16/2021)      Overview: 7/162021 r/t sepsis      Systolic heart failure, chronic ([facility] (7/16/2021)        Plan:     Non specific troponin due to sespsis  Echo pending.      Pulmonary edema, DCM dilated with AICD: (EF 25-30%)  -ve fluid balance. Diuretic. Continue on Coreg.  ARB on hold with ARF

## 2021-07-18 NOTE — PROGRESS NOTES
Dr. Sabrina Vu notified  Perico [place] requesting restarted pt's home diabetes medication to better manage her BG since continues to run high-januvia, glipizide and ozempic. Okay to restart glipizide 5mg BID.

## 2021-07-18 NOTE — PROGRESS NOTES
Subjective:     Dallas Lamb is a 79 y.o. female who presents today with the following:  Chief Complaint   Patient presents with    Diabetes     f/u       Patient Active Problem List   Diagnosis Code    Hypercholesterolemia E78.00    Arthritis M19.90    Diabetes ([facility] [address] E11.9    IBS (irritable bowel syndrome) K58.9    Hemorrhoid K64.9    Chronic pain P90.37    Neutrophilic leukocytosis P38.8    SOB (shortness of breath) R06.02    Abdominal pain, generalized R10.84    Diarrhea in adult patient R19.7    Cigarette smoker F17.210    Diverticulitis large intestine w/o perforation or abscess w/o bleeding K57.32    Hyperkalemia E87.5    Altered mental status R41.82    Transaminitis R74.01    Acute renal failure (ARF) (formerly [facility]) N17.9    Acute encephalopathy G93.40    Overdose of opiate or related narcotic, accidental or unintentional, subsequent encounter T40.601D    Acute left-sided low back pain with sciatica M54.40    Physical debility R53.81    Type 2 diabetes with nephropathy (formerly [facility]) E11.21    Type 2 diabetes mellitus with diabetic neuropathy (formerly [facility]) E11.40    Lymphocytosis D72.820    CKD (chronic kidney disease) stage 3, GFR 30-59 ml/min (formerly [facility]) N18.30    PAF (paroxysmal atrial fibrillation) (formerly [facility]) I48.0    Respiratory failure (formerly [facility]) J96.90    CHF (congestive heart failure) (formerly [facility]) I50.9    Hypoxia R09.02    Bilateral pleural effusion J90    Elevated troponin R77.8    Dilated cardiomyopathy ([facility]) I42.0    SSS (sick sinus syndrome) (formerly [facility]) I49.5    Cardiomyopathy (formerly [facility]) I42.9    AICD (automatic cardioverter/defibrillator) present Z95.810    Spinal stenosis of lumbar region with neurogenic claudication M48.062    Spondylosis of lumbosacral region without myelopathy or radiculopathy M47.817    Moderate episode of recurrent major depressive disorder (formerly [facility]) F33.1    Sepsis (formerly [facility]) A41.9    Type 2 MI (myocardial infarction) ([facility] I21. A1    Systolic heart failure, chronic (formerly [facility]) I50.22 COMPLIANT WITH MEDICATION:   HTN; Denies chest pain, dyspnea, palpitations, headache and blurred vision. Blood pressure normotensive. DM:  Diabetes. Sugars controlled poorly BG rrange 180-210 . Had spaghetti  With mixed vegetable up to 252. Trying to eat healthier. Hypoglycemia: none  Tolerating current treatment well  Current medications include diet  Glipizide, ozempic, Januvia  And diet. Lab Results   Component Value Date/Time    Hemoglobin A1c 9.1 (H) 06/14/2021 09:17 PM    Hemoglobin A1c 6.4 (H) 10/29/2020 10:22 AM    Hemoglobin A1c 7.8 (H) 06/29/2020 12:03 PM    Glucose 248 (H) 07/17/2021 03:02 AM    Glucose (POC) 190 (H) 07/18/2021 07:38 AM    Microalb/Creat ratio (ug/mg creat.) 1,568.2 (H) 05/07/2018 11:49 AM    MICROALBUMIN,MG/DAY Comment 11/15/2017 12:12 PM    Microalbumin/creat ratio (POC)  06/14/2021 04:31 PM    LDL,Direct 137 (H) 06/14/2021 09:17 PM    LDL, calculated Not calculated due to elevated triglyceride level 06/14/2021 09:17 PM    Creatinine 1.81 (H) 07/17/2021 03:02 AM     Lab Results   Component Value Date/Time    Microalb/Creat ratio (ug/mg creat.) 1,568.2 (H) 05/07/2018 11:49 AM    MICROALBUMIN,MG/DAY Comment 11/15/2017 12:12 PM       Last eye exam performed  greather than 1 year 4/24/2020 and was normal.    Last foot exam 06/20/2021  performed less than 1 year ago.   warm, good capillary refill    Lab Results   Component Value Date/Time    Microalb/Creat ratio (ug/mg creat.) 1,568.2 (H) 05/07/2018 11:49 AM    MICROALBUMIN,MG/DAY Comment 11/15/2017 12:12 PM             depression screening addressed not at risk    abuse screening addressed denies    learning assessment addressed reviewed nurses notes    fall risk addressed not at risk    HM: addressed    ROS:  Gen: denies fever, chills, fatigue, weight loss, weight gain  HEENT:denies blurry vision, nasal congestion, sore throat  Resp: denies dypsnea, cough, wheezing  CV: denies chest pain radiating to the jaws or arms, palpitations, lower extremity edema  Abd: denies nausea, vomiting, diarrhea, constipation  Neuro: denies numbness/tingling  Endo: denies polyuria, polydipsia, heat/cold intolerance  Heme: no lymphadenopathy    Allergies   Allergen Reactions    Morphine Anaphylaxis    Ultram [Tramadol] Palpitations       No current facility-administered medications for this visit. No current outpatient medications on file.     Facility-Administered Medications Ordered in Other Visits:     ceFAZolin (ANCEF) 1 g in 0.9% sodium chloride (MBP/ADV) 50 mL MBP, 1 g, IntraVENous, Q12H, Zabrina Lenz MD, Last Rate: 100 mL/hr at 07/18/21 0925, 1 g at 07/18/21 0925    furosemide (LASIX) injection 40 mg, 40 mg, IntraVENous, BID, Hernán Patterson MD    LORazepam (ATIVAN) injection 1 mg, 1 mg, IntraVENous, Q6H PRN, Evangelina Tarango ACNP, 1 mg at 07/17/21 2125    heparin (porcine) injection 5,000 Units, 5,000 Units, SubCUTAneous, Q8H, Hernán Patterson MD, 5,000 Units at 07/18/21 0926    haloperidol lactate (HALDOL) injection 3 mg, 3 mg, IntraVENous, ONCE, Evangelina Tarango, ACNP    aspirin delayed-release tablet 81 mg, 81 mg, Oral, DAILY, Hallie Fairbanks MD, 81 mg at 07/18/21 0926    atorvastatin (LIPITOR) tablet 20 mg, 20 mg, Oral, QHS, Cesar Palafox MD, 20 mg at 07/17/21 2136    carvediloL (COREG) tablet 12.5 mg, 12.5 mg, Oral, BID WITH MEALS, Hlalie Fairbanks MD, 12.5 mg at 07/18/21 0925    sodium chloride (NS) flush 5-40 mL, 5-40 mL, IntraVENous, Q8H, Cesar Glover MD, 10 mL at 07/18/21 0556    sodium chloride (NS) flush 5-40 mL, 5-40 mL, IntraVENous, PRN, Hallie Fairbanks MD    acetaminophen (TYLENOL) tablet 650 mg, 650 mg, Oral, Q6H PRN **OR** acetaminophen (TYLENOL) suppository 650 mg, 650 mg, Rectal, Q6H PRN, Hallie Fairbanks MD, 650 mg at 07/17/21 0433    polyethylene glycol (MIRALAX) packet 17 g, 17 g, Oral, DAILY PRN, Hallie Fairbanks MD    ondansetron (ZOFRAN ODT) tablet 4 mg, 4 mg, Oral, Q8H PRN **OR** ondansetron (ZOFRAN) injection 4 mg, 4 mg, IntraVENous, Q6H PRN, Tiana Vieyra MD    insulin lispro (HUMALOG) injection, , SubCUTAneous, AC&HS, Tiana Vieyra MD, 2 Units at 07/18/21 0948    glucose chewable tablet 16 g, 4 Tablet, Oral, PRN, Tiana Vieyra MD    dextrose (D50W) injection syrg 12.5-25 g, 12.5-25 g, IntraVENous, PRN, Tiana Vieyra MD    glucagon (GLUCAGEN) injection 1 mg, 1 mg, IntraMUSCular, PRN, Tiana Vieyra MD    Past Medical History:   Diagnosis Date    A-fib [facility])     AICD (automatic cardioverter/defibrillator) present 9/23/2020 9/23/2020 [employer] AICD implant    Arthritis     Bilateral pleural effusion 9/18/2020    Chronic pain     Chronic pain     Diabetes ([facility]     Diverticular disease     Elevated troponin 9/18/2020    Hemorrhoid     Hypercholesterolemia     IBS (irritable bowel syndrome)     Lymphocytosis 95/00/8009    Systolic heart failure, chronic ([facility] 7/16/2021    Type 2 MI (myocardial infarction) ([facility] 7/16/2021 7/162021 r/t sepsis       Past Surgical History:   Procedure Laterality Date    COLONOSCOPY N/A 10/31/2019    [facility]  10/31/2019         COLONOSCOPY,PAKO LEUNG,SNARE  10/31/2019         HX CATARACT REMOVAL      HX CHOLECYSTECTOMY      HX COLONOSCOPY  2009    HX COLONOSCOPY  2016    2 adenomatous polyp    HX HEMORRHOIDECTOMY  2009    HX HYSTERECTOMY      HX KNEE REPLACEMENT      right    HX ORTHOPAEDIC  12/11/2017    back, laminectomy and decompression    IL INSJ/RPLCMT PERM DFB W/TRNSVNS LDS 1/DUAL CHMBR N/A 9/23/2020    INSERT ICD DUAL performed [facility] Aline Simpson MD [facility] CARDIAC CATH LAB       Social History     Tobacco Use   Smoking Status Current Every Day Smoker    Packs/day: 1.00    Years: 55.00    Pack years: 55.00    Types: Cigarettes   Smokeless Tobacco Never Used       Social History     Socioeconomic History    Marital status:      Spouse name: Not on file    Number of children: Not on file    Years of education: Not on file    Highest education level: Not on file   Occupational History    Occupation: retired     Comment: LPN   Tobacco Use    Smoking status: Current Every Day Smoker     Packs/day: 1.00     Years: 55.00     Pack years: 55.00     Types: Cigarettes    Smokeless tobacco: Never Used   Vaping Use    Vaping Use: Never used   Substance and Sexual Activity    Alcohol use: No    Drug use: Never   Other Topics Concern     Service No    Blood Transfusions Yes    Caffeine Concern Yes    Occupational Exposure No    Hobby Hazards No    Sleep Concern Yes    Stress Concern Yes    Weight Concern No    Special Diet No    Back Care Yes    Exercise No    Bike Helmet No     Comment: n/a    Seat Belt Yes    Self-Exams Yes     Social Determinants of Health     Financial Resource Strain:     Difficulty of Paying Living Expenses:    Food Insecurity:     Worried About Running Out of Food in the Last Year:     Ran Out of Food in the Last Year:    Transportation Needs:     Lack of Transportation (Medical):      Lack of Transportation (Non-Medical):    Physical Activity:     Days of Exercise per Week:     Minutes of Exercise per Session:    Stress:     Feeling of Stress :    Social Connections:     Frequency of Communication with Friends and Family:     Frequency of Social Gatherings with Friends and Family:     Attends Sabianist Services:     Active Member of Clubs or Organizations:     Attends Club or Organization Meetings:     Marital Status:        Family History   Problem Relation Age of Onset    Colon Cancer Father     Diabetes Mother          Objective:     Visit Vitals  /62 (BP 1 Location: Right upper arm, BP Patient Position: Sitting, BP Cuff Size: Adult)   Pulse 77   Temp 96.8 °F (36 °C) (Temporal)   Resp 20   Ht 5' 6\" (1.676 m)   Wt 160 lb 6.4 oz (72.8 kg)   SpO2 95%   BMI 25.89 kg/m²     Body mass index is 25.89 kg/m². General: Alert and oriented. No acute distress. Well nourished  HEENT :  Ears:TMs are normal. Canals are clear. Eyes: pupils equal, round, react to light and accommodation. Extra ocular movements intact. Nose: patent. Mouth and throat is clear. Neck:supple full range of motion no thyromegaly. Trachea midline, No carotid bruits. No significant lymphadenopathy  Lungs[de-identified] clear to auscultation without wheezes, rales, or rhonchi. Heart :RRR, S1 & S2 are normal intensity. No murmur; no gallop  Abdomen: bowel sounds active. No tenderness, guarding, rebound, masses, hepatic or spleen enlargement  Back: no CVA tenderness. Extremities: without clubbing, cyanosis, or edema  Pulses: radial and femoral pulses are normal  Neuro: HMF intact. Cranial nerves II through XII grossly normal.  Motor: is 5 over 5 and symmetrical.   Deep tendon reflexes: +2 equal  Psych:appropriate behavior, mood, affect and judgement. Results for orders placed or performed in visit on 84/20/56   METABOLIC PANEL, COMPREHENSIVE   Result Value Ref Range    Sodium 135 (L) 136 - 145 mmol/L    Potassium 4.9 3.5 - 5.1 mmol/L    Chloride 103 97 - 108 mmol/L    CO2 23 21 - 32 mmol/L    Anion gap 9 5 - 15 mmol/L    Glucose 294 (H) 65 - 100 mg/dL    BUN 39 (H) 6 - 20 MG/DL    Creatinine 1.71 (H) 0.55 - 1.02 MG/DL    BUN/Creatinine ratio 23 (H) 12 - 20      GFR est AA 35 (L) >60 ml/min/1.73m2    GFR est non-AA 29 (L) >60 ml/min/1.73m2    Calcium 9.7 8.5 - 10.1 MG/DL    Bilirubin, total 0.2 0.2 - 1.0 MG/DL    ALT (SGPT) 21 12 - 78 U/L    AST (SGOT) 12 (L) 15 - 37 U/L    Alk.  phosphatase 110 45 - 117 U/L    Protein, total 7.4 6.4 - 8.2 g/dL    Albumin 3.5 3.5 - 5.0 g/dL    Globulin 3.9 2.0 - 4.0 g/dL    A-G Ratio 0.9 (L) 1.1 - 2.2     LIPID PANEL   Result Value Ref Range    Cholesterol, total 243 (H) <200 MG/DL    Triglyceride 493 (H) <150 MG/DL    HDL Cholesterol 42 MG/DL    LDL, calculated Not calculated due to elevated triglyceride level 0 - 100 MG/DL    VLDL, calculated  MG/DL     Calculation not valid with this patient's other Lipid values. CHOL/HDL Ratio 5.8 (H) 0.0 - 5.0     HEMOGLOBIN A1C WITH EAG   Result Value Ref Range    Hemoglobin A1c 9.1 (H) 4.0 - 5.6 %    Est. average glucose 214 mg/dL   CBC WITH AUTOMATED DIFF   Result Value Ref Range    WBC 12.9 (H) 3.6 - 11.0 K/uL    RBC 4.15 3.80 - 5.20 M/uL    HGB 12.5 11.5 - 16.0 g/dL    HCT 39.8 35.0 - 47.0 %    MCV 95.9 80.0 - 99.0 FL    MCH 30.1 26.0 - 34.0 PG    MCHC 31.4 30.0 - 36.5 g/dL    RDW 13.6 11.5 - 14.5 %    PLATELET 200 310 - 134 K/uL    MPV 11.4 8.9 - 12.9 FL    NRBC 0.0 0  WBC    ABSOLUTE NRBC 0.00 0.00 - 0.01 K/uL    NEUTROPHILS 60 32 - 75 %    LYMPHOCYTES 30 12 - 49 %    MONOCYTES 6 5 - 13 %    EOSINOPHILS 2 0 - 7 %    BASOPHILS 1 0 - 1 %    IMMATURE GRANULOCYTES 1 (H) 0.0 - 0.5 %    ABS. NEUTROPHILS 8.0 1.8 - 8.0 K/UL    ABS. LYMPHOCYTES 3.8 (H) 0.8 - 3.5 K/UL    ABS. MONOCYTES 0.8 0.0 - 1.0 K/UL    ABS. EOSINOPHILS 0.3 0.0 - 0.4 K/UL    ABS. BASOPHILS 0.1 0.0 - 0.1 K/UL    ABS. IMM. GRANS. 0.1 (H) 0.00 - 0.04 K/UL    DF AUTOMATED     LDL, DIRECT   Result Value Ref Range    LDL,Direct 137 (H) 0 - 100 mg/dl   AMB POC URINE, MICROALBUMIN, SEMIQUANT (3 RESULTS)   Result Value Ref Range    ALBUMIN, URINE  Negative mg/L    CREATININE, URINE  mg/dL    Microalbumin/creat ratio (POC)  <30 MG/G       No results found for this visit on 07/14/21. Assessment/ Plan:     There are no diagnoses linked to this encounter. No orders of the defined types were placed in this encounter. Verbal and written instructions (see AVS) provided. Patient expresses understanding of diagnosis and treatment plan.     Health Maintenance Due   Topic Date Due    Shingrix Vaccine Age 49> (2 of 2) 10/15/2019               BERNARDO Lindsay

## 2021-07-18 NOTE — PROGRESS NOTES
Hospitalist Progress Note    NAME: Rafita Judd   :  1940   MRN:  [id number]       Assessment / Plan:  Staph aureus bacteremia  Severe sepsis,   Rule out meningitis  Septic metabolic encephalopathy  Initial sepsis work up did not reveal source of infection - UA negative, CXR negative, CT Abdo/Pelv negative. Pt was given 1 dose of Zosyn at outside facility and transferred here. Head CT is negative for acute abnormality  Status post lumbar puncture, no organisms seen, ampicillin acyclovir and ceftriaxone stopped. Continue vancomycin, follow-up blood culture sensitivity  Repeat blood cultures  2D echo is pending, if negative most likely will need VALERIA  Patient has a pacemaker, most likely will need to come out.   ID consulted, appreciate input   patient still lethargic, patient still spiking fevers this morning  :   patient is awake alert x3, afebrile  2D echo showed no vegetation, EF is 55 to 60%  We will get VALERIA  Acute respiratory failure with hypoxia secondary to pulmonary edema on   Overnight event noted, patient became hypoxic overnight, her chest x-ray showed pulmonary edema   fluid stopped, received IV Lasix overnight, currently on 2 L of oxygen via nasal cannula  Cards on board  : Patient was short of breath overnight, proBNP elevated   Will start Lasix 40 mg bid   Received 80 mg overnight    elevated troponin, concerning for NSTEMI  NSTEMI ruled out by cardiology, troponin leak most likely secondary to sepsis  Results noted    IVAN CKD stage III  Likely related to sepsis  Hold nephrotoxic meds  IVF    Creatinine in  was 1.7    Abnormal LFTs  Likely secondary to sepsis   LFTs are trending down    Hx of Afib  HLD  DMII  Diverticular disease  IBS  S/p AICD  Resume home meds  FS monitoring, SS  Blood sugar elevated, resume home dose Lantus at reduced dose  Med rec to be done in order to resume oral diabetic meds     Code Status: DNR  Surrogate Decision Maker:   DVT Prophylaxis:   GI Prophylaxis: not indicated  Baseline: Independent    25.0 - 29.9 Overweight / Body mass index is 26.47 kg/m². Estimated discharge date: July 22  Barriers: Clinical improvement       Subjective:     Chief Complaint / Reason for Physician Visit  Follow-up staff aureus bacteremia. She was short of breath overnight, now is alert oriented x3, reporting feeling better    Discussed with RN events overnight. Review of Systems:  Symptom Y/N Comments  Symptom Y/N Comments   Fever/Chills n   Chest Pain n    Poor Appetite    Edema     Cough    Abdominal Pain n    Sputum    Joint Pain     SOB/STANTON y   Pruritis/Rash     Nausea/vomit n   Tolerating PT/OT     Diarrhea    Tolerating Diet y    Constipation    Other       Could NOT obtain due to:      Objective:     VITALS:   Last 24hrs VS reviewed since prior progress note. Most recent are:  Patient Vitals for the past 24 hrs:   Temp Pulse Resp BP SpO2   07/18/21 0727 98.7 °F (37.1 °C) 79 22 (!) 142/50 95 %   07/18/21 0430 98.1 °F (36.7 °C) 79 21 120/82 100 %   07/18/21 0411     100 %   07/18/21 0400  79 29 (!) 108/52 100 %   07/18/21 0300  86 28 (!) 126/45 100 %   07/18/21 0200  97 25 (!) 150/56 100 %   07/18/21 0100  97 24 (!) 151/88 100 %   07/18/21 0000  99 24 (!) 129/102 100 %   07/17/21 2340 98.6 °F (37 °C) 93 (!) 36 (!) 168/65 100 %   07/17/21 2100 98.7 °F (37.1 °C) (!) 102 (!) 36 (!) 157/108 96 %   07/17/21 2000  (!) 105      07/17/21 1659 98.6 °F (37 °C) 90 28 (!) 151/59 97 %   07/17/21 1101 98 °F (36.7 °C) 87 25 (!) 147/120 97 %       Intake/Output Summary (Last 24 hours) at 7/18/2021 0952  Last data filed at 7/18/2021 0447  Gross per 24 hour   Intake    Output 1900 ml   Net -1900 ml        I had a face to face encounter and independently examined this patient on 7/18/2021, as outlined below:  PHYSICAL EXAM:  General: WD, WN. Awake  EENT:  EOMI. Anicteric sclerae. MMM  Resp:  CTA bilaterally, no wheezing or rales.   No accessory muscle use  CV:  Regular  rhythm,  No edema  GI:  Soft, Non distended, Non tender. +Bowel sounds  Neurologic:  Alert oriented x3, no focal deficit   psych:   Unable to assess   skin:  No rashes. No jaundice    Reviewed most current lab test results and cultures  YES  Reviewed most current radiology test results   YES  Review and summation of old records today    NO  Reviewed patient's current orders and MAR    YES  PMH/SH reviewed - no change compared to H&P  ________________________________________________________________________  Care Plan discussed with:    Comments   Patient x    Family      RN x    Care Manager     Consultant                        Multidiciplinary team rounds were held today with , nursing, pharmacist and clinical coordinator. Patient's plan of care was discussed; medications were reviewed and discharge planning was addressed. ________________________________________________________________________  Total NON critical care TIME:25 Minutes    Total CRITICAL CARE TIME Spent:   Minutes non procedure based      Comments   >50% of visit spent in counseling and coordination of care     ________________________________________________________________________  Khadijah uDval MD     Procedures: see electronic medical records for all procedures/Xrays and details which were not copied into this note but were reviewed prior to creation of Plan. LABS:  I reviewed today's most current labs and imaging studies.   Pertinent labs include:  Recent Labs     07/17/21  0302 07/16/21  1001 07/15/21  1937   WBC 19.7* 13.0* 16.7*   HGB 11.7 10.1* 13.2   HCT 36.8 30.8* 39.8   * 115* 170     Recent Labs     07/17/21  0302 07/16/21  1001 07/15/21  1937   * 136 131*   K 4.5 4.5 4.8   * 111* 98   CO2 17* 18* 19*   * 158* 199*   BUN 50* 53* 46*   CREA 1.81* 1.97* 2.41*   CA 8.0* 8.1* 9.3   MG  --   --  1.8   ALB 2.2* 2.0* 2.7*   TBILI 0.3 0.4 0.4   * 275* 173*   INR --   --  1.1       Signed: Christiana Mcburney, MD

## 2021-07-18 NOTE — PROGRESS NOTES
End of Shift Note    Bedside shift change report given to Rosa Barrios (oncoming nurse) by Reyes Limbo, HO (offgoing nurse). Report included the following information SBAR, Kardex, ED Summary, Med Rec Status and Cardiac Rhythm NSR    Shift worked:  7a-7p     Shift summary and any significant changes:          Concerns for physician to address:       Zone phone for oncoming shift:          Activity:  Activity Level: Bed Rest  Number times ambulated in hallways past shift: 0  Number of times OOB to chair past shift: 0    Cardiac:   Cardiac Monitoring: Yes      Cardiac Rhythm: Sinus Rhythm    Access:   Current line(s): PIV     Genitourinary:   Urinary status: incontinent    Respiratory:   O2 Device: None (Room air)  Chronic home O2 use?: NO  Incentive spirometer at bedside: NO     GI:  Last Bowel Movement Date: 07/18/21  Current diet:  ADULT DIET Regular  Passing flatus: YES  Tolerating current diet: YES       Pain Management:   Patient states pain is manageable on current regimen: YES    Skin:  Bryan Score: 15  Interventions: increase time out of bed    Patient Safety:  Fall Score:  Total Score: 4  Interventions: bed/chair alarm  High Fall Risk: Yes    Length of Stay:  Expected LOS: 4d 19h  Actual LOS: 2      Reyes Limbo, RN

## 2021-07-18 NOTE — PROGRESS NOTES
End of Shift Note    Bedside shift change report given to Magi Law RN (oncoming nurse) by Jeff Edward RN (offgoing nurse). Report included the following information SBAR, Kardex, Recent Results and Cardiac Rhythm SR    Shift worked:  7p- 7a      Shift summary and any significant changes:     pt is sob, tachypneic, anxious and agitated. On call NP notified   -  Order sat ProBNP, CXR, and 80 mg IV Lasix. - PRN Ativan given for agitation. -  2216:  On BIPAP    - Total urine output 1.4L     Concerns for physician to address:  SOB      Zone phone for oncoming shift:          Activity:  Activity Level: Bed Rest  Number times ambulated in hallways past shift: 0  Number of times OOB to chair past shift: 0    Cardiac:   Cardiac Monitoring: Yes      Cardiac Rhythm: Sinus Rhythm    Access:   Current line(s): PIV     Genitourinary:   Urinary status: allen    Respiratory:   O2 Device: BIPAP  Chronic home O2 use?: NO  Incentive spirometer at bedside: NO     GI:  Last Bowel Movement Date: 07/17/21  Current diet:  ADULT DIET Regular  Passing flatus: YES  Tolerating current diet: YES       Pain Management:   Patient states pain is manageable on current regimen: YES    Skin:  Bryan Score: 15  Interventions: limit briefs    Patient Safety:  Fall Score:  Total Score: 4  Interventions: bed/chair alarm and pt to call before getting OOB     High Fall Risk: Yes    Length of Stay:  Expected LOS: 4d 19h  Actual LOS: 2      Jeff Edward RN

## 2021-07-18 NOTE — PROGRESS NOTES
Received message from patient's nurse stating:    unable to tolerate bipap despite prn Ativan. Discussion / orders:    Haldol 3 mg iv x 1           Please note that this note was dictated using Dragon computer voice recognition software. Quite often unanticipated grammatical, syntax, homophones, and other interpretive errors are inadvertently transcribed by the computer software. Please disregard these errors. Please excuse any errors that have escaped final proofreading.

## 2021-07-18 NOTE — PROGRESS NOTES
Received message from patient's nurse stating / informing that Pt states \" she's having hard time to breath \"             Discussion / orders:    · Order chest x-ray stat  · Recheck proBNP stat  · Requested respiratory therapist evaluation         Please note that this note was dictated using Dragon computer voice recognition software. Quite often unanticipated grammatical, syntax, homophones, and other interpretive errors are inadvertently transcribed by the computer software. Please disregard these errors. Please excuse any errors that have escaped final proofreading.

## 2021-07-19 ENCOUNTER — APPOINTMENT (OUTPATIENT)
Dept: NON INVASIVE DIAGNOSTICS | Age: 81
DRG: 853 | End: 2021-07-19
Attending: INTERNAL MEDICINE
Payer: MEDICARE

## 2021-07-19 LAB
ALBUMIN SERPL-MCNC: 2.1 G/DL (ref 3.5–5)
ALBUMIN/GLOB SERPL: 0.5 {RATIO} (ref 1.1–2.2)
ALP SERPL-CCNC: 87 U/L (ref 45–117)
ALT SERPL-CCNC: 119 U/L (ref 12–78)
ANION GAP SERPL CALC-SCNC: 5 MMOL/L (ref 5–15)
AST SERPL-CCNC: 94 U/L (ref 15–37)
BACTERIA SPEC CULT: ABNORMAL
BACTERIA SPEC CULT: ABNORMAL
BILIRUB SERPL-MCNC: 0.4 MG/DL (ref 0.2–1)
BUN SERPL-MCNC: 45 MG/DL (ref 6–20)
BUN/CREAT SERPL: 33 (ref 12–20)
CALCIUM SERPL-MCNC: 8.6 MG/DL (ref 8.5–10.1)
CHLORIDE SERPL-SCNC: 112 MMOL/L (ref 97–108)
CO2 SERPL-SCNC: 23 MMOL/L (ref 21–32)
CREAT SERPL-MCNC: 1.37 MG/DL (ref 0.55–1.02)
ECHO AV AREA PLAN: 1.4 CM2
ERYTHROCYTE [DISTWIDTH] IN BLOOD BY AUTOMATED COUNT: 14.3 % (ref 11.5–14.5)
GLOBULIN SER CALC-MCNC: 4.2 G/DL (ref 2–4)
GLUCOSE BLD STRIP.AUTO-MCNC: 168 MG/DL (ref 65–117)
GLUCOSE BLD STRIP.AUTO-MCNC: 181 MG/DL (ref 65–117)
GLUCOSE BLD STRIP.AUTO-MCNC: 184 MG/DL (ref 65–117)
GLUCOSE BLD STRIP.AUTO-MCNC: 233 MG/DL (ref 65–117)
GLUCOSE SERPL-MCNC: 134 MG/DL (ref 65–100)
HCT VFR BLD AUTO: 29.4 % (ref 35–47)
HGB BLD-MCNC: 9.7 G/DL (ref 11.5–16)
MCH RBC QN AUTO: 29.5 PG (ref 26–34)
MCHC RBC AUTO-ENTMCNC: 33 G/DL (ref 30–36.5)
MCV RBC AUTO: 89.4 FL (ref 80–99)
NRBC # BLD: 0 K/UL (ref 0–0.01)
NRBC BLD-RTO: 0 PER 100 WBC
PLATELET # BLD AUTO: 139 K/UL (ref 150–400)
PMV BLD AUTO: 11.4 FL (ref 8.9–12.9)
POTASSIUM SERPL-SCNC: 3.6 MMOL/L (ref 3.5–5.1)
PROT SERPL-MCNC: 6.3 G/DL (ref 6.4–8.2)
RBC # BLD AUTO: 3.29 M/UL (ref 3.8–5.2)
SERVICE CMNT-IMP: ABNORMAL
SODIUM SERPL-SCNC: 140 MMOL/L (ref 136–145)
WBC # BLD AUTO: 12.2 K/UL (ref 3.6–11)

## 2021-07-19 PROCEDURE — 93325 DOPPLER ECHO COLOR FLOW MAPG: CPT | Performed by: INTERNAL MEDICINE

## 2021-07-19 PROCEDURE — 99152 MOD SED SAME PHYS/QHP 5/>YRS: CPT

## 2021-07-19 PROCEDURE — 97162 PT EVAL MOD COMPLEX 30 MIN: CPT

## 2021-07-19 PROCEDURE — 74011000250 HC RX REV CODE- 250: Performed by: INTERNAL MEDICINE

## 2021-07-19 PROCEDURE — 74011000258 HC RX REV CODE- 258: Performed by: STUDENT IN AN ORGANIZED HEALTH CARE EDUCATION/TRAINING PROGRAM

## 2021-07-19 PROCEDURE — 80053 COMPREHEN METABOLIC PANEL: CPT

## 2021-07-19 PROCEDURE — 97165 OT EVAL LOW COMPLEX 30 MIN: CPT

## 2021-07-19 PROCEDURE — 74011636637 HC RX REV CODE- 636/637: Performed by: GENERAL ACUTE CARE HOSPITAL

## 2021-07-19 PROCEDURE — 96374 THER/PROPH/DIAG INJ IV PUSH: CPT

## 2021-07-19 PROCEDURE — 74011250636 HC RX REV CODE- 250/636: Performed by: INTERNAL MEDICINE

## 2021-07-19 PROCEDURE — 85027 COMPLETE CBC AUTOMATED: CPT

## 2021-07-19 PROCEDURE — 82962 GLUCOSE BLOOD TEST: CPT

## 2021-07-19 PROCEDURE — 74011250637 HC RX REV CODE- 250/637: Performed by: INTERNAL MEDICINE

## 2021-07-19 PROCEDURE — 97116 GAIT TRAINING THERAPY: CPT

## 2021-07-19 PROCEDURE — 36415 COLL VENOUS BLD VENIPUNCTURE: CPT

## 2021-07-19 PROCEDURE — 97535 SELF CARE MNGMENT TRAINING: CPT

## 2021-07-19 PROCEDURE — 93312 ECHO TRANSESOPHAGEAL: CPT | Performed by: INTERNAL MEDICINE

## 2021-07-19 PROCEDURE — 74011250636 HC RX REV CODE- 250/636: Performed by: NURSE PRACTITIONER

## 2021-07-19 PROCEDURE — 74011250636 HC RX REV CODE- 250/636: Performed by: STUDENT IN AN ORGANIZED HEALTH CARE EDUCATION/TRAINING PROGRAM

## 2021-07-19 PROCEDURE — 74011636637 HC RX REV CODE- 636/637: Performed by: INTERNAL MEDICINE

## 2021-07-19 PROCEDURE — 77010033678 HC OXYGEN DAILY

## 2021-07-19 PROCEDURE — 74011250637 HC RX REV CODE- 250/637: Performed by: GENERAL ACUTE CARE HOSPITAL

## 2021-07-19 PROCEDURE — 99152 MOD SED SAME PHYS/QHP 5/>YRS: CPT | Performed by: INTERNAL MEDICINE

## 2021-07-19 PROCEDURE — 99232 SBSQ HOSP IP/OBS MODERATE 35: CPT | Performed by: INTERNAL MEDICINE

## 2021-07-19 PROCEDURE — 65660000000 HC RM CCU STEPDOWN

## 2021-07-19 PROCEDURE — 93320 DOPPLER ECHO COMPLETE: CPT | Performed by: INTERNAL MEDICINE

## 2021-07-19 RX ORDER — IBUPROFEN 200 MG
1 TABLET ORAL DAILY
Status: DISCONTINUED | OUTPATIENT
Start: 2021-07-19 | End: 2021-07-24 | Stop reason: HOSPADM

## 2021-07-19 RX ORDER — LOSARTAN POTASSIUM 100 MG/1
100 TABLET ORAL DAILY
Status: DISCONTINUED | OUTPATIENT
Start: 2021-07-20 | End: 2021-07-24 | Stop reason: HOSPADM

## 2021-07-19 RX ORDER — ERGOCALCIFEROL 1.25 MG/1
50000 CAPSULE ORAL
COMMUNITY
End: 2022-06-02

## 2021-07-19 RX ORDER — MULTIVIT WITH MINERALS/HERBS
1 TABLET ORAL DAILY
COMMUNITY

## 2021-07-19 RX ORDER — HYDRALAZINE HYDROCHLORIDE 20 MG/ML
10 INJECTION INTRAMUSCULAR; INTRAVENOUS
Status: DISCONTINUED | OUTPATIENT
Start: 2021-07-19 | End: 2021-07-24 | Stop reason: HOSPADM

## 2021-07-19 RX ORDER — FENTANYL CITRATE 50 UG/ML
12.5-5 INJECTION, SOLUTION INTRAMUSCULAR; INTRAVENOUS
Status: DISCONTINUED | OUTPATIENT
Start: 2021-07-19 | End: 2021-07-19

## 2021-07-19 RX ORDER — LIDOCAINE HYDROCHLORIDE 20 MG/ML
15 SOLUTION OROPHARYNGEAL AS NEEDED
Status: DISCONTINUED | OUTPATIENT
Start: 2021-07-19 | End: 2021-07-19

## 2021-07-19 RX ORDER — VENLAFAXINE HYDROCHLORIDE 37.5 MG/1
37.5 CAPSULE, EXTENDED RELEASE ORAL 2 TIMES DAILY
COMMUNITY
End: 2021-08-03 | Stop reason: CLARIF

## 2021-07-19 RX ORDER — CARVEDILOL 12.5 MG/1
25 TABLET ORAL 2 TIMES DAILY WITH MEALS
Status: DISCONTINUED | OUTPATIENT
Start: 2021-07-19 | End: 2021-07-24 | Stop reason: HOSPADM

## 2021-07-19 RX ORDER — ERGOCALCIFEROL 1.25 MG/1
50000 CAPSULE ORAL
Status: DISCONTINUED | OUTPATIENT
Start: 2021-07-21 | End: 2021-07-24 | Stop reason: HOSPADM

## 2021-07-19 RX ORDER — LOSARTAN POTASSIUM 50 MG/1
50 TABLET ORAL DAILY
Status: DISCONTINUED | OUTPATIENT
Start: 2021-07-19 | End: 2021-07-19

## 2021-07-19 RX ORDER — MIDAZOLAM HYDROCHLORIDE 1 MG/ML
.5-2 INJECTION, SOLUTION INTRAMUSCULAR; INTRAVENOUS
Status: DISCONTINUED | OUTPATIENT
Start: 2021-07-19 | End: 2021-07-19

## 2021-07-19 RX ORDER — INSULIN GLARGINE 100 [IU]/ML
50 INJECTION, SOLUTION SUBCUTANEOUS
Status: DISCONTINUED | OUTPATIENT
Start: 2021-07-19 | End: 2021-07-24 | Stop reason: HOSPADM

## 2021-07-19 RX ORDER — VENLAFAXINE HYDROCHLORIDE 37.5 MG/1
37.5 CAPSULE, EXTENDED RELEASE ORAL 2 TIMES DAILY WITH MEALS
Status: DISCONTINUED | OUTPATIENT
Start: 2021-07-19 | End: 2021-07-24 | Stop reason: HOSPADM

## 2021-07-19 RX ORDER — HALOPERIDOL 5 MG/ML
2 INJECTION INTRAMUSCULAR ONCE
Status: COMPLETED | OUTPATIENT
Start: 2021-07-20 | End: 2021-07-20

## 2021-07-19 RX ORDER — LANOLIN ALCOHOL/MO/W.PET/CERES
400 CREAM (GRAM) TOPICAL
COMMUNITY
End: 2021-08-06

## 2021-07-19 RX ADMIN — BENZOCAINE, BUTAMBEN, AND TETRACAINE HYDROCHLORIDE 1 SPRAY: .028; .004; .004 AEROSOL, SPRAY TOPICAL at 13:10

## 2021-07-19 RX ADMIN — HEPARIN SODIUM 5000 UNITS: 5000 INJECTION INTRAVENOUS; SUBCUTANEOUS at 23:18

## 2021-07-19 RX ADMIN — FENTANYL CITRATE 25 MCG: 50 INJECTION, SOLUTION INTRAMUSCULAR; INTRAVENOUS at 13:14

## 2021-07-19 RX ADMIN — CEFAZOLIN 1 G: 1 INJECTION, POWDER, FOR SOLUTION INTRAMUSCULAR; INTRAVENOUS at 22:01

## 2021-07-19 RX ADMIN — Medication 10 ML: at 06:22

## 2021-07-19 RX ADMIN — ATORVASTATIN CALCIUM 20 MG: 20 TABLET, FILM COATED ORAL at 22:02

## 2021-07-19 RX ADMIN — HYDRALAZINE HYDROCHLORIDE 10 MG: 20 INJECTION INTRAMUSCULAR; INTRAVENOUS at 23:09

## 2021-07-19 RX ADMIN — GLIPIZIDE 5 MG: 5 TABLET ORAL at 08:08

## 2021-07-19 RX ADMIN — GLIPIZIDE 5 MG: 5 TABLET ORAL at 17:37

## 2021-07-19 RX ADMIN — HEPARIN SODIUM 5000 UNITS: 5000 INJECTION INTRAVENOUS; SUBCUTANEOUS at 17:36

## 2021-07-19 RX ADMIN — FUROSEMIDE 40 MG: 10 INJECTION, SOLUTION INTRAMUSCULAR; INTRAVENOUS at 08:08

## 2021-07-19 RX ADMIN — ASPIRIN 81 MG: 81 TABLET, COATED ORAL at 08:08

## 2021-07-19 RX ADMIN — INSULIN LISPRO 2 UNITS: 100 INJECTION, SOLUTION INTRAVENOUS; SUBCUTANEOUS at 22:02

## 2021-07-19 RX ADMIN — LOSARTAN POTASSIUM 50 MG: 50 TABLET, FILM COATED ORAL at 14:42

## 2021-07-19 RX ADMIN — LIDOCAINE HYDROCHLORIDE 15 ML: 20 SOLUTION ORAL at 13:11

## 2021-07-19 RX ADMIN — CEFAZOLIN 1 G: 1 INJECTION, POWDER, FOR SOLUTION INTRAMUSCULAR; INTRAVENOUS at 08:08

## 2021-07-19 RX ADMIN — LORAZEPAM 1 MG: 2 INJECTION INTRAMUSCULAR; INTRAVENOUS at 16:36

## 2021-07-19 RX ADMIN — FENTANYL CITRATE 25 MCG: 50 INJECTION, SOLUTION INTRAMUSCULAR; INTRAVENOUS at 13:16

## 2021-07-19 RX ADMIN — LORAZEPAM 1 MG: 2 INJECTION INTRAMUSCULAR; INTRAVENOUS at 22:01

## 2021-07-19 RX ADMIN — CARVEDILOL 12.5 MG: 12.5 TABLET, FILM COATED ORAL at 08:08

## 2021-07-19 RX ADMIN — ACETAMINOPHEN 650 MG: 325 TABLET ORAL at 09:01

## 2021-07-19 RX ADMIN — INSULIN LISPRO 3 UNITS: 100 INJECTION, SOLUTION INTRAVENOUS; SUBCUTANEOUS at 17:37

## 2021-07-19 RX ADMIN — INSULIN GLARGINE 50 UNITS: 100 INJECTION, SOLUTION SUBCUTANEOUS at 22:02

## 2021-07-19 RX ADMIN — VENLAFAXINE HYDROCHLORIDE 37.5 MG: 37.5 CAPSULE, EXTENDED RELEASE ORAL at 17:36

## 2021-07-19 RX ADMIN — HEPARIN SODIUM 5000 UNITS: 5000 INJECTION INTRAVENOUS; SUBCUTANEOUS at 08:08

## 2021-07-19 RX ADMIN — CARVEDILOL 25 MG: 12.5 TABLET, FILM COATED ORAL at 17:36

## 2021-07-19 RX ADMIN — LORAZEPAM 1 MG: 2 INJECTION INTRAMUSCULAR; INTRAVENOUS at 00:51

## 2021-07-19 RX ADMIN — Medication 10 ML: at 14:42

## 2021-07-19 RX ADMIN — INSULIN LISPRO 2 UNITS: 100 INJECTION, SOLUTION INTRAVENOUS; SUBCUTANEOUS at 12:22

## 2021-07-19 RX ADMIN — Medication 10 ML: at 22:01

## 2021-07-19 RX ADMIN — MIDAZOLAM HYDROCHLORIDE 1 MG: 1 INJECTION, SOLUTION INTRAMUSCULAR; INTRAVENOUS at 13:14

## 2021-07-19 NOTE — PROGRESS NOTES
Hospitalist Progress Note    NAME: Marbella Serrano   :  1940   MRN:  [id number]       Assessment / Plan:  MSSA bacteremia  Severe sepsis,   Rule out meningitis  Septic metabolic encephalopathy  Initial sepsis work up did not reveal source of infection - UA negative, CXR negative, CT Abdo/Pelv negative. Pt was given 1 dose of Zosyn at outside facility and transferred here. Head CT is negative for acute abnormality  Status post lumbar puncture, no organisms seen, ampicillin acyclovir and ceftriaxone stopped. Continue vancomycin, follow-up blood culture sensitivity  Repeat blood cultures  2D echo is pending, if negative most likely will need VALERIA  Patient has a pacemaker, most likely will need to come out.   ID consulted, appreciate input   patient is now awake, oriented x3  :  VALERIA done which is negative for vegetation  Blood culture positive for MSSA, ID on board  Repeat blood cultures negative to date  :   patient is awake alert x3, afebrile  2D echo showed no vegetation, EF is 55 to 60%  We will get VALERIA    Acute respiratory failure with hypoxia secondary to pulmonary edema on   Overnight event noted, patient became hypoxic overnight, her chest x-ray showed pulmonary edema   fluid stopped, received IV Lasix overnight, currently on 2 L of oxygen via nasal cannula  Cards on board      Patient is doing much better, she is currently on room air, continue with Lasix    : Patient was short of breath overnight, proBNP elevated   Will start Lasix 40 mg bid   Received 80 mg overnight    elevated troponin, concerning for NSTEMI  NSTEMI ruled out by cardiology, troponin leak most likely secondary to sepsis  Results noted    IVAN CKD stage III  Likely related to sepsis  Hold nephrotoxic meds  IVF    Creatinine in  was 1.7    Abnormal LFTs  Likely secondary to sepsis   LFTs are trending down  Hypertension  Blood pressure elevated, increase Coreg and restart Cozaar  Blood pressure elevated,  Hx of Afib  HLD  DMII  Diverticular disease  IBS  S/p AICD  Resume home meds  FS monitoring, SS  Continue with Lantus       Code Status: DNR  Surrogate Decision Maker:   DVT Prophylaxis:   GI Prophylaxis: not indicated  Baseline: Independent    25.0 - 29.9 Overweight / Body mass index is 27.08 kg/m². Estimated discharge date: July 22  Barriers: Clinical improvement       Subjective:     Chief Complaint / Reason for Physician Visit  Follow-up staff aureus bacteremia. No acute event, blood pressure elevated    Discussed with RN events overnight. Review of Systems:  Symptom Y/N Comments  Symptom Y/N Comments   Fever/Chills n   Chest Pain n    Poor Appetite    Edema     Cough    Abdominal Pain n    Sputum    Joint Pain     SOB/STANTON y   Pruritis/Rash     Nausea/vomit n   Tolerating PT/OT     Diarrhea    Tolerating Diet y    Constipation    Other       Could NOT obtain due to:      Objective:     VITALS:   Last 24hrs VS reviewed since prior progress note. Most recent are:  Patient Vitals for the past 24 hrs:   Temp Pulse Resp BP SpO2   07/19/21 0716 98.4 °F (36.9 °C) 85 28 (!) 173/67 99 %   07/19/21 0500  91 26  98 %   07/19/21 0430 97.6 °F (36.4 °C) 80 10 (!) 158/69 100 %   07/19/21 0350  83      07/18/21 2354  75      07/18/21 2330 98.2 °F (36.8 °C) 87 25 (!) 155/54 100 %   07/18/21 2000  81      07/18/21 1900 98.9 °F (37.2 °C) 88 25 (!) 140/63 97 %   07/18/21 1456 98.6 °F (37 °C) 77 21 (!) 159/64 97 %   07/18/21 1101 97.3 °F (36.3 °C) 79 25 117/60 94 %       Intake/Output Summary (Last 24 hours) at 7/19/2021 0819  Last data filed at 7/19/2021 0500  Gross per 24 hour   Intake    Output 1750 ml   Net -1750 ml        I had a face to face encounter and independently examined this patient on 7/19/2021, as outlined below:  PHYSICAL EXAM:  General: WD, WN. Awake  EENT:  EOMI. Anicteric sclerae. MMM  Resp:  CTA bilaterally, no wheezing or rales.   No accessory muscle use  CV:  Regular rhythm,  No edema  GI:  Soft, Non distended, Non tender. +Bowel sounds  Neurologic:  Alert oriented x3, no focal deficit   psych:   Unable to assess   skin:  No rashes. No jaundice    Reviewed most current lab test results and cultures  YES  Reviewed most current radiology test results   YES  Review and summation of old records today    NO  Reviewed patient's current orders and MAR    YES  PMH/SH reviewed - no change compared to H&P  ________________________________________________________________________  Care Plan discussed with:    Comments   Patient x    Family      RN x    Care Manager     Consultant                        Multidiciplinary team rounds were held today with , nursing, pharmacist and clinical coordinator. Patient's plan of care was discussed; medications were reviewed and discharge planning was addressed. ________________________________________________________________________  Total NON critical care TIME:25 Minutes    Total CRITICAL CARE TIME Spent:   Minutes non procedure based      Comments   >50% of visit spent in counseling and coordination of care     ________________________________________________________________________  Christin Mabry MD     Procedures: see electronic medical records for all procedures/Xrays and details which were not copied into this note but were reviewed prior to creation of Plan. LABS:  I reviewed today's most current labs and imaging studies.   Pertinent labs include:  Recent Labs     07/19/21  0312 07/18/21  1115 07/17/21  0302   WBC 12.2* 12.3* 19.7*   HGB 9.7* 10.1* 11.7   HCT 29.4* 31.2* 36.8   * 106* 126*     Recent Labs     07/19/21  0312 07/18/21  1120 07/17/21  0302    139 134*   K 3.6 3.8 4.5   * 111* 110*   CO2 23 22 17*   * 268* 248*   BUN 45* 48* 50*   CREA 1.37* 1.68* 1.81*   CA 8.6 8.2* 8.0*   ALB 2.1* 2.0* 2.2*   TBILI 0.4 0.4 0.3   * 134* 229*       Signed: Christin Mabry MD

## 2021-07-19 NOTE — PROGRESS NOTES
[facility]      [address] [place] [address]  [phone]      Cardiology Progress Note      7/19/2021 4:25 PM    Admit Date: 7/16/2021    Admit Diagnosis:   Sepsis ([address] [A41.9]    Subjective:     Sherle Led     No CP    Visit Vitals  BP (!) 163/58 (BP 1 Location: Left upper arm, BP Patient Position: At rest)   Pulse 69   Temp 98.2 °F (36.8 °C)   Resp 22   Ht 5' 6\" (1.676 m)   Wt 167 lb 12.3 oz (76.1 kg)   SpO2 97%   BMI 27.08 kg/m²       Current Facility-Administered Medications   Medication Dose Route Frequency    carvediloL (COREG) tablet 25 mg  25 mg Oral BID WITH MEALS    hydrALAZINE (APRESOLINE) 20 mg/mL injection 10 mg  10 mg IntraVENous Q6H PRN    venlafaxine-SR (EFFEXOR-XR) capsule 37.5 mg  37.5 mg Oral BID WITH MEALS    [START ON 7/21/2021] ergocalciferol capsule 50,000 Units  50,000 Units Oral Q7D    [START ON 7/20/2021] losartan (COZAAR) tablet 100 mg  100 mg Oral DAILY    insulin glargine (LANTUS) injection 50 Units  50 Units SubCUTAneous QHS    ceFAZolin (ANCEF) 1 g in 0.9% sodium chloride (MBP/ADV) 50 mL MBP  1 g IntraVENous Q12H    furosemide (LASIX) injection 40 mg  40 mg IntraVENous DAILY    glipiZIDE (GLUCOTROL) tablet 5 mg  5 mg Oral ACB&D    LORazepam (ATIVAN) injection 1 mg  1 mg IntraVENous Q6H PRN    heparin (porcine) injection 5,000 Units  5,000 Units SubCUTAneous Q8H    aspirin delayed-release tablet 81 mg  81 mg Oral DAILY    atorvastatin (LIPITOR) tablet 20 mg  20 mg Oral QHS    sodium chloride (NS) flush 5-40 mL  5-40 mL IntraVENous Q8H    sodium chloride (NS) flush 5-40 mL  5-40 mL IntraVENous PRN    acetaminophen (TYLENOL) tablet 650 mg  650 mg Oral Q6H PRN    Or    acetaminophen (TYLENOL) suppository 650 mg  650 mg Rectal Q6H PRN    polyethylene glycol (MIRALAX) packet 17 g  17 g Oral DAILY PRN    ondansetron (ZOFRAN ODT) tablet 4 mg  4 mg Oral Q8H PRN    Or    ondansetron (ZOFRAN) injection 4 mg  4 mg IntraVENous Q6H PRN    insulin lispro (HUMALOG) injection   SubCUTAneous AC&HS    glucose chewable tablet 16 g  4 Tablet Oral PRN    dextrose (D50W) injection syrg 12.5-25 g  12.5-25 g IntraVENous PRN    glucagon (GLUCAGEN) injection 1 mg  1 mg IntraMUSCular PRN       Objective:      Physical Exam:  General Appearance:    Chest:   Clear  Cardiovascular: RRR  Extremities: no edema  Skin:  Warm and dry.     Data Review:   Recent Labs     07/19/21  0312 07/18/21  1115 07/17/21  0302   WBC 12.2* 12.3* 19.7*   HGB 9.7* 10.1* 11.7   HCT 29.4* 31.2* 36.8   * 106* 126*     Recent Labs     07/19/21 0312 07/18/21  1120 07/17/21  0302    139 134*   K 3.6 3.8 4.5   * 111* 110*   CO2 23 22 17*   * 268* 248*   BUN 45* 48* 50*   CREA 1.37* 1.68* 1.81*   CA 8.6 8.2* 8.0*   ALB 2.1* 2.0* 2.2*   TBILI 0.4 0.4 0.3   * 134* 229*       Recent Labs     07/17/21  0302 07/16/21  1934   TROIQ 7.00* 9.30*   *  --    CKMB 7.1*  --          Intake/Output Summary (Last 24 hours) at 7/19/2021 1625  Last data filed at 7/19/2021 1225  Gross per 24 hour   Intake    Output 1850 ml   Net -1850 ml        Telemetry:   EKG:  Cxray:    Assessment:     Principal Problem:    Sepsis ([facility] (7/16/2021)    Active Problems:    Physical debility (10/27/2017)      Type 2 diabetes with nephropathy ([facility] (2/15/2018)      CKD (chronic kidney disease) stage 3, GFR 30-59 ml/min ([facility]) (6/4/2019)      PAF (paroxysmal atrial fibrillation) ([facility] (9/10/2020)      Dilated cardiomyopathy ([facility] (9/23/2020)      AICD (automatic cardioverter/defibrillator) present (9/23/2020)      Overview: 9/23/2020 [employer] AICD implant      Type 2 MI (myocardial infarction) ([facility] (7/16/2021)      Overview: 7/162021 r/t sepsis      Systolic heart failure, chronic ([facility] (7/16/2021)        Plan:     No CP. No veg by VALERIA. Will sign off.     Kylah Cortez M.D., [facility]

## 2021-07-19 NOTE — PROGRESS NOTES
Transition of Care Plan:    RUR: 24%    Disposition: Home with family with hh vs snf    Follow up appointments: To be placed on AVS prior to discharge. DME needed: To be determined. Transportation at Discharge: /daughter to transport. [place] or means to access home:  and daughter has keys. IM Medicare letter: To be given prior to discharged. Caregiver Contact:     Discharge Caregiver contacted prior to discharge? Caregiver to be contacted prior to discharge. Reason for Admission: Patient was a transfer from [facility] with ams, fever and lethargy. RUR Score: 24%                 PCP: First and Last name:   Flower Nowak NP     Name of Practice: [facility]     Are you a current patient: Yes/No: Yes     Approximate date of last visit: Tuesday, July 13,2021     Can you participate in a virtual visit if needed: Yes    Do you (patient/family) have any concerns for transition/discharge? No              Plan for utilizing home health:  Patient has used [employer] in the past.      Current Advanced Directive/Advance Care Plan:  DNR  Advance Care Planning     General Advance Care Planning (ACP) Conversation      Date of Conversation: 7/19/21  Conducted with: Patient with Decision Making Capacity    Healthcare Decision Maker:     Primary Decision Maker: César Scott  [phone]  Click here to complete [employer] including selection of the Healthcare Decision Maker Relationship (ie \"Primary\")      Content/Action Overview:    Has ACP document(s) on file - reflects the patient's care preferences  Reviewed DNR/DNI and patient confirms current DNR status - completed forms on file (place new order if needed)         Length of Voluntary ACP Conversation in minutes:  <16 minutes (Non-Billable)    Jasmin Hannah RN               Healthcare Decision Maker:   Click here to [facility] Decision Makers including selection of the Healthcare Decision Maker Relationship (ie \"Primary\")            Primary Decision Maker: [facility] - [phone]      Patient lives in two story home with ramp. She was independent with adl's and iadl's prior to coming to the hospital. She drives. She does not use home oxygen or cpap. Her  she states is in a wheelchair and her daughter from [place] came down to be with him. Patient is scheduled for VALERIA today. Patient has orders for pt/ot to see her. iM Agarwal RN BSN CRM        [phone]

## 2021-07-19 NOTE — PROGRESS NOTES
Physician Progress Note      PATIENT:               Stephanie Soriano  CSN #:                  [id number]  :                       1940  ADMIT DATE:       2021 12:05 AM  DISCH DATE:  RESPONDING  PROVIDER #:        Gavi Faye MD          QUERY TEXT:    Pt admitted with sepsis and has encephalopathy documented. If possible, please document in progress notes and discharge summary further specificity regarding the type of encephalopathy:    The medical record reflects the following:  Risk Factors: sepsis, possible meningitis, magnolia  Clinical Indicators:  neuro-\"i feel better and do not know what happened\"  H&P-Severe sepsis, etiology unclear but given lethargy and encephalopathy  Treatment: LP, neuro cx, EEG ordered, Zovirax, Omnipen, Rocephin, vanc  Thanks,  Chery Bradford RN/CDI   Options provided:  -- Metabolic encephalopathy  -- Septic encephalopathy  -- Toxic encephalopathy  -- Toxic metabolic encephalopathy  -- Wernicke?s encephalopathy  -- Other - I will add my own diagnosis  -- Disagree - Not applicable / Not valid  -- Disagree - Clinically unable to determine / Unknown  -- Refer to Clinical Documentation Reviewer    PROVIDER RESPONSE TEXT:    This patient has septic encephalopathy.     Query created by: Ezequiel Robles on 2021 9:09 AM      Electronically signed by:  Gavi Faye MD 2021 9:12 AM

## 2021-07-19 NOTE — PROGRESS NOTES
Problem: Self Care Deficits Care Plan (Adult)  Goal: *Acute Goals and Plan of Care (Insert Text)  Description:   FUNCTIONAL STATUS PRIOR TO ADMISSION: Patient was independent and active without use of DME. Patient was independent for basic and instrumental ADLs. Patient questionable historian secondary to confusion. Patient reported her  is w/c bound but she does not assist him at baseline. HOME SUPPORT: The patient lived with  but did not require assist.    Occupational Therapy Goals  Initiated 7/19/2021  1. Patient will perform grooming standing at sink with supervision/set-up within 7 day(s). 2.  Patient will perform sponge bathing with supervision/set-up within 7 day(s). 3.  Patient will perform lower body dressing with supervision/set-up within 7 day(s). 4.  Patient will perform toilet transfers with supervision/set-up within 7 day(s). 5.  Patient will perform all aspects of toileting with supervision/set-up within 7 day(s). 6.  Patient will participate in upper extremity therapeutic exercise/activities with supervision/set-up for 5 minutes within 7 day(s). 7.  Patient will utilize energy conservation techniques during functional activities with verbal cues within 7 day(s). Outcome: Not Met   OCCUPATIONAL THERAPY EVALUATION  Patient: Willie Garcia (78 y.o. female)  Date: 7/19/2021  Primary Diagnosis: Sepsis ([facility] [address] [A41.9]        Precautions: Fall, DNR, Bed Alarm    ASSESSMENT  Based on the objective data described below, the patient presents with decreased independence in self-care and functional mobility secondary to general weakness, impaired balance, confusion, decreased safety awareness, and decreased activity tolerance. Patient is questionable historian secondary to AMS but appears to be functioning below her self-reported independent baseline for self-care and functional mobility.  Patient is now completing self-care with set-up/supervision to max assist and functional mobility with CGA. Patient received semisupine in bed and agreeable for therapy with encouragement. Patient was able to don L sock with CGA but when attempting R sock, patient returned to R side lying and refused attempting. Patient ambulated into bathroom with CGA to wash face, refusing to complete any additional grooming tasks until discharged home. Patient was left sitting in chair with all needs met and chair alarmed. Patient would benefit from skilled OT services during acute hospital stay. Anticipate patient will need SNF rehab prior to returning home. Current Level of Function Impacting Discharge (ADLs/self-care): set-up/supervision to max assist for self-care, CGA for functional mobility    Functional Outcome Measure: The patient scored 50 on the Barthel Index outcome measure which is indicative of 50% functional impairment in ADLs. Other factors to consider for discharge: fall risk, confusion, impaired balance     Patient will benefit from skilled therapy intervention to address the above noted impairments. PLAN :  Recommendations and Planned Interventions: self care training, functional mobility training, therapeutic exercise, balance training, therapeutic activities, endurance activities, patient education, home safety training and family training/education    Frequency/Duration: Patient will be followed by occupational therapy 3 times a week to address goals. Recommendation for discharge: (in order for the patient to meet his/her long term goals)  Therapy up to 5 days/week in SNF setting    This discharge recommendation:  Has not yet been discussed the attending provider and/or case management    IF patient discharges home will need the following DME: TBD pending progress       SUBJECTIVE:   Patient stated I'm not doing anything else until I'm home.     OBJECTIVE DATA SUMMARY:   HISTORY:   Past Medical History:   Diagnosis Date    A-fib ([facility] [address]     AICD (automatic cardioverter/defibrillator) present 9/23/2020 9/23/2020 [employer] AICD implant    Arthritis     Bilateral pleural effusion 9/18/2020    Chronic pain     Chronic pain     Diabetes ([facility] [address]     Diverticular disease     Elevated troponin 9/18/2020    Hemorrhoid     Hypercholesterolemia     IBS (irritable bowel syndrome)     Lymphocytosis 97/69/6501    Systolic heart failure, chronic ([facility] [address] 7/16/2021    Type 2 MI (myocardial infarction) ([facility] [address] 7/16/2021 7/162021 r/t sepsis     Past Surgical History:   Procedure Laterality Date    COLONOSCOPY N/A 10/31/2019    [facility]  10/31/2019         Trudy Pelt  10/31/2019         HX CATARACT REMOVAL      HX CHOLECYSTECTOMY      HX COLONOSCOPY  2009    HX COLONOSCOPY  2016    2 adenomatous polyp    HX HEMORRHOIDECTOMY  2009    HX HYSTERECTOMY      HX KNEE REPLACEMENT      right    HX ORTHOPAEDIC  12/11/2017    back, laminectomy and decompression    IN INSJ/RPLCMT PERM DFB W/TRNSVNS LDS 1/DUAL CHMBR N/A 9/23/2020    INSERT ICD DUAL performed by Jose Manuel Lu MD at [facility] 33 LAB       Expanded or extensive additional review of patient history:     Home Situation  Home Environment: Private residence  # Steps to Enter: 0  Wheelchair Ramp: Yes  One/Two Story Residence: Two story, live on 1st floor  Living Alone: No  Support Systems: Spouse/Significant Other/Partner  Patient Expects to be Discharged to[de-identified] [employer]  Current DME Used/Available at Home: Shower chair, Walker, rolling, Cane, straight  Tub or Shower Type: Tub/Shower combination    Hand dominance: Right    EXAMINATION OF PERFORMANCE DEFICITS:  Cognitive/Behavioral Status:  Neurologic State: Drowsy  Orientation Level: Oriented to person;Oriented to place;Oriented to time;Disoriented to time  Cognition: Decreased attention/concentration; Follows commands; Impaired decision making  Perception: Appears intact  Perseveration: Perseverates during conversation  Safety/Judgement: Decreased awareness of need for assistance;Decreased awareness of need for safety;Decreased insight into deficits; Fall prevention    Hearing: Auditory  Auditory Impairment: None    Vision/Perceptual:    Acuity: Within Defined Limits      Range of Motion:  AROM: Generally decreased, functional    Strength:  Strength: Generally decreased, functional    Coordination:  Coordination: Generally decreased, functional  Fine Motor Skills-Upper: Left Intact; Right Intact    Gross Motor Skills-Upper: Left Intact; Right Intact    Tone & Sensation:  Tone: Normal  Sensation: Intact    Balance:  Sitting: Impaired  Sitting - Static: Good (unsupported)  Sitting - Dynamic: Fair (occasional)  Standing: Impaired  Standing - Static: Fair  Standing - Dynamic : Fair    Functional Mobility and Transfers for ADLs:  Bed Mobility:  Rolling: Contact guard assistance  Supine to Sit: Contact guard assistance  Scooting: Contact guard assistance    Transfers:  Sit to Stand: Contact guard assistance  Stand to Sit: Contact guard assistance  Bed to Chair: Contact guard assistance  Bathroom Mobility: Contact guard assistance    ADL Assessment:  Feeding: Setup;Supervision  Oral Facial Hygiene/Grooming: Contact guard assistance  Bathing: Minimum assistance  Upper Body Dressing: Contact guard assistance  Lower Body Dressing: Moderate assistance  Toileting: Maximum assistance (allen)    ADL Intervention and task modifications:    Grooming  Position Performed: Standing (at sink)  Washing Face: Contact guard assistance  Cues: Tactile cues provided;Verbal cues provided    Lower Body Dressing Assistance  Socks: Moderate assistance (CGA- L sock; total assist- R sock)  Leg Crossed Method Used: Yes (LLE only)  Position Performed: Seated edge of bed  Cues: Don;Physical assistance; Tactile cues provided;Verbal cues provided    Toileting  Bladder Hygiene:  Total assistance (dependent) (allen catheter)    Cognitive Retraining  Safety/Judgement: Decreased awareness of need for assistance;Decreased awareness of need for safety;Decreased insight into deficits; Fall prevention    Functional Measure:  Barthel Index:    Bathin  Bladder: 0  Bowels: 10  Groomin  Dressin  Feeding: 10  Mobility: 10  Stairs: 0  Toilet Use: 5  Transfer (Bed to Chair and Back): 10  Total: 50/100        The Barthel ADL Index: Guidelines  1. The index should be used as a record of what a patient does, not as a record of what a patient could do. 2. The main aim is to establish degree of independence from any help, physical or verbal, however minor and for whatever reason. 3. The need for supervision renders the patient not independent. 4. A patient's performance should be established using the best available evidence. Asking the patient, friends/relatives and nurses are the usual sources, but direct observation and common sense are also important. However direct testing is not needed. 5. Usually the patient's performance over the preceding 24-48 hours is important, but occasionally longer periods will be relevant. 6. Middle categories imply that the patient supplies over 50 per cent of the effort. 7. Use of aids to be independent is allowed. Zi Jerez., Barthel, DPaulW. ([id number]). Functional evaluation: the Barthel Index. [facility] (14)2. Mary Freeze carmen [place], J.J.M.F, Kvng Ames., Kerri Mccormack., Ashli, [address] [place] [address] (). Measuring the change indisability after inpatient rehabilitation; comparison of the responsiveness of the Barthel Index and Functional [place] Measure. Journal of Neurology, Neurosurgery, and Psychiatry, 66(4), 215-892. Vick Phelps, N.J.A, ASHLEY Garrett, & Renée Ceballos M.A. (2004.) Assessment of post-stroke quality of life in cost-effectiveness studies: The usefulness of the Barthel Index and the EuroQoL-5D.  Quality of Life Research, 13, 733-68     Based on the above components, the patient evaluation is determined to be of the following complexity level: LOW   Pain Rating:  Patient did not c/o pain during session. Activity Tolerance:   Fair and requires rest breaks    After treatment patient left in no apparent distress:    Sitting in chair, Call bell within reach, and Bed / chair alarm activated    COMMUNICATION/EDUCATION:   The patients plan of care was discussed with: Physical therapist and Registered nurse. Home safety education was provided and the patient/caregiver indicated understanding., Patient/family have participated as able in goal setting and plan of care. , and Patient/family agree to work toward stated goals and plan of care. This patients plan of care is appropriate for delegation to [facility].     Thank you for this referral.  Chris Lombardi, OTR/L  Time Calculation: 22 mins

## 2021-07-19 NOTE — PROGRESS NOTES
Problem: Mobility Impaired (Adult and Pediatric)  Goal: *Acute Goals and Plan of Care (Insert Text)  Description: FUNCTIONAL STATUS PRIOR TO ADMISSION: Patient was independent and active without use of DME.    HOME SUPPORT PRIOR TO ADMISSION: The patient lived with  but did not require assist.  is in a wheelchair and daughter from [place] is staying with him while she is in the hospital.    Physical Therapy Goals  Initiated 7/19/2021  1. Patient will move from supine to sit and sit to supine  in bed with supervision/set-up within 7 day(s). 2.  Patient will transfer from bed to chair and chair to bed with minimal assistance/contact guard assist using the least restrictive device within 7 day(s). 3.  Patient will perform sit to stand with minimal assistance/contact guard assist within 7 day(s). 4.  Patient will ambulate with minimal assistance/contact guard assist for 100 feet with the least restrictive device within 7 day(s). Outcome: Not Met    PHYSICAL THERAPY EVALUATION  Patient: Titus Rivera (5 y.o. female)  Date: 7/19/2021  Primary Diagnosis: Sepsis ([facility] [address] [A41.9]        Precautions:   Fall, DNR, Bed Alarm      ASSESSMENT  Based on the objective data described below, the patient presents with confusion, generalized weakness, decreased activity tolerance, impaired balance and overall decreased functional mobility. She was received in supine on RA and cleared by nursing to mobilize. She was able to come to the EOB and worked on lower body dressing with OT. Stood and ambulated to the chair and then into the bathroom. She declined any further ambulation and was left sitting up in the chair. VSS during the session. Current Level of Function Impacting Discharge (mobility/balance): CGA    Functional Outcome Measure: The patient scored Total: 50/100 on the Barthel Index which is indicative of 50% impaired ability to care for basic self needs/dependency on others.      Other factors to consider for discharge:  is in a wheelchair     Patient will benefit from skilled therapy intervention to address the above noted impairments. PLAN :  Recommendations and Planned Interventions: bed mobility training, transfer training, gait training, therapeutic exercises, and patient and family training/education      Frequency/Duration: Patient will be followed by physical therapy:  3 times a week to address goals.     Recommendation for discharge: (in order for the patient to meet his/her long term goals)  Therapy up to 5 days/week in SNF setting    This discharge recommendation:  Has not yet been discussed the attending provider and/or case management    IF patient discharges home will need the following DME: to be determined (TBD)         SUBJECTIVE:   Patient stated my   is in a wheelchair, he can barely get his own socks on.    OBJECTIVE DATA SUMMARY:   HISTORY:    Past Medical History:   Diagnosis Date    [facility]-fib [facility])     AICD (automatic cardioverter/defibrillator) present 9/23/2020 9/23/2020 [employer] AICD implant    Arthritis     Bilateral pleural effusion 9/18/2020    Chronic pain     Chronic pain     Diabetes ([address]     Diverticular disease     Elevated troponin 9/18/2020    Hemorrhoid     Hypercholesterolemia     IBS (irritable bowel syndrome)     Lymphocytosis 31/86/6562    Systolic heart failure, chronic ([address] 7/16/2021    Type 2 MI (myocardial infarction) ([address] 7/16/2021 7/162021 r/t sepsis     Past Surgical History:   Procedure Laterality Date    COLONOSCOPY N/A 10/31/2019    COL[facility]ree [facility]ras [facility] at [facility]  10/31/2019         COLONOSCOPY,PAKO Waggoner  10/31/2019         HX CATARACT REMOVAL      HX CHOLECYSTECTOMY      HX COLONOSCOPY  2009    HX COLONOSCOPY  2016    2 adenomatous polyp    HX HEMORRHOIDECTOMY  2009    HX HYSTERECTOMY      HX KNEE REPLACEMENT      right    HX ORTHOPAEDIC 12/11/2017    back, laminectomy and decompression    HI INSJ/RPLCMT PERM DFB W/TRNSVNS LDS 1/DUAL CHMBR N/A 9/23/2020    INSERT ICD DUAL performed by Willow Gao MD at [facility] CARDIAC CATH LAB       Personal factors and/or comorbidities impacting plan of care:     Home Situation  Home Environment: Private residence  # Steps to Enter: 0  Wheelchair Ramp: Yes  One/Two Story Residence: Two story, live on 1st floor  Living Alone: No  Support Systems: Spouse/Significant Other/Partner  Patient Expects to be Discharged to[de-identified] [employer]  Current DME Used/Available at Home: Shower chair, Walker, rolling, Cane, straight  Tub or Shower Type: Tub/Shower combination    EXAMINATION/PRESENTATION/DECISION MAKING:   Critical Behavior:  Neurologic State: Drowsy  Orientation Level: Oriented to person, Oriented to place, Oriented to time, Disoriented to time  Cognition: Decreased attention/concentration, Follows commands, Impaired decision making  Safety/Judgement: Decreased awareness of need for assistance, Decreased awareness of need for safety, Decreased insight into deficits, Fall prevention  Hearing:   Auditory  Auditory Impairment: None  Skin:  intact  Edema: WDL  Range Of Motion:  AROM: Generally decreased, functional                       Strength:    Strength: Generally decreased, functional                    Tone & Sensation:   Tone: Normal              Sensation: Intact               Coordination:  Coordination: Generally decreased, functional  Vision:   Acuity: Within Defined Limits  Functional Mobility:  Bed Mobility:  Rolling: Contact guard assistance  Supine to Sit: Contact guard assistance     Scooting: Contact guard assistance  Transfers:  Sit to Stand: Contact guard assistance  Stand to Sit: Contact guard assistance        Bed to Chair: Contact guard assistance              Balance:   Sitting: Impaired  Sitting - Static: Good (unsupported)  Sitting - Dynamic: Fair (occasional)  Standing: Impaired  Standing - Static: Fair  Standing - Dynamic : Fair  Ambulation/Gait Training:  Distance (ft): 10 Feet (ft)     Ambulation - Level of Assistance: Contact guard assistance        Gait Abnormalities: Decreased step clearance;Shuffling gait        Base of Support: Widened     Speed/Lata: Pace decreased (<100 feet/min); Shuffled  Step Length: Left shortened;Right shortened             Functional Measure:  Barthel Index:    Bathin  Bladder: 0  Bowels: 10  Groomin  Dressin  Feeding: 10  Mobility: 10  Stairs: 0  Toilet Use: 5  Transfer (Bed to Chair and Back): 10  Total: 50/100       The Barthel ADL Index: Guidelines  1. The index should be used as a record of what a patient does, not as a record of what a patient could do. 2. The main aim is to establish degree of independence from any help, physical or verbal, however minor and for whatever reason. 3. The need for supervision renders the patient not independent. 4. A patient's performance should be established using the best available evidence. Asking the patient, friends/relatives and nurses are the usual sources, but direct observation and common sense are also important. However direct testing is not needed. 5. Usually the patient's performance over the preceding 24-48 hours is important, but occasionally longer periods will be relevant. 6. Middle categories imply that the patient supplies over 50 per cent of the effort. 7. Use of aids to be independent is allowed. Masha Carnes., Barthel, D.W. ([id number]). Functional evaluation: the Barthel Index. [facility] [address] [facility] (14)2. ALISA Horton, Jody Boswell., Adilenedorothy Black., [place], [address] [place] [address] (). Measuring the change indisability after inpatient rehabilitation; comparison of the responsiveness of the Barthel Index and Functional [place] Measure. Journal of Neurology, Neurosurgery, and Psychiatry, 66(4), 212-790.   Edmar Urrutia, N.J.A, ANASTASIA Garrett.TANNA, & Sherley Uriostegui, M.A. (2004.) Assessment of post-stroke quality of life in cost-effectiveness studies: The usefulness of the Barthel Index and the EuroQoL-5D. Quality of Life Research, 15, 253-31        Physical Therapy Evaluation Charge Determination   History Examination Presentation Decision-Making   HIGH Complexity :3+ comorbidities / personal factors will impact the outcome/ POC  MEDIUM Complexity : 3 Standardized tests and measures addressing body structure, function, activity limitation and / or participation in recreation  MEDIUM Complexity : Evolving with changing characteristics  Other outcome measures barthel  MEDIUM      Based on the above components, the patient evaluation is determined to be of the following complexity level: MEDIUM    Pain Rating:  No complaints     Activity Tolerance:   Fair    After treatment patient left in no apparent distress:   Sitting in chair, Call bell within reach, and Bed / chair alarm activated    COMMUNICATION/EDUCATION:   The patients plan of care was discussed with: Occupational therapist and Registered nurse. Fall prevention education was provided and the patient/caregiver indicated understanding. and Patient/family agree to work toward stated goals and plan of care.     Thank you for this referral.  Handy Guillaume, PT, DPT   Time Calculation: 23 mins

## 2021-07-19 NOTE — PROGRESS NOTES
Patient arrived to Non-Invasive Cardiology Lab for In Patient VALERIA Procedure. Staff introduced to patient. Patient identifiers verified with Name and Date of Birth. Procedure verified with patient. Consent forms reviewed and signed by patient or authorized representative and verified. Allergies verified. Patient informed of procedure and plan of care. Questions answered with review. Patient on cardiac monitor, non-invasive blood pressure, SPO2 monitor. On RA. Patient is A&Ox3. Patient reports no complaints. Patient on stretcher, in low position, with side rails up. Patient instructed to call for assistance as needed.

## 2021-07-19 NOTE — PROGRESS NOTES
End of Shift Note    Bedside shift change report given to Dyllan Uribe RN  (oncoming nurse) by Jere Whalen RN (offgoing nurse). Report included the following information SBAR, Kardex, Recent Results and Cardiac Rhythm SR, AFib    Shift worked:  7p - 7a      Shift summary and any significant changes:     0204: 2 mins of SVT  0220 : notified Dr. Lilia Michael. No order received. Concerns for physician to address:  SVT      Zone phone for oncoming shift:          Activity:  Activity Level: Bed Rest  Number times ambulated in hallways past shift: 0  Number of times OOB to chair past shift: 0    Cardiac:   Cardiac Monitoring: Yes      Cardiac Rhythm: Sinus Rhythm    Access:   Current line(s): PIV     Genitourinary:   Urinary status: allen    Respiratory:   O2 Device: None (Room air)  Chronic home O2 use?: NO  Incentive spirometer at bedside: NO     GI:  Last Bowel Movement Date: 07/18/21  Current diet:  DIET NPO  Passing flatus: YES  Tolerating current diet: YES       Pain Management:   Patient states pain is manageable on current regimen: YES    Skin:  Bryan Score: 15  Interventions: limit briefs    Patient Safety:  Fall Score:  Total Score: 4  Interventions: bed/chair alarm and pt to call before getting OOB  High Fall Risk: Yes    Length of Stay:  Expected LOS: 4d 19h  Actual LOS: Buck Soto RN

## 2021-07-19 NOTE — PROGRESS NOTES
VALERIA shows no vegetations. Severe atherosclerosis of the arch, descending aorta. Full report to follow.

## 2021-07-19 NOTE — CARDIO/PULMONARY
Cardiac Rehab Note: chart review/referral     Pt is an 78 yo admitted with elevated troponin due to sepsis. EF 60%  on 7/17/21 per echo. Smoking history assessed. Patient is a current smoker. Smoking Cessation Program link has been added to the AVS.     Patient not seen at this time due to current condition as indicated in chart.

## 2021-07-19 NOTE — PROGRESS NOTES
Pharmacy Medication Reconciliation     The patient allowed permission to speak to her  about her prior to admission medications. The patient's  was questioned regarding use of any other inhalers, topical products, over the counter medications, herbal medications, vitamin products or ophthalmic/nasal/otic medication use. Allergy Update: Morphine and Ultram [tramadol]    Recommendations/Findings: The following amendments were made to the patient's active medication list on file at [facility]:   1) Additions:   Vitamin B complex    2) Deletions:   Loperamide  Thiamine  Refresh eye drops  Preservision AREDS    3) Changes:   Changed venlafaxine to ER capsule        Clarified PTA med list with patient's . PTA medication list was corrected to the following:     Prior to Admission Medications   Prescriptions Last Dose Informant Taking? B.infantis-B.ani-B.long-B.bifi (PROBIOTIC 4X) 10-15 mg TbEC 7/9/2021 at Unknown time  Yes   Sig: Take  by mouth daily. SITagliptin (Januvia) 50 mg tablet   Yes   Sig: Take 50 mg by mouth daily. acetaminophen (TYLENOL) 650 mg TbER 7/9/2021 at Unknown time  Yes   Sig: Take 650 mg by mouth every eight (8) hours. ascorbic acid, vitamin C, (VITAMIN C) 500 mg tablet 7/9/2021 at Unknown time  Yes   Sig: Take 1,000 mg by mouth daily. aspirin delayed-release 81 mg tablet 7/9/2021 at Unknown time  Yes   Sig: Take 1 Tab by mouth daily. atorvastatin (LIPITOR) 20 mg tablet 7/9/2021 at Unknown time  Yes   Sig: TAKE 1 TABLET BY MOUTH  DAILY   b complex vitamins tablet   Yes   Sig: Take 1 Tablet by mouth daily. carvediloL (COREG) 25 mg tablet 7/9/2021 at Unknown time  Yes   Sig: Take 1 Tab by mouth two (2) times daily (with meals). colestipoL (Colestid) 1 gram tablet 7/9/2021 at Unknown time  Yes   Sig: Take 1 Tab by mouth two (2) times a day. ergocalciferol (ERGOCALCIFEROL) 1,250 mcg (50,000 unit) capsule   Yes   Sig: Take 50,000 Units by mouth every seven (7) days.  On    furosemide (LASIX) 40 mg tablet 2021 at Unknown time  Yes   Sig: TAKE 1 TABLET BY MOUTH  DAILY   glipiZIDE (GLUCOTROL) 5 mg tablet 2021 at Unknown time  Yes   Sig: TAKE 1 TABLET BY MOUTH  TWICE DAILY   insulin glargine (Lantus Solostar U-100 Insulin) 100 unit/mL (3 mL) inpn 2021 at Unknown time  Yes   Si Units by SubCUTAneous route daily. magnesium oxide (MAG-OX) 400 mg tablet   Yes   Sig: Take 400 mg by mouth two (2) times daily as needed (Cramps). nitroglycerin (NITROSTAT) 0.4 mg SL tablet 2021 at Unknown time  Yes   Si Tab by SubLINGual route every five (5) minutes as needed for Chest Pain. Up to 3 doses. omega-3 fatty acids-vitamin e 1,000 mg cap 2021 at Unknown time  Yes   Sig: Take 1 Cap by mouth two (2) times a day. 32a day    semaglutide (OZEMPIC) 0.25 mg/0.2 mL (2 mg/1.5 mL) sub-q pen 2021 at Unknown time  Yes   Si.25 mg by SubCUTAneous route every seven (7) days. Indications: type 2 diabetes mellitus   turmeric-herbal complex no. 278 150 mg cap   Yes   Sig: Take 1 Capsule by mouth daily. valsartan (DIOVAN) 80 mg tablet 2021 at Unknown time  Yes   Sig: Take 80 mg by mouth daily. venlafaxine-SR (EFFEXOR-XR) 37.5 mg capsule   Yes   Sig: Take 37.5 mg by mouth two (2) times a day. vit C/E/Zn/coppr/lutein/zeaxan (PRESERVISION AREDS-2 PO) 2021 at Unknown time  Yes   Sig: Take 1 Tablet by mouth two (2) times a day. zinc 50 mg tab tablet 2021 at Unknown time  Yes   Sig: Take 50 mg by mouth daily.       Facility-Administered Medications: None        Thank you,  Adán Monday, CARLOS EDUARDO        '

## 2021-07-19 NOTE — PROGRESS NOTES
End of Shift Note    Bedside shift change report given to pati (oncoming nurse) by Latha Ricks RN (offgoing nurse). Report included the following information SBAR, Kardex, ED Summary, Recent Results, Med Rec Status and Cardiac Rhythm NSR    Shift worked:  7a-7p     Shift summary and any significant changes:     increasing agitiation      Concerns for physician to address:       Zone phone for oncoming shift:          Activity:  Activity Level: Bed Rest  Number times ambulated in hallways past shift: 0  Number of times OOB to chair past shift: 1    Cardiac:   Cardiac Monitoring: Yes      Cardiac Rhythm: Sinus Rhythm    Access:   Current line(s): PIV     Genitourinary:   Urinary status: allen    Respiratory:   O2 Device: None (Room air)  Chronic home O2 use?: NO  Incentive spirometer at bedside: YES     GI:  Last Bowel Movement Date: 07/18/21  Current diet:  ADULT DIET Regular; 3 carb choices (45 gm/meal); Low Fat/Low Chol/High Fiber/2 gm Na  Passing flatus: YES  Tolerating current diet: YES       Pain Management:   Patient states pain is manageable on current regimen: YES    Skin:  Bryan Score: 17  Interventions: increase time out of bed    Patient Safety:  Fall Score:  Total Score: 4  Interventions: bed/chair alarm  High Fall Risk: Yes    Length of Stay:  Expected LOS: 4d 19h  Actual LOS: 3      Latha Ricks RN

## 2021-07-19 NOTE — PROGRESS NOTES
TRANSFER - OUT REPORT:    Verbal report given to Brayden Eric (name) on Marie Xie being transferred to PCU (unit) for routine post - op       Report consisted of patient's Situation, Background, Assessment and   Recommendations(SBAR). Information from the following report(s) SBAR, Kardex, Procedure Summary, Intake/Output, MAR, Recent Results, Med Rec Status, Cardiac Rhythm NSR, Pre Procedure Checklist and Procedure Verification was reviewed with the receiving nurse. Opportunity for questions and clarification was provided.       Patient transported with:   Monitor  Tech

## 2021-07-19 NOTE — DISCHARGE INSTRUCTIONS
Smoking Cessation Program: This is a free, phone/text/email based, smoking cessation program. The program is individualized to meet each patient's needs. To enroll use the link - [facility]. [facility]/quit or text Sparkle Hubbard to [phone] from any smart phone.

## 2021-07-19 NOTE — PROGRESS NOTES
Spoke with Dr. Janet Lester cardiology-[place] to order VALERIA for today per Dr. Tamia Best consult.

## 2021-07-20 LAB
ARTERIAL PATENCY WRIST A: YES
BASE DEFICIT BLDA-SCNC: 12.4 MMOL/L
BASOPHILS # BLD: 0.1 K/UL (ref 0–0.1)
BASOPHILS NFR BLD: 0 % (ref 0–1)
BDY SITE: ABNORMAL
DIFFERENTIAL METHOD BLD: ABNORMAL
EOSINOPHIL # BLD: 0.4 K/UL (ref 0–0.4)
EOSINOPHIL NFR BLD: 3 % (ref 0–7)
ERYTHROCYTE [DISTWIDTH] IN BLOOD BY AUTOMATED COUNT: 13.9 % (ref 11.5–14.5)
GLUCOSE BLD STRIP.AUTO-MCNC: 163 MG/DL (ref 65–117)
GLUCOSE BLD STRIP.AUTO-MCNC: 185 MG/DL (ref 65–117)
GLUCOSE BLD STRIP.AUTO-MCNC: 216 MG/DL (ref 65–117)
GLUCOSE BLD STRIP.AUTO-MCNC: 221 MG/DL (ref 65–117)
HCO3 BLDA-SCNC: 17 MMOL/L (ref 22–26)
HCT VFR BLD AUTO: 29.1 % (ref 35–47)
HGB BLD-MCNC: 9.9 G/DL (ref 11.5–16)
IMM GRANULOCYTES # BLD AUTO: 0.1 K/UL (ref 0–0.04)
IMM GRANULOCYTES NFR BLD AUTO: 1 % (ref 0–0.5)
LYMPHOCYTES # BLD: 3.5 K/UL (ref 0.8–3.5)
LYMPHOCYTES NFR BLD: 24 % (ref 12–49)
MCH RBC QN AUTO: 29.8 PG (ref 26–34)
MCHC RBC AUTO-ENTMCNC: 34 G/DL (ref 30–36.5)
MCV RBC AUTO: 87.7 FL (ref 80–99)
MONOCYTES # BLD: 1.3 K/UL (ref 0–1)
MONOCYTES NFR BLD: 9 % (ref 5–13)
NEUTS SEG # BLD: 9.2 K/UL (ref 1.8–8)
NEUTS SEG NFR BLD: 63 % (ref 32–75)
NRBC # BLD: 0 K/UL (ref 0–0.01)
NRBC BLD-RTO: 0 PER 100 WBC
PCO2 BLDA: 52 MMHG (ref 35–45)
PH BLDA: 7.13 [PH] (ref 7.35–7.45)
PLATELET # BLD AUTO: 184 K/UL (ref 150–400)
PMV BLD AUTO: 11.1 FL (ref 8.9–12.9)
PO2 BLDA: 144 MMHG (ref 80–100)
RBC # BLD AUTO: 3.32 M/UL (ref 3.8–5.2)
SAO2 % BLD: 98 % (ref 92–97)
SAO2% DEVICE SAO2% SENSOR NAME: ABNORMAL
SERVICE CMNT-IMP: ABNORMAL
SPECIMEN SITE: ABNORMAL
WBC # BLD AUTO: 14.5 K/UL (ref 3.6–11)

## 2021-07-20 PROCEDURE — 99233 SBSQ HOSP IP/OBS HIGH 50: CPT | Performed by: STUDENT IN AN ORGANIZED HEALTH CARE EDUCATION/TRAINING PROGRAM

## 2021-07-20 PROCEDURE — 74011250636 HC RX REV CODE- 250/636: Performed by: INTERNAL MEDICINE

## 2021-07-20 PROCEDURE — 74011636637 HC RX REV CODE- 636/637: Performed by: GENERAL ACUTE CARE HOSPITAL

## 2021-07-20 PROCEDURE — 94660 CPAP INITIATION&MGMT: CPT

## 2021-07-20 PROCEDURE — 74011250637 HC RX REV CODE- 250/637: Performed by: INTERNAL MEDICINE

## 2021-07-20 PROCEDURE — 82962 GLUCOSE BLOOD TEST: CPT

## 2021-07-20 PROCEDURE — 74011636637 HC RX REV CODE- 636/637: Performed by: INTERNAL MEDICINE

## 2021-07-20 PROCEDURE — 74011000250 HC RX REV CODE- 250: Performed by: NURSE PRACTITIONER

## 2021-07-20 PROCEDURE — 77010033678 HC OXYGEN DAILY

## 2021-07-20 PROCEDURE — 94760 N-INVAS EAR/PLS OXIMETRY 1: CPT

## 2021-07-20 PROCEDURE — 74011000258 HC RX REV CODE- 258: Performed by: STUDENT IN AN ORGANIZED HEALTH CARE EDUCATION/TRAINING PROGRAM

## 2021-07-20 PROCEDURE — 74011250636 HC RX REV CODE- 250/636: Performed by: STUDENT IN AN ORGANIZED HEALTH CARE EDUCATION/TRAINING PROGRAM

## 2021-07-20 PROCEDURE — 65660000000 HC RM CCU STEPDOWN

## 2021-07-20 PROCEDURE — 74011250637 HC RX REV CODE- 250/637: Performed by: GENERAL ACUTE CARE HOSPITAL

## 2021-07-20 PROCEDURE — 74011250636 HC RX REV CODE- 250/636: Performed by: NURSE PRACTITIONER

## 2021-07-20 PROCEDURE — 74011000250 HC RX REV CODE- 250: Performed by: STUDENT IN AN ORGANIZED HEALTH CARE EDUCATION/TRAINING PROGRAM

## 2021-07-20 PROCEDURE — 36415 COLL VENOUS BLD VENIPUNCTURE: CPT

## 2021-07-20 PROCEDURE — 85025 COMPLETE CBC W/AUTO DIFF WBC: CPT

## 2021-07-20 RX ORDER — HALOPERIDOL 5 MG/ML
3 INJECTION INTRAMUSCULAR AS NEEDED
Status: DISCONTINUED | OUTPATIENT
Start: 2021-07-20 | End: 2021-07-24 | Stop reason: HOSPADM

## 2021-07-20 RX ORDER — LABETALOL HYDROCHLORIDE 5 MG/ML
10 INJECTION, SOLUTION INTRAVENOUS
Status: DISCONTINUED | OUTPATIENT
Start: 2021-07-20 | End: 2021-07-24 | Stop reason: HOSPADM

## 2021-07-20 RX ORDER — INSULIN LISPRO 100 [IU]/ML
3 INJECTION, SOLUTION INTRAVENOUS; SUBCUTANEOUS
Status: DISCONTINUED | OUTPATIENT
Start: 2021-07-20 | End: 2021-07-24 | Stop reason: HOSPADM

## 2021-07-20 RX ADMIN — LABETALOL HYDROCHLORIDE 10 MG: 5 INJECTION INTRAVENOUS at 23:01

## 2021-07-20 RX ADMIN — HEPARIN SODIUM 5000 UNITS: 5000 INJECTION INTRAVENOUS; SUBCUTANEOUS at 09:26

## 2021-07-20 RX ADMIN — ACETAMINOPHEN 650 MG: 325 TABLET ORAL at 17:58

## 2021-07-20 RX ADMIN — LOSARTAN POTASSIUM 100 MG: 100 TABLET, FILM COATED ORAL at 09:26

## 2021-07-20 RX ADMIN — INSULIN LISPRO 3 UNITS: 100 INJECTION, SOLUTION INTRAVENOUS; SUBCUTANEOUS at 12:35

## 2021-07-20 RX ADMIN — VENLAFAXINE HYDROCHLORIDE 37.5 MG: 37.5 CAPSULE, EXTENDED RELEASE ORAL at 09:25

## 2021-07-20 RX ADMIN — INSULIN LISPRO 3 UNITS: 100 INJECTION, SOLUTION INTRAVENOUS; SUBCUTANEOUS at 17:58

## 2021-07-20 RX ADMIN — HYDRALAZINE HYDROCHLORIDE 10 MG: 20 INJECTION INTRAMUSCULAR; INTRAVENOUS at 20:49

## 2021-07-20 RX ADMIN — INSULIN GLARGINE 50 UNITS: 100 INJECTION, SOLUTION SUBCUTANEOUS at 21:54

## 2021-07-20 RX ADMIN — CEFAZOLIN 1 G: 1 INJECTION, POWDER, FOR SOLUTION INTRAMUSCULAR; INTRAVENOUS at 09:26

## 2021-07-20 RX ADMIN — WATER 2 G: 1 INJECTION INTRAMUSCULAR; INTRAVENOUS; SUBCUTANEOUS at 20:48

## 2021-07-20 RX ADMIN — ASPIRIN 81 MG: 81 TABLET, COATED ORAL at 09:25

## 2021-07-20 RX ADMIN — FUROSEMIDE 40 MG: 10 INJECTION, SOLUTION INTRAMUSCULAR; INTRAVENOUS at 11:00

## 2021-07-20 RX ADMIN — Medication 10 ML: at 21:54

## 2021-07-20 RX ADMIN — VENLAFAXINE HYDROCHLORIDE 37.5 MG: 37.5 CAPSULE, EXTENDED RELEASE ORAL at 17:58

## 2021-07-20 RX ADMIN — HALOPERIDOL LACTATE 2 MG: 5 INJECTION, SOLUTION INTRAMUSCULAR at 00:57

## 2021-07-20 RX ADMIN — Medication 10 ML: at 06:46

## 2021-07-20 RX ADMIN — GLIPIZIDE 5 MG: 5 TABLET ORAL at 09:24

## 2021-07-20 RX ADMIN — Medication 10 ML: at 09:27

## 2021-07-20 RX ADMIN — CARVEDILOL 25 MG: 12.5 TABLET, FILM COATED ORAL at 11:00

## 2021-07-20 RX ADMIN — INSULIN LISPRO 2 UNITS: 100 INJECTION, SOLUTION INTRAVENOUS; SUBCUTANEOUS at 09:25

## 2021-07-20 NOTE — PROGRESS NOTES
Infectious Disease Progress Note         Interval:  79-year-old female being seen in consultation for MSSA bacteremia. Pertinent history includes presence of an AICD that was placed in 2020. Patient was admitted on 7/16/2021 for confusion and altered mental status. Today, patient was able to give me a fairly reasonable history. She reports that she has been feeling very tired and fatigued for about 2 to 3 weeks which is unusual for her. Patient does not say that she was feeling sick but only that very tired and fatigued. She did not have any pain at the AICD or any focal symptoms. She denies any subjective fevers or chills. Patient was taken to [facility] initially for altered mental status by her . Per the notes she had a fever of 103.8 at [facility] and was transferred to [facility].  Leukocytosis of 16.7 on admission. On presentation to this hospital concern for meningitis was raised. LP showed 0 WBCs. Chest x-ray CT abdomen pelvis negative for acute pathology. Blood cultures from 7/15/2021 resulted positive for MSSA in all bottles (3/3 bottles). On arrival patient had received acyclovir vancomycin ampicillin and ceftriaxone. She was then deescalated to cefazolin for MSSA bacteremia. TTE has not shown any acute findings. VALERIA on 7/19/2021 did not show any valvular vegetations or vegetations on the AICD leads or device. Patient reports a history of right knee replacement several years ago. She she denies any other implants or prosthetics. At this time she has no focal pain. She reports feeling slightly better since presentation. Subjective:   As above        Objective:    Vitals:   Reviewed in chart. Physical Exam:  Gen: No apparent distress  HEENT:  Normocephalic, atraumatic, no scleral icterus, no subconjunctival hemorrhage.   CV: S1,2 heard regularly, no lower extremity edema no murmurs heard  Lungs: Breathing well on room air  Abdomen: soft, non tender, non distended  Genitourinary: No allen catheter   Skin: no rash   Psych: good affect, good eye contact, non tearful  Neuro: alert, oriented to time,  place, and situation, moves all extremities to commands, verbal  Musculoskeletal:  No joint edema, erythema or tenderness noted   Lines: Peripheral IV lines        Labs:  Recent Results (from the past 24 hour(s))   GLUCOSE, POC    Collection Time: 07/19/21 11:27 AM   Result Value Ref Range    Glucose (POC) 181 (H) 65 - 117 mg/dL    Performed by Zachary Hinojosa PCT    ECHO VALERIA W OR WO CONTRAST    Collection Time: 07/19/21  2:30 PM   Result Value Ref Range    Aortic Valve Area by Planimetry 1.40 cm2   GLUCOSE, POC    Collection Time: 07/19/21  4:29 PM   Result Value Ref Range    Glucose (POC) 233 (H) 65 - 117 mg/dL    Performed by Zachary Hinojosa PCT    GLUCOSE, POC    Collection Time: 07/19/21  9:46 PM   Result Value Ref Range    Glucose (POC) 184 (H) 65 - 117 mg/dL    Performed by Santi Joyner RN    CBC WITH AUTOMATED DIFF    Collection Time: 07/20/21  6:30 AM   Result Value Ref Range    WBC 14.5 (H) 3.6 - 11.0 K/uL    RBC 3.32 (L) 3.80 - 5.20 M/uL    HGB 9.9 (L) 11.5 - 16.0 g/dL    HCT 29.1 (L) 35.0 - 47.0 %    MCV 87.7 80.0 - 99.0 FL    MCH 29.8 26.0 - 34.0 PG    MCHC 34.0 30.0 - 36.5 g/dL    RDW 13.9 11.5 - 14.5 %    PLATELET 759 097 - 034 K/uL    MPV 11.1 8.9 - 12.9 FL    NRBC 0.0 0  WBC    ABSOLUTE NRBC 0.00 0.00 - 0.01 K/uL    NEUTROPHILS 63 32 - 75 %    LYMPHOCYTES 24 12 - 49 %    MONOCYTES 9 5 - 13 %    EOSINOPHILS 3 0 - 7 %    BASOPHILS 0 0 - 1 %    IMMATURE GRANULOCYTES 1 (H) 0.0 - 0.5 %    ABS. NEUTROPHILS 9.2 (H) 1.8 - 8.0 K/UL    ABS. LYMPHOCYTES 3.5 0.8 - 3.5 K/UL    ABS. MONOCYTES 1.3 (H) 0.0 - 1.0 K/UL    ABS. EOSINOPHILS 0.4 0.0 - 0.4 K/UL    ABS. BASOPHILS 0.1 0.0 - 0.1 K/UL    ABS. IMM.  GRANS. 0.1 (H) 0.00 - 0.04 K/UL    DF AUTOMATED     GLUCOSE, POC    Collection Time: 07/20/21  7:34 AM   Result Value Ref Range    Glucose (POC) 163 (H) 65 - 117 mg/dL Performed by Dariana Benavidez PCT          Assessment/Recommendations:  74-year-old female with AICD , right knee replacement who was admitted for MSSA bacteremia of unknown source. TTE and VALERIA both did not show any valvular vegetations or device or lead vegetations. However in the setting of staph aureus bacteremia suspicion for device infection is very high. In addition the patient's clinical history of malaise and extreme fatigue for 2 to 3 weeks, though non-specific, is concerning for ongoing bacteremia as well. I have discussed the case with Dr. Cori Severance from cardiology today. We will recommend removal of the AICD/pacemaker as much as possible. Dr. Cori Severance is to ask Dr. Vitaly Rios for evaluation. Patient's kidney function is improving as well. Will increase dose of cefazolin to 2 g every 12 hours. We will follow. Thank you for the opportunity to participate in the care of this patient. Please contact with questions or concerns.       Bernadine Carrera MD  Infectious Diseases

## 2021-07-20 NOTE — PROGRESS NOTES
Occupational therapy note: Attempted OT session. Pt received supine in bed and declined therapy at this time stating she just got comfortable in bed after eating breakfast. Pt educated on benefits of progressive mobility and attempting self-care, but pt continued to decline. Will defer and continue to follow.  Thank you    Aditya Cardoso, OTR/L

## 2021-07-20 NOTE — PROGRESS NOTES
Spiritual Care Assessment/Progress Note  [facility]      NAME: Fermín Prescott      MRN: [id number]  AGE: 79 y.o.  SEX: female  Hindu Affiliation: Buddhist   Language: English     7/20/2021     Total Time (in minutes): 30     Spiritual Assessment begun in [facility] PROGRESSIVE CARE through conversation with:         [x]Patient        [] Family    [] Friend(s)        Reason for Consult: Initial/Spiritual assessment, patient floor     Spiritual beliefs: (Please include comment if needed)     [x] Identifies with a mallorie tradition:  Lester Wilson       [] Supported by a mallorie community:            [] Claims no spiritual orientation:           [] Seeking spiritual identity:                [] Adheres to an individual form of spirituality:           [] Not able to assess:                           Identified resources for coping:      [x] Prayer                               [] Music                  [] Guided Imagery     [x] Family/friends                 [] Pet visits     [x] Devotional reading                         [] Unknown     [] Other:                                              Interventions offered during this visit: (See comments for more details)    Patient Interventions: Affirmation of emotions/emotional suffering, Affirmation of mallorie, Catharsis/review of pertinent events in supportive environment, Coping skills reviewed/reinforced, Iconic (affirming the presence of God/Higher Power), Initial/Spiritual assessment, patient floor, Initial visit, Life review/legacy, Hindu beliefs/image of God discussed           Plan of Care:     [] Support spiritual and/or cultural needs    [] Support AMD and/or advance care planning process      [] Support grieving process   [] Coordinate Rites and/or Rituals    [] Coordination with community clergy   [] No spiritual needs identified at this time   [] Detailed Plan of Care below (See Comments)  [] Make referral to Music Therapy  [] Make referral to Pet Therapy     [] Make referral to Addiction services  [] Make referral to [facility]  [] Make referral to Spiritual Care Partner  [] No future visits requested        [x] Follow up upon further referrals     Comments:     Initial Spiritual Care Assessment visit for Ms. Chey Tapia in 2242. Patient was alert, no family was present during the visit. Patient shared her medical journey how her sepsis caused by her devices in her body. She also share about her mallorie response that she is wondering why this is happening to her but would follow God's will whatever he wants her to go. She was accepting the reality though it is not understandable according to her. She also shred about her life and family, living in [place] [facility] [place] where the closest grocery is located 17 miles away. She also shared about reflections about her mallorie. Then tech came in to check her medical status,  gave them time and space for medical intervention. She was thankful for 's visit. Pt is in her birthday today,  wished her happy birthday. Advised of 24/7  availability.     [id number] [facility] Helen Mendez., M.S., Th.M.  Spiritual Care Provider   Paging Service [phone])

## 2021-07-20 NOTE — PROGRESS NOTES
1900- Bedside shift change report given to Latha Pitts RN (oncoming nurse) by Tracy Peña RN (offgoing nurse). Report included the following information SBAR, Kardex, ED Summary, Intake/Output, MAR, Recent Results, Med Rec Status and Cardiac Rhythm NSR.     2200- Pt becoming increasingly tachycardic, tachypneic, and disoriented. IV ativan given, bipap placed back on pt. 2230- Pt repeatedly removing the bipap, IM haldol order placed by SHARATH Remy. Pt also hypertensive- IV labatelol ordered PRN.    0000- Pt still removing bipap. Message sent to NP Emelina. Order placed for a one time PRN 3mg Haldol. 0100- Pt asleep, HR now down to mid 70's, respiratory rate improving to 20s. Will continue to monitor pt.     0700- End of Shift Note    Bedside shift change report given to Riana Doe (oncoming nurse) by Sergio Guardado RN (offgoing nurse). Report included the following information SBAR, Kardex, ED Summary, Intake/Output, MAR, Recent Results, Med Rec Status and Cardiac Rhythm NSR    Shift worked:  7p-7a     Shift summary and any significant changes:    See progress notes above. Pt with agitation overnight, needed bipap for respiratory support. Concerns for physician to address:  Possible hypoxic episode overnight? Bipap applied     Zone phone for oncoming shift:          Activity:  Activity Level: Bed Rest  Number times ambulated in hallways past shift: 0  Number of times OOB to chair past shift: 0    Cardiac:   Cardiac Monitoring: Yes      Cardiac Rhythm: Sinus Rhythm    Access:   Current line(s): PIV     Genitourinary:   Urinary status: allen    Respiratory:   O2 Device: BIPAP  Chronic home O2 use?: NO  Incentive spirometer at bedside: YES     GI:  Last Bowel Movement Date: 07/19/21  Current diet:  ADULT DIET Regular; 3 carb choices (45 gm/meal);  Low Fat/Low Chol/High Fiber/2 gm Na  Passing flatus: NO  Tolerating current diet: YES       Pain Management:   Patient states pain is manageable on current regimen: YES    Skin:  Bryan Score: 17  Interventions: turn team, speciality bed, increase time out of bed and PT/OT consult    Patient Safety:  Fall Score:  Total Score: 4  Interventions: bed/chair alarm, assistive device (walker, cane, etc), gripper socks and pt to call before getting OOB  High Fall Risk: Yes    Length of Stay:  Expected LOS: 4d 19h  Actual LOS: 111 [facility] Nima, RN

## 2021-07-20 NOTE — PROGRESS NOTES
Physical therapy:    Attempted PT session. Pt received supine in bed and declined therapy at this time stating she just got comfortable in bed after eating breakfast. Pt educated on benefits of progressive mobility, but pt continued to decline. Will defer and continue to follow.  Thank you    Francia Blackwell, PT, DPT

## 2021-07-20 NOTE — PROGRESS NOTES
Received notification from bedside RN about patient with regards to: persistent agitation and restlessness despite receiving Ativan at 2200  VS: /98, HR 78, RR 27, O2 sat 98% on RA    Intervention given: Haldol 2 mg IM x 1 dose ordered    0038: Notified of patient's persistent tachycardia and tachypnea.  Has orders for BIPAP  VS: /84, , RR 20, O2 sat 92% on RA    - place on BIPAP, give ordered Haldol, Labetalol IV prn  Ordered    0150: keeps intermittently taking off BIPAP with episode of intermittent restlessness and agitation    - Haldol 3 mg IM prn x1 dose for persistent restless and agitation ordered

## 2021-07-20 NOTE — PROGRESS NOTES
Hospitalist Progress Note    NAME: Celestina Champagne   :  1940   MRN:  [id number]       Assessment / Plan:  Acute metabolic encephalopathy POA  -Seems to be resolving  -CT head on admission no acute findings  -Patient currently AAO x3, seems to have good insight re current situation    MSSA bacteremia  Severe sepsis,   -TTE and VALERIA both negative for valvular vegetations  -Appreciate ID consult  -No obvious source of infection  -Patient has pacemaker/AICD.   Device in place for years no obvious signs of infection at chest wall site  -Continue IV antibiotics with cefazolin  -Cardiology is consulted and discussed his ongoing pulmonary possible removal PM/AICD given virulence of staph aureus  -Initially meningitis was under consideration and patient underwent LP, no org seen and therefore empiric meningitis coverage was discontinued  -Blood cultures positive for staph RS 7/15 in all 3 bottles drawn  -Repeat cultures  NGTD    Acute respiratory failure with hypoxia secondary to pulmonary edema on   -Given Lasix, IVF stopped  -Currently resolved and patient maintaining sats on room air     elevated troponin  -No chest pain or shortness of breath  -Appreciate cardiology following  -Thought likely from sepsis with possible demand ischemia    Elevated LFTs  HLD  -Trending down, no abdominal pain  -Also likely from sepsis, monitor  -hold statin     IVAN CKD stage III  -resolving  -suspect prerenal in setting of sepsis  -monitor labs, renally dose meds     HTN  -cont increased dose of coreg, cont losartan    DM2  -BS high in 200s  -cont lantus  -hold glipizide  -start preprandial insulin  -monitor POCs     Dilated CM  SSS  -s/p PPM     Code Status: DNR  Surrogate Decision Maker:   DVT Prophylaxis:   GI Prophylaxis: not indicated  Baseline: Independent     25.0 - 29.9 Overweight / Body mass index is 27.08 kg/m².     Estimated discharge date:   Barriers: Clinical improvement     Subjective: Chief Complaint / Reason for Physician Visit  Denies CP, SOB, palpitations, fevers/chills/n/v/abd pain    Discussed with RN events overnight. Review of Systems:  Symptom Y/N Comments  Symptom Y/N Comments   Fever/Chills n   Chest Pain n    Poor Appetite n   Edema n    Cough n   Abdominal Pain n    Sputum n   Joint Pain     SOB/STANTON n   Pruritis/Rash     Nausea/vomit n   Tolerating PT/OT     Diarrhea n   Tolerating Diet y    Constipation n   Other       Could NOT obtain due to:      Objective:     VITALS:   Last 24hrs VS reviewed since prior progress note. Most recent are:  Patient Vitals for the past 24 hrs:   Temp Pulse Resp BP SpO2   07/20/21 1439 98.6 °F (37 °C) 70 23 (!) 140/58 95 %   07/20/21 1235  74 20 (!) 154/57 98 %   07/20/21 1100  79  (!) 171/75    07/20/21 1043 98.7 °F (37.1 °C) 78 22 (!) 173/67 95 %   07/20/21 0947  71 21 (!) 149/58 95 %   07/20/21 0837     98 %   07/20/21 0825 98.6 °F (37 °C) 81 19 (!) 147/96 100 %   07/20/21 0343     98 %   07/20/21 0332 97.8 °F (36.6 °C) 93 22 96/79 99 %   07/20/21 0055     92 %   07/19/21 2257 98 °F (36.7 °C) 93 20 (!) 182/84 92 %   07/19/21 1926 98.3 °F (36.8 °C) 78 27 (!) 116/98 98 %       Intake/Output Summary (Last 24 hours) at 7/20/2021 1625  Last data filed at 7/20/2021 1439  Gross per 24 hour   Intake 450 ml   Output 2700 ml   Net -2250 ml        I had a face to face encounter and independently examined this patient on 7/20/2021, as outlined below:  PHYSICAL EXAM:  General: WD, WN. Alert, cooperative, no acute distress    EENT:  EOMI. Anicteric sclerae. MMM  Resp:  CTA bilaterally, no wheezing or rales. No accessory muscle use  CV:  Regular  rhythm,  No edema  GI:  Soft, Non distended, Non tender. +Bowel sounds  Neurologic:  Alert and oriented X 3, normal speech,   Psych:   Good insight. Not anxious nor agitated  Skin:  No rashes.   No jaundice    Reviewed most current lab test results and cultures  YES  Reviewed most current radiology test results   YES  Review and summation of old records today    NO  Reviewed patient's current orders and MAR    YES  PMH/SH reviewed - no change compared to H&P  ________________________________________________________________________  Care Plan discussed with:    Comments   Patient x    Family      RN     Care Manager     Consultant                       x Multidiciplinary team rounds were held today with , nursing, pharmacist and clinical coordinator. Patient's plan of care was discussed; medications were reviewed and discharge planning was addressed. ________________________________________________________________________  Total NON critical care TIME:  35   Minutes    Total CRITICAL CARE TIME Spent:   Minutes non procedure based      Comments   >50% of visit spent in counseling and coordination of care x    ________________________________________________________________________  Jim Busing, DO     Procedures: see electronic medical records for all procedures/Xrays and details which were not copied into this note but were reviewed prior to creation of Plan. LABS:  I reviewed today's most current labs and imaging studies.   Pertinent labs include:  Recent Labs     07/20/21  0630 07/19/21 0312 07/18/21  1115   WBC 14.5* 12.2* 12.3*   HGB 9.9* 9.7* 10.1*   HCT 29.1* 29.4* 31.2*    139* 106*     Recent Labs     07/19/21 0312 07/18/21  1120    139   K 3.6 3.8   * 111*   CO2 23 22   * 268*   BUN 45* 48*   CREA 1.37* 1.68*   CA 8.6 8.2*   ALB 2.1* 2.0*   TBILI 0.4 0.4   * 134*       Signed: Maximiliano Chatman, DO

## 2021-07-20 NOTE — PROGRESS NOTES
0700 Bedside shift change report given to Todd Cesar (oncoming nurse) by Len Preciado (offgoing nurse). Report included the following information SBAR, Kardex, Intake/Output, MAR, Recent Results and Cardiac Rhythm NSR. Pt resting in bed with BiPAP on at shift change. 0900 Pt awake and BiPAP removed to allow pt to eat breakfast. VSS, O2 sat 95% on RA. Will continue to closely monitor pt. Bed alarm on, call bell within reach. [facility] called to Dr. Edward Gallo for electrophysiology consult. Phone [phone]. Spoke with Davion Workman who took the consult.

## 2021-07-21 ENCOUNTER — ANESTHESIA EVENT (OUTPATIENT)
Dept: CARDIAC CATH/INVASIVE PROCEDURES | Age: 81
DRG: 853 | End: 2021-07-21
Payer: MEDICARE

## 2021-07-21 ENCOUNTER — ANESTHESIA (OUTPATIENT)
Dept: CARDIAC CATH/INVASIVE PROCEDURES | Age: 81
DRG: 853 | End: 2021-07-21
Payer: MEDICARE

## 2021-07-21 LAB
ALBUMIN SERPL-MCNC: 2.3 G/DL (ref 3.5–5)
ALBUMIN/GLOB SERPL: 0.5 {RATIO} (ref 1.1–2.2)
ALP SERPL-CCNC: 67 U/L (ref 45–117)
ALT SERPL-CCNC: 57 U/L (ref 12–78)
ANION GAP SERPL CALC-SCNC: 8 MMOL/L (ref 5–15)
AST SERPL-CCNC: 38 U/L (ref 15–37)
BILIRUB SERPL-MCNC: 0.4 MG/DL (ref 0.2–1)
BUN SERPL-MCNC: 37 MG/DL (ref 6–20)
BUN/CREAT SERPL: 28 (ref 12–20)
CALCIUM SERPL-MCNC: 9.5 MG/DL (ref 8.5–10.1)
CHLORIDE SERPL-SCNC: 108 MMOL/L (ref 97–108)
CO2 SERPL-SCNC: 23 MMOL/L (ref 21–32)
CREAT SERPL-MCNC: 1.3 MG/DL (ref 0.55–1.02)
ERYTHROCYTE [DISTWIDTH] IN BLOOD BY AUTOMATED COUNT: 14 % (ref 11.5–14.5)
GLOBULIN SER CALC-MCNC: 4.4 G/DL (ref 2–4)
GLUCOSE BLD STRIP.AUTO-MCNC: 137 MG/DL (ref 65–117)
GLUCOSE BLD STRIP.AUTO-MCNC: 142 MG/DL (ref 65–117)
GLUCOSE BLD STRIP.AUTO-MCNC: 149 MG/DL (ref 65–117)
GLUCOSE BLD STRIP.AUTO-MCNC: 250 MG/DL (ref 65–117)
GLUCOSE SERPL-MCNC: 143 MG/DL (ref 65–100)
HCT VFR BLD AUTO: 30.9 % (ref 35–47)
HGB BLD-MCNC: 10.7 G/DL (ref 11.5–16)
MAGNESIUM SERPL-MCNC: 1.6 MG/DL (ref 1.6–2.4)
MCH RBC QN AUTO: 30.4 PG (ref 26–34)
MCHC RBC AUTO-ENTMCNC: 34.6 G/DL (ref 30–36.5)
MCV RBC AUTO: 87.8 FL (ref 80–99)
NRBC # BLD: 0 K/UL (ref 0–0.01)
NRBC BLD-RTO: 0 PER 100 WBC
PLATELET # BLD AUTO: 243 K/UL (ref 150–400)
PMV BLD AUTO: 10.5 FL (ref 8.9–12.9)
POTASSIUM SERPL-SCNC: 4.3 MMOL/L (ref 3.5–5.1)
PROT SERPL-MCNC: 6.7 G/DL (ref 6.4–8.2)
RBC # BLD AUTO: 3.52 M/UL (ref 3.8–5.2)
SERVICE CMNT-IMP: ABNORMAL
SODIUM SERPL-SCNC: 139 MMOL/L (ref 136–145)
WBC # BLD AUTO: 15.9 K/UL (ref 3.6–11)

## 2021-07-21 PROCEDURE — 74011000250 HC RX REV CODE- 250: Performed by: STUDENT IN AN ORGANIZED HEALTH CARE EDUCATION/TRAINING PROGRAM

## 2021-07-21 PROCEDURE — 0JPT0PZ REMOVAL OF CARDIAC RHYTHM RELATED DEVICE FROM TRUNK SUBCUTANEOUS TISSUE AND FASCIA, OPEN APPROACH: ICD-10-PCS | Performed by: INTERNAL MEDICINE

## 2021-07-21 PROCEDURE — 74011250636 HC RX REV CODE- 250/636: Performed by: INTERNAL MEDICINE

## 2021-07-21 PROCEDURE — 74011250636 HC RX REV CODE- 250/636: Performed by: STUDENT IN AN ORGANIZED HEALTH CARE EDUCATION/TRAINING PROGRAM

## 2021-07-21 PROCEDURE — 82962 GLUCOSE BLOOD TEST: CPT

## 2021-07-21 PROCEDURE — 74011000258 HC RX REV CODE- 258: Performed by: NURSE ANESTHETIST, CERTIFIED REGISTERED

## 2021-07-21 PROCEDURE — 77030031139 HC SUT VCRL2 J&J -A: Performed by: INTERNAL MEDICINE

## 2021-07-21 PROCEDURE — 65660000000 HC RM CCU STEPDOWN

## 2021-07-21 PROCEDURE — 74011250636 HC RX REV CODE- 250/636

## 2021-07-21 PROCEDURE — 99233 SBSQ HOSP IP/OBS HIGH 50: CPT | Performed by: STUDENT IN AN ORGANIZED HEALTH CARE EDUCATION/TRAINING PROGRAM

## 2021-07-21 PROCEDURE — 36415 COLL VENOUS BLD VENIPUNCTURE: CPT

## 2021-07-21 PROCEDURE — 85027 COMPLETE CBC AUTOMATED: CPT

## 2021-07-21 PROCEDURE — 33241 REMOVE PULSE GENERATOR: CPT | Performed by: INTERNAL MEDICINE

## 2021-07-21 PROCEDURE — 74011636637 HC RX REV CODE- 636/637: Performed by: INTERNAL MEDICINE

## 2021-07-21 PROCEDURE — 74011250636 HC RX REV CODE- 250/636: Performed by: NURSE ANESTHETIST, CERTIFIED REGISTERED

## 2021-07-21 PROCEDURE — 33244 REMOVE ELCTRD TRANSVENOUSLY: CPT | Performed by: INTERNAL MEDICINE

## 2021-07-21 PROCEDURE — 77010033678 HC OXYGEN DAILY

## 2021-07-21 PROCEDURE — 77030018729 HC ELECTRD DEFIB PAD CARD -B: Performed by: INTERNAL MEDICINE

## 2021-07-21 PROCEDURE — 74011250637 HC RX REV CODE- 250/637: Performed by: GENERAL ACUTE CARE HOSPITAL

## 2021-07-21 PROCEDURE — 83735 ASSAY OF MAGNESIUM: CPT

## 2021-07-21 PROCEDURE — 74011000250 HC RX REV CODE- 250: Performed by: NURSE ANESTHETIST, CERTIFIED REGISTERED

## 2021-07-21 PROCEDURE — 80053 COMPREHEN METABOLIC PANEL: CPT

## 2021-07-21 PROCEDURE — 74011000250 HC RX REV CODE- 250: Performed by: INTERNAL MEDICINE

## 2021-07-21 PROCEDURE — 74011000272 HC RX REV CODE- 272: Performed by: INTERNAL MEDICINE

## 2021-07-21 PROCEDURE — 02PA0MZ REMOVAL OF CARDIAC LEAD FROM HEART, OPEN APPROACH: ICD-10-PCS | Performed by: INTERNAL MEDICINE

## 2021-07-21 PROCEDURE — 76060000032 HC ANESTHESIA 0.5 TO 1 HR: Performed by: INTERNAL MEDICINE

## 2021-07-21 PROCEDURE — 77030039266 HC ADH SKN EXOFIN S2SG -A: Performed by: INTERNAL MEDICINE

## 2021-07-21 PROCEDURE — 77030028698 HC BLD TISS PLSM MEDT -D: Performed by: INTERNAL MEDICINE

## 2021-07-21 PROCEDURE — 74011636637 HC RX REV CODE- 636/637: Performed by: GENERAL ACUTE CARE HOSPITAL

## 2021-07-21 PROCEDURE — 74011250637 HC RX REV CODE- 250/637: Performed by: INTERNAL MEDICINE

## 2021-07-21 RX ORDER — MAGNESIUM SULFATE HEPTAHYDRATE 40 MG/ML
2 INJECTION, SOLUTION INTRAVENOUS ONCE
Status: COMPLETED | OUTPATIENT
Start: 2021-07-21 | End: 2021-07-21

## 2021-07-21 RX ORDER — LANOLIN ALCOHOL/MO/W.PET/CERES
400 CREAM (GRAM) TOPICAL 2 TIMES DAILY
Status: DISCONTINUED | OUTPATIENT
Start: 2021-07-21 | End: 2021-07-21

## 2021-07-21 RX ORDER — PROPOFOL 10 MG/ML
INJECTION, EMULSION INTRAVENOUS
Status: DISCONTINUED | OUTPATIENT
Start: 2021-07-21 | End: 2021-07-21 | Stop reason: HOSPADM

## 2021-07-21 RX ORDER — FENTANYL CITRATE 50 UG/ML
INJECTION, SOLUTION INTRAMUSCULAR; INTRAVENOUS AS NEEDED
Status: DISCONTINUED | OUTPATIENT
Start: 2021-07-21 | End: 2021-07-21 | Stop reason: HOSPADM

## 2021-07-21 RX ORDER — DEXMEDETOMIDINE HYDROCHLORIDE 100 UG/ML
INJECTION, SOLUTION INTRAVENOUS AS NEEDED
Status: DISCONTINUED | OUTPATIENT
Start: 2021-07-21 | End: 2021-07-21 | Stop reason: HOSPADM

## 2021-07-21 RX ORDER — PROPOFOL 10 MG/ML
INJECTION, EMULSION INTRAVENOUS AS NEEDED
Status: DISCONTINUED | OUTPATIENT
Start: 2021-07-21 | End: 2021-07-21 | Stop reason: HOSPADM

## 2021-07-21 RX ORDER — HEPARIN SODIUM 200 [USP'U]/100ML
INJECTION, SOLUTION INTRAVENOUS
Status: DISCONTINUED | OUTPATIENT
Start: 2021-07-21 | End: 2021-07-21 | Stop reason: HOSPADM

## 2021-07-21 RX ORDER — ONDANSETRON 2 MG/ML
INJECTION INTRAMUSCULAR; INTRAVENOUS AS NEEDED
Status: DISCONTINUED | OUTPATIENT
Start: 2021-07-21 | End: 2021-07-21 | Stop reason: HOSPADM

## 2021-07-21 RX ORDER — LIDOCAINE HYDROCHLORIDE AND EPINEPHRINE 10; 10 MG/ML; UG/ML
INJECTION, SOLUTION INFILTRATION; PERINEURAL AS NEEDED
Status: DISCONTINUED | OUTPATIENT
Start: 2021-07-21 | End: 2021-07-21 | Stop reason: HOSPADM

## 2021-07-21 RX ORDER — SODIUM CHLORIDE 9 MG/ML
INJECTION, SOLUTION INTRAVENOUS
Status: DISCONTINUED | OUTPATIENT
Start: 2021-07-21 | End: 2021-07-21 | Stop reason: HOSPADM

## 2021-07-21 RX ADMIN — INSULIN GLARGINE 50 UNITS: 100 INJECTION, SOLUTION SUBCUTANEOUS at 21:59

## 2021-07-21 RX ADMIN — INSULIN LISPRO 3 UNITS: 100 INJECTION, SOLUTION INTRAVENOUS; SUBCUTANEOUS at 21:59

## 2021-07-21 RX ADMIN — INSULIN LISPRO 2 UNITS: 100 INJECTION, SOLUTION INTRAVENOUS; SUBCUTANEOUS at 17:10

## 2021-07-21 RX ADMIN — Medication 10 ML: at 14:00

## 2021-07-21 RX ADMIN — INSULIN LISPRO 2 UNITS: 100 INJECTION, SOLUTION INTRAVENOUS; SUBCUTANEOUS at 12:38

## 2021-07-21 RX ADMIN — PROPOFOL 20 MG: 10 INJECTION, EMULSION INTRAVENOUS at 14:56

## 2021-07-21 RX ADMIN — ACETAMINOPHEN 650 MG: 650 SUPPOSITORY RECTAL at 09:23

## 2021-07-21 RX ADMIN — Medication 10 ML: at 21:59

## 2021-07-21 RX ADMIN — FUROSEMIDE 40 MG: 10 INJECTION, SOLUTION INTRAMUSCULAR; INTRAVENOUS at 09:01

## 2021-07-21 RX ADMIN — VENLAFAXINE HYDROCHLORIDE 37.5 MG: 37.5 CAPSULE, EXTENDED RELEASE ORAL at 17:10

## 2021-07-21 RX ADMIN — DEXMEDETOMIDINE HYDROCHLORIDE 10 MCG: 100 INJECTION, SOLUTION, CONCENTRATE INTRAVENOUS at 14:45

## 2021-07-21 RX ADMIN — CARVEDILOL 25 MG: 12.5 TABLET, FILM COATED ORAL at 17:09

## 2021-07-21 RX ADMIN — WATER 2 G: 1 INJECTION INTRAMUSCULAR; INTRAVENOUS; SUBCUTANEOUS at 21:59

## 2021-07-21 RX ADMIN — PROPOFOL 50 MCG/KG/MIN: 10 INJECTION, EMULSION INTRAVENOUS at 14:50

## 2021-07-21 RX ADMIN — WATER 2 G: 1 INJECTION INTRAMUSCULAR; INTRAVENOUS; SUBCUTANEOUS at 09:01

## 2021-07-21 RX ADMIN — SODIUM CHLORIDE: 9 INJECTION, SOLUTION INTRAVENOUS at 14:36

## 2021-07-21 RX ADMIN — ACETAMINOPHEN 650 MG: 650 SUPPOSITORY RECTAL at 02:50

## 2021-07-21 RX ADMIN — ACETAMINOPHEN 650 MG: 325 TABLET ORAL at 22:02

## 2021-07-21 RX ADMIN — ONDANSETRON HYDROCHLORIDE 4 MG: 2 INJECTION, SOLUTION INTRAMUSCULAR; INTRAVENOUS at 15:00

## 2021-07-21 RX ADMIN — MAGNESIUM SULFATE HEPTAHYDRATE 2 G: 40 INJECTION, SOLUTION INTRAVENOUS at 09:24

## 2021-07-21 RX ADMIN — FENTANYL CITRATE 25 MCG: 50 INJECTION, SOLUTION INTRAMUSCULAR; INTRAVENOUS at 15:10

## 2021-07-21 RX ADMIN — FENTANYL CITRATE 50 MCG: 50 INJECTION, SOLUTION INTRAMUSCULAR; INTRAVENOUS at 14:56

## 2021-07-21 RX ADMIN — ACETAMINOPHEN 650 MG: 325 TABLET ORAL at 15:42

## 2021-07-21 NOTE — PROGRESS NOTES
Hospitalist Progress Note    NAME: Ambrosio Santana   :  1940   MRN:  [id number]       Assessment / Plan:  MSSA bacteremia  Severe sepsis, POA  -TTE and VALERIA both negative for valvular vegetations  -Appreciate ID consult  -No obvious source of infection  -S/p successful explantation of dual-chamber ICD today ()  -Echo this admission with EF 55-60%, improved from 25-30% on last echo on file 2020.   As such patient will not require reimplantation of ICD for primary prevention  -Continue IV antibiotics with cefazolin, will need 6 weeks of therapy from AICD explantation   -Initially meningitis was under consideration and patient underwent LP, no org seen and therefore empiric meningitis coverage was discontinued  -Blood cultures positive for staph RS 7/15 in all 3 bottles drawn  -Repeat cultures  NGTD    Acute metabolic encephalopathy, POA  -2/2 sepsis, resolved  -CT head on admission no acute findings  -Pt AAO x 3    Acute respiratory failure with hypoxia secondary to pulmonary edema on   -Given Lasix, IVF stopped  -Currently resolved and patient maintaining sats on room air     elevated troponin  -No chest pain or shortness of breath  -Appreciate cardiology following  -Thought likely from sepsis with possible demand ischemia    Elevated LFTs  HLD  -Trending down, no abdominal pain  -Also likely from sepsis, monitor  -hold statin     IVAN CKD stage III  -resolving  -suspect prerenal in setting of sepsis  -monitor labs, renally dose meds     HTN  -cont increased dose of coreg, cont losartan    DM2  -BS high in 200s  -cont lantus  -hold glipizide  -start preprandial insulin  -monitor POCs     Dilated CM  SSS  -s/p PPM     Code Status: DNR  Surrogate Decision Maker:   DVT Prophylaxis:   GI Prophylaxis: not indicated  Baseline: Independent     25.0 - 29.9 Overweight / Body mass index is 27.08 kg/m².     Estimated discharge date:   Barriers: Clinical improvement     Subjective: Chief Complaint / Reason for Physician Visit  Seen on the floor after AICD removal. Pt drowsy but oriented x 3, no acute complaints. Discussed with RN events overnight. Review of Systems:  Symptom Y/N Comments  Symptom Y/N Comments   Fever/Chills n   Chest Pain n    Poor Appetite n   Edema n    Cough n   Abdominal Pain n    Sputum n   Joint Pain     SOB/STANTON n   Pruritis/Rash     Nausea/vomit n   Tolerating PT/OT     Diarrhea n   Tolerating Diet y    Constipation n   Other       Could NOT obtain due to:      Objective:     VITALS:   Last 24hrs VS reviewed since prior progress note. Most recent are:  Patient Vitals for the past 24 hrs:   Temp Pulse Resp BP SpO2   07/21/21 0735 98.6 °F (37 °C) 74 12 110/73 99 %   07/21/21 0237 98.5 °F (36.9 °C) 77 28 (!) 162/70 98 %   07/21/21 0034  93 21 (!) 153/61 99 %   07/20/21 2253 98 °F (36.7 °C) 77 20 (!) 182/60 98 %   07/20/21 2049  78  (!) 182/71    07/20/21 2005 98.2 °F (36.8 °C) 72 18 (!) 178/73 99 %   07/20/21 1710  71 20 (!) 152/70 97 %   07/20/21 1439 98.6 °F (37 °C) 70 23 (!) 140/58 95 %   07/20/21 1235  74 20 (!) 154/57 98 %   07/20/21 1100  79  (!) 171/75    07/20/21 1043 98.7 °F (37.1 °C) 78 22 (!) 173/67 95 %   07/20/21 0947  71 21 (!) 149/58 95 %       Intake/Output Summary (Last 24 hours) at 7/21/2021 0853  Last data filed at 7/21/2021 9566  Gross per 24 hour   Intake 550 ml   Output 1750 ml   Net -1200 ml        I had a face to face encounter and independently examined this patient on 7/21/2021, as outlined below:  PHYSICAL EXAM:  General: WD, WN. Alert, cooperative, no acute distress    EENT:  EOMI. Anicteric sclerae. MMM  Resp:  CTA bilaterally, no wheezing or rales. No accessory muscle use  CV:  Regular  rhythm,  No edema  GI:  Soft, Non distended, Non tender. +Bowel sounds  Neurologic:  Alert and oriented X 3, normal speech,   Psych:   Good insight. Not anxious nor agitated  Skin:  No rashes.   No jaundice    Reviewed most current lab test results and cultures  YES  Reviewed most current radiology test results   YES  Review and summation of old records today    NO  Reviewed patient's current orders and MAR    YES  PMH/SH reviewed - no change compared to H&P  ________________________________________________________________________  Care Plan discussed with:    Comments   Patient x    Family      RN x    Care Manager     Consultant                       x Multidiciplinary team rounds were held today with , nursing, pharmacist and clinical coordinator. Patient's plan of care was discussed; medications were reviewed and discharge planning was addressed. ________________________________________________________________________  Total NON critical care TIME:  25   Minutes    Total CRITICAL CARE TIME Spent:   Minutes non procedure based      Comments   >50% of visit spent in counseling and coordination of care x    ________________________________________________________________________  Rosa Rack, DO     Procedures: see electronic medical records for all procedures/Xrays and details which were not copied into this note but were reviewed prior to creation of Plan. LABS:  I reviewed today's most current labs and imaging studies.   Pertinent labs include:  Recent Labs     07/21/21 0257 07/20/21  0630 07/19/21 0312   WBC 15.9* 14.5* 12.2*   HGB 10.7* 9.9* 9.7*   HCT 30.9* 29.1* 29.4*    184 139*     Recent Labs     07/21/21  0257 07/19/21 0312 07/18/21  1120    140 139   K 4.3 3.6 3.8    112* 111*   CO2 23 23 22   * 134* 268*   BUN 37* 45* 48*   CREA 1.30* 1.37* 1.68*   CA 9.5 8.6 8.2*   MG 1.6  --   --    ALB 2.3* 2.1* 2.0*   TBILI 0.4 0.4 0.4   ALT 57 119* 134*       Signed: Diana Chatman, DO

## 2021-07-21 NOTE — PROGRESS NOTES
2033 Notified NP Cecille Aguileras: Upon arrival to pt room a family member was assiting the pt to bed. Before I could say anything the pt alejandro came out. She is scheduled to has sugery for removal of her AICD in the morning. Would you like another one placed? NP instructed to :\"Monitor patient if able to void without it for now\"    0023 Notified SHARATH Remy: pt has been having some elevated BP's. She has received her PRN hydralazine and Labetalol and is still having elevated BP's her current Bp is 171/60. What do you recommend? NP asked:\"Has she voided since? Is she in pain? \"    0036 Reply: Yes pt has voided 400ml and she is in no pain at this time. She is now asking to be on the bipap and her BP has come down to 153/61. Just placed pt on bipap.    0107 Notified SHARATH Remy: Pt has been having multiple PVC's and then has gone into what looks like a ventricular rhythm in the 90's briefly but is now in NSR. Her last mag was 1.8 on 7/15. Would you like a mag added to her morning labs? NP placed order for mag with AM labs    [address] Notified SHARATH Remy: is now complaining of a headache but she is currently NPO for her procedure in the morning. Np stated to give tylenol suppository at this time        End of Shift Note    Bedside shift change report given to Zaida Zarco RN (oncoming nurse) by Tosha Nunez (offgoing nurse).   Report included the following information SBAR, Kardex, ED Summary, Intake/Output and Recent Results    Shift worked:  5635-2389     Shift summary and any significant changes:     See above note, [facility] bath received     Concerns for physician to address:       Zone phone for oncoming shift:          Activity:  Activity Level: Bed Rest  Number times ambulated in hallways past shift: 0  Number of times OOB to chair past shift: 1    Cardiac:   Cardiac Monitoring: Yes      Cardiac Rhythm: Sinus Rhythm    Access:   Current line(s): PIV     Genitourinary:   Urinary status: voiding    Respiratory:   O2 Device: None (Room air)  Chronic home O2 use?: NO  Incentive spirometer at bedside: NO     GI:  Last Bowel Movement Date: 07/19/21  Current diet:  DIET NPO  Passing flatus: YES  Tolerating current diet: YES       Pain Management:   Patient states pain is manageable on current regimen: YES    Skin:  Bryan Score: 18  Interventions: float heels, increase time out of bed, PT/OT consult and internal/external urinary devices    Patient Safety:  Fall Score:  Total Score: 4  Interventions: bed/chair alarm, gripper socks, pt to call before getting OOB and stay with me (per policy)  High Fall Risk: Yes    Length of Stay:  Expected LOS: 4d 19h  Actual LOS: [facility]

## 2021-07-21 NOTE — PROGRESS NOTES
S/p explantation of the [address] dual chamber ICD from the left chest using a transvenous approach, no complications. Full note to follow. The echo from 7/17/2021 reveals an EF 55-60%, a recovery from 25-30% from 9/2020, so a primary prevention ICD reimplantation is NOT indicated at this time.

## 2021-07-21 NOTE — CONSULTS
EP/ ARRHYTHMIA CONSULT     Patient ID:  Patient: Marie Xie  MRN: [id number]  Age: 79 y.o.  : 1940    Date of  Admission: 2021 12:05 AM   PCP:  Negrito Linn NP   Usual electrophysiologist:  Frederick Sesay MD    Assessment: 1. Methicillin sensitive Staphlococcus aureus bacteremia. Unknown source with no obvious finding on VALERIA. She has an ICD which is at risk. 2. Severe chronic nonischemic cardiomyopathy EF 25-30%. 3. Mild to moderate MR, probably due to cardiomyopathy. No evidence of vegetation of valve destruction on VALERIA. 4. Chronic systolic CHF class 3.  5. Dual chamber [employer] ICD implant, primary prevention indication. 6. Hypertension. 7. Diabetes mellitus type 2.  8. Hypercholesterolemia. 9. Mild anemia. 10. DNR. Plan:     1. I don't see any sign of pocket infection or reported vegetation on the valves or ICD leads per the VALERIA. Nonetheless, there is enough concern that the ICD system has been jeopardized, so I am willing to explant it. 2. The system has been in place for under a year, there is a decent chance it will come out without laser lead or other mechanical extraction device. She and I discussed the pro's and con's of this and we decided to try to remove the whole system tomorrow. There is a small chance everything does not come out, she is aware. 3. She is not pacemaker dependent. 4. NPO after MN. I answered all her questions. Discussed with nursing. [x]       High complexity decision making was performed in this patient at high risk for decompensation with multiple organ involvement. Marie Xie is a 79 y.o. female with a history of bacteremia. I was asked to see her from RCA since ID consult recommended system removal.  She is not pacemaker dependent. The leads are active fixation. The ICD lead is a single coil lead. The system has been in place for less than a year.       Per ID consult:  \"79year-old female being seen in consultation for MSSA bacteremia. Pertinent history includes presence of an AICD that was placed in 2020. Patient was admitted on 7/16/2021 for confusion and altered mental status. Today, patient was able to give me a fairly reasonable history. She reports that she has been feeling very tired and fatigued for about 2 to 3 weeks which is unusual for her. Patient does not say that she was feeling sick but only that very tired and fatigued. She did not have any pain at the AICD or any focal symptoms. She denies any subjective fevers or chills. Patient was taken to [facility] initially for altered mental status by her . Per the notes she had a fever of 103.8 at [facility] and was transferred to [facility].  Leukocytosis of 16.7 on admission. On presentation to this hospital concern for meningitis was raised. LP showed 0 WBCs. Chest x-ray CT abdomen pelvis negative for acute pathology. Blood cultures from 7/15/2021 resulted positive for MSSA in all bottles (3/3 bottles). On arrival patient had received acyclovir vancomycin ampicillin and ceftriaxone. She was then deescalated to cefazolin for MSSA bacteremia. TTE has not shown any acute findings. VALERIA on 7/19/2021 did not show any valvular vegetations or vegetations on the AICD leads or device. Patient reports a history of right knee replacement several years ago. She she denies any other implants or prosthetics. At this time she has no focal pain. She reports feeling slightly better since presentation. \"      Past Medical History:   Diagnosis Date    A-fib [facility])     AICD (automatic cardioverter/defibrillator) present 9/23/2020 9/23/2020 [employer] AICD implant    Arthritis     Bilateral pleural effusion 9/18/2020    Chronic pain     Chronic pain     Diabetes ([address]     Diverticular disease     Elevated troponin 9/18/2020    Hemorrhoid     Hypercholesterolemia     IBS (irritable bowel syndrome)     Lymphocytosis 76/24/2459    Systolic heart failure, chronic [address] 7/16/2021    Type 2 MI (myocardial infarction) ([facility] 7/16/2021 7/162021 r/t sepsis        Past Surgical History:   Procedure Laterality Date    COLONOSCOPY N/A 10/31/2019    COLONOSCOPY performed by Reed Hernandez MD at [facility]  10/31/2019         COLONOSCOPY,PAKO LEUNG,SNARE  10/31/2019         HX CATARACT REMOVAL      HX CHOLECYSTECTOMY      HX COLONOSCOPY  2009    HX COLONOSCOPY  2016    2 adenomatous polyp    HX HEMORRHOIDECTOMY  2009    HX HYSTERECTOMY      HX KNEE REPLACEMENT      right    HX ORTHOPAEDIC  12/11/2017    back, laminectomy and decompression    AR INSJ/RPLCMT PERM DFB W/TRNSVNS LDS 1/DUAL CHMBR N/A 9/23/2020    INSERT ICD DUAL performed by Brook Toro MD at [facility] CARDIAC CATH LAB       Social History     Tobacco Use    Smoking status: Current Every Day Smoker     Packs/day: 1.00     Years: 55.00     Pack years: 55.00     Types: Cigarettes    Smokeless tobacco: Never Used   Substance Use Topics    Alcohol use: No        Family History   Problem Relation Age of Onset    Colon Cancer Father     Diabetes Mother         Allergies   Allergen Reactions    Morphine Anaphylaxis    Ultram [Tramadol] Palpitations          Current Facility-Administered Medications   Medication Dose Route Frequency    labetaloL (NORMODYNE;TRANDATE) injection 10 mg  10 mg IntraVENous Q4H PRN    haloperidol lactate (HALDOL) injection 3 mg  3 mg IntraMUSCular PRN    ceFAZolin (ANCEF) 2 g in sterile water (preservative free) 20 mL IV syringe  2 g IntraVENous Q12H    insulin lispro (HUMALOG) injection 3 Units  3 Units SubCUTAneous TIDAC    carvediloL (COREG) tablet 25 mg  25 mg Oral BID WITH MEALS    hydrALAZINE (APRESOLINE) 20 mg/mL injection 10 mg  10 mg IntraVENous Q6H PRN    venlafaxine-SR (EFFEXOR-XR) capsule 37.5 mg  37.5 mg Oral BID WITH MEALS    [START ON 7/21/2021] ergocalciferol capsule 50,000 Units  50,000 Units Oral Q7D    losartan (COZAAR) tablet 100 mg  100 mg Oral DAILY    insulin glargine (LANTUS) injection 50 Units  50 Units SubCUTAneous QHS    nicotine (NICODERM CQ) 14 mg/24 hr patch 1 Patch  1 Patch TransDERmal DAILY    furosemide (LASIX) injection 40 mg  40 mg IntraVENous DAILY    [Held by provider] glipiZIDE (GLUCOTROL) tablet 5 mg  5 mg Oral ACB&D    LORazepam (ATIVAN) injection 1 mg  1 mg IntraVENous Q6H PRN    [Held by provider] heparin (porcine) injection 5,000 Units  5,000 Units SubCUTAneous Q8H    [Held by provider] aspirin delayed-release tablet 81 mg  81 mg Oral DAILY    atorvastatin (LIPITOR) tablet 20 mg  20 mg Oral QHS    sodium chloride (NS) flush 5-40 mL  5-40 mL IntraVENous Q8H    sodium chloride (NS) flush 5-40 mL  5-40 mL IntraVENous PRN    acetaminophen (TYLENOL) tablet 650 mg  650 mg Oral Q6H PRN    Or    acetaminophen (TYLENOL) suppository 650 mg  650 mg Rectal Q6H PRN    polyethylene glycol (MIRALAX) packet 17 g  17 g Oral DAILY PRN    ondansetron (ZOFRAN ODT) tablet 4 mg  4 mg Oral Q8H PRN    Or    ondansetron (ZOFRAN) injection 4 mg  4 mg IntraVENous Q6H PRN    insulin lispro (HUMALOG) injection   SubCUTAneous AC&HS    glucose chewable tablet 16 g  4 Tablet Oral PRN    dextrose (D50W) injection syrg 12.5-25 g  12.5-25 g IntraVENous PRN    glucagon (GLUCAGEN) injection 1 mg  1 mg IntraMUSCular PRN       Review of Symptoms:  See HPI as well.   General: negative for sweats, weight loss   Eyes: negative for blurred vision, eye pain, loss of vision, diplopia   Ear Nose and Throat: negative for rhinorrhea, pharyngitis, otalgia, tinnitus, speech or swallowing difficulties   Respiratory: negative for cough, sputum production, wheezing, pleuritic pain   Cardiology: negative for chest pain, palpitations, orthopnea, PND, edema, syncope   Gastrointestinal: negative for abdominal pain, N/V, dysphagia, change in bowel habits, bleeding   Genitourinary: negative for frequency, urgency, dysuria, hematuria, incontinence   Muskuloskeletal : negative for arthralgia, myalgia   Hematology: negative for easy bruising, bleeding, lymphadenopathy   Dermatological: negative for rash, ulceration, mole change, new lesion   Endocrine: negative for hot flashes or polydipsia   Neurological: negative for headache, dizziness, confusion, focal weakness, paresthesia, memory loss, gait disturbance   Psychological: negative for anxiety, depression, agitation       Objective:      Physical Exam:  Temp (24hrs), Av.3 °F (36.8 °C), Min:97.8 °F (36.6 °C), Max:98.7 °F (37.1 °C)    Patient Vitals for the past 8 hrs:   Pulse   219 78   21 72   21 1710 71   21 1439 70    Patient Vitals for the past 8 hrs:   Resp   21 18   21 1710 20   21 1439 23    Patient Vitals for the past 8 hrs:   BP   21 (!) 182/71   21 (!) 178/73   21 1710 (!) 152/70   21 1439 (!) 140/58          Intake/Output Summary (Last 24 hours) at 2021 2138  Last data filed at 2021 1854  Gross per 24 hour   Intake 550 ml   Output 2875 ml   Net -2325 ml       Nondiaphoretic, not in acute distress. Supple, no palpable thyromegaly. No scleral icterus, mucous membranes moist, conjuctivae pink, no xanthelasma. L chest ICD site OK. Unlabored, clear to auscultation bilaterally, symmetric air movement. Regular rate and rhythm, no murmur, pericardial rub, knock, or gallop. No JVD or peripheral edema. Palpable radial pulses bilaterally. Abdomen, soft, nontender, nondistended. Extremities without cyanosis or clubbing. Muscle tone and bulk normal.  Skin warm and dry. No rashes or ulcers. Neuro grossly nonfocal.  No tremor. Awake and appropriate. CARDIOGRAPHICS and STUDIES, I reviewed:    Telemetry:  SR.      Labs:  No results for input(s): CPK, CKMB, CKNDX, TROIQ in the last 72 hours.     No lab exists for component: CPKMB  Lab Results Component Value Date/Time    Cholesterol, total 243 (H) 06/14/2021 09:17 PM    HDL Cholesterol 42 06/14/2021 09:17 PM    LDL,Direct 137 (H) 06/14/2021 09:17 PM    LDL, calculated Not calculated due to elevated triglyceride level 06/14/2021 09:17 PM    Triglyceride 493 (H) 06/14/2021 09:17 PM    CHOL/HDL Ratio 5.8 (H) 06/14/2021 09:17 PM     No results for input(s): INR, PTP, APTT, INREXT in the last 72 hours. Recent Labs     07/20/21  0630 07/19/21  0312 07/18/21  1120 07/18/21  1115   NA  --  140 139  --    K  --  3.6 3.8  --    CL  --  112* 111*  --    CO2  --  23 22  --    BUN  --  45* 48*  --    CREA  --  1.37* 1.68*  --    GLU  --  134* 268*  --    CA  --  8.6 8.2*  --    ALB  --  2.1* 2.0*  --    WBC 14.5* 12.2*  --  12.3*   HGB 9.9* 9.7*  --  10.1*   HCT 29.1* 29.4*  --  31.2*    139*  --  106*     Recent Labs     07/19/21 0312 07/18/21  1120   AP 87 72   TP 6.3* 6.0*   ALB 2.1* 2.0*   GLOB 4.2* 4.0     No components found for: GLPOC  No results for input(s): PH, PCO2, PO2 in the last 72 hours.         Marjorie Carpio MD  7/20/2021

## 2021-07-21 NOTE — ANESTHESIA PREPROCEDURE EVALUATION
Relevant Problems   No relevant active problems       Anesthetic History   No history of anesthetic complications            Review of Systems / Medical History  Patient summary reviewed, nursing notes reviewed and pertinent labs reviewed    Pulmonary    COPD      Shortness of breath and smoker      Comments: Bilateral pleural effusion (J90)   Neuro/Psych         Psychiatric history    Comments: Chronic pain (G89.29)  Spinal stenosis of lumbar region with neurogenic claudication  Spondylosis of lumbosacral region without myelopathy or radiculopathy   Cardiovascular          CHF  Dysrhythmias : atrial fibrillation  Pacemaker and hyperlipidemia    Exercise tolerance: <4 METS  Comments: LV: Estimated LVEF is 25 - 30%. Moderately dilated left ventricle.  Mild    concentric        GI/Hepatic/Renal         Renal disease: CRI      Comments: IBS (irritable bowel syndrome) (K58.9) Endo/Other    Diabetes    Arthritis     Other Findings              Physical Exam    Airway  Mallampati: II  TM Distance: > 6 cm  Neck ROM: normal range of motion   Mouth opening: Normal     Cardiovascular  Regular rate and rhythm,  S1 and S2 normal,  no murmur, click, rub, or gallop  Rhythm: regular  Rate: normal         Dental    Dentition: Full lower dentures and Full upper dentures     Pulmonary  Breath sounds clear to auscultation          Prolonged expiration     Abdominal  GI exam deferred       Other Findings            Anesthetic Plan    ASA: 4  Anesthesia type: MAC          Induction: Intravenous  Anesthetic plan and risks discussed with: Patient and Son / Daughter

## 2021-07-21 NOTE — PROGRESS NOTES
0700- report received from Luis Lockwood, [address] [place] [address]- patient taken off unit for procedure    1610- patient back in room from procedure    1613- PerfectServed Dr. Lauryn Ball in regards to patient being back from procedure and for a diet order. 0- Dr. Lauryn Ball responded \"lets let her wake up, still looks drowsy. if more awake in an hour can reorder previous diet\"    1900- End of Shift Note    Bedside shift change report given to Luis Lockwood RN (oncoming nurse) by Bronwyn Canavan, RN (offgoing nurse).   Report included the following information SBAR, Kardex, Med Rec Status and Cardiac Rhythm NSR    Shift worked:  7a-7p     Shift summary and any significant changes:     ICD explant     Concerns for physician to address:       Zone phone for oncoming shift:

## 2021-07-21 NOTE — PROGRESS NOTES
Chart reviewed and cleared by nursing to mobilize. She refused to mobilize with PT and was not interested in reasoning for mobilizing. Became agitated quickly, so visit was terminated. Will continue to follow patient and do re-eval post surgery once MD clears patient to mobilize out of bed.     Gemini Penaloza, PT

## 2021-07-22 ENCOUNTER — APPOINTMENT (OUTPATIENT)
Dept: GENERAL RADIOLOGY | Age: 81
DRG: 853 | End: 2021-07-22
Attending: INTERNAL MEDICINE
Payer: MEDICARE

## 2021-07-22 LAB
ANION GAP SERPL CALC-SCNC: 7 MMOL/L (ref 5–15)
BASOPHILS # BLD: 0.1 K/UL (ref 0–0.1)
BASOPHILS NFR BLD: 1 % (ref 0–1)
BUN SERPL-MCNC: 35 MG/DL (ref 6–20)
BUN/CREAT SERPL: 24 (ref 12–20)
CALCIUM SERPL-MCNC: 8.3 MG/DL (ref 8.5–10.1)
CHLORIDE SERPL-SCNC: 106 MMOL/L (ref 97–108)
CO2 SERPL-SCNC: 24 MMOL/L (ref 21–32)
CREAT SERPL-MCNC: 1.45 MG/DL (ref 0.55–1.02)
DIFFERENTIAL METHOD BLD: ABNORMAL
EOSINOPHIL # BLD: 0.4 K/UL (ref 0–0.4)
EOSINOPHIL NFR BLD: 3 % (ref 0–7)
ERYTHROCYTE [DISTWIDTH] IN BLOOD BY AUTOMATED COUNT: 14.3 % (ref 11.5–14.5)
GLUCOSE BLD STRIP.AUTO-MCNC: 149 MG/DL (ref 65–117)
GLUCOSE BLD STRIP.AUTO-MCNC: 180 MG/DL (ref 65–117)
GLUCOSE BLD STRIP.AUTO-MCNC: 198 MG/DL (ref 65–117)
GLUCOSE BLD STRIP.AUTO-MCNC: 200 MG/DL (ref 65–117)
GLUCOSE SERPL-MCNC: 158 MG/DL (ref 65–100)
HCT VFR BLD AUTO: 29.3 % (ref 35–47)
HGB BLD-MCNC: 9.6 G/DL (ref 11.5–16)
IMM GRANULOCYTES # BLD AUTO: 0.1 K/UL (ref 0–0.04)
IMM GRANULOCYTES NFR BLD AUTO: 1 % (ref 0–0.5)
LYMPHOCYTES # BLD: 2.8 K/UL (ref 0.8–3.5)
LYMPHOCYTES NFR BLD: 22 % (ref 12–49)
MCH RBC QN AUTO: 29.4 PG (ref 26–34)
MCHC RBC AUTO-ENTMCNC: 32.8 G/DL (ref 30–36.5)
MCV RBC AUTO: 89.9 FL (ref 80–99)
MONOCYTES # BLD: 1.2 K/UL (ref 0–1)
MONOCYTES NFR BLD: 9 % (ref 5–13)
NEUTS SEG # BLD: 8.4 K/UL (ref 1.8–8)
NEUTS SEG NFR BLD: 64 % (ref 32–75)
NRBC # BLD: 0 K/UL (ref 0–0.01)
NRBC BLD-RTO: 0 PER 100 WBC
PLATELET # BLD AUTO: 278 K/UL (ref 150–400)
PMV BLD AUTO: 10.8 FL (ref 8.9–12.9)
POTASSIUM SERPL-SCNC: 3.9 MMOL/L (ref 3.5–5.1)
RBC # BLD AUTO: 3.26 M/UL (ref 3.8–5.2)
SERVICE CMNT-IMP: ABNORMAL
SODIUM SERPL-SCNC: 137 MMOL/L (ref 136–145)
WBC # BLD AUTO: 13.1 K/UL (ref 3.6–11)

## 2021-07-22 PROCEDURE — 77030018786 HC NDL GD F/USND BARD -B

## 2021-07-22 PROCEDURE — 74011250637 HC RX REV CODE- 250/637: Performed by: INTERNAL MEDICINE

## 2021-07-22 PROCEDURE — C1751 CATH, INF, PER/CENT/MIDLINE: HCPCS

## 2021-07-22 PROCEDURE — 65660000000 HC RM CCU STEPDOWN

## 2021-07-22 PROCEDURE — 82962 GLUCOSE BLOOD TEST: CPT

## 2021-07-22 PROCEDURE — 74011636637 HC RX REV CODE- 636/637: Performed by: INTERNAL MEDICINE

## 2021-07-22 PROCEDURE — 74011000250 HC RX REV CODE- 250: Performed by: STUDENT IN AN ORGANIZED HEALTH CARE EDUCATION/TRAINING PROGRAM

## 2021-07-22 PROCEDURE — 77030038269 HC DRN EXT URIN PURWCK BARD -A

## 2021-07-22 PROCEDURE — 80048 BASIC METABOLIC PNL TOTAL CA: CPT

## 2021-07-22 PROCEDURE — 85025 COMPLETE CBC W/AUTO DIFF WBC: CPT

## 2021-07-22 PROCEDURE — 76937 US GUIDE VASCULAR ACCESS: CPT

## 2021-07-22 PROCEDURE — 74011250636 HC RX REV CODE- 250/636: Performed by: INTERNAL MEDICINE

## 2021-07-22 PROCEDURE — 36573 INSJ PICC RS&I 5 YR+: CPT | Performed by: INTERNAL MEDICINE

## 2021-07-22 PROCEDURE — 99233 SBSQ HOSP IP/OBS HIGH 50: CPT | Performed by: STUDENT IN AN ORGANIZED HEALTH CARE EDUCATION/TRAINING PROGRAM

## 2021-07-22 PROCEDURE — 74011000250 HC RX REV CODE- 250: Performed by: NURSE PRACTITIONER

## 2021-07-22 PROCEDURE — 74011636637 HC RX REV CODE- 636/637: Performed by: GENERAL ACUTE CARE HOSPITAL

## 2021-07-22 PROCEDURE — 74011250637 HC RX REV CODE- 250/637: Performed by: GENERAL ACUTE CARE HOSPITAL

## 2021-07-22 PROCEDURE — 2709999900 HC NON-CHARGEABLE SUPPLY

## 2021-07-22 PROCEDURE — 71045 X-RAY EXAM CHEST 1 VIEW: CPT

## 2021-07-22 PROCEDURE — 02HV33Z INSERTION OF INFUSION DEVICE INTO SUPERIOR VENA CAVA, PERCUTANEOUS APPROACH: ICD-10-PCS | Performed by: RADIOLOGY

## 2021-07-22 PROCEDURE — 87040 BLOOD CULTURE FOR BACTERIA: CPT

## 2021-07-22 PROCEDURE — 74011250636 HC RX REV CODE- 250/636: Performed by: STUDENT IN AN ORGANIZED HEALTH CARE EDUCATION/TRAINING PROGRAM

## 2021-07-22 PROCEDURE — 36415 COLL VENOUS BLD VENIPUNCTURE: CPT

## 2021-07-22 RX ORDER — HEPARIN 100 UNIT/ML
300 SYRINGE INTRAVENOUS AS NEEDED
Status: DISCONTINUED | OUTPATIENT
Start: 2021-07-22 | End: 2021-07-24 | Stop reason: HOSPADM

## 2021-07-22 RX ORDER — BACITRACIN 500 UNIT/G
1 PACKET (EA) TOPICAL AS NEEDED
Status: DISCONTINUED | OUTPATIENT
Start: 2021-07-22 | End: 2021-07-24 | Stop reason: HOSPADM

## 2021-07-22 RX ADMIN — WATER 2 G: 1 INJECTION INTRAMUSCULAR; INTRAVENOUS; SUBCUTANEOUS at 08:35

## 2021-07-22 RX ADMIN — INSULIN LISPRO 3 UNITS: 100 INJECTION, SOLUTION INTRAVENOUS; SUBCUTANEOUS at 16:51

## 2021-07-22 RX ADMIN — INSULIN GLARGINE 50 UNITS: 100 INJECTION, SOLUTION SUBCUTANEOUS at 22:53

## 2021-07-22 RX ADMIN — WATER 2 G: 1 INJECTION INTRAMUSCULAR; INTRAVENOUS; SUBCUTANEOUS at 22:53

## 2021-07-22 RX ADMIN — INSULIN LISPRO 2 UNITS: 100 INJECTION, SOLUTION INTRAVENOUS; SUBCUTANEOUS at 08:31

## 2021-07-22 RX ADMIN — FUROSEMIDE 40 MG: 10 INJECTION, SOLUTION INTRAMUSCULAR; INTRAVENOUS at 08:48

## 2021-07-22 RX ADMIN — Medication 10 ML: at 22:00

## 2021-07-22 RX ADMIN — INSULIN LISPRO 2 UNITS: 100 INJECTION, SOLUTION INTRAVENOUS; SUBCUTANEOUS at 16:52

## 2021-07-22 RX ADMIN — CARVEDILOL 25 MG: 12.5 TABLET, FILM COATED ORAL at 08:26

## 2021-07-22 RX ADMIN — LOSARTAN POTASSIUM 100 MG: 100 TABLET, FILM COATED ORAL at 08:26

## 2021-07-22 RX ADMIN — INSULIN LISPRO 3 UNITS: 100 INJECTION, SOLUTION INTRAVENOUS; SUBCUTANEOUS at 08:32

## 2021-07-22 RX ADMIN — INSULIN LISPRO 3 UNITS: 100 INJECTION, SOLUTION INTRAVENOUS; SUBCUTANEOUS at 12:47

## 2021-07-22 RX ADMIN — VENLAFAXINE HYDROCHLORIDE 37.5 MG: 37.5 CAPSULE, EXTENDED RELEASE ORAL at 16:51

## 2021-07-22 RX ADMIN — Medication 10 ML: at 15:14

## 2021-07-22 RX ADMIN — ACETAMINOPHEN 650 MG: 325 TABLET ORAL at 08:26

## 2021-07-22 RX ADMIN — VENLAFAXINE HYDROCHLORIDE 37.5 MG: 37.5 CAPSULE, EXTENDED RELEASE ORAL at 08:26

## 2021-07-22 RX ADMIN — ACETAMINOPHEN 650 MG: 325 TABLET ORAL at 22:53

## 2021-07-22 RX ADMIN — LABETALOL HYDROCHLORIDE 10 MG: 5 INJECTION INTRAVENOUS at 23:23

## 2021-07-22 RX ADMIN — CARVEDILOL 25 MG: 12.5 TABLET, FILM COATED ORAL at 16:51

## 2021-07-22 NOTE — PROGRESS NOTES
Transition of Care Plan:     RUR: 24%     Disposition: Home with family with hh vs snf     Follow up appointments: To be placed on AVS prior to discharge.      DME needed: To be determined.     Transportation at Discharge: /daughter to transport. [place] or means to access home:  and daughter has keys. IM Medicare letter: To be given prior to discharged.      Caregiver Contact:      Discharge Caregiver contacted prior to discharge? Caregiver to be contacted prior to discharge. Spoke with one of patient's daughters re dcp. Attempted to call her other daughter Sera Holland but no answer on her telephone. Mi Agarwal  RN BSN CRM        [phone]

## 2021-07-22 NOTE — PROGRESS NOTES
PICC (Peripherally Inserted Central Catheter) line insertion  procedure note     1039 : Procedure explained to patient and her daughter   along with risks and benefits and  Patient and her daughter    agreed to proceed. Informed consent obtained from patient    Patient teaching completed. Timeout completed. Pre-procedure assessment done. Pt had pacemaker removed recently. Maximum sterile barrier precautions observed throughout procedure. Lidocaine 1%  3.0    ml SQ given prior to cannulation. Cannulated   basilic  vein using ultrasound guidance and modified seldinger technique. Inserted   4  Austrian  single  lumen PICC to   right  arm using [employer] Interactive Tip Location system. Blood return verified and flushed with 20 ml normal saline in each port. Placement was confirmed with VPS positioning system. Noted high P wave which is indicating tip is locating CAJ/SVC. But no significant negative deflection showed. Will confirm with X ray per policy. Sterile dressing applied with Biopatch, StatLock and occlusive dressing as per protocol. Curos caps applied to each port. Patient tolerated procedure well with minimal blood loss. Patient education material provided. PICC procedure performed by  :  Morelia Eduardo RN. LUCINDA. PRASANTH. PICC nurse. Vascular Access Team.   Assisted by  :    Shannon Valente RN. PICC nurse  Reason for access : MD order / Reliable access  / Long term IV medication  / Discharge with PICC line    Complications related to insertion  : none  Measured Length :    38  cm   Total Trimmed Length    40  cm   External Length : At the hub         PICC line site arm circumference:     32  cm   PICC catheter occupies    10  %  of vein  Type of PICC: Arrow Power PICC       Ref # :     [id number]           Lot # :    [id number]  Expiration Date :  2022-04-30  Stat portable chest X-ray ordered to confirm the PICC tip location. Pt has  sinus  rhythm.   Primary nurse   denisa TEAGUE aware not use until PICC Tip placement  Is confirmed by  Radiologist.  [phone] : X ray indicating tip of PICC catheter terminating at Mid SVC. Notified nurse that PICC line is good to use it     Blanca Escobar. TESSYN. LUCINDACMSRN.  PICC Nurse, Vascular Access Team

## 2021-07-22 NOTE — PROGRESS NOTES
End of Shift Note    Bedside shift change report given to Luis Fernando Romeo (oncoming nurse) by Serina Felipe RN (offgoing nurse). Report included the following information SBAR, Kardex, ED Summary, Recent Results, Med Rec Status and Cardiac Rhythm NSR    Shift worked:  7a-7p     Shift summary and any significant changes:          Concerns for physician to address:       Zone phone for oncoming shift:          Activity:  Activity Level: Up with Assistance  Number times ambulated in hallways past shift: 0  Number of times OOB to chair past shift: 2    Cardiac:   Cardiac Monitoring: Yes      Cardiac Rhythm: Sinus Rhythm    Access:   Current line(s): PICC     Genitourinary:   Urinary status: voiding    Respiratory:   O2 Device: None (Room air)  Chronic home O2 use?: NO  Incentive spirometer at bedside: NO     GI:  Last Bowel Movement Date: 07/19/21  Current diet:  ADULT DIET Regular; 3 carb choices (45 gm/meal); Low Fat/Low Chol/High Fiber/BRADLEY; Low Sodium (2 gm)  Passing flatus: YES  Tolerating current diet: YES       Pain Management:   Patient states pain is manageable on current regimen: YES    Skin:  Bryan Score: 18  Interventions: increase time out of bed    Patient Safety:  Fall Score:  Total Score: 4  Interventions: bed/chair alarm  High Fall Risk: Yes    Length of Stay:  Expected LOS: 4d 19h  Actual LOS: 6      Serina Felipe RN

## 2021-07-22 NOTE — PROGRESS NOTES
Physical Therapy Note    Chart reviewed. Spoke with nursing. Patient with removal of ICD and leads on 7/21. Pt received supine in bed with DTR at bedside. Attempted to see patient for PT intervention, however patient adamantly refused to participate in any therapy this date. Therapist and DTR provided significant encourage and education, however patient continued to refuse due to having just had PICC line placed and with \"11\"/10 L shoulder pain. Will defer and continue to follow.     Thank you,  Sharona Motta,  PT, DPT

## 2021-07-22 NOTE — PROGRESS NOTES
Infectious Disease Progress Note           Subjective:   Patient seen this afternoon. Patient was awaiting explantation of the ICD. Objective:    Vitals:   Reviewed in chart.     Physical Exam:  Gen: No apparent distress  HEENT:  Normocephalic, atraumatic, no scleral icterus  CV: Hemodynamically stable  Lungs: Breathing well on room air  Abdomen: soft, non tender, non distended  Genitourinary: No allen catheter   Skin: no rash   Psych: good affect, good eye contact, non tearful  Neuro: alert, oriented to time,  place, and situation, moves all extremities to commands, verbal  Musculoskeletal:  No joint edema, erythema or tenderness noted   Lines: Peripheral IV lines        Labs:  Recent Results (from the past 24 hour(s))   GLUCOSE, POC    Collection Time: 07/20/21  9:16 PM   Result Value Ref Range    Glucose (POC) 185 (H) 65 - 117 mg/dL    Performed by Criss Palacios    CBC W/O DIFF    Collection Time: 07/21/21  2:57 AM   Result Value Ref Range    WBC 15.9 (H) 3.6 - 11.0 K/uL    RBC 3.52 (L) 3.80 - 5.20 M/uL    HGB 10.7 (L) 11.5 - 16.0 g/dL    HCT 30.9 (L) 35.0 - 47.0 %    MCV 87.8 80.0 - 99.0 FL    MCH 30.4 26.0 - 34.0 PG    MCHC 34.6 30.0 - 36.5 g/dL    RDW 14.0 11.5 - 14.5 %    PLATELET 938 365 - 787 K/uL    MPV 10.5 8.9 - 12.9 FL    NRBC 0.0 0  WBC    ABSOLUTE NRBC 0.00 0.00 - 0.01 K/uL   MAGNESIUM    Collection Time: 07/21/21  2:57 AM   Result Value Ref Range    Magnesium 1.6 1.6 - 2.4 mg/dL   METABOLIC PANEL, COMPREHENSIVE    Collection Time: 07/21/21  2:57 AM   Result Value Ref Range    Sodium 139 136 - 145 mmol/L    Potassium 4.3 3.5 - 5.1 mmol/L    Chloride 108 97 - 108 mmol/L    CO2 23 21 - 32 mmol/L    Anion gap 8 5 - 15 mmol/L    Glucose 143 (H) 65 - 100 mg/dL    BUN 37 (H) 6 - 20 MG/DL    Creatinine 1.30 (H) 0.55 - 1.02 MG/DL    BUN/Creatinine ratio 28 (H) 12 - 20      GFR est AA 48 (L) >60 ml/min/1.73m2    GFR est non-AA 39 (L) >60 ml/min/1.73m2    Calcium 9.5 8.5 - 10.1 MG/DL Bilirubin, total 0.4 0.2 - 1.0 MG/DL    ALT (SGPT) 57 12 - 78 U/L    AST (SGOT) 38 (H) 15 - 37 U/L    Alk. phosphatase 67 45 - 117 U/L    Protein, total 6.7 6.4 - 8.2 g/dL    Albumin 2.3 (L) 3.5 - 5.0 g/dL    Globulin 4.4 (H) 2.0 - 4.0 g/dL    A-G Ratio 0.5 (L) 1.1 - 2.2     GLUCOSE, POC    Collection Time: 07/21/21  7:53 AM   Result Value Ref Range    Glucose (POC) 137 (H) 65 - 117 mg/dL    Performed by TPACK PCT    GLUCOSE, POC    Collection Time: 07/21/21 11:56 AM   Result Value Ref Range    Glucose (POC) 149 (H) 65 - 117 mg/dL    Performed by TPACK PCT    GLUCOSE, POC    Collection Time: 07/21/21  4:40 PM   Result Value Ref Range    Glucose (POC) 142 (H) 65 - 117 mg/dL    Performed by TPACK PCT          Assessment/Recommendations:  43-year-old female with AICD , right knee replacement who was admitted for MSSA bacteremia of unknown source. Patient is status post explantation of the ICD/pacemaker along with leads  by Dr. Tommy Simpson on 7/21/2021. Greatly appreciate care from cardiology. No reimplantation is seen necessary at this time per cardiology.  -Please send repeat blood cultures today (ordered). - Plan will be for 6 weeks of cefazolin from date of explantation of the ICD. We will follow. Thank you for the opportunity to participate in the care of this patient. Please contact with questions or concerns.       Jeff Paez MD  Infectious Diseases

## 2021-07-22 NOTE — PROGRESS NOTES
Transition of Care Plan:     RUR: 24%     Disposition: Home with family with hh vs snf     Follow up appointments: To be placed on AVS prior to discharge.      DME needed: To be determined.     Transportation at Discharge: /daughter to transport.      Keys or means to access home:  and daughter has keys.        IM Medicare letter: To be given prior to discharged.      Caregiver Contact:      Discharge Caregiver contacted prior to discharge?   Caregiver to be contacted prior to discharge.       Discussed with daughter who is in her room and patient dcp. They state Mr. Oren Ratliff is setting up help from [facility] who will come in and do things like cleaning, grocery shopping, housekeeping--non medical work. Discussed snf with patient and her daughter. Patient states she is not going to snf, she will do home health services. Discussed the differences with home health vs snf and she will need home ivab therapy per md for 6 weeks. She still does not want to go to snf. She will do home health services. She has had home health in the past.  Choice given. [facility] with confirmation and will await their response. Referra sent to [facility] and will await their response. Mi Agarwal RN BSN CRM        [phone]

## 2021-07-22 NOTE — PROGRESS NOTES
End of Shift Note    Bedside shift change report given to Anthony Zuniga RN (oncoming nurse) by Hamlet Robbins (offgoing nurse). Report included the following information SBAR, Kardex, ED Summary, Intake/Output, MAR and Recent Results    Shift worked:  [id number]     Shift summary and any significant changes:     CHG bath completed, Blood cultures sent     Concerns for physician to address:       Zone phone for oncoming shift:          Activity:  Activity Level: Bed Rest  Number times ambulated in hallways past shift: 0  Number of times OOB to chair past shift: 0    Cardiac:   Cardiac Monitoring: Yes      Cardiac Rhythm: Sinus Rhythm    Access:   Current line(s): PIV     Genitourinary:   Urinary status: voiding and external catheter    Respiratory:   O2 Device: None (Room air)  Chronic home O2 use?: NO  Incentive spirometer at bedside: NO     GI:  Last Bowel Movement Date: 07/19/21  Current diet:  ADULT DIET Regular; 3 carb choices (45 gm/meal); Low Fat/Low Chol/High Fiber/BRADLEY; Low Sodium (2 gm)  Passing flatus: YES  Tolerating current diet: YES       Pain Management:   Patient states pain is manageable on current regimen: YES    Skin:  Bryan Score: 18  Interventions: float heels, increase time out of bed and PT/OT consult    Patient Safety:  Fall Score:  Total Score: 4  Interventions: bed/chair alarm, gripper socks and pt to call before getting OOB  High Fall Risk: Yes    Length of Stay:  Expected LOS: 4d 19h  Actual LOS: [place]

## 2021-07-22 NOTE — PROGRESS NOTES
Occupational Therapy Note    Chart reviewed and cleared for therapy by RN. Patient with removal of ICD and leads on 7/21. Patient received supine in bed with daughter at bedside. Attempted to see patient for OT intervention, however patient adamantly refused to participate in any therapy this date. Therapists and daughter all provided significant encourage and education, however patient continued to refuse due to having just had PICC line placed and with \"11\"/10 L shoulder pain. Will defer and continue to follow.     Samantha Hollingsworth, OTR/L

## 2021-07-22 NOTE — PROGRESS NOTES
I will sign off. The postop site looks good. She can F/U with Dr. Sophie Mcnair and her regular cardiologist in the future (I think there already is an August appointment). All questions answered for her and her daughter.

## 2021-07-22 NOTE — PROGRESS NOTES
1900: Bedside shift change report given to Kat Aguilar RN (oncoming nurse) by Tana Butler RN (offgoing nurse). Report included the following information SBAR, Kardex, Intake/Output and MAR.     0700: End of Shift Note    Bedside shift change report given to  (oncoming nurse) by Laura Snyder (offgoing nurse). Report included the following information SBAR, Kardex, Intake/Output and MAR    Shift worked:  night     Shift summary and any significant changes:     Room air overnight. Tylenol given X2 for pain. PRN Labetalol given X1 for hypertension      Concerns for physician to address:  above     Zone phone for oncoming shift:   xxx       Activity:  Activity Level: Up with Assistance  Number times ambulated in hallways past shift: 0  Number of times OOB to chair past shift: 1    Cardiac:   Cardiac Monitoring: Yes      Cardiac Rhythm: Sinus Rhythm    Access:   Current line(s): PIV and PICC     Genitourinary:   Urinary status: voiding    Respiratory:   O2 Device: None (Room air)  Chronic home O2 use?: NO  Incentive spirometer at bedside: NO     GI:  Last Bowel Movement Date: 07/19/21  Current diet:  ADULT DIET Regular; 3 carb choices (45 gm/meal); Low Fat/Low Chol/High Fiber/BRADLEY; Low Sodium (2 gm)  Passing flatus: YES  Tolerating current diet: YES       Pain Management:   Patient states pain is manageable on current regimen: YES    Skin:  Bryan Score: 18  Interventions: turn team, speciality bed, float heels, increase time out of bed, PT/OT consult, internal/external urinary devices and nutritional support     Patient Safety:  Fall Score:  Total Score: 4  Interventions: bed/chair alarm, assistive device (walker, cane, etc), gripper socks and pt to call before getting OOB  High Fall Risk: Yes    Length of Stay:  Expected LOS: 4d 19h  Actual LOS: [facility]

## 2021-07-22 NOTE — ANESTHESIA POSTPROCEDURE EVALUATION
Procedure(s):  REMOVE ICD GENERATOR  REMOVE ICD LEADS. MAC    Anesthesia Post Evaluation        Patient location during evaluation: PACU  Note status: Adequate. Level of consciousness: responsive to verbal stimuli and sleepy but conscious  Pain management: satisfactory to patient  Airway patency: patent  Anesthetic complications: no  Cardiovascular status: acceptable  Respiratory status: acceptable  Hydration status: acceptable  Comments: +Post-Anesthesia Evaluation and Assessment    Patient: Sammy Grant MRN: [id number]  SSN: [id number]   YOB: 1940  Age: 79 y.o. Sex: female      Cardiovascular Function/Vital Signs    BP (!) 158/64   Pulse 82   Temp 36.8 °C (98.2 °F)   Resp 24   Ht 5' 6\" (1.676 m)   Wt 73.3 kg (161 lb 9.6 oz)   SpO2 94%   BMI 26.08 kg/m²     Patient is status post Procedure(s):  REMOVE ICD GENERATOR  REMOVE ICD LEADS. Nausea/Vomiting: Controlled. Postoperative hydration reviewed and adequate. Pain:  Pain Scale 1: Numeric (0 - 10) (07/22/21 0253)  Pain Intensity 1: 0 (07/22/21 0253)   Managed. Neurological Status: At baseline. Mental Status and Level of Consciousness: Arousable. Pulmonary Status:   O2 Device: None (Room air) (07/21/21 2317)   Adequate oxygenation and airway patent. Complications related to anesthesia: None    Post-anesthesia assessment completed. No concerns. Signed By: Delano Smith MD    7/22/2021  Post anesthesia nausea and vomiting:  controlled      INITIAL Post-op Vital signs:   Vitals Value Taken Time   /53 07/22/21 0400   Temp 36.8 °C (98.2 °F) 07/22/21 0253   Pulse 83 07/22/21 0644   Resp 17 07/22/21 0644   SpO2 94 % 07/22/21 0644   Vitals shown include unvalidated device data.

## 2021-07-22 NOTE — PROGRESS NOTES
Hospitalist Progress Note    NAME: Rodrigo White   :  1940   MRN:  [id number]       Assessment / Plan:  MSSA bacteremia  Severe sepsis, POA  -TTE and VALERIA both negative for valvular vegetations  -Appreciate ID consult  -No obvious source of infection  -S/p successful explantation of dual-chamber ICD ()  -Echo this admission with EF 55-60%, improved from 25-30% on last echo on file 2020.   As such patient will not require reimplantation of ICD for primary prevention  -Continue IV antibiotics with cefazolin, will need 6 weeks of therapy from AICD explantation   -Initially meningitis was under consideration and patient underwent LP, no org seen and therefore empiric meningitis coverage was discontinued  -Blood cultures positive for staph RS 7/15 in all 3 bottles drawn  -Repeat cultures  NGTD  -s/p PICC line    Acute metabolic encephalopathy, POA  -2/2 sepsis, resolved  -CT head on admission no acute findings  -Pt AAO x 3, good insight    Acute respiratory failure with hypoxia secondary to pulmonary edema on   -Given Lasix, IVF stopped  -Currently resolved and patient maintaining sats on room air     elevated troponin  -No chest pain or shortness of breath  -Appreciate cardiology following  -Thought likely from sepsis with possible demand ischemia    Elevated LFTs  HLD  -Trending down, no abdominal pain  -Also likely from sepsis, monitor  -hold statin     IVAN CKD stage III  -resolving  -suspect prerenal in setting of sepsis  -monitor labs, renally dose meds     HTN  -cont increased dose of coreg, cont losartan    DM2  -BS high in 200s  -cont lantus  -hold glipizide  -start preprandial insulin  -monitor POCs     Dilated CM  SSS  -s/p PPM     Code Status: DNR  Surrogate Decision Maker:   DVT Prophylaxis:   GI Prophylaxis: not indicated  Baseline: Independent     25.0 - 29.9 Overweight / Body mass index is 27.08 kg/m².     Estimated discharge date:   Barriers: Clinical improvement     Subjective:     Chief Complaint / Reason for Physician Visit  Soreness at site of ICD removal, no fevers/chills, no n/v.    Discussed with RN events overnight. Review of Systems:  Symptom Y/N Comments  Symptom Y/N Comments   Fever/Chills n   Chest Pain n    Poor Appetite n   Edema n    Cough n   Abdominal Pain n    Sputum n   Joint Pain     SOB/STANTON n   Pruritis/Rash     Nausea/vomit n   Tolerating PT/OT     Diarrhea n   Tolerating Diet y    Constipation n   Other       Could NOT obtain due to:      Objective:     VITALS:   Last 24hrs VS reviewed since prior progress note. Most recent are:  Patient Vitals for the past 24 hrs:   Temp Pulse Resp BP SpO2   07/22/21 1513 98.8 °F (37.1 °C) 70 18 (!) 143/55 98 %   07/22/21 1049 97.5 °F (36.4 °C) 63 18 (!) 115/47 98 %   07/22/21 0755 (!) 100.7 °F (38.2 °C) 78 21 (!) 128/116 95 %   07/22/21 0253 98.2 °F (36.8 °C) 82 24 (!) 158/64 94 %   07/21/21 2317 98 °F (36.7 °C) 83 16 (!) 124/34 93 %   07/21/21 1930 97.8 °F (36.6 °C) 78 20 (!) 136/41 94 %   07/21/21 1845  74 22 (!) 122/34 (!) 89 %   07/21/21 1830  74 21 (!) 135/36 92 %   07/21/21 1815  77 23 (!) 159/57 93 %   07/21/21 1800  76 17 (!) 177/82 95 %   07/21/21 1745  80 13 (!) 169/59 97 %   07/21/21 1730  73 18 (!) 170/96 97 %       Intake/Output Summary (Last 24 hours) at 7/22/2021 1718  Last data filed at 7/22/2021 0253  Gross per 24 hour   Intake    Output 700 ml   Net -700 ml        I had a face to face encounter and independently examined this patient on 7/22/2021, as outlined below:  PHYSICAL EXAM:  General: WD, WN. Alert, cooperative, no acute distress    EENT:  EOMI. Anicteric sclerae. MMM  Resp:  CTA bilaterally, no wheezing or rales. No accessory muscle use  CV:  Regular  rhythm,  No edema  GI:  Soft, Non distended, Non tender. +Bowel sounds  Neurologic:  Alert and oriented X 3, normal speech,   Psych:   Good insight. Not anxious nor agitated  Skin:  No rashes.   No jaundice    Reviewed most current lab test results and cultures  YES  Reviewed most current radiology test results   YES  Review and summation of old records today    NO  Reviewed patient's current orders and MAR    YES  PMH/SH reviewed - no change compared to H&P  ________________________________________________________________________  Care Plan discussed with:    Comments   Patient x    Family      RN x    Care Manager     Consultant                       x Multidiciplinary team rounds were held today with , nursing, pharmacist and clinical coordinator. Patient's plan of care was discussed; medications were reviewed and discharge planning was addressed. ________________________________________________________________________  Total NON critical care TIME:  25   Minutes    Total CRITICAL CARE TIME Spent:   Minutes non procedure based      Comments   >50% of visit spent in counseling and coordination of care x    ________________________________________________________________________  Emmit Army, DO     Procedures: see electronic medical records for all procedures/Xrays and details which were not copied into this note but were reviewed prior to creation of Plan. LABS:  I reviewed today's most current labs and imaging studies.   Pertinent labs include:  Recent Labs     07/22/21  0150 07/21/21  0257 07/20/21  0630   WBC 13.1* 15.9* 14.5*   HGB 9.6* 10.7* 9.9*   HCT 29.3* 30.9* 29.1*    243 184     Recent Labs     07/22/21  0150 07/21/21  0257    139   K 3.9 4.3    108   CO2 24 23   * 143*   BUN 35* 37*   CREA 1.45* 1.30*   CA 8.3* 9.5   MG  --  1.6   ALB  --  2.3*   TBILI  --  0.4   ALT  --  57       Signed: Josey Cabrales, DO

## 2021-07-23 LAB
BACTERIA SPEC CULT: NORMAL
ERYTHROCYTE [DISTWIDTH] IN BLOOD BY AUTOMATED COUNT: 14.3 % (ref 11.5–14.5)
GLUCOSE BLD STRIP.AUTO-MCNC: 130 MG/DL (ref 65–117)
GLUCOSE BLD STRIP.AUTO-MCNC: 135 MG/DL (ref 65–117)
GLUCOSE BLD STRIP.AUTO-MCNC: 194 MG/DL (ref 65–117)
GLUCOSE BLD STRIP.AUTO-MCNC: 213 MG/DL (ref 65–117)
GRAM STN SPEC: NORMAL
GRAM STN SPEC: NORMAL
HCT VFR BLD AUTO: 26.1 % (ref 35–47)
HGB BLD-MCNC: 8.7 G/DL (ref 11.5–16)
MCH RBC QN AUTO: 30 PG (ref 26–34)
MCHC RBC AUTO-ENTMCNC: 33.3 G/DL (ref 30–36.5)
MCV RBC AUTO: 90 FL (ref 80–99)
NRBC # BLD: 0 K/UL (ref 0–0.01)
NRBC BLD-RTO: 0 PER 100 WBC
PLATELET # BLD AUTO: 301 K/UL (ref 150–400)
PMV BLD AUTO: 10.7 FL (ref 8.9–12.9)
RBC # BLD AUTO: 2.9 M/UL (ref 3.8–5.2)
SERVICE CMNT-IMP: ABNORMAL
SERVICE CMNT-IMP: NORMAL
WBC # BLD AUTO: 12.9 K/UL (ref 3.6–11)

## 2021-07-23 PROCEDURE — 74011250637 HC RX REV CODE- 250/637: Performed by: INTERNAL MEDICINE

## 2021-07-23 PROCEDURE — 97535 SELF CARE MNGMENT TRAINING: CPT

## 2021-07-23 PROCEDURE — 36415 COLL VENOUS BLD VENIPUNCTURE: CPT

## 2021-07-23 PROCEDURE — 97530 THERAPEUTIC ACTIVITIES: CPT | Performed by: PHYSICAL THERAPIST

## 2021-07-23 PROCEDURE — 65660000000 HC RM CCU STEPDOWN

## 2021-07-23 PROCEDURE — 74011250637 HC RX REV CODE- 250/637: Performed by: GENERAL ACUTE CARE HOSPITAL

## 2021-07-23 PROCEDURE — 74011250636 HC RX REV CODE- 250/636: Performed by: STUDENT IN AN ORGANIZED HEALTH CARE EDUCATION/TRAINING PROGRAM

## 2021-07-23 PROCEDURE — 74011250636 HC RX REV CODE- 250/636: Performed by: INTERNAL MEDICINE

## 2021-07-23 PROCEDURE — 74011636637 HC RX REV CODE- 636/637: Performed by: INTERNAL MEDICINE

## 2021-07-23 PROCEDURE — 74011000250 HC RX REV CODE- 250: Performed by: STUDENT IN AN ORGANIZED HEALTH CARE EDUCATION/TRAINING PROGRAM

## 2021-07-23 PROCEDURE — 97530 THERAPEUTIC ACTIVITIES: CPT

## 2021-07-23 PROCEDURE — 74011636637 HC RX REV CODE- 636/637: Performed by: GENERAL ACUTE CARE HOSPITAL

## 2021-07-23 PROCEDURE — 99233 SBSQ HOSP IP/OBS HIGH 50: CPT | Performed by: STUDENT IN AN ORGANIZED HEALTH CARE EDUCATION/TRAINING PROGRAM

## 2021-07-23 PROCEDURE — 82962 GLUCOSE BLOOD TEST: CPT

## 2021-07-23 PROCEDURE — 85027 COMPLETE CBC AUTOMATED: CPT

## 2021-07-23 PROCEDURE — 97116 GAIT TRAINING THERAPY: CPT | Performed by: PHYSICAL THERAPIST

## 2021-07-23 RX ADMIN — LOSARTAN POTASSIUM 100 MG: 100 TABLET, FILM COATED ORAL at 09:27

## 2021-07-23 RX ADMIN — INSULIN GLARGINE 50 UNITS: 100 INJECTION, SOLUTION SUBCUTANEOUS at 21:43

## 2021-07-23 RX ADMIN — INSULIN LISPRO 3 UNITS: 100 INJECTION, SOLUTION INTRAVENOUS; SUBCUTANEOUS at 09:28

## 2021-07-23 RX ADMIN — INSULIN LISPRO 3 UNITS: 100 INJECTION, SOLUTION INTRAVENOUS; SUBCUTANEOUS at 12:05

## 2021-07-23 RX ADMIN — VENLAFAXINE HYDROCHLORIDE 37.5 MG: 37.5 CAPSULE, EXTENDED RELEASE ORAL at 16:32

## 2021-07-23 RX ADMIN — WATER 2 G: 1 INJECTION INTRAMUSCULAR; INTRAVENOUS; SUBCUTANEOUS at 09:28

## 2021-07-23 RX ADMIN — INSULIN LISPRO 3 UNITS: 100 INJECTION, SOLUTION INTRAVENOUS; SUBCUTANEOUS at 12:06

## 2021-07-23 RX ADMIN — FUROSEMIDE 40 MG: 10 INJECTION, SOLUTION INTRAMUSCULAR; INTRAVENOUS at 09:28

## 2021-07-23 RX ADMIN — INSULIN LISPRO 3 UNITS: 100 INJECTION, SOLUTION INTRAVENOUS; SUBCUTANEOUS at 16:31

## 2021-07-23 RX ADMIN — ACETAMINOPHEN 650 MG: 325 TABLET ORAL at 06:31

## 2021-07-23 RX ADMIN — Medication 10 ML: at 21:44

## 2021-07-23 RX ADMIN — Medication 10 ML: at 06:00

## 2021-07-23 RX ADMIN — WATER 2 G: 1 INJECTION INTRAMUSCULAR; INTRAVENOUS; SUBCUTANEOUS at 20:39

## 2021-07-23 RX ADMIN — CARVEDILOL 25 MG: 12.5 TABLET, FILM COATED ORAL at 09:28

## 2021-07-23 RX ADMIN — VENLAFAXINE HYDROCHLORIDE 37.5 MG: 37.5 CAPSULE, EXTENDED RELEASE ORAL at 09:27

## 2021-07-23 RX ADMIN — ACETAMINOPHEN 650 MG: 325 TABLET ORAL at 16:32

## 2021-07-23 RX ADMIN — CARVEDILOL 25 MG: 12.5 TABLET, FILM COATED ORAL at 16:32

## 2021-07-23 RX ADMIN — INSULIN LISPRO 2 UNITS: 100 INJECTION, SOLUTION INTRAVENOUS; SUBCUTANEOUS at 16:31

## 2021-07-23 NOTE — PROGRESS NOTES
Infectious Disease Progress Note           Subjective:   Patient seen 7/22/21 in f/u. S/p successful explantation of the ICD on 7/21/21. She reports feeling much better. Objective:    Vitals:   Reviewed in chart. Physical Exam:  Gen: No apparent distress  HEENT:  Normocephalic, atraumatic, no scleral icterus  CV: Hemodynamically stable  Lungs: Breathing well on room air  Abdomen: soft, non tender, non distended  Genitourinary: No allen catheter   Skin: no rash   Psych: good affect, good eye contact, non tearful  Neuro: alert, oriented to time,  place, and situation, moves all extremities to commands, verbal  Musculoskeletal:  No joint edema, erythema or tenderness noted   Lines: Peripheral IV lines        Labs:  Recent Results (from the past 24 hour(s))   GLUCOSE, POC    Collection Time: 07/22/21 11:57 AM   Result Value Ref Range    Glucose (POC) 200 (H) 65 - 117 mg/dL    Performed by Lucy Patterson PCT    GLUCOSE, POC    Collection Time: 07/22/21  4:37 PM   Result Value Ref Range    Glucose (POC) 198 (H) 65 - 117 mg/dL    Performed by Vaughn Clifford PCT    GLUCOSE, POC    Collection Time: 07/22/21  9:37 PM   Result Value Ref Range    Glucose (POC) 180 (H) 65 - 117 mg/dL    Performed by Abby Fernandez RN    GLUCOSE, POC    Collection Time: 07/23/21  7:35 AM   Result Value Ref Range    Glucose (POC) 130 (H) 65 - 117 mg/dL    Performed by Alexandria Champagne PCT          Assessment/Recommendations:  59-year-old female with AICD , right knee replacement who was admitted for MSSA bacteremia of unknown source. Patient is status post explantation of the ICD/pacemaker along with leads  by Dr. Prince Cannon on 7/21/2021. Greatly appreciate care from cardiology. No reimplantation is seen necessary at this time per cardiology. -PICC line already placed on 7/22/21 as blood cultures from 7/7 were negative. - Plan will be for 6 weeks of cefazolin from date of explantation of the ICD. - Final abx recommendations 7/23/21. We will follow. Thank you for the opportunity to participate in the care of this patient. Please contact with questions or concerns.       Gisselle Cat MD  Infectious Diseases

## 2021-07-23 NOTE — PROGRESS NOTES
Comprehensive Nutrition Assessment    Type and Reason for Visit: Initial, RD nutrition re-screen/LOS    Nutrition Recommendations/Plan:  Continue cardiac/consistent carb diet     Nutrition Assessment:     Patient medically noted for sepsis and bacteremia. PMH CHF, DM, CKD, CM, and HTN. Chart reviewed for length of stay. Patient discussed on PCU rounds earlier; plans for discharge once home infusion company/antibiotics are set up. Continue CCD for BG control. Patient with no significant weight changes recently. Encourage intake of meals. Wt Readings from Last 9 Encounters:   07/23/21 73.7 kg (162 lb 7.7 oz)   07/15/21 73.4 kg (161 lb 13.1 oz)   07/14/21 72.8 kg (160 lb 6.4 oz)   06/21/21 74.2 kg (163 lb 9.6 oz)   06/14/21 73.2 kg (161 lb 6.4 oz)   05/15/21 73.5 kg (162 lb)   03/04/21 72.6 kg (160 lb)   02/23/21 72.1 kg (159 lb)   10/29/20 71 kg (156 lb 10.1 oz)     Estimated Daily Nutrient Needs:  Energy (kcal): 1582 kcal (BMR 1217 x 1. 3AF); Weight Used for Energy Requirements: Current  Protein (g): 74g (1.0 g/kg bw); Weight Used for Protein Requirements: Current  Fluid (ml/day): 1600 mL; Method Used for Fluid Requirements: 1 ml/kcal    Nutrition Related Findings:       [phone]  BM 7/23  Coreg, Vitamin D, lasix, Lantus, Humalog    Wounds:    None       Current Nutrition Therapies:  ADULT DIET Regular; 3 carb choices (45 gm/meal); Low Fat/Low Chol/High Fiber/BRADLEY; Low Sodium (2 gm)    Anthropometric Measures:  · Height:  5' 6\" (167.6 cm)  · Current Body Wt:  73.7 kg (162 lb 7.7 oz)     · BMI Category: Overweight (BMI 25.0-29. 9)       Nutrition Diagnosis:   No nutrition diagnosis at this time     Nutrition Interventions:   Food and/or Nutrient Delivery: Continue current diet  Nutrition Education and Counseling: No recommendations at this time  Coordination of Nutrition Care: No recommendation at this time    Goals:  PO intake >50% of meals/snacks next 5-7 days       Nutrition Monitoring and Evaluation: Behavioral-Environmental Outcomes: None identified  Food/Nutrient Intake Outcomes: Food and nutrient intake  Physical Signs/Symptoms Outcomes: Biochemical data, Weight    Discharge Planning:    Continue current diet     Electronically signed by Maria Guadalupe Pacheco RD on 7/23/2021 at 2:51 PM    Contact: ext [phone]

## 2021-07-23 NOTE — PROGRESS NOTES
0700 - Assumed care of patient. Patient resting comfortably, alert, oriented, denies pain. Daughter at bedside. Call bell within reach. Reviewed plan of care with patient and daughter. Both verbalize understanding, no further questions. Will continue to monitor. 1100 - Infectious Disease MD at bedside. Hospitalist said he will wait on her recommendations before discharge, so will continue to monitor and keep patient up to date with plan. 1300 - Report given to [address] as this nurse is getting floated off unit. No further questions. 1600 - Re-assumed care of patient due to staffing changes. Patient oriented, complains of arthritic pain. Per MD, patient will not be able to go home until morning due to scheduling of infusion company. Patient verbalizes understanding, disappointment. 1900 - Report given to oncoming PM RN. Reviewed patient assessment, plan of care. Patient verbalized understanding of shift change. No further questions from patient or oncoming RN. Problem: Pressure Injury - Risk of  Goal: *Prevention of pressure injury  Description: Document Bryan Scale and appropriate interventions in the flowsheet.   Outcome: Progressing Towards Goal  Note: Pressure Injury Interventions:  Sensory Interventions: Assess changes in LOC, Assess need for specialty bed    Moisture Interventions: Absorbent underpads, Assess need for specialty bed, Check for incontinence Q2 hours and as needed    Activity Interventions: Assess need for specialty bed, Chair cushion    Mobility Interventions: Assess need for specialty bed, Chair cushion    Nutrition Interventions: Document food/fluid/supplement intake    Friction and Shear Interventions: Apply protective barrier, creams and emollients, HOB 30 degrees or less, Lift sheet, Minimize layers, Lift team/patient mobility team, Transferring/repositioning devices                Problem: Infection - Risk of, Central Venous Catheter-Associated Bloodstream Infection  Goal: *Absence of infection signs and symptoms  Outcome: Progressing Towards Goal  Note: VSS, hygiene discussed

## 2021-07-23 NOTE — PROGRESS NOTES
Transition of Care Plan:     RUR: 24%     Disposition: Home with family with hh vs snf     Follow up appointments: To be placed on AVS prior to discharge.      DME needed: To be determined.     Transportation at Discharge: /daughter to transport.      Keys or means to access home:  and daughter has keys.        IM Medicare letter: To be given prior to discharged.      Caregiver Contact:      Discharge Caregiver contacted prior to discharge?   Caregiver to be contacted prior to discharge.       Received consult for ivab therapy and [facility] and to [facility] to have them review. Mi Agarwal  RN BSN CRM        [phone]

## 2021-07-23 NOTE — PROGRESS NOTES
Problem: Mobility Impaired (Adult and Pediatric)  Goal: *Acute Goals and Plan of Care (Insert Text)  Description: FUNCTIONAL STATUS PRIOR TO ADMISSION: Patient was independent and active without use of DME.    HOME SUPPORT PRIOR TO ADMISSION: The patient lived with  but did not require assist.  is in a wheelchair and daughter from [place] is staying with him while she is in the hospital.    Physical Therapy Goals  Initiated 7/19/2021  1. Patient will move from supine to sit and sit to supine  in bed with supervision/set-up within 7 day(s). 2.  Patient will transfer from bed to chair and chair to bed with minimal assistance/contact guard assist using the least restrictive device within 7 day(s). 3.  Patient will perform sit to stand with minimal assistance/contact guard assist within 7 day(s). 4.  Patient will ambulate with minimal assistance/contact guard assist for 100 feet with the least restrictive device within 7 day(s). Outcome: Progressing Towards Goal   PHYSICAL THERAPY TREATMENT  Patient: Main Nye (81 y.o. female)  Date: 7/23/2021  Diagnosis: Sepsis ([address] [A41.9] Sepsis ([address]  Procedure(s) (LRB):  REMOVE ICD GENERATOR (N/A)  REMOVE ICD LEADS (N/A) 2 Days Post-Op  Precautions: Fall, DNR, Bed Alarm  Chart, physical therapy assessment, plan of care and goals were reviewed. ASSESSMENT  Patient continues with skilled PT services and is progressing towards goals. Patient with increased motivation today and agreeable to work with PT. Needing CGA for sit to stand with cues for technique. Amb approx 125 feet with RW and CGA with no overt LOB. No reports of SOB with activity and able to also stand at sink for grooming with OT with CGA. Patient daughter present throughout session and is agreeable with DC plan for patient to return home with [facility] PT.      Other factors to consider for discharge: at risk for falls,  is elderly and in a w/c and cannot assist her         PLAN :  Patient continues to benefit from skilled intervention to address the above impairments. Continue treatment per established plan of care. to address goals. Recommendation for discharge: (in order for the patient to meet his/her long term goals)  Physical therapy at least 2 days/week in the home         IF patient discharges home will need the following DME: patient owns DME required for discharge       SUBJECTIVE:   Patient stated I am doing better.     OBJECTIVE DATA SUMMARY:   Critical Behavior:  Neurologic State: Alert  Orientation Level: Oriented X4  Cognition: Follows commands  Safety/Judgement: Decreased awareness of need for assistance, Decreased awareness of need for safety, Decreased insight into deficits, Fall prevention  Functional Mobility Training:  Bed Mobility:      Not tested              Transfers:  Sit to Stand: Contact guard assistance  Stand to Sit: Contact guard assistance                             Balance:  Sitting: Intact  Standing: Impaired  Standing - Static: Constant support;Good  Standing - Dynamic : Constant support; Fair  Ambulation/Gait Training:  Distance (ft): 125 Feet (ft)  Assistive Device: Gait belt;Walker, rolling  Ambulation - Level of Assistance: Contact guard assistance; Additional time;Assist x1        Gait Abnormalities: Decreased step clearance        Base of Support: Widened     Speed/Lata: Pace decreased (<100 feet/min); Shuffled; Slow  Step Length: Left shortened;Right shortened         Pain Rating:  No c/o pain    Activity Tolerance:   Good and requires rest breaks    After treatment patient left in no apparent distress:   Sitting in chair, Call bell within reach, and Caregiver / family present    COMMUNICATION/COLLABORATION:   The patients plan of care was discussed with: Physical therapist, Occupational therapist, and Registered nurse.      Yulia Alert, PT, DPT   Time Calculation: 24 mins

## 2021-07-23 NOTE — PROGRESS NOTES
Infectious Disease Progress Note           Subjective:   Patient reports feeling much better today. Objective:    Vitals:   Reviewed in chart. Physical Exam:  Gen: No apparent distress  HEENT:  Normocephalic, atraumatic, no scleral icterus  CV: Hemodynamically stable  Lungs: Breathing well on room air  Abdomen: soft, non tender, non distended  Genitourinary: No allen catheter   Skin: no rash   Psych: good affect, good eye contact, non tearful  Neuro: alert, oriented to time,  place, and situation, moves all extremities to commands, verbal  Musculoskeletal:  No joint edema, erythema or tenderness noted   Lines: PICC line        Labs:  Recent Results (from the past 24 hour(s))   GLUCOSE, POC    Collection Time: 07/22/21  4:37 PM   Result Value Ref Range    Glucose (POC) 198 (H) 65 - 117 mg/dL    Performed by Deedee Mak PCT    GLUCOSE, POC    Collection Time: 07/22/21  9:37 PM   Result Value Ref Range    Glucose (POC) 180 (H) 65 - 117 mg/dL    Performed by Miguel Ángel Arora RN    GLUCOSE, POC    Collection Time: 07/23/21  7:35 AM   Result Value Ref Range    Glucose (POC) 130 (H) 65 - 117 mg/dL    Performed by Marguerite Underwood PCT    CBC W/O DIFF    Collection Time: 07/23/21 10:23 AM   Result Value Ref Range    WBC 12.9 (H) 3.6 - 11.0 K/uL    RBC 2.90 (L) 3.80 - 5.20 M/uL    HGB 8.7 (L) 11.5 - 16.0 g/dL    HCT 26.1 (L) 35.0 - 47.0 %    MCV 90.0 80.0 - 99.0 FL    MCH 30.0 26.0 - 34.0 PG    MCHC 33.3 30.0 - 36.5 g/dL    RDW 14.3 11.5 - 14.5 %    PLATELET 329 344 - 999 K/uL    MPV 10.7 8.9 - 12.9 FL    NRBC 0.0 0  WBC    ABSOLUTE NRBC 0.00 0.00 - 0.01 K/uL   GLUCOSE, POC    Collection Time: 07/23/21 11:18 AM   Result Value Ref Range    Glucose (POC) 213 (H) 65 - 117 mg/dL    Performed by Deedee Mak PCT          Assessment/Recommendations:  79-year-old female with AICD , right knee replacement who was admitted for MSSA bacteremia of unknown source.   Patient is status post explantation of the ICD/pacemaker along with leads  by Dr. Leatha Edwards on 7/21/2021. Greatly appreciate care from cardiology. No reimplantation is seen necessary at this time per cardiology.  -Cefazolin 2 g every 12 hour.   - Duration 6 weeks from date of explantation of the ICD, 7/22/2021 to 9/2/2021.  -Infectious disease follow-up in clinic in 2 to 3 weeks. · Please have labs done on weekly basis for CBC with diff, CMP, and have results sent to me by faxing to  [phone]. · Call with critical labs at [phone]. · Please ensure that patient has PICC care arranged per protocol   · Once IV antibiotics have been discontinued, please ensure that PICC/IV access is promptly removed. · Smoking cessation encouraged if patient is current smoker to aid in infection and wound healing        We will sign off now. Please recall as needed. Thank you for the opportunity to participate in the care of this patient. Please contact with questions or concerns.       Lauren Perez MD  Infectious Diseases

## 2021-07-23 NOTE — PROGRESS NOTES
Problem: Self Care Deficits Care Plan (Adult)  Goal: *Acute Goals and Plan of Care (Insert Text)  Description:   FUNCTIONAL STATUS PRIOR TO ADMISSION: Patient was independent and active without use of DME. Patient was independent for basic and instrumental ADLs. Patient questionable historian secondary to confusion. Patient reported her  is w/c bound but she does not assist him at baseline. HOME SUPPORT: The patient lived with  but did not require assist.    Occupational Therapy Goals  Initiated 7/19/2021  1. Patient will perform grooming standing at sink with supervision/set-up within 7 day(s). 2.  Patient will perform sponge bathing with supervision/set-up within 7 day(s). 3.  Patient will perform lower body dressing with supervision/set-up within 7 day(s). 4.  Patient will perform toilet transfers with supervision/set-up within 7 day(s). 5.  Patient will perform all aspects of toileting with supervision/set-up within 7 day(s). 6.  Patient will participate in upper extremity therapeutic exercise/activities with supervision/set-up for 5 minutes within 7 day(s). 7.  Patient will utilize energy conservation techniques during functional activities with verbal cues within 7 day(s). Outcome: Progressing Towards Goal   OCCUPATIONAL THERAPY TREATMENT  Patient: Raúl Gonzales (7 y.o. female)  Date: 7/23/2021  Diagnosis: Sepsis ([address] [A41.9] Sepsis ([address]  Procedure(s) (LRB):  REMOVE ICD GENERATOR (N/A)  REMOVE ICD LEADS (N/A) 2 Days Post-Op  Precautions: Fall, DNR, Bed Alarm  Chart, occupational therapy assessment, plan of care, and goals were reviewed. ASSESSMENT  Patient continues with skilled OT services and is progressing towards goals. Patient received sitting edge of bed with daughter present in room and agreeable for therapy. Patient appeared motivated and in good spirits this session. Patient required contact guard assist for functional mobility using rolling walker.  After ambulating in hallway, patient completed grooming tasks in bathroom with contact guard assist while standing at sink. Patient tolerated session fair with rest breaks when needed. Patient agreed to sit in chair at end of session and was left with all needs met. Patient continues to be limited by impaired balance, general weakness, and decreased activity tolerance. Patient would continue to benefit from skilled OT services during acute hospital stay. Recommend patient discharge home with HHOT/PT services and assistance from family. Current Level of Function Impacting Discharge (ADLs): contact guard assist for self-care and functional mobility using rolling walker     Other factors to consider for discharge: fall risk, family hiring caregiver to come during the day         PLAN :  Patient continues to benefit from skilled intervention to address the above impairments. Continue treatment per established plan of care to address goals. Recommendation for discharge: (in order for the patient to meet his/her long term goals)  Occupational therapy at least 2 days/week in the home AND ensure assist and/or supervision for safety with ADLs and functional mobility    This discharge recommendation:  Has been made in collaboration with the attending provider and/or case management    IF patient discharges home will need the following DME: patient owns DME required for discharge       SUBJECTIVE:   Patient stated I feel much better today.     OBJECTIVE DATA SUMMARY:   Cognitive/Behavioral Status:  Neurologic State: Alert  Orientation Level: Oriented X4  Cognition: Follows commands    Functional Mobility and Transfers for ADLs:  Bed Mobility:  Received sitting edge of bed and ended in chair    Transfers:  Sit to Stand: Contact guard assistance   Stand to Sit: Contact guard assistance   Functional Transfers  Bathroom Mobility: Contact guard assistance  Cues: Tactile cues provided;Verbal cues provided     Balance:  Sitting: Intact  Standing: Impaired  Standing - Static: Constant support;Good  Standing - Dynamic : Constant support; Fair    ADL Intervention:    Grooming  Position Performed: Standing (at sink)  Washing Face: Contact guard assistance  Washing Hands: Contact guard assistance  Brushing Teeth: Contact guard assistance  Brushing/Combing Hair: Contact guard assistance  Cues: Tactile cues provided;Verbal cues provided    Lower Body Dressing Assistance  Socks: Contact guard assistance  Leg Crossed Method Used: Yes  Position Performed: Seated edge of bed  Cues: Don;Tactile cues provided;Verbal cues provided    Pain:  Patient did not c/o pain during session. Activity Tolerance:   Fair and requires rest breaks    After treatment patient left in no apparent distress:   Sitting in chair, Call bell within reach, and Caregiver / family present    COMMUNICATION/COLLABORATION:   The patients plan of care was discussed with: Physical therapist and Registered nurse.      TABBY Church/L  Time Calculation: 23 mins

## 2021-07-23 NOTE — PROGRESS NOTES
Hospitalist Progress Note    NAME: Dayday Callahan   :  1940   MRN:  [id number]       Assessment / Plan:  MSSA bacteremia  Severe sepsis, POA  Metabolic encephalopathy POA, resolved  -encephalopathy resolved, suspected 2/2 sepsis  -TTE and VALERIA both negative for valvular vegetations  -Appreciate ID consult  -No obvious source of infection  -S/p successful explantation of dual-chamber ICD ()  -Echo this admission with EF 55-60%, improved from 25-30% on last echo on file 2020.   As such patient will not require reimplantation of ICD for primary prevention  -Continue IV antibiotics with cefazolin, will need 6 weeks of therapy from AICD explantation   -Initially meningitis was under consideration and patient underwent LP, no org seen and therefore empiric meningitis coverage was discontinued  -Blood cultures positive for staph aureus 7/15 in all 3 bottles drawn  -Repeat cultures  NGTD  -s/p PICC line  -to complete cefazolin 2g q12hrs through 2021    Hx of DCM  -previously ED 25-35%  -as above, given recovery of EF, no current plan for reimplantation of AICD for primary prevention     elevated troponin  -demand ischemia from sepsis suspected  -No chest pain or shortness of breath  -Appreciate cardiology following    Elevated LFTs  HLD  -Trending down, no abdominal pain  -Also likely from sepsis, monitor  -hold statin     IVAN CKD stage III  -resolving  -suspect prerenal in setting of sepsis  -monitor labs, renally dose meds     HTN  -cont increased dose of coreg, cont losartan    DM2  -improved control  -cont lantus  -hold glipizide  -cont preprandial insulin  -monitor POCs     Dilated CM  SSS  -s/p PPM     Code Status: DNR  Surrogate Decision Maker:   DVT Prophylaxis:   GI Prophylaxis: not indicated  Baseline: Independent     25.0 - 29.9 Overweight / Body mass index is 27.08 kg/m².     Estimated discharge date:   Barriers: Clinical improvement     Subjective:     Chief Complaint / Reason for Physician Visit  No new complaints. No chest pain, no SOB. Wants to go home    Discussed with RN events overnight. Review of Systems:  Symptom Y/N Comments  Symptom Y/N Comments   Fever/Chills n   Chest Pain n    Poor Appetite n   Edema n    Cough n   Abdominal Pain n    Sputum n   Joint Pain     SOB/STANTON n   Pruritis/Rash     Nausea/vomit n   Tolerating PT/OT     Diarrhea n   Tolerating Diet y    Constipation n   Other       Could NOT obtain due to:      Objective:     VITALS:   Last 24hrs VS reviewed since prior progress note. Most recent are:  Patient Vitals for the past 24 hrs:   Temp Pulse Resp BP SpO2   07/23/21 0800 98.7 °F (37.1 °C) 85 16 (!) 145/50 100 %   07/23/21 0408 98.5 °F (36.9 °C) 84 13 (!) 150/55 93 %   07/23/21 0000  84 17 (!) 128/43 92 %   07/22/21 2308 98.3 °F (36.8 °C) 94 22 (!) 174/62 92 %   07/22/21 1916 98.5 °F (36.9 °C) 70 20 (!) 143/38 97 %     No intake or output data in the 24 hours ending 07/23/21 1550     I had a face to face encounter and independently examined this patient on 7/23/2021, as outlined below:  PHYSICAL EXAM:  General: WD, WN. Alert, cooperative, no acute distress    EENT:  EOMI. Anicteric sclerae. MMM  Resp:  CTA bilaterally, no wheezing or rales. No accessory muscle use  CV:  Regular  rhythm,  No edema  GI:  Soft, Non distended, Non tender. +Bowel sounds  Neurologic:  Alert and oriented X 3, normal speech,   Psych:   Good insight. Not anxious nor agitated  Skin:  No rashes.   No jaundice    Reviewed most current lab test results and cultures  YES  Reviewed most current radiology test results   YES  Review and summation of old records today    NO  Reviewed patient's current orders and MAR    YES  PMH/SH reviewed - no change compared to H&P  ________________________________________________________________________  Care Plan discussed with:    Comments   Patient x    Family      RN x    Care Manager     Consultant                       x Multidiciplinary team rounds were held today with , nursing, pharmacist and clinical coordinator. Patient's plan of care was discussed; medications were reviewed and discharge planning was addressed. ________________________________________________________________________  Total NON critical care TIME:  25   Minutes    Total CRITICAL CARE TIME Spent:   Minutes non procedure based      Comments   >50% of visit spent in counseling and coordination of care x    ________________________________________________________________________  Hanane Love DO     Procedures: see electronic medical records for all procedures/Xrays and details which were not copied into this note but were reviewed prior to creation of Plan. LABS:  I reviewed today's most current labs and imaging studies.   Pertinent labs include:  Recent Labs     07/23/21  1023 07/22/21  0150 07/21/21  0257   WBC 12.9* 13.1* 15.9*   HGB 8.7* 9.6* 10.7*   HCT 26.1* 29.3* 30.9*    278 243     Recent Labs     07/22/21  0150 07/21/21  0257    139   K 3.9 4.3    108   CO2 24 23   * 143*   BUN 35* 37*   CREA 1.45* 1.30*   CA 8.3* 9.5   MG  --  1.6   ALB  --  2.3*   TBILI  --  0.4   ALT  --  57       Signed: Hanane Love DO

## 2021-07-23 NOTE — PROGRESS NOTES
Transition of Care Plan:     RUR: 24%     Disposition: Home with family with hh vs snf     Follow up appointments: To be placed on AVS prior to discharge.      DME needed: To be determined.     Transportation at Discharge: /daughter to transport.      Keys or means to access home:  and daughter has keys.        IM Medicare letter: To be given prior to discharged.      Caregiver Contact:      Discharge Caregiver contacted prior to discharge?   Caregiver to be contacted prior to discharge.        [facility] with confirmation. [facility] access hospital has accepted the patient and will be out to see her on Sunday. Spoke with liarina at [facility] and patient will receive her morning dose tomorrow here at the hospital and then the family will administer the second dose and [facility] will follow up on Sunday AM.. Patient asked about [facility] for therapy however called [facility] and they do not have the staff to send to patient's area. Patient informed and is fine with [facility]. Liarina from [facility] was up to talk with patient and her daughter about ivab therapy at home. Sanchez Citlali will be back tonight to teach the patient and family. Patient states her  will be able to administer the medication however he cannot make it up to the hospital today. Per daughter is in the room and will be taught and will teach her father. Per liason with Lauren the medication will not be delivered in time for her 0900am dose. Informed patient and daughter of this. Mi Agarwal RN BSN CRM        [phone]

## 2021-07-24 VITALS
BODY MASS INDEX: 26.47 KG/M2 | DIASTOLIC BLOOD PRESSURE: 44 MMHG | TEMPERATURE: 98.1 F | HEIGHT: 66 IN | RESPIRATION RATE: 21 BRPM | HEART RATE: 75 BPM | WEIGHT: 164.68 LBS | OXYGEN SATURATION: 98 % | SYSTOLIC BLOOD PRESSURE: 121 MMHG

## 2021-07-24 LAB
GLUCOSE BLD STRIP.AUTO-MCNC: 171 MG/DL (ref 65–117)
GLUCOSE BLD STRIP.AUTO-MCNC: 82 MG/DL (ref 65–117)
SERVICE CMNT-IMP: ABNORMAL
SERVICE CMNT-IMP: NORMAL

## 2021-07-24 PROCEDURE — 74011250636 HC RX REV CODE- 250/636: Performed by: INTERNAL MEDICINE

## 2021-07-24 PROCEDURE — 74011000250 HC RX REV CODE- 250: Performed by: STUDENT IN AN ORGANIZED HEALTH CARE EDUCATION/TRAINING PROGRAM

## 2021-07-24 PROCEDURE — 74011250637 HC RX REV CODE- 250/637: Performed by: GENERAL ACUTE CARE HOSPITAL

## 2021-07-24 PROCEDURE — 74011250636 HC RX REV CODE- 250/636: Performed by: STUDENT IN AN ORGANIZED HEALTH CARE EDUCATION/TRAINING PROGRAM

## 2021-07-24 PROCEDURE — 74011250637 HC RX REV CODE- 250/637: Performed by: INTERNAL MEDICINE

## 2021-07-24 PROCEDURE — 74011636637 HC RX REV CODE- 636/637: Performed by: INTERNAL MEDICINE

## 2021-07-24 PROCEDURE — 82962 GLUCOSE BLOOD TEST: CPT

## 2021-07-24 RX ADMIN — CARVEDILOL 25 MG: 12.5 TABLET, FILM COATED ORAL at 09:09

## 2021-07-24 RX ADMIN — INSULIN LISPRO 3 UNITS: 100 INJECTION, SOLUTION INTRAVENOUS; SUBCUTANEOUS at 11:30

## 2021-07-24 RX ADMIN — WATER 2 G: 1 INJECTION INTRAMUSCULAR; INTRAVENOUS; SUBCUTANEOUS at 09:09

## 2021-07-24 RX ADMIN — FUROSEMIDE 40 MG: 10 INJECTION, SOLUTION INTRAMUSCULAR; INTRAVENOUS at 09:10

## 2021-07-24 RX ADMIN — VENLAFAXINE HYDROCHLORIDE 37.5 MG: 37.5 CAPSULE, EXTENDED RELEASE ORAL at 09:12

## 2021-07-24 RX ADMIN — LOSARTAN POTASSIUM 100 MG: 100 TABLET, FILM COATED ORAL at 09:11

## 2021-07-24 RX ADMIN — Medication 10 ML: at 05:57

## 2021-07-24 RX ADMIN — ACETAMINOPHEN 650 MG: 325 TABLET ORAL at 04:48

## 2021-07-24 RX ADMIN — INSULIN LISPRO 3 UNITS: 100 INJECTION, SOLUTION INTRAVENOUS; SUBCUTANEOUS at 09:08

## 2021-07-24 NOTE — DISCHARGE SUMMARY
Hospitalist Discharge Summary     Patient ID:  Inez Davis  [id number]  89 y.o.  1940 7/16/2021    PCP on record: Jing Gomez NP    Admit date: 7/16/2021  Discharge date and time: 7/24/2021    DISCHARGE DIAGNOSIS:    See below    CONSULTATIONS:  IP CONSULT TO CARDIOLOGY  IP CONSULT TO CARDIOLOGY  IP CONSULT TO NEUROLOGY  IP CONSULT TO INFECTIOUS DISEASES  IP CONSULT TO ELECTROPHYSIOLOGY    Excerpted HPI from H&P of Renee Betancourt MD:  Ashley Hope is a 79 y.o.  female who presents with CC listed above. Pt initially presented to [facility] today with altered mental status. Apparently her temp there was 104F. Since her arrival to the [facility], she has been afebrile. I called her  to inform him that the pt was being admitted to the hospital for possible meningitis and possible NSTEMI. He states that she was doing well until yesterday when she was \"not like herself\" and \"very lethargic. \" He also states that she was \"very drowsy\" and did not want to do anything. He denies noticing any fever or chills. Currently the pt states that she is doing well - other than being tired and needing sleep. She denies photophobia or neck stiffness. She denies any cough, SOB, chest pain, syncope or burning upon urination. ______________________________________________________________________  DISCHARGE SUMMARY/HOSPITAL COURSE:  for full details see H&P, daily progress notes, labs, consult notes. MSSA bacteremia  Severe sepsis, POA  Metabolic encephalopathy POA, resolved  -encephalopathy resolved, suspected 2/2 sepsis  -TTE and VALERIA both negative for valvular vegetations  -Appreciate ID consult  -No obvious source of infection  -S/p successful explantation of dual-chamber ICD (7/21)  -Echo this admission with EF 55-60%, improved from 25-30% on last echo on file 9/2020.   As such patient will not require reimplantation of ICD for primary prevention  -Continue IV antibiotics with cefazolin, will need 6 weeks of therapy from AICD explantation 7/21  -Initially meningitis was under consideration and patient underwent LP, no org seen and therefore empiric meningitis coverage was discontinued  -Blood cultures positive for staph aureus 7/15 in all 3 bottles drawn  -Repeat cultures 7/17 NGTD  -s/p PICC line  -to complete cefazolin 2g q12hrs through 9/2/2021     Hx of DCM  -previously ED 25-35%  -as above, given recovery of EF, no current plan for reimplantation of AICD for primary prevention     elevated troponin  -demand ischemia from sepsis suspected  -No chest pain or shortness of breath  -Appreciate cardiology following     Elevated LFTs  HLD  -Trending down, no abdominal pain  -Also likely from sepsis, monitor  -hold statin     IVAN CKD stage III  -resolving  -suspect prerenal in setting of sepsis  -monitor labs, renally dose meds     HTN  -cont increased dose of coreg, cont losartan     DM2  -improved control  -cont lantus  -hold glipizide  -cont preprandial insulin  -monitor POCs      Dilated CM  SSS  -s/p PPM         _______________________________________________________________________  Patient seen and examined by me on discharge day. Pertinent Findings:  Gen:    Not in distress  Chest: Clear lungs  CVS:   Regular rhythm. No edema  Abd:  Soft, not distended, not tender  Neuro:  Alert,   _______________________________________________________________________  DISCHARGE MEDICATIONS:   Current Discharge Medication List      CONTINUE these medications which have NOT CHANGED    Details   SITagliptin (Januvia) 50 mg tablet Take 50 mg by mouth daily. magnesium oxide (MAG-OX) 400 mg tablet Take 400 mg by mouth two (2) times daily as needed (Cramps). turmeric-herbal complex no. 278 150 mg cap Take 1 Capsule by mouth daily. venlafaxine-SR (EFFEXOR-XR) 37.5 mg capsule Take 37.5 mg by mouth two (2) times a day.       ergocalciferol (ERGOCALCIFEROL) 1,250 mcg (50,000 unit) capsule Take 50,000 Units by mouth every seven (7) days. On Wednesdays      b complex vitamins tablet Take 1 Tablet by mouth daily. insulin glargine (Lantus Solostar U-100 Insulin) 100 unit/mL (3 mL) inpn 55 Units by SubCUTAneous route daily. valsartan (DIOVAN) 80 mg tablet Take 80 mg by mouth daily. semaglutide (OZEMPIC) 0.25 mg/0.2 mL (2 mg/1.5 mL) sub-q pen 0.25 mg by SubCUTAneous route every seven (7) days. Indications: type 2 diabetes mellitus  Qty: 1 Box, Refills: 1      atorvastatin (LIPITOR) 20 mg tablet TAKE 1 TABLET BY MOUTH  DAILY  Qty: 90 Tablet, Refills: 1      glipiZIDE (GLUCOTROL) 5 mg tablet TAKE 1 TABLET BY MOUTH  TWICE DAILY  Qty: 180 Tablet, Refills: 1      furosemide (LASIX) 40 mg tablet TAKE 1 TABLET BY MOUTH  DAILY  Qty: 90 Tablet, Refills: 1      nitroglycerin (NITROSTAT) 0.4 mg SL tablet 1 Tab by SubLINGual route every five (5) minutes as needed for Chest Pain. Up to 3 doses. Qty: 25 Tab, Refills: 0      carvediloL (COREG) 25 mg tablet Take 1 Tab by mouth two (2) times daily (with meals). Qty: 28 Tab, Refills: 0    Comments: Waiting for mail order      colestipoL (Colestid) 1 gram tablet Take 1 Tab by mouth two (2) times a day. Qty: 180 Tab, Refills: 3      aspirin delayed-release 81 mg tablet Take 1 Tab by mouth daily. Qty: 90 Tab, Refills: 1      vit C/E/Zn/coppr/lutein/zeaxan (PRESERVISION AREDS-2 PO) Take 1 Tablet by mouth two (2) times a day. B.infantis-B.ani-B.long-B.bifi (PROBIOTIC 4X) 10-15 mg TbEC Take  by mouth daily. acetaminophen (TYLENOL) 650 mg TbER Take 650 mg by mouth every eight (8) hours. omega-3 fatty acids-vitamin e 1,000 mg cap Take 1 Cap by mouth two (2) times a day. 32a day       ascorbic acid, vitamin C, (VITAMIN C) 500 mg tablet Take 1,000 mg by mouth daily. zinc 50 mg tab tablet Take 50 mg by mouth daily. Patient Follow Up Instructions:    Activity: PT/OT per Home Health  Diet: Resume previous diet  *    Follow-up Information     Follow up With Specialties Details [facility]   This will be your home health company and they will follow up with you when discharged. [phone]    [employer]   This is your home infusion company. Any questions call the above number. [phone]    Dr Salo Rinaldi. [facility]    [phone]  [address] [facility]   Mary Jo Cat, SHARATH Nurse Practitioner   Tabitha Camacho  [place] [place] [address]  [phone]          ________________________________________________________________    Risk of deterioration: Moderate    Condition at Discharge:  Stable  __________________________________________________________________    Disposition  Home with family and home health services    ____________________________________________________________________    Code Status: DNR/DNI  ___________________________________________________________________      Total time in minutes spent coordinating this discharge (includes going over instructions, follow-up, prescriptions, and preparing report for sign off to her PCP) :  >30 minutes    Signed:  Noreen Kim, DO

## 2021-07-26 ENCOUNTER — PATIENT OUTREACH (OUTPATIENT)
Dept: CASE MANAGEMENT | Age: 81
End: 2021-07-26

## 2021-07-26 NOTE — PROGRESS NOTES
Care Transitions Initial Call    Call within 2 business days of discharge: Yes     Patient: Naveed Marshall Patient : 1940 MRN: [id number]    [facility]       Complaint Diagnosis Description Type Department Provider    21 Transfer Of Care Encephalopathy . .. ED to Hosp-Admission (Discharged) (ADMIT) [id number] Lashay Chatman, DO; [place]. .. Was this an external facility discharge? No Discharge Facility: [facility]    Challenges to be reviewed by the provider   -AICD explant on   -New home PICC line for IV Cefazolin 2G every 12 hours.  -Weekly CBC w/diff and CMP to be sent to ID  -Declined SNF placement at discharge     Method of communication with provider : chart routing    Discussed COVID-19 related testing which was available at this time. Test results were negative. Patient informed of results, if available? yes     Advance Care Planning:   Does patient have an Advance Directive: yes, reviewed and current. Inpatient Readmission Risk score: Unplanned Readmit Risk Score: 29    Was this a readmission? no   Patient stated reason for the admission: \" I was so sick\"     Patients top risk factors for readmission: none noted. Interventions to address risk factors: Scheduled appointment with PCP- and Obtained and reviewed discharge summary and/or continuity of care documents    Care Transition Nurse (CTN) contacted the patient by telephone to perform post hospital discharge assessment. Verified name and  with patient as identifiers. Provided introduction to self, and explanation of the CTN role. CTN reviewed discharge instructions, medical action plan and red flags with patient who verbalized understanding. Were discharge instructions available to patient? yes. Reviewed appropriate site of care based on symptoms and resources available to patient including: PCP, Specialist, [address] [place] [address] Deven Casas and When to call 911.  Patient given an opportunity to ask questions and does not have any further questions or concerns at this time. The patient agrees to contact the PCP office for questions related to their healthcare. Medication reconciliation was performed with patient, who verbalizes understanding of administration of home medications. Advised obtaining a 90-day supply of all daily and as-needed medications. Referral to Pharm D needed: no     Home Health/Outpatient orders at discharge: home health care, PT, OT and [address]: [facility]  Date of initial visit: 7/25/2021    Durable Medical Equipment ordered at discharge: IV antibiotics and supplies  [address]: [address] [facility] [address] received: yes    Covid Risk Education    Educated patient about risk for severe COVID-19 due to risk factors according to CDC guidelines. CTN reviewed discharge instructions, medical action plan and red flag symptoms with the patient who verbalized understanding. Discussed COVID vaccination status: yes. Education provided on COVID-19 vaccination as appropriate. Discussed exposure protocols and quarantine with CDC Guidelines. Patient was given an opportunity to verbalize any questions and concerns and agrees to contact CTN or health care provider for questions related to their healthcare. Was patient discharged with a pulse oximeter? no.      Discussed follow-up appointments. If no appointment was previously scheduled, appointment scheduling offered: yes. Is follow up appointment scheduled within 7 days of discharge? yes.    [address] follow up appointment(s):   Future Appointments   Date Time Provider Marty Bautista   7/27/2021  1:20 PM Slick Mars NP [facility] MAIN BS AMB   9/9/2021 11:30 AM Patricia Craig MD RCAR BS AMB   10/18/2021 10:50 AM Slick Mars NP [facility] MAIN BS AMB   3/9/2022  1:15 PM PACEMAKER, RCAM RCAMB BS AMB   3/9/2022  1:40 PM Violet Rosales, ANP RCAMB BS AMB     Non-[facility] follow up appointment(s): none    Plan for follow-up call in 7-10 days based on severity of symptoms and risk factors. Plan for next call: follow up appointment-with ID  CTN provided contact information for future needs. Goals Addressed                 This Visit's Progress     Attends follow-up appointments as directed. 7/26/2021  -Will follow up with Family Medicine NP on 7/27  -Will call Dr. Alla Lindsey at [facility]. Declines CTN's assistance. CTN provided ID contact information. -CTN will follow up in 1 week  Tammy ZALDIVAR       Prevent complications post hospitalization. 7/26/2021  -Patient has dressing to left chest from AICD explant on 7/21. Clean/Dry/Intact. Home health nurse to change dressing on Thursday. Will monitor site for edema, redness, drainage. Will monitor for fever, chills. Will notify MD  -New PICC line. On Cefazolin 2G every 12 hours.  is administering medication.  [facility] to change dressing.  -Weekly labs CBC and CMP to be drawn and sent to ID  -CTN to follow up in 1 week  SP

## 2021-07-27 ENCOUNTER — TELEPHONE (OUTPATIENT)
Dept: FAMILY MEDICINE CLINIC | Age: 81
End: 2021-07-27

## 2021-07-27 NOTE — TELEPHONE ENCOUNTER
Patient was just discharged from [address] [place] [address] yesterday. She has developed blisters on her legs. Thinks that it has something to do with her medication. Per Mata Webster, nurse to call patient to get more details.

## 2021-07-27 NOTE — TELEPHONE ENCOUNTER
S/w patient, rescheduled f/u  hospital visit for next week, now c/o fall and hurting all over and blisters on legs. She does not feel like she needs to go to ER for evaluation.  KENNY

## 2021-07-28 ENCOUNTER — TELEPHONE (OUTPATIENT)
Dept: FAMILY MEDICINE CLINIC | Age: 81
End: 2021-07-28

## 2021-07-28 LAB
BACTERIA SPEC CULT: NORMAL
SERVICE CMNT-IMP: NORMAL

## 2021-07-28 NOTE — TELEPHONE ENCOUNTER
Spoke w/ patient's ; two patient identifiers confirmed. Appointment scheduled for 8/25/2021 at 3:30pm for 3-4 week hospital follow-up; patient's  requested virtual visit due to them residing in [place], [place].     Jolly Duran, DUC

## 2021-07-28 NOTE — TELEPHONE ENCOUNTER
----- Message from Tashia Arriaza MD sent at 7/23/2021  1:56 PM EDT -----    Please schedule hospital f/u with me in 3-4 weeks.      Msg back once done.    -Edelmira Jamison

## 2021-07-29 ENCOUNTER — TELEPHONE (OUTPATIENT)
Dept: FAMILY MEDICINE CLINIC | Age: 81
End: 2021-07-29

## 2021-07-29 NOTE — TELEPHONE ENCOUNTER
Spoke w/ patient's ; two patient identifiers confirmed. Informed that Dr. Pedro Rios would like to see patient virtually today (7/29/201) at 2pm to address blisters and antibiotic therapy. Patient's  verbalized understanding and states that if he is able to get his email to work on his laptop they will be able to have that appointment. Informed  that appointment will be scheduled for today and if unable to get laptop to work then we will need to know ASAP to figure out how to proceed. Patient's  verbalized understanding.     Evita Gao LPN

## 2021-07-29 NOTE — TELEPHONE ENCOUNTER
M for patient to return call. Will need to speak with patient regarding status of blisters on her legs as well as status of antibiotic treatment. Dr. Jake Ovalle would like to see patient today (7/29/2021) at 2pm for virtual visit if patient is able to do so.     Mikael Saunders LPN

## 2021-07-29 NOTE — TELEPHONE ENCOUNTER
Spoke w/ patient's daughter; two patient identifiers confirmed. Informed daughter that we are trying to reach her mother to attempt to schedule a virtual visit for today if they are available to do so to address blisters and antibiotic treatment. Daughter states that she will reach out to parents to have return call to office.     Delicia Li LPN

## 2021-08-02 ENCOUNTER — TELEPHONE (OUTPATIENT)
Dept: FAMILY MEDICINE CLINIC | Age: 81
End: 2021-08-02

## 2021-08-02 NOTE — TELEPHONE ENCOUNTER
----- Message from Danielle Green sent at 8/2/2021  1:06 PM EDT -----  Regarding: SHARATH Dee/ telephone  General Message/Vendor Calls    Caller's first and last name: [facility] Nurse      Reason for call:  Caller wanted to let the doctor know that the pt blood sugar was 37 this am- fasting reading. Caller stated that pt has since eaten and took her insulin.        Callback required yes/no and why: yes      Best contact number(s): [phone]- pt      Details to clarify the request: n/a      Danielle Green

## 2021-08-03 ENCOUNTER — HOSPITAL ENCOUNTER (EMERGENCY)
Age: 81
Discharge: ANOTHER HOSPITAL | End: 2021-08-03
Attending: EMERGENCY MEDICINE
Payer: MEDICARE

## 2021-08-03 ENCOUNTER — TELEPHONE (OUTPATIENT)
Dept: FAMILY MEDICINE CLINIC | Age: 81
End: 2021-08-03

## 2021-08-03 ENCOUNTER — APPOINTMENT (OUTPATIENT)
Dept: GENERAL RADIOLOGY | Age: 81
End: 2021-08-03
Attending: EMERGENCY MEDICINE
Payer: MEDICARE

## 2021-08-03 ENCOUNTER — APPOINTMENT (OUTPATIENT)
Dept: GENERAL RADIOLOGY | Age: 81
DRG: 291 | End: 2021-08-03
Attending: STUDENT IN AN ORGANIZED HEALTH CARE EDUCATION/TRAINING PROGRAM
Payer: MEDICARE

## 2021-08-03 ENCOUNTER — TELEPHONE (OUTPATIENT)
Dept: CARDIOLOGY CLINIC | Age: 81
End: 2021-08-03

## 2021-08-03 ENCOUNTER — HOSPITAL ENCOUNTER (INPATIENT)
Age: 81
LOS: 3 days | Discharge: HOME HEALTH | DRG: 291 | End: 2021-08-06
Attending: STUDENT IN AN ORGANIZED HEALTH CARE EDUCATION/TRAINING PROGRAM | Admitting: INTERNAL MEDICINE
Payer: MEDICARE

## 2021-08-03 ENCOUNTER — APPOINTMENT (OUTPATIENT)
Dept: NUCLEAR MEDICINE | Age: 81
DRG: 291 | End: 2021-08-03
Attending: STUDENT IN AN ORGANIZED HEALTH CARE EDUCATION/TRAINING PROGRAM
Payer: MEDICARE

## 2021-08-03 VITALS
HEART RATE: 105 BPM | BODY MASS INDEX: 25.19 KG/M2 | DIASTOLIC BLOOD PRESSURE: 49 MMHG | WEIGHT: 156.75 LBS | SYSTOLIC BLOOD PRESSURE: 156 MMHG | HEIGHT: 66 IN | OXYGEN SATURATION: 95 % | RESPIRATION RATE: 23 BRPM | TEMPERATURE: 98.7 F

## 2021-08-03 DIAGNOSIS — R21 RASH: ICD-10-CM

## 2021-08-03 DIAGNOSIS — E87.5 ACUTE HYPERKALEMIA: Primary | ICD-10-CM

## 2021-08-03 DIAGNOSIS — D72.829 LEUKOCYTOSIS, UNSPECIFIED TYPE: ICD-10-CM

## 2021-08-03 DIAGNOSIS — R06.02 SHORTNESS OF BREATH: ICD-10-CM

## 2021-08-03 DIAGNOSIS — N17.9 AKI (ACUTE KIDNEY INJURY) (HCC): Primary | ICD-10-CM

## 2021-08-03 DIAGNOSIS — E87.5 ACUTE HYPERKALEMIA: ICD-10-CM

## 2021-08-03 DIAGNOSIS — N17.9 ACUTE KIDNEY INJURY SUPERIMPOSED ON CHRONIC KIDNEY DISEASE (HCC): ICD-10-CM

## 2021-08-03 DIAGNOSIS — N18.9 ACUTE KIDNEY INJURY SUPERIMPOSED ON CHRONIC KIDNEY DISEASE (HCC): ICD-10-CM

## 2021-08-03 DIAGNOSIS — R06.02 SOB (SHORTNESS OF BREATH): ICD-10-CM

## 2021-08-03 DIAGNOSIS — R77.8 ELEVATED TROPONIN: ICD-10-CM

## 2021-08-03 LAB
ALBUMIN SERPL-MCNC: 2 G/DL (ref 3.5–5)
ALBUMIN SERPL-MCNC: 2.2 G/DL (ref 3.5–5)
ALBUMIN/GLOB SERPL: 0.4 {RATIO} (ref 1.1–2.2)
ALBUMIN/GLOB SERPL: 0.4 {RATIO} (ref 1.1–2.2)
ALP SERPL-CCNC: 64 U/L (ref 45–117)
ALP SERPL-CCNC: 72 U/L (ref 45–117)
ALT SERPL-CCNC: 8 U/L (ref 12–78)
ALT SERPL-CCNC: <6 U/L (ref 12–78)
ANION GAP SERPL CALC-SCNC: 10 MMOL/L (ref 5–15)
ANION GAP SERPL CALC-SCNC: 6 MMOL/L (ref 5–15)
APPEARANCE UR: CLEAR
AST SERPL-CCNC: 10 U/L (ref 15–37)
AST SERPL-CCNC: 15 U/L (ref 15–37)
BACTERIA URNS QL MICRO: NEGATIVE /HPF
BASOPHILS # BLD: 0.1 K/UL (ref 0–0.1)
BASOPHILS # BLD: 0.2 K/UL (ref 0–0.1)
BASOPHILS NFR BLD: 1 % (ref 0–1)
BASOPHILS NFR BLD: 1 % (ref 0–1)
BILIRUB SERPL-MCNC: 0.2 MG/DL (ref 0.2–1)
BILIRUB SERPL-MCNC: 0.3 MG/DL (ref 0.2–1)
BILIRUB UR QL: NEGATIVE
BNP SERPL-MCNC: ABNORMAL PG/ML (ref 0–450)
BUN SERPL-MCNC: 28 MG/DL (ref 6–20)
BUN SERPL-MCNC: 32 MG/DL (ref 6–20)
BUN/CREAT SERPL: 14 (ref 12–20)
BUN/CREAT SERPL: 15 (ref 12–20)
CALCIUM SERPL-MCNC: 9.2 MG/DL (ref 8.5–10.1)
CALCIUM SERPL-MCNC: 9.3 MG/DL (ref 8.5–10.1)
CHLORIDE SERPL-SCNC: 100 MMOL/L (ref 97–108)
CHLORIDE SERPL-SCNC: 105 MMOL/L (ref 97–108)
CK SERPL-CCNC: 35 U/L (ref 26–192)
CO2 SERPL-SCNC: 22 MMOL/L (ref 21–32)
CO2 SERPL-SCNC: 23 MMOL/L (ref 21–32)
COLOR UR: ABNORMAL
COMMENT, HOLDF: NORMAL
CREAT SERPL-MCNC: 1.96 MG/DL (ref 0.55–1.02)
CREAT SERPL-MCNC: 2.07 MG/DL (ref 0.55–1.02)
CRP SERPL-MCNC: 11 MG/DL (ref 0–0.6)
CRP SERPL-MCNC: 12.6 MG/DL (ref 0–0.6)
D DIMER PPP FEU-MCNC: 15.2 MG/L FEU (ref 0–0.65)
DIFFERENTIAL METHOD BLD: ABNORMAL
DIFFERENTIAL METHOD BLD: ABNORMAL
EOSINOPHIL # BLD: 0.2 K/UL (ref 0–0.4)
EOSINOPHIL # BLD: 0.2 K/UL (ref 0–0.4)
EOSINOPHIL NFR BLD: 1 % (ref 0–7)
EOSINOPHIL NFR BLD: 1 % (ref 0–7)
EPITH CASTS URNS QL MICRO: NORMAL /LPF
ERYTHROCYTE [DISTWIDTH] IN BLOOD BY AUTOMATED COUNT: 14 % (ref 11.5–14.5)
ERYTHROCYTE [DISTWIDTH] IN BLOOD BY AUTOMATED COUNT: 14.1 % (ref 11.5–14.5)
ERYTHROCYTE [SEDIMENTATION RATE] IN BLOOD: 127 MM/HR (ref 0–30)
FERRITIN SERPL-MCNC: 194 NG/ML (ref 8–252)
FLUAV RNA SPEC QL NAA+PROBE: NOT DETECTED
FLUBV RNA SPEC QL NAA+PROBE: NOT DETECTED
FOLATE SERPL-MCNC: 61.5 NG/ML (ref 5–21)
GLOBULIN SER CALC-MCNC: 5.4 G/DL (ref 2–4)
GLOBULIN SER CALC-MCNC: 5.9 G/DL (ref 2–4)
GLUCOSE BLD STRIP.AUTO-MCNC: 111 MG/DL (ref 65–117)
GLUCOSE BLD STRIP.AUTO-MCNC: 95 MG/DL (ref 65–117)
GLUCOSE SERPL-MCNC: 119 MG/DL (ref 65–100)
GLUCOSE SERPL-MCNC: 99 MG/DL (ref 65–100)
GLUCOSE UR STRIP.AUTO-MCNC: NEGATIVE MG/DL
HCT VFR BLD AUTO: 27.1 % (ref 35–47)
HCT VFR BLD AUTO: 30.1 % (ref 35–47)
HGB BLD-MCNC: 8.6 G/DL (ref 11.5–16)
HGB BLD-MCNC: 9.5 G/DL (ref 11.5–16)
HGB UR QL STRIP: ABNORMAL
IMM GRANULOCYTES # BLD AUTO: 0.1 K/UL (ref 0–0.04)
IMM GRANULOCYTES # BLD AUTO: 0.2 K/UL (ref 0–0.04)
IMM GRANULOCYTES NFR BLD AUTO: 0 % (ref 0–0.5)
IMM GRANULOCYTES NFR BLD AUTO: 1 % (ref 0–0.5)
INR PPP: 1.1 (ref 0.9–1.1)
IRON SATN MFR SERPL: 7 % (ref 20–50)
IRON SERPL-MCNC: 15 UG/DL (ref 35–150)
KETONES UR QL STRIP.AUTO: NEGATIVE MG/DL
LACTATE SERPL-SCNC: 1 MMOL/L (ref 0.4–2)
LEUKOCYTE ESTERASE UR QL STRIP.AUTO: NEGATIVE
LIPASE SERPL-CCNC: 153 U/L (ref 73–393)
LYMPHOCYTES # BLD: 2 K/UL (ref 0.8–3.5)
LYMPHOCYTES # BLD: 2.2 K/UL (ref 0.8–3.5)
LYMPHOCYTES NFR BLD: 10 % (ref 12–49)
LYMPHOCYTES NFR BLD: 14 % (ref 12–49)
MAGNESIUM SERPL-MCNC: 2 MG/DL (ref 1.6–2.4)
MCH RBC QN AUTO: 29.1 PG (ref 26–34)
MCH RBC QN AUTO: 29.5 PG (ref 26–34)
MCHC RBC AUTO-ENTMCNC: 31.6 G/DL (ref 30–36.5)
MCHC RBC AUTO-ENTMCNC: 31.7 G/DL (ref 30–36.5)
MCV RBC AUTO: 92 FL (ref 80–99)
MCV RBC AUTO: 92.8 FL (ref 80–99)
MONOCYTES # BLD: 1 K/UL (ref 0–1)
MONOCYTES # BLD: 1.2 K/UL (ref 0–1)
MONOCYTES NFR BLD: 6 % (ref 5–13)
MONOCYTES NFR BLD: 7 % (ref 5–13)
NEUTS SEG # BLD: 11.6 K/UL (ref 1.8–8)
NEUTS SEG # BLD: 15.8 K/UL (ref 1.8–8)
NEUTS SEG NFR BLD: 77 % (ref 32–75)
NEUTS SEG NFR BLD: 81 % (ref 32–75)
NITRITE UR QL STRIP.AUTO: NEGATIVE
NRBC # BLD: 0 K/UL (ref 0–0.01)
NRBC # BLD: 0 K/UL (ref 0–0.01)
NRBC BLD-RTO: 0 PER 100 WBC
NRBC BLD-RTO: 0 PER 100 WBC
PH UR STRIP: 6.5 [PH] (ref 5–8)
PLATELET # BLD AUTO: 574 K/UL (ref 150–400)
PLATELET # BLD AUTO: 659 K/UL (ref 150–400)
PLATELET COMMENTS,PCOM: ABNORMAL
PMV BLD AUTO: 9.5 FL (ref 8.9–12.9)
PMV BLD AUTO: 9.6 FL (ref 8.9–12.9)
POTASSIUM SERPL-SCNC: 5 MMOL/L (ref 3.5–5.1)
POTASSIUM SERPL-SCNC: 6.1 MMOL/L (ref 3.5–5.1)
POTASSIUM SERPL-SCNC: 6.2 MMOL/L (ref 3.5–5.1)
PROT SERPL-MCNC: 7.4 G/DL (ref 6.4–8.2)
PROT SERPL-MCNC: 8.1 G/DL (ref 6.4–8.2)
PROT UR STRIP-MCNC: 100 MG/DL
PROTHROMBIN TIME: 11.3 SEC (ref 9–11.1)
RBC # BLD AUTO: 2.92 M/UL (ref 3.8–5.2)
RBC # BLD AUTO: 3.27 M/UL (ref 3.8–5.2)
RBC #/AREA URNS HPF: NORMAL /HPF (ref 0–5)
RBC MORPH BLD: ABNORMAL
SAMPLES BEING HELD,HOLD: NORMAL
SARS-COV-2, COV2: NOT DETECTED
SERVICE CMNT-IMP: NORMAL
SERVICE CMNT-IMP: NORMAL
SODIUM SERPL-SCNC: 132 MMOL/L (ref 136–145)
SODIUM SERPL-SCNC: 134 MMOL/L (ref 136–145)
SP GR UR REFRACTOMETRY: 1.01 (ref 1–1.03)
TIBC SERPL-MCNC: 208 UG/DL (ref 250–450)
TROPONIN I SERPL-MCNC: 0.05 NG/ML
TROPONIN I SERPL-MCNC: 0.2 NG/ML
UROBILINOGEN UR QL STRIP.AUTO: 0.2 EU/DL (ref 0.2–1)
VIT B12 SERPL-MCNC: 737 PG/ML (ref 193–986)
WBC # BLD AUTO: 15.2 K/UL (ref 3.6–11)
WBC # BLD AUTO: 19.6 K/UL (ref 3.6–11)
WBC URNS QL MICRO: NORMAL /HPF (ref 0–4)

## 2021-08-03 PROCEDURE — 74011250637 HC RX REV CODE- 250/637: Performed by: EMERGENCY MEDICINE

## 2021-08-03 PROCEDURE — 83880 ASSAY OF NATRIURETIC PEPTIDE: CPT

## 2021-08-03 PROCEDURE — 99285 EMERGENCY DEPT VISIT HI MDM: CPT

## 2021-08-03 PROCEDURE — 86140 C-REACTIVE PROTEIN: CPT

## 2021-08-03 PROCEDURE — 71045 X-RAY EXAM CHEST 1 VIEW: CPT

## 2021-08-03 PROCEDURE — 36415 COLL VENOUS BLD VENIPUNCTURE: CPT

## 2021-08-03 PROCEDURE — 93005 ELECTROCARDIOGRAM TRACING: CPT

## 2021-08-03 PROCEDURE — 82607 VITAMIN B-12: CPT

## 2021-08-03 PROCEDURE — 65660000000 HC RM CCU STEPDOWN

## 2021-08-03 PROCEDURE — 74011250636 HC RX REV CODE- 250/636: Performed by: EMERGENCY MEDICINE

## 2021-08-03 PROCEDURE — 74011250637 HC RX REV CODE- 250/637: Performed by: INTERNAL MEDICINE

## 2021-08-03 PROCEDURE — 86038 ANTINUCLEAR ANTIBODIES: CPT

## 2021-08-03 PROCEDURE — 84484 ASSAY OF TROPONIN QUANT: CPT

## 2021-08-03 PROCEDURE — 83690 ASSAY OF LIPASE: CPT

## 2021-08-03 PROCEDURE — 85025 COMPLETE CBC W/AUTO DIFF WBC: CPT

## 2021-08-03 PROCEDURE — 85652 RBC SED RATE AUTOMATED: CPT

## 2021-08-03 PROCEDURE — 82728 ASSAY OF FERRITIN: CPT

## 2021-08-03 PROCEDURE — 82550 ASSAY OF CK (CPK): CPT

## 2021-08-03 PROCEDURE — 96372 THER/PROPH/DIAG INJ SC/IM: CPT

## 2021-08-03 PROCEDURE — 87040 BLOOD CULTURE FOR BACTERIA: CPT

## 2021-08-03 PROCEDURE — 82962 GLUCOSE BLOOD TEST: CPT

## 2021-08-03 PROCEDURE — 74011000258 HC RX REV CODE- 258: Performed by: INTERNAL MEDICINE

## 2021-08-03 PROCEDURE — 74011000258 HC RX REV CODE- 258: Performed by: EMERGENCY MEDICINE

## 2021-08-03 PROCEDURE — 80074 ACUTE HEPATITIS PANEL: CPT

## 2021-08-03 PROCEDURE — 73100 X-RAY EXAM OF WRIST: CPT

## 2021-08-03 PROCEDURE — 84132 ASSAY OF SERUM POTASSIUM: CPT

## 2021-08-03 PROCEDURE — 85610 PROTHROMBIN TIME: CPT

## 2021-08-03 PROCEDURE — 74011250636 HC RX REV CODE- 250/636: Performed by: INTERNAL MEDICINE

## 2021-08-03 PROCEDURE — 96375 TX/PRO/DX INJ NEW DRUG ADDON: CPT

## 2021-08-03 PROCEDURE — 81001 URINALYSIS AUTO W/SCOPE: CPT

## 2021-08-03 PROCEDURE — 83605 ASSAY OF LACTIC ACID: CPT

## 2021-08-03 PROCEDURE — 74011636637 HC RX REV CODE- 636/637: Performed by: EMERGENCY MEDICINE

## 2021-08-03 PROCEDURE — 83520 IMMUNOASSAY QUANT NOS NONAB: CPT

## 2021-08-03 PROCEDURE — 82746 ASSAY OF FOLIC ACID SERUM: CPT

## 2021-08-03 PROCEDURE — 83735 ASSAY OF MAGNESIUM: CPT

## 2021-08-03 PROCEDURE — 99223 1ST HOSP IP/OBS HIGH 75: CPT | Performed by: INTERNAL MEDICINE

## 2021-08-03 PROCEDURE — 96365 THER/PROPH/DIAG IV INF INIT: CPT

## 2021-08-03 PROCEDURE — 87636 SARSCOV2 & INF A&B AMP PRB: CPT

## 2021-08-03 PROCEDURE — 83540 ASSAY OF IRON: CPT

## 2021-08-03 PROCEDURE — 74011000250 HC RX REV CODE- 250: Performed by: EMERGENCY MEDICINE

## 2021-08-03 PROCEDURE — 80053 COMPREHEN METABOLIC PANEL: CPT

## 2021-08-03 PROCEDURE — A9540 TC99M MAA: HCPCS

## 2021-08-03 PROCEDURE — 85379 FIBRIN DEGRADATION QUANT: CPT

## 2021-08-03 RX ORDER — FUROSEMIDE 10 MG/ML
40 INJECTION INTRAMUSCULAR; INTRAVENOUS 2 TIMES DAILY
Status: DISCONTINUED | OUTPATIENT
Start: 2021-08-03 | End: 2021-08-04

## 2021-08-03 RX ORDER — LANOLIN ALCOHOL/MO/W.PET/CERES
400 CREAM (GRAM) TOPICAL 2 TIMES DAILY
COMMUNITY
End: 2021-08-06

## 2021-08-03 RX ORDER — VENLAFAXINE 75 MG/1
37.5 TABLET ORAL 2 TIMES DAILY
COMMUNITY
End: 2022-04-05

## 2021-08-03 RX ORDER — POLYETHYLENE GLYCOL 3350 17 G/17G
17 POWDER, FOR SOLUTION ORAL DAILY PRN
Status: DISCONTINUED | OUTPATIENT
Start: 2021-08-03 | End: 2021-08-06 | Stop reason: HOSPADM

## 2021-08-03 RX ORDER — CARVEDILOL 12.5 MG/1
25 TABLET ORAL 2 TIMES DAILY WITH MEALS
Status: DISCONTINUED | OUTPATIENT
Start: 2021-08-03 | End: 2021-08-06 | Stop reason: HOSPADM

## 2021-08-03 RX ORDER — ONDANSETRON 2 MG/ML
4 INJECTION INTRAMUSCULAR; INTRAVENOUS
Status: DISCONTINUED | OUTPATIENT
Start: 2021-08-03 | End: 2021-08-06 | Stop reason: HOSPADM

## 2021-08-03 RX ORDER — SODIUM POLYSTYRENE SULFONATE 15 G/60ML
15 SUSPENSION ORAL; RECTAL
Status: COMPLETED | OUTPATIENT
Start: 2021-08-03 | End: 2021-08-03

## 2021-08-03 RX ORDER — IBUPROFEN 200 MG
1 TABLET ORAL DAILY
Status: DISCONTINUED | OUTPATIENT
Start: 2021-08-03 | End: 2021-08-06 | Stop reason: HOSPADM

## 2021-08-03 RX ORDER — DEXTROSE 50 % IN WATER (D50W) INTRAVENOUS SYRINGE
25 ONCE
Status: COMPLETED | OUTPATIENT
Start: 2021-08-03 | End: 2021-08-03

## 2021-08-03 RX ORDER — VENLAFAXINE HYDROCHLORIDE 37.5 MG/1
37.5 CAPSULE, EXTENDED RELEASE ORAL 2 TIMES DAILY
Status: DISCONTINUED | OUTPATIENT
Start: 2021-08-03 | End: 2021-08-04 | Stop reason: SDUPTHER

## 2021-08-03 RX ORDER — SODIUM BICARBONATE 1 MEQ/ML
50 SYRINGE (ML) INTRAVENOUS
Status: COMPLETED | OUTPATIENT
Start: 2021-08-03 | End: 2021-08-03

## 2021-08-03 RX ORDER — SODIUM CHLORIDE 0.9 % (FLUSH) 0.9 %
5-40 SYRINGE (ML) INJECTION AS NEEDED
Status: DISCONTINUED | OUTPATIENT
Start: 2021-08-03 | End: 2021-08-06 | Stop reason: HOSPADM

## 2021-08-03 RX ORDER — SODIUM CHLORIDE 0.9 % (FLUSH) 0.9 %
5-40 SYRINGE (ML) INJECTION EVERY 8 HOURS
Status: DISCONTINUED | OUTPATIENT
Start: 2021-08-03 | End: 2021-08-06 | Stop reason: HOSPADM

## 2021-08-03 RX ORDER — GUAIFENESIN 100 MG/5ML
81 LIQUID (ML) ORAL
Status: COMPLETED | OUTPATIENT
Start: 2021-08-03 | End: 2021-08-03

## 2021-08-03 RX ORDER — ONDANSETRON 4 MG/1
4 TABLET, ORALLY DISINTEGRATING ORAL
Status: DISCONTINUED | OUTPATIENT
Start: 2021-08-03 | End: 2021-08-06 | Stop reason: HOSPADM

## 2021-08-03 RX ORDER — MAGNESIUM SULFATE 100 %
4 CRYSTALS MISCELLANEOUS AS NEEDED
Status: DISCONTINUED | OUTPATIENT
Start: 2021-08-03 | End: 2021-08-06 | Stop reason: HOSPADM

## 2021-08-03 RX ORDER — SODIUM CHLORIDE 0.9 % (FLUSH) 0.9 %
5-10 SYRINGE (ML) INJECTION ONCE
Status: COMPLETED | OUTPATIENT
Start: 2021-08-03 | End: 2021-08-03

## 2021-08-03 RX ORDER — INSULIN GLARGINE 100 [IU]/ML
50 INJECTION, SOLUTION SUBCUTANEOUS
Status: DISCONTINUED | OUTPATIENT
Start: 2021-08-03 | End: 2021-08-06 | Stop reason: HOSPADM

## 2021-08-03 RX ORDER — ACETAMINOPHEN 650 MG/1
650 SUPPOSITORY RECTAL
Status: DISCONTINUED | OUTPATIENT
Start: 2021-08-03 | End: 2021-08-06 | Stop reason: HOSPADM

## 2021-08-03 RX ORDER — DEXTROSE 50 % IN WATER (D50W) INTRAVENOUS SYRINGE
12.5-25 AS NEEDED
Status: DISCONTINUED | OUTPATIENT
Start: 2021-08-03 | End: 2021-08-06 | Stop reason: HOSPADM

## 2021-08-03 RX ORDER — ENOXAPARIN SODIUM 100 MG/ML
1 INJECTION SUBCUTANEOUS
Status: COMPLETED | OUTPATIENT
Start: 2021-08-03 | End: 2021-08-03

## 2021-08-03 RX ORDER — CEFAZOLIN SODIUM IN 0.9 % NACL 2 G/100 ML
2 PLASTIC BAG, INJECTION (ML) INTRAVENOUS 2 TIMES DAILY
Status: DISCONTINUED | OUTPATIENT
Start: 2021-08-03 | End: 2021-08-03

## 2021-08-03 RX ORDER — ASPIRIN 81 MG/1
81 TABLET ORAL DAILY
Status: DISCONTINUED | OUTPATIENT
Start: 2021-08-04 | End: 2021-08-06 | Stop reason: HOSPADM

## 2021-08-03 RX ORDER — HEPARIN SODIUM 5000 [USP'U]/ML
5000 INJECTION, SOLUTION INTRAVENOUS; SUBCUTANEOUS EVERY 8 HOURS
Status: DISCONTINUED | OUTPATIENT
Start: 2021-08-03 | End: 2021-08-06 | Stop reason: HOSPADM

## 2021-08-03 RX ORDER — ACETAMINOPHEN 325 MG/1
650 TABLET ORAL
Status: DISCONTINUED | OUTPATIENT
Start: 2021-08-03 | End: 2021-08-06 | Stop reason: HOSPADM

## 2021-08-03 RX ORDER — MONTELUKAST SODIUM 4 MG/1
1 TABLET, CHEWABLE ORAL 2 TIMES DAILY
Status: DISCONTINUED | OUTPATIENT
Start: 2021-08-03 | End: 2021-08-06 | Stop reason: HOSPADM

## 2021-08-03 RX ORDER — CEFAZOLIN SODIUM IN 0.9 % NACL 2 G/100 ML
2 PLASTIC BAG, INJECTION (ML) INTRAVENOUS 2 TIMES DAILY
COMMUNITY
End: 2021-08-06

## 2021-08-03 RX ORDER — CALCIUM GLUCONATE 94 MG/ML
1 INJECTION, SOLUTION INTRAVENOUS
Status: COMPLETED | OUTPATIENT
Start: 2021-08-03 | End: 2021-08-03

## 2021-08-03 RX ORDER — INSULIN LISPRO 100 [IU]/ML
INJECTION, SOLUTION INTRAVENOUS; SUBCUTANEOUS
Status: DISCONTINUED | OUTPATIENT
Start: 2021-08-03 | End: 2021-08-06 | Stop reason: HOSPADM

## 2021-08-03 RX ADMIN — ASPIRIN 81 MG: 81 TABLET, CHEWABLE ORAL at 02:40

## 2021-08-03 RX ADMIN — ENOXAPARIN SODIUM 70 MG: 40 INJECTION SUBCUTANEOUS at 05:26

## 2021-08-03 RX ADMIN — HUMAN INSULIN 10 UNITS: 100 INJECTION, SOLUTION SUBCUTANEOUS at 03:09

## 2021-08-03 RX ADMIN — HEPARIN SODIUM 5000 UNITS: 5000 INJECTION INTRAVENOUS; SUBCUTANEOUS at 23:49

## 2021-08-03 RX ADMIN — CEFTRIAXONE SODIUM 1 G: 1 INJECTION, POWDER, FOR SOLUTION INTRAMUSCULAR; INTRAVENOUS at 02:37

## 2021-08-03 RX ADMIN — Medication 10 ML: at 01:10

## 2021-08-03 RX ADMIN — DAPTOMYCIN 450 MG: 500 INJECTION, POWDER, LYOPHILIZED, FOR SOLUTION INTRAVENOUS at 19:51

## 2021-08-03 RX ADMIN — SODIUM POLYSTYRENE SULFONATE 15 G: 15 SUSPENSION ORAL; RECTAL at 03:21

## 2021-08-03 RX ADMIN — ACETAMINOPHEN 650 MG: 325 TABLET ORAL at 16:21

## 2021-08-03 RX ADMIN — CARVEDILOL 25 MG: 12.5 TABLET, FILM COATED ORAL at 16:21

## 2021-08-03 RX ADMIN — Medication 10 ML: at 19:07

## 2021-08-03 RX ADMIN — CALCIUM GLUCONATE 1 G: 98 INJECTION, SOLUTION INTRAVENOUS at 03:10

## 2021-08-03 RX ADMIN — SODIUM BICARBONATE 50 MEQ: 84 INJECTION, SOLUTION INTRAVENOUS at 03:15

## 2021-08-03 RX ADMIN — HEPARIN SODIUM 5000 UNITS: 5000 INJECTION INTRAVENOUS; SUBCUTANEOUS at 16:19

## 2021-08-03 RX ADMIN — DEXTROSE MONOHYDRATE 25 G: 25 INJECTION, SOLUTION INTRAVENOUS at 03:07

## 2021-08-03 NOTE — TELEPHONE ENCOUNTER
She is scheduled to come in on Aug 4th, 2021 for a DANIELLE Floris appointment. Do you want to add this onto the visit?

## 2021-08-03 NOTE — ED PROVIDER NOTES
Patient is an 78-year-old female history of A. fib, CHF, diabetes recent AICD removal thought to be related to infected hardware currently on antibiotics through the right arm PICC line transfer from an outside facility for admission due to hyperkalemia, shortness of breath with a positive D-dimer (unable to get PE study due to renal function), and worsening leukocytosis. She also was noted to have a vasculitic-looking rash to the bilateral lower extremities which she says she has had for weeks. She was started on ceftriaxone at the outside facility, given insulin/bicarb/dextrose for hyperkalemia. She was also given 1 mg/kg of Lovenox prior to arrival.  Patient now complaining of left wrist pain, unclear what happened to it. She reports her shortness of breath has resolved. I discussed with hospitalist upon arrival who requests we wait on V/Q results to determine which floor she needs to go to.            Past Medical History:   Diagnosis Date    A-fib [facility])     AICD (automatic cardioverter/defibrillator) present 9/23/2020 9/23/2020 [employer] AICD implant    Arthritis     Bilateral pleural effusion 9/18/2020    Chronic pain     Chronic pain     Diabetes ([address]     Diverticular disease     Elevated troponin 9/18/2020    Hemorrhoid     Hypercholesterolemia     IBS (irritable bowel syndrome)     Lymphocytosis 42/07/4824    Systolic heart failure, chronic ([address] 7/16/2021    Type 2 MI (myocardial infarction) ([address] 7/16/2021 7/162021 r/t sepsis       Past Surgical History:   Procedure Laterality Date    COLONOSCOPY N/A 10/31/2019    COLONOSCOPY performed by Oscar Bell MD at [facility]  10/31/2019         [address]  10/31/2019         HX CATARACT REMOVAL      HX CHOLECYSTECTOMY      HX COLONOSCOPY  2009    HX COLONOSCOPY  2016    2 adenomatous polyp    HX HEMORRHOIDECTOMY  2009    HX HYSTERECTOMY      HX KNEE REPLACEMENT      right  HX ORTHOPAEDIC  12/11/2017    back, laminectomy and decompression    RI INSJ/RPLCMT PERM DFB W/TRNSVNS LDS 1/DUAL CHMBR N/A 9/23/2020    INSERT ICD DUAL performed by Sue Egan MD at [facility] CARDIAC CATH LAB         Family History:   Problem Relation Age of Onset    Colon Cancer Father     Diabetes Mother        Social History     Socioeconomic History    Marital status:      Spouse name: Not on file    Number of children: Not on file    Years of education: Not on file    Highest education level: Not on file   Occupational History    Occupation: retired     Comment: LPN   Tobacco Use    Smoking status: Current Every Day Smoker     Packs/day: 1.00     Years: 55.00     Pack years: 55.00     Types: Cigarettes    Smokeless tobacco: Never Used   Vaping Use    Vaping Use: Never used   Substance and Sexual Activity    Alcohol use: No    Drug use: Never    Sexual activity: Not on file   Other Topics Concern     Service No    Blood Transfusions Yes    Caffeine Concern Yes    Occupational Exposure No    Hobby Hazards No    Sleep Concern Yes    Stress Concern Yes    Weight Concern No    Special Diet No    Back Care Yes    Exercise No    Bike Helmet No     Comment: n/a    Seat Belt Yes    Self-Exams Yes   Social History Narrative    Not on file     Social Determinants of Health     Financial Resource Strain:     Difficulty of Paying Living Expenses:    Food Insecurity:     Worried About Running Out of Food in the Last Year:     Ran Out of Food in the Last Year:    Transportation Needs:     Lack of Transportation (Medical):      Lack of Transportation (Non-Medical):    Physical Activity:     Days of Exercise per Week:     Minutes of Exercise per Session:    Stress:     Feeling of Stress :    Social Connections:     Frequency of Communication with Friends and Family:     Frequency of Social Gatherings with Friends and Family:     Attends Druze Services:     Active Member of Clubs or Organizations:     Attends Club or Organization Meetings:     Marital Status:    Intimate Partner Violence:     Fear of Current or Ex-Partner:     Emotionally Abused:     Physically Abused:     Sexually Abused: ALLERGIES: Morphine and Ultram [tramadol]    Review of Systems   Constitutional: Negative for chills and fever. HENT: Negative for congestion and rhinorrhea. Eyes: Negative for redness and visual disturbance. Respiratory: Positive for shortness of breath. Negative for cough. Cardiovascular: Negative for chest pain and leg swelling. Gastrointestinal: Negative for abdominal pain, diarrhea, nausea and vomiting. Genitourinary: Negative for dysuria, flank pain, frequency, hematuria and urgency. Musculoskeletal: Positive for arthralgias. Negative for back pain, myalgias and neck pain. Skin: Positive for rash. Negative for wound. Allergic/Immunologic: Negative for immunocompromised state. Neurological: Negative for dizziness and headaches. Vitals:    08/03/21 1055   BP: (!) 143/56   Pulse: 96   Resp: 14   Temp: 99.4 °F (37.4 °C)   SpO2: 96%   Weight: 73.8 kg (162 lb 12.8 oz)   Height: 5' 6\" (1.676 m)            Physical Exam  Vitals and nursing note reviewed. Constitutional:       General: She is not in acute distress. Appearance: She is well-developed. She is not diaphoretic. HENT:      Head: Normocephalic. Mouth/Throat:      Pharynx: No oropharyngeal exudate. Eyes:      General:         Right eye: No discharge. Left eye: No discharge. Pupils: Pupils are equal, round, and reactive to light. Cardiovascular:      Rate and Rhythm: Normal rate and regular rhythm. Heart sounds: Normal heart sounds. No murmur heard. No friction rub. No gallop. Pulmonary:      Effort: Pulmonary effort is normal. No respiratory distress. Breath sounds: Normal breath sounds. No stridor. No wheezing or rales.    Abdominal: General: Bowel sounds are normal. There is no distension. Palpations: Abdomen is soft. Tenderness: There is no abdominal tenderness. There is no guarding or rebound. Musculoskeletal:         General: No deformity. Normal range of motion. Cervical back: Normal range of motion and neck supple. Comments: Tenderness with mild swelling to the left wrist, it is not hot or erythematous   Skin:     General: Skin is warm and dry. Capillary Refill: Capillary refill takes less than 2 seconds. Findings: No rash. Comments: Vasculitic appearing lesions to bilateral lower extremities, nonblanching. Palpable DP and PT pulses    Right upper extremity PICC line in place, no evidence of cellulitic changes   Neurological:      Mental Status: She is alert and oriented to person, place, and time. Psychiatric:         Behavior: Behavior normal.          MDM     When the patient arrived I discussed with the hospitalist as the patient was supposed to be a direct admit. They requested that we get the VQ scan so she could be appropriately triaged prior to admission. I repeated labs while awaiting VQ scan. Potassium improving. White blood cell count improving. She does have a slight elevation in her troponin but no active chest pain. EKG without acute ischemic changes. Renal function improving as well. VQ scan negative  X-ray of the left wrist negative      58-year-old female was transferred from an outside hospital for admission due to hyperkalemia, renal insufficiency, shortness of breath and concern for possible bacteremia. She already received ceftriaxone prior to arrival so she did not need redosing of antibiotics as it has been less than 24 hours. Here her vital signs are stable and she is in no acute distress. Initial concern was for hyperkalemia at the outside facility however this is improving. Renal function also seems to be improving.   Patient to be admitted for further management and work-up. ICD-10-CM ICD-9-CM    1. IVAN (acute kidney injury) ([facility] [address] [address]  N17.9 584.9    2. Acute hyperkalemia  E87.5 276.7    3. Rash  R21 782.1    4. SOB (shortness of breath)  R06.02 786.05    5.  Elevated troponin  R77.8 790.6      Khurram Knowles, DO

## 2021-08-03 NOTE — ED NOTES
Pt noted to be moaning,  Pt repositioned in bed for comfort. Pt reported she does not know why she is here, pt educated that she came in during the night for shortness of breath and pt reported that was 2 nights ago. Pt stated I called the squad last night but I was not short of breath. Pt educated on the plan to transfer to [address] [place] [address] because her K+ was high and pt then reported I know that and they treated it already. Pt educated the she needs to see the kidney specialist for her kidney function and to try and find the cause of the high potassium. Pt reported her  did not know where she was at and she has been gone all night. Pt educated that this writer will help her with the phone so that she can call her . This writer dialed the number and pt is currently talking with her  on the phone.

## 2021-08-03 NOTE — ED NOTES
TRANSFER - OUT REPORT:    Verbal report given to Puneet Welodn RN(name) on Ambrosio Santana  being transferred to [facility] (unit) for routine progression of care       Report consisted of patients Situation, Background, Assessment and   Recommendations(SBAR). Information from the following report(s) SBAR, Kardex, ED Summary, Intake/Output, MAR and Recent Results was reviewed with the receiving nurse. Lines:   PICC Single Lumen 48/23/66 Right;Basilic (Active)        Opportunity for questions and clarification was provided.       Patient transported with:   Monitor

## 2021-08-03 NOTE — ED NOTES
Pt assisted back to bed after the use of the bedside commode,  Pt noted with STANTON but SpO2 maintained 95-96%

## 2021-08-03 NOTE — ED TRIAGE NOTES
Pt with recent HX of sepsis and pacemaker removal . Presents with SOB \"like I couldn't get a breath in\" Better now.

## 2021-08-03 NOTE — ED NOTES
PRISCA left with patient at this time.   This writer called to [facility] spoke to Butch Arthur [phone]  and updated her pt is leaving at this time

## 2021-08-03 NOTE — ED NOTES
Bedside shift change report given to [address] (oncoming nurse) by Minda Kang (offgoing nurse). Report included the following information SBAR, Kardex, ED Summary and Recent Results.

## 2021-08-03 NOTE — TELEPHONE ENCOUNTER
calling wanting to let you know that his wife is in [facility]-  called 911 last night- please call him at [phone]-     Thanks  mickey    Please forward to Fely Guardado

## 2021-08-03 NOTE — ED PROVIDER NOTES
EMERGENCY DEPARTMENT HISTORY AND PHYSICAL EXAM      Date: 8/3/2021  Patient Name: Teri Sanz    History of Presenting Illness     Chief Complaint   Patient presents with    Respiratory Distress     Resolved       History Provided By: Patient    HPI:   The history is provided by the patient. Shortness of Breath  This is a new problem. The problem occurs rarely. The current episode started 3 to 5 hours ago. The problem has been resolved. Associated symptoms include orthopnea, chest pain and rash. Pertinent negatives include no fever, no headaches, no coryza, no rhinorrhea, no sore throat, no neck pain, no cough, no sputum production, no hemoptysis, no wheezing, no PND, no syncope, no vomiting, no abdominal pain, no leg pain, no leg swelling and no claudication. She has tried nothing for the symptoms. The treatment provided no relief. She has had prior hospitalizations. She has had prior ED visits. Associated medical issues include heart failure. Associated medical issues do not include asthma or COPD. Teri Sanz, 79 y.o. female  presents to the ED with cc of shortness of breath that started around 9 PM tonight. Patient reports shortness of breath has resolved. She occurred when she was attempting to go to sleep. Sounds like she was lying flat when shortness of breath started. She denies any shortness of breath or chest pain during the day no exertional chest pain shortness of breath. She reports her chest does feel tight. She denies any history of asthma COPD. She is currently on IV cefazolin BID for MSSA sepsis and has a PICC line. Patient reports had a similar episode of shortness of breath last night which resolved. Chart review reveals she does have history of heart failure. She denies any fevers chills cough cold upper respiratory symptoms. She did have an AICD removed several weeks ago as it was felt to be infected.   Patient does report she has had a rash on her lower extremities has been there since she left the hospital.    There are no other complaints, changes, or physical findings at this time. PCP: Flower Nowak NP    No current facility-administered medications on file prior to encounter. Current Outpatient Medications on File Prior to Encounter   Medication Sig Dispense Refill    cefazolin sodium in 0.9 % NaCl (ceFAZolin in normal saline) 2 gram/100 mL soln IVPB 2 g by IntraVENous route two (2) times a day.  magnesium oxide (MAG-OX) 400 mg tablet Take 400 mg by mouth two (2) times a day.  TURMERIC PO Take 150 mg by mouth daily.  [DISCONTINUED] L.acidoph/L.bulg/B.bif/S.therm (BACID PO) Take 1 Tablet by mouth daily.  [DISCONTINUED] semaglutide (OZEMPIC) 0.25 mg or 0.5 mg/dose (2 mg/1.5 ml) subq pen 0.25 mg by SubCUTAneous route every seven (7) days.  SITagliptin (Januvia) 50 mg tablet Take 50 mg by mouth daily.  magnesium oxide (MAG-OX) 400 mg tablet Take 400 mg by mouth two (2) times daily as needed (Cramps).  turmeric-herbal complex no. 278 150 mg cap Take 1 Capsule by mouth daily.  venlafaxine-SR (EFFEXOR-XR) 37.5 mg capsule Take 37.5 mg by mouth two (2) times a day.  ergocalciferol (ERGOCALCIFEROL) 1,250 mcg (50,000 unit) capsule Take 50,000 Units by mouth every seven (7) days. On Wednesdays      b complex vitamins tablet Take 1 Tablet by mouth daily.  insulin glargine (Lantus Solostar U-100 Insulin) 100 unit/mL (3 mL) inpn 55 Units by SubCUTAneous route daily.  valsartan (DIOVAN) 80 mg tablet Take 80 mg by mouth daily.  semaglutide (OZEMPIC) 0.25 mg/0.2 mL (2 mg/1.5 mL) sub-q pen 0.25 mg by SubCUTAneous route every seven (7) days.  Indications: type 2 diabetes mellitus 1 Box 1    atorvastatin (LIPITOR) 20 mg tablet TAKE 1 TABLET BY MOUTH  DAILY 90 Tablet 1    glipiZIDE (GLUCOTROL) 5 mg tablet TAKE 1 TABLET BY MOUTH  TWICE DAILY 180 Tablet 1    furosemide (LASIX) 40 mg tablet TAKE 1 TABLET BY MOUTH  DAILY 90 Tablet 1    nitroglycerin (NITROSTAT) 0.4 mg SL tablet 1 Tab by SubLINGual route every five (5) minutes as needed for Chest Pain. Up to 3 doses. (Patient not taking: Reported on 7/26/2021) 25 Tab 0    carvediloL (COREG) 25 mg tablet Take 1 Tab by mouth two (2) times daily (with meals). 28 Tab 0    colestipoL (Colestid) 1 gram tablet Take 1 Tab by mouth two (2) times a day. 180 Tab 3    aspirin delayed-release 81 mg tablet Take 1 Tab by mouth daily. 90 Tab 1    vit C/E/Zn/coppr/lutein/zeaxan (PRESERVISION AREDS-2 PO) Take 1 Tablet by mouth two (2) times a day.  B.infantis-B.ani-B.long-B.bifi (PROBIOTIC 4X) 10-15 mg TbEC Take  by mouth daily.  acetaminophen (TYLENOL) 650 mg TbER Take 650 mg by mouth every eight (8) hours.  omega-3 fatty acids-vitamin e 1,000 mg cap Take 1 Cap by mouth two (2) times a day. 32a day       ascorbic acid, vitamin C, (VITAMIN C) 500 mg tablet Take 1,000 mg by mouth daily.  zinc 50 mg tab tablet Take 50 mg by mouth daily.          Past History     Past Medical History:  Past Medical History:   Diagnosis Date    A-fib [facility])     AICD (automatic cardioverter/defibrillator) present 9/23/2020 9/23/2020 [employer] AICD implant    Arthritis     Bilateral pleural effusion 9/18/2020    Chronic pain     Chronic pain     Diabetes ([address]     Diverticular disease     Elevated troponin 9/18/2020    Hemorrhoid     Hypercholesterolemia     IBS (irritable bowel syndrome)     Lymphocytosis 46/79/4099    Systolic heart failure, chronic ([address] 7/16/2021    Type 2 MI (myocardial infarction) ([address] 7/16/2021 7/162021 r/t sepsis       Past Surgical History:  Past Surgical History:   Procedure Laterality Date    COLONOSCOPY N/A 10/31/2019    COL[facility]ram [facility]  10/31/2019         COLONOSCOPY,PAKO Kern  10/31/2019         HX CATARACT REMOVAL      HX CHOLECYSTECTOMY      HX COLONOSCOPY  2009  HX COLONOSCOPY  2016    2 adenomatous polyp    HX HEMORRHOIDECTOMY  2009    HX HYSTERECTOMY      HX KNEE REPLACEMENT      right    HX ORTHOPAEDIC  12/11/2017    back, laminectomy and decompression    UT INSJ/RPLCMT PERM DFB W/TRNSVNS LDS 1/DUAL CHMBR N/A 9/23/2020    INSERT ICD DUAL performed by Morenita Houston MD at [facility] CARDIAC CATH LAB       Family History:  Family History   Problem Relation Age of Onset    Colon Cancer Father     Diabetes Mother        Social History:  Social History     Tobacco Use    Smoking status: Current Every Day Smoker     Packs/day: 1.00     Years: 55.00     Pack years: 55.00     Types: Cigarettes    Smokeless tobacco: Never Used   Vaping Use    Vaping Use: Never used   Substance Use Topics    Alcohol use: No    Drug use: Never       Allergies: Allergies   Allergen Reactions    Morphine Anaphylaxis    Ultram [Tramadol] Palpitations         Review of Systems   Review of Systems   Constitutional: Negative. Negative for chills and fever. HENT: Negative. Negative for congestion, rhinorrhea and sore throat. Eyes: Negative. Negative for discharge and redness. Respiratory: Positive for shortness of breath. Negative for cough, hemoptysis, sputum production and wheezing. Cardiovascular: Positive for chest pain and orthopnea. Negative for palpitations, claudication, leg swelling, syncope and PND. Gastrointestinal: Negative. Negative for abdominal pain, nausea and vomiting. Endocrine: Negative. Negative for polydipsia and polyuria. Genitourinary: Negative. Negative for dysuria, flank pain and frequency. Musculoskeletal: Negative. Negative for arthralgias, back pain and neck pain. Skin: Positive for rash. Negative for wound. Allergic/Immunologic: Negative. Neurological: Negative. Negative for dizziness and headaches. Hematological: Negative. Negative for adenopathy. Does not bruise/bleed easily. Psychiatric/Behavioral: Negative.   Negative for confusion. The patient is not nervous/anxious. All other systems reviewed and are negative. Physical Exam   Physical Exam  Vitals and nursing note reviewed. Constitutional:       General: She is not in acute distress. Appearance: Normal appearance. She is well-developed. She is not ill-appearing, toxic-appearing or diaphoretic. HENT:      Head: Normocephalic and atraumatic. Nose: Nose normal.      Mouth/Throat:      Mouth: Mucous membranes are moist.      Pharynx: Oropharynx is clear. Eyes:      Extraocular Movements: Extraocular movements intact. Conjunctiva/sclera: Conjunctivae normal.      Pupils: Pupils are equal, round, and reactive to light. Cardiovascular:      Rate and Rhythm: Regular rhythm. Tachycardia present. Pulses: Normal pulses. Heart sounds: Normal heart sounds. No murmur heard. Pulmonary:      Effort: Pulmonary effort is normal. No respiratory distress. Breath sounds: Normal breath sounds. No wheezing, rhonchi or rales. Abdominal:      General: There is no distension. Palpations: Abdomen is soft. Tenderness: There is no abdominal tenderness. Musculoskeletal:         General: No swelling or tenderness. Normal range of motion. Cervical back: Normal range of motion and neck supple. No rigidity or tenderness. No muscular tenderness. Skin:     General: Skin is warm and dry. Capillary Refill: Capillary refill takes less than 2 seconds. Findings: Rash present. No erythema. Comments: Target lesion appearing rash on lower extremities right greater than left. vasculitic appearing rash   Neurological:      General: No focal deficit present. Mental Status: She is alert and oriented to person, place, and time. Mental status is at baseline. Cranial Nerves: No cranial nerve deficit. Sensory: No sensory deficit. Motor: No weakness.    Psychiatric:         Mood and Affect: Mood normal.         Behavior: Behavior normal.         Thought Content: Thought content normal.         Judgment: Judgment normal.         Diagnostic Study Results     Labs -     Recent Results (from the past 12 hour(s))   NT-PRO BNP    Collection Time: 08/03/21 12:55 AM   Result Value Ref Range    NT pro-BNP 19,490 (H) 0 - 450 PG/ML   CBC WITH AUTOMATED DIFF    Collection Time: 08/03/21 12:55 AM   Result Value Ref Range    WBC 19.6 (H) 3.6 - 11.0 K/uL    RBC 3.27 (L) 3.80 - 5.20 M/uL    HGB 9.5 (L) 11.5 - 16.0 g/dL    HCT 30.1 (L) 35.0 - 47.0 %    MCV 92.0 80.0 - 99.0 FL    MCH 29.1 26.0 - 34.0 PG    MCHC 31.6 30.0 - 36.5 g/dL    RDW 14.0 11.5 - 14.5 %    PLATELET 905 (H) 001 - 400 K/uL    MPV 9.6 8.9 - 12.9 FL    NRBC 0.0 0  WBC    ABSOLUTE NRBC 0.00 0.00 - 0.01 K/uL    NEUTROPHILS 81 (H) 32 - 75 %    LYMPHOCYTES 10 (L) 12 - 49 %    MONOCYTES 6 5 - 13 %    EOSINOPHILS 1 0 - 7 %    BASOPHILS 1 0 - 1 %    IMMATURE GRANULOCYTES 1 (H) 0.0 - 0.5 %    ABS. NEUTROPHILS 15.8 (H) 1.8 - 8.0 K/UL    ABS. LYMPHOCYTES 2.0 0.8 - 3.5 K/UL    ABS. MONOCYTES 1.2 (H) 0.0 - 1.0 K/UL    ABS. EOSINOPHILS 0.2 0.0 - 0.4 K/UL    ABS. BASOPHILS 0.2 (H) 0.0 - 0.1 K/UL    ABS. IMM. GRANS. 0.2 (H) 0.00 - 0.04 K/UL    DF SMEAR SCANNED      PLATELET COMMENTS Increased Platelets      RBC COMMENTS NORMOCYTIC, NORMOCHROMIC     METABOLIC PANEL, COMPREHENSIVE    Collection Time: 08/03/21 12:55 AM   Result Value Ref Range    Sodium 132 (L) 136 - 145 mmol/L    Potassium 6.1 (H) 3.5 - 5.1 mmol/L    Chloride 100 97 - 108 mmol/L    CO2 22 21 - 32 mmol/L    Anion gap 10 5 - 15 mmol/L    Glucose 119 (H) 65 - 100 mg/dL    BUN 32 (H) 6 - 20 MG/DL    Creatinine 2.07 (H) 0.55 - 1.02 MG/DL    BUN/Creatinine ratio 15 12 - 20      GFR est AA 28 (L) >60 ml/min/1.73m2    GFR est non-AA 23 (L) >60 ml/min/1.73m2    Calcium 9.3 8.5 - 10.1 MG/DL    Bilirubin, total 0.2 0.2 - 1.0 MG/DL    ALT (SGPT) 8 (L) 12 - 78 U/L    AST (SGOT) 15 15 - 37 U/L    Alk.  phosphatase 72 45 - 117 U/L Protein, total 8.1 6.4 - 8.2 g/dL    Albumin 2.2 (L) 3.5 - 5.0 g/dL    Globulin 5.9 (H) 2.0 - 4.0 g/dL    A-G Ratio 0.4 (L) 1.1 - 2.2     LIPASE    Collection Time: 08/03/21 12:55 AM   Result Value Ref Range    Lipase 153 73 - 393 U/L   TROPONIN I    Collection Time: 08/03/21 12:55 AM   Result Value Ref Range    Troponin-I, Qt. 0.05 (H) <0.05 ng/mL   LACTIC ACID    Collection Time: 08/03/21 12:55 AM   Result Value Ref Range    Lactic acid 1.0 0.4 - 2.0 MMOL/L   POTASSIUM    Collection Time: 08/03/21 12:55 AM   Result Value Ref Range    Potassium 6.2 (H) 3.5 - 5.1 mmol/L   MAGNESIUM    Collection Time: 08/03/21 12:55 AM   Result Value Ref Range    Magnesium 2.0 1.6 - 2.4 mg/dL   D DIMER    Collection Time: 08/03/21 12:55 AM   Result Value Ref Range    D-dimer 15.20 (H) 0.00 - 0.65 mg/L FEU   PROTHROMBIN TIME + INR    Collection Time: 08/03/21 12:55 AM   Result Value Ref Range    INR 1.1 0.9 - 1.1      Prothrombin time 11.3 (H) 9.0 - 11.1 sec   COVID-19 WITH INFLUENZA A/B    Collection Time: 08/03/21  2:30 AM   Result Value Ref Range    SARS-CoV-2 Not detected NOTD      Influenza A by PCR Not detected NOTD      Influenza B by PCR Not detected NOTD     EKG, 12 LEAD, SUBSEQUENT    Collection Time: 08/03/21  3:52 AM   Result Value Ref Range    Ventricular Rate 115 BPM    Atrial Rate 115 BPM    QRS Duration 104 ms    Q-T Interval 344 ms    QTC Calculation (Bezet) 475 ms    Calculated R Axis 64 degrees    Calculated T Axis -129 degrees    Diagnosis       Accelerated Junctional rhythm with frequent premature ventricular complexes  ST & T wave abnormality, consider inferior ischemia  Abnormal ECG  When compared with ECG of 03-AUG-2021 00:46,  MANUAL COMPARISON REQUIRED, DATA IS UNCONFIRMED         Radiologic Studies -   XR CHEST PORT   Final Result   New right pleural effusion. No acute findings otherwise.         CT Results  (Last 48 hours)    None        CXR Results  (Last 48 hours)               08/03/21 0109  XR CHEST PORT Final result    Impression:  New right pleural effusion. No acute findings otherwise. Narrative:  EXAM: XR CHEST PORT       INDICATION: SOB       COMPARISON: Chest x-ray 7/22/2021. FINDINGS: A portable AP radiograph of the chest was obtained at 02:26 hours. The   patient is on a cardiac monitor. There is developed a small right pleural   effusion with no pneumothorax. Lungs are otherwise clear. Right PICC traverses   expected course to terminate in the mid SVC region. The cardiac and mediastinal   contours and pulmonary vascularity are normal.  Atherosclerotic calcifications   affect the aortic arch. The chest wall structures and visualized upper abdomen   show no acute findings with incidental note of degenerative spine and shoulder   changes. Medical Decision Making   I am the first provider for this patient. I reviewed the vital signs, available nursing notes, past medical history, past surgical history, family history and social history. Vital Signs-Reviewed the patient's vital signs. Patient Vitals for the past 12 hrs:   Temp Pulse Resp BP SpO2   08/03/21 0044 99.9 °F (37.7 °C) 100 18 (!) 183/64 97 %           EKG interpretation: (Preliminary)  Rhythm: sinus tachycardia;  regular . Rate (approx.): 101; Blocks: none; Ectopy: noneAxis: normal; P wave: normal; QRS interval: normal ; ST/T wave: depressed; in  Lead: lateral; Other findings: . EKG interpretation: (Preliminary)  Rhythm: sinus tachycardia; and regular . Rate (approx.): 111; Blocks: none; Ectopy: none; Axis: normal; P wave: normal; QRS interval: normal ; ST/T wave: depressed; in  Lead: inferior/lateral; Other findings: unchanged from previous ekg. This EKG was interpreted by Shelby Grey MD ,ED Provider.         Records Reviewed: Nursing Notes, Old Medical Records, Previous electrocardiograms, Previous Radiology Studies and Previous Laboratory Studies    Provider Notes (Medical Decision Making): Patient presents complaining of shortness of breath, laboratory studies x-ray history of CHF. ED Course:   Initial assessment performed. The patients presenting problems have been discussed, and they are in agreement with the care plan formulated and outlined with them. I have encouraged them to ask questions as they arise throughout their visit. ED Course as of Aug 03 0407   Tue Aug 03, 2021   0140 white blood cell count returned higher than what it is previously been, will obtain blood cultures and lactic acid per sepsis protocol, will give Rocephin to cover for sepsis. []   0934 Patient on laboratory study results chest x-ray plan for admission. Given to her complex history feel she needs to be transferred to an outside facility that has cardiology and nephrology for further evaluation of her symptoms. Patient is in agreement. []   0291 Transfer center contacted    []   0695 Patient accepted by Dr. Corrina Persaud to [facility] ED Spoke with hospitalist Dr. Dimple Mari regarding admission. They will perform VQ scan. []   0406 Patient updated on plan for admission to [facility].     []      ED Course User Index  [] Grecia Junior MD         Medications Given in the ED:    Medications   enoxaparin (LOVENOX) injection 70 mg (has no administration in time range)   sodium chloride (NS) flush 5-10 mL (10 mL IntraVENous Given 8/3/21 0110)   cefTRIAXone (ROCEPHIN) 1 g in 0.9% sodium chloride (MBP/ADV) 50 mL MBP (0 g IntraVENous IV Completed 8/3/21 0307)   aspirin chewable tablet 81 mg (81 mg Oral Given 8/3/21 0240)   calcium gluconate injection 1 g (1 g IntraVENous Given 8/3/21 0310)   sodium bicarbonate 8.4 % (1 mEq/mL) injection 50 mEq (50 mEq IntraVENous Given 8/3/21 0315)   insulin regular (NOVOLIN R, HUMULIN R) injection 10 Units (10 Units IntraVENous Given 8/3/21 0309)   dextrose (D50W) injection syrg 25 g (25 g IntraVENous New Bag 8/3/21 0307)   sodium polystyrene (KAYEXALATE) 15 gram/60 mL oral suspension 15 g (15 g Oral Given 8/3/21 0321)           3:15 AM    Patient presents complaining of shortness of breath, she has history of CHF. Chest x-ray revealed no pulmonary edema but she does have a pleural effusion. She has increasing leukocytosis she is tachycardic worsening renal function, hyperkalemic, she is currently on outpatient antibiotics for MSSA. Cultures were obtained antibiotics were given per sepsis protocol. She has a vasculitic appearing rash. D-dimer elevated, unable to perform CTA given creatinine, VQ scan will be performed at outside facility. Feel patient needs cardiology evaluation as well as possible nephrology, will transfer for admission    I have reviewed all pertinent and currently available diagnostic test results for this visit including, but not limited to, labs, xrays, and EKGs. I have reviewed all pertinent and currently available medical records. My plan of care and further evaluation and/or disposition is based on these results, as well as the initial, and subsequent, history and physical exam, as well as any additional complaints during the visit. Fozia Dorantes MD        Procedures      Critical Care Time:   CRITICAL CARE NOTE :    4:06 AM      IMPENDING DETERIORATION -Cardiovascular and Metabolic  ASSOCIATED RISK FACTORS - Dysrhythmia, Metabolic changes and Vascular Compromise  MANAGEMENT- Bedside Assessment, Supervision of Care and Transfer  INTERPRETATION -  Xrays, ECG and Blood Pressure  INTERVENTIONS - Metabolic interventions  CASE REVIEW - Hospitalist/Intensivist and Nursing  TREATMENT RESPONSE -Improved and Stable  PERFORMED BY - Self    NOTES   :  I have spent 36 minutes of critical care time involved in lab review, consultations with specialist, family decision- making, bedside attention and documentation. This time excludes time spent in any separate billed procedures. During this entire length of time I was immediately available to the patient .     Aida Nath Jah Alonzo MD      Disposition:    Transferred to Another Facility      Diagnosis     Clinical Impression:   1. Acute hyperkalemia    2. Acute kidney injury superimposed on chronic kidney disease (HCC)    3. Leukocytosis, unspecified type    4. Shortness of breath        Attestations: Hima Sampson MD    Please note that this dictation was completed with [employer], the computer voice recognition software. Quite often unanticipated grammatical, syntax, homophones, and other interpretive errors are inadvertently transcribed by the computer software. Please disregard these errors. Please excuse any errors that have escaped final proofreading. Thank you.

## 2021-08-03 NOTE — ED TRIAGE NOTES
Patient came in as a transfer from [address] [place] [address] for shortness of breath, elevated potassium, elevated d-dimer, and decreased renal function. Patient complaining of Left wrist pain states that started yesterday.

## 2021-08-03 NOTE — PROGRESS NOTES
Contacted [facility] to obtain ETA for ALS transport of patient to [facility] ED. Spoke with Claudell Gutta. [facility] will call back with ETA. ED is aware.

## 2021-08-03 NOTE — ED NOTES
Bedside and Verbal shift change report given to Maria Guadalupe Kaplan RN (oncoming nurse) by Juliette Beth RN (offgoing nurse). Report included the following information SBAR, ED Summary, MAR and Recent Results.

## 2021-08-03 NOTE — H&P
HISTORY AND PHYSICAL      PCP: Shaji Jim NP  History source: Patient      CC: SOB       HPI: A 79year old female patient with PMH of CHF, DM, HTN, CKD stage 3, SSS s/p PPM which was recently removed on 7/21 due to MSSA bacteremia, she was discharged on PICC with IV cefazolin until 9/2/2021; presented to [address] [place] [address] ED for evaluation of shortness of breath. Pt noticed sob while laying down, unable to sleep and so called EMS. She denied chest pain, sob on exertion or palpitations. She also has been having rash on her legs which is getting worse, rash started since hospital discharge on 7/24. She denied any chest pain, cough, fever, abd pain, urinary or bowel changes. In [address] [place] [address] ED, Potassium was 6.2 - she was given carly gluconate, Sod bicarb, Insulin+ dextrose, Kayexalate. Elevated D dimer - given full dose of lovenox. One dose of IV ceftriaxone given for rash?/ infection. In [facility] ED, VQ scan done which showed low probability PE rpt labs showed normal K, cr improved. Hospitalist consulted for admission.    She c/o left wrist pain which started suddenly when enroute to [facility] - XR no fractures       PMH/PSH:  Past Medical History:   Diagnosis Date    A-fib [facility])     AICD (automatic cardioverter/defibrillator) present 9/23/2020 9/23/2020 [employer] AICD implant    Arthritis     Bilateral pleural effusion 9/18/2020    Chronic pain     Chronic pain     Diabetes ([address]     Diverticular disease     Elevated troponin 9/18/2020    Hemorrhoid     Hypercholesterolemia     IBS (irritable bowel syndrome)     Lymphocytosis 69/14/8045    Systolic heart failure, chronic ([address] 7/16/2021    Type 2 MI (myocardial infarction) ([address] 7/16/2021 7/162021 r/t sepsis     Past Surgical History:   Procedure Laterality Date    COLONOSCOPY N/A 10/31/2019    COLONOSCOPY performed by Kvng Avitia MD at [facility]  10/31/2019         Julieth Herrmann  10/31/2019  HX CATARACT REMOVAL      HX CHOLECYSTECTOMY      HX COLONOSCOPY  2009    HX COLONOSCOPY  2016    2 adenomatous polyp    HX HEMORRHOIDECTOMY  2009    HX HYSTERECTOMY      HX KNEE REPLACEMENT      right    HX ORTHOPAEDIC  12/11/2017    back, laminectomy and decompression    NV INSJ/RPLCMT PERM DFB W/TRNSVNS LDS 1/DUAL CHMBR N/A 9/23/2020    INSERT ICD DUAL performed by Mehnaz Panda MD at [facility] CARDIAC CATH LAB       Home meds:   Prior to Admission medications    Medication Sig Start Date End Date Taking? Authorizing Provider   cefazolin sodium in 0.9 % NaCl (ceFAZolin in normal saline) 2 gram/100 mL soln IVPB 2 g by IntraVENous route two (2) times a day. Other, MD Cory   magnesium oxide (MAG-OX) 400 mg tablet Take 400 mg by mouth two (2) times a day. Other, MD Cory   TURMERIC PO Take 150 mg by mouth daily. Other, MD Cory   SITagliptin (Januvia) 50 mg tablet Take 50 mg by mouth daily. Provider, Historical   magnesium oxide (MAG-OX) 400 mg tablet Take 400 mg by mouth two (2) times daily as needed (Cramps). Provider, Historical   turmeric-herbal complex no. 278 150 mg cap Take 1 Capsule by mouth daily. Provider, Historical   venlafaxine-SR (EFFEXOR-XR) 37.5 mg capsule Take 37.5 mg by mouth two (2) times a day. Provider, Historical   ergocalciferol (ERGOCALCIFEROL) 1,250 mcg (50,000 unit) capsule Take 50,000 Units by mouth every seven (7) days. On Wednesdays    Provider, Historical   b complex vitamins tablet Take 1 Tablet by mouth daily. Provider, Historical   insulin glargine (Lantus Solostar U-100 Insulin) 100 unit/mL (3 mL) inpn 55 Units by SubCUTAneous route daily. Provider, Historical   valsartan (DIOVAN) 80 mg tablet Take 80 mg by mouth daily. Provider, Historical   semaglutide (OZEMPIC) 0.25 mg/0.2 mL (2 mg/1.5 mL) sub-q pen 0.25 mg by SubCUTAneous route every seven (7) days.  Indications: type 2 diabetes mellitus 6/30/21   Moses Bloch, NP   atorvastatin (LIPITOR) 20 mg tablet TAKE 1 TABLET BY MOUTH  DAILY 6/28/21   Barbra Rivera NP   glipiZIDE (GLUCOTROL) 5 mg tablet TAKE 1 TABLET BY MOUTH  TWICE DAILY 6/28/21   Barbra Rivera NP   furosemide (LASIX) 40 mg tablet TAKE 1 TABLET BY MOUTH  DAILY 6/15/21   Michelle Rangel MD   nitroglycerin (NITROSTAT) 0.4 mg SL tablet 1 Tab by SubLINGual route every five (5) minutes as needed for Chest Pain. Up to 3 doses. Patient not taking: Reported on 7/26/2021 2/23/21   Violet Correa ANP   carvediloL (COREG) 25 mg tablet Take 1 Tab by mouth two (2) times daily (with meals). 2/21/21   Michelle Rangel MD   colestipoL (Colestid) 1 gram tablet Take 1 Tab by mouth two (2) times a day. 10/7/20   Troy Salguero DO   aspirin delayed-release 81 mg tablet Take 1 Tab by mouth daily. 9/10/20   Michelle Rangel MD   vit C/E/Zn/coppr/lutein/zeaxan (PRESERVISION AREDS-2 PO) Take 1 Tablet by mouth two (2) times a day. Provider, Historical   B.infantis-B.ani-B.long-B.bifi (PROBIOTIC 4X) 10-15 mg TbEC Take  by mouth daily. Provider, Historical   acetaminophen (TYLENOL) 650 mg TbER Take 650 mg by mouth every eight (8) hours. Provider, Historical   omega-3 fatty acids-vitamin e 1,000 mg cap Take 1 Cap by mouth two (2) times a day. 32a day     Provider, Historical   ascorbic acid, vitamin C, (VITAMIN C) 500 mg tablet Take 1,000 mg by mouth daily. Provider, Historical   zinc 50 mg tab tablet Take 50 mg by mouth daily. Provider, Historical       Allergies:   Allergies   Allergen Reactions    Morphine Anaphylaxis    Ultram [Tramadol] Palpitations       FH:  Family History   Problem Relation Age of Onset    Colon Cancer Father     Diabetes Mother        SH:  Social History     Tobacco Use    Smoking status: Current Every Day Smoker     Packs/day: 1.00     Years: 55.00     Pack years: 55.00     Types: Cigarettes    Smokeless tobacco: Never Used   Substance Use Topics    Alcohol use: No       ROS: A comprehensive review of systems was negative except for that written in the HPI. PHYSICAL EXAM:  Visit Vitals  BP (!) 148/58   Pulse (!) 112   Temp 99.2 °F (37.3 °C)   Resp 27   Ht 5' 6\" (1.676 m)   Wt 73.8 kg (162 lb 12.8 oz)   SpO2 91%   BMI 26.28 kg/m²       Gen: mild distress  HEENT: anicteric sclerae, normal conjunctiva, oropharynx clear, MM moist  Neck: supple, trachea midline, no adenopathy  Heart: tachy, no MRG, no JVD, no peripheral edema  Lungs: decreased left side breath sounds   Abd: soft, NT, ND, BS+, no organomegaly  Extr: warm  Skin: dry, large maculopapular rash on both extremities   Neuro: CN II-XII grossly intact, normal speech, moves all extremities  Psych: normal mood, appropriate affect    Labs/Imaging:  Recent Results (from the past 24 hour(s))   NT-PRO BNP    Collection Time: 08/03/21 12:55 AM   Result Value Ref Range    NT pro-BNP 19,490 (H) 0 - 450 PG/ML   CBC WITH AUTOMATED DIFF    Collection Time: 08/03/21 12:55 AM   Result Value Ref Range    WBC 19.6 (H) 3.6 - 11.0 K/uL    RBC 3.27 (L) 3.80 - 5.20 M/uL    HGB 9.5 (L) 11.5 - 16.0 g/dL    HCT 30.1 (L) 35.0 - 47.0 %    MCV 92.0 80.0 - 99.0 FL    MCH 29.1 26.0 - 34.0 PG    MCHC 31.6 30.0 - 36.5 g/dL    RDW 14.0 11.5 - 14.5 %    PLATELET 727 (H) 589 - 400 K/uL    MPV 9.6 8.9 - 12.9 FL    NRBC 0.0 0  WBC    ABSOLUTE NRBC 0.00 0.00 - 0.01 K/uL    NEUTROPHILS 81 (H) 32 - 75 %    LYMPHOCYTES 10 (L) 12 - 49 %    MONOCYTES 6 5 - 13 %    EOSINOPHILS 1 0 - 7 %    BASOPHILS 1 0 - 1 %    IMMATURE GRANULOCYTES 1 (H) 0.0 - 0.5 %    ABS. NEUTROPHILS 15.8 (H) 1.8 - 8.0 K/UL    ABS. LYMPHOCYTES 2.0 0.8 - 3.5 K/UL    ABS. MONOCYTES 1.2 (H) 0.0 - 1.0 K/UL    ABS. EOSINOPHILS 0.2 0.0 - 0.4 K/UL    ABS. BASOPHILS 0.2 (H) 0.0 - 0.1 K/UL    ABS. IMM.  GRANS. 0.2 (H) 0.00 - 0.04 K/UL    DF SMEAR SCANNED      PLATELET COMMENTS Increased Platelets      RBC COMMENTS NORMOCYTIC, NORMOCHROMIC     METABOLIC PANEL, COMPREHENSIVE    Collection Time: 08/03/21 12:55 AM   Result Value Ref Range Sodium 132 (L) 136 - 145 mmol/L    Potassium 6.1 (H) 3.5 - 5.1 mmol/L    Chloride 100 97 - 108 mmol/L    CO2 22 21 - 32 mmol/L    Anion gap 10 5 - 15 mmol/L    Glucose 119 (H) 65 - 100 mg/dL    BUN 32 (H) 6 - 20 MG/DL    Creatinine 2.07 (H) 0.55 - 1.02 MG/DL    BUN/Creatinine ratio 15 12 - 20      GFR est AA 28 (L) >60 ml/min/1.73m2    GFR est non-AA 23 (L) >60 ml/min/1.73m2    Calcium 9.3 8.5 - 10.1 MG/DL    Bilirubin, total 0.2 0.2 - 1.0 MG/DL    ALT (SGPT) 8 (L) 12 - 78 U/L    AST (SGOT) 15 15 - 37 U/L    Alk.  phosphatase 72 45 - 117 U/L    Protein, total 8.1 6.4 - 8.2 g/dL    Albumin 2.2 (L) 3.5 - 5.0 g/dL    Globulin 5.9 (H) 2.0 - 4.0 g/dL    A-G Ratio 0.4 (L) 1.1 - 2.2     LIPASE    Collection Time: 08/03/21 12:55 AM   Result Value Ref Range    Lipase 153 73 - 393 U/L   TROPONIN I    Collection Time: 08/03/21 12:55 AM   Result Value Ref Range    Troponin-I, Qt. 0.05 (H) <0.05 ng/mL   CULTURE, BLOOD    Collection Time: 08/03/21 12:55 AM    Specimen: Blood   Result Value Ref Range    Special Requests: NO SPECIAL REQUESTS      Culture result: NO GROWTH AFTER 3 HOURS     LACTIC ACID    Collection Time: 08/03/21 12:55 AM   Result Value Ref Range    Lactic acid 1.0 0.4 - 2.0 MMOL/L   POTASSIUM    Collection Time: 08/03/21 12:55 AM   Result Value Ref Range    Potassium 6.2 (H) 3.5 - 5.1 mmol/L   MAGNESIUM    Collection Time: 08/03/21 12:55 AM   Result Value Ref Range    Magnesium 2.0 1.6 - 2.4 mg/dL   D DIMER    Collection Time: 08/03/21 12:55 AM   Result Value Ref Range    D-dimer 15.20 (H) 0.00 - 0.65 mg/L FEU   PROTHROMBIN TIME + INR    Collection Time: 08/03/21 12:55 AM   Result Value Ref Range    INR 1.1 0.9 - 1.1      Prothrombin time 11.3 (H) 9.0 - 11.1 sec   CULTURE, BLOOD    Collection Time: 08/03/21  2:20 AM    Specimen: Blood   Result Value Ref Range    Special Requests: NO SPECIAL REQUESTS      Culture result: NO GROWTH AFTER 2 HOURS     COVID-19 WITH INFLUENZA A/B    Collection Time: 08/03/21  2:30 AM Result Value Ref Range    SARS-CoV-2 Not detected NOTD      Influenza A by PCR Not detected NOTD      Influenza B by PCR Not detected NOTD     EKG, 12 LEAD, SUBSEQUENT    Collection Time: 08/03/21  3:52 AM   Result Value Ref Range    Ventricular Rate 115 BPM    Atrial Rate 115 BPM    QRS Duration 104 ms    Q-T Interval 344 ms    QTC Calculation (Bezet) 475 ms    Calculated R Axis 64 degrees    Calculated T Axis -129 degrees    Diagnosis       Accelerated Junctional rhythm with frequent premature ventricular complexes  ST & T wave abnormality, consider inferior ischemia  Abnormal ECG  When compared with ECG of 03-AUG-2021 00:46,  MANUAL COMPARISON REQUIRED, DATA IS UNCONFIRMED     URINALYSIS W/ RFLX MICROSCOPIC    Collection Time: 08/03/21  4:55 AM   Result Value Ref Range    Color YELLOW/STRAW      Appearance CLEAR CLEAR      Specific gravity 1.015 1.003 - 1.030      pH (UA) 6.5 5.0 - 8.0      Protein 100 (A) NEG mg/dL    Glucose Negative NEG mg/dL    Ketone Negative NEG mg/dL    Bilirubin Negative NEG      Blood MODERATE (A) NEG      Urobilinogen 0.2 0.2 - 1.0 EU/dL    Nitrites Negative NEG      Leukocyte Esterase Negative NEG     URINE MICROSCOPIC ONLY    Collection Time: 08/03/21  4:55 AM   Result Value Ref Range    WBC 0-4 0 - 4 /hpf    RBC 10-20 0 - 5 /hpf    Epithelial cells FEW FEW /lpf    Bacteria Negative NEG /hpf   EKG, 12 LEAD, INITIAL    Collection Time: 08/03/21 11:18 AM   Result Value Ref Range    Ventricular Rate 95 BPM    Atrial Rate 95 BPM    P-R Interval 168 ms    QRS Duration 108 ms    Q-T Interval 382 ms    QTC Calculation (Bezet) 480 ms    Calculated P Axis 10 degrees    Calculated R Axis 60 degrees    Calculated T Axis 138 degrees    Diagnosis       Normal sinus rhythm  Left ventricular hypertrophy with repolarization abnormality  When compared with ECG of 03-AUG-2021 03:59,  MANUAL COMPARISON REQUIRED, DATA IS UNCONFIRMED     CBC WITH AUTOMATED DIFF    Collection Time: 08/03/21 11:25 AM Result Value Ref Range    WBC 15.2 (H) 3.6 - 11.0 K/uL    RBC 2.92 (L) 3.80 - 5.20 M/uL    HGB 8.6 (L) 11.5 - 16.0 g/dL    HCT 27.1 (L) 35.0 - 47.0 %    MCV 92.8 80.0 - 99.0 FL    MCH 29.5 26.0 - 34.0 PG    MCHC 31.7 30.0 - 36.5 g/dL    RDW 14.1 11.5 - 14.5 %    PLATELET 562 (H) 421 - 400 K/uL    MPV 9.5 8.9 - 12.9 FL    NRBC 0.0 0  WBC    ABSOLUTE NRBC 0.00 0.00 - 0.01 K/uL    NEUTROPHILS 77 (H) 32 - 75 %    LYMPHOCYTES 14 12 - 49 %    MONOCYTES 7 5 - 13 %    EOSINOPHILS 1 0 - 7 %    BASOPHILS 1 0 - 1 %    IMMATURE GRANULOCYTES 0 0.0 - 0.5 %    ABS. NEUTROPHILS 11.6 (H) 1.8 - 8.0 K/UL    ABS. LYMPHOCYTES 2.2 0.8 - 3.5 K/UL    ABS. MONOCYTES 1.0 0.0 - 1.0 K/UL    ABS. EOSINOPHILS 0.2 0.0 - 0.4 K/UL    ABS. BASOPHILS 0.1 0.0 - 0.1 K/UL    ABS. IMM. GRANS. 0.1 (H) 0.00 - 0.04 K/UL    DF AUTOMATED     METABOLIC PANEL, COMPREHENSIVE    Collection Time: 08/03/21 11:25 AM   Result Value Ref Range    Sodium 134 (L) 136 - 145 mmol/L    Potassium 5.0 3.5 - 5.1 mmol/L    Chloride 105 97 - 108 mmol/L    CO2 23 21 - 32 mmol/L    Anion gap 6 5 - 15 mmol/L    Glucose 99 65 - 100 mg/dL    BUN 28 (H) 6 - 20 MG/DL    Creatinine 1.96 (H) 0.55 - 1.02 MG/DL    BUN/Creatinine ratio 14 12 - 20      GFR est AA 30 (L) >60 ml/min/1.73m2    GFR est non-AA 24 (L) >60 ml/min/1.73m2    Calcium 9.2 8.5 - 10.1 MG/DL    Bilirubin, total 0.3 0.2 - 1.0 MG/DL    ALT (SGPT) <6 (L) 12 - 78 U/L    AST (SGOT) 10 (L) 15 - 37 U/L    Alk.  phosphatase 64 45 - 117 U/L    Protein, total 7.4 6.4 - 8.2 g/dL    Albumin 2.0 (L) 3.5 - 5.0 g/dL    Globulin 5.4 (H) 2.0 - 4.0 g/dL    A-G Ratio 0.4 (L) 1.1 - 2.2     TROPONIN I    Collection Time: 08/03/21 11:25 AM   Result Value Ref Range    Troponin-I, Qt. 0.20 (H) <0.05 ng/mL   SAMPLES BEING HELD    Collection Time: 08/03/21 11:25 AM   Result Value Ref Range    SAMPLES BEING HELD 1RED 1BLU     COMMENT        Add-on orders for these samples will be processed based on acceptable specimen integrity and analyte stability, which may vary by analyte. Recent Labs     08/03/21  1125 08/03/21  0055   WBC 15.2* 19.6*   HGB 8.6* 9.5*   HCT 27.1* 30.1*   * 659*     Recent Labs     08/03/21  1125 08/03/21  0055   * 132*   K 5.0 6.2*  6.1*    100   CO2 23 22   BUN 28* 32*   CREA 1.96* 2.07*   GLU 99 119*   CA 9.2 9.3   MG  --  2.0     Recent Labs     08/03/21  1125 08/03/21  0055   ALT <6* 8*   AP 64 72   TBILI 0.3 0.2   TP 7.4 8.1   ALB 2.0* 2.2*   GLOB 5.4* 5.9*   LPSE  --  153       Recent Labs     08/03/21  1125 08/03/21  0055   TROIQ 0.20* 0.05*       Recent Labs     08/03/21  0055   INR 1.1   PTP 11.3*        No results for input(s): PH, PCO2, PO2 in the last 72 hours. All labs and imaging personally reviewed by me. Assessment & Plan:   Shortness of breath likely due to right pleural effusion 2/2 CHF exacerbation  - admit to telemetry  - orthopnea +.   - recent echo on 7/19 showed EF 25-30%. - pro BNP 19,490  - CXR: New right pleural effusion  - elevated D dimer. VQ scan low probability for PE   - daily weight and strict I&Os  - started on IV lasix 40mg bid  - saturating well on RA    Elevated Troponin  - normal at OSH , now 0.2  - denied chest pain. - EKS: non specific ST-T changes   - will trend troponin  - cardiology consult placed    Hyperkalemia- improved  - 6.2 at OSH , now 5. S/p carly gluconate, Sod bicarb, Insulin+ dextrose, Kayexalate at OSH  - will monitor    IVAN on CKD stage 3 - improving   - could be due to dehydration vs IV abx  - on IV diuresis  - will monitor renal function     Rash since 1 week  - could be due to Cefazolin vs other etiologies ? vasculitis   - leucocytosis   - blood cx sent . UA clean except moderate blood   - ID consult placed  - CRP, ESR, SHANNA ordered     Left wrist pain  - no fracture but restricted ROM  - XR: Degenerative changes. No acute abnormality.     Recent MSSA bacteremia on PICC with IV cefazolin until 9/2/2021  - changed abx to IV Daptomycin Anemia  - possible due to chronic disease  - iron profile and fobt ordered  - monitor hb, transfuse <7    DM   - A1c 9.1 in 6/2021  - c/w lantus 50U HS, ISS. - hold oral home meds    HTN  -c/w coreg. Hold ARB for IVAN     Depression - Effexor     DVT ppx: heparin  Code status: DNR.  mpoa - tried to reach, no answer.  Updated patient's daughter Marielena Leong   Disposition: TBD     Signed By: Luis Alberto Diaz MD     August 3, 2021

## 2021-08-03 NOTE — ED NOTES
The pt stated it was ok to call her  to verify the Effexor dose. Called the pt's  to verify the pt's Effexor dose. The pt's  stated the dose is 75 mg of Effexor bid. Called, spoke to a pharmacist, and informed them of the correct dose.

## 2021-08-03 NOTE — TELEPHONE ENCOUNTER
called to let Shraddha Susi know that wife was taken to [address] late last night and has now been transferred to [facility]. He is not sure at this point what is going on.

## 2021-08-03 NOTE — PROGRESS NOTES
Contacted [facility] to place patient on will call for ALS transport. Spoke with Malachi Tillman. Discussed patient condition, and need for cardiac monitoring. Writer will call back for ETA once patient has been accepted to another facility.

## 2021-08-04 ENCOUNTER — APPOINTMENT (OUTPATIENT)
Dept: ULTRASOUND IMAGING | Age: 81
DRG: 291 | End: 2021-08-04
Attending: INTERNAL MEDICINE
Payer: MEDICARE

## 2021-08-04 ENCOUNTER — APPOINTMENT (OUTPATIENT)
Dept: NON INVASIVE DIAGNOSTICS | Age: 81
DRG: 291 | End: 2021-08-04
Attending: INTERNAL MEDICINE
Payer: MEDICARE

## 2021-08-04 LAB
ALBUMIN SERPL-MCNC: 2 G/DL (ref 3.5–5)
ALBUMIN/GLOB SERPL: 0.4 {RATIO} (ref 1.1–2.2)
ALP SERPL-CCNC: 61 U/L (ref 45–117)
ALT SERPL-CCNC: 6 U/L (ref 12–78)
ANA SER QL: NEGATIVE
ANION GAP SERPL CALC-SCNC: 7 MMOL/L (ref 5–15)
AST SERPL-CCNC: 14 U/L (ref 15–37)
ATRIAL RATE: 95 BPM
BASOPHILS # BLD: 0.1 K/UL (ref 0–0.1)
BASOPHILS NFR BLD: 1 % (ref 0–1)
BILIRUB SERPL-MCNC: 0.3 MG/DL (ref 0.2–1)
BUN SERPL-MCNC: 30 MG/DL (ref 6–20)
BUN/CREAT SERPL: 15 (ref 12–20)
CALCIUM SERPL-MCNC: 9.1 MG/DL (ref 8.5–10.1)
CALCULATED P AXIS, ECG09: 10 DEGREES
CALCULATED R AXIS, ECG10: 60 DEGREES
CALCULATED T AXIS, ECG11: 138 DEGREES
CHLORIDE SERPL-SCNC: 108 MMOL/L (ref 97–108)
CK SERPL-CCNC: 48 U/L (ref 26–192)
CO2 SERPL-SCNC: 22 MMOL/L (ref 21–32)
CREAT SERPL-MCNC: 2.06 MG/DL (ref 0.55–1.02)
DIAGNOSIS, 93000: NORMAL
DIFFERENTIAL METHOD BLD: ABNORMAL
ECHO AO ROOT DIAM: 2.86 CM
ECHO AV AREA PEAK VELOCITY: 1.3 CM2
ECHO AV AREA VTI: 1.53 CM2
ECHO AV AREA/BSA PEAK VELOCITY: 0.7 CM2/M2
ECHO AV AREA/BSA VTI: 0.9 CM2/M2
ECHO AV MEAN GRADIENT: 7.23 MMHG
ECHO AV PEAK GRADIENT: 14.6 MMHG
ECHO AV PEAK VELOCITY: 191.04 CM/S
ECHO AV VTI: 36.55 CM
ECHO LA AREA 4C: 18.76 CM2
ECHO LA MAJOR AXIS: 4.16 CM
ECHO LA MINOR AXIS: 2.32 CM
ECHO LA VOL 2C: 73.31 ML (ref 22–52)
ECHO LA VOL 4C: 54.07 ML (ref 22–52)
ECHO LA VOL BP: 65.9 ML (ref 22–52)
ECHO LA VOL/BSA BIPLANE: 36.82 ML/M2 (ref 16–28)
ECHO LA VOLUME INDEX A2C: 40.96 ML/M2 (ref 16–28)
ECHO LA VOLUME INDEX A4C: 30.21 ML/M2 (ref 16–28)
ECHO LV EDV A2C: 111.28 ML
ECHO LV EDV A4C: 87.18 ML
ECHO LV EDV BP: 110.29 ML (ref 56–104)
ECHO LV EDV INDEX A4C: 48.7 ML/M2
ECHO LV EDV INDEX BP: 61.6 ML/M2
ECHO LV EDV NDEX A2C: 62.2 ML/M2
ECHO LV EJECTION FRACTION A2C: 38 PERCENT
ECHO LV EJECTION FRACTION A4C: 55 PERCENT
ECHO LV EJECTION FRACTION BIPLANE: 45.3 PERCENT (ref 55–100)
ECHO LV ESV A2C: 69.26 ML
ECHO LV ESV A4C: 39.66 ML
ECHO LV ESV BP: 60.29 ML (ref 19–49)
ECHO LV ESV INDEX A2C: 38.7 ML/M2
ECHO LV ESV INDEX A4C: 22.2 ML/M2
ECHO LV ESV INDEX BP: 33.7 ML/M2
ECHO LV INTERNAL DIMENSION DIASTOLIC: 5.15 CM (ref 3.9–5.3)
ECHO LV INTERNAL DIMENSION SYSTOLIC: 4.1 CM
ECHO LV IVSD: 0.93 CM (ref 0.6–0.9)
ECHO LV MASS 2D: 221.4 G (ref 67–162)
ECHO LV MASS INDEX 2D: 123.7 G/M2 (ref 43–95)
ECHO LV POSTERIOR WALL DIASTOLIC: 1.3 CM (ref 0.6–0.9)
ECHO LVOT DIAM: 2 CM
ECHO LVOT PEAK GRADIENT: 2.5 MMHG
ECHO LVOT PEAK VELOCITY: 79.03 CM/S
ECHO LVOT SV: 56 ML
ECHO LVOT VTI: 17.77 CM
ECHO MV A VELOCITY: 107.64 CM/S
ECHO MV AREA PHT: 5.24 CM2
ECHO MV E DECELERATION TIME (DT): 144.75 MS
ECHO MV E VELOCITY: 112.37 CM/S
ECHO MV E/A RATIO: 1.04
ECHO MV PRESSURE HALF TIME (PHT): 41.98 MS
ECHO PV PEAK INSTANTANEOUS GRADIENT SYSTOLIC: 6.09 MMHG
ECHO RV TAPSE: 2.21 CM (ref 1.5–2)
ECHO TV REGURGITANT MAX VELOCITY: 228.35 CM/S
ECHO TV REGURGITANT PEAK GRADIENT: 20.86 MMHG
EOSINOPHIL # BLD: 0.3 K/UL (ref 0–0.4)
EOSINOPHIL NFR BLD: 2 % (ref 0–7)
ERYTHROCYTE [DISTWIDTH] IN BLOOD BY AUTOMATED COUNT: 14.1 % (ref 11.5–14.5)
GLOBULIN SER CALC-MCNC: 5.5 G/DL (ref 2–4)
GLUCOSE BLD STRIP.AUTO-MCNC: 107 MG/DL (ref 65–117)
GLUCOSE BLD STRIP.AUTO-MCNC: 183 MG/DL (ref 65–117)
GLUCOSE BLD STRIP.AUTO-MCNC: 233 MG/DL (ref 65–117)
GLUCOSE BLD STRIP.AUTO-MCNC: 273 MG/DL (ref 65–117)
GLUCOSE SERPL-MCNC: 68 MG/DL (ref 65–100)
HAV IGM SER QL: NONREACTIVE
HBV CORE IGM SER QL: NONREACTIVE
HBV SURFACE AG SER QL: 0.61 INDEX
HBV SURFACE AG SER QL: NEGATIVE
HCT VFR BLD AUTO: 26.8 % (ref 35–47)
HCV AB SERPL QL IA: NONREACTIVE
HCV COMMENT,HCGAC: NORMAL
HGB BLD-MCNC: 8.3 G/DL (ref 11.5–16)
IMM GRANULOCYTES # BLD AUTO: 0.1 K/UL (ref 0–0.04)
IMM GRANULOCYTES NFR BLD AUTO: 1 % (ref 0–0.5)
LYMPHOCYTES # BLD: 4 K/UL (ref 0.8–3.5)
LYMPHOCYTES NFR BLD: 33 % (ref 12–49)
MCH RBC QN AUTO: 29.1 PG (ref 26–34)
MCHC RBC AUTO-ENTMCNC: 31 G/DL (ref 30–36.5)
MCV RBC AUTO: 94 FL (ref 80–99)
MONOCYTES # BLD: 1.1 K/UL (ref 0–1)
MONOCYTES NFR BLD: 9 % (ref 5–13)
NEUTS SEG # BLD: 6.7 K/UL (ref 1.8–8)
NEUTS SEG NFR BLD: 54 % (ref 32–75)
NRBC # BLD: 0 K/UL (ref 0–0.01)
NRBC BLD-RTO: 0 PER 100 WBC
P-R INTERVAL, ECG05: 168 MS
PLATELET # BLD AUTO: 565 K/UL (ref 150–400)
PMV BLD AUTO: 9.8 FL (ref 8.9–12.9)
POTASSIUM SERPL-SCNC: 4.7 MMOL/L (ref 3.5–5.1)
PROT SERPL-MCNC: 7.5 G/DL (ref 6.4–8.2)
Q-T INTERVAL, ECG07: 382 MS
QRS DURATION, ECG06: 108 MS
QTC CALCULATION (BEZET), ECG08: 480 MS
RBC # BLD AUTO: 2.85 M/UL (ref 3.8–5.2)
SERVICE CMNT-IMP: ABNORMAL
SERVICE CMNT-IMP: NORMAL
SODIUM SERPL-SCNC: 137 MMOL/L (ref 136–145)
SP1: NORMAL
SP2: NORMAL
SP3: NORMAL
TROPONIN I SERPL-MCNC: 0.54 NG/ML
VENTRICULAR RATE, ECG03: 95 BPM
WBC # BLD AUTO: 12.2 K/UL (ref 3.6–11)

## 2021-08-04 PROCEDURE — 76770 US EXAM ABDO BACK WALL COMP: CPT

## 2021-08-04 PROCEDURE — 76604 US EXAM CHEST: CPT

## 2021-08-04 PROCEDURE — 82962 GLUCOSE BLOOD TEST: CPT

## 2021-08-04 PROCEDURE — 84484 ASSAY OF TROPONIN QUANT: CPT

## 2021-08-04 PROCEDURE — 97530 THERAPEUTIC ACTIVITIES: CPT

## 2021-08-04 PROCEDURE — 85025 COMPLETE CBC W/AUTO DIFF WBC: CPT

## 2021-08-04 PROCEDURE — 97161 PT EVAL LOW COMPLEX 20 MIN: CPT

## 2021-08-04 PROCEDURE — 74011250636 HC RX REV CODE- 250/636: Performed by: INTERNAL MEDICINE

## 2021-08-04 PROCEDURE — 82550 ASSAY OF CK (CPK): CPT

## 2021-08-04 PROCEDURE — 65660000000 HC RM CCU STEPDOWN

## 2021-08-04 PROCEDURE — 36415 COLL VENOUS BLD VENIPUNCTURE: CPT

## 2021-08-04 PROCEDURE — 93306 TTE W/DOPPLER COMPLETE: CPT

## 2021-08-04 PROCEDURE — 80053 COMPREHEN METABOLIC PANEL: CPT

## 2021-08-04 PROCEDURE — 74011250637 HC RX REV CODE- 250/637: Performed by: INTERNAL MEDICINE

## 2021-08-04 PROCEDURE — 74011636637 HC RX REV CODE- 636/637: Performed by: INTERNAL MEDICINE

## 2021-08-04 RX ORDER — VENLAFAXINE 37.5 MG/1
37.5 TABLET ORAL EVERY 12 HOURS
Status: DISCONTINUED | OUTPATIENT
Start: 2021-08-04 | End: 2021-08-06 | Stop reason: HOSPADM

## 2021-08-04 RX ORDER — LANOLIN ALCOHOL/MO/W.PET/CERES
1 CREAM (GRAM) TOPICAL
Status: DISCONTINUED | OUTPATIENT
Start: 2021-08-05 | End: 2021-08-06 | Stop reason: HOSPADM

## 2021-08-04 RX ORDER — FUROSEMIDE 10 MG/ML
40 INJECTION INTRAMUSCULAR; INTRAVENOUS DAILY
Status: DISCONTINUED | OUTPATIENT
Start: 2021-08-05 | End: 2021-08-05

## 2021-08-04 RX ADMIN — INSULIN LISPRO 3 UNITS: 100 INJECTION, SOLUTION INTRAVENOUS; SUBCUTANEOUS at 17:38

## 2021-08-04 RX ADMIN — COLESTIPOL HYDROCHLORIDE 1 G: 1 TABLET, FILM COATED ORAL at 08:53

## 2021-08-04 RX ADMIN — Medication 10 ML: at 07:37

## 2021-08-04 RX ADMIN — CARVEDILOL 25 MG: 12.5 TABLET, FILM COATED ORAL at 08:53

## 2021-08-04 RX ADMIN — ASPIRIN 81 MG: 81 TABLET, COATED ORAL at 08:53

## 2021-08-04 RX ADMIN — FUROSEMIDE 40 MG: 10 INJECTION, SOLUTION INTRAMUSCULAR; INTRAVENOUS at 08:53

## 2021-08-04 RX ADMIN — ACETAMINOPHEN 650 MG: 325 TABLET ORAL at 17:44

## 2021-08-04 RX ADMIN — VENLAFAXINE 37.5 MG: 37.5 TABLET ORAL at 08:53

## 2021-08-04 RX ADMIN — Medication 10 ML: at 22:54

## 2021-08-04 RX ADMIN — INSULIN GLARGINE 50 UNITS: 100 INJECTION, SOLUTION SUBCUTANEOUS at 22:53

## 2021-08-04 RX ADMIN — HEPARIN SODIUM 5000 UNITS: 5000 INJECTION INTRAVENOUS; SUBCUTANEOUS at 14:00

## 2021-08-04 RX ADMIN — VENLAFAXINE 37.5 MG: 37.5 TABLET ORAL at 22:54

## 2021-08-04 RX ADMIN — HEPARIN SODIUM 5000 UNITS: 5000 INJECTION INTRAVENOUS; SUBCUTANEOUS at 07:37

## 2021-08-04 RX ADMIN — CARVEDILOL 25 MG: 12.5 TABLET, FILM COATED ORAL at 17:38

## 2021-08-04 RX ADMIN — HEPARIN SODIUM 5000 UNITS: 5000 INJECTION INTRAVENOUS; SUBCUTANEOUS at 22:53

## 2021-08-04 RX ADMIN — INSULIN LISPRO 5 UNITS: 100 INJECTION, SOLUTION INTRAVENOUS; SUBCUTANEOUS at 12:49

## 2021-08-04 RX ADMIN — COLESTIPOL HYDROCHLORIDE 1 G: 1 TABLET, FILM COATED ORAL at 17:38

## 2021-08-04 NOTE — PROGRESS NOTES
Physical Therapy    Orders acknowledged, chart reviewed. Pt unavailable for PT evaluation due to off floor for ultrasound. Will con't to follow.     Rakesh Coffey, PT, MPT

## 2021-08-04 NOTE — PROGRESS NOTES
Problem: Falls - Risk of  Goal: *Absence of Falls  Description: Document Childress Pillow Fall Risk and appropriate interventions in the flowsheet. Outcome: Progressing Towards Goal  Note: Fall Risk Interventions:  Mobility Interventions: Communicate number of staff needed for ambulation/transfer              Elimination Interventions: Call light in reach              Problem: Pain  Goal: *Control of Pain  Outcome: Progressing Towards Goal     Problem: Pressure Injury - Risk of  Goal: *Prevention of pressure injury  Description: Document Bryan Scale and appropriate interventions in the flowsheet. Outcome: Progressing Towards Goal  Note: Pressure Injury Interventions:             Activity Interventions: Increase time out of bed, Pressure redistribution bed/mattress(bed type)    Mobility Interventions: Pressure redistribution bed/mattress (bed type)    Nutrition Interventions: Document food/fluid/supplement intake

## 2021-08-04 NOTE — CONSULTS
Infectious Disease Consult Note    Reason for Consult: rash, hx of MSSA bacteremia   Date of Consultation: August 3, 2021  Date of Admission: 8/3/2021  Referring Physician: Dr Luis Mcdonald       HPI : Ms. Caroline Juarez is a poor historian. She is sleepy and unable to give good history at present. Discussed with nursing staff in the ER and chart reviewed    Ms. Caroline Juarez is a 79-year-old lady with a history of atrial fibrillation, diabetes, hypertension systolic heart failure, right knee replacement, lumbar laminectomy MSSA bacteremia admitted to [facility] in July 2021 who is status post explantation of [employer] dual-chamber ICD from the left chest using a transvenous approach on 7/21/2021 that was transfer from another facility for concerns of shortness of breath, metabolic derangements and found to have bilateral lower extremity rash. Patient was discharged with a PICC line and antibiotic recommendations for cefazolin for [facility] on last discharge. Patient is a very poor historian and is not able to tell me any specific symptoms but when asked if she has pain in her lower extremities she admits to having a rash that is painful. She denies any pruritus. She is not clear as to when this rash started. She denies shortness of breath or cough to me. She denies any fevers or chills. She denies any nausea vomiting or diarrhea. She denies any back pain. She denies any other particular joint pain other than right wrist pain. Her antibiotic has been switched to daptomycin IV by the primary team.      During this admission her T-max is 99.4 and she is oxygenating well on room air. Labs show leukocytosis with neutrophilic predominance. She also has anemia and thrombocytosis. Her renal function is elevated with a creatinine up to 2 and a BUN of 32. CRP and ESR are elevated. Blood cultures are sent and pending.   She has had a nuclear lung scan to look for PE and reported as low probability. She has had a chest x-ray as well as an wrist x-ray. Wrist x-ray shows no acute abnormality. Chest x-ray has a new right pleural effusion. Past Medical History:  Past Medical History:   Diagnosis Date    A-fib [facility])     AICD (automatic cardioverter/defibrillator) present 9/23/2020 9/23/2020 [employer] AICD implant    Arthritis     Bilateral pleural effusion 9/18/2020    Chronic pain     Chronic pain     Diabetes ([facility] [address]     Diverticular disease     Elevated troponin 9/18/2020    Hemorrhoid     Hypercholesterolemia     IBS (irritable bowel syndrome)     Lymphocytosis 71/39/8877    Systolic heart failure, chronic ([facility] [address] 7/16/2021    Type 2 MI (myocardial infarction) ([facility] [address] 7/16/2021 7/162021 r/t sepsis         Surgical History:  Past Surgical History:   Procedure Laterality Date    COLONOSCOPY N/A 10/31/2019    COLONOSCOPY performed by Balta Pham MD at [facility]  10/31/2019         [facility]  10/31/2019         HX CATARACT REMOVAL      HX CHOLECYSTECTOMY      HX COLONOSCOPY  2009    HX COLONOSCOPY  2016    2 adenomatous polyp    HX HEMORRHOIDECTOMY  2009    HX HYSTERECTOMY      HX KNEE REPLACEMENT      right    HX ORTHOPAEDIC  12/11/2017    back, laminectomy and decompression    W[place] INSJ/RPLCMT PERM DFB W/TRNSVNS LDS 1/DUAL CHMBR N/A 9/23/2020    INSERT ICD DUAL performed by Vani Carevr MD at [facility] CARDIAC CATH LAB         Family History:   Family History   Problem Relation Age of Onset    Colon Cancer Father     Diabetes Mother          Social History:     Patient denies alcohol smoking or illicit substance abuse    Allergies: Allergies   Allergen Reactions    Morphine Anaphylaxis    Ultram [Tramadol] Palpitations         Review of Systems:    Attempted 10 point review of systems and per HPI.   All others negative    Medications:  Current Facility-Administered Medications on File Prior to Encounter   Medication Dose Route Frequency Provider Last Rate Last Admin    [COMPLETED] sodium chloride (NS) flush 5-10 mL  5-10 mL IntraVENous ONCE TANNA Sandhu MD   10 mL at 08/03/21 0110    [COMPLETED] cefTRIAXone (ROCEPHIN) 1 g in 0.9% sodium chloride (MBP/ADV) 50 mL MBP  1 g IntraVENous NOW Rober Parker MD   IV Completed at 08/03/21 0307    [COMPLETED] aspirin chewable tablet 81 mg  81 mg Oral NOW Rober Parker MD   81 mg at 08/03/21 0240    [COMPLETED] calcium gluconate injection 1 g  1 g IntraVENous NOW Rober Parker MD   1 g at 08/03/21 0310    [COMPLETED] sodium bicarbonate 8.4 % (1 mEq/mL) injection 50 mEq  50 mEq IntraVENous NOW Rober Parker MD   50 mEq at 08/03/21 0315    [COMPLETED] insulin regular (NOVOLIN R, HUMULIN R) injection 10 Units  10 Units IntraVENous NOW Rober Parker MD   10 Units at 08/03/21 0309    [COMPLETED] dextrose (D50W) injection syrg 25 g  25 g IntraVENous ONCE Rober Parker MD   IV Completed at 08/03/21 0715    [COMPLETED] sodium polystyrene (KAYEXALATE) 15 gram/60 mL oral suspension 15 g  15 g Oral NOW Rober Parker MD   15 g at 08/03/21 0321    [COMPLETED] enoxaparin (LOVENOX) injection 70 mg  1 mg/kg SubCUTAneous NOW TANNA Warren MD   70 mg at 08/03/21 5216     Current Outpatient Medications on File Prior to Encounter   Medication Sig Dispense Refill    venlafaxine (EFFEXOR) 75 mg tablet Take 25 mg by mouth two (2) times a day.  cefazolin sodium in 0.9 % NaCl (ceFAZolin in normal saline) 2 gram/100 mL soln IVPB 2 g by IntraVENous route two (2) times a day.  magnesium oxide (MAG-OX) 400 mg tablet Take 400 mg by mouth two (2) times a day.  TURMERIC PO Take 150 mg by mouth daily.  SITagliptin (Januvia) 50 mg tablet Take 50 mg by mouth daily.  magnesium oxide (MAG-OX) 400 mg tablet Take 400 mg by mouth two (2) times daily as needed (Cramps).  turmeric-herbal complex no. 278 150 mg cap Take 1 Capsule by mouth daily.      ergocalciferol (ERGOCALCIFEROL) 1,250 mcg (50,000 unit) capsule Take 50,000 Units by mouth every seven (7) days. On Wednesdays      b complex vitamins tablet Take 1 Tablet by mouth daily.  insulin glargine (Lantus Solostar U-100 Insulin) 100 unit/mL (3 mL) inpn 55 Units by SubCUTAneous route daily.  valsartan (DIOVAN) 80 mg tablet Take 80 mg by mouth daily.  semaglutide (OZEMPIC) 0.25 mg/0.2 mL (2 mg/1.5 mL) sub-q pen 0.25 mg by SubCUTAneous route every seven (7) days. Indications: type 2 diabetes mellitus 1 Box 1    atorvastatin (LIPITOR) 20 mg tablet TAKE 1 TABLET BY MOUTH  DAILY 90 Tablet 1    glipiZIDE (GLUCOTROL) 5 mg tablet TAKE 1 TABLET BY MOUTH  TWICE DAILY 180 Tablet 1    furosemide (LASIX) 40 mg tablet TAKE 1 TABLET BY MOUTH  DAILY 90 Tablet 1    nitroglycerin (NITROSTAT) 0.4 mg SL tablet 1 Tab by SubLINGual route every five (5) minutes as needed for Chest Pain. Up to 3 doses. (Patient not taking: Reported on 7/26/2021) 25 Tab 0    carvediloL (COREG) 25 mg tablet Take 1 Tab by mouth two (2) times daily (with meals). 28 Tab 0    colestipoL (Colestid) 1 gram tablet Take 1 Tab by mouth two (2) times a day. 180 Tab 3    aspirin delayed-release 81 mg tablet Take 1 Tab by mouth daily. 90 Tab 1    vit C/E/Zn/coppr/lutein/zeaxan (PRESERVISION AREDS-2 PO) Take 1 Tablet by mouth two (2) times a day.  B.infantis-B.ani-B.long-B.bifi (PROBIOTIC 4X) 10-15 mg TbEC Take  by mouth daily.  acetaminophen (TYLENOL) 650 mg TbER Take 650 mg by mouth every eight (8) hours.  omega-3 fatty acids-vitamin e 1,000 mg cap Take 1 Cap by mouth two (2) times a day. 32a day       ascorbic acid, vitamin C, (VITAMIN C) 500 mg tablet Take 1,000 mg by mouth daily.  zinc 50 mg tab tablet Take 50 mg by mouth daily.              Physical Exam:    Vitals:   Patient Vitals for the past 24 hrs:   Temp Pulse Resp BP SpO2   08/03/21 1930  84 17 (!) 126/46 97 %   08/03/21 1900  89 22 104/60 94 %   08/03/21 1856  85 20 (!) 114/42 96 %   08/03/21 1830  79 24 (!) 105/46 95 %   08/03/21 1700  95 26 (!) 116/45 96 %   08/03/21 1630  (!) 103 19 (!) 145/55 96 %   08/03/21 1621  (!) 109  (!) 154/68    08/03/21 1600  (!) 101 18 (!) 154/68 96 %   08/03/21 1530  (!) 103 22 (!) 155/66 96 %   08/03/21 1500  (!) 102 22 (!) 141/91 95 %   08/03/21 1400  97 25 (!) 122/43 94 %   08/03/21 1345 99.2 °F (37.3 °C)       08/03/21 1330  (!) 112 27 (!) 148/58 91 %   08/03/21 1300  (!) 111 28 (!) 152/63 94 %   08/03/21 1230  (!) 106 30 (!) 134/52 95 %   08/03/21 1200  (!) 103 20 (!) 137/52 95 %   08/03/21 1130  100 18 (!) 144/61 96 %   08/03/21 1055 99.4 °F (37.4 °C) 96 14 (!) 143/56 96 %   ·   · GEN: NAD  · HEENT: No scleral icterus, no thrush   · CV: S1, S2 heard , tachy, + L chest pervious ICD site without any surrounding erythema or discharge  · Lungs: Diminished breath sounds  · Abdomen: soft, nontender  · Extremities: no edema  · Neuro: Alert, oriented to self, knows she is at the hospital but could not tell me which 1  · Skin: no rash on face, chest, back, abdomen or upper extremities.   Lower extremities bilaterally with macular darkly discolored rash with some blister like lesions , tender, no pustules seen ,   · Psych non tearful  · Lines: RUE PICC line, no erythema around site   · MSK no erythema or edema noted of the right wrist or the right knee    Labs:   Recent Results (from the past 24 hour(s))   NT-PRO BNP    Collection Time: 08/03/21 12:55 AM   Result Value Ref Range    NT pro-BNP 19,490 (H) 0 - 450 PG/ML   CBC WITH AUTOMATED DIFF    Collection Time: 08/03/21 12:55 AM   Result Value Ref Range    WBC 19.6 (H) 3.6 - 11.0 K/uL    RBC 3.27 (L) 3.80 - 5.20 M/uL    HGB 9.5 (L) 11.5 - 16.0 g/dL    HCT 30.1 (L) 35.0 - 47.0 %    MCV 92.0 80.0 - 99.0 FL    MCH 29.1 26.0 - 34.0 PG    MCHC 31.6 30.0 - 36.5 g/dL    RDW 14.0 11.5 - 14.5 %    PLATELET 165 (H) 976 - 400 K/uL    MPV 9.6 8.9 - 12.9 FL    NRBC 0.0 0  WBC    ABSOLUTE NRBC 0.00 0.00 - 0.01 K/uL    NEUTROPHILS 81 (H) 32 - 75 %    LYMPHOCYTES 10 (L) 12 - 49 %    MONOCYTES 6 5 - 13 %    EOSINOPHILS 1 0 - 7 %    BASOPHILS 1 0 - 1 %    IMMATURE GRANULOCYTES 1 (H) 0.0 - 0.5 %    ABS. NEUTROPHILS 15.8 (H) 1.8 - 8.0 K/UL    ABS. LYMPHOCYTES 2.0 0.8 - 3.5 K/UL    ABS. MONOCYTES 1.2 (H) 0.0 - 1.0 K/UL    ABS. EOSINOPHILS 0.2 0.0 - 0.4 K/UL    ABS. BASOPHILS 0.2 (H) 0.0 - 0.1 K/UL    ABS. IMM. GRANS. 0.2 (H) 0.00 - 0.04 K/UL    DF SMEAR SCANNED      PLATELET COMMENTS Increased Platelets      RBC COMMENTS NORMOCYTIC, NORMOCHROMIC     METABOLIC PANEL, COMPREHENSIVE    Collection Time: 08/03/21 12:55 AM   Result Value Ref Range    Sodium 132 (L) 136 - 145 mmol/L    Potassium 6.1 (H) 3.5 - 5.1 mmol/L    Chloride 100 97 - 108 mmol/L    CO2 22 21 - 32 mmol/L    Anion gap 10 5 - 15 mmol/L    Glucose 119 (H) 65 - 100 mg/dL    BUN 32 (H) 6 - 20 MG/DL    Creatinine 2.07 (H) 0.55 - 1.02 MG/DL    BUN/Creatinine ratio 15 12 - 20      GFR est AA 28 (L) >60 ml/min/1.73m2    GFR est non-AA 23 (L) >60 ml/min/1.73m2    Calcium 9.3 8.5 - 10.1 MG/DL    Bilirubin, total 0.2 0.2 - 1.0 MG/DL    ALT (SGPT) 8 (L) 12 - 78 U/L    AST (SGOT) 15 15 - 37 U/L    Alk.  phosphatase 72 45 - 117 U/L    Protein, total 8.1 6.4 - 8.2 g/dL    Albumin 2.2 (L) 3.5 - 5.0 g/dL    Globulin 5.9 (H) 2.0 - 4.0 g/dL    A-G Ratio 0.4 (L) 1.1 - 2.2     LIPASE    Collection Time: 08/03/21 12:55 AM   Result Value Ref Range    Lipase 153 73 - 393 U/L   TROPONIN I    Collection Time: 08/03/21 12:55 AM   Result Value Ref Range    Troponin-I, Qt. 0.05 (H) <0.05 ng/mL   CULTURE, BLOOD    Collection Time: 08/03/21 12:55 AM    Specimen: Blood   Result Value Ref Range    Special Requests: NO SPECIAL REQUESTS      Culture result: NO GROWTH AFTER 3 HOURS     LACTIC ACID    Collection Time: 08/03/21 12:55 AM   Result Value Ref Range    Lactic acid 1.0 0.4 - 2.0 MMOL/L   C REACTIVE PROTEIN, QT    Collection Time: 08/03/21 12:55 AM   Result Value Ref Range    C-Reactive protein 11.00 (H) 0.00 - 0.60 mg/dL   POTASSIUM    Collection Time: 08/03/21 12:55 AM   Result Value Ref Range    Potassium 6.2 (H) 3.5 - 5.1 mmol/L   MAGNESIUM    Collection Time: 08/03/21 12:55 AM   Result Value Ref Range    Magnesium 2.0 1.6 - 2.4 mg/dL   D DIMER    Collection Time: 08/03/21 12:55 AM   Result Value Ref Range    D-dimer 15.20 (H) 0.00 - 0.65 mg/L FEU   PROTHROMBIN TIME + INR    Collection Time: 08/03/21 12:55 AM   Result Value Ref Range    INR 1.1 0.9 - 1.1      Prothrombin time 11.3 (H) 9.0 - 11.1 sec   CULTURE, BLOOD    Collection Time: 08/03/21  2:20 AM    Specimen: Blood   Result Value Ref Range    Special Requests: NO SPECIAL REQUESTS      Culture result: NO GROWTH AFTER 2 HOURS     COVID-19 WITH INFLUENZA A/B    Collection Time: 08/03/21  2:30 AM   Result Value Ref Range    SARS-CoV-2 Not detected NOTD      Influenza A by PCR Not detected NOTD      Influenza B by PCR Not detected NOTD     EKG, 12 LEAD, SUBSEQUENT    Collection Time: 08/03/21  3:52 AM   Result Value Ref Range    Ventricular Rate 115 BPM    Atrial Rate 115 BPM    QRS Duration 104 ms    Q-T Interval 344 ms    QTC Calculation (Bezet) 475 ms    Calculated R Axis 64 degrees    Calculated T Axis -129 degrees    Diagnosis       Accelerated Junctional rhythm with frequent premature ventricular complexes  ST & T wave abnormality, consider inferior ischemia  Abnormal ECG  When compared with ECG of 03-AUG-2021 00:46,  MANUAL COMPARISON REQUIRED, DATA IS UNCONFIRMED     URINALYSIS W/ RFLX MICROSCOPIC    Collection Time: 08/03/21  4:55 AM   Result Value Ref Range    Color YELLOW/STRAW      Appearance CLEAR CLEAR      Specific gravity 1.015 1.003 - 1.030      pH (UA) 6.5 5.0 - 8.0      Protein 100 (A) NEG mg/dL    Glucose Negative NEG mg/dL    Ketone Negative NEG mg/dL    Bilirubin Negative NEG      Blood MODERATE (A) NEG      Urobilinogen 0.2 0.2 - 1.0 EU/dL    Nitrites Negative NEG      Leukocyte Esterase Negative NEG     URINE MICROSCOPIC ONLY    Collection Time: 08/03/21  4:55 AM   Result Value Ref Range    WBC 0-4 0 - 4 /hpf    RBC 10-20 0 - 5 /hpf    Epithelial cells FEW FEW /lpf    Bacteria Negative NEG /hpf   EKG, 12 LEAD, INITIAL    Collection Time: 08/03/21 11:18 AM   Result Value Ref Range    Ventricular Rate 95 BPM    Atrial Rate 95 BPM    P-R Interval 168 ms    QRS Duration 108 ms    Q-T Interval 382 ms    QTC Calculation (Bezet) 480 ms    Calculated P Axis 10 degrees    Calculated R Axis 60 degrees    Calculated T Axis 138 degrees    Diagnosis       Normal sinus rhythm  Left ventricular hypertrophy with repolarization abnormality  When compared with ECG of 03-AUG-2021 03:59,  MANUAL COMPARISON REQUIRED, DATA IS UNCONFIRMED     CBC WITH AUTOMATED DIFF    Collection Time: 08/03/21 11:25 AM   Result Value Ref Range    WBC 15.2 (H) 3.6 - 11.0 K/uL    RBC 2.92 (L) 3.80 - 5.20 M/uL    HGB 8.6 (L) 11.5 - 16.0 g/dL    HCT 27.1 (L) 35.0 - 47.0 %    MCV 92.8 80.0 - 99.0 FL    MCH 29.5 26.0 - 34.0 PG    MCHC 31.7 30.0 - 36.5 g/dL    RDW 14.1 11.5 - 14.5 %    PLATELET 912 (H) 118 - 400 K/uL    MPV 9.5 8.9 - 12.9 FL    NRBC 0.0 0  WBC    ABSOLUTE NRBC 0.00 0.00 - 0.01 K/uL    NEUTROPHILS 77 (H) 32 - 75 %    LYMPHOCYTES 14 12 - 49 %    MONOCYTES 7 5 - 13 %    EOSINOPHILS 1 0 - 7 %    BASOPHILS 1 0 - 1 %    IMMATURE GRANULOCYTES 0 0.0 - 0.5 %    ABS. NEUTROPHILS 11.6 (H) 1.8 - 8.0 K/UL    ABS. LYMPHOCYTES 2.2 0.8 - 3.5 K/UL    ABS. MONOCYTES 1.0 0.0 - 1.0 K/UL    ABS. EOSINOPHILS 0.2 0.0 - 0.4 K/UL    ABS. BASOPHILS 0.1 0.0 - 0.1 K/UL    ABS. IMM.  GRANS. 0.1 (H) 0.00 - 0.04 K/UL    DF AUTOMATED     METABOLIC PANEL, COMPREHENSIVE    Collection Time: 08/03/21 11:25 AM   Result Value Ref Range    Sodium 134 (L) 136 - 145 mmol/L    Potassium 5.0 3.5 - 5.1 mmol/L    Chloride 105 97 - 108 mmol/L    CO2 23 21 - 32 mmol/L    Anion gap 6 5 - 15 mmol/L    Glucose 99 65 - 100 mg/dL    BUN 28 (H) 6 - 20 MG/DL Creatinine 1.96 (H) 0.55 - 1.02 MG/DL    BUN/Creatinine ratio 14 12 - 20      GFR est AA 30 (L) >60 ml/min/1.73m2    GFR est non-AA 24 (L) >60 ml/min/1.73m2    Calcium 9.2 8.5 - 10.1 MG/DL    Bilirubin, total 0.3 0.2 - 1.0 MG/DL    ALT (SGPT) <6 (L) 12 - 78 U/L    AST (SGOT) 10 (L) 15 - 37 U/L    Alk. phosphatase 64 45 - 117 U/L    Protein, total 7.4 6.4 - 8.2 g/dL    Albumin 2.0 (L) 3.5 - 5.0 g/dL    Globulin 5.4 (H) 2.0 - 4.0 g/dL    A-G Ratio 0.4 (L) 1.1 - 2.2     TROPONIN I    Collection Time: 08/03/21 11:25 AM   Result Value Ref Range    Troponin-I, Qt. 0.20 (H) <0.05 ng/mL   SAMPLES BEING HELD    Collection Time: 08/03/21 11:25 AM   Result Value Ref Range    SAMPLES BEING HELD 1RED 1BLU     COMMENT        Add-on orders for these samples will be processed based on acceptable specimen integrity and analyte stability, which may vary by analyte.    IRON PROFILE    Collection Time: 08/03/21 11:25 AM   Result Value Ref Range    Iron 15 (L) 35 - 150 ug/dL    TIBC 208 (L) 250 - 450 ug/dL    Iron % saturation 7 (L) 20 - 50 %   FERRITIN    Collection Time: 08/03/21 11:25 AM   Result Value Ref Range    Ferritin 194 8 - 252 NG/ML   FOLATE    Collection Time: 08/03/21 11:25 AM   Result Value Ref Range    Folate 61.5 (H) 5.0 - 21.0 ng/mL   VITAMIN B12    Collection Time: 08/03/21 11:25 AM   Result Value Ref Range    Vitamin B12 737 193 - 986 pg/mL   SED RATE (ESR)    Collection Time: 08/03/21  6:47 PM   Result Value Ref Range    Sed rate, automated 127 (H) 0 - 30 mm/hr   C REACTIVE PROTEIN, QT    Collection Time: 08/03/21  6:47 PM   Result Value Ref Range    C-Reactive protein 12.60 (H) 0.00 - 0.60 mg/dL   GLUCOSE, POC    Collection Time: 08/03/21  6:54 PM   Result Value Ref Range    Glucose (POC) 111 65 - 117 mg/dL    Performed by Amando Mahoney        Microbiology Data:       Blood:8/3/21 pending    Blood 7/15/21  Staphylococcus aureus    Antibiotic Interpretation Value Method Comment   Clindamycin ($) Resistant >=4 ug/mL NIA    Daptomycin ($$$$$) Susceptible 0.25 ug/mL NIA    Erythromycin ($$$$) Resistant >=8 ug/mL NIA    Gentamicin ($) Susceptible <=0.5 ug/mL NIA    Linezolid ($$$$$) Susceptible 2 ug/mL NIA    Oxacillin Susceptible 2 ug/mL NIA    Rifampin ($$$$) Susceptible <=0.5 ug/mL NIA Rifampin is not to be used for mono-therapy. Tetracycline Susceptible <=1 ug/mL NIA    Trimeth/Sulfa Susceptible <=10 ug/mL NIA    Vancomycin ($) Susceptible 1 ug/mL NIA    Ciprofloxacin ($) Resistant >=8 ug/mL NIA    Levofloxacin ($) Resistant >=8 ug/mL NIA    Moxifloxacin ($$$$) Resistant >=8 ug/mL NIA    Doxycycline ($$) Susceptible <=0.5 ug/mL NIA             Blood 7/22/21, 7/17/21   Ref Range & Units 7/22/21 0150   Special Requests:   NO SPECIAL REQUESTS    Culture result:   NO GROWTH 6 DAYS        CSF 7/16/21  Specimen Information: Cerebrospinal Fluid         0 Result Notes      (important suggestion)  Newer results are available. Click to view them now. Ref Range & Units 7/16/21 1425 Resulting Agency   Special Requests:   NO SPECIAL REQUESTS  [facility]   GRAM STAIN   NO WBC'S SEEN  [facility]   Culture result:   Culture performed on Unspun Fluid  [facility] [facility]   Culture result:   NO GROWTH 7 DAYS                  0 Result Notes   Ref Range & Units 8/3/21 0230 7/15/21 2115   SARS-CoV-2 NOTD   Not detected  Not detected CM    Comment: Not Detected results do not preclude SARS-CoV-2 infection and should not be used as the sole basis for patient management decisions. Results must be combined with clinical observations, patient history, and epidemiological information. Influenza A by PCR NOTD   Not detected  Not detected    Influenza B by PCR NOTD   Not detected  Not detected CM    Comment: Testing was performed using da[facility] [place] SARS-CoV-2 and Influenza A/B nucleic acid assay.                 Pathology Results:    Imaging:   NM lung scan  Narrative & Impression INDICATION: +dimer, sob, renal failure      EXAM: Nuclear lung scan is performed with posterior ventilatory imaging with  11.6 mCi Xe-133 gas followed by  perfusion imaging in multiple projections with  4.4 mCi Tc 99m MAA IV.     COMPARISON: CXR today     Pulmonary perfusion is uniform with no well-defined wedge shaped segmental or  subsegmental sized defects.     Ventilation is uniform. There is no significant retention of xenon on washout  images.     There is no V/Q mismatch.     IMPRESSION  Normal study. Low probability for pulmonary embolism.            Procedures:   VALERIA 7/19/21  Left Ventricle Normal wall thickness. Mildly dilated left ventricle. The estimated EF is 25 - 30%. Moderate-to-severely reduced systolic function. Left Atrium Mildly dilated left atrium. Left atrial appendage is normal. Appendage velocity is normal (greater than 40 cm/sec). Right Ventricle Normal cavity size, wall thickness and global systolic function. Right Atrium Normal cavity size. Interatrial Septum No atrial septal defect present. No patent foramen ovale visualized. Lipomatous hypertrophy of the interatrial septum. Aortic Valve Normal valve structure, trileaflet valve structure, no stenosis and no regurgitation. Mitral Valve Normal valve structure and no stenosis. There is moderate posterior leaflet calcification. Mild to moderate regurgitation. Tricuspid Valve Normal valve structure, no stenosis and no regurgitation. Aorta There is  large 13 mm x 8 mm protruding plaque observed in the ascending aorta and transverse aorta. Pericardium No evidence of pericardial effusion.            Assessment / Plan:         1) B/L Lower extremity rash: Not very typical for antibiotic associated allergic rash given the appearance as well as involvement of the only lower extremities without the rest of the body  Suspect possible vascular phenomenon, embolic phenomenon, immune mediated phenomenon  VALERIA on 7/19/2021 with a large 13 mm x 8 mm protruding plaque in the ascending aorta and transverse aorta  ? Interstitial nephritis wt rash on LE and IVAN ( can be a immune mediated phenomenon as patient was on cephalosporins that can cause)  Recommend work-up for endocarditis ( TTE ordered, may need VALERIA)   If change in mental status does not improve/worsens consider MRI to assess for CNS embolic phenomenon    2) Hx of MSSA bacteremia on 7/15/2021, status post explantation of [employer] dual-chamber ICD from the left chest using a transvenous approach on 7/21/2021   Await blood cultures  If blood cultures positive recommend removal of PICC line  Currently on daptomycin IV  Check CK and hold statin while on daptomycin    3) hyper kalemia, hyponatremia, IVAN  Plans per primary team  Renally dose antibiotics  ? ? Interstitial nephritis     4) R wrist pain  X ray wt DJD   no focal signs of cellulitis on exam/septic joint    5) Lumbar laminectomy    6) right knee replacement no focal signs of cellulitis       Please call infectious disease on call if any acute issues. I will be back 8/5/21. Thank for the opportunity to participate in the care of this patient. Please contact with questions or concerns.            Babs Foster,   9:02 PM

## 2021-08-04 NOTE — CONSULTS
Assessment:  IVAN on CKD-3b: Cr fluctuating -> but notable elevation in serum Cr over past several years suggesting CKD. Baseline appears to be 1.4-1.7mg/dl. AIN usually a dx of exclusion-> +LE rash present. UA not suggestive. My suspicion is these fluctuations are more due to underlying cardiorenal effects/post ICD explant +ARB therapy    Hyperkalemia: improved    Recent MSSA Bacteremia: s/p ICD explant 7/2021 at [address] SOB: RIght pleural effusion. Chronic HFrEF    HTN: stable    DM2: last HgbA1c 9.1 (6/2021)    Anemia: hgb suboptimal    Plan/Recommendations:  Renal ultrasound  Consider Right thoracentesis-> defer to primary group  Send off Urine Eos  Okay to ct current IV Abx. ID following  Would like to cut back on IV Lasix by tomorrow  Repeat Echo  Renally adjust new meds    Discussed with patient    Thanks for the consultation. Renal service will follow patient with you. Please contact me with any questions or concerns. Initial Consult note         Patient name: Jordin Boo  MR no: [id number]  Date of admission: 8/3/2021  Date of consultation: 8/4/2021  Requested by: Dr. Tika Tapia  Reason for consult: IVAN    Patient seen and examined. History obtained from patient and chart review. Relevant labs, data and notes reviewed. HPI: Jordin Boo is a 79 y.o. female with PMH significant for HTN, DM2, HFrEF, Recent MSSA bacteremia thought to be 2 to ICD infection-> removed last month at [address]. Getting outpatient IV Daptomycin. Presented back to ER with worsening SOB. Found to have CXR showing right pleural effusion. Serum Cr 2mg/dl. Nephrology consulted to evaluate and manage IVAN. Patient denies any underlying CKD. Prior serum Cr 1.3 to 2.4mg/dl over the last month. Denies any orthopnea. No LE edema. No n/v/d. No fevers.     PMH:  Past Medical History:   Diagnosis Date    A-fib [facility])     AICD (automatic cardioverter/defibrillator) present 9/23/2020 9/23/2020 [employer] AICD implant    Arthritis     Bilateral pleural effusion 9/18/2020    Chronic pain     Chronic pain     Diabetes ([facility]     Diverticular disease     Elevated troponin 9/18/2020    Hemorrhoid     Hypercholesterolemia     IBS (irritable bowel syndrome)     Lymphocytosis 62/94/2843    Systolic heart failure, chronic ([facility] [address] 7/16/2021    Type 2 MI (myocardial infarction) ([facility] [address] 7/16/2021 7/162021 r/t sepsis     PSH:  Past Surgical History:   Procedure Laterality Date    COLONOSCOPY N/A 10/31/2019    COL[facility]COPY performed [facility] at [facility]  10/31/2019         Jose Mast  10/31/2019         HX CATARACT REMOVAL      HX CHOLECYSTECTOMY      HX COLONOSCOPY  2009    HX COLONOSCOPY  2016    2 adenomatous polyp    HX HEMORRHOIDECTOMY  2009    HX HYSTERECTOMY      HX KNEE REPLACEMENT      right    HX ORTHOPAEDIC  12/11/2017    back, laminectomy and decompression    MN INSJ/RPLCMT PERM DFB W/TRNSVNS LDS 1/DUAL CHMBR N/A 9/23/2020    INSERT ICD DUAL performed by Sandy Gomez MD at \[facility]\"" CARDIAC CATH LAB       Social history:   Social History     Tobacco Use    Smoking status: Current Every Day Smoker     Packs/day: 1.00     Years: 55.00     Pack years: 55.00     Types: Cigarettes    Smokeless tobacco: Never Used   Vaping Use    Vaping Use: Never used   Substance Use Topics    Alcohol use: No    Drug use: Never       Family history:  No history of CKD or ESRD in the family.     Allergies   Allergen Reactions    Morphine Anaphylaxis    Ultram [Tramadol] Palpitations       Current Facility-Administered Medications   Medication Dose Route Frequency Last Admin    venlafaxine (EFFEXOR) tablet 37.5 mg  37.5 mg Oral Q12H 37.5 mg at 08/04/21 0853    sodium chloride (NS) flush 5-40 mL  5-40 mL IntraVENous Q8H 10 mL at 08/04/21 0737    sodium chloride (NS) flush 5-40 mL  5-40 mL IntraVENous PRN      acetaminophen (TYLENOL) tablet 650 mg  650 mg Oral Q6H  mg at 08/03/21 1621    Or    acetaminophen (TYLENOL) suppository 650 mg  650 mg Rectal Q6H PRN      polyethylene glycol (MIRALAX) packet 17 g  17 g Oral DAILY PRN      ondansetron (ZOFRAN ODT) tablet 4 mg  4 mg Oral Q8H PRN      Or    ondansetron (ZOFRAN) injection 4 mg  4 mg IntraVENous Q6H PRN      heparin (porcine) injection 5,000 Units  5,000 Units SubCUTAneous Q8H 5,000 Units at 08/04/21 0737    aspirin delayed-release tablet 81 mg  81 mg Oral DAILY 81 mg at 08/04/21 0853    carvediloL (COREG) tablet 25 mg  25 mg Oral BID WITH MEALS 25 mg at 08/04/21 0853    colestipoL (COLESTID) tablet 1 g  1 g Oral BID 1 g at 08/04/21 0853    insulin lispro (HUMALOG) injection   SubCUTAneous AC&HS      glucose chewable tablet 16 g  4 Tablet Oral PRN      dextrose (D50W) injection syrg 12.5-25 g  12.5-25 g IntraVENous PRN      glucagon (GLUCAGEN) injection 1 mg  1 mg IntraMUSCular PRN      insulin glargine (LANTUS) injection 50 Units  50 Units SubCUTAneous QHS      furosemide (LASIX) injection 40 mg  40 mg IntraVENous BID 40 mg at 08/04/21 0853    nicotine (NICODERM CQ) 21 mg/24 hr patch 1 Patch  1 Patch TransDERmal DAILY 1 Patch at 08/04/21 0853    DAPTOmycin (CUBICIN) 450 mg in 0.9% sodium chloride 50 mL IVPB RF formulation  450 mg IntraVENous Q48H 450 mg at 08/03/21 1951       ROS (besides HPI):    General: No fever. +Fatigue. No weight changes  ENT: No hearing loss or visual changes  Cardiovascular: No Chest pain  Pulmonary: + SOB  GI: No abdominal pain. No Nausea/Vomiting/Diarrhea. No blood in stool  : No blood in urine. No foamy or cloudy urine  Musculoskeletal: No joint swelling or redness.  No morning stiffness  Endocrine: no cold or heat intolerance  Psych: denies anxiety or depression  Neuro: No light headedness or dizziness    Objective   Visit Vitals  /64 (BP 1 Location: Left upper arm, BP Patient Position: At rest)   Pulse 79   Temp 99 °F (37.2 °C)   Resp 16   Ht 5' 6\" (1.676 m)   Wt 70 kg (154 lb 5.2 oz)   SpO2 96%   BMI 24.91 kg/m²       Physical Exam:    Gen: NAD    HEENT: AT/NC, EOMI, moist mucous membrane, no scleral icterus    Neck: no JVD, no cervical lymphadenopathy, no carotid bruit    Lungs/Chest wall: Breath sounds diminished Right base,  No accessory muscle use. Clear to auscultation    Cardiovascular: Normal S1/S2, normal rate, regular rhythm. Abdomen: soft, NT, ND, BS+, no HSM    Ext: no clubbing or cyanosis. No edema    Skin: macular rash distal LE    : no allen    CNS: alert awake. Answers appropriately.      Labs/Data:    Lab Results   Component Value Date/Time    Sodium 137 08/04/2021 03:44 AM    Potassium 4.7 08/04/2021 03:44 AM    Chloride 108 08/04/2021 03:44 AM    CO2 22 08/04/2021 03:44 AM    Anion gap 7 08/04/2021 03:44 AM    Glucose 68 08/04/2021 03:44 AM    BUN 30 (H) 08/04/2021 03:44 AM    Creatinine 2.06 (H) 08/04/2021 03:44 AM    BUN/Creatinine ratio 15 08/04/2021 03:44 AM    GFR est AA 28 (L) 08/04/2021 03:44 AM    GFR est non-AA 23 (L) 08/04/2021 03:44 AM    Calcium 9.1 08/04/2021 03:44 AM       Lab Results   Component Value Date/Time    WBC 12.2 (H) 08/04/2021 03:44 AM    HGB 8.3 (L) 08/04/2021 03:44 AM    HCT 26.8 (L) 08/04/2021 03:44 AM    PLATELET 001 (H) 33/13/8358 03:44 AM    MCV 94.0 08/04/2021 03:44 AM       Urine analysis:   Results for orders placed or performed in visit on 05/07/18   AMB POC URINALYSIS DIP STICK AUTO W/O MICRO     Status: None   Result Value Ref Range Status    Color (UA POC) Yellow  Final    Clarity (UA POC) Clear  Final    Glucose (UA POC) Negative Negative Final    Bilirubin (UA POC) Negative Negative Final    Ketones (UA POC) Negative Negative Final    Specific gravity (UA POC) 1.025 1.001 - 1.035 Final    Blood (UA POC) Negative Negative Final    pH (UA POC) 5.5 4.6 - 8.0 Final    Protein (UA POC) 1+ Negative Final    Urobilinogen (UA POC) 0.2 mg/dL 0.2 - 1 Final    Nitrites (UA POC) Negative Negative Final    Leukocyte esterase (UA POC) Negative Negative Final           No components found for: SPEP, UPEP  No results found for: PUQ, PROTU2, PROTU1, BJP1, CPE1, IMEL1, MET2  Lab Results   Component Value Date/Time    Microalb/Creat ratio (ug/mg creat.) 1,568.2 (H) 05/07/2018 11:49 AM    MICROALBUMIN,MG/DAY Comment 11/15/2017 12:12 PM         Intake/Output Summary (Last 24 hours) at 8/4/2021 1159  Last data filed at 8/4/2021 0943  Gross per 24 hour   Intake 240 ml   Output    Net 240 ml       Wt Readings from Last 3 Encounters:   08/04/21 70 kg (154 lb 5.2 oz)   08/03/21 71.1 kg (156 lb 12 oz)   07/24/21 74.7 kg (164 lb 10.9 oz)       Signed by:  Vasile Fall MD  Nephrology and Hypertension  [facility]

## 2021-08-04 NOTE — PROGRESS NOTES
Bedside shift change report given to mu (oncoming nurse) by Karthik Quezada (offgoing nurse). Report included the following information SBAR, Kardex and Intake/Output.

## 2021-08-04 NOTE — PROGRESS NOTES
Hospitalist Progress Note      Hospital summary: A 79year old female patient with PMH of CHF, DM, HTN, CKD stage 3, SSS s/p PPM which was recently removed on 7/21 due to MSSA bacteremia, she was discharged on PICC with IV cefazolin until 9/2/2021; presented to [address] [place] [address] ED for evaluation of shortness of breath. Pt noticed sob while laying down, unable to sleep and so called EMS. She denied chest pain, sob on exertion or palpitations. She also has been having rash on her legs which is getting worse, rash started since hospital discharge on 7/24. She denied any chest pain, cough, fever, abd pain, urinary or bowel changes. In [address] [place] [address] ED, Potassium was 6.2 - she was given carly gluconate, Sod bicarb, Insulin+ dextrose, Kayexalate. Elevated D dimer - given full dose of lovenox. One dose of IV ceftriaxone given for rash?/ infection. In [address] [place] [address] ED, VQ scan done which showed low probability PE rpt labs showed normal K, cr improved. Hospitalist consulted for admission. She c/o left wrist pain which started suddenly when enroute to [address] [place] [address] - XR no fractures  8/3/2021      Assessment/Plan:  Shortness of breath likely due to right pleural effusion 2/2 CHF exacerbation  - orthopnea +.   - recent echo on 7/19 showed EF 25-30%. - pro BNP 19,490  - CXR: New right pleural effusion  - elevated D dimer. VQ scan low probability for PE   - rpt echo 8/4: EF 45 - 50%. Severe (grade 3) left ventricular diastolic dysfunction  - daily weight and strict I&Os  -  IV lasix 40mg decreased to daily   - saturating well on RA  - US guided right thoracentesis ordered      Elevated Troponin  - normal at OSH , 0.2, 0.54  - denied chest pain. - EKS: non specific ST-T changes   - appreciate cardiology input \" No specific cardiac work-up planned\"      Hyperkalemia- resolved  - 6.2 at OSH .  S/p carly gluconate, Sod bicarb, Insulin+ dextrose, Kayexalate at OSH  - will monitor     IVAN on CKD stage 3 - cr mild worsening   - could be due to dehydration vs Interstitial nephritis  - on IV diuresis as above   - renal us ordered. Urine Eos sent   -appreciate nephro input   - will monitor renal function      B/L lower extremity Rash  - drug allergy vs vasculitis vs  embolic phenomenon vs immune mediated phenomenon     - leucocytosis - improving   - blood cx: NGTD. UA clean except moderate blood   - appreciate ID input   - elevated CRP and ESR. Negative SHANNA. ANCA level pending      Left wrist pain - improved  - no fracture but restricted ROM  - XR: Degenerative changes. No acute abnormality.     Recent MSSA bacteremia on PICC with IV cefazolin until 9/2/2021  - changed abx to IV Daptomycin      Anemia  - possible due to chronic disease  - iron deficient. Started on po iron supplements   - fobt ordered  - monitor hb, transfuse <7     DM   - A1c 9.1 in 6/2021  - c/w lantus 50U HS, ISS. - hold oral home meds     HTN  -c/w coreg. Hold ARB for IVAN      Depression - Effexor     Code status: DNR.  mpoa  DVT prophylaxis: Heparin   Disposition: TBD. Home with [place] when ready   ----------------------------------------------    CC: SOB     S: Patient is seen and examined at bedside this AM. She feels better. Sob improving. Denied any pain. Rash still present. No itching. Discussed with nursing and nephrology Dr. Maxim Harden with patient's  Nadeem Hardin on phone and updated patient's status - sob improving, planning for thoracentesis, cr fluctuating, nephro and ID following, rash - etiology unclear     Review of Systems:  A comprehensive review of systems was negative.     O:  Visit Vitals  /64 (BP 1 Location: Left upper arm, BP Patient Position: At rest)   Pulse 79   Temp 99 °F (37.2 °C)   Resp 16   Ht 5' 6\" (1.676 m)   Wt 70 kg (154 lb 5.2 oz)   SpO2 96%   BMI 24.91 kg/m²       PHYSICAL EXAM:  Gen: NAD, elderly   HEENT: anicteric sclerae, normal conjunctiva, oropharynx clear, MM moist  Neck: supple, trachea midline, no adenopathy  Heart: RRR, no MRG, no JVD, no peripheral edema  Lungs: decreased on right side   Abd: soft, NT, ND, BS+, no organomegaly  Extr: warm  Skin: dry, rash on b/l extremities   Neuro: CN II-XII grossly intact, normal speech, moves all extremities  Psych: normal mood, appropriate affect      Intake/Output Summary (Last 24 hours) at 8/4/2021 1503  Last data filed at 8/4/2021 0943  Gross per 24 hour   Intake 240 ml   Output    Net 240 ml        Recent labs & imaging reviewed:  Recent Results (from the past 24 hour(s))   SED RATE (ESR)    Collection Time: 08/03/21  6:47 PM   Result Value Ref Range    Sed rate, automated 127 (H) 0 - 30 mm/hr   C REACTIVE PROTEIN, QT    Collection Time: 08/03/21  6:47 PM   Result Value Ref Range    C-Reactive protein 12.60 (H) 0.00 - 0.60 mg/dL   SHANNA, DIRECT, W/REFLEX    Collection Time: 08/03/21  6:47 PM   Result Value Ref Range    SHANNA, Direct Negative Negative     GLUCOSE, POC    Collection Time: 08/03/21  6:54 PM   Result Value Ref Range    Glucose (POC) 111 65 - 117 mg/dL    Performed by DALI Waller    CK    Collection Time: 08/03/21  9:44 PM   Result Value Ref Range    CK 35 26 - 192 U/L   GLUCOSE, POC    Collection Time: 08/03/21 10:51 PM   Result Value Ref Range    Glucose (POC) 95 65 - 117 mg/dL    Performed by Abby Bhagat    METABOLIC PANEL, COMPREHENSIVE    Collection Time: 08/04/21  3:44 AM   Result Value Ref Range    Sodium 137 136 - 145 mmol/L    Potassium 4.7 3.5 - 5.1 mmol/L    Chloride 108 97 - 108 mmol/L    CO2 22 21 - 32 mmol/L    Anion gap 7 5 - 15 mmol/L    Glucose 68 65 - 100 mg/dL    BUN 30 (H) 6 - 20 MG/DL    Creatinine 2.06 (H) 0.55 - 1.02 MG/DL    BUN/Creatinine ratio 15 12 - 20      GFR est AA 28 (L) >60 ml/min/1.73m2    GFR est non-AA 23 (L) >60 ml/min/1.73m2    Calcium 9.1 8.5 - 10.1 MG/DL    Bilirubin, total 0.3 0.2 - 1.0 MG/DL    ALT (SGPT) 6 (L) 12 - 78 U/L    AST (SGOT) 14 (L) 15 - 37 U/L    Alk.  phosphatase 61 45 - 117 U/L    Protein, total 7.5 6.4 - 8.2 g/dL    Albumin 2.0 (L) 3.5 - 5.0 g/dL    Globulin 5.5 (H) 2.0 - 4.0 g/dL    A-G Ratio 0.4 (L) 1.1 - 2.2     CBC WITH AUTOMATED DIFF    Collection Time: 08/04/21  3:44 AM   Result Value Ref Range    WBC 12.2 (H) 3.6 - 11.0 K/uL    RBC 2.85 (L) 3.80 - 5.20 M/uL    HGB 8.3 (L) 11.5 - 16.0 g/dL    HCT 26.8 (L) 35.0 - 47.0 %    MCV 94.0 80.0 - 99.0 FL    MCH 29.1 26.0 - 34.0 PG    MCHC 31.0 30.0 - 36.5 g/dL    RDW 14.1 11.5 - 14.5 %    PLATELET 660 (H) 082 - 400 K/uL    MPV 9.8 8.9 - 12.9 FL    NRBC 0.0 0  WBC    ABSOLUTE NRBC 0.00 0.00 - 0.01 K/uL    NEUTROPHILS 54 32 - 75 %    LYMPHOCYTES 33 12 - 49 %    MONOCYTES 9 5 - 13 %    EOSINOPHILS 2 0 - 7 %    BASOPHILS 1 0 - 1 %    IMMATURE GRANULOCYTES 1 (H) 0.0 - 0.5 %    ABS. NEUTROPHILS 6.7 1.8 - 8.0 K/UL    ABS. LYMPHOCYTES 4.0 (H) 0.8 - 3.5 K/UL    ABS. MONOCYTES 1.1 (H) 0.0 - 1.0 K/UL    ABS. EOSINOPHILS 0.3 0.0 - 0.4 K/UL    ABS. BASOPHILS 0.1 0.0 - 0.1 K/UL    ABS. IMM.  GRANS. 0.1 (H) 0.00 - 0.04 K/UL    DF AUTOMATED     CK    Collection Time: 08/04/21  3:44 AM   Result Value Ref Range    CK 48 26 - 192 U/L   TROPONIN I    Collection Time: 08/04/21  3:44 AM   Result Value Ref Range    Troponin-I, Qt. 0.54 (H) <0.05 ng/mL   GLUCOSE, POC    Collection Time: 08/04/21  6:32 AM   Result Value Ref Range    Glucose (POC) 107 65 - 117 mg/dL    Performed by Christina Quispe    GLUCOSE, POC    Collection Time: 08/04/21 11:14 AM   Result Value Ref Range    Glucose (POC) 273 (H) 65 - 117 mg/dL    Performed by Georgiana Taylor    ECHO ADULT COMPLETE    Collection Time: 08/04/21 12:25 PM   Result Value Ref Range    IVSd 0.93 (A) 0.60 - 0.90 cm    LVIDd 5.15 3.90 - 5.30 cm    LVIDs 4.10 cm    LVOT d 2.00 cm    LVPWd 1.30 (A) 0.60 - 0.90 cm    BP EF 45.3 (A) 55.0 - 100.0 percent    LV Ejection Fraction MOD 2C 38 percent    LV Ejection Fraction MOD 4C 55 percent    LV ED Vol A2C 111.28 mL    LV ED Vol A4C 87.18 mL    LV ED Vol .29 (A) 56.0 - 104.0 mL LV ES Vol A2C 69.26 mL    LV ES Vol A4C 39.66 mL    LV ES Vol BP 60.29 (A) 19.0 - 49.0 mL    LVOT Peak Gradient 2.50 mmHg    LVOT SV 56.0 mL    LVOT Peak Velocity 79.03 cm/s    LVOT VTI 17.77 cm    Left Atrium Major Axis 4.16 cm    LA Volume 65.90 22.0 - 52.0 mL    LA Area 4C 18.76 cm2    LA Vol 2C 73.31 (A) 22.00 - 52.00 mL    LA Vol 4C 54.07 (A) 22.00 - 52.00 mL    Aortic Valve Area by Continuity of Peak Velocity 1.30 cm2    Aortic Valve Area by Continuity of VTI 1.53 cm2    AoV PG 14.60 mmHg    Aortic Valve Systolic Mean Gradient 9.88 mmHg    Aortic Valve Systolic Peak Velocity 003.65 cm/s    AoV VTI 36.55 cm    MV A Arsalan 107.64 cm/s    Mitral Valve E Wave Deceleration Time 144.75 ms    MV E Arsalan 112.37 cm/s    Mitral Valve Pressure Half-time 41.98 ms    MVA (PHT) 5.24 cm2    Pulmonic Valve Systolic Peak Instantaneous Gradient 6.09 mmHg    Tapse 2.21 (A) 1.50 - 2.00 cm    Triscuspid Valve Regurgitation Peak Gradient 20.86 mmHg    TR Max Velocity 228. 35 cm/s    Ao Root D 2.86 cm    MV E/A 1.04     LV Mass .4 67.0 - 162.0 g    LV Mass AL Index 123.7 43.0 - 95.0 g/m2    LVES Vol Index BP 33.7 mL/m2    LVED Vol Index BP 61.6 mL/m2    Left Atrium Minor Axis 2.32 cm    LA Vol Index 36.82 16.00 - 28.00 ml/m2    LA Vol Index 40.96 16.00 - 28.00 ml/m2    LA Vol Index 30.21 16.00 - 28.00 ml/m2    LVED Vol Index A4C 48.7 mL/m2    LVED Vol Index A2C 62.2 mL/m2    LVES Vol Index A4C 22.2 mL/m2    LVES Vol Index A2C 38.7 mL/m2    BRAYDON/BSA Pk Arsalan 0.7 cm2/m2    BRAYDON/BSA VTI 0.9 cm2/m2     Recent Labs     08/04/21  0344 08/03/21  1125   WBC 12.2* 15.2*   HGB 8.3* 8.6*   HCT 26.8* 27.1*   * 574*     Recent Labs     08/04/21 0344 08/03/21  1125 08/03/21  0055    134* 132*   K 4.7 5.0 6.2*  6.1*    105 100   CO2 22 23 22   BUN 30* 28* 32*   CREA 2.06* 1.96* 2.07*   GLU 68 99 119*   CA 9.1 9.2 9.3   MG  --   --  2.0     Recent Labs     08/04/21 0344 08/03/21  1125 08/03/21  0055   ALT 6* <6* 8*   AP 61 64 72   TBILI 0.3 0.3 0.2   TP 7.5 7.4 8.1   ALB 2.0* 2.0* 2.2*   GLOB 5.5* 5.4* 5.9*   LPSE  --   --  153     Recent Labs     08/03/21  0055   INR 1.1   PTP 11.3*      Recent Labs     08/03/21  1125   TIBC 208*   PSAT 7*   FERR 194      Lab Results   Component Value Date/Time    Folate 61.5 (H) 08/03/2021 11:25 AM      No results for input(s): PH, PCO2, PO2 in the last 72 hours.   Recent Labs     08/04/21  0344 08/03/21  2144 08/03/21  1125 08/03/21  0055   CPK 48 35  --   --    TROIQ 0.54*  --  0.20* 0.05*     Lab Results   Component Value Date/Time    Cholesterol, total 243 (H) 06/14/2021 09:17 PM    HDL Cholesterol 42 06/14/2021 09:17 PM    LDL,Direct 137 (H) 06/14/2021 09:17 PM    LDL, calculated Not calculated due to elevated triglyceride level 06/14/2021 09:17 PM    Triglyceride 493 (H) 06/14/2021 09:17 PM    CHOL/HDL Ratio 5.8 (H) 06/14/2021 09:17 PM     Lab Results   Component Value Date/Time    Glucose (POC) 273 (H) 08/04/2021 11:14 AM    Glucose (POC) 107 08/04/2021 06:32 AM    Glucose (POC) 95 08/03/2021 10:51 PM    Glucose (POC) 111 08/03/2021 06:54 PM    Glucose (POC) 171 (H) 07/24/2021 10:38 AM     Lab Results   Component Value Date/Time    Color YELLOW/STRAW 08/03/2021 04:55 AM    Appearance CLEAR 08/03/2021 04:55 AM    Specific gravity 1.015 08/03/2021 04:55 AM    Specific gravity 1.020 07/15/2021 07:50 PM    pH (UA) 6.5 08/03/2021 04:55 AM    Protein 100 (A) 08/03/2021 04:55 AM    Glucose Negative 08/03/2021 04:55 AM    Ketone Negative 08/03/2021 04:55 AM    Bilirubin Negative 08/03/2021 04:55 AM    Urobilinogen 0.2 08/03/2021 04:55 AM    Nitrites Negative 08/03/2021 04:55 AM    Leukocyte Esterase Negative 08/03/2021 04:55 AM    Epithelial cells FEW 08/03/2021 04:55 AM    Bacteria Negative 08/03/2021 04:55 AM    WBC 0-4 08/03/2021 04:55 AM    RBC 10-20 08/03/2021 04:55 AM       Med list reviewed  Current Facility-Administered Medications   Medication Dose Route Frequency    venlafaxine (EFFEXOR) tablet 37.5 mg  37.5 mg Oral Q12H    sodium chloride (NS) flush 5-40 mL  5-40 mL IntraVENous Q8H    sodium chloride (NS) flush 5-40 mL  5-40 mL IntraVENous PRN    acetaminophen (TYLENOL) tablet 650 mg  650 mg Oral Q6H PRN    Or    acetaminophen (TYLENOL) suppository 650 mg  650 mg Rectal Q6H PRN    polyethylene glycol (MIRALAX) packet 17 g  17 g Oral DAILY PRN    ondansetron (ZOFRAN ODT) tablet 4 mg  4 mg Oral Q8H PRN    Or    ondansetron (ZOFRAN) injection 4 mg  4 mg IntraVENous Q6H PRN    heparin (porcine) injection 5,000 Units  5,000 Units SubCUTAneous Q8H    aspirin delayed-release tablet 81 mg  81 mg Oral DAILY    carvediloL (COREG) tablet 25 mg  25 mg Oral BID WITH MEALS    colestipoL (COLESTID) tablet 1 g  1 g Oral BID    insulin lispro (HUMALOG) injection   SubCUTAneous AC&HS    glucose chewable tablet 16 g  4 Tablet Oral PRN    dextrose (D50W) injection syrg 12.5-25 g  12.5-25 g IntraVENous PRN    glucagon (GLUCAGEN) injection 1 mg  1 mg IntraMUSCular PRN    insulin glargine (LANTUS) injection 50 Units  50 Units SubCUTAneous QHS    furosemide (LASIX) injection 40 mg  40 mg IntraVENous BID    nicotine (NICODERM CQ) 21 mg/24 hr patch 1 Patch  1 Patch TransDERmal DAILY    DAPTOmycin (CUBICIN) 450 mg in 0.9% sodium chloride 50 mL IVPB RF formulation  450 mg IntraVENous Q48H       Care Plan discussed with:  Patient/Family and Nurse    Neo Elliott MD  Internal Medicine  Date of Service: 8/4/2021

## 2021-08-04 NOTE — PROGRESS NOTES
TRANSFER - IN REPORT:    Verbal report received from [address], RN(name) on Jaimie Ree  being received from ED(unit) for routine progression of care      Report consisted of patients Situation, Background, Assessment and   Recommendations(SBAR). Information from the following report(s) SBAR and Kardex was reviewed with the receiving nurse. Opportunity for questions and clarification was provided. Assessment completed upon patients arrival to unit and care assumed.

## 2021-08-04 NOTE — PROGRESS NOTES
Problem: Mobility Impaired (Adult and Pediatric)  Goal: *Acute Goals and Plan of Care (Insert Text)  Description: FUNCTIONAL STATUS PRIOR TO ADMISSION: Patient was modified independent using a rollator intermittently for functional mobility. Of note, pt's  uses w/c for mobility but assists pt with medications. Pt recently hospitalized with sepis and had AICD removed 7/21/2021. Returned home with [facility] PT who recommended pt transition to outpt PT.    [facility]: The patient lived with  who provides support as needed. Physical Therapy Goals  Initiated 8/4/2021  1. Patient will move from supine to sit and sit to supine  in bed with independence within 7 day(s). 2.  Patient will transfer from bed to chair and chair to bed with modified independence using the least restrictive device within 7 day(s). 3.  Patient will perform sit to stand with modified independence within 7 day(s). 4.  Patient will ambulate with supervision/set-up for 100 feet with the least restrictive device within 7 day(s). Outcome: Not Met   PHYSICAL THERAPY EVALUATION  Patient: Evan Vann (10 y.o. female)  Date: 8/4/2021  Primary Diagnosis: Hyperkalemia [E87.5]       Precautions: fall       ASSESSMENT  Based on the objective data described below, the patient presents with general weakness, decreased activity tolerance, decreased standing balance, gait dysfunction following transfer from \[facility]\"" where she presented with SOB, hyperkalemia, and rash bilat LEs. Pt mobilized with CGA overall for bed mob, transfers, and short distance amb with HHA. Tolerance to activity limited by general fatigue this date with pt requesting return to bed at end of session. Pt will benefit from con't PT for mobility progression with use of RW next session. Anticipate steady progress toward goals with pt ability to return home with supportive  and [facility] PT at d/c.     Current Level of Function Impacting Discharge (mobility/balance): bed mob CGA, sit to stand CGA, short distance amb with min HHA    Functional Outcome Measure: The patient scored 19/28 on the Tinetti outcome measure which is indicative of moderate risk for fall. Other factors to consider for discharge: PICC R UE, recent AICD explantation     Patient will benefit from skilled therapy intervention to address the above noted impairments. PLAN :  Recommendations and Planned Interventions: bed mobility training, transfer training, gait training, therapeutic exercises, edema management/control, patient and family training/education and therapeutic activities      Frequency/Duration: Patient will be followed by physical therapy:  5 times a week to address goals. Recommendation for discharge: (in order for the patient to meet his/her long term goals)  Physical therapy at least 2 days/week in the home     This discharge recommendation:  Has not yet been discussed the attending provider and/or case management    IF patient discharges home will need the following DME: to be determined (TBD)         SUBJECTIVE:   Patient stated I'll do more tomorrow.     OBJECTIVE DATA SUMMARY:   HISTORY:    Past Medical History:   Diagnosis Date    A-fib [facility])     AICD (automatic cardioverter/defibrillator) present 9/23/2020 9/23/2020 [employer] AICD implant    Arthritis     Bilateral pleural effusion 9/18/2020    Chronic pain     Chronic pain     [facility] [address]     Diverticular disease     Elevated troponin 9/18/2020    Hemorrhoid     Hypercholesterolemia     IBS (irritable bowel syndrome)     Lymphocytosis 12/38/5394    Systolic heart failure, chronic ([address] 7/16/2021    Type 2 MI (myocardial infarction) ([facility] [address] 7/16/2021 7/162021 r/t sepsis     Past Surgical History:   Procedure Laterality Date    COLONOSCOPY N/A 10/31/2019    COLONOSCOPY performed by [facility]udh [facility]queda [facility] at [facility]  10/31/2019         Christopher Mendoza  10/31/2019         HX CATARACT REMOVAL      HX CHOLECYSTECTOMY      HX COLONOSCOPY  2009    HX COLONOSCOPY  2016    2 adenomatous polyp    HX HEMORRHOIDECTOMY  2009    HX HYSTERECTOMY      HX KNEE REPLACEMENT      right    HX ORTHOPAEDIC  12/11/2017    back, laminectomy and decompression    IA INSJ/RPLCMT PERM DFB W/TRNSVNS LDS 1/DUAL CHMBR N/A 9/23/2020    INSERT ICD DUAL performed by Elizabeth Villarreal MD at [facility] CARDIAC CATH LAB       Personal factors and/or comorbidities impacting plan of care: recent sepsis and AICD explantation    Home Situation  Home Environment: Private residence  Wheelchair Ramp: Yes  One/Two Story Residence: Two story, live on 1st floor  Living Alone: No  Support Systems: Spouse/Significant Other/Partner  Current DME Used/Available at Home: Mileva Early, jane, Walker, rollator, Commode, bedside, Transfer bench  Tub or Shower Type: Tub/Shower combination    EXAMINATION/PRESENTATION/DECISION MAKING:   Critical Behavior:  Neurologic State: Alert, Drowsy  Orientation Level: Oriented X4  Cognition: Appropriate decision making, Appropriate for age attention/concentration, Follows commands     Hearing:   Auditory  Auditory Impairment: None  Skin:  Blisters/ rash notes bilat LEs  Edema: present L>R foot, L hand  Range Of Motion:  AROM: Within functional limits                       Strength:    Strength: Generally decreased, functional                    Tone & Sensation:   Tone: Normal              Sensation: Intact               Coordination:  Coordination: Within functional limits  Vision:      Functional Mobility:  Bed Mobility:     Supine to Sit: Stand-by assistance;Bed Modified  Sit to Supine: Contact guard assistance  Scooting: Stand-by assistance  Transfers:  Sit to Stand: Contact guard assistance  Stand to Sit: Contact guard assistance                       Balance:   Sitting: Intact  Standing: Impaired  Standing - Static: Good  Standing - Dynamic : Fair  Ambulation/Gait Training:  Distance (ft): 12 Feet (ft)  Assistive Device:  (HHA)  Ambulation - Level of Assistance: Contact guard assistance        Gait Abnormalities: Decreased step clearance;Trunk sway increased              Speed/Lata: Pace decreased (<100 feet/min)  Step Length: Left shortened;Right shortened                  Functional Measure:  Tinetti test:    Sitting Balance: 1  Arises: 1  Attempts to Rise: 2  Immediate Standing Balance: 1  Standing Balance: 1  Nudged: 1  Eyes Closed: 1  Turn 360 Degrees - Continuous/Discontinuous: 1  Turn 360 Degrees - Steady/Unsteady: 1  Sitting Down: 1  Balance Score: 11 Balance total score  Indication of Gait: 1  R Step Length/Height: 1  L Step Length/Height: 1  R Foot Clearance: 1  L Foot Clearance: 1  Step Symmetry: 1  Step Continuity: 1  Path: 1  Trunk: 0  Walking Time: 0  Gait Score: 8 Gait total score  Total Score: 19/28 Overall total score         Tinetti Tool Score Risk of Falls  <19 = High Fall Risk  19-24 = Moderate Fall Risk  25-28 = Low Fall Risk  Tinetti ME. Performance-Oriented Assessment of Mobility Problems in Elderly Patients. Patel 66; [id number].  (Scoring Description: PT Bulletin Feb. 10, 1993)    Older adults: Dianne Tristan et al, 2009; n = 1000 [facility] elderly evaluated with ABC, ZAIN, ADL, and IADL)  · Mean ZAIN score for males aged 69-68 years = 26.21(3.40)  · Mean ZAIN score for females age 69-68 years = 25.16(4.30)  · Mean ZAIN score for males over 80 years = 23.29(6.02)  · Mean ZAIN score for females over 80 years = 17.20(8.32)          Physical Therapy Evaluation Charge Determination   History Examination Presentation Decision-Making   HIGH Complexity :3+ comorbidities / personal factors will impact the outcome/ POC  LOW Complexity : 1-2 Standardized tests and measures addressing body structure, function, activity limitation and / or participation in recreation  LOW Complexity : Stable, uncomplicated  LOW Complexity : FOTO score of  Based on the above components, the patient evaluation is determined to be of the following complexity level: LOW     Pain Rating:  No c/o pain    Activity Tolerance:   Fair and generally fatigued this date    After treatment patient left in no apparent distress:   Supine in bed, Call bell within reach, and Side rails x 3    COMMUNICATION/EDUCATION:   The patients plan of care was discussed with: Registered nurse. Fall prevention education was provided and the patient/caregiver indicated understanding., Patient/family have participated as able in goal setting and plan of care. , and Patient/family agree to work toward stated goals and plan of care.     Thank you for this referral.  fEren Waggoner, PT   Time Calculation: 24 mins

## 2021-08-04 NOTE — ROUTINE PROCESS
TRANSFER - OUT REPORT:    Verbal report given to Maryam(name) on Mollysarah Butt  being transferred to (unit) for routine progression of care       Report consisted of patients Situation, Background, Assessment and   Recommendations(SBAR). Information from the following report(s) SBAR, Kardex, ED Summary and Recent Results was reviewed with the receiving nurse. Lines:   PICC Single Lumen 22/14/58 Right;Basilic (Active)        Opportunity for questions and clarification was provided.       Patient transported with:   Avisena

## 2021-08-04 NOTE — NURSE NAVIGATOR
Chart reviewed by Heart Failure Nurse Navigator. Heart Failure database completed. EF:  Pending; Previous EF 25-30% (echo dated 7/19/21)     ACEi/ARB/ARNi: Diovan 80 mg QD    BB: Coreg 25 mg BID    Aldosterone Antagonist:     Obstructive Sleep Apnea Screening: screening priority 4/advanced age   STOP-BANG score:   Referred to Sleep Medicine:     CRT implanted 9/23/20; removal 7/21/21. NYHA Functional Class III on admission. Heart Failure Teach Back in Patient Education. Heart Failure Avoiding Triggers on Discharge Instructions. Cardiologist: seen by Dr. Radha Barger; Dr Geneva Homans primary cardiologist      Post discharge follow up phone call to be made within 48-72 hours of discharge.

## 2021-08-04 NOTE — CONSULTS
S Cardiology Consult Note    Date of consult:  08/04/21  Date of admission: 8/3/2021  Primary Cardiologist: Dr Fletcher Ralph  Physician Requesting consult: Dr Mansfield Kos / Reason for consult: troponin elevation    History of the presenting illness:  Susanna Montes is a 79 y.o. F who was admitted on 8/3 with shortness of breath. She is a rather vague historian in terms of her acute symptoms. History mostly from chart review. She has had most of her care at [facility] lately. Pertinently she developed an MSSA bacteremia and was felt to have possible ICD infection, so this was removed by Dr Kamilah Raza recently. Her main Cardiologist is Dr Fletcher Ralph and her EP doctor with [facility] group is Dr Susana De Anda.     Apparently she has developed a skin rash as well. We were called due to an elevated troponin level. She has not had any chest pain. Past Medical History:   Diagnosis Date    A-fib [facility])     AICD (automatic cardioverter/defibrillator) present 9/23/2020 9/23/2020 [employer] AICD implant    Arthritis     Bilateral pleural effusion 9/18/2020    Chronic pain     Chronic pain     Diabetes ([facility] [address]     Diverticular disease     Elevated troponin 9/18/2020    Hemorrhoid     Hypercholesterolemia     IBS (irritable bowel syndrome)     Lymphocytosis 10/22/5956    Systolic heart failure, chronic ([address] 7/16/2021    Type 2 MI (myocardial infarction) ([facility] [address] 7/16/2021 7/162021 r/t sepsis       Prior to Admission medications    Medication Sig Start Date End Date Taking? Authorizing Provider   [facility]) 75 mg tablet Take 25 mg by mouth two (2) times a day. Yes Jennifer, MD Cory   cefazolin sodium in 0.9 % NaCl (ceFAZolin in normal saline) 2 gram/100 mL soln IVPB 2 g by IntraVENous route two (2) times a day. Cory Rodriguez MD   magnesium oxide (MAG-OX) 400 mg tablet Take 400 mg by mouth two (2) times a day. Cory Rodriguez MD   TURMERIC PO Take 150 mg by mouth daily.     Cory Rodriguez MD SITagliptin (Januvia) 50 mg tablet Take 50 mg by mouth daily. Provider, Historical   magnesium oxide (MAG-OX) 400 mg tablet Take 400 mg by mouth two (2) times daily as needed (Cramps). Provider, Historical   turmeric-herbal complex no. 278 150 mg cap Take 1 Capsule by mouth daily. Provider, Historical   ergocalciferol (ERGOCALCIFEROL) 1,250 mcg (50,000 unit) capsule Take 50,000 Units by mouth every seven (7) days. On Wednesdays    Provider, Historical   b complex vitamins tablet Take 1 Tablet by mouth daily. Provider, Historical   insulin glargine (Lantus Solostar U-100 Insulin) 100 unit/mL (3 mL) inpn 55 Units by SubCUTAneous route daily. Provider, Historical   valsartan (DIOVAN) 80 mg tablet Take 80 mg by mouth daily. Provider, Historical   semaglutide (OZEMPIC) 0.25 mg/0.2 mL (2 mg/1.5 mL) sub-q pen 0.25 mg by SubCUTAneous route every seven (7) days. Indications: type 2 diabetes mellitus 6/30/21   Rhnia Beltre NP   atorvastatin (LIPITOR) 20 mg tablet TAKE 1 TABLET BY MOUTH  DAILY 6/28/21   Rhina Beltre NP   glipiZIDE (GLUCOTROL) 5 mg tablet TAKE 1 TABLET BY MOUTH  TWICE DAILY 6/28/21   Rhina Beltre NP   furosemide (LASIX) 40 mg tablet TAKE 1 TABLET BY MOUTH  DAILY 6/15/21   Maty Pascal MD   nitroglycerin (NITROSTAT) 0.4 mg SL tablet 1 Tab by SubLINGual route every five (5) minutes as needed for Chest Pain. Up to 3 doses. Patient not taking: Reported on 7/26/2021 2/23/21   Violet Aquino, ANP   carvediloL (COREG) 25 mg tablet Take 1 Tab by mouth two (2) times daily (with meals). 2/21/21   Maty Pascal MD   colestipoL (Colestid) 1 gram tablet Take 1 Tab by mouth two (2) times a day. 10/7/20   Jeffry Claduncan, DO   aspirin delayed-release 81 mg tablet Take 1 Tab by mouth daily. 9/10/20   Maty Pascal MD   vit C/E/Zn/coppr/lutein/zeaxan (PRESERVISION AREDS-2 PO) Take 1 Tablet by mouth two (2) times a day.     Provider, Historical   B.infantis-B.ani-B.long-B.bifi (PROBIOTIC 4X) 10-15 mg TbEC Take  by mouth daily. Provider, Historical   acetaminophen (TYLENOL) 650 mg TbER Take 650 mg by mouth every eight (8) hours. Provider, Historical   omega-3 fatty acids-vitamin e 1,000 mg cap Take 1 Cap by mouth two (2) times a day. 32a day     Provider, Historical   ascorbic acid, vitamin C, (VITAMIN C) 500 mg tablet Take 1,000 mg by mouth daily. Provider, Historical   zinc 50 mg tab tablet Take 50 mg by mouth daily. Provider, Historical       Allergies   Allergen Reactions    Morphine Anaphylaxis    Ultram [Tramadol] Palpitations        Family History   Problem Relation Age of Onset    Colon Cancer Father     Diabetes Mother        Social History     Socioeconomic History    Marital status:      Spouse name: Not on file    Number of children: Not on file    Years of education: Not on file    Highest education level: Not on file   Occupational History    Occupation: retired     Comment: LPN   Tobacco Use    Smoking status: Current Every Day Smoker     Packs/day: 1.00     Years: 55.00     Pack years: 55.00     Types: Cigarettes    Smokeless tobacco: Never Used   Vaping Use    Vaping Use: Never used   Substance and Sexual Activity    Alcohol use: No    Drug use: Never    Sexual activity: Not on file   Other Topics Concern     Service No    Blood Transfusions Yes    Caffeine Concern Yes    Occupational Exposure No    Hobby Hazards No    Sleep Concern Yes    Stress Concern Yes    Weight Concern No    Special Diet No    Back Care Yes    Exercise No    Bike Helmet No     Comment: n/a    Seat Belt Yes    Self-Exams Yes   Social History Narrative    Not on file     Social Determinants of Health     Financial Resource Strain:     Difficulty of Paying Living Expenses:    Food Insecurity:     Worried About Running Out of Food in the Last Year:     Ran Out of Food in the Last Year:    Transportation Needs:     Lack of Transportation (Medical):      Lack of Transportation (Non-Medical):    Physical Activity:     Days of Exercise per Week:     Minutes of Exercise per Session:    Stress:     Feeling of Stress :    Social Connections:     Frequency of Communication with Friends and Family:     Frequency of Social Gatherings with Friends and Family:     Attends Oriental orthodox Services:     Active Member of Clubs or Organizations:     Attends Club or Organization Meetings:     Marital Status:    Intimate Partner Violence:     Fear of Current or Ex-Partner:     Emotionally Abused:     Physically Abused:     Sexually Abused:        Review of Systems   Constitutional: Negative for chills and fever. HENT: Negative for hearing loss and nosebleeds. Eyes: Negative for blurred vision and double vision. Respiratory: Positive for shortness of breath. Negative for cough and sputum production. Cardiovascular: Negative for chest pain. Gastrointestinal: Negative for abdominal pain, nausea and vomiting. Genitourinary: Negative for frequency and urgency. Musculoskeletal: Negative for back pain and joint pain. Skin: Negative for itching and rash. Neurological: Negative for dizziness and headaches. Endo/Heme/Allergies: Negative for environmental allergies. Does not bruise/bleed easily. Visit Vitals  BP (!) 153/74 (BP 1 Location: Left upper arm, BP Patient Position: At rest)   Pulse 97   Temp 97.8 °F (36.6 °C)   Resp 16   Ht 5' 6\" (1.676 m)   Wt 70.9 kg (156 lb 4.8 oz)   SpO2 91%   BMI 25.23 kg/m²     Physical Exam  HENT:      Head: Normocephalic and atraumatic. Eyes:      General: No scleral icterus. Conjunctiva/sclera: Conjunctivae normal.   Neck:      Vascular: No JVD. Cardiovascular:      Rate and Rhythm: Normal rate and regular rhythm. Heart sounds: Normal heart sounds. No murmur heard. No gallop. Pulmonary:      Effort: Pulmonary effort is normal. No respiratory distress. Breath sounds: Normal breath sounds. No stridor.  No wheezing. Abdominal:      General: There is no distension. Palpations: Abdomen is soft. Musculoskeletal:         General: No deformity. Normal range of motion. Skin:     General: Skin is warm and dry. Neurological:      Mental Status: She is alert and oriented to person, place, and time. Psychiatric:         Mood and Affect: Mood and affect normal.         Lab review:  BMP:   Lab Results   Component Value Date/Time     08/04/2021 03:44 AM    K 4.7 08/04/2021 03:44 AM     08/04/2021 03:44 AM    CO2 22 08/04/2021 03:44 AM    AGAP 7 08/04/2021 03:44 AM    GLU 68 08/04/2021 03:44 AM    BUN 30 (H) 08/04/2021 03:44 AM    CREA 2.06 (H) 08/04/2021 03:44 AM    GFRAA 28 (L) 08/04/2021 03:44 AM    GFRNA 23 (L) 08/04/2021 03:44 AM        CBC:  Lab Results   Component Value Date/Time    WBC 12.2 (H) 08/04/2021 03:44 AM    HGB 8.3 (L) 08/04/2021 03:44 AM    HCT 26.8 (L) 08/04/2021 03:44 AM    PLATELET 330 (H) 33/91/2941 03:44 AM    MCV 94.0 08/04/2021 03:44 AM       All Cardiac Markers in the last 24 hours:    Lab Results   Component Value Date/Time    CPK 48 08/04/2021 03:44 AM    CPK 35 08/03/2021 09:44 PM    TROIQ 0.54 (H) 08/04/2021 03:44 AM    TROIQ 0.20 (H) 08/03/2021 11:25 AM       Lab Results   Component Value Date/Time    Cholesterol, total 243 (H) 06/14/2021 09:17 PM    HDL Cholesterol 42 06/14/2021 09:17 PM    LDL,Direct 137 (H) 06/14/2021 09:17 PM    LDL, calculated Not calculated due to elevated triglyceride level 06/14/2021 09:17 PM    VLDL, calculated  06/14/2021 09:17 PM     Calculation not valid with this patient's other Lipid values. Triglyceride 493 (H) 06/14/2021 09:17 PM    CHOL/HDL Ratio 5.8 (H) 06/14/2021 09:17 PM      Blood Cx: no growth to date    Data review:  EKG tracing personally reviewed: NSR with LVH and repolarization changes.      Echocardiogram:  07/16/21    ECHO VALERIA W OR WO CONTRAST 07/19/2021 7/19/2021    Interpretation Summary  · Saline contrast was given to evaluate for intracardiac shunt. · LV: Estimated LVEF is 25 - 30%. Normal wall thickness. Mildly dilated left ventricle. Moderate-to-severely reduced systolic function. · LA: Mildly dilated left atrium. Left atrial appendage velocity is normal (greater than 40 cm/sec). · IAS: No atrial septal defect present. · MV: Mild to moderate mitral valve regurgitation is present. Signed by: Krystin Hillman MD on 7/19/2021  3:45 PM        Assessment:    1. Non-MI troponin elevation  No chest pain and EKG shows LVH changes only  Likely related to infection +/- known CHF and renal dysfunction    2. Bacteremia (MSSA)  S/p pacer / ICD extraction  On IV Abx    3. Chronic systolic heart failure  NYHA class III    4. Chronic non-ischemic cardiomyopathy    5. Type II diabetes    Recommendations / Plan:    No specific cardiac work-up planned  -specifically no stress test / cath needed  -no VALERIA needed either - just done on 7/19  Continue OP treatment of bacteremia  F/u with Dr Michael Bonner and [facility] Cardiology team upon discharge    Signed:  Leti REED  [facility]  Interventional Cardiology  08/04/21

## 2021-08-04 NOTE — PROGRESS NOTES
Occupational Therapy  Chart reviewed; patient currently off unit.  Will retry tomorrow for OT salomon. Miryam Gordillonty OTR/L

## 2021-08-05 ENCOUNTER — APPOINTMENT (OUTPATIENT)
Dept: GENERAL RADIOLOGY | Age: 81
DRG: 291 | End: 2021-08-05
Attending: INTERNAL MEDICINE
Payer: MEDICARE

## 2021-08-05 LAB
ALBUMIN SERPL-MCNC: 2 G/DL (ref 3.5–5)
ALBUMIN/GLOB SERPL: 0.4 {RATIO} (ref 1.1–2.2)
ALP SERPL-CCNC: 60 U/L (ref 45–117)
ALT SERPL-CCNC: 7 U/L (ref 12–78)
ANION GAP SERPL CALC-SCNC: 5 MMOL/L (ref 5–15)
AST SERPL-CCNC: 13 U/L (ref 15–37)
BASOPHILS # BLD: 0.1 K/UL (ref 0–0.1)
BASOPHILS NFR BLD: 1 % (ref 0–1)
BILIRUB SERPL-MCNC: 0.2 MG/DL (ref 0.2–1)
BUN SERPL-MCNC: 41 MG/DL (ref 6–20)
BUN/CREAT SERPL: 19 (ref 12–20)
C-ANCA TITR SER IF: NORMAL TITER
CALCIUM SERPL-MCNC: 9.3 MG/DL (ref 8.5–10.1)
CHLORIDE SERPL-SCNC: 108 MMOL/L (ref 97–108)
CO2 SERPL-SCNC: 24 MMOL/L (ref 21–32)
CREAT SERPL-MCNC: 2.17 MG/DL (ref 0.55–1.02)
DIFFERENTIAL METHOD BLD: ABNORMAL
EOSINOPHIL # BLD: 0.5 K/UL (ref 0–0.4)
EOSINOPHIL #/AREA URNS HPF: NEGATIVE /[HPF]
EOSINOPHIL NFR BLD: 4 % (ref 0–7)
ERYTHROCYTE [DISTWIDTH] IN BLOOD BY AUTOMATED COUNT: 14.1 % (ref 11.5–14.5)
GLOBULIN SER CALC-MCNC: 5.3 G/DL (ref 2–4)
GLUCOSE BLD STRIP.AUTO-MCNC: 105 MG/DL (ref 65–117)
GLUCOSE BLD STRIP.AUTO-MCNC: 166 MG/DL (ref 65–117)
GLUCOSE BLD STRIP.AUTO-MCNC: 168 MG/DL (ref 65–117)
GLUCOSE BLD STRIP.AUTO-MCNC: 203 MG/DL (ref 65–117)
GLUCOSE SERPL-MCNC: 87 MG/DL (ref 65–100)
HCT VFR BLD AUTO: 26.2 % (ref 35–47)
HGB BLD-MCNC: 8.3 G/DL (ref 11.5–16)
IMM GRANULOCYTES # BLD AUTO: 0.1 K/UL (ref 0–0.04)
IMM GRANULOCYTES NFR BLD AUTO: 0 % (ref 0–0.5)
LYMPHOCYTES # BLD: 3.7 K/UL (ref 0.8–3.5)
LYMPHOCYTES NFR BLD: 27 % (ref 12–49)
MAGNESIUM SERPL-MCNC: 2.3 MG/DL (ref 1.6–2.4)
MCH RBC QN AUTO: 29.1 PG (ref 26–34)
MCHC RBC AUTO-ENTMCNC: 31.7 G/DL (ref 30–36.5)
MCV RBC AUTO: 91.9 FL (ref 80–99)
MONOCYTES # BLD: 0.7 K/UL (ref 0–1)
MONOCYTES NFR BLD: 5 % (ref 5–13)
MYELOPEROXIDASE AB SER IA-ACNC: <9 U/ML (ref 0–9)
NEUTS SEG # BLD: 8.4 K/UL (ref 1.8–8)
NEUTS SEG NFR BLD: 63 % (ref 32–75)
NRBC # BLD: 0 K/UL (ref 0–0.01)
NRBC BLD-RTO: 0 PER 100 WBC
P-ANCA ATYPICAL TITR SER IF: NORMAL TITER
P-ANCA TITR SER IF: NORMAL TITER
PHOSPHATE SERPL-MCNC: 3.9 MG/DL (ref 2.6–4.7)
PLATELET # BLD AUTO: 561 K/UL (ref 150–400)
PMV BLD AUTO: 9.8 FL (ref 8.9–12.9)
POTASSIUM SERPL-SCNC: 4.7 MMOL/L (ref 3.5–5.1)
PROT SERPL-MCNC: 7.3 G/DL (ref 6.4–8.2)
PROTEINASE3 AB SER IA-ACNC: <3.5 U/ML (ref 0–3.5)
RBC # BLD AUTO: 2.85 M/UL (ref 3.8–5.2)
SERVICE CMNT-IMP: ABNORMAL
SERVICE CMNT-IMP: NORMAL
SODIUM SERPL-SCNC: 137 MMOL/L (ref 136–145)
WBC # BLD AUTO: 13.5 K/UL (ref 3.6–11)

## 2021-08-05 PROCEDURE — 74011250637 HC RX REV CODE- 250/637: Performed by: INTERNAL MEDICINE

## 2021-08-05 PROCEDURE — 85025 COMPLETE CBC W/AUTO DIFF WBC: CPT

## 2021-08-05 PROCEDURE — 84100 ASSAY OF PHOSPHORUS: CPT

## 2021-08-05 PROCEDURE — 65660000000 HC RM CCU STEPDOWN

## 2021-08-05 PROCEDURE — 74011250636 HC RX REV CODE- 250/636: Performed by: INTERNAL MEDICINE

## 2021-08-05 PROCEDURE — 99232 SBSQ HOSP IP/OBS MODERATE 35: CPT | Performed by: INTERNAL MEDICINE

## 2021-08-05 PROCEDURE — 71045 X-RAY EXAM CHEST 1 VIEW: CPT

## 2021-08-05 PROCEDURE — 83735 ASSAY OF MAGNESIUM: CPT

## 2021-08-05 PROCEDURE — 74011636637 HC RX REV CODE- 636/637: Performed by: INTERNAL MEDICINE

## 2021-08-05 PROCEDURE — 97535 SELF CARE MNGMENT TRAINING: CPT

## 2021-08-05 PROCEDURE — 74011000258 HC RX REV CODE- 258: Performed by: INTERNAL MEDICINE

## 2021-08-05 PROCEDURE — 80053 COMPREHEN METABOLIC PANEL: CPT

## 2021-08-05 PROCEDURE — 97166 OT EVAL MOD COMPLEX 45 MIN: CPT

## 2021-08-05 PROCEDURE — 36415 COLL VENOUS BLD VENIPUNCTURE: CPT

## 2021-08-05 PROCEDURE — 74011250637 HC RX REV CODE- 250/637: Performed by: FAMILY MEDICINE

## 2021-08-05 PROCEDURE — 97110 THERAPEUTIC EXERCISES: CPT

## 2021-08-05 PROCEDURE — 82962 GLUCOSE BLOOD TEST: CPT

## 2021-08-05 PROCEDURE — 87205 SMEAR GRAM STAIN: CPT

## 2021-08-05 RX ORDER — OXYCODONE HYDROCHLORIDE 5 MG/1
5 TABLET ORAL
Status: DISCONTINUED | OUTPATIENT
Start: 2021-08-05 | End: 2021-08-06 | Stop reason: HOSPADM

## 2021-08-05 RX ORDER — FUROSEMIDE 40 MG/1
40 TABLET ORAL DAILY
Status: DISCONTINUED | OUTPATIENT
Start: 2021-08-06 | End: 2021-08-06 | Stop reason: HOSPADM

## 2021-08-05 RX ADMIN — FUROSEMIDE 40 MG: 10 INJECTION, SOLUTION INTRAMUSCULAR; INTRAVENOUS at 08:47

## 2021-08-05 RX ADMIN — ASPIRIN 81 MG: 81 TABLET, COATED ORAL at 08:47

## 2021-08-05 RX ADMIN — DAPTOMYCIN 450 MG: 500 INJECTION, POWDER, LYOPHILIZED, FOR SOLUTION INTRAVENOUS at 19:30

## 2021-08-05 RX ADMIN — OXYCODONE HYDROCHLORIDE 5 MG: 5 TABLET ORAL at 19:13

## 2021-08-05 RX ADMIN — VENLAFAXINE 37.5 MG: 37.5 TABLET ORAL at 22:03

## 2021-08-05 RX ADMIN — FERROUS SULFATE TAB 325 MG (65 MG ELEMENTAL FE) 325 MG: 325 (65 FE) TAB at 08:47

## 2021-08-05 RX ADMIN — HEPARIN SODIUM 5000 UNITS: 5000 INJECTION INTRAVENOUS; SUBCUTANEOUS at 06:25

## 2021-08-05 RX ADMIN — CARVEDILOL 25 MG: 12.5 TABLET, FILM COATED ORAL at 17:54

## 2021-08-05 RX ADMIN — CARVEDILOL 25 MG: 12.5 TABLET, FILM COATED ORAL at 08:47

## 2021-08-05 RX ADMIN — ACETAMINOPHEN 650 MG: 325 TABLET ORAL at 14:14

## 2021-08-05 RX ADMIN — INSULIN LISPRO 3 UNITS: 100 INJECTION, SOLUTION INTRAVENOUS; SUBCUTANEOUS at 12:07

## 2021-08-05 RX ADMIN — HEPARIN SODIUM 5000 UNITS: 5000 INJECTION INTRAVENOUS; SUBCUTANEOUS at 22:03

## 2021-08-05 RX ADMIN — VENLAFAXINE 37.5 MG: 37.5 TABLET ORAL at 08:47

## 2021-08-05 RX ADMIN — COLESTIPOL HYDROCHLORIDE 1 G: 1 TABLET, FILM COATED ORAL at 08:47

## 2021-08-05 RX ADMIN — COLESTIPOL HYDROCHLORIDE 1 G: 1 TABLET, FILM COATED ORAL at 17:54

## 2021-08-05 RX ADMIN — Medication 10 ML: at 06:20

## 2021-08-05 RX ADMIN — HEPARIN SODIUM 5000 UNITS: 5000 INJECTION INTRAVENOUS; SUBCUTANEOUS at 14:04

## 2021-08-05 RX ADMIN — ACETAMINOPHEN 650 MG: 325 TABLET ORAL at 00:21

## 2021-08-05 RX ADMIN — INSULIN LISPRO 2 UNITS: 100 INJECTION, SOLUTION INTRAVENOUS; SUBCUTANEOUS at 17:54

## 2021-08-05 RX ADMIN — Medication 10 ML: at 22:00

## 2021-08-05 RX ADMIN — INSULIN GLARGINE 50 UNITS: 100 INJECTION, SOLUTION SUBCUTANEOUS at 22:02

## 2021-08-05 NOTE — PROGRESS NOTES
Infectious Disease Consult Note          HPI : Ms. Oren Ratliff says her rash on legs feel better  Denied pruritis or pain   Says breathing is better  Denied any new symptoms         Medications:  No current facility-administered medications on file prior to encounter. Current Outpatient Medications on File Prior to Encounter   Medication Sig Dispense Refill    venlafaxine (EFFEXOR) 75 mg tablet Take 25 mg by mouth two (2) times a day.  cefazolin sodium in 0.9 % NaCl (ceFAZolin in normal saline) 2 gram/100 mL soln IVPB 2 g by IntraVENous route two (2) times a day.  magnesium oxide (MAG-OX) 400 mg tablet Take 400 mg by mouth two (2) times a day.  TURMERIC PO Take 150 mg by mouth daily.  SITagliptin (Januvia) 50 mg tablet Take 50 mg by mouth daily.  magnesium oxide (MAG-OX) 400 mg tablet Take 400 mg by mouth two (2) times daily as needed (Cramps).  turmeric-herbal complex no. 278 150 mg cap Take 1 Capsule by mouth daily.  ergocalciferol (ERGOCALCIFEROL) 1,250 mcg (50,000 unit) capsule Take 50,000 Units by mouth every seven (7) days. On Wednesdays      b complex vitamins tablet Take 1 Tablet by mouth daily.  insulin glargine (Lantus Solostar U-100 Insulin) 100 unit/mL (3 mL) inpn 55 Units by SubCUTAneous route daily.  valsartan (DIOVAN) 80 mg tablet Take 80 mg by mouth daily.  semaglutide (OZEMPIC) 0.25 mg/0.2 mL (2 mg/1.5 mL) sub-q pen 0.25 mg by SubCUTAneous route every seven (7) days. Indications: type 2 diabetes mellitus 1 Box 1    atorvastatin (LIPITOR) 20 mg tablet TAKE 1 TABLET BY MOUTH  DAILY 90 Tablet 1    glipiZIDE (GLUCOTROL) 5 mg tablet TAKE 1 TABLET BY MOUTH  TWICE DAILY 180 Tablet 1    furosemide (LASIX) 40 mg tablet TAKE 1 TABLET BY MOUTH  DAILY 90 Tablet 1    nitroglycerin (NITROSTAT) 0.4 mg SL tablet 1 Tab by SubLINGual route every five (5) minutes as needed for Chest Pain. Up to 3 doses.  (Patient not taking: Reported on 7/26/2021) 25 Tab 0    carvediloL (COREG) 25 mg tablet Take 1 Tab by mouth two (2) times daily (with meals). 28 Tab 0    colestipoL (Colestid) 1 gram tablet Take 1 Tab by mouth two (2) times a day. 180 Tab 3    aspirin delayed-release 81 mg tablet Take 1 Tab by mouth daily. 90 Tab 1    vit C/E/Zn/coppr/lutein/zeaxan (PRESERVISION AREDS-2 PO) Take 1 Tablet by mouth two (2) times a day.  B.infantis-B.ani-B.long-B.bifi (PROBIOTIC 4X) 10-15 mg TbEC Take  by mouth daily.  acetaminophen (TYLENOL) 650 mg TbER Take 650 mg by mouth every eight (8) hours.  omega-3 fatty acids-vitamin e 1,000 mg cap Take 1 Cap by mouth two (2) times a day. 32a day       ascorbic acid, vitamin C, (VITAMIN C) 500 mg tablet Take 1,000 mg by mouth daily.  zinc 50 mg tab tablet Take 50 mg by mouth daily. Physical Exam:    Vitals:   Patient Vitals for the past 24 hrs:   Temp Pulse Resp BP SpO2   08/05/21 1214 99 °F (37.2 °C) 69 20 130/64 95 %   08/05/21 0814 98.6 °F (37 °C) 94 20 (!) 134/52 96 %   08/05/21 0600  89      08/05/21 0400  94      08/05/21 0340 98.7 °F (37.1 °C) 90 22 (!) 151/73 93 %   08/05/21 0200  89      08/04/21 2350 99.4 °F (37.4 °C) 99 22 (!) 158/72 92 %   08/04/21 2200  91      08/04/21 2100 98.9 °F (37.2 °C) 89 20 115/67 98 %   08/04/21 1644  90 20 (!) 149/76 98 %   08/04/21 1543 99 °F (37.2 °C) 86 16 137/74 96 %     · GEN: NAD  · HEENT: No scleral icterus, no thrush   · CV: S1, S2 heard , tachy, + L chest pervious ICD site without any surrounding erythema or discharge  · Lungs: trace crackles   · Abdomen: soft, nontender  · Extremities: no edema  · Neuro: Alert, oriented to self, place situation, follows commands   · Skin: no rash on face, chest, back, abdomen or upper extremities.   Lower extremities bilaterally with macular darkly discolored rash with some blister like lesions that have improved and flattened, non tender, no pustules seen ,   · Lines: RUE PICC line, no erythema around site   · MSK no erythema or edema noted of the right wrist or the right knee    Labs:   Recent Results (from the past 24 hour(s))   GLUCOSE, POC    Collection Time: 08/04/21  4:20 PM   Result Value Ref Range    Glucose (POC) 233 (H) 65 - 117 mg/dL    Performed by Johnathon POC    Collection Time: 08/04/21 10:15 PM   Result Value Ref Range    Glucose (POC) 183 (H) 65 - 117 mg/dL    Performed by Angelique Tarango    MAGNESIUM    Collection Time: 08/05/21  6:18 AM   Result Value Ref Range    Magnesium 2.3 1.6 - 2.4 mg/dL   PHOSPHORUS    Collection Time: 08/05/21  6:18 AM   Result Value Ref Range    Phosphorus 3.9 2.6 - 4.7 MG/DL   METABOLIC PANEL, COMPREHENSIVE    Collection Time: 08/05/21  6:18 AM   Result Value Ref Range    Sodium 137 136 - 145 mmol/L    Potassium 4.7 3.5 - 5.1 mmol/L    Chloride 108 97 - 108 mmol/L    CO2 24 21 - 32 mmol/L    Anion gap 5 5 - 15 mmol/L    Glucose 87 65 - 100 mg/dL    BUN 41 (H) 6 - 20 MG/DL    Creatinine 2.17 (H) 0.55 - 1.02 MG/DL    BUN/Creatinine ratio 19 12 - 20      GFR est AA 26 (L) >60 ml/min/1.73m2    GFR est non-AA 22 (L) >60 ml/min/1.73m2    Calcium 9.3 8.5 - 10.1 MG/DL    Bilirubin, total 0.2 0.2 - 1.0 MG/DL    ALT (SGPT) 7 (L) 12 - 78 U/L    AST (SGOT) 13 (L) 15 - 37 U/L    Alk.  phosphatase 60 45 - 117 U/L    Protein, total 7.3 6.4 - 8.2 g/dL    Albumin 2.0 (L) 3.5 - 5.0 g/dL    Globulin 5.3 (H) 2.0 - 4.0 g/dL    A-G Ratio 0.4 (L) 1.1 - 2.2     CBC WITH AUTOMATED DIFF    Collection Time: 08/05/21  6:18 AM   Result Value Ref Range    WBC 13.5 (H) 3.6 - 11.0 K/uL    RBC 2.85 (L) 3.80 - 5.20 M/uL    HGB 8.3 (L) 11.5 - 16.0 g/dL    HCT 26.2 (L) 35.0 - 47.0 %    MCV 91.9 80.0 - 99.0 FL    MCH 29.1 26.0 - 34.0 PG    MCHC 31.7 30.0 - 36.5 g/dL    RDW 14.1 11.5 - 14.5 %    PLATELET 633 (H) 718 - 400 K/uL    MPV 9.8 8.9 - 12.9 FL    NRBC 0.0 0  WBC    ABSOLUTE NRBC 0.00 0.00 - 0.01 K/uL    NEUTROPHILS 63 32 - 75 %    LYMPHOCYTES 27 12 - 49 %    MONOCYTES 5 5 - 13 % EOSINOPHILS 4 0 - 7 %    BASOPHILS 1 0 - 1 %    IMMATURE GRANULOCYTES 0 0.0 - 0.5 %    ABS. NEUTROPHILS 8.4 (H) 1.8 - 8.0 K/UL    ABS. LYMPHOCYTES 3.7 (H) 0.8 - 3.5 K/UL    ABS. MONOCYTES 0.7 0.0 - 1.0 K/UL    ABS. EOSINOPHILS 0.5 (H) 0.0 - 0.4 K/UL    ABS. BASOPHILS 0.1 0.0 - 0.1 K/UL    ABS. IMM. GRANS. 0.1 (H) 0.00 - 0.04 K/UL    DF AUTOMATED     GLUCOSE, POC    Collection Time: 08/05/21  6:25 AM   Result Value Ref Range    Glucose (POC) 105 65 - 117 mg/dL    Performed by [address], POC    Collection Time: 08/05/21 11:01 AM   Result Value Ref Range    Glucose (POC) 203 (H) 65 - 117 mg/dL    Performed by Cora Jaimes        Microbiology Data:       Blood:8/3/21 pending    Blood 7/15/21  Staphylococcus aureus    Antibiotic Interpretation Value Method Comment   Clindamycin ($) Resistant >=4 ug/mL NIA    Daptomycin ($$$$$) Susceptible 0.25 ug/mL NIA    Erythromycin ($$$$) Resistant >=8 ug/mL NIA    Gentamicin ($) Susceptible <=0.5 ug/mL NIA    Linezolid ($$$$$) Susceptible 2 ug/mL NIA    Oxacillin Susceptible 2 ug/mL NIA    Rifampin ($$$$) Susceptible <=0.5 ug/mL NIA Rifampin is not to be used for mono-therapy. Tetracycline Susceptible <=1 ug/mL NIA    Trimeth/Sulfa Susceptible <=10 ug/mL NIA    Vancomycin ($) Susceptible 1 ug/mL NIA    Ciprofloxacin ($) Resistant >=8 ug/mL NIA    Levofloxacin ($) Resistant >=8 ug/mL NIA    Moxifloxacin ($$$$) Resistant >=8 ug/mL NIA    Doxycycline ($$) Susceptible <=0.5 ug/mL NIA             Blood 7/22/21, 7/17/21   Ref Range & Units 7/22/21 0150   Special Requests:   NO SPECIAL REQUESTS    Culture result:   NO GROWTH 6 DAYS        CSF 7/16/21  Specimen Information: Cerebrospinal Fluid         0 Result Notes      (important suggestion)  Newer results are available. Click to view them now.     Ref Range & Units 7/16/21 1420 Resulting Agency   Special Requests:   NO SPECIAL REQUESTS  [facility]   GRAM STAIN   NO WBC'S SEEN  [address]   [address] [facility]   Culture result:   Culture performed on Unspun Fluid  ST. [address]   Culture result:   NO GROWTH 7 DAYS                  0 Result Notes   Ref Range & Units 8/3/21 0230 7/15/21 2115   SARS-CoV-2 NOTD   Not detected  Not detected CM    Comment: Not Detected results do not preclude SARS-CoV-2 infection and should not be used as the sole basis for patient management decisions. Results must be combined with clinical observations, patient history, and epidemiological information. Influenza A by PCR NOTD   Not detected  Not detected    Influenza B by PCR NOTD   Not detected  Not detected CM    Comment: Testing was performed using daisy [place] SARS-CoV-2 and Influenza A/B nucleic acid assay. Pathology Results:    Imaging:   NM lung scan  Narrative & Impression   INDICATION: +dimer, sob, renal failure      EXAM: Nuclear lung scan is performed with posterior ventilatory imaging with  11.6 mCi Xe-133 gas followed by  perfusion imaging in multiple projections with  4.4 mCi Tc 99m MAA IV.     COMPARISON: CXR today     Pulmonary perfusion is uniform with no well-defined wedge shaped segmental or  subsegmental sized defects.     Ventilation is uniform. There is no significant retention of xenon on washout  images.     There is no V/Q mismatch.     IMPRESSION  Normal study. Low probability for pulmonary embolism.            Procedures:   VALERIA 7/19/21  Left Ventricle Normal wall thickness. Mildly dilated left ventricle. The estimated EF is 25 - 30%. Moderate-to-severely reduced systolic function. Left Atrium Mildly dilated left atrium. Left atrial appendage is normal. Appendage velocity is normal (greater than 40 cm/sec). Right Ventricle Normal cavity size, wall thickness and global systolic function. Right Atrium Normal cavity size. Interatrial Septum No atrial septal defect present. No patent foramen ovale visualized. Lipomatous hypertrophy of the interatrial septum. Aortic Valve Normal valve structure, trileaflet valve structure, no stenosis and no regurgitation. Mitral Valve Normal valve structure and no stenosis. There is moderate posterior leaflet calcification. Mild to moderate regurgitation. Tricuspid Valve Normal valve structure, no stenosis and no regurgitation. Aorta There is  large 13 mm x 8 mm protruding plaque observed in the ascending aorta and transverse aorta. Pericardium No evidence of pericardial effusion. Assessment / Plan:         1) B/L Lower extremity rash: Not very typical for antibiotic associated allergic rash given the appearance as well as involvement of the only lower extremities without the rest of the body  Suspect possible vascular phenomenon, embolic phenomenon, immune mediated phenomenon  VALERIA on 7/19/2021 with a large 13 mm x 8 mm protruding plaque in the ascending aorta and transverse aorta  ? Interstitial nephritis wt rash on LE and IVAN ( can be a immune mediated phenomenon as patient was on cephalosporins that can cause)    TTE \"No obvious vegitation but sclerotic changes to aortic valve make determination of presence of vegitation not accurate\"  No plans for VALERIA per cardiology     If change in mental status does not improve/worsens consider MRI to assess for CNS embolic phenomenon  If worsening rash, may need VALERIA repeated but her symptoms are improving today     D/W Dr Daniel Plummer ( patient's ID physician) and will continue daptomycin to finish course     F/U with Dr Daniel Plummer on discharge   Daptomycin IV 8 mg/KG till 9/2/21  Weekly labs for cbc wt diff, cmp, ck and fax results to Dr Nereida Saunders picc at end of completion of antibiotics  Per last Dr Alie Alberts note [place] Median results sent to me by faxing to  [phone]. Call with critical labs at [phone].  \"      2) Hx of MSSA bacteremia on 7/15/2021, status post explantation of [employer] dual-chamber ICD from the left chest using a transvenous approach on 7/21/2021   Await blood cultures  If blood cultures positive recommend removal of PICC line  Currently on daptomycin IV  Check CK and hold statin while on daptomycin    3) hyper kalemia, hyponatremia, IVAN  Plans per primary team  Renally dose antibiotics  ? ? Interstitial nephritis     4) R wrist pain  X ray wt DJD   no focal signs of cellulitis on exam/septic joint    5) Lumbar laminectomy    6) right knee replacement no focal signs of cellulitis           Thank for the opportunity to participate in the care of this patient. Please contact with questions or concerns.            Babs Foster,   3:21 PM

## 2021-08-05 NOTE — PROGRESS NOTES
Bedside shift change report given to mu (oncoming nurse) by Kb Roldan (offgoing nurse). Report included the following information SBAR, Kardex and Intake/Output.

## 2021-08-05 NOTE — PROGRESS NOTES
Bedside shift change report given to Lillie Figueroa RN (oncoming nurse) by Efraín Red RN (offgoing nurse). Report included the following information SBAR and Kardex.

## 2021-08-05 NOTE — PROGRESS NOTES
[facility]                                                                                          Hospitalist Progress Note  Marisa Jones MD  Answering service: [phone] OR [phone] from in house phone              Progress Note    Patient: Jw Vasquez MRN: [id number]  SSN: [id number]    YOB: 1940  Age: 79 y.o. Sex: female      Admit Date: 8/3/2021    LOS: 2 days     Subjective:     Patient presents with CHF exacerbation with right-sided pleural effusion. Patient is being diuresed with Lasix at this time. Overall improving with her breathing. Denies headache, dizziness, chest pain, shortness of breath, nausea, vomiting or abdominal pain. Objective:     Vitals:    08/05/21 0600 08/05/21 0814 08/05/21 1214 08/05/21 1317   BP:  (!) 134/52 130/64    Pulse: 89 94 69    Resp:  20 20    Temp:  98.6 °F (37 °C) 99 °F (37.2 °C)    SpO2:  96% 95%    Weight:    72.3 kg (159 lb 6.4 oz)   Height:            Intake and Output:  Current Shift: 08/05 0701 - 08/05 1900  In: 240 [P.O.:240]  Out: -   Last three shifts: 08/03 1901 - 08/05 0700  In: 240 [P.O.:240]  Out: -     Physical Exam:   GENERAL: alert, cooperative, no distress, appears stated age  THROAT & NECK: normal and no erythema or exudates noted. LUNG: clear to auscultation bilaterally  HEART: regular rate and rhythm, S1, S2 normal, no murmur, click, rub or gallop  ABDOMEN: soft, non-tender. Bowel sounds normal. No masses,  no organomegaly  EXTREMITIES:  extremities normal, atraumatic, no cyanosis or edema  SKIN: Bilateral lower extremity maculopapular rash  NEUROLOGIC: AOx3. PSYCHIATRIC: non focal    Lab/Data Review: All lab results for the last 24 hours reviewed.      Recent Results (from the past 24 hour(s))   GLUCOSE, POC    Collection Time: 08/04/21  4:20 PM   Result Value Ref Range    Glucose (POC) 233 (H) 65 - 117 mg/dL    Performed by ANSLEY Diego    Collection Time: 08/04/21 10:15 PM   Result Value Ref Range    Glucose (POC) 183 (H) 65 - 117 mg/dL    Performed by Venessa Prado    MAGNESIUM    Collection Time: 08/05/21  6:18 AM   Result Value Ref Range    Magnesium 2.3 1.6 - 2.4 mg/dL   PHOSPHORUS    Collection Time: 08/05/21  6:18 AM   Result Value Ref Range    Phosphorus 3.9 2.6 - 4.7 MG/DL   METABOLIC PANEL, COMPREHENSIVE    Collection Time: 08/05/21  6:18 AM   Result Value Ref Range    Sodium 137 136 - 145 mmol/L    Potassium 4.7 3.5 - 5.1 mmol/L    Chloride 108 97 - 108 mmol/L    CO2 24 21 - 32 mmol/L    Anion gap 5 5 - 15 mmol/L    Glucose 87 65 - 100 mg/dL    BUN 41 (H) 6 - 20 MG/DL    Creatinine 2.17 (H) 0.55 - 1.02 MG/DL    BUN/Creatinine ratio 19 12 - 20      GFR est AA 26 (L) >60 ml/min/1.73m2    GFR est non-AA 22 (L) >60 ml/min/1.73m2    Calcium 9.3 8.5 - 10.1 MG/DL    Bilirubin, total 0.2 0.2 - 1.0 MG/DL    ALT (SGPT) 7 (L) 12 - 78 U/L    AST (SGOT) 13 (L) 15 - 37 U/L    Alk. phosphatase 60 45 - 117 U/L    Protein, total 7.3 6.4 - 8.2 g/dL    Albumin 2.0 (L) 3.5 - 5.0 g/dL    Globulin 5.3 (H) 2.0 - 4.0 g/dL    A-G Ratio 0.4 (L) 1.1 - 2.2     CBC WITH AUTOMATED DIFF    Collection Time: 08/05/21  6:18 AM   Result Value Ref Range    WBC 13.5 (H) 3.6 - 11.0 K/uL    RBC 2.85 (L) 3.80 - 5.20 M/uL    HGB 8.3 (L) 11.5 - 16.0 g/dL    HCT 26.2 (L) 35.0 - 47.0 %    MCV 91.9 80.0 - 99.0 FL    MCH 29.1 26.0 - 34.0 PG    MCHC 31.7 30.0 - 36.5 g/dL    RDW 14.1 11.5 - 14.5 %    PLATELET 452 (H) 990 - 400 K/uL    MPV 9.8 8.9 - 12.9 FL    NRBC 0.0 0  WBC    ABSOLUTE NRBC 0.00 0.00 - 0.01 K/uL    NEUTROPHILS 63 32 - 75 %    LYMPHOCYTES 27 12 - 49 %    MONOCYTES 5 5 - 13 %    EOSINOPHILS 4 0 - 7 %    BASOPHILS 1 0 - 1 %    IMMATURE GRANULOCYTES 0 0.0 - 0.5 %    ABS. NEUTROPHILS 8.4 (H) 1.8 - 8.0 K/UL    ABS. LYMPHOCYTES 3.7 (H) 0.8 - 3.5 K/UL    ABS. MONOCYTES 0.7 0.0 - 1.0 K/UL    ABS. EOSINOPHILS 0.5 (H) 0.0 - 0.4 K/UL    ABS. BASOPHILS 0.1 0.0 - 0.1 K/UL    ABS. IMM. GRANS. 0.1 (H) 0.00 - 0.04 K/UL    DF AUTOMATED     GLUCOSE, POC    Collection Time: 08/05/21  6:25 AM   Result Value Ref Range    Glucose (POC) 105 65 - 117 mg/dL    Performed by [address], POC    Collection Time: 08/05/21 11:01 AM   Result Value Ref Range    Glucose (POC) 203 (H) 65 - 117 mg/dL    Performed by Lona Boo         Imaging:    XR CHEST PORT    Result Date: 8/5/2021  EXAM:  XR CHEST PORT INDICATION:  Pleural effusion COMPARISON: August 3, 2021 TECHNIQUE: AP portable upright chest view FINDINGS: Right PICC line terminates in the distal SVC. Heart size and mediastinal contours are normal. No new consolidation, pleural effusion, or pneumothorax. No significant pleural effusion.        Assessment and Plan:     CHF  -Acute on chronic combined systolic and diastolic CHF, NYHA class IV on admission  -Repeat echo this admission showed EF of 45 to 50% with severe grade 3 diastolic dysfunction  -On diuresis with Lasix, nephrology managing diuretics    Pleural effusion  -Right-sided pleural effusion, likely from CHF  -Ultrasound-guided thoracentesis pending  -Check pleural fluid analysis once thoracentesis done    Elevated troponin  -Patient with nonspecific ST T changes on EKG and no other signs or symptoms of acute coronary syndrome  -Cardiology evaluated the patient, no further work-up was planned    IVAN  -IVAN on CKD stage III, fluctuating creatinine  -Renal ultrasound pending  -Continue on IV diuresis, nephrology managing    MSSA bacteremia  -Recent admission for MSSA bacteremia, continuing IV antibiotics  -Cefazolin was switched to daptomycin due to concern of lower extremity rash  -Repeat blood cultures 8/3 shows no growth so far  -Plan to complete IV antibiotics until 9/2, ID following    Anemia  -Anemia of chronic disease as well as iron deficiency  -Continue iron supplement    Hypertension  -Continue Coreg  -ARB on hold for IVAN    Diabetes  -Continue current dose of Lantus along with insulin sliding scale coverage  -Continue monitor    Depression  -Continue SSRI    Discharge disposition: Likely in the next 24 to 48 hours, plan for home with home health.     Signed By: Jorge Henson MD     August 5, 2021

## 2021-08-05 NOTE — PROGRESS NOTES
Physician Progress Note      PATIENT:               Manuel Tellez  CSN #:                  [id number]  :                       1940  ADMIT DATE:       8/3/2021 10:38 AM  DISCH DATE:  RESPONDING  PROVIDER #:        Dimple Apgar MD          QUERY TEXT:    Patient admitted with CHF , noted to have a history of atrial fibrillation. If possible, please document in progress notes and discharge summary further specificity regarding the type of atrial fibrillation: The medical record reflects the following:  Risk Factors: HX a fib  Clinical Indicators:  Past Medical History:  Diagnosis Date  - A-fib [facility])    Treatment: coreg    Thank you,  Claudia Amaro RN, CDI, [facility], CCDS  Certified Clinical   [phone]      Chronic: nonspecific term that could be referring to paroxysmal, persistent, or permanent  Longstanding persistent: persistent and continuous, lasting > 1 year. Paroxysmal - self-terminating or intermittent; resolves with or without intervention within 7 days of onset; may recur with various frequency. Persistent - Fails to terminate within 7 days; Often requires meds or cardioversion to restore to NSR. Permanent - longstanding & persistent; Medication has been ineffective in restoring NSR &/or cardioversion is contraindicated    Definitions per MS-DRG Training Guide and Quick Reference Guide, Buck Copemar 112 5 Diseases and Disorders of the Circulatory System; 2019; [employer]. Software content from the [employer]?  Advanced CDI Transformation Program.  Options provided:  -- Paroxysmal Atrial Fibrillation  -- Longstanding Persistent Atrial Fibrillation  -- Permanent Atrial Fibrillation  -- Persistent Atrial Fibrillation  -- Chronic Atrial Fibrillation, unspecified  -- Other - I will add my own diagnosis  -- Disagree - Not applicable / Not valid  -- Disagree - Clinically unable to determine / Unknown  -- Refer to Clinical Documentation Reviewer    PROVIDER RESPONSE TEXT:    Provider is clinically unable to determine a response to this query.     Query created by: Al Barton on 8/5/2021 2:28 PM      Electronically signed by:  Anika Mclean MD 8/5/2021 4:36 PM

## 2021-08-05 NOTE — PROGRESS NOTES
Problem: Self Care Deficits Care Plan (Adult)  Goal: *Therapy Goal (Edit Goal, Insert Text)  Description: FUNCTIONAL STATUS PRIOR TO ADMISSION: Patient was modified independent for basic ADL tasks and using a rolling walker for functional mobility. Patient reports that spouse manages cooking and med mgmt. She has hired assist for house cleaning    HOME SUPPORT: The patient lived with spouse. Initiated 8/5/2021  1. Patient will perform grooming in stand with modified independence within 7 day(s). 2.  Patient will perform bathing with modified independence within 7 day(s). 3.  Patient will perform lower body dressing with modified independence within 7 day(s). 4.  Patient will perform toilet transfers with modified independence within 7 day(s). 5.  Patient will perform all aspects of toileting with modified independence within 7 day(s). 6.  Patient will participate in upper extremity therapeutic exercise/activities with modified independence for 10 minutes within 7 day(s). 7.  Patient will utilize energy conservation techniques during functional activities with verbal cues within 7 day(s). Outcome: Not Met  OCCUPATIONAL THERAPY EVALUATION  Patient: [place] (40 y.o. female)  Date: 8/5/2021  Primary Diagnosis: Hyperkalemia [E87.5]        Precautions: fall       ASSESSMENT  Based on the objective data described below, the patient presents with increased fall risk due to impaired standing balance, impaired standing tolerance, and general weakness during ADL tasks. Patient was Mod I with ADL tasks, though reports hospitalization over the last 3 + weeks, contributing to overall deconditioning and decline in ADL performance. Patient reports safe home set up with all necessary DME in place.   OT follow up recommended at [place] to ensure safe transition home with reduced fall risk, and decreased risk for readmission     Current Level of Function Impacting Discharge (ADLs/self-care): minimal assistance for ADL in stand and related mobility     Functional Outcome Measure: The patient scored Total: 60/100 on the Barthel Index outcome measure which is indicative of 40% impaired ability to care for basic self needs/dependency on others; inferred 100% dependency on others for instrumental ADLs. Other factors to consider for discharge: spouse available for assistance      Patient will benefit from skilled therapy intervention to address the above noted impairments. PLAN :  Recommendations and Planned Interventions: self care training, functional mobility training, therapeutic exercise, balance training, therapeutic activities, endurance activities, patient education, home safety training, and family training/education    Frequency/Duration: Patient will be followed by occupational therapy 5 times a week to address goals.     Recommendation for discharge: (in order for the patient to meet his/her long term goals)  Occupational therapy at least 2 days/week in the home     This discharge recommendation:  Has been made in collaboration with the attending provider and/or case management    IF patient discharges home will need the following DME: patient owns DME required for discharge       SUBJECTIVE:   Patient pleasant and cooperative     OBJECTIVE DATA SUMMARY:   HISTORY:   Past Medical History:   Diagnosis Date    A-fib [facility])     AICD (automatic cardioverter/defibrillator) present 9/23/2020 9/23/2020 [employer] AICD implant    Arthritis     Bilateral pleural effusion 9/18/2020    Chronic pain     Chronic pain     Diabetes ([facility] [address]     Diverticular disease     Elevated troponin 9/18/2020    Hemorrhoid     Hypercholesterolemia     IBS (irritable bowel syndrome)     Lymphocytosis 40/37/5261    Systolic heart failure, chronic ([address] 7/16/2021    Type 2 MI (myocardial infarction) ([facility] [address] 7/16/2021 7/162021 r/t sepsis     Past Surgical History:   Procedure Laterality Date    COLONOSCOPY N/A 10/31/2019    COLONOSCOPY performed by Divina Messina MD at [address] [place] [address]  10/31/2019         COLONOSCOPY,PAKO Henriquez  10/31/2019         HX CATARACT REMOVAL      HX CHOLECYSTECTOMY      HX COLONOSCOPY  2009    HX COLONOSCOPY  2016    2 adenomatous polyp    HX HEMORRHOIDECTOMY  2009    HX HYSTERECTOMY      HX KNEE REPLACEMENT      right    HX ORTHOPAEDIC  12/11/2017    back, laminectomy and decompression    [place] INSJ/RPLCMT PERM DFB W/TRNSVNS LDS 1/DUAL CHMBR N/A 9/23/2020    INSERT ICD DUAL performed by Rosa Larios MD at [facility] CARDIAC CATH LAB       Expanded or extensive additional review of patient history:     Home Situation  Home Environment: Private residence  Wheelchair Ramp: Yes  One/Two Story Residence: Two story, live on 1st floor  Living Alone: No  Support Systems: Spouse/Significant Other/Partner  Current DME Used/Available at Home: Walker, rolling, Walker, rollator, Commode, bedside, Transfer bench  Tub or Shower Type: Tub/Shower combination    Hand dominance: Right    EXAMINATION OF PERFORMANCE DEFICITS:  Cognitive/Behavioral Status:  Neurologic State: Alert  Orientation Level: Oriented X4  Cognition: Appropriate decision making             Skin:     Edema: bilat feet edema     Hearing: Auditory  Auditory Impairment: None    Vision/Perceptual:              Range of Motion:    AROM: Within functional limits        Strength:    Strength: Generally decreased, functional        Coordination:  Coordination: Within functional limits  Fine Motor Skills-Upper: Left Intact; Right Intact    Gross Motor Skills-Upper: Left Intact; Right Intact    Tone & Sensation:    Tone: Normal  Sensation: Intact           Balance:  Sitting: Intact  Standing: Impaired  Standing - Static: Good  Standing - Dynamic : Fair    Functional Mobility and Transfers for ADLs:  Bed Mobility:  Supine to Sit: Stand-by assistance  Sit to Supine: Stand-by assistance    Transfers:  Sit to Stand: Supervision  Stand to Sit: Supervision  Bathroom Mobility: Minimum assistance    ADL Assessment:  Feeding: Independent    Oral Facial Hygiene/Grooming: Supervision    Bathing: Contact guard assistance    Upper Body Dressing: Setup    Lower Body Dressing: Contact guard assistance    Toileting: Contact guard assistance                ADL Intervention and task modifications:        Patient instructed in fall reduction strategies during ADL in room. Patient noted with 2 times LOB (multi directional) during bathroom mobility with hand held assistance provided. Patient was able to stand pivot transfer safely bed <> BSC during session. Therapeutic Exercise:  Patient instructed in UE/LE AROM exercises (overhead press, chest press, leg kick, marching) completed 5-10 reps each while seated EOB. Aptinet educate don importance of maintaining strength required for independence ADL/mobility. Functional Measure:  Barthel Index:    Bathin  Bladder: 10  Bowels: 10  Groomin  Dressin  Feeding: 10  Mobility: 5  Stairs: 0  Toilet Use: 5  Transfer (Bed to Chair and Back): 10  Total: 60/100        The Barthel ADL Index: Guidelines  1. The index should be used as a record of what a patient does, not as a record of what a patient could do. 2. The main aim is to establish degree of independence from any help, physical or verbal, however minor and for whatever reason. 3. The need for supervision renders the patient not independent. 4. A patient's performance should be established using the best available evidence. Asking the patient, friends/relatives and nurses are the usual sources, but direct observation and common sense are also important. However direct testing is not needed. 5. Usually the patient's performance over the preceding 24-48 hours is important, but occasionally longer periods will be relevant. 6. Middle categories imply that the patient supplies over 50 per cent of the effort.   7. Use of aids to be independent is allowed. Mariah Delay., Barthel, D.W. ([id number]). Functional evaluation: the Barthel Index. [address] (14)2. Duran ALISA Gonzales, Kathaleen Boas., Shahnaz Gardner., Trisha Grimmmateo, [address] (1999). Measuring the change indisability after inpatient rehabilitation; comparison of the responsiveness of the Barthel Index and Functional Young Measure. Journal of Neurology, Neurosurgery, and Psychiatry, 66(4), 865-744. KARINA Cifuentes, ASHLEY Garrett, & Laura Johnson M.A. (2004.) Assessment of post-stroke quality of life in cost-effectiveness studies: The usefulness of the Barthel Index and the EuroQoL-5D. Quality of Life Research, 15, 629-80         Occupational Therapy Evaluation Charge Determination   History Examination Decision-Making   MEDIUM Complexity : Expanded review of history including physical, cognitive and psychosocial  history  MEDIUM Complexity : 3-5 performance deficits relating to physical, cognitive , or psychosocial skils that result in activity limitations and / or participation restrictions MEDIUM Complexity : Patient may present with comorbidities that affect occupational performnce. Miniml to moderate modification of tasks or assistance (eg, physical or verbal ) with assesment(s) is necessary to enable patient to complete evaluation       Based on the above components, the patient evaluation is determined to be of the following complexity level: MEDIUM  Pain Rating:  None reported     Activity Tolerance:   Fair and requires rest breaks    After treatment patient left in no apparent distress:    Supine in bed and Call bell within reach    COMMUNICATION/EDUCATION:   The patients plan of care was discussed with: Registered nurse. Home safety education was provided and the patient/caregiver indicated understanding. and Patient/family agree to work toward stated goals and plan of care. This patients plan of care is appropriate for delegation to [facility].     Thank you for this referral.  Jose Zaragoza, OT  Time Calculation: 25 mins

## 2021-08-05 NOTE — PROGRESS NOTES
Problem: Falls - Risk of  Goal: *Absence of Falls  Description: Document Brenna Marcos Fall Risk and appropriate interventions in the flowsheet. Outcome: Progressing Towards Goal  Note: Fall Risk Interventions:  Mobility Interventions: Communicate number of staff needed for ambulation/transfer              Elimination Interventions: Call light in reach, Patient to call for help with toileting needs              Problem: Pain  Goal: *Control of Pain  Outcome: Progressing Towards Goal     Problem: Pressure Injury - Risk of  Goal: *Prevention of pressure injury  Description: Document Bryan Scale and appropriate interventions in the flowsheet. Outcome: Progressing Towards Goal  Note: Pressure Injury Interventions:             Activity Interventions: Assess need for specialty bed, Increase time out of bed    Mobility Interventions: Float heels, HOB 30 degrees or less    Nutrition Interventions: Document food/fluid/supplement intake

## 2021-08-05 NOTE — PROGRESS NOTES
Transition of Care Plan   RUR: 31% high    Disposition: The disposition plan is home with family assistance and [address] with [address] [place] F/U with PCP/Specialist     Transport: Family    Reason for Readmission:    hyperkalemia         RUR Score/Risk Level:    31% high     PCP: First and Last name:  Bridger Brown   Name of Practice:    Are you a current patient: Yes/No:    Approximate date of last visit:    Can you participate in a virtual visit with your PCP:     Is a Care Conference indicated:  No      Did you attend your follow up appointment (s): If not, why not:   Yes       Resources/supports as identified by patient/family:         & family  Top Challenges facing patient (as identified by patient/family and CM): None  Finances/Medication cost?     No issues  Transportation      Brother  Support system or lack thereof? Has support with family    Living arrangements? Lives with her         Self-care/ADLs/Cognition? Independent       Current Advanced Directive/Advance Care Plan:  Does not have one           [facility]             Transition of Care Plan:    Based on readmission, the patient's previous Plan of Care   has been evaluated and/or modified. The current Transition of Care Plan is:            CRM spoke with patient's , introduced self, explained role, verified demographics, and offered assistance. Patient lives in a home with her  that has a ramp to enter through the back. The patient is independent in her ADL's/IADL's and has a walker to assist with mobility. The patient has mail order medications and her  stated sometimes getting to the pharmacy is difficult as he is wheelchair bound. The patient was recently inpatient from July 14-July 24. Patient went home with [facility] and was still receiving those services upon admission this visit.  The patient received IV Cirpo through [facility] from previous discharge as well. CM sent [facility] a message to notify patient inpatient and orders have changed. CM also sent referral to [facility] for resumption of care.         Readmission Assessment  Number of days since last admission?: 8-30 days  Previous disposition: Home with Home Health  Who is being interviewed?: Caregiver  What was the patient's/caregiver's perception as to why they think they needed to return back to the hospital?: Other (Comment)  Did you visit your Primary Care Physician after you left the hospital, before you returned this time?: Yes  Did you see a specialist, such as Cardiac, Pulmonary, Orthopedic Physician, etc. after you left the hospital?: Yes  Who advised the patient to return to the hospital?: [address] Staff  In our efforts to provide the best possible care to you and others like you, can you think of anything that we could have done to help you after you left the hospital the first time, so that you might not have needed to return so soon?: Other (Comment)    REBEKA Clement

## 2021-08-05 NOTE — PROGRESS NOTES
Patient name: Raymond Comer  MRN: [id number]    Nephrology Progress note:    Assessment:  IVAN on CKD-3b: Cr fluctuating b/w 1.9-2.1mg/dl -> but notable elevation in serum Cr over past several years suggesting underlying CKD. Baseline appears to be 1.4-1.7mg/dl. AIN usually a dx of exclusion-> +LE rash present but not classic for AIN. UA not suggestive. My suspicion is these fluctuations are more due to underlying cardiorenal effects/post ICD explant +ARB therapy     Hyperkalemia: improved     Recent MSSA Bacteremia: s/p ICD explant 7/2021 at [facility].     SOB: Right pleural effusion.      Chronic HFrEF: repeat echo shows improved EF 45-50%     HTN: stable     DM2: last HgbA1c 9.1 (6/2021)     Anemia: hgb suboptimal    Plan/Recommendations:  Change Lasix to oral 40mg daily  Right thoracentesis? Awaiting reading on renal ultrasound  Send off Urine Eos please  IV Abx  Renally adjust new meds  AM labs      Subjective:  Patient feels significantly better. Laying flat in bed.      ROS:   No nausea, no vomiting  No chest pain, no shortness of breath    Exam:  Visit Vitals  BP (!) 134/52 (BP 1 Location: Left upper arm)   Pulse 94   Temp 98.6 °F (37 °C)   Resp 20   Ht 5' 6\" (1.676 m)   Wt 70 kg (154 lb 5.2 oz)   SpO2 96%   BMI 24.91 kg/m²     Wt Readings from Last 3 Encounters:   08/04/21 70 kg (154 lb 5.2 oz)   08/03/21 71.1 kg (156 lb 12 oz)   07/24/21 74.7 kg (164 lb 10.9 oz)     No intake or output data in the 24 hours ending 08/05/21 1058    Gen: NAD  HEENT:  AT/NC  Lungs/Chest wall: Clear. No accessory muscle use. Cardiovascular: Regular rate, normal rhythm. Abdomen/: Soft, NT, ND, BS+. Ext:  No peripheral jovita  CNS: alert and awake.  Answers appropriately      Current Facility-Administered Medications   Medication Dose Route Frequency Last Admin    venlafaxine [facility]) tablet 37.5 mg  37.5 mg Oral Q12H 37.5 mg at 08/05/21 0847    furosemide (LASIX) injection 40 mg  40 mg IntraVENous DAILY 40 mg at 08/05/21 0847    ferrous sulfate tablet 325 mg  1 Tablet Oral DAILY WITH BREAKFAST 325 mg at 08/05/21 0847    sodium chloride (NS) flush 5-40 mL  5-40 mL IntraVENous Q8H 10 mL at 08/05/21 3515    sodium chloride (NS) flush 5-40 mL  5-40 mL IntraVENous PRN      acetaminophen (TYLENOL) tablet 650 mg  650 mg Oral Q6H  mg at 08/05/21 0021    Or    acetaminophen (TYLENOL) suppository 650 mg  650 mg Rectal Q6H PRN      polyethylene glycol (MIRALAX) packet 17 g  17 g Oral DAILY PRN      ondansetron (ZOFRAN ODT) tablet 4 mg  4 mg Oral Q8H PRN      Or    ondansetron (ZOFRAN) injection 4 mg  4 mg IntraVENous Q6H PRN      heparin (porcine) injection 5,000 Units  5,000 Units SubCUTAneous Q8H 5,000 Units at 08/05/21 8540    aspirin delayed-release tablet 81 mg  81 mg Oral DAILY 81 mg at 08/05/21 0847    carvediloL (COREG) tablet 25 mg  25 mg Oral BID WITH MEALS 25 mg at 08/05/21 0847    colestipoL (COLESTID) tablet 1 g  1 g Oral BID 1 g at 08/05/21 0847    insulin lispro (HUMALOG) injection   SubCUTAneous AC&HS 3 Units at 08/04/21 1738    glucose chewable tablet 16 g  4 Tablet Oral PRN      dextrose (D50W) injection syrg 12.5-25 g  12.5-25 g IntraVENous PRN      glucagon (GLUCAGEN) injection 1 mg  1 mg IntraMUSCular PRN      insulin glargine (LANTUS) injection 50 Units  50 Units SubCUTAneous QHS 50 Units at 08/04/21 3685    nicotine (NICODERM CQ) 21 mg/24 hr patch 1 Patch  1 Patch TransDERmal DAILY 1 Patch at 08/05/21 0847    DAPTOmycin (CUBICIN) 450 mg in 0.9% sodium chloride 50 mL IVPB RF formulation  450 mg IntraVENous Q48H 450 mg at 08/03/21 1951       Labs/Data:    Lab Results   Component Value Date/Time    WBC 13.5 (H) 08/05/2021 06:18 AM    HGB 8.3 (L) 08/05/2021 06:18 AM    HCT 26.2 (L) 08/05/2021 06:18 AM    PLATELET 211 (H) 22/80/0681 06:18 AM    MCV 91.9 08/05/2021 06:18 AM       Lab Results   Component Value Date/Time    Sodium 137 08/05/2021 06:18 AM    Potassium 4.7 08/05/2021 06:18 AM    Chloride 108 08/05/2021 06:18 AM    CO2 24 08/05/2021 06:18 AM    Anion gap 5 08/05/2021 06:18 AM    Glucose 87 08/05/2021 06:18 AM    BUN 41 (H) 08/05/2021 06:18 AM    Creatinine 2.17 (H) 08/05/2021 06:18 AM    BUN/Creatinine ratio 19 08/05/2021 06:18 AM    GFR est AA 26 (L) 08/05/2021 06:18 AM    GFR est non-AA 22 (L) 08/05/2021 06:18 AM    Calcium 9.3 08/05/2021 06:18 AM       Patient seen and examined. Chart reviewed. Labs, data and other pertinent notes reviewed in last 24 hrs.     Discussed with patient    Signed by:  Elias Melendez MD  [id number]

## 2021-08-06 VITALS
TEMPERATURE: 98.2 F | RESPIRATION RATE: 18 BRPM | DIASTOLIC BLOOD PRESSURE: 65 MMHG | BODY MASS INDEX: 25.62 KG/M2 | HEIGHT: 66 IN | WEIGHT: 159.4 LBS | HEART RATE: 65 BPM | SYSTOLIC BLOOD PRESSURE: 111 MMHG | OXYGEN SATURATION: 94 %

## 2021-08-06 LAB
ALBUMIN SERPL-MCNC: 2.2 G/DL (ref 3.5–5)
ALBUMIN/GLOB SERPL: 0.4 {RATIO} (ref 1.1–2.2)
ALP SERPL-CCNC: 58 U/L (ref 45–117)
ALT SERPL-CCNC: 8 U/L (ref 12–78)
ANION GAP SERPL CALC-SCNC: 6 MMOL/L (ref 5–15)
AST SERPL-CCNC: 12 U/L (ref 15–37)
ATRIAL RATE: 101 BPM
ATRIAL RATE: 113 BPM
ATRIAL RATE: 115 BPM
BILIRUB SERPL-MCNC: 0.2 MG/DL (ref 0.2–1)
BUN SERPL-MCNC: 45 MG/DL (ref 6–20)
BUN/CREAT SERPL: 22 (ref 12–20)
CALCIUM SERPL-MCNC: 9.3 MG/DL (ref 8.5–10.1)
CALCULATED P AXIS, ECG09: 107 DEGREES
CALCULATED R AXIS, ECG10: 61 DEGREES
CALCULATED R AXIS, ECG10: 63 DEGREES
CALCULATED R AXIS, ECG10: 64 DEGREES
CALCULATED T AXIS, ECG11: -129 DEGREES
CALCULATED T AXIS, ECG11: -46 DEGREES
CALCULATED T AXIS, ECG11: 44 DEGREES
CHLORIDE SERPL-SCNC: 107 MMOL/L (ref 97–108)
CO2 SERPL-SCNC: 24 MMOL/L (ref 21–32)
CREAT SERPL-MCNC: 2.05 MG/DL (ref 0.55–1.02)
DIAGNOSIS, 93000: NORMAL
ERYTHROCYTE [DISTWIDTH] IN BLOOD BY AUTOMATED COUNT: 13.8 % (ref 11.5–14.5)
GLOBULIN SER CALC-MCNC: 5.2 G/DL (ref 2–4)
GLUCOSE BLD STRIP.AUTO-MCNC: 122 MG/DL (ref 65–117)
GLUCOSE BLD STRIP.AUTO-MCNC: 194 MG/DL (ref 65–117)
GLUCOSE BLD STRIP.AUTO-MCNC: 58 MG/DL (ref 65–117)
GLUCOSE SERPL-MCNC: 76 MG/DL (ref 65–100)
HCT VFR BLD AUTO: 26.6 % (ref 35–47)
HGB BLD-MCNC: 8.1 G/DL (ref 11.5–16)
MAGNESIUM SERPL-MCNC: 2.1 MG/DL (ref 1.6–2.4)
MCH RBC QN AUTO: 28.6 PG (ref 26–34)
MCHC RBC AUTO-ENTMCNC: 30.5 G/DL (ref 30–36.5)
MCV RBC AUTO: 94 FL (ref 80–99)
NRBC # BLD: 0 K/UL (ref 0–0.01)
NRBC BLD-RTO: 0 PER 100 WBC
P-R INTERVAL, ECG05: 170 MS
P-R INTERVAL, ECG05: 88 MS
PHOSPHATE SERPL-MCNC: 3.6 MG/DL (ref 2.6–4.7)
PLATELET # BLD AUTO: 535 K/UL (ref 150–400)
PMV BLD AUTO: 9.6 FL (ref 8.9–12.9)
POTASSIUM SERPL-SCNC: 5 MMOL/L (ref 3.5–5.1)
PROT SERPL-MCNC: 7.4 G/DL (ref 6.4–8.2)
Q-T INTERVAL, ECG07: 344 MS
Q-T INTERVAL, ECG07: 344 MS
Q-T INTERVAL, ECG07: 360 MS
QRS DURATION, ECG06: 104 MS
QRS DURATION, ECG06: 106 MS
QRS DURATION, ECG06: 116 MS
QTC CALCULATION (BEZET), ECG08: 466 MS
QTC CALCULATION (BEZET), ECG08: 471 MS
QTC CALCULATION (BEZET), ECG08: 475 MS
RBC # BLD AUTO: 2.83 M/UL (ref 3.8–5.2)
SERVICE CMNT-IMP: ABNORMAL
SODIUM SERPL-SCNC: 137 MMOL/L (ref 136–145)
VENTRICULAR RATE, ECG03: 101 BPM
VENTRICULAR RATE, ECG03: 113 BPM
VENTRICULAR RATE, ECG03: 115 BPM
WBC # BLD AUTO: 11.6 K/UL (ref 3.6–11)

## 2021-08-06 PROCEDURE — 74011250637 HC RX REV CODE- 250/637: Performed by: INTERNAL MEDICINE

## 2021-08-06 PROCEDURE — 97116 GAIT TRAINING THERAPY: CPT

## 2021-08-06 PROCEDURE — 83735 ASSAY OF MAGNESIUM: CPT

## 2021-08-06 PROCEDURE — 84100 ASSAY OF PHOSPHORUS: CPT

## 2021-08-06 PROCEDURE — 36415 COLL VENOUS BLD VENIPUNCTURE: CPT

## 2021-08-06 PROCEDURE — 82962 GLUCOSE BLOOD TEST: CPT

## 2021-08-06 PROCEDURE — 74011636637 HC RX REV CODE- 636/637: Performed by: INTERNAL MEDICINE

## 2021-08-06 PROCEDURE — 74011250636 HC RX REV CODE- 250/636: Performed by: INTERNAL MEDICINE

## 2021-08-06 PROCEDURE — 80053 COMPREHEN METABOLIC PANEL: CPT

## 2021-08-06 PROCEDURE — 99232 SBSQ HOSP IP/OBS MODERATE 35: CPT | Performed by: INTERNAL MEDICINE

## 2021-08-06 PROCEDURE — 85027 COMPLETE CBC AUTOMATED: CPT

## 2021-08-06 RX ORDER — LANOLIN ALCOHOL/MO/W.PET/CERES
325 CREAM (GRAM) TOPICAL
Qty: 30 TABLET | Refills: 0 | Status: SHIPPED | OUTPATIENT
Start: 2021-08-07

## 2021-08-06 RX ORDER — IBUPROFEN 200 MG
1 TABLET ORAL DAILY
Qty: 30 PATCH | Refills: 0 | Status: SHIPPED | OUTPATIENT
Start: 2021-08-07 | End: 2021-09-07

## 2021-08-06 RX ORDER — INSULIN GLARGINE 100 [IU]/ML
50 INJECTION, SOLUTION SUBCUTANEOUS DAILY
Qty: 1 ADJUSTABLE DOSE PRE-FILLED PEN SYRINGE | Refills: 0 | Status: SHIPPED
Start: 2021-08-06 | End: 2021-09-30

## 2021-08-06 RX ADMIN — COLESTIPOL HYDROCHLORIDE 1 G: 1 TABLET, FILM COATED ORAL at 09:11

## 2021-08-06 RX ADMIN — FERROUS SULFATE TAB 325 MG (65 MG ELEMENTAL FE) 325 MG: 325 (65 FE) TAB at 09:11

## 2021-08-06 RX ADMIN — INSULIN LISPRO 2 UNITS: 100 INJECTION, SOLUTION INTRAVENOUS; SUBCUTANEOUS at 13:06

## 2021-08-06 RX ADMIN — HEPARIN SODIUM 5000 UNITS: 5000 INJECTION INTRAVENOUS; SUBCUTANEOUS at 06:46

## 2021-08-06 RX ADMIN — FUROSEMIDE 40 MG: 40 TABLET ORAL at 09:12

## 2021-08-06 RX ADMIN — ASPIRIN 81 MG: 81 TABLET, COATED ORAL at 09:12

## 2021-08-06 RX ADMIN — CARVEDILOL 25 MG: 12.5 TABLET, FILM COATED ORAL at 09:12

## 2021-08-06 RX ADMIN — Medication 10 ML: at 06:00

## 2021-08-06 RX ADMIN — VENLAFAXINE 37.5 MG: 37.5 TABLET ORAL at 09:12

## 2021-08-06 NOTE — PROGRESS NOTES
Infectious Disease Consult Note          HPI : Ms. Osmel Judd says her rash on legs feel better  Says feels a lot better  tolerating antibiotics well   No new symptoms       Medications:  No current facility-administered medications on file prior to encounter. Current Outpatient Medications on File Prior to Encounter   Medication Sig Dispense Refill    venlafaxine (EFFEXOR) 75 mg tablet Take 25 mg by mouth two (2) times a day.  cefazolin sodium in 0.9 % NaCl (ceFAZolin in normal saline) 2 gram/100 mL soln IVPB 2 g by IntraVENous route two (2) times a day.  magnesium oxide (MAG-OX) 400 mg tablet Take 400 mg by mouth two (2) times a day.  TURMERIC PO Take 150 mg by mouth daily.  SITagliptin (Januvia) 50 mg tablet Take 50 mg by mouth daily.  magnesium oxide (MAG-OX) 400 mg tablet Take 400 mg by mouth two (2) times daily as needed (Cramps).  turmeric-herbal complex no. 278 150 mg cap Take 1 Capsule by mouth daily.  ergocalciferol (ERGOCALCIFEROL) 1,250 mcg (50,000 unit) capsule Take 50,000 Units by mouth every seven (7) days. On Wednesdays      b complex vitamins tablet Take 1 Tablet by mouth daily.  insulin glargine (Lantus Solostar U-100 Insulin) 100 unit/mL (3 mL) inpn 55 Units by SubCUTAneous route daily.  valsartan (DIOVAN) 80 mg tablet Take 80 mg by mouth daily.  semaglutide (OZEMPIC) 0.25 mg/0.2 mL (2 mg/1.5 mL) sub-q pen 0.25 mg by SubCUTAneous route every seven (7) days. Indications: type 2 diabetes mellitus 1 Box 1    atorvastatin (LIPITOR) 20 mg tablet TAKE 1 TABLET BY MOUTH  DAILY 90 Tablet 1    glipiZIDE (GLUCOTROL) 5 mg tablet TAKE 1 TABLET BY MOUTH  TWICE DAILY 180 Tablet 1    furosemide (LASIX) 40 mg tablet TAKE 1 TABLET BY MOUTH  DAILY 90 Tablet 1    nitroglycerin (NITROSTAT) 0.4 mg SL tablet 1 Tab by SubLINGual route every five (5) minutes as needed for Chest Pain. Up to 3 doses.  (Patient not taking: Reported on 7/26/2021) 25 Tab 0    carvediloL (COREG) 25 mg tablet Take 1 Tab by mouth two (2) times daily (with meals). 28 Tab 0    colestipoL (Colestid) 1 gram tablet Take 1 Tab by mouth two (2) times a day. 180 Tab 3    aspirin delayed-release 81 mg tablet Take 1 Tab by mouth daily. 90 Tab 1    vit C/E/Zn/coppr/lutein/zeaxan (PRESERVISION AREDS-2 PO) Take 1 Tablet by mouth two (2) times a day.  B.infantis-B.ani-B.long-B.bifi (PROBIOTIC 4X) 10-15 mg TbEC Take  by mouth daily.  acetaminophen (TYLENOL) 650 mg TbER Take 650 mg by mouth every eight (8) hours.  omega-3 fatty acids-vitamin e 1,000 mg cap Take 1 Cap by mouth two (2) times a day. 32a day       ascorbic acid, vitamin C, (VITAMIN C) 500 mg tablet Take 1,000 mg by mouth daily.  zinc 50 mg tab tablet Take 50 mg by mouth daily. Physical Exam:    Vitals:   Patient Vitals for the past 24 hrs:   Temp Pulse Resp BP SpO2   08/06/21 1005  75      08/06/21 0746 98.5 °F (36.9 °C) 84 18 (!) 156/62 95 %   08/06/21 0600  91      08/06/21 0442 98 °F (36.7 °C) 85 18 (!) 175/79 95 %   08/06/21 0358  68      08/06/21 0206  73      08/06/21 0005 98 °F (36.7 °C) 83 17 (!) 175/80 94 %   08/05/21 2206  77      08/05/21 2048  81      08/05/21 2017 98.9 °F (37.2 °C) 80 18 (!) 152/71 95 %   08/05/21 1810  86 20 (!) 176/75 96 %   08/05/21 1603 99.4 °F (37.4 °C) 78 20 (!) 147/60 96 %   08/05/21 1214 99 °F (37.2 °C) 69 20 130/64 95 %     · GEN: NAD  · HEENT: No scleral icterus, no thrush   · CV: S1, S2 heard , tachy, + L chest pervious ICD site without any surrounding erythema or discharge  · Lungs: trace crackles   · Abdomen: soft, nontender  · Extremities: no edema  · Neuro: Alert, oriented to self, place situation, follows commands   · Skin: no rash on face, chest, back, abdomen or upper extremities.   Lower extremities bilaterally with macular darkly discolored rash which are improved since admission  Pictures taken via [employer] after obtaining patient's consent · Lines: RUE PICC line, no erythema around site       Labs:   Recent Results (from the past 24 hour(s))   EOSINOPHILS, URINE    Collection Time: 08/05/21  3:21 PM   Result Value Ref Range    Eosinophils,urine Negative     GLUCOSE, POC    Collection Time: 08/05/21  4:45 PM   Result Value Ref Range    Glucose (POC) 168 (H) 65 - 117 mg/dL    Performed by Gretchen Burkett    GLUCOSE, POC    Collection Time: 08/05/21  9:13 PM   Result Value Ref Range    Glucose (POC) 166 (H) 65 - 117 mg/dL    Performed by [employer]    CBC W/O DIFF    Collection Time: 08/06/21  4:39 AM   Result Value Ref Range    WBC 11.6 (H) 3.6 - 11.0 K/uL    RBC 2.83 (L) 3.80 - 5.20 M/uL    HGB 8.1 (L) 11.5 - 16.0 g/dL    HCT 26.6 (L) 35.0 - 47.0 %    MCV 94.0 80.0 - 99.0 FL    MCH 28.6 26.0 - 34.0 PG    MCHC 30.5 30.0 - 36.5 g/dL    RDW 13.8 11.5 - 14.5 %    PLATELET 595 (H) 599 - 400 K/uL    MPV 9.6 8.9 - 12.9 FL    NRBC 0.0 0  WBC    ABSOLUTE NRBC 0.00 0.00 - 0.01 K/uL   MAGNESIUM    Collection Time: 08/06/21  4:39 AM   Result Value Ref Range    Magnesium 2.1 1.6 - 2.4 mg/dL   PHOSPHORUS    Collection Time: 08/06/21  4:39 AM   Result Value Ref Range    Phosphorus 3.6 2.6 - 4.7 MG/DL   METABOLIC PANEL, COMPREHENSIVE    Collection Time: 08/06/21  4:39 AM   Result Value Ref Range    Sodium 137 136 - 145 mmol/L    Potassium 5.0 3.5 - 5.1 mmol/L    Chloride 107 97 - 108 mmol/L    CO2 24 21 - 32 mmol/L    Anion gap 6 5 - 15 mmol/L    Glucose 76 65 - 100 mg/dL    BUN 45 (H) 6 - 20 MG/DL    Creatinine 2.05 (H) 0.55 - 1.02 MG/DL    BUN/Creatinine ratio 22 (H) 12 - 20      GFR est AA 28 (L) >60 ml/min/1.73m2    GFR est non-AA 23 (L) >60 ml/min/1.73m2    Calcium 9.3 8.5 - 10.1 MG/DL    Bilirubin, total 0.2 0.2 - 1.0 MG/DL    ALT (SGPT) 8 (L) 12 - 78 U/L    AST (SGOT) 12 (L) 15 - 37 U/L    Alk.  phosphatase 58 45 - 117 U/L    Protein, total 7.4 6.4 - 8.2 g/dL    Albumin 2.2 (L) 3.5 - 5.0 g/dL    Globulin 5.2 (H) 2.0 - 4.0 g/dL A-G Ratio 0.4 (L) 1.1 - 2.2     GLUCOSE, POC    Collection Time: 08/06/21  6:24 AM   Result Value Ref Range    Glucose (POC) 58 (L) 65 - 117 mg/dL    Performed by [employer]    GLUCOSE, POC    Collection Time: 08/06/21  6:48 AM   Result Value Ref Range    Glucose (POC) 122 (H) 65 - 117 mg/dL    Performed by [employer]        Microbiology Data:       Blood:8/3/21 pending    Blood 7/15/21  Staphylococcus aureus    Antibiotic Interpretation Value Method Comment   Clindamycin ($) Resistant >=4 ug/mL NIA    Daptomycin ($$$$$) Susceptible 0.25 ug/mL NIA    Erythromycin ($$$$) Resistant >=8 ug/mL NIA    Gentamicin ($) Susceptible <=0.5 ug/mL NIA    Linezolid ($$$$$) Susceptible 2 ug/mL NIA    Oxacillin Susceptible 2 ug/mL NIA    Rifampin ($$$$) Susceptible <=0.5 ug/mL NIA Rifampin is not to be used for mono-therapy. Tetracycline Susceptible <=1 ug/mL NIA    Trimeth/Sulfa Susceptible <=10 ug/mL NIA    Vancomycin ($) Susceptible 1 ug/mL NIA    Ciprofloxacin ($) Resistant >=8 ug/mL NIA    Levofloxacin ($) Resistant >=8 ug/mL NIA    Moxifloxacin ($$$$) Resistant >=8 ug/mL NIA    Doxycycline ($$) Susceptible <=0.5 ug/mL NIA             Blood 7/22/21, 7/17/21   Ref Range & Units 7/22/21 0150   Special Requests:   NO SPECIAL REQUESTS    Culture result:   NO GROWTH 6 DAYS        CSF 7/16/21  Specimen Information: Cerebrospinal Fluid         0 Result Notes      (important suggestion)  Newer results are available. Click to view them now. Ref Range & Units 7/16/21 1425 Resulting Agency   Special Requests:   NO SPECIAL REQUESTS  [facility]   GRAM STAIN   NO WBC'S SEEN  [facility]   Culture result:   Culture performed on Unspun Fluid  [address]    Culture result:   NO GROWTH 7 DAYS                  0 Result Notes   Ref Range & Units 8/3/21 0230 7/15/21 2115   SARS-CoV-2 NOTD   Not detected  Not detected CM    Comment: Not Detected results do not preclude SARS-CoV-2 infection and should not be used as the sole basis for patient management decisions. Results must be combined with clinical observations, patient history, and epidemiological information. Influenza A by PCR NOTD   Not detected  Not detected    Influenza B by PCR NOTD   Not detected  Not detected CM    Comment: Testing was performed using daisy [place] SARS-CoV-2 and Influenza A/B nucleic acid assay. Pathology Results:    Imaging:   NM lung scan  Narrative & Impression   INDICATION: +dimer, sob, renal failure      EXAM: Nuclear lung scan is performed with posterior ventilatory imaging with  11.6 mCi Xe-133 gas followed by  perfusion imaging in multiple projections with  4.4 mCi Tc 99m MAA IV.     COMPARISON: CXR today     Pulmonary perfusion is uniform with no well-defined wedge shaped segmental or  subsegmental sized defects.     Ventilation is uniform. There is no significant retention of xenon on washout  images.     There is no V/Q mismatch.     IMPRESSION  Normal study. Low probability for pulmonary embolism.            Procedures:   VALERIA 7/19/21  Left Ventricle Normal wall thickness. Mildly dilated left ventricle. The estimated EF is 25 - 30%. Moderate-to-severely reduced systolic function. Left Atrium Mildly dilated left atrium. Left atrial appendage is normal. Appendage velocity is normal (greater than 40 cm/sec). Right Ventricle Normal cavity size, wall thickness and global systolic function. Right Atrium Normal cavity size. Interatrial Septum No atrial septal defect present. No patent foramen ovale visualized. Lipomatous hypertrophy of the interatrial septum. Aortic Valve Normal valve structure, trileaflet valve structure, no stenosis and no regurgitation. Mitral Valve Normal valve structure and no stenosis. There is moderate posterior leaflet calcification. Mild to moderate regurgitation.    Tricuspid Valve Normal valve structure, no stenosis and no regurgitation. Aorta There is  large 13 mm x 8 mm protruding plaque observed in the ascending aorta and transverse aorta. Pericardium No evidence of pericardial effusion. Assessment / Plan:         1) B/L Lower extremity rash: Not very typical for antibiotic associated allergic rash given the appearance as well as involvement of the only lower extremities without the rest of the body  Suspect possible vascular phenomenon, embolic phenomenon, immune mediated phenomenon  VALERIA on 7/19/2021 with a large 13 mm x 8 mm protruding plaque in the ascending aorta and transverse aorta  ? Interstitial nephritis wt rash on LE and IVAN ( can be a immune mediated phenomenon as patient was on cephalosporins that can cause)    TTE \"No obvious vegitation but sclerotic changes to aortic valve make determination of presence of vegitation not accurate\"  No plans for VALERIA per cardiology     If change in mental status does not improve/worsens consider MRI to assess for CNS embolic phenomenon  If worsening rash, may need VALERIA repeated but her symptoms are improving today     D/W Dr Jake Ovalle ( patient's ID physician) and will continue daptomycin to finish course     F/U with Dr Jake Ovalle on discharge   Daptomycin IV 8 mg/KG till 9/2/21  Weekly labs for cbc wt diff, cmp, ck and fax results to Dr Bean Cui picc at end of completion of antibiotics  Per last Dr Song Jackson note Rose Mary Ashraf results sent to me by faxing to  [phone]. Call with critical labs at [phone].  \"      2) Hx of MSSA bacteremia on 7/15/2021, status post explantation of [employer] dual-chamber ICD from the left chest using a transvenous approach on 7/21/2021   Await blood cultures  If blood cultures positive recommend removal of PICC line  Currently on daptomycin IV  Check CK and hold statin while on daptomycin    3) IVAN   Plans per primary team  Renally dose antibiotics  Reviewed renal notes and not classic for AIN per renal     4) R wrist pain  X ray wt DJD no focal signs of cellulitis on exam/septic joint    5) Lumbar laminectomy    6) right knee replacement no focal signs of cellulitis       Will sign off . Thank for the opportunity to participate in the care of this patient. Please contact with questions or concerns.            Babs Foster DO  11:03 AM

## 2021-08-06 NOTE — PROGRESS NOTES
Problem: Falls - Risk of  Goal: *Absence of Falls  Description: Document [place] Fall Risk and appropriate interventions in the flowsheet. Outcome: Resolved/Not Met  Note: Fall Risk Interventions:  Mobility Interventions: Communicate number of staff needed for ambulation/transfer         Medication Interventions: Patient to call before getting OOB    Elimination Interventions: Call light in reach              Problem: Patient Education: Go to Patient Education Activity  Goal: Patient/Family Education  Outcome: Resolved/Not Met     Problem: Pain  Goal: *Control of Pain  Outcome: Resolved/Not Met     Problem: Patient Education: Go to Patient Education Activity  Goal: Patient/Family Education  Outcome: Resolved/Not Met     Problem: Pressure Injury - Risk of  Goal: *Prevention of pressure injury  Description: Document Bryan Scale and appropriate interventions in the flowsheet.   Outcome: Resolved/Not Met  Note: Pressure Injury Interventions:       Moisture Interventions: Absorbent underpads    Activity Interventions: Pressure redistribution bed/mattress(bed type)    Mobility Interventions: Pressure redistribution bed/mattress (bed type)    Nutrition Interventions: Document food/fluid/supplement intake    Friction and Shear Interventions: Apply protective barrier, creams and emollients                Problem: Patient Education: Go to Patient Education Activity  Goal: Patient/Family Education  Outcome: Resolved/Not Met     Problem: Patient Education: Go to Patient Education Activity  Goal: Patient/Family Education  Outcome: Resolved/Not Met     Problem: Patient Education: Go to Patient Education Activity  Goal: Patient/Family Education  Outcome: Resolved/Not Met     Problem: Discharge Planning  Goal: *Discharge to safe environment  Outcome: Resolved/Not Met

## 2021-08-06 NOTE — PROGRESS NOTES
Patient name: Herminia Pearson  MRN: [id number]    Nephrology Progress note:    Assessment:  IVAN on CKD-3b: Cr fluctuating b/w 1.9-2.1mg/dl -> but notable elevation in serum Cr over past several years suggesting underlying CKD. Prior baseline appears to be 1.4-1.7mg/dl. AIN usually a dx of exclusion-> +LE rash present but not classic for AIN. UA not suggestive/Urine Eos neg. My suspicion is these fluctuations are more due to underlying cardiorenal effects/post ICD explant +ARB therapy     Hyperkalemia: improved     Recent MSSA Bacteremia: s/p ICD explant 7/2021 at [address]     SOB: Right pleural effusion.      Chronic HFrEF: repeat echo shows improved EF 45-50%     HTN: stable     DM2: last HgbA1c 9.1 (6/2021)     Anemia: hgb sub-optimal 8.1    Plan/Recommendations:  Stable for discharge from renal standpoint-> would benefit from outpatient CKD follow up  Ct Lasix 40mg PO daily  Awaiting reading on renal ultrasound  Abx  Renally adjust new meds  AM labs      Subjective:  \" I feel great\" Slept well overnight. Laying flat in bed.    NO SOB    ROS:   No nausea, no vomiting  No chest pain    Exam:  Visit Vitals  BP (!) 156/62 (BP 1 Location: Left upper arm, BP Patient Position: At rest;Lying right side)   Pulse 75   Temp 98.5 °F (36.9 °C)   Resp 18   Ht 5' 6\" (1.676 m)   Wt 72.3 kg (159 lb 6.4 oz)   SpO2 95%   BMI 25.73 kg/m²     Wt Readings from Last 3 Encounters:   08/05/21 72.3 kg (159 lb 6.4 oz)   08/03/21 71.1 kg (156 lb 12 oz)   07/24/21 74.7 kg (164 lb 10.9 oz)       Intake/Output Summary (Last 24 hours) at 8/6/2021 1020  Last data filed at 8/6/2021 0949  Gross per 24 hour   Intake 480 ml   Output    Net 480 ml       Gen: NAD  HEENT:  AT/NC  Lungs/Chest wall: Clear. No accessory muscle use. Cardiovascular: Regular rate, normal rhythm. Abdomen/: Soft, NT, ND, BS+. Ext:  No peripheral jovita  CNS: alert and awake.  Answers appropriately      Current Facility-Administered Medications   Medication Dose Route Frequency Last Admin    furosemide (LASIX) tablet 40 mg  40 mg Oral DAILY 40 mg at 08/06/21 0912    oxyCODONE IR (ROXICODONE) tablet 5 mg  5 mg Oral Q4H PRN 5 mg at 08/05/21 1913    venlafaxine [facility]) tablet 37.5 mg  37.5 mg Oral Q12H 37.5 mg at 08/06/21 0912    ferrous sulfate tablet 325 mg  1 Tablet Oral DAILY WITH BREAKFAST 325 mg at 08/06/21 0911    sodium chloride (NS) flush 5-40 mL  5-40 mL IntraVENous Q8H 10 mL at 08/06/21 0600    sodium chloride (NS) flush 5-40 mL  5-40 mL IntraVENous PRN      acetaminophen (TYLENOL) tablet 650 mg  650 mg Oral Q6H  mg at 08/05/21 1414    Or    acetaminophen (TYLENOL) suppository 650 mg  650 mg Rectal Q6H PRN      polyethylene glycol (MIRALAX) packet 17 g  17 g Oral DAILY PRN      ondansetron (ZOFRAN ODT) tablet 4 mg  4 mg Oral Q8H PRN      Or    ondansetron (ZOFRAN) injection 4 mg  4 mg IntraVENous Q6H PRN      heparin (porcine) injection 5,000 Units  5,000 Units SubCUTAneous Q8H 5,000 Units at 08/06/21 0646    aspirin delayed-release tablet 81 mg  81 mg Oral DAILY 81 mg at 08/06/21 0912    carvediloL (COREG) tablet 25 mg  25 mg Oral BID WITH MEALS 25 mg at 08/06/21 0912    colestipoL (COLESTID) tablet 1 g  1 g Oral BID 1 g at 08/06/21 0911    insulin lispro (HUMALOG) injection   SubCUTAneous AC&HS 2 Units at 08/05/21 1754    glucose chewable tablet 16 g  4 Tablet Oral PRN      dextrose (D50W) injection syrg 12.5-25 g  12.5-25 g IntraVENous PRN      glucagon (GLUCAGEN) injection 1 mg  1 mg IntraMUSCular PRN      insulin glargine (LANTUS) injection 50 Units  50 Units SubCUTAneous QHS 50 Units at 08/05/21 2202    nicotine (NICODERM CQ) 21 mg/24 hr patch 1 Patch  1 Patch TransDERmal DAILY 1 Patch at 08/06/21 0912    DAPTOmycin (CUBICIN) 450 mg in 0.9% sodium chloride 50 mL IVPB RF formulation  450 mg IntraVENous Q48H 450 mg at 08/05/21 1930       Labs/Data:    Lab Results   Component Value Date/Time    WBC 11.6 (H) 08/06/2021 04:39 AM    HGB 8.1 (L) 08/06/2021 04:39 AM    HCT 26.6 (L) 08/06/2021 04:39 AM    PLATELET 479 (H) 23/90/6218 04:39 AM    MCV 94.0 08/06/2021 04:39 AM       Lab Results   Component Value Date/Time    Sodium 137 08/06/2021 04:39 AM    Potassium 5.0 08/06/2021 04:39 AM    Chloride 107 08/06/2021 04:39 AM    CO2 24 08/06/2021 04:39 AM    Anion gap 6 08/06/2021 04:39 AM    Glucose 76 08/06/2021 04:39 AM    BUN 45 (H) 08/06/2021 04:39 AM    Creatinine 2.05 (H) 08/06/2021 04:39 AM    BUN/Creatinine ratio 22 (H) 08/06/2021 04:39 AM    GFR est AA 28 (L) 08/06/2021 04:39 AM    GFR est non-AA 23 (L) 08/06/2021 04:39 AM    Calcium 9.3 08/06/2021 04:39 AM       Patient seen and examined. Chart reviewed. Labs, data and other pertinent notes reviewed in last 24 hrs.     Discussed with patient and Dr. Weaver People by:  Richie Lino MD  [address]

## 2021-08-06 NOTE — PROGRESS NOTES
Hospital follow-up PCP transitional care appointment has been scheduled with Rome Nuñez NP for Tuesday, 8/10/21 at 2:40 p.m. Pending patient discharge.   Sree Serna, Care Management Specialist.

## 2021-08-06 NOTE — PROGRESS NOTES
Problem: Mobility Impaired (Adult and Pediatric)  Goal: *Acute Goals and Plan of Care (Insert Text)  Description: FUNCTIONAL STATUS PRIOR TO ADMISSION: Patient was modified independent using a rollator intermittently for functional mobility. Of note, pt's  uses w/c for mobility but assists pt with medications. Pt recently hospitalized with sepis and had AICD removed 7/21/2021. Returned home with [place] PT who recommended pt transition to outpt PT.    [facility]: The patient lived with  who provides support as needed. Physical Therapy Goals  Initiated 8/4/2021  1. Patient will move from supine to sit and sit to supine  in bed with independence within 7 day(s). 2.  Patient will transfer from bed to chair and chair to bed with modified independence using the least restrictive device within 7 day(s). 3.  Patient will perform sit to stand with modified independence within 7 day(s). 4.  Patient will ambulate with supervision/set-up for 100 feet with the least restrictive device within 7 day(s). Outcome: Progressing Towards Goal    PHYSICAL THERAPY TREATMENT  Patient: Fatou Solis (46 y.o. female)  Date: 8/6/2021  Diagnosis: Hyperkalemia [E87.5] <principal problem not specified>      Precautions:    Chart, physical therapy assessment, plan of care and goals were reviewed. ASSESSMENT  Patient continues with skilled PT services and is progressing towards goals. Pt.received supine in bed and agreeable to therapy. Pt.came to EOB, and standing with supervision but required VC to push from the bed rather the RW. Pt.ambulated 150ft with CGA and RW, required a few rest breaks towards the end due to LE weakness and SOB. Pt.required frequent VC to keep RW close when ambulating. Pt.returned back to her room where she sat in her chair requiring supervision and all needs in reach.      Current Level of Function Impacting Discharge (mobility/balance): CGA    Other factors to consider for discharge: LE weakness, activity tolerance         PLAN :  Patient continues to benefit from skilled intervention to address the above impairments. Continue treatment per established plan of care. to address goals. Recommendation for discharge: (in order for the patient to meet his/her long term goals)  Physical therapy at least 2 days/week in the home AND ensure assist and/or supervision for safety with mobility and ADLs    This discharge recommendation:  Has been made in collaboration with the attending provider and/or case management    IF patient discharges home will need the following DME: patient owns DME required for discharge       SUBJECTIVE:   Patient stated yes!, lets go.     OBJECTIVE DATA SUMMARY:   Critical Behavior:  Neurologic State: Alert  Orientation Level: Oriented X4  Cognition: Appropriate decision making     Functional Mobility Training:  Bed Mobility:        Sit to Supine: Stand-by assistance  Scooting: Independent        Transfers:  Sit to Stand: Supervision  Stand to Sit: Supervision                             Balance:  Sitting: Intact  Standing: Impaired  Standing - Static: Good  Standing - Dynamic : Fair  Ambulation/Gait Training:  Distance (ft): 150 Feet (ft)  Assistive Device: Gait belt;Walker, rolling  Ambulation - Level of Assistance: Contact guard assistance                       Speed/Lata: Pace decreased (<100 feet/min)  Step Length: Left shortened;Right shortened                    Stairs: Therapeutic Exercises:     Pain Rating:  none    Activity Tolerance:   Fair, requires frequent rest breaks, and observed SOB with activity    After treatment patient left in no apparent distress:   Sitting in chair and Call bell within reach    COMMUNICATION/COLLABORATION:   The patients plan of care was discussed with: Registered nursePaul Ronelor., SPTA   Time Calculation: 18 mins

## 2021-08-06 NOTE — DISCHARGE SUMMARY
Discharge Summary       PATIENT ID: Fermín Prescott  MRN: [id number]   YOB: 1940    DATE OF ADMISSION: 8/3/2021 10:38 AM    DATE OF DISCHARGE: 08/06/2021   PRIMARY CARE PROVIDER: Maria Dolores Stevens NP     ATTENDING PHYSICIAN: Haile Warner  DISCHARGING PROVIDER: Chely Sykes MD    To contact this individual call [phone] and ask the  to page. If unavailable ask to be transferred the Adult Hospitalist Department. CONSULTATIONS: IP CONSULT TO CARDIOLOGY  IP CONSULT TO INFECTIOUS DISEASES  IP CONSULT TO NEPHROLOGY    PROCEDURES/SURGERIES: * No surgery found *    ADMITTING DIAGNOSES & HOSPITAL COURSE:     HPI  A 79year old female patient with PMH of CHF, DM, HTN, CKD stage 3, SSS s/p PPM which was recently removed on 7/21 due to MSSA bacteremia, she was discharged on PICC with IV cefazolin until 9/2/2021; presented to [address] [place] [address] ED for evaluation of shortness of breath. Pt noticed sob while laying down, unable to sleep and so called EMS. She denied chest pain, sob on exertion or palpitations. She also has been having rash on her legs which is getting worse, rash started since hospital discharge on 7/24. She denied any chest pain, cough, fever, abd pain, urinary or bowel changes. In [address] [place] [address] ED, Potassium was 6.2 - she was given carly gluconate, Sod bicarb, Insulin+ dextrose, Kayexalate. Elevated D dimer - given full dose of lovenox. One dose of IV ceftriaxone given for rash?/ infection. In [facility] ED, VQ scan done which showed low probability PE rpt labs showed normal K, cr improved. Hospitalist consulted for admission. Hospital Course  1. CHF  Patient presents with acute on chronic combined systolic and diastolic CHF, NYHA class IV on admission and NYHA class II on discharge. Repeat echocardiogram this admission shows EF of 45 to 50% with severe grade 3 diastolic dysfunction. Also initially presented with elevated troponin, EKG shows nonspecific ST-T changes.  No symptoms of acute coronary syndrome. Cardiology evaluated the patient and no further cardiac work-up is planned. Patient was diuresed with Lasix and overall improved. Patient with right pleural effusion on presentation but improving with diuresis and not large enough to do thoracentesis. Patient to follow-up with cardiology as outpatient. 2. IVAN  Patient with IVAN on CKD stage III with fluctuating creatinine. Nephrology follows the patient, patient continues with IV diuresis. Nephrology thinks that probable IVAN due to cardiorenal syndrome as well as being on ARB prior to presentation. Renal function stabilized. Patient was not seen by nephrology in the past. I have recommended following with a nephrologist. Patient will discuss with her PCP and find one. 3. Bacteremia  Patient with MSSA bacteremia diagnosed on previous admission, s/p ICD extraction. Initial plan was to continue cefazolin 9/2. However during this admission, while continuing cefazolin, patient was noted to have bilateral lower extremity rash. ID thinks that is not very typical for antibiotic associated allergic rash. However for safety, antibiotics was changed from cefazolin to daptomycin. Patient to continue daptomycin as outpatient till 9/2. Patient to follow-up with infectious disease as outpatient. Further orders per infectious disease. 4. Hypertension  Patient to continue with Coreg. Holding ARB due to IVAN. Blood pressure is stable. Follow-up with PCP as scheduled. 5. Diabetes  Patient to continue Lantus but hold the rest of the medications. Blood sugars has been stable. Follow-up with PCP as scheduled. DISCHARGE DIAGNOSES / PLAN:      1. Congestive heart failure  2. IVAN  3. Elevated troponin  4. MSSA bacteremia  5. Anemia  6. Hypertension  7. Diabetes  8. Depression     ADDITIONAL CARE RECOMMENDATIONS:     Follow-up with PCP in 1 week. Follow-up with ID as scheduled.     Patient to discuss with PCP and to arrange follow-up with nephrology as outpatient. PENDING TEST RESULTS:   At the time of discharge the following test results are still pending: None    FOLLOW UP APPOINTMENTS:    Follow-up Information     Follow up With Specialties Details Why Contact Info    Thalia Neely NP Nurse Practitioner   Tabitha Camacho  [address] [facility] [address] [phone]               DIET: Diabetic Diet  Oral Nutritional Supplements: No Oral Supplement prescribed    ACTIVITY: Activity as tolerated    WOUND CARE: None    EQUIPMENT needed: None      DISCHARGE MEDICATIONS:  Current Discharge Medication List      START taking these medications    Details   DAPTOmycin (CUBICIN RF) IVPB 450 mg by IntraVENous route every fourty-eight (48) hours. Qty: 1 Dose, Refills: 0  Start date: 8/7/2021      ferrous sulfate 325 mg (65 mg iron) tablet Take 1 Tablet by mouth daily (with breakfast). Qty: 30 Tablet, Refills: 0  Start date: 8/7/2021      nicotine (NICODERM CQ) 21 mg/24 hr 1 Patch by TransDERmal route daily for 30 days. Qty: 30 Patch, Refills: 0  Start date: 8/7/2021, End date: 9/6/2021         CONTINUE these medications which have CHANGED    Details   insulin glargine (Lantus Solostar U-100 Insulin) 100 unit/mL (3 mL) inpn 50 Units by SubCUTAneous route daily. Qty: 1 Adjustable Dose Pre-filled Pen Syringe, Refills: 0  Start date: 8/6/2021         CONTINUE these medications which have NOT CHANGED    Details   venlafaxine (EFFEXOR) 75 mg tablet Take 25 mg by mouth two (2) times a day. ergocalciferol (ERGOCALCIFEROL) 1,250 mcg (50,000 unit) capsule Take 50,000 Units by mouth every seven (7) days. On Wednesdays      b complex vitamins tablet Take 1 Tablet by mouth daily. furosemide (LASIX) 40 mg tablet TAKE 1 TABLET BY MOUTH  DAILY  Qty: 90 Tablet, Refills: 1      nitroglycerin (NITROSTAT) 0.4 mg SL tablet 1 Tab by SubLINGual route every five (5) minutes as needed for Chest Pain. Up to 3 doses.   Qty: 25 Tab, Refills: 0      carvediloL (COREG) 25 mg tablet Take 1 Tab by mouth two (2) times daily (with meals). Qty: 28 Tab, Refills: 0    Comments: Waiting for mail order      colestipoL (Colestid) 1 gram tablet Take 1 Tab by mouth two (2) times a day. Qty: 180 Tab, Refills: 3      aspirin delayed-release 81 mg tablet Take 1 Tab by mouth daily. Qty: 90 Tab, Refills: 1      B.infantis-B.ani-B.long-B.bifi (PROBIOTIC 4X) 10-15 mg TbEC Take  by mouth daily. acetaminophen (TYLENOL) 650 mg TbER Take 650 mg by mouth every eight (8) hours. omega-3 fatty acids-vitamin e 1,000 mg cap Take 1 Cap by mouth two (2) times a day. 32a day       ascorbic acid, vitamin C, (VITAMIN C) 500 mg tablet Take 1,000 mg by mouth daily. zinc 50 mg tab tablet Take 50 mg by mouth daily. STOP taking these medications       cefazolin sodium in 0.9 % NaCl (ceFAZolin in normal saline) 2 gram/100 mL soln IVPB Comments:   Reason for Stopping:         magnesium oxide (MAG-OX) 400 mg tablet Comments:   Reason for Stopping:         TURMERIC PO Comments:   Reason for Stopping:         SITagliptin (Januvia) 50 mg tablet Comments:   Reason for Stopping:         magnesium oxide (MAG-OX) 400 mg tablet Comments:   Reason for Stopping:         turmeric-herbal complex no. 278 150 mg cap Comments:   Reason for Stopping:         valsartan (DIOVAN) 80 mg tablet Comments:   Reason for Stopping:         semaglutide (OZEMPIC) 0.25 mg/0.2 mL (2 mg/1.5 mL) sub-q pen Comments:   Reason for Stopping:         atorvastatin (LIPITOR) 20 mg tablet Comments:   Reason for Stopping:         glipiZIDE (GLUCOTROL) 5 mg tablet Comments:   Reason for Stopping:         vit C/E/Zn/coppr/lutein/zeaxan (PRESERVISION AREDS-2 PO) Comments:   Reason for Stopping:                 NOTIFY YOUR PHYSICIAN FOR ANY OF THE FOLLOWING:   Fever over 101 degrees for 24 hours. Chest pain, shortness of breath, fever, chills, nausea, vomiting, diarrhea, change in mentation, falling, weakness, bleeding.  Severe pain or pain not relieved by medications. Or, any other signs or symptoms that you may have questions about. DISPOSITION:    Home With:   OT  PT  HH  RN       Long term SNF/Inpatient Rehab    Independent/assisted living    Hospice    Other:       PATIENT CONDITION AT DISCHARGE:     Functional status    Poor     Deconditioned     Independent      Cognition     Lucid     Forgetful     Dementia      Catheters/lines (plus indication)    Zeng     PICC     PEG     None      Code status     Full code     DNR      PHYSICAL EXAMINATION AT DISCHARGE:  General:          Alert, cooperative, no distress, appears stated age. HEENT:           Atraumatic, anicteric sclerae, pink conjunctivae                          No oral ulcers, mucosa moist, throat clear, dentition fair  Neck:               Supple, symmetrical  Lungs:             Clear to auscultation bilaterally. No Wheezing or Rhonchi. No rales. Chest wall:      No tenderness  No Accessory muscle use. Heart:              Regular  rhythm,  No  murmur   No edema  Abdomen:        Soft, non-tender. Not distended. Bowel sounds normal  Extremities:     No cyanosis. No clubbing,                            Skin turgor normal, Capillary refill normal  Skin:                Not pale. Not Jaundiced  No rashes   Psych:             Not anxious or agitated. Neurologic:      Alert, moves all extremities, answers questions appropriately and responds to commands       CHRONIC MEDICAL DIAGNOSES:  Problem List as of 8/6/2021 Date Reviewed: 7/21/2021        Codes Class Noted - Resolved    Sepsis ([facility] ICD-10-CM: A41.9  ICD-9-CM: 038.9, 995.91  7/16/2021 - Present        Type 2 MI (myocardial infarction) ([facility] ICD-10-CM: I21. A1  ICD-9-CM: 410.90  7/16/2021 - Present    Overview Signed 7/16/2021  1:24 PM by Kari Hemphill NP     [id number] r/t sepsis             Systolic heart failure, chronic ([facility] ICD-10-CM: I50.22  ICD-9-CM: 428.22  7/16/2021 - Present        Moderate episode of recurrent major depressive disorder ([place] [address].) ICD-10-CM: F33.1  ICD-9-CM: 296.32  6/14/2021 - Present        Dilated cardiomyopathy ([place] [address].) (Chronic) ICD-10-CM: I42.0  ICD-9-CM: 425.4  9/23/2020 - Present        SSS (sick sinus syndrome) ([facility]) (Chronic) ICD-10-CM: I49.5  ICD-9-CM: 427.81  9/23/2020 - Present        Cardiomyopathy ([place] [address].) ICD-10-CM: I42.9  ICD-9-CM: 425.4  9/23/2020 - Present        AICD (automatic cardioverter/defibrillator) present ICD-10-CM: Z95.810  ICD-9-CM: V45.02  9/23/2020 - Present    Overview Signed 9/23/2020  4:31 PM by Jake Kamara NP     9/23/2020 [employer] AICD implant             Respiratory failure ([place] [address].) ICD-10-CM: J96.90  ICD-9-CM: 518.81  9/18/2020 - Present        CHF (congestive heart failure) ([place] [address].) ICD-10-CM: I50.9  ICD-9-CM: 428.0  9/18/2020 - Present        Hypoxia ICD-10-CM: R09.02  ICD-9-CM: 799.02  9/18/2020 - Present        Bilateral pleural effusion ICD-10-CM: J90  ICD-9-CM: 511.9  9/18/2020 - Present        Elevated troponin ICD-10-CM: R77.8  ICD-9-CM: 790.6  9/18/2020 - Present        PAF (paroxysmal atrial fibrillation) ([facility]) ICD-10-CM: I48.0  ICD-9-CM: 427.31  9/10/2020 - Present        CKD (chronic kidney disease) stage 3, GFR 30-59 ml/min ([facility]) ICD-10-CM: N18.30  ICD-9-CM: 585.3  6/4/2019 - Present        Lymphocytosis ICD-10-CM: I17.461  ICD-9-CM: 288.61  5/7/2018 - Present        Type 2 diabetes with nephropathy ([place] [address].) ICD-10-CM: E11.21  ICD-9-CM: 250.40, 583.81  2/15/2018 - Present        Type 2 diabetes mellitus with diabetic neuropathy ([facility] ICD-10-CM: E11.40  ICD-9-CM: 250.60, 357.2  2/15/2018 - Present        Spinal stenosis of lumbar region with neurogenic claudication ICD-10-CM: H93.151  ICD-9-CM: 724.03  11/17/2017 - Present        Spondylosis of lumbosacral region without myelopathy or radiculopathy ICD-10-CM: M47.817  ICD-9-CM: 721.3  11/17/2017 - Present        Overdose of opiate or related narcotic, accidental or unintentional, subsequent encounter ICD-10-CM: T40.601D  ICD-9-CM: V58.89, 965.09  10/27/2017 - Present        Acute left-sided low back pain with sciatica ICD-10-CM: M54.40  ICD-9-CM: 724.2, 724.3  10/27/2017 - Present        Physical debility ICD-10-CM: R53.81  ICD-9-CM: 799.3  10/27/2017 - Present        Acute encephalopathy ICD-10-CM: G93.40  ICD-9-CM: 348.30  10/23/2017 - Present        Hyperkalemia ICD-10-CM: E87.5  ICD-9-CM: 276.7  10/20/2017 - Present        Altered mental status ICD-10-CM: R41.82  ICD-9-CM: 780.97  10/20/2017 - Present        Transaminitis ICD-10-CM: R74.01  ICD-9-CM: 790.4  10/20/2017 - Present        Acute renal failure (ARF) (H[facility]) ICD-10-CM: N17.9  ICD-9-CM: 584.9  10/20/2017 - Present        Diverticulitis large intestine w/o perforation or abscess w/o bleeding ICD-10-CM: K57.32  ICD-9-CM: 562.11  7/6/2017 - Present        SOB (shortness of breath) ICD-10-CM: R06.02  ICD-9-CM: 786.05  6/21/2017 - Present        Abdominal pain, generalized ICD-10-CM: R10.84  ICD-9-CM: 789.07  6/21/2017 - Present        Diarrhea in adult patient ICD-10-CM: R19.7  ICD-9-CM: 787.91  6/21/2017 - Present        Cigarette smoker ICD-10-CM: F17.210  ICD-9-CM: 305.1  6/21/2017 - Present        Neutrophilic leukocytosis MBI-46-GP: D72.9  ICD-9-CM: 288.8  6/20/2017 - Present        Chronic pain ICD-10-CM: G89.29  ICD-9-CM: 338.29  Unknown - Present        Hypercholesterolemia ICD-10-CM: E78.00  ICD-9-CM: 272.0  Unknown - Present        Arthritis ICD-10-CM: M19.90  ICD-9-CM: 716.90  Unknown - Present        Diabetes ([address] ICD-10-CM: E11.9  ICD-9-CM: 250.00  Unknown - Present        IBS (irritable bowel syndrome) ICD-10-CM: K58.9  ICD-9-CM: 564.1  Unknown - Present        Hemorrhoid ICD-10-CM: K64.9  ICD-9-CM: 455.6  Unknown - Present              Greater than 60 minutes were spent with the patient on counseling and coordination of care    Signed:   Sabiha Jones MD  8/6/2021  11:54 AM

## 2021-08-06 NOTE — PROGRESS NOTES
Problem: Falls - Risk of  Goal: *Absence of Falls  Description: Document Barbara Pearson Fall Risk and appropriate interventions in the flowsheet. Outcome: Progressing Towards Goal  Note: Fall Risk Interventions:  Mobility Interventions: Communicate number of staff needed for ambulation/transfer         Medication Interventions: Patient to call before getting OOB    Elimination Interventions: Call light in reach              Problem: Patient Education: Go to Patient Education Activity  Goal: Patient/Family Education  Outcome: Progressing Towards Goal     Problem: Pain  Goal: *Control of Pain  Outcome: Progressing Towards Goal     Problem: Patient Education: Go to Patient Education Activity  Goal: Patient/Family Education  Outcome: Progressing Towards Goal     Problem: Pressure Injury - Risk of  Goal: *Prevention of pressure injury  Description: Document Bryan Scale and appropriate interventions in the flowsheet.   Outcome: Progressing Towards Goal  Note: Pressure Injury Interventions:       Moisture Interventions: Absorbent underpads    Activity Interventions: PT/OT evaluation    Mobility Interventions: Pressure redistribution bed/mattress (bed type)    Nutrition Interventions: Document food/fluid/supplement intake    Friction and Shear Interventions: HOB 30 degrees or less                Problem: Patient Education: Go to Patient Education Activity  Goal: Patient/Family Education  Outcome: Progressing Towards Goal     Problem: Patient Education: Go to Patient Education Activity  Goal: Patient/Family Education  Outcome: Progressing Towards Goal     Problem: Patient Education: Go to Patient Education Activity  Goal: Patient/Family Education  Outcome: Progressing Towards Goal     Problem: Discharge Planning  Goal: *Discharge to safe environment  Outcome: Progressing Towards Goal

## 2021-08-06 NOTE — DISCHARGE INSTRUCTIONS
Patient Education        Acute Kidney Injury: Care Instructions  Your Care Instructions     Acute kidney injury (IVAN) is a sudden decrease in kidney function. This can happen over a period of hours, days or, in some cases, weeks. IVAN used to be called acute renal failure, but kidney failure doesn't always happen with IVAN. Common causes of IVAN are dehydration, blood loss, and medicines. When IVAN happens, the kidneys have trouble removing waste and excess fluids from the body. The waste and fluids build up and become harmful. Kidney function may return to normal if the cause of IVAN is treated quickly. Your chance of a full recovery depends on what caused the problem, how quickly the cause was treated, and what other medical problems you have. A machine may be used to help your kidneys remove waste and fluids for a short period of time. This is called dialysis. Follow-up care is a key part of your treatment and safety. Be sure to make and go to all appointments, and call your doctor if you are having problems. It's also a good idea to know your test results and keep a list of the medicines you take. How can you care for yourself at home? · Talk to your doctor about how much fluid you should drink. · Eat a balanced diet. Talk to your doctor or a dietitian about what type of diet may be best for you. You may need to limit sodium, potassium, and phosphorus. · If you need dialysis, follow the instructions and schedule for dialysis that your doctor gives you. · Do not smoke. Smoking can make your condition worse. If you need help quitting, talk to your doctor about stop-smoking programs and medicines. These can increase your chances of quitting for good. · Do not drink alcohol. · Review all of your medicines with your doctor.  Do not take any medicines, including nonsteroidal anti-inflammatory drugs (NSAIDs) such as ibuprofen (Advil, Motrin) or naproxen (Aleve), unless your doctor says it is safe for you to do so.  · Make sure that anyone treating you for any health problem knows that you have had IVAN. When should you call for help? Call 911 anytime you think you may need emergency care. For example, call if:    · You passed out (lost consciousness). Call your doctor now or seek immediate medical care if:    · You have new or worse nausea and vomiting.     · You have much less urine than normal, or you have no urine.     · You are feeling confused or cannot think clearly.     · You have new or more blood in your urine.     · You have new swelling.     · You are dizzy or lightheaded, or feel like you may faint. Watch closely for changes in your health, and be sure to contact your doctor if:    · You do not get better as expected. Where can you learn more? Go to http://www.gray.com/  Enter F337 in the search box to learn more about \"Acute Kidney Injury: Care Instructions. \"  Current as of: December 17, 2020               Content Version: 12.8  © 2006-2021 [employer]. Care instructions adapted under license by [facility] (which disclaims liability or warranty for this information). If you have questions about a medical condition or this instruction, always ask your healthcare professional. [address] any warranty or liability for your use of this information.

## 2021-08-06 NOTE — PROGRESS NOTES
FRANCISCO:  1. RUR-32%  2. Returning home with [address] and IV ABX with [place] [address]. 3. [facility] transport. CM faxed home health orders to [facility] at [phone] and sent updated ID orders to [place] [address]. Her family will transport her home, she does not have any other needs or  Concerns. 1448- CM met with patient, she is requesting ride home. CM arragned [facility] transport for 1530. Est Cost is $135.00. ROGER informed , and notified RN of this time. Transport will pickup at ER.     [facility]

## 2021-08-09 ENCOUNTER — PATIENT OUTREACH (OUTPATIENT)
Dept: CASE MANAGEMENT | Age: 81
End: 2021-08-09

## 2021-08-10 LAB
BACTERIA SPEC CULT: NORMAL
BACTERIA SPEC CULT: NORMAL
SERVICE CMNT-IMP: NORMAL
SERVICE CMNT-IMP: NORMAL

## 2021-08-11 NOTE — PROGRESS NOTES
Care Transitions Outreach Attempt    Call within 2 business days of discharge: Yes   Attempted to reach patient for transitions of care follow up. Unable to reach patient. Patient: Naveed Marshall Patient : 1940 MRN: [id number]    Last Discharge [address]       Complaint Diagnosis Description Type Department Provider    8/3/21 Transfer Of Care IVAN (acute kidney injury) ([facility] . .. ED to Hosp-Admission (Discharged) (ADMIT) Tom Bautista MD; Latanya Knowles. .. 8/3/21 Respiratory Distress Acute hyperkalemia . .. ED (TRANSFER) Adrianne Pinzon MD            Was this an external facility discharge?  No Discharge Facility: [facility]      Noted following upcoming appointments from discharge chart review:   [facility] follow up appointment(s):   Future Appointments   Date Time Provider Marty Bautista   2021  3:30 PM Darin Sims MD [facility] AMB   2021 11:30 AM Flores Alfonso MD [facility]   10/18/2021 10:50 AM Michael Boles NP [facility]  AMB   3/9/2022  1:15 PM [facility]   3[facility]9/2022  1:40 PM [facility]     Non-[facility] follow up appointment(s): none

## 2021-08-13 ENCOUNTER — TELEPHONE (OUTPATIENT)
Dept: FAMILY MEDICINE CLINIC | Age: 81
End: 2021-08-13

## 2021-08-13 NOTE — TELEPHONE ENCOUNTER
Pt called to say [facility] called tell her her results are there and why? She is not a pt there .  Per pt the home health nurse dropped it off at [facility]  Now pt is calling here to find out what her blood work results are that was taken on Monday

## 2021-08-13 NOTE — TELEPHONE ENCOUNTER
Patient advised that she will need to get in [facility] since they were the ones in which derick the labs an had the orders.

## 2021-08-19 ENCOUNTER — PATIENT OUTREACH (OUTPATIENT)
Dept: CASE MANAGEMENT | Age: 81
End: 2021-08-19

## 2021-08-23 ENCOUNTER — TELEPHONE (OUTPATIENT)
Dept: FAMILY MEDICINE CLINIC | Age: 81
End: 2021-08-23

## 2021-08-23 ENCOUNTER — HOSPITAL ENCOUNTER (INPATIENT)
Age: 81
LOS: 3 days | Discharge: LEFT BEFORE DISCHARGE | DRG: 193 | End: 2021-08-28
Attending: EMERGENCY MEDICINE | Admitting: INTERNAL MEDICINE
Payer: MEDICARE

## 2021-08-23 ENCOUNTER — APPOINTMENT (OUTPATIENT)
Dept: GENERAL RADIOLOGY | Age: 81
DRG: 193 | End: 2021-08-23
Attending: EMERGENCY MEDICINE
Payer: MEDICARE

## 2021-08-23 DIAGNOSIS — R06.02 SHORTNESS OF BREATH: Primary | ICD-10-CM

## 2021-08-23 DIAGNOSIS — J18.9 PNEUMONIA OF RIGHT LUNG DUE TO INFECTIOUS ORGANISM, UNSPECIFIED PART OF LUNG: Primary | ICD-10-CM

## 2021-08-23 LAB
ALBUMIN SERPL-MCNC: 2.1 G/DL (ref 3.5–5)
ALBUMIN/GLOB SERPL: 0.4 {RATIO} (ref 1.1–2.2)
ALP SERPL-CCNC: 76 U/L (ref 45–117)
ALT SERPL-CCNC: 16 U/L (ref 12–78)
ANION GAP SERPL CALC-SCNC: 10 MMOL/L (ref 5–15)
APPEARANCE UR: CLEAR
AST SERPL-CCNC: 45 U/L (ref 15–37)
BACTERIA URNS QL MICRO: ABNORMAL /HPF
BASOPHILS # BLD: 0 K/UL (ref 0–0.1)
BASOPHILS NFR BLD: 0 % (ref 0–1)
BILIRUB SERPL-MCNC: 0.3 MG/DL (ref 0.2–1)
BILIRUB UR QL: NEGATIVE
BNP SERPL-MCNC: ABNORMAL PG/ML (ref 0–450)
BUN SERPL-MCNC: 37 MG/DL (ref 6–20)
BUN/CREAT SERPL: 21 (ref 12–20)
CALCIUM SERPL-MCNC: 9.5 MG/DL (ref 8.5–10.1)
CHLORIDE SERPL-SCNC: 99 MMOL/L (ref 97–108)
CO2 SERPL-SCNC: 21 MMOL/L (ref 21–32)
COLOR UR: ABNORMAL
COMMENT, HOLDF: NORMAL
CREAT SERPL-MCNC: 1.8 MG/DL (ref 0.55–1.02)
DIFFERENTIAL METHOD BLD: ABNORMAL
EOSINOPHIL # BLD: 1.6 K/UL (ref 0–0.4)
EOSINOPHIL NFR BLD: 8 % (ref 0–7)
EPITH CASTS URNS QL MICRO: ABNORMAL /LPF
ERYTHROCYTE [DISTWIDTH] IN BLOOD BY AUTOMATED COUNT: 15 % (ref 11.5–14.5)
FLUAV RNA SPEC QL NAA+PROBE: NOT DETECTED
FLUBV RNA SPEC QL NAA+PROBE: NOT DETECTED
GLOBULIN SER CALC-MCNC: 5.6 G/DL (ref 2–4)
GLUCOSE SERPL-MCNC: 240 MG/DL (ref 65–100)
GLUCOSE UR STRIP.AUTO-MCNC: NEGATIVE MG/DL
HCT VFR BLD AUTO: 27.9 % (ref 35–47)
HGB BLD-MCNC: 8.8 G/DL (ref 11.5–16)
HGB UR QL STRIP: NEGATIVE
IMM GRANULOCYTES # BLD AUTO: 0 K/UL (ref 0–0.04)
IMM GRANULOCYTES NFR BLD AUTO: 0 % (ref 0–0.5)
KETONES UR QL STRIP.AUTO: NEGATIVE MG/DL
LACTATE SERPL-SCNC: 1.1 MMOL/L (ref 0.4–2)
LEUKOCYTE ESTERASE UR QL STRIP.AUTO: NEGATIVE
LYMPHOCYTES # BLD: 0.8 K/UL (ref 0.8–3.5)
LYMPHOCYTES NFR BLD: 4 % (ref 12–49)
MCH RBC QN AUTO: 28.7 PG (ref 26–34)
MCHC RBC AUTO-ENTMCNC: 31.5 G/DL (ref 30–36.5)
MCV RBC AUTO: 90.9 FL (ref 80–99)
MONOCYTES # BLD: 0.8 K/UL (ref 0–1)
MONOCYTES NFR BLD: 4 % (ref 5–13)
NEUTS BAND NFR BLD MANUAL: 2 %
NEUTS SEG # BLD: 16.7 K/UL (ref 1.8–8)
NEUTS SEG NFR BLD: 82 % (ref 32–75)
NITRITE UR QL STRIP.AUTO: NEGATIVE
NRBC # BLD: 0 K/UL (ref 0–0.01)
NRBC BLD-RTO: 0 PER 100 WBC
PH UR STRIP: 5.5 [PH] (ref 5–8)
PLATELET # BLD AUTO: 563 K/UL (ref 150–400)
PLATELET COMMENTS,PCOM: ABNORMAL
PMV BLD AUTO: 9.9 FL (ref 8.9–12.9)
POTASSIUM SERPL-SCNC: 5.6 MMOL/L (ref 3.5–5.1)
PROT SERPL-MCNC: 7.7 G/DL (ref 6.4–8.2)
PROT UR STRIP-MCNC: >300 MG/DL
RBC # BLD AUTO: 3.07 M/UL (ref 3.8–5.2)
RBC #/AREA URNS HPF: ABNORMAL /HPF (ref 0–5)
RBC MORPH BLD: ABNORMAL
SAMPLES BEING HELD,HOLD: NORMAL
SARS-COV-2, COV2: NOT DETECTED
SODIUM SERPL-SCNC: 130 MMOL/L (ref 136–145)
SP GR UR REFRACTOMETRY: 1.02 (ref 1–1.03)
TROPONIN I SERPL-MCNC: <0.05 NG/ML
UROBILINOGEN UR QL STRIP.AUTO: 0.2 EU/DL (ref 0.2–1)
WBC # BLD AUTO: 19.9 K/UL (ref 3.6–11)
WBC URNS QL MICRO: ABNORMAL /HPF (ref 0–4)

## 2021-08-23 PROCEDURE — 96375 TX/PRO/DX INJ NEW DRUG ADDON: CPT

## 2021-08-23 PROCEDURE — 96376 TX/PRO/DX INJ SAME DRUG ADON: CPT

## 2021-08-23 PROCEDURE — 87086 URINE CULTURE/COLONY COUNT: CPT

## 2021-08-23 PROCEDURE — 77030029684 HC NEB SM VOL KT MONA -A

## 2021-08-23 PROCEDURE — 93005 ELECTROCARDIOGRAM TRACING: CPT

## 2021-08-23 PROCEDURE — 99285 EMERGENCY DEPT VISIT HI MDM: CPT

## 2021-08-23 PROCEDURE — 77010033678 HC OXYGEN DAILY

## 2021-08-23 PROCEDURE — 80053 COMPREHEN METABOLIC PANEL: CPT

## 2021-08-23 PROCEDURE — 87040 BLOOD CULTURE FOR BACTERIA: CPT

## 2021-08-23 PROCEDURE — 83605 ASSAY OF LACTIC ACID: CPT

## 2021-08-23 PROCEDURE — 74011000250 HC RX REV CODE- 250: Performed by: EMERGENCY MEDICINE

## 2021-08-23 PROCEDURE — 81001 URINALYSIS AUTO W/SCOPE: CPT

## 2021-08-23 PROCEDURE — 94640 AIRWAY INHALATION TREATMENT: CPT

## 2021-08-23 PROCEDURE — 84484 ASSAY OF TROPONIN QUANT: CPT

## 2021-08-23 PROCEDURE — 99218 HC RM OBSERVATION: CPT

## 2021-08-23 PROCEDURE — 83880 ASSAY OF NATRIURETIC PEPTIDE: CPT

## 2021-08-23 PROCEDURE — 96365 THER/PROPH/DIAG IV INF INIT: CPT

## 2021-08-23 PROCEDURE — 85025 COMPLETE CBC W/AUTO DIFF WBC: CPT

## 2021-08-23 PROCEDURE — 87636 SARSCOV2 & INF A&B AMP PRB: CPT

## 2021-08-23 PROCEDURE — 71045 X-RAY EXAM CHEST 1 VIEW: CPT

## 2021-08-23 PROCEDURE — 36415 COLL VENOUS BLD VENIPUNCTURE: CPT

## 2021-08-23 PROCEDURE — 74011000258 HC RX REV CODE- 258: Performed by: EMERGENCY MEDICINE

## 2021-08-23 PROCEDURE — 74011250636 HC RX REV CODE- 250/636: Performed by: EMERGENCY MEDICINE

## 2021-08-23 RX ORDER — FUROSEMIDE 10 MG/ML
40 INJECTION INTRAMUSCULAR; INTRAVENOUS ONCE
Status: COMPLETED | OUTPATIENT
Start: 2021-08-23 | End: 2021-08-23

## 2021-08-23 RX ORDER — ONDANSETRON 2 MG/ML
4 INJECTION INTRAMUSCULAR; INTRAVENOUS
Status: DISCONTINUED | OUTPATIENT
Start: 2021-08-23 | End: 2021-08-24 | Stop reason: SDUPTHER

## 2021-08-23 RX ORDER — LEVOFLOXACIN 5 MG/ML
750 INJECTION, SOLUTION INTRAVENOUS
Status: DISCONTINUED | OUTPATIENT
Start: 2021-08-23 | End: 2021-08-24

## 2021-08-23 RX ORDER — IPRATROPIUM BROMIDE AND ALBUTEROL SULFATE 2.5; .5 MG/3ML; MG/3ML
3 SOLUTION RESPIRATORY (INHALATION)
Status: COMPLETED | OUTPATIENT
Start: 2021-08-23 | End: 2021-08-24

## 2021-08-23 RX ORDER — ACETAMINOPHEN 325 MG/1
650 TABLET ORAL
Status: DISCONTINUED | OUTPATIENT
Start: 2021-08-23 | End: 2021-08-24 | Stop reason: SDUPTHER

## 2021-08-23 RX ADMIN — IPRATROPIUM BROMIDE AND ALBUTEROL SULFATE 3 ML: .5; 3 SOLUTION RESPIRATORY (INHALATION) at 16:01

## 2021-08-23 RX ADMIN — FUROSEMIDE 40 MG: 10 INJECTION, SOLUTION INTRAMUSCULAR; INTRAVENOUS at 17:51

## 2021-08-23 RX ADMIN — LEVOFLOXACIN 750 MG: 5 INJECTION, SOLUTION INTRAVENOUS at 18:40

## 2021-08-23 RX ADMIN — PIPERACILLIN AND TAZOBACTAM 3.38 G: 3; .375 INJECTION, POWDER, LYOPHILIZED, FOR SOLUTION INTRAVENOUS at 17:51

## 2021-08-23 RX ADMIN — PIPERACILLIN AND TAZOBACTAM 3.38 G: 3; .375 INJECTION, POWDER, LYOPHILIZED, FOR SOLUTION INTRAVENOUS at 23:56

## 2021-08-23 NOTE — TELEPHONE ENCOUNTER
JANE patient and . CXR ordered at [facility]. Instructed if she develops more difficulty. O2 SATs drop below 90 % or more symptomatic to call 911 /go to the nearest ER.   KANA Deleon at [facility].    Nubia ARORA

## 2021-08-23 NOTE — TELEPHONE ENCOUNTER
Adrienne with [facility] called regarding this patient. She stated she has been short of breath all weekend and has gotten worse over the weekend. She stated that the patient told her that her left lung when she breaths doesn't seem right. Adrienne stated that her O2 level this AM was at 90% on room air and that her Respirations are between 24-32 and regular at times and panting at times. She stated that the pt has had no fever, BP is good, Lung sounds were clear, and that she doesn't have any new swelling/edema. The patient would like a call back to advise what she should do. Adrienne with [facility] stated you could give her a call as well for any additional information at [phone].     Thank you

## 2021-08-23 NOTE — ED PROVIDER NOTES
EMERGENCY DEPARTMENT HISTORY AND PHYSICAL EXAM          Date: 8/23/2021  Patient Name: Dejan Sherman    History of Presenting Illness     Chief Complaint   Patient presents with    Shortness of Breath       History Provided By: Patient    HPI: Dejan Sherman is a 79 y.o. female, pmhx listed below, who presents to the ED c/o is of breath. Patient reports gradually worsening shortness of breath over the last week. Reports she is now at a point where she is having difficulty speaking in complete sentences due to shortness of breath. Reports associated cough and fatigue. Health nurse visited patient today and noted O2 sat was 90%, called 911. Patient reports her health has been gradually declining since she had an AICD infection after it was placed in September 2020. Reports she has been in and out of the hospital with severe medical conditions. Reports she currently has a PICC line after receiving IV antibiotics. Patient reports she is fully vaccinated against COVID-19. PCP: Derrick Porter NP    There are no other complaints, changes, or physical findings at this time.          Past History       Past Medical History:  Past Medical History:   Diagnosis Date    A-fib [facility])     AICD (automatic cardioverter/defibrillator) present 9/23/2020 9/23/2020 [employer] AICD implant    Arthritis     Bilateral pleural effusion 9/18/2020    Chronic pain     Chronic pain     Diabetes ([address]     Diverticular disease     Elevated troponin 9/18/2020    Hemorrhoid     Hypercholesterolemia     IBS (irritable bowel syndrome)     Lymphocytosis 92/16/5041    Systolic heart failure, chronic ([address] 7/16/2021    Type 2 MI (myocardial infarction) ([address] 7/16/2021 7/162021 r/t sepsis       Past Surgical History:  Past Surgical History:   Procedure Laterality Date    COLONOSCOPY N/A 10/31/2019    COLONOSCOPY performed by Mercy Proctor MD at [facility]  10/31/2019         Jasmyn Rm  10/31/2019         HX CATARACT REMOVAL      HX CHOLECYSTECTOMY      HX COLONOSCOPY  2009    HX COLONOSCOPY  2016    2 adenomatous polyp    HX HEMORRHOIDECTOMY  2009    HX HYSTERECTOMY      HX KNEE REPLACEMENT      right    HX ORTHOPAEDIC  12/11/2017    back, laminectomy and decompression    RI INSJ/RPLCMT PERM DFB W/TRNSVNS LDS 1/DUAL CHMBR N/A 9/23/2020    INSERT ICD DUAL performed by Sue Egan MD at [facility] CARDIAC CATH LAB       Family History:  Family History   Problem Relation Age of Onset    Colon Cancer Father     Diabetes Mother        Social History:  Social History     Tobacco Use    Smoking status: Current Every Day Smoker     Packs/day: 1.00     Years: 55.00     Pack years: 55.00     Types: Cigarettes    Smokeless tobacco: Never Used   Vaping Use    Vaping Use: Never used   Substance Use Topics    Alcohol use: No    Drug use: Never       Current Facility-Administered Medications   Medication Dose Route Frequency Provider Last Rate Last Admin    piperacillin-tazobactam (ZOSYN) 3.375 g in 0.9% sodium chloride (MBP/ADV) 100 mL MBP  3.375 g IntraVENous Q6H Ephagustín Chapman MD   IV Completed at 08/23/21 1844    levoFLOXacin (LEVAQUIN) 750 mg in D5W IVPB  750 mg IntraVENous Q48H Holly Chapman  mL/hr at 08/23/21 1840 750 mg at 08/23/21 1840     Current Outpatient Medications   Medication Sig Dispense Refill    insulin glargine (Lantus Solostar U-100 Insulin) 100 unit/mL (3 mL) inpn 50 Units by SubCUTAneous route daily. 1 Adjustable Dose Pre-filled Pen Syringe 0    DAPTOmycin (CUBICIN RF) IVPB 450 mg by IntraVENous route every fourty-eight (48) hours. 1 Dose 0    ferrous sulfate 325 mg (65 mg iron) tablet Take 1 Tablet by mouth daily (with breakfast). 30 Tablet 0    nicotine (NICODERM CQ) 21 mg/24 hr 1 Patch by TransDERmal route daily for 30 days. 30 Patch 0    venlafaxine (EFFEXOR) 75 mg tablet Take 25 mg by mouth two (2) times a day.  ergocalciferol (ERGOCALCIFEROL) 1,250 mcg (50,000 unit) capsule Take 50,000 Units by mouth every seven (7) days. On Wednesdays      b complex vitamins tablet Take 1 Tablet by mouth daily.  furosemide (LASIX) 40 mg tablet TAKE 1 TABLET BY MOUTH  DAILY 90 Tablet 1    nitroglycerin (NITROSTAT) 0.4 mg SL tablet 1 Tab by SubLINGual route every five (5) minutes as needed for Chest Pain. Up to 3 doses. (Patient not taking: Reported on 7/26/2021) 25 Tab 0    carvediloL (COREG) 25 mg tablet Take 1 Tab by mouth two (2) times daily (with meals). 28 Tab 0    colestipoL (Colestid) 1 gram tablet Take 1 Tab by mouth two (2) times a day. 180 Tab 3    aspirin delayed-release 81 mg tablet Take 1 Tab by mouth daily. 90 Tab 1    B.infantis-B.ani-B.long-B.bifi (PROBIOTIC 4X) 10-15 mg TbEC Take  by mouth daily.  acetaminophen (TYLENOL) 650 mg TbER Take 650 mg by mouth every eight (8) hours.  omega-3 fatty acids-vitamin e 1,000 mg cap Take 1 Cap by mouth two (2) times a day. 32a day       ascorbic acid, vitamin C, (VITAMIN C) 500 mg tablet Take 1,000 mg by mouth daily.  zinc 50 mg tab tablet Take 50 mg by mouth daily. Allergies: Allergies   Allergen Reactions    Morphine Anaphylaxis    Ultram [Tramadol] Palpitations         Review of Systems   Review of Systems   Constitutional: Positive for chills and fever. HENT: Negative for congestion. Eyes: Negative for pain. Respiratory: Positive for cough and shortness of breath. Cardiovascular: Negative for chest pain. Gastrointestinal: Negative for abdominal pain. Genitourinary: Negative for flank pain. Musculoskeletal: Negative for back pain. Neurological: Negative for headaches. Psychiatric/Behavioral: Negative for agitation. Physical Exam     Vital Signs-Reviewed the patient's vital signs.   Patient Vitals for the past 12 hrs:   Temp Pulse Resp BP SpO2   08/23/21 1900 -- 67 20 (!) 160/67 98 %   08/23/21 1843 -- 82 20 (!) 171/81 98 %   08/23/21 1751 -- 79 20 (!) 180/84 --   08/23/21 1700 -- 73 20 (!) 159/76 97 %   08/23/21 1601 -- 74 26 (!) 162/77 98 %   08/23/21 1515 -- -- -- -- 98 %   08/23/21 1512 98.4 °F (36.9 °C) 90 24 (!) 224/95 97 %       Physical Exam  Constitutional:       General: She is in acute distress. Appearance: Normal appearance. Comments: Speaking in 1-2 word sentences, tachypneic, uncomfortable appearing   HENT:      Head: Normocephalic and atraumatic. Mouth/Throat:      Mouth: Mucous membranes are moist.   Eyes:      Pupils: Pupils are equal, round, and reactive to light. Cardiovascular:      Rate and Rhythm: Normal rate and regular rhythm. Pulmonary:      Effort: Tachypnea present. Breath sounds: Rhonchi present. Comments: Ministered breath sounds bilaterally with scattered rhonchi  Abdominal:      Tenderness: There is no abdominal tenderness. Musculoskeletal:         General: No swelling. Right lower leg: No edema. Left lower leg: No edema. Skin:     General: Skin is warm and dry. Neurological:      Mental Status: She is alert and oriented to person, place, and time.    Psychiatric:         Mood and Affect: Mood normal.         Diagnostic Study Results     Labs -     Recent Results (from the past 12 hour(s))   EKG, 12 LEAD, INITIAL    Collection Time: 08/23/21  3:26 PM   Result Value Ref Range    Ventricular Rate 87 BPM    Atrial Rate 87 BPM    P-R Interval 164 ms    QRS Duration 112 ms    Q-T Interval 374 ms    QTC Calculation (Bezet) 450 ms    Calculated P Axis 98 degrees    Calculated R Axis 63 degrees    Calculated T Axis -30 degrees    Diagnosis       Normal sinus rhythm  Nonspecific ST abnormality  Abnormal QRS-T angle, consider primary T wave abnormality  Abnormal ECG  When compared with ECG of 03-AUG-2021 11:18,  T wave inversion no longer evident in Anterolateral leads     CBC WITH AUTOMATED DIFF    Collection Time: 08/23/21  3:35 PM   Result Value Ref Range    WBC 19.9 (H) 3.6 - 11.0 K/uL    RBC 3.07 (L) 3.80 - 5.20 M/uL    HGB 8.8 (L) 11.5 - 16.0 g/dL    HCT 27.9 (L) 35.0 - 47.0 %    MCV 90.9 80.0 - 99.0 FL    MCH 28.7 26.0 - 34.0 PG    MCHC 31.5 30.0 - 36.5 g/dL    RDW 15.0 (H) 11.5 - 14.5 %    PLATELET 589 (H) 772 - 400 K/uL    MPV 9.9 8.9 - 12.9 FL    NRBC 0.0 0  WBC    ABSOLUTE NRBC 0.00 0.00 - 0.01 K/uL    NEUTROPHILS 82 (H) 32 - 75 %    BAND NEUTROPHILS 2 %    LYMPHOCYTES 4 (L) 12 - 49 %    MONOCYTES 4 (L) 5 - 13 %    EOSINOPHILS 8 (H) 0 - 7 %    BASOPHILS 0 0 - 1 %    IMMATURE GRANULOCYTES 0 0.0 - 0.5 %    ABS. NEUTROPHILS 16.7 (H) 1.8 - 8.0 K/UL    ABS. LYMPHOCYTES 0.8 0.8 - 3.5 K/UL    ABS. MONOCYTES 0.8 0.0 - 1.0 K/UL    ABS. EOSINOPHILS 1.6 (H) 0.0 - 0.4 K/UL    ABS. BASOPHILS 0.0 0.0 - 0.1 K/UL    ABS. IMM. GRANS. 0.0 0.00 - 0.04 K/UL    DF MANUAL      PLATELET COMMENTS ADEQUATE PLATELETS      RBC COMMENTS ANISOCYTOSIS  1+       METABOLIC PANEL, COMPREHENSIVE    Collection Time: 08/23/21  3:35 PM   Result Value Ref Range    Sodium 130 (L) 136 - 145 mmol/L    Potassium 5.6 (H) 3.5 - 5.1 mmol/L    Chloride 99 97 - 108 mmol/L    CO2 21 21 - 32 mmol/L    Anion gap 10 5 - 15 mmol/L    Glucose 240 (H) 65 - 100 mg/dL    BUN 37 (H) 6 - 20 MG/DL    Creatinine 1.80 (H) 0.55 - 1.02 MG/DL    BUN/Creatinine ratio 21 (H) 12 - 20      GFR est AA 33 (L) >60 ml/min/1.73m2    GFR est non-AA 27 (L) >60 ml/min/1.73m2    Calcium 9.5 8.5 - 10.1 MG/DL    Bilirubin, total 0.3 0.2 - 1.0 MG/DL    ALT (SGPT) 16 12 - 78 U/L    AST (SGOT) 45 (H) 15 - 37 U/L    Alk.  phosphatase 76 45 - 117 U/L    Protein, total 7.7 6.4 - 8.2 g/dL    Albumin 2.1 (L) 3.5 - 5.0 g/dL    Globulin 5.6 (H) 2.0 - 4.0 g/dL    A-G Ratio 0.4 (L) 1.1 - 2.2     TROPONIN I    Collection Time: 08/23/21  3:35 PM   Result Value Ref Range    Troponin-I, Qt. <0.05 <0.05 ng/mL   NT-PRO BNP    Collection Time: 08/23/21  3:35 PM   Result Value Ref Range    NT pro-BNP 29,783 (H) 0 - 450 PG/ML   LACTIC ACID    Collection Time: 08/23/21  3:35 PM   Result Value Ref Range    Lactic acid 1.1 0.4 - 2.0 MMOL/L   SAMPLES BEING HELD    Collection Time: 08/23/21  3:35 PM   Result Value Ref Range    SAMPLES BEING HELD 1RED, 1SST, 1BLUE     COMMENT        Add-on orders for these samples will be processed based on acceptable specimen integrity and analyte stability, which may vary by analyte. COVID-19 WITH INFLUENZA A/B    Collection Time: 08/23/21  3:53 PM   Result Value Ref Range    SARS-CoV-2 Not detected NOTD      Influenza A by PCR Not detected NOTD      Influenza B by PCR Not detected NOTD     URINALYSIS W/ RFLX MICROSCOPIC    Collection Time: 08/23/21  5:41 PM   Result Value Ref Range    Color YELLOW/STRAW      Appearance CLEAR CLEAR      Specific gravity 1.025 1.003 - 1.030      pH (UA) 5.5 5.0 - 8.0      Protein >300 (A) NEG mg/dL    Glucose Negative NEG mg/dL    Ketone Negative NEG mg/dL    Bilirubin Negative NEG      Blood Negative NEG      Urobilinogen 0.2 0.2 - 1.0 EU/dL    Nitrites Negative NEG      Leukocyte Esterase Negative NEG     URINE MICROSCOPIC ONLY    Collection Time: 08/23/21  5:41 PM   Result Value Ref Range    WBC 0-4 0 - 4 /hpf    RBC 0-5 0 - 5 /hpf    Epithelial cells MODERATE (A) FEW /lpf    Bacteria 1+ (A) NEG /hpf       Radiologic Studies -   XR CHEST PORT   Final Result   Worsening right midlung consolidation. Worsening diffuse bilateral   interstitial lung disease. Mild bibasilar atelectasis. CT Results  (Last 48 hours)    None        CXR Results  (Last 48 hours)               08/23/21 1533  XR CHEST PORT Final result    Impression:  Worsening right midlung consolidation. Worsening diffuse bilateral   interstitial lung disease. Mild bibasilar atelectasis. Narrative:  INDICATION: Shortness of breath since last Thursday. 90% on room air. Portable AP view of the chest.       Direct comparison made to prior chest x-ray dated August 5, 2021. Cardiomediastinal silhouette is stable. Right-sided PICC line extends to the   proximal SVC. There is worsening elevation within the right mid lung. There are   worsening fused bilateral increased interstitial markings. There is mild   bibasilar atelectasis. No pleural fluid is visualized on the single AP view. No   pneumothorax. EKG interpretation: (Preliminary)  Rhythm: Sinus, no ST elevation, inferior T wave inversions  This EKG was interpreted by ED Provider Christine Qureshi MD    Medical Decision Making   I am the first provider for this patient. I reviewed the vital signs, available nursing notes, past medical history, past surgical history, family history and social history. Records Reviewed: Nursing Notes and Old Medical Records    Provider Notes (Medical Decision Making):   MDM: 79-year-old female with worsening shortness of breath, now tachypneic and hypoxic requiring O2. Patient received oxygen by nasal cannula by EMS and then through our department with improvement of oxygen saturations. Due to poor air movement, patient received 1 nebulizer treatment and reported some improvement after nebulizer. Also noted to have improvement in respiratory rate after nebulizer. Will await lab work, chest x-ray, Covid test. Likely admission due to new oxygen requirement and mild respiratory distress. Initial assessment performed. The patients presenting problems have been discussed, and they are in agreement with the care plan formulated and outlined with them. I have encouraged them to ask questions as they arise throughout their visit. PROGRESS NOTE:    Respiratory rate gradually improved throughout stay. Patient is now speaking in complete sentences. Still on 2 L of nasal cannula. Some improvement in air movement after receiving Lasix and single nebulizer treatment. Chest x-ray reveals worsening infiltrate, antibiotics ordered. Due to CHF component and normal lactic acid, patient is not receiving sepsis fluid bolus.  I discussed this with patient and she voiced understanding of the importance of her volume status. Case discussed with Dr. Grady Vasquez for admission. Diagnosis     Clinical Impression:   1. Pneumonia of right lung due to infectious organism, unspecified part of lung            Disposition:      Current Discharge Medication List            Please note, this dictation was completed with [employer], the computer voice recognition software. Quite often unanticipated grammatical, syntax, homophones, and other interpretive errors are inadvertently transcribed by the computer software. Please disregard these errors. Please excuse any errors that have escaped final proof reading.

## 2021-08-23 NOTE — Clinical Note
Patient Class[de-identified] OBSERVATION [104]   Type of Bed: Medical [8]   Cardiac Monitoring Required?: No   Reason for Observation: pneumonia   Admitting Diagnosis: Pneumonia [[id number]]   Admitting Diagnosis: CHF (congestive heart failure) [facility]) [[id number]]   Admitting Physician: Jack Torres   Attending Physician: Arabella Casillas [[id number]]

## 2021-08-23 NOTE — ED TRIAGE NOTES
Pt arrives via EMS with SOB since last Thursday, increasing. Pt home health RN assessed O2 at 90% at home today on room air. Pt at 88% on room air in ED, placed on 2L NC. Pt reports dyspnea with any exertion, visible accessory muscle use.

## 2021-08-24 ENCOUNTER — APPOINTMENT (OUTPATIENT)
Dept: GENERAL RADIOLOGY | Age: 81
DRG: 193 | End: 2021-08-24
Attending: FAMILY MEDICINE
Payer: MEDICARE

## 2021-08-24 ENCOUNTER — APPOINTMENT (OUTPATIENT)
Dept: CT IMAGING | Age: 81
DRG: 193 | End: 2021-08-24
Attending: INTERNAL MEDICINE
Payer: MEDICARE

## 2021-08-24 LAB
ALBUMIN SERPL-MCNC: 2 G/DL (ref 3.5–5)
ALBUMIN/GLOB SERPL: 0.4 {RATIO} (ref 1.1–2.2)
ALP SERPL-CCNC: 79 U/L (ref 45–117)
ALT SERPL-CCNC: 10 U/L (ref 12–78)
ANION GAP SERPL CALC-SCNC: 15 MMOL/L (ref 5–15)
ANION GAP SERPL CALC-SCNC: 16 MMOL/L (ref 5–15)
AST SERPL-CCNC: 18 U/L (ref 15–37)
BASOPHILS # BLD: 0 K/UL (ref 0–0.1)
BASOPHILS NFR BLD: 0 % (ref 0–1)
BILIRUB SERPL-MCNC: 0.3 MG/DL (ref 0.2–1)
BUN SERPL-MCNC: 35 MG/DL (ref 6–20)
BUN SERPL-MCNC: 36 MG/DL (ref 6–20)
BUN/CREAT SERPL: 18 (ref 12–20)
BUN/CREAT SERPL: 19 (ref 12–20)
CALCIUM SERPL-MCNC: 9.6 MG/DL (ref 8.5–10.1)
CALCIUM SERPL-MCNC: 9.6 MG/DL (ref 8.5–10.1)
CHLORIDE SERPL-SCNC: 103 MMOL/L (ref 97–108)
CHLORIDE SERPL-SCNC: 103 MMOL/L (ref 97–108)
CO2 SERPL-SCNC: 16 MMOL/L (ref 21–32)
CO2 SERPL-SCNC: 17 MMOL/L (ref 21–32)
CREAT SERPL-MCNC: 1.91 MG/DL (ref 0.55–1.02)
CREAT SERPL-MCNC: 1.92 MG/DL (ref 0.55–1.02)
DIFFERENTIAL METHOD BLD: ABNORMAL
EOSINOPHIL # BLD: 2 K/UL (ref 0–0.4)
EOSINOPHIL NFR BLD: 9 % (ref 0–7)
ERYTHROCYTE [DISTWIDTH] IN BLOOD BY AUTOMATED COUNT: 15.1 % (ref 11.5–14.5)
GLOBULIN SER CALC-MCNC: 5.2 G/DL (ref 2–4)
GLUCOSE BLD STRIP.AUTO-MCNC: 168 MG/DL (ref 65–117)
GLUCOSE BLD STRIP.AUTO-MCNC: 303 MG/DL (ref 65–117)
GLUCOSE BLD STRIP.AUTO-MCNC: 322 MG/DL (ref 65–117)
GLUCOSE SERPL-MCNC: 99 MG/DL (ref 65–100)
GLUCOSE SERPL-MCNC: 99 MG/DL (ref 65–100)
HCT VFR BLD AUTO: 28.4 % (ref 35–47)
HGB BLD-MCNC: 9.1 G/DL (ref 11.5–16)
IMM GRANULOCYTES # BLD AUTO: 0.2 K/UL (ref 0–0.04)
IMM GRANULOCYTES NFR BLD AUTO: 1 % (ref 0–0.5)
LYMPHOCYTES # BLD: 1.5 K/UL (ref 0.8–3.5)
LYMPHOCYTES NFR BLD: 7 % (ref 12–49)
MAGNESIUM SERPL-MCNC: 1.6 MG/DL (ref 1.6–2.4)
MCH RBC QN AUTO: 28.8 PG (ref 26–34)
MCHC RBC AUTO-ENTMCNC: 32 G/DL (ref 30–36.5)
MCV RBC AUTO: 89.9 FL (ref 80–99)
MONOCYTES # BLD: 1.3 K/UL (ref 0–1)
MONOCYTES NFR BLD: 6 % (ref 5–13)
NEUTS SEG # BLD: 17 K/UL (ref 1.8–8)
NEUTS SEG NFR BLD: 77 % (ref 32–75)
NRBC # BLD: 0 K/UL (ref 0–0.01)
NRBC BLD-RTO: 0 PER 100 WBC
PHOSPHATE SERPL-MCNC: 3.5 MG/DL (ref 2.6–4.7)
PLATELET # BLD AUTO: 546 K/UL (ref 150–400)
PLATELET COMMENTS,PCOM: ABNORMAL
PMV BLD AUTO: 10.1 FL (ref 8.9–12.9)
POTASSIUM SERPL-SCNC: 4.5 MMOL/L (ref 3.5–5.1)
POTASSIUM SERPL-SCNC: 4.5 MMOL/L (ref 3.5–5.1)
PROT SERPL-MCNC: 7.2 G/DL (ref 6.4–8.2)
RBC # BLD AUTO: 3.16 M/UL (ref 3.8–5.2)
RBC MORPH BLD: ABNORMAL
RBC MORPH BLD: ABNORMAL
SERVICE CMNT-IMP: ABNORMAL
SODIUM SERPL-SCNC: 135 MMOL/L (ref 136–145)
SODIUM SERPL-SCNC: 135 MMOL/L (ref 136–145)
WBC # BLD AUTO: 22 K/UL (ref 3.6–11)

## 2021-08-24 PROCEDURE — 84100 ASSAY OF PHOSPHORUS: CPT

## 2021-08-24 PROCEDURE — 74011250636 HC RX REV CODE- 250/636: Performed by: INTERNAL MEDICINE

## 2021-08-24 PROCEDURE — 74011636637 HC RX REV CODE- 636/637: Performed by: INTERNAL MEDICINE

## 2021-08-24 PROCEDURE — 96372 THER/PROPH/DIAG INJ SC/IM: CPT

## 2021-08-24 PROCEDURE — 94760 N-INVAS EAR/PLS OXIMETRY 1: CPT

## 2021-08-24 PROCEDURE — 71045 X-RAY EXAM CHEST 1 VIEW: CPT

## 2021-08-24 PROCEDURE — 74011000258 HC RX REV CODE- 258: Performed by: EMERGENCY MEDICINE

## 2021-08-24 PROCEDURE — 82962 GLUCOSE BLOOD TEST: CPT

## 2021-08-24 PROCEDURE — 74011000250 HC RX REV CODE- 250: Performed by: FAMILY MEDICINE

## 2021-08-24 PROCEDURE — 94640 AIRWAY INHALATION TREATMENT: CPT

## 2021-08-24 PROCEDURE — 85025 COMPLETE CBC W/AUTO DIFF WBC: CPT

## 2021-08-24 PROCEDURE — 74011000258 HC RX REV CODE- 258: Performed by: INTERNAL MEDICINE

## 2021-08-24 PROCEDURE — 77030020847 HC STATLOK BARD -A

## 2021-08-24 PROCEDURE — 74011250636 HC RX REV CODE- 250/636: Performed by: FAMILY MEDICINE

## 2021-08-24 PROCEDURE — 83735 ASSAY OF MAGNESIUM: CPT

## 2021-08-24 PROCEDURE — 96375 TX/PRO/DX INJ NEW DRUG ADDON: CPT

## 2021-08-24 PROCEDURE — 96376 TX/PRO/DX INJ SAME DRUG ADON: CPT

## 2021-08-24 PROCEDURE — 74011250636 HC RX REV CODE- 250/636: Performed by: EMERGENCY MEDICINE

## 2021-08-24 PROCEDURE — 80053 COMPREHEN METABOLIC PANEL: CPT

## 2021-08-24 PROCEDURE — 77030040361 HC SLV COMPR DVT MDII -B

## 2021-08-24 PROCEDURE — 74011250637 HC RX REV CODE- 250/637: Performed by: INTERNAL MEDICINE

## 2021-08-24 PROCEDURE — 77010033678 HC OXYGEN DAILY

## 2021-08-24 PROCEDURE — 99218 HC RM OBSERVATION: CPT

## 2021-08-24 PROCEDURE — 36415 COLL VENOUS BLD VENIPUNCTURE: CPT

## 2021-08-24 PROCEDURE — 74011000250 HC RX REV CODE- 250

## 2021-08-24 RX ORDER — VENLAFAXINE 37.5 MG/1
37.5 TABLET ORAL DAILY
Status: DISCONTINUED | OUTPATIENT
Start: 2021-08-25 | End: 2021-08-27

## 2021-08-24 RX ORDER — ACETAMINOPHEN 325 MG/1
650 TABLET ORAL
Status: DISCONTINUED | OUTPATIENT
Start: 2021-08-24 | End: 2021-08-28 | Stop reason: HOSPADM

## 2021-08-24 RX ORDER — IPRATROPIUM BROMIDE AND ALBUTEROL SULFATE 2.5; .5 MG/3ML; MG/3ML
SOLUTION RESPIRATORY (INHALATION)
Status: COMPLETED
Start: 2021-08-24 | End: 2021-08-24

## 2021-08-24 RX ORDER — SODIUM CHLORIDE 0.9 % (FLUSH) 0.9 %
5-40 SYRINGE (ML) INJECTION AS NEEDED
Status: DISCONTINUED | OUTPATIENT
Start: 2021-08-24 | End: 2021-08-28 | Stop reason: HOSPADM

## 2021-08-24 RX ORDER — ONDANSETRON 2 MG/ML
4 INJECTION INTRAMUSCULAR; INTRAVENOUS
Status: DISCONTINUED | OUTPATIENT
Start: 2021-08-24 | End: 2021-08-28 | Stop reason: HOSPADM

## 2021-08-24 RX ORDER — HEPARIN SODIUM 5000 [USP'U]/ML
5000 INJECTION, SOLUTION INTRAVENOUS; SUBCUTANEOUS EVERY 12 HOURS
Status: DISCONTINUED | OUTPATIENT
Start: 2021-08-24 | End: 2021-08-28 | Stop reason: HOSPADM

## 2021-08-24 RX ORDER — MONTELUKAST SODIUM 4 MG/1
1 TABLET, CHEWABLE ORAL 2 TIMES DAILY
Status: DISCONTINUED | OUTPATIENT
Start: 2021-08-25 | End: 2021-08-25 | Stop reason: CLARIF

## 2021-08-24 RX ORDER — POLYETHYLENE GLYCOL 3350 17 G/17G
17 POWDER, FOR SOLUTION ORAL DAILY PRN
Status: DISCONTINUED | OUTPATIENT
Start: 2021-08-24 | End: 2021-08-28 | Stop reason: HOSPADM

## 2021-08-24 RX ORDER — MAGNESIUM SULFATE 100 %
4 CRYSTALS MISCELLANEOUS AS NEEDED
Status: DISCONTINUED | OUTPATIENT
Start: 2021-08-24 | End: 2021-08-28 | Stop reason: HOSPADM

## 2021-08-24 RX ORDER — IPRATROPIUM BROMIDE AND ALBUTEROL SULFATE 2.5; .5 MG/3ML; MG/3ML
3 SOLUTION RESPIRATORY (INHALATION)
Status: COMPLETED | OUTPATIENT
Start: 2021-08-24 | End: 2021-08-24

## 2021-08-24 RX ORDER — FERROUS SULFATE 324(65)MG
324 TABLET, DELAYED RELEASE (ENTERIC COATED) ORAL
Status: DISCONTINUED | OUTPATIENT
Start: 2021-08-25 | End: 2021-08-28 | Stop reason: HOSPADM

## 2021-08-24 RX ORDER — INSULIN LISPRO 100 [IU]/ML
INJECTION, SOLUTION INTRAVENOUS; SUBCUTANEOUS
Status: DISCONTINUED | OUTPATIENT
Start: 2021-08-24 | End: 2021-08-28 | Stop reason: HOSPADM

## 2021-08-24 RX ORDER — FUROSEMIDE 10 MG/ML
40 INJECTION INTRAMUSCULAR; INTRAVENOUS
Status: COMPLETED | OUTPATIENT
Start: 2021-08-24 | End: 2021-08-24

## 2021-08-24 RX ORDER — DEXTROSE 50 % IN WATER (D50W) INTRAVENOUS SYRINGE
25-50 AS NEEDED
Status: DISCONTINUED | OUTPATIENT
Start: 2021-08-24 | End: 2021-08-28 | Stop reason: HOSPADM

## 2021-08-24 RX ORDER — IBUPROFEN 200 MG
1 TABLET ORAL DAILY
Status: DISCONTINUED | OUTPATIENT
Start: 2021-08-24 | End: 2021-08-27

## 2021-08-24 RX ORDER — MULTIVIT WITH MINERALS/HERBS
1 TABLET ORAL DAILY
Status: DISCONTINUED | OUTPATIENT
Start: 2021-08-25 | End: 2021-08-28 | Stop reason: HOSPADM

## 2021-08-24 RX ORDER — FUROSEMIDE 10 MG/ML
INJECTION INTRAMUSCULAR; INTRAVENOUS
Status: DISPENSED
Start: 2021-08-24 | End: 2021-08-24

## 2021-08-24 RX ORDER — SODIUM CHLORIDE 0.9 % (FLUSH) 0.9 %
5-40 SYRINGE (ML) INJECTION EVERY 8 HOURS
Status: DISCONTINUED | OUTPATIENT
Start: 2021-08-24 | End: 2021-08-28 | Stop reason: HOSPADM

## 2021-08-24 RX ORDER — ASPIRIN 81 MG/1
81 TABLET ORAL DAILY
Status: DISCONTINUED | OUTPATIENT
Start: 2021-08-25 | End: 2021-08-28 | Stop reason: HOSPADM

## 2021-08-24 RX ORDER — ACETAMINOPHEN 650 MG/1
650 SUPPOSITORY RECTAL
Status: DISCONTINUED | OUTPATIENT
Start: 2021-08-24 | End: 2021-08-28 | Stop reason: HOSPADM

## 2021-08-24 RX ORDER — INSULIN GLARGINE 100 [IU]/ML
50 INJECTION, SOLUTION SUBCUTANEOUS
Status: DISCONTINUED | OUTPATIENT
Start: 2021-08-24 | End: 2021-08-27

## 2021-08-24 RX ORDER — ONDANSETRON 4 MG/1
4 TABLET, ORALLY DISINTEGRATING ORAL
Status: DISCONTINUED | OUTPATIENT
Start: 2021-08-24 | End: 2021-08-28 | Stop reason: HOSPADM

## 2021-08-24 RX ADMIN — PIPERACILLIN AND TAZOBACTAM 2.25 G: 2; .25 INJECTION, POWDER, LYOPHILIZED, FOR SOLUTION INTRAVENOUS at 20:48

## 2021-08-24 RX ADMIN — VANCOMYCIN HYDROCHLORIDE 1250 MG: 1.25 INJECTION, POWDER, LYOPHILIZED, FOR SOLUTION INTRAVENOUS at 15:52

## 2021-08-24 RX ADMIN — FUROSEMIDE 40 MG: 10 INJECTION, SOLUTION INTRAMUSCULAR; INTRAVENOUS at 07:08

## 2021-08-24 RX ADMIN — Medication 10 ML: at 20:50

## 2021-08-24 RX ADMIN — INSULIN LISPRO 4 UNITS: 100 INJECTION, SOLUTION INTRAVENOUS; SUBCUTANEOUS at 13:44

## 2021-08-24 RX ADMIN — INSULIN LISPRO 4 UNITS: 100 INJECTION, SOLUTION INTRAVENOUS; SUBCUTANEOUS at 17:45

## 2021-08-24 RX ADMIN — PIPERACILLIN AND TAZOBACTAM 3.38 G: 3; .375 INJECTION, POWDER, LYOPHILIZED, FOR SOLUTION INTRAVENOUS at 12:04

## 2021-08-24 RX ADMIN — HEPARIN SODIUM 5000 UNITS: 5000 INJECTION INTRAVENOUS; SUBCUTANEOUS at 20:50

## 2021-08-24 RX ADMIN — PIPERACILLIN AND TAZOBACTAM 3.38 G: 3; .375 INJECTION, POWDER, LYOPHILIZED, FOR SOLUTION INTRAVENOUS at 05:37

## 2021-08-24 RX ADMIN — IPRATROPIUM BROMIDE AND ALBUTEROL SULFATE 3 ML: .5; 3 SOLUTION RESPIRATORY (INHALATION) at 08:00

## 2021-08-24 RX ADMIN — HEPARIN SODIUM 5000 UNITS: 5000 INJECTION INTRAVENOUS; SUBCUTANEOUS at 13:42

## 2021-08-24 RX ADMIN — Medication 10 ML: at 13:04

## 2021-08-24 RX ADMIN — IPRATROPIUM BROMIDE AND ALBUTEROL SULFATE 3 ML: .5; 3 SOLUTION RESPIRATORY (INHALATION) at 07:20

## 2021-08-24 RX ADMIN — INSULIN GLARGINE 50 UNITS: 100 INJECTION, SOLUTION SUBCUTANEOUS at 21:48

## 2021-08-24 NOTE — PROGRESS NOTES
Patient continues to have tachypnea and moderate dyspnea but states she feels much better this evening. Denies discomfort.   Sats maintaining at 96-97% on 3 lpm NC.

## 2021-08-24 NOTE — H&P
Hospitalist Admission Note    NAME: Rodrigo White   :  1940   MRN:  [id number]     Date/Time:  2021 9:16 AM    Patient PCP: Camila Cooks, NP  ______________________________________________________________________  Given the patient's current clinical presentation, I have a high level of concern for decompensation if discharged from the emergency department. Complex decision making was performed, which includes reviewing the patient's available past medical records, laboratory results, and x-ray films. My assessment of this patient's clinical condition and my plan of care is as follows. Assessment / Plan:  Hospital Acquired Pneumonia  Acute Hypoxic Respiratory Failure  Recent hospital stay  COVID-19 and flu a/b PCR neg  CXR R lung consolidation with b/l dz  Vanc/zosyn (avoid cephalosporin for recent suspected drug induced kidney injury and drug eruption)  duoneb prn for possible contribution of undx COPD    Hx recent MSSA bacteremia and suspected pacer infxn scheduled to receive IV ABX (daptomycin) via PICC (R arm) until   Was on cefazolin until suspected drug eruption > then daptomycin  Now on vanc/zosyn in house (MSSA susceptible to vanc)  Repeat BCx in house    HFrEF, mild decompensation  Recent pacer/ICD explant  PPM/ICD was originally indicated for primary prevention  Continue lasix + coreg  Does not appear to be on ACEi/ARB/ARNI/aldosterone antagonist - will clarify     PAF?   Unclear if pt's Cardiologist has her on [address] [place] [address] Road or not (does not appear so)  Only ASA, but pt with elevated CHADSVASC    DM  accuchecks achs + SSI + lantus 50u qpm    HLD  HTN  Coreg  Unclear why she isn't on statins    CKD4  Renally dose Rx  Avoid nephrotoxins    Mood Disorder  Venlafaxine    Smoker  1-1.5ppd per daughter, but pt states now 1/2 ppd  Nicotine patch      Code Status: Full  Surrogate Decision Maker: daughter    DVT Prophylaxis: heparin 5000u sq q8h  GI Prophylaxis: not indicated    Baseline: ambulatory      Subjective:   CHIEF COMPLAINT: SOB    HISTORY OF PRESENT ILLNESS:     Leatha Cardona is a 79 y.o.  female who presents with SOB. Patient has a very complicated medical history. Most recently she had a pacer explant after she was found to have MSSA bacteremia. She received a PICC line in her R arm and was treated with cephalosporins outpt then developed an IVAN and a rash on her lower extremities. Cephalosporins were thought to have caused a drug reaction thus her ABX were changed to Daptomycin. She continued to receive Daptomycin at home q48h. Her  has been giving her the infusions. In the past several days prior to arrival she has been more SOB and weak. In the ED she was found to have a pneumonia. This is thought to be a hospital acquired pneumonia. She was started on vancomycin and zosyn (avoiding cefepime due to previous drug reaction). This was discussed with Dr. Shari Goetz (the ID physician following her as an outpt). Of note, pt was scheduled to complete daptomycin on 09/02/21. We were asked to admit for work up and evaluation of the above problems.      Past Medical History:   Diagnosis Date    A-fib [facility])     AICD (automatic cardioverter/defibrillator) present 9/23/2020 9/23/2020 [employer] AICD implant    Arthritis     Bilateral pleural effusion 9/18/2020    Chronic pain     Chronic pain     Diabetes ([address]     Diverticular disease     Elevated troponin 9/18/2020    Hemorrhoid     Hypercholesterolemia     IBS (irritable bowel syndrome)     Lymphocytosis 35/80/6364    Systolic heart failure, chronic ([address] 7/16/2021    Type 2 MI (myocardial infarction) ([address] 7/16/2021 7/162021 r/t sepsis        Past Surgical History:   Procedure Laterality Date    COLONOSCOPY N/A 10/31/2019    COLONOSCOPY [facility] Naz [facility]endariz [facility]  10/31/2019         COLONOSCOPY,REMV Ronald Cage  10/31/2019         HX CATARACT REMOVAL      HX CHOLECYSTECTOMY      HX COLONOSCOPY  2009    HX COLONOSCOPY  2016    2 adenomatous polyp    HX HEMORRHOIDECTOMY  2009    HX HYSTERECTOMY      HX KNEE REPLACEMENT      right    HX ORTHOPAEDIC  12/11/2017    back, laminectomy and decompression    IA INSJ/RPLCMT PERM DFB W/TRNSVNS LDS 1/DUAL CHMBR N/A 9/23/2020    INSERT ICD DUAL performed by Sue Egan MD at [facility] CARDIAC CATH LAB       Social History     Tobacco Use    Smoking status: Current Every Day Smoker     Packs/day: 1.00     Years: 55.00     Pack years: 55.00     Types: Cigarettes    Smokeless tobacco: Never Used   Substance Use Topics    Alcohol use: No        Family History   Problem Relation Age of Onset    Colon Cancer Father     Diabetes Mother      Allergies   Allergen Reactions    Morphine Anaphylaxis    Ultram [Tramadol] Palpitations        Prior to Admission medications    Medication Sig Start Date End Date Taking? Authorizing Provider   insulin glargine (Lantus Solostar U-100 Insulin) 100 unit/mL (3 mL) inpn 50 Units by SubCUTAneous route daily. 8/6/21  Yes Ileana Castellano MD   DAPTOmycin (CUBICIN RF) IVPB 450 mg by IntraVENous route every fourty-eight (48) hours. 8/7/21  Yes Ileana Castellano MD   ferrous sulfate 325 mg (65 mg iron) tablet Take 1 Tablet by mouth daily (with breakfast). 8/7/21  Yes Ileana Castellano MD   venlafaxine (EFFEXOR) 75 mg tablet Take 25 mg by mouth two (2) times a day. Yes Other, MD Cory   ergocalciferol (ERGOCALCIFEROL) 1,250 mcg (50,000 unit) capsule Take 50,000 Units by mouth every seven (7) days. On Wednesdays   Yes Provider, Historical   b complex vitamins tablet Take 1 Tablet by mouth daily. Yes Provider, Historical   furosemide (LASIX) 40 mg tablet TAKE 1 TABLET BY MOUTH  DAILY 6/15/21  Yes Arcenio Guardado MD   carvediloL (COREG) 25 mg tablet Take 1 Tab by mouth two (2) times daily (with meals).  2/21/21  Yes Arcenio Guardado MD   colestipoL (Colestid) 1 gram tablet Take 1 Tab by mouth two (2) times a day. 10/7/20  Yes Marifer Chan DO   aspirin delayed-release 81 mg tablet Take 1 Tab by mouth daily. 9/10/20  Yes MD SYED Poeinfantis-B.ani-B.long-BPaulbifi (PROBIOTIC 4X) 10-15 mg TbEC Take  by mouth daily. Yes Provider, Historical   acetaminophen (TYLENOL) 650 mg TbER Take 650 mg by mouth every eight (8) hours. Yes Provider, Historical   omega-3 fatty acids-vitamin e 1,000 mg cap Take 1 Cap by mouth two (2) times a day. 32a day    Yes Provider, Historical   ascorbic acid, vitamin C, (VITAMIN C) 500 mg tablet Take 1,000 mg by mouth daily. Yes Provider, Historical   zinc 50 mg tab tablet Take 50 mg by mouth daily. Yes Provider, Historical   nicotine (NICODERM CQ) 21 mg/24 hr 1 Patch by TransDERmal route daily for 30 days. Patient not taking: Reported on 8/23/2021 8/7/21 9/6/21  Sowmya Patel MD   nitroglycerin (NITROSTAT) 0.4 mg SL tablet 1 Tab by SubLINGual route every five (5) minutes as needed for Chest Pain. Up to 3 doses. Patient not taking: Reported on 7/26/2021 2/23/21   Violet Richter ANP       REVIEW OF SYSTEMS:     I am not able to complete the review of systems because:    The patient is intubated and sedated    The patient has altered mental status due to his acute medical problems    The patient has baseline aphasia from prior stroke(s)    The patient has baseline dementia and is not reliable historian    The patient is in acute medical distress and unable to provide information           Total of 12 systems reviewed as follows:       POSITIVE= underlined text  Negative = text not underlined  General:  fever, chills, sweats, generalized weakness, weight loss/gain,      loss of appetite   Eyes:    blurred vision, eye pain, loss of vision, double vision  ENT:    rhinorrhea, pharyngitis   Respiratory:   cough, sputum production, SOB, STANTON, wheezing, pleuritic pain   Cardiology:   chest pain, palpitations, orthopnea, PND, edema, syncope   Gastrointestinal:  abdominal pain , N/V, diarrhea, dysphagia, constipation, bleeding   Genitourinary:  frequency, urgency, dysuria, hematuria, incontinence   Muskuloskeletal :  arthralgia, myalgia, back pain  Hematology:  easy bruising, nose or gum bleeding, lymphadenopathy   Dermatological: rash, ulceration, pruritis, color change / jaundice  Endocrine:   hot flashes or polydipsia   Neurological:  headache, dizziness, confusion, focal weakness, paresthesia,     Speech difficulties, memory loss, gait difficulty  Psychological: Feelings of anxiety, depression, agitation    Objective:   VITALS:    Visit Vitals  BP (!) 167/70   Pulse 90   Temp 98.4 °F (36.9 °C)   Resp (!) 32   Ht 5' 6\" (1.676 m)   Wt 68.9 kg (151 lb 14.4 oz)   SpO2 96%   BMI 24.52 kg/m²       PHYSICAL EXAM:    General:    Alert, cooperative, no distress, appears stated age. Ill appearing. HEENT: Atraumatic, anicteric sclerae, pink conjunctivae     No oral ulcers, mucosa moist, throat clear, dentition fair  Neck:  Supple, symmetrical,  thyroid: non tender  Lungs:   Nasal cannula. No Wheezing or Rhonchi. R basilar crackles. Chest wall:  No tenderness  No Accessory muscle use. Heart:   Regular  rhythm,  No  murmur   No edema  Abdomen:   Soft, non-tender. Not distended. Bowel sounds normal  Extremities: No cyanosis. No clubbing,      Skin turgor normal, Capillary refill normal, Radial dial pulse 2+  Skin:     Not pale. Not Jaundiced  No rashes   Psych:  Good insight. Not depressed. Not anxious or agitated. Neurologic: EOMs intact. No facial asymmetry. No aphasia or slurred speech. Symmetrical strength, Sensation grossly intact.  Alert and oriented X 4.     _______________________________________________________________________  Care Plan discussed with:    Comments   Patient x    Family  x    RN x    Care Manager                    Consultant:      _______________________________________________________________________  Expected  Disposition:   Home with Family x HH/PT/OT/RN    SNF/LTC    EMILY    ________________________________________________________________________  TOTAL TIME:  70 Minutes    Critical Care Provided     Minutes non procedure based      Comments    x Reviewed previous records   >50% of visit spent in counseling and coordination of care x Discussion with patient and/or family and questions answered       ________________________________________________________________________  Signed: Heavenly Piedra,     Procedures: see electronic medical records for all procedures/Xrays and details which were not copied into this note but were reviewed prior to creation of Plan. LAB DATA REVIEWED:    Recent Results (from the past 24 hour(s))   EKG, 12 LEAD, INITIAL    Collection Time: 08/23/21  3:26 PM   Result Value Ref Range    Ventricular Rate 87 BPM    Atrial Rate 87 BPM    P-R Interval 164 ms    QRS Duration 112 ms    Q-T Interval 374 ms    QTC Calculation (Bezet) 450 ms    Calculated P Axis 98 degrees    Calculated R Axis 63 degrees    Calculated T Axis -30 degrees    Diagnosis       Normal sinus rhythm  Nonspecific ST abnormality  Abnormal QRS-T angle, consider primary T wave abnormality  Abnormal ECG  When compared with ECG of 03-AUG-2021 11:18,  T wave inversion no longer evident in Anterolateral leads     CBC WITH AUTOMATED DIFF    Collection Time: 08/23/21  3:35 PM   Result Value Ref Range    WBC 19.9 (H) 3.6 - 11.0 K/uL    RBC 3.07 (L) 3.80 - 5.20 M/uL    HGB 8.8 (L) 11.5 - 16.0 g/dL    HCT 27.9 (L) 35.0 - 47.0 %    MCV 90.9 80.0 - 99.0 FL    MCH 28.7 26.0 - 34.0 PG    MCHC 31.5 30.0 - 36.5 g/dL    RDW 15.0 (H) 11.5 - 14.5 %    PLATELET 389 (H) 607 - 400 K/uL    MPV 9.9 8.9 - 12.9 FL    NRBC 0.0 0  WBC    ABSOLUTE NRBC 0.00 0.00 - 0.01 K/uL    NEUTROPHILS 82 (H) 32 - 75 %    BAND NEUTROPHILS 2 %    LYMPHOCYTES 4 (L) 12 - 49 %    MONOCYTES 4 (L) 5 - 13 %    EOSINOPHILS 8 (H) 0 - 7 %    BASOPHILS 0 0 - 1 %    IMMATURE GRANULOCYTES 0 0.0 - 0.5 %    ABS. NEUTROPHILS 16.7 (H) 1.8 - 8.0 K/UL    ABS. LYMPHOCYTES 0.8 0.8 - 3.5 K/UL    ABS. MONOCYTES 0.8 0.0 - 1.0 K/UL    ABS. EOSINOPHILS 1.6 (H) 0.0 - 0.4 K/UL    ABS. BASOPHILS 0.0 0.0 - 0.1 K/UL    ABS. IMM. GRANS. 0.0 0.00 - 0.04 K/UL    DF MANUAL      PLATELET COMMENTS ADEQUATE PLATELETS      RBC COMMENTS ANISOCYTOSIS  1+       METABOLIC PANEL, COMPREHENSIVE    Collection Time: 08/23/21  3:35 PM   Result Value Ref Range    Sodium 130 (L) 136 - 145 mmol/L    Potassium 5.6 (H) 3.5 - 5.1 mmol/L    Chloride 99 97 - 108 mmol/L    CO2 21 21 - 32 mmol/L    Anion gap 10 5 - 15 mmol/L    Glucose 240 (H) 65 - 100 mg/dL    BUN 37 (H) 6 - 20 MG/DL    Creatinine 1.80 (H) 0.55 - 1.02 MG/DL    BUN/Creatinine ratio 21 (H) 12 - 20      GFR est AA 33 (L) >60 ml/min/1.73m2    GFR est non-AA 27 (L) >60 ml/min/1.73m2    Calcium 9.5 8.5 - 10.1 MG/DL    Bilirubin, total 0.3 0.2 - 1.0 MG/DL    ALT (SGPT) 16 12 - 78 U/L    AST (SGOT) 45 (H) 15 - 37 U/L    Alk. phosphatase 76 45 - 117 U/L    Protein, total 7.7 6.4 - 8.2 g/dL    Albumin 2.1 (L) 3.5 - 5.0 g/dL    Globulin 5.6 (H) 2.0 - 4.0 g/dL    A-G Ratio 0.4 (L) 1.1 - 2.2     TROPONIN I    Collection Time: 08/23/21  3:35 PM   Result Value Ref Range    Troponin-I, Qt. <0.05 <0.05 ng/mL   NT-PRO BNP    Collection Time: 08/23/21  3:35 PM   Result Value Ref Range    NT pro-BNP 29,783 (H) 0 - 450 PG/ML   LACTIC ACID    Collection Time: 08/23/21  3:35 PM   Result Value Ref Range    Lactic acid 1.1 0.4 - 2.0 MMOL/L   SAMPLES BEING HELD    Collection Time: 08/23/21  3:35 PM   Result Value Ref Range    SAMPLES BEING HELD 1RED, 1SST, 1BLUE     COMMENT        Add-on orders for these samples will be processed based on acceptable specimen integrity and analyte stability, which may vary by analyte.    COVID-19 WITH INFLUENZA A/B    Collection Time: 08/23/21  3:53 PM   Result Value Ref Range    SARS-CoV-2 Not detected NOTD      Influenza A by PCR Not detected NOTD      Influenza B by PCR Not detected NOTD URINALYSIS W/ RFLX MICROSCOPIC    Collection Time: 08/23/21  5:41 PM   Result Value Ref Range    Color YELLOW/STRAW      Appearance CLEAR CLEAR      Specific gravity 1.025 1.003 - 1.030      pH (UA) 5.5 5.0 - 8.0      Protein >300 (A) NEG mg/dL    Glucose Negative NEG mg/dL    Ketone Negative NEG mg/dL    Bilirubin Negative NEG      Blood Negative NEG      Urobilinogen 0.2 0.2 - 1.0 EU/dL    Nitrites Negative NEG      Leukocyte Esterase Negative NEG     URINE MICROSCOPIC ONLY    Collection Time: 08/23/21  5:41 PM   Result Value Ref Range    WBC 0-4 0 - 4 /hpf    RBC 0-5 0 - 5 /hpf    Epithelial cells MODERATE (A) FEW /lpf    Bacteria 1+ (A) NEG /hpf   CBC WITH AUTOMATED DIFF    Collection Time: 08/24/21  7:22 AM   Result Value Ref Range    WBC 22.0 (H) 3.6 - 11.0 K/uL    RBC 3.16 (L) 3.80 - 5.20 M/uL    HGB 9.1 (L) 11.5 - 16.0 g/dL    HCT 28.4 (L) 35.0 - 47.0 %    MCV 89.9 80.0 - 99.0 FL    MCH 28.8 26.0 - 34.0 PG    MCHC 32.0 30.0 - 36.5 g/dL    RDW 15.1 (H) 11.5 - 14.5 %    PLATELET 454 (H) 889 - 400 K/uL    MPV 10.1 8.9 - 12.9 FL    NRBC 0.0 0  WBC    ABSOLUTE NRBC 0.00 0.00 - 0.01 K/uL    NEUTROPHILS 77 (H) 32 - 75 %    LYMPHOCYTES 7 (L) 12 - 49 %    MONOCYTES 6 5 - 13 %    EOSINOPHILS 9 (H) 0 - 7 %    BASOPHILS 0 0 - 1 %    IMMATURE GRANULOCYTES 1 (H) 0.0 - 0.5 %    ABS. NEUTROPHILS 17.0 (H) 1.8 - 8.0 K/UL    ABS. LYMPHOCYTES 1.5 0.8 - 3.5 K/UL    ABS. MONOCYTES 1.3 (H) 0.0 - 1.0 K/UL    ABS. EOSINOPHILS 2.0 (H) 0.0 - 0.4 K/UL    ABS. BASOPHILS 0.0 0.0 - 0.1 K/UL    ABS. IMM.  GRANS. 0.2 (H) 0.00 - 0.04 K/UL    DF AUTOMATED      PLATELET COMMENTS CLUMPED PLATELETS      RBC COMMENTS ANISOCYTOSIS  1+        RBC COMMENTS HYPOCHROMIA  1+       METABOLIC PANEL, BASIC    Collection Time: 08/24/21  7:22 AM   Result Value Ref Range    Sodium 135 (L) 136 - 145 mmol/L    Potassium 4.5 3.5 - 5.1 mmol/L    Chloride 103 97 - 108 mmol/L    CO2 17 (L) 21 - 32 mmol/L    Anion gap 15 5 - 15 mmol/L    Glucose 99 65 - 100 mg/dL    BUN 36 (H) 6 - 20 MG/DL    Creatinine 1.92 (H) 0.55 - 1.02 MG/DL    BUN/Creatinine ratio 19 12 - 20      GFR est AA 30 (L) >60 ml/min/1.73m2    GFR est non-AA 25 (L) >60 ml/min/1.73m2    Calcium 9.6 8.5 - 48.7 MG/DL   METABOLIC PANEL, COMPREHENSIVE    Collection Time: 08/24/21  7:22 AM   Result Value Ref Range    Sodium 135 (L) 136 - 145 mmol/L    Potassium 4.5 3.5 - 5.1 mmol/L    Chloride 103 97 - 108 mmol/L    CO2 16 (L) 21 - 32 mmol/L    Anion gap 16 (H) 5 - 15 mmol/L    Glucose 99 65 - 100 mg/dL    BUN 35 (H) 6 - 20 MG/DL    Creatinine 1.91 (H) 0.55 - 1.02 MG/DL    BUN/Creatinine ratio 18 12 - 20      GFR est AA 30 (L) >60 ml/min/1.73m2    GFR est non-AA 25 (L) >60 ml/min/1.73m2    Calcium 9.6 8.5 - 10.1 MG/DL    Bilirubin, total 0.3 0.2 - 1.0 MG/DL    ALT (SGPT) 10 (L) 12 - 78 U/L    AST (SGOT) 18 15 - 37 U/L    Alk.  phosphatase 79 45 - 117 U/L    Protein, total 7.2 6.4 - 8.2 g/dL    Albumin 2.0 (L) 3.5 - 5.0 g/dL    Globulin 5.2 (H) 2.0 - 4.0 g/dL    A-G Ratio 0.4 (L) 1.1 - 2.2     MAGNESIUM    Collection Time: 08/24/21  7:22 AM   Result Value Ref Range    Magnesium 1.6 1.6 - 2.4 mg/dL   PHOSPHORUS    Collection Time: 08/24/21  7:22 AM   Result Value Ref Range    Phosphorus 3.5 2.6 - 4.7 MG/DL

## 2021-08-24 NOTE — PROGRESS NOTES
Pharmacy Antimicrobial Dosing  80 y/o 68.9 kg in ED with SOB O2 sats 88% on RA  HX of AICD infection -tx as outpatient with daptomycin- has PICC line    Indication for Antimicrobials: HAP    Current Regimen of Each Antimicrobial (Start Day & Day of Therapy):  Start date   Zosyn 2.25 gm every 6 hours- renally dosed  Vancomycin 1250 mg x1 then 500 mg IV every 18 hours- renally dosed    Discontinued  Levaquin 750 mg IV x 1 in ED  Daptomycin - as outpatient- now discontinued    Goal Vancomycin Level : 15-20 mcg/ml  or AUC  400-600   Level(s) Ordered: Will follow  patient  and  order     Significant Cultures:  CXR- diffuse bilateral interstitial disease    Paralysis, amputations:   Recent Labs     21  0722 21  1535   CREA 1.91*  1.92* 1.80*   BUN 35*  36* 37*   WBC 22.0* 19.9*     Temp (24hrs), Av.4 °F (36.9 °C), Min:97.9 °F (36.6 °C), Max:99.7 °F (37.6 °C)    Creatinine Clearance (Creatinine Clearance (ml/min)): 21 ml/min       Impression/Plan: 80 y/o 68.9 kg in ED with SOB-O2  88% on RA  HX of AICD infection treated  as outpatient pta with daptomycin via pICC line  Patient has hx of adverse reaction of IVAN s/p cephalosporin therapy. Unknown if other medications being administered concurrently. Plan to treat HAP with 7 days Zosyn and Vancomycin both renally dosed. Currently afebrile, WBC 22, O2 sat 97 on 3L  Will monitor renal function closely and adjust doses as needed. Pharmacy will follow daily and adjust medications as appropriate for renal function and/or serum levels.     Thank you,  Divya Angel, JESUD     Renal Dosing Tables on [employer]

## 2021-08-24 NOTE — RT PROTOCOL NOTE
RT Nebulizer Bronchodilator Protocol Note    There is a bronchodilator order in the chart from a provider indicating to follow the RT Bronchodilator Protocol and there is an Initiate RT Bronchodilator Protocol order as well (see protocol at bottom of note). The findings from the last RT Protocol Assessment were as follows:  Smoking:    Surgical Status:    Xray:    Respiratory Pattern:    Mental Status:    Breath Sounds:    Cough: Activity Level:    Oxygen Requirement:    Indication for Bronchodilator Therapy:    Bronchodilator Assessment Score:      Aerosolized bronchodilator medication orders have been revised according to the RT Bronchodilator Protocol. RT Bronchodilator Protocol:    Respiratory Therapist to perform RT Therapy Protocol Assessment then follow the protocol. No Indications - adjust the frequency to every 6 hours PRN wheezing or bronchospasm, if no treatments needed after 48 hours then discontinue using Per Protocol order mode. If indication present, adjust the RT bronchodilator orders based on the Bronchodilator Assessment Score as follows:    0-6 - enter or revise RT Bronchodilator order to Albuterol Nebulizer order with frequency of every 2 hours PRN for wheezing or increased work of breathing using Per Protocol order mode. Repeat RT Therapy Protocol Assessment as needed. 7-10 - discontinue any other Inpatient aerosolized bronchodilator medication orders and enter or revise two Albuterol Nebulizer orders, one with BID frequency and one with frequency of every 2 hours PRN wheezing or increased work of breathing using Per Protocol order mode. Repeat RT Therapy Protocol Assessment with second treatment then BID and as needed.       11-13 - discontinue any other Inpatient aerosolized bronchodilator medication orders and enter DuoNeb Nebulizer order with QID frequency and an Albuterol Nebulizer order with frequency of every 2 hours PRN wheezing or increased work of breathing using Per Protocol order mode. Repeat RT Therapy Protocol Assessment with second treatment then QID and as needed. Greater than 13 - discontinue any other Inpatient aerosolized bronchodilator medication orders and enter a DuoNeb Nebulizer order with every 4 hours frequency and an Albuterol Nebulizer order with frequency of every 2 hours PRN wheezing or increased work of breathing using Per Protocol order mode. Repeat RT Therapy Protocol Assessment with second treatment then every 4 hours and as needed. RT to enter RT Home Evaluation for COPD & MDI Assessment order using Per Protocol order mode.     Electronically signed by RT Benita on 8/24/2021 at 7:31 AM

## 2021-08-24 NOTE — PROGRESS NOTES
Called to see patient due to increased dyspnea with exertion, tachypnea noted about 0600. Pt admitted for rx of worsening rt sided pneumonia, interstitial dz, with elevated bnp of about 43469, potassium of 5.6, [place] [address], covid negative. Pt rx in ed with neb and lasix, abx. Did well till this am, noted to have increased huynh and tachypnea, hypoxia increased. On exam she stated she was not short of breath at rest, no Chest pain, no pedal edema, no diaphresis,  noted to have respirations of about 30, sats of 97% on 3 lpm, + jvd, tachycardia with no MGR, lungs with ralesrt> left. Assessment: worsening of chf, pneumonia. Pt has not had a fluid bolus over night. Plan: Iv lasix, duoneb, if not improving may benefit from bipap, will recheck cbc and bmp, will discuss with Dr David Menchaca.

## 2021-08-24 NOTE — PROGRESS NOTES
S: Pt noted to be SOB after getting up to the Bedside commode. B: Pt admitted for pneumonia on evening 8/23/2021. Pt stated she had been feeling sick since April from an infected pacemaker . She has a PICC in her right arm that she has for weekly blood draws to monitor the infection. A: Pt had an uneventful evening until around 0630 when she hurried to get out of bed to the bedside commode and after voiding became acutely short of breath, with rapid respiratory rate, elevated heart rate, and elevated blood pressure. Charge nurse and physician notified. Fio2 increased to 3.5l NC, PCXR done. Respiratory gave  A breathing treatment with good result. R: Physician feels this is a worsening of pneumonia. See plan of care. Report given to oncoming shift.

## 2021-08-24 NOTE — PROGRESS NOTES
PICC dressing was changed by home nurse (reported by patient). Date on dressing is 8/23. Biopatch is noted to be upside down. Catheter is out by 1.5cm (measured) - Hospitalist at bedside. He called radiologist and confirmed with the last XRAY - in position and okay to use for Vancomycin.

## 2021-08-24 NOTE — PROGRESS NOTES
Physical Therapy Screening:    [facility] was triggered for physical therapy based on results obtained during the nursing admission assessment. The patients chart was reviewed and the patient is appropriate for a skilled therapy evaluation if there is a decline in functional mobility from baseline. Please order a consult for physical therapy if you are in agreement and would like an evaluation to be completed. Thank you.     Durell Habermann, PTA

## 2021-08-24 NOTE — PROGRESS NOTES
Dr. Melissa Co notified that pt is having more difficulty breathing-- PCXR ordered per MD and radiology tech notified. [id number]  PCXR being done. 0700  Pt with rhythm change on monitor, was in sinus rhythm and now in a-fib, EKG ordered and RT notified. Dr. Melissa Co notified also. 0713  40 mg IV Lasix has been administered per order.

## 2021-08-24 NOTE — PROGRESS NOTES
Chloe Rodriguez ....Bedside shift change report given to [address] [place] [address] Road (oncoming nurse) by SULEMAN Ayala RN (offgoing nurse). Report included the following information SBAR, Kardex and Intake/Output.

## 2021-08-24 NOTE — PROGRESS NOTES
Problem: Falls - Risk of  Goal: *Absence of Falls  Description: Document Zach Martin Fall Risk and appropriate interventions in the flowsheet.   Outcome: Progressing Towards Goal  Note: Fall Risk Interventions:  Mobility Interventions: Bed/chair exit alarm         Medication Interventions: Patient to call before getting OOB    Elimination Interventions: Bed/chair exit alarm, Call light in reach    History of Falls Interventions: Door open when patient unattended

## 2021-08-24 NOTE — PROGRESS NOTES
Bedside and Verbal shift change report given to HO Marie (oncoming nurse) by Edelmira Miller RN (offgoing nurse). Report included the following information SBAR, Kardex, MAR and Recent Results.

## 2021-08-24 NOTE — PROGRESS NOTES
Pt was admitted to room via ER stretcher on O2 at 2 L/min, denies pain, assisted to scoot over to bed by 2 nurses, oriented to room by nurse and all safety measures in place, all questions answered. Pt now resting comfortably in bed with HOB elevated.

## 2021-08-24 NOTE — PROGRESS NOTES
Oxygen order was not placed in bridge orders by Dr. Fabian Teixeira - pt has been on 2L NC in ED and during admission. Dr. Blair Mcclure advised to order oxygen [facility] as patient was on during admission for pneumonia.

## 2021-08-24 NOTE — PROGRESS NOTES
Dr. Addi Subramanian cancelled lactic acid repeats due to WNL lactic prior - repeated verbal cancel order. Cancelled in [facility]. During cancellation - computer froze - unable to change comment of \"Pt had WNL prior lactic - admitting dx fx hip - no know admitting of sepsis\" - pt does NOT have hip fx. Writer contacting IT for assistance in addenduming to cancellation comments.

## 2021-08-24 NOTE — PROGRESS NOTES
Care Management Interventions  PCP Verified by CM: Yes Kb Uribe NP)  Palliative Care Criteria Met (RRAT>21 & CHF Dx)?: No (No MD order for Palliative Care.)  Transition of Care Consult (CM Consult): [address]: No  Reason Outside [place]: Patient already serviced by other home care/hospice agency ([address])  Discharge Durable Medical Equipment: No  Physical Therapy Consult: No  Occupational Therapy Consult: No  Speech Therapy Consult: No  Current Support Network: Lives with Spouse, Own Home  Confirm Follow Up Transport: Self  Discharge Location  Discharge Placement: Home with home health (Patient has [address])     Met with the patient to review and discuss plan of care and disposition plans. Patient states she lives in her home with her  and manages very well. Her  is in a wheelchair she states b/o cancer diagnosis 12 years ago but manages ok at home. She is independent in all ADLs. She uses a walker at home. Does not have home oxygen. She has 2 daughters and the both live out of state; one in [place] and on in [place]. Patient has a PICC line in for IV antibiotics s/p AICD infection which device has been removed. She is receiving IV antibiotics, Daptomycin she says every other day. [address]  is assisting with administering and when they don't make a visit, patient states her  has been educated on how to administer the antibiotic. Patient is in Observation status. MOON Observation Notification letter explained to the patient. She verbalized understanding. Copy to her and copy placed in the chart. Case Management introductory letter with contact information given to the patient and encouraged to call if she should have any needs. .    Reason for Admission:   Pneumonia, CHF               RUR Score:  NA  Observation status   RRAT: 52  HIGH         PCP: First and Last name:  Radames Hanson NP    Name of Practice:  [facility]  Are you a current patient: Yes/No: YES  Approximate date of last visit: Could not remember. She stated that it was a long time ago. Can you do a virtual visit with your PCP:  YES             Resources/supports as identified by patient/family:                   Top Challenges facing patient (as identified by patient/family and CM): None at this time   Finances/Medication cost?    None stated                Transportation? None              Support system or lack thereof?  at home and very supportive according to the patien  Living arrangements? None at this time             Self-care/ADLs/Cognition?   Independent with ADLs and Cognition: WNL          Current Advanced Directive/Advance Care Plan:  Full Code    Healthcare Decision Maker:   Click here to complete HealthCare Decision Makers including selection of the Healthcare Decision Maker Relationship (ie \"Primary\")      Primary Decision MakerCathjennifer Berkowitz - Spouse - [phone]    Secondary Decision Maker: Kwaku Gillian - Daughter - [phone]    Payor Source Payor: Jorge Sandoval / Plan: Pam Davis / Product Type: Managed Care Medicare /                             Plan for utilizing home health:    [facility]                 Transition of Care Plan:   Home with [facility] Dev[facility]ris           Readmission Assessment  Number of days since last admission?: 8-30 days (17 days)  Previous disposition: Home with Home Health  Who is being interviewed?: Patient  What was the patient's/caregiver's perception as to why they think they needed to return back to the hospital?: Other (Comment) (Patient stated that she was doing ok at home until she became short of breath/difficulty breathin)  Did you visit your Primary Care Physician after you left the hospital, before you returned this time?: No (Patient stated that due to swollen legs and feet, she could not walk Home health was on the scene.)  Why weren't you able to visit your PCP?: Other (Comment) (She could not walk secondary to swollen feet and legs.)  Who advised the patient to return to the hospital?: Self-referral  Does the patient report anything that got in the way of taking their medications?: No  In our efforts to provide the best possible care to you and others like you, can you think of anything that we could have done to help you after you left the hospital the first time, so that you might not have needed to return so soon?: Other (Comment) (Nothing could have done on previous admission to prevent the readmission.)

## 2021-08-24 NOTE — PROGRESS NOTES
Non-billable note. Patient known to ID service. 79-year-old female with AICD , right knee replacement who was admitted in July 2021 for MSSA bacteremia of unknown source. Patient is status post explantation of the ICD/pacemaker along with leads by Cardiology on 7/21/2021. No need for reimplantation of AICD seen by cardiology at that time. Dr. Enma Velazquez from [facility] contacted us today regarding this patient being admitted at [facility] for right-sided pneumonia. Discussed with him regarding reasons for the patient's presentation, current clinical status. I have reviewed the labs and chest x-ray reports. Appears to be admitted for a new right-sided pneumonia that is assessed to be hospital-acquired because of recent hospital admissions and being on IV antibiotics. Will recommend to start patient on vancomycin with goal trough 15-20 at renal dosing given IVAN seen this admission. Also start Zosyn for pseudomonal coverage. Stop daptomycin as it does not have optimal lung coverage for pneumonia. Patient had a possible vascular or immune-mediated rash on her lower extremities earlier in August.  she was seen by our ID colleagues at [facility]. Although the suspicion for a drug rash due to cefazolin was extremely low, to be on the cautious side, cefazolin was stopped at that time and changed to daptomycin. Because of this it is best we also avoid cefepime at this time and start Zosyn. Pharmacy must dose the vancomycin very carefully because of the IVAN seen this admission. We will continue to discuss clinical updates with Dr.Seipp.        Bertrand Combs MD  Infectious Diseases

## 2021-08-24 NOTE — PROGRESS NOTES
Bedside shift change report given to JIM Carter RN (oncoming nurse) by Carrol Barcenas RN (offgoing nurse).  Report included the following information SBAR, MAR, VS, respiratory status,

## 2021-08-25 LAB
ALBUMIN SERPL-MCNC: 1.8 G/DL (ref 3.5–5)
ALBUMIN/GLOB SERPL: 0.3 {RATIO} (ref 1.1–2.2)
ALP SERPL-CCNC: 78 U/L (ref 45–117)
ALT SERPL-CCNC: 26 U/L (ref 12–78)
ANION GAP SERPL CALC-SCNC: 14 MMOL/L (ref 5–15)
AST SERPL-CCNC: 23 U/L (ref 15–37)
BACTERIA SPEC CULT: ABNORMAL
BASOPHILS # BLD: 0 K/UL (ref 0–0.1)
BASOPHILS NFR BLD: 0 % (ref 0–1)
BILIRUB SERPL-MCNC: 0.3 MG/DL (ref 0.2–1)
BUN SERPL-MCNC: 37 MG/DL (ref 6–20)
BUN/CREAT SERPL: 17 (ref 12–20)
CALCIUM SERPL-MCNC: 9.3 MG/DL (ref 8.5–10.1)
CC UR VC: ABNORMAL
CHLORIDE SERPL-SCNC: 104 MMOL/L (ref 97–108)
CK SERPL-CCNC: 39 U/L (ref 26–192)
CO2 SERPL-SCNC: 19 MMOL/L (ref 21–32)
COMMENT, HOLDF: NORMAL
CREAT SERPL-MCNC: 2.16 MG/DL (ref 0.55–1.02)
DIFFERENTIAL METHOD BLD: ABNORMAL
EOSINOPHIL # BLD: 3.3 K/UL (ref 0–0.4)
EOSINOPHIL NFR BLD: 17 % (ref 0–7)
ERYTHROCYTE [DISTWIDTH] IN BLOOD BY AUTOMATED COUNT: 15.4 % (ref 11.5–14.5)
GLOBULIN SER CALC-MCNC: 5.2 G/DL (ref 2–4)
GLUCOSE BLD STRIP.AUTO-MCNC: 161 MG/DL (ref 65–117)
GLUCOSE BLD STRIP.AUTO-MCNC: 235 MG/DL (ref 65–117)
GLUCOSE BLD STRIP.AUTO-MCNC: 245 MG/DL (ref 65–117)
GLUCOSE BLD STRIP.AUTO-MCNC: 312 MG/DL (ref 65–117)
GLUCOSE SERPL-MCNC: 149 MG/DL (ref 65–100)
HCT VFR BLD AUTO: 25.8 % (ref 35–47)
HGB BLD-MCNC: 8.3 G/DL (ref 11.5–16)
IMM GRANULOCYTES # BLD AUTO: 0 K/UL (ref 0–0.04)
IMM GRANULOCYTES NFR BLD AUTO: 0 % (ref 0–0.5)
LYMPHOCYTES # BLD: 3.1 K/UL (ref 0.8–3.5)
LYMPHOCYTES NFR BLD: 16 % (ref 12–49)
MAGNESIUM SERPL-MCNC: 1.8 MG/DL (ref 1.6–2.4)
MCH RBC QN AUTO: 28.4 PG (ref 26–34)
MCHC RBC AUTO-ENTMCNC: 32.2 G/DL (ref 30–36.5)
MCV RBC AUTO: 88.4 FL (ref 80–99)
MONOCYTES # BLD: 1.2 K/UL (ref 0–1)
MONOCYTES NFR BLD: 6 % (ref 5–13)
NEUTS SEG # BLD: 11.8 K/UL (ref 1.8–8)
NEUTS SEG NFR BLD: 61 % (ref 32–75)
NRBC # BLD: 0 K/UL (ref 0–0.01)
NRBC BLD-RTO: 0 PER 100 WBC
PHOSPHATE SERPL-MCNC: 3.6 MG/DL (ref 2.6–4.7)
PLATELET # BLD AUTO: 509 K/UL (ref 150–400)
PMV BLD AUTO: 9.9 FL (ref 8.9–12.9)
POTASSIUM SERPL-SCNC: 4.1 MMOL/L (ref 3.5–5.1)
PROT SERPL-MCNC: 7 G/DL (ref 6.4–8.2)
RBC # BLD AUTO: 2.92 M/UL (ref 3.8–5.2)
RBC MORPH BLD: ABNORMAL
SAMPLES BEING HELD,HOLD: NORMAL
SERVICE CMNT-IMP: ABNORMAL
SODIUM SERPL-SCNC: 137 MMOL/L (ref 136–145)
WBC # BLD AUTO: 19.4 K/UL (ref 3.6–11)

## 2021-08-25 PROCEDURE — 74011250636 HC RX REV CODE- 250/636: Performed by: INTERNAL MEDICINE

## 2021-08-25 PROCEDURE — 74011000258 HC RX REV CODE- 258: Performed by: INTERNAL MEDICINE

## 2021-08-25 PROCEDURE — 82962 GLUCOSE BLOOD TEST: CPT

## 2021-08-25 PROCEDURE — 84100 ASSAY OF PHOSPHORUS: CPT

## 2021-08-25 PROCEDURE — 36415 COLL VENOUS BLD VENIPUNCTURE: CPT

## 2021-08-25 PROCEDURE — 85025 COMPLETE CBC W/AUTO DIFF WBC: CPT

## 2021-08-25 PROCEDURE — 96372 THER/PROPH/DIAG INJ SC/IM: CPT

## 2021-08-25 PROCEDURE — 74011250637 HC RX REV CODE- 250/637: Performed by: INTERNAL MEDICINE

## 2021-08-25 PROCEDURE — 83735 ASSAY OF MAGNESIUM: CPT

## 2021-08-25 PROCEDURE — 99218 HC RM OBSERVATION: CPT

## 2021-08-25 PROCEDURE — 80053 COMPREHEN METABOLIC PANEL: CPT

## 2021-08-25 PROCEDURE — 74011636637 HC RX REV CODE- 636/637: Performed by: INTERNAL MEDICINE

## 2021-08-25 PROCEDURE — 82550 ASSAY OF CK (CPK): CPT

## 2021-08-25 PROCEDURE — 65660000000 HC RM CCU STEPDOWN

## 2021-08-25 PROCEDURE — 96376 TX/PRO/DX INJ SAME DRUG ADON: CPT

## 2021-08-25 RX ORDER — FUROSEMIDE 40 MG/1
40 TABLET ORAL DAILY
Status: DISCONTINUED | OUTPATIENT
Start: 2021-08-25 | End: 2021-08-28 | Stop reason: HOSPADM

## 2021-08-25 RX ORDER — CHOLESTYRAMINE 4 G/4.8G
4 POWDER, FOR SUSPENSION ORAL 2 TIMES DAILY
Status: DISCONTINUED | OUTPATIENT
Start: 2021-08-25 | End: 2021-08-28 | Stop reason: HOSPADM

## 2021-08-25 RX ORDER — CARVEDILOL 25 MG/1
25 TABLET ORAL 2 TIMES DAILY WITH MEALS
Status: DISCONTINUED | OUTPATIENT
Start: 2021-08-25 | End: 2021-08-28 | Stop reason: HOSPADM

## 2021-08-25 RX ADMIN — HEPARIN SODIUM 5000 UNITS: 5000 INJECTION INTRAVENOUS; SUBCUTANEOUS at 20:35

## 2021-08-25 RX ADMIN — ACETAMINOPHEN 650 MG: 325 TABLET ORAL at 20:41

## 2021-08-25 RX ADMIN — Medication 1 TABLET: at 09:24

## 2021-08-25 RX ADMIN — INSULIN GLARGINE 50 UNITS: 100 INJECTION, SOLUTION SUBCUTANEOUS at 21:54

## 2021-08-25 RX ADMIN — CHOLESTYRAMINE 4 G: 4 POWDER, FOR SUSPENSION ORAL at 20:34

## 2021-08-25 RX ADMIN — HEPARIN SODIUM 5000 UNITS: 5000 INJECTION INTRAVENOUS; SUBCUTANEOUS at 09:25

## 2021-08-25 RX ADMIN — INSULIN LISPRO 4 UNITS: 100 INJECTION, SOLUTION INTRAVENOUS; SUBCUTANEOUS at 12:03

## 2021-08-25 RX ADMIN — Medication 10 ML: at 02:43

## 2021-08-25 RX ADMIN — Medication 1 CAPSULE: at 09:24

## 2021-08-25 RX ADMIN — CARVEDILOL 25 MG: 25 TABLET, FILM COATED ORAL at 16:41

## 2021-08-25 RX ADMIN — Medication 10 ML: at 17:14

## 2021-08-25 RX ADMIN — VANCOMYCIN HYDROCHLORIDE 500 MG: 500 INJECTION, POWDER, LYOPHILIZED, FOR SOLUTION INTRAVENOUS at 09:26

## 2021-08-25 RX ADMIN — Medication 40 ML: at 22:00

## 2021-08-25 RX ADMIN — FERROUS SULFATE TAB EC 324 MG (65 MG FE EQUIVALENT) 324 MG: 324 (65 FE) TABLET DELAYED RESPONSE at 09:24

## 2021-08-25 RX ADMIN — ASPIRIN 81 MG: 81 TABLET, COATED ORAL at 09:24

## 2021-08-25 RX ADMIN — FUROSEMIDE 40 MG: 40 TABLET ORAL at 16:41

## 2021-08-25 RX ADMIN — PIPERACILLIN AND TAZOBACTAM 2.25 G: 2; .25 INJECTION, POWDER, LYOPHILIZED, FOR SOLUTION INTRAVENOUS at 02:42

## 2021-08-25 RX ADMIN — VENLAFAXINE 37.5 MG: 37.5 TABLET ORAL at 09:25

## 2021-08-25 RX ADMIN — INSULIN LISPRO 2 UNITS: 100 INJECTION, SOLUTION INTRAVENOUS; SUBCUTANEOUS at 17:13

## 2021-08-25 RX ADMIN — PIPERACILLIN AND TAZOBACTAM 2.25 G: 2; .25 INJECTION, POWDER, LYOPHILIZED, FOR SOLUTION INTRAVENOUS at 09:25

## 2021-08-25 RX ADMIN — PIPERACILLIN AND TAZOBACTAM 2.25 G: 2; .25 INJECTION, POWDER, LYOPHILIZED, FOR SOLUTION INTRAVENOUS at 20:34

## 2021-08-25 NOTE — PROGRESS NOTES
Bedside shift change report given to Jess Lennox RN (oncoming nurse) by Stefania Rose. Payton TEAGUE (offgoing nurse). Report included the following information Kardex.

## 2021-08-25 NOTE — PROGRESS NOTES
Follow up visit with patient in Rm 204  Provided empathic listening and spiritual support  Advised of  Availability   [address] [place] [address]

## 2021-08-25 NOTE — PROGRESS NOTES
Subjective  O2 req remains the same  Pt appears better  Eating breakfast  No distress  Discussed with pt, , and daughter      Objective        Assessment/Plan  CODE: Full  DVT: heparin 5000u sq q8h  Dispo: pending resolution of PNA      HPI: Ajay Garcia is a 79 y.o.  female who presents with SOB.     Patient has a very complicated medical history. Most recently she had a pacer explant after she was found to have MSSA bacteremia. She received a PICC line in her R arm and was treated with cephalosporins outpt then developed an IVAN and a rash on her lower extremities. Cephalosporins were thought to have caused a drug reaction thus her ABX were changed to Daptomycin. She continued to receive Daptomycin at home q48h. Her  has been giving her the infusions.    In the past several days prior to arrival she has been more SOB and weak. In the ED she was found to have a pneumonia. This is thought to be a hospital acquired pneumonia. She was started on vancomycin and zosyn (avoiding cefepime due to previous drug reaction). This was discussed with Dr. Charito Jaimes (the ID physician following her as an outpt). Hospital Acquired Pneumonia  Acute Hypoxic Respiratory Failure  Recent hospital stay  COVID-19 and flu a/b PCR neg  CXR R lung consolidation with b/l dz  Vanc/zosyn (avoid cephalosporin for recent suspected drug induced kidney injury and drug eruption)  duoneb prn for possible contribution of undx COPD     Hx recent MSSA bacteremia and suspected pacer infxn scheduled to receive IV ABX (daptomycin) via PICC (R arm) until 09/02  Was on cefazolin until suspected drug eruption > then daptomycin  Now on vanc/zosyn in house (MSSA susceptible to vanc)  Repeat BCx in house     HFrEF, mild decompensation  Recent pacer/ICD explant  PPM/ICD was originally indicated for primary prevention  Continue lasix + coreg  Does not appear to be on ACEi/ARB/ARNI/aldosterone antagonist - will clarify      PAF?   Unclear if pt's Cardiologist has her on [address] or not (does not appear so)  Only ASA, but pt with elevated CHADSVASC     DM  accuchecks achs + SSI + lantus 50u qpm     HLD  HTN  Coreg  Unclear why she isn't on statins     CKD4  Metabolic Acidosis  Renally dose Rx  Avoid nephrotoxins  Could consider starting bicarb supplementation as outpt but prefer pt follows with her Nephrologist first  Wouldn't start bicarb now due to recent bouts of hypervolemia     Mood Disorder  Venlafaxine     Smoker  1-1.5ppd per daughter, but pt states now 1/2 ppd  Nicotine patch

## 2021-08-25 NOTE — PROGRESS NOTES
IDR Team; MD, Nursing, Care Manager, Physical therapy, Nursing Supervisor, Pharmacy and Dietician, met to review patient's plan of care. Discussed goals, interventions, barriers and progress. RUR: 34%    Team will continue to monitor progress and report any concerns to the physician and care management as indicated. Transition of Care Plan:   Patient continues with IV antibiotics. States she feels better today. After a second level review was conducted, patient has been deemed appropriate for Inpatient Status. IM Notification Letter explained to the patient. She verbalized understanding. Copy to her and copy placed in the chart. Reason for Readmission:   Pneumonia           RUR Score/Risk Level:    34%     PCP: First and Last name:  Kenny Ellison. NP  Name of Practice: [address] [place] [address]  Are you a current patient: Yes/No:  YES  Approximate date of last visit: 7/14/21  Can you participate in a virtual visit with your PCP: YES    Is a Care Conference indicated: Not at this time    Did you attend your follow up appointment (s): If not, why not:  NO, Patient stated that her legs/feet swollen and she could not walk (8/10/21)       Resources/supports as identified by patient/family: Patient lives in her home with her  whom she says is very supportive         Top Challenges facing patient (as identified by patient/family and CM): Finances/Medication cost?   No Issues     Transportation : I see on previous admission, transportation was an issue. Patient transported via "[employer]. Support system or lack thereof?       Living arrangements? Lives with her      Self-care/ADLs/Cognition? Patient states she manages at home independently/Cognition wnl          Current Advanced Directive/Advance Care Plan:   On File, naming her  as [address] for utilizing home health:   Already has [employer] Transition of Care Plan:    Based on readmission, the patient's previous Plan of Care has been evaluated and/or modified. The current Transition of Care Plan is: to return home with home health.  is in a wheelchair but able to get around.  Assists the patient with administering IV antibiotics when home health is not there      Readmission Assessment  Number of days since last admission?: 8-30 days (17 days)  Previous disposition: Home with Home Health  Who is being interviewed?: Patient  What was the patient's/caregiver's perception as to why they think they needed to return back to the hospital?: Other (Comment) (Patient stated that she was doing ok at home until she became short of breath/difficulty breathin)  Did you visit your Primary Care Physician after you left the hospital, before you returned this time?: No (Patient stated that due to swollen legs and feet, she could not walk Home health was on the scene.)  Why weren't you able to visit your PCP?: Other (Comment) (She could not walk secondary to swollen feet and legs.)  Who advised the patient to return to the hospital?: Self-referral  Does the patient report anything that got in the way of taking their medications?: No  In our efforts to provide the best possible care to you and others like you, can you think of anything that we could have done to help you after you left the hospital the first time, so that you might not have needed to return so soon?: Other (Comment) (Nothing could have done on previous admission to prevent the readmission.)

## 2021-08-25 NOTE — PROGRESS NOTES
Problem: Falls - Risk of  Goal: *Absence of Falls  Description: Document [place] Fall Risk and appropriate interventions in the flowsheet. Outcome: Progressing Towards Goal  Note: Fall Risk Interventions:  Mobility Interventions: Bed/chair exit alarm         Medication Interventions: Patient to call before getting OOB, Bed/chair exit alarm    Elimination Interventions: Bed/chair exit alarm, Call light in reach    History of Falls Interventions: Bed/chair exit alarm, Door open when patient unattended         Problem: Pressure Injury - Risk of  Goal: *Prevention of pressure injury  Description: Document Bryan Scale and appropriate interventions in the flowsheet.   Outcome: Progressing Towards Goal  Note: Pressure Injury Interventions:  Sensory Interventions: Assess changes in LOC    Moisture Interventions: Absorbent underpads, Offer toileting Q_hr    Activity Interventions: Increase time out of bed    Mobility Interventions: HOB 30 degrees or less    Nutrition Interventions: Document food/fluid/supplement intake

## 2021-08-25 NOTE — PROGRESS NOTES
Problem: Falls - Risk of  Goal: *Absence of Falls  Description: Document Emma Jeff Fall Risk and appropriate interventions in the flowsheet. Outcome: Progressing Towards Goal  Note: Fall Risk Interventions:  Mobility Interventions: Bed/chair exit alarm         Medication Interventions: Bed/chair exit alarm    Elimination Interventions: Call light in reach    History of Falls Interventions: Bed/chair exit alarm         Problem: Patient Education: Go to Patient Education Activity  Goal: Patient/Family Education  Outcome: Progressing Towards Goal     Problem: Pressure Injury - Risk of  Goal: *Prevention of pressure injury  Description: Document Bryan Scale and appropriate interventions in the flowsheet.   Outcome: Progressing Towards Goal  Note: Pressure Injury Interventions:  Sensory Interventions: Assess changes in LOC    Moisture Interventions: Absorbent underpads    Activity Interventions: Increase time out of bed    Mobility Interventions: HOB 30 degrees or less    Nutrition Interventions: Document food/fluid/supplement intake                     Problem: Patient Education: Go to Patient Education Activity  Goal: Patient/Family Education  Outcome: Progressing Towards Goal

## 2021-08-26 PROBLEM — B99.9 ANEMIA OF INFECTION AND CHRONIC DISEASE: Status: ACTIVE | Noted: 2021-08-26

## 2021-08-26 PROBLEM — F32.A DEPRESSION: Status: ACTIVE | Noted: 2021-08-26

## 2021-08-26 PROBLEM — D63.8 ANEMIA OF INFECTION AND CHRONIC DISEASE: Status: ACTIVE | Noted: 2021-08-26

## 2021-08-26 PROBLEM — J44.9 COPD (CHRONIC OBSTRUCTIVE PULMONARY DISEASE) (HCC): Chronic | Status: ACTIVE | Noted: 2021-08-26

## 2021-08-26 LAB
ALBUMIN SERPL-MCNC: 1.7 G/DL (ref 3.5–5)
ALBUMIN/GLOB SERPL: 0.3 {RATIO} (ref 1.1–2.2)
ALP SERPL-CCNC: 79 U/L (ref 45–117)
ALT SERPL-CCNC: 26 U/L (ref 12–78)
ANION GAP SERPL CALC-SCNC: 12 MMOL/L (ref 5–15)
AST SERPL-CCNC: 25 U/L (ref 15–37)
BASOPHILS # BLD: 0 K/UL (ref 0–0.1)
BASOPHILS NFR BLD: 0 % (ref 0–1)
BILIRUB SERPL-MCNC: 0.1 MG/DL (ref 0.2–1)
BUN SERPL-MCNC: 43 MG/DL (ref 6–20)
BUN/CREAT SERPL: 21 (ref 12–20)
CALCIUM SERPL-MCNC: 9 MG/DL (ref 8.5–10.1)
CHLORIDE SERPL-SCNC: 107 MMOL/L (ref 97–108)
CK SERPL-CCNC: 35 U/L (ref 26–192)
CO2 SERPL-SCNC: 20 MMOL/L (ref 21–32)
CREAT SERPL-MCNC: 2.02 MG/DL (ref 0.55–1.02)
DIFFERENTIAL METHOD BLD: ABNORMAL
EOSINOPHIL # BLD: 3.8 K/UL (ref 0–0.4)
EOSINOPHIL NFR BLD: 22 % (ref 0–7)
ERYTHROCYTE [DISTWIDTH] IN BLOOD BY AUTOMATED COUNT: 15.5 % (ref 11.5–14.5)
GLOBULIN SER CALC-MCNC: 5.2 G/DL (ref 2–4)
GLUCOSE BLD STRIP.AUTO-MCNC: 146 MG/DL (ref 65–117)
GLUCOSE BLD STRIP.AUTO-MCNC: 211 MG/DL (ref 65–117)
GLUCOSE BLD STRIP.AUTO-MCNC: 225 MG/DL (ref 65–117)
GLUCOSE BLD STRIP.AUTO-MCNC: 313 MG/DL (ref 65–117)
GLUCOSE SERPL-MCNC: 140 MG/DL (ref 65–100)
HCT VFR BLD AUTO: 24.7 % (ref 35–47)
HGB BLD-MCNC: 7.9 G/DL (ref 11.5–16)
IMM GRANULOCYTES # BLD AUTO: 0 K/UL (ref 0–0.04)
IMM GRANULOCYTES NFR BLD AUTO: 0 % (ref 0–0.5)
LYMPHOCYTES # BLD: 3.3 K/UL (ref 0.8–3.5)
LYMPHOCYTES NFR BLD: 19 % (ref 12–49)
MAGNESIUM SERPL-MCNC: 1.8 MG/DL (ref 1.6–2.4)
MCH RBC QN AUTO: 28.5 PG (ref 26–34)
MCHC RBC AUTO-ENTMCNC: 32 G/DL (ref 30–36.5)
MCV RBC AUTO: 89.2 FL (ref 80–99)
MONOCYTES # BLD: 0.9 K/UL (ref 0–1)
MONOCYTES NFR BLD: 5 % (ref 5–13)
NEUTS SEG # BLD: 9.2 K/UL (ref 1.8–8)
NEUTS SEG NFR BLD: 54 % (ref 32–75)
NRBC # BLD: 0 K/UL (ref 0–0.01)
NRBC BLD-RTO: 0 PER 100 WBC
PHOSPHATE SERPL-MCNC: 4.1 MG/DL (ref 2.6–4.7)
PLATELET # BLD AUTO: 526 K/UL (ref 150–400)
PLATELET COMMENTS,PCOM: ABNORMAL
PMV BLD AUTO: 10 FL (ref 8.9–12.9)
POTASSIUM SERPL-SCNC: 4.4 MMOL/L (ref 3.5–5.1)
PROT SERPL-MCNC: 6.9 G/DL (ref 6.4–8.2)
RBC # BLD AUTO: 2.77 M/UL (ref 3.8–5.2)
RBC MORPH BLD: ABNORMAL
RBC MORPH BLD: ABNORMAL
SERVICE CMNT-IMP: ABNORMAL
SODIUM SERPL-SCNC: 139 MMOL/L (ref 136–145)
VANCOMYCIN SERPL-MCNC: 10.9 UG/ML
WBC # BLD AUTO: 17.2 K/UL (ref 3.6–11)

## 2021-08-26 PROCEDURE — 83735 ASSAY OF MAGNESIUM: CPT

## 2021-08-26 PROCEDURE — 82962 GLUCOSE BLOOD TEST: CPT

## 2021-08-26 PROCEDURE — 74011250637 HC RX REV CODE- 250/637: Performed by: INTERNAL MEDICINE

## 2021-08-26 PROCEDURE — 80053 COMPREHEN METABOLIC PANEL: CPT

## 2021-08-26 PROCEDURE — 82272 OCCULT BLD FECES 1-3 TESTS: CPT

## 2021-08-26 PROCEDURE — 85025 COMPLETE CBC W/AUTO DIFF WBC: CPT

## 2021-08-26 PROCEDURE — 36415 COLL VENOUS BLD VENIPUNCTURE: CPT

## 2021-08-26 PROCEDURE — 74011250636 HC RX REV CODE- 250/636: Performed by: INTERNAL MEDICINE

## 2021-08-26 PROCEDURE — 74011636637 HC RX REV CODE- 636/637: Performed by: INTERNAL MEDICINE

## 2021-08-26 PROCEDURE — 82550 ASSAY OF CK (CPK): CPT

## 2021-08-26 PROCEDURE — 65660000000 HC RM CCU STEPDOWN

## 2021-08-26 PROCEDURE — 80202 ASSAY OF VANCOMYCIN: CPT

## 2021-08-26 PROCEDURE — 84100 ASSAY OF PHOSPHORUS: CPT

## 2021-08-26 PROCEDURE — 74011000258 HC RX REV CODE- 258: Performed by: INTERNAL MEDICINE

## 2021-08-26 RX ORDER — ALBUTEROL SULFATE 0.83 MG/ML
2.5 SOLUTION RESPIRATORY (INHALATION)
Status: DISCONTINUED | OUTPATIENT
Start: 2021-08-26 | End: 2021-08-27

## 2021-08-26 RX ORDER — FAMOTIDINE 20 MG/1
20 TABLET, FILM COATED ORAL EVERY EVENING
Status: DISCONTINUED | OUTPATIENT
Start: 2021-08-26 | End: 2021-08-28 | Stop reason: HOSPADM

## 2021-08-26 RX ADMIN — Medication 10 ML: at 07:06

## 2021-08-26 RX ADMIN — ASPIRIN 81 MG: 81 TABLET, COATED ORAL at 09:00

## 2021-08-26 RX ADMIN — INSULIN LISPRO 2 UNITS: 100 INJECTION, SOLUTION INTRAVENOUS; SUBCUTANEOUS at 11:30

## 2021-08-26 RX ADMIN — CARVEDILOL 25 MG: 25 TABLET, FILM COATED ORAL at 16:41

## 2021-08-26 RX ADMIN — HEPARIN SODIUM 5000 UNITS: 5000 INJECTION INTRAVENOUS; SUBCUTANEOUS at 09:02

## 2021-08-26 RX ADMIN — CHOLESTYRAMINE 4 G: 4 POWDER, FOR SUSPENSION ORAL at 20:47

## 2021-08-26 RX ADMIN — PIPERACILLIN AND TAZOBACTAM 2.25 G: 2; .25 INJECTION, POWDER, LYOPHILIZED, FOR SOLUTION INTRAVENOUS at 11:49

## 2021-08-26 RX ADMIN — Medication 1 TABLET: at 09:05

## 2021-08-26 RX ADMIN — INSULIN GLARGINE 50 UNITS: 100 INJECTION, SOLUTION SUBCUTANEOUS at 22:20

## 2021-08-26 RX ADMIN — Medication 10 ML: at 14:50

## 2021-08-26 RX ADMIN — CHOLESTYRAMINE 4 G: 4 POWDER, FOR SUSPENSION ORAL at 11:57

## 2021-08-26 RX ADMIN — INSULIN LISPRO 2 UNITS: 100 INJECTION, SOLUTION INTRAVENOUS; SUBCUTANEOUS at 16:41

## 2021-08-26 RX ADMIN — Medication 10 ML: at 20:47

## 2021-08-26 RX ADMIN — VENLAFAXINE 37.5 MG: 37.5 TABLET ORAL at 09:04

## 2021-08-26 RX ADMIN — FAMOTIDINE 20 MG: 20 TABLET ORAL at 19:06

## 2021-08-26 RX ADMIN — FERROUS SULFATE TAB EC 324 MG (65 MG FE EQUIVALENT) 324 MG: 324 (65 FE) TABLET DELAYED RESPONSE at 07:05

## 2021-08-26 RX ADMIN — HEPARIN SODIUM 5000 UNITS: 5000 INJECTION INTRAVENOUS; SUBCUTANEOUS at 20:47

## 2021-08-26 RX ADMIN — PIPERACILLIN AND TAZOBACTAM 2.25 G: 2; .25 INJECTION, POWDER, LYOPHILIZED, FOR SOLUTION INTRAVENOUS at 04:09

## 2021-08-26 RX ADMIN — VANCOMYCIN HYDROCHLORIDE 750 MG: 750 INJECTION, POWDER, LYOPHILIZED, FOR SOLUTION INTRAVENOUS at 14:49

## 2021-08-26 RX ADMIN — CARVEDILOL 25 MG: 25 TABLET, FILM COATED ORAL at 07:05

## 2021-08-26 RX ADMIN — Medication 1 CAPSULE: at 09:03

## 2021-08-26 RX ADMIN — FUROSEMIDE 40 MG: 40 TABLET ORAL at 09:01

## 2021-08-26 RX ADMIN — PIPERACILLIN AND TAZOBACTAM 2.25 G: 2; .25 INJECTION, POWDER, LYOPHILIZED, FOR SOLUTION INTRAVENOUS at 20:47

## 2021-08-26 NOTE — PROGRESS NOTES
IDR Team; MD, Nursing, Care Manager, Physical therapy, Nursing Supervisor, Pharmacy and Dietician, met to review patient's plan of care. Discussed goals, interventions, barriers and progress. RUR: 37%    Team will continue to monitor progress and report any concerns to the physician and care management as indicated. Transition of Care Plan:   Patient continues on IV antibiotics. Continues to be monitored. No c/o. No needs identified at this time.

## 2021-08-26 NOTE — PROGRESS NOTES
Bedside shift change report given to WILNER Nicholson (oncoming nurse) by [employer]. DUC Arroyo (offgoing nurse). Report included the following information SBAR, Kardex and MAR.

## 2021-08-26 NOTE — PROGRESS NOTES
Problem: Falls - Risk of  Goal: *Absence of Falls  Description: Document Rosa Ny Fall Risk and appropriate interventions in the flowsheet.   Outcome: Progressing Towards Goal  Note: Fall Risk Interventions:  Mobility Interventions: Bed/chair exit alarm, Patient to call before getting OOB, Utilize walker, cane, or other assistive device         Medication Interventions: Bed/chair exit alarm, Patient to call before getting OOB, Teach patient to arise slowly    Elimination Interventions: Bed/chair exit alarm, Call light in reach, Patient to call for help with toileting needs    History of Falls Interventions: Bed/chair exit alarm

## 2021-08-26 NOTE — PROGRESS NOTES
Bedside shift change report given to LUIS EDUARDO Arroyo RN (oncoming nurse) by Gayatri White RN (offgoing nurse). Report included the following information Intake/Output and Recent Results.

## 2021-08-26 NOTE — PROGRESS NOTES
Bedside shift change report given to JIM Childs RN (oncoming nurse) by Nazario Rosa RN (offgoing nurse).  Report included the following information SBAR, MAR, respiratory status, VS

## 2021-08-26 NOTE — PROGRESS NOTES
[facility]  Hospitalist Progress Note    NAME: Sammy Grant   :  1940   MRN:  [id number]     Total duration of encounter: 3 days      Interim Hospital Summary: 79 y.o. female who presented on 2021 with Pneumonia. She has a past medical history of A-fib [facility]), AICD (automatic cardioverter/defibrillator) present (2020), Arthritis, Bilateral pleural effusion (2020), Chronic pain, Chronic pain, Diabetes ([facility] [address] Diverticular disease, Elevated troponin (2020), Hemorrhoid, Hypercholesterolemia, IBS (irritable bowel syndrome), Lymphocytosis (), Systolic heart failure, chronic (HCC) (2021), and Type 2 MI (myocardial infarction) ([facility] [address] (2021). Pt has h/o MSSA bacteremia with suspected Pacer / Defib infection to be tx with Daptomycin via R UE PICC till . Recent drug reaction to Ancef a/w ARF to Cr 2.17 . Anemia with drop in Hg from 13.2 July 15 to low 7.9 today with w/u 8/3 c/w chronic disease. Subjective:     Chief Complaint / Reason for Physician Visit  \"better\".   Discussed with RN   Feeling better each day  Denies SOB, congestion, cough, chest pains    Notes no h/o bleeding or anemia    Review of Systems:  Symptom Y/N Comments  Symptom Y/N Comments   Fever/Chills n   Chest Pain n    Poor Appetite n   Edema n    Cough n   Abdominal Pain n    Sputum n   Joint Pain n    SOB/STANTON n   Pruritis/Rash n    Nausea/vomit n   Tolerating PT/OT     Diarrhea n   Tolerating Diet y    Constipation n   Other         Current Facility-Administered Medications:     piperacillin-tazobactam (ZOSYN) 2.25 g in 0.9% sodium chloride (MBP/ADV) 50 mL MBP, 2.25 g, IntraVENous, Q8H, Seipp, James, DO, Last Rate: 100 mL/hr at 21 0409, 2.25 g at 21 0409    vancomycin (VANCOCIN) 750 mg in 0.9% sodium chloride 250 mL (VIAL-MATE), 750 mg, IntraVENous, Q36H, Seipp, James, DO    Vancomycin Random Level due  at 1000- Nursing infomation note, , Other, Rx Dosing/Monitoring, Seipp, James, DO    cholestyramine-aspartame (QUESTRAN LIGHT) packet 4 g, 4 g, Oral, BID, Seipp, James, DO, 4 g at 08/25/21 2034    furosemide (LASIX) tablet 40 mg, 40 mg, Oral, DAILY, Seipp, James, DO, 40 mg at 08/26/21 0901    carvediloL (COREG) tablet 25 mg, 25 mg, Oral, BID WITH MEALS, Seipp, James, DO, 25 mg at 08/26/21 0705    sodium chloride (NS) flush 5-40 mL, 5-40 mL, IntraVENous, Q8H, Seipp, James, DO, 10 mL at 08/26/21 0706    sodium chloride (NS) flush 5-40 mL, 5-40 mL, IntraVENous, PRN, Seipp, James, DO, 10 mL at 08/25/21 0243    acetaminophen (TYLENOL) tablet 650 mg, 650 mg, Oral, Q6H PRN, 650 mg at 08/25/21 2041 **OR** acetaminophen (TYLENOL) suppository 650 mg, 650 mg, Rectal, Q6H PRN, Seipp, James, DO    polyethylene glycol (MIRALAX) packet 17 g, 17 g, Oral, DAILY PRN, Seipp, James, DO    ondansetron (ZOFRAN ODT) tablet 4 mg, 4 mg, Oral, Q8H PRN **OR** ondansetron (ZOFRAN) injection 4 mg, 4 mg, IntraVENous, Q6H PRN, Seipp, James, DO    heparin (porcine) injection 5,000 Units, 5,000 Units, SubCUTAneous, Q12H, Seipp, James, DO, 5,000 Units at 08/26/21 0902    venlafaxine (EFFEXOR) tablet 37.5 mg, 37.5 mg, Oral, DAILY, Seipp, James, DO, 37.5 mg at 08/26/21 0904    ferrous sulfate tablet 324 mg, 324 mg, Oral, DAILY WITH BREAKFAST, Seipp, James, DO, 324 mg at 08/26/21 0705    aspirin delayed-release tablet 81 mg, 81 mg, Oral, DAILY, Seipp, James, DO, 81 mg at 08/26/21 0900    vitamin B complex tablet, 1 Tablet, Oral, DAILY, Seipp, James, DO, 1 Tablet at 08/26/21 0905    lactobacillus rhamnosus gg 10 billion cell (CULTURELLE) 1 Capsule, 1 Capsule, Oral, DAILY, Seipp, James, DO, 1 Capsule at 08/26/21 0903    glucose chewable tablet 16 g, 4 Tablet, Oral, PRN, Seipp, James,     dextrose (D50W) injection syrg 12.5-25 g, 25-50 mL, IntraVENous, PRN, Seipp, James,     glucagon (GLUCAGEN) injection 1 mg, 1 mg, IntraMUSCular, PRN, Seipp, James,     insulin lispro (HUMALOG) injection, , SubCUTAneous, TIDAC, Seipp, James, DO, 2 Units at 08/25/21 1713    insulin glargine (LANTUS) injection 50 Units, 50 Units, SubCUTAneous, QHS, Seipp, James, DO, 50 Units at 08/25/21 2154    nicotine (NICODERM CQ) 21 mg/24 hr patch 1 Patch, 1 Patch, TransDERmal, DAILY, Seipp, James, DO, 1 Patch at 08/26/21 0903    Vancomycin dosing/monitoring per pharmacy, , Other, Rx Dosing/Monitoring, Seipp, James, DO    Objective:     VITALS:   Patient Vitals for the past 12 hrs:   Temp Pulse Resp BP SpO2   08/26/21 1100 97.2 °F (36.2 °C) 72 20 (!) 146/76 94 %   08/26/21 0801 98.6 °F (37 °C) 76 20 122/64 94 %   08/26/21 0441 -- -- -- (!) 138/43 --   08/26/21 0401 98.1 °F (36.7 °C) 86 18 (!) 181/74 98 %   08/26/21 0012 98 °F (36.7 °C) 73 18 (!) 119/57 97 %       Intake/Output Summary (Last 24 hours) at 8/26/2021 1135  Last data filed at 8/26/2021 0902  Gross per 24 hour   Intake 540 ml   Output 1240 ml   Net -700 ml        PHYSICAL EXAM:  General: WD, WN. Alert, cooperative, no acute distress    EENT:  EOMI. Anicteric sclerae. MMM  Resp:  CTA bilaterally, no wheezing or rales. No accessory muscle use  CV:  Regular  rhythm,  No edema  GI:  Soft, Non distended, Non tender. +Bowel sounds  Neurologic:  Alert and oriented X 3, normal speech,   Psych:   Good insight. Not anxious nor agitated  Skin:  No rashes. No jaundice    LABS:  I reviewed today's most current labs and imaging studies.   Pertinent labs include:  Recent Labs     08/26/21  0521 08/25/21  0545 08/24/21  0722   WBC 17.2* 19.4* 22.0*   HGB 7.9* 8.3* 9.1*   HCT 24.7* 25.8* 28.4*   * 509* 546*     Recent Labs     08/26/21  0521 08/25/21  0545 08/24/21  0722    137 135*  135*   K 4.4 4.1 4.5  4.5    104 103  103   CO2 20* 19* 16*  17*   * 149* 99  99   BUN 43* 37* 35*  36*   CREA 2.02* 2.16* 1.91*  1.92*   CA 9.0 9.3 9.6  9.6   MG 1.8 1.8 1.6   PHOS 4.1 3.6 3.5   ALB 1.7* 1.8* 2.0*   TBILI 0.1* 0.3 0.3   ALT 26 26 10*       RADIOLOGY:  pCXR 8/23:  Cardiomediastinal silhouette is stable. Right-sided PICC line extends to the  proximal SVC. There is worsening elevation within the right mid lung. There are  worsening fused bilateral increased interstitial markings. There is mild  bibasilar atelectasis. No pleural fluid is visualized on the single AP view. No pneumothorax.   IMPRESSION  Worsening right midlung consolidation. Worsening diffuse bilateral  interstitial lung disease. Mild bibasilar atelectasis    pCXR 8/24:  AP portable upright view of the chest demonstrates a stable  cardiopericardial  silhouette. Right upper lobe parenchymal disease is slightly improved. Mild  basilar atelectasis. .There is no focal consolidation. .There is no pneumothorax. Maximiliano  PICC line catheter on the right. Patient is on a cardiac monitor.    IMPRESSION  Slightly improved right lung consolidation. EKG 8/23:  NSR 87 bpm, NSSTC, NSTWC    VQ LUNG 8/3:  There is no V/Q mismatch.   IMPRESSION: Normal study. Low probability for pulmonary embolism    ECHO 8/4:  · LV: Estimated LVEF is 45 - 50%. Visually measured ejection fraction. Normal wall thickness. Mildly dilated left ventricle. Severe (grade 3) left ventricular diastolic dysfunction. · LA: Moderately dilated left atrium. · IAS: No atrial septal defect present. · AV: With no significant stenosis. · MV: Mild mitral annular calcification. · No obvious vegitation but sclerotic changes to aortic valve make determination of presence of vegitation not accurate      Procedures: see electronic medical records for all procedures/Xrays and details which were not copied into this note but were reviewed prior to creation of Plan.         Assessment / Plan:    Principal Problem:    Pneumonia (8/23/2021)  [facility]  Appreciated [place] [id number] [address]24 for tx Vancomycin / Zosyn  Goal of 15-20 for Vancomycin with recent IVAN  H/o Vascular / Immune mediated rash earlier in Aug, stopping Cefazolin for unlikely drug rash  Probiotic / Culturelle    Active Problems:    Respiratory failure ([facility] (9/18/2020)    COPD  Cont O2 NC and Albuterol Neb prn  Nicoderm CQ 21 daily      Systolic heart failure, chronic ([facility] (7/16/2021)    Cardiomyopathy ([facility] (9/23/2020)  ASA anticoag with possible need or OAC for PAF  Monitor renal fx with diuresis - recent jump in Cr noted  Current Lasix 40mg daily (was given 40mg IV x 2 on admission)  Coreg 25mg bid        Anemia of infection and chronic disease (8/26/2021)  Lab evaluation 8/3 with low Fe 15, low TIBC 208, and low %Fe Sat 7 and nl Ferritin 194  B12 nl 737 and Folate nl 61.   Note CRP 12.6, , SHANNA neg, ANCA neg  Will check FOB, Trial Fe, f/u CRP  Transaminases were elevated with Acute Hep Panel negative  Consider transfusion      Type 2 diabetes with nephropathy ([facility] (2/15/2018)  A1c jumped to 9.1 June 14 from prior 6.3 Oct 2020    Results for Shaila Terry (MRN [id number]) as of 8/26/2021 11:21   8/25/2021 06:43 8/25/2021 11:20 8/25/2021 16:40 8/25/2021 21:29 8/26/2021 07:01   GLU -  (H) 312 (H) 235 (H) 245 (H) 146 (H)   Cont Lantus 50u SQ daily bedtime  CC Humalog tid      Depression  Cont Effexor XR 37.5 daily    ______________________________________________________________________  SAFETY:   Code Status:Full  DVT prophylaxis:Heparin  Stress Ulcer prophylaxis: Pepcid  Bladder catheter:no  Family Contact Info:  Primary Emergency Contact: Terrence Staff, Home Phone: [phone]  Bedded: [facility] Room 204/01  Disposition: TBD, likely home when stable  Admission status:  Inpatient    Reviewed most current lab test results and cultures  YES  Reviewed most current radiology test results   YES  Review and summation of old records today    NO  Reviewed patient's current orders and MAR    YES  PMH/SH reviewed - no change compared to H&P    Care Plan discussed with:                                   Comments  Patient x     Family  x Daughter Zena Murillo) [phone]   RN x     Care Manager  x     Consultant                           Multidiciplinary team rounds were held today with , nursing, pharmacist and clinical coordinator. Patient's plan of care was discussed; medications were reviewed and discharge planning was addressed.         ____________________________________________    Total NON Critical Care TIME:  40   Minutes        Comments   >50% of visit spent in counseling and coordination of care   x      Signed: Araceli Black MD  [facility] Hospitalist  [phone]

## 2021-08-27 ENCOUNTER — APPOINTMENT (OUTPATIENT)
Dept: GENERAL RADIOLOGY | Age: 81
DRG: 193 | End: 2021-08-27
Attending: INTERNAL MEDICINE
Payer: MEDICARE

## 2021-08-27 LAB
ANION GAP SERPL CALC-SCNC: 11 MMOL/L (ref 5–15)
ATRIAL RATE: 87 BPM
BASOPHILS # BLD: 0.2 K/UL (ref 0–0.1)
BASOPHILS NFR BLD: 1 % (ref 0–1)
BUN SERPL-MCNC: 41 MG/DL (ref 6–20)
BUN/CREAT SERPL: 22 (ref 12–20)
CALCIUM SERPL-MCNC: 9.5 MG/DL (ref 8.5–10.1)
CALCULATED P AXIS, ECG09: 98 DEGREES
CALCULATED R AXIS, ECG10: 63 DEGREES
CALCULATED T AXIS, ECG11: -30 DEGREES
CHLORIDE SERPL-SCNC: 105 MMOL/L (ref 97–108)
CK SERPL-CCNC: 30 U/L (ref 26–192)
CO2 SERPL-SCNC: 22 MMOL/L (ref 21–32)
CREAT SERPL-MCNC: 1.84 MG/DL (ref 0.55–1.02)
CRP SERPL-MCNC: 6.04 MG/DL (ref 0–0.6)
DIAGNOSIS, 93000: NORMAL
DIFFERENTIAL METHOD BLD: ABNORMAL
EOSINOPHIL # BLD: 3.7 K/UL (ref 0–0.4)
EOSINOPHIL NFR BLD: 23 % (ref 0–7)
ERYTHROCYTE [DISTWIDTH] IN BLOOD BY AUTOMATED COUNT: 15.4 % (ref 11.5–14.5)
GLUCOSE BLD STRIP.AUTO-MCNC: 225 MG/DL (ref 65–117)
GLUCOSE BLD STRIP.AUTO-MCNC: 246 MG/DL (ref 65–117)
GLUCOSE BLD STRIP.AUTO-MCNC: 269 MG/DL (ref 65–117)
GLUCOSE BLD STRIP.AUTO-MCNC: 278 MG/DL (ref 65–117)
GLUCOSE SERPL-MCNC: 236 MG/DL (ref 65–100)
HCT VFR BLD AUTO: 27.4 % (ref 35–47)
HEMOCCULT STL QL: NEGATIVE
HGB BLD-MCNC: 8.5 G/DL (ref 11.5–16)
IMM GRANULOCYTES # BLD AUTO: 0 K/UL (ref 0–0.04)
IMM GRANULOCYTES NFR BLD AUTO: 0 % (ref 0–0.5)
LYMPHOCYTES # BLD: 2.9 K/UL (ref 0.8–3.5)
LYMPHOCYTES NFR BLD: 18 % (ref 12–49)
MAGNESIUM SERPL-MCNC: 1.7 MG/DL (ref 1.6–2.4)
MCH RBC QN AUTO: 27.5 PG (ref 26–34)
MCHC RBC AUTO-ENTMCNC: 31 G/DL (ref 30–36.5)
MCV RBC AUTO: 88.7 FL (ref 80–99)
MONOCYTES # BLD: 0.8 K/UL (ref 0–1)
MONOCYTES NFR BLD: 5 % (ref 5–13)
NEUTS SEG # BLD: 8.5 K/UL (ref 1.8–8)
NEUTS SEG NFR BLD: 53 % (ref 32–75)
NRBC # BLD: 0 K/UL (ref 0–0.01)
NRBC BLD-RTO: 0 PER 100 WBC
P-R INTERVAL, ECG05: 164 MS
PLATELET # BLD AUTO: 590 K/UL (ref 150–400)
PMV BLD AUTO: 9.7 FL (ref 8.9–12.9)
POTASSIUM SERPL-SCNC: 4.7 MMOL/L (ref 3.5–5.1)
Q-T INTERVAL, ECG07: 374 MS
QRS DURATION, ECG06: 112 MS
QTC CALCULATION (BEZET), ECG08: 450 MS
RBC # BLD AUTO: 3.09 M/UL (ref 3.8–5.2)
RBC MORPH BLD: ABNORMAL
SERVICE CMNT-IMP: ABNORMAL
SODIUM SERPL-SCNC: 138 MMOL/L (ref 136–145)
VANCOMYCIN SERPL-MCNC: 11.2 UG/ML
VENTRICULAR RATE, ECG03: 87 BPM
WBC # BLD AUTO: 16.1 K/UL (ref 3.6–11)

## 2021-08-27 PROCEDURE — 74011000250 HC RX REV CODE- 250: Performed by: INTERNAL MEDICINE

## 2021-08-27 PROCEDURE — 74011250636 HC RX REV CODE- 250/636: Performed by: INTERNAL MEDICINE

## 2021-08-27 PROCEDURE — 82550 ASSAY OF CK (CPK): CPT

## 2021-08-27 PROCEDURE — 74011000258 HC RX REV CODE- 258: Performed by: INTERNAL MEDICINE

## 2021-08-27 PROCEDURE — 74011250637 HC RX REV CODE- 250/637: Performed by: INTERNAL MEDICINE

## 2021-08-27 PROCEDURE — 82962 GLUCOSE BLOOD TEST: CPT

## 2021-08-27 PROCEDURE — 86140 C-REACTIVE PROTEIN: CPT

## 2021-08-27 PROCEDURE — 80048 BASIC METABOLIC PNL TOTAL CA: CPT

## 2021-08-27 PROCEDURE — 36415 COLL VENOUS BLD VENIPUNCTURE: CPT

## 2021-08-27 PROCEDURE — 83735 ASSAY OF MAGNESIUM: CPT

## 2021-08-27 PROCEDURE — 80202 ASSAY OF VANCOMYCIN: CPT

## 2021-08-27 PROCEDURE — 74011636637 HC RX REV CODE- 636/637: Performed by: INTERNAL MEDICINE

## 2021-08-27 PROCEDURE — 94640 AIRWAY INHALATION TREATMENT: CPT

## 2021-08-27 PROCEDURE — 77010033678 HC OXYGEN DAILY

## 2021-08-27 PROCEDURE — 65270000029 HC RM PRIVATE

## 2021-08-27 PROCEDURE — 94760 N-INVAS EAR/PLS OXIMETRY 1: CPT

## 2021-08-27 PROCEDURE — 85025 COMPLETE CBC W/AUTO DIFF WBC: CPT

## 2021-08-27 RX ORDER — INSULIN GLARGINE 100 [IU]/ML
60 INJECTION, SOLUTION SUBCUTANEOUS
Status: DISCONTINUED | OUTPATIENT
Start: 2021-08-27 | End: 2021-08-28 | Stop reason: HOSPADM

## 2021-08-27 RX ORDER — IBUPROFEN 200 MG
1 TABLET ORAL DAILY
Status: DISCONTINUED | OUTPATIENT
Start: 2021-08-28 | End: 2021-08-28 | Stop reason: HOSPADM

## 2021-08-27 RX ORDER — VENLAFAXINE 37.5 MG/1
75 TABLET ORAL DAILY
Status: DISCONTINUED | OUTPATIENT
Start: 2021-08-28 | End: 2021-08-28 | Stop reason: HOSPADM

## 2021-08-27 RX ORDER — LORAZEPAM 0.5 MG/1
0.5 TABLET ORAL
Status: DISCONTINUED | OUTPATIENT
Start: 2021-08-27 | End: 2021-08-28

## 2021-08-27 RX ORDER — ALBUTEROL SULFATE 0.83 MG/ML
2.5 SOLUTION RESPIRATORY (INHALATION)
Status: DISCONTINUED | OUTPATIENT
Start: 2021-08-27 | End: 2021-08-28 | Stop reason: HOSPADM

## 2021-08-27 RX ADMIN — INSULIN GLARGINE 60 UNITS: 100 INJECTION, SOLUTION SUBCUTANEOUS at 23:05

## 2021-08-27 RX ADMIN — HEPARIN SODIUM 5000 UNITS: 5000 INJECTION INTRAVENOUS; SUBCUTANEOUS at 09:13

## 2021-08-27 RX ADMIN — PIPERACILLIN AND TAZOBACTAM 2.25 G: 2; .25 INJECTION, POWDER, LYOPHILIZED, FOR SOLUTION INTRAVENOUS at 04:01

## 2021-08-27 RX ADMIN — INSULIN LISPRO 3 UNITS: 100 INJECTION, SOLUTION INTRAVENOUS; SUBCUTANEOUS at 10:51

## 2021-08-27 RX ADMIN — Medication 10 ML: at 22:01

## 2021-08-27 RX ADMIN — FUROSEMIDE 40 MG: 40 TABLET ORAL at 09:13

## 2021-08-27 RX ADMIN — CARVEDILOL 25 MG: 25 TABLET, FILM COATED ORAL at 17:41

## 2021-08-27 RX ADMIN — VANCOMYCIN HYDROCHLORIDE 750 MG: 750 INJECTION, POWDER, LYOPHILIZED, FOR SOLUTION INTRAVENOUS at 15:52

## 2021-08-27 RX ADMIN — LORAZEPAM 0.5 MG: 0.5 TABLET ORAL at 22:42

## 2021-08-27 RX ADMIN — ACETAMINOPHEN 650 MG: 325 TABLET ORAL at 00:08

## 2021-08-27 RX ADMIN — FERROUS SULFATE TAB EC 324 MG (65 MG FE EQUIVALENT) 324 MG: 324 (65 FE) TABLET DELAYED RESPONSE at 09:13

## 2021-08-27 RX ADMIN — ALBUTEROL SULFATE 2.5 MG: 2.5 SOLUTION RESPIRATORY (INHALATION) at 00:50

## 2021-08-27 RX ADMIN — HEPARIN SODIUM 5000 UNITS: 5000 INJECTION INTRAVENOUS; SUBCUTANEOUS at 20:53

## 2021-08-27 RX ADMIN — CHOLESTYRAMINE 4 G: 4 POWDER, FOR SUSPENSION ORAL at 10:55

## 2021-08-27 RX ADMIN — Medication 10 ML: at 04:02

## 2021-08-27 RX ADMIN — INSULIN LISPRO 2 UNITS: 100 INJECTION, SOLUTION INTRAVENOUS; SUBCUTANEOUS at 06:42

## 2021-08-27 RX ADMIN — Medication 10 ML: at 14:00

## 2021-08-27 RX ADMIN — VENLAFAXINE 37.5 MG: 37.5 TABLET ORAL at 09:13

## 2021-08-27 RX ADMIN — FAMOTIDINE 20 MG: 20 TABLET ORAL at 17:41

## 2021-08-27 RX ADMIN — CARVEDILOL 25 MG: 25 TABLET, FILM COATED ORAL at 09:14

## 2021-08-27 RX ADMIN — PIPERACILLIN AND TAZOBACTAM 2.25 G: 2; .25 INJECTION, POWDER, LYOPHILIZED, FOR SOLUTION INTRAVENOUS at 20:53

## 2021-08-27 RX ADMIN — ASPIRIN 81 MG: 81 TABLET, COATED ORAL at 09:13

## 2021-08-27 RX ADMIN — CHOLESTYRAMINE 4 G: 4 POWDER, FOR SUSPENSION ORAL at 20:53

## 2021-08-27 RX ADMIN — ACETAMINOPHEN 650 MG: 325 TABLET ORAL at 17:41

## 2021-08-27 RX ADMIN — PIPERACILLIN AND TAZOBACTAM 2.25 G: 2; .25 INJECTION, POWDER, LYOPHILIZED, FOR SOLUTION INTRAVENOUS at 11:35

## 2021-08-27 RX ADMIN — INSULIN LISPRO 3 UNITS: 100 INJECTION, SOLUTION INTRAVENOUS; SUBCUTANEOUS at 15:52

## 2021-08-27 RX ADMIN — Medication 1 TABLET: at 09:14

## 2021-08-27 RX ADMIN — Medication 1 CAPSULE: at 09:13

## 2021-08-27 NOTE — PROGRESS NOTES
Problem: Falls - Risk of  Goal: *Absence of Falls  Description: Document Carrie Locket Fall Risk and appropriate interventions in the flowsheet. Outcome: Progressing Towards Goal  Note: Fall Risk Interventions:  Mobility Interventions: Bed/chair exit alarm, OT consult for ADLs, Patient to call before getting OOB, PT Consult for mobility concerns, PT Consult for assist device competence, Strengthening exercises (ROM-active/passive), Utilize walker, cane, or other assistive device         Medication Interventions: Bed/chair exit alarm, Patient to call before getting OOB, Teach patient to arise slowly    Elimination Interventions: Call light in reach, Toileting schedule/hourly rounds    History of Falls Interventions: Bed/chair exit alarm         Problem: Patient Education: Go to Patient Education Activity  Goal: Patient/Family Education  Outcome: Progressing Towards Goal     Problem: Pressure Injury - Risk of  Goal: *Prevention of pressure injury  Description: Document Bryan Scale and appropriate interventions in the flowsheet.   Outcome: Progressing Towards Goal  Note: Pressure Injury Interventions:  Sensory Interventions: Assess changes in LOC    Moisture Interventions: Absorbent underpads, Maintain skin hydration (lotion/cream)    Activity Interventions: Increase time out of bed    Mobility Interventions: HOB 30 degrees or less    Nutrition Interventions: Document food/fluid/supplement intake                     Problem: Patient Education: Go to Patient Education Activity  Goal: Patient/Family Education  Outcome: Progressing Towards Goal     Problem: General Medical Care Plan  Goal: *Vital signs within specified parameters  Outcome: Progressing Towards Goal  Goal: *Labs within defined limits  Outcome: Progressing Towards Goal  Goal: *Absence of infection signs and symptoms  Outcome: Progressing Towards Goal  Goal: *Optimal pain control at patient's stated goal  Outcome: Progressing Towards Goal  Goal: *Skin integrity maintained  Outcome: Progressing Towards Goal  Goal: *Fluid volume balance  Outcome: Progressing Towards Goal  Goal: *Optimize nutritional status  Outcome: Progressing Towards Goal  Goal: *Anxiety reduced or absent  Outcome: Progressing Towards Goal  Goal: *Progressive mobility and function (eg: ADL's)  Outcome: Progressing Towards Goal     Problem: Patient Education: Go to Patient Education Activity  Goal: Patient/Family Education  Outcome: Progressing Towards Goal     Problem: Diabetes Self-Management  Goal: *Disease process and treatment process  Description: Define diabetes and identify own type of diabetes; list 3 options for treating diabetes. Outcome: Progressing Towards Goal  Goal: *Incorporating nutritional management into lifestyle  Description: Describe effect of type, amount and timing of food on blood glucose; list 3 methods for planning meals. Outcome: Progressing Towards Goal  Goal: *Incorporating physical activity into lifestyle  Description: State effect of exercise on blood glucose levels. Outcome: Progressing Towards Goal  Goal: *Developing strategies to promote health/change behavior  Description: Define the ABC's of diabetes; identify appropriate screenings, schedule and personal plan for screenings. Outcome: Progressing Towards Goal  Goal: *Using medications safely  Description: State effect of diabetes medications on diabetes; name diabetes medication taking, action and side effects. Outcome: Progressing Towards Goal  Goal: *Monitoring blood glucose, interpreting and using results  Description: Identify recommended blood glucose targets  and personal targets. Outcome: Progressing Towards Goal  Goal: *Prevention, detection, treatment of acute complications  Description: List symptoms of hyper- and hypoglycemia; describe how to treat low blood sugar and actions for lowering  high blood glucose level.   Outcome: Progressing Towards Goal  Goal: *Prevention, detection and treatment of chronic complications  Description: Define the natural course of diabetes and describe the relationship of blood glucose levels to long term complications of diabetes.   Outcome: Progressing Towards Goal  Goal: *Developing strategies to address psychosocial issues  Description: Describe feelings about living with diabetes; identify support needed and support network  Outcome: Progressing Towards Goal     Problem: Patient Education: Go to Patient Education Activity  Goal: Patient/Family Education  Outcome: Progressing Towards Goal

## 2021-08-27 NOTE — ROUTINE PROCESS
Bedside and Verbal shift change report given to Leatha Hamilton RN (oncoming nurse) by Rancho Brothers LPN (offgoing nurse). Report included the following information SBAR and UMA Cuellar score 3  Bed/chair alarm is in use. If in use it is set at the highest volume. Intravenous fluids were administered, PICC in place and capped. SL removed per patients request.  Patient Vitals for the past 12 hrs:   Temp Pulse Resp BP SpO2   08/27/21 1600 97 °F (36.1 °C) 91 20 (!) 194/89 --   08/27/21 1230 97.8 °F (36.6 °C) 77 20 (!) 171/70 94 %   08/27/21 0924 97.6 °F (36.4 °C) 78 20 (!) 184/90 94 %     No flowsheet data found. All lab results for the last 24 hours reviewed.

## 2021-08-27 NOTE — PROGRESS NOTES
[facility]  Hospitalist Progress Note    NAME: Evan Vann   :  1940   MRN:  [id number]     Total duration of encounter: 4 days      Interim Hospital Summary: 79 y.o. female who presented on 2021 with Pneumonia. She has a past medical history of A-fib [facility]), AICD (automatic cardioverter/defibrillator) present (2020), Arthritis, Bilateral pleural effusion (2020), Chronic pain, Chronic pain, Diabetes ([facility] [address] Diverticular disease, Elevated troponin (2020), Hemorrhoid, Hypercholesterolemia, IBS (irritable bowel syndrome), Lymphocytosis (), Systolic heart failure, chronic (HCC) (2021), and Type 2 MI (myocardial infarction) ([facility] [address] (2021). Pt has h/o MSSA bacteremia with suspected Pacer / Defib infection to be tx with Daptomycin via R UE PICC till . Recent drug reaction to Ancef a/w ARF to Cr 2.17 . Anemia with drop in Hg from 13.2 July 15 to low 7.9 today with w/u /3 c/w chronic disease. Subjective:     Chief Complaint / Reason for Physician Visit  \"weak, STANTON\".   Discussed with RN   No congestion or sputum as noted on presentation  No pleuricy or chest pain    Notes no h/o bleeding or anemia    Review of Systems:  Symptom Y/N Comments  Symptom Y/N Comments   Fever/Chills n   Chest Pain n    Poor Appetite n   Edema n    Cough n   Abdominal Pain n    Sputum n   Joint Pain n    SOB/STANTON n   Pruritis/Rash n    Nausea/vomit n   Tolerating PT/OT     Diarrhea n   Tolerating Diet y    Constipation n   Other         Current Facility-Administered Medications:     [START ON 2021] VANCOMYCIN RANDOM LEVEL ORDERED TO BE DRAWN AT 1400 ON : NURSING INFORMATION NOTE, , Other, ONCE, Zev Mendez MD    famotidine (PEPCID) tablet 20 mg, 20 mg, Oral, QPM, Zev Mendez MD, 20 mg at 21    albuterol (PROVENTIL VENTOLIN) nebulizer solution 2.5 mg, 2.5 mg, Nebulization, Q6H PRN, Zev Mendez MD, 2.5 mg at 21 0050   piperacillin-tazobactam (ZOSYN) 2.25 g in 0.9% sodium chloride (MBP/ADV) 50 mL MBP, 2.25 g, IntraVENous, Q8H, Seipp, James, DO, IV Completed at 08/27/21 1205    cholestyramine-aspartame (QUESTRAN LIGHT) packet 4 g, 4 g, Oral, BID, Seipp, James, DO, 4 g at 08/27/21 1055    furosemide (LASIX) tablet 40 mg, 40 mg, Oral, DAILY, Seipp, James, DO, 40 mg at 08/27/21 0913    carvediloL (COREG) tablet 25 mg, 25 mg, Oral, BID WITH MEALS, Seipp, James, DO, 25 mg at 08/27/21 0914    sodium chloride (NS) flush 5-40 mL, 5-40 mL, IntraVENous, Q8H, Seipp, James, DO, 10 mL at 08/27/21 1400    sodium chloride (NS) flush 5-40 mL, 5-40 mL, IntraVENous, PRN, Seipp, James, DO, 10 mL at 08/27/21 0402    acetaminophen (TYLENOL) tablet 650 mg, 650 mg, Oral, Q6H PRN, 650 mg at 08/27/21 0008 **OR** acetaminophen (TYLENOL) suppository 650 mg, 650 mg, Rectal, Q6H PRN, Seipp, James, DO    polyethylene glycol (MIRALAX) packet 17 g, 17 g, Oral, DAILY PRN, Seipp, James, DO    ondansetron (ZOFRAN ODT) tablet 4 mg, 4 mg, Oral, Q8H PRN **OR** ondansetron (ZOFRAN) injection 4 mg, 4 mg, IntraVENous, Q6H PRN, Seipp, James, DO    heparin (porcine) injection 5,000 Units, 5,000 Units, SubCUTAneous, Q12H, Seipp, James, DO, 5,000 Units at 08/27/21 0913    venlafaxine (EFFEXOR) tablet 37.5 mg, 37.5 mg, Oral, DAILY, Seipp, James, DO, 37.5 mg at 08/27/21 0913    ferrous sulfate tablet 324 mg, 324 mg, Oral, DAILY WITH BREAKFAST, Seipp, James, DO, 324 mg at 08/27/21 0913    aspirin delayed-release tablet 81 mg, 81 mg, Oral, DAILY, Seipp, James, DO, 81 mg at 08/27/21 0913    vitamin B complex tablet, 1 Tablet, Oral, DAILY, Seipp, James, DO, 1 Tablet at 08/27/21 0914    lactobacillus rhamnosus gg 10 billion cell (CULTURELLE) 1 Capsule, 1 Capsule, Oral, DAILY, Seipp, James, DO, 1 Capsule at 08/27/21 0913    glucose chewable tablet 16 g, 4 Tablet, Oral, PRN, Seipp, James, DO    dextrose (D50W) injection syrg 12.5-25 g, 25-50 mL, IntraVENous, PRN, Rubi Louann,     glucagon (GLUCAGEN) injection 1 mg, 1 mg, IntraMUSCular, PRN, Seipp, James, DO    insulin lispro (HUMALOG) injection, , SubCUTAneous, TIDAC, Seipp, James, DO, 3 Units at 08/27/21 1552    insulin glargine (LANTUS) injection 50 Units, 50 Units, SubCUTAneous, QHS, Seipp, James, DO, 50 Units at 08/26/21 2220    nicotine (NICODERM CQ) 21 mg/24 hr patch 1 Patch, 1 Patch, TransDERmal, DAILY, Seipp, James, DO, 1 Patch at 08/27/21 0914    Vancomycin dosing/monitoring per pharmacy, , Other, Rx Dosing/Monitoring, Seipp, James, DO    Objective:     VITALS:   Patient Vitals for the past 12 hrs:   Temp Pulse Resp BP SpO2   08/27/21 1230 97.8 °F (36.6 °C) 77 20 (!) 171/70 94 %   08/27/21 0924 97.6 °F (36.4 °C) 78 20 (!) 184/90 94 %       Intake/Output Summary (Last 24 hours) at 8/27/2021 1729  Last data filed at 8/27/2021 0801  Gross per 24 hour   Intake --   Output 600 ml   Net -600 ml        PHYSICAL EXAM:  General: WD, WN. Alert, cooperative, no acute distress    EENT:  EOMI. Anicteric sclerae. MMM  Resp:  Nonlabored at rest, Mild B crackles R>L  CV:  Regular  rhythm,  No edema  GI:  Soft, Non distended, Non tender. +Bowel sounds  Neurologic:  Alert and oriented X 3, normal speech, nonfocal  Psych:   Mildly anxious / agitated  Skin:  No rashes. No jaundice    LABS:  I reviewed today's most current labs and imaging studies. Pertinent labs include:  Recent Labs     08/27/21  0629 08/26/21  0521 08/25/21  0545   WBC 16.1* 17.2* 19.4*   HGB 8.5* 7.9* 8.3*   HCT 27.4* 24.7* 25.8*   * 526* 509*     Recent Labs     08/27/21  0629 08/26/21  0521 08/25/21  0545    139 137   K 4.7 4.4 4.1    107 104   CO2 22 20* 19*   * 140* 149*   BUN 41* 43* 37*   CREA 1.84* 2.02* 2.16*   CA 9.5 9.0 9.3   MG 1.7 1.8 1.8   PHOS  --  4.1 3.6   ALB  --  1.7* 1.8*   TBILI  --  0.1* 0.3   ALT  --  26 26       RADIOLOGY:  pCXR 8/23:  Cardiomediastinal silhouette is stable.  Right-sided PICC line extends to the  proximal SVC. There is worsening elevation within the right mid lung. There are  worsening fused bilateral increased interstitial markings. There is mild  bibasilar atelectasis. No pleural fluid is visualized on the single AP view. No pneumothorax.   IMPRESSION  Worsening right midlung consolidation. Worsening diffuse bilateral  interstitial lung disease. Mild bibasilar atelectasis    pCXR 8/24:  AP portable upright view of the chest demonstrates a stable  cardiopericardial  silhouette. Right upper lobe parenchymal disease is slightly improved. Mild  basilar atelectasis. .There is no focal consolidation. .There is no pneumothorax. Kaykay Rued PICC line catheter on the right. Patient is on a cardiac monitor.    IMPRESSION  Slightly improved right lung consolidation. EKG 8/23:  NSR 87 bpm, NSSTC, NSTWC    VQ LUNG 8/3:  There is no V/Q mismatch.   IMPRESSION: Normal study. Low probability for pulmonary embolism    ECHO 8/4:  · LV: Estimated LVEF is 45 - 50%. Visually measured ejection fraction. Normal wall thickness. Mildly dilated left ventricle. Severe (grade 3) left ventricular diastolic dysfunction. · LA: Moderately dilated left atrium. · IAS: No atrial septal defect present. · AV: With no significant stenosis. · MV: Mild mitral annular calcification. · No obvious vegitation but sclerotic changes to aortic valve make determination of presence of vegitation not accurate      Procedures: see electronic medical records for all procedures/Xrays and details which were not copied into this note but were reviewed prior to creation of Plan.         Assessment / Plan:    Principal Problem:    Pneumonia (8/23/2021)  [facility]  Appreciated recommendations from Dr. Marlys Clark, ID [facility] 8/24 for tx Vancomycin / Zosyn  Goal of 15-20 for Vancomycin with recent IVAN  H/o Vascular / Immune mediated rash earlier in Aug, stopping Cefazolin for unlikely drug rash  Probiotic / Culturelle  PA / LAT CXR in AM  WBC improving    Active Problems:    Respiratory failure (HCC) (9/18/2020)    COPD  Cont O2 NC and Albuterol Neb prn  Nicoderm CQ 21 daily  Has anxiety / insomnia  Wean Albuterol / Nicotine      Systolic heart failure, chronic ([facility] (7/16/2021)    Cardiomyopathy ([facility] (9/23/2020)  ASA anticoag with possible need or OAC for PAF  Monitor renal fx with diuresis - recent jump in Cr noted (better in f/u today)  Current Lasix 40mg daily  Coreg 25mg bid        Anemia of infection and chronic disease (8/26/2021)  Hg up to 8.5 today  Lab evaluation 8/3 with low Fe 15, low TIBC 208, and low %Fe Sat 7 and nl Ferritin 194  B12 nl 737 and Folate nl 61.   Note CRP 12.6, , SHANNA neg, ANCA neg  Will check FOB, Trial Fe, f/u CRP  Transaminases were elevated with Acute Hep Panel negative  Consider transfusion      Type 2 diabetes with nephropathy ([facility] (2/15/2018)  A1c jumped to 9.1 June 14 from prior 6.3 Oct 2020  Some elevation this AM pt attributes to more cards yesterday  Follow  Cont Lantus 50u SQ daily bedtime  CC Humalog tid      Depression  Cont Effexor XR 37.5 daily    ______________________________________________________________________  SAFETY:   Code Status:Full  DVT prophylaxis:Heparin  Stress Ulcer prophylaxis: Pepcid  Bladder catheter:no  Family Contact Info:  Primary Emergency Contact: [address], Home Phone: [phone]  Bedded: [facility] Room 204/01  Disposition: TBD, likely home when stable  Admission status:  Inpatient    Reviewed most current lab test results and cultures  YES  Reviewed most current radiology test results   YES  Review and summation of old records today    NO  Reviewed patient's current orders and MAR    YES  PMH/SH reviewed - no change compared to H&P    Care Plan discussed with:                                   Comments  Patient x     Family  x Daughter Ama Bautista) [phone]   RN x     Care Manager  x     Consultant                           Multidiciplinary team rounds were held today with , nursing, pharmacist and clinical coordinator. Patient's plan of care was discussed; medications were reviewed and discharge planning was addressed.         ____________________________________________    Total NON Critical Care TIME:  40   Minutes        Comments   >50% of visit spent in counseling and coordination of care   x      Signed: Jessica Harding MD  [facility] Hospitalist  [phone]

## 2021-08-27 NOTE — ROUTINE PROCESS
Juan Manuelering out. Vancomycin 1/2 in -complains of burning pain in upper left arm, shoulder and left back. States non-stop even after stopping the medication. No redness or swelling noted at site. Line removed per request of patient. Nursing supervisor aware.

## 2021-08-27 NOTE — PROGRESS NOTES
Bedside shift change report given to WILNER Zuñiga RN (oncoming nurse) by Berta Cain RN (offgoing nurse). Report included the following information Kardex.

## 2021-08-27 NOTE — PROGRESS NOTES
Follow up visit with patient in Rm 204  Provided empathic listening and spiritual support  Advised of  Availability   [address] [place] [address]

## 2021-08-27 NOTE — PROGRESS NOTES
Pt states she feels short of breath. SATS 94% on 3L NC. Exp wheezing noted on left. Ghulam Sebastian. called to give pt PRN neb tx. Nsg sup made aware.

## 2021-08-27 NOTE — ROUTINE PROCESS
Spoke with Dr. Larissa Mills concerning the complaints of pain with the vancomycin infusing. Said to try running it at a slower rate (was already infusing it over 2 hours) and to try using the PICC line for the infusing. The patient does not have objections to using the line = however states we are not restarting the med and not using the PICC line until tomorrow.

## 2021-08-27 NOTE — PROGRESS NOTES
IDR Team; MD, Nursing, Care Manager, Physical therapy, Nursing Supervisor, Pharmacy and Dietician, met to review patient's plan of care. Discussed goals, interventions, barriers and progress. RUR: 38%  HIGH    Team will continue to monitor progress and report any concerns to the physician and care management as indicated. Transition of Care Plan:  According to the primary care nurse, patient gets very dyspneic with the any movement. Will evaluate the need for home oxygen. IV antibiotics will be discontinued on Thursday according to attending. Continues to have very slow progress.

## 2021-08-28 ENCOUNTER — APPOINTMENT (OUTPATIENT)
Dept: GENERAL RADIOLOGY | Age: 81
DRG: 193 | End: 2021-08-28
Attending: INTERNAL MEDICINE
Payer: MEDICARE

## 2021-08-28 VITALS
WEIGHT: 159.8 LBS | HEART RATE: 76 BPM | BODY MASS INDEX: 25.68 KG/M2 | OXYGEN SATURATION: 94 % | DIASTOLIC BLOOD PRESSURE: 86 MMHG | TEMPERATURE: 98.2 F | RESPIRATION RATE: 20 BRPM | HEIGHT: 66 IN | SYSTOLIC BLOOD PRESSURE: 142 MMHG

## 2021-08-28 LAB
ANION GAP SERPL CALC-SCNC: 14 MMOL/L (ref 5–15)
BASOPHILS # BLD: 0 K/UL (ref 0–0.1)
BASOPHILS NFR BLD: 0 % (ref 0–1)
BUN SERPL-MCNC: 34 MG/DL (ref 6–20)
BUN/CREAT SERPL: 20 (ref 12–20)
CALCIUM SERPL-MCNC: 10.2 MG/DL (ref 8.5–10.1)
CHLORIDE SERPL-SCNC: 106 MMOL/L (ref 97–108)
CO2 SERPL-SCNC: 20 MMOL/L (ref 21–32)
CREAT SERPL-MCNC: 1.68 MG/DL (ref 0.55–1.02)
DIFFERENTIAL METHOD BLD: ABNORMAL
EOSINOPHIL # BLD: 3.6 K/UL (ref 0–0.4)
EOSINOPHIL NFR BLD: 19 % (ref 0–7)
ERYTHROCYTE [DISTWIDTH] IN BLOOD BY AUTOMATED COUNT: 15.3 % (ref 11.5–14.5)
GLUCOSE BLD STRIP.AUTO-MCNC: 157 MG/DL (ref 65–117)
GLUCOSE BLD STRIP.AUTO-MCNC: 166 MG/DL (ref 65–117)
GLUCOSE BLD STRIP.AUTO-MCNC: 191 MG/DL (ref 65–117)
GLUCOSE SERPL-MCNC: 151 MG/DL (ref 65–100)
HCT VFR BLD AUTO: 28.5 % (ref 35–47)
HGB BLD-MCNC: 8.9 G/DL (ref 11.5–16)
IMM GRANULOCYTES # BLD AUTO: 0 K/UL (ref 0–0.04)
IMM GRANULOCYTES NFR BLD AUTO: 0 % (ref 0–0.5)
LYMPHOCYTES # BLD: 5.9 K/UL (ref 0.8–3.5)
LYMPHOCYTES NFR BLD: 31 % (ref 12–49)
MCH RBC QN AUTO: 28 PG (ref 26–34)
MCHC RBC AUTO-ENTMCNC: 31.2 G/DL (ref 30–36.5)
MCV RBC AUTO: 89.6 FL (ref 80–99)
MONOCYTES # BLD: 0.6 K/UL (ref 0–1)
MONOCYTES NFR BLD: 3 % (ref 5–13)
NEUTS SEG # BLD: 8.9 K/UL (ref 1.8–8)
NEUTS SEG NFR BLD: 47 % (ref 32–75)
NRBC # BLD: 0 K/UL (ref 0–0.01)
NRBC BLD-RTO: 0 PER 100 WBC
PLATELET # BLD AUTO: 690 K/UL (ref 150–400)
PMV BLD AUTO: 9.4 FL (ref 8.9–12.9)
POTASSIUM SERPL-SCNC: 4.4 MMOL/L (ref 3.5–5.1)
RBC # BLD AUTO: 3.18 M/UL (ref 3.8–5.2)
RBC MORPH BLD: ABNORMAL
SERVICE CMNT-IMP: ABNORMAL
SODIUM SERPL-SCNC: 140 MMOL/L (ref 136–145)
VANCOMYCIN SERPL-MCNC: 11 UG/ML
WBC # BLD AUTO: 19 K/UL (ref 3.6–11)

## 2021-08-28 PROCEDURE — 74011250636 HC RX REV CODE- 250/636: Performed by: INTERNAL MEDICINE

## 2021-08-28 PROCEDURE — 74011250637 HC RX REV CODE- 250/637: Performed by: INTERNAL MEDICINE

## 2021-08-28 PROCEDURE — 74011000258 HC RX REV CODE- 258: Performed by: INTERNAL MEDICINE

## 2021-08-28 PROCEDURE — 71046 X-RAY EXAM CHEST 2 VIEWS: CPT

## 2021-08-28 PROCEDURE — 77010033678 HC OXYGEN DAILY

## 2021-08-28 PROCEDURE — 36415 COLL VENOUS BLD VENIPUNCTURE: CPT

## 2021-08-28 PROCEDURE — 80048 BASIC METABOLIC PNL TOTAL CA: CPT

## 2021-08-28 PROCEDURE — 80202 ASSAY OF VANCOMYCIN: CPT

## 2021-08-28 PROCEDURE — 85025 COMPLETE CBC W/AUTO DIFF WBC: CPT

## 2021-08-28 PROCEDURE — 82962 GLUCOSE BLOOD TEST: CPT

## 2021-08-28 PROCEDURE — 74011250637 HC RX REV CODE- 250/637: Performed by: FAMILY MEDICINE

## 2021-08-28 PROCEDURE — 94760 N-INVAS EAR/PLS OXIMETRY 1: CPT

## 2021-08-28 RX ORDER — CHOLECALCIFEROL (VITAMIN D3) 125 MCG
5 CAPSULE ORAL
Status: CANCELLED | OUTPATIENT
Start: 2021-08-28

## 2021-08-28 RX ORDER — LORAZEPAM 0.5 MG/1
0.5 TABLET ORAL
Status: COMPLETED | OUTPATIENT
Start: 2021-08-28 | End: 2021-08-28

## 2021-08-28 RX ORDER — FUROSEMIDE 80 MG/1
80 TABLET ORAL ONCE
Status: COMPLETED | OUTPATIENT
Start: 2021-08-28 | End: 2021-08-28

## 2021-08-28 RX ADMIN — ASPIRIN 81 MG: 81 TABLET, COATED ORAL at 10:06

## 2021-08-28 RX ADMIN — FERROUS SULFATE TAB EC 324 MG (65 MG FE EQUIVALENT) 324 MG: 324 (65 FE) TABLET DELAYED RESPONSE at 10:07

## 2021-08-28 RX ADMIN — FUROSEMIDE 40 MG: 40 TABLET ORAL at 10:07

## 2021-08-28 RX ADMIN — Medication 1 TABLET: at 10:08

## 2021-08-28 RX ADMIN — PIPERACILLIN AND TAZOBACTAM 2.25 G: 2; .25 INJECTION, POWDER, LYOPHILIZED, FOR SOLUTION INTRAVENOUS at 11:39

## 2021-08-28 RX ADMIN — CARVEDILOL 25 MG: 25 TABLET, FILM COATED ORAL at 10:08

## 2021-08-28 RX ADMIN — FUROSEMIDE 80 MG: 80 TABLET ORAL at 15:12

## 2021-08-28 RX ADMIN — VENLAFAXINE 75 MG: 37.5 TABLET ORAL at 10:07

## 2021-08-28 RX ADMIN — Medication 1 CAPSULE: at 10:08

## 2021-08-28 RX ADMIN — PIPERACILLIN AND TAZOBACTAM 2.25 G: 2; .25 INJECTION, POWDER, LYOPHILIZED, FOR SOLUTION INTRAVENOUS at 04:24

## 2021-08-28 RX ADMIN — LORAZEPAM 0.5 MG: 0.5 TABLET ORAL at 03:16

## 2021-08-28 NOTE — PROGRESS NOTES
Patient remains restless despite receiving oral ativan at bedtime. Dr. Addi Subramanian notified, and gave order to administer 0.5 mg PO ativan once now.

## 2021-08-28 NOTE — ROUTINE PROCESS
Discharge instructions reviewed with Mari tried to get pt to stay, but she was unwilling  Personal belongings returned: pt  Home meds returned: chaz  To front entrance via w/c  Discharged home with   @ [phone]

## 2021-08-28 NOTE — PROGRESS NOTES
Bedside and Verbal shift change report given to JAILENE Salgado (oncoming nurse) by Ziggy Baird LPN (offgoing nurse). Report included the following information SBAR, Kardex, Intake/Output and MAR.

## 2021-08-28 NOTE — PROGRESS NOTES
Transition of Care (FRANCISCO) Plan:  Patient has signed out AMA to home. According to documentation, patient adamant about going home and would not listen to the attending. Will contact the patient to see if she will want home health to make visits. RUR: 39%  HIGH    FRANCISCO Transportation:      How is patient being transported at discharge? POV     When? 8/28/21    Is transport scheduled? NA    Follow-up appointment and transportation:    PCP? Salvador Doyle NP    Specialist?    Time/Date? Patient has signed out AMA on weekend. Patient will make f/u appointment. Will contact the patient on Monday to make sure she made the appointment    Who is transporting to the follow-up appointment? Patient/    Is transport for follow up appointment scheduled? NA    Communication plan (with patient/family): Who is being called? Patient     What number(s) is to be used? [phone]    What service provider is calling for [facility] services? Primary Care Physician    When are they calling? Probably 24 - 48 hours prior to appointment    Click here to [address] including selection of the Healthcare Decision Maker Relationship (ie \"Primary\")  [email or web address]  Patient has named her  as [address] [place] [address] Management Interventions  PCP Verified by CM:  Yes Salvador Doyle NP)  Palliative Care Criteria Met (RRAT>21 & CHF Dx)?: No (No MD order for Palliative Care.)  Transition of Care Consult (CM Consult): [address]: No  Reason Outside [place]: Patient already serviced by other home care/hospice agency ([facility])  Discharge Durable Medical Equipment: No  Physical Therapy Consult: No  Occupational Therapy Consult: No  Speech Therapy Consult: No  Current Support Network: Lives with Spouse, Own Home  Confirm Follow Up Transport: Self  Discharge Location  Discharge Placement: Home (PATIENT SIGNED OUT AMA)

## 2021-08-28 NOTE — PROGRESS NOTES
Pt agitated. Refusing to stay seated or stay in bed. States, \"Stop bossing me around! \". Up to bathroom to void. Refuses to keep O2 on and her SAO2 is 87-91% on RA. MD notified and has come to the room. He explained her treatment regime, but she did not appear to understand. She kept repeating that she only got an antibiotic every other day. Wants to know if her  is aware of her treatment plan. Assured her that he was. 0900 pt still seems somewhat delirious. Coming around somewhat better, but not at baseline. Dr. Russ Buerger and [facility] supervisor notified of pt's agitation and had come to the room. Dr. Russ Buerger explained why pt needed cxr and also the meds she was receiving. He told her that he was told that her dgt was the one he was to speak to. Pt very angry with her dgt and told Dr. Russ Buerger that her  was the only one he was to talk to about her care and treatment. Dr. Russ Buerger assured her that he would comply with her wishes. She agreed to the cxr . Taken by radiology tech for cxr via w/c.                1230 pt finally in bed and sleeping. Continuing to check pt freq.    1600 pt stating she was leaving. She had things to do at home and couldn't stay any longer. Dr. Russ Buerger notified and came to speak with pt. She was very argumentative. 1800 pt refusing to take any meds except for what she was taking at home. States her NP, Millie Davis, whom she has gone to for 10yrs, knows what she needs to take and will make sure she has what she needs. Insists on going home. Dr. Russ Buerger  once again called. He came and talked with pt encouraging her to stay for treatment. He explained to her the risks of stopping treatment and leaving the hospital. Pt had already called her  and he was waiting by the ED to take her home. Pt states she will sign whatever form she needed to to leave the hosp ital. She signed the AMA form. 1851 Pt taken to ED parking area to meet with her .  She told him she couldn't stay at the hospital anymore because she was bored. Pt applied her seat belt. Instructed her and her  to make sure she removes the Nicotene patch from her left arm before she smokes and the he is to flush her PICC line. Pt wouldn't let me do any of those things as she was anxiou to leave.

## 2021-08-28 NOTE — PROGRESS NOTES
Problem: Falls - Risk of  Goal: *Absence of Falls  Description: Document Megha Rapp Fall Risk and appropriate interventions in the flowsheet. Outcome: Progressing Towards Goal  Note: Fall Risk Interventions:  Mobility Interventions: Bed/chair exit alarm, Patient to call before getting OOB         Medication Interventions: Bed/chair exit alarm, Patient to call before getting OOB, Teach patient to arise slowly    Elimination Interventions: Bed/chair exit alarm, Call light in reach, Patient to call for help with toileting needs    History of Falls Interventions: Bed/chair exit alarm, Door open when patient unattended         Problem: Patient Education: Go to Patient Education Activity  Goal: Patient/Family Education  Outcome: Progressing Towards Goal     Problem: Pressure Injury - Risk of  Goal: *Prevention of pressure injury  Description: Document Bryan Scale and appropriate interventions in the flowsheet.   Outcome: Progressing Towards Goal  Note: Pressure Injury Interventions:  Sensory Interventions: Assess changes in LOC    Moisture Interventions: Absorbent underpads    Activity Interventions: Increase time out of bed    Mobility Interventions: HOB 30 degrees or less    Nutrition Interventions: Document food/fluid/supplement intake                     Problem: Patient Education: Go to Patient Education Activity  Goal: Patient/Family Education  Outcome: Progressing Towards Goal

## 2021-08-28 NOTE — PROGRESS NOTES
Problem: Falls - Risk of  Goal: *Absence of Falls  Description: Document Jillian Acacia Fall Risk and appropriate interventions in the flowsheet. Outcome: Resolved/Not Met  Note: Fall Risk Interventions:  Mobility Interventions: Bed/chair exit alarm, Patient to call before getting OOB         Medication Interventions: Bed/chair exit alarm, Patient to call before getting OOB    Elimination Interventions: Bed/chair exit alarm, Call light in reach, Patient to call for help with toileting needs    History of Falls Interventions: Bed/chair exit alarm         Problem: Patient Education: Go to Patient Education Activity  Goal: Patient/Family Education  Outcome: Resolved/Not Met     Problem: Pressure Injury - Risk of  Goal: *Prevention of pressure injury  Description: Document Bryan Scale and appropriate interventions in the flowsheet.   Outcome: Resolved/Not Met  Note: Pressure Injury Interventions:  Sensory Interventions: Assess changes in LOC    Moisture Interventions: Absorbent underpads    Activity Interventions: Increase time out of bed    Mobility Interventions: HOB 30 degrees or less    Nutrition Interventions: Document food/fluid/supplement intake, Offer support with meals,snacks and hydration                     Problem: Patient Education: Go to Patient Education Activity  Goal: Patient/Family Education  Outcome: Resolved/Not Met     Problem: General Medical Care Plan  Goal: *Vital signs within specified parameters  Outcome: Resolved/Not Met  Goal: *Labs within defined limits  Outcome: Resolved/Not Met  Goal: *Absence of infection signs and symptoms  Outcome: Resolved/Not Met  Goal: *Optimal pain control at patient's stated goal  Outcome: Resolved/Not Met  Goal: *Skin integrity maintained  Outcome: Resolved/Not Met  Goal: *Fluid volume balance  Outcome: Resolved/Not Met  Goal: *Optimize nutritional status  Outcome: Resolved/Not Met  Goal: *Anxiety reduced or absent  Outcome: Resolved/Not Met  Goal: *Progressive mobility and function (eg: ADL's)  Outcome: Resolved/Not Met     Problem: Patient Education: Go to Patient Education Activity  Goal: Patient/Family Education  Outcome: Resolved/Not Met     Problem: Diabetes Self-Management  Goal: *Disease process and treatment process  Description: Define diabetes and identify own type of diabetes; list 3 options for treating diabetes. Outcome: Resolved/Not Met  Goal: *Incorporating nutritional management into lifestyle  Description: Describe effect of type, amount and timing of food on blood glucose; list 3 methods for planning meals. Outcome: Resolved/Not Met  Goal: *Incorporating physical activity into lifestyle  Description: State effect of exercise on blood glucose levels. Outcome: Resolved/Not Met  Goal: *Developing strategies to promote health/change behavior  Description: Define the ABC's of diabetes; identify appropriate screenings, schedule and personal plan for screenings. Outcome: Resolved/Not Met  Goal: *Using medications safely  Description: State effect of diabetes medications on diabetes; name diabetes medication taking, action and side effects. Outcome: Resolved/Not Met  Goal: *Monitoring blood glucose, interpreting and using results  Description: Identify recommended blood glucose targets  and personal targets. Outcome: Resolved/Not Met  Goal: *Prevention, detection, treatment of acute complications  Description: List symptoms of hyper- and hypoglycemia; describe how to treat low blood sugar and actions for lowering  high blood glucose level. Outcome: Resolved/Not Met  Goal: *Prevention, detection and treatment of chronic complications  Description: Define the natural course of diabetes and describe the relationship of blood glucose levels to long term complications of diabetes.   Outcome: Resolved/Not Met  Goal: *Developing strategies to address psychosocial issues  Description: Describe feelings about living with diabetes; identify support needed and support network  Outcome: Resolved/Not Met     Problem: Patient Education: Go to Patient Education Activity  Goal: Patient/Family Education  Outcome: Resolved/Not Met   Pt signed out MEKHI

## 2021-08-28 NOTE — PROGRESS NOTES
Was advised of patient's elevated BP by Spring Zimmerman (CNA). Patient's BP was 207/109 with a HR of 100. Patient's BP was rechecked manually and it was 190/82 with a HR of 84. Charge nurse Emeli Zelaya) was advised. Patient is asymptomatic and will continue to monitor. 0220- Pts BP was reassessed and is now 189/84 with a HR of 79. Charge nurse advised. Will continue to monitor. 0300- Patient was restless all night and has not slept. Patient stated that they had a lot on their mind and was unable to sleep. Patient received Ativan 0.5 mg at bedtime but did not help. An order was placed by ER provider for a stat order of another dose of Ativan 0.5 to help patient rest.    6278- Patient is now resting and sleeping.

## 2021-08-28 NOTE — PROGRESS NOTES
[facility]  Hospitalist Progress Note    NAME: Raymond Comer   :  1940   MRN:  [id number]     Total duration of encounter: 5 days      Interim Hospital Summary: 79 y.o. female who presented on 2021 with Pneumonia. She has a past medical history of A-fib [facility]), AICD (automatic cardioverter/defibrillator) present (2020), Arthritis, Bilateral pleural effusion (2020), Chronic pain, Chronic pain, Diabetes ([facility] Diverticular disease, Elevated troponin (2020), Hemorrhoid, Hypercholesterolemia, IBS (irritable bowel syndrome), Lymphocytosis (), Systolic heart failure, chronic (HCC) (2021), and Type 2 MI (myocardial infarction) ([facility] (2021). Pt has h/o MSSA bacteremia with suspected Pacer / Defib infection to be tx with Daptomycin via R UE PICC till . Recent drug reaction to Ancef a/w ARF to Cr 2.17 . Anemia with drop in Hg from 13.2 July 15 to low 7.9 today with w/u 8/3 c/w chronic disease. FOB negative. CRP down from 12 to 6 range since 8/3. Renal function has imrpoved, f/u CXR notes improved infiltrate but worsening of interstitial edema. Lasix dosing advanced        Subjective:     Chief Complaint / Reason for Physician Visit  \"don't give me any more Vancomycin\". Discussed with RN   Severe c/o L UE burning during Vancomycin last evening - stopped  Staff noting more dyspnea with drop in Oximetry below 90 off Nasal O2  Confusion and agitation this AM thinking she is at home  No pleuricy or chest pain  Pt concerned about her     Notes no h/o bleeding or anemia  FOB negative  Labs noted for anemia a/w chronic disease?   Has been started on FeSO4 daily    Review of Systems:  Symptom Y/N Comments  Symptom Y/N Comments   Fever/Chills n   Chest Pain n    Poor Appetite n   Edema n    Cough n   Abdominal Pain n    Sputum n   Joint Pain n    SOB/STANTON y   Pruritis/Rash n    Nausea/vomit n   Tolerating PT/OT     Diarrhea n   Tolerating Diet n refused today   Constipation n   Other         Current Facility-Administered Medications:     piperacillin-tazobactam (ZOSYN) 2.25 g in 0.9% sodium chloride (MBP/ADV) 50 mL MBP, 2.25 g, IntraVENous, Q6H, Anastasia De La O MD, Last Rate: 100 mL/hr at 08/28/21 1139, 2.25 g at 08/28/21 1139    furosemide (LASIX) tablet 80 mg, 80 mg, Oral, ONCE, Anastasia De La O MD    nicotine (NICODERM CQ) 14 mg/24 hr patch 1 Patch, 1 Patch, TransDERmal, DAILY, Anastasia De La O MD, 1 Patch at 08/28/21 1007    insulin glargine (LANTUS) injection 60 Units, 60 Units, SubCUTAneous, QHS, Anastasia De La O MD, 60 Units at 08/27/21 2305    albuterol (PROVENTIL VENTOLIN) nebulizer solution 2.5 mg, 2.5 mg, Nebulization, BID PRN, Anastasia De La O MD    [facility] tablet 75 mg, 75 mg, Oral, DAILY, Anastasia De La O MD, 75 mg at 08/28/21 1007    famotidine (PEPCID) tablet 20 mg, 20 mg, Oral, QPM, Anastasia De La O MD, 20 mg at 08/27/21 1741    cholestyramine-aspartame (QUESTRAN LIGHT) packet 4 g, 4 g, Oral, BID, Seipp, James, DO, 4 g at 08/27/21 2053    furosemide (LASIX) tablet 40 mg, 40 mg, Oral, DAILY, Seipp, James, DO, 40 mg at 08/28/21 1007    carvediloL (COREG) tablet 25 mg, 25 mg, Oral, BID WITH MEALS, Seipp, James, DO, 25 mg at 08/28/21 1008    sodium chloride (NS) flush 5-40 mL, 5-40 mL, IntraVENous, Q8H, Seipp, James, DO, 10 mL at 08/27/21 2201    sodium chloride (NS) flush 5-40 mL, 5-40 mL, IntraVENous, PRN, Seipp, James, DO, 10 mL at 08/27/21 0402    acetaminophen (TYLENOL) tablet 650 mg, 650 mg, Oral, Q6H PRN, 650 mg at 08/27/21 1741 **OR** acetaminophen (TYLENOL) suppository 650 mg, 650 mg, Rectal, Q6H PRN, Seipp, James, DO    polyethylene glycol (MIRALAX) packet 17 g, 17 g, Oral, DAILY PRN, Seipp, James, DO    ondansetron (ZOFRAN ODT) tablet 4 mg, 4 mg, Oral, Q8H PRN **OR** ondansetron (ZOFRAN) injection 4 mg, 4 mg, IntraVENous, Q6H PRN, Seipp, James, DO    heparin (porcine) injection 5,000 Units, 5,000 Units, SubCUTAneous, Q12H, SeippSerina Saran, DO, 5,000 Units at 08/27/21 2053    ferrous sulfate tablet 324 mg, 324 mg, Oral, DAILY WITH BREAKFAST, Seipp, James, DO, 324 mg at 08/28/21 1007    aspirin delayed-release tablet 81 mg, 81 mg, Oral, DAILY, Seipp, James, DO, 81 mg at 08/28/21 1006    vitamin B complex tablet, 1 Tablet, Oral, DAILY, Seipp, James, DO, 1 Tablet at 08/28/21 1008    lactobacillus rhamnosus gg 10 billion cell (CULTURELLE) 1 Capsule, 1 Capsule, Oral, DAILY, Seipp, James, DO, 1 Capsule at 08/28/21 1008    glucose chewable tablet 16 g, 4 Tablet, Oral, PRN, Seipp, James, DO    dextrose (D50W) injection syrg 12.5-25 g, 25-50 mL, IntraVENous, PRN, Seipp, James, DO    glucagon (GLUCAGEN) injection 1 mg, 1 mg, IntraMUSCular, PRN, Seipp, James, DO    insulin lispro (HUMALOG) injection, , SubCUTAneous, TIDAC, Seipp, James, DO, 3 Units at 08/27/21 1552    Vancomycin dosing/monitoring per pharmacy, , Other, Rx Dosing/Monitoring, Seipp, James, DO    Objective:     VITALS:   Patient Vitals for the past 12 hrs:   Temp Pulse Resp BP SpO2   08/28/21 1100 98.2 °F (36.8 °C) 76 20 (!) 142/86 94 %   08/28/21 1026 -- -- -- -- 93 %   08/28/21 0417 97.9 °F (36.6 °C) 83 20 (!) 184/69 99 %   08/28/21 0216 -- 79 20 (!) 189/84 96 %       Intake/Output Summary (Last 24 hours) at 8/28/2021 1400  Last data filed at 8/28/2021 0900  Gross per 24 hour   Intake 420 ml   Output 300 ml   Net 120 ml        PHYSICAL EXAM:  General: WD, WN. Alert, Delirious    States she is at home, left the hospital   EENT:  EOMI. Anicteric sclerae. MMM  Resp:  Mildly labored at rest, Mild B crackles R>L. No cough  CV:  Regular  rhythm,  No edema  GI:  Soft, Non distended, Non tender. +Bowel sounds  Neurologic:  Alert, disoriented, normal speech, nonfocal  Psych:   Mildly anxious / agitated. Not able to reason with her  Skin:  No rashes. No jaundice    LABS:  I reviewed today's most current labs and imaging studies.   Pertinent labs include:  Recent Labs 08/28/21  0814 08/27/21  0629 08/26/21  0521   WBC 19.0* 16.1* 17.2*   HGB 8.9* 8.5* 7.9*   HCT 28.5* 27.4* 24.7*   * 590* 526*     Recent Labs     08/28/21  0814 08/27/21  0629 08/26/21  0521    138 139   K 4.4 4.7 4.4    105 107   CO2 20* 22 20*   * 236* 140*   BUN 34* 41* 43*   CREA 1.68* 1.84* 2.02*   CA 10.2* 9.5 9.0   MG  --  1.7 1.8   PHOS  --   --  4.1   ALB  --   --  1.7*   TBILI  --   --  0.1*   ALT  --   --  26       RADIOLOGY:  pCXR 8/23:  Cardiomediastinal silhouette is stable. Right-sided PICC line extends to the  proximal SVC. There is worsening elevation within the right mid lung. There are  worsening fused bilateral increased interstitial markings. There is mild  bibasilar atelectasis. No pleural fluid is visualized on the single AP view. No pneumothorax.   IMPRESSION  Worsening right midlung consolidation. Worsening diffuse bilateral  interstitial lung disease. Mild bibasilar atelectasis    pCXR 8/24:  AP portable upright view of the chest demonstrates a stable  cardiopericardial  silhouette. Right upper lobe parenchymal disease is slightly improved. Mild  basilar atelectasis. .There is no focal consolidation. .There is no pneumothorax. Branden Holograamas PICC line catheter on the right. Patient is on a cardiac monitor.    IMPRESSION  Slightly improved right lung consolidation. PA/LAT CXR 8/28:  Stable positioning of right upper extremity PICC with tip terminating in the mid  SVC. Slightly improved consolidative opacity in the peripheral right midlung. There are new small bilateral pleural effusions, right larger than left, with  bilateral lower lobe subsegmental atelectasis. There are mild diffuse bilateral  interstitial opacities. No pneumothorax. Heart size is normal. Calcifications of  the thoracic aorta. Multilevel degenerative disc disease in the thoracic spine.     IMPRESSION  1.  Interval development of small bilateral pleural effusions, right worse than  left, with overlying bilateral lower lobe subsegmental atelectasis. Interval  development of mild pulmonary interstitial edema. 2. Slightly improved airspace disease in the peripheral right midlung. Imaging  follow-up to resolution is recommended. EKG 8/23:  NSR 87 bpm, NSSTC, NSTWC    VQ LUNG 8/3:  There is no V/Q mismatch.   IMPRESSION: Normal study. Low probability for pulmonary embolism    ECHO 8/4:  · LV: Estimated LVEF is 45 - 50%. Visually measured ejection fraction. Normal wall thickness. Mildly dilated left ventricle. Severe (grade 3) left ventricular diastolic dysfunction. · LA: Moderately dilated left atrium. · IAS: No atrial septal defect present. · AV: With no significant stenosis. · MV: Mild mitral annular calcification. · No obvious vegitation but sclerotic changes to aortic valve make determination of presence of vegitation not accurate      Procedures: see electronic medical records for all procedures/Xrays and details which were not copied into this note but were reviewed prior to creation of Plan.         Assessment / Plan:    Principal Problem:    Pneumonia (8/23/2021)  [facility]  Appreciated recommendations from Dr. Sheri Levine, [facility] 8/24 for tx Vancomycin / Zosyn  Goal of 15-20 for Vancomycin with recent IVAN  H/o Vascular / Immune mediated rash earlier in Aug, stopping Cefazolin for unlikely drug rash  Probiotic / Culturelle  PA / LAT CXR today - infiltrate improved  No cough or sputum for several days now  WBC had improved, but is up slight to 19 today  Pt is delirious today - suspect related to new sedative Ativan 0.5mg last night   Dr. Sheri Levine texted yesterday, and will f/u with her Monday to plan course of tx for HCAP  Will try to give Vancomycin in R anticub PICC, L arm c/o were likely due to small / inflammed vein from antibiotics    Active Problems:    Respiratory failure ([address] (9/18/2020)    COPD  Cont O2 NC and Albuterol Neb prn  Nicoderm CQ 21 daily reduced to 14 yesterday due to imsomnia  Has anxiety / insomnia  Wean Albuterol / Nicotine  Did not get / need  NEBS prn last 36 hours      Systolic heart failure, chronic ([facility] (7/16/2021)    Cardiomyopathy ([facility] (9/23/2020)  ASA anticoag with possible need or OAC for PAF  Monitor renal fx with diuresis - recent jump in Cr noted (better again in f/u today)  Current Lasix 40mg daily,  Add 80mg this afternoon due to CXR / exam findings  Coreg 25mg bid  Dr. Geneva Homans saw pt Friday - did not recommend [facility] as not recent documentation for Afib  Also no change in Beta blocker or need for ARB/ACE  Will f/u with Dr. Geneva Homans as planned      Anemia of infection and chronic disease (8/26/2021)  Hg up to 8.5 Fri BS 8.9 today  Lab evaluation 8/3 with low Fe 15, low TIBC 208, and low %Fe Sat 7 and nl Ferritin 194  B12 nl 737 and Folate nl 61. Note CRP 12.6, , SHANNA neg, ANCA neg  F/U FOB NEGATIVE, Trial Fe started.    f/u CRP down to 6 range  Transaminases were elevated with Acute Hep Panel negative  Consider transfusion, but not indicated with rising Hg appreciated      Type 2 diabetes with nephropathy ([facility] (2/15/2018)  A1c jumped to 9.1 June 14 from prior 6.3 Oct 2020  Some elevation Fri AM pt attributes to more cards on Thurs  Follow  Advance Lantus 60u SQ daily bedtime  CC Humalog tid      Depression  Advanced Effexor XR 75 daily yesterday due to agitation / insomnia   Follow    ______________________________________________________________________  SAFETY:   Code Status:Full  DVT prophylaxis:Heparin  Stress Ulcer prophylaxis: Pepcid  Bladder catheter:no  Family Contact Info:  Primary Emergency Contact: [address], Home Phone: [phone]  Bedded: [facility] Room 204/01  Disposition: TBD, likely home when stable  Admission status:  Inpatient    Reviewed most current lab test results and cultures  YES  Reviewed most current radiology test results   YES  Review and summation of old records today    NO  Reviewed patient's current orders and [facility] YES  PMH/SH reviewed - no change compared to H&P    Care Plan discussed with:                                   Comments  Patient x     Family      RN x     Care Manager  x     Consultant                           Multidiciplinary team rounds were held today with , nursing, pharmacist and clinical coordinator. Patient's plan of care was discussed; medications were reviewed and discharge planning was addressed.         ____________________________________________    Total NON Critical Care TIME:  40   Minutes        Comments   >50% of visit spent in counseling and coordination of care   x      Signed: Luis E Lara MD  [facility] Hospitalist  [phone]

## 2021-08-29 NOTE — PROGRESS NOTES
Hospital Progress Note  DISCHARGE AMA    Patient has decided to leave the hospital AMA, her  has come to pick her up. I came to see the patient several hours ago when she was complaining she had seen the DrPaul For several days now. She continued to have some confusion and agitation but was much improved from earlier today  I clarified that I had visited her twice daily the past couple days as well as earlier this morning. The nurses confirmed this to her but she would not accept this reality.   The patient and I had had excellent interactions the past couple days  I have contacted her daughter Dinesh Barrett) who lives out of state who I assumed was conveying information to the rest of the family  The patient was made aware that Dr Clark Walker and Sherri Deal have been in contact with Dr. Jarvis, [facility] and she has made a consultation with antibiotic recommendations for her HCAP  The patient wants to go home and resume her Daptomycin IV per [facility] and her   She is upset that her daughter Sebastián Pacheco and  have not been in to visit since adm 8/23  She says \"Dilcia wants me put into a nursing home\"  She wants all communication only with her     I contacted Mr. Kurt Goldberg at home and explained the situation in great detail  Dr. Jarvis had recommended stopping Daptomycin and starting Vancomycin / Alvena Sham on 8/23 for the HCAP  It seemed he was well aware of this and had been following changes on MY CHART  He has been in conversation with Sebastián Pacheco who had been contacted with progress by me on 8/26, and was given my desk ext [phone] to call for updates  I explained the f/u CXR was improved concerning Pneumonia, but the plan was to continue the Vanc/Zosyn IV till Monday when Dr. Jarvis would be contacted  With continued improvement it was possible a change to oral medication could be considered then if she continued to improved  Also Lasix was advanced today as the CXR suggested interstitial edema and the ARF was improving allowing for more aggressive diuresis at this time  It was felt the Oxygen was still necessary since her oximetry dropped below 90% today on RA with activity  He stated he planned to visit tomorrow / Sunday, and realized she was not necessarily to be discharged on Monday (pending planning with Dr. Siva Martin)    The patient contacted her  in the mean time and threatened their marriage if he did not come to pick her up today  He is wheelchair dependent and does not have much activity out of the home  Clovis Hansen was in town during prior [facility] admission and stayed with her in the hosptial, and assisted her d/c home  I discussed the AMA d/c plan with the patient and Mr. Oren Ratliff. My best advise was for her to remain in the hospital for Oxygen and IV Vanc / Zosyn  This tx could not be duplicated in the home setting with [facility], like the Daptomycin was  Pt wanted to leave for home and f/u with her PCP KRIS Mills in [address] [place] [address]  I advised Mr. Oren Ratliff to monitor her temp and breathing and to get her to an ED for evaluation and admission to whatever facility if she had obvious problems. Her medical records would be here for reference. I was not able to convince the patient or  for her to remain in the hospital and I could make arrangements for inpatient care of their preference.    I do not feel she is stable to be managed at home at this time

## 2021-08-30 ENCOUNTER — TELEPHONE (OUTPATIENT)
Dept: FAMILY MEDICINE CLINIC | Age: 81
End: 2021-08-30

## 2021-08-30 ENCOUNTER — DOCUMENTATION ONLY (OUTPATIENT)
Dept: FAMILY MEDICINE CLINIC | Age: 81
End: 2021-08-30

## 2021-08-30 ENCOUNTER — HOSPITAL ENCOUNTER (EMERGENCY)
Age: 81
Discharge: HOME | End: 2021-08-30
Attending: FAMILY MEDICINE
Payer: MEDICARE

## 2021-08-30 ENCOUNTER — APPOINTMENT (OUTPATIENT)
Dept: GENERAL RADIOLOGY | Age: 81
End: 2021-08-30
Attending: FAMILY MEDICINE
Payer: MEDICARE

## 2021-08-30 VITALS
HEIGHT: 66 IN | RESPIRATION RATE: 27 BRPM | OXYGEN SATURATION: 99 % | BODY MASS INDEX: 24.91 KG/M2 | WEIGHT: 155 LBS | HEART RATE: 94 BPM | SYSTOLIC BLOOD PRESSURE: 178 MMHG | DIASTOLIC BLOOD PRESSURE: 70 MMHG | TEMPERATURE: 98.2 F

## 2021-08-30 DIAGNOSIS — I21.4 NON-ST ELEVATION MYOCARDIAL INFARCTION (NSTEMI) (HCC): Primary | ICD-10-CM

## 2021-08-30 DIAGNOSIS — J90 PLEURAL EFFUSION: ICD-10-CM

## 2021-08-30 LAB
ALBUMIN SERPL-MCNC: 2.3 G/DL (ref 3.5–5)
ALBUMIN/GLOB SERPL: 0.4 {RATIO} (ref 1.1–2.2)
ALP SERPL-CCNC: 77 U/L (ref 45–117)
ALT SERPL-CCNC: 22 U/L (ref 12–78)
ANION GAP SERPL CALC-SCNC: 9 MMOL/L (ref 5–15)
AST SERPL-CCNC: 45 U/L (ref 15–37)
BASOPHILS # BLD: 0.2 K/UL (ref 0–0.1)
BASOPHILS NFR BLD: 1 % (ref 0–1)
BILIRUB SERPL-MCNC: 0.3 MG/DL (ref 0.2–1)
BUN SERPL-MCNC: 35 MG/DL (ref 6–20)
BUN/CREAT SERPL: 23 (ref 12–20)
CALCIUM SERPL-MCNC: 9.3 MG/DL (ref 8.5–10.1)
CHLORIDE SERPL-SCNC: 103 MMOL/L (ref 97–108)
CO2 SERPL-SCNC: 22 MMOL/L (ref 21–32)
CREAT SERPL-MCNC: 1.49 MG/DL (ref 0.55–1.02)
DIFFERENTIAL METHOD BLD: ABNORMAL
EOSINOPHIL # BLD: 4.2 K/UL (ref 0–0.4)
EOSINOPHIL NFR BLD: 17 % (ref 0–7)
ERYTHROCYTE [DISTWIDTH] IN BLOOD BY AUTOMATED COUNT: 15.9 % (ref 11.5–14.5)
GLOBULIN SER CALC-MCNC: 5.3 G/DL (ref 2–4)
GLUCOSE SERPL-MCNC: 121 MG/DL (ref 65–100)
HCT VFR BLD AUTO: 26.7 % (ref 35–47)
HGB BLD-MCNC: 8.6 G/DL (ref 11.5–16)
IMM GRANULOCYTES # BLD AUTO: 0 K/UL (ref 0–0.04)
IMM GRANULOCYTES NFR BLD AUTO: 0 % (ref 0–0.5)
LACTATE SERPL-SCNC: 1 MMOL/L (ref 0.4–2)
LYMPHOCYTES # BLD: 4 K/UL (ref 0.8–3.5)
LYMPHOCYTES NFR BLD: 16 % (ref 12–49)
MCH RBC QN AUTO: 28.4 PG (ref 26–34)
MCHC RBC AUTO-ENTMCNC: 32.2 G/DL (ref 30–36.5)
MCV RBC AUTO: 88.1 FL (ref 80–99)
MONOCYTES # BLD: 1.5 K/UL (ref 0–1)
MONOCYTES NFR BLD: 6 % (ref 5–13)
NEUTS BAND NFR BLD MANUAL: 1 %
NEUTS SEG # BLD: 14.8 K/UL (ref 1.8–8)
NEUTS SEG NFR BLD: 59 % (ref 32–75)
NRBC # BLD: 0 K/UL (ref 0–0.01)
NRBC BLD-RTO: 0 PER 100 WBC
PLATELET # BLD AUTO: 594 K/UL (ref 150–400)
PLATELET COMMENTS,PCOM: ABNORMAL
PMV BLD AUTO: 9.8 FL (ref 8.9–12.9)
POTASSIUM SERPL-SCNC: 4.8 MMOL/L (ref 3.5–5.1)
PROT SERPL-MCNC: 7.6 G/DL (ref 6.4–8.2)
RBC # BLD AUTO: 3.03 M/UL (ref 3.8–5.2)
RBC MORPH BLD: ABNORMAL
SODIUM SERPL-SCNC: 134 MMOL/L (ref 136–145)
TROPONIN I SERPL-MCNC: 0.19 NG/ML
WBC # BLD AUTO: 24.7 K/UL (ref 3.6–11)

## 2021-08-30 PROCEDURE — 99285 EMERGENCY DEPT VISIT HI MDM: CPT

## 2021-08-30 PROCEDURE — 80053 COMPREHEN METABOLIC PANEL: CPT

## 2021-08-30 PROCEDURE — 36415 COLL VENOUS BLD VENIPUNCTURE: CPT

## 2021-08-30 PROCEDURE — 83605 ASSAY OF LACTIC ACID: CPT

## 2021-08-30 PROCEDURE — 87040 BLOOD CULTURE FOR BACTERIA: CPT

## 2021-08-30 PROCEDURE — 93005 ELECTROCARDIOGRAM TRACING: CPT

## 2021-08-30 PROCEDURE — 74011250637 HC RX REV CODE- 250/637: Performed by: FAMILY MEDICINE

## 2021-08-30 PROCEDURE — 71045 X-RAY EXAM CHEST 1 VIEW: CPT

## 2021-08-30 PROCEDURE — 85025 COMPLETE CBC W/AUTO DIFF WBC: CPT

## 2021-08-30 PROCEDURE — 84484 ASSAY OF TROPONIN QUANT: CPT

## 2021-08-30 RX ORDER — GUAIFENESIN 100 MG/5ML
162 LIQUID (ML) ORAL
Status: COMPLETED | OUTPATIENT
Start: 2021-08-30 | End: 2021-08-30

## 2021-08-30 RX ADMIN — ASPIRIN 162 MG: 81 TABLET, CHEWABLE ORAL at 21:28

## 2021-08-30 NOTE — PROGRESS NOTES
Received call from patient's daughter expressing concern that she had signed herself out AMA on 8/28 and seemed to have no plan in place for continuation of IV abx for previous staph sepsis and pneumonia for which she was admitted. She wanted to make sure Leroy Reno as PCP was aware of the situation. Dr. Roshan Sanchez note from 8/28 reviewed, though confusion is mentioned, it does not appear it was appropriate to prevent the [place] discharge. Will forward concern to PCP and [facility] nursing staff for phone follow up today.   smg

## 2021-08-30 NOTE — PROGRESS NOTES
TELEPHONE CONVERSATION  8/30/21    Contacted the patient and spoke with her and her . They are both under the assumption that Mohan Hicks was coming to the home today to resume care/IV administration for Daptomycin for Blood stream infection. Mr. Vernell Strange stated that the nurse from [facility] but did not know the number or who called. Geovani Brumfield MSW contacted [facility] and was told that they need a resumption of care orders. Ms. Vernell Strange stated that she contacted the PCP office and attempted to make an appointment but they did not have an opening but she was going anyway. Will confer with Dr. Precious Ohara about what to do and where to go from here. Informed the patient that [facility] will not be visiting her until a resumption order to continue services is received. Informed the patient that her daughter Emiliano Little has left me a message and to see if it was ok for me to talk with her and Mrs. Vernell Strange stated that it was ok. Spoke to the patient's daughter, [phone] to let her know that we have called [facility] and what we are goingto do to f/u. Contacted PCP office to get a f/u appointment for her to be seen and maybe order IV antibiotics for home. Appointment made with the PCP office for August 31, 2021 at 1030.  Dr. Precious Ohara stated that she told both the patient and her  that she would need IV antibiotics broad spectrum coverage for HCAP.and would be best to remain in the hospital. They still declined to remain in the hospital. Will and have referred the patient to the PCP, Keke Sanchez NP.

## 2021-08-30 NOTE — TELEPHONE ENCOUNTER
----- Message from Pb Carter sent at 8/30/2021 10:43 AM EDT -----  Regarding: Dr. Alon Greenwood Message/Vendor Calls    Caller's first and last name: Dorothy Lau 414      Reason for call: Home care orders for pt      Callback required yes/no and why: Yes to assist.       Best contact number(s):[phone]      Details to clarify the request: Pt was referred to [facility] for home health orders and Yann Osborn would like to see what was it that the pt needed for services from [facility].        Pb Carter Attending Attestation (For Attendings USE Only)...

## 2021-08-30 NOTE — PROGRESS NOTES
Please contact patient /family to get  clear picture on what is going on clinically and if home health is needed for IV antibiotics etc.  I will be happy to talk with the patient and or her daughter sometime today.

## 2021-08-30 NOTE — ED TRIAGE NOTES
Pt arrived by EMS with complaint of SOB for 5 days. Per EMS pt has been seen at several hospitals for the same, pt reports she has pneumonia and is having trouble sleeping. Per EMS on arrival pt was 93% on room air with diminished lung sounds and a duo neb was given with improvement in SpO2 and clear lung fields. Pt awake alert and oriented with STANTON noted.  Pt educated on ER flow

## 2021-08-31 ENCOUNTER — TELEPHONE (OUTPATIENT)
Dept: FAMILY MEDICINE CLINIC | Age: 81
End: 2021-08-31

## 2021-08-31 ENCOUNTER — HOSPITAL ENCOUNTER (EMERGENCY)
Age: 81
Discharge: ANOTHER HOSPITAL | End: 2021-08-31
Attending: EMERGENCY MEDICINE
Payer: MEDICARE

## 2021-08-31 VITALS
HEIGHT: 66 IN | DIASTOLIC BLOOD PRESSURE: 48 MMHG | RESPIRATION RATE: 22 BRPM | OXYGEN SATURATION: 95 % | BODY MASS INDEX: 24.75 KG/M2 | HEART RATE: 60 BPM | WEIGHT: 154 LBS | TEMPERATURE: 98.2 F | SYSTOLIC BLOOD PRESSURE: 142 MMHG

## 2021-08-31 DIAGNOSIS — I21.4 NSTEMI (NON-ST ELEVATED MYOCARDIAL INFARCTION) (HCC): Primary | ICD-10-CM

## 2021-08-31 DIAGNOSIS — J90 PLEURAL EFFUSION: ICD-10-CM

## 2021-08-31 DIAGNOSIS — D72.829 LEUKOCYTOSIS, UNSPECIFIED TYPE: ICD-10-CM

## 2021-08-31 LAB
ANION GAP SERPL CALC-SCNC: 13 MMOL/L (ref 5–15)
BNP SERPL-MCNC: ABNORMAL PG/ML (ref 0–450)
BUN SERPL-MCNC: 31 MG/DL (ref 6–20)
BUN/CREAT SERPL: 19 (ref 12–20)
CALCIUM SERPL-MCNC: 9 MG/DL (ref 8.5–10.1)
CHLORIDE SERPL-SCNC: 103 MMOL/L (ref 97–108)
CO2 SERPL-SCNC: 21 MMOL/L (ref 21–32)
COMMENT, HOLDF: NORMAL
CREAT SERPL-MCNC: 1.59 MG/DL (ref 0.55–1.02)
ERYTHROCYTE [DISTWIDTH] IN BLOOD BY AUTOMATED COUNT: 15.9 % (ref 11.5–14.5)
FLUAV RNA SPEC QL NAA+PROBE: NOT DETECTED
FLUBV RNA SPEC QL NAA+PROBE: NOT DETECTED
GLUCOSE SERPL-MCNC: 99 MG/DL (ref 65–100)
HCT VFR BLD AUTO: 25.5 % (ref 35–47)
HGB BLD-MCNC: 8.3 G/DL (ref 11.5–16)
MAGNESIUM SERPL-MCNC: 1.6 MG/DL (ref 1.6–2.4)
MCH RBC QN AUTO: 28.4 PG (ref 26–34)
MCHC RBC AUTO-ENTMCNC: 32.5 G/DL (ref 30–36.5)
MCV RBC AUTO: 87.3 FL (ref 80–99)
NRBC # BLD: 0 K/UL (ref 0–0.01)
NRBC BLD-RTO: 0 PER 100 WBC
PHOSPHATE SERPL-MCNC: 4 MG/DL (ref 2.6–4.7)
PLATELET # BLD AUTO: 539 K/UL (ref 150–400)
PMV BLD AUTO: 9.6 FL (ref 8.9–12.9)
POTASSIUM SERPL-SCNC: 4.3 MMOL/L (ref 3.5–5.1)
RBC # BLD AUTO: 2.92 M/UL (ref 3.8–5.2)
SAMPLES BEING HELD,HOLD: NORMAL
SARS-COV-2, COV2: NOT DETECTED
SODIUM SERPL-SCNC: 137 MMOL/L (ref 136–145)
TROPONIN I SERPL-MCNC: 0.16 NG/ML
TROPONIN I SERPL-MCNC: 0.16 NG/ML
WBC # BLD AUTO: 25 K/UL (ref 3.6–11)

## 2021-08-31 PROCEDURE — 80048 BASIC METABOLIC PNL TOTAL CA: CPT

## 2021-08-31 PROCEDURE — 96372 THER/PROPH/DIAG INJ SC/IM: CPT

## 2021-08-31 PROCEDURE — 36415 COLL VENOUS BLD VENIPUNCTURE: CPT

## 2021-08-31 PROCEDURE — 96376 TX/PRO/DX INJ SAME DRUG ADON: CPT

## 2021-08-31 PROCEDURE — 94762 N-INVAS EAR/PLS OXIMTRY CONT: CPT

## 2021-08-31 PROCEDURE — 83735 ASSAY OF MAGNESIUM: CPT

## 2021-08-31 PROCEDURE — 85027 COMPLETE CBC AUTOMATED: CPT

## 2021-08-31 PROCEDURE — 87636 SARSCOV2 & INF A&B AMP PRB: CPT

## 2021-08-31 PROCEDURE — 96375 TX/PRO/DX INJ NEW DRUG ADDON: CPT

## 2021-08-31 PROCEDURE — 83880 ASSAY OF NATRIURETIC PEPTIDE: CPT

## 2021-08-31 PROCEDURE — 96365 THER/PROPH/DIAG IV INF INIT: CPT

## 2021-08-31 PROCEDURE — 84100 ASSAY OF PHOSPHORUS: CPT

## 2021-08-31 PROCEDURE — 74011250636 HC RX REV CODE- 250/636: Performed by: EMERGENCY MEDICINE

## 2021-08-31 PROCEDURE — 74011000258 HC RX REV CODE- 258: Performed by: EMERGENCY MEDICINE

## 2021-08-31 PROCEDURE — 96367 TX/PROPH/DG ADDL SEQ IV INF: CPT

## 2021-08-31 PROCEDURE — 93005 ELECTROCARDIOGRAM TRACING: CPT

## 2021-08-31 PROCEDURE — 74011250637 HC RX REV CODE- 250/637: Performed by: EMERGENCY MEDICINE

## 2021-08-31 PROCEDURE — 84484 ASSAY OF TROPONIN QUANT: CPT

## 2021-08-31 PROCEDURE — 99285 EMERGENCY DEPT VISIT HI MDM: CPT

## 2021-08-31 RX ORDER — MAGNESIUM SULFATE HEPTAHYDRATE 40 MG/ML
2 INJECTION, SOLUTION INTRAVENOUS
Status: COMPLETED | OUTPATIENT
Start: 2021-08-31 | End: 2021-08-31

## 2021-08-31 RX ORDER — FUROSEMIDE 10 MG/ML
40 INJECTION INTRAMUSCULAR; INTRAVENOUS
Status: COMPLETED | OUTPATIENT
Start: 2021-08-31 | End: 2021-08-31

## 2021-08-31 RX ORDER — IBUPROFEN 200 MG
1 TABLET ORAL DAILY
Status: DISCONTINUED | OUTPATIENT
Start: 2021-08-31 | End: 2021-09-01 | Stop reason: HOSPADM

## 2021-08-31 RX ORDER — SODIUM CHLORIDE 0.9 % (FLUSH) 0.9 %
5-10 SYRINGE (ML) INJECTION ONCE
Status: COMPLETED | OUTPATIENT
Start: 2021-08-31 | End: 2021-08-31

## 2021-08-31 RX ORDER — GUAIFENESIN 100 MG/5ML
162 LIQUID (ML) ORAL
Status: COMPLETED | OUTPATIENT
Start: 2021-08-31 | End: 2021-08-31

## 2021-08-31 RX ORDER — ENOXAPARIN SODIUM 100 MG/ML
1 INJECTION SUBCUTANEOUS
Status: COMPLETED | OUTPATIENT
Start: 2021-08-31 | End: 2021-08-31

## 2021-08-31 RX ADMIN — Medication 10 ML: at 11:10

## 2021-08-31 RX ADMIN — ASPIRIN 162 MG: 81 TABLET, CHEWABLE ORAL at 10:22

## 2021-08-31 RX ADMIN — MAGNESIUM SULFATE HEPTAHYDRATE 2 G: 40 INJECTION, SOLUTION INTRAVENOUS at 11:15

## 2021-08-31 RX ADMIN — FUROSEMIDE 40 MG: 10 INJECTION, SOLUTION INTRAMUSCULAR; INTRAVENOUS at 20:34

## 2021-08-31 RX ADMIN — ENOXAPARIN SODIUM 70 MG: 80 INJECTION SUBCUTANEOUS at 14:59

## 2021-08-31 RX ADMIN — FUROSEMIDE 40 MG: 10 INJECTION, SOLUTION INTRAMUSCULAR; INTRAVENOUS at 11:08

## 2021-08-31 RX ADMIN — DAPTOMYCIN 500 MG: 500 INJECTION, POWDER, LYOPHILIZED, FOR SOLUTION INTRAVENOUS at 12:01

## 2021-08-31 NOTE — ED PROVIDER NOTES
EMERGENCY DEPARTMENT HISTORY AND PHYSICAL EXAM          Date: 8/31/2021  Patient Name: Rodrigo White    History of Presenting Illness     Chief Complaint   Patient presents with    Shortness of Breath       History Provided By: Patient    HPI: Rodrigo White is a 79 y.o. female, pmhx hypertension, high cholesterol, A. fib and MI with AICD, chronic pain, IBS, diabetes, who presents ambulatory to the ED c/o shortness of breath    Patient explains over the past 10 days she is becoming progressively more short of breath. She notes last few nights she is unable to sleep and awakes at 4 AM sitting on multiple pillows she just cannot catch her breath. She denies any chest pain during these events as well as any cough, fevers, chills, abdominal pain or vomiting. She was seen here yesterday for similar symptoms but states she could not stay and had a [address] despite abnormal lab testing. She states she is willing to stay today and be transferred to [facility]. PCP: Camila Cooks, NP    Allergies: Cefazolin, Ultram and morphine  Social Hx: +tobacco, -vaping, -EtOH, -Illicit Drugs; There are no other complaints, changes, or physical findings at this time. Current Facility-Administered Medications   Medication Dose Route Frequency Provider Last Rate Last Admin    magnesium sulfate 2 g/50 ml IVPB (premix or compounded)  2 g IntraVENous NOW Jalyn Acosta MD 50 mL/hr at 08/31/21 1115 2 g at 08/31/21 1115    DAPTOmycin (CUBICIN) 550 mg in sterile water (preservative free) 11 mL IV syringe RF formulation  8 mg/kg IntraVENous ONCE Jalyn Acosta MD         Current Outpatient Medications   Medication Sig Dispense Refill    insulin glargine (Lantus Solostar U-100 Insulin) 100 unit/mL (3 mL) inpn 50 Units by SubCUTAneous route daily. 1 Adjustable Dose Pre-filled Pen Syringe 0    DAPTOmycin (CUBICIN RF) IVPB 450 mg by IntraVENous route every fourty-eight (48) hours.  1 Dose 0  ferrous sulfate 325 mg (65 mg iron) tablet Take 1 Tablet by mouth daily (with breakfast). 30 Tablet 0    nicotine (NICODERM CQ) 21 mg/24 hr 1 Patch by TransDERmal route daily for 30 days. (Patient not taking: Reported on 8/23/2021) 30 Patch 0    venlafaxine (EFFEXOR) 75 mg tablet Take 25 mg by mouth two (2) times a day.  ergocalciferol (ERGOCALCIFEROL) 1,250 mcg (50,000 unit) capsule Take 50,000 Units by mouth every seven (7) days. On Wednesdays      b complex vitamins tablet Take 1 Tablet by mouth daily.  furosemide (LASIX) 40 mg tablet TAKE 1 TABLET BY MOUTH  DAILY 90 Tablet 1    nitroglycerin (NITROSTAT) 0.4 mg SL tablet 1 Tab by SubLINGual route every five (5) minutes as needed for Chest Pain. Up to 3 doses. (Patient not taking: Reported on 7/26/2021) 25 Tab 0    carvediloL (COREG) 25 mg tablet Take 1 Tab by mouth two (2) times daily (with meals). 28 Tab 0    colestipoL (Colestid) 1 gram tablet Take 1 Tab by mouth two (2) times a day. 180 Tab 3    aspirin delayed-release 81 mg tablet Take 1 Tab by mouth daily. 90 Tab 1    B.infantis-B.ani-B.long-B.bifi (PROBIOTIC 4X) 10-15 mg TbEC Take  by mouth daily.  acetaminophen (TYLENOL) 650 mg TbER Take 650 mg by mouth every eight (8) hours.  omega-3 fatty acids-vitamin e 1,000 mg cap Take 1 Cap by mouth two (2) times a day. 32a day       ascorbic acid, vitamin C, (VITAMIN C) 500 mg tablet Take 1,000 mg by mouth daily.  zinc 50 mg tab tablet Take 50 mg by mouth daily.          Past History     Past Medical History:  Past Medical History:   Diagnosis Date    A-fib [facility])     AICD (automatic cardioverter/defibrillator) present 9/23/2020 9/23/2020 [employer] AICD implant    Arthritis     Bilateral pleural effusion 9/18/2020    Chronic pain     Chronic pain     Diabetes ([address]     Diverticular disease     Elevated troponin 9/18/2020    Hemorrhoid     Hypercholesterolemia     IBS (irritable bowel syndrome)     Lymphocytosis 05/03/3300    Systolic heart failure, chronic ([facility] 7/16/2021    Type 2 MI (myocardial infarction) ([facility] [facility] 7/16/2021 7/162021 r/t sepsis       Past Surgical History:  Past Surgical History:   Procedure Laterality Date    COLONOSCOPY N/A 10/31/2019    COLON[facility] performed [facility]  10/31/2019         [facility]  10/31/2019         HX CATARACT REMOVAL      HX CHOLECYSTECTOMY      HX COLONOSCOPY  2009    HX COLONOSCOPY  2016    2 adenomatous polyp    HX HEMORRHOIDECTOMY  2009    HX HYSTERECTOMY      HX KNEE REPLACEMENT      right    HX ORTHOPAEDIC  12/11/2017    back, laminectomy and decompression    GA INSJ/RPLCMT PERM DFB W/TRNSVNS LDS 1/DUAL CHMBR N/A 9/23/2020    INSERT ICD DUAL performed by Rosa Larios MD at [facility] CARDIAC CATH LAB       Family History:  Family History   Problem Relation Age of Onset    Colon Cancer Father     Diabetes Mother        Social History:  Social History     Tobacco Use    Smoking status: Current Every Day Smoker     Packs/day: 1.00     Years: 55.00     Pack years: 55.00     Types: Cigarettes    Smokeless tobacco: Never Used   Vaping Use    Vaping Use: Never used   Substance Use Topics    Alcohol use: No    Drug use: Never       Allergies: Allergies   Allergen Reactions    Cefazolin Rash     Possible kidney failure/AIN    Morphine Anaphylaxis    Ultram [Tramadol] Palpitations         Review of Systems   Review of Systems   Constitutional: Negative for activity change, appetite change, chills, fever and unexpected weight change. HENT: Negative for congestion. Eyes: Negative for pain and visual disturbance. Respiratory: Positive for shortness of breath. Negative for cough and chest tightness. Cardiovascular: Negative for chest pain, palpitations and leg swelling. Gastrointestinal: Negative for abdominal pain, diarrhea, nausea and vomiting.    Genitourinary: Negative for dysuria. Musculoskeletal: Negative for back pain. Skin: Negative for rash. Neurological: Negative for headaches. Physical Exam   Physical Exam  Vitals and nursing note reviewed. Constitutional:       Appearance: She is well-developed. She is not diaphoretic. Comments: Elderly female who appears comfortable on 2 L nasal cannula in mild acute distress   HENT:      Head: Normocephalic and atraumatic. Eyes:      General:         Right eye: No discharge. Left eye: No discharge. Conjunctiva/sclera: Conjunctivae normal.      Pupils: Pupils are equal, round, and reactive to light. Cardiovascular:      Rate and Rhythm: Normal rate and regular rhythm. Heart sounds: Normal heart sounds. No murmur heard. Pulmonary:      Effort: Pulmonary effort is normal. No tachypnea, accessory muscle usage or respiratory distress. Breath sounds: Normal breath sounds. No decreased breath sounds, wheezing, rhonchi or rales. Abdominal:      General: Bowel sounds are normal. There is no distension. Palpations: Abdomen is soft. Tenderness: There is no abdominal tenderness. There is no guarding. Musculoskeletal:         General: Normal range of motion. Cervical back: Normal range of motion and neck supple. Skin:     General: Skin is warm and dry. Findings: No rash. Neurological:      Mental Status: She is alert and oriented to person, place, and time. Cranial Nerves: No cranial nerve deficit. Motor: No abnormal muscle tone. Diagnostic Study Results     Labs -     Recent Results (from the past 12 hour(s))   SAMPLES BEING HELD    Collection Time: 08/31/21  8:58 AM   Result Value Ref Range    SAMPLES BEING HELD 1SST, 1RED, 1BLUE, 1LAV     COMMENT        Add-on orders for these samples will be processed based on acceptable specimen integrity and analyte stability, which may vary by analyte.    MAGNESIUM    Collection Time: 08/31/21  8:58 AM   Result Value Ref Range    Magnesium 1.6 1.6 - 2.4 mg/dL   PHOSPHORUS    Collection Time: 08/31/21  8:58 AM   Result Value Ref Range    Phosphorus 4.0 2.6 - 4.7 MG/DL   METABOLIC PANEL, BASIC    Collection Time: 08/31/21  8:58 AM   Result Value Ref Range    Sodium 137 136 - 145 mmol/L    Potassium 4.3 3.5 - 5.1 mmol/L    Chloride 103 97 - 108 mmol/L    CO2 21 21 - 32 mmol/L    Anion gap 13 5 - 15 mmol/L    Glucose 99 65 - 100 mg/dL    BUN 31 (H) 6 - 20 MG/DL    Creatinine 1.59 (H) 0.55 - 1.02 MG/DL    BUN/Creatinine ratio 19 12 - 20      GFR est AA 38 (L) >60 ml/min/1.73m2    GFR est non-AA 31 (L) >60 ml/min/1.73m2    Calcium 9.0 8.5 - 10.1 MG/DL   CBC W/O DIFF    Collection Time: 08/31/21  8:58 AM   Result Value Ref Range    WBC 25.0 (H) 3.6 - 11.0 K/uL    RBC 2.92 (L) 3.80 - 5.20 M/uL    HGB 8.3 (L) 11.5 - 16.0 g/dL    HCT 25.5 (L) 35.0 - 47.0 %    MCV 87.3 80.0 - 99.0 FL    MCH 28.4 26.0 - 34.0 PG    MCHC 32.5 30.0 - 36.5 g/dL    RDW 15.9 (H) 11.5 - 14.5 %    PLATELET 899 (H) 074 - 400 K/uL    MPV 9.6 8.9 - 12.9 FL    NRBC 0.0 0  WBC    ABSOLUTE NRBC 0.00 0.00 - 0.01 K/uL   EKG, 12 LEAD, INITIAL    Collection Time: 08/31/21  9:54 AM   Result Value Ref Range    Ventricular Rate 62 BPM    Atrial Rate 62 BPM    P-R Interval 152 ms    QRS Duration 108 ms    Q-T Interval 446 ms    QTC Calculation (Bezet) 452 ms    Calculated R Axis 59 degrees    Calculated T Axis -172 degrees    Diagnosis       Normal sinus rhythm  Left ventricular hypertrophy with repolarization abnormality  Abnormal ECG  When compared with ECG of 30-AUG-2021 19:44,  MANUAL COMPARISON REQUIRED, DATA IS UNCONFIRMED     TROPONIN I    Collection Time: 08/31/21  9:58 AM   Result Value Ref Range    Troponin-I, Qt. 0.16 (H) <0.05 ng/mL   NT-PRO BNP    Collection Time: 08/31/21  9:58 AM   Result Value Ref Range    NT pro-BNP >35,000 (H) 0 - 450 PG/ML       Radiologic Studies -   No orders to display     CT Results  (Last 48 hours)    None        CXR Results (Last 48 hours)               08/30/21 2018  XR CHEST PORT Final result    Impression:      1. Unchanged mild bilateral pleural effusions, greater on the right. 2. Unchanged right sided airspace disease. 3. Unchanged mild cardiomegaly and edema. Narrative:  EXAM: XR CHEST PORT       HISTORY: Treated for PNA, left AMA 2 days ago. Now SOB. COMPARISON: 8/20/2021       FINDINGS: Single view(s) of the chest. A right-sided PICC line terminates in the   distal SVC. Cardiac monitoring leads overlie the chest. The heart is mildly   enlarged, but unchanged. There are unchanged mild bilateral pleural effusions,   greater on the right. Areas of irregular opacification are seen in the right mid   and lower lung zones that are unchanged. There is an unchanged background of   interstitial prominence likely related to mild edema. Medical Decision Making   I am the first provider for this patient. I reviewed the vital signs, available nursing notes, past medical history, past surgical history, family history and social history. Vital Signs-Reviewed the patient's vital signs. Patient Vitals for the past 12 hrs:   Temp Pulse Resp BP SpO2   08/31/21 1115  61  (!) 128/56    08/31/21 1112     97 %   08/31/21 1108  64  114/64    08/31/21 1105    107/66    08/31/21 1103   18     08/31/21 1102  (!) 57 20 (!) 124/54 94 %   08/31/21 0950 98.8 °F (37.1 °C) 64 24 136/76 93 %       Pulse Oximetry Analysis - 97% on RA    Cardiac Monitor:   Rate: 60bpm  Rhythm: Normal Sinus Rhythm      Records Reviewed: Nursing Notes, Old Medical Records, Previous electrocardiograms, Previous Radiology Studies and Previous Laboratory Studies    Provider Notes (Medical Decision Making):   MDM: Elderly female who was seen here yesterday with elevated troponin and BNP and white count and recommended transfer which she refused.   She is back today because her primary care doctor would not see her in the office and recommended she come emergently to the ER for admission. On reevaluation her EKG shows T wave inversions were these to be upright, her troponin remains elevated and her BNP is significantly elevated. I reviewed her x-ray yesterday which shows effusion and her white count is elevated but she does not show any evidence of pneumonia or parapneumonic effusion. Looking through past her white count seems to always be elevated especially over the past week but is continuing to progressively increase from 17k one week ago to 25,000 today. On extensive lab review her white count has been positive since at least June. She has had multiple chest x-rays but last CT scan of the chest was 2017. Her creatinine is elevated making her a poor candidate for CT angiogram as her GFR is 38% today. Will place call to the transfer center for higher level of care with the option of V/Q scanning to rule out PE causing the elevated BNP and troponin. ED Course:   Initial assessment performed. The patients presenting problems have been discussed, and they are in agreement with the care plan formulated and outlined with them. I have encouraged them to ask questions as they arise throughout their visit. EKG interpretation: (Preliminary)  Rhythm: Sinus rhythm at a rate of 62 bpm; normal OR; normal QRS; normal QTC and normal axis. T wave inversions noted in 1 and aVL as well as V3 through V6. These are all new compared to yesterday's EKG time 7:45 PM.  This EKG was interpreted by ED Provider Kathy Luther MD    PROGRESS NOTE:    ED Course as of Aug 31 1201   Tue Aug 31, 2021   1044 Troponin remains elevated, BNP is elevated. X-ray yesterday shows significant pleural effusion with a leukocytosis. Call placed to the transfer center. Anticoagulant to be provided with IV diuretics. [JT]   1151 Received call back from the transfer center.   There are no facilities with available beds in the [facility] system so we will place a call to [facility] for acceptance. [JT]      ED Course User Index  [JT] Porfirio Cardona MD        CONSULT NOTE:   12:00 PM  Burt Knight MD spoke with Dr Hali Padilla,   Specialty: Hospitalist [id number]  Discussed pt's hx, disposition, and available diagnostic and imaging results. Reviewed care plans. Consultant agrees with plans as outlined. He will accept in transfer. 3:45PM  Call placed to transfer center to inquire about a bed is we have not received an assignment. Bed has been assigned and a call has been made to [facility] for transport. 7 PM  Patient continues to rest comfortably. Nicotine patch and dinner have been provided. [facility] ETA is midnight. She is signed out to Dr. Jasmin Saez who will manage any urgent needs she may develop. Critical Care Time:   CRITICAL CARE NOTE :  7pm  IMPENDING DETERIORATION -Airway, Respiratory, Cardiovascular, CNS, Metabolic, Renal and Hepatic  ASSOCIATED RISK FACTORS - Hypotension, Shock, Hypoxia, Dysrhythmia, Metabolic changes and Dehydration  MANAGEMENT- Bedside Assessment, Supervision of Care and Transfer  INTERPRETATION -  Xrays, ECG and Blood Pressure  INTERVENTIONS - hemodynamic mngmt and Metobolic interventions  CASE REVIEW - Medical Sub-Specialist  TREATMENT RESPONSE -Improved  PERFORMED BY - Self    NOTES   :  I have spent 50 minutes of critical care time involved in lab review, consultations with specialist, family decision- making, bedside attention and documentation. During this entire length of time I was immediately available to the patient. Tad Almanza. MD Karla       Diagnosis     Clinical Impression:   1. NSTEMI (non-ST elevated myocardial infarction) (HCC)    2. Leukocytosis, unspecified type    3. Pleural effusion        PLAN:  Transfer for higher level of care    Please note, this dictation was completed with [employer], the [employer] voice recognition software.  Quite often unanticipated grammatical, syntax, homophones, and other interpretive errors are inadvertently transcribed by the computer software. Please disregard these errors. Please excuse any errors that have escaped final proof reading.

## 2021-08-31 NOTE — TELEPHONE ENCOUNTER
----- Message from Johanna Ontiveros sent at 8/31/2021  2:30 PM EDT -----  Regarding: Mickey Robles  General Message/Vendor Calls    Caller's first and last name: Davy/octavia      Reason for call: advise of hosp      Callback required yes/no and why: yes      Best contact number(s): [phone]      Details to clarify the request: calling to advise Baldemar David went into the [facility] last night around 9pm      Johanna Ontiveros

## 2021-08-31 NOTE — ED TRIAGE NOTES
Seen here last night diagnosed with NSTEMI, refused transport , left AMA. Pt returns today because her PCP called and encouraged her to return to ED for transfer to a higher level of care, she denies CP, n/v/d. C/o SOB,tachypneic at 24 and 93% on RA , placed on 1 lpm/nc.

## 2021-08-31 NOTE — ED NOTES
Pt sleeping , easily arousable. Hourly rounding completed, assessed need for restroom and pain level. Pathway clear from obstructions and personal belongings within reach. Repositioned for comfort.

## 2021-08-31 NOTE — DISCHARGE INSTRUCTIONS
--Increase your Lasix to twice daily for the next 3 days. --Return to the ED if you have worsening shortness of breath or chest pain.

## 2021-08-31 NOTE — ED PROVIDER NOTES
EMERGENCY DEPARTMENT HISTORY AND PHYSICAL EXAM          Date: 8/30/2021  Patient Name: Stacy Hyman    History of Presenting Illness     Chief Complaint   Patient presents with    Shortness of Breath       History Provided By: Patient    HPI: Stacy Hyman is a 79 y.o. female, pmhx insulin-dependent diabetes mellitus, congestive heart failure, non-STEMI, atrial fibrillation, and chronic pain, who was brought to the emergency department by EMS because of dyspnea. She was admitted to [facility] last week for several days and then signed out [address] 2 days ago. She reports that tonight she became progressively short of breath and called EMS. She has no chest pain to speak of. 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 She has been able to eat and drink fairly well. She denies any fevers. She continues to smoke a pack of cigarettes daily. Patient specifically denies any recent fevers, chills, nausea, vomiting, diarrhea, abd pain, CP, SOB, urinary sxs, changes in BM, or headache. PCP: Slick Mars NP    Allergies: Cefazolin, morphine, and tramadol  Social Hx: 1 ppd tobacco, No alcohol. Lives locally with her . There are no other complaints, changes, or physical findings at this time. Current Outpatient Medications   Medication Sig Dispense Refill    insulin glargine (Lantus Solostar U-100 Insulin) 100 unit/mL (3 mL) inpn 50 Units by SubCUTAneous route daily. 1 Adjustable Dose Pre-filled Pen Syringe 0    DAPTOmycin (CUBICIN RF) IVPB 450 mg by IntraVENous route every fourty-eight (48) hours. 1 Dose 0    ferrous sulfate 325 mg (65 mg iron) tablet Take 1 Tablet by mouth daily (with breakfast). 30 Tablet 0    nicotine (NICODERM CQ) 21 mg/24 hr 1 Patch by TransDERmal route daily for 30 days.  (Patient not taking: Reported on 8/23/2021) 30 Patch 0    venlafaxine [facility]) 75 mg tablet Take 25 mg by mouth two (2) times a day.  ergocalciferol (ERGOCALCIFEROL) 1,250 mcg (50,000 unit) capsule Take 50,000 Units by mouth every seven (7) days. On Wednesdays      b complex vitamins tablet Take 1 Tablet by mouth daily.  furosemide (LASIX) 40 mg tablet TAKE 1 TABLET BY MOUTH  DAILY 90 Tablet 1    nitroglycerin (NITROSTAT) 0.4 mg SL tablet 1 Tab by SubLINGual route every five (5) minutes as needed for Chest Pain. Up to 3 doses. (Patient not taking: Reported on 7/26/2021) 25 Tab 0    carvediloL (COREG) 25 mg tablet Take 1 Tab by mouth two (2) times daily (with meals). 28 Tab 0    colestipoL (Colestid) 1 gram tablet Take 1 Tab by mouth two (2) times a day. 180 Tab 3    aspirin delayed-release 81 mg tablet Take 1 Tab by mouth daily. 90 Tab 1    B.infantis-B.ani-B.long-B.bifi (PROBIOTIC 4X) 10-15 mg TbEC Take  by mouth daily.  acetaminophen (TYLENOL) 650 mg TbER Take 650 mg by mouth every eight (8) hours.  omega-3 fatty acids-vitamin e 1,000 mg cap Take 1 Cap by mouth two (2) times a day. 32a day       ascorbic acid, vitamin C, (VITAMIN C) 500 mg tablet Take 1,000 mg by mouth daily.  zinc 50 mg tab tablet Take 50 mg by mouth daily.          Past History     Past Medical History:  Past Medical History:   Diagnosis Date    A-fib [facility])     AICD (automatic cardioverter/defibrillator) present 9/23/2020 9/23/2020 Beamr AICD implant    Arthritis     Bilateral pleural effusion 9/18/2020    Chronic pain     Chronic pain     Diabetes ([address]     Diverticular disease     Elevated troponin 9/18/2020    Hemorrhoid     Hypercholesterolemia     IBS (irritable bowel syndrome)     Lymphocytosis 45/41/0507    Systolic heart failure, chronic ([address] 7/16/2021    Type 2 MI (myocardial infarction) ([address] 7/16/2021 7/162021 r/t sepsis       Past Surgical History:  Past Surgical History:   Procedure Laterality Date    COLONOSCOPY N/A 10/31/2019    COLONOSCOPY [facility]udh [facility]qu[facility] at [facility]  10/31/2019         [facility]  [facility]         HX CATARACT REMOVAL      HX CHOLECYSTECTOMY      HX COLONOSCOPY  2009    HX COLONOSCOPY  2016    2 adenomatous polyp    HX HEMORRHOIDECTOMY  2009    HX HYSTERECTOMY      HX KNEE REPLACEMENT      right    HX ORTHOPAEDIC  12/11/2017    back, laminectomy and decompression    [place] INSJ/RPLCMT PERM DFB W/TRNSVNS LDS 1/DUAL CHMBR N/A 9/23/2020    INSERT ICD DUAL performed by Jimbo Mcclain MD at [facility] CARDIAC CATH LAB       Family History:  Family History   Problem Relation Age of Onset    Colon Cancer Father     Diabetes Mother        Social History:  Social History     Tobacco Use    Smoking status: Current Every Day Smoker     Packs/day: 1.00     Years: 55.00     Pack years: 55.00     Types: Cigarettes    Smokeless tobacco: Never Used   Vaping Use    Vaping Use: Never used   Substance Use Topics    Alcohol use: No    Drug use: Never       Allergies: Allergies   Allergen Reactions    Cefazolin Rash     Possible kidney failure/AIN    Morphine Anaphylaxis    Ultram [Tramadol] Palpitations         Review of Systems   Review of Systems   Constitutional: Negative for appetite change and fever. HENT: Negative for congestion, facial swelling, sore throat and trouble swallowing. Respiratory: Positive for cough and shortness of breath. Cardiovascular: Negative for chest pain. Gastrointestinal: Negative for abdominal pain, constipation, diarrhea and vomiting. Genitourinary: Negative for dysuria and flank pain. Musculoskeletal: Negative for back pain and neck pain. Skin: Negative for rash. Allergic/Immunologic: Negative for environmental allergies. Neurological: Negative for dizziness, weakness, light-headedness and headaches. Physical Exam   Physical Exam  Constitutional:       General: She is in acute distress.       Appearance: She is well-developed and normal weight. HENT:      Mouth/Throat:      Mouth: Mucous membranes are moist.   Eyes:      Pupils: Pupils are equal, round, and reactive to light. Cardiovascular:      Rate and Rhythm: Regular rhythm. Tachycardia present. No extrasystoles are present. Pulses: No decreased pulses. Heart sounds: No murmur heard. Pulmonary:      Effort: Tachypnea and respiratory distress present. Breath sounds: Examination of the right-middle field reveals rales. Examination of the left-middle field reveals rales. Examination of the right-lower field reveals rales. Examination of the left-lower field reveals rales. Rhonchi and rales present. No wheezing. Comments: Respiratory effort increased. Scattered rales throughout. Chest:      Chest wall: No mass. Abdominal:      Palpations: Abdomen is soft. There is no hepatomegaly or mass. Tenderness: There is no abdominal tenderness. There is no guarding. Musculoskeletal:         General: Normal range of motion. Cervical back: Normal range of motion. Right lower leg: Tenderness present. Left lower leg: Tenderness present. Comments: On both ankles, there are multiple 3 mm scabs that are scattered approximately 2 to 3 cm apart, randomly. No erythema and the scabs have an appearance of resolution. Skin:     General: Skin is warm and dry. Neurological:      General: No focal deficit present. Mental Status: She is alert.          Diagnostic Study Results     Labs -  Recent Results (from the past 24 hour(s))   CBC WITH AUTOMATED DIFF    Collection Time: 08/30/21  7:30 PM   Result Value Ref Range    WBC 24.7 (H) 3.6 - 11.0 K/uL    RBC 3.03 (L) 3.80 - 5.20 M/uL    HGB 8.6 (L) 11.5 - 16.0 g/dL    HCT 26.7 (L) 35.0 - 47.0 %    MCV 88.1 80.0 - 99.0 FL    MCH 28.4 26.0 - 34.0 PG    MCHC 32.2 30.0 - 36.5 g/dL    RDW 15.9 (H) 11.5 - 14.5 %    PLATELET 503 (H) 556 - 400 K/uL    MPV 9.8 8.9 - 12.9 FL    NRBC 0.0 0  WBC    ABSOLUTE NRBC 0.00 0.00 - 0.01 K/uL    NEUTROPHILS 59 32 - 75 %    BAND NEUTROPHILS 1 %    LYMPHOCYTES 16 12 - 49 %    MONOCYTES 6 5 - 13 %    EOSINOPHILS 17 (H) 0 - 7 %    BASOPHILS 1 0 - 1 %    IMMATURE GRANULOCYTES 0 0.0 - 0.5 %    ABS. NEUTROPHILS 14.8 (H) 1.8 - 8.0 K/UL    ABS. LYMPHOCYTES 4.0 (H) 0.8 - 3.5 K/UL    ABS. MONOCYTES 1.5 (H) 0.0 - 1.0 K/UL    ABS. EOSINOPHILS 4.2 (H) 0.0 - 0.4 K/UL    ABS. BASOPHILS 0.2 (H) 0.0 - 0.1 K/UL    ABS. IMM. GRANS. 0.0 0.00 - 0.04 K/UL    DF MANUAL      PLATELET COMMENTS ADEQUATE PLATELETS      RBC COMMENTS ANISOCYTOSIS  1+       METABOLIC PANEL, COMPREHENSIVE    Collection Time: 08/30/21  7:30 PM   Result Value Ref Range    Sodium 134 (L) 136 - 145 mmol/L    Potassium 4.8 3.5 - 5.1 mmol/L    Chloride 103 97 - 108 mmol/L    CO2 22 21 - 32 mmol/L    Anion gap 9 5 - 15 mmol/L    Glucose 121 (H) 65 - 100 mg/dL    BUN 35 (H) 6 - 20 MG/DL    Creatinine 1.49 (H) 0.55 - 1.02 MG/DL    BUN/Creatinine ratio 23 (H) 12 - 20      GFR est AA 41 (L) >60 ml/min/1.73m2    GFR est non-AA 34 (L) >60 ml/min/1.73m2    Calcium 9.3 8.5 - 10.1 MG/DL    Bilirubin, total 0.3 0.2 - 1.0 MG/DL    ALT (SGPT) 22 12 - 78 U/L    AST (SGOT) 45 (H) 15 - 37 U/L    Alk.  phosphatase 77 45 - 117 U/L    Protein, total 7.6 6.4 - 8.2 g/dL    Albumin 2.3 (L) 3.5 - 5.0 g/dL    Globulin 5.3 (H) 2.0 - 4.0 g/dL    A-G Ratio 0.4 (L) 1.1 - 2.2     TROPONIN I    Collection Time: 08/30/21  7:30 PM   Result Value Ref Range    Troponin-I, Qt. 0.19 (H) <0.05 ng/mL   CULTURE, BLOOD    Collection Time: 08/30/21  7:30 PM    Specimen: Blood   Result Value Ref Range    Special Requests: NO SPECIAL REQUESTS      Culture result: NO GROWTH AFTER 8 HOURS     LACTIC ACID    Collection Time: 08/30/21  7:30 PM   Result Value Ref Range    Lactic acid 1.0 0.4 - 2.0 MMOL/L   CULTURE, BLOOD    Collection Time: 08/30/21  7:40 PM    Specimen: Blood   Result Value Ref Range    Special Requests: NO SPECIAL REQUESTS      Culture result: NO GROWTH AFTER 8 HOURS     EKG, 12 LEAD, INITIAL    Collection Time: 08/30/21  7:44 PM   Result Value Ref Range    Ventricular Rate 77 BPM    Atrial Rate 77 BPM    P-R Interval 164 ms    QRS Duration 110 ms    Q-T Interval 388 ms    QTC Calculation (Bezet) 439 ms    Calculated P Axis 50 degrees    Calculated R Axis 56 degrees    Calculated T Axis -104 degrees    Diagnosis       Sinus rhythm with premature atrial complexes in a pattern of bigeminy  Nonspecific ST and T wave abnormality  Abnormal ECG  When compared with ECG of 23-AUG-2021 15:26,  premature atrial complexes are now present         Radiologic Studies -   XR CHEST PORT   Final Result      1. Unchanged mild bilateral pleural effusions, greater on the right. 2. Unchanged right sided airspace disease. 3. Unchanged mild cardiomegaly and edema. CT Results  (Last 48 hours)    None        CXR Results  (Last 48 hours)               08/30/21 2018  XR CHEST PORT Final result    Impression:      1. Unchanged mild bilateral pleural effusions, greater on the right. 2. Unchanged right sided airspace disease. 3. Unchanged mild cardiomegaly and edema. Narrative:  EXAM: XR CHEST PORT       HISTORY: Treated for PNA, left AMA 2 days ago. Now SOB. COMPARISON: 8/20/2021       FINDINGS: Single view(s) of the chest. A right-sided PICC line terminates in the   distal SVC. Cardiac monitoring leads overlie the chest. The heart is mildly   enlarged, but unchanged. There are unchanged mild bilateral pleural effusions,   greater on the right. Areas of irregular opacification are seen in the right mid   and lower lung zones that are unchanged. There is an unchanged background of   interstitial prominence likely related to mild edema. Medical Decision Making   I am the first provider for this patient.     I reviewed the vital signs, available nursing notes, past medical history, past surgical history, family history and social history. Vital Signs-Reviewed the patient's vital signs. Patient Vitals for the past 12 hrs:   Pulse Resp BP SpO2   08/30/21 2116 94 27 (!) 178/70    08/30/21 2108 88 29 (!) 168/71    08/30/21 2046 69 22 (!) 178/54 99 %   08/30/21 2031 94 24 (!) 173/69 99 %   08/30/21 2016 98 25 (!) 224/128 98 %       Pulse Oximetry Analysis - 92% on RA but with a respiratory rate of approximately 30 and labored respirations. Cardiac Monitor:   Rate: 95 bpm  Rhythm: Normal Sinus Rhythm      Records Reviewed: Nursing Notes, Old Medical Records, Previous electrocardiograms, Previous Radiology Studies and Previous Laboratory Studies    Provider Notes (Medical Decision Making):   MDM: Patient with significant respiratory distress on admission to the emergency department. Since she signed out [address] 2 days ago, it seems that she needs to be in the hospital. Plan admission or transfer. ED Course:   Initial assessment performed. The patients presenting problems have been discussed, and they are in agreement with the care plan formulated and outlined with them. I have encouraged them to ask questions as they arise throughout their visit. EKG interpretation: (Preliminary)  NSR, HR 77, Nonspecific ST changes. PVC's. Rhythm: This EKG was interpreted by ED Provider Dr Aliyah Barker MD      PROGRESS NOTE:  Patient seemed to improve significantly with 2 L of oxygen. No diuretics were given. Once her laboratory studies had returned, they were discussed with her. It was recommended to her that she be transferred to a hospital with a cardiologist, and she reported that she had an appointment with her cardiologist next week. It was made clear to her that it appeared that she had had a heart attack in the last 2 days, but she insisted that she had not. In addition, it was explained to her that she had an elevated white blood cell count, and the patient reported that she always had an elevated white blood cell count. She was informed that this was even higher than last week, from 19,000-24,000. This did not seem to impress her. She reported that she has an appointment with her nurse practitioner tomorrow and she did not want to miss that appointment. She was informed that if she left the hospital that she would be at risk for sudden cardiac death, worsening of her congestive heart failure, worsening infection, among other possible severe consequences. Neither of these results seem to bother her and she went home with her . Fifi Ren was the provider on call for the family practice, and this exchange was explained to her so that she could relate it to Derick Phalen, the patient's nurse practitioner. Discharge note:      Diagnosis     Clinical Impression:   1. Non-ST elevation myocardial infarction (NSTEMI) ([address]    2. Pleural effusion        PLAN:  1. Follow-up Derick Phalen. Discharge Medication List as of 8/30/2021  9:38 PM        2. Follow-up Information     Follow up With Specialties Details Why Contact Info    Gisela Laughlin NP Nurse Practitioner In 2 days  Tabitha Camacho  [address] [employer]   [phone]          Return to ED if worse     Disposition:  Home AMA      Please note, this dictation was completed with eDealya, the computer voice recognition software. Quite often unanticipated grammatical, syntax, homophones, and other interpretive errors are inadvertently transcribed by the computer software. Please disregard these errors. Please excuse any errors that have escaped final proof reading.

## 2021-08-31 NOTE — TELEPHONE ENCOUNTER
Spoke with Mr Caty Will and advised patient was taken back to [facility] last night and is still currently there. He was advised that he would need to check with discharging physician regarding orders and if he had any questions regarding IV antibiotics to call Dr Shereen Sinha.

## 2021-08-31 NOTE — ED NOTES
This RN witnessed MD again speaking with pt and. Pt seemed very hostile and unwilling to listen. Pt signed AMA forms. Pt then stated that we did give her meds to sleep. This RN again explained to the pt that her trouble sleeping was d/t her pleural effusions and possibly worsening heart failure. Pt again refused to listen to full explanation.

## 2021-08-31 NOTE — ED NOTES
Called [facility] to arrange an ALS transport to [address] [facility] room 2121.   Called and spoke to Aaron Guadalupe and went over diagnosis and equipment (IV and CM.)  ETA of

## 2021-08-31 NOTE — ED NOTES
Pt has been made fully aware by this RN as well as Dr Enedina Canales, that her blood results show an elevated troponin level indicating a heart attack. Pt does not wish to be admitted or transferred stating that she has an appt with her NP tomorrow and that there is not anything wrong with her heart and that is why they removed the pacemaker. She further states that she needs to be home with her  because he is \"in a wheelchair and  Its passed his bedtime\"  MD, as well as this RN, explained to the pt the risks of AMA, including worsening condition or death.

## 2021-08-31 NOTE — TELEPHONE ENCOUNTER
Received a call from patient yesterday evening around 6pm. Stated she was SOB. Stated she was just dx'd with PNA on Saturday (3 days ago) at the ER but had left AMA because she didn't agree with everything they were doing with her. Was requesting Vix vapor rub. I explained to her that Vix vapor rub was not going to help her SOB and that she needed to go back to the ER. Stated she had an appt with her PCP this morning but I still insisted that if she was SOB she needed to go to ER. Then later last night around 10:30pm I missed a call from ER physician Dr Myranda Sebastian. Called her back this morning and she informed me patient's troponin level was elevated but pt left again AMA. I told Dr Raina Chaudhary I would pass this info on to pt's PCP NP Masood Guerrero who has an appt with her today.

## 2021-08-31 NOTE — ED NOTES
Pt sleeping , easily arousable. Hourly rounding completed, assessed need for restroom and pain level. Pathway clear from obstructions and personal belongings within reach. Repositioned for comfort. Per [facility] at the transfer center bed at [address] expected within the hour.

## 2021-09-01 ENCOUNTER — HOSPITAL ENCOUNTER (INPATIENT)
Age: 81
LOS: 6 days | Discharge: HOME HEALTH | DRG: 291 | End: 2021-09-07
Attending: GENERAL ACUTE CARE HOSPITAL | Admitting: HOSPITALIST
Payer: MEDICARE

## 2021-09-01 ENCOUNTER — APPOINTMENT (OUTPATIENT)
Dept: ULTRASOUND IMAGING | Age: 81
DRG: 291 | End: 2021-09-01
Attending: INTERNAL MEDICINE
Payer: MEDICARE

## 2021-09-01 ENCOUNTER — APPOINTMENT (OUTPATIENT)
Dept: CT IMAGING | Age: 81
DRG: 291 | End: 2021-09-01
Attending: HOSPITALIST
Payer: MEDICARE

## 2021-09-01 ENCOUNTER — APPOINTMENT (OUTPATIENT)
Dept: GENERAL RADIOLOGY | Age: 81
DRG: 291 | End: 2021-09-01
Attending: RADIOLOGY
Payer: MEDICARE

## 2021-09-01 DIAGNOSIS — Z51.5 PALLIATIVE CARE ENCOUNTER: ICD-10-CM

## 2021-09-01 DIAGNOSIS — N18.9 CHRONIC KIDNEY DISEASE, UNSPECIFIED CKD STAGE: ICD-10-CM

## 2021-09-01 DIAGNOSIS — I42.0 DILATED CARDIOMYOPATHY (HCC): Chronic | ICD-10-CM

## 2021-09-01 DIAGNOSIS — E11.21 TYPE 2 DIABETES WITH NEPHROPATHY (HCC): ICD-10-CM

## 2021-09-01 DIAGNOSIS — R77.8 ELEVATED TROPONIN: ICD-10-CM

## 2021-09-01 DIAGNOSIS — Z95.810 AICD (AUTOMATIC CARDIOVERTER/DEFIBRILLATOR) PRESENT: ICD-10-CM

## 2021-09-01 DIAGNOSIS — I50.41 ACUTE COMBINED SYSTOLIC AND DIASTOLIC CONGESTIVE HEART FAILURE (HCC): ICD-10-CM

## 2021-09-01 DIAGNOSIS — I42.9 CARDIOMYOPATHY, UNSPECIFIED TYPE (HCC): ICD-10-CM

## 2021-09-01 DIAGNOSIS — R93.89 ABNORMAL CHEST CT: ICD-10-CM

## 2021-09-01 DIAGNOSIS — J90 PLEURAL EFFUSION: ICD-10-CM

## 2021-09-01 DIAGNOSIS — R06.02 SOB (SHORTNESS OF BREATH): ICD-10-CM

## 2021-09-01 DIAGNOSIS — R09.02 HYPOXIA: ICD-10-CM

## 2021-09-01 DIAGNOSIS — I49.5 SSS (SICK SINUS SYNDROME) (HCC): Chronic | ICD-10-CM

## 2021-09-01 PROBLEM — D72.829 LEUKOCYTOSIS: Status: ACTIVE | Noted: 2021-09-01

## 2021-09-01 PROBLEM — I50.9 ACUTE CHF (CONGESTIVE HEART FAILURE) (HCC): Status: ACTIVE | Noted: 2021-09-01

## 2021-09-01 LAB
APPEARANCE FLD: ABNORMAL
COLOR FLD: YELLOW
COMMENT, HOLDF: NORMAL
EOSINOPHIL NFR FLD MANUAL: 35 %
GLUCOSE BLD STRIP.AUTO-MCNC: 118 MG/DL (ref 65–117)
LDH FLD L TO P-CCNC: 72 U/L
LYMPHOCYTES NFR FLD: 29 %
MESOTHL CELL NFR FLD: 8 %
MONOS+MACROS NFR FLD: 27 %
NEUTROPHILS NFR FLD: 1 %
NUC CELL # FLD: 1419 /CU MM
PROT FLD-MCNC: 2.6 G/DL
RBC # FLD: >100 /CU MM
SAMPLES BEING HELD,HOLD: NORMAL
SERVICE CMNT-IMP: ABNORMAL
SPECIMEN SOURCE FLD: ABNORMAL
SPECIMEN SOURCE FLD: NORMAL
SPECIMEN SOURCE FLD: NORMAL

## 2021-09-01 PROCEDURE — 77030028236 US THORACENTESIS NDL PUNC ASP W IMAGE

## 2021-09-01 PROCEDURE — 74011250637 HC RX REV CODE- 250/637: Performed by: HOSPITALIST

## 2021-09-01 PROCEDURE — 74011250636 HC RX REV CODE- 250/636: Performed by: INTERNAL MEDICINE

## 2021-09-01 PROCEDURE — 71045 X-RAY EXAM CHEST 1 VIEW: CPT

## 2021-09-01 PROCEDURE — 77010033678 HC OXYGEN DAILY

## 2021-09-01 PROCEDURE — 0W993ZZ DRAINAGE OF RIGHT PLEURAL CAVITY, PERCUTANEOUS APPROACH: ICD-10-PCS | Performed by: RADIOLOGY

## 2021-09-01 PROCEDURE — 74011250636 HC RX REV CODE- 250/636: Performed by: STUDENT IN AN ORGANIZED HEALTH CARE EDUCATION/TRAINING PROGRAM

## 2021-09-01 PROCEDURE — 74011250636 HC RX REV CODE- 250/636: Performed by: HOSPITALIST

## 2021-09-01 PROCEDURE — 99223 1ST HOSP IP/OBS HIGH 75: CPT | Performed by: INTERNAL MEDICINE

## 2021-09-01 PROCEDURE — 74011000250 HC RX REV CODE- 250: Performed by: INTERNAL MEDICINE

## 2021-09-01 PROCEDURE — 87205 SMEAR GRAM STAIN: CPT

## 2021-09-01 PROCEDURE — 77030028236 US THORACENTESIS CATH W IMAGE

## 2021-09-01 PROCEDURE — 71250 CT THORAX DX C-: CPT

## 2021-09-01 PROCEDURE — 94760 N-INVAS EAR/PLS OXIMETRY 1: CPT

## 2021-09-01 PROCEDURE — 88305 TISSUE EXAM BY PATHOLOGIST: CPT

## 2021-09-01 PROCEDURE — 83615 LACTATE (LD) (LDH) ENZYME: CPT

## 2021-09-01 PROCEDURE — 94640 AIRWAY INHALATION TREATMENT: CPT

## 2021-09-01 PROCEDURE — 65660000000 HC RM CCU STEPDOWN

## 2021-09-01 PROCEDURE — 84157 ASSAY OF PROTEIN OTHER: CPT

## 2021-09-01 PROCEDURE — 74011000250 HC RX REV CODE- 250: Performed by: HOSPITALIST

## 2021-09-01 PROCEDURE — 82962 GLUCOSE BLOOD TEST: CPT

## 2021-09-01 PROCEDURE — 87116 MYCOBACTERIA CULTURE: CPT

## 2021-09-01 PROCEDURE — 88112 CYTOPATH CELL ENHANCE TECH: CPT

## 2021-09-01 PROCEDURE — 74011000258 HC RX REV CODE- 258: Performed by: STUDENT IN AN ORGANIZED HEALTH CARE EDUCATION/TRAINING PROGRAM

## 2021-09-01 PROCEDURE — APPSS180 APP SPLIT SHARED TIME > 60 MINUTES: Performed by: NURSE PRACTITIONER

## 2021-09-01 PROCEDURE — 89050 BODY FLUID CELL COUNT: CPT

## 2021-09-01 PROCEDURE — 87102 FUNGUS ISOLATION CULTURE: CPT

## 2021-09-01 RX ORDER — ACETAMINOPHEN 650 MG/1
650 SUPPOSITORY RECTAL
Status: DISCONTINUED | OUTPATIENT
Start: 2021-09-01 | End: 2021-09-07 | Stop reason: HOSPADM

## 2021-09-01 RX ORDER — IPRATROPIUM BROMIDE AND ALBUTEROL SULFATE 2.5; .5 MG/3ML; MG/3ML
3 SOLUTION RESPIRATORY (INHALATION)
Status: DISCONTINUED | OUTPATIENT
Start: 2021-09-01 | End: 2021-09-07 | Stop reason: HOSPADM

## 2021-09-01 RX ORDER — POLYETHYLENE GLYCOL 3350 17 G/17G
17 POWDER, FOR SOLUTION ORAL DAILY PRN
Status: DISCONTINUED | OUTPATIENT
Start: 2021-09-01 | End: 2021-09-07 | Stop reason: HOSPADM

## 2021-09-01 RX ORDER — LANOLIN ALCOHOL/MO/W.PET/CERES
325 CREAM (GRAM) TOPICAL
Status: DISCONTINUED | OUTPATIENT
Start: 2021-09-01 | End: 2021-09-07 | Stop reason: HOSPADM

## 2021-09-01 RX ORDER — ACETAMINOPHEN 325 MG/1
650 TABLET ORAL
Status: DISCONTINUED | OUTPATIENT
Start: 2021-09-01 | End: 2021-09-07 | Stop reason: HOSPADM

## 2021-09-01 RX ORDER — VENLAFAXINE 25 MG/1
25 TABLET ORAL 2 TIMES DAILY
Status: DISCONTINUED | OUTPATIENT
Start: 2021-09-01 | End: 2021-09-07 | Stop reason: HOSPADM

## 2021-09-01 RX ORDER — MONTELUKAST SODIUM 4 MG/1
1 TABLET, CHEWABLE ORAL 2 TIMES DAILY
Status: DISCONTINUED | OUTPATIENT
Start: 2021-09-01 | End: 2021-09-07 | Stop reason: HOSPADM

## 2021-09-01 RX ORDER — IBUPROFEN 200 MG
1 TABLET ORAL DAILY
Status: DISCONTINUED | OUTPATIENT
Start: 2021-09-01 | End: 2021-09-07 | Stop reason: HOSPADM

## 2021-09-01 RX ORDER — LIDOCAINE HYDROCHLORIDE 10 MG/ML
8 INJECTION, SOLUTION EPIDURAL; INFILTRATION; INTRACAUDAL; PERINEURAL
Status: COMPLETED | OUTPATIENT
Start: 2021-09-01 | End: 2021-09-01

## 2021-09-01 RX ORDER — HEPARIN SODIUM 5000 [USP'U]/ML
5000 INJECTION, SOLUTION INTRAVENOUS; SUBCUTANEOUS EVERY 8 HOURS
Status: DISCONTINUED | OUTPATIENT
Start: 2021-09-01 | End: 2021-09-07 | Stop reason: HOSPADM

## 2021-09-01 RX ORDER — INSULIN GLARGINE 100 [IU]/ML
50 INJECTION, SOLUTION SUBCUTANEOUS DAILY
Status: DISCONTINUED | OUTPATIENT
Start: 2021-09-01 | End: 2021-09-07 | Stop reason: HOSPADM

## 2021-09-01 RX ORDER — ONDANSETRON 2 MG/ML
4 INJECTION INTRAMUSCULAR; INTRAVENOUS
Status: DISCONTINUED | OUTPATIENT
Start: 2021-09-01 | End: 2021-09-07 | Stop reason: HOSPADM

## 2021-09-01 RX ORDER — FUROSEMIDE 10 MG/ML
40 INJECTION INTRAMUSCULAR; INTRAVENOUS EVERY 12 HOURS
Status: DISCONTINUED | OUTPATIENT
Start: 2021-09-01 | End: 2021-09-04

## 2021-09-01 RX ORDER — VANCOMYCIN/0.9 % SOD CHLORIDE 1.5G/250ML
1500 PLASTIC BAG, INJECTION (ML) INTRAVENOUS ONCE
Status: COMPLETED | OUTPATIENT
Start: 2021-09-01 | End: 2021-09-01

## 2021-09-01 RX ORDER — CARVEDILOL 12.5 MG/1
25 TABLET ORAL 2 TIMES DAILY WITH MEALS
Status: DISCONTINUED | OUTPATIENT
Start: 2021-09-01 | End: 2021-09-07 | Stop reason: HOSPADM

## 2021-09-01 RX ORDER — SODIUM CHLORIDE 0.9 % (FLUSH) 0.9 %
5-40 SYRINGE (ML) INJECTION EVERY 8 HOURS
Status: DISCONTINUED | OUTPATIENT
Start: 2021-09-01 | End: 2021-09-07 | Stop reason: HOSPADM

## 2021-09-01 RX ORDER — ASPIRIN 81 MG/1
81 TABLET ORAL DAILY
Status: DISCONTINUED | OUTPATIENT
Start: 2021-09-01 | End: 2021-09-07 | Stop reason: HOSPADM

## 2021-09-01 RX ORDER — SODIUM CHLORIDE 0.9 % (FLUSH) 0.9 %
5-40 SYRINGE (ML) INJECTION AS NEEDED
Status: DISCONTINUED | OUTPATIENT
Start: 2021-09-01 | End: 2021-09-07 | Stop reason: HOSPADM

## 2021-09-01 RX ORDER — ONDANSETRON 4 MG/1
4 TABLET, ORALLY DISINTEGRATING ORAL
Status: DISCONTINUED | OUTPATIENT
Start: 2021-09-01 | End: 2021-09-07 | Stop reason: HOSPADM

## 2021-09-01 RX ORDER — NITROGLYCERIN 0.4 MG/1
0.4 TABLET SUBLINGUAL
Status: DISCONTINUED | OUTPATIENT
Start: 2021-09-01 | End: 2021-09-07 | Stop reason: HOSPADM

## 2021-09-01 RX ADMIN — CARVEDILOL 25 MG: 12.5 TABLET, FILM COATED ORAL at 17:40

## 2021-09-01 RX ADMIN — FUROSEMIDE 40 MG: 10 INJECTION, SOLUTION INTRAMUSCULAR; INTRAVENOUS at 09:37

## 2021-09-01 RX ADMIN — SODIUM CHLORIDE 3.38 G: 900 INJECTION INTRAVENOUS at 15:00

## 2021-09-01 RX ADMIN — ARFORMOTEROL TARTRATE: 15 SOLUTION RESPIRATORY (INHALATION) at 09:48

## 2021-09-01 RX ADMIN — HEPARIN SODIUM 5000 UNITS: 5000 INJECTION INTRAVENOUS; SUBCUTANEOUS at 05:25

## 2021-09-01 RX ADMIN — VENLAFAXINE 25 MG: 25 TABLET ORAL at 17:40

## 2021-09-01 RX ADMIN — VANCOMYCIN HYDROCHLORIDE 1500 MG: 10 INJECTION, POWDER, LYOPHILIZED, FOR SOLUTION INTRAVENOUS at 15:00

## 2021-09-01 RX ADMIN — HEPARIN SODIUM 5000 UNITS: 5000 INJECTION INTRAVENOUS; SUBCUTANEOUS at 22:00

## 2021-09-01 RX ADMIN — VENLAFAXINE 25 MG: 25 TABLET ORAL at 09:37

## 2021-09-01 RX ADMIN — HEPARIN SODIUM 5000 UNITS: 5000 INJECTION INTRAVENOUS; SUBCUTANEOUS at 14:57

## 2021-09-01 RX ADMIN — Medication 10 ML: at 22:39

## 2021-09-01 RX ADMIN — Medication 10 ML: at 14:00

## 2021-09-01 RX ADMIN — FUROSEMIDE 40 MG: 10 INJECTION, SOLUTION INTRAMUSCULAR; INTRAVENOUS at 03:31

## 2021-09-01 RX ADMIN — LIDOCAINE HYDROCHLORIDE 8 ML: 10 INJECTION, SOLUTION EPIDURAL; INFILTRATION; INTRACAUDAL; PERINEURAL at 15:00

## 2021-09-01 RX ADMIN — Medication 1 CAPSULE: at 09:37

## 2021-09-01 RX ADMIN — FERROUS SULFATE TAB 325 MG (65 MG ELEMENTAL FE) 325 MG: 325 (65 FE) TAB at 09:37

## 2021-09-01 RX ADMIN — ASPIRIN 81 MG: 81 TABLET, COATED ORAL at 09:37

## 2021-09-01 RX ADMIN — FUROSEMIDE 40 MG: 10 INJECTION, SOLUTION INTRAMUSCULAR; INTRAVENOUS at 21:00

## 2021-09-01 RX ADMIN — CARVEDILOL 25 MG: 12.5 TABLET, FILM COATED ORAL at 09:37

## 2021-09-01 RX ADMIN — Medication 10 ML: at 05:26

## 2021-09-01 RX ADMIN — MONTELUKAST SODIUM 1 G: 4 TABLET, CHEWABLE ORAL at 17:40

## 2021-09-01 RX ADMIN — ACETAMINOPHEN 650 MG: 325 TABLET ORAL at 14:57

## 2021-09-01 RX ADMIN — MONTELUKAST SODIUM 1 G: 4 TABLET, CHEWABLE ORAL at 09:37

## 2021-09-01 RX ADMIN — PIPERACILLIN AND TAZOBACTAM 3.38 G: 3; .375 INJECTION, POWDER, LYOPHILIZED, FOR SOLUTION INTRAVENOUS at 22:38

## 2021-09-01 NOTE — PROGRESS NOTES
Transition of Care Plan:    RUR: 44% high risk for readmission (multiple readmissions in past 2 weeks) may benefit from Palliative consult to discuss goals of care  Disposition: Home with resumption of HH [address]) vs SNF level rehab  Follow up appointments: PCP, Cardiology  DME needed: Home O2 will need to be evaluated; possible continuation of IV antibiotics  Transportation at Discharge: Brother vs transportation assistance  Keys or means to access home:  Pt has access to home      IM Medicare Letter: 2nd IMM letter will be needed prior to discharge  Is patient a BCPI-A Bundle: not identified as Bundle at this time        If yes, was Bundle Letter given?:     Caregiver Contact: Spouse, Akin Willard, [phone]  Discharge Caregiver contacted prior to discharge? Spouse will be provided update on discharge plans    Pt readmission from [facility], where she left AMA on 8/28/2021. Pt presented back to the ED at [facility] on 8/30/2021 where she was told she had increased troponin and wanted to keep her for suspected heart attack. Pt declined to stay and went home so she could be with her spouse who is wheelchair bound. Pt's PCP called patient on 8/31/2021 and convinced her to return back to the hospital and she was [facility] from [facility]. Pt resides in her home with her spouse, has 2 daughters who reside outside of the state. Pt was independent prior to admission. Pt has rolling walker as DME and [facility], providing services from home IV antibiotics. Pt currently on O2 but does not have home O2, will either need to be weaned or have new Home O2 set up.     Reason for ReAdmission:   Shortness of breath                 RUR Score:   44% high risk        PCP: First and Last name:  Mohit Campa NP     Name of Practice:   Are you a current patient: Yes/No: yes   Approximate date of last visit:  Several months, telephone calls between visits   Can you do a virtual visit with your PCP:  No    Is a Care Conference indicated:  Palliative consult may be beneficial with patient high RUR, multiple readmissions and co-mordities. Did you attend your follow up appointment (s): If not, why not:  No, patient did not have follow up scheduled as she had left \[facility] [place] [facility]\"" against medical advice on a weekend. Resources/supports as identified by patient/family:   Spouse who is wheelchair bound and 2 daughters who live out of state                Top Challenges facing patient (as identified by patient/family and CM): Finances/Medication cost?  Not currently a challenge                  Transportation? Challenge when patient unable to drive              Support system or lack thereof? Family is support system                     Living arrangements? Resides in home with spouse who is wheelchair bound             Self-care/ADLs/Cognition?   Pt independent prior to admission          Current Advanced Directive/Advance Care Plan:  Full Code      Healthcare Decision Maker:   Click here to complete [address] including selection of the Healthcare Decision Maker Relationship (ie \"Primary\")      Primary Decision MakerCan Cespedes - Spouse - [phone]    Secondary Decision Maker: Tariq Roxy - Daughter - [phone]    Payor Source Payor: Juan Manuel Hallman / Plan: Dean Ye / Product Type: Managed Care Medicare /                             Plan for utilizing home health:    Open with [facility], will require resumption of care orders if [facility] is still needed at discharge                 Transition of Care Plan:  Home with resumption of HH vs SNF if recommended    Readmission Assessment  Number of days since last admission?: 1-7 days  Previous disposition: Other (comment) (Pt left AMA - was being services by [facility] but did not received FELICIA orders after patient left AMA)  Who is being interviewed?: Patient  What was the patient's/caregiver's perception as to why they think they needed to return back to the hospital?: [place] discharge on prior admission  Did you visit your Primary Care Physician after you left the hospital, before you returned this time?: No  Why weren't you able to visit your PCP?: Did not have an appointment (Did not have appointment due to leaving AMA on weekend, patient was supposed to get follow up PCP appointment on her own.)  Did you see a specialist, such as Cardiac, Pulmonary, Orthopedic Physician, etc. after you left the hospital?: No  Who advised the patient to return to the hospital?: Physician (Pt came to ED due to increase shortness of breath, found to have increased troponin levels but refused to stay at the ED, primary care doctor called patient and convinced her to return back to the hospital)  Does the patient report anything that got in the way of taking their medications?: Yes  What reasons did they give?: Needed help to administer them (Left AMA so no resumption of HH and she was still receiving IV antibiotics at home)  In our efforts to provide the best possible care to you and others like you, can you think of anything that we could have done to help you after you left the hospital the first time, so that you might not have needed to return so soon?: Additional Community resources available for illness support      Care Management Interventions  PCP Verified by CM: Yes  Transition of Care Consult (CM Consult): [address]: No  Reason Outside [place]: Patient already serviced by other home care/hospice agency  Current Support Network: Lives with Spouse, Own Home  Discharge Location  Discharge Placement: Home with home health    Desmond Montez.  Dav Diaz, [address] [facility]  Advanced Steps ACP Facilitator  Zone Phone: [phone]

## 2021-09-01 NOTE — PROGRESS NOTES
Patient seen and examined today. Full note to follow. Patient known to me from her previous admission course at [facility]. She has been feeling shortness of breath in the past few weeks. She reports she initially had a cough which then resolved. She was recently at [facility] for right-sided pneumonia. I had advised him to give Vanco and Zosyn but patient was unhappy with her care there and left AMA last week. She now represents for the same complaint of shortness of breath. There is no notable cough. She denies any fevers chills at home. She is not noting any weight loss or anorexia or night sweats. CT chest was reviewed with imaging. Overall, clinically not raising high suspicion for pneumonia however as this is certainly very much possible given recent MSSA bacteremia and hospital course. Patient is an extensive smoker with more than 50-pack-year history and has only recently quit in the past 1 to 2 weeks. Certainly at risk for lung cancer. Will recommend thoracentesis of the right pleural effusion for therapeutic and diagnostic purposes. Please send for the basic pleural fluid analysis along with bacterial, fungal, AFB stains and cultures and cytology/pathology as suspicion for a neoplastic process is very high on the differential. This was relayed to Dr. Rui Allen. Will stop daptomycin as it does not has optimal lung coverage. She technically finishes 6 weeks coverage for the recent MSSA bacteremia and presumed ICD infection on 9/2/2021. Will start renally dosed empiric vancomycin per pharmacy protocol. Patient did develop a rash at the time she was on cefazolin though the suspicion that the rash on the foot was due to the cefazolin was extremely low. Will start empiric Zosyn as well. We will follow.       Scot Heard MD  Infectious Diseases

## 2021-09-01 NOTE — PROGRESS NOTES
Dr. Jevon Swartz inserted right thoracentesis catheter without difficulty with immed. Return clear, yellow fluid.

## 2021-09-01 NOTE — ED NOTES
Received care of pt. CORNELIUSS. Aware of ETA for [facility] to transport her to [id number] [facility]. Daughter updated with destination by Matthew Purdy.

## 2021-09-01 NOTE — PROGRESS NOTES
Called xray dept. And spoke with Kuldip to inquire about PCXR stat. Mima Jones states she will be right here.

## 2021-09-01 NOTE — ROUTINE PROCESS
TRANSFER - OUT REPORT:    Verbal report given to nurse Ligia for pt. ,(name) on [place]  being transferred to Gen. surg unit(unit) for routine progression of care       Report consisted of patients Situation, Background, Assessment and   Recommendations(SBAR). Information from the following report(s) Procedure Summary was reviewed with the receiving nurse. Lines:   PICC Single Lumen Right (Active)       PICC Single Lumen  Right;Basilic (Active)   Central Line Being Utilized Yes 09/01/21 0739   Criteria for Appropriate Use Long term IV/antibiotic administration 09/01/21 0739   Site Assessment Clean, dry, & intact 09/01/21 0739   Phlebitis Assessment 0 09/01/21 0739   Infiltration Assessment 0 09/01/21 0739   Dressing Status Clean, dry, & intact 09/01/21 0739   Dressing Type Tape;Transparent 09/01/21 0739   Hub Color/Line Status Pink;Patent; Flushed;Capped 09/01/21 0739   Action Taken Blood drawn;Open ports on tubing capped 09/01/21 0144   Positive Blood Return (Site #1) Yes 09/01/21 0739   Alcohol Cap Used Yes 09/01/21 0739        Opportunity for questions and clarification was provided. Patient transported with:   Monitor    Oxygen at 2L. NC          Name of procedure:  U/S guided right thoracentesis    Vital Signs: see connectcare    Fluids removed: 1050 cc clear, yellow fluid    Samples sent to lab: yes    Any complications related to procedure:  None noted     Post Procedure Care Needed/order sets placed in connect care. See connectcare.

## 2021-09-01 NOTE — H&P
Hospitalist Admission Note    NAME: Marbella Serrano   :  1940   MRN:  [id number]     Date/Time:  2021 12:48 AM    Patient PCP: Elie Negron NP  _____________________________________________________________________  Given the patient's current clinical presentation, I have a high level of concern for decompensation if discharged from the emergency department. Complex decision making was performed, which includes reviewing the patient's available past medical records, laboratory results, and x-ray films. My assessment of this patient's clinical condition and my plan of care is as follows. Assessment / Plan:  A 79year old female patient with PMH of CHF, DM, HTN, CKD stage 3, SSS s/p PPM which was  removed on  due to MSSA bacteremia, initially was on cefazolin but then changed to daptomycin till 2021, who is recently admitted for CHF, acute kidney injury, bilateral lower extremity rash suspected drug eruption  Cefazoline and discharged on 8/3/[facility]21 from [facility]    Acute respiratory failure with hypoxia   Acute on chronic congestive heart failure proBNP 35,000  Echocardiogram 2021 at [facility]  shows EF of 45 to 50% with severe grade 3 diastolic dysfunction. Troponinemia likely type B demand secondary to fluid overload  Leukocytosis 25,000, lactic acid within normal limit, Covid negative on 2021  Pleural effusion   history of MSSA on Depto  Till  2021  Chronic kidney disease creatinine 1.59 which is around baseline. Monitor  Current tobacco abuse  XR CHEST PORT (Order: [id number]) - 2021   IMPRESSION  1. Unchanged mild bilateral pleural effusions, greater on the right. 2. Unchanged right sided airspace disease. 3. Unchanged mild cardiomegaly and edema. EKG Sinus rhythm at a rate of 62 bpm; normal [place]; normal QRS; normal QTC and normal axis. T wave inversions noted in 1 and aVL as well as V3 through V6.           Admit to to hospitalist service, will get a CT chest without contrast to further evaluate the chest x-ray finding, will continue daptomycin as above, telemetry and serial cardiac enzymes, continue GDMT for CHF, cardiology RCA consulted for management of acute on chronic congestive heart failure, admit to CHF pathway strict intake output record Daily weight 2 g sodium diet, diuresis as needed, will check procalcitonin level and primary team to Considerinfectious disease  Consult as patient persistent leukocytosis  In am, avoid nephrotoxic medication and adjust medication to GFR        Recent pacer/ICD explant  PPM/ICD was originally indicated for primary prevention      OTHER PMH  Anemia hemoglobin 8.3 which is around his previous baseline. Monitor. No acute evidence of bleeding. Hypertension  Diabetes  Depression  Resume home medication as per med rec including insulin sliding scale. Check A1c level  Current tobacco use. Advised cessation. Nicotine patch  Cont. duoneb prn for possible contribution of undx COPD    Code Status: FULL  [facility]  Travis Ovalles Spouse [phone]   Kyle Arciniega Daughter [phone]   Carlos Eduardo [facility] Daughter [phone]         DVT Prophylaxis: SQ HEP  GI Prophylaxis: not indicated    Baseline: Independent        Subjective:   CHIEF COMPLAINT:  sob    HISTORY OF PRESENT ILLNESS:   Dejan Sherman is a 79 y.o. female, with multiple medical problems as stated below who presents to the  [facility]  ED on 8/31 c/o gradually increasing shortness of breath for 10 days     Per Patient  o She notes last few nights she is unable to sleep and awakes at 4 AM sitting on multiple pillows she just cannot catch her breath. She denies any chest pain during these events as well as any cough, fevers, chills, abdominal pain or vomiting. She was seen at osh day before yesterday for similar symptoms but states she could not stay and had a [address] despite abnormal lab testing.   She states she is willing to stay today . While in the emergency department, patient became acutely dyspneic and hypertensive. On evaluation at that time she had crackles in all lung fields so was given sublingual nitro and started on nitro drip       Outside hospital ED course and summary is below  Patient presented to outside ED with shortness of breath, on arrival she was found to have labored respirations with crackles and wheezing noted on exam.  Patient was tachycardic and hypoxic on room air. Patient was also found to have very hypertensive. After labs and imaging symptoms are felt likely to be multifactorial with likely degree of heart failure exacerbation secondary to uncontrolled blood pressure as well as possible undiagnosed COPD given her smoking history and wheezing on exam. Patient has been showing signs of improvement however became acutely dyspneic and hypertensive. On reevaluation that time had crackles in all lung fields so was given nitroglycerin with improvement in her blood pressure. Was also given an additional nebulized albuterol and steroids given her persistent wheezing. With her new oxygen requirement and nitroglycerin infusion will plan to transfer this patient to [facility] to a higher level of care. Elderly female who was seen at osh ED day before  yesterday with elevated troponin and BNP and white count and recommended admision which she refused. She is back  because her primary care doctor would not see her in the office and recommended she come emergently to the ER for admission. Today On reevaluation her EKG shows T wave inversions were these to be upright, her troponin remains elevated and her BNP is significantly elevated. Chest x-ray from day before yesterday  shows effusion and her white count is elevated but she does not show any evidence of pneumonia or parapneumonic effusion.   Looking through past her white count seems to always be elevated especially over the past week but is continuing to progressively increase from 17k one week ago to 25,000 today. On extensive lab review her white count has been positive since at least June. She has had multiple chest x-rays but last CT scan of the chest was 2017. Her creatinine is elevated making her a poor candidate for CT angiogram as her GFR is 38% . Vital Signs-Reviewed the patient's vital signs. Patient Vitals for the past 12 hrs:    Temp Pulse Resp BP SpO2   08/31/21 1115 -- 61 -- (!) 128/56 --   08/31/21 1112 -- -- -- -- 97 %   08/31/21 1108 -- 64 -- 114/64 --   08/31/21 1105 -- -- -- 107/66 --   08/31/21 1103 -- -- 18 -- --   08/31/21 1102 -- (!) 57 20 (!) 124/54 94 %   08/31/21 0950 98.8 °F (37.1 °C) 64 24 136/76 93 %         Pulse Oximetry Analysis - 97% on RA     Cardiac Monitor:   Rate: 60bpm  Rhythm: Normal Sinus Rhythm          DATE OF ADMISSION: 8/3/2021 10:38 AM    DATE OF DISCHARGE: 08/06/2021   ADMITTING DIAGNOSES & HOSPITAL COURSE:  HPI  A 79year old female patient with PMH of CHF, DM, HTN, CKD stage 3, SSS s/p PPM which was recently removed on 7/21 due to MSSA bacteremia, she was discharged on PICC with IV cefazolin until 9/2/2021; presented to [address] [place] [address] ED for evaluation of shortness of breath. Pt noticed sob while laying down, unable to sleep and so called EMS. She denied chest pain, sob on exertion or palpitations. She also has been having rash on her legs which is getting worse, rash started since hospital discharge on 7/24. She denied any chest pain, cough, fever, abd pain, urinary or bowel changes. In [address] [place] [address] ED, Potassium was 6.2 - she was given carly gluconate, Sod bicarb, Insulin+ dextrose, Kayexalate. Elevated D dimer - given full dose of lovenox. One dose of IV ceftriaxone given for rash?/ infection. In [facility] ED, VQ scan done which showed low probability PE rpt labs showed normal K, cr improved. Hospitalist consulted for admission. Hospital Course  1.  CHF  Patient presents with acute on chronic combined systolic and diastolic CHF, NYHA class IV on admission and NYHA class II on discharge. Repeat echocardiogram this admission shows EF of 45 to 50% with severe grade 3 diastolic dysfunction. Also initially presented with elevated troponin, EKG shows nonspecific ST-T changes. No symptoms of acute coronary syndrome. Cardiology evaluated the patient and no further cardiac work-up is planned. Patient was diuresed with Lasix and overall improved. Patient with right pleural effusion on presentation but improving with diuresis and not large enough to do thoracentesis. Patient to follow-up with cardiology as outpatient. 2. IVAN  Patient with IVAN on CKD stage III with fluctuating creatinine. Nephrology follows the patient, patient continues with IV diuresis. Nephrology thinks that probable IVAN due to cardiorenal syndrome as well as being on ARB prior to presentation. Renal function stabilized. Patient was not seen by nephrology in the past. I have recommended following with a nephrologist. Patient will discuss with her PCP and find one. 3. Bacteremia  Patient with MSSA bacteremia diagnosed on previous admission, s/p ICD extraction. Initial plan was to continue cefazolin 9/2. However during this admission, while continuing cefazolin, patient was noted to have bilateral lower extremity rash. ID thinks that is not very typical for antibiotic associated allergic rash. However for safety, antibiotics was changed from cefazolin to daptomycin. Patient to continue daptomycin as outpatient till 9/2. Patient to follow-up with infectious disease as outpatient. Further orders per infectious disease.                Past Medical History:   Diagnosis Date    A-fib [facility])     AICD (automatic cardioverter/defibrillator) present 9/23/2020 9/23/2020 [employer] AICD implant    Arthritis     Bilateral pleural effusion 9/18/2020    Chronic pain     Chronic pain     Diabetes ([facility]     Diverticular disease     Elevated troponin 9/18/2020    Hemorrhoid     Hypercholesterolemia     IBS (irritable bowel syndrome)     Lymphocytosis 36/47/5948    Systolic heart failure, chronic ([facility] [address] 7/16/2021    Type 2 MI (myocardial infarction) ([facility] [address] 7/16/2021 7/162021 r/t sepsis        Past Surgical History:   Procedure Laterality Date    COLONOSCOPY N/A 10/31/2019    COLONOSCOPY performed by Jason Kim MD at [address]  10/31/2019         COLONOSCOPY,REMV Sherren Brick  10/31/2019         HX CATARACT REMOVAL      HX CHOLECYSTECTOMY      HX COLONOSCOPY  2009    HX COLONOSCOPY  2016    2 adenomatous polyp    HX HEMORRHOIDECTOMY  2009    HX HYSTERECTOMY      HX KNEE REPLACEMENT      right    HX ORTHOPAEDIC  12/11/2017    back, laminectomy and decompression    NH INSJ/RPLCMT PERM DFB W/TRNSVNS LDS 1/DUAL CHMBR N/A 9/23/2020    INSERT ICD DUAL performed by Aline Simpson MD at [facility] CARDIAC CATH LAB       Social History     Tobacco Use    Smoking status: Current Every Day Smoker     Packs/day: 1.00     Years: 55.00     Pack years: 55.00     Types: Cigarettes    Smokeless tobacco: Never Used   Substance Use Topics    Alcohol use: No        Family History   Problem Relation Age of Onset    Colon Cancer Father     Diabetes Mother      Allergies   Allergen Reactions    Cefazolin Rash     Possible kidney failure/AIN    Morphine Anaphylaxis    Ultram [Tramadol] Palpitations        Prior to Admission medications    Medication Sig Start Date End Date Taking? Authorizing Provider   insulin glargine (Lantus Solostar U-100 Insulin) 100 unit/mL (3 mL) inpn 50 Units by SubCUTAneous route daily. 8/6/21   Mary De La Torre MD   DAPTOmycin (CUBICIN RF) IVPB 450 mg by IntraVENous route every fourty-eight (48) hours. 8/7/21   Mary De La Torre MD   ferrous sulfate 325 mg (65 mg iron) tablet Take 1 Tablet by mouth daily (with breakfast).  8/7/21   Mary De La Torre MD   nicotine (NICODERM CQ) 21 mg/24 hr 1 Patch by TransDERmal route daily for 30 days. Patient not taking: Reported on 8/23/2021 8/7/21 9/6/21  Zenda Kocher, MD   venlafaxine [facility]) 75 mg tablet Take 25 mg by mouth two (2) times a day. Other, MD Cory   ergocalciferol (ERGOCALCIFEROL) 1,250 mcg (50,000 unit) capsule Take 50,000 Units by mouth every seven (7) days. On Wednesdays    Provider, Historical   b complex vitamins tablet Take 1 Tablet by mouth daily. Provider, Historical   furosemide (LASIX) 40 mg tablet TAKE 1 TABLET BY MOUTH  DAILY 6/15/21   Giselle Lopez MD   nitroglycerin (NITROSTAT) 0.4 mg SL tablet 1 Tab by SubLINGual route every five (5) minutes as needed for Chest Pain. Up to 3 doses. Patient not taking: Reported on 7/26/2021 2/23/21   Violet Gonsales ANP   carvediloL (COREG) 25 mg tablet Take 1 Tab by mouth two (2) times daily (with meals). 2/21/21   Giselle Lopez MD   colestipoL (Colestid) 1 gram tablet Take 1 Tab by mouth two (2) times a day. 10/7/20   Chang Calvoief,    aspirin delayed-release 81 mg tablet Take 1 Tab by mouth daily. 9/10/20   Giselle Lopez MD   B.infantis-B.ani-B.long-B.bifi (PROBIOTIC 4X) 10-15 mg TbEC Take  by mouth daily. Provider, Historical   acetaminophen (TYLENOL) 650 mg TbER Take 650 mg by mouth every eight (8) hours. Provider, Historical   omega-3 fatty acids-vitamin e 1,000 mg cap Take 1 Cap by mouth two (2) times a day. 32a day     Provider, Historical   ascorbic acid, vitamin C, (VITAMIN C) 500 mg tablet Take 1,000 mg by mouth daily. Provider, Historical   zinc 50 mg tab tablet Take 50 mg by mouth daily. Provider, Historical       REVIEW OF SYSTEMS:     I am not able to complete the review of systems because:    The patient is intubated and sedated    The patient has altered mental status due to his acute medical problems    The patient has baseline aphasia from prior stroke(s)    The patient has baseline dementia and is not reliable historian    The patient is in acute medical distress and unable to provide information         Constitutional: Negative for activity change, appetite change, chills, fever and unexpected weight change. HENT: Negative for congestion. Eyes: Negative for pain and visual disturbance. Respiratory: Positive for shortness of breath. Negative for cough and chest tightness. Cardiovascular: Negative for chest pain, palpitations and leg swelling. Gastrointestinal: Negative for abdominal pain, diarrhea, nausea and vomiting. Genitourinary: Negative for dysuria. Musculoskeletal: Negative for back pain. Skin: Negative for rash. Neurological: Negative for headaches. Objective:   VITALS:    There were no vitals taken for this visit. PHYSICAL EXAM:    Constitutional:       Appearance: She is well-developed. She is not diaphoretic. Comments: Elderly female who appears comfortable on 2 L nasal cannula in mild acute distress   HENT:      Head: Normocephalic and atraumatic. Eyes:      General:         Right eye: No discharge. Left eye: No discharge. Conjunctiva/sclera: Conjunctivae normal.      Pupils: Pupils are equal, round, and reactive to light. Cardiovascular:      Rate and Rhythm: Normal rate and regular rhythm. Heart sounds: Normal heart sounds. No murmur heard. Pulmonary:      Effort: Pulmonary effort is normal. No tachypnea, accessory muscle usage or respiratory distress. Breath sounds: Normal breath sounds. No decreased breath sounds, wheezing, rhonchi or rales. Abdominal:      General: Bowel sounds are normal. There is no distension. Palpations: Abdomen is soft. Tenderness: There is no abdominal tenderness. There is no guarding. Musculoskeletal:         General: Normal range of motion. Cervical back: Normal range of motion and neck supple. Skin:     General: Skin is warm and dry. Findings: No rash. Neurological:      Mental Status: She is alert and oriented to person, place, and time.       Cranial Nerves: No cranial nerve deficit. Motor: No abnormal muscle tone. _______________________________________________________________________  Care Plan discussed with:    Comments   Patient y    Family      RN y    Care Manager                    Consultant:      _______________________________________________________________________  Expected  Disposition:   Home with Family tbd   HH/PT/OT/RN    SNF/LTC    EMLIY    ________________________________________________________________________  TOTAL TIME:   72 Minutes    Critical Care Provided     Minutes non procedure based      Comments    x Reviewed previous records   >50% of visit spent in counseling and coordination of care x Discussion with patient and/or family and questions answered       Given the patient's current clinical presentation, I have a high level of concern for decompensation if discharged from the ED. Complex decision making was performed which includes reviewing the patient's available past medical records, laboratory results, and Xray films. I have also directly communicated my plan and discussed this case with the involved ED physician.     ____________________________________________________________________  Carley Frias MD    Procedures: see electronic medical records for all procedures/Xrays and details which were not copied into this note but were reviewed prior to creation of Plan. LAB DATA REVIEWED:    Recent Results (from the past 24 hour(s))   SAMPLES BEING HELD    Collection Time: 08/31/21  8:58 AM   Result Value Ref Range    SAMPLES BEING HELD 1SST, 1RED, 1BLUE, 1LAV     COMMENT        Add-on orders for these samples will be processed based on acceptable specimen integrity and analyte stability, which may vary by analyte.    MAGNESIUM    Collection Time: 08/31/21  8:58 AM   Result Value Ref Range    Magnesium 1.6 1.6 - 2.4 mg/dL   PHOSPHORUS    Collection Time: 08/31/21  8:58 AM   Result Value Ref Range    Phosphorus 4.0 2.6 - 4.7 MG/DL   METABOLIC PANEL, BASIC    Collection Time: 08/31/21  8:58 AM   Result Value Ref Range    Sodium 137 136 - 145 mmol/L    Potassium 4.3 3.5 - 5.1 mmol/L    Chloride 103 97 - 108 mmol/L    CO2 21 21 - 32 mmol/L    Anion gap 13 5 - 15 mmol/L    Glucose 99 65 - 100 mg/dL    BUN 31 (H) 6 - 20 MG/DL    Creatinine 1.59 (H) 0.55 - 1.02 MG/DL    BUN/Creatinine ratio 19 12 - 20      GFR est AA 38 (L) >60 ml/min/1.73m2    GFR est non-AA 31 (L) >60 ml/min/1.73m2    Calcium 9.0 8.5 - 10.1 MG/DL   CBC W/O DIFF    Collection Time: 08/31/21  8:58 AM   Result Value Ref Range    WBC 25.0 (H) 3.6 - 11.0 K/uL    RBC 2.92 (L) 3.80 - 5.20 M/uL    HGB 8.3 (L) 11.5 - 16.0 g/dL    HCT 25.5 (L) 35.0 - 47.0 %    MCV 87.3 80.0 - 99.0 FL    MCH 28.4 26.0 - 34.0 PG    MCHC 32.5 30.0 - 36.5 g/dL    RDW 15.9 (H) 11.5 - 14.5 %    PLATELET 445 (H) 646 - 400 K/uL    MPV 9.6 8.9 - 12.9 FL    NRBC 0.0 0  WBC    ABSOLUTE NRBC 0.00 0.00 - 0.01 K/uL   EKG, 12 LEAD, INITIAL    Collection Time: 08/31/21  9:54 AM   Result Value Ref Range    Ventricular Rate 62 BPM    Atrial Rate 62 BPM    P-R Interval 152 ms    QRS Duration 108 ms    Q-T Interval 446 ms    QTC Calculation (Bezet) 452 ms    Calculated R Axis 59 degrees    Calculated T Axis -172 degrees    Diagnosis       Normal sinus rhythm  Left ventricular hypertrophy with repolarization abnormality  Abnormal ECG  When compared with ECG of 30-AUG-2021 19:44,  MANUAL COMPARISON REQUIRED, DATA IS UNCONFIRMED     TROPONIN I    Collection Time: 08/31/21  9:58 AM   Result Value Ref Range    Troponin-I, Qt. 0.16 (H) <0.05 ng/mL   NT-PRO BNP    Collection Time: 08/31/21  9:58 AM   Result Value Ref Range    NT pro-BNP >35,000 (H) 0 - 450 PG/ML   COVID-19 WITH INFLUENZA A/B    Collection Time: 08/31/21  3:00 PM   Result Value Ref Range    SARS-CoV-2 Not detected NOTD      Influenza A by PCR Not detected NOTD      Influenza B by PCR Not detected NOTD     TROPONIN I    Collection Time: 08/31/21  8:30 PM   Result Value Ref Range    Troponin-I, Qt. 0.16 (H) <0.05 ng/mL

## 2021-09-01 NOTE — PROGRESS NOTES
Pt seen and examined briefly today during rounds  Pt stable with no new complains  Spoke with ID- will order R Thoracentesis with Pleural fluid analysis/cultures and Cytopathology as recommended  Cont IV Abx as per ID Dr Chelita Yanez.                 DO NOT BILL

## 2021-09-01 NOTE — ROUTINE PROCESS
TRANSFER - OUT REPORT:    Verbal report given to SYED Avalos RN on Rafita Judd  being transferred to ED [facility] rm 2138 for urgent transfer       Report consisted of patients Situation, Background, Assessment and   Recommendations(SBAR). Information from the following report(s) SBAR, ED Summary, Intake/Output, MAR, Recent Results and Cardiac Rhythm SA was reviewed with the receiving nurse. Lines:   PICC Single Lumen Right (Active)        Opportunity for questions and clarification was provided. Patient transported with:   Monitor  O2 @ 1 liters  Tech-PRISCA Barajas paramedic @[id number]

## 2021-09-01 NOTE — PROGRESS NOTES
Repositioned pt. Onto stretcher with HOB elevated approx 45 degrees. Pt awaiting PCXR. Pt appears to be breathing easier.

## 2021-09-01 NOTE — CONSULTS
[facility]     Date of  Admission: 9/1/2021  1:25 AM     Admission type:Emergency    Referral for: CHF  Referral by: Subjective:     Stacy Hyman is a 79 y.o. female with PMH SSS, DM, IBS, HLD, COPD, PAF, CHF who was  admitted for Acute CHF (congestive heart failure) (HCC) [I50.9]  Hypoxia [R09.02]  SOB (shortness of breath) [R06.02]  Elevated troponin [R77.8]  CKD (chronic kidney disease) [N18.9]  Leukocytosis [D72.829]  Pleural effusion [J90]. Per H&P note Stacy Hyman presented to an outside ED with c/o SOB and wheezing. Ms. Shan Abad was very hypertensive and had crackles on exam.  Transferred to [facility]. Referred to Cardiology for  Heart failure. On assessment, Stacy Hyman is s/o thoracentesis where 1 L of fluid was removed from the right. Her breathing has drastically improved since procedure. Prior to procedure she's been feeling SOB, + orthopnea, + PND. No chest pain or leg swelling. She denies any changes to her diet or medications. Had her ICD removed 7/21/21 due to blood stream infection. Stacy Hyman  follows with Dr. Lacy Gilford for cardiology. Last ECHO 08/21 with EF increased to 73-49%; grade 3 diastolic dysfunction. Stress 07/20 with fixed defect.        Cardiac risk factors: smoking/ tobacco exposure, diabetes mellitus, obesity, hypertension, post-menopausal.      Patient Active Problem List    Diagnosis Date Noted    Leukocytosis 09/01/2021    Pleural effusion 09/01/2021    CKD (chronic kidney disease) 09/01/2021    Acute CHF (congestive heart failure) ([facility] 09/01/2021    Anemia of infection and chronic disease 08/26/2021    COPD (chronic obstructive pulmonary disease) ([facility] 08/26/2021    Depression 08/26/2021    Pneumonia 08/23/2021    Sepsis ([facility] 07/16/2021    Type 2 MI (myocardial infarction) ([facility] 53/69/9365    Systolic heart failure, chronic (HCC) 07/16/2021    Moderate episode of recurrent major depressive disorder ([facility] 06/14/2021    Dilated cardiomyopathy ([address] 09/23/2020    SSS (sick sinus syndrome) ([facility] 09/23/2020    Cardiomyopathy ([facility] 09/23/2020    AICD (automatic cardioverter/defibrillator) present 09/23/2020    Respiratory failure ([address] 09/18/2020    CHF (congestive heart failure) ([facility] 09/18/2020    Hypoxia 09/18/2020    Bilateral pleural effusion 09/18/2020    Elevated troponin 09/18/2020    PAF (paroxysmal atrial fibrillation) ([address] 09/10/2020    CKD (chronic kidney disease) stage 3, GFR 30-59 ml/min ([facility]) 06/04/2019    Lymphocytosis 05/07/2018    Type 2 diabetes with nephropathy ([facility] 02/15/2018    Type 2 diabetes mellitus with diabetic neuropathy ([facility] 02/15/2018    Spinal stenosis of lumbar region with neurogenic claudication 11/17/2017    Spondylosis of lumbosacral region without myelopathy or radiculopathy 11/17/2017    Overdose of opiate or related narcotic, accidental or unintentional, subsequent encounter 10/27/2017    Acute left-sided low back pain with sciatica 10/27/2017    Physical debility 10/27/2017    Acute encephalopathy 10/23/2017    Hyperkalemia 10/20/2017    Altered mental status 10/20/2017    Transaminitis 10/20/2017    Acute renal failure (ARF) ([address] 10/20/2017    Diverticulitis large intestine w/o perforation or abscess w/o bleeding 07/06/2017    SOB (shortness of breath) 06/21/2017    Abdominal pain, generalized 06/21/2017    Diarrhea in adult patient 06/21/2017    Cigarette smoker 14/50/7757    Neutrophilic leukocytosis 49/29/8841    Chronic pain     Hypercholesterolemia     Arthritis     Diabetes (HCC)     IBS (irritable bowel syndrome)     Hemorrhoid       Bryce Whitten NP  Past Medical History:   Diagnosis Date    A-fib [facility])     AICD (automatic cardioverter/defibrillator) present 9/23/2020 9/23/2020 [employer] AICD implant    Arthritis     Bilateral pleural effusion 9/18/2020    Chronic pain     Chronic pain     Diabetes ([address]     Diverticular disease Elevated troponin 9/18/2020    Hemorrhoid     Hypercholesterolemia     IBS (irritable bowel syndrome)     Lymphocytosis 90/93/4888    Systolic heart failure, chronic [facility] 7/16/2021    Type 2 MI (myocardial infarction) ([facility] 7/16/2021 7/162021 r/t sepsis      Social History     Socioeconomic History    Marital status:      Spouse name: Not on file    Number of children: Not on file    Years of education: Not on file    Highest education level: Not on file   Occupational History    Occupation: retired     Comment: LPN   Tobacco Use    Smoking status: Current Every Day Smoker     Packs/day: 1.00     Years: 55.00     Pack years: 55.00     Types: Cigarettes    Smokeless tobacco: Never Used   Vaping Use    Vaping Use: Never used   Substance and Sexual Activity    Alcohol use: No    Drug use: Never   Other Topics Concern     Service No    Blood Transfusions Yes    Caffeine Concern Yes    Occupational Exposure No    Hobby Hazards No    Sleep Concern Yes    Stress Concern Yes    Weight Concern No    Special Diet No    Back Care Yes    Exercise No    Bike Helmet No     Comment: n/a    Seat Belt Yes    Self-Exams Yes     Social Determinants of Health     Financial Resource Strain:     Difficulty of Paying Living Expenses:    Food Insecurity:     Worried About Running Out of Food in the Last Year:     Ran Out of Food in the Last Year:    Transportation Needs:     Lack of Transportation (Medical):     Lack of Transportation (Non-Medical):    Physical Activity:     Days of Exercise per Week:     Minutes of Exercise per Session:    Stress:     Feeling of Stress :    Social Connections:     Frequency of Communication with Friends and Family:     Frequency of Social Gatherings with Friends and Family:     Attends Scientologist Services:      Active Member of Clubs or Organizations:     Attends Club or Organization Meetings:     Marital Status:      Allergies   Allergen Reactions    Cefazolin Rash     Possible kidney failure/AIN    Morphine Anaphylaxis    Ultram [Tramadol] Palpitations      Family History   Problem Relation Age of Onset    Colon Cancer Father     Diabetes Mother       Current Facility-Administered Medications   Medication Dose Route Frequency    aspirin delayed-release tablet 81 mg  81 mg Oral DAILY    L.acidophilus-paracasei-S.thermophil-bifidobacter (RISAQUAD) 8 billion cell capsule  1 Capsule Oral DAILY    carvediloL (COREG) tablet 25 mg  25 mg Oral BID WITH MEALS    colestipoL (COLESTID) tablet 1 g  1 g Oral BID    ferrous sulfate tablet 325 mg  325 mg Oral DAILY WITH BREAKFAST    insulin glargine (LANTUS) injection 50 Units  50 Units SubCUTAneous DAILY    nicotine (NICODERM CQ) 21 mg/24 hr patch 1 Patch  1 Patch TransDERmal DAILY    nitroglycerin (NITROSTAT) tablet 0.4 mg  0.4 mg SubLINGual Q5MIN PRN    venlafaxine (EFFEXOR) tablet 25 mg  25 mg Oral BID    sodium chloride (NS) flush 5-40 mL  5-40 mL IntraVENous Q8H    sodium chloride (NS) flush 5-40 mL  5-40 mL IntraVENous PRN    acetaminophen (TYLENOL) tablet 650 mg  650 mg Oral Q6H PRN    Or    acetaminophen (TYLENOL) suppository 650 mg  650 mg Rectal Q6H PRN    polyethylene glycol (MIRALAX) packet 17 g  17 g Oral DAILY PRN    ondansetron (ZOFRAN ODT) tablet 4 mg  4 mg Oral Q8H PRN    Or    ondansetron (ZOFRAN) injection 4 mg  4 mg IntraVENous Q6H PRN    heparin (porcine) injection 5,000 Units  5,000 Units SubCUTAneous Q8H    furosemide (LASIX) injection 40 mg  40 mg IntraVENous Q12H    arformoterol 15 mcg/budesonide 0.5 mg neb solution   Nebulization BID    albuterol-ipratropium (DUO-NEB) 2.5 MG-0.5 MG/3 ML  3 mL Nebulization Q6H PRN    piperacillin-tazobactam (ZOSYN) 3.375 g in 0.9% sodium chloride (MBP/ADV) 100 mL MBP  3.375 g IntraVENous Q8H    vancomycin (VANCOCIN) 1500 mg in  ml infusion  1,500 mg IntraVENous ONCE          Review of Symptoms:  Constitutional: negative  Eyes: negative  Ears, nose, mouth, throat, and face: negative  Respiratory: SOB, orthopnea, PND  Cardiovascular: negative  Gastrointestinal: negative  Genitourinary:negative  Musculoskeletal:negative  Neurological: negative  Behvioral/Psych: negative  Endocrine: negative            Objective:      Visit Vitals  BP (!) 128/47 (BP 1 Location: Left upper arm, BP Patient Position: At rest;Sitting)   Pulse 65   Temp 98.1 °F (36.7 °C)   Resp 16   Ht 5' 5.98\" (1.676 m)   Wt 69.6 kg (153 lb 7 oz)   SpO2 98%   Breastfeeding No   BMI 24.78 kg/m²       Physical Assessment:   General Appearance:  pleasant, alert, cooperative, elderly  female laying in bed in NAD; appears stated age  Eyes: sclera anicteric  Mouth/Throat: moist mucous membranes; oral pharynx clear  Neck: supple;   Pulmonary:  clear to auscultation bilaterally; good effort  Cardiovascular: regular rate and rhythm; no murmur, click, rub, or gallop  Abdomen: soft, non-tender, non-distended; bowel sounds normal  Musculoskeletal: no swelling or deformity; moves all extremities  Extremities: no edema; palpable distal pulses   Skin: warm and dry  Neuro: grossly normal  Psych: normal mood and affect given the setting      Data Review:   Recent Labs     08/31/21  0858 08/30/21 1930   WBC 25.0* 24.7*   HGB 8.3* 8.6*   HCT 25.5* 26.7*   * 594*     Recent Labs     08/31/21  0858 08/30/21 1930    134*   K 4.3 4.8    103   CO2 21 22   GLU 99 121*   BUN 31* 35*   CREA 1.59* 1.49*   CA 9.0 9.3   MG 1.6  --    PHOS 4.0  --    ALB  --  2.3*   TBILI  --  0.3   ALT  --  22       Recent Labs     08/31/21 2030 08/31/21  0958 08/30/21 1930   TROIQ 0.16* 0.16* 0.19*         Intake/Output Summary (Last 24 hours) at 9/1/2021 1623  Last data filed at 9/1/2021 1411  Gross per 24 hour   Intake --   Output 1050 ml   Net -1050 ml        Cardiographics    Telemetry: SR to SB with PVCs  ECG: NSR  Echocardiogram: last as above   CXRAY: \"1. Unchanged mild bilateral pleural effusions, greater on the right.   2. Unchanged right sided airspace disease. 3. Unchanged mild cardiomegaly and edema. \"  CT chest: \"1. Masslike opacity in the right upper lobe laterally along the pleural surface and mediastinal adenopathy suspicious for malignancy. 2.  Additional pulmonary nodule in the right upper lobe. 3.  Right pleural effusion and right lower lobe volume loss. This may represent a malignant pleural effusion given the pleural-based mass more superiorly which possibly tracks along the pleural surface inferiorly. \"       Assessment:       Active Problems:    SOB (shortness of breath) (6/21/2017)      Hypoxia (9/18/2020)      Elevated troponin (9/18/2020)      Leukocytosis (9/1/2021)      Pleural effusion (9/1/2021)      CKD (chronic kidney disease) (9/1/2021)      Acute CHF (congestive heart failure) ([facility] (9/1/2021)         Plan:     Glynn Raygoza is a 79 y.o. female who was transferred for Acute CHF (congestive heart failure) ([facility]) [I50.9]  Hypoxia [R09.02]  SOB (shortness of breath) [R06.02]  Elevated troponin [R77.8]  CKD (chronic kidney disease) [N18.9]  Leukocytosis [D72.829]  Pleural effusion [J90]  Referred to cardiology for management of patient's heart failure. CXR with effusions and airspace disease. CT chest shows an area suspicious for malignancy. WBC 25; Hg 8.3; ; creat 1.59; troponin 0.19/0.16/0.16; proBNP greater than 90801. Patient feeling much better after thoracentesis. Follow fluid studies. Acute on chronic combined systolic/diastolic heart failure. Per notes, it appears may have been flash pulmonary edema or triggered by hypertension at OSH. Continue diuresis  Recent ECHO. No need to repeat at this time. Unlikely to change treatment. EF had recovered to 45-50% last month. ICD removed 7/21/21 due to blood stream infection. Was due to stop abx soon PTA. HTN variable. Continue BB  Mild troponin elevation:  Flat and likely from HTN and/or HF. Stress 07/20 with fixed defect. ASA and BB.   Statin has been on hold while on daptomycin. Unclear how soon after stopping the abx that statin can be restarted. Possible new infectious process and/or malignancy per hospitalist and ID         Thank you for referring this patient to [facility] N Richa Hicks NP  DNP, RN, AGAP-BC    Patient seen and examined by me with the above nurse practitioner. I personally performed all components of the history, physical, and medical decision making and agree with the assessment and plan with minor modifications as noted. Today the patient presents with acute on chronic diastolic greater than systolic heart failure. General PE  Gen:  NAD  Mental Status - Alert. General Appearance - Not in acute distress. HEENT:  PERRL, no carotid bruits or JVD  Chest and Lung Exam   Inspection: Accessory muscles - No use of accessory muscles in breathing. Auscultation:   Breath sounds: - Normal.   Cardiovascular   Inspection: Jugular vein - Bilateral - Inspection Normal.   Palpation/Percussion:   Apical Impulse: - Normal.   Auscultation: Rhythm - Regular. Heart Sounds - S1 WNL and S2 WNL. No S3 or S4. Murmurs & Other Heart Sounds: Auscultation of the heart reveals - No Murmurs. Peripheral Vascular   Upper Extremity: Inspection - Bilateral - No Cyanotic nailbeds or Digital clubbing. Lower Extremity:   Palpation: Edema - Bilateral - No edema. Abdomen:   Soft, non-tender, bowel sounds are active. Neuro: A&O times 3, CN and motor grossly WNL    Continue with diuresis, while monitoring creatinine. Monitor hypertension. Other evaluation and treatment as noted above per internal medicine.

## 2021-09-01 NOTE — PROGRESS NOTES
Problem: Falls - Risk of  Goal: *Absence of Falls  Description: Document Ashley Jyoti Fall Risk and appropriate interventions in the flowsheet.   Outcome: Progressing Towards Goal  Note: Fall Risk Interventions:  Mobility Interventions: Communicate number of staff needed for ambulation/transfer, Patient to call before getting OOB         Medication Interventions: Assess postural VS orthostatic hypotension, Patient to call before getting OOB    Elimination Interventions: Call light in reach    History of Falls Interventions: Room close to nurse's station         Problem: Patient Education: Go to Patient Education Activity  Goal: Patient/Family Education  Outcome: Progressing Towards Goal

## 2021-09-01 NOTE — PROGRESS NOTES
Received pt. Via stretcher accomp. By transport with HOB elevated approx 45 degrees. Pt awake, alert and oriented x 3. Pt states \"she has trouble with left shoulder and some low back pain.

## 2021-09-01 NOTE — PROGRESS NOTES
End of Shift Note    Bedside shift change report given to Marissa (oncoming nurse) by Ivette Marin RN (offgoing nurse). Report included the following information SBAR, Kardex, ED Summary, Procedure Summary, Intake/Output, MAR and Recent Results    Shift worked:  7a-7p     Shift summary and any significant changes:     Patient had a thoracentesis today. IV antibiotics were started. Patient's daughter called and expressed her concern over her mom's history of leaving the hospital before her tx was finished. Concerns for physician to address: none     Zone phone for oncoming shift:   [phone]       Activity:  Activity Level: Up with Assistance  Number times ambulated in hallways past shift: 0  Number of times OOB to chair past shift: 0    Cardiac:   Cardiac Monitoring: Yes      Cardiac Rhythm: Sinus Arrhythmia    Access:   Current line(s): PIV     Genitourinary:   Urinary status: voiding    Respiratory:   O2 Device: Nasal cannula  Chronic home O2 use?: NO  Incentive spirometer at bedside: YES     GI:  Last Bowel Movement Date: 08/31/21  Current diet:  ADULT DIET Regular; 3 carb choices (45 gm/meal); 1200 ml  Passing flatus: YES  Tolerating current diet: YES       Pain Management:   Patient states pain is manageable on current regimen: YES    Skin:  Bryan Score: 20  Interventions: increase time out of bed    Patient Safety:  Fall Score:  Total Score: 4  Interventions: gripper socks, pt to call before getting OOB and stay with me (per policy)  High Fall Risk: Yes    Length of Stay:  Expected LOS: 4d 0h  Actual LOS: 0      Ivette Marin RN

## 2021-09-02 LAB
ANION GAP SERPL CALC-SCNC: 6 MMOL/L (ref 5–15)
ATRIAL RATE: 62 BPM
ATRIAL RATE: 77 BPM
BUN SERPL-MCNC: 42 MG/DL (ref 6–20)
BUN/CREAT SERPL: 20 (ref 12–20)
CALCIUM SERPL-MCNC: 8.9 MG/DL (ref 8.5–10.1)
CALCULATED P AXIS, ECG09: 50 DEGREES
CALCULATED R AXIS, ECG10: 56 DEGREES
CALCULATED R AXIS, ECG10: 59 DEGREES
CALCULATED T AXIS, ECG11: -104 DEGREES
CALCULATED T AXIS, ECG11: -172 DEGREES
CHLORIDE SERPL-SCNC: 108 MMOL/L (ref 97–108)
CO2 SERPL-SCNC: 22 MMOL/L (ref 21–32)
CREAT SERPL-MCNC: 2.05 MG/DL (ref 0.55–1.02)
DIAGNOSIS, 93000: NORMAL
DIAGNOSIS, 93000: NORMAL
GLUCOSE BLD STRIP.AUTO-MCNC: 148 MG/DL (ref 65–117)
GLUCOSE SERPL-MCNC: 114 MG/DL (ref 65–100)
P-R INTERVAL, ECG05: 152 MS
P-R INTERVAL, ECG05: 164 MS
POTASSIUM SERPL-SCNC: 4.2 MMOL/L (ref 3.5–5.1)
Q-T INTERVAL, ECG07: 388 MS
Q-T INTERVAL, ECG07: 446 MS
QRS DURATION, ECG06: 108 MS
QRS DURATION, ECG06: 110 MS
QTC CALCULATION (BEZET), ECG08: 439 MS
QTC CALCULATION (BEZET), ECG08: 452 MS
SERVICE CMNT-IMP: ABNORMAL
SODIUM SERPL-SCNC: 136 MMOL/L (ref 136–145)
VENTRICULAR RATE, ECG03: 62 BPM
VENTRICULAR RATE, ECG03: 77 BPM

## 2021-09-02 PROCEDURE — 74011250636 HC RX REV CODE- 250/636: Performed by: STUDENT IN AN ORGANIZED HEALTH CARE EDUCATION/TRAINING PROGRAM

## 2021-09-02 PROCEDURE — 2709999900 HC NON-CHARGEABLE SUPPLY

## 2021-09-02 PROCEDURE — 74011636637 HC RX REV CODE- 636/637: Performed by: HOSPITALIST

## 2021-09-02 PROCEDURE — 97530 THERAPEUTIC ACTIVITIES: CPT

## 2021-09-02 PROCEDURE — 74011250637 HC RX REV CODE- 250/637: Performed by: HOSPITALIST

## 2021-09-02 PROCEDURE — 74011250636 HC RX REV CODE- 250/636: Performed by: INTERNAL MEDICINE

## 2021-09-02 PROCEDURE — 97165 OT EVAL LOW COMPLEX 30 MIN: CPT

## 2021-09-02 PROCEDURE — 74011000250 HC RX REV CODE- 250: Performed by: HOSPITALIST

## 2021-09-02 PROCEDURE — 80048 BASIC METABOLIC PNL TOTAL CA: CPT

## 2021-09-02 PROCEDURE — 99233 SBSQ HOSP IP/OBS HIGH 50: CPT | Performed by: STUDENT IN AN ORGANIZED HEALTH CARE EDUCATION/TRAINING PROGRAM

## 2021-09-02 PROCEDURE — 74011000258 HC RX REV CODE- 258: Performed by: STUDENT IN AN ORGANIZED HEALTH CARE EDUCATION/TRAINING PROGRAM

## 2021-09-02 PROCEDURE — 97161 PT EVAL LOW COMPLEX 20 MIN: CPT

## 2021-09-02 PROCEDURE — 36415 COLL VENOUS BLD VENIPUNCTURE: CPT

## 2021-09-02 PROCEDURE — 94760 N-INVAS EAR/PLS OXIMETRY 1: CPT

## 2021-09-02 PROCEDURE — 99233 SBSQ HOSP IP/OBS HIGH 50: CPT | Performed by: INTERNAL MEDICINE

## 2021-09-02 PROCEDURE — 74011000258 HC RX REV CODE- 258: Performed by: INTERNAL MEDICINE

## 2021-09-02 PROCEDURE — 97116 GAIT TRAINING THERAPY: CPT

## 2021-09-02 PROCEDURE — 82962 GLUCOSE BLOOD TEST: CPT

## 2021-09-02 PROCEDURE — APPSS45 APP SPLIT SHARED TIME 31-45 MINUTES: Performed by: NURSE PRACTITIONER

## 2021-09-02 PROCEDURE — 65660000000 HC RM CCU STEPDOWN

## 2021-09-02 PROCEDURE — 97535 SELF CARE MNGMENT TRAINING: CPT

## 2021-09-02 PROCEDURE — 74011250636 HC RX REV CODE- 250/636: Performed by: HOSPITALIST

## 2021-09-02 PROCEDURE — 77010033678 HC OXYGEN DAILY

## 2021-09-02 PROCEDURE — 94640 AIRWAY INHALATION TREATMENT: CPT

## 2021-09-02 RX ORDER — SODIUM CHLORIDE 9 MG/ML
75 INJECTION, SOLUTION INTRAVENOUS CONTINUOUS
Status: DISCONTINUED | OUTPATIENT
Start: 2021-09-02 | End: 2021-09-02

## 2021-09-02 RX ADMIN — SODIUM CHLORIDE 75 ML/HR: 9 INJECTION, SOLUTION INTRAVENOUS at 11:24

## 2021-09-02 RX ADMIN — CARVEDILOL 25 MG: 12.5 TABLET, FILM COATED ORAL at 08:53

## 2021-09-02 RX ADMIN — INSULIN GLARGINE 50 UNITS: 100 INJECTION, SOLUTION SUBCUTANEOUS at 08:58

## 2021-09-02 RX ADMIN — Medication 10 ML: at 14:39

## 2021-09-02 RX ADMIN — CARVEDILOL 25 MG: 12.5 TABLET, FILM COATED ORAL at 17:49

## 2021-09-02 RX ADMIN — Medication 10 ML: at 05:07

## 2021-09-02 RX ADMIN — Medication 10 ML: at 21:34

## 2021-09-02 RX ADMIN — ACETAMINOPHEN 650 MG: 325 TABLET ORAL at 10:37

## 2021-09-02 RX ADMIN — VENLAFAXINE 25 MG: 25 TABLET ORAL at 08:53

## 2021-09-02 RX ADMIN — FUROSEMIDE 40 MG: 10 INJECTION, SOLUTION INTRAMUSCULAR; INTRAVENOUS at 08:59

## 2021-09-02 RX ADMIN — HEPARIN SODIUM 5000 UNITS: 5000 INJECTION INTRAVENOUS; SUBCUTANEOUS at 05:07

## 2021-09-02 RX ADMIN — PIPERACILLIN AND TAZOBACTAM 3.38 G: 3; .375 INJECTION, POWDER, LYOPHILIZED, FOR SOLUTION INTRAVENOUS at 20:22

## 2021-09-02 RX ADMIN — MONTELUKAST SODIUM 1 G: 4 TABLET, CHEWABLE ORAL at 08:54

## 2021-09-02 RX ADMIN — ARFORMOTEROL TARTRATE: 15 SOLUTION RESPIRATORY (INHALATION) at 08:45

## 2021-09-02 RX ADMIN — FERROUS SULFATE TAB 325 MG (65 MG ELEMENTAL FE) 325 MG: 325 (65 FE) TAB at 08:53

## 2021-09-02 RX ADMIN — ARFORMOTEROL TARTRATE: 15 SOLUTION RESPIRATORY (INHALATION) at 19:37

## 2021-09-02 RX ADMIN — VENLAFAXINE 25 MG: 25 TABLET ORAL at 17:49

## 2021-09-02 RX ADMIN — ASPIRIN 81 MG: 81 TABLET, COATED ORAL at 08:54

## 2021-09-02 RX ADMIN — Medication 1 CAPSULE: at 08:53

## 2021-09-02 RX ADMIN — MONTELUKAST SODIUM 1 G: 4 TABLET, CHEWABLE ORAL at 17:49

## 2021-09-02 RX ADMIN — HEPARIN SODIUM 5000 UNITS: 5000 INJECTION INTRAVENOUS; SUBCUTANEOUS at 21:34

## 2021-09-02 RX ADMIN — HEPARIN SODIUM 5000 UNITS: 5000 INJECTION INTRAVENOUS; SUBCUTANEOUS at 14:38

## 2021-09-02 RX ADMIN — PIPERACILLIN AND TAZOBACTAM 3.38 G: 3; .375 INJECTION, POWDER, LYOPHILIZED, FOR SOLUTION INTRAVENOUS at 07:00

## 2021-09-02 NOTE — PROGRESS NOTES
End of Shift Note    Bedside shift change report given to Garrett Escobar RN (oncoming nurse) by Jacoby Sow RN (offgoing nurse).   Report included the following information SBAR, Kardex, Procedure Summary and Recent Results    Shift worked: 3p-7p   Shift summary and any significant changes:    New admission CHF exacerbation       Concerns for physician to address: none   Zone phone for oncoming shift:   [phone]     Patient Information  Willie Garcia  79 y.o.  9/1/2021  1:25 AM by Cammy Benítez MD. Willie Garcia was admitted from Home    Problem List  Patient Active Problem List    Diagnosis Date Noted    Leukocytosis 09/01/2021    Pleural effusion 09/01/2021    CKD (chronic kidney disease) 09/01/2021    Acute CHF (congestive heart failure) ([address] 09/01/2021    Anemia of infection and chronic disease 08/26/2021    COPD (chronic obstructive pulmonary disease) ([address] 08/26/2021    Depression 08/26/2021    Pneumonia 08/23/2021    Sepsis ([address] 07/16/2021    Type 2 MI (myocardial infarction) ([address] 83/83/4062    Systolic heart failure, chronic ([address] 07/16/2021    Moderate episode of recurrent major depressive disorder ([address] 06/14/2021    Dilated cardiomyopathy ([address] 09/23/2020    SSS (sick sinus syndrome) ([address] 09/23/2020    Cardiomyopathy ([address] 09/23/2020    AICD (automatic cardioverter/defibrillator) present 09/23/2020    Respiratory failure ([address] 09/18/2020    CHF (congestive heart failure) ([address] 09/18/2020    Hypoxia 09/18/2020    Bilateral pleural effusion 09/18/2020    Elevated troponin 09/18/2020    PAF (paroxysmal atrial fibrillation) ([address] 09/10/2020    CKD (chronic kidney disease) stage 3, GFR 30-59 ml/min ([address] 06/04/2019    Lymphocytosis 05/07/2018    Type 2 diabetes with nephropathy ([address] 02/15/2018    Type 2 diabetes mellitus with diabetic neuropathy ([address] 02/15/2018    Spinal stenosis of lumbar region with neurogenic claudication 11/17/2017    Spondylosis of lumbosacral region without myelopathy or radiculopathy 11/17/2017    Overdose of opiate or related narcotic, accidental or unintentional, subsequent encounter 10/27/2017    Acute left-sided low back pain with sciatica 10/27/2017    Physical debility 10/27/2017    Acute encephalopathy 10/23/2017    Hyperkalemia 10/20/2017    Altered mental status 10/20/2017    Transaminitis 10/20/2017    Acute renal failure (ARF) ([facility] 10/20/2017    Diverticulitis large intestine w/o perforation or abscess w/o bleeding 07/06/2017    SOB (shortness of breath) 06/21/2017    Abdominal pain, generalized 06/21/2017    Diarrhea in adult patient 06/21/2017    Cigarette smoker 27/63/9157    Neutrophilic leukocytosis 71/99/6197    Chronic pain     Hypercholesterolemia     Arthritis     Diabetes (HCC)     IBS (irritable bowel syndrome)     Hemorrhoid      Past Medical History:   Diagnosis Date    A-fib [facility])     AICD (automatic cardioverter/defibrillator) present 9/23/2020 9/23/2020 [employer] AICD implant    Arthritis     Bilateral pleural effusion 9/18/2020    Chronic pain     Chronic pain     Diabetes ([address]     Diverticular disease     Elevated troponin 9/18/2020    Hemorrhoid     Hypercholesterolemia     IBS (irritable bowel syndrome)     Lymphocytosis 37/25/8417    Systolic heart failure, chronic ([address] 7/16/2021    Type 2 MI (myocardial infarction) ([facility] 7/16/2021 7/162021 r/t sepsis       Core Measures:  CVA: No No  CHF:Yes Yes  PNA:No Refused    Activity:  Activity Level: Up with Assistance  Number times ambulated in hallways past shift: 0  Number of times OOB to chair past shift: 0    Cardiac:   Cardiac Monitoring: Yes      Cardiac Rhythm: Sinus Arrhythmia    Access:   Current line(s): PICC   Central Line? No  PICC LINE? Yes Placement date is unknown Reason Medically Necessary is for long term antibiotics. Genitourinary:   Urinary status: voiding  Urinary Catheter?  No     Respiratory:   O2 Device: Nasal cannula  Chronic home O2 use?: NO  Incentive spirometer at bedside: N/A       GI:  Last Bowel Movement Date: 09/01/21  Current diet:  ADULT DIET Regular; 3 carb choices (45 gm/meal); 1200 ml  Passing flatus: NO  Tolerating current diet: YES       Pain Management:   Patient states pain is manageable on current regimen: YES    Skin:  Bryan Score: 20  Interventions: increase time out of bed    Patient Safety:  Fall Score:  Total Score: 4  Interventions: bed/chair alarm, assistive device (walker, cane, etc), gripper socks and pt to call before getting OOB  High Fall Risk: Yes  @Rollbelt  @dexterity to release roll belt  Yes/No ( must document dexterity  here by stating Yes or No here, otherwise this is a restraint and must follow restraint documentation policy.)    DVT prophylaxis:  DVT prophylaxis Med- Yes  DVT prophylaxis SCD or JONNATHAN- No     Wounds: (If Applicable)  Wounds- No    Active Consults:  IP CONSULT TO CARDIOLOGY  IP CONSULT TO INFECTIOUS DISEASES    Length of Stay:  Expected LOS: 4d 0h  Actual LOS: 1  Discharge Plan: Tarah Raya RN

## 2021-09-02 NOTE — PROGRESS NOTES
[address] [place] [address]  [phone]      Cardiology Progress Note      9/2/2021 1155AM    Admit Date: 9/1/2021    Admit Diagnosis:   Acute CHF (congestive heart failure) (HCC) [I50.9]  Hypoxia [R09.02]  SOB (shortness of breath) [R06.02]  Elevated troponin [R77.8]  CKD (chronic kidney disease) [N18.9]  Leukocytosis [D72.829]  Pleural effusion [J90]    Interval History/Subjective:     Raymond Comer is a 79 y.o. female with PMH SSS, DM, IBS, HLD, COPD, PAF, CHF who was  admitted for Acute CHF (congestive heart failure) (HCC) [I50.9]  Hypoxia [R09.02]  SOB (shortness of breath) [R06.02]  Elevated troponin [R77.8]  CKD (chronic kidney disease) [N18.9]  Leukocytosis [D72.829]  Pleural effusion [J90]. -VSS  -creat up to 2.05  -weight down 7#; I/O inc  -Ms. Ebony Francisco is feeling great. Breathing feels night and day from PTA. No c/o pain.           Visit Vitals  /65 (BP 1 Location: Left upper arm, BP Patient Position: At rest)   Pulse 68   Temp 98.5 °F (36.9 °C)   Resp 18   Ht 5' 5.98\" (1.676 m)   Wt 66.4 kg (146 lb 6.2 oz)   SpO2 97%   Breastfeeding No   BMI 23.64 kg/m²       Current Facility-Administered Medications   Medication Dose Route Frequency    0.9% sodium chloride infusion  75 mL/hr IntraVENous CONTINUOUS    vancomycin (VANCOCIN) 500 mg in 0.9% sodium chloride (MBP/ADV) 100 mL MBP  500 mg IntraVENous Q24H    piperacillin-tazobactam (ZOSYN) 3.375 g in 0.9% sodium chloride (MBP/ADV) 100 mL MBP  3.375 g IntraVENous Q12H    aspirin delayed-release tablet 81 mg  81 mg Oral DAILY    L.acidophilus-paracasei-S.thermophil-bifidobacter (RISAQUAD) 8 billion cell capsule  1 Capsule Oral DAILY    carvediloL (COREG) tablet 25 mg  25 mg Oral BID WITH MEALS    colestipoL (COLESTID) tablet 1 g  1 g Oral BID    ferrous sulfate tablet 325 mg  325 mg Oral DAILY WITH BREAKFAST    insulin glargine (LANTUS) injection 50 Units  50 Units SubCUTAneous DAILY    nicotine (NICODERM CQ) 21 mg/24 hr patch 1 Patch  1 Patch TransDERmal DAILY    nitroglycerin (NITROSTAT) tablet 0.4 mg  0.4 mg SubLINGual Q5MIN PRN    venlafaxine (EFFEXOR) tablet 25 mg  25 mg Oral BID    sodium chloride (NS) flush 5-40 mL  5-40 mL IntraVENous Q8H    sodium chloride (NS) flush 5-40 mL  5-40 mL IntraVENous PRN    acetaminophen (TYLENOL) tablet 650 mg  650 mg Oral Q6H PRN    Or    acetaminophen (TYLENOL) suppository 650 mg  650 mg Rectal Q6H PRN    polyethylene glycol (MIRALAX) packet 17 g  17 g Oral DAILY PRN    ondansetron (ZOFRAN ODT) tablet 4 mg  4 mg Oral Q8H PRN    Or    ondansetron (ZOFRAN) injection 4 mg  4 mg IntraVENous Q6H PRN    heparin (porcine) injection 5,000 Units  5,000 Units SubCUTAneous Q8H    [Held by provider] furosemide (LASIX) injection 40 mg  40 mg IntraVENous Q12H    arformoterol 15 mcg/budesonide 0.5 mg neb solution   Nebulization BID    albuterol-ipratropium (DUO-NEB) 2.5 MG-0.5 MG/3 ML  3 mL Nebulization Q6H PRN    Vancomycin - Pharmacy to dose   Other Rx Dosing/Monitoring       Objective:      Physical Exam:  General Appearance:  pleasant, alert, cooperative, elderly  female laying in bed in NAD; appears stated age  Eyes: sclera anicteric  Mouth/Throat: moist mucous membranes; oral pharynx clear  Neck: supple;   Pulmonary:  clear to auscultation bilaterally; good effort  Cardiovascular: regular rate and rhythm; 2/6 systolic murmur  Abdomen: soft, non-tender, non-distended; bowel sounds normal  Musculoskeletal: no swelling or deformity; moves all extremities  Extremities: no edema; palpable distal pulses   Skin: warm and dry  Neuro: grossly normal  Psych: normal mood and affect given the setting    Data Review:   Recent Labs     08/31/21 0858 08/30/21 1930   WBC 25.0* 24.7*   HGB 8.3* 8.6*   HCT 25.5* 26.7*   * 594*     Recent Labs     09/02/21  0211 08/31/21 0858 08/30/21 1930    137 134*   K 4.2 4.3 4.8    103 103   CO2 22 21 22   * 99 121* BUN 42* 31* 35*   CREA 2.05* 1.59* 1.49*   CA 8.9 9.0 9.3   MG  --  1.6  --    PHOS  --  4.0  --    ALB  --   --  2.3*   TBILI  --   --  0.3   ALT  --   --  22       Recent Labs     08/31/21 2030 08/31/21  0958 08/30/21  1930   TROIQ 0.16* 0.16* 0.19*         Intake/Output Summary (Last 24 hours) at 9/2/2021 1241  Last data filed at 9/2/2021 0646  Gross per 24 hour   Intake 100 ml   Output 1650 ml   Net -1550 ml        Telemetry: SR with PVCs  ECG: NSR  CXRAY: \"1. Unchanged mild bilateral pleural effusions, greater on the right. 2. Unchanged right sided airspace disease. 3. Unchanged mild cardiomegaly and edema. \"  CT chest: \"1. Masslike opacity in the right upper lobe laterally along the pleural surface and mediastinal adenopathy suspicious for malignancy. 2.  Additional pulmonary nodule in the right upper lobe. 3.  Right pleural effusion and right lower lobe volume loss. This may represent a malignant pleural effusion given the pleural-based mass more superiorly which possibly tracks along the pleural surface inferiorly. \"    Assessment:     Active Problems:    SOB (shortness of breath) (6/21/2017)      Hypoxia (9/18/2020)      Elevated troponin (9/18/2020)      Leukocytosis (9/1/2021)      Pleural effusion (9/1/2021)      CKD (chronic kidney disease) (9/1/2021)      Acute CHF (congestive heart failure) ([facility] [address] (9/1/2021)        Plan:     Acute on chronic combined systolic/diastolic heart failure:  Improved. Per notes, appears patient had flash pulmonary edema or hypertensive crisis in addition to her SOB from her pleural effusions. Breathing much improved after thoracentesis with 1L removed. ICD removed due to blood stream infection 7/21/21. Recent EF 45-50%  · Heart failure component appears improved/resolved. Unclear yet if effusion is 2/2 heart failure or another cause. Follow fluid studies. · Hold further diuretic today due to bump in creatinine.   Would transition back to PO possibly tomorrow pending morning labs. · HTN stable:  Continue BB. · No ACEi/ARB/ARNI with patient's CKD         Mild troponin elevation:  Flat and likely from hypertensive crisis at OSH or HF. Stress 07/20 with fixed defect. · ASA and BB  · Hasn't been on statin due to daptomycin, but this is now stopped. Consider restarting at discharge once dapt out of system. CKD:  Creat 2.05  · Hold further diuretic today  · Is currently getting IVF - would be cautious with this        ICD explanted 7/21/21:  Just finished course of planned abx for bloodstream infection. · ID following  · Most recent EF 45-50%         Quang Atkins NP  DNP, RN, AGACNP-BC    Patient seen and examined by me with the above nurse practitioner. I personally performed all components of the history, physical, and medical decision making and agree with the assessment and plan with minor modifications as noted. Today the patient is feeling much better, euvolemic, lying flat, appears somewhat dry by creatinine. Noted abnormal chest CT. General PE  Gen:  NAD  Mental Status - Alert. General Appearance - Not in acute distress. HEENT:  PERRL, no carotid bruits or JVD  Chest and Lung Exam   Inspection: Accessory muscles - No use of accessory muscles in breathing. Auscultation:   Breath sounds: - Normal.   Cardiovascular   Inspection: Jugular vein - Bilateral - Inspection Normal.   Palpation/Percussion:   Apical Impulse: - Normal.   Auscultation: Rhythm - Regular. Heart Sounds - S1 WNL and S2 WNL. No S3 or S4. Murmurs & Other Heart Sounds: Auscultation of the heart reveals - No Murmurs. Peripheral Vascular   Upper Extremity: Inspection - Bilateral - No Cyanotic nailbeds or Digital clubbing. Lower Extremity:   Palpation: Edema - Bilateral - No edema. Abdomen:   Soft, non-tender, bowel sounds are active. Neuro: A&O times 3, CN and motor grossly WNL    Concerned that dyspnea and respiratory issues are all related to abdomen chest CT. Will hold Lasix with rising creatinine, now euvolemic to slightly dry. Will discuss with Dr. Jany Jacobo.

## 2021-09-02 NOTE — PROGRESS NOTES
End of Shift Note    Bedside shift change report given to Ashford RN (oncoming nurse) by Donnice Aase, RN (offgoing nurse).   Report included the following information SBAR, Kardex, Procedure Summary and Recent Results    Shift worked: 7a-3p   Shift summary and any significant changes:    New admission CHF exacerbation       Concerns for physician to address: none   Zone phone for oncoming shift:   [id number]     Patient Information  Jorje Stewart  79 y.o.  9/1/2021  1:25 AM by Trung Anderson MD. Jorje Stewart was admitted from Home    Problem List  Patient Active Problem List    Diagnosis Date Noted    Leukocytosis 09/01/2021    Pleural effusion 09/01/2021    CKD (chronic kidney disease) 09/01/2021    Acute CHF (congestive heart failure) ([address] 09/01/2021    Anemia of infection and chronic disease 08/26/2021    COPD (chronic obstructive pulmonary disease) ([address] 08/26/2021    Depression 08/26/2021    Pneumonia 08/23/2021    Sepsis ([address] 07/16/2021    Type 2 MI (myocardial infarction) ([address] 38/67/5188    Systolic heart failure, chronic ([address] 07/16/2021    Moderate episode of recurrent major depressive disorder ([address] 06/14/2021    Dilated cardiomyopathy ([address] 09/23/2020    SSS (sick sinus syndrome) ([address] 09/23/2020    Cardiomyopathy ([address] 09/23/2020    AICD (automatic cardioverter/defibrillator) present 09/23/2020    Respiratory failure ([address] 09/18/2020    CHF (congestive heart failure) ([address] 09/18/2020    Hypoxia 09/18/2020    Bilateral pleural effusion 09/18/2020    Elevated troponin 09/18/2020    PAF (paroxysmal atrial fibrillation) ([address] 09/10/2020    CKD (chronic kidney disease) stage 3, GFR 30-59 ml/min ([address] 06/04/2019    Lymphocytosis 05/07/2018    Type 2 diabetes with nephropathy ([address] 02/15/2018    Type 2 diabetes mellitus with diabetic neuropathy ([address] 02/15/2018    Spinal stenosis of lumbar region with neurogenic claudication 11/17/2017    Spondylosis of lumbosacral region without myelopathy or radiculopathy 11/17/2017    Overdose of opiate or related narcotic, accidental or unintentional, subsequent encounter 10/27/2017    Acute left-sided low back pain with sciatica 10/27/2017    Physical debility 10/27/2017    Acute encephalopathy 10/23/2017    Hyperkalemia 10/20/2017    Altered mental status 10/20/2017    Transaminitis 10/20/2017    Acute renal failure (ARF) ([address] 10/20/2017    Diverticulitis large intestine w/o perforation or abscess w/o bleeding 07/06/2017    SOB (shortness of breath) 06/21/2017    Abdominal pain, generalized 06/21/2017    Diarrhea in adult patient 06/21/2017    Cigarette smoker 02/52/1500    Neutrophilic leukocytosis 16/25/3895    Chronic pain     Hypercholesterolemia     Arthritis     Diabetes (HCC)     IBS (irritable bowel syndrome)     Hemorrhoid      Past Medical History:   Diagnosis Date    A-fib [facility])     AICD (automatic cardioverter/defibrillator) present 9/23/2020 9/23/2020 [employer] AICD implant    Arthritis     Bilateral pleural effusion 9/18/2020    Chronic pain     Chronic pain     Diabetes ([address]     Diverticular disease     Elevated troponin 9/18/2020    Hemorrhoid     Hypercholesterolemia     IBS (irritable bowel syndrome)     Lymphocytosis 80/53/0072    Systolic heart failure, chronic ([address] 7/16/2021    Type 2 MI (myocardial infarction) ([facility] [address] 7/16/2021 7/162021 r/t sepsis       Core Measures:  CVA: No No  CHF:Yes Yes  PNA:No Refused    Activity:  Activity Level: Up with Assistance  Number times ambulated in hallways past shift: 0  Number of times OOB to chair past shift: 0    Cardiac:   Cardiac Monitoring: Yes      Cardiac Rhythm: Sinus Arrhythmia    Access:   Current line(s): PICC   Central Line? No  PICC LINE? Yes Placement date is unknown Reason Medically Necessary is for long term antibiotics. Genitourinary:   Urinary status: voiding  Urinary Catheter?  No     Respiratory:   O2 Device: Nasal cannula  Chronic home O2 use?: NO  Incentive spirometer at bedside: N/A       GI:  Last Bowel Movement Date: 09/01/21  Current diet:  ADULT DIET Regular; 3 carb choices (45 gm/meal); 1200 ml  Passing flatus: NO  Tolerating current diet: YES       Pain Management:   Patient states pain is manageable on current regimen: YES    Skin:  Bryan Score: 20  Interventions: increase time out of bed    Patient Safety:  Fall Score:  Total Score: 4  Interventions: bed/chair alarm, assistive device (walker, cane, etc), gripper socks and pt to call before getting OOB  High Fall Risk: Yes  @Rollbelt  @dexterity to release roll belt  Yes/No ( must document dexterity  here by stating Yes or No here, otherwise this is a restraint and must follow restraint documentation policy.)    DVT prophylaxis:  DVT prophylaxis Med- Yes  DVT prophylaxis SCD or JONNATHAN- No     Wounds: (If Applicable)  Wounds- No    Active Consults:  IP CONSULT TO CARDIOLOGY  IP CONSULT TO INFECTIOUS DISEASES    Length of Stay:  Expected LOS: 4d 0h  Actual LOS: 1  Discharge Plan: Paola Parra RN

## 2021-09-02 NOTE — PROGRESS NOTES
Problem: Falls - Risk of  Goal: *Absence of Falls  Description: Document Megha Rapp Fall Risk and appropriate interventions in the flowsheet. Outcome: Progressing Towards Goal  Note: Fall Risk Interventions:  Mobility Interventions: Assess mobility with egress test, Bed/chair exit alarm, Patient to call before getting OOB         Medication Interventions: Assess postural VS orthostatic hypotension, Bed/chair exit alarm, Patient to call before getting OOB, Teach patient to arise slowly    Elimination Interventions: Bed/chair exit alarm, Call light in reach, Patient to call for help with toileting needs, Stay With Me (per policy)    History of Falls Interventions:  Investigate reason for fall         Problem: Patient Education: Go to Patient Education Activity  Goal: Patient/Family Education  Outcome: Progressing Towards Goal

## 2021-09-02 NOTE — PROGRESS NOTES
Problem: Self Care Deficits Care Plan (Adult)  Goal: *Acute Goals and Plan of Care (Insert Text)  Description:   FUNCTIONAL STATUS PRIOR TO ADMISSION: Patient was independent and active without use of DME, occasionally using Rw for home ambulation. Patient was independent for basic and instrumental ADLs. Patient reported her  is w/c bound but she does not assist him at baseline. Reports  assist with medication PRN. Reports she was only driving short distances on occasion. HOME SUPPORT: The patient lived with  but did not require assist.    Occupational Therapy Goals  Initiated 9/2/2021  1. Patient will perform lower body dressing with independence within 7 day(s). 2.  Patient will perform toileting with independence within 7 day(s). 3.  Patient will perform grooming in standing with independence within 7 day(s). 4.  Patient will perform toilet transfers with independence within 7 day(s). 5.  Patient will perform all aspects of toileting with independence within 7 day(s). 6.  Patient will participate in upper extremity therapeutic exercise/activities with independence within 7 day(s). 7.  Patient will utilize energy conservation techniques during functional activities with verbal cues within 7 day(s). Outcome: Progressing Towards Goal     OCCUPATIONAL THERAPY EVALUATION  Patient: Rodrigo White (1 y.o. female)  Date: 9/2/2021  Primary Diagnosis: Acute CHF (congestive heart failure) (HCC) [I50.9]  Hypoxia [R09.02]  SOB (shortness of breath) [R06.02]  Elevated troponin [R77.8]  CKD (chronic kidney disease) [N18.9]  Leukocytosis [D72.829]  Pleural effusion [J90]        Precautions:   Fall    ASSESSMENT  Based on the objective data described below, the patient presents with minor decreased strength and endurance deficits, decreased balance and decreased functional tolerance compared to baseline.  On this date pt agreeable to therapy session, tolerated increased time performing bedroom and bathroom ambulation with overall CGA/SBA. Pt overall supervision - SBA for ADLs, demo'd donning/doffing socks and good clothing mgmt during toileting tasks. Pt is motivated to return home and anticipate pt would benefit from a few more session as she is close to baseline functioning for ADL tasks. Pt would benefit from cont education on energy conservation and home safety. Current Level of Function Impacting Discharge (ADLs/self-care): SPV-SBA for ADLs    Functional Outcome Measure: The patient scored Total: 60/100 on the Barthel Index outcome measure which is indicative of 40% impaired ability to care for basic self needs/dependency on others. Other factors to consider for discharge:  unable to provide physical assist      Patient will benefit from skilled therapy intervention to address the above noted impairments. PLAN :  Recommendations and Planned Interventions: self care training, functional mobility training, therapeutic exercise, balance training, therapeutic activities, endurance activities, patient education and home safety training    Frequency/Duration: Patient will be followed by occupational therapy 5 times a week to address goals. Recommendation for discharge: (in order for the patient to meet his/her long term goals)  No skilled occupational therapy/ follow up rehabilitation needs identified at this time. This discharge recommendation:  A follow-up discussion with the attending provider and/or case management is planned    IF patient discharges home will need the following DME: patient owns DME required for discharge       SUBJECTIVE:   Patient stated Feels good to get up.     OBJECTIVE DATA SUMMARY:   HISTORY:   Past Medical History:   Diagnosis Date    A-fib [facility])     AICD (automatic cardioverter/defibrillator) present 9/23/2020 9/23/2020 [employer] AICD implant    Arthritis     Bilateral pleural effusion 9/18/2020    Chronic pain     Chronic pain     [facility]     Diverticular disease     Elevated troponin 9/18/2020    Hemorrhoid     Hypercholesterolemia     IBS (irritable bowel syndrome)     Lymphocytosis 31/11/4734    Systolic heart failure, chronic ([facility] 7/16/2021    Type 2 MI (myocardial infarction) ([facility] 7/16/2021 7/162021 r/t sepsis     Past Surgical History:   Procedure Laterality Date    COLONOSCOPY N/A 10/31/2019    COLON[facility]ilda [facility]  10/31/2019         [facility]  10/31/2019         HX CATARACT REMOVAL      HX CHOLECYSTECTOMY      HX COLONOSCOPY  2009    HX COLONOSCOPY  2016    2 adenomatous polyp    HX HEMORRHOIDECTOMY  2009    HX HYSTERECTOMY      HX KNEE REPLACEMENT      right    HX ORTHOPAEDIC  12/11/2017    back, laminectomy and decompression    AL INSJ/RPLCMT PERM DFB W/TRNSVNS LDS 1/DUAL CHMBR N/A 9/23/2020    INSERT ICD DUAL performed by Pastor Gonzalez MD at [place] [address] LAB       Expanded or extensive additional review of patient history:     Home Situation  Home Environment: Private residence  # Steps to Enter:  (uses ramp;  uses WC.)  Rails to Enter: No  Wheelchair Ramp: Yes  One/Two Story Residence: Two story  # of Interior Steps: 15  Interior Rails: Right  Lift Chair Available: No  Living Alone: No  Support Systems: Spouse/Significant Other/Partner (78 yo )  Patient Expects to be Discharged to[de-identified] House  Current DME Used/Available at Home: jane Rai, Wheelchair, Transfer bench (both WCs are fitted for )  Tub or Shower Type: Tub/Shower combination    Hand dominance: Right    EXAMINATION OF PERFORMANCE DEFICITS:  Cognitive/Behavioral Status:                      Skin: intact    Edema: no edema noted    Hearing:   Auditory  Auditory Impairment: None    Vision/Perceptual:                                     Range of Motion:    AROM: Within functional limits Strength:    Strength: Within functional limits                Coordination:  Coordination: Within functional limits            Tone & Sensation:                              Balance:  Sitting: Intact  Standing: Intact; With support    Functional Mobility and Transfers for ADLs:  Bed Mobility:  Rolling: Supervision  Supine to Sit: Supervision  Sit to Supine: Supervision    Transfers:  Sit to Stand: Contact guard assistance  Stand to Sit: Contact guard assistance  Bathroom Mobility: Contact guard assistance  Toilet Transfer : Contact guard assistance  Assistive Device : Walker, rolling    ADL Assessment:  Feeding: Independent    Oral Facial Hygiene/Grooming: Independent    Bathing: Stand-by assistance    Upper Body Dressing: Supervision    Lower Body Dressing: Supervision    Toileting: Supervision                ADL Intervention and task modifications:       Grooming  Washing Hands: Supervision  Cues: Verbal cues provided                   Lower Body Dressing Assistance  Socks: Supervision  Position Performed: Seated edge of bed    Toileting  Toileting Assistance: Supervision  Bladder Hygiene: Supervision  Clothing Management: Supervision  Cues: Verbal cues provided  Adaptive Equipment: Grab bars          Functional Measure:  Barthel Index:    Bathin  Bladder: 5  Bowels: 5  Groomin  Dressin  Feeding: 10  Mobility: 10  Stairs: 5  Toilet Use: 5  Transfer (Bed to Chair and Back): 10  Total: 60/100        The Barthel ADL Index: Guidelines  1. The index should be used as a record of what a patient does, not as a record of what a patient could do. 2. The main aim is to establish degree of independence from any help, physical or verbal, however minor and for whatever reason. 3. The need for supervision renders the patient not independent. 4. A patient's performance should be established using the best available evidence.  Asking the patient, friends/relatives and nurses are the usual sources, but direct observation and common sense are also important. However direct testing is not needed. 5. Usually the patient's performance over the preceding 24-48 hours is important, but occasionally longer periods will be relevant. 6. Middle categories imply that the patient supplies over 50 per cent of the effort. 7. Use of aids to be independent is allowed. Pleasant [place]., Barthel, D.W. (9539). Functional evaluation: the Barthel Index. [address] (14)2. ALISA Bolanos, Beatriz Tuttle., Nikki Polk., Ashli, [address] (1999). Measuring the change indisability after inpatient rehabilitation; comparison of the responsiveness of the Barthel Index and Functional [place] Measure. Journal of Neurology, Neurosurgery, and Psychiatry, 66(4), 518-622. KARINA Perez, ASHLEY Garrett, & Enrique Orozco M.A. (2004.) Assessment of post-stroke quality of life in cost-effectiveness studies: The usefulness of the Barthel Index and the EuroQoL-5D. Quality of Life Research, 15, 271-71         Occupational Therapy Evaluation Charge Determination   History Examination Decision-Making   LOW Complexity : Brief history review  LOW Complexity : 1-3 performance deficits relating to physical, cognitive , or psychosocial skils that result in activity limitations and / or participation restrictions  LOW Complexity : No comorbidities that affect functional and no verbal or physical assistance needed to complete eval tasks       Based on the above components, the patient evaluation is determined to be of the following complexity level: LOW   Pain Rating:  No pain endorsed before, during or at conclusion of session    Activity Tolerance:   Good    After treatment patient left in no apparent distress:    Sitting in chair, Call bell within reach and Bed / chair alarm activated    COMMUNICATION/EDUCATION:   The patients plan of care was discussed with: Physical therapist, Occupational therapist and Registered nurse. Patient/family have participated as able in goal setting and plan of care. This patients plan of care is appropriate for delegation to VIVEK.     Thank you for this referral.  Sam Fry, OT  Time Calculation: 38 mins

## 2021-09-02 NOTE — PROGRESS NOTES
Hospitalist Progress Note    NAME: Teri Sanz   :  1940   MRN:  [id number]       Assessment / Plan:  Acute respiratory failure with hypoxia POA  Acute on chronic congestive heart failure proBNP 35,000  Echocardiogram 2021 at [facility]  shows EF of 45 to 50% with severe grade 3 diastolic dysfunction. Troponinemia likely type B demand secondary to fluid overload- remains flat  Leukocytosis 25,000, lactic acid within normal limit, Covid negative on 2021  B/L Pleural effusion POA  history of MSSA on Dapto IV till  2021 at other hospital before transfer here  Chronic kidney disease creatinine 1.59 POA- up to 2.0 today  Current tobacco abuse  XR CHEST PORT (Order: [id number]) - 2021   IMPRESSION  1. Unchanged mild bilateral pleural effusions, greater on the right. 2. Unchanged right sided airspace disease. 3. Unchanged mild cardiomegaly and edema. EKG Sinus rhythm at a rate of 62 bpm; normal FL; normal QRS; normal QTC and normal axis.  T wave inversions noted in 1 and aVL as well as V3 through V6.      CT chest without contrast=  1. Masslike opacity in the right upper lobe laterally along the pleural surface  and mediastinal adenopathy suspicious for malignancy.     2. Additional pulmonary nodule in the right upper lobe.     3. Right pleural effusion and right lower lobe volume loss. This may represent  a malignant pleural effusion given the pleural-based mass more superiorly which  possibly tracks along the pleural surface inferiorly.      S/p IV daptomycin- changed to Vanc and cefepime per ID  IP ID consulted- recommended Thoracentesis with Pleural fluid cytology/pathology with routine cultures- s/p Thoracentesis ()- follow cytology & C&S  IP Cardiology consulted- cont IV diuresis as able for now   Cont strict intake output record Daily weight 2 g sodium diet        Recent pacer/ICD explant  PPM/ICD was originally indicated for primary prevention        OTHER PMH  Anemia hemoglobin 8.3 which is around his previous baseline. Monitor. No acute evidence of bleeding. Hypertension  Diabetes  Depression  Resume home medication as per med rec including insulin sliding scale. Check A1c level  Current tobacco use. Advised cessation. Nicotine patch  Cont. duoneb prn for possible contribution of undx COPD     Code Status: FULL    Estimated discharge date: September 6  Barriers:      Prophylaxis: Hep SQ  Recommended Disposition: Home w/Family, SNF/LTC and HH PT, OT, RN? TBD     Subjective:     Chief Complaint / Reason for Physician Visit : F/U SOB/Pleural effusions, s/p thoracentesis, CHF  \"I am so much better with my breathing\". Discussed with RN events overnight. Review of Systems:  Symptom Y/N Comments  Symptom Y/N Comments   Fever/Chills n   Chest Pain n    Poor Appetite n   Edema n    Cough n   Abdominal Pain n    Sputum n   Joint Pain n    SOB/STANTON n Resolved after thoracentesis per pt  Pruritis/Rash n    Nausea/vomit n   Tolerating PT/OT y    Diarrhea n   Tolerating Diet y    Constipation    Other       Could NOT obtain due to:      Objective:     VITALS:   Last 24hrs VS reviewed since prior progress note.  Most recent are:  Patient Vitals for the past 24 hrs:   Temp Pulse Resp BP SpO2   09/02/21 0933 98.4 °F (36.9 °C) 75 18 134/63 95 %   09/02/21 0846 -- -- -- -- 99 %   09/02/21 0840 98.3 °F (36.8 °C) 69 18 (!) 124/53 99 %   09/02/21 0410 98.2 °F (36.8 °C) 75 17 (!) 103/53 96 %   09/02/21 0024 98.2 °F (36.8 °C) 74 17 127/75 98 %   09/01/21 1433 -- 65 16 (!) 128/47 98 %   09/01/21 1402 -- 69 16 (!) 98/47 99 %   09/01/21 1346 -- 72 20 (!) 126/41 100 %   09/01/21 1321 98.1 °F (36.7 °C) 72 20 (!) 123/41 100 %   09/01/21 1103 98.2 °F (36.8 °C) 78 18 (!) 126/44 97 %       Intake/Output Summary (Last 24 hours) at 9/2/2021 1047  Last data filed at 9/2/2021 0646  Gross per 24 hour   Intake 100 ml   Output 1650 ml   Net -1550 ml        I had a face to face encounter and independently examined this patient on 9/2/2021, as outlined below:  PHYSICAL EXAM:  General: WD, WN. Alert, cooperative, no acute distress    EENT:  EOMI. Anicteric sclerae. MMM  Resp:  CTA bilaterally, no wheezing or rales. No accessory muscle use  CV:  Regular  rhythm,  No edema  GI:  Soft, Non distended, Non tender. +Bowel sounds  Neurologic:  Alert and oriented X 3, normal speech,   Psych:   Good insight. Not anxious nor agitated  Skin:  No rashes. No jaundice    Reviewed most current lab test results and cultures  YES  Reviewed most current radiology test results   YES  Review and summation of old records today    NO  Reviewed patient's current orders and MAR    YES  PMH/SH reviewed - no change compared to H&P  ________________________________________________________________________  Care Plan discussed with:    Comments   Patient x    Family      RN x    Care Manager x Graham   Consultant                        Multidiciplinary team rounds were held today with , nursing, pharmacist and clinical coordinator. Patient's plan of care was discussed; medications were reviewed and discharge planning was addressed. ________________________________________________________________________  Total NON critical care TIME:  36   Minutes    Total CRITICAL CARE TIME Spent:   Minutes non procedure based      Comments   >50% of visit spent in counseling and coordination of care     ________________________________________________________________________  Stewart Noland MD     Procedures: see electronic medical records for all procedures/Xrays and details which were not copied into this note but were reviewed prior to creation of Plan. LABS:  I reviewed today's most current labs and imaging studies.   Pertinent labs include:  Recent Labs     08/31/21  0858 08/30/21 1930   WBC 25.0* 24.7*   HGB 8.3* 8.6*   HCT 25.5* 26.7*   * 594*     Recent Labs     09/02/21  0211 08/31/21  0858 08/30/21 1930    137 134*   K 4.2 4.3 4.8    103 103   CO2 22 21 22   * 99 121*   BUN 42* 31* 35*   CREA 2.05* 1.59* 1.49*   CA 8.9 9.0 9.3   MG  --  1.6  --    PHOS  --  4.0  --    ALB  --   --  2.3*   TBILI  --   --  0.3   ALT  --   --  22       Signed: Maxim Edwards MD

## 2021-09-02 NOTE — PROGRESS NOTES
Infectious Disease Progress Note         Interval:  NAEO    Subjective:   Seen today. Reports feeling much better today with her breathing - much easier and comfortable. Objective:    Vitals:   Reviewed in chart. Physical Exam:  Gen: No apparent distress  HEENT:  Normocephalic, atraumatic, no scleral icterus  CV: HDS  Lungs: on minimal NC O2  Abdomen: soft, non tender, non distended  Genitourinary:   no allen catheter   Skin: no rash   Psych: good affect, good eye contact, non tearful  Neuro: alert, oriented to time,  place, and situation, moves all extremities to commands, verbal  Musculoskeletal:  No joint edema, erythema or tenderness noted   Lines: PICC line        Labs:  Recent Results (from the past 24 hour(s))   CELL COUNT, BODY FLUID    Collection Time: 09/01/21  1:44 PM   Result Value Ref Range    BODY FLUID TYPE PLEURAL FLUID      FLUID COLOR YELLOW      FLUID APPEARANCE HAZY      FLUID RBC CT. >100 (H) 0 /cu mm    FLUID NUCLEATED CELLS 1,419 /cu mm    FLD NEUTROPHILS 1 (A) NRRE %    FLD LYMPHS 29 (A) NRRE %    FLD MONO/MACROPHAGES 27 (A) NRRE %    FLUID MESOTHELIAL 8 (A) NRRE %    FLD EOSINS 35 (A) NRRE %   LDH, BODY FLUID    Collection Time: 09/01/21  1:44 PM   Result Value Ref Range    Fluid Type: PLEURAL FLUID      LD, body fld. 72 U/L   PROTEIN TOTAL, FLUID    Collection Time: 09/01/21  1:44 PM   Result Value Ref Range    Fluid Type: PLEURAL FLUID      Protein total, body fld. 2.6 g/dL   CULTURE, BODY FLUID Dallin Gunter STAIN    Collection Time: 09/01/21  1:47 PM    Specimen: Pleural Fluid;  Body Fluid   Result Value Ref Range    Special Requests: NO SPECIAL REQUESTS      GRAM STAIN NO WBC'S SEEN      GRAM STAIN NO ORGANISMS SEEN      Culture result: PENDING    SAMPLES BEING HELD    Collection Time: 09/01/21  1:47 PM   Result Value Ref Range    SAMPLES BEING HELD [facility]     COMMENT        Add-on orders for these samples will be processed based on acceptable specimen integrity and analyte stability, which may vary by analyte. METABOLIC PANEL, BASIC    Collection Time: 09/02/21  2:11 AM   Result Value Ref Range    Sodium 136 136 - 145 mmol/L    Potassium 4.2 3.5 - 5.1 mmol/L    Chloride 108 97 - 108 mmol/L    CO2 22 21 - 32 mmol/L    Anion gap 6 5 - 15 mmol/L    Glucose 114 (H) 65 - 100 mg/dL    BUN 42 (H) 6 - 20 MG/DL    Creatinine 2.05 (H) 0.55 - 1.02 MG/DL    BUN/Creatinine ratio 20 12 - 20      GFR est AA 28 (L) >60 ml/min/1.73m2    GFR est non-AA 23 (L) >60 ml/min/1.73m2    Calcium 8.9 8.5 - 10.1 MG/DL               Assessment:  25-year-old female with:    -Shortness of breath prior to arrival likely due to right-sided pleural effusion, CT chest showing right-sided consolidation, pleural effusion. She is status post right-sided thoracentesis with drainage of 1500 cc clear fluid on 9/1/2021. Pleural fluid analysis reviewed. pH not available. 1419 nucleated cells; 29% lymphocytes, 27% macrophages, only 1% neutrophils. Gram stain and cultures pending at this time. LDH, total protein was not elevated. Pathology and cytology pending as well. Patient clinically better and feels better since after the thoracentesis. -MSSA bacteremia in July 2021 status post explantation of the ICD/pacemaker along with leads by Dr. Mario Alberto Becker on 7/21/2021. Patient has finished 6 weeks of antibiotics for this with IV cefazolin/daptomycin on 9/2/2021. No further indication of pacemaker was deemed necessary by cardiology at that time. - History of right knee replacement  -History of greater than 50-pack-year smoking history; quit around 2 weeks ago. Recommendations:  -Some IVAN noted today. Suspicion for MRSA pneumonia is low. Will stop vancomycin at this time. If patient decompensates, we can restart vancomycin.  -Continue Zosyn empirically.  -Send MRSA nasal swab. -Follow-up pleural fluid cultures and pathology.         We will follow          Thank you for the opportunity to participate in the care of this patient. Please contact with questions or concerns.       Jacky Talley MD  Infectious Diseases

## 2021-09-02 NOTE — PROGRESS NOTES
Problem: Mobility Impaired (Adult and Pediatric)  Goal: *Acute Goals and Plan of Care (Insert Text)  Description: FUNCTIONAL STATUS PRIOR TO ADMISSION: Patient ambulates household distances with RW, only drives occasionally    HOME SUPPORT PRIOR TO ADMISSION: Patient lives with  (who is w/c bound), does not require assist with ADLs.  did assist with med management. Physical Therapy Goals  Initiated 9/2/2021  1. Patient will move from supine to sit and sit to supine  in bed with independence within 7 day(s). 2.  Patient will transfer from bed to chair and chair to bed with supervision/set-up using the least restrictive device within 7 day(s). 3.  Patient will perform sit to stand with supervision/set-up within 7 day(s). 4.  Patient will ambulate with supervision/set-up for 100 feet with the least restrictive device within 7 day(s). 5.  Patient will ascend/descend 13 stairs with  handrail(s) with minimal assistance/contact guard assist within 7 day(s). Outcome: Not Met   PHYSICAL THERAPY EVALUATION  Patient: Molly Cuellar (11 y.o. female)  Date: 9/2/2021  Primary Diagnosis: Acute CHF (congestive heart failure) (HCC) [I50.9]  Hypoxia [R09.02]  SOB (shortness of breath) [R06.02]  Elevated troponin [R77.8]  CKD (chronic kidney disease) [N18.9]  Leukocytosis [D72.829]  Pleural effusion [J90]        Precautions:   Fall    ASSESSMENT  Based on the objective data described below, the patient presents with decreased tolerance of activity, general weakness, and impaired functional mobility following admission for CHF. Patient received in bed and agreeable to participate, on 1 liter 02 with sats in high 90s. Patient was able to come to EOB with supervision and sitting balance is intact, then was able to ambulate in room x approx 50 feet (including into bathroom)  with RW and CGA, Patient required cuing for safety, sequencing and walker management.   Left up in chair with call bell in reach, expect she will be able to discharge home with HHPT to follow. Current Level of Function Impacting Discharge (mobility/balance): CGA    Functional Outcome Measure: The patient scored 60/100 on the Barthel  outcome measure which is indicative of moderate functional impairment      Other factors to consider for discharge:  in w/c, frequent re-hospitalizations, ?compliance     Patient will benefit from skilled therapy intervention to address the above noted impairments. PLAN :  Recommendations and Planned Interventions: bed mobility training, transfer training, gait training, therapeutic exercises, patient and family training/education and therapeutic activities      Frequency/Duration: Patient will be followed by physical therapy:  4 times a week to address goals. Recommendation for discharge: (in order for the patient to meet his/her long term goals)  Physical therapy at least 2 days/week in the home     This discharge recommendation:  Has been made in collaboration with the attending provider and/or case management    IF patient discharges home will need the following DME: patient owns DME required for discharge         SUBJECTIVE:   Patient stated I don't want to to have to use the bedpan.     OBJECTIVE DATA SUMMARY:   HISTORY:    Past Medical History:   Diagnosis Date    A-fib [facility])     AICD (automatic cardioverter/defibrillator) present 9/23/2020 9/23/2020 [employer] AICD implant    Arthritis     Bilateral pleural effusion 9/18/2020    Chronic pain     Chronic pain     Diabetes ([address]     Diverticular disease     Elevated troponin 9/18/2020    Hemorrhoid     Hypercholesterolemia     IBS (irritable bowel syndrome)     Lymphocytosis 91/96/3468    Systolic heart failure, chronic ([address] 7/16/2021    Type 2 MI (myocardial infarction) ([address] 7/16/2021 7/162021 r/t sepsis     Past Surgical History:   Procedure Laterality Date    COLONOSCOPY N/A 10/31/2019    COLONOSCOPY performed by Sheree Porras MD at [address]  10/31/2019         Shonda Parada  10/31/2019         HX CATARACT REMOVAL      HX CHOLECYSTECTOMY      HX COLONOSCOPY  2009    HX COLONOSCOPY  2016    2 adenomatous polyp    HX HEMORRHOIDECTOMY  2009    HX HYSTERECTOMY      HX KNEE REPLACEMENT      right    HX ORTHOPAEDIC  12/11/2017    back, laminectomy and decompression    RI INSJ/RPLCMT PERM DFB W/TRNSVNS LDS 1/DUAL CHMBR N/A 9/23/2020    INSERT ICD DUAL performed by Matthew Rosenthal MD at [facility] CARDIAC CATH LAB       Personal factors and/or comorbidities impacting plan of care: multiple co-morbidities    Home Situation  Home Environment: Private residence  # Steps to Enter:  (uses ramp;  uses WC.)  Rails to Enter: No  Wheelchair Ramp: Yes  One/Two Story Residence: Two story  # of Interior Steps: 15  Interior Rails: Right  Lift Chair Available: No  Living Alone: No  Support Systems: Spouse/Significant Other/Partner (79 yo )  Patient Expects to be Discharged to[de-identified] House  Current DME Used/Available at Home: Larayne Fried, rolling, Wheelchair, Transfer bench (both WCs are fitted for )  Tub or Shower Type: Tub/Shower combination    EXAMINATION/PRESENTATION/DECISION MAKING:   Critical Behavior:              Hearing: Auditory  Auditory Impairment: None  Range Of Motion:  AROM: Within functional limits                       Strength:    Strength: Within functional limits                    Tone & Sensation:                                  Coordination:  Coordination: Within functional limits  Vision:      Functional Mobility:  Bed Mobility:  Rolling: Supervision  Supine to Sit: Supervision  Sit to Supine: Supervision     Transfers:  Sit to Stand: Contact guard assistance  Stand to Sit: Contact guard assistance                       Balance:   Sitting: Intact  Standing: Intact; With support  Ambulation/Gait Training:  Distance (ft): 50 Feet (ft)  Assistive Device: Gait belt;Walker, rolling  Ambulation - Level of Assistance: Contact guard assistance                       Speed/Lata: Pace decreased (<100 feet/min)                        Stairs:                Functional Measure:  Barthel Index:    Bathin  Bladder: 5  Bowels: 5  Groomin  Dressin  Feeding: 10  Mobility: 10  Stairs: 5  Toilet Use: 5  Transfer (Bed to Chair and Back): 10  Total: 60/100       The Barthel ADL Index: Guidelines  1. The index should be used as a record of what a patient does, not as a record of what a patient could do. 2. The main aim is to establish degree of independence from any help, physical or verbal, however minor and for whatever reason. 3. The need for supervision renders the patient not independent. 4. A patient's performance should be established using the best available evidence. Asking the patient, friends/relatives and nurses are the usual sources, but direct observation and common sense are also important. However direct testing is not needed. 5. Usually the patient's performance over the preceding 24-48 hours is important, but occasionally longer periods will be relevant. 6. Middle categories imply that the patient supplies over 50 per cent of the effort. 7. Use of aids to be independent is allowed. Giovanna Tiwari., Barthel, DPaulW. (4318). Functional evaluation: the Barthel Index. [address] [facility] (14)2. ALISA Martínez, Ashwin Luther., Jenae Castorena., [place], [address] [place] [address] (). Measuring the change indisability after inpatient rehabilitation; comparison of the responsiveness of the Barthel Index and Functional Dyer Measure. Journal of Neurology, Neurosurgery, and Psychiatry, 66(4), 443-209. Srikanth Chappell, N.J.A, ASHLEY Garrett, & Amy Panchal MPaulA. (2004.) Assessment of post-stroke quality of life in cost-effectiveness studies: The usefulness of the Barthel Index and the EuroQoL-5D.  [facility], 13, 407-68 Physical Therapy Evaluation Charge Determination   History Examination Presentation Decision-Making   MEDIUM  Complexity : 1-2 comorbidities / personal factors will impact the outcome/ POC  LOW Complexity : 1-2 Standardized tests and measures addressing body structure, function, activity limitation and / or participation in recreation  LOW Complexity : Stable, uncomplicated  LOW Complexity : FOTO score of       Based on the above components, the patient evaluation is determined to be of the following complexity level: LOW     Pain Rating:      Activity Tolerance:   Fair, desaturates with exertion and requires oxygen and requires rest breaks    After treatment patient left in no apparent distress:   Sitting in chair and Call bell within reach    COMMUNICATION/EDUCATION:   The patients plan of care was discussed with: Occupational therapist and Registered nurse. Fall prevention education was provided and the patient/caregiver indicated understanding. and Patient/family agree to work toward stated goals and plan of care.     Thank you for this referral.  Starlyn Runner, PT   Time Calculation: 38 mins

## 2021-09-03 ENCOUNTER — APPOINTMENT (OUTPATIENT)
Dept: GENERAL RADIOLOGY | Age: 81
DRG: 291 | End: 2021-09-03
Attending: NURSE PRACTITIONER
Payer: MEDICARE

## 2021-09-03 PROBLEM — Z51.5 PALLIATIVE CARE ENCOUNTER: Status: ACTIVE | Noted: 2021-09-03

## 2021-09-03 LAB
ANION GAP SERPL CALC-SCNC: 7 MMOL/L (ref 5–15)
BUN SERPL-MCNC: 46 MG/DL (ref 6–20)
BUN/CREAT SERPL: 21 (ref 12–20)
CALCIUM SERPL-MCNC: 9 MG/DL (ref 8.5–10.1)
CHLORIDE SERPL-SCNC: 110 MMOL/L (ref 97–108)
CO2 SERPL-SCNC: 21 MMOL/L (ref 21–32)
CREAT SERPL-MCNC: 2.14 MG/DL (ref 0.55–1.02)
ERYTHROCYTE [DISTWIDTH] IN BLOOD BY AUTOMATED COUNT: 16.8 % (ref 11.5–14.5)
GLUCOSE BLD STRIP.AUTO-MCNC: 141 MG/DL (ref 65–117)
GLUCOSE BLD STRIP.AUTO-MCNC: 218 MG/DL (ref 65–117)
GLUCOSE SERPL-MCNC: 128 MG/DL (ref 65–100)
HCT VFR BLD AUTO: 25.5 % (ref 35–47)
HGB BLD-MCNC: 7.9 G/DL (ref 11.5–16)
MCH RBC QN AUTO: 28.1 PG (ref 26–34)
MCHC RBC AUTO-ENTMCNC: 31 G/DL (ref 30–36.5)
MCV RBC AUTO: 90.7 FL (ref 80–99)
NRBC # BLD: 0 K/UL (ref 0–0.01)
NRBC BLD-RTO: 0 PER 100 WBC
PLATELET # BLD AUTO: 497 K/UL (ref 150–400)
PMV BLD AUTO: 9.7 FL (ref 8.9–12.9)
POTASSIUM SERPL-SCNC: 4.4 MMOL/L (ref 3.5–5.1)
RBC # BLD AUTO: 2.81 M/UL (ref 3.8–5.2)
SERVICE CMNT-IMP: ABNORMAL
SERVICE CMNT-IMP: ABNORMAL
SODIUM SERPL-SCNC: 138 MMOL/L (ref 136–145)
WBC # BLD AUTO: 17.7 K/UL (ref 3.6–11)

## 2021-09-03 PROCEDURE — 74011000258 HC RX REV CODE- 258: Performed by: INTERNAL MEDICINE

## 2021-09-03 PROCEDURE — 83880 ASSAY OF NATRIURETIC PEPTIDE: CPT

## 2021-09-03 PROCEDURE — 74011000250 HC RX REV CODE- 250: Performed by: HOSPITALIST

## 2021-09-03 PROCEDURE — 74011250636 HC RX REV CODE- 250/636: Performed by: HOSPITALIST

## 2021-09-03 PROCEDURE — 94760 N-INVAS EAR/PLS OXIMETRY 1: CPT

## 2021-09-03 PROCEDURE — 99233 SBSQ HOSP IP/OBS HIGH 50: CPT | Performed by: INTERNAL MEDICINE

## 2021-09-03 PROCEDURE — 71045 X-RAY EXAM CHEST 1 VIEW: CPT

## 2021-09-03 PROCEDURE — 36415 COLL VENOUS BLD VENIPUNCTURE: CPT

## 2021-09-03 PROCEDURE — 99223 1ST HOSP IP/OBS HIGH 75: CPT | Performed by: NURSE PRACTITIONER

## 2021-09-03 PROCEDURE — APPSS45 APP SPLIT SHARED TIME 31-45 MINUTES: Performed by: NURSE PRACTITIONER

## 2021-09-03 PROCEDURE — 77010033678 HC OXYGEN DAILY

## 2021-09-03 PROCEDURE — 74011250636 HC RX REV CODE- 250/636: Performed by: INTERNAL MEDICINE

## 2021-09-03 PROCEDURE — 74011250637 HC RX REV CODE- 250/637: Performed by: HOSPITALIST

## 2021-09-03 PROCEDURE — 82962 GLUCOSE BLOOD TEST: CPT

## 2021-09-03 PROCEDURE — 74011636637 HC RX REV CODE- 636/637: Performed by: HOSPITALIST

## 2021-09-03 PROCEDURE — 97535 SELF CARE MNGMENT TRAINING: CPT

## 2021-09-03 PROCEDURE — 74011000250 HC RX REV CODE- 250: Performed by: NURSE PRACTITIONER

## 2021-09-03 PROCEDURE — 80048 BASIC METABOLIC PNL TOTAL CA: CPT

## 2021-09-03 PROCEDURE — 85027 COMPLETE CBC AUTOMATED: CPT

## 2021-09-03 PROCEDURE — 94640 AIRWAY INHALATION TREATMENT: CPT

## 2021-09-03 PROCEDURE — 65660000000 HC RM CCU STEPDOWN

## 2021-09-03 RX ORDER — LABETALOL HYDROCHLORIDE 5 MG/ML
10 INJECTION, SOLUTION INTRAVENOUS
Status: DISCONTINUED | OUTPATIENT
Start: 2021-09-03 | End: 2021-09-07 | Stop reason: HOSPADM

## 2021-09-03 RX ADMIN — Medication 10 ML: at 22:05

## 2021-09-03 RX ADMIN — ARFORMOTEROL TARTRATE: 15 SOLUTION RESPIRATORY (INHALATION) at 07:15

## 2021-09-03 RX ADMIN — ASPIRIN 81 MG: 81 TABLET, COATED ORAL at 09:39

## 2021-09-03 RX ADMIN — Medication 10 ML: at 06:30

## 2021-09-03 RX ADMIN — INSULIN GLARGINE 50 UNITS: 100 INJECTION, SOLUTION SUBCUTANEOUS at 09:43

## 2021-09-03 RX ADMIN — Medication 1 CAPSULE: at 09:41

## 2021-09-03 RX ADMIN — MONTELUKAST SODIUM 1 G: 4 TABLET, CHEWABLE ORAL at 17:15

## 2021-09-03 RX ADMIN — FERROUS SULFATE TAB 325 MG (65 MG ELEMENTAL FE) 325 MG: 325 (65 FE) TAB at 09:40

## 2021-09-03 RX ADMIN — HEPARIN SODIUM 5000 UNITS: 5000 INJECTION INTRAVENOUS; SUBCUTANEOUS at 22:04

## 2021-09-03 RX ADMIN — HEPARIN SODIUM 5000 UNITS: 5000 INJECTION INTRAVENOUS; SUBCUTANEOUS at 14:51

## 2021-09-03 RX ADMIN — LABETALOL HYDROCHLORIDE 10 MG: 5 INJECTION INTRAVENOUS at 23:21

## 2021-09-03 RX ADMIN — CARVEDILOL 25 MG: 12.5 TABLET, FILM COATED ORAL at 09:41

## 2021-09-03 RX ADMIN — PIPERACILLIN AND TAZOBACTAM 3.38 G: 3; .375 INJECTION, POWDER, LYOPHILIZED, FOR SOLUTION INTRAVENOUS at 08:05

## 2021-09-03 RX ADMIN — HEPARIN SODIUM 5000 UNITS: 5000 INJECTION INTRAVENOUS; SUBCUTANEOUS at 06:31

## 2021-09-03 RX ADMIN — VENLAFAXINE 25 MG: 25 TABLET ORAL at 09:41

## 2021-09-03 RX ADMIN — PIPERACILLIN AND TAZOBACTAM 3.38 G: 3; .375 INJECTION, POWDER, LYOPHILIZED, FOR SOLUTION INTRAVENOUS at 18:23

## 2021-09-03 RX ADMIN — Medication 10 ML: at 17:12

## 2021-09-03 RX ADMIN — CARVEDILOL 25 MG: 12.5 TABLET, FILM COATED ORAL at 17:11

## 2021-09-03 RX ADMIN — MONTELUKAST SODIUM 1 G: 4 TABLET, CHEWABLE ORAL at 09:40

## 2021-09-03 RX ADMIN — VENLAFAXINE 25 MG: 25 TABLET ORAL at 17:15

## 2021-09-03 RX ADMIN — IPRATROPIUM BROMIDE AND ALBUTEROL SULFATE 3 ML: .5; 3 SOLUTION RESPIRATORY (INHALATION) at 23:25

## 2021-09-03 NOTE — PROGRESS NOTES
Spiritual Care Assessment/Progress Note  [facility]      NAME: Angela Beck      MRN: [id number]  AGE: 79 y.o. SEX: female  Oriental orthodox Affiliation: Taoism   Language: English     9/3/2021     Total Time (in minutes): 8     Spiritual Assessment begun in [facility]LE[facility] through conversation with:         []Patient        [] Family    [] Friend(s)        Reason for Consult: Palliative Care, Initial/Spiritual Assessment     Spiritual beliefs: (Please include comment if needed)     [] Identifies with a mallorie tradition:         [] Supported by a mallorie community:            [] Claims no spiritual orientation:           [] Seeking spiritual identity:                [] Adheres to an individual form of spirituality:           [x] Not able to assess:                           Identified resources for coping:      [] Prayer                               [] Music                  [] Guided Imagery     [] Family/friends                 [] Pet visits     [] Devotional reading                         [x] Unknown     [] Other:                                              Interventions offered during this visit: (See comments for more details)                Plan of Care:     [] Support spiritual and/or cultural needs    [] Support AMD and/or advance care planning process      [] Support grieving process   [] Coordinate Rites and/or Rituals    [] Coordination with community clergy   [] No spiritual needs identified at this time   [] Detailed Plan of Care below (See Comments)  [] Make referral to Music Therapy  [] Make referral to Pet Therapy     [] Make referral to Addiction services  [] Make referral to [facility]  [] Make referral to Spiritual Care Partner  [] No future visits requested        [] Follow up upon further referrals     Comments: Attempted Palliative Initial Spiritual Assessment for this pt in [facility].  Reviewed pt's chart prior to this visit to augment any observed or expressed support needs this pt may have. Pt was unable to be assessed at this time. No family/friends present at time of visit. Contact Spiritual Care Services for any spiritual or emotional support needs. Sharron Tellez MDiv.  Staff   Request  Support/Spiritual Care Services via [address] [place] [address]

## 2021-09-03 NOTE — PROGRESS NOTES
Problem: Falls - Risk of  Goal: *Absence of Falls  Description: Document Wiliam Patel Fall Risk and appropriate interventions in the flowsheet.   Outcome: Progressing Towards Goal  Note: Fall Risk Interventions:  Mobility Interventions: Bed/chair exit alarm         Medication Interventions: Bed/chair exit alarm, Patient to call before getting OOB, Teach patient to arise slowly    Elimination Interventions: Bed/chair exit alarm, Call light in reach, Patient to call for help with toileting needs, Stay With Me (per policy), Toilet paper/wipes in reach    History of Falls Interventions: Room close to nurse's station         Problem: Breathing Pattern - Ineffective  Goal: *Absence of hypoxia  Outcome: Progressing Towards Goal  Goal: *Use of effective breathing techniques  Outcome: Progressing Towards Goal

## 2021-09-03 NOTE — PROGRESS NOTES
Problem: Self Care Deficits Care Plan (Adult)  Goal: *Acute Goals and Plan of Care (Insert Text)  Description:   FUNCTIONAL STATUS PRIOR TO ADMISSION: Patient was independent and active without use of DME. Patient was independent for basic and instrumental ADLs. Patient reported her  is w/c bound but she does not assist him at baseline. Reports  assist with medication PRN. HOME SUPPORT: The patient lived with  but did not require assist.    Occupational Therapy Goals  Initiated 9/2/2021  1. Patient will perform lower body dressing with independence within 7 day(s). 2.  Patient will perform toileting with independence within 7 day(s). 3.  Patient will perform grooming in standing with independence within 7 day(s). 4.  Patient will perform toilet transfers with independence within 7 day(s). 5.  Patient will perform all aspects of toileting with independence within 7 day(s). 6.  Patient will participate in upper extremity therapeutic exercise/activities with independence within 7 day(s). 7.  Patient will utilize energy conservation techniques during functional activities with verbal cues within 7 day(s). Outcome: Progressing Towards Goal   OCCUPATIONAL THERAPY TREATMENT  Patient: Stacy Hyman (65 y.o. female)  Date: 9/3/2021  Diagnosis: Acute CHF (congestive heart failure) (HCC) [I50.9]  Hypoxia [R09.02]  SOB (shortness of breath) [R06.02]  Elevated troponin [R77.8]  CKD (chronic kidney disease) [N18.9]  Leukocytosis [D72.829]  Pleural effusion [J90] <principal problem not specified>       Precautions: Fall  Chart, occupational therapy assessment, plan of care, and goals were reviewed. ASSESSMENT  Patient continues with skilled OT services and is progressing towards goals. Pt progressing well toward set goals and very engaged during home safety and energy conservation.  On no supplemental O2, pts O2 remained >92% during standing grooming and toileting tasks with overall SPV using RW. Following tasks, pt returned to sit in recliner chair and set up with breakfast tray. Redonned 1L O2 via NC for pt comfort per pt request. Educated pt regarding home safety (clearing paths in home, removing throw rugs, investing in medical alert device) as well as energy conservation (seated for bathing tasks, taking freq rest breaks prior to onset of sig fatigue, having space to sit in bathroom). Pt receptive to all recommendations and verbalize good understanding. Based upon pt progress and good pt understanding of set goals and education, pt dropped to 3x/week freq. Current Level of Function Impacting Discharge (ADLs): Supervision    Other factors to consider for discharge:  in w/c, frequent re-hospitalizations         PLAN :  Patient continues to benefit from skilled intervention to address the above impairments. Continue treatment per established plan of care to address goals. Recommend with staff: Margarito Carnes for all meals    Recommend next OT session: endurance tasks    Recommendation for discharge: (in order for the patient to meet his/her long term goals)  No skilled occupational therapy/ follow up rehabilitation needs identified at this time. This discharge recommendation:  Has been made in collaboration with the attending provider and/or case management    IF patient discharges home will need the following DME: patient owns DME required for discharge       SUBJECTIVE:   Patient stated I have a bench to sit on in my shower at home.     OBJECTIVE DATA SUMMARY:   Cognitive/Behavioral Status:  Neurologic State: Alert  Orientation Level: Oriented X4                Functional Mobility and Transfers for ADLs:  Bed Mobility:  Supine to Sit: Supervision    Transfers:  Sit to Stand: Supervision  Functional Transfers  Bathroom Mobility: Supervision/set up  Toilet Transfer : Supervision  Adaptive Equipment: Funmilayo Alarcon (comment)  Bed to Chair: Supervision    Balance:  Sitting: Intact  Standing: Intact; With support    ADL Intervention:       Grooming  Grooming Assistance: Supervision  Washing Face: Supervision  Washing Hands: Set-up  Brushing/Combing Hair: Supervision                   Lower Body Dressing Assistance  Socks: Supervision    Toileting  Toileting Assistance: Supervision  Bladder Hygiene: Supervision  Clothing Management: Supervision  Adaptive Equipment: Grab bars           Pain:  Pt endorsed slight back pain during session.  Improved with sitting in recliner vs being in bed    Activity Tolerance:   Good    After treatment patient left in no apparent distress:   Sitting in chair, Call bell within reach and Bed / chair alarm activated    COMMUNICATION/COLLABORATION:   The patients plan of care was discussed with: Physical therapist and Occupational therapist.     Renee Silveira OT  Time Calculation: 19 mins

## 2021-09-03 NOTE — PROGRESS NOTES
[address] [place] [address], [address] [facility]  [phone]      Cardiology Progress Note      9/3/2021 955AM    Admit Date: 9/1/2021    Admit Diagnosis:   Acute CHF (congestive heart failure) (HCC) [I50.9]  Hypoxia [R09.02]  SOB (shortness of breath) [R06.02]  Elevated troponin [R77.8]  CKD (chronic kidney disease) [N18.9]  Leukocytosis [D72.829]  Pleural effusion [J90]    Interval History/Subjective:     Angela Beck is a 79 y.o. female with PMH SSS, DM, IBS, HLD, COPD, PAF, CHF who was  admitted for Acute CHF (congestive heart failure) (HCC) [I50.9]  Hypoxia [R09.02]  SOB (shortness of breath) [R06.02]  Elevated troponin [R77.8]  CKD (chronic kidney disease) [N18.9]  Leukocytosis [D72.829]  Pleural effusion [J90]. -VSS  -creat up to 2.14  -no weight; no I/O  -Ms. Gill Last is feeling good. Breathing still feeling good. No c/o pain. Didn't sleep as well last night.          Visit Vitals  BP (!) 158/66 (BP 1 Location: Left upper arm)   Pulse 83   Temp 98.7 °F (37.1 °C)   Resp 18   Ht 5' 5.98\" (1.676 m)   Wt 66.4 kg (146 lb 6.2 oz)   SpO2 93%   Breastfeeding No   BMI 23.64 kg/m²       Current Facility-Administered Medications   Medication Dose Route Frequency    piperacillin-tazobactam (ZOSYN) 3.375 g in 0.9% sodium chloride (MBP/ADV) 100 mL MBP  3.375 g IntraVENous Q12H    aspirin delayed-release tablet 81 mg  81 mg Oral DAILY    L.acidophilus-paracasei-S.thermophil-bifidobacter (RISAQUAD) 8 billion cell capsule  1 Capsule Oral DAILY    carvediloL (COREG) tablet 25 mg  25 mg Oral BID WITH MEALS    colestipoL (COLESTID) tablet 1 g  1 g Oral BID    ferrous sulfate tablet 325 mg  325 mg Oral DAILY WITH BREAKFAST    insulin glargine (LANTUS) injection 50 Units  50 Units SubCUTAneous DAILY    nicotine (NICODERM CQ) 21 mg/24 hr patch 1 Patch  1 Patch TransDERmal DAILY    nitroglycerin (NITROSTAT) tablet 0.4 mg  0.4 mg SubLINGual Q5MIN PRN    venlafaxine (EFFEXOR) tablet 25 mg  25 mg Oral BID  sodium chloride (NS) flush 5-40 mL  5-40 mL IntraVENous Q8H    sodium chloride (NS) flush 5-40 mL  5-40 mL IntraVENous PRN    acetaminophen (TYLENOL) tablet 650 mg  650 mg Oral Q6H PRN    Or    acetaminophen (TYLENOL) suppository 650 mg  650 mg Rectal Q6H PRN    polyethylene glycol (MIRALAX) packet 17 g  17 g Oral DAILY PRN    ondansetron (ZOFRAN ODT) tablet 4 mg  4 mg Oral Q8H PRN    Or    ondansetron (ZOFRAN) injection 4 mg  4 mg IntraVENous Q6H PRN    heparin (porcine) injection 5,000 Units  5,000 Units SubCUTAneous Q8H    [Held by provider] furosemide (LASIX) injection 40 mg  40 mg IntraVENous Q12H    arformoterol 15 mcg/budesonide 0.5 mg neb solution   Nebulization BID    albuterol-ipratropium (DUO-NEB) 2.5 MG-0.5 MG/3 ML  3 mL Nebulization Q6H PRN       Objective:      Physical Exam:  General Appearance:  pleasant, alert, cooperative, elderly  female sitting in chair in NAD; appears stated age  Eyes: sclera anicteric  Mouth/Throat: moist mucous membranes; oral pharynx clear  Neck: supple;   Pulmonary:  dim LLL; good effort  Cardiovascular: regular rate and rhythm; 2/6 systolic murmur  Abdomen: soft, non-tender, non-distended; bowel sounds normal  Musculoskeletal: no swelling or deformity; moves all extremities  Extremities: no edema; palpable distal pulses   Skin: warm and dry  Neuro: grossly normal  Psych: normal mood and affect given the setting    Data Review:   Recent Labs     09/03/21  0207   WBC 17.7*   HGB 7.9*   HCT 25.5*   *     Recent Labs     09/03/21  0207 09/02/21  0211    136   K 4.4 4.2   * 108   CO2 21 22   * 114*   BUN 46* 42*   CREA 2.14* 2.05*   CA 9.0 8.9       Recent Labs     08/31/21  2030   TROIQ 0.16*       No intake or output data in the 24 hours ending 09/03/21 1025     Telemetry: SR with PVCs and PACs  ECG: NSR  CXRAY: \"1. Unchanged mild bilateral pleural effusions, greater on the right. 2. Unchanged right sided airspace disease.   3. Unchanged mild cardiomegaly and edema. \"  CT chest: \"1. Masslike opacity in the right upper lobe laterally along the pleural surface and mediastinal adenopathy suspicious for malignancy. 2.  Additional pulmonary nodule in the right upper lobe. 3.  Right pleural effusion and right lower lobe volume loss. This may represent a malignant pleural effusion given the pleural-based mass more superiorly which possibly tracks along the pleural surface inferiorly. \"    Assessment:     Active Problems:    SOB (shortness of breath) (6/21/2017)      Hypoxia (9/18/2020)      Elevated troponin (9/18/2020)      Leukocytosis (9/1/2021)      Pleural effusion (9/1/2021)      CKD (chronic kidney disease) (9/1/2021)      Acute CHF (congestive heart failure) ([facility] [address] (9/1/2021)        Plan:     Acute on chronic combined systolic/diastolic heart failure:  Improved. Per notes, appears patient had flash pulmonary edema or hypertensive crisis in addition to her SOB from her pleural effusions. Breathing much improved after thoracentesis with 1L removed. ICD removed due to blood stream infection 7/21/21. Recent EF 45-50%  · Heart failure component appears improved/resolved. Feel that effusion seems more likely not HF related. Follow fluid studies. · Will wait on adding diuretic back today. Would transition back to PO possibly tomorrow pending morning labs. · HTN stable:  Continue BB. · No ACEi/ARB/ARNI with patient's CKD         Mild troponin elevation:  Flat and likely from hypertensive crisis at OSH or HF. Stress 07/20 with fixed defect. · ASA and BB  · Hasn't been on statin due to daptomycin, but this is now stopped. Consider restarting at discharge once dapt out of system. CKD:  Creat 2.14  · IVF completed;   · Consider restarting diuretic tomorrow       ICD explanted 7/21/21:  Just finished course of planned abx for bloodstream infection.     · ID following  · Most recent EF 45-50%           Will see again if asked during admission. Otherwise follow up with Dr. Fletcher Ralph ~2 weeks after discharge      Jemima Delgado NP  DNP, RN, AGACNP-BC    Patient seen and examined by me with the above nurse practitioner. I personally performed all components of the history, physical, and medical decision making and agree with the assessment and plan with minor modifications as noted. Today the patient presents with improved shortness of breath, and now compensated volume overload. General PE  Gen:  NAD  Mental Status - Alert. General Appearance - Not in acute distress. HEENT:  PERRL, no carotid bruits or JVD  Chest and Lung Exam   Inspection: Accessory muscles - No use of accessory muscles in breathing. Auscultation:   Breath sounds: - Normal.   Cardiovascular   Inspection: Jugular vein - Bilateral - Inspection Normal.   Palpation/Percussion:   Apical Impulse: - Normal.   Auscultation: Rhythm - Regular. Heart Sounds - S1 WNL and S2 WNL. No S3 or S4. Murmurs & Other Heart Sounds: Auscultation of the heart reveals - No Murmurs. Peripheral Vascular   Upper Extremity: Inspection - Bilateral - No Cyanotic nailbeds or Digital clubbing. Lower Extremity:   Palpation: Edema - Bilateral - No edema. Abdomen:   Soft, non-tender, bowel sounds are active. Neuro: A&O times 3, CN and motor grossly WNL    Today the patient presents with improved shortness of breath, and now compensated volume overload. Eval for lung mass/ cytology pending. Cardiac status stable. Will sign off for now. F/u in 2-4 weeks in the office with Dr. Fletcher Ralph.

## 2021-09-03 NOTE — PROGRESS NOTES
Hospitalist Progress Note    NAME: Inez Davis   :  1940   MRN:  [id number]       Assessment / Plan:  Acute respiratory failure with hypoxia POA- improved s/p pleural tap, on 1L/m oxygen now  Acute on chronic congestive heart failure proBNP 35,000  Echocardiogram 2021 at [facility]  shows EF of 45 to 50% with severe grade 3 diastolic dysfunction. Troponinemia likely type B demand secondary to fluid overload- remains flat  Leukocytosis 25,000, lactic acid within normal limit, Covid negative on 2021  B/L Pleural effusion POA  history of MSSA on Dapto IV till  2021 at other hospital before transfer here  Chronic kidney disease creatinine 1.59 POA- stable at 2.1 today  Current tobacco abuse  XR CHEST PORT (Order: [id number]) - 2021   IMPRESSION  1. Unchanged mild bilateral pleural effusions, greater on the right. 2. Unchanged right sided airspace disease. 3. Unchanged mild cardiomegaly and edema. EKG Sinus rhythm at a rate of 62 bpm; normal WV; normal QRS; normal QTC and normal axis.  T wave inversions noted in 1 and aVL as well as V3 through V6.      CT chest without contrast=  1. Masslike opacity in the right upper lobe laterally along the pleural surface  and mediastinal adenopathy suspicious for malignancy.     2. Additional pulmonary nodule in the right upper lobe.     3. Right pleural effusion and right lower lobe volume loss. This may represent  a malignant pleural effusion given the pleural-based mass more superiorly which  possibly tracks along the pleural surface inferiorly.      S/p IV daptomycin- changed to Vanc and cefepime per ID- Vancomycin stopped () per ID due to low suspicion for MSSA this time and elevated Cr, cont Cefepime for now  IP ID consulted- recommended Thoracentesis with Pleural fluid cytology/pathology with routine cultures- s/p Thoracentesis ()- follow cytology- pending & C&S/ AFB- neg till now  IP Cardiology consulted- cont IV diuresis as able for now , resume Lasix in 24 hrs  Cont strict intake output- -1.6L in past 24 hrs  record Daily weight  Cont  2 g sodium diet        Recent pacer/ICD explant  PPM/ICD was originally indicated for primary prevention        OTHER PMH  Anemia hemoglobin 8.3 which is around his previous baseline. Monitor. No acute evidence of bleeding. Hypertension  Diabetes  Depression  Resume home medication as per med rec including insulin sliding scale. Check A1c level  Current tobacco use. Advised cessation. Nicotine patch  Cont. duoneb prn for possible contribution of undx COPD     Code Status: FULL    Estimated discharge date: September 6  Barriers:      Prophylaxis: Hep SQ  Recommended Disposition: HH recommended by PT/OT     Subjective:     Chief Complaint / Reason for Physician Visit : F/U SOB/Pleural effusions, s/p thoracentesis, CHF  \"I am so much better with my breathing\". Discussed with RN events overnight. Review of Systems:  Symptom Y/N Comments  Symptom Y/N Comments   Fever/Chills n   Chest Pain n    Poor Appetite n   Edema n    Cough n   Abdominal Pain n    Sputum n   Joint Pain n    SOB/STANTON n Resolved after thoracentesis per pt  Pruritis/Rash n    Nausea/vomit n   Tolerating PT/OT y    Diarrhea n   Tolerating Diet y    Constipation    Other       Could NOT obtain due to:      Objective:     VITALS:   Last 24hrs VS reviewed since prior progress note.  Most recent are:  Patient Vitals for the past 24 hrs:   Temp Pulse Resp BP SpO2   09/03/21 1058 97.6 °F (36.4 °C) 79 19 (!) 159/66 95 %   09/03/21 0734 98.7 °F (37.1 °C) 83 18 (!) 158/66 93 %   09/03/21 0716 -- -- -- -- 95 %   09/03/21 0250 98.2 °F (36.8 °C) 86 18 129/60 95 %   09/02/21 2253 98.2 °F (36.8 °C) 85 18 134/63 96 %   09/1940 -- -- -- -- 96 %   09/02/21 1930 98.2 °F (36.8 °C) 79 18 (!) 120/59 96 %   09/02/21 1616 98.2 °F (36.8 °C) 72 18 (!) 141/62 95 %     No intake or output data in the 24 hours ending 09/03/21 1150     I had a face to face encounter and independently examined this patient on 9/3/2021, as outlined below:  PHYSICAL EXAM:  General: WD, WN. Alert, cooperative, no acute distress    EENT:  EOMI. Anicteric sclerae. MMM  Resp:  CTA bilaterally, no wheezing or rales. No accessory muscle use  CV:  Regular  rhythm,  No edema  GI:  Soft, Non distended, Non tender. +Bowel sounds  Neurologic:  Alert and oriented X 3, normal speech,   Psych:   Good insight. Not anxious nor agitated  Skin:  No rashes. No jaundice    Reviewed most current lab test results and cultures  YES  Reviewed most current radiology test results   YES  Review and summation of old records today    NO  Reviewed patient's current orders and MAR    YES  PMH/SH reviewed - no change compared to H&P  ________________________________________________________________________  Care Plan discussed with:    Comments   Patient x    Family      RN x    Care Manager x    Consultant  x Dr Sanam Jain (cardio), Dr Pedro Rios (ID)                     Multidiciplinary team rounds were held today with , nursing, pharmacist and clinical coordinator. Patient's plan of care was discussed; medications were reviewed and discharge planning was addressed. ________________________________________________________________________  Total NON critical care TIME:  26   Minutes    Total CRITICAL CARE TIME Spent:   Minutes non procedure based      Comments   >50% of visit spent in counseling and coordination of care     ________________________________________________________________________  Jacinto Reddy MD     Procedures: see electronic medical records for all procedures/Xrays and details which were not copied into this note but were reviewed prior to creation of Plan. LABS:  I reviewed today's most current labs and imaging studies.   Pertinent labs include:  Recent Labs     09/03/21 0207   WBC 17.7*   HGB 7.9*   HCT 25.5*   *     Recent Labs     09/03/21 0207 09/02/21  0211   NA 138 136   K 4.4 4.2   * 108   CO2 21 22   * 114*   BUN 46* 42*   CREA 2.14* 2.05*   CA 9.0 8.9       Signed: Selina Canas MD

## 2021-09-03 NOTE — PROGRESS NOTES
Physical Therapy    Chart reviewed and attempted visit, but  patient reports she is very fatigued and politely declines participating. Will defer and continue to follow.     Alta Davis

## 2021-09-03 NOTE — PROGRESS NOTES
End of Shift Note     Bedside shift change report given to Graham bowers RN (oncoming nurse) by Shari Hurst RN (offgoing nurse).   Report included the following information SBAR, Kardex, Procedure Summary and Recent Results     Shift worked: 7a-3p   Shift summary and any significant changes:     none         Concerns for physician to address: none   Zone phone for oncoming shift:   [phone]      Patient Information  Rodrigo White  79 y.o.  9/1/2021  1:25 AM by Kellee Marroquin MD. Rodrigo White was admitted from Home     Problem List       Patient Active Problem List     Diagnosis Date Noted    Leukocytosis 09/01/2021    Pleural effusion 09/01/2021    CKD (chronic kidney disease) 09/01/2021    Acute CHF (congestive heart failure) ([address] 09/01/2021    Anemia of infection and chronic disease 08/26/2021    COPD (chronic obstructive pulmonary disease) ([address] 08/26/2021    Depression 08/26/2021    Pneumonia 08/23/2021    Sepsis ([address] 07/16/2021    Type 2 MI (myocardial infarction) ([address] 62/51/8490    Systolic heart failure, chronic ([address] 07/16/2021    Moderate episode of recurrent major depressive disorder ([address] 06/14/2021    Dilated cardiomyopathy ([address] 09/23/2020    SSS (sick sinus syndrome) ([address] 09/23/2020    Cardiomyopathy ([address] 09/23/2020    AICD (automatic cardioverter/defibrillator) present 09/23/2020    Respiratory failure ([address] 09/18/2020    CHF (congestive heart failure) ([address] 09/18/2020    Hypoxia 09/18/2020    Bilateral pleural effusion 09/18/2020    Elevated troponin 09/18/2020    PAF (paroxysmal atrial fibrillation) ([address] 09/10/2020    CKD (chronic kidney disease) stage 3, GFR 30-59 ml/min ([address] 06/04/2019    Lymphocytosis 05/07/2018    Type 2 diabetes with nephropathy ([address] 02/15/2018    Type 2 diabetes mellitus with diabetic neuropathy ([address] 02/15/2018    Spinal stenosis of lumbar region with neurogenic claudication 11/17/2017    Spondylosis of lumbosacral region without myelopathy or radiculopathy 11/17/2017    Overdose of opiate or related narcotic, accidental or unintentional, subsequent encounter 10/27/2017    Acute left-sided low back pain with sciatica 10/27/2017    Physical debility 10/27/2017    Acute encephalopathy 10/23/2017    Hyperkalemia 10/20/2017    Altered mental status 10/20/2017    Transaminitis 10/20/2017    Acute renal failure (ARF) ([facility] [address] 10/20/2017    Diverticulitis large intestine w/o perforation or abscess w/o bleeding 07/06/2017    SOB (shortness of breath) 06/21/2017    Abdominal pain, generalized 06/21/2017    Diarrhea in adult patient 06/21/2017    Cigarette smoker 78/35/4093    Neutrophilic leukocytosis 25/16/0281    Chronic pain      Hypercholesterolemia      Arthritis      Diabetes (HCC)      IBS (irritable bowel syndrome)      Hemorrhoid             Past Medical History:   Diagnosis Date    A-fib [facility])      AICD (automatic cardioverter/defibrillator) present 9/23/2020 9/23/2020 [employer] AICD implant    Arthritis      Bilateral pleural effusion 9/18/2020    Chronic pain      Chronic pain      Diabetes (HCC)      Diverticular disease      Elevated troponin 9/18/2020    Hemorrhoid      Hypercholesterolemia      IBS (irritable bowel syndrome)      Lymphocytosis 72/39/6780    Systolic heart failure, chronic ([address] 7/16/2021    Type 2 MI (myocardial infarction) ([facility] [address] 7/16/2021 7/162021 r/t sepsis         Core Measures:  CVA: No No  CHF:Yes Yes  PNA:No Refused     Activity:  Activity Level: Up with Assistance  Number times ambulated in hallways past shift: 0  Number of times OOB to chair past shift: 2     Cardiac:   Cardiac Monitoring: Yes      Cardiac Rhythm: Sinus Arrhythmia     Access:   Current line(s): PICC   Central Line? No  PICC LINE? Yes Placement date is unknown Reason Medically Necessary is for long term antibiotics.     Genitourinary:   Urinary status: voiding  Urinary Catheter?  No      Respiratory:   O2 Device: Nasal cannula  Chronic home O2 use?: NO  Incentive spirometer at bedside: N/A     GI:  Last Bowel Movement Date: 09/01/21  Current diet:  ADULT DIET Regular; 3 carb choices (45 gm/meal); 1200 ml  Passing flatus: NO  Tolerating current diet: YES     Pain Management:   Patient states pain is manageable on current regimen: YES     Skin:  Bryan Score: 21  Interventions: increase time out of bed    Patient Safety:  Fall Score: Total Score: 4  Interventions: bed/chair alarm, assistive device (walker, cane, etc), gripper socks and pt to call before getting OOB  High Fall Risk:  Yes     DVT prophylaxis:  DVT prophylaxis Med- Yes  DVT prophylaxis SCD or JONNATHAN- No      Wounds: (If Applicable)  Wounds- No     Active Consults:  IP CONSULT TO CARDIOLOGY  IP CONSULT TO INFECTIOUS DISEASES     Length of Stay:  Expected LOS: 4d 0h  Actual LOS: 1  Discharge Plan: YES        Mag Bradley RN

## 2021-09-03 NOTE — PROGRESS NOTES
End of Shift Note    Bedside shift change report given to Stiven Bazzi RN (oncoming nurse) by Iftikhar Lee RN (offgoing nurse). Report included the following information SBAR, Kardex, Procedure Summary and Recent Results    Shift worked: 7p-7a   Shift summary and any significant changes:     CHF exacerbation. No issues overnight.         Concerns for physician to address: none   Zone phone for oncoming shift:   [phone]     Patient Information  Raymond Comer  79 y.o.  9/1/2021  1:25 AM by Cheryle Maravilla MD. Raymond Comer was admitted from Home    Problem List  Patient Active Problem List    Diagnosis Date Noted    Leukocytosis 09/01/2021    Pleural effusion 09/01/2021    CKD (chronic kidney disease) 09/01/2021    Acute CHF (congestive heart failure) ([address] 09/01/2021    Anemia of infection and chronic disease 08/26/2021    COPD (chronic obstructive pulmonary disease) ([address] 08/26/2021    Depression 08/26/2021    Pneumonia 08/23/2021    Sepsis ([address] 07/16/2021    Type 2 MI (myocardial infarction) ([address] 95/57/7376    Systolic heart failure, chronic ([address] 07/16/2021    Moderate episode of recurrent major depressive disorder ([address] 06/14/2021    Dilated cardiomyopathy ([address] 09/23/2020    SSS (sick sinus syndrome) ([address] 09/23/2020    Cardiomyopathy ([address] 09/23/2020    AICD (automatic cardioverter/defibrillator) present 09/23/2020    Respiratory failure ([address] 09/18/2020    CHF (congestive heart failure) ([address] 09/18/2020    Hypoxia 09/18/2020    Bilateral pleural effusion 09/18/2020    Elevated troponin 09/18/2020    PAF (paroxysmal atrial fibrillation) ([address] 09/10/2020    CKD (chronic kidney disease) stage 3, GFR 30-59 ml/min ([facility]) 06/04/2019    Lymphocytosis 05/07/2018    Type 2 diabetes with nephropathy ([address] 02/15/2018    Type 2 diabetes mellitus with diabetic neuropathy ([address] 02/15/2018    Spinal stenosis of lumbar region with neurogenic claudication 11/17/2017    Spondylosis of lumbosacral region without myelopathy or radiculopathy 11/17/2017    Overdose of opiate or related narcotic, accidental or unintentional, subsequent encounter 10/27/2017    Acute left-sided low back pain with sciatica 10/27/2017    Physical debility 10/27/2017    Acute encephalopathy 10/23/2017    Hyperkalemia 10/20/2017    Altered mental status 10/20/2017    Transaminitis 10/20/2017    Acute renal failure (ARF) ([address] 10/20/2017    Diverticulitis large intestine w/o perforation or abscess w/o bleeding 07/06/2017    SOB (shortness of breath) 06/21/2017    Abdominal pain, generalized 06/21/2017    Diarrhea in adult patient 06/21/2017    Cigarette smoker 72/87/8457    Neutrophilic leukocytosis 53/96/7722    Chronic pain     Hypercholesterolemia     Arthritis     Diabetes (HCC)     IBS (irritable bowel syndrome)     Hemorrhoid      Past Medical History:   Diagnosis Date    A-fib [facility])     AICD (automatic cardioverter/defibrillator) present 9/23/2020 9/23/2020 [employer] AICD implant    Arthritis     Bilateral pleural effusion 9/18/2020    Chronic pain     Chronic pain     Diabetes ([address]     Diverticular disease     Elevated troponin 9/18/2020    Hemorrhoid     Hypercholesterolemia     IBS (irritable bowel syndrome)     Lymphocytosis 85/24/7387    Systolic heart failure, chronic ([address] 7/16/2021    Type 2 MI (myocardial infarction) ([address] 7/16/2021 7/162021 r/t sepsis       Core Measures:  CVA: No No  CHF:Yes Yes  PNA:No Refused    Activity:  Activity Level: Up with Assistance  Number times ambulated in hallways past shift: 0  Number of times OOB to chair past shift: 0    Cardiac:   Cardiac Monitoring: Yes      Cardiac Rhythm: Sinus Rhythm    Access:   Current line(s): PICC   Central Line? No  PICC LINE? Yes Placement date is unknown Reason Medically Necessary is for long term antibiotics. Genitourinary:   Urinary status: voiding  Urinary Catheter?  No     Respiratory:   O2 Device: Nasal cannula  Chronic home O2 use?: NO  Incentive spirometer at bedside: N/A       GI:  Last Bowel Movement Date: 09/01/21  Current diet:  ADULT DIET Regular; 3 carb choices (45 gm/meal); 1200 ml  Passing flatus: NO  Tolerating current diet: YES       Pain Management:   Patient states pain is manageable on current regimen: YES    Skin:  Bryan Score: 20  Interventions: increase time out of bed    Patient Safety:  Fall Score:  Total Score: 4  Interventions: bed/chair alarm, assistive device (walker, cane, etc), gripper socks and pt to call before getting OOB  High Fall Risk: Yes  @Rollbelt  @dexterity to release roll belt  Yes/No ( must document dexterity  here by stating Yes or No here, otherwise this is a restraint and must follow restraint documentation policy.)    DVT prophylaxis:  DVT prophylaxis Med- Yes  DVT prophylaxis SCD or JONNATHAN- No     Wounds: (If Applicable)  Wounds- No    Active Consults:  IP CONSULT TO CARDIOLOGY  IP CONSULT TO INFECTIOUS DISEASES    Length of Stay:  Expected LOS: 4d 0h  Actual LOS: 2  Discharge Plan: Caroline Ochoa RN

## 2021-09-03 NOTE — PROGRESS NOTES
Palliative Medicine  [place]: [phone]  [facility]: [phone] ([phone])    Palliative consult received for Care Decisions. Patient with acute hypoxic respiratory failure, CHF, bi-lateral pleural effusions, leukocytosis, CKD. Patient is Full Code, she has an AMD scanned in the chart. MPOA are noted as her  and children. AMD also indicates that patient would not want life prolonging measures at end of life if there is no hope for recovery. Patient came in to ED for SOB (she presented the previous day as well with symptoms but declined admission). Patient is being worked up to manage her symptoms. Code status discussion would be important with patient and family. Palliative team will follow up.

## 2021-09-03 NOTE — PROGRESS NOTES
Respiratory Care;    Per patient's assessment, patient does not take home inhalers.  No respiratory distress present

## 2021-09-03 NOTE — CONSULTS
Palliative Medicine Consult  [place]: [phone] ([phone])    Patient Name: Raymond Comer  YOB: 1940    Date of Initial Consult: 9/3/21  Reason for Consult: care decisions  Requesting Provider: Cherelle Mike MD  Primary Care Physician: Moses Bloch, NP     SUMMARY:   Raymond Comer is a 79 y.o. with a past history of CHF, DM, HTN, CKD stage 3, SSS s/p PPM which was recently removed on 7/21 due to MSSA bacteremia, she was discharged on PICC with IV cefazolin until 9/2/2021, who was admitted on 9/1/2021 from home with a diagnosis of pleural effusion. Current medical issues leading to Palliative Medicine involvement include: admitted 7/16/21 for MSSA bacteremia, removal of ICD, d/c with PICC and 6 weeks abx.  8/3-8/6/21: admitted to [facility] for CHF, IVAN, continuing abx for bacteremia. 8/23/21-8/28/21 admitted to [facility] for PNA, signed out [place] on 8/28/21. 8/30: seen in [facility] ED for [address] again signed out AMA. 8/31: [facility] ED again for SOB and sent to [facility].     9/1: right thoracentesis with 1 Liter removed. Psychosocial: lives with  who is w/c bound, wife is caregiver, pt uses walker. 2 dtrs and 1 son all live out of town. 50 pack year history of smoking, quit smoking 1-2 weeks ago. They have a  who also mows their lawn and someone intermittently helps with her . PALLIATIVE DIAGNOSES:   1. SOB  2. Hypoxia   1. Pleural effusion: improved with diuresis and 1 liter  2. CHF acute on chronic combined systolic and diastolic, NYHA class IV,  ECHO EF of 45 to 50% with severe grade 3 diastolic dysfunction  3. IVAN on CKD3, cardiorenal syndrome  4. MSSA bacteremia s/p ICD extraction, daptomycin outpt until 9/2  5. Fatigue   6. Care decisions   PLAN:   1. Prior to visit, I completed an extensive review of patient's medical records, including medical documentation, vital signs, MARs, and results of various labs and other diagnostics.     2. Met with pt: obtained detailed history, discussed current medical condition and goals of care. Pt has fair insight into her medical condition, she can tell me about the recent multiple admissions and what they were for, she has difficulty naming the diagnoses of her illnesses, ie, she knows she has heart problems but thought the cardiologist told her she did not have CHF. She knows that fluid was removed from \"the space by my heart and lungs\" and that 2 of the tests on the fluid were negative and we are still waiting to hear if there are cancer cells. She has been off the 02 since the fluid was removed and she feels much better. She states that her goal in life was to live until her great-grandhchild was born (last year). She wants to continue treating treatable conditions but when God calls her she thinks she's ready to go. She has an AMD that is current and on file. 3. Initial consult note routed to primary continuity provider and/or primary health care team members  4.  Communicated plan of care with: Palliative [facility] Team     GOALS OF CARE / TREATMENT PREFERENCES:     GOALS OF CARE:  Patient/Health Care Proxy Stated Goals: Prolong life    TREATMENT PREFERENCES:   Code Status: Full Code    Patient's personal goals include: living to see great grandchild born (done), cares for her     Important upcoming milestones or family events: done        Advance Care Planning:  [x] [facility] Interdisciplinary Team has updated the ACP Navigator with Health Care Decision Maker and Patient Capacity      Primary Decision MakerRosfaiza Carrera - [phone]    Secondary Decision Maker: Phisarah Meka - Ann - [phone]  Advance Care Planning 9/3/2021   Patient's [address] is: Named in scanned ACP document   Confirm Advance Directive Yes, on file   Patient Would Like to Complete Advance Directive -   Does the patient have other document types -       Medical Interventions: Full interventions Other:    As far as possible, the palliative care team has discussed with patient / health care proxy about goals of care / treatment preferences for patient. HISTORY:     History obtained from: chart    CHIEF COMPLAINT: SOB    HPI/SUBJECTIVE:    The patient is:   [x] Verbal and participatory  [] Non-participatory due to:     9/1: presented to ED with c/o SOB,unable to sleep, multiple pillows, cannot catch her breath. Seen at OSH 2 days ago for similar symptoms but signed [place]. Clinical Pain Assessment (nonverbal scale for severity on nonverbal patients):   Clinical Pain Assessment  Severity: 0     Activity (Movement): Lying quietly, normal position    Duration: for how long has pt been experiencing pain (e.g., 2 days, 1 month, years)  Frequency: how often pain is an issue (e.g., several times per day, once every few days, constant)     FUNCTIONAL ASSESSMENT:     Palliative Performance Scale (PPS):  PPS: 40       PSYCHOSOCIAL/SPIRITUAL SCREENING:     Palliative IDT has assessed this patient for cultural preferences / practices and a referral made as appropriate to needs (Cultural Services, Patient Advocacy, Ethics, etc.)    Any spiritual / Adventism concerns:  [] Yes /  [x] No    Caregiver Burnout:  [] Yes /  [x] No /  [] No Caregiver Present      Anticipatory grief assessment:   [x] Normal  / [] Maladaptive       ESAS Anxiety: Anxiety: 0    ESAS Depression: Depression: 0        REVIEW OF SYSTEMS:     Positive and pertinent negative findings in ROS are noted above in HPI. The following systems were [x] reviewed / [] unable to be reviewed as noted in HPI  Other findings are noted below. Systems: constitutional, ears/nose/mouth/throat, respiratory, gastrointestinal, genitourinary, musculoskeletal, integumentary, neurologic, psychiatric, endocrine. Positive findings noted below.   Modified ESAS Completed by: provider   Fatigue: 5 Drowsiness: 0   Depression: 0 Pain: 0   Anxiety: 0 Nausea: 0 Anorexia: 0 Dyspnea: 0                    PHYSICAL EXAM:     From RN flowsheet:  Wt Readings from Last 3 Encounters:   09/02/21 146 lb 6.2 oz (66.4 kg)   08/31/21 154 lb (69.9 kg)   08/30/21 155 lb (70.3 kg)     Blood pressure (!) 165/70, pulse 90, temperature 98.2 °F (36.8 °C), resp. rate 19, height 5' 5.98\" (1.676 m), weight 146 lb 6.2 oz (66.4 kg), SpO2 93 %, not currently breastfeeding.     Pain Scale 1: Numeric (0 - 10)  Pain Intensity 1: 0  Pain Onset 1: 9/1/21 (9/1)  Pain Location 1: Back  Pain Orientation 1: Lower  Pain Description 1: Aching  Pain Intervention(s) 1: Medication (see MAR)  Last bowel movement, if known:     Constitutional: WD, WN, NAD, pleasant and cooperative  Eyes: pupils equal, anicteric  ENMT: no nasal discharge, moist mucous membranes  Cardiovascular: regular rhythm, distal pulses intact  Respiratory: breathing not labored, symmetric  Gastrointestinal: soft non-tender, +bowel sounds  Musculoskeletal: no deformity, no tenderness to palpation  Skin: warm, dry  Neurologic: following commands, moving all extremities  Psychiatric: full affect, no hallucinations          HISTORY:     Active Problems:    SOB (shortness of breath) (6/21/2017)      Hypoxia (9/18/2020)      Elevated troponin (9/18/2020)      Leukocytosis (9/1/2021)      Pleural effusion (9/1/2021)      CKD (chronic kidney disease) (9/1/2021)      Acute CHF (congestive heart failure) ([facility]) (9/1/2021)      Past Medical History:   Diagnosis Date    A-fib [facility])     AICD (automatic cardioverter/defibrillator) present 9/23/2020 9/23/2020 [employer] AICD implant    Arthritis     Bilateral pleural effusion 9/18/2020    Chronic pain     Chronic pain     Diabetes (HCC)     Diverticular disease     Elevated troponin 9/18/2020    Hemorrhoid     Hypercholesterolemia     IBS (irritable bowel syndrome)     Lymphocytosis 52/09/7398    Systolic heart failure, chronic (HCC) 7/16/2021    Type 2 MI (myocardial infarction) ([address] 7/16/2021 7/174085 r/t sepsis      Past Surgical History:   Procedure Laterality Date    COLONOSCOPY N/A 10/31/2019    COLONOSCOPY performed by Escobar Jim [facility]  10/31/2019         [facility]  10/31/2019         HX CATARACT REMOVAL      HX CHOLECYSTECTOMY      HX COLONOSCOPY  2009    HX COLONOSCOPY  2016    2 adenomatous polyp    HX HEMORRHOIDECTOMY  2009    HX HYSTERECTOMY      HX KNEE REPLACEMENT      right    HX ORTHOPAEDIC  12/11/2017    back, laminectomy and decompression    AK INSJ/RPLCMT PERM DFB W/TRNSVNS LDS 1/DUAL CHMBR N/A 9/23/2020    INSERT ICD DUAL performed by Corine Rae MD at [facility]      Family History   Problem Relation Age of Onset    Colon Cancer Father     Diabetes Mother       History reviewed, no pertinent family history.   Social History     Tobacco Use    Smoking status: Current Every Day Smoker     Packs/day: 1.00     Years: 55.00     Pack years: 55.00     Types: Cigarettes    Smokeless tobacco: Never Used   Substance Use Topics    Alcohol use: No     Allergies   Allergen Reactions    Cefazolin Rash     Possible kidney failure/AIN    Morphine Anaphylaxis    Ultram [Tramadol] Palpitations      Current Facility-Administered Medications   Medication Dose Route Frequency    arformoterol 15 mcg/budesonide 0.5 mg neb solution   Nebulization BID PRN    piperacillin-tazobactam (ZOSYN) 3.375 g in 0.9% sodium chloride (MBP/ADV) 100 mL MBP  3.375 g IntraVENous Q12H    aspirin delayed-release tablet 81 mg  81 mg Oral DAILY    L.acidophilus-paracasei-S.thermophil-bifidobacter (RISAQUAD) 8 billion cell capsule  1 Capsule Oral DAILY    carvediloL (COREG) tablet 25 mg  25 mg Oral BID WITH MEALS    colestipoL (COLESTID) tablet 1 g  1 g Oral BID    ferrous sulfate tablet 325 mg  325 mg Oral DAILY WITH BREAKFAST    insulin glargine (LANTUS) injection 50 Units  50 Units SubCUTAneous DAILY    nicotine (NICODERM CQ) 21 mg/24 hr patch 1 Patch  1 Patch TransDERmal DAILY    nitroglycerin (NITROSTAT) tablet 0.4 mg  0.4 mg SubLINGual Q5MIN PRN    venlafaxine (EFFEXOR) tablet 25 mg  25 mg Oral BID    sodium chloride (NS) flush 5-40 mL  5-40 mL IntraVENous Q8H    sodium chloride (NS) flush 5-40 mL  5-40 mL IntraVENous PRN    acetaminophen (TYLENOL) tablet 650 mg  650 mg Oral Q6H PRN    Or    acetaminophen (TYLENOL) suppository 650 mg  650 mg Rectal Q6H PRN    polyethylene glycol (MIRALAX) packet 17 g  17 g Oral DAILY PRN    ondansetron (ZOFRAN ODT) tablet 4 mg  4 mg Oral Q8H PRN    Or    ondansetron (ZOFRAN) injection 4 mg  4 mg IntraVENous Q6H PRN    heparin (porcine) injection 5,000 Units  5,000 Units SubCUTAneous Q8H    [Held by provider] furosemide (LASIX) injection 40 mg  40 mg IntraVENous Q12H    albuterol-ipratropium (DUO-NEB) 2.5 MG-0.5 MG/3 ML  3 mL Nebulization Q6H PRN          LAB AND IMAGING FINDINGS:     Lab Results   Component Value Date/Time    WBC 17.7 (H) 09/03/2021 02:07 AM    HGB 7.9 (L) 09/03/2021 02:07 AM    PLATELET 496 (H) 66/20/0328 02:07 AM     Lab Results   Component Value Date/Time    Sodium 138 09/03/2021 02:07 AM    Potassium 4.4 09/03/2021 02:07 AM    Chloride 110 (H) 09/03/2021 02:07 AM    CO2 21 09/03/2021 02:07 AM    BUN 46 (H) 09/03/2021 02:07 AM    Creatinine 2.14 (H) 09/03/2021 02:07 AM    Calcium 9.0 09/03/2021 02:07 AM    Magnesium 1.6 08/31/2021 08:58 AM    Phosphorus 4.0 08/31/2021 08:58 AM      Lab Results   Component Value Date/Time    Alk.  phosphatase 77 08/30/2021 07:30 PM    Protein, total 7.6 08/30/2021 07:30 PM    Albumin 2.3 (L) 08/30/2021 07:30 PM    Globulin 5.3 (H) 08/30/2021 07:30 PM     Lab Results   Component Value Date/Time    INR 1.1 08/03/2021 12:55 AM    Prothrombin time 11.3 (H) 08/03/2021 12:55 AM    aPTT 31.9 (H) 07/15/2021 07:37 PM      Lab Results   Component Value Date/Time    Iron 15 (L) 08/03/2021 11:25 AM    TIBC 208 (L) 08/03/2021 11:25 AM    Iron % saturation 7 (L) 08/03/2021 11:25 AM    Ferritin 194 08/03/2021 11:25 AM      Lab Results   Component Value Date/Time    pH 7.33 (L) 07/17/2021 04:57 AM    PCO2 33 (L) 07/17/2021 04:57 AM    PO2 102 (H) 07/17/2021 04:57 AM     No components found for: [place]   Lab Results   Component Value Date/Time    CK 30 08/27/2021 06:29 AM    CK - MB 7.1 (H) 07/17/2021 03:02 AM                Total time: 75  Counseling / coordination time, spent as noted above: 65  > 50% counseling / coordination?: y    Prolonged service was provided for  []30 min   []75 min in face to face time in the presence of the patient, spent as noted above. Time Start:   Time End:   Note: this can only be billed with 25842 (initial) or 14460 (follow up). If multiple start / stop times, list each separately.

## 2021-09-04 LAB
ANION GAP SERPL CALC-SCNC: 8 MMOL/L (ref 5–15)
BNP SERPL-MCNC: ABNORMAL PG/ML
BUN SERPL-MCNC: 48 MG/DL (ref 6–20)
BUN/CREAT SERPL: 24 (ref 12–20)
CALCIUM SERPL-MCNC: 9.5 MG/DL (ref 8.5–10.1)
CHLORIDE SERPL-SCNC: 109 MMOL/L (ref 97–108)
CO2 SERPL-SCNC: 21 MMOL/L (ref 21–32)
CREAT SERPL-MCNC: 2 MG/DL (ref 0.55–1.02)
ERYTHROCYTE [DISTWIDTH] IN BLOOD BY AUTOMATED COUNT: 16.6 % (ref 11.5–14.5)
GLUCOSE BLD STRIP.AUTO-MCNC: 184 MG/DL (ref 65–117)
GLUCOSE BLD STRIP.AUTO-MCNC: 205 MG/DL (ref 65–117)
GLUCOSE BLD STRIP.AUTO-MCNC: 270 MG/DL (ref 65–117)
GLUCOSE SERPL-MCNC: 203 MG/DL (ref 65–100)
HCT VFR BLD AUTO: 28.7 % (ref 35–47)
HGB BLD-MCNC: 9 G/DL (ref 11.5–16)
MCH RBC QN AUTO: 28.4 PG (ref 26–34)
MCHC RBC AUTO-ENTMCNC: 31.4 G/DL (ref 30–36.5)
MCV RBC AUTO: 90.5 FL (ref 80–99)
NRBC # BLD: 0 K/UL (ref 0–0.01)
NRBC BLD-RTO: 0 PER 100 WBC
PLATELET # BLD AUTO: 553 K/UL (ref 150–400)
PMV BLD AUTO: 10 FL (ref 8.9–12.9)
POTASSIUM SERPL-SCNC: 5 MMOL/L (ref 3.5–5.1)
RBC # BLD AUTO: 3.17 M/UL (ref 3.8–5.2)
SERVICE CMNT-IMP: ABNORMAL
SODIUM SERPL-SCNC: 138 MMOL/L (ref 136–145)
WBC # BLD AUTO: 16.1 K/UL (ref 3.6–11)

## 2021-09-04 PROCEDURE — 99233 SBSQ HOSP IP/OBS HIGH 50: CPT | Performed by: STUDENT IN AN ORGANIZED HEALTH CARE EDUCATION/TRAINING PROGRAM

## 2021-09-04 PROCEDURE — 74011000258 HC RX REV CODE- 258: Performed by: INTERNAL MEDICINE

## 2021-09-04 PROCEDURE — 74011250636 HC RX REV CODE- 250/636: Performed by: NURSE PRACTITIONER

## 2021-09-04 PROCEDURE — 65660000000 HC RM CCU STEPDOWN

## 2021-09-04 PROCEDURE — 77010033678 HC OXYGEN DAILY

## 2021-09-04 PROCEDURE — 74011250637 HC RX REV CODE- 250/637: Performed by: INTERNAL MEDICINE

## 2021-09-04 PROCEDURE — 82962 GLUCOSE BLOOD TEST: CPT

## 2021-09-04 PROCEDURE — 36415 COLL VENOUS BLD VENIPUNCTURE: CPT

## 2021-09-04 PROCEDURE — 74011250636 HC RX REV CODE- 250/636: Performed by: HOSPITALIST

## 2021-09-04 PROCEDURE — 74011636637 HC RX REV CODE- 636/637: Performed by: HOSPITALIST

## 2021-09-04 PROCEDURE — 74011250636 HC RX REV CODE- 250/636: Performed by: INTERNAL MEDICINE

## 2021-09-04 PROCEDURE — 74011250637 HC RX REV CODE- 250/637: Performed by: HOSPITALIST

## 2021-09-04 PROCEDURE — 80048 BASIC METABOLIC PNL TOTAL CA: CPT

## 2021-09-04 PROCEDURE — 94760 N-INVAS EAR/PLS OXIMETRY 1: CPT

## 2021-09-04 PROCEDURE — 85027 COMPLETE CBC AUTOMATED: CPT

## 2021-09-04 RX ORDER — OXYCODONE AND ACETAMINOPHEN 5; 325 MG/1; MG/1
1 TABLET ORAL
Status: DISCONTINUED | OUTPATIENT
Start: 2021-09-04 | End: 2021-09-07 | Stop reason: HOSPADM

## 2021-09-04 RX ORDER — FUROSEMIDE 10 MG/ML
40 INJECTION INTRAMUSCULAR; INTRAVENOUS EVERY 12 HOURS
Status: DISCONTINUED | OUTPATIENT
Start: 2021-09-04 | End: 2021-09-06

## 2021-09-04 RX ORDER — FUROSEMIDE 10 MG/ML
40 INJECTION INTRAMUSCULAR; INTRAVENOUS ONCE
Status: COMPLETED | OUTPATIENT
Start: 2021-09-04 | End: 2021-09-04

## 2021-09-04 RX ADMIN — MONTELUKAST SODIUM 1 G: 4 TABLET, CHEWABLE ORAL at 08:58

## 2021-09-04 RX ADMIN — Medication 10 ML: at 14:00

## 2021-09-04 RX ADMIN — INSULIN GLARGINE 50 UNITS: 100 INJECTION, SOLUTION SUBCUTANEOUS at 08:58

## 2021-09-04 RX ADMIN — ACETAMINOPHEN 650 MG: 325 TABLET ORAL at 08:59

## 2021-09-04 RX ADMIN — LABETALOL HYDROCHLORIDE 10 MG: 5 INJECTION INTRAVENOUS at 04:56

## 2021-09-04 RX ADMIN — FUROSEMIDE 40 MG: 10 INJECTION, SOLUTION INTRAMUSCULAR; INTRAVENOUS at 23:06

## 2021-09-04 RX ADMIN — CARVEDILOL 25 MG: 12.5 TABLET, FILM COATED ORAL at 18:31

## 2021-09-04 RX ADMIN — Medication 1 CAPSULE: at 08:59

## 2021-09-04 RX ADMIN — OXYCODONE HYDROCHLORIDE AND ACETAMINOPHEN 1 TABLET: 5; 325 TABLET ORAL at 13:38

## 2021-09-04 RX ADMIN — FUROSEMIDE 40 MG: 10 INJECTION, SOLUTION INTRAMUSCULAR; INTRAVENOUS at 13:38

## 2021-09-04 RX ADMIN — VENLAFAXINE 25 MG: 25 TABLET ORAL at 18:31

## 2021-09-04 RX ADMIN — FUROSEMIDE 40 MG: 10 INJECTION, SOLUTION INTRAMUSCULAR; INTRAVENOUS at 01:05

## 2021-09-04 RX ADMIN — CARVEDILOL 25 MG: 12.5 TABLET, FILM COATED ORAL at 08:59

## 2021-09-04 RX ADMIN — PIPERACILLIN AND TAZOBACTAM 3.38 G: 3; .375 INJECTION, POWDER, LYOPHILIZED, FOR SOLUTION INTRAVENOUS at 09:04

## 2021-09-04 RX ADMIN — Medication 10 ML: at 06:26

## 2021-09-04 RX ADMIN — HEPARIN SODIUM 5000 UNITS: 5000 INJECTION INTRAVENOUS; SUBCUTANEOUS at 06:23

## 2021-09-04 RX ADMIN — LABETALOL HYDROCHLORIDE 10 MG: 5 INJECTION INTRAVENOUS at 18:35

## 2021-09-04 RX ADMIN — VENLAFAXINE 25 MG: 25 TABLET ORAL at 08:58

## 2021-09-04 RX ADMIN — ACETAMINOPHEN 650 MG: 325 TABLET ORAL at 23:53

## 2021-09-04 RX ADMIN — ASPIRIN 81 MG: 81 TABLET, COATED ORAL at 08:59

## 2021-09-04 RX ADMIN — FERROUS SULFATE TAB 325 MG (65 MG ELEMENTAL FE) 325 MG: 325 (65 FE) TAB at 08:59

## 2021-09-04 RX ADMIN — Medication 10 ML: at 21:24

## 2021-09-04 RX ADMIN — PIPERACILLIN AND TAZOBACTAM 3.38 G: 3; .375 INJECTION, POWDER, LYOPHILIZED, FOR SOLUTION INTRAVENOUS at 18:31

## 2021-09-04 RX ADMIN — HEPARIN SODIUM 5000 UNITS: 5000 INJECTION INTRAVENOUS; SUBCUTANEOUS at 13:38

## 2021-09-04 RX ADMIN — MONTELUKAST SODIUM 1 G: 4 TABLET, CHEWABLE ORAL at 18:31

## 2021-09-04 RX ADMIN — HEPARIN SODIUM 5000 UNITS: 5000 INJECTION INTRAVENOUS; SUBCUTANEOUS at 21:23

## 2021-09-04 NOTE — PROGRESS NOTES
End of Shift Note    Bedside shift change report given to HO Trinidad (oncoming nurse) by ANUM Hernandez (offgoing nurse).   Report included the following information SBAR, Kardex, Procedure Summary and Recent Results    Shift worked: 7a-7p   Shift summary and any significant changes:    Pain meds given PRN percocet, PRN labetalol given for high BP, weaned oxygen down from 4L to 1.5L oxygen saturation 96%       Concerns for physician to address: none   Zone phone for oncoming shift:   [phone]     Patient Information  Raúl Christian  79 y.o.  9/1/2021  1:25 AM by Laura Brenner MD. Raúl Gonzales was admitted from Home    Problem List  Patient Active Problem List    Diagnosis Date Noted    Palliative care encounter 09/03/2021    Leukocytosis 09/01/2021    Pleural effusion 09/01/2021    CKD (chronic kidney disease) 09/01/2021    Acute CHF (congestive heart failure) ([address] 09/01/2021    Anemia of infection and chronic disease 08/26/2021    COPD (chronic obstructive pulmonary disease) ([address] 08/26/2021    Depression 08/26/2021    Pneumonia 08/23/2021    Sepsis ([address] 07/16/2021    Type 2 MI (myocardial infarction) ([address] 09/36/3363    Systolic heart failure, chronic ([address] 07/16/2021    Moderate episode of recurrent major depressive disorder ([address] 06/14/2021    Dilated cardiomyopathy ([address] 09/23/2020    SSS (sick sinus syndrome) ([address] 09/23/2020    Cardiomyopathy ([address] 09/23/2020    AICD (automatic cardioverter/defibrillator) present 09/23/2020    Respiratory failure ([address] 09/18/2020    CHF (congestive heart failure) ([address] 09/18/2020    Hypoxia 09/18/2020    Bilateral pleural effusion 09/18/2020    Elevated troponin 09/18/2020    PAF (paroxysmal atrial fibrillation) ([address] 09/10/2020    CKD (chronic kidney disease) stage 3, GFR 30-59 ml/min ([address] 06/04/2019    Lymphocytosis 05/07/2018    Type 2 diabetes with nephropathy ([facility] 02/15/2018    Type 2 diabetes mellitus with diabetic neuropathy ([facility] 02/15/2018    Spinal stenosis of lumbar region with neurogenic claudication 11/17/2017    Spondylosis of lumbosacral region without myelopathy or radiculopathy 11/17/2017    Overdose of opiate or related narcotic, accidental or unintentional, subsequent encounter 10/27/2017    Acute left-sided low back pain with sciatica 10/27/2017    Physical debility 10/27/2017    Acute encephalopathy 10/23/2017    Hyperkalemia 10/20/2017    Altered mental status 10/20/2017    Transaminitis 10/20/2017    Acute renal failure (ARF) ([address] 10/20/2017    Diverticulitis large intestine w/o perforation or abscess w/o bleeding 07/06/2017    SOB (shortness of breath) 06/21/2017    Abdominal pain, generalized 06/21/2017    Diarrhea in adult patient 06/21/2017    Cigarette smoker 13/31/2065    Neutrophilic leukocytosis 74/56/0702    Chronic pain     Hypercholesterolemia     Arthritis     Diabetes (HCC)     IBS (irritable bowel syndrome)     Hemorrhoid      Past Medical History:   Diagnosis Date    A-fib [facility])     AICD (automatic cardioverter/defibrillator) present 9/23/2020 9/23/2020 [employer] AICD implant    Arthritis     Bilateral pleural effusion 9/18/2020    Chronic pain     Chronic pain     Diabetes ([address]     Diverticular disease     Elevated troponin 9/18/2020    Hemorrhoid     Hypercholesterolemia     IBS (irritable bowel syndrome)     Lymphocytosis 19/94/6160    Systolic heart failure, chronic ([address] 7/16/2021    Type 2 MI (myocardial infarction) ([address] 7/16/2021 7/162021 r/t sepsis       Core Measures:  CVA: No No  CHF:Yes Yes  PNA:No Refused    Activity:  Activity Level: Up with Assistance  Number times ambulated in hallways past shift: 0  Number of times OOB to chair past shift: 0    Cardiac:   Cardiac Monitoring: Yes      Cardiac Rhythm: Sinus Rhythm    Access:   Current line(s): PICC   Central Line? No  PICC LINE?  Yes Placement date is unknown Reason Medically Necessary is for long term antibiotics. Genitourinary:   Urinary status: voiding  Urinary Catheter? No     Respiratory:   O2 Device: Nasal cannula  Chronic home O2 use?: NO  Incentive spirometer at bedside: N/A       GI:  Last Bowel Movement Date: 09/03/21  Current diet:  ADULT DIET Regular; 3 carb choices (45 gm/meal); 1200 ml  Passing flatus: NO  Tolerating current diet: YES       Pain Management:   Patient states pain is manageable on current regimen: YES    Skin:  Bryan Score: 21  Interventions: increase time out of bed    Patient Safety:  Fall Score:  Total Score: 3  Interventions: bed/chair alarm, assistive device (walker, cane, etc), gripper socks and pt to call before getting OOB  High Fall Risk: Yes  @Rollbelt  @dexterity to release roll belt  Yes/No ( must document dexterity  here by stating Yes or No here, otherwise this is a restraint and must follow restraint documentation policy.)    DVT prophylaxis:  DVT prophylaxis Med- Yes  DVT prophylaxis SCD or JONNATHAN- No     Wounds: (If Applicable)  Wounds- No    Active Consults:  IP CONSULT TO CARDIOLOGY  IP CONSULT TO INFECTIOUS DISEASES  IP CONSULT TO PALLIATIVE CARE - PROVIDER    Length of Stay:  Expected LOS: 4d 0h  Actual LOS: 3  Discharge Plan: YES      ANUM Hernandez

## 2021-09-04 NOTE — PROGRESS NOTES
Patient complaining of SOB and /106. Called for a breathing treatment and informed Jonel NP. Also found patient was getting fluids at 10 ml/hr when she didn't have an order for any. Stopped the fluids and then informed the NP as well.

## 2021-09-04 NOTE — PROGRESS NOTES
Hospitalist Progress Note    NAME: Herminia Pearson   :  1940   MRN:  [id number]       Assessment / Plan:  Acute respiratory failure with hypoxia POA- improved s/p pleural tap, was on 1L/m, now up to 4 L/m via NC oxygen today  Acute on chronic congestive heart failure proBNP 35,000  Echocardiogram 2021 at [facility]  shows EF of 45 to 50% with severe grade 3 diastolic dysfunction. Troponinemia likely type B demand secondary to fluid overload- remains flat  Leukocytosis 25,000, lactic acid within normal limit, Covid negative on 2021  B/L Pleural effusion POA  history of MSSA on Dapto IV till  2021 at other hospital before transfer here  Chronic kidney disease creatinine 1.59 POA- improved to 2.0 today  Current tobacco abuse  CXR:  IMPRESSION  1. Unchanged mild bilateral pleural effusions, greater on the right. 2. Unchanged right sided airspace disease. 3. Unchanged mild cardiomegaly and edema. EKG=  Sinus rhythm at a rate of 62 bpm; normal IL; normal QRS; normal QTC and normal axis.  T wave inversions noted in 1 and aVL as well as V3 through V6.      CT chest without contrast=  1. Masslike opacity in the right upper lobe laterally along the pleural surface  and mediastinal adenopathy suspicious for malignancy.     2. Additional pulmonary nodule in the right upper lobe.     3. Right pleural effusion and right lower lobe volume loss. This may represent  a malignant pleural effusion given the pleural-based mass more superiorly which  possibly tracks along the pleural surface inferiorly.      S/p IV daptomycin- changed to Vanc and cefepime per ID- Vancomycin stopped () per ID due to low suspicion for MSSA this time and elevated Cr, cont Cefepime for now  IP ID consulted- recommended Thoracentesis with Pleural fluid cytology/pathology with routine cultures- s/p Thoracentesis ()- follow cytology- negative for malignancy  Pleural Fluid C&S- neg organism, Cx pending  AFB staining- neg AFB Cx- pending    IP Cardiology consulted- cont IV diuresis as able for now ,   resume Lasix today as Cr improved/stable and oxygen requirement increased to 4l/m  Cont strict intake output  record Daily weight  Cont  2 g sodium diet        Recent pacer/ICD explant  PPM/ICD was originally indicated for primary prevention        OTHER PMH  Anemia hemoglobin 8.3 which is around his previous baseline. Monitor. No acute evidence of bleeding. Hypertension  Diabetes  Depression  Resume home medication as per med rec including insulin sliding scale. Check A1c level  Current tobacco use. Advised cessation. Nicotine patch  Cont. duoneb prn for possible contribution of undx COPD     Code Status: FULL, seen by palliative (9/3)- no change in Goal of care    Estimated discharge date: September 6  Barriers:      Prophylaxis: Hep SQ  Recommended Disposition: HH recommended by PT/OT in 24 hrs if oxygen stable     Subjective:     Chief Complaint / Reason for Physician Visit : F/U SOB/Pleural effusions, s/p thoracentesis, CHF  \"I am so much better with my breathing\". Discussed with RN events overnight. Review of Systems:  Symptom Y/N Comments  Symptom Y/N Comments   Fever/Chills n   Chest Pain n    Poor Appetite n   Edema n    Cough n   Abdominal Pain n    Sputum n   Joint Pain n    SOB/STANTON n Resolved after thoracentesis per pt  Pruritis/Rash n    Nausea/vomit n   Tolerating PT/OT y    Diarrhea n   Tolerating Diet y    Constipation    Other       Could NOT obtain due to:      Objective:     VITALS:   Last 24hrs VS reviewed since prior progress note.  Most recent are:  Patient Vitals for the past 24 hrs:   Temp Pulse Resp BP SpO2   09/04/21 1107 97.9 °F (36.6 °C) 73 20 136/61 97 %   09/04/21 0727 97.9 °F (36.6 °C) 80 20 (!) 164/70 97 %   09/04/21 0451 -- -- -- (!) 185/81 --   09/04/21 0303 98.1 °F (36.7 °C) 80 20 (!) 182/76 98 %   09/03/21 2327 -- -- -- -- 95 %   09/03/21 2257 98.1 °F (36.7 °C) 100 22 (!) 198/106 94 % 09/03/21 1819 98 °F (36.7 °C) 87 21 (!) 197/86 92 %   09/03/21 1518 98.2 °F (36.8 °C) 90 19 (!) 165/70 93 %       Intake/Output Summary (Last 24 hours) at 9/4/2021 1227  Last data filed at 9/3/2021 1818  Gross per 24 hour   Intake 200 ml   Output --   Net 200 ml        I had a face to face encounter and independently examined this patient on 9/4/2021, as outlined below:  PHYSICAL EXAM:  General: WD, WN. Alert, cooperative, no acute distress    EENT:  EOMI. Anicteric sclerae. MMM  Resp:  CTA bilaterally, no wheezing or rales. No accessory muscle use  CV:  Regular  rhythm,  No edema  GI:  Soft, Non distended, Non tender. +Bowel sounds  Neurologic:  Alert and oriented X 3, normal speech,   Psych:   Good insight. Not anxious nor agitated  Skin:  No rashes. No jaundice    Reviewed most current lab test results and cultures  YES  Reviewed most current radiology test results   YES  Review and summation of old records today    NO  Reviewed patient's current orders and MAR    YES  PMH/ reviewed - no change compared to H&P  ________________________________________________________________________  Care Plan discussed with:    Comments   Patient x    Family      RN x    Care Manager     Consultant                        Multidiciplinary team rounds were held today with , nursing, pharmacist and clinical coordinator. Patient's plan of care was discussed; medications were reviewed and discharge planning was addressed.      ________________________________________________________________________  Total NON critical care TIME:  26   Minutes    Total CRITICAL CARE TIME Spent:   Minutes non procedure based      Comments   >50% of visit spent in counseling and coordination of care     ________________________________________________________________________  Kerline Kim MD     Procedures: see electronic medical records for all procedures/Xrays and details which were not copied into this note but were reviewed prior to creation of Plan. LABS:  I reviewed today's most current labs and imaging studies.   Pertinent labs include:  Recent Labs     09/04/21 0333 09/03/21 0207   WBC 16.1* 17.7*   HGB 9.0* 7.9*   HCT 28.7* 25.5*   * 497*     Recent Labs     09/04/21 0333 09/03/21 0207 09/02/21  0211    138 136   K 5.0 4.4 4.2   * 110* 108   CO2 21 21 22   * 128* 114*   BUN 48* 46* 42*   CREA 2.00* 2.14* 2.05*   CA 9.5 9.0 8.9       Signed: Therese Mallory MD

## 2021-09-04 NOTE — PROGRESS NOTES
Infectious Disease Progress Note         Interval:  Patient became short of breath again last night. Recurrent right pleural effusion. Was given 1 dose of lasix. Subjective:   Patient seen this afternoon. Feels well currently with her breathing. She has been urinating a lot since the overnight lasix dose. Reports she became very short of breath last night. No cough or sputum. Objective:    Vitals:   Reviewed in chart.     Physical Exam:  Gen: No apparent distress  HEENT:  Normocephalic, atraumatic, no scleral icterus  CV: HDS  Lungs: on minimal NC O2  Abdomen: soft, non tender, non distended  Genitourinary:   no allen catheter   Skin: no rash   Psych: good affect, good eye contact, non tearful  Neuro: alert, oriented to time,  place, and situation, moves all extremities to commands, verbal  Musculoskeletal:  No joint edema, erythema or tenderness noted   Lines: PICC line        Labs:  Recent Results (from the past 24 hour(s))   GLUCOSE, POC    Collection Time: 09/03/21  9:53 PM   Result Value Ref Range    Glucose (POC) 218 (H) 65 - 117 mg/dL    Performed by Gregor Batres    NT-PRO BNP    Collection Time: 09/03/21 11:37 PM   Result Value Ref Range    NT pro-BNP 21,189 (H) <085 PG/ML   METABOLIC PANEL, BASIC    Collection Time: 09/04/21  3:33 AM   Result Value Ref Range    Sodium 138 136 - 145 mmol/L    Potassium 5.0 3.5 - 5.1 mmol/L    Chloride 109 (H) 97 - 108 mmol/L    CO2 21 21 - 32 mmol/L    Anion gap 8 5 - 15 mmol/L    Glucose 203 (H) 65 - 100 mg/dL    BUN 48 (H) 6 - 20 MG/DL    Creatinine 2.00 (H) 0.55 - 1.02 MG/DL    BUN/Creatinine ratio 24 (H) 12 - 20      GFR est AA 29 (L) >60 ml/min/1.73m2    GFR est non-AA 24 (L) >60 ml/min/1.73m2    Calcium 9.5 8.5 - 10.1 MG/DL   CBC W/O DIFF    Collection Time: 09/04/21  3:33 AM   Result Value Ref Range    WBC 16.1 (H) 3.6 - 11.0 K/uL    RBC 3.17 (L) 3.80 - 5.20 M/uL    HGB 9.0 (L) 11.5 - 16.0 g/dL    HCT 28.7 (L) 35.0 - 47.0 %    MCV 90.5 80.0 - 99.0 FL    MCH 28.4 26.0 - 34.0 PG    MCHC 31.4 30.0 - 36.5 g/dL    RDW 16.6 (H) 11.5 - 14.5 %    PLATELET 138 (H) 991 - 400 K/uL    MPV 10.0 8.9 - 12.9 FL    NRBC 0.0 0  WBC    ABSOLUTE NRBC 0.00 0.00 - 0.01 K/uL   GLUCOSE, POC    Collection Time: 09/04/21  7:24 AM   Result Value Ref Range    Glucose (POC) 184 (H) 65 - 117 mg/dL    Performed by [facility] PCT    GLUCOSE, POC    Collection Time: 09/04/21 12:08 PM   Result Value Ref Range    Glucose (POC) 205 (H) 65 - 117 mg/dL    Performed by [facility] PCT                Assessment:  79-year-old female with:    -Shortness of breath prior to arrival likely due to right-sided pleural effusion, CT chest showing right-sided consolidation, pleural effusion. She is status post right-sided thoracentesis with drainage of 1500 cc clear fluid on 9/1/2021. Pleural fluid analysis reviewed. pH not available. 1419 nucleated cells; 29% lymphocytes, 27% macrophages, only 1% neutrophils. Gram stain and cultures pending at this time. LDH, total protein was not elevated. Pathology and cytology pending as well. Patient clinically better and feels better since after the thoracentesis. -MSSA bacteremia in July 2021 status post explantation of the ICD/pacemaker along with leads by Dr. Dashawn Bales on 7/21/2021. Patient has finished 6 weeks of antibiotics for this with IV cefazolin/daptomycin on 9/2/2021. No further indication of pacemaker was deemed necessary by cardiology at that time. - History of right knee replacement  -History of greater than 50-pack-year smoking history; quit around 2 weeks ago. Recommendations:  - Continue empiric zosyn for 1-2 more days.  - Pleural fluid showed no malignancy. It showed  reactive mesothelial cells and acute and chronic inflammatory cells. Pleural fluid analysis showed high number of eosinophils and mesothelial cells and very low neutrophils. This is an interesting picture.  Per the literature, >5% mesothelial cells have low likelihood of Tb. Will send out Quantiferon. She is low risk epidemiologically. Possibility of daptomycin-mediated effusion? Will need to review. Recommend pulmonary consult given recurrent effusion of unclear etiology. - Histoplasma Ag and Cryptococcal Ag ordered. We will follow          Thank you for the opportunity to participate in the care of this patient. Please contact with questions or concerns.       Yang King MD  Infectious Diseases

## 2021-09-04 NOTE — PROGRESS NOTES
Received notification from bedside RN about patient with regards to: persistent BP elevation, crackles on auscultation and SOB  VS: /106, , RR 22, O2 sat 94% on NC 1 L    Intervention given: Labetalol IV prn, pro BNP and CXR ordered  Pro BNP: 21,189  CXR: Increased pulmonary edema and increased right pleural effusion.   Patchy airspace opacity may represent confluent edema versus volume loss versus  Infection    - Lasix 40 mg IV x 1 dose ordered

## 2021-09-05 LAB
ANION GAP SERPL CALC-SCNC: 8 MMOL/L (ref 5–15)
BACTERIA SPEC CULT: NORMAL
BACTERIA SPEC CULT: NORMAL
BASOPHILS # BLD: 0 K/UL (ref 0–0.1)
BASOPHILS NFR BLD: 0 % (ref 0–1)
BUN SERPL-MCNC: 52 MG/DL (ref 6–20)
BUN/CREAT SERPL: 29 (ref 12–20)
CALCIUM SERPL-MCNC: 9.3 MG/DL (ref 8.5–10.1)
CHLORIDE SERPL-SCNC: 107 MMOL/L (ref 97–108)
CO2 SERPL-SCNC: 23 MMOL/L (ref 21–32)
CREAT SERPL-MCNC: 1.79 MG/DL (ref 0.55–1.02)
CRYPTOC AG SER QL IA: NEGATIVE
DIFFERENTIAL METHOD BLD: ABNORMAL
EOSINOPHIL # BLD: 4.1 K/UL (ref 0–0.4)
EOSINOPHIL NFR BLD: 26 % (ref 0–7)
ERYTHROCYTE [DISTWIDTH] IN BLOOD BY AUTOMATED COUNT: 16.8 % (ref 11.5–14.5)
GLUCOSE BLD STRIP.AUTO-MCNC: 128 MG/DL (ref 65–117)
GLUCOSE BLD STRIP.AUTO-MCNC: 154 MG/DL (ref 65–117)
GLUCOSE BLD STRIP.AUTO-MCNC: 169 MG/DL (ref 65–117)
GLUCOSE BLD STRIP.AUTO-MCNC: 173 MG/DL (ref 65–117)
GLUCOSE SERPL-MCNC: 122 MG/DL (ref 65–100)
GRAM STN SPEC: NORMAL
GRAM STN SPEC: NORMAL
HCT VFR BLD AUTO: 24.8 % (ref 35–47)
HGB BLD-MCNC: 7.6 G/DL (ref 11.5–16)
IMM GRANULOCYTES # BLD AUTO: 0 K/UL (ref 0–0.04)
IMM GRANULOCYTES NFR BLD AUTO: 0 % (ref 0–0.5)
LYMPHOCYTES # BLD: 5 K/UL (ref 0.8–3.5)
LYMPHOCYTES NFR BLD: 32 % (ref 12–49)
MCH RBC QN AUTO: 28.3 PG (ref 26–34)
MCHC RBC AUTO-ENTMCNC: 30.6 G/DL (ref 30–36.5)
MCV RBC AUTO: 92.2 FL (ref 80–99)
MONOCYTES # BLD: 1.1 K/UL (ref 0–1)
MONOCYTES NFR BLD: 7 % (ref 5–13)
NEUTS SEG # BLD: 5.5 K/UL (ref 1.8–8)
NEUTS SEG NFR BLD: 35 % (ref 32–75)
NRBC # BLD: 0 K/UL (ref 0–0.01)
NRBC BLD-RTO: 0 PER 100 WBC
PATH REV BLD -IMP: ABNORMAL
PLATELET # BLD AUTO: 522 K/UL (ref 150–400)
PMV BLD AUTO: 10.3 FL (ref 8.9–12.9)
POTASSIUM SERPL-SCNC: 5 MMOL/L (ref 3.5–5.1)
RBC # BLD AUTO: 2.69 M/UL (ref 3.8–5.2)
RBC MORPH BLD: ABNORMAL
SERVICE CMNT-IMP: ABNORMAL
SERVICE CMNT-IMP: NORMAL
SODIUM SERPL-SCNC: 138 MMOL/L (ref 136–145)
WBC # BLD AUTO: 15.7 K/UL (ref 3.6–11)

## 2021-09-05 PROCEDURE — 74011250636 HC RX REV CODE- 250/636: Performed by: INTERNAL MEDICINE

## 2021-09-05 PROCEDURE — 80048 BASIC METABOLIC PNL TOTAL CA: CPT

## 2021-09-05 PROCEDURE — 87385 HISTOPLASMA CAPSUL AG IA: CPT

## 2021-09-05 PROCEDURE — 87327 CRYPTOCOCCUS NEOFORM AG IA: CPT

## 2021-09-05 PROCEDURE — 85025 COMPLETE CBC W/AUTO DIFF WBC: CPT

## 2021-09-05 PROCEDURE — 2709999900 HC NON-CHARGEABLE SUPPLY

## 2021-09-05 PROCEDURE — 74011250637 HC RX REV CODE- 250/637: Performed by: HOSPITALIST

## 2021-09-05 PROCEDURE — 65660000000 HC RM CCU STEPDOWN

## 2021-09-05 PROCEDURE — 74011000258 HC RX REV CODE- 258: Performed by: INTERNAL MEDICINE

## 2021-09-05 PROCEDURE — 74011250636 HC RX REV CODE- 250/636: Performed by: HOSPITALIST

## 2021-09-05 PROCEDURE — 74011250636 HC RX REV CODE- 250/636: Performed by: NURSE PRACTITIONER

## 2021-09-05 PROCEDURE — 36415 COLL VENOUS BLD VENIPUNCTURE: CPT

## 2021-09-05 PROCEDURE — 82962 GLUCOSE BLOOD TEST: CPT

## 2021-09-05 PROCEDURE — 86480 TB TEST CELL IMMUN MEASURE: CPT

## 2021-09-05 PROCEDURE — 74011636637 HC RX REV CODE- 636/637: Performed by: HOSPITALIST

## 2021-09-05 RX ORDER — DEXTROSE 50 % IN WATER (D50W) INTRAVENOUS SYRINGE
12.5-25 AS NEEDED
Status: DISCONTINUED | OUTPATIENT
Start: 2021-09-05 | End: 2021-09-07 | Stop reason: HOSPADM

## 2021-09-05 RX ORDER — MAGNESIUM SULFATE 100 %
4 CRYSTALS MISCELLANEOUS AS NEEDED
Status: DISCONTINUED | OUTPATIENT
Start: 2021-09-05 | End: 2021-09-07 | Stop reason: HOSPADM

## 2021-09-05 RX ORDER — INSULIN LISPRO 100 [IU]/ML
INJECTION, SOLUTION INTRAVENOUS; SUBCUTANEOUS
Status: DISCONTINUED | OUTPATIENT
Start: 2021-09-05 | End: 2021-09-07 | Stop reason: HOSPADM

## 2021-09-05 RX ORDER — HEPARIN 100 UNIT/ML
300 SYRINGE INTRAVENOUS AS NEEDED
Status: DISCONTINUED | OUTPATIENT
Start: 2021-09-05 | End: 2021-09-07 | Stop reason: HOSPADM

## 2021-09-05 RX ADMIN — HEPARIN SODIUM 5000 UNITS: 5000 INJECTION INTRAVENOUS; SUBCUTANEOUS at 16:25

## 2021-09-05 RX ADMIN — HEPARIN SODIUM 5000 UNITS: 5000 INJECTION INTRAVENOUS; SUBCUTANEOUS at 05:34

## 2021-09-05 RX ADMIN — Medication 10 ML: at 05:38

## 2021-09-05 RX ADMIN — MONTELUKAST SODIUM 1 G: 4 TABLET, CHEWABLE ORAL at 18:23

## 2021-09-05 RX ADMIN — CARVEDILOL 25 MG: 12.5 TABLET, FILM COATED ORAL at 09:21

## 2021-09-05 RX ADMIN — FUROSEMIDE 40 MG: 10 INJECTION, SOLUTION INTRAMUSCULAR; INTRAVENOUS at 22:00

## 2021-09-05 RX ADMIN — Medication 10 ML: at 22:00

## 2021-09-05 RX ADMIN — Medication 10 ML: at 16:25

## 2021-09-05 RX ADMIN — FUROSEMIDE 40 MG: 10 INJECTION, SOLUTION INTRAMUSCULAR; INTRAVENOUS at 09:21

## 2021-09-05 RX ADMIN — HEPARIN SODIUM 5000 UNITS: 5000 INJECTION INTRAVENOUS; SUBCUTANEOUS at 22:00

## 2021-09-05 RX ADMIN — Medication 1 CAPSULE: at 09:24

## 2021-09-05 RX ADMIN — ASPIRIN 81 MG: 81 TABLET, COATED ORAL at 09:21

## 2021-09-05 RX ADMIN — Medication 300 UNITS: at 05:40

## 2021-09-05 RX ADMIN — PIPERACILLIN AND TAZOBACTAM 3.38 G: 3; .375 INJECTION, POWDER, LYOPHILIZED, FOR SOLUTION INTRAVENOUS at 19:38

## 2021-09-05 RX ADMIN — MONTELUKAST SODIUM 1 G: 4 TABLET, CHEWABLE ORAL at 09:21

## 2021-09-05 RX ADMIN — PIPERACILLIN AND TAZOBACTAM 3.38 G: 3; .375 INJECTION, POWDER, LYOPHILIZED, FOR SOLUTION INTRAVENOUS at 06:10

## 2021-09-05 RX ADMIN — VENLAFAXINE 25 MG: 25 TABLET ORAL at 09:21

## 2021-09-05 RX ADMIN — CARVEDILOL 25 MG: 12.5 TABLET, FILM COATED ORAL at 16:25

## 2021-09-05 RX ADMIN — FERROUS SULFATE TAB 325 MG (65 MG ELEMENTAL FE) 325 MG: 325 (65 FE) TAB at 09:21

## 2021-09-05 RX ADMIN — VENLAFAXINE 25 MG: 25 TABLET ORAL at 18:23

## 2021-09-05 RX ADMIN — INSULIN GLARGINE 50 UNITS: 100 INJECTION, SOLUTION SUBCUTANEOUS at 09:20

## 2021-09-05 NOTE — PROGRESS NOTES
Hospitalist Progress Note    NAME: Angela Beck   :  1940   MRN:  [id number]       Assessment / Plan:  Acute respiratory failure with hypoxia POA- improved s/p pleural tap, now down to 1.5l/m now , improved with resuming diuresis ()  Acute on chronic congestive heart failure proBNP 35,000  Echocardiogram 2021 at [facility]  shows EF of 45 to 50% with severe grade 3 diastolic dysfunction. Troponinemia likely type B demand secondary to fluid overload- remains flat  Leukocytosis 25,000, lactic acid within normal limit, Covid negative on 2021  B/L Pleural effusion POA  history of MSSA on Dapto IV till  2021 at other hospital before transfer here  Chronic kidney disease creatinine 1.59 POA- improved to 1.79 today  Current tobacco abuse  CXR:  IMPRESSION  1. Unchanged mild bilateral pleural effusions, greater on the right. 2. Unchanged right sided airspace disease. 3. Unchanged mild cardiomegaly and edema. EKG=  Sinus rhythm at a rate of 62 bpm; normal CT; normal QRS; normal QTC and normal axis.  T wave inversions noted in 1 and aVL as well as V3 through V6.      CT chest without contrast=  1. Masslike opacity in the right upper lobe laterally along the pleural surface  and mediastinal adenopathy suspicious for malignancy.     2. Additional pulmonary nodule in the right upper lobe.     3. Right pleural effusion and right lower lobe volume loss. This may represent  a malignant pleural effusion given the pleural-based mass more superiorly which  possibly tracks along the pleural surface inferiorly.      S/p IV daptomycin- changed to Vanc and cefepime per ID- Vancomycin stopped () per ID due to low suspicion for MSSA this time and elevated Cr, cont Cefepime for now  IP ID consulted- recommended Thoracentesis with Pleural fluid cytology/pathology with routine cultures- s/p Thoracentesis ()- follow cytology- negative for malignancy  Pleural Fluid C&S- neg organism, Cx pending  AFB staining- neg   AFB Cx- pending    IP Cardiology consulted- cont IV diuresis as able for now ,   resumed Lasix 9/4 as Cr improved/stable - tolerating well  Strict intake output please  record Daily weight  Cont  2 g sodium diet        Recent pacer/ICD explant  PPM/ICD was originally indicated for primary prevention        OTHER PMH  Anemia hemoglobin 8.3 which is around his previous baseline. Monitor. No acute evidence of bleeding. Hypertension  Diabetes  Depression  Resume home medication as per med rec including insulin sliding scale. Check A1c level  Current tobacco use. Advised cessation. Nicotine patch  Cont. duoneb prn for possible contribution of undx COPD     Code Status: FULL, seen by palliative (9/3)- no change in Goal of care    Estimated discharge date: September 6-7?? Barriers: ID clearance      Prophylaxis: Hep SQ  Recommended Disposition: HH recommended by PT/OT     Subjective:     Chief Complaint / Reason for Physician Visit : F/U SOB/Pleural effusions, s/p thoracentesis, CHF  \"I am so much better with my breathing\". Discussed with RN events overnight. Review of Systems:  Symptom Y/N Comments  Symptom Y/N Comments   Fever/Chills n   Chest Pain n    Poor Appetite n   Edema n    Cough n   Abdominal Pain n    Sputum n   Joint Pain n    SOB/STANTON n Resolved after thoracentesis per pt  Pruritis/Rash n    Nausea/vomit n   Tolerating PT/OT y    Diarrhea n   Tolerating Diet y    Constipation    Other       Could NOT obtain due to:      Objective:     VITALS:   Last 24hrs VS reviewed since prior progress note.  Most recent are:  Patient Vitals for the past 24 hrs:   Temp Pulse Resp BP SpO2   09/05/21 1105 98.2 °F (36.8 °C) 62 18 (!) 121/55 97 %   09/05/21 0818 98.4 °F (36.9 °C) 68 18 127/60 (!) 68 %   09/05/21 0431 98.5 °F (36.9 °C) (!) 57 18 (!) 152/69 98 %   09/04/21 2336 98.4 °F (36.9 °C) 66 19 (!) 179/74 95 %   09/04/21 1908 98 °F (36.7 °C) (!) 59 20 (!) 158/72 95 %   09/04/21 1831 -- 68 -- (!) 180/77 --   09/04/21 1630 98.2 °F (36.8 °C) 66 20 138/64 95 %   09/04/21 1338 -- 64 -- (!) 142/63 --       Intake/Output Summary (Last 24 hours) at 9/5/2021 1207  Last data filed at 9/4/2021 1632  Gross per 24 hour   Intake 150 ml   Output --   Net 150 ml        I had a face to face encounter and independently examined this patient on 9/5/2021, as outlined below:  PHYSICAL EXAM:  General: WD, WN. Alert, cooperative, no acute distress    EENT:  EOMI. Anicteric sclerae. MMM  Resp:  CTA bilaterally, no wheezing or rales. No accessory muscle use  CV:  Regular  rhythm,  No edema  GI:  Soft, Non distended, Non tender. +Bowel sounds  Neurologic:  Alert and oriented X 3, normal speech,   Psych:   Good insight. Not anxious nor agitated  Skin:  No rashes. No jaundice    Reviewed most current lab test results and cultures  YES  Reviewed most current radiology test results   YES  Review and summation of old records today    NO  Reviewed patient's current orders and MAR    YES  PMH/SH reviewed - no change compared to H&P  ________________________________________________________________________  Care Plan discussed with:    Comments   Patient x    Family      RN x    Care Manager     Consultant  x ID Dr Shauna Samayoa team rounds were held today with , nursing, pharmacist and clinical coordinator. Patient's plan of care was discussed; medications were reviewed and discharge planning was addressed.      ________________________________________________________________________  Total NON critical care TIME:  26   Minutes    Total CRITICAL CARE TIME Spent:   Minutes non procedure based      Comments   >50% of visit spent in counseling and coordination of care     ________________________________________________________________________  Raj Quezada MD     Procedures: see electronic medical records for all procedures/Xrays and details which were not copied into this note but were reviewed prior to creation of Plan. LABS:  I reviewed today's most current labs and imaging studies.   Pertinent labs include:  Recent Labs     09/05/21 0349 09/04/21 0333 09/03/21 0207   WBC 15.7* 16.1* 17.7*   HGB 7.6* 9.0* 7.9*   HCT 24.8* 28.7* 25.5*   * 553* 497*     Recent Labs     09/05/21 0349 09/04/21 0333 09/03/21 0207    138 138   K 5.0 5.0 4.4    109* 110*   CO2 23 21 21   * 203* 128*   BUN 52* 48* 46*   CREA 1.79* 2.00* 2.14*   CA 9.3 9.5 9.0       Signed: Israel Crain MD

## 2021-09-05 NOTE — PROGRESS NOTES
Received notification from bedside RN about patient with regards to: sluggish blood return to PICC ports, requesting Heparin flushes  VS: /69, HR 57, RR 18, O2 sat 98% NC 1.5 L     Intervention given: Heparin intra cath flushes prn ordered

## 2021-09-05 NOTE — PROGRESS NOTES
Problem: Falls - Risk of  Goal: *Absence of Falls  Description: Document Ivette Preciado Fall Risk and appropriate interventions in the flowsheet.   Outcome: Progressing Towards Goal  Note: Fall Risk Interventions:  Mobility Interventions: Bed/chair exit alarm, Communicate number of staff needed for ambulation/transfer         Medication Interventions: Bed/chair exit alarm    Elimination Interventions: Bed/chair exit alarm, Call light in reach    History of Falls Interventions: Bed/chair exit alarm, Consult care management for discharge planning, Door open when patient unattended         Problem: Breathing Pattern - Ineffective  Goal: *Absence of hypoxia  Outcome: Progressing Towards Goal  Goal: *Use of effective breathing techniques  Outcome: Progressing Towards Goal  Goal: *PALLIATIVE CARE:  Alleviation of Dyspnea  Outcome: Progressing Towards Goal

## 2021-09-05 NOTE — PROGRESS NOTES
Patient lying on right arm. IV pump beeping. Attempted to flush picc line. Unable to pulsatile flush. Dr. Calin Benitez notified. Advised to remove picc line and start peripheral iv.   Line placed

## 2021-09-05 NOTE — PROGRESS NOTES
End of Shift Note    Bedside shift change report given to [address] [place] [address] [place] RN(oncoming nurse) by Marguerite Good (offgoing nurse). Report included the following information SBAR, Kardex, MAR and Recent Results    Shift worked:  night   Shift summary and any significant changes:     1)ID recommending empiric zosyn for another 1-2 days. Ordered quantiferon test + others done AM labs 9/5  2) Got order for PRN hep flush for PICC d/t PICC has sluggish blood return. Hep flush x 1 with some improvement. Also, sluggishness seems to be somewhat positional     Concerns for physician to address:  patient has orders for Lantus 50 units daily but no orders for BG checks or other coverage for hyper/hypoglycemia?    Zone phone for oncoming shift:   [phone]     Patient Information  Raymond Comer  79 y.o.  9/1/2021  1:25 AM by Cheryle Maravilla MD. Raymond Comer was admitted from Home    Problem List  Patient Active Problem List    Diagnosis Date Noted    Palliative care encounter 09/03/2021    Leukocytosis 09/01/2021    Pleural effusion 09/01/2021    CKD (chronic kidney disease) 09/01/2021    Acute CHF (congestive heart failure) ([address] 09/01/2021    Anemia of infection and chronic disease 08/26/2021    COPD (chronic obstructive pulmonary disease) ([address] 08/26/2021    Depression 08/26/2021    Pneumonia 08/23/2021    Sepsis ([address] 07/16/2021    Type 2 MI (myocardial infarction) ([address] 28/45/2775    Systolic heart failure, chronic ([address] 07/16/2021    Moderate episode of recurrent major depressive disorder ([address] 06/14/2021    Dilated cardiomyopathy ([address] 09/23/2020    SSS (sick sinus syndrome) ([address] 09/23/2020    Cardiomyopathy ([address] 09/23/2020    AICD (automatic cardioverter/defibrillator) present 09/23/2020    Respiratory failure ([address] 09/18/2020    CHF (congestive heart failure) ([address] 09/18/2020    Hypoxia 09/18/2020    Bilateral pleural effusion 09/18/2020    Elevated troponin 09/18/2020    PAF (paroxysmal atrial fibrillation) ([address] 09/10/2020    CKD (chronic kidney disease) stage 3, GFR 30-59 ml/min ([facility]) 06/04/2019    Lymphocytosis 05/07/2018    Type 2 diabetes with nephropathy ([address] 02/15/2018    Type 2 diabetes mellitus with diabetic neuropathy ([address] 02/15/2018    Spinal stenosis of lumbar region with neurogenic claudication 11/17/2017    Spondylosis of lumbosacral region without myelopathy or radiculopathy 11/17/2017    Overdose of opiate or related narcotic, accidental or unintentional, subsequent encounter 10/27/2017    Acute left-sided low back pain with sciatica 10/27/2017    Physical debility 10/27/2017    Acute encephalopathy 10/23/2017    Hyperkalemia 10/20/2017    Altered mental status 10/20/2017    Transaminitis 10/20/2017    Acute renal failure (ARF) ([address] 10/20/2017    Diverticulitis large intestine w/o perforation or abscess w/o bleeding 07/06/2017    SOB (shortness of breath) 06/21/2017    Abdominal pain, generalized 06/21/2017    Diarrhea in adult patient 06/21/2017    Cigarette smoker 86/58/7352    Neutrophilic leukocytosis 19/83/2821    Chronic pain     Hypercholesterolemia     Arthritis     Diabetes (HCC)     IBS (irritable bowel syndrome)     Hemorrhoid      Past Medical History:   Diagnosis Date    A-fib [facility])     AICD (automatic cardioverter/defibrillator) present 9/23/2020 9/23/2020 [employer] AICD implant    Arthritis     Bilateral pleural effusion 9/18/2020    Chronic pain     Chronic pain     Diabetes ([address]     Diverticular disease     Elevated troponin 9/18/2020    Hemorrhoid     Hypercholesterolemia     IBS (irritable bowel syndrome)     Lymphocytosis 99/63/6828    Systolic heart failure, chronic ([address] 7/16/2021    Type 2 MI (myocardial infarction) ([address] 7/16/2021 7/162021 r/t sepsis       Core Measures:  CVA: No Not applicable  CHF:No Not applicable  PNA:No Not applicable    Activity:  Activity Level:  Up with Assistance  Number times ambulated in hallways past shift: 0  Number of times OOB to chair past shift: 0    Cardiac:   Cardiac Monitoring: Yes      Cardiac Rhythm: Sinus Rhythm    Access:   Current line(s): PICC   Central Line? No   PICC LINE? Yes Placement date unknown Reason Medically Necessary limited access    Genitourinary:   Urinary status: voiding  Urinary Catheter? No     Respiratory:   O2 Device: Nasal cannula  Chronic home O2 use?: NO  Incentive spirometer at bedside: NO       GI:  Last Bowel Movement Date: 09/03/21  Current diet:  ADULT DIET Regular; 3 carb choices (45 gm/meal); 1200 ml  Passing flatus: YES  Tolerating current diet: YES       Pain Management:   Patient states pain is manageable on current regimen: YES    Skin:  Bryan Score: 21  Interventions: increase time out of bed and PT/OT consult    Patient Safety:  Fall Score: Total Score: 3  Interventions: bed/chair alarm, gripper socks and pt to call before getting OOB  High Fall Risk: Yes  @[place] no      DVT prophylaxis:  DVT prophylaxis Med- No  DVT prophylaxis SCD or JONNATHAN- No     Wounds: (If Applicable)  Wounds- No  Location none    Active Consults:  IP CONSULT TO CARDIOLOGY  IP CONSULT TO INFECTIOUS DISEASES  IP CONSULT TO PALLIATIVE CARE - PROVIDER    Length of Stay:  Expected LOS: 4d 0h  Actual LOS: 4  Discharge Plan: No last CM note from 9/1 indicates consideration of SNF for rehab vs home with home health.  PT/OT recommending home health PT      Vladimir Montes

## 2021-09-06 LAB
ANION GAP SERPL CALC-SCNC: 5 MMOL/L (ref 5–15)
BACTERIA SPEC CULT: NORMAL
BACTERIA SPEC CULT: NORMAL
BASOPHILS # BLD: 0.3 K/UL (ref 0–0.1)
BASOPHILS NFR BLD: 2 % (ref 0–1)
BUN SERPL-MCNC: 58 MG/DL (ref 6–20)
BUN/CREAT SERPL: 26 (ref 12–20)
CALCIUM SERPL-MCNC: 9.3 MG/DL (ref 8.5–10.1)
CHLORIDE SERPL-SCNC: 108 MMOL/L (ref 97–108)
CO2 SERPL-SCNC: 24 MMOL/L (ref 21–32)
CREAT SERPL-MCNC: 2.25 MG/DL (ref 0.55–1.02)
CRP SERPL HS-MCNC: 3 MG/L
DIFFERENTIAL METHOD BLD: ABNORMAL
EOSINOPHIL # BLD: 5.5 K/UL (ref 0–0.4)
EOSINOPHIL NFR BLD: 33 % (ref 0–7)
ERYTHROCYTE [DISTWIDTH] IN BLOOD BY AUTOMATED COUNT: 17.3 % (ref 11.5–14.5)
ERYTHROCYTE [SEDIMENTATION RATE] IN BLOOD: 128 MM/HR (ref 0–30)
GLUCOSE BLD STRIP.AUTO-MCNC: 117 MG/DL (ref 65–117)
GLUCOSE BLD STRIP.AUTO-MCNC: 178 MG/DL (ref 65–117)
GLUCOSE BLD STRIP.AUTO-MCNC: 184 MG/DL (ref 65–117)
GLUCOSE BLD STRIP.AUTO-MCNC: 189 MG/DL (ref 65–117)
GLUCOSE SERPL-MCNC: 109 MG/DL (ref 65–100)
HCT VFR BLD AUTO: 27.4 % (ref 35–47)
HGB BLD-MCNC: 8.6 G/DL (ref 11.5–16)
IMM GRANULOCYTES # BLD AUTO: 0 K/UL (ref 0–0.04)
IMM GRANULOCYTES NFR BLD AUTO: 0 % (ref 0–0.5)
LYMPHOCYTES # BLD: 6.4 K/UL (ref 0.8–3.5)
LYMPHOCYTES NFR BLD: 39 % (ref 12–49)
MCH RBC QN AUTO: 28.4 PG (ref 26–34)
MCHC RBC AUTO-ENTMCNC: 31.4 G/DL (ref 30–36.5)
MCV RBC AUTO: 90.4 FL (ref 80–99)
MONOCYTES # BLD: 0.2 K/UL (ref 0–1)
MONOCYTES NFR BLD: 1 % (ref 5–13)
NEUTS SEG # BLD: 4.2 K/UL (ref 1.8–8)
NEUTS SEG NFR BLD: 25 % (ref 32–75)
NRBC # BLD: 0 K/UL (ref 0–0.01)
NRBC BLD-RTO: 0 PER 100 WBC
PLATELET # BLD AUTO: 486 K/UL (ref 150–400)
PMV BLD AUTO: 10 FL (ref 8.9–12.9)
POTASSIUM SERPL-SCNC: 4.8 MMOL/L (ref 3.5–5.1)
RBC # BLD AUTO: 3.03 M/UL (ref 3.8–5.2)
RBC MORPH BLD: ABNORMAL
RBC MORPH BLD: ABNORMAL
SERVICE CMNT-IMP: ABNORMAL
SERVICE CMNT-IMP: NORMAL
SODIUM SERPL-SCNC: 137 MMOL/L (ref 136–145)
WBC # BLD AUTO: 16.6 K/UL (ref 3.6–11)

## 2021-09-06 PROCEDURE — 85025 COMPLETE CBC W/AUTO DIFF WBC: CPT

## 2021-09-06 PROCEDURE — 74011250636 HC RX REV CODE- 250/636: Performed by: HOSPITALIST

## 2021-09-06 PROCEDURE — 86141 C-REACTIVE PROTEIN HS: CPT

## 2021-09-06 PROCEDURE — 77010033678 HC OXYGEN DAILY

## 2021-09-06 PROCEDURE — 74011250637 HC RX REV CODE- 250/637: Performed by: HOSPITALIST

## 2021-09-06 PROCEDURE — 74011636637 HC RX REV CODE- 636/637: Performed by: HOSPITALIST

## 2021-09-06 PROCEDURE — 36415 COLL VENOUS BLD VENIPUNCTURE: CPT

## 2021-09-06 PROCEDURE — 85652 RBC SED RATE AUTOMATED: CPT

## 2021-09-06 PROCEDURE — 82962 GLUCOSE BLOOD TEST: CPT

## 2021-09-06 PROCEDURE — 83520 IMMUNOASSAY QUANT NOS NONAB: CPT

## 2021-09-06 PROCEDURE — 94760 N-INVAS EAR/PLS OXIMETRY 1: CPT

## 2021-09-06 PROCEDURE — 80048 BASIC METABOLIC PNL TOTAL CA: CPT

## 2021-09-06 PROCEDURE — 74011250636 HC RX REV CODE- 250/636: Performed by: INTERNAL MEDICINE

## 2021-09-06 PROCEDURE — 65660000000 HC RM CCU STEPDOWN

## 2021-09-06 RX ADMIN — HEPARIN SODIUM 5000 UNITS: 5000 INJECTION INTRAVENOUS; SUBCUTANEOUS at 22:09

## 2021-09-06 RX ADMIN — HEPARIN SODIUM 5000 UNITS: 5000 INJECTION INTRAVENOUS; SUBCUTANEOUS at 14:29

## 2021-09-06 RX ADMIN — Medication 1 CAPSULE: at 10:53

## 2021-09-06 RX ADMIN — ACETAMINOPHEN 650 MG: 325 TABLET ORAL at 03:16

## 2021-09-06 RX ADMIN — FERROUS SULFATE TAB 325 MG (65 MG ELEMENTAL FE) 325 MG: 325 (65 FE) TAB at 10:53

## 2021-09-06 RX ADMIN — MONTELUKAST SODIUM 1 G: 4 TABLET, CHEWABLE ORAL at 10:53

## 2021-09-06 RX ADMIN — HEPARIN SODIUM 5000 UNITS: 5000 INJECTION INTRAVENOUS; SUBCUTANEOUS at 05:11

## 2021-09-06 RX ADMIN — ASPIRIN 81 MG: 81 TABLET, COATED ORAL at 10:53

## 2021-09-06 RX ADMIN — VENLAFAXINE 25 MG: 25 TABLET ORAL at 10:53

## 2021-09-06 RX ADMIN — ACETAMINOPHEN 650 MG: 325 TABLET ORAL at 14:29

## 2021-09-06 RX ADMIN — INSULIN GLARGINE 50 UNITS: 100 INJECTION, SOLUTION SUBCUTANEOUS at 11:02

## 2021-09-06 RX ADMIN — Medication 10 ML: at 05:10

## 2021-09-06 RX ADMIN — CARVEDILOL 25 MG: 12.5 TABLET, FILM COATED ORAL at 18:12

## 2021-09-06 RX ADMIN — VENLAFAXINE 25 MG: 25 TABLET ORAL at 18:12

## 2021-09-06 RX ADMIN — MONTELUKAST SODIUM 1 G: 4 TABLET, CHEWABLE ORAL at 18:12

## 2021-09-06 RX ADMIN — CARVEDILOL 25 MG: 12.5 TABLET, FILM COATED ORAL at 10:53

## 2021-09-06 RX ADMIN — FUROSEMIDE 40 MG: 10 INJECTION, SOLUTION INTRAMUSCULAR; INTRAVENOUS at 10:53

## 2021-09-06 RX ADMIN — ACETAMINOPHEN 650 MG: 325 TABLET ORAL at 22:13

## 2021-09-06 RX ADMIN — Medication 10 ML: at 18:12

## 2021-09-06 RX ADMIN — Medication 10 ML: at 22:11

## 2021-09-06 NOTE — PROGRESS NOTES
Problem: Falls - Risk of  Goal: *Absence of Falls  Description: Document Khalida Urias Fall Risk and appropriate interventions in the flowsheet.   Outcome: Progressing Towards Goal  Note: Fall Risk Interventions:  Mobility Interventions: Bed/chair exit alarm, Communicate number of staff needed for ambulation/transfer         Medication Interventions: Bed/chair exit alarm    Elimination Interventions: Bed/chair exit alarm, Call light in reach    History of Falls Interventions: Bed/chair exit alarm

## 2021-09-06 NOTE — PROGRESS NOTES
Hospitalist Progress Note    NAME: Jaimie Petty   :  1940   MRN:  [id number]       Assessment / Plan:  Acute respiratory failure with hypoxia POA- improved s/p pleural tap, now down to 1.5l/m now , improved with resuming diuresis ()  Acute on chronic congestive heart failure proBNP 35,000  Echocardiogram 2021 at [facility]  shows EF of 45 to 50% with severe grade 3 diastolic dysfunction. Troponinemia likely type B demand secondary to fluid overload- remains flat  Leukocytosis with eosinophilia POA- now down to 16k   B/L Pleural effusion POA  history of MSSA on Dapto IV till  2021 at other hospital before transfer here  Chronic kidney disease creatinine 1.59 POA- improved to 1.79 but now up to 2.2 with resumption of lasix  Current tobacco abuse  CXR:  IMPRESSION  1. Unchanged mild bilateral pleural effusions, greater on the right. 2. Unchanged right sided airspace disease. 3. Unchanged mild cardiomegaly and edema. EKG=  Sinus rhythm at a rate of 62 bpm; normal HI; normal QRS; normal QTC and normal axis.  T wave inversions noted in 1 and aVL as well as V3 through V6.      CT chest without contrast=  1. Masslike opacity in the right upper lobe laterally along the pleural surface  and mediastinal adenopathy suspicious for malignancy.     2. Additional pulmonary nodule in the right upper lobe.     3. Right pleural effusion and right lower lobe volume loss. This may represent  a malignant pleural effusion given the pleural-based mass more superiorly which  possibly tracks along the pleural surface inferiorly.      S/p IV daptomycin- changed to Vanc and cefepime per ID- Vancomycin stopped () per ID due to low suspicion for MSSA this time and elevated Cr, s/p Cefepime - DC today as per ID recommendations- Abx therapy completed now  IP ID consulted- recommended Thoracentesis with Pleural fluid cytology/pathology with routine cultures- s/p Thoracentesis ()- follow cytology- negative for malignancy  Pleural Fluid C&S- neg organism, Cx pending  AFB staining- neg   AFB Cx- pending  Pleural fluid Eosinophilia noted     IP Cardiology consulted- cont IV diuresis as able for now , lasix on hold again as Cr rises again today to 2.2  Strict intake output please  record Daily weight  Cont  2 g sodium diet  IP Pulmonary consulted per ID recommendations- ? Would patient benefit from steroids?, Repeat CT chest will be needed in a few weeks        Recent pacer/ICD explant  PPM/ICD was originally indicated for primary prevention        OTHER PMH  Anemia hemoglobin 8.3 which is around his previous baseline. Monitor. No acute evidence of bleeding. Hypertension  Diabetes  Depression  Resume home medication as per med rec including insulin sliding scale. Check A1c level  Current tobacco use. Advised cessation. Nicotine patch  Cont. duoneb prn for possible contribution of undx COPD     Code Status: FULL, seen by palliative (9/3)- no change in Goal of care    Estimated discharge date: September 7 ? Barriers: ID & Pulmonary clearance      Prophylaxis: Hep SQ  Recommended Disposition: HH recommended by PT/OT     Subjective:     Chief Complaint / Reason for Physician Visit : F/U SOB/Pleural effusions, s/p thoracentesis, CHF  \"I am so much better with my breathing\". Discussed with RN events overnight. Review of Systems:  Symptom Y/N Comments  Symptom Y/N Comments   Fever/Chills n   Chest Pain n    Poor Appetite n   Edema n    Cough n   Abdominal Pain n    Sputum n   Joint Pain n    SOB/STANTON n Resolved after thoracentesis per pt  Pruritis/Rash n    Nausea/vomit n   Tolerating PT/OT y    Diarrhea n   Tolerating Diet y    Constipation    Other       Could NOT obtain due to:      Objective:     VITALS:   Last 24hrs VS reviewed since prior progress note.  Most recent are:  Patient Vitals for the past 24 hrs:   Temp Pulse Resp BP SpO2   09/06/21 0740 97.7 °F (36.5 °C) 63 18 (!) 151/65 96 %   09/06/21 0329 98.5 °F (36.9 °C) 63 20 130/61 99 %   09/05/21 2310 98.3 °F (36.8 °C) 67 20 131/60 96 %   09/05/21 1910 98.9 °F (37.2 °C) 68 18 127/60 94 %   09/05/21 1506 98.4 °F (36.9 °C) 66 18 (!) 150/64 95 %     No intake or output data in the 24 hours ending 09/06/21 1150     I had a face to face encounter and independently examined this patient on 9/6/2021, as outlined below:  PHYSICAL EXAM:  General: WD, WN. Alert, cooperative, no acute distress    EENT:  EOMI. Anicteric sclerae. MMM  Resp:  CTA bilaterally, no wheezing or rales. No accessory muscle use  CV:  Regular  rhythm,  No edema  GI:  Soft, Non distended, Non tender. +Bowel sounds  Neurologic:  Alert and oriented X 3, normal speech,   Psych:   Good insight. Not anxious nor agitated  Skin:  No rashes. No jaundice    Reviewed most current lab test results and cultures  YES  Reviewed most current radiology test results   YES  Review and summation of old records today    NO  Reviewed patient's current orders and MAR    YES  PMH/ reviewed - no change compared to H&P  ________________________________________________________________________  Care Plan discussed with:    Comments   Patient x    Family  x Daughter on phone today   RN x    Care Manager     Consultant  x ID Dr Norman Singer team rounds were held today with , nursing, pharmacist and clinical coordinator. Patient's plan of care was discussed; medications were reviewed and discharge planning was addressed.      ________________________________________________________________________  Total NON critical care TIME:  26   Minutes    Total CRITICAL CARE TIME Spent:   Minutes non procedure based      Comments   >50% of visit spent in counseling and coordination of care     ________________________________________________________________________  Gretchen Page MD     Procedures: see electronic medical records for all procedures/Xrays and details which were not copied into this note but were reviewed prior to creation of Plan. LABS:  I reviewed today's most current labs and imaging studies.   Pertinent labs include:  Recent Labs     09/06/21 0046 09/05/21 0349 09/04/21 0333   WBC 16.6* 15.7* 16.1*   HGB 8.6* 7.6* 9.0*   HCT 27.4* 24.8* 28.7*   * 522* 553*     Recent Labs     09/06/21 0046 09/05/21 0349 09/04/21 0333    138 138   K 4.8 5.0 5.0    107 109*   CO2 24 23 21   * 122* 203*   BUN 58* 52* 48*   CREA 2.25* 1.79* 2.00*   CA 9.3 9.3 9.5       Signed: Kevan Burden MD

## 2021-09-06 NOTE — CONSULTS
Pulmonary, Critical Care, and Sleep Medicine~Consult Note    Name: Justo Hinson MRN: [id number]   : 1940 Hospital: [facility]   Date: 2021 11:32 AM Admission: 2021     IMPRESSION:   · Right pleural effusion s/p thoracentesis, pathology negative for malignancy  · RUL pleural based mass 4.1 x 1.8cm. 7mm nodule RUL. · Recent MSSA bacteremia s/p PPM removal, completed course of daptomycin  · Leukocytosis, eosinophilia - ? Secondary to daptomycin  · Smoker  · CKD      PLAN:   · Check ANCA, ESR, CRP  · Tobacco cessation  · Outpt pulmonary follow up 2 weeks, plan for PFTs  · She would like further w/u done outpt. We discussed repeat CT 4-6 weeks and considering biopsy. · Would not empirically treat w/ steroids  · D/w Dr. Langston Screws     Daily Progression:    78yo female smoker w/ CHF, DM, HTN, CKD, SSS s/p PPM. Admitted 21 w/ dyspnea. Multiple recent hospitalizations for MSSA bacteremia, PPM removed 2021. Treated w/ IV abx, completed course of daptomycin this admission. Admitted on this occasion w/ dyspnea. pBNP 35,000. Echo 2021 w/ EF 45-50%, CT w/ right pleural effusion, masslike opacity RUL, nodule RUL, mediastinal adenopathy. Thoracentesis done 21 1000ml removed. Pathology negative for malignancy. She feels well and is w/out complaints of cough, cp, wheezing, dyspnea. On RA. Bored and tired of being in the hospital. Would like to go home. Has a cardiology appt on Wednesday she would like to go to. Lives in [facility] [place] by [facility]. Retired LPN. States her eosinophils have been elevated \"all my life\". No family history of lung disease. Not diagnosed w/ COPD but has smoked 1ppd for about 60 years. Not sure if she will remain abstinent once discharged. Would like further w/u done outpt if possible. Past medical, surgical, social, family history reviewed. Labs/imaginag reviewed. Medications reviewed.      I have reviewed the labs and previous days notes.    A comprehensive review of systems was negative except for that written in the HPI. Past Medical History:   Diagnosis Date    A-fib [facility])     AICD (automatic cardioverter/defibrillator) present 9/23/2020 9/23/2020 [employer] AICD implant    Arthritis     Bilateral pleural effusion 9/18/2020    Chronic pain     Chronic pain     Diabetes ([facility]     Diverticular disease     Elevated troponin 9/18/2020    Hemorrhoid     Hypercholesterolemia     IBS (irritable bowel syndrome)     Lymphocytosis 45/19/9806    Systolic heart failure, chronic ([facility] 7/16/2021    Type 2 MI (myocardial infarction) ([facility] 7/16/2021 7/162021 r/t sepsis      Past Surgical History:   Procedure Laterality Date    COLONOSCOPY N/A 10/31/2019    [facility] Doyne Fabry[facility]  10/31/2019         [facility]         HX CATARACT REMOVAL      HX CHOLECYSTECTOMY      HX COLONOSCOPY  2009    HX COLONOSCOPY  2016    2 adenomatous polyp    HX HEMORRHOIDECTOMY  2009    HX HYSTERECTOMY      HX KNEE REPLACEMENT      right    HX ORTHOPAEDIC  12/11/2017    back, laminectomy and decompression    DC INSJ/RPLCMT PERM DFB W/TRNSVNS LDS 1/DUAL CHMBR N/A 9/23/2020    INSERT ICD DUAL performed by Jorge Wolf MD at [facility]      Prior to Admission medications    Medication Sig Start Date End Date Taking? Authorizing Provider   insulin glargine (Lantus Solostar U-100 Insulin) 100 unit/mL (3 mL) inpn 50 Units by SubCUTAneous route daily. 8/6/21  Yes Noam Maldonado MD   ferrous sulfate 325 mg (65 mg iron) tablet Take 1 Tablet by mouth daily (with breakfast). 8/7/21  Yes Noam Maldonado MD   nicotine (NICODERM CQ) 21 mg/24 hr 1 Patch by TransDERmal route daily for 30 days. 8/7/21 9/6/21 Yes Noam Maldonado MD   venlafaxine (EFFEXOR) 75 mg tablet Take 25 mg by mouth two (2) times a day.    Yes Jennifer, MD Cory   ergocalciferol (ERGOCALCIFEROL) 1,250 mcg (50,000 unit) capsule Take 50,000 Units by mouth every seven (7) days. On Wednesdays   Yes Provider, Historical   b complex vitamins tablet Take 1 Tablet by mouth daily. Yes Provider, Historical   furosemide (LASIX) 40 mg tablet TAKE 1 TABLET BY MOUTH  DAILY 6/15/21  Yes Giselle Lopez MD   carvediloL (COREG) 25 mg tablet Take 1 Tab by mouth two (2) times daily (with meals). 2/21/21  Yes Giselle Lopez MD   colestipoL (Colestid) 1 gram tablet Take 1 Tab by mouth two (2) times a day. 10/7/20  Yes Marifer Chan DO   aspirin delayed-release 81 mg tablet Take 1 Tab by mouth daily. 9/10/20  Yes Giselle Lopez MD   B.infantis-B.ani-B.long-B.bifi (PROBIOTIC 4X) 10-15 mg TbEC Take  by mouth daily. Yes Provider, Historical   acetaminophen (TYLENOL) 650 mg TbER Take 650 mg by mouth every eight (8) hours. Yes Provider, Historical   omega-3 fatty acids-vitamin e 1,000 mg cap Take 1 Cap by mouth two (2) times a day. 32a day    Yes Provider, Historical   ascorbic acid, vitamin C, (VITAMIN C) 500 mg tablet Take 1,000 mg by mouth daily. Yes Provider, Historical   zinc 50 mg tab tablet Take 50 mg by mouth daily. Yes Provider, Historical   DAPTOmycin (CUBICIN RF) IVPB 450 mg by IntraVENous route every fourty-eight (48) hours. 8/7/21   Zenda Kocher, MD   nitroglycerin (NITROSTAT) 0.4 mg SL tablet 1 Tab by SubLINGual route every five (5) minutes as needed for Chest Pain. Up to 3 doses.   Patient not taking: Reported on 7/26/2021 2/23/21   CARTER Mcdonnell     Allergies   Allergen Reactions    Cefazolin Rash     Possible kidney failure/AIN    Morphine Anaphylaxis    Ultram [Tramadol] Palpitations      Social History     Tobacco Use    Smoking status: Current Every Day Smoker     Packs/day: 1.00     Years: 55.00     Pack years: 55.00     Types: Cigarettes    Smokeless tobacco: Never Used   Substance Use Topics    Alcohol use: No      Family History   Problem Relation Age of Onset    Colon Cancer Father     Diabetes Mother        OBJECTIVE:     Vital Signs:       Visit Vitals  BP (!) 151/65 (BP 1 Location: Left upper arm, BP Patient Position: At rest)   Pulse 63   Temp 97.7 °F (36.5 °C)   Resp 18   Ht 5' 5.98\" (1.676 m)   Wt 67.8 kg (149 lb 7.6 oz)   SpO2 96%   Breastfeeding No   BMI 24.14 kg/m²      Temp (24hrs), Av.4 °F (36.9 °C), Min:97.7 °F (36.5 °C), Max:98.9 °F (37.2 °C)     Intake/Output:     Last shift: No intake/output data recorded. Last 3 shifts: No intake/output data recorded.       No intake or output data in the 24 hours ending 21 1132       Imaging:  I have personally reviewed these radiographic films and reports:     I have personally reviewed PFTs:        Physical Exam:                                        Exam Findings Other   General: No resp distress noted, appears stated age    [de-identified]:  No ulcers, JVD not elevated, no cervical LAD    Chest: No pectus deformity, normal chest rise b/l    HEART:  RRR, no murmurs/rubs/gallops    Lungs:  CTA b/l, no rhonchi/crackles/wheeze, diminished BS at bases    ABD: Soft/NT, non rigid mildly distended    EXT: No cyanosis/clubbing/edema, normal peripheral pulses    Skin: No rashes or ulcers, no mottling    Neuro: A/O x 3        Medications:  Current Facility-Administered Medications   Medication Dose Route Frequency    heparin (porcine) pf 300 Units  300 Units InterCATHeter PRN    insulin lispro (HUMALOG) injection   SubCUTAneous AC&HS    glucose chewable tablet 16 g  4 Tablet Oral PRN    dextrose (D50W) injection syrg 12.5-25 g  12.5-25 g IntraVENous PRN    glucagon (GLUCAGEN) injection 1 mg  1 mg IntraMUSCular PRN    furosemide (LASIX) injection 40 mg  40 mg IntraVENous Q12H    oxyCODONE-acetaminophen (PERCOCET) 5-325 mg per tablet 1 Tablet  1 Tablet Oral Q6H PRN    arformoterol 15 mcg/budesonide 0.5 mg neb solution   Nebulization BID PRN    labetaloL (NORMODYNE;TRANDATE) injection 10 mg  10 mg IntraVENous Q4H PRN    aspirin delayed-release tablet 81 mg  81 mg Oral DAILY    L.acidophilus-paracasei-S.thermophil-bifidobacter (RISAQUAD) 8 billion cell capsule  1 Capsule Oral DAILY    carvediloL (COREG) tablet 25 mg  25 mg Oral BID WITH MEALS    colestipoL (COLESTID) tablet 1 g  1 g Oral BID    ferrous sulfate tablet 325 mg  325 mg Oral DAILY WITH BREAKFAST    insulin glargine (LANTUS) injection 50 Units  50 Units SubCUTAneous DAILY    nicotine (NICODERM CQ) 21 mg/24 hr patch 1 Patch  1 Patch TransDERmal DAILY    nitroglycerin (NITROSTAT) tablet 0.4 mg  0.4 mg SubLINGual Q5MIN PRN    venlafaxine (EFFEXOR) tablet 25 mg  25 mg Oral BID    sodium chloride (NS) flush 5-40 mL  5-40 mL IntraVENous Q8H    sodium chloride (NS) flush 5-40 mL  5-40 mL IntraVENous PRN    acetaminophen (TYLENOL) tablet 650 mg  650 mg Oral Q6H PRN    Or    acetaminophen (TYLENOL) suppository 650 mg  650 mg Rectal Q6H PRN    polyethylene glycol (MIRALAX) packet 17 g  17 g Oral DAILY PRN    ondansetron (ZOFRAN ODT) tablet 4 mg  4 mg Oral Q8H PRN    Or    ondansetron (ZOFRAN) injection 4 mg  4 mg IntraVENous Q6H PRN    heparin (porcine) injection 5,000 Units  5,000 Units SubCUTAneous Q8H    albuterol-ipratropium (DUO-NEB) 2.5 MG-0.5 MG/3 ML  3 mL Nebulization Q6H PRN       Labs:  ABG No results for input(s): PHI, PCO2I, PO2I, HCO3I, SO2I, FIO2I in the last 72 hours.      CBC Recent Labs     09/06/21  0046 09/05/21 0349 09/04/21  0333   WBC 16.6* 15.7* 16.1*   HGB 8.6* 7.6* 9.0*   HCT 27.4* 24.8* 28.7*   * 522* 553*   MCV 90.4 92.2 90.5   MCH 28.4 28.3 69.3        Metabolic  Panel Recent Labs     09/06/21  0046 09/05/21 0349 09/04/21  0333    138 138   K 4.8 5.0 5.0    107 109*   CO2 24 23 21   * 122* 203*   BUN 58* 52* 48*   CREA 2.25* 1.79* 2.00*   CA 9.3 9.3 9.5        Pertinent Labs                Judith Perry NP  9/6/2021

## 2021-09-06 NOTE — PROGRESS NOTES
Problem: Falls - Risk of  Goal: *Absence of Falls  Description: Document Bashir Gamez Fall Risk and appropriate interventions in the flowsheet.   Outcome: Progressing Towards Goal  Note: Fall Risk Interventions:  Mobility Interventions: Communicate number of staff needed for ambulation/transfer, Bed/chair exit alarm         Medication Interventions: Bed/chair exit alarm, Patient to call before getting OOB    Elimination Interventions: Bed/chair exit alarm, Call light in reach    History of Falls Interventions: Bed/chair exit alarm         Problem: Breathing Pattern - Ineffective  Goal: *Absence of hypoxia  Outcome: Progressing Towards Goal  Goal: *Use of effective breathing techniques  Outcome: Progressing Towards Goal  Goal: *PALLIATIVE CARE:  Alleviation of Dyspnea  Outcome: Progressing Towards Goal

## 2021-09-06 NOTE — PROGRESS NOTES
Patient with rising eosinophilia peripherally. Could be daptomycin related - ? Temporally, her peripheral eos started to go up after the daptomycin was started ~8/3/21. Sometimes rise in peripheral eosinophilia is seen for some days after the discontinuation of the therapy. Eosinophils in the pleural fluid as well. The leukocytosis is due to the eosinophilia. Recommend pulmonary evaluation. D/w hospitalist Dr. Mary Wilcox. Would patient benefit from steroids? Repeat CT chest will be needed in a few weeks. Will discuss with Pulmonary after evaluation. Stop zosyn today. From ID-standpoint, she has finished antimicrobial courses.           Trung Michele MD  Infectious Diseases

## 2021-09-06 NOTE — PROGRESS NOTES
End of Shift Note    Bedside shift change report given to Kaitlin Sherwood (oncoming nurse) by ANUM Manning (offgoing nurse).   Report included the following information SBAR    Shift worked:  [id number]   Shift summary and any significant changes:     PRN Tylenol x1       Concerns for physician to address:  None   Zone phone for oncoming shift:   [phone]     Patient Information  Raymond Comer  79 y.o.  9/1/2021  1:25 AM by Cheryle Maravilla MD. Raymond Comer was admitted from Home    Problem List  Patient Active Problem List    Diagnosis Date Noted    Palliative care encounter 09/03/2021    Leukocytosis 09/01/2021    Pleural effusion 09/01/2021    CKD (chronic kidney disease) 09/01/2021    Acute CHF (congestive heart failure) ([address] 09/01/2021    Anemia of infection and chronic disease 08/26/2021    COPD (chronic obstructive pulmonary disease) ([address] 08/26/2021    Depression 08/26/2021    Pneumonia 08/23/2021    Sepsis ([address] 07/16/2021    Type 2 MI (myocardial infarction) ([address] 47/34/3317    Systolic heart failure, chronic ([address] 07/16/2021    Moderate episode of recurrent major depressive disorder ([address] 06/14/2021    Dilated cardiomyopathy ([address] 09/23/2020    SSS (sick sinus syndrome) ([address] 09/23/2020    Cardiomyopathy ([address] 09/23/2020    AICD (automatic cardioverter/defibrillator) present 09/23/2020    Respiratory failure ([address] 09/18/2020    CHF (congestive heart failure) ([address] 09/18/2020    Hypoxia 09/18/2020    Bilateral pleural effusion 09/18/2020    Elevated troponin 09/18/2020    PAF (paroxysmal atrial fibrillation) ([address] 09/10/2020    CKD (chronic kidney disease) stage 3, GFR 30-59 ml/min ([address] 06/04/2019    Lymphocytosis 05/07/2018    Type 2 diabetes with nephropathy ([address] 02/15/2018    Type 2 diabetes mellitus with diabetic neuropathy ([address] 02/15/2018    Spinal stenosis of lumbar region with neurogenic claudication 11/17/2017    Spondylosis of lumbosacral region without myelopathy or radiculopathy 11/17/2017    Overdose of opiate or related narcotic, accidental or unintentional, subsequent encounter 10/27/2017    Acute left-sided low back pain with sciatica 10/27/2017    Physical debility 10/27/2017    Acute encephalopathy 10/23/2017    Hyperkalemia 10/20/2017    Altered mental status 10/20/2017    Transaminitis 10/20/2017    [facility] ([facility]F) [facility] 10/20/2017    Diverticulitis large intestine w/o perforation or abscess w/o bleeding 07/06/2017    SOB (shortness of breath) 06/21/2017    Abdominal pain, generalized 06/21/2017    Diarrhea in adult patient 06/21/2017    Cigarette smoker 92/52/5532    Neutrophilic leukocytosis 50/44/8025    Chronic pain     Hypercholesterolemia     Arthritis     Diabetes (HCC)     IBS (irritable bowel syndrome)     Hemorrhoid      Past Medical History:   Diagnosis Date    A-fib [facility])     AICD (automatic cardioverter/defibrillator) present 9/23/2020 9/23/2020 [employer] AICD implant    Arthritis     Bilateral pleural effusion 9/18/2020    Chronic pain     Chronic pain     Diabetes ([facility]     Diverticular disease     Elevated troponin 9/18/2020    Hemorrhoid     Hypercholesterolemia     IBS (irritable bowel syndrome)     Lymphocytosis 34/54/8049    Systolic heart failure, chronic ([address] 7/16/2021    Type 2 MI (myocardial infarction) ([facility] 7/16/2021 7/162021 r/t sepsis       Core Measures:  CVA: No No  CHF:No No  PNA:No No    Activity:  Activity Level:  Up with Assistance  Number times ambulated in hallways past shift: 0  Number of times OOB to chair past shift: 0    Cardiac:   Cardiac Monitoring: Yes      Cardiac Rhythm: Sinus Rhythm    Access:   Current line(s): PIV     Genitourinary:   Urinary status: voiding     Respiratory:   O2 Device: Nasal cannula  Chronic home O2 use?: NO  Incentive spirometer at bedside: N/A       GI:  Last Bowel Movement Date: 09/05/21  Current diet:  ADULT DIET Regular; 3 carb choices (45 gm/meal); 1200 ml  Passing flatus: YES  Tolerating current diet: YES       Pain Management:   Patient states pain is manageable on current regimen: N/A    Skin:  Bryan Score: 21  Interventions: increase time out of bed and limit briefs    Patient Safety:  Fall Score:  Total Score: 3  Interventions: bed/chair alarm, gripper socks and pt to call before getting OOB  High Fall Risk: Yes  @Rollbelt  @dexterity to release roll belt  Yes/No ( must document dexterity  here by stating Yes or No here, otherwise this is a restraint and must follow restraint documentation policy.)    DVT prophylaxis:  DVT prophylaxis Med- Yes  DVT prophylaxis SCD or JONNATHAN- No     Wounds: (If Applicable)  Wounds- No  Location     Active Consults:  IP CONSULT TO CARDIOLOGY  IP CONSULT TO INFECTIOUS DISEASES  IP CONSULT TO PALLIATIVE CARE - PROVIDER    Length of Stay:  Expected LOS: 4d 0h  Actual LOS: 5  Discharge Plan: Yes; home       ANUM Hui

## 2021-09-07 VITALS
TEMPERATURE: 98 F | HEIGHT: 66 IN | HEART RATE: 77 BPM | OXYGEN SATURATION: 98 % | DIASTOLIC BLOOD PRESSURE: 72 MMHG | WEIGHT: 149.47 LBS | RESPIRATION RATE: 15 BRPM | BODY MASS INDEX: 24.02 KG/M2 | SYSTOLIC BLOOD PRESSURE: 171 MMHG

## 2021-09-07 LAB
ANION GAP SERPL CALC-SCNC: 8 MMOL/L (ref 5–15)
BASOPHILS # BLD: 0.1 K/UL (ref 0–0.1)
BASOPHILS NFR BLD: 1 % (ref 0–1)
BUN SERPL-MCNC: 64 MG/DL (ref 6–20)
BUN/CREAT SERPL: 31 (ref 12–20)
CALCIUM SERPL-MCNC: 9.3 MG/DL (ref 8.5–10.1)
CHLORIDE SERPL-SCNC: 106 MMOL/L (ref 97–108)
CO2 SERPL-SCNC: 22 MMOL/L (ref 21–32)
CREAT SERPL-MCNC: 2.08 MG/DL (ref 0.55–1.02)
DIFFERENTIAL METHOD BLD: ABNORMAL
DISCLAIMER:, 130261: NORMAL
EOSINOPHIL # BLD: 3.7 K/UL (ref 0–0.4)
EOSINOPHIL NFR BLD: 26 % (ref 0–7)
ERYTHROCYTE [DISTWIDTH] IN BLOOD BY AUTOMATED COUNT: 17.2 % (ref 11.5–14.5)
GLUCOSE BLD STRIP.AUTO-MCNC: 152 MG/DL (ref 65–117)
GLUCOSE SERPL-MCNC: 135 MG/DL (ref 65–100)
HCT VFR BLD AUTO: 29 % (ref 35–47)
HGB BLD-MCNC: 9.1 G/DL (ref 11.5–16)
HISTOPLASMA AG, UR,HAGU: <0.5 NG/ML
IMM GRANULOCYTES # BLD AUTO: 0 K/UL (ref 0–0.04)
IMM GRANULOCYTES NFR BLD AUTO: 0 % (ref 0–0.5)
LYMPHOCYTES # BLD: 6.4 K/UL (ref 0.8–3.5)
LYMPHOCYTES NFR BLD: 44 % (ref 12–49)
MCH RBC QN AUTO: 28.5 PG (ref 26–34)
MCHC RBC AUTO-ENTMCNC: 31.4 G/DL (ref 30–36.5)
MCV RBC AUTO: 90.9 FL (ref 80–99)
MONOCYTES # BLD: 0.4 K/UL (ref 0–1)
MONOCYTES NFR BLD: 3 % (ref 5–13)
NEUTS SEG # BLD: 3.7 K/UL (ref 1.8–8)
NEUTS SEG NFR BLD: 26 % (ref 32–75)
NRBC # BLD: 0 K/UL (ref 0–0.01)
NRBC BLD-RTO: 0 PER 100 WBC
PLATELET # BLD AUTO: 513 K/UL (ref 150–400)
PMV BLD AUTO: 10.2 FL (ref 8.9–12.9)
POTASSIUM SERPL-SCNC: 4.3 MMOL/L (ref 3.5–5.1)
RBC # BLD AUTO: 3.19 M/UL (ref 3.8–5.2)
RBC MORPH BLD: ABNORMAL
SERVICE CMNT-IMP: ABNORMAL
SODIUM SERPL-SCNC: 136 MMOL/L (ref 136–145)
WBC # BLD AUTO: 14.3 K/UL (ref 3.6–11)

## 2021-09-07 PROCEDURE — 74011250637 HC RX REV CODE- 250/637: Performed by: HOSPITALIST

## 2021-09-07 PROCEDURE — 85025 COMPLETE CBC W/AUTO DIFF WBC: CPT

## 2021-09-07 PROCEDURE — 80048 BASIC METABOLIC PNL TOTAL CA: CPT

## 2021-09-07 PROCEDURE — 74011636637 HC RX REV CODE- 636/637: Performed by: HOSPITALIST

## 2021-09-07 PROCEDURE — 82962 GLUCOSE BLOOD TEST: CPT

## 2021-09-07 PROCEDURE — 74011250636 HC RX REV CODE- 250/636: Performed by: HOSPITALIST

## 2021-09-07 PROCEDURE — 36415 COLL VENOUS BLD VENIPUNCTURE: CPT

## 2021-09-07 RX ORDER — FUROSEMIDE 20 MG/1
20 TABLET ORAL DAILY
Qty: 30 TABLET | Refills: 1 | Status: SHIPPED | OUTPATIENT
Start: 2021-09-09 | End: 2021-11-12

## 2021-09-07 RX ADMIN — CARVEDILOL 25 MG: 12.5 TABLET, FILM COATED ORAL at 08:18

## 2021-09-07 RX ADMIN — INSULIN GLARGINE 50 UNITS: 100 INJECTION, SOLUTION SUBCUTANEOUS at 09:00

## 2021-09-07 RX ADMIN — FERROUS SULFATE TAB 325 MG (65 MG ELEMENTAL FE) 325 MG: 325 (65 FE) TAB at 08:19

## 2021-09-07 RX ADMIN — MONTELUKAST SODIUM 1 G: 4 TABLET, CHEWABLE ORAL at 08:18

## 2021-09-07 RX ADMIN — Medication 1 CAPSULE: at 08:18

## 2021-09-07 RX ADMIN — ASPIRIN 81 MG: 81 TABLET, COATED ORAL at 08:18

## 2021-09-07 RX ADMIN — ACETAMINOPHEN 650 MG: 325 TABLET ORAL at 08:18

## 2021-09-07 RX ADMIN — Medication 10 ML: at 05:28

## 2021-09-07 RX ADMIN — VENLAFAXINE 25 MG: 25 TABLET ORAL at 08:18

## 2021-09-07 RX ADMIN — HEPARIN SODIUM 5000 UNITS: 5000 INJECTION INTRAVENOUS; SUBCUTANEOUS at 05:28

## 2021-09-07 NOTE — PROGRESS NOTES
End of Shift Note    Bedside shift change report given to George Woodruff,  (oncoming nurse) by Rhea Pederson RN (offgoing nurse). Report included the following information SBAR and Kardex    Shift worked:  night   Shift summary and any significant changes:     fall and safety precaution maintained, call bell and phone within reach of patient. Declined CHG bathe offered.         Concerns for physician to address:  none   Zone phone for oncoming shift:   [phone]     Patient Information  Naveed Marshall  79 y.o.  9/1/2021  1:25 AM by Joao Harrison MD. Naveed Marshall was admitted from Home    Problem List  Patient Active Problem List    Diagnosis Date Noted    Palliative care encounter 09/03/2021    Leukocytosis 09/01/2021    Pleural effusion 09/01/2021    CKD (chronic kidney disease) 09/01/2021    Acute CHF (congestive heart failure) ([address] 09/01/2021    Anemia of infection and chronic disease 08/26/2021    COPD (chronic obstructive pulmonary disease) ([address] 08/26/2021    Depression 08/26/2021    Pneumonia 08/23/2021    Sepsis ([address] 07/16/2021    Type 2 MI (myocardial infarction) ([address] 67/06/4639    Systolic heart failure, chronic ([address] 07/16/2021    Moderate episode of recurrent major depressive disorder ([address] 06/14/2021    Dilated cardiomyopathy ([address] 09/23/2020    SSS (sick sinus syndrome) ([address] 09/23/2020    Cardiomyopathy ([address] 09/23/2020    AICD (automatic cardioverter/defibrillator) present 09/23/2020    Respiratory failure ([address] 09/18/2020    CHF (congestive heart failure) ([address] 09/18/2020    Hypoxia 09/18/2020    Bilateral pleural effusion 09/18/2020    Elevated troponin 09/18/2020    PAF (paroxysmal atrial fibrillation) ([address] 09/10/2020    CKD (chronic kidney disease) stage 3, GFR 30-59 ml/min ([facility] 06/04/2019    Lymphocytosis 05/07/2018    Type 2 diabetes with nephropathy ([address] 02/15/2018    Type 2 diabetes mellitus with diabetic neuropathy ([facility] 02/15/2018    Spinal stenosis of lumbar region with neurogenic claudication 11/17/2017    Spondylosis of lumbosacral region without myelopathy or radiculopathy 11/17/2017    Overdose of opiate or related narcotic, accidental or unintentional, subsequent encounter 10/27/2017    Acute left-sided low back pain with sciatica 10/27/2017    Physical debility 10/27/2017    Acute encephalopathy 10/23/2017    Hyperkalemia 10/20/2017    Altered mental status 10/20/2017    Transaminitis 10/20/2017    Acute renal failure (ARF) ([address] 10/20/2017    Diverticulitis large intestine w/o perforation or abscess w/o bleeding 07/06/2017    SOB (shortness of breath) 06/21/2017    Abdominal pain, generalized 06/21/2017    Diarrhea in adult patient 06/21/2017    Cigarette smoker 75/50/0545    Neutrophilic leukocytosis 52/55/0930    Chronic pain     Hypercholesterolemia     Arthritis     Diabetes (HCC)     IBS (irritable bowel syndrome)     Hemorrhoid      Past Medical History:   Diagnosis Date    A-fib [facility])     AICD (automatic cardioverter/defibrillator) present 9/23/2020 9/23/2020 [employer] AICD implant    Arthritis     Bilateral pleural effusion 9/18/2020    Chronic pain     Chronic pain     Diabetes ([address]     Diverticular disease     Elevated troponin 9/18/2020    Hemorrhoid     Hypercholesterolemia     IBS (irritable bowel syndrome)     Lymphocytosis 32/68/6780    Systolic heart failure, chronic ([address] 7/16/2021    Type 2 MI (myocardial infarction) ([address] 7/16/2021 7/162021 r/t sepsis       Core Measures: Activity:  Activity Level:  Up with Assistance  Number times ambulated in hallways past shift: 0  Number of times OOB to chair past shift: 3    Cardiac:   Cardiac Monitoring: Yes      Cardiac Rhythm: Sinus Rhythm    Access:   Current line(s): PIV     Genitourinary:   Urinary status: voiding    Respiratory:   O2 Device: None (Room air)  Chronic home O2 use?: NO  Incentive spirometer at bedside: NO       GI:  Last Bowel Movement Date: 09/06/21  Current diet:  ADULT DIET Regular; 3 carb choices (45 gm/meal); 1200 ml  Passing flatus: YES  Tolerating current diet: YES       Pain Management:   Patient states pain is manageable on current regimen: YES    Skin:  Bryan Score: 20  Interventions: increase time out of bed    Patient Safety:  Fall Score:  Total Score: 3  Interventions: bed/chair alarm, assistive device (walker, cane, etc), gripper socks and pt to call before getting OOB  High Fall Risk: Yes  @Rollbelt  @dexterity to release roll belt  Yes/No ( must document dexterity  here by stating Yes or No here, otherwise this is a restraint and must follow restraint documentation policy.)    DVT prophylaxis:  DVT prophylaxis Med- Yes  DVT prophylaxis SCD or JONNATHAN- No     Wounds: (If Applicable)  Wounds- No  Location     Active Consults:  IP CONSULT TO CARDIOLOGY  IP CONSULT TO INFECTIOUS DISEASES  IP CONSULT TO PALLIATIVE CARE - PROVIDER  IP CONSULT TO PULMONOLOGY    Length of Stay:  Expected LOS: 4d 0h  Actual LOS: 6  Discharge Plan: Yes 9/7      Radha Rojas RN

## 2021-09-07 NOTE — PROGRESS NOTES
Pharmacist Discharge Medication Reconciliation    Significant PMH:   Past Medical History:   Diagnosis Date    A-fib [facility])     AICD (automatic cardioverter/defibrillator) present 9/23/2020 9/23/2020 [employer] AICD implant    Arthritis     Bilateral pleural effusion 9/18/2020    Chronic pain     Chronic pain     Diabetes ([facility]     Diverticular disease     Elevated troponin 9/18/2020    Hemorrhoid     Hypercholesterolemia     IBS (irritable bowel syndrome)     Lymphocytosis 39/59/7472    Systolic heart failure, chronic ([facility] 7/16/2021    Type 2 MI (myocardial infarction) ([facility] 7/16/2021 7/162021 r/t sepsis     Encounter Diagnoses:   Encounter Diagnoses   Name Primary? Acute combined systolic and diastolic congestive heart failure (HCC)     Chronic kidney disease, unspecified CKD stage     Elevated troponin     Pleural effusion     AICD (automatic cardioverter/defibrillator) present     SSS (sick sinus syndrome) (HCC)     Type 2 diabetes with nephropathy (HCC)     Hypoxia     Cardiomyopathy, unspecified type (HCC)     Abnormal chest CT     Palliative care encounter     SOB (shortness of breath)     Dilated cardiomyopathy (HCC)      Allergies: Cefazolin, Morphine, and Ultram [tramadol]    Discharge Medications:   Current Discharge Medication List        CONTINUE these medications which have CHANGED    Details   furosemide (LASIX) 20 mg tablet Take 1 Tablet by mouth daily. Qty: 30 Tablet, Refills: 1  Start date: 9/9/2021           CONTINUE these medications which have NOT CHANGED    Details   insulin glargine (Lantus Solostar U-100 Insulin) 100 unit/mL (3 mL) inpn 50 Units by SubCUTAneous route daily. Qty: 1 Adjustable Dose Pre-filled Pen Syringe, Refills: 0      ferrous sulfate 325 mg (65 mg iron) tablet Take 1 Tablet by mouth daily (with breakfast). Qty: 30 Tablet, Refills: 0      venlafaxine (EFFEXOR) 75 mg tablet Take 37.5 mg by mouth two (2) times a day.       ergocalciferol (ERGOCALCIFEROL) 1,250 mcg (50,000 unit) capsule Take 50,000 Units by mouth every seven (7) days. On Wednesdays      b complex vitamins tablet Take 1 Tablet by mouth daily. carvediloL (COREG) 25 mg tablet Take 1 Tab by mouth two (2) times daily (with meals). Qty: 28 Tab, Refills: 0    Comments: Waiting for mail order      colestipoL (Colestid) 1 gram tablet Take 1 Tab by mouth two (2) times a day. Qty: 180 Tab, Refills: 3      aspirin delayed-release 81 mg tablet Take 1 Tab by mouth daily. Qty: 90 Tab, Refills: 1      B.infantis-B.ani-B.long-B.bifi (PROBIOTIC 4X) 10-15 mg TbEC Take  by mouth daily. acetaminophen (TYLENOL) 650 mg TbER Take 650 mg by mouth every eight (8) hours. omega-3 fatty acids-vitamin e 1,000 mg cap Take 1 Cap by mouth two (2) times a day. 32a day       ascorbic acid, vitamin C, (VITAMIN C) 500 mg tablet Take 1,000 mg by mouth daily. zinc 50 mg tab tablet Take 50 mg by mouth daily. nitroglycerin (NITROSTAT) 0.4 mg SL tablet 1 Tab by SubLINGual route every five (5) minutes as needed for Chest Pain. Up to 3 doses. Qty: 25 Tab, Refills: 0           STOP taking these medications       nicotine (NICODERM CQ) 21 mg/24 hr Comments:   Reason for Stopping:         DAPTOmycin (CUBICIN RF) IVPB Comments:   Reason for Stopping:               The patient's chart, MAR and AVS were reviewed by Albertina Schilder, [address] [place] [address].     Discharging Provider: Gwen Mcclain MD    Thank you,     Albertina Schilder, [address] [place] [address]

## 2021-09-07 NOTE — PROGRESS NOTES
Transition of Care Plan:     RUR: 45% - \"high-risk\"  Disposition: Home with Myron Thurman (RN/PT) & f/u apts  * [facility] HH orders, H&P, d/c summary, therapy notes, & face-to-face faxed to [facility] per request 21 (f: [phone])   * Palliative care consult completed 9/3/21  Follow up appointments: PCP & specialist as indicated - all f/u apts secured for d/c, details reflected in pt's AVS for reference  DME needed: No current DME needs identified for d/c  Transportation at Discharge: [facility] transport secured for 11:30 AM; PCS completed, copy placed on chart  Keys or means to access home: Pt has access to her home      IM Medicare Letter: 2nd IMM provided at bedside 21  Is patient a BCPI-A Bundle: N/A  Caregiver Contact: Pt's  Gaurav [phone])  Discharge Caregiver contacted prior to discharge? To be contacted per request    Update - 12:00 PM: CM received call from [facility] requesting face-to-face document to be completed by attending MD for [facility]. [facility] also requested for pt's H&P & therapy notes throughout the course of her admission to be faxed to their office for reference. MD completed face-to-face; ROGER faxed completed copy of face-to-face to [facility] alongside other requested documentation. Update - 10:35 AM: CM met with pt at bedside to provide final overview of the d/c plan. CM reviewed d/c plan including [facility] HH services & f/u apts needed for d/c; pt verbalized understanding. CM reiterated the importance of pt attending f/u apts as scheduled; pt verbalized understanding. CM informed pt of [facility] transport at 11:30 AM. CM also informed RN of [facility] transport time. Medicare pt has received, reviewed, and provided verbal consent for 2nd IM letter informing them of their right to appeal the discharge. Copy has been placed on pt bedside chart.     Update - 9:35 AM: [facility] transport secured for 11:30 AM; PCS completed, copy to be placed on chart. CM noted unit secretary secured f/u apt with Dr. Peña Simon for 9/21/21 at 2:00 PM; information reflected in pt's AVS for reference. CM confirmed pt has all f/u apts secured for d/c; AVS completed & ready to print. CM will meet with pt at bedside to provide final overview of the d/c plan & reiterate dates/times of f/u apts. No further CM needs identified. Update - 9:18 AM: CM contacted [facility] ([phone]) to inform agency of pt's d/c and confirm address for pt's AVS. CM spoke with staff member Howard Hinojosa) who confirmed pt is serviced by [address] [place] [address]; agency's information placed in pt's AVS for reference. Zeke Feilpe also acknowledged pt's d/c date for today (9/7/21), as well as request to f/u in a timely manner. CM sent referral via [employer] to [facility] requesting medical transport for 11:30 AM; referral currently pending via [employer]. CM noted unit secretary secured f/u apt with Dr. Ac Cardona for 9/30/21 at 8:15 AM; information reflected in pt's AVS for reference. Update - 9:05 AM: CM faxed [address] HH orders & pt d/c summary to [facility] for reference; CM reiterated d/c date for today (9/7/21) & requested timely f/u due to pt presenting as a high risk for readmission. CM will confirm [facility] physical address for pt's AVS.     Update - 8:54 AM: CM requested unit secretary's assistance securing specialist f/u apts for d/c, as pt presents as a high risk for re-admission; unit secretary actively working to process request. CM contacted pt via bedside phone to review plan for d/c. [facility] through [facility] at d/c; pt confirmed. CM inquired about transportation for d/c. Pt requested for CM to arrange a medical transport back to her home address detailed on file. CM clarified which form of medical transport pt was referencing re: wheel chair van vs stretcher transport.  Pt requested stretcher transport & confirmed her  Damaris Dux: [phone] is present at home to allow crew inside. CM inquired if pt had additional needs for d/c, including any DME and/or supportive resources; pt declined. CM confirmed pt is agreeable to the d/c plan; no questions or concerns reported. CM is actively working to arrange medical transport for d/c; CM to report pick-up time once available. Initial note: CM acknowledged d/c. CM reviewed pt's chart prior to moving forward with d/c planning. Per chart review, pt has completed her course of IV Abx & will not need home infusion services resumed at d/c. CM also noted pt is currently on room air & will not need home O2 arranged for d/c. Pt was open to [facility] prior to current admission; CM will f/u with pt to inquire if she would like services resumed at d/c. Once confirmed, CM will fax [facility] orders to [facility] for reference. In anticipation for d/c, CM specialist secured PCP f/u apt for 9/14/21 at 4:20 PM; information reflected in pt's AVS for reference. CM will f/u with pt to review plan for d/c, address any immediate questions/concerns, & confirm transportation home. CM will continue to follow & remain accessible for d/c planning. Care Management Interventions  PCP Verified by CM:  Yes  Palliative Care Criteria Met (RRAT>21 & CHF Dx)?: Yes  Palliative Consult Recommended?: Yes (Palliative consult completed 9/3/21 )  Mode of Transport at Discharge: [address]  [address] Time of Discharge: 1130  Transition of Care Consult (CM Consult): [address]: No  Reason Outside [place]: Patient already serviced by other home care/hospice agency  Discharge Durable Medical Equipment: No (Pt owns DME necessary for d/c)  Physical Therapy Consult: Yes  Occupational Therapy Consult: Yes  Speech Therapy Consult: No  Current Support Network: Lives with Spouse, Own Home  Confirm Follow Up Transport: Family  The Plan for Transition of Care is Related to the Following Treatment Goals : [address] HH services (RN/PT)  The Patient and/or Patient Representative was Provided with a Choice of Provider and Agrees with the Discharge Plan?: Yes  Name of the Patient Representative Who was Provided with a Choice of Provider and Agrees with the Discharge Plan: patient Oh [facility])  Freedom of Choice List was Provided with Basic Dialogue that Supports the Patient's Individualized Plan of Care/Goals, Treatment Preferences and Shares the Quality Data Associated with the Providers?: Yes  [place] Resource Information Provided?: No  Discharge Location  Discharge Placement: Home with home health (Home with Myron Thurman (RN/PT) & f/u apts)    Wyflorin Carltonniru, [address] [place] [address], [address] [place] [address]

## 2021-09-07 NOTE — PROGRESS NOTES
Problem: Falls - Risk of  Goal: *Absence of Falls  Description: Document Johnny Arredondo Fall Risk and appropriate interventions in the flowsheet.   Outcome: Progressing Towards Goal  Note: Fall Risk Interventions:  Mobility Interventions: Bed/chair exit alarm, Patient to call before getting OOB         Medication Interventions: Bed/chair exit alarm    Elimination Interventions: Call light in reach, Bed/chair exit alarm, Patient to call for help with toileting needs    History of Falls Interventions: Bed/chair exit alarm         Problem: Patient Education: Go to Patient Education Activity  Goal: Patient/Family Education  Outcome: Progressing Towards Goal     Problem: Patient Education: Go to Patient Education Activity  Goal: Patient/Family Education  Outcome: Progressing Towards Goal     Problem: Patient Education: Go to Patient Education Activity  Goal: Patient/Family Education  Outcome: Progressing Towards Goal     Problem: Breathing Pattern - Ineffective  Goal: *Absence of hypoxia  Outcome: Progressing Towards Goal  Goal: *Use of effective breathing techniques  Outcome: Progressing Towards Goal  Goal: *PALLIATIVE CARE:  Alleviation of Dyspnea  Outcome: Progressing Towards Goal     Problem: Patient Education: Go to Patient Education Activity  Goal: Patient/Family Education  Outcome: Progressing Towards Goal     Problem: Breathing Pattern - Ineffective  Goal: *Absence of hypoxia  Outcome: Progressing Towards Goal  Goal: *Use of effective breathing techniques  Outcome: Progressing Towards Goal     Problem: Patient Education: Go to Patient Education Activity  Goal: Patient/Family Education  Outcome: Progressing Towards Goal

## 2021-09-07 NOTE — DISCHARGE SUMMARY
Hospitalist Discharge Summary     Patient ID:  Sammy Grant  [id number]  71 y.o.  1940 9/1/2021    PCP on record: Sahji Jim NP    Admit date: 9/1/2021  Discharge date and time: 9/7/2021    DISCHARGE DIAGNOSIS:    Acute respiratory failure with hypoxia POA- improved s/p pleural tap,off oxygen now  Acute on chronic congestive heart failure proBNP 35,000- cont lasix at 20 mg daily for now due to IVAN for now, outpatient followup with Dr John Heller at [facility] EF of 45 to 50% with severe grade 3 diastolic dysfunction. Troponinemia likely type B demand secondary to fluid overload- remains flat  Leukocytosis with eosinophilia POA- now down to 14k, outpatient followup with Pulm associates as recommended  B/L Pleural effusion POA- s/p R thoracentesis (~1L), neg for cytology, neg for bacteria, neg for AFB, exudative  history of MSSA on Dapto IV till  9/2/2021 at other hospital before transfer here- taken off all Abx per ID, therapy completed now  Chronic kidney disease POA- Cr down to 2.0, resume lower dose of Lasix after 48 hrs as discussed  Current tobacco abuse POA- cessation counseling given  Full code    CONSULTATIONS:  IP CONSULT TO CARDIOLOGY  IP CONSULT TO INFECTIOUS DISEASES  IP CONSULT TO PALLIATIVE CARE - PROVIDER  IP CONSULT TO PULMONOLOGY    Excerpted HPI from H&P of Karey Ro MD:  Elaina.[age] y. o. female, with multiple medical problems as stated below who [facility] c/o gradually increasing shortness of breath for 10 days     Per Patient  o She notes last few nights she is unable to sleep and awakes at 4 AM sitting on multiple pillows she just cannot catch her breath.  She denies any chest pain during these events as well as any cough, fevers, chills, abdominal pain or vomiting.   She was seen at osh day before yesterday for similar symptoms but states she could not stay and had a signout [address] despite abnormal lab testing. Latasha Davies states she is willing to stay today . While in the emergency department, patient became acutely dyspneic and hypertensive. On evaluation at that time she had crackles in all lung fields so was given sublingual nitro and started on nitro drip         Outside hospital ED course and summary is below  \"Patient presented to outside ED with shortness of breath, on arrival she was found to have labored respirations with crackles and wheezing noted on exam.  Patient was tachycardic and hypoxic on room air. Patient was also found to have very hypertensive. After labs and imaging symptoms are felt likely to be multifactorial with likely degree of heart failure exacerbation secondary to uncontrolled blood pressure as well as possible undiagnosed COPD given her smoking history and wheezing on exam. Patient has been showing signs of improvement however became acutely dyspneic and hypertensive. On reevaluation that time had crackles in all lung fields so was given nitroglycerin with improvement in her blood pressure. Was also given an additional nebulized albuterol and steroids given her persistent wheezing. With her new oxygen requirement and nitroglycerin infusion will plan to transfer this patient to [facility] to a higher level of care.      Elderly female who was seen at [facility] ED day before  yesterday with elevated troponin and BNP and white count and recommended admision which she refused. Latasha Davies is back  because her primary care doctor would not see her in the office and recommended she come emergently to the ER for admission.  Today On reevaluation her EKG shows T wave inversions were these to be upright, her troponin remains elevated and her BNP is significantly elevated. Chest x-ray from day before yesterday  shows effusion and her white count is elevated but she does not show any evidence of pneumonia or parapneumonic effusion.  Looking through past her white count seems to always be elevated especially over the past week but is continuing to progressively increase from 17k one week ago to 25,000 today.  On extensive lab review her white count has been positive since at least June.  She has had multiple chest x-rays but last CT scan of the chest was 2017. Kirsty Diamond creatinine is elevated making her a poor candidate for CT angiogram as her GFR is 38% . \"    ______________________________________________________________________  DISCHARGE SUMMARY/HOSPITAL COURSE:  for full details see H&P, daily progress notes, labs, consult notes. Acute respiratory failure with hypoxia POA- improved s/p pleural tap, now down to 1.5l/m now , improved with resuming diuresis (9/4)  Acute on chronic congestive heart failure proBNP 35,000  Echocardiogram 8/20/2021 at [facility] EF of 45 to 50% with severe grade 3 diastolic dysfunction. Troponinemia likely type B demand secondary to fluid overload- remains flat  Leukocytosis with eosinophilia POA- now down to 16k   B/L Pleural effusion POA  history of MSSA on Dapto IV till  9/2/2021 at other hospital before transfer here  Chronic kidney disease creatinine 1.59 POA- improved to 1.79 but now up to 2.2 with resumption of lasix  Current tobacco abuse  Recent pacer/ICD explant    CXR:  IMPRESSION  1. Unchanged mild bilateral pleural effusions, greater on the right. 2. Unchanged right sided airspace disease. 3. Unchanged mild cardiomegaly and edema. EKG=  Sinus rhythm at a rate of 62 bpm; normal TX; normal QRS; normal QTC and normal axis.  T wave inversions noted in 1 and aVL as well as V3 through V6.       CT chest without contrast=  1.  Masslike opacity in the right upper lobe laterally along the pleural surface  and mediastinal adenopathy suspicious for malignancy.     2.  Additional pulmonary nodule in the right upper lobe.     3.  Right pleural effusion and right lower lobe volume loss.  This may represent  a malignant pleural effusion given the pleural-based mass more superiorly which  possibly tracks along the pleural surface inferiorly.      S/p IV daptomycin- changed to Vanc and cefepime per ID- Vancomycin stopped (9/2) per ID due to low suspicion for MSSA this time and elevated Cr, s/p Cefepime - DC today as per ID recommendations- Abx therapy completed now  IP ID consulted- recommended Thoracentesis with Pleural fluid cytology/pathology with routine cultures- s/p Thoracentesis (9/1)- follow cytology- negative for malignancy  Pleural Fluid C&S- neg organism, Cx pending  AFB staining- neg   AFB Cx- pending  Pleural fluid Eosinophilia noted      IP Cardiology consulted- cont IV diuresis as able for now , lasix on hold again as Cr rises again today to 2.2  Strict intake output please  record Daily weight  Cont  2 g sodium diet  IP Pulmonary consulted per ID recommendations- ? Would patient benefit from steroids?, Repeat CT chest will be needed in a few weeks        Recent pacer/ICD explant  PPM/ICD was originally indicated for primary prevention        OTHER PMH  Anemia hemoglobin 8.3 which is around his previous baseline.  Monitor.  No acute evidence of bleeding. Hypertension  Diabetes  Depression  Resume home medication as per med rec including insulin sliding scale.  Check A1c level  Current tobacco use.  Advised cessation.  Nicotine patch  Cont. duoneb prn for possible contribution of undx COPD     Code Status: FULL, seen by palliative (9/3)- no change in Goal of care      _______________________________________________________________________  Patient seen and examined by me on discharge day. Pertinent Findings:  Gen:    Not in distress  Chest: Clear lungs  CVS:   Regular rhythm.   No edema  Abd:  Soft, not distended, not tender  Neuro:  Alert, oriented x 3  _______________________________________________________________________  DISCHARGE MEDICATIONS:   Current Discharge Medication List      CONTINUE these medications which have CHANGED    Details   furosemide (LASIX) 20 mg tablet Take 1 Tablet by mouth daily. Qty: 30 Tablet, Refills: 1  Start date: 9/9/2021         CONTINUE these medications which have NOT CHANGED    Details   insulin glargine (Lantus Solostar U-100 Insulin) 100 unit/mL (3 mL) inpn 50 Units by SubCUTAneous route daily. Qty: 1 Adjustable Dose Pre-filled Pen Syringe, Refills: 0      ferrous sulfate 325 mg (65 mg iron) tablet Take 1 Tablet by mouth daily (with breakfast). Qty: 30 Tablet, Refills: 0      venlafaxine (EFFEXOR) 75 mg tablet Take 25 mg by mouth two (2) times a day. ergocalciferol (ERGOCALCIFEROL) 1,250 mcg (50,000 unit) capsule Take 50,000 Units by mouth every seven (7) days. On Wednesdays      b complex vitamins tablet Take 1 Tablet by mouth daily. carvediloL (COREG) 25 mg tablet Take 1 Tab by mouth two (2) times daily (with meals). Qty: 28 Tab, Refills: 0    Comments: Waiting for mail order      colestipoL (Colestid) 1 gram tablet Take 1 Tab by mouth two (2) times a day. Qty: 180 Tab, Refills: 3      aspirin delayed-release 81 mg tablet Take 1 Tab by mouth daily. Qty: 90 Tab, Refills: 1      B.infantis-B.ani-B.long-B.bifi (PROBIOTIC 4X) 10-15 mg TbEC Take  by mouth daily. acetaminophen (TYLENOL) 650 mg TbER Take 650 mg by mouth every eight (8) hours. omega-3 fatty acids-vitamin e 1,000 mg cap Take 1 Cap by mouth two (2) times a day. 32a day       ascorbic acid, vitamin C, (VITAMIN C) 500 mg tablet Take 1,000 mg by mouth daily. zinc 50 mg tab tablet Take 50 mg by mouth daily. nitroglycerin (NITROSTAT) 0.4 mg SL tablet 1 Tab by SubLINGual route every five (5) minutes as needed for Chest Pain. Up to 3 doses. Qty: 25 Tab, Refills: 0         STOP taking these medications       nicotine (NICODERM CQ) 21 mg/24 hr Comments:   Reason for Stopping:         DAPTOmycin (CUBICIN RF) IVPB Comments:   Reason for Stopping:                 Patient Follow Up Instructions:    Activity: Activity as tolerated  Diet: Cardiac Diet, Diabetic Diet and Low fat, Low cholesterol    Follow-up with Dr Neo Martinez in 1 week, Dr Suzy Litten ([facility]) in 2 weeks for outpatient followup/ PFTs and repeat CT as recommended      Follow-up Information     Follow up With Specialties Details Why Contact Clemente Hansen MD Cardiology, Internal Medicine Schedule an appointment as soon as possible for a visit in 2 weeks Contact the office directly to schedule hospital f/u [facility] [address]  [phone]      Janell Sanchez NP Nurse Practitioner Go on 9/14/2021 For hospital follow up appointment at 4:20PM  [place] [id number]  [address] [place] [address]  [phone]          ________________________________________________________________    Risk of deterioration: Low    Condition at Discharge:  Stable  __________________________________________________________________    Disposition  Home with family and home health services    ____________________________________________________________________    Code Status: Full Code  ___________________________________________________________________      Total time in minutes spent coordinating this discharge (includes going over instructions, follow-up, prescriptions, and preparing report for sign off to her PCP) :  >30 minutes    Signed:  Emily Borges MD

## 2021-09-07 NOTE — PROGRESS NOTES
Patient discharged home via [facility] transport. All personal belongings taken with patient. Discharge instructions given and explained.

## 2021-09-07 NOTE — DISCHARGE INSTRUCTIONS
HOSPITALIST DISCHARGE INSTRUCTIONS    NAME: Molly Cuellar   :  1940   MRN:  [id number]     Date/Time:  2021 8:36 AM    ADMIT DATE: 2021     DISCHARGE DATE: 2021     DISCHARGE DIAGNOSIS:  Acute respiratory failure with hypoxia POA- improved s/p pleural tap,off oxygen now  Acute on chronic congestive heart failure proBNP 35,000- cont lasix at 20 mg daily for now due to IVAN for now, outpatient followup with Dr Sascha Frias soon  Echocardiogram 2021 at [facility] EF of 45 to 50% with severe grade 3 diastolic dysfunction. Troponinemia likely type B demand secondary to fluid overload- remains flat  Leukocytosis with eosinophilia POA- now down to 14k, outpatient followup with Pulm associates as recommended  B/L Pleural effusion POA- s/p R thoracentesis (~1L), neg for cytology, neg for bacteria, neg for AFB, exudative  history of MSSA on Dapto IV till  2021 at other hospital before transfer here- taken off all Abx per ID, therapy completed now  Chronic kidney disease POA- Cr down to 2.0, resume lower dose of Lasix after 48 hrs as discussed  Current tobacco abuse POA- cessation counseling given  Full code    Active Problems:    SOB (shortness of breath) (2017)      Hypoxia (2020)      Elevated troponin (2020)      Leukocytosis (2021)      Pleural effusion (2021)      CKD (chronic kidney disease) (2021)      Acute CHF (congestive heart failure) ([address] (2021)      Palliative care encounter (9/3/2021)         MEDICATIONS:  As per medication reconciliation  list  · It is important that you take the medication exactly as they are prescribed. · Keep your medication in the bottles provided by the pharmacist and keep a list of the medication names, dosages, and times to be taken in your wallet. · Do not take other medications without consulting your doctor.      Pain Management: per above medications    What to do at Home    Recommended diet:  Cardiac Diet, Diabetic Diet and Low fat, Low cholesterol    Recommended activity: Activity as tolerated    If you have questions regarding the hospital related prescriptions or hospital related issues please call Renetta Lees at . If you experience any of the following symptoms then please call your primary care physician or return to the emergency room if you cannot get hold of your doctor:  Fever, chills, nausea, vomiting, diarrhea, change in mentation, falling, bleeding, shortness of breath,     Follow Up:  Dr. Grant Vidal, NP  you are to call and set up an appointment to see them in 7-10 days. Dr Dante Bucio ([facility]) in 2 weeks for PFTs, then repeat CT in 4-6 weeks, +/- biopsy as recommended  Dr Bishop Monk (Cardiology) in 1 week    Information obtained by :  I understand that if any problems occur once I am at home I am to contact my physician. I understand and acknowledge receipt of the instructions indicated above.                                                                                                                                            Physician's or R.N.'s Signature                                                                  Date/Time                                                                                                                                              Patient or Representative Signature                                                          Date/Time

## 2021-09-07 NOTE — PROGRESS NOTES
Problem: Falls - Risk of  Goal: *Absence of Falls  Description: Document Bel Landrum Fall Risk and appropriate interventions in the flowsheet.   9/7/2021 1109 by Wai Malave RN  Outcome: Resolved/Met  9/7/2021 1109 by Wai Malave RN  Outcome: Progressing Towards Goal  Note: Fall Risk Interventions:  Mobility Interventions: Bed/chair exit alarm, Patient to call before getting OOB         Medication Interventions: Bed/chair exit alarm, Patient to call before getting OOB, Teach patient to arise slowly    Elimination Interventions: Bed/chair exit alarm, Call light in reach, Patient to call for help with toileting needs, Stay With Me (per policy), Toilet paper/wipes in reach    History of Falls Interventions: Bed/chair exit alarm         Problem: Patient Education: Go to Patient Education Activity  Goal: Patient/Family Education  9/7/2021 1109 by Wai Malave RN  Outcome: Resolved/Met  9/7/2021 1109 by Wai Malave RN  Outcome: Progressing Towards Goal     Problem: Patient Education: Go to Patient Education Activity  Goal: Patient/Family Education  9/7/2021 1109 by Wai Malave RN  Outcome: Resolved/Met  9/7/2021 1109 by Wai Malave RN  Outcome: Progressing Towards Goal     Problem: Patient Education: Go to Patient Education Activity  Goal: Patient/Family Education  9/7/2021 1109 by Wai Malave RN  Outcome: Resolved/Met  9/7/2021 1109 by Wai Malave RN  Outcome: Progressing Towards Goal     Problem: Breathing Pattern - Ineffective  Goal: *Absence of hypoxia  9/7/2021 1109 by Wai Malave RN  Outcome: Resolved/Met  9/7/2021 1109 by Wai Malave RN  Outcome: Progressing Towards Goal  Goal: *Use of effective breathing techniques  9/7/2021 1109 by Wai Malave RN  Outcome: Resolved/Met  9/7/2021 1109 by Wai Malave RN  Outcome: Progressing Towards Goal  Goal: *PALLIATIVE CARE:  Alleviation of Dyspnea  9/7/2021 1109 by Wai Malave RN  Outcome: Resolved/Met  9/7/2021 1109 by Wai Malave RN  Outcome: Progressing Towards Goal     Problem: Patient Education: Go to Patient Education Activity  Goal: Patient/Family Education  9/7/2021 1109 by Cedric Rivas RN  Outcome: Resolved/Met  9/7/2021 1109 by Cedric Rivas RN  Outcome: Progressing Towards Goal     Problem: Breathing Pattern - Ineffective  Goal: *Absence of hypoxia  9/7/2021 1109 by Cedric Rivas RN  Outcome: Resolved/Met  9/7/2021 1109 by Cedric Rivas RN  Outcome: Progressing Towards Goal  Goal: *Use of effective breathing techniques  9/7/2021 1109 by Cedric Rivas RN  Outcome: Resolved/Met  9/7/2021 1109 by Cedric Rivas RN  Outcome: Progressing Towards Goal     Problem: Patient Education: Go to Patient Education Activity  Goal: Patient/Family Education  9/7/2021 1109 by Cedric Rivas RN  Outcome: Resolved/Met  9/7/2021 1109 by Cedric Rivas RN  Outcome: Progressing Towards Goal

## 2021-09-08 ENCOUNTER — PATIENT OUTREACH (OUTPATIENT)
Dept: CASE MANAGEMENT | Age: 81
End: 2021-09-08

## 2021-09-08 LAB
M TB IFN-G BLD-IMP: NEGATIVE
QUANTIFERON CRITERIA, QFI1T: NORMAL
QUANTIFERON MITOGEN VALUE: 2.34 IU/ML
QUANTIFERON NIL VALUE: 0.01 IU/ML
QUANTIFERON TB1 AG: 0.01 IU/ML
QUANTIFERON TB2 AG: 0.01 IU/ML

## 2021-09-08 NOTE — ACP (ADVANCE CARE PLANNING)
Patient states her AMD document that is dated 2- is a current ACP document. Patient's AMD is currently scanned into her chart.

## 2021-09-09 NOTE — TELEPHONE ENCOUNTER
ADRIANE LARA Mrs. And Mr. Mirza Newton to confirm per Mario Rehman is she is still using the [address]? Per Mrs. Mirza Newton, she was D/C by the hospital.  Mr. Mirza Newton did question if [place] Ori would want her to be back on it? He was informed, per Mario Rehman, if the hospital D/C her, then she needs to stay off of it for now. He stated OK and thanks.

## 2021-09-14 ENCOUNTER — OFFICE VISIT (OUTPATIENT)
Dept: FAMILY MEDICINE CLINIC | Age: 81
End: 2021-09-14
Payer: MEDICARE

## 2021-09-14 VITALS
DIASTOLIC BLOOD PRESSURE: 70 MMHG | WEIGHT: 148 LBS | RESPIRATION RATE: 24 BRPM | HEART RATE: 83 BPM | SYSTOLIC BLOOD PRESSURE: 124 MMHG | BODY MASS INDEX: 24.66 KG/M2 | HEIGHT: 65 IN | TEMPERATURE: 97 F | OXYGEN SATURATION: 97 %

## 2021-09-14 DIAGNOSIS — Z79.4 DIABETES MELLITUS DUE TO UNDERLYING CONDITION WITH HYPEROSMOLARITY WITHOUT COMA, WITH LONG-TERM CURRENT USE OF INSULIN (HCC): Primary | ICD-10-CM

## 2021-09-14 DIAGNOSIS — Z23 ENCOUNTER FOR IMMUNIZATION: ICD-10-CM

## 2021-09-14 DIAGNOSIS — E08.00 DIABETES MELLITUS DUE TO UNDERLYING CONDITION WITH HYPEROSMOLARITY WITHOUT COMA, WITH LONG-TERM CURRENT USE OF INSULIN (HCC): Primary | ICD-10-CM

## 2021-09-14 PROCEDURE — G8420 CALC BMI NORM PARAMETERS: HCPCS | Performed by: NURSE PRACTITIONER

## 2021-09-14 PROCEDURE — 1111F DSCHRG MED/CURRENT MED MERGE: CPT | Performed by: NURSE PRACTITIONER

## 2021-09-14 PROCEDURE — 90694 VACC AIIV4 NO PRSRV 0.5ML IM: CPT | Performed by: NURSE PRACTITIONER

## 2021-09-14 PROCEDURE — 1100F PTFALLS ASSESS-DOCD GE2>/YR: CPT | Performed by: NURSE PRACTITIONER

## 2021-09-14 PROCEDURE — G9717 DOC PT DX DEP/BP F/U NT REQ: HCPCS | Performed by: NURSE PRACTITIONER

## 2021-09-14 PROCEDURE — 36415 COLL VENOUS BLD VENIPUNCTURE: CPT | Performed by: NURSE PRACTITIONER

## 2021-09-14 PROCEDURE — G8752 SYS BP LESS 140: HCPCS | Performed by: NURSE PRACTITIONER

## 2021-09-14 PROCEDURE — G8399 PT W/DXA RESULTS DOCUMENT: HCPCS | Performed by: NURSE PRACTITIONER

## 2021-09-14 PROCEDURE — G8754 DIAS BP LESS 90: HCPCS | Performed by: NURSE PRACTITIONER

## 2021-09-14 PROCEDURE — 99214 OFFICE O/P EST MOD 30 MIN: CPT | Performed by: NURSE PRACTITIONER

## 2021-09-14 PROCEDURE — 3288F FALL RISK ASSESSMENT DOCD: CPT | Performed by: NURSE PRACTITIONER

## 2021-09-14 PROCEDURE — 1090F PRES/ABSN URINE INCON ASSESS: CPT | Performed by: NURSE PRACTITIONER

## 2021-09-14 PROCEDURE — G0008 ADMIN INFLUENZA VIRUS VAC: HCPCS | Performed by: NURSE PRACTITIONER

## 2021-09-14 PROCEDURE — G8536 NO DOC ELDER MAL SCRN: HCPCS | Performed by: NURSE PRACTITIONER

## 2021-09-14 PROCEDURE — G8427 DOCREV CUR MEDS BY ELIG CLIN: HCPCS | Performed by: NURSE PRACTITIONER

## 2021-09-14 NOTE — PROGRESS NOTES
1. Have you been to the ER, urgent care clinic since your last visit? Hospitalized since your last visit? YES [facility] ER TWICE FOR sob THE [facility] FOR SOB 9-  2. Have you seen or consulted any other health care providers outside of the [address] [place] [address] since your last visit? Include any pap smears or colon screening.  No      Chief Complaint   Patient presents with    Breathing Problem     [facility]         Visit Vitals  /70 (BP 1 Location: Left upper arm, BP Patient Position: Sitting, BP Cuff Size: Adult)   Pulse 83   Temp 97 °F (36.1 °C) (Temporal)   Resp 24   Ht 5' 5\" (1.651 m)   Wt 148 lb (67.1 kg)   SpO2 97%   BMI 24.63 kg/m²       Pain Scale: 5/10  Pain Location: Shoulder (LEFT)

## 2021-09-15 LAB
EST. AVERAGE GLUCOSE BLD GHB EST-MCNC: 143 MG/DL
HBA1C MFR BLD: 6.6 % (ref 4–5.6)

## 2021-09-17 NOTE — PROGRESS NOTES
Patient verified by stating name and date of birth.  Patient informed of lab results and states understanding per Hui Morales

## 2021-09-21 ENCOUNTER — OFFICE VISIT (OUTPATIENT)
Dept: CARDIOLOGY CLINIC | Age: 81
End: 2021-09-21
Payer: MEDICARE

## 2021-09-21 VITALS
OXYGEN SATURATION: 98 % | HEART RATE: 61 BPM | RESPIRATION RATE: 16 BRPM | DIASTOLIC BLOOD PRESSURE: 70 MMHG | TEMPERATURE: 97.1 F | BODY MASS INDEX: 24.83 KG/M2 | SYSTOLIC BLOOD PRESSURE: 124 MMHG | WEIGHT: 149 LBS | HEIGHT: 65 IN

## 2021-09-21 DIAGNOSIS — I49.3 PVC'S (PREMATURE VENTRICULAR CONTRACTIONS): ICD-10-CM

## 2021-09-21 DIAGNOSIS — I42.0 DILATED CARDIOMYOPATHY (HCC): ICD-10-CM

## 2021-09-21 DIAGNOSIS — E11.21 TYPE 2 DIABETES WITH NEPHROPATHY (HCC): ICD-10-CM

## 2021-09-21 DIAGNOSIS — N18.30 STAGE 3 CHRONIC KIDNEY DISEASE, UNSPECIFIED WHETHER STAGE 3A OR 3B CKD (HCC): ICD-10-CM

## 2021-09-21 DIAGNOSIS — I10 ESSENTIAL HYPERTENSION: ICD-10-CM

## 2021-09-21 DIAGNOSIS — I49.5 SSS (SICK SINUS SYNDROME) (HCC): ICD-10-CM

## 2021-09-21 DIAGNOSIS — I50.43 ACUTE ON CHRONIC COMBINED SYSTOLIC AND DIASTOLIC CONGESTIVE HEART FAILURE (HCC): Primary | ICD-10-CM

## 2021-09-21 DIAGNOSIS — Z95.810 AICD (AUTOMATIC CARDIOVERTER/DEFIBRILLATOR) PRESENT: ICD-10-CM

## 2021-09-21 DIAGNOSIS — I48.0 PAF (PAROXYSMAL ATRIAL FIBRILLATION) (HCC): ICD-10-CM

## 2021-09-21 PROCEDURE — G8420 CALC BMI NORM PARAMETERS: HCPCS | Performed by: INTERNAL MEDICINE

## 2021-09-21 PROCEDURE — G8752 SYS BP LESS 140: HCPCS | Performed by: INTERNAL MEDICINE

## 2021-09-21 PROCEDURE — 3288F FALL RISK ASSESSMENT DOCD: CPT | Performed by: INTERNAL MEDICINE

## 2021-09-21 PROCEDURE — 1090F PRES/ABSN URINE INCON ASSESS: CPT | Performed by: INTERNAL MEDICINE

## 2021-09-21 PROCEDURE — 1111F DSCHRG MED/CURRENT MED MERGE: CPT | Performed by: INTERNAL MEDICINE

## 2021-09-21 PROCEDURE — G9717 DOC PT DX DEP/BP F/U NT REQ: HCPCS | Performed by: INTERNAL MEDICINE

## 2021-09-21 PROCEDURE — 1100F PTFALLS ASSESS-DOCD GE2>/YR: CPT | Performed by: INTERNAL MEDICINE

## 2021-09-21 PROCEDURE — G8536 NO DOC ELDER MAL SCRN: HCPCS | Performed by: INTERNAL MEDICINE

## 2021-09-21 PROCEDURE — G8427 DOCREV CUR MEDS BY ELIG CLIN: HCPCS | Performed by: INTERNAL MEDICINE

## 2021-09-21 PROCEDURE — G8754 DIAS BP LESS 90: HCPCS | Performed by: INTERNAL MEDICINE

## 2021-09-21 PROCEDURE — 99214 OFFICE O/P EST MOD 30 MIN: CPT | Performed by: INTERNAL MEDICINE

## 2021-09-21 PROCEDURE — G8399 PT W/DXA RESULTS DOCUMENT: HCPCS | Performed by: INTERNAL MEDICINE

## 2021-09-21 NOTE — PROGRESS NOTES
Identified pt with two pt identifiers(name and ). Reviewed record in preparation for visit and have obtained necessary documentation. Chief Complaint   Patient presents with   [facility] Follow Up     ED [facility] 2021 - 2021       Vitals:    21 1421   BP: 124/70   Pulse: 61   Resp: 16   Temp: 97.1 °F (36.2 °C)   TempSrc: Temporal   SpO2: 98%   Weight: 149 lb (67.6 kg)   Height: 5' 5\" (1.651 m)   PainSc:   0 - No pain       Health Maintenance Review: Patient reminded of \"due or due soon\" health maintenance. I have asked the patient to contact his/her primary care provider (PCP) for follow-up on his/her health maintenance. Coordination of Care Questionnaire:  :   1) Have you been to an emergency room, urgent care, or hospitalized since your last visit? If yes, where when, and reason for visit? yes   - See today's reason for visit    2. Have seen or consulted any other health care provider since your last visit? If yes, where when, and reason for visit? NO      Patient is accompanied by self I have received verbal consent from Celesitna Champagne to discuss any/all medical information while they are present in the room.

## 2021-09-21 NOTE — PROGRESS NOTES
Jana Lozoya is a 79 y.o. female is here for hospital f/u. Multiple medical problems including DM II, hypertension, CKD III, dyslipidemia, DJD, lumbar stenosis/radiculopathy, chronic pain, tobacco, leukocytosis, PAF during hospitalization with encephalopathy 10/17 (at [facility]), followed by Jose Angel Barragan at [facility] 6/29/20 with brief intermittent CP--atypical, EKG abnormal with NSST and sent for Lexiscan MPI 7/29/20:  · Baseline ECG: Normal EKG, PACs, non-specific ST-T wave abnormalities. · Inconclusive stress test.  · EKG stress test results correlate with an intermediate risk of inducible myocardial ischemia. · Non-limiting dyspnea, no EKG changes  · Nuclear images reported seperately  · FINDINGS: The rest and stress perfusion images a fixed defect in the inferior wall compatible with infarct. No reversible abnormality is seen to suggest myocardium at ischemic risk. The gated images demonstrate global hypokinesia and inferior akinesia. Left ventricular ejection fraction is 31 %. Impression: Fixed defect in the inferior wall is compatible with infarct. No evidence of myocardium at ischemic risk. Left ventricular ejection fraction is 31 %. Global hypokinesia and inferior akinesia.   +STANTON, some fatigue, brief intermittent chest pain--not clearly exertional.  No recent Echo, had prior Echo 10/17 with LVEF 55%. No recurrence of afib known since 10/17.   Seen in office here 9/10/20 with CHF--cardizem changed to beta blocker, on ARB, lasix.   Developed worsening SOB, sx of CHF--admitted to [facility], BNP elevated, trop mildly increased.  Seen by Cardiology in hospital, diurested, meds adjusted.  Repeat cardiac testing:  LEXISCAN MPI 9/22/20:  · Baseline ECG: Normal EKG, rare PVCs. · There was no ST segment deviation noted during stress. · FINDINGS: The rest and stress perfusion images demonstrate left ventricular dilatation. There is a fixed defect in the inferior wall compatible with infarct. No evidence of reversibility to suggest myocardium at ischemic risk. The gated images demonstrate global hypokinesia and inferior akinesia. Left ventricular ejection fraction is 32 %. Impression: Unchanged fixed defect in the inferior wall compatible with infarct. No evidence to suggest myocardium at ischemic risk. Left ventricular dilatation. Left ventricular ejection fraction is 32 %. Global hypokinesia and inferior akinesia. ECHO 9/22/20:     · LV: Estimated LVEF is 25 - 30%. Moderately dilated left ventricle. Mild concentric hypertrophy. Moderate-to-severely reduced systolic function. · Pericardium: Trivial-to-small pericardial effusion. · LA: Moderately dilated left atrium. · MV: Mild to moderate mitral valve regurgitation is present.     Had AICD/PPM placed by Dr. Angel Montes on 3/13/23 without complication.  Seen by Dr Angel Montes in f/u on 2/23/21 with PPM check ok., last seen here March 2021. Hospitalized 9/1-9/7/21 with:  Acute respiratory failure with hypoxia POA- improved s/p pleural tap,off oxygen now  Acute on chronic congestive heart failure proBNP 35,000- cont lasix at 20 mg daily for now due to IVAN for now, outpatient followup with Dr Neo Martinez soon  Echocardiogram 8/20/2021 at [facility] EF of 45 to 50% with severe grade 3 diastolic dysfunction. Troponinemia likely type B demand secondary to fluid overload- remains flat  Leukocytosis with eosinophilia POA- now down to 14k, outpatient followup with Pulm associates as recommended  B/L Pleural effusion POA- s/p R thoracentesis (~1L), neg for cytology, neg for bacteria, neg for AFB, exudative  history of MSSA on Dapto IV till  9/2/2021 at other hospital before transfer here- taken off all Abx per ID, therapy completed now  Chronic kidney disease POA- Cr down to 2.0, resume lower dose of Lasix after 48 hrs as discussed  Currently stable--mild STANTON. The patient denies chest pain, orthopnea, PND, LE edema, palpitations, syncope, presyncope or fatigue. Patient Active Problem List    Diagnosis Date Noted    Palliative care encounter 09/03/2021    Leukocytosis 09/01/2021    Pleural effusion 09/01/2021    CKD (chronic kidney disease) 09/01/2021    Acute CHF (congestive heart failure) ([facility] 09/01/2021    Anemia of infection and chronic disease 08/26/2021    COPD (chronic obstructive pulmonary disease) ([facility] 08/26/2021    Depression 08/26/2021    Pneumonia 08/23/2021    Sepsis ([facility] 07/16/2021    Type 2 MI (myocardial infarction) ([facility] 55/35/1323    Systolic heart failure, chronic (HCC) 07/16/2021    Moderate episode of recurrent major depressive disorder ([facility] 06/14/2021    Dilated cardiomyopathy ([facility] 09/23/2020    SSS (sick sinus syndrome) ([facility] 09/23/2020    Cardiomyopathy ([facility] 09/23/2020    AICD (automatic cardioverter/defibrillator) present 09/23/2020    Respiratory failure ([facility] 09/18/2020    CHF (congestive heart failure) ([facility] 09/18/2020    Hypoxia 09/18/2020    Bilateral pleural effusion 09/18/2020    Elevated troponin 09/18/2020    PAF (paroxysmal atrial fibrillation) ([facility] 09/10/2020    CKD (chronic kidney disease) stage 3, GFR 30-59 ml/min ([facility] 06/04/2019    Lymphocytosis 05/07/2018    Type 2 diabetes with nephropathy ([facility] 02/15/2018    Type 2 diabetes mellitus with diabetic neuropathy ([facility] 02/15/2018    Spinal stenosis of lumbar region with neurogenic claudication 11/17/2017    Spondylosis of lumbosacral region without myelopathy or radiculopathy 11/17/2017    Overdose of opiate or related narcotic, accidental or unintentional, subsequent encounter 10/27/2017    Acute left-sided low back pain with sciatica 10/27/2017    Physical debility 10/27/2017    Acute encephalopathy 10/23/2017    Hyperkalemia 10/20/2017    Altered mental status 10/20/2017    Transaminitis 10/20/2017    [facility]F) [facility] 10/20/2017    Diverticulitis large intestine w/o perforation or abscess w/o bleeding 07/06/2017    SOB (shortness of breath) 06/21/2017    Abdominal pain, generalized 06/21/2017    Diarrhea in adult patient 06/21/2017    Cigarette smoker 57/97/0902    Neutrophilic leukocytosis 28/84/6348    Chronic pain     Hypercholesterolemia     Arthritis     Diabetes ([address]     IBS (irritable bowel syndrome)     Hemorrhoid       Giacomo Camacho NP  Past Medical History:   Diagnosis Date    A-fib [facility])     AICD (automatic cardioverter/defibrillator) present 9/23/2020 9/23/2020 [employer] AICD implant    Arthritis     Bilateral pleural effusion 9/18/2020    Chronic pain     Chronic pain     Diabetes ([address]     Diverticular disease     Elevated troponin 9/18/2020    Hemorrhoid     Hypercholesterolemia     IBS (irritable bowel syndrome)     Lymphocytosis 17/55/5688    Systolic heart failure, chronic ([address] 7/16/2021    Type 2 MI (myocardial infarction) ([facility] 7/16/2021 [id number]/162021 r/t sepsis      Past Surgical History:   Procedure Laterality Date    COLONOSCOPY N/A 10/31/2019    COLONOSCOPY performed [facility] Kevin [facility]gr[facility]or [facility]  10/31/2019         Emy Pacheco  10/31/2019         HX CATARACT REMOVAL      HX CHOLECYSTECTOMY      HX COLONOSCOPY  2009    HX COLONOSCOPY  2016    2 adenomatous polyp    HX HEMORRHOIDECTOMY  2009    HX HYSTERECTOMY      HX KNEE REPLACEMENT      right    HX ORTHOPAEDIC  12/11/2017    back, laminectomy and decompression    WI INSJ/RPLCMT PERM DFB W/TRNSVNS LDS 1/DUAL CHMBR N/A 9/23/2020    INSERT ICD DUAL performed by Lissa Pascal MD at [facility] CARDIAC CATH LAB     Allergies   Allergen Reactions    Cefazolin Rash     Possible kidney failure/AIN    Morphine Anaphylaxis    Ultram [Tramadol] Palpitations      Family History   Problem Relation Age of Onset    Colon Cancer Father     Diabetes Mother       Social History     Socioeconomic History    Marital status:      Spouse name: Not on file    Number of children: Not on file    Years of education: Not on file    Highest education level: Not on file   Occupational History    Occupation: retired     Comment: LPN   Tobacco Use    Smoking status: Current Every Day Smoker     Packs/day: 1.00     Years: 55.00     Pack years: 55.00     Types: Cigarettes    Smokeless tobacco: Never Used   Vaping Use    Vaping Use: Never used   Substance and Sexual Activity    Alcohol use: No    Drug use: Never    Sexual activity: Not on file   Other Topics Concern     Service No    Blood Transfusions Yes    Caffeine Concern Yes    Occupational Exposure No    Hobby Hazards No    Sleep Concern Yes    Stress Concern Yes    Weight Concern No    Special Diet No    Back Care Yes    Exercise No    Bike Helmet No     Comment: n/a    Seat Belt Yes    Self-Exams Yes   Social History Narrative    Not on file     Social Determinants of Health     Financial Resource Strain:     Difficulty of Paying Living Expenses:    Food Insecurity:     Worried About Running Out of Food in the Last Year:     Ran Out of Food in the Last Year:    Transportation Needs:     Lack of Transportation (Medical):  Lack of Transportation (Non-Medical):    Physical Activity:     Days of Exercise per Week:     Minutes of Exercise per Session:    Stress:     Feeling of Stress :    Social Connections:     Frequency of Communication with Friends and Family:     Frequency of Social Gatherings with Friends and Family:     Attends Mandaen Services:     Active Member of Clubs or Organizations:     Attends Club or Organization Meetings:     Marital Status:    Intimate Partner Violence:     Fear of Current or Ex-Partner:     Emotionally Abused:     Physically Abused:     Sexually Abused:       Current Outpatient Medications   Medication Sig    furosemide (LASIX) 20 mg tablet Take 1 Tablet by mouth daily.     insulin glargine (Lantus Solostar U-100 Insulin) 100 unit/mL (3 mL) inpn 50 Units by SubCUTAneous route daily.  ferrous sulfate 325 mg (65 mg iron) tablet Take 1 Tablet by mouth daily (with breakfast).  venlafaxine (EFFEXOR) 75 mg tablet Take 37.5 mg by mouth two (2) times a day.  ergocalciferol (ERGOCALCIFEROL) 1,250 mcg (50,000 unit) capsule Take 50,000 Units by mouth every seven (7) days. On Wednesdays     b complex vitamins tablet Take 1 Tablet by mouth daily.  nitroglycerin (NITROSTAT) 0.4 mg SL tablet 1 Tab by SubLINGual route every five (5) minutes as needed for Chest Pain. Up to 3 doses.  carvediloL (COREG) 25 mg tablet Take 1 Tab by mouth two (2) times daily (with meals).  colestipoL (Colestid) 1 gram tablet Take 1 Tab by mouth two (2) times a day.  aspirin delayed-release 81 mg tablet Take 1 Tab by mouth daily.  B.infantis-B.ani-B.long-B.bifi (PROBIOTIC 4X) 10-15 mg TbEC Take  by mouth daily.  acetaminophen (TYLENOL) 650 mg TbER Take 650 mg by mouth every eight (8) hours.  omega-3 fatty acids-vitamin e 1,000 mg cap Take 1 Cap by mouth two (2) times a day. 32a day     ascorbic acid, vitamin C, (VITAMIN C) 500 mg tablet Take 1,000 mg by mouth daily.  zinc 50 mg tab tablet Take 50 mg by mouth daily. No current facility-administered medications for this visit. Review of Symptoms:    CONST  No weight change. No fever, chills, sweats    ENT No visual changes, URI sx, sore throat    CV  See HPI   RESP  No cough, or sputum, wheezing. Also see HPI   GI  No abdominal pain or change in bowel habits. No heartburn or dysphagia. No melena or rectal bleeding.   No dysuria, urgency, frequency, hematuria   MSKEL  No joint pain, swelling. No muscle pain. SKIN  No rash or lesions. NEURO  No headache, syncope, or seizure. No weakness, loss of sensation, or paresthesias. PSYCH  No low mood or depression  No anxiety. HE/LYMPH  No easy bruising, abnormal bleeding, or enlarged glands.         Physical ExamPhysical Exam:    Visit Vitals  /70   Pulse 61   Temp 97.1 °F (36.2 °C) (Temporal)   Resp 16   Ht 5' 5\" (1.651 m)   Wt 149 lb (67.6 kg)   SpO2 98%   BMI 24.79 kg/m²     Gen: NAD  HEENT:  PERRL, throat clear  Neck: no adenopathy, no thyromegaly, no JVD   Heart:  Regular,Nl S1S2,  no murmur, gallop or rub. Lungs:  clear  Abdomen:   Soft, non-tender, bowel sounds are active.    Extremities:  No edema  Pulse: symmetric  Neuro: A&O times 3, No focal neuro deficits    Cardiographics    Labs:   Lab Results   Component Value Date/Time    Sodium 136 09/07/2021 02:16 AM    Sodium 137 09/06/2021 12:46 AM    Sodium 138 09/05/2021 03:49 AM    Sodium 138 09/04/2021 03:33 AM    Sodium 138 09/03/2021 02:07 AM    Potassium 4.3 09/07/2021 02:16 AM    Potassium 4.8 09/06/2021 12:46 AM    Potassium 5.0 09/05/2021 03:49 AM    Potassium 5.0 09/04/2021 03:33 AM    Potassium 4.4 09/03/2021 02:07 AM    Chloride 106 09/07/2021 02:16 AM    Chloride 108 09/06/2021 12:46 AM    Chloride 107 09/05/2021 03:49 AM    Chloride 109 (H) 09/04/2021 03:33 AM    Chloride 110 (H) 09/03/2021 02:07 AM    CO2 22 09/07/2021 02:16 AM    CO2 24 09/06/2021 12:46 AM    CO2 23 09/05/2021 03:49 AM    CO2 21 09/04/2021 03:33 AM    CO2 21 09/03/2021 02:07 AM    Anion gap 8 09/07/2021 02:16 AM    Anion gap 5 09/06/2021 12:46 AM    Anion gap 8 09/05/2021 03:49 AM    Anion gap 8 09/04/2021 03:33 AM    Anion gap 7 09/03/2021 02:07 AM    Glucose 135 (H) 09/07/2021 02:16 AM    Glucose 109 (H) 09/06/2021 12:46 AM    Glucose 122 (H) 09/05/2021 03:49 AM    Glucose 203 (H) 09/04/2021 03:33 AM    Glucose 128 (H) 09/03/2021 02:07 AM    BUN 64 (H) 09/07/2021 02:16 AM    BUN 58 (H) 09/06/2021 12:46 AM    BUN 52 (H) 09/05/2021 03:49 AM    BUN 48 (H) 09/04/2021 03:33 AM    BUN 46 (H) 09/03/2021 02:07 AM    Creatinine 2.08 (H) 09/07/2021 02:16 AM    Creatinine 2.25 (H) 09/06/2021 12:46 AM    Creatinine 1.79 (H) 09/05/2021 03:49 AM    Creatinine 2.00 (H) 09/04/2021 03:33 AM    Creatinine 2.14 (H) 09/03/2021 02:07 AM    BUN/Creatinine ratio 31 (H) 09/07/2021 02:16 AM    BUN/Creatinine ratio 26 (H) 09/06/2021 12:46 AM    BUN/Creatinine ratio 29 (H) 09/05/2021 03:49 AM    BUN/Creatinine ratio 24 (H) 09/04/2021 03:33 AM    BUN/Creatinine ratio 21 (H) 09/03/2021 02:07 AM    GFR est AA 28 (L) 09/07/2021 02:16 AM    GFR est AA 25 (L) 09/06/2021 12:46 AM    GFR est AA 33 (L) 09/05/2021 03:49 AM    GFR est AA 29 (L) 09/04/2021 03:33 AM    GFR est AA 27 (L) 09/03/2021 02:07 AM    GFR est non-AA 23 (L) 09/07/2021 02:16 AM    GFR est non-AA 21 (L) 09/06/2021 12:46 AM    GFR est non-AA 27 (L) 09/05/2021 03:49 AM    GFR est non-AA 24 (L) 09/04/2021 03:33 AM    GFR est non-AA 22 (L) 09/03/2021 02:07 AM    Calcium 9.3 09/07/2021 02:16 AM    Calcium 9.3 09/06/2021 12:46 AM    Calcium 9.3 09/05/2021 03:49 AM    Calcium 9.5 09/04/2021 03:33 AM    Calcium 9.0 09/03/2021 02:07 AM    Bilirubin, total 0.3 08/30/2021 07:30 PM    Bilirubin, total 0.1 (L) 08/26/2021 05:21 AM    Bilirubin, total 0.3 08/25/2021 05:45 AM    Bilirubin, total 0.3 08/24/2021 07:22 AM    Bilirubin, total 0.3 08/23/2021 03:35 PM    Alk. phosphatase 77 08/30/2021 07:30 PM    Alk. phosphatase 79 08/26/2021 05:21 AM    Alk. phosphatase 78 08/25/2021 05:45 AM    Alk. phosphatase 79 08/24/2021 07:22 AM    Alk.  phosphatase 76 08/23/2021 03:35 PM    Protein, total 7.6 08/30/2021 07:30 PM    Protein, total 6.9 08/26/2021 05:21 AM    Protein, total 7.0 08/25/2021 05:45 AM    Protein, total 7.2 08/24/2021 07:22 AM    Protein, total 7.7 08/23/2021 03:35 PM    Albumin 2.3 (L) 08/30/2021 07:30 PM    Albumin 1.7 (L) 08/26/2021 05:21 AM    Albumin 1.8 (L) 08/25/2021 05:45 AM    Albumin 2.0 (L) 08/24/2021 07:22 AM    Albumin 2.1 (L) 08/23/2021 03:35 PM    Globulin 5.3 (H) 08/30/2021 07:30 PM    Globulin 5.2 (H) 08/26/2021 05:21 AM    Globulin 5.2 (H) 08/25/2021 05:45 AM    Globulin 5.2 (H) 08/24/2021 07:22 AM    Globulin 5.6 (H) 08/23/2021 03:35 PM    A-G Ratio 0.4 (L) 08/30/2021 07:30 PM    A-G Ratio 0.3 (L) 08/26/2021 05:21 AM    A-G Ratio 0.3 (L) 08/25/2021 05:45 AM    A-G Ratio 0.4 (L) 08/24/2021 07:22 AM    A-G Ratio 0.4 (L) 08/23/2021 03:35 PM    ALT (SGPT) 22 08/30/2021 07:30 PM    ALT (SGPT) 26 08/26/2021 05:21 AM    ALT (SGPT) 26 08/25/2021 05:45 AM    ALT (SGPT) 10 (L) 08/24/2021 07:22 AM    ALT (SGPT) 16 08/23/2021 03:35 PM     Lab Results   Component Value Date/Time    CK 30 08/27/2021 06:29 AM     Lab Results   Component Value Date/Time    Cholesterol, total 243 (H) 06/14/2021 09:17 PM    Cholesterol, total 198 10/29/2020 10:22 AM    Cholesterol, total 185 06/29/2020 12:03 PM    Cholesterol, total 217 (H) 02/03/2020 03:52 PM    Cholesterol, total 169 06/04/2019 03:57 PM    HDL Cholesterol 42 06/14/2021 09:17 PM    HDL Cholesterol 35 (L) 10/29/2020 10:22 AM    HDL Cholesterol 36 (L) 06/29/2020 12:03 PM    HDL Cholesterol 44 02/03/2020 03:52 PM    HDL Cholesterol 37 (L) 06/04/2019 03:57 PM    LDL,Direct 137 (H) 06/14/2021 09:17 PM    LDL, calculated Not calculated due to elevated triglyceride level 06/14/2021 09:17 PM    LDL, calculated 110 (H) 10/29/2020 10:22 AM    LDL, calculated Comment 06/29/2020 12:03 PM    LDL, calculated 106 (H) 02/03/2020 03:52 PM    LDL, calculated 75 06/04/2019 03:57 PM    LDL, calculated 101 (H) 10/03/2018 04:30 PM    Triglyceride 493 (H) 06/14/2021 09:17 PM    Triglyceride 305 (H) 10/29/2020 10:22 AM    Triglyceride 412 (H) 06/29/2020 12:03 PM    Triglyceride 336 (H) 02/03/2020 03:52 PM    Triglyceride 283 (H) 06/04/2019 03:57 PM    CHOL/HDL Ratio 5.8 (H) 06/14/2021 09:17 PM     No results found for this or any previous visit.     Assessment:         Patient Active Problem List    Diagnosis Date Noted    Palliative care encounter 09/03/2021    Leukocytosis 09/01/2021    Pleural effusion 09/01/2021    CKD (chronic kidney disease) 09/01/2021    Acute CHF (congestive heart failure) ([facility] 09/01/2021    Anemia of infection and chronic disease 08/26/2021    COPD (chronic obstructive pulmonary disease) ([facility] 08/26/2021    Depression 08/26/2021    Pneumonia 08/23/2021    [facility]eps[facility] 07/16/2021    Type 2 MI (myocardial infarction) ([facility] 04/03/9419    Systolic heart failure, chronic ([facility] 07/16/2021    Moderate episode of recurrent major depressive disorder ([facility] 06/14/2021    Dilated cardiomyopathy ([facility] 09/23/2020    SSS (sick sinus syndrome) ([facility] 09/23/2020    Cardiomyopathy ([facility] 09/23/2020    AICD (automatic cardioverter/defibrillator) present 09/23/2020    Respiratory failure ([facility] 09/18/2020    CHF (congestive heart failure) ([facility] 09/18/2020    Hypoxia 09/18/2020    Bilateral pleural effusion 09/18/2020    Elevated troponin 09/18/2020    PAF (paroxysmal atrial fibrillation) [facility] 09/10/2020    CKD (chronic kidney disease) stage 3, GFR 30-59 ml/min ([facility]) 06/04/2019    Lymphocytosis 05/07/2018    Type 2 diabetes with nephropathy ([facility] 02/15/2018    Type 2 diabetes mellitus with diabetic neuropathy ([facility] 02/15/2018    Spinal stenosis of lumbar region with neurogenic claudication 11/17/2017    Spondylosis of lumbosacral region without myelopathy or radiculopathy 11/17/2017    Overdose of opiate or related narcotic, accidental or unintentional, subsequent encounter 10/27/2017    Acute left-sided low back pain with sciatica 10/27/2017    Physical debility 10/27/2017    Acute encephalopathy 10/23/2017    Hyperkalemia 10/20/2017    Altered mental status 10/20/2017    Transaminitis 10/20/2017    Acute renal failure (ARF) ([facility] 10/20/2017    Diverticulitis large intestine w/o perforation or abscess w/o bleeding 07/06/2017    SOB (shortness of breath) 06/21/2017    Abdominal pain, generalized 06/21/2017    Diarrhea in adult patient 06/21/2017    Cigarette smoker 12/05/9491    Neutrophilic leukocytosis 85/56/1361    Chronic pain     Hypercholesterolemia     Arthritis     Diabetes (HCC)     IBS (irritable bowel syndrome)     Hemorrhoid      Multiple medical problems including DM II, hypertension, CKD III, dyslipidemia, DJD, lumbar stenosis/radiculopathy, chronic pain, tobacco, leukocytosis, PAF during hospitalization with encephalopathy 10/17 (at [facility]), followed by Salvador Doyle at [facility] 6/29/20 with brief intermittent CP--atypical, EKG abnormal with NSST and sent for Lexiscan MPI 7/29/20:  · Baseline ECG: Normal EKG, PACs, non-specific ST-T wave abnormalities. · Inconclusive stress test.  · EKG stress test results correlate with an intermediate risk of inducible myocardial ischemia. · Non-limiting dyspnea, no EKG changes  · Nuclear images reported seperately  · FINDINGS: The rest and stress perfusion images a fixed defect in the inferior wall compatible with infarct. No reversible abnormality is seen to suggest myocardium at ischemic risk. The gated images demonstrate global hypokinesia and inferior akinesia. Left ventricular ejection fraction is 31 %. Impression: Fixed defect in the inferior wall is compatible with infarct. No evidence of myocardium at ischemic risk. Left ventricular ejection fraction is 31 %. Global hypokinesia and inferior akinesia.   +STANTON, some fatigue, brief intermittent chest pain--not clearly exertional.  No recent Echo, had prior Echo 10/17 with LVEF 55%. No recurrence of afib known since 10/17.   Seen in office here 9/10/20 with CHF--cardizem changed to beta blocker, on ARB, lasix.   Developed worsening SOB, sx of CHF--admitted to [facility], BNP elevated, trop mildly increased.  Seen by Cardiology in hospital, diurested, meds adjusted.  Repeat cardiac testing:  LEXISCAN MPI 9/22/20:  · Baseline ECG: Normal EKG, rare PVCs. · There was no ST segment deviation noted during stress. · FINDINGS: The rest and stress perfusion images demonstrate left ventricular dilatation. There is a fixed defect in the inferior wall compatible with infarct.  No evidence of reversibility to suggest myocardium at ischemic risk. The gated images demonstrate global hypokinesia and inferior akinesia. Left ventricular ejection fraction is 32 %. Impression: Unchanged fixed defect in the inferior wall compatible with infarct. No evidence to suggest myocardium at ischemic risk. Left ventricular dilatation. Left ventricular ejection fraction is 32 %. Global hypokinesia and inferior akinesia. ECHO 9/22/20:     · LV: Estimated LVEF is 25 - 30%. Moderately dilated left ventricle. Mild concentric hypertrophy. Moderate-to-severely reduced systolic function. · Pericardium: Trivial-to-small pericardial effusion. · LA: Moderately dilated left atrium. · MV: Mild to moderate mitral valve regurgitation is present.     Had AICD/PPM placed by Dr. Lenny Smith on 1/17/40 without complication.  Seen by Dr Lenny Smith in f/u on 2/23/21 with PPM check ok., last seen here March 2021. Hospitalized 9/1-9/7/21 with:  Acute respiratory failure with hypoxia POA- improved s/p pleural tap,off oxygen now  Acute on chronic congestive heart failure proBNP 35,000- cont lasix at 20 mg daily for now due to IVAN for now, outpatient followup with Dr John Carpenter soon  Echocardiogram 8/20/2021 at [facility] EF of 45 to 50% with severe grade 3 diastolic dysfunction. Troponinemia likely type B demand secondary to fluid overload- remains flat  Leukocytosis with eosinophilia POA- now down to 14k, outpatient followup with Pulm associates as recommended  B/L Pleural effusion POA- s/p R thoracentesis (~1L), neg for cytology, neg for bacteria, neg for AFB, exudative  history of MSSA on Dapto IV till  9/2/2021 at other hospital before transfer here- taken off all Abx per ID, therapy completed now  Chronic kidney disease POA- Cr down to 2.0, resume lower dose of Lasix after 48 hrs as discussed  Currently stable--mild STANTON. The patient denies chest pain, orthopnea, PND, LE edema, palpitations, syncope, presyncope or fatigue.           Plan:     Doing well with no adverse cardiac symptoms currently  Fu with Pulmonary as planned (bronch/bx)  Fu with PCP as planned  Can take extra lasix 20 if weight up 3-4 labs, edema. Lipids and labs followed by PCP. Continue current care and f/u in 6 months.     Britney Aranda MD

## 2021-09-25 NOTE — PROGRESS NOTES
Subjective:     Dejan Sherman is a 79 y.o. female who presents today with the following:  Chief Complaint   Patient presents with    Breathing Problem     /[facility] [address] [facility] 9-       Patient Active Problem List   Diagnosis Code    Hypercholesterolemia E78.00    Arthritis M19.90    Diabetes ([facility] [address] E11.9    IBS (irritable bowel syndrome) K58.9    Hemorrhoid K64.9    Chronic pain C51.90    Neutrophilic leukocytosis C56.6    SOB (shortness of breath) R06.02    Abdominal pain, generalized R10.84    Diarrhea in adult patient R19.7    Cigarette smoker F17.210    Diverticulitis large intestine w/o perforation or abscess w/o bleeding K57.32    Hyperkalemia E87.5    Altered mental status R41.82    Transaminitis R74.01    Acute renal failure (ARF) ([facility]) N17.9    Acute encephalopathy G93.40    Overdose of opiate or related narcotic, accidental or unintentional, subsequent encounter T40.601D    Acute left-sided low back pain with sciatica M54.40    Physical debility R53.81    Type 2 diabetes with nephropathy ([facility]) E11.21    Type 2 diabetes mellitus with diabetic neuropathy ([facility]) E11.40    Lymphocytosis D72.820    CKD (chronic kidney disease) stage 3, GFR 30-59 ml/min ([facility]) N18.30    PAF (paroxysmal atrial fibrillation) ([facility]) I48.0    Respiratory failure ([facility]) J96.90    CHF (congestive heart failure) ([facility]) I50.9    Hypoxia R09.02    Bilateral pleural effusion J90    Elevated troponin R77.8    Dilated cardiomyopathy ([facility]) I42.0    SSS (sick sinus syndrome) ([facility]) I49.5    Cardiomyopathy ([facility]) I42.9    AICD (automatic cardioverter/defibrillator) present Z95.810    Spinal stenosis of lumbar region with neurogenic claudication M48.062    Spondylosis of lumbosacral region without myelopathy or radiculopathy M47.817    Moderate episode of recurrent major depressive disorder ([facility]) F33.1    Sepsis ([facility]) A41.9    Type 2 MI (myocardial infarction) ([facility] G81.L2    Systolic heart failure, chronic ([facility]) I50.22    Pneumonia J18.9    Anemia of infection and chronic disease B99.9, D63.8    COPD (chronic obstructive pulmonary disease) ([facility]) J44.9    Depression F32.9    Leukocytosis D72.829    Pleural effusion J90    CKD (chronic kidney disease) N18.9    Acute CHF (congestive heart failure) ([facility]) I50.9    Palliative care encounter Z51.5     Reviewed notes form hospitalization and urgent care visits . Agree with the plan. COMPLIANT WITH MEDICATION:   HTN; Denies chest pain, dyspnea, palpitations, headache and blurred vision. Blood pressure normotensive. Reviewed current medications for HT and cholesterol listed below no changes     DM; Diabetes. Sugars controlled well  Hypoglycemia: none  Tolerating current treatment well  Current medications include diet  Glargine insulin 50 units daily    Lab Results   Component Value Date/Time    Hemoglobin A1c 6.6 (H) 09/14/2021 05:03 PM    Hemoglobin A1c 9.1 (H) 06/14/2021 09:17 PM    Hemoglobin A1c 6.4 (H) 10/29/2020 10:22 AM    Glucose 135 (H) 09/07/2021 02:16 AM    Glucose (POC) 152 (H) 09/07/2021 06:53 AM    Microalb/Creat ratio (ug/mg creat.) 1,568.2 (H) 05/07/2018 11:49 AM    MICROALBUMIN,MG/DAY Comment 11/15/2017 12:12 PM    Microalbumin/creat ratio (POC)  06/14/2021 04:31 PM    LDL,Direct 137 (H) 06/14/2021 09:17 PM    LDL, calculated Not calculated due to elevated triglyceride level 06/14/2021 09:17 PM    Creatinine 2.08 (H) 09/07/2021 02:16 AM     Lab Results   Component Value Date/Time    Microalb/Creat ratio (ug/mg creat.) 1,568.2 (H) 05/07/2018 11:49 AM    MICROALBUMIN,MG/DAY Comment 11/15/2017 12:12 PM       We discussed the importance of scheduling an eye exam and foot care.         depression screening addressed not at risk    abuse screening addressed denies    learning assessment addressed reviewed nurses notes    fall risk addressed not at risk    HM: addressed She would like the flu vaccine Today.    ROS:  Gen: denies fever, chills, fatigue, weight loss, weight gain  HEENT:denies blurry vision, nasal congestion, sore throat  Resp: denies dypsnea, cough, wheezing  CV: denies chest pain radiating to the jaws or arms, palpitations, lower extremity edema  Abd: denies nausea, vomiting, diarrhea, constipation  Neuro: denies numbness/tingling  Endo: denies polyuria, polydipsia, heat/cold intolerance  Heme: no lymphadenopathy    Allergies   Allergen Reactions    Cefazolin Rash     Possible kidney failure/AIN    Morphine Anaphylaxis    Ultram [Tramadol] Palpitations         Current Outpatient Medications:     furosemide (LASIX) 20 mg tablet, Take 1 Tablet by mouth daily. , Disp: 30 Tablet, Rfl: 1    insulin glargine (Lantus Solostar U-100 Insulin) 100 unit/mL (3 mL) inpn, 50 Units by SubCUTAneous route daily. , Disp: 1 Adjustable Dose Pre-filled Pen Syringe, Rfl: 0    ferrous sulfate 325 mg (65 mg iron) tablet, Take 1 Tablet by mouth daily (with breakfast). , Disp: 30 Tablet, Rfl: 0    venlafaxine (EFFEXOR) 75 mg tablet, Take 37.5 mg by mouth two (2) times a day., Disp: , Rfl:     ergocalciferol (ERGOCALCIFEROL) 1,250 mcg (50,000 unit) capsule, Take 50,000 Units by mouth every seven (7) days. On Wednesdays , Disp: , Rfl:     b complex vitamins tablet, Take 1 Tablet by mouth daily. , Disp: , Rfl:     nitroglycerin (NITROSTAT) 0.4 mg SL tablet, 1 Tab by SubLINGual route every five (5) minutes as needed for Chest Pain. Up to 3 doses. , Disp: 25 Tab, Rfl: 0    carvediloL (COREG) 25 mg tablet, Take 1 Tab by mouth two (2) times daily (with meals). , Disp: 28 Tab, Rfl: 0    colestipoL (Colestid) 1 gram tablet, Take 1 Tab by mouth two (2) times a day., Disp: 180 Tab, Rfl: 3    aspirin delayed-release 81 mg tablet, Take 1 Tab by mouth daily. , Disp: 90 Tab, Rfl: 1    B.infantis-B.ani-B.long-B.bifi (PROBIOTIC 4X) 10-15 mg TbEC, Take  by mouth daily. , Disp: , Rfl:     acetaminophen (TYLENOL) 650 mg TbER, Take 650 mg by mouth every eight (8) hours. , Disp: , Rfl:     omega-3 fatty acids-vitamin e 1,000 mg cap, Take 1 Cap by mouth two (2) times a day. 32a day , Disp: , Rfl:     ascorbic acid, vitamin C, (VITAMIN C) 500 mg tablet, Take 1,000 mg by mouth daily. , Disp: , Rfl:     zinc 50 mg tab tablet, Take 50 mg by mouth daily. , Disp: , Rfl:     Past Medical History:   Diagnosis Date    A-fib [facility])     AICD (automatic cardioverter/defibrillator) present 9/23/2020 9/23/2020 [employer] AICD implant    Arthritis     Bilateral pleural effusion 9/18/2020    Chronic pain     Chronic pain     Diabetes ([facility] [address] [address]     Diverticular disease     Elevated troponin 9/18/2020    Hemorrhoid     Hypercholesterolemia     IBS (irritable bowel syndrome)     Lymphocytosis 49/07/6671    Systolic heart failure, chronic ([facility] 7/16/2021    Type 2 MI (myocardial infarction) ([facility] 7/16/2021 7/162021 r/t sepsis       Past Surgical History:   Procedure Laterality Date    COLONOSCOPY N/A 10/31/2019    COL[facility]COPY performed [facility]  10/31/2019         [facility]  10/31/2019         HX CATARACT REMOVAL      HX CHOLECYSTECTOMY      HX COLONOSCOPY  2009    HX COLONOSCOPY  2016    2 adenomatous polyp    HX HEMORRHOIDECTOMY  2009    HX HYSTERECTOMY      HX KNEE REPLACEMENT      right    HX ORTHOPAEDIC  12/11/2017    back, laminectomy and decompression    SC INSJ/RPLCMT PERM DFB W/TRNSVNS LDS 1/DUAL CHMBR N/A 9/23/2020    INSERT ICD DUAL performed by Tulio Venegas MD at [facility] C[facility] CATH LAB       Social History     Tobacco Use   Smoking Status Current Every Day Smoker    Packs/day: 1.00    Years: 55.00    Pack years: 55.00    Types: Cigarettes   Smokeless Tobacco Never Used       Social History     Socioeconomic History    Marital status:      Spouse name: Not on file    Number of children: Not on file    Years of education: Not on file  Highest education level: Not on file   Occupational History    Occupation: retired     Comment: LPN   Tobacco Use    Smoking status: Current Every Day Smoker     Packs/day: 1.00     Years: 55.00     Pack years: 55.00     Types: Cigarettes    Smokeless tobacco: Never Used   Vaping Use    Vaping Use: Never used   Substance and Sexual Activity    Alcohol use: No    Drug use: Never   Other Topics Concern     Service No    Blood Transfusions Yes    Caffeine Concern Yes    Occupational Exposure No    Hobby Hazards No    Sleep Concern Yes    Stress Concern Yes    Weight Concern No    Special Diet No    Back Care Yes    Exercise No    Bike Helmet No     Comment: n/a    Seat Belt Yes    Self-Exams Yes     Social Determinants of Health     Financial Resource Strain:     Difficulty of Paying Living Expenses:    Food Insecurity:     Worried About Running Out of Food in the Last Year:     Ran Out of Food in the Last Year:    Transportation Needs:     Lack of Transportation (Medical):  Lack of Transportation (Non-Medical):    Physical Activity:     Days of Exercise per Week:     Minutes of Exercise per Session:    Stress:     Feeling of Stress :    Social Connections:     Frequency of Communication with Friends and Family:     Frequency of Social Gatherings with Friends and Family:     Attends [employer] Services:     Active Member of Clubs or Organizations:     Attends Club or Organization Meetings:     Marital Status:        Family History   Problem Relation Age of Onset    Colon Cancer Father     Diabetes Mother          Objective:     Visit Vitals  /70 (BP 1 Location: Left upper arm, BP Patient Position: Sitting, BP Cuff Size: Adult)   Pulse 83   Temp 97 °F (36.1 °C) (Temporal)   Resp 24   Ht 5' 5\" (1.651 m)   Wt 148 lb (67.1 kg)   SpO2 97%   BMI 24.63 kg/m²     Body mass index is 24.63 kg/m². General: Alert and oriented. No acute distress.   Well nourished  HEENT :  Ears:TMs are normal. Canals are clear. Eyes: pupils equal, round, react to light and accommodation. Extra ocular movements intact. Nose: patent. Mouth and throat is clear. Neck:supple full range of motion no thyromegaly. Trachea midline, No carotid bruits. No significant lymphadenopathy  Lungs[de-identified] clear to auscultation without wheezes, rales, or rhonchi. Heart :RRR, S1 & S2 are normal intensity. No murmur; no gallop  Abdomen: bowel sounds active. No tenderness, guarding, rebound, masses, hepatic or spleen enlargement  Back: no CVA tenderness. Extremities: without clubbing, cyanosis, or edema  Pulses: radial and femoral pulses are normal  Neuro: HMF intact. Cranial nerves II through XII grossly normal.  Motor: is 5 over 5 and symmetrical.   Deep tendon reflexes: +2 equal  Psych:appropriate behavior, mood, affect and judgement. Results for orders placed or performed in visit on 09/14/21   HEMOGLOBIN A1C WITH EAG   Result Value Ref Range    Hemoglobin A1c 6.6 (H) 4.0 - 5.6 %    Est. average glucose 143 mg/dL       Results for orders placed or performed in visit on 09/14/21   HEMOGLOBIN A1C WITH EAG   Result Value Ref Range    Hemoglobin A1c 6.6 (H) 4.0 - 5.6 %    Est. average glucose 143 mg/dL       Assessment/ Plan:     1. Encounter for immunization    - ADMIN INFLUENZA VIRUS VAC  - INFLUENZA VIRUS VACCINE, HIGH DOSE SEASONAL, PRESERVATIVE FREE    2. Diabetes mellitus due to underlying condition with hyperosmolarity without coma, with long-term current use of insulin ([facility])    - HEMOGLOBIN A1C WITH EAG;  Future  - COLLECTION VENOUS BLOOD,VENIPUNCTURE  - HEMOGLOBIN A1C WITH EAG      Orders Placed This Encounter    COLLECTION VENOUS BLOOD,VENIPUNCTURE    INFLUENZA VIRUS VACCINE, HIGH DOSE SEASONAL, PRESERVATIVE FREE    HEMOGLOBIN A1C WITH EAG     Standing Status:   Future     Number of Occurrences:   1     Standing Expiration Date:   9/14/2022    ADMIN INFLUENZA VIRUS VAC         Verbal and written instructions (see AVS) provided. Patient expresses understanding of diagnosis and treatment plan.     Health Maintenance Due   Topic Date Due    Shingrix Vaccine Age 49> (2 of 2) 10/15/2019    COVID-19 Vaccine (3 - Moderna risk 3-dose series) 05/13/2021               Holly Manning, KRIS-C

## 2021-09-30 RX ORDER — INSULIN GLARGINE 100 [IU]/ML
INJECTION, SOLUTION SUBCUTANEOUS
Qty: 45 ML | Refills: 3 | Status: SHIPPED | OUTPATIENT
Start: 2021-09-30 | End: 2022-11-03

## 2021-10-04 LAB
BACTERIA SPEC CULT: NORMAL
SERVICE CMNT-IMP: NORMAL

## 2021-10-13 ENCOUNTER — HOSPITAL ENCOUNTER (EMERGENCY)
Age: 81
Discharge: HOME | End: 2021-10-13
Attending: EMERGENCY MEDICINE
Payer: MEDICARE

## 2021-10-13 ENCOUNTER — TELEPHONE (OUTPATIENT)
Dept: FAMILY MEDICINE CLINIC | Age: 81
End: 2021-10-13

## 2021-10-13 ENCOUNTER — APPOINTMENT (OUTPATIENT)
Dept: CT IMAGING | Age: 81
End: 2021-10-13
Attending: EMERGENCY MEDICINE
Payer: MEDICARE

## 2021-10-13 ENCOUNTER — TRANSCRIBE ORDER (OUTPATIENT)
Dept: REGISTRATION | Age: 81
End: 2021-10-13

## 2021-10-13 ENCOUNTER — HOSPITAL ENCOUNTER (OUTPATIENT)
Dept: GENERAL RADIOLOGY | Age: 81
Discharge: HOME | End: 2021-10-13
Payer: MEDICARE

## 2021-10-13 ENCOUNTER — APPOINTMENT (OUTPATIENT)
Dept: GENERAL RADIOLOGY | Age: 81
End: 2021-10-13
Attending: EMERGENCY MEDICINE
Payer: MEDICARE

## 2021-10-13 VITALS
OXYGEN SATURATION: 98 % | HEART RATE: 72 BPM | BODY MASS INDEX: 24.75 KG/M2 | DIASTOLIC BLOOD PRESSURE: 77 MMHG | WEIGHT: 154 LBS | TEMPERATURE: 98.7 F | HEIGHT: 66 IN | RESPIRATION RATE: 18 BRPM | SYSTOLIC BLOOD PRESSURE: 136 MMHG

## 2021-10-13 DIAGNOSIS — S05.11XA CONTUSION OF RIGHT ORBITAL TISSUES, INITIAL ENCOUNTER: Primary | ICD-10-CM

## 2021-10-13 DIAGNOSIS — S70.01XA CONTUSION OF RIGHT HIP, INITIAL ENCOUNTER: ICD-10-CM

## 2021-10-13 DIAGNOSIS — J90 PLEURAL EFFUSION, RIGHT: ICD-10-CM

## 2021-10-13 DIAGNOSIS — J90 PLEURAL EFFUSION, RIGHT: Primary | ICD-10-CM

## 2021-10-13 PROCEDURE — 99283 EMERGENCY DEPT VISIT LOW MDM: CPT

## 2021-10-13 PROCEDURE — 73502 X-RAY EXAM HIP UNI 2-3 VIEWS: CPT

## 2021-10-13 PROCEDURE — 70450 CT HEAD/BRAIN W/O DYE: CPT

## 2021-10-13 PROCEDURE — 70486 CT MAXILLOFACIAL W/O DYE: CPT

## 2021-10-13 PROCEDURE — 71046 X-RAY EXAM CHEST 2 VIEWS: CPT

## 2021-10-13 NOTE — ED PROVIDER NOTES
EMERGENCY DEPARTMENT HISTORY AND PHYSICAL EXAM      Date: 10/13/2021  Patient Name: Katy Gutierrez    History of Presenting Illness     Chief Complaint   Patient presents with    Fall     fell monday on her ramp. No loc but not sure why she fell. Bruised on her right side. History Provided By: Patient    HPI: Katy Gutierrez, 79 y.o. female with PMHx significant for high cholesterol, DM, A. fib, CAD, CHF, presents via private vehicle to the ED with cc of fall. Patient reports she had a ground-level fall on Monday. Patient reports she tripped on a ramp Monday morning while walking into her house. She reports she fell forward hitting her right face right shoulder and right hip. No loss of consciousness. She reports moderate swelling and bruising around her right orbit area. Is also reports she had a bloody nose that went away. She reports some mild pain in her right shoulder but that is improved over the week. She also reports some right lateral hip pain that has also improved throughout the week. She has been able to bear weight. She was here obtaining a outpatient chest x-ray for a pulmonologist appointment tomorrow. Staff recommended she come to the ER and get evaluated. She reports the pain in her face hip and shoulder have significantly improved since Monday. No significant treatment at home other than applying ice. She has a history of A. fib but is not on any anticoagulants other than baby aspirin daily. She is otherwise without complaints. PMHx: Significant for CAD, A. fib, high cholesterol, DM  PSHx: Significant for AICD placement, hysterectomy, cholecystectomy, knee replacement, cataract surgery  Social Hx: Pack-a-day smoker for 55 years. Denies alcohol use. There are no other complaints, changes, or physical findings at this time. PCP: Jessica Beltre NP    No current facility-administered medications on file prior to encounter.      Current Outpatient Medications on File Prior to Encounter   Medication Sig Dispense Refill    Lantus Solostar U-100 Insulin 100 unit/mL (3 mL) inpn INJECT SUBCUTANEOUSLY 45  UNITS DAILY 45 mL 3    furosemide (LASIX) 20 mg tablet Take 1 Tablet by mouth daily. 30 Tablet 1    ferrous sulfate 325 mg (65 mg iron) tablet Take 1 Tablet by mouth daily (with breakfast). 30 Tablet 0    venlafaxine (EFFEXOR) 75 mg tablet Take 37.5 mg by mouth two (2) times a day.  ergocalciferol (ERGOCALCIFEROL) 1,250 mcg (50,000 unit) capsule Take 50,000 Units by mouth every seven (7) days. On Wednesdays       b complex vitamins tablet Take 1 Tablet by mouth daily.  nitroglycerin (NITROSTAT) 0.4 mg SL tablet 1 Tab by SubLINGual route every five (5) minutes as needed for Chest Pain. Up to 3 doses. 25 Tab 0    carvediloL (COREG) 25 mg tablet Take 1 Tab by mouth two (2) times daily (with meals). 28 Tab 0    colestipoL (Colestid) 1 gram tablet Take 1 Tab by mouth two (2) times a day. 180 Tab 3    aspirin delayed-release 81 mg tablet Take 1 Tab by mouth daily. 90 Tab 1    B.infantis-B.ani-B.long-B.bifi (PROBIOTIC 4X) 10-15 mg TbEC Take  by mouth daily.  acetaminophen (TYLENOL) 650 mg TbER Take 650 mg by mouth every eight (8) hours.  omega-3 fatty acids-vitamin e 1,000 mg cap Take 1 Cap by mouth two (2) times a day. 32a day       ascorbic acid, vitamin C, (VITAMIN C) 500 mg tablet Take 1,000 mg by mouth daily.  zinc 50 mg tab tablet Take 50 mg by mouth daily.          Past History     Past Medical History:  Past Medical History:   Diagnosis Date    A-fib [facility])     AICD (automatic cardioverter/defibrillator) present 9/23/2020 9/23/2020 [employer] AICD implant    Arthritis     Bilateral pleural effusion 9/18/2020    Chronic pain     Chronic pain     Diabetes ([address]     Diverticular disease     Elevated troponin 9/18/2020    Hemorrhoid     Hypercholesterolemia     IBS (irritable bowel syndrome)     Lymphocytosis 05/07/2018    Systolic heart failure, chronic ([facility] 7/16/2021    Type 2 MI (myocardial infarction) ([facility] 7/16/2021 [id number] r/t sepsis       Past Surgical History:  Past Surgical History:   Procedure Laterality Date    COLONOSCOPY N/A 10/31/2019    COLONOSCOPY performed by Sophronia Dubin, [facility] at [facility]  10/31/2019         Rob Heath  10/31/2019         HX CATARACT REMOVAL      HX CHOLECYSTECTOMY      HX COLONOSCOPY  2009    HX COLONOSCOPY  2016    2 adenomatous polyp    HX HEMORRHOIDECTOMY  2009    HX HYSTERECTOMY      HX KNEE REPLACEMENT      right    HX ORTHOPAEDIC  12/11/2017    back, laminectomy and decompression    CO INSJ/RPLCMT PERM DFB W/TRNSVNS LDS 1/DUAL CHMBR N/A 9/23/2020    INSERT ICD DUAL performed by Ariadna Lou [facility] CARDI[facility] LAB       Family History:  Family History   Problem Relation Age of Onset    Colon Cancer Father     Diabetes Mother        Social History:  Social History     Tobacco Use    Smoking status: Current Every Day Smoker     Packs/day: 1.00     Years: 55.00     Pack years: 55.00     Types: Cigarettes    Smokeless tobacco: Never Used   Vaping Use    Vaping Use: Never used   Substance Use Topics    Alcohol use: No    Drug use: Never       Allergies: Allergies   Allergen Reactions    Cefazolin Rash     Possible kidney failure/AIN    Morphine Anaphylaxis    Ultram [Tramadol] Palpitations         Review of Systems   Review of Systems   Constitutional: Negative for activity change, chills and fever. HENT: Positive for facial swelling and nosebleeds. Negative for congestion and sore throat. Eyes: Negative for pain and redness. Respiratory: Negative for cough, chest tightness and shortness of breath. Cardiovascular: Negative for chest pain and palpitations. Gastrointestinal: Negative for abdominal pain, diarrhea, nausea and vomiting. Genitourinary: Negative for dysuria, frequency and urgency. Musculoskeletal: Negative for back pain and neck pain. Skin: Positive for rash. Negative for wound. Neurological: Negative for syncope, light-headedness and headaches. Psychiatric/Behavioral: Negative for confusion. All other systems reviewed and are negative. Physical Exam   Physical Exam  Vitals and nursing note reviewed. Constitutional:       General: She is not in acute distress. Appearance: She is well-developed. She is not diaphoretic. HENT:      Head:      Comments: Mild swelling of the right orbital ridge. Ecchymosis surrounding the right orbit. No bony step-offs. Skin intact. No proptosis or chemosis. No evidence of hyphema. EOMI. No mandibular tenderness. No evidence of malocclusion. Nose: Nose normal.      Mouth/Throat:      Pharynx: No oropharyngeal exudate. Eyes:      General: No scleral icterus. Extraocular Movements: Extraocular movements intact. Conjunctiva/sclera: Conjunctivae normal.      Pupils: Pupils are equal, round, and reactive to light. Neck:      Thyroid: No thyromegaly. Vascular: No JVD. Trachea: No tracheal deviation. Cardiovascular:      Rate and Rhythm: Normal rate and regular rhythm. Heart sounds: No murmur heard. No friction rub. No gallop. Pulmonary:      Effort: Pulmonary effort is normal. No respiratory distress. Breath sounds: Normal breath sounds. No stridor. No wheezing or rales. Abdominal:      General: Bowel sounds are normal. There is no distension. Palpations: Abdomen is soft. Tenderness: There is no abdominal tenderness. There is no guarding or rebound. Musculoskeletal:         General: Normal range of motion. Cervical back: Normal range of motion and neck supple. Comments: Right upper extremity: Full AROM of right shoulder. No tenderness of shoulder, humerus, elbow, forearm, hand or wrist..  2+ radial and ulnar pulses. Brisk CR.   Hand motor and sensory grossly intact. Right hip: Mild tenderness of right lateral hip/proximal femur. No ecchymosis. Skin intact. No swelling. Full AROM of the mildly painful. Toes up/down. 2+ DP and PT pulses. Brisk CR. Lymphadenopathy:      Cervical: No cervical adenopathy. Skin:     General: Skin is warm and dry. Findings: No erythema or rash. Neurological:      General: No focal deficit present. Mental Status: She is alert and oriented to person, place, and time. Cranial Nerves: No cranial nerve deficit. Motor: No abnormal muscle tone. Coordination: Coordination normal.   Psychiatric:         Behavior: Behavior normal.             Diagnostic Study Results     Labs -   No results found for this or any previous visit (from the past 12 hour(s)). Radiologic Studies -   XR HIP RT W OR WO PELV 2-3 VWS   Final Result   No displaced fracture. CT HEAD WO CONT   Final Result      1. No acute intracranial abnormalities. Right frontal extracranial hematoma. 2. Chronic left inferior occipital lobe infarct, new since 7/15/2021.   3. Chronic left sphenoid sinusitis. CT MAXILLOFACIAL WO CONT   Final Result      1. No fracture. No orbital injury. Right frontal extracranial hematoma. 2. Chronic left sphenoid sinus fluid and mucosal thickening without associated   bony  abnormality. CT Results  (Last 48 hours)               10/13/21 1242  CT HEAD WO CONT Final result    Impression:      1. No acute intracranial abnormalities. Right frontal extracranial hematoma. 2. Chronic left inferior occipital lobe infarct, new since 7/15/2021.   3. Chronic left sphenoid sinusitis. Narrative:  EXAM: CT HEAD WO CONT       INDICATION: fall       COMPARISON: CT head of 7/15/2021 and of 10/21/2027. CONTRAST: None. TECHNIQUE: Unenhanced CT of the head was performed using 5 mm images. Brain and   bone windows were generated. Coronal and sagittal reformats.  CT dose reduction   was achieved through use of a standardized protocol tailored for this   examination and automatic exposure control for dose modulation. FINDINGS:   The ventricles and sulci are normal in size, shape and configuration. . There is   a small chronic infarct in the right inferior occipital lobe which has developed   since 7/15/2021. This infarct appears chronic because it is relatively   well-defined and of low attenuation. . There is no intracranial hemorrhage,   extra-axial collection, or mass effect. The basilar cisterns are open. No CT   evidence of acute infarct. The bone windows demonstrate bilateral hyperostosis frontalis interna. There is   no fracture. Right frontal extracranial hematoma is noted. . There is chronic   left maxillary sinusitis, with high attenuation secretions. The sinus was   completely opacified on 7/15/2021 and contains fluid on 10/21/2017. .           10/13/21 1242  CT MAXILLOFACIAL WO CONT Final result    Impression:      1. No fracture. No orbital injury. Right frontal extracranial hematoma. 2. Chronic left sphenoid sinus fluid and mucosal thickening without associated   bony  abnormality. Narrative:  EXAM: CT MAXILLOFACIAL WO CONT       INDICATION: fall; right orbit trauma       COMPARISON: None. CONTRAST:   None. TECHNIQUE:  Multislice helical CT of the facial bones was performed in the axial   plane without intravenous contrast administration. Coronal and sagittal   reformations were generated. CT dose reduction was achieved through use of a   standardized protocol tailored for this examination and automatic exposure   control for dose modulation. FINDINGS:       Bones: There is no fracture or other osseous abnormality. Paranasal sinuses: Mucosal thickening and fluid in the left sphenoid sinus. No   associated fracture. Clenton Reas Opacification of the left sphenoid air cell has been   present on prior head CTs. Paranasal sinuses are otherwise clear. Orbits: The globes, optic nerves, and extraocular muscles are intact. There are   bilateral lens replacements. There is right orbital preseptal soft tissue   swelling and there is a right frontal extracranial hematoma. .. Base of brain and soft tissues: Within normal limits. No evidence of hematoma. No hemorrhage in the visualized portion of the brain. Deviation of the nasal   septum to the right. .. CXR Results  (Last 48 hours)               10/13/21 1116  XR CHEST PA LAT Final result    Impression:  1. Interval improvement of the congestive change/pulmonary edema. 2. Presence of bilateral pleural effusions (right greater than left). Findings   suggestive of loculation on the right. 3. Interval removal of the PICC line which was present on the right. Narrative:  Chest 2 views dated 10/13/2021. Comparison chest dated 9/3/2021       History is pleural effusion       AP and lateral views of the chest were obtained. It is difficult to accurately   assess the size of the cardiac slight. There has been a decrease in the   conspicuity of interstitial markings since the previous examination. This is   compatible with improved congestive change. There continues to be blunting of   both costophrenic angles (right greater than left). This is compatible with the   presence of bilateral pleural effusions. A focal soft tissue density is noted in   the lateral aspect of the right midlung. This finding is in the vicinity of the   minor fissure and may represent a loculated collection of pleural fluid. There   is no definite evidence of infiltration in this region. There has been interval   removal of the PICC line which was present on the right. Medical Decision Making   I am the first provider for this patient. I reviewed the vital signs, available nursing notes, past medical history, past surgical history, family history and social history.     Vital Signs-Reviewed the patient's vital signs. Patient Vitals for the past 12 hrs:   Temp Pulse Resp BP SpO2   10/13/21 1332  72 18 136/77    10/13/21 1217     98 %   10/13/21 1154 98.7 °F (37.1 °C) 75 16 (!) 142/67 98 %           Records Reviewed: Nursing notes reviewed    Provider Notes (Medical Decision Making):   DDX: Fracture, contusion. ED Course:   Initial assessment performed. The patients presenting problems have been discussed, and they are in agreement with the care plan formulated and outlined with them. I have encouraged them to ask questions as they arise throughout their visit. PROGRESS NOTE    Pt reevaluated. CT head and max face without any acute fracture or ICH. Plain films of right hip without evidence of fracture or dislocation. Reassured patient. Will discharge home. She agreed to follow-up with pulmonology tomorrow as scheduled. Written by Pratima Gaines MD     Progress note:    Pt noted to be feeling better and ready for discharge. Updated pt and/or family on all final lab and/or  imaging findings. Will follow up as instructed. All questions have been answered, pt voiced understanding and agreement with plan. Specific return precautions provided as well as instructions to return to the ED should sx worsen at any time. Vital signs stable for discharge. I have also put together some discharge instructions for them that include: 1) educational information regarding their diagnosis, 2) how to care for their diagnosis at home, as well a 3) list of reasons why they would want to return to the ED prior to their follow-up appointment, should their condition change. Written by Pratima Gaines MD        Critical Care Time:   0    Disposition:  Discharge    PLAN:  1. Current Discharge Medication List        2.    Follow-up Information     Follow up With Specialties Details Why Contact Info    Mike Baptiste NP Nurse Practitioner Schedule an appointment as soon as possible for a visit in 1 week  [address]  [address] [facility]      [address] [facility] Go in 1 day If symptoms worsen [address] [address] [phone]        Return to ED if worse     Diagnosis     Clinical Impression:   1. Contusion of right orbital tissues, initial encounter    2. Contusion of right hip, initial encounter              Please note that this dictation was completed with [employer], the computer voice recognition software. Quite often unanticipated grammatical, syntax, homophones, and other interpretive errors are inadvertently transcribed by the computer software. Please disregard these errors. Please excuse any errors that have escaped final proofreading.

## 2021-10-13 NOTE — ED NOTES
Pt reports feeling anxious about  still in car outside, would like to know when results are back. ED Provider aware.

## 2021-10-13 NOTE — ED NOTES
I have reviewed discharge instructions with the patient. The patient verbalized understanding. Discharge medications discussed with patient. No questions at this time. Wheeled to waiting room to wait on ride.

## 2021-10-13 NOTE — TELEPHONE ENCOUNTER
----- Message from [facility] sent at 10/13/2021  8:40 AM EDT -----  Regarding: MADHURI Dee/ telephone  Contact: [phone]  General Message/Vendor Calls    Caller's first and last name: yes       Reason for call: Patient wants to get information in reference to having a cat scan done had a bad fall.       Callback required yes/no and why: yes,      Best contact number [phone]       Details to clarify the request: [address] [place] [address], [address]

## 2021-10-13 NOTE — ED NOTES
Pt had fall Monday after noon. Pt able to ambulate with antalgic gait. Fall injured right hip, right shoulder and right eyes where there is evidence of bruising around the orbital structure.

## 2021-10-13 NOTE — TELEPHONE ENCOUNTER
PC x 3 for more information, line busy x 3, in the meanwhile message sent to [facility] for [phone] [address].

## 2021-10-13 NOTE — ED NOTES
Received assignment. Knocked on door to announce to pt. Hands washed. Introduced self to pt and updated whiteboard. Explained role of self to pt. ED flow explained to pt. Pt verbalized understanding.

## 2021-10-16 LAB
ACID FAST STN SPEC: NEGATIVE
MYCOBACTERIUM SPEC QL CULT: NEGATIVE
SPECIMEN PREPARATION: NORMAL
SPECIMEN SOURCE: NORMAL

## 2021-10-18 ENCOUNTER — TELEPHONE (OUTPATIENT)
Dept: FAMILY MEDICINE CLINIC | Age: 81
End: 2021-10-18

## 2021-10-18 NOTE — TELEPHONE ENCOUNTER
----- Message from Zeyneparmin Nicolle sent at 10/18/2021  9:24 AM EDT -----  Subject: Message to Provider    QUESTIONS  Information for Provider? pt is requesting to know if pcp could recommend   a medication for pain. pt took a terrible fall and needs recommendation   for pain meds. ---------------------------------------------------------------------------  --------------  Melissa ANDREA  What is the best way for the office to contact you? OK to leave message on   voicemail  Preferred Call Back Phone Number? [phone]  ---------------------------------------------------------------------------  --------------  SCRIPT ANSWERS  Relationship to Patient?  Self

## 2021-10-25 ENCOUNTER — TELEPHONE (OUTPATIENT)
Dept: FAMILY MEDICINE CLINIC | Age: 81
End: 2021-10-25

## 2021-10-26 NOTE — TELEPHONE ENCOUNTER
Patient is still waiting for call or information regarding the request that was left yesterday. Patient had fallen. Went to ER and had x-rays. Nothing broken, but she is black and blue and in a lot of pain. Requesting muscle relaxer and Ambien to get some sleep. Daughter is with her and they are anxious. Please call today.

## 2021-10-26 NOTE — TELEPHONE ENCOUNTER
Message routed to provider to approve refill.     Requested Prescriptions      No prescriptions requested or ordered in this encounter

## 2021-10-28 NOTE — TELEPHONE ENCOUNTER
Please schedule a VV as early as possible on Thursday morning to address post fall bruising and discomfort.

## 2021-11-08 RX ORDER — FUROSEMIDE 40 MG/1
TABLET ORAL
Qty: 90 TABLET | Refills: 1 | Status: SHIPPED | OUTPATIENT
Start: 2021-11-08 | End: 2022-04-07

## 2021-11-08 RX ORDER — CARVEDILOL 25 MG/1
TABLET ORAL
Qty: 180 TABLET | Refills: 1 | Status: SHIPPED | OUTPATIENT
Start: 2021-11-08 | End: 2022-04-07

## 2021-11-11 ENCOUNTER — OFFICE VISIT (OUTPATIENT)
Dept: FAMILY MEDICINE CLINIC | Age: 81
End: 2021-11-11
Payer: MEDICARE

## 2021-11-11 VITALS
SYSTOLIC BLOOD PRESSURE: 122 MMHG | BODY MASS INDEX: 24.27 KG/M2 | HEIGHT: 66 IN | DIASTOLIC BLOOD PRESSURE: 66 MMHG | HEART RATE: 70 BPM | TEMPERATURE: 96.8 F | OXYGEN SATURATION: 99 % | WEIGHT: 151 LBS | RESPIRATION RATE: 20 BRPM

## 2021-11-11 DIAGNOSIS — Z12.31 ENCOUNTER FOR SCREENING MAMMOGRAM FOR MALIGNANT NEOPLASM OF BREAST: ICD-10-CM

## 2021-11-11 DIAGNOSIS — M54.41 CHRONIC BILATERAL LOW BACK PAIN WITH BILATERAL SCIATICA: Primary | ICD-10-CM

## 2021-11-11 DIAGNOSIS — G89.29 CHRONIC BILATERAL LOW BACK PAIN WITH BILATERAL SCIATICA: Primary | ICD-10-CM

## 2021-11-11 DIAGNOSIS — Z13.39 SCREENING FOR ALCOHOLISM: ICD-10-CM
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
## 2021-11-11 DIAGNOSIS — M54.42 CHRONIC BILATERAL LOW BACK PAIN WITH BILATERAL SCIATICA: Primary | ICD-10-CM

## 2021-11-11 PROCEDURE — G8754 DIAS BP LESS 90: HCPCS | Performed by: NURSE PRACTITIONER

## 2021-11-11 PROCEDURE — G8420 CALC BMI NORM PARAMETERS: HCPCS | Performed by: NURSE PRACTITIONER

## 2021-11-11 PROCEDURE — G8752 SYS BP LESS 140: HCPCS | Performed by: NURSE PRACTITIONER

## 2021-11-11 PROCEDURE — G9717 DOC PT DX DEP/BP F/U NT REQ: HCPCS | Performed by: NURSE PRACTITIONER

## 2021-11-11 PROCEDURE — G8399 PT W/DXA RESULTS DOCUMENT: HCPCS | Performed by: NURSE PRACTITIONER

## 2021-11-11 PROCEDURE — G0439 PPPS, SUBSEQ VISIT: HCPCS | Performed by: NURSE PRACTITIONER

## 2021-11-11 PROCEDURE — 1101F PT FALLS ASSESS-DOCD LE1/YR: CPT | Performed by: NURSE PRACTITIONER

## 2021-11-11 PROCEDURE — G8536 NO DOC ELDER MAL SCRN: HCPCS | Performed by: NURSE PRACTITIONER

## 2021-11-11 PROCEDURE — G8427 DOCREV CUR MEDS BY ELIG CLIN: HCPCS | Performed by: NURSE PRACTITIONER

## 2021-11-11 NOTE — PROGRESS NOTES
1. Have you been to the ER, urgent care clinic since your last visit? Hospitalized since your last visit? YES 10- [facility]  ER for Fall    2. Have you seen or consulted any other health care providers outside of the [address] [place] [address] since your last visit? Include any pap smears or colon screening.        Chief Complaint   Patient presents with   [facility] Annual Wellness Visit    Fall     back pain         Visit Vitals  /66 (BP 1 Location: Right upper arm, BP Patient Position: Sitting, BP Cuff Size: Adult)   Pulse 70   Temp 96.8 °F (36 °C) (Temporal)   Resp 20   Ht 5' 6\" (1.676 m)   Wt 151 lb (68.5 kg)   SpO2 99%   BMI 24.37 kg/m²       Pain Scale: 10 - Worst pain ever/10  Pain Location: Back

## 2021-11-11 NOTE — PATIENT INSTRUCTIONS
Medicare Wellness Visit, Female     The best way to live healthy is to have a lifestyle where you eat a well-balanced diet, exercise regularly, limit alcohol use, and quit all forms of tobacco/nicotine, if applicable. Regular preventive services are another way to keep healthy. Preventive services (vaccines, screening tests, monitoring & exams) can help personalize your care plan, which helps you manage your own care. Screening tests can find health problems at the earliest stages, when they are easiest to treat. [facility] follows the current, evidence-based guidelines published by the [facility] ([facility]) when recommending preventive services for our patients. Because we follow these guidelines, sometimes recommendations change over time as research supports it. (For example, mammograms used to be recommended annually. Even though Medicare will still pay for an annual mammogram, the newer guidelines recommend a mammogram every two years for women of average risk). Of course, you and your doctor may decide to screen more often for some diseases, based on your risk and your co-morbidities (chronic disease you are already diagnosed with). Preventive services for you include:  - Medicare offers their members a free annual wellness visit, which is time for you and your primary care provider to discuss and plan for your preventive service needs. Take advantage of this benefit every year!  -All adults over the age of 72 should receive the recommended pneumonia vaccines. Current USPSTF guidelines recommend a series of two vaccines for the best pneumonia protection.   -All adults should have a flu vaccine yearly and a tetanus vaccine every 10 years.   -All adults age 48 and older should receive the shingles vaccines (series of two vaccines).       -All adults age 38-68 who are overweight should have a diabetes screening test once every three years.   -All adults born between [birth year] and 1966 should be screened once for Hepatitis C.  -Other screening tests and preventive services for persons with diabetes include: an eye exam to screen for diabetic retinopathy, a kidney function test, a foot exam, and stricter control over your cholesterol.   -Cardiovascular screening for adults with routine risk involves an electrocardiogram (ECG) at intervals determined by your doctor.   -Colorectal cancer screenings should be done for adults age 54-65 with no increased risk factors for colorectal cancer. There are a number of acceptable methods of screening for this type of cancer. Each test has its own benefits and drawbacks. Discuss with your doctor what is most appropriate for you during your annual wellness visit. The different tests include: colonoscopy (considered the best screening method), a fecal occult blood test, a fecal DNA test, and sigmoidoscopy.    -A bone mass density test is recommended when a woman turns 65 to screen for osteoporosis. This test is only recommended one time, as a screening. Some providers will use this same test as a disease monitoring tool if you already have osteoporosis. -Breast cancer screenings are recommended every other year for women of normal risk, age 54-69.  -Cervical cancer screenings for women over age 72 are only recommended with certain risk factors.      Here is a list of your current Health Maintenance items (your personalized list of preventive services) with a due date:  Health Maintenance Due   Topic Date Due    Shingles Vaccine (2 of 2) 10/15/2019    COVID-19 Vaccine (3 - Moderna risk 4-dose series) 05/13/2021

## 2021-11-11 NOTE — PROGRESS NOTES
This is the Subsequent Medicare Annual Wellness Exam, performed 12 months or more after the Initial AWV or the last Subsequent AWV    I have reviewed the patient's medical history in detail and updated the computerized patient record. Assessment/Plan   Education and counseling provided:  Are appropriate based on today's review and evaluation    1. Chronic bilateral low back pain with bilateral sciatica  -     REFERRAL TO ORTHOPEDICS    We discussed application of heat prior to bed and acetaminophen   2. Encounter for screening mammogram for malignant neoplasm of breast  3. Screening for alcoholism  -     OR ANNUAL ALCOHOL SCREEN 15 MIN  4. Fall, sequela  -     REFERRAL TO ORTHOPEDICS       Depression Risk Factor Screening     3 most recent PHQ Screens 8/23/2021   Little interest or pleasure in doing things Not at all   Feeling down, depressed, irritable, or hopeless Not at all   Total Score PHQ 2 0   Trouble falling or staying asleep, or sleeping too much -   Feeling tired or having little energy -   Poor appetite, weight loss, or overeating -   Feeling bad about yourself - or that you are a failure or have let yourself or your family down -   Trouble concentrating on things such as school, work, reading, or watching TV -   Moving or speaking so slowly that other people could have noticed; or the opposite being so fidgety that others notice -   Thoughts of being better off dead, or hurting yourself in some way -   PHQ 9 Score -   How difficult have these problems made it for you to do your work, take care of your home and get along with others -       Alcohol Risk Screen    Do you average more than 1 drink per night or more than 7 drinks a week:  No    On any one occasion in the past three months have you have had more than 3 drinks containing alcohol:  No        Functional Ability and Level of Safety    Hearing: Hearing is good. Activities of Daily Living:   The home contains: handrails  Patient needs help with:  transportation, shopping, preparing meals, laundry, housework, managing medications, managing money, dressing and bathing      Ambulation: with difficulty, uses a walker     Fall Risk:  Fall Risk Assessment, last 12 mths 11/11/2021   Able to walk? Yes   Fall in past 12 months? 1   Do you feel unsteady? 1   Are you worried about falling 1   Is TUG test greater than 12 seconds? 0   Is the gait abnormal? 1   Number of falls in past 12 months 2   Fall with injury?  1      Abuse Screen:  Patient is not abused       Cognitive Screening    Has your family/caregiver stated any concerns about your memory: no     Cognitive Screening: Normal - MMSE (Mini Mental Status Exam)    Health Maintenance Due     Health Maintenance Due   Topic Date Due    Shingrix Vaccine Age 49> (2 of 2) 10/15/2019    COVID-19 Vaccine (3 - Moderna risk 4-dose series) 05/13/2021       Patient Care Team   Patient Care Team:  Robi Henry NP as PCP - General (Nurse Practitioner)  Robi Henry NP as PCP - [facility]  Bernard Cantu MD (Surgery)  Emeka Balbuena MD (Infectious Disease)    History     Patient Active Problem List   Diagnosis Code    Hypercholesterolemia E78.00    Arthritis M19.90    Diabetes ([address] E11.9    IBS (irritable bowel syndrome) K58.9    Hemorrhoid K64.9    Chronic pain N84.08    Neutrophilic leukocytosis I61.6    SOB (shortness of breath) R06.02    Abdominal pain, generalized R10.84    Diarrhea in adult patient R19.7    Cigarette smoker F17.210    Diverticulitis large intestine w/o perforation or abscess w/o bleeding K57.32    Hyperkalemia E87.5    Altered mental status R41.82    Transaminitis R74.01    Acute renal failure (ARF) ([address] N17.9    Acute encephalopathy G93.40    Overdose of opiate or related narcotic, accidental or unintentional, subsequent encounter T40.601D    Acute left-sided low back pain with sciatica M54.40    Physical debility R53.81    Type 2 diabetes with nephropathy ([facility]) E11.21    Type 2 diabetes mellitus with diabetic neuropathy ([facility]) E11.40    Lymphocytosis D72.820    CKD (chronic kidney disease) stage 3, GFR 30-59 ml/min ([facility]) N18.30    PAF (paroxysmal atrial fibrillation) ([facility]) I48.0    Respiratory failure ([facility]) J96.90    CHF (congestive heart failure) ([facility]) I50.9    Hypoxia R09.02    Bilateral pleural effusion J90    Elevated troponin R77.8    Dilated cardiomyopathy ([facility]) I42.0    SSS (sick sinus syndrome) ([facility]) I49.5    Cardiomyopathy ([facility]) I42.9    AICD (automatic cardioverter/defibrillator) present Z95.810    Spinal stenosis of lumbar region with neurogenic claudication M48.062    Spondylosis of lumbosacral region without myelopathy or radiculopathy M47.817    Moderate episode of recurrent major depressive disorder ([facility]) F33.1    Sepsis ([facility]) A41.9    Type 2 MI (myocardial infarction) ([address] I21. A1    Systolic heart failure, chronic ([facility]) I50.22    Pneumonia J18.9    Anemia of infection and chronic disease B99.9, D63.8    COPD (chronic obstructive pulmonary disease) ([facility]) J44.9    Depression F32. A    Leukocytosis D72.829    Pleural effusion J90    CKD (chronic kidney disease) N18.9    Acute CHF (congestive heart failure) ([facility]) I50.9    Palliative care encounter Z51.5     Past Medical History:   Diagnosis Date    A-fib [facility])     AICD (automatic cardioverter/defibrillator) present 9/23/2020 9/23/2020 [employer] AICD implant    Arthritis     Bilateral pleural effusion 9/18/2020    Chronic pain     Chronic pain     Diabetes ([address]     Diverticular disease     Elevated troponin 9/18/2020    Hemorrhoid     Hypercholesterolemia     IBS (irritable bowel syndrome)     Lymphocytosis 31/69/1503    Systolic heart failure, chronic ([address] 7/16/2021    Type 2 MI (myocardial infarction) ([address] 7/16/2021 7/162021 r/t sepsis      Past Surgical History:   Procedure Laterality Date    COLONOSCOPY N/A 10/31/2019 COLONOSCOPY performed by Z[facility]os [facility] at [facility]  10/31/2019         Lani Nair  10/31/2019         HX CATARACT REMOVAL      HX CHOLECYSTECTOMY      HX COLONOSCOPY  2009    HX COLONOSCOPY  2016    2 adenomatous polyp    HX HEMORRHOIDECTOMY  2009    HX HYSTERECTOMY      HX KNEE REPLACEMENT      right    HX ORTHOPAEDIC  12/11/2017    back, laminectomy and decompression    [place] INSJ/RPLCMT PERM DFB W/TRNSVNS LDS 1/DUAL CHMBR N/A 9/23/2020    INSERT ICD DUAL performed by Taras Saldivar MD at [facility] CARDIAC CATH LAB     Current Outpatient Medications   Medication Sig Dispense Refill    carvediloL (COREG) 25 mg tablet TAKE 1 TABLET BY MOUTH  TWICE DAILY 180 Tablet 1    furosemide (LASIX) 40 mg tablet TAKE 1 TABLET BY MOUTH  DAILY 90 Tablet 1    Lantus Solostar U-100 Insulin 100 unit/mL (3 mL) inpn INJECT SUBCUTANEOUSLY 45  UNITS DAILY 45 mL 3    furosemide (LASIX) 20 mg tablet Take 1 Tablet by mouth daily. 30 Tablet 1    ferrous sulfate 325 mg (65 mg iron) tablet Take 1 Tablet by mouth daily (with breakfast). 30 Tablet 0    venlafaxine (EFFEXOR) 75 mg tablet Take 37.5 mg by mouth two (2) times a day.  ergocalciferol (ERGOCALCIFEROL) 1,250 mcg (50,000 unit) capsule Take 50,000 Units by mouth every seven (7) days. On Wednesdays       b complex vitamins tablet Take 1 Tablet by mouth daily.  nitroglycerin (NITROSTAT) 0.4 mg SL tablet 1 Tab by SubLINGual route every five (5) minutes as needed for Chest Pain. Up to 3 doses. 25 Tab 0    colestipoL (Colestid) 1 gram tablet Take 1 Tab by mouth two (2) times a day. 180 Tab 3    aspirin delayed-release 81 mg tablet Take 1 Tab by mouth daily. 90 Tab 1    B.infantis-B.ani-B.long-B.bifi (PROBIOTIC 4X) 10-15 mg TbEC Take  by mouth daily.  acetaminophen (TYLENOL) 650 mg TbER Take 650 mg by mouth every eight (8) hours.       omega-3 fatty acids-vitamin e 1,000 mg cap Take 1 Cap by mouth two (2) times a day. 32a day       ascorbic acid, vitamin C, (VITAMIN C) 500 mg tablet Take 1,000 mg by mouth daily.  zinc 50 mg tab tablet Take 50 mg by mouth daily.        Allergies   Allergen Reactions    Cefazolin Rash     Possible kidney failure/AIN    Morphine Anaphylaxis    Ultram [Tramadol] Palpitations       Family History   Problem Relation Age of Onset    Colon Cancer Father     Diabetes Mother      Social History     Tobacco Use    Smoking status: Current Every Day Smoker     Packs/day: 1.00     Years: 55.00     Pack years: 55.00     Types: Cigarettes    Smokeless tobacco: Never Used   Substance Use Topics    Alcohol use: No   Shanti Lazo NP-C

## 2021-11-12 NOTE — ACP (ADVANCE CARE PLANNING)
Has advanced medical directive scanned into chart. Reviewed at this visit. No changes.  Jenelle TANC

## 2021-12-15 NOTE — TELEPHONE ENCOUNTER
Please set up rx refills per phone note and I will sign. [Follow-Up - From Hospitalization] : follow-up of a recent hospitalization for [FreeTextEntry1] : JOHN PAUL

## 2022-01-18 ENCOUNTER — OFFICE VISIT (OUTPATIENT)
Dept: FAMILY MEDICINE CLINIC | Age: 82
End: 2022-01-18
Payer: COMMERCIAL

## 2022-01-18 VITALS
RESPIRATION RATE: 22 BRPM | WEIGHT: 152.8 LBS | BODY MASS INDEX: 24.56 KG/M2 | TEMPERATURE: 96.8 F | OXYGEN SATURATION: 98 % | HEIGHT: 66 IN

## 2022-01-18 DIAGNOSIS — I73.9 PVD (PERIPHERAL VASCULAR DISEASE) (HCC): ICD-10-CM

## 2022-01-18 DIAGNOSIS — R60.0 LOWER EXTREMITY EDEMA: ICD-10-CM

## 2022-01-18 DIAGNOSIS — M54.31 BILATERAL SCIATICA: ICD-10-CM

## 2022-01-18 DIAGNOSIS — I73.9 CLAUDICATION (HCC): ICD-10-CM

## 2022-01-18 DIAGNOSIS — F33.1 MODERATE EPISODE OF RECURRENT MAJOR DEPRESSIVE DISORDER (HCC): ICD-10-CM

## 2022-01-18 DIAGNOSIS — I48.0 PAF (PAROXYSMAL ATRIAL FIBRILLATION) (HCC): ICD-10-CM

## 2022-01-18 DIAGNOSIS — E11.21 TYPE 2 DIABETES WITH NEPHROPATHY (HCC): ICD-10-CM

## 2022-01-18 DIAGNOSIS — N18.4 CHRONIC KIDNEY DISEASE, STAGE IV (SEVERE) (HCC): ICD-10-CM

## 2022-01-18 DIAGNOSIS — N18.30 STAGE 3 CHRONIC KIDNEY DISEASE, UNSPECIFIED WHETHER STAGE 3A OR 3B CKD (HCC): ICD-10-CM

## 2022-01-18 DIAGNOSIS — E08.00 DIABETES MELLITUS DUE TO UNDERLYING CONDITION WITH HYPEROSMOLARITY WITHOUT COMA, WITH LONG-TERM CURRENT USE OF INSULIN (HCC): Primary | ICD-10-CM

## 2022-01-18 DIAGNOSIS — M54.32 BILATERAL SCIATICA: ICD-10-CM

## 2022-01-18 DIAGNOSIS — Z79.4 DIABETES MELLITUS DUE TO UNDERLYING CONDITION WITH HYPEROSMOLARITY WITHOUT COMA, WITH LONG-TERM CURRENT USE OF INSULIN (HCC): Primary | ICD-10-CM

## 2022-01-18 DIAGNOSIS — J44.1 CHRONIC OBSTRUCTIVE PULMONARY DISEASE WITH ACUTE EXACERBATION (HCC): ICD-10-CM

## 2022-01-18 DIAGNOSIS — I42.0 DILATED CARDIOMYOPATHY (HCC): ICD-10-CM

## 2022-01-18 PROCEDURE — 99214 OFFICE O/P EST MOD 30 MIN: CPT | Performed by: NURSE PRACTITIONER

## 2022-01-18 NOTE — PROGRESS NOTES
1. Have you been to the ER, urgent care clinic since your last visit? Hospitalized since your last visit? No    2. Have you seen or consulted any other health care providers outside of the [address] [place] [address] since your last visit? Include any pap smears or colon screening.   Yes, pulmonologist DR Obey Funk      Chief Complaint   Patient presents with    Diabetes    Back Pain     radiating to both legs         Visit Vitals  Temp 96.8 °F (36 °C) (Temporal)   Resp 22   Ht 5' 6\" (1.676 m)   Wt 152 lb 12.8 oz (69.3 kg)   SpO2 98%   BMI 24.66 kg/m²       Pain Scale: 10 - Worst pain ever/10  Pain Location: Back (radiating both legs)

## 2022-01-18 NOTE — PROGRESS NOTES
Subjective:     Caprice Martinez is a 79 y.o. female who presents today with the following:  Chief Complaint   Patient presents with    Diabetes    Back Pain     radiating to both legs       Patient Active Problem List   Diagnosis Code    Hypercholesterolemia E78.00    Arthritis M19.90    Diabetes ([facility] E11.9    IBS (irritable bowel syndrome) K58.9    Hemorrhoid K64.9    Chronic pain H45.02    Neutrophilic leukocytosis D88.6    SOB (shortness of breath) R06.02    Abdominal pain, generalized R10.84    Diarrhea in adult patient R19.7    Cigarette smoker F17.210    Diverticulitis large intestine w/o perforation or abscess w/o bleeding K57.32    Hyperkalemia E87.5    Altered mental status R41.82    Transaminitis R74.01    Acute renal failure (ARF) ([facility]) N17.9    Acute encephalopathy G93.40    Overdose of opiate or related narcotic, accidental or unintentional, subsequent encounter T40.601D    Acute left-sided low back pain with sciatica M54.40    Physical debility R53.81    Type 2 diabetes with nephropathy ([facility]) E11.21    Type 2 diabetes mellitus with diabetic neuropathy ([facility]) E11.40    Lymphocytosis D72.820    CKD (chronic kidney disease) stage 3, GFR 30-59 ml/min ([facility]) N18.30    PAF (paroxysmal atrial fibrillation) ([facility]) I48.0    Respiratory failure ([facility]) J96.90    CHF (congestive heart failure) ([facility]) I50.9    Hypoxia R09.02    Bilateral pleural effusion J90    Elevated troponin R77.8    Dilated cardiomyopathy ([facility]) I42.0    SSS (sick sinus syndrome) ([facility]) I49.5    Cardiomyopathy ([facility]) I42.9    AICD (automatic cardioverter/defibrillator) present Z95.810    Spinal stenosis of lumbar region with neurogenic claudication M48.062    Spondylosis of lumbosacral region without myelopathy or radiculopathy M47.817    Moderate episode of recurrent major depressive disorder ([facility]) F33.1    Sepsis ([facility]) A41.9    Type [facility]) [facility] I21. A1    Systolic heart failure, chronic ([facility]) I50.22    Pneumonia J18.9    Anemia of infection and chronic disease B99.9, D63.8    COPD (chronic obstructive pulmonary disease) ([facility]) J44.9    Depression F32. A    Leukocytosis D72.829    Pleural effusion J90    CKD (chronic kidney disease) N18.9    Acute CHF (congestive heart failure) ([facility]) I50.9    Palliative care encounter Z51.5     HPI: Patient is 79 female presenting today for sciatica and repeat hemoglobin a1c. Her sciatic symptoms have been present for a year and are worse with activity. She has tried rest with no relief. She describes her pain as aching and a 5/10. Patient endorses concerns about her bilateral lower extremity edema, states Dr. Sailaja Adams wanted studies done but she is unsure of the test.       COMPLIANT WITH MEDICATION:   HTN; Denies chest pain, dyspnea, palpitations, headache and blurred vision. Blood pressure normotensive. depression screening addressed not at risk    abuse screening addressed denies    learning assessment addressed reviewed nurses notes    fall risk addressed not at risk    HM: addressed-Education on smoking cessation, importance of compliance with all recommendations from specialists.      ROS:  Gen: denies fever, chills, fatigue, weight loss, weight gain  HEENT:denies blurry vision, nasal congestion, sore throat  Resp: denies dypsnea, cough, wheezing  CV: denies chest pain radiating to the jaws or arms, palpitations, lower extremity edema  Abd: denies nausea, vomiting, diarrhea, constipation  Neuro: denies numbness/tingling  Endo: denies polyuria, polydipsia, heat/cold intolerance  Heme: no lymphadenopathy    Allergies   Allergen Reactions    Cefazolin Rash     Possible kidney failure/AIN    Morphine Anaphylaxis    Ultram [Tramadol] Palpitations         Current Outpatient Medications:     carvediloL (COREG) 25 mg tablet, TAKE 1 TABLET BY MOUTH  TWICE DAILY, Disp: 180 Tablet, Rfl: 1    furosemide (LASIX) 40 mg tablet, TAKE 1 TABLET BY MOUTH DAILY, Disp: 90 Tablet, Rfl: 1    Lantus Solostar U-100 Insulin 100 unit/mL (3 mL) inpn, INJECT SUBCUTANEOUSLY 45  UNITS DAILY, Disp: 45 mL, Rfl: 3    ferrous sulfate 325 mg (65 mg iron) tablet, Take 1 Tablet by mouth daily (with breakfast). , Disp: 30 Tablet, Rfl: 0    venlafaxine (EFFEXOR) 75 mg tablet, Take 37.5 mg by mouth two (2) times a day., Disp: , Rfl:     ergocalciferol (ERGOCALCIFEROL) 1,250 mcg (50,000 unit) capsule, Take 50,000 Units by mouth every seven (7) days. On Wednesdays , Disp: , Rfl:     b complex vitamins tablet, Take 1 Tablet by mouth daily. , Disp: , Rfl:     nitroglycerin (NITROSTAT) 0.4 mg SL tablet, 1 Tab by SubLINGual route every five (5) minutes as needed for Chest Pain. Up to 3 doses. , Disp: 25 Tab, Rfl: 0    colestipoL (Colestid) 1 gram tablet, Take 1 Tab by mouth two (2) times a day., Disp: 180 Tab, Rfl: 3    aspirin delayed-release 81 mg tablet, Take 1 Tab by mouth daily. , Disp: 90 Tab, Rfl: 1    B.infantis-B.ani-B.long-B.bifi (PROBIOTIC 4X) 10-15 mg TbEC, Take  by mouth daily. , Disp: , Rfl:     acetaminophen (TYLENOL) 650 mg TbER, Take 650 mg by mouth every eight (8) hours. , Disp: , Rfl:     omega-3 fatty acids-vitamin e 1,000 mg cap, Take 1 Cap by mouth two (2) times a day. 32a day , Disp: , Rfl:     ascorbic acid, vitamin C, (VITAMIN C) 500 mg tablet, Take 1,000 mg by mouth daily. , Disp: , Rfl:     zinc 50 mg tab tablet, Take 50 mg by mouth daily. , Disp: , Rfl:     Past Medical History:   Diagnosis Date    A-fib [facility])     AICD (automatic cardioverter/defibrillator) present 9/23/2020 9/23/2020 [employer] AICD implant    Arthritis     Bilateral pleural effusion 9/18/2020    Chronic pain     Chronic pain     Diabetes ([address]     Diverticular disease     Elevated troponin 9/18/2020    Hemorrhoid     Hypercholesterolemia     IBS (irritable bowel syndrome)     Lymphocytosis 05/82/1218    Systolic heart failure, chronic [facility] 7/16/2021    [facility] 7/16/2021 [id number] r/t sepsis       Past Surgical History:   Procedure Laterality Date    COLONOSCOPY N/A 10/31/2019    [facility] at [facility]  10/31/2019         [facility]  [facility]         HX CATARACT REMOVAL      HX CHOLECYSTECTOMY      HX COLONOSCOPY  2009    HX COLONOSCOPY  2016    2 adenomatous polyp    HX HEMORRHOIDECTOMY  2009    HX HYSTERECTOMY      HX KNEE REPLACEMENT      right    HX ORTHOPAEDIC  12/11/2017    back, laminectomy and decompression    [place] INSJ/RPLCMT PERM DFB W/TRNSVNS LDS 1/DUAL CHMBR N/A 9/23/2020    INSERT ICD DUAL performed by Mario Bojorquez MD at [facility] CARDIAC CATH LAB       Social History     Tobacco Use   Smoking Status Current Every Day Smoker    Packs/day: 1.00    Years: 55.00    Pack years: 55.00    Types: Cigarettes   Smokeless Tobacco Never Used       Social History     Socioeconomic History    Marital status:    Occupational History    Occupation: retired     Comment: LPN   Tobacco Use    Smoking status: Current Every Day Smoker     Packs/day: 1.00     Years: 55.00     Pack years: 55.00     Types: Cigarettes    Smokeless tobacco: Never Used   Vaping Use    Vaping Use: Never used   Substance and Sexual Activity    Alcohol use: No    Drug use: Never   Other Topics Concern     Service No    Blood Transfusions Yes    Caffeine Concern Yes    Occupational Exposure No    Hobby Hazards No    Sleep Concern Yes    Stress Concern Yes    Weight Concern No    Special Diet No    Back Care Yes    Exercise No    Bike Helmet No     Comment: n/a    Seat Belt Yes    Self-Exams Yes       Family History   Problem Relation Age of Onset    Colon Cancer Father     Diabetes Mother          Objective:     Visit Vitals  Temp 96.8 °F (36 °C) (Temporal)   Resp 22   Ht 5' 6\" (1.676 m)   Wt 152 lb 12.8 oz (69.3 kg)   SpO2 98%   BMI 24.66 kg/m²     Body mass index is 24.66 kg/m². General: Alert and oriented. No acute distress. Well nourished  HEENT :  Ears:TMs are normal. Canals are clear. Eyes: pupils equal, round, react to light and accommodation. Extra ocular movements intact. Nose: patent. Mouth and throat is clear. Neck:supple full range of motion no thyromegaly. Trachea midline, No carotid bruits. No significant lymphadenopathy  Lungs[de-identified] clear to auscultation without wheezes, rales, or rhonchi. Heart :RRR, S1 & S2 are normal intensity. No murmur; no gallop  Abdomen: bowel sounds active. No tenderness, guarding, rebound, masses, hepatic or spleen enlargement  Back: no CVA tenderness-bilateral straight leg raises negative. Extremities: without clubbing,or  cyanosis, 2 + nonpitting  edema  Pulses: radial and femoral pulses are normal  Neuro: HMF intact. Cranial nerves II through XII grossly normal.  Motor: is 5 over 5 and symmetrical.   Deep tendon reflexes: +2 equal  Psych:appropriate behavior, mood, affect and judgement. Results for orders placed or performed in visit on 09/14/21   HEMOGLOBIN A1C WITH EAG   Result Value Ref Range    Hemoglobin A1c 6.6 (H) 4.0 - 5.6 %    Est. average glucose 143 mg/dL       No results found for this visit on 01/18/22. Assessment/ Plan:     1. Diabetes mellitus due to underlying condition with hyperosmolarity without coma, with long-term current use of insulin ([facility])  Lantus 45 Units daily  - HEMOGLOBIN A1C WITH EAG; Future  - COLLECTION VENOUS BLOOD,VENIPUNCTURE    2. Chronic obstructive pulmonary disease with acute exacerbation ([facility])  Followed by Dr. Renee Cabrera, pulmonology  - COLLECTION VENOUS BLOOD,VENIPUNCTURE    3. Dilated cardiomyopathy ([facility]  Following up with cardiology in March  - COLLECTION VENOUS BLOOD,VENIPUNCTURE    4. Moderate episode of recurrent major depressive disorder (HCC)    Stable no change to medical management     5.  Type 2 diabetes with nephropathy ([facility])  Lantus 45 units once daily  - COLLECTION VENOUS BLOOD,VENIPUNCTURE    6. Chronic kidney disease, stage IV (severe) (HCC)  Stable no change to medical management       7. Claudication [facility])  Followed by specialist  Cardiologist  Bilateral lower extremity edema and pain. Venous duplex order below. 8. PAF (paroxysmal atrial fibrillation) ([facility]    Followed by specialist cardiologist         9. Bilateral sciatica  Patient education provided   - REFERRAL TO PHYSICAL THERAPY      Orders Placed This Encounter    COLLECTION VENOUS BLOOD,VENIPUNCTURE    HEMOGLOBIN A1C WITH EAG     Standing Status:   Future     Number of Occurrences:   1     Standing Expiration Date:   2/18/2022    REFERRAL TO PHYSICAL THERAPY     Referral Priority:   Routine     Referral Type:   PT/OT/ST     Referral Reason:   Specialty Services Required     Referral Location:   [facility]     Referred to Provider:   Obdulia Reynaga PT    DUPLEX LOWER EXT VENOUS BILAT     Bilateral edema hx of claudication concern for DVT developing. Standing Status:   Future     Standing Expiration Date:   2/19/2022     Scheduling Instructions:      Please call and schedule patient as soon as possible         Verbal and written instructions (see AVS) provided. Patient expresses understanding of diagnosis and treatment plan.     Health Maintenance Due   Topic Date Due    Shingrix Vaccine Age 49> (2 of 2) 10/15/2019    COVID-19 Vaccine (3 - Moderna risk 4-dose series) 01/13/2022               Lazarus Pickle, FNP-C

## 2022-01-19 ENCOUNTER — HOSPITAL ENCOUNTER (OUTPATIENT)
Dept: ULTRASOUND IMAGING | Age: 82
Discharge: HOME | End: 2022-01-19
Attending: NURSE PRACTITIONER
Payer: COMMERCIAL

## 2022-01-19 ENCOUNTER — TELEPHONE (OUTPATIENT)
Dept: FAMILY MEDICINE CLINIC | Age: 82
End: 2022-01-19

## 2022-01-19 DIAGNOSIS — I73.9 PVD (PERIPHERAL VASCULAR DISEASE) (HCC): ICD-10-CM

## 2022-01-19 DIAGNOSIS — R60.0 LOWER EXTREMITY EDEMA: ICD-10-CM

## 2022-01-19 DIAGNOSIS — I73.9 CLAUDICATION (HCC): ICD-10-CM

## 2022-01-19 LAB
EST. AVERAGE GLUCOSE BLD GHB EST-MCNC: 140 MG/DL
HBA1C MFR BLD: 6.5 % (ref 4–5.6)

## 2022-01-19 PROCEDURE — 93970 EXTREMITY STUDY: CPT

## 2022-01-19 NOTE — TELEPHONE ENCOUNTER
informed of bilateral doppler today at 1:30 [facility] [place] [facility] at 1:00 pm, no prep and bring new insurance card

## 2022-01-21 NOTE — PROGRESS NOTES
Spoke with patient's  Julio Judson (in chart), confirmed with 2 identifiers, advised patient of venous duplex results.  expressed understanding.  KT

## 2022-02-03 ENCOUNTER — TELEPHONE (OUTPATIENT)
Dept: FAMILY MEDICINE CLINIC | Age: 82
End: 2022-02-03

## 2022-02-03 NOTE — TELEPHONE ENCOUNTER
[facility] called stating that they reached out to the pt and family to set up appointment. They stated that they will not be going forward with PT at this time because she has done it in the past and it has not helped and they do not feel like would help again.

## 2022-03-12 NOTE — CONSULTS
[address] [facility]     Date of  Admission: 7/16/2021 12:05 AM     Admission type:Emergency    Consult for:ELEVATED TROPONIN/NSTEMI  Consult by: hospitalist      Subjective:      Virtual  Visit was conducted, as patient is on respiratory isolation for r/o meningitis. Discussed patient with RN and had conversation. Patient not conversant. Colletta Bracket is a 79 y.o. female admitted for Sepsis ([facility] [address] [A41.9]. PMH dilated NICM with EF 25-30%, AICD [place]), CKD, HTN, DM. Admitted to [facility] with fever of 104, lethargy, septic with WBC 16.7, suspect meningitis, lumbar puncture completed and cx pending. Patient is on isolation, confused, lethargic, not able to provide any information. ECG SR with nonspecific changes.    Troponin 11.71-11.9    Previous treatment/evaluation includes echocardiogram . Cardiac risk factors: dyslipidemia, diabetes mellitus, obesity, sedentary life style, hypertension, stress, post-menopausal.  AICD 2/23/2020 EF 25-30%     Patient Active Problem List    Diagnosis Date Noted    Sepsis ([facility] [address] 07/16/2021    Type 2 MI (myocardial infarction) ([facility] [address] 88/75/1184    Systolic heart failure, chronic ([address] 07/16/2021    Moderate episode of recurrent major depressive disorder ([address] 06/14/2021    Dilated cardiomyopathy ([address] 09/23/2020    SSS (sick sinus syndrome) ([address] 09/23/2020    Cardiomyopathy ([address] 09/23/2020    AICD (automatic cardioverter/defibrillator) present 09/23/2020    Respiratory failure ([address] 09/18/2020    CHF (congestive heart failure) ([address] 09/18/2020    Hypoxia 09/18/2020    Bilateral pleural effusion 09/18/2020    Elevated troponin 09/18/2020    PAF (paroxysmal atrial fibrillation) ([address] 09/10/2020    CKD (chronic kidney disease) stage 3, GFR 30-59 ml/min ([facility]) 06/04/2019    Lymphocytosis 05/07/2018    Type 2 diabetes with nephropathy ([address] 02/15/2018    Type 2 diabetes mellitus with diabetic neuropathy ([facility] [address] 02/15/2018    Spinal stenosis of lumbar region with neurogenic claudication 11/17/2017    Spondylosis of lumbosacral region without myelopathy or radiculopathy 11/17/2017    Overdose of opiate or related narcotic, accidental or unintentional, subsequent encounter 10/27/2017    Acute left-sided low back pain with sciatica 10/27/2017    Physical debility 10/27/2017    Acute encephalopathy 10/23/2017    Hyperkalemia 10/20/2017    Altered mental status 10/20/2017    Transaminitis 10/20/2017    Acute renal failure (ARF) ([address] 10/20/2017    Diverticulitis large intestine w/o perforation or abscess w/o bleeding 07/06/2017    SOB (shortness of breath) 06/21/2017    Abdominal pain, generalized 06/21/2017    Diarrhea in adult patient 06/21/2017    Cigarette smoker 57/05/5830    Neutrophilic leukocytosis 00/44/1193    Chronic pain     Hypercholesterolemia     Arthritis     Diabetes (HCC)     IBS (irritable bowel syndrome)     Hemorrhoid       Slick Mars NP  Past Medical History:   Diagnosis Date    A-fib [facility])     AICD (automatic cardioverter/defibrillator) present 9/23/2020 9/23/2020 [employer] AICD implant    Arthritis     Bilateral pleural effusion 9/18/2020    Chronic pain     Chronic pain     Diabetes ([address]     Diverticular disease     Elevated troponin 9/18/2020    Hemorrhoid     Hypercholesterolemia     IBS (irritable bowel syndrome)     Lymphocytosis 59/84/6248    Systolic heart failure, chronic ([address] 7/16/2021    Type 2 MI (myocardial infarction) ([address] 7/16/2021 7/162021 r/t sepsis      Social History     Socioeconomic History    Marital status:      Spouse name: Not on file    Number of children: Not on file    Years of education: Not on file    Highest education level: Not on file   Occupational History    Occupation: retired     Comment: LPN   Tobacco Use    Smoking status: Current Every Day Smoker     Packs/day: 1.00     Years: 55.00 Pack years: 55.00     Types: Cigarettes    Smokeless tobacco: Never Used   Vaping Use    Vaping Use: Never used   Substance and Sexual Activity    Alcohol use: No    Drug use: Never   Other Topics Concern     Service No    Blood Transfusions Yes    Caffeine Concern Yes    Occupational Exposure No    Hobby Hazards No    Sleep Concern Yes    Stress Concern Yes    Weight Concern No    Special Diet No    Back Care Yes    Exercise No    Bike Helmet No     Comment: n/a    Seat Belt Yes    Self-Exams Yes     Social Determinants of Health     Financial Resource Strain:     Difficulty of Paying Living Expenses:    Food Insecurity:     Worried About Running Out of Food in the Last Year:     Ran Out of Food in the Last Year:    Transportation Needs:     Lack of Transportation (Medical):      Lack of Transportation (Non-Medical):    Physical Activity:     Days of Exercise per Week:     Minutes of Exercise per Session:    Stress:     Feeling of Stress :    Social Connections:     Frequency of Communication with Friends and Family:     Frequency of Social Gatherings with Friends and Family:     Attends Islam Services:     Active Member of Clubs or Organizations:     Attends Club or Organization Meetings:     Marital Status:      Allergies   Allergen Reactions    Morphine Anaphylaxis    Ultram [Tramadol] Palpitations      Family History   Problem Relation Age of Onset    Colon Cancer Father     Diabetes Mother       Current Facility-Administered Medications   Medication Dose Route Frequency    ampicillin (OMNIPEN) 2 g in 0.9% sodium chloride (MBP/ADV) 100 mL MBP  2 g IntraVENous Q6H    acyclovir (ZOVIRAX) 750 mg in 0.9% sodium chloride 250 mL IVPB  10 mg/kg IntraVENous Q24H    cefTRIAXone (ROCEPHIN) 2 g in 0.9% sodium chloride (MBP/ADV) 50 mL MBP  2 g IntraVENous Q12H    aspirin delayed-release tablet 81 mg  81 mg Oral DAILY    atorvastatin (LIPITOR) tablet 20 mg  20 mg Oral QHS  carvediloL (COREG) tablet 12.5 mg  12.5 mg Oral BID WITH MEALS    sodium chloride (NS) flush 5-40 mL  5-40 mL IntraVENous Q8H    sodium chloride (NS) flush 5-40 mL  5-40 mL IntraVENous PRN    acetaminophen (TYLENOL) tablet 650 mg  650 mg Oral Q6H PRN    Or    acetaminophen (TYLENOL) suppository 650 mg  650 mg Rectal Q6H PRN    polyethylene glycol (MIRALAX) packet 17 g  17 g Oral DAILY PRN    ondansetron (ZOFRAN ODT) tablet 4 mg  4 mg Oral Q8H PRN    Or    ondansetron (ZOFRAN) injection 4 mg  4 mg IntraVENous Q6H PRN    [START ON 7/17/2021] vancomycin (VANCOCIN) 1,000 mg in 0.9% sodium chloride 250 mL (VIAL-MATE)  1,000 mg IntraVENous Q36H    insulin lispro (HUMALOG) injection   SubCUTAneous AC&HS    glucose chewable tablet 16 g  4 Tablet Oral PRN    dextrose (D50W) injection syrg 12.5-25 g  12.5-25 g IntraVENous PRN    glucagon (GLUCAGEN) injection 1 mg  1 mg IntraMUSCular PRN    0.9% sodium chloride infusion  125 mL/hr IntraVENous CONTINUOUS        Review of Symptoms: unable to obtain        Objective:      Visit Vitals  BP (!) 108/39 (BP 1 Location: Left upper arm, BP Patient Position: At rest)   Pulse 89   Temp 99.7 °F (37.6 °C)   Resp 17   Ht 5' 6\" (1.676 m)   Wt 161 lb 13.1 oz (73.4 kg)   SpO2 97%   BMI 26.12 kg/m²       Physical Assessment: unable to perform as patient is on respiratory isolation    General Appearance:  elderly  female in no acute distress  appears stated age      Data Review:   Recent Labs     07/16/21  1001 07/15/21  1937   WBC 13.0* 16.7*   HGB 10.1* 13.2   HCT 30.8* 39.8   * 170     Recent Labs     07/16/21  1001 07/15/21  1937    131*   K 4.5 4.8   * 98   CO2 18* 19*   * 199*   BUN 53* 46*   CREA 1.97* 2.41*   CA 8.1* 9.3   MG  --  1.8   ALB 2.0* 2.7*   TBILI 0.4 0.4   * 173*   INR  --  1.1       Recent Labs     07/16/21  0441 07/15/21  1937   TROIQ 11.90* 11.71*         Intake/Output Summary (Last 24 hours) at 7/16/2021 26  Last data filed at 7/16/2021 0424  Gross per 24 hour   Intake 100 ml   Output    Net 100 ml        Cardiographics    Telemetry: SR with PVCs  ECG: SR with no acute changes    Echocardiogram: pending    CXRAY:no acute process       Assessment:       Principal Problem:    Sepsis ([address] (7/16/2021)    Active Problems:    Physical debility (10/27/2017)      Type 2 diabetes with nephropathy ([address] (2/15/2018)      CKD (chronic kidney disease) stage 3, GFR 30-59 ml/min ([facility]) (6/4/2019)      PAF (paroxysmal atrial fibrillation) ([address] (9/10/2020)      Dilated cardiomyopathy ([address] (9/23/2020)      AICD (automatic cardioverter/defibrillator) present (9/23/2020)      Overview: 9/23/2020 [employer] AICD implant      Type 2 MI (myocardial infarction) ([facility] [address] (7/16/2021)      Overview: 7/162021 r/t sepsis      Systolic heart failure, chronic ([address] (7/16/2021)         Plan:     NSTEMI TYPE 2 r/t sepsis:  No previous hx of CAD  Continue on ASA, BB, statin. Consider IV heparin once time post lumbar puncture  Echo pending   Will consider further cardiac evaluation once patient has recovered from the septic event     NICM dilated with AICD: (EF 25-30%)  Not volume overloaded  Continue on Coreg, ARB on hold with ARF, restart when renal fx allows  Monitor I/Os, daily weights and labs.  NTproBNP pending      Thank you for consulting GUERITA Rodriges NP No

## 2022-03-18 PROBLEM — E11.40 TYPE 2 DIABETES MELLITUS WITH DIABETIC NEUROPATHY (HCC): Status: ACTIVE | Noted: 2018-02-15

## 2022-03-18 PROBLEM — J90 PLEURAL EFFUSION: Status: ACTIVE | Noted: 2021-09-01

## 2022-03-18 PROBLEM — G93.40 ACUTE ENCEPHALOPATHY: Status: ACTIVE | Noted: 2017-10-23

## 2022-03-18 PROBLEM — I21.A1 TYPE 2 MI (MYOCARDIAL INFARCTION) (HCC): Status: ACTIVE | Noted: 2021-07-16

## 2022-03-18 PROBLEM — I50.9 CHF (CONGESTIVE HEART FAILURE) (HCC): Status: ACTIVE | Noted: 2020-09-18

## 2022-03-18 PROBLEM — J44.9 COPD (CHRONIC OBSTRUCTIVE PULMONARY DISEASE) (HCC): Status: ACTIVE | Noted: 2021-08-26

## 2022-03-18 PROBLEM — M47.817 SPONDYLOSIS OF LUMBOSACRAL REGION WITHOUT MYELOPATHY OR RADICULOPATHY: Status: ACTIVE | Noted: 2017-11-17

## 2022-03-18 PROBLEM — T40.601D: Status: ACTIVE | Noted: 2017-10-27

## 2022-03-18 PROBLEM — N17.9 ACUTE RENAL FAILURE (ARF) (HCC): Status: ACTIVE | Noted: 2017-10-20

## 2022-03-18 PROBLEM — K57.32 DIVERTICULITIS LARGE INTESTINE W/O PERFORATION OR ABSCESS W/O BLEEDING: Status: ACTIVE | Noted: 2017-07-06

## 2022-03-19 PROBLEM — F32.A DEPRESSION: Status: ACTIVE | Noted: 2021-08-26

## 2022-03-19 PROBLEM — A41.9 SEPSIS (HCC): Status: ACTIVE | Noted: 2021-07-16

## 2022-03-19 PROBLEM — R06.02 SOB (SHORTNESS OF BREATH): Status: ACTIVE | Noted: 2017-06-21

## 2022-03-19 PROBLEM — R10.84 ABDOMINAL PAIN, GENERALIZED: Status: ACTIVE | Noted: 2017-06-21

## 2022-03-19 PROBLEM — R09.02 HYPOXIA: Status: ACTIVE | Noted: 2020-09-18

## 2022-03-19 PROBLEM — E87.5 HYPERKALEMIA: Status: ACTIVE | Noted: 2017-10-20

## 2022-03-19 PROBLEM — J90 BILATERAL PLEURAL EFFUSION: Status: ACTIVE | Noted: 2020-09-18

## 2022-03-19 PROBLEM — I49.5 SSS (SICK SINUS SYNDROME) (HCC): Status: ACTIVE | Noted: 2020-09-23

## 2022-03-19 PROBLEM — D72.9 NEUTROPHILIC LEUKOCYTOSIS: Status: ACTIVE | Noted: 2017-06-20

## 2022-03-19 PROBLEM — R74.01 TRANSAMINITIS: Status: ACTIVE | Noted: 2017-10-20

## 2022-03-19 PROBLEM — D72.828 NEUTROPHILIC LEUKOCYTOSIS: Status: ACTIVE | Noted: 2017-06-20

## 2022-03-19 PROBLEM — F33.1 MODERATE EPISODE OF RECURRENT MAJOR DEPRESSIVE DISORDER (HCC): Status: ACTIVE | Noted: 2021-06-14

## 2022-03-19 PROBLEM — N18.9 CKD (CHRONIC KIDNEY DISEASE): Status: ACTIVE | Noted: 2021-09-01

## 2022-03-19 PROBLEM — Z95.810 AICD (AUTOMATIC CARDIOVERTER/DEFIBRILLATOR) PRESENT: Status: ACTIVE | Noted: 2020-09-23

## 2022-03-19 PROBLEM — M54.40 ACUTE LEFT-SIDED LOW BACK PAIN WITH SCIATICA: Status: ACTIVE | Noted: 2017-10-27

## 2022-03-19 PROBLEM — D72.829 LEUKOCYTOSIS: Status: ACTIVE | Noted: 2021-09-01

## 2022-03-19 PROBLEM — R19.7 DIARRHEA IN ADULT PATIENT: Status: ACTIVE | Noted: 2017-06-21

## 2022-03-19 PROBLEM — I50.9 ACUTE CHF (CONGESTIVE HEART FAILURE) (HCC): Status: ACTIVE | Noted: 2021-09-01

## 2022-03-19 PROBLEM — I50.22 SYSTOLIC HEART FAILURE, CHRONIC (HCC): Status: ACTIVE | Noted: 2021-07-16

## 2022-03-19 PROBLEM — E11.21 TYPE 2 DIABETES WITH NEPHROPATHY (HCC): Status: ACTIVE | Noted: 2018-02-15

## 2022-03-19 PROBLEM — D63.8 ANEMIA OF INFECTION AND CHRONIC DISEASE: Status: ACTIVE | Noted: 2021-08-26

## 2022-03-19 PROBLEM — F17.210 CIGARETTE SMOKER: Status: ACTIVE | Noted: 2017-06-21

## 2022-03-19 PROBLEM — R53.81 PHYSICAL DEBILITY: Status: ACTIVE | Noted: 2017-10-27

## 2022-03-19 PROBLEM — Z51.5 PALLIATIVE CARE ENCOUNTER: Status: ACTIVE | Noted: 2021-09-03

## 2022-03-19 PROBLEM — I48.0 PAF (PAROXYSMAL ATRIAL FIBRILLATION) (HCC): Status: ACTIVE | Noted: 2020-09-10

## 2022-03-19 PROBLEM — J96.90 RESPIRATORY FAILURE (HCC): Status: ACTIVE | Noted: 2020-09-18

## 2022-03-19 PROBLEM — B99.9 ANEMIA OF INFECTION AND CHRONIC DISEASE: Status: ACTIVE | Noted: 2021-08-26

## 2022-03-19 PROBLEM — D72.820 LYMPHOCYTOSIS: Status: ACTIVE | Noted: 2018-05-07

## 2022-03-20 PROBLEM — R77.8 ELEVATED TROPONIN: Status: ACTIVE | Noted: 2020-09-18

## 2022-03-20 PROBLEM — N18.30 CKD (CHRONIC KIDNEY DISEASE) STAGE 3, GFR 30-59 ML/MIN (HCC): Status: ACTIVE | Noted: 2019-06-04

## 2022-03-20 PROBLEM — I42.0 DILATED CARDIOMYOPATHY (HCC): Status: ACTIVE | Noted: 2020-09-23

## 2022-03-20 PROBLEM — M48.062 SPINAL STENOSIS OF LUMBAR REGION WITH NEUROGENIC CLAUDICATION: Status: ACTIVE | Noted: 2017-11-17

## 2022-03-20 PROBLEM — R41.82 ALTERED MENTAL STATUS: Status: ACTIVE | Noted: 2017-10-20

## 2022-03-20 PROBLEM — J18.9 PNEUMONIA: Status: ACTIVE | Noted: 2021-08-23

## 2022-03-20 PROBLEM — R79.89 ELEVATED TROPONIN: Status: ACTIVE | Noted: 2020-09-18

## 2022-03-20 PROBLEM — I42.9 CARDIOMYOPATHY (HCC): Status: ACTIVE | Noted: 2020-09-23

## 2022-03-28 NOTE — PROGRESS NOTES
Jayy Collins is a 79 y.o. female is here for follow up. Pmhx SSS, DM, IBS, HLD, COPD, PAF, CHF. Last seen by Dr Junior Mckeon in 9/2021:  Multiple medical problems including DM II, hypertension, CKD III, dyslipidemia, DJD, lumbar stenosis/radiculopathy, chronic pain, tobacco, leukocytosis, PAF during hospitalization with encephalopathy 10/17 (at [facility]), followed by Moriah Fermin at [facility]. Hospitalized 9/1-9/7/21 with:  Acute respiratory failure with hypoxia POA- improved s/p pleural tap,off oxygen now  Acute on chronic congestive heart failure proBNP 35,000- cont lasix at 20 mg daily for now due to IVAN for now, outpatient followup with Dr Junior Mckeon soon  Echocardiogram 8/20/2021 at [facility] EF of 45 to 50% with severe grade 3 diastolic dysfunction. Troponinemia likely type B demand secondary to fluid overload- remains flat  Leukocytosis with eosinophilia POA- now down to 14k, outpatient followup with Pulm associates as recommended  B/L Pleural effusion POA- s/p R thoracentesis (~1L), neg for cytology, neg for bacteria, neg for AFB, exudative  history of MSSA on Dapto IV till  9/2/2021 at other hospital before transfer here- taken off all Abx per ID, therapy completed now  Chronic kidney disease POA- Cr down to 2.0, resume lower dose of Lasix after 48 hrs as discussed        Today reports stable sob, continues to smoke half a pack per day. No cp. Bp running high. Taking medications  As prescribed. Has some ankle swelling but over all improved. The patient denies chest pain, orthopnea, PND, palpitations, syncope, presyncope or fatigue.        Patient Active Problem List    Diagnosis Date Noted    Palliative care encounter 09/03/2021    Leukocytosis 09/01/2021    Pleural effusion 09/01/2021    CKD (chronic kidney disease) 09/01/2021    Acute CHF (congestive heart failure) ([facility] 09/01/2021    Anemia of infection and chronic disease 08/26/2021    COPD (chronic obstructive pulmonary disease) ([facility] 08/26/2021    Depression 08/26/2021    Pneumonia 08/23/2021    Sepsis ([address] 07/16/2021    Type 2 MI (myocardial infarction) ([facility] 95/73/1834    Systolic heart failure, chronic ([address] 07/16/2021    Moderate episode of recurrent major depressive disorder ([address] 06/14/2021    Dilated cardiomyopathy ([address] 09/23/2020    SSS (sick sinus syndrome) ([address] 09/23/2020    Cardiomyopathy ([facility] 09/23/2020    AICD (automatic cardioverter/defibrillator) present 09/23/2020    Respiratory failure ([address] 09/18/2020    CHF (congestive heart failure) ([facility] 09/18/2020    Hypoxia 09/18/2020    Bilateral pleural effusion 09/18/2020    Elevated troponin 09/18/2020    PAF (paroxysmal atrial fibrillation) ([facility] 09/10/2020    CKD (chronic kidney disease) stage 3, GFR 30-59 ml/min ([facility]) 06/04/2019    Lymphocytosis 05/07/2018    Type 2 diabetes with nephropathy ([facility] 02/15/2018    Type 2 diabetes mellitus with diabetic neuropathy ([facility] 02/15/2018    Spinal stenosis of lumbar region with neurogenic claudication 11/17/2017    Spondylosis of lumbosacral region without myelopathy or radiculopathy 11/17/2017    Overdose of opiate or related narcotic, accidental or unintentional, subsequent encounter 10/27/2017    Acute left-sided low back pain with sciatica 10/27/2017    Physical debility 10/27/2017    Acute encephalopathy 10/23/2017    Hyperkalemia 10/20/2017    Altered mental status 10/20/2017    Transaminitis 10/20/2017    Acute renal failure (ARF) ([facility] 10/20/2017    Diverticulitis large intestine w/o perforation or abscess w/o bleeding 07/06/2017    SOB (shortness of breath) 06/21/2017    Abdominal pain, generalized 06/21/2017    Diarrhea in adult patient 06/21/2017    Cigarette smoker 41/57/4052    Neutrophilic leukocytosis 27/33/7453    Chronic pain     Hypercholesterolemia     Arthritis     [facility]     IBS (irritable bowel syndrome)     Hemorrhoid       Iza Hartley, NP  Past Medical History:   Diagnosis Date    A-fib [facility])     AICD (automatic cardioverter/defibrillator) present 9/23/2020 9/23/2020 [employer] AICD implant    Arthritis     Bilateral pleural effusion 9/18/2020    Chronic pain     Chronic pain     Diabetes ([facility]     Diverticular disease     Elevated troponin 9/18/2020    Hemorrhoid     Hypercholesterolemia     IBS (irritable bowel syndrome)     Lymphocytosis 97/23/5905    Systolic heart failure, chronic ([facility] 7/16/2021    Type 2 MI (myocardial infarction) ([facility] 7/16/2021 7/162021 r/t sepsis      Past Surgical History:   Procedure Laterality Date    COLONOSCOPY N/A 10/31/2019    COLON[facility]ino [facility]  10/31/2019         [facility]  10/31/2019         HX CATARACT REMOVAL      HX CHOLECYSTECTOMY      HX COLONOSCOPY  2009    HX COLONOSCOPY  2016    2 adenomatous polyp    HX HEMORRHOIDECTOMY  2009    HX HYSTERECTOMY      HX KNEE REPLACEMENT      right    HX ORTHOPAEDIC  12/11/2017    back, laminectomy and decompression    OH INSJ/RPLCMT PERM DFB W/TRNSVNS LDS 1/DUAL CHMBR N/A 9/23/2020    INSERT ICD DUAL performed by Soham Paul [facility] at [facility] CARDIAC CATH LAB     Allergies   Allergen Reactions    Cefazolin Rash     Possible kidney failure/AIN    Morphine Anaphylaxis    Ultram [Tramadol] Palpitations      Family History   Problem Relation Age of Onset    Colon Cancer Father     Diabetes Mother       Social History     Socioeconomic History    Marital status:      Spouse name: Not on file    Number of children: Not on file    Years of education: Not on file    Highest education level: Not on file   Occupational History    Occupation: retired     Comment: LPN   Tobacco Use    Smoking status: Current Every Day Smoker     Packs/day: 1.00     Years: 55.00     Pack years: 55.00     Types: Cigarettes    Smokeless tobacco: Never Used Vaping Use    Vaping Use: Never used   Substance and Sexual Activity    Alcohol use: No    Drug use: Never    Sexual activity: Not on file   Other Topics Concern     Service No    Blood Transfusions Yes    Caffeine Concern Yes    Occupational Exposure No    Hobby Hazards No    Sleep Concern Yes    Stress Concern Yes    Weight Concern No    Special Diet No    Back Care Yes    Exercise No    Bike Helmet No     Comment: n/a    Seat Belt Yes    Self-Exams Yes   Social History Narrative    Not on file     Social Determinants of Health     Financial Resource Strain:     Difficulty of Paying Living Expenses: Not on file   Food Insecurity:     Worried About Running Out of Food in the Last Year: Not on file    Alycia of Food in the Last Year: Not on file   Transportation Needs:     Lack of Transportation (Medical): Not on file    Lack of Transportation (Non-Medical):  Not on file   Physical Activity:     Days of Exercise per Week: Not on file    Minutes of Exercise per Session: Not on file   Stress:     Feeling of Stress : Not on file   Social Connections:     Frequency of Communication with Friends and Family: Not on file    Frequency of Social Gatherings with Friends and Family: Not on file    Attends Voodoo Services: Not on file    Active Member of [address] [place] [address] [employer] or Organizations: Not on file    Attends Club or Organization Meetings: Not on file    Marital Status: Not on file   Intimate Partner Violence:     Fear of Current or Ex-Partner: Not on file    Emotionally Abused: Not on file    Physically Abused: Not on file    Sexually Abused: Not on file   Housing Stability:     Unable to Pay for Housing in the Last Year: Not on file    Number of [place] in the Last Year: Not on file    Unstable Housing in the Last Year: Not on file      Current Outpatient Medications   Medication Sig    carvediloL (COREG) 25 mg tablet TAKE 1 TABLET BY MOUTH  TWICE DAILY    furosemide (LASIX) 40 mg tablet TAKE 1 TABLET BY MOUTH  DAILY    Lantus Solostar U-100 Insulin 100 unit/mL (3 mL) inpn INJECT SUBCUTANEOUSLY 45  UNITS DAILY    ferrous sulfate 325 mg (65 mg iron) tablet Take 1 Tablet by mouth daily (with breakfast).  venlafaxine (EFFEXOR) 75 mg tablet Take 37.5 mg by mouth two (2) times a day.  ergocalciferol (ERGOCALCIFEROL) 1,250 mcg (50,000 unit) capsule Take 50,000 Units by mouth every seven (7) days. On Wednesdays     b complex vitamins tablet Take 1 Tablet by mouth daily.  nitroglycerin (NITROSTAT) 0.4 mg SL tablet 1 Tab by SubLINGual route every five (5) minutes as needed for Chest Pain. Up to 3 doses.  colestipoL (Colestid) 1 gram tablet Take 1 Tab by mouth two (2) times a day.  aspirin delayed-release 81 mg tablet Take 1 Tab by mouth daily.  B.infantis-B.ani-B.long-B.bifi (PROBIOTIC 4X) 10-15 mg TbEC Take  by mouth daily.  acetaminophen (TYLENOL) 650 mg TbER Take 650 mg by mouth every eight (8) hours.  omega-3 fatty acids-vitamin e 1,000 mg cap Take 1 Cap by mouth two (2) times a day. 32a day     ascorbic acid, vitamin C, (VITAMIN C) 500 mg tablet Take 1,000 mg by mouth daily.  zinc 50 mg tab tablet Take 50 mg by mouth daily. No current facility-administered medications for this visit. Review of Symptoms:    CONST  No weight change. No fever, chills, sweats    ENT No visual changes, URI sx, sore throat    CV  See HPI   RESP  No cough, or sputum, wheezing. Also see HPI   GI  No abdominal pain or change in bowel habits. No heartburn or dysphagia. No melena or rectal bleeding.   No dysuria, urgency, frequency, hematuria   MSKEL  No joint pain, swelling. No muscle pain. SKIN  No rash or lesions. NEURO  No headache, syncope, or seizure. No weakness, loss of sensation, or paresthesias. PSYCH  No low mood or depression  No anxiety. HE/LYMPH  No easy bruising, abnormal bleeding, or enlarged glands.         Physical ExamPhysical Exam: There were no vitals taken for this visit. Gen: NAD  HEENT:  PERRL, throat clear  Neck: no adenopathy, no thyromegaly, no JVD   Heart:  Regular,Nl S1S2,  no murmur, gallop or rub. Lungs:  clear  Abdomen:   Soft, non-tender, bowel sounds are active.    Extremities:  No edema  Pulse: symmetric  Neuro: A&O times 3, No focal neuro deficits    Cardiographics    Labs:   Lab Results   Component Value Date/Time    Sodium 136 09/07/2021 02:16 AM    Sodium 137 09/06/2021 12:46 AM    Sodium 138 09/05/2021 03:49 AM    Sodium 138 09/04/2021 03:33 AM    Sodium 138 09/03/2021 02:07 AM    Potassium 4.3 09/07/2021 02:16 AM    Potassium 4.8 09/06/2021 12:46 AM    Potassium 5.0 09/05/2021 03:49 AM    Potassium 5.0 09/04/2021 03:33 AM    Potassium 4.4 09/03/2021 02:07 AM    Chloride 106 09/07/2021 02:16 AM    Chloride 108 09/06/2021 12:46 AM    Chloride 107 09/05/2021 03:49 AM    Chloride 109 (H) 09/04/2021 03:33 AM    Chloride 110 (H) 09/03/2021 02:07 AM    CO2 22 09/07/2021 02:16 AM    CO2 24 09/06/2021 12:46 AM    CO2 23 09/05/2021 03:49 AM    CO2 21 09/04/2021 03:33 AM    CO2 21 09/03/2021 02:07 AM    Anion gap 8 09/07/2021 02:16 AM    Anion gap 5 09/06/2021 12:46 AM    Anion gap 8 09/05/2021 03:49 AM    Anion gap 8 09/04/2021 03:33 AM    Anion gap 7 09/03/2021 02:07 AM    Glucose 135 (H) 09/07/2021 02:16 AM    Glucose 109 (H) 09/06/2021 12:46 AM    Glucose 122 (H) 09/05/2021 03:49 AM    Glucose 203 (H) 09/04/2021 03:33 AM    Glucose 128 (H) 09/03/2021 02:07 AM    BUN 64 (H) 09/07/2021 02:16 AM    BUN 58 (H) 09/06/2021 12:46 AM    BUN 52 (H) 09/05/2021 03:49 AM    BUN 48 (H) 09/04/2021 03:33 AM    BUN 46 (H) 09/03/2021 02:07 AM    Creatinine 2.08 (H) 09/07/2021 02:16 AM    Creatinine 2.25 (H) 09/06/2021 12:46 AM    Creatinine 1.79 (H) 09/05/2021 03:49 AM    Creatinine 2.00 (H) 09/04/2021 03:33 AM    Creatinine 2.14 (H) 09/03/2021 02:07 AM    BUN/Creatinine ratio 31 (H) 09/07/2021 02:16 AM    BUN/Creatinine ratio 26 (H) 09/06/2021 12:46 AM    BUN/Creatinine ratio 29 (H) 09/05/2021 03:49 AM    BUN/Creatinine ratio 24 (H) 09/04/2021 03:33 AM    BUN/Creatinine ratio 21 (H) 09/03/2021 02:07 AM    GFR est AA 28 (L) 09/07/2021 02:16 AM    GFR est AA 25 (L) 09/06/2021 12:46 AM    GFR est AA 33 (L) 09/05/2021 03:49 AM    GFR est AA 29 (L) 09/04/2021 03:33 AM    GFR est AA 27 (L) 09/03/2021 02:07 AM    GFR est non-AA 23 (L) 09/07/2021 02:16 AM    GFR est non-AA 21 (L) 09/06/2021 12:46 AM    GFR est non-AA 27 (L) 09/05/2021 03:49 AM    GFR est non-AA 24 (L) 09/04/2021 03:33 AM    GFR est non-AA 22 (L) 09/03/2021 02:07 AM    Calcium 9.3 09/07/2021 02:16 AM    Calcium 9.3 09/06/2021 12:46 AM    Calcium 9.3 09/05/2021 03:49 AM    Calcium 9.5 09/04/2021 03:33 AM    Calcium 9.0 09/03/2021 02:07 AM    Bilirubin, total 0.3 08/30/2021 07:30 PM    Bilirubin, total 0.1 (L) 08/26/2021 05:21 AM    Bilirubin, total 0.3 08/25/2021 05:45 AM    Bilirubin, total 0.3 08/24/2021 07:22 AM    Bilirubin, total 0.3 08/23/2021 03:35 PM    Alk. phosphatase 77 08/30/2021 07:30 PM    Alk. phosphatase 79 08/26/2021 05:21 AM    Alk. phosphatase 78 08/25/2021 05:45 AM    Alk. phosphatase 79 08/24/2021 07:22 AM    Alk.  phosphatase 76 08/23/2021 03:35 PM    Protein, total 7.6 08/30/2021 07:30 PM    Protein, total 6.9 08/26/2021 05:21 AM    Protein, total 7.0 08/25/2021 05:45 AM    Protein, total 7.2 08/24/2021 07:22 AM    Protein, total 7.7 08/23/2021 03:35 PM    Albumin 2.3 (L) 08/30/2021 07:30 PM    Albumin 1.7 (L) 08/26/2021 05:21 AM    Albumin 1.8 (L) 08/25/2021 05:45 AM    Albumin 2.0 (L) 08/24/2021 07:22 AM    Albumin 2.1 (L) 08/23/2021 03:35 PM    Globulin 5.3 (H) 08/30/2021 07:30 PM    Globulin 5.2 (H) 08/26/2021 05:21 AM    Globulin 5.2 (H) 08/25/2021 05:45 AM    Globulin 5.2 (H) 08/24/2021 07:22 AM    Globulin 5.6 (H) 08/23/2021 03:35 PM    A-G Ratio 0.4 (L) 08/30/2021 07:30 PM    A-G Ratio 0.3 (L) 08/26/2021 05:21 AM    A-G Ratio 0.3 (L) 08/25/2021 05:45 AM A-G Ratio 0.4 (L) 08/24/2021 07:22 AM    A-G Ratio 0.4 (L) 08/23/2021 03:35 PM    ALT (SGPT) 22 08/30/2021 07:30 PM    ALT (SGPT) 26 08/26/2021 05:21 AM    ALT (SGPT) 26 08/25/2021 05:45 AM    ALT (SGPT) 10 (L) 08/24/2021 07:22 AM    ALT (SGPT) 16 08/23/2021 03:35 PM     Lab Results   Component Value Date/Time    CK 30 08/27/2021 06:29 AM     Lab Results   Component Value Date/Time    Cholesterol, total 243 (H) 06/14/2021 09:17 PM    Cholesterol, total 198 10/29/2020 10:22 AM    Cholesterol, total 185 06/29/2020 12:03 PM    Cholesterol, total 217 (H) 02/03/2020 03:52 PM    Cholesterol, total 169 06/04/2019 03:57 PM    HDL Cholesterol 42 06/14/2021 09:17 PM    HDL Cholesterol 35 (L) 10/29/2020 10:22 AM    HDL Cholesterol 36 (L) 06/29/2020 12:03 PM    HDL Cholesterol 44 02/03/2020 03:52 PM    HDL Cholesterol 37 (L) 06/04/2019 03:57 PM    LDL,Direct 137 (H) 06/14/2021 09:17 PM    LDL, calculated Not calculated due to elevated triglyceride level 06/14/2021 09:17 PM    LDL, calculated 110 (H) 10/29/2020 10:22 AM    LDL, calculated Comment 06/29/2020 12:03 PM    LDL, calculated 106 (H) 02/03/2020 03:52 PM    LDL, calculated 75 06/04/2019 03:57 PM    LDL, calculated 101 (H) 10/03/2018 04:30 PM    Triglyceride 493 (H) 06/14/2021 09:17 PM    Triglyceride 305 (H) 10/29/2020 10:22 AM    Triglyceride 412 (H) 06/29/2020 12:03 PM    Triglyceride 336 (H) 02/03/2020 03:52 PM    Triglyceride 283 (H) 06/04/2019 03:57 PM    CHOL/HDL Ratio 5.8 (H) 06/14/2021 09:17 PM     No results found for this or any previous visit.     Assessment:         Patient Active Problem List    Diagnosis Date Noted    Palliative care encounter 09/03/2021    Leukocytosis 09/01/2021    Pleural effusion 09/01/2021    CKD (chronic kidney disease) 09/01/2021    Acute CHF (congestive heart failure) ([facility] 09/01/2021    Anemia of infection and chronic disease 08/26/2021    COPD (chronic obstructive pulmonary disease) ([facility] 08/26/2021    Depression 08/26/2021  Pneumonia 08/23/2021    Sepsis ([facility] 07/16/2021    Type 2 MI (myocardial infarction) ([address] 42/66/6193    Systolic heart failure, chronic ([address] 07/16/2021    Moderate episode of recurrent major depressive disorder ([address] 06/14/2021    Dilated cardiomyopathy ([address] 09/23/2020    SSS (sick sinus syndrome) ([address] 09/23/2020    Cardiomyopathy ([address] 09/23/2020    AICD (automatic cardioverter/defibrillator) present 09/23/2020    Respiratory failure ([address] 09/18/2020    CHF (congestive heart failure) ([facility] 09/18/2020    Hypoxia 09/18/2020    Bilateral pleural effusion 09/18/2020    Elevated troponin 09/18/2020    PAF (paroxysmal atrial fibrillation) ([facility] 09/10/2020    CKD (chronic kidney disease) stage 3, GFR 30-59 ml/min ([facility] 06/04/2019    Lymphocytosis 05/07/2018    Type 2 diabetes with nephropathy ([facility] 02/15/2018    Type 2 diabetes mellitus with diabetic neuropathy ([facility] 02/15/2018    Spinal stenosis of lumbar region with neurogenic claudication 11/17/2017    Spondylosis of lumbosacral region without myelopathy or radiculopathy 11/17/2017    Overdose of opiate or related narcotic, accidental or unintentional, subsequent encounter 10/27/2017    Acute left-sided low back pain with sciatica 10/27/2017    Physical debility 10/27/2017    Acute encephalopathy 10/23/2017    Hyperkalemia 10/20/2017    Altered mental status 10/20/2017    Transaminitis 10/20/2017    Acute renal failure (ARF) ([address] 10/20/2017    Diverticulitis large intestine w/o perforation or abscess w/o bleeding 07/06/2017    SOB (shortness of breath) 06/21/2017    Abdominal pain, generalized 06/21/2017    Diarrhea in adult patient 06/21/2017    Cigarette smoker 16/48/6662    Neutrophilic leukocytosis 18/29/6541    Chronic pain     Hypercholesterolemia     Arthritis     Diabetes (HCC)     IBS (irritable bowel syndrome)     Hemorrhoid      Multiple medical problems including DM II, hypertension, CKD III, dyslipidemia, DJD, lumbar stenosis/radiculopathy, chronic pain, tobacco, leukocytosis, PAF during hospitalization with encephalopathy 10/17 (at [facility]), followed by Alvaro Bucio at [facility] 6/29/20 with brief intermittent CP--atypical, EKG abnormal with NSST and sent for Lexiscan MPI 7/29/20:  · Baseline ECG: Normal EKG, PACs, non-specific ST-T wave abnormalities. · Inconclusive stress test.  · EKG stress test results correlate with an intermediate risk of inducible myocardial ischemia. · Non-limiting dyspnea, no EKG changes  · Nuclear images reported seperately  · FINDINGS: The rest and stress perfusion images a fixed defect in the inferior wall compatible with infarct. No reversible abnormality is seen to suggest myocardium at ischemic risk. The gated images demonstrate global hypokinesia and inferior akinesia. Left ventricular ejection fraction is 31 %. Impression: Fixed defect in the inferior wall is compatible with infarct. No evidence of myocardium at ischemic risk. Left ventricular ejection fraction is 31 %. Global hypokinesia and inferior akinesia.     +STANTON, some fatigue, brief intermittent chest pain--not clearly exertional.  No recent Echo, had prior Echo 10/17 with LVEF 55%. No recurrence of afib known since 10/17.   Seen in office here 9/10/20 with CHF--cardizem changed to beta blocker, on ARB, lasix.   Developed worsening SOB, sx of CHF--admitted to [facility], BNP elevated, trop mildly increased.  Seen by Cardiology in hospital, diurested, meds adjusted.  Repeat cardiac testing:      Had AICD/PPM placed by Dr. Claudette Rios on 4/64/28 without complication.  Seen by Dr Claudette Rios in f/u on 2/23/21 with PPM check ok., last seen here March 2021.     Hospitalized 9/1-9/7/21 with:  Acute respiratory failure with hypoxia POA- improved s/p pleural tap,off oxygen now  Acute on chronic congestive heart failure proBNP 35,000- cont lasix at 20 mg daily for now due to IVAN for now, outpatient followup with Dr Tanja Benavidez soon  Echocardiogram 8/20/2021 at [facility] EF of 45 to 50% with severe grade 3 diastolic dysfunction. Troponinemia likely type B demand secondary to fluid overload- remains flat  Leukocytosis with eosinophilia POA- now down to 14k, outpatient followup with Pulm associates as recommended  B/L Pleural effusion POA- s/p R thoracentesis (~1L), neg for cytology, neg for bacteria, neg for AFB, exudative  history of MSSA on Dapto IV till  9/2/2021 at other hospital before transfer here- taken off all Abx per ID, therapy completed now  Chronic kidney disease POA- Cr down to 2.0, resume lower dose of Lasix after 48 hrs as discussed  Currently stable--mild STANTON. The patient denies chest pain, orthopnea, PND, LE edema, palpitations, syncope, presyncope or fatigue. Doc Kameron Rodas 9/2021  Doing well with no adverse cardiac symptoms currently  Fu with Pulmonary as planned (bronch/bx)  Fu with PCP as planned  Can take extra lasix 20 if weight up 3-4 labs, edema. Lipids and labs followed by PCP. Plan:       CM  ICD explanted 7/21/21 by Dr Laurence Jacobsen  Hx AICD/PPM placed by Dr. Chey Graham on 9/23/20  Most recent EF 45-50% mod dilated LA grade 3 DD per echo in 8/2021   Prev EF 25-30% mod MR per echo in 9/2020  Continue Carvedilol  Continue Furosemide 40 mg daily  No ace-I/arb d/t IVAN  Will likely repeat echo at follow up. Hx brief intermittent CP--atypical, EKG abnormal with NSST and sent for Lexiscan MPI 7/29/20:  Angelito Gwynn 7/2020 and 9/2020 showed  fixed defect in the inferior wall compatible with infarct. HTN  Elevated, continue carvedilol 25 mg BID  Adding hydralazine 10 mg BID  Prev on losartan but last serum cr was elevated, checking now  The patient will monitor BP at home and call if generally >140/85. Elevated TG/HLD  TG in 6/2021 at 493, unable to calculate LDL d/t to this.   Prev LDL 10/2020 elevated at 110    DM  On Insulin regimen    CKD IV  Serum Cr 2.08 in  9/2021  Will check labs now    Current 0. 5 PPD smoker  No intention on quitting    Continue current care and f/u in 4-6 mos with Dr Theron Aschoff, NP

## 2022-03-29 ENCOUNTER — OFFICE VISIT (OUTPATIENT)
Dept: CARDIOLOGY CLINIC | Age: 82
End: 2022-03-29
Payer: MEDICARE

## 2022-03-29 VITALS
SYSTOLIC BLOOD PRESSURE: 160 MMHG | OXYGEN SATURATION: 97 % | WEIGHT: 160 LBS | HEART RATE: 70 BPM | HEIGHT: 66 IN | DIASTOLIC BLOOD PRESSURE: 90 MMHG | TEMPERATURE: 98.2 F | BODY MASS INDEX: 25.71 KG/M2 | RESPIRATION RATE: 18 BRPM

## 2022-03-29 DIAGNOSIS — I42.0 DILATED CARDIOMYOPATHY (HCC): Primary | ICD-10-CM

## 2022-03-29 DIAGNOSIS — I49.5 SSS (SICK SINUS SYNDROME) (HCC): ICD-10-CM

## 2022-03-29 DIAGNOSIS — N18.30 STAGE 3 CHRONIC KIDNEY DISEASE, UNSPECIFIED WHETHER STAGE 3A OR 3B CKD (HCC): ICD-10-CM

## 2022-03-29 DIAGNOSIS — I50.22 CHRONIC SYSTOLIC CONGESTIVE HEART FAILURE (HCC): ICD-10-CM

## 2022-03-29 DIAGNOSIS — Z95.810 AICD (AUTOMATIC CARDIOVERTER/DEFIBRILLATOR) PRESENT: ICD-10-CM

## 2022-03-29 DIAGNOSIS — I48.0 PAF (PAROXYSMAL ATRIAL FIBRILLATION) (HCC): ICD-10-CM

## 2022-03-29 DIAGNOSIS — I10 ESSENTIAL HYPERTENSION: ICD-10-CM

## 2022-03-29 PROCEDURE — 99214 OFFICE O/P EST MOD 30 MIN: CPT | Performed by: NURSE PRACTITIONER

## 2022-03-29 PROCEDURE — 93000 ELECTROCARDIOGRAM COMPLETE: CPT | Performed by: INTERNAL MEDICINE

## 2022-03-29 RX ORDER — HYDRALAZINE HYDROCHLORIDE 10 MG/1
10 TABLET, FILM COATED ORAL 2 TIMES DAILY
Qty: 60 TABLET | Refills: 3 | Status: SHIPPED | OUTPATIENT
Start: 2022-03-29 | End: 2022-03-31 | Stop reason: SDUPTHER

## 2022-03-29 NOTE — PROGRESS NOTES
Identified pt with two pt identifiers(name and ). Reviewed record in preparation for visit and have obtained necessary documentation. Chief Complaint   Patient presents with    Irregular Heart Beat     6 month follow up    CHF    Cardiomyopathy      Vitals:    22 1404   BP: (!) 160/90   Pulse: 70   Resp: 18   Temp: 98.2 °F (36.8 °C)   TempSrc: Temporal   SpO2: 97%   Weight: 160 lb (72.6 kg)   Height: 5' 6\" (1.676 m)   PainSc:   0 - No pain       Medications reviewed/approved by provider. Health Maintenance Review: Patient reminded of \"due or due soon\" health maintenance. I have asked the patient to contact his/her primary care provider (PCP) for follow-up on his/her health maintenance. Coordination of Care Questionnaire:  :   1) Have you been to an emergency room, urgent care, or hospitalized since your last visit? If yes, where when, and reason for visit? no       2. Have seen or consulted any other health care provider since your last visit? If yes, where when, and reason for visit? NO      Patient is accompanied by self I have received verbal consent from Karmen Hernandez to discuss any/all medical information while they are present in the room.

## 2022-03-31 RX ORDER — HYDRALAZINE HYDROCHLORIDE 10 MG/1
10 TABLET, FILM COATED ORAL 2 TIMES DAILY
Qty: 180 TABLET | Refills: 1 | Status: SHIPPED | OUTPATIENT
Start: 2022-03-31 | End: 2022-08-18

## 2022-03-31 NOTE — TELEPHONE ENCOUNTER
PCP: Gideon Litten, NP    Last appt: 3/29/2022  Future Appointments   Date Time Provider Marty Bautista   7/1/2022 10:20 AM Facundo Reed MD [facility] AMB       Requested Prescriptions     Pending Prescriptions Disp Refills    hydrALAZINE (APRESOLINE) 10 mg tablet 180 Tablet 1     Sig: Take 1 Tablet by mouth two (2) times a day. Other Comments:  Pt prefers the rx to come from mail order.

## 2022-04-01 ENCOUNTER — CLINICAL SUPPORT (OUTPATIENT)
Dept: FAMILY MEDICINE CLINIC | Age: 82
End: 2022-04-01

## 2022-04-01 DIAGNOSIS — I48.0 PAF (PAROXYSMAL ATRIAL FIBRILLATION) (HCC): ICD-10-CM

## 2022-04-01 DIAGNOSIS — N18.30 STAGE 3 CHRONIC KIDNEY DISEASE, UNSPECIFIED WHETHER STAGE 3A OR 3B CKD (HCC): ICD-10-CM

## 2022-04-01 DIAGNOSIS — I49.5 SSS (SICK SINUS SYNDROME) (HCC): ICD-10-CM

## 2022-04-01 DIAGNOSIS — I50.22 CHRONIC SYSTOLIC CONGESTIVE HEART FAILURE (HCC): ICD-10-CM

## 2022-04-01 DIAGNOSIS — I42.0 DILATED CARDIOMYOPATHY (HCC): ICD-10-CM

## 2022-04-01 DIAGNOSIS — I10 ESSENTIAL HYPERTENSION: ICD-10-CM

## 2022-04-01 DIAGNOSIS — Z95.810 AICD (AUTOMATIC CARDIOVERTER/DEFIBRILLATOR) PRESENT: ICD-10-CM

## 2022-04-01 NOTE — PROGRESS NOTES
Pt presents to clinic for lab work. She denies sick sx today. Labs drawn from [facility], 1 attempt. Pt tolerated well. KT      BMP and Magnesium, in system.  KT

## 2022-04-02 DIAGNOSIS — N28.9 ACUTE RENAL INSUFFICIENCY: Primary | ICD-10-CM

## 2022-04-02 LAB
ANION GAP SERPL CALC-SCNC: 6 MMOL/L (ref 5–15)
BUN SERPL-MCNC: 37 MG/DL (ref 6–20)
BUN/CREAT SERPL: 21 (ref 12–20)
CALCIUM SERPL-MCNC: 10 MG/DL (ref 8.5–10.1)
CHLORIDE SERPL-SCNC: 104 MMOL/L (ref 97–108)
CO2 SERPL-SCNC: 25 MMOL/L (ref 21–32)
CREAT SERPL-MCNC: 1.77 MG/DL (ref 0.55–1.02)
GLUCOSE SERPL-MCNC: 128 MG/DL (ref 65–100)
MAGNESIUM SERPL-MCNC: 2.3 MG/DL (ref 1.6–2.4)
POTASSIUM SERPL-SCNC: 6 MMOL/L (ref 3.5–5.1)
SODIUM SERPL-SCNC: 135 MMOL/L (ref 136–145)

## 2022-04-02 NOTE — PROGRESS NOTES
Kidney fxn is trending down, which is good news. Potassium level is elevated at 6.0. I suspect this was a hemolyed sample  Given BID diuretic therapy, improving kidney fxn etc.  I have called the patient myself today and has no complaints. She is not on any K supplement/ace-/arb etc.  She eats 1 banana a day. I recommend repeating BMP on Monday just to be sure. I will place order in and she will pick it up.  thanks

## 2022-04-06 ENCOUNTER — TELEPHONE (OUTPATIENT)
Dept: CARDIOLOGY CLINIC | Age: 82
End: 2022-04-06

## 2022-04-06 NOTE — TELEPHONE ENCOUNTER
Patient called and said her  has been giving her potassium pills and that is why her potassium is so high. She wants to know if she still needs to go get her blood work done tomorrow. . Please call her at [phone].     Celzabrina Ha

## 2022-04-06 NOTE — PROGRESS NOTES
Identified pt with two pt identifiers (name and ). Advised to have BMP drawn at [facility] lab. Pt verbalized understanding. She will have the lab drawn tomorrow.

## 2022-04-07 RX ORDER — FUROSEMIDE 40 MG/1
40 TABLET ORAL 2 TIMES DAILY
Qty: 180 TABLET | Refills: 2 | Status: SHIPPED | OUTPATIENT
Start: 2022-04-07 | End: 2022-09-25

## 2022-04-07 RX ORDER — CARVEDILOL 25 MG/1
TABLET ORAL
Qty: 180 TABLET | Refills: 3 | Status: SHIPPED | OUTPATIENT
Start: 2022-04-07

## 2022-04-07 NOTE — TELEPHONE ENCOUNTER
Per Isiah Bay, NP: \"Since pt has still been taking potassium supplement, HOLD and recheck labs on Monday, 4/11. \"    Spoke with the patient. Verified patient with two patient identifiers. Instructions given and questions answered. Patient verbalized understanding. Potassium Chloride ER 20 MEQ daily prescribed by pulmonologist.  Pt will stop taking the potassium supplement. She will have the labs rechecked on Monday, 4/11.

## 2022-04-11 ENCOUNTER — CLINICAL SUPPORT (OUTPATIENT)
Dept: FAMILY MEDICINE CLINIC | Age: 82
End: 2022-04-11

## 2022-04-11 VITALS — TEMPERATURE: 97.1 F

## 2022-04-11 DIAGNOSIS — N28.9 ACUTE RENAL INSUFFICIENCY: ICD-10-CM

## 2022-04-11 NOTE — PROGRESS NOTES
Ms Nikole Hull presented to the office for her repeated lab BW via left arm venipuncture for her cardiologist doctor, see BMP previous lab result, 1 small gold and 1 light green top sent.

## 2022-04-11 NOTE — TELEPHONE ENCOUNTER
Called pt and asked her about the following:  Per Hilda Arana NP: \"Since pt has still been taking potassium supplement, HOLD and recheck labs on Monday, 4/11. \"    Pt states that she had lab drawn today at pcp. Verified patient with two patient identifiers.

## 2022-04-12 ENCOUNTER — TELEPHONE (OUTPATIENT)
Dept: CARDIOLOGY CLINIC | Age: 82
End: 2022-04-12

## 2022-04-12 LAB
ANION GAP SERPL CALC-SCNC: 9 MMOL/L (ref 5–15)
BUN SERPL-MCNC: 28 MG/DL (ref 6–20)
BUN/CREAT SERPL: 15 (ref 12–20)
CALCIUM SERPL-MCNC: 9.6 MG/DL (ref 8.5–10.1)
CHLORIDE SERPL-SCNC: 102 MMOL/L (ref 97–108)
CO2 SERPL-SCNC: 22 MMOL/L (ref 21–32)
COMMENT, HOLDF: NORMAL
CREAT SERPL-MCNC: 1.93 MG/DL (ref 0.55–1.02)
GLUCOSE SERPL-MCNC: 203 MG/DL (ref 65–100)
POTASSIUM SERPL-SCNC: 4.9 MMOL/L (ref 3.5–5.1)
SAMPLES BEING HELD,HOLD: NORMAL
SODIUM SERPL-SCNC: 133 MMOL/L (ref 136–145)

## 2022-04-12 NOTE — TELEPHONE ENCOUNTER
----- Message from Nate Hilario NP sent at 4/12/2022  3:21 PM EDT -----  K is back to normal now,  Kidney fxn stable.   Would like to refer her to Dr Sg Rachel (renal) if willing for further guidance on her chronic kidney disease (unless she is already seeing someone)

## 2022-04-12 NOTE — PROGRESS NOTES
K is back to normal now,  Kidney fxn stable.   Would like to refer her to Dr Giovanny Rivera (renal) if willing for further guidance on her chronic kidney disease (unless she is already seeing someone)

## 2022-06-01 ENCOUNTER — HOSPITAL ENCOUNTER (EMERGENCY)
Age: 82
Discharge: HOME | End: 2022-06-02
Attending: EMERGENCY MEDICINE
Payer: MEDICARE

## 2022-06-01 ENCOUNTER — APPOINTMENT (OUTPATIENT)
Dept: GENERAL RADIOLOGY | Age: 82
End: 2022-06-01
Attending: EMERGENCY MEDICINE
Payer: MEDICARE

## 2022-06-01 DIAGNOSIS — R53.1 WEAKNESS: Primary | ICD-10-CM

## 2022-06-01 DIAGNOSIS — N30.00 ACUTE CYSTITIS WITHOUT HEMATURIA: ICD-10-CM

## 2022-06-01 LAB
BASOPHILS # BLD: 0.1 K/UL (ref 0–0.1)
BASOPHILS NFR BLD: 0 % (ref 0–1)
DIFFERENTIAL METHOD BLD: ABNORMAL
EOSINOPHIL # BLD: 0.1 K/UL (ref 0–0.4)
EOSINOPHIL NFR BLD: 1 % (ref 0–7)
ERYTHROCYTE [DISTWIDTH] IN BLOOD BY AUTOMATED COUNT: 15.4 % (ref 11.5–14.5)
HCT VFR BLD AUTO: 34 % (ref 35–47)
HGB BLD-MCNC: 11.6 G/DL (ref 11.5–16)
IMM GRANULOCYTES # BLD AUTO: 0.1 K/UL (ref 0–0.04)
IMM GRANULOCYTES NFR BLD AUTO: 0 % (ref 0–0.5)
LYMPHOCYTES # BLD: 1.7 K/UL (ref 0.8–3.5)
LYMPHOCYTES NFR BLD: 13 % (ref 12–49)
MCH RBC QN AUTO: 30.8 PG (ref 26–34)
MCHC RBC AUTO-ENTMCNC: 34.1 G/DL (ref 30–36.5)
MCV RBC AUTO: 90.2 FL (ref 80–99)
MONOCYTES # BLD: 0.9 K/UL (ref 0–1)
MONOCYTES NFR BLD: 7 % (ref 5–13)
NEUTS SEG # BLD: 10.4 K/UL (ref 1.8–8)
NEUTS SEG NFR BLD: 79 % (ref 32–75)
NRBC # BLD: 0 K/UL (ref 0–0.01)
NRBC BLD-RTO: 0 PER 100 WBC
PLATELET # BLD AUTO: 325 K/UL (ref 150–400)
PMV BLD AUTO: 9.9 FL (ref 8.9–12.9)
RBC # BLD AUTO: 3.77 M/UL (ref 3.8–5.2)
WBC # BLD AUTO: 13.2 K/UL (ref 3.6–11)

## 2022-06-01 PROCEDURE — 74011250636 HC RX REV CODE- 250/636: Performed by: EMERGENCY MEDICINE

## 2022-06-01 PROCEDURE — 85025 COMPLETE CBC W/AUTO DIFF WBC: CPT

## 2022-06-01 PROCEDURE — 87040 BLOOD CULTURE FOR BACTERIA: CPT

## 2022-06-01 PROCEDURE — 71045 X-RAY EXAM CHEST 1 VIEW: CPT

## 2022-06-01 PROCEDURE — 83605 ASSAY OF LACTIC ACID: CPT

## 2022-06-01 PROCEDURE — 93005 ELECTROCARDIOGRAM TRACING: CPT

## 2022-06-01 PROCEDURE — 84484 ASSAY OF TROPONIN QUANT: CPT

## 2022-06-01 PROCEDURE — 36415 COLL VENOUS BLD VENIPUNCTURE: CPT

## 2022-06-01 PROCEDURE — 80053 COMPREHEN METABOLIC PANEL: CPT

## 2022-06-01 PROCEDURE — 99285 EMERGENCY DEPT VISIT HI MDM: CPT

## 2022-06-01 PROCEDURE — 87636 SARSCOV2 & INF A&B AMP PRB: CPT

## 2022-06-01 RX ORDER — SODIUM CHLORIDE 0.9 % (FLUSH) 0.9 %
5-10 SYRINGE (ML) INJECTION AS NEEDED
Status: DISCONTINUED | OUTPATIENT
Start: 2022-06-01 | End: 2022-06-02 | Stop reason: HOSPADM

## 2022-06-01 RX ADMIN — SODIUM CHLORIDE 1000 ML: 9 INJECTION, SOLUTION INTRAVENOUS at 23:25

## 2022-06-01 NOTE — TELEPHONE ENCOUNTER
Refill request too soon.  30 day supply was ordered 1 week ago by Dr. Georgette Paris
Requested Prescriptions     Pending Prescriptions Disp Refills    valsartan (DIOVAN) 160 mg tablet 30 Tab 0     Sig: Take 1 Tab by mouth daily.  gabapentin (NEURONTIN) 300 mg capsule 60 Cap 0     Sig: Take 1 Cap by mouth two (2) times a day.
128

## 2022-06-02 VITALS
SYSTOLIC BLOOD PRESSURE: 177 MMHG | DIASTOLIC BLOOD PRESSURE: 65 MMHG | WEIGHT: 154 LBS | TEMPERATURE: 98.3 F | OXYGEN SATURATION: 96 % | HEIGHT: 66 IN | HEART RATE: 95 BPM | RESPIRATION RATE: 20 BRPM | BODY MASS INDEX: 24.75 KG/M2

## 2022-06-02 LAB
ALBUMIN SERPL-MCNC: 2.7 G/DL (ref 3.5–5)
ALBUMIN/GLOB SERPL: 0.7 {RATIO} (ref 1.1–2.2)
ALP SERPL-CCNC: 79 U/L (ref 45–117)
ALT SERPL-CCNC: 18 U/L (ref 12–78)
ANION GAP SERPL CALC-SCNC: 12 MMOL/L (ref 5–15)
APPEARANCE UR: CLEAR
AST SERPL-CCNC: 14 U/L (ref 15–37)
ATRIAL RATE: 73 BPM
BACTERIA URNS QL MICRO: NEGATIVE /HPF
BILIRUB SERPL-MCNC: 0.3 MG/DL (ref 0.2–1)
BILIRUB UR QL: NEGATIVE
BUN SERPL-MCNC: 40 MG/DL (ref 6–20)
BUN/CREAT SERPL: 22 (ref 12–20)
CALCIUM SERPL-MCNC: 8.9 MG/DL (ref 8.5–10.1)
CALCULATED P AXIS, ECG09: -5 DEGREES
CALCULATED R AXIS, ECG10: 64 DEGREES
CALCULATED T AXIS, ECG11: -153 DEGREES
CHLORIDE SERPL-SCNC: 99 MMOL/L (ref 97–108)
CO2 SERPL-SCNC: 20 MMOL/L (ref 21–32)
COLOR UR: ABNORMAL
CREAT SERPL-MCNC: 1.86 MG/DL (ref 0.55–1.02)
DIAGNOSIS, 93000: NORMAL
EPITH CASTS URNS QL MICRO: ABNORMAL /LPF
FLUAV RNA SPEC QL NAA+PROBE: NOT DETECTED
FLUBV RNA SPEC QL NAA+PROBE: NOT DETECTED
GLOBULIN SER CALC-MCNC: 4.1 G/DL (ref 2–4)
GLUCOSE SERPL-MCNC: 174 MG/DL (ref 65–100)
GLUCOSE UR STRIP.AUTO-MCNC: NEGATIVE MG/DL
HGB UR QL STRIP: ABNORMAL
KETONES UR QL STRIP.AUTO: NEGATIVE MG/DL
LACTATE SERPL-SCNC: 1.4 MMOL/L (ref 0.4–2)
LEUKOCYTE ESTERASE UR QL STRIP.AUTO: ABNORMAL
NITRITE UR QL STRIP.AUTO: NEGATIVE
P-R INTERVAL, ECG05: 174 MS
PH UR STRIP: 6 [PH] (ref 5–8)
POTASSIUM SERPL-SCNC: 5.1 MMOL/L (ref 3.5–5.1)
PROT SERPL-MCNC: 6.8 G/DL (ref 6.4–8.2)
PROT UR STRIP-MCNC: 100 MG/DL
Q-T INTERVAL, ECG07: 410 MS
QRS DURATION, ECG06: 112 MS
QTC CALCULATION (BEZET), ECG08: 451 MS
RBC #/AREA URNS HPF: ABNORMAL /HPF (ref 0–5)
SARS-COV-2, COV2: NOT DETECTED
SODIUM SERPL-SCNC: 131 MMOL/L (ref 136–145)
SP GR UR REFRACTOMETRY: 1.01 (ref 1–1.03)
TROPONIN-HIGH SENSITIVITY: 45 NG/L (ref 0–51)
UA: UC IF INDICATED,UAUC: ABNORMAL
UROBILINOGEN UR QL STRIP.AUTO: 0.2 EU/DL (ref 0.2–1)
VENTRICULAR RATE, ECG03: 73 BPM
WBC URNS QL MICRO: ABNORMAL /HPF (ref 0–4)
YEAST URNS QL MICRO: PRESENT

## 2022-06-02 PROCEDURE — 81001 URINALYSIS AUTO W/SCOPE: CPT

## 2022-06-02 PROCEDURE — 87086 URINE CULTURE/COLONY COUNT: CPT

## 2022-06-02 RX ORDER — DOXYCYCLINE HYCLATE 100 MG
100 TABLET ORAL 2 TIMES DAILY
Qty: 14 TABLET | Refills: 0 | Status: SHIPPED | OUTPATIENT
Start: 2022-06-02 | End: 2022-06-02 | Stop reason: SDUPTHER

## 2022-06-02 RX ORDER — ERGOCALCIFEROL 1.25 MG/1
CAPSULE ORAL
Qty: 13 CAPSULE | Refills: 1 | Status: SHIPPED | OUTPATIENT
Start: 2022-06-02

## 2022-06-02 RX ORDER — DOXYCYCLINE HYCLATE 100 MG
100 TABLET ORAL 2 TIMES DAILY
Qty: 14 TABLET | Refills: 0 | Status: SHIPPED | OUTPATIENT
Start: 2022-06-02 | End: 2022-06-09

## 2022-06-02 NOTE — ED NOTES
I have reviewed discharge instructions with the patient. The patient verbalized understanding.  To lobby to wait for ride

## 2022-06-02 NOTE — ED NOTES
Assisted OOB to [facility]. Mid stream uirne specimen collected. Pt denies pain. Assisted back to bed and warm blanket given.

## 2022-06-02 NOTE — ED NOTES
Jorge Palominos notified for a ride home for pt per pt request.  will come to pick pt up. Pt requested to wait for  in waiting room.

## 2022-06-02 NOTE — ED PROVIDER NOTES
EMERGENCY DEPARTMENT HISTORY AND PHYSICAL EXAM      Date: 6/1/2022  Patient Name: Agueda Paul      Diagnosis     Clinical Impression:   1. Weakness    2. Acute cystitis without hematuria              History of Presenting Illness     Chief Complaint   Patient presents with    Fever    Generalized Body Aches       History Provided By: Patient    HPI:   The history is provided by the patient. Fever   This is a new problem. The current episode started more than 2 days ago. The problem occurs constantly. The problem has not changed since onset. The maximum temperature noted was more than 104 F. The temperature was taken using an oral thermometer. Associated symptoms include congestion and muscle aches. Pertinent negatives include no chest pain, no diarrhea, no vomiting, no headaches, no sore throat, no cough, no shortness of breath, no mental status change, no neck pain, no rash and no urinary symptoms. She has tried acetaminophen for the symptoms. The treatment provided significant relief. Agueda Paul, 79 y.o. female  presents to the ED with cc of generalized weakness muscle aches body aches, she reports symptoms started 5 days ago. Reports she had a fever up to 105 on the first day. She has been taking Tylenol for symptoms. She reports took a home COVID test which was negative. She reports some allergy congestion but denies any cough chest pain shortness of breath. She reports she feels weak but has not passed out, she reports has been drinking fluids and eating less than usual.  She denies any abdominal pain nausea vomiting or diarrhea. Today she felt so bad so she came in to be evaluated. She has been able to see her family doctor. Denies any known sick contacts at home. There are no other complaints, changes, or physical findings at this time. PCP: Dierdre Soulier, NP    No current facility-administered medications on file prior to encounter.      Current Outpatient Medications on File Prior to Encounter   Medication Sig Dispense Refill    hydrALAZINE (APRESOLINE) 10 mg tablet Take 1 Tablet by mouth two (2) times a day. 180 Tablet 1    carvediloL (COREG) 25 mg tablet TAKE 1 TABLET BY MOUTH  TWICE DAILY 180 Tablet 3    furosemide (LASIX) 40 mg tablet Take 1 Tablet by mouth two (2) times a day. 180 Tablet 2    venlafaxine (EFFEXOR) 75 mg tablet TAKE ONE-HALF TABLET BY  MOUTH TWICE DAILY 90 Tablet 3    Lantus Solostar U-100 Insulin 100 unit/mL (3 mL) inpn INJECT SUBCUTANEOUSLY 45  UNITS DAILY 45 mL 3    ferrous sulfate 325 mg (65 mg iron) tablet Take 1 Tablet by mouth daily (with breakfast). 30 Tablet 0    ergocalciferol (ERGOCALCIFEROL) 1,250 mcg (50,000 unit) capsule Take 50,000 Units by mouth every seven (7) days. On Wednesdays       b complex vitamins tablet Take 1 Tablet by mouth daily.  nitroglycerin (NITROSTAT) 0.4 mg SL tablet 1 Tab by SubLINGual route every five (5) minutes as needed for Chest Pain. Up to 3 doses. 25 Tab 0    colestipoL (Colestid) 1 gram tablet Take 1 Tab by mouth two (2) times a day. 180 Tab 3    aspirin delayed-release 81 mg tablet Take 1 Tab by mouth daily. 90 Tab 1    B.infantis-B.ani-B.long-B.bifi (PROBIOTIC 4X) 10-15 mg TbEC Take  by mouth daily.  acetaminophen (TYLENOL) 650 mg TbER Take 650 mg by mouth every eight (8) hours.  omega-3 fatty acids-vitamin e 1,000 mg cap Take 1 Cap by mouth two (2) times a day. 32a day       ascorbic acid, vitamin C, (VITAMIN C) 500 mg tablet Take 1,000 mg by mouth daily.  zinc 50 mg tab tablet Take 50 mg by mouth daily.          Past History     Past Medical History:  Past Medical History:   Diagnosis Date    A-fib [facility])     AICD (automatic cardioverter/defibrillator) present 9/23/2020 9/23/2020 [employer] AICD implant    Arthritis     Bilateral pleural effusion 9/18/2020    Chronic pain     Chronic pain     Diabetes ([address]     Diverticular disease     Elevated troponin 9/18/2020    Hemorrhoid     Hypercholesterolemia     IBS (irritable bowel syndrome)     Lymphocytosis 97/71/5097    Systolic heart failure, chronic [address] [address] 7/16/2021    Type 2 MI (myocardial infarction) ([facility] [address] 7/16/2021 7/162021 r/t sepsis       Past Surgical History:  Past Surgical History:   Procedure Laterality Date    COLONOSCOPY N/A 10/31/2019    COLON[facility] performed [facility] at [facility]  10/31/2019         [place]  10/31/2019         HX CATARACT REMOVAL      HX CHOLECYSTECTOMY      HX COLONOSCOPY  2009    HX COLONOSCOPY  2016    2 adenomatous polyp    HX HEMORRHOIDECTOMY  2009    HX HYSTERECTOMY      HX KNEE REPLACEMENT      right    HX ORTHOPAEDIC  12/11/2017    back, laminectomy and decompression    DE INSJ/RPLCMT PERM DFB W/TRNSVNS LDS 1/DUAL CHMBR N/A 9/23/2020    INSERT ICD DUAL performed by Linda Salazar MD at [facility] CARDIAC CATH LAB       Family History:  Family History   Problem Relation Age of Onset    Colon Cancer Father     Diabetes Mother        Social History:  Social History     Tobacco Use    Smoking status: Current Every Day Smoker     Packs/day: 0.50     Years: 60.00     Pack years: 30.00     Types: Cigarettes    Smokeless tobacco: Never Used   Vaping Use    Vaping Use: Never used   Substance Use Topics    Alcohol use: No    Drug use: Never       Allergies: Allergies   Allergen Reactions    Cefazolin Rash     Possible kidney failure/AIN    Morphine Anaphylaxis    Ultram [Tramadol] Palpitations         Review of Systems   Review of Systems   Constitutional: Positive for activity change, appetite change and fever. Negative for chills. HENT: Positive for congestion. Negative for sore throat. Eyes: Negative. Negative for discharge and redness. Respiratory: Negative. Negative for cough, shortness of breath and wheezing. Cardiovascular: Negative. Negative for chest pain and palpitations.    Gastrointestinal: Negative. Negative for abdominal pain, constipation, diarrhea, nausea and vomiting. Endocrine: Negative. Negative for polydipsia and polyuria. Genitourinary: Negative. Negative for dysuria, flank pain and frequency. Musculoskeletal: Positive for arthralgias and myalgias. Negative for back pain, neck pain and neck stiffness. Skin: Negative. Negative for rash and wound. Allergic/Immunologic: Negative. Neurological: Positive for weakness. Negative for dizziness, syncope and headaches. Hematological: Negative. Negative for adenopathy. Does not bruise/bleed easily. Psychiatric/Behavioral: Negative. Negative for confusion. The patient is not nervous/anxious. All other systems reviewed and are negative. Physical Exam   Physical Exam  Vitals and nursing note reviewed. Constitutional:       General: She is not in acute distress. Appearance: Normal appearance. She is well-developed and normal weight. She is not ill-appearing, toxic-appearing or diaphoretic. HENT:      Head: Normocephalic and atraumatic. Nose: Nose normal. No congestion or rhinorrhea. Mouth/Throat:      Mouth: Mucous membranes are moist.      Pharynx: Oropharynx is clear. No oropharyngeal exudate or posterior oropharyngeal erythema. Comments: Post nasal  drainage noted  Eyes:      General:         Right eye: No discharge. Left eye: Discharge present. Extraocular Movements: Extraocular movements intact. Conjunctiva/sclera: Conjunctivae normal.      Pupils: Pupils are equal, round, and reactive to light. Cardiovascular:      Rate and Rhythm: Normal rate and regular rhythm. Pulses: Normal pulses. Heart sounds: Normal heart sounds. Pulmonary:      Effort: Pulmonary effort is normal. No respiratory distress. Breath sounds: Normal breath sounds. Abdominal:      General: There is no distension. Palpations: Abdomen is soft. There is no mass. Tenderness:  There is no abdominal tenderness. There is no guarding or rebound. Hernia: No hernia is present. Musculoskeletal:         General: No swelling or tenderness. Normal range of motion. Cervical back: Normal range of motion and neck supple. No rigidity or tenderness. No muscular tenderness. Right lower leg: No edema. Left lower leg: No edema. Skin:     General: Skin is warm and dry. Capillary Refill: Capillary refill takes less than 2 seconds. Findings: No erythema or rash. Neurological:      General: No focal deficit present. Mental Status: She is alert and oriented to person, place, and time. Mental status is at baseline. Cranial Nerves: No cranial nerve deficit. Sensory: No sensory deficit. Motor: No weakness. Psychiatric:         Mood and Affect: Mood normal.         Behavior: Behavior normal.         Diagnostic Study Results     Labs -     Recent Results (from the past 12 hour(s))   COVID-19 WITH INFLUENZA A/B    Collection Time: 06/01/22 11:16 PM   Result Value Ref Range    SARS-CoV-2 by PCR Not detected NOTD      Influenza A by PCR Not detected NOTD      Influenza B by PCR Not detected NOTD     LACTIC ACID    Collection Time: 06/01/22 11:25 PM   Result Value Ref Range    Lactic acid 1.4 0.4 - 2.0 MMOL/L   METABOLIC PANEL, COMPREHENSIVE    Collection Time: 06/01/22 11:25 PM   Result Value Ref Range    Sodium 131 (L) 136 - 145 mmol/L    Potassium 5.1 3.5 - 5.1 mmol/L    Chloride 99 97 - 108 mmol/L    CO2 20 (L) 21 - 32 mmol/L    Anion gap 12 5 - 15 mmol/L    Glucose 174 (H) 65 - 100 mg/dL    BUN 40 (H) 6 - 20 MG/DL    Creatinine 1.86 (H) 0.55 - 1.02 MG/DL    BUN/Creatinine ratio 22 (H) 12 - 20      GFR est AA 31 (L) >60 ml/min/1.73m2    GFR est non-AA 26 (L) >60 ml/min/1.73m2    Calcium 8.9 8.5 - 10.1 MG/DL    Bilirubin, total 0.3 0.2 - 1.0 MG/DL    ALT (SGPT) 18 12 - 78 U/L    AST (SGOT) 14 (L) 15 - 37 U/L    Alk.  phosphatase 79 45 - 117 U/L    Protein, total 6.8 6.4 - 8.2 g/dL    Albumin 2.7 (L) 3.5 - 5.0 g/dL    Globulin 4.1 (H) 2.0 - 4.0 g/dL    A-G Ratio 0.7 (L) 1.1 - 2.2     CBC WITH AUTOMATED DIFF    Collection Time: 06/01/22 11:25 PM   Result Value Ref Range    WBC 13.2 (H) 3.6 - 11.0 K/uL    RBC 3.77 (L) 3.80 - 5.20 M/uL    HGB 11.6 11.5 - 16.0 g/dL    HCT 34.0 (L) 35.0 - 47.0 %    MCV 90.2 80.0 - 99.0 FL    MCH 30.8 26.0 - 34.0 PG    MCHC 34.1 30.0 - 36.5 g/dL    RDW 15.4 (H) 11.5 - 14.5 %    PLATELET 730 723 - 124 K/uL    MPV 9.9 8.9 - 12.9 FL    NRBC 0.0 0  WBC    ABSOLUTE NRBC 0.00 0.00 - 0.01 K/uL    NEUTROPHILS 79 (H) 32 - 75 %    LYMPHOCYTES 13 12 - 49 %    MONOCYTES 7 5 - 13 %    EOSINOPHILS 1 0 - 7 %    BASOPHILS 0 0 - 1 %    IMMATURE GRANULOCYTES 0 0.0 - 0.5 %    ABS. NEUTROPHILS 10.4 (H) 1.8 - 8.0 K/UL    ABS. LYMPHOCYTES 1.7 0.8 - 3.5 K/UL    ABS. MONOCYTES 0.9 0.0 - 1.0 K/UL    ABS. EOSINOPHILS 0.1 0.0 - 0.4 K/UL    ABS. BASOPHILS 0.1 0.0 - 0.1 K/UL    ABS. IMM.  GRANS. 0.1 (H) 0.00 - 0.04 K/UL    DF AUTOMATED     TROPONIN-HIGH SENSITIVITY    Collection Time: 06/01/22 11:25 PM   Result Value Ref Range    Troponin-High Sensitivity 45 0 - 51 ng/L   EKG, 12 LEAD, INITIAL    Collection Time: 06/01/22 11:37 PM   Result Value Ref Range    Ventricular Rate 73 BPM    Atrial Rate 73 BPM    P-R Interval 174 ms    QRS Duration 112 ms    Q-T Interval 410 ms    QTC Calculation (Bezet) 451 ms    Calculated P Axis -5 degrees    Calculated R Axis 64 degrees    Calculated T Axis -153 degrees    Diagnosis       Normal sinus rhythm  ST & T wave abnormality, consider inferolateral ischemia  Abnormal ECG  When compared with ECG of 31-AUG-2021 09:54,  No significant change was found     URINALYSIS W/ REFLEX CULTURE    Collection Time: 06/02/22  2:05 AM    Specimen: Urine   Result Value Ref Range    Color YELLOW/STRAW      Appearance CLEAR CLEAR      Specific gravity 1.010 1.003 - 1.030      pH (UA) 6.0 5.0 - 8.0      Protein 100 (A) NEG mg/dL    Glucose Negative NEG mg/dL Ketone Negative NEG mg/dL    Bilirubin Negative NEG      Blood TRACE (A) NEG      Urobilinogen 0.2 0.2 - 1.0 EU/dL    Nitrites Negative NEG      Leukocyte Esterase SMALL (A) NEG      WBC 10-20 0 - 4 /hpf    RBC 5-10 0 - 5 /hpf    Epithelial cells FEW FEW /lpf    Bacteria Negative NEG /hpf    UA:UC IF INDICATED URINE CULTURE ORDERED (A) CNI      Yeast PRESENT (A) NEG         Radiologic Studies -   XR CHEST PORT   Final Result   Stable chronic small right pleural effusion. No other acute process. CT Results  (Last 48 hours)    None        CXR Results  (Last 48 hours)               06/01/22 2302  XR CHEST PORT Final result    Impression:  Stable chronic small right pleural effusion. No other acute process. Narrative:  EXAM:  XR CHEST PORT       INDICATION: Fever and generalized body aches       COMPARISON: 10/13/2021. TECHNIQUE: Portable AP semiupright chest view at 2252 hours       FINDINGS: The cardiomediastinal contours are stable. There is a stable chronic   small right pleural effusion. The lungs and left pleural space are clear. There   is no pneumothorax. The bones and upper abdomen are stable. Medical Decision Making   I am the first provider for this patient. I reviewed the vital signs, available nursing notes, past medical history, past surgical history, family history and social history. Vital Signs-Reviewed the patient's vital signs. Patient Vitals for the past 12 hrs:   Temp Pulse Resp BP SpO2   06/02/22 0358 -- 95 20 (!) 177/65 --   06/02/22 0300 -- 80 20 (!) 157/60 96 %   06/02/22 0200 -- 86 18 (!) 168/74 95 %   06/02/22 0100 -- 80 18 (!) 152/66 96 %   06/01/22 2324 -- 87 21 (!) 103/52 96 %   06/01/22 2228 98.3 °F (36.8 °C) 76 18 (!) 149/60 95 %           EKG interpretation: (Preliminary)  Rhythm: normal sinus rhythm;  regular . Rate (approx.): 73;  Blocks: none; Ectopy: noneAxis: normal; P wave: normal; QRS interval: normal ; ST/T wave: non-specific changes; in  Lead: ; Other findings: Unchanged compared with previous. Records Reviewed: Nursing Notes and Old Medical Records    Provider Notes (Medical Decision Making):   Presents with malaise reported fever at home, will begin sepsis protocol check COVID-19 test.    ED Course:   Initial assessment performed. The patients presenting problems have been discussed, and they are in agreement with the care plan formulated and outlined with them. I have encouraged them to ask questions as they arise throughout their visit. ED Course as of 06/02/22 0410   Thu Jun 02, 2022   0122 Updated patient on laboratory studies, awaiting urinalysis results. [MF]   0155 Updated patient on laboratory studies, awaiting urinalysis. Patient reports she did noted she had some left flank pain here in the department which she did not have at home. Patient reports she feels improved. [MF]   1053 Reviewed urinalysis with patient plan for antibiotics to cover for UTI. She will be contacted if blood cultures are abnormal.  She is instructed to drink plenty fluids take Tylenol for pain avoid ibuprofen and see her PCP for follow-up as soon as possible return for change or worsening of symptoms. [MF]      ED Course User Index  [MF] Alivia Sandhu MD         Medications Given in the ED:    Medications   sodium chloride (NS) flush 5-10 mL (has no administration in time range)   sodium chloride 0.9 % bolus infusion 1,000 mL (0 mL IntraVENous IV Completed 6/2/22 0025)           3:50 AM    Resents with general weakness malaise fatigue, she did have an elevated white blood cell count but no other sirs criteria, she was not tachycardic or febrile. She reports fever at home however negative here. Previous white blood cell counts have all been elevated. Urinalysis suggest UTI culture was sent and she was given antibiotics, will give her doxycycline to take at home. Creatinine is also elevated but that appears to be baseline.   Lactic acid was normal, patient is not septic. She is instructed to drink plenty of fluids and add some salt to her diet to her sodium being low. She is instructed see her PCP for follow-up. COVID test was negative chest x-ray revealed no acute findings she is instructed return for change or worsening of symptoms    Pt has been re-examined and states that they are feeling better and have no new complaints. Laboratory tests, medications, x-rays, diagnosis, follow up plan and return instructions have been reviewed and discussed with the patient and/or family. Pt and/or family were instructed on symptoms that may arise after discharge requiring re-evaluation by a physician. Pt and/or family have had the opportunity to ask questions about their care. Patient and/or family verbalized understanding and agreement with care plan, follow up and return instructions. Patient and/or family agree to return in 50 hours if their symptoms are not improving or immediately if they have any change in their condition. Abx were prescribed, pt advised that diarrhea and rash are possible side effects of the medications. I have also put together some discharge instructions for patient that include: 1) educational information regarding their diagnosis, 2) how to care for their diagnosis at home, as well a 3) list of reasons why they would want to return to the ED prior to their follow-up appointment, should their condition change. Mayela Montes MD      Procedures        Disposition:  Discharged        DISCHARGE PLAN:  1. Current Discharge Medication List      START taking these medications    Details   doxycycline (VIBRA-TABS) 100 mg tablet Take 1 Tablet by mouth two (2) times a day for 7 days. Qty: 14 Tablet, Refills: 0  Start date: 6/2/2022, End date: 6/9/2022           2. Follow-up Information    None       3. Return to ED if worse       Attestations:     Mayela Montes MD    Please note that this dictation was completed with Dragon, the computer voice recognition software. Quite often unanticipated grammatical, syntax, homophones, and other interpretive errors are inadvertently transcribed by the computer software. Please disregard these errors. Please excuse any errors that have escaped final proofreading. Thank you.

## 2022-06-02 NOTE — ED TRIAGE NOTES
Pt arrived via EMS from home with report of fever, body aches, and generalized weakness since last Friday. States that she went out to the store last Thursday and then developed these symptoms the next day. Taking Tylenol every 7-9 hours.

## 2022-06-03 LAB
BACTERIA SPEC CULT: NORMAL
SERVICE CMNT-IMP: NORMAL

## 2022-07-01 ENCOUNTER — OFFICE VISIT (OUTPATIENT)
Dept: CARDIOLOGY CLINIC | Age: 82
End: 2022-07-01
Payer: MEDICARE

## 2022-07-01 VITALS
OXYGEN SATURATION: 94 % | DIASTOLIC BLOOD PRESSURE: 80 MMHG | SYSTOLIC BLOOD PRESSURE: 132 MMHG | TEMPERATURE: 97 F | BODY MASS INDEX: 25.23 KG/M2 | RESPIRATION RATE: 22 BRPM | HEIGHT: 66 IN | HEART RATE: 78 BPM | WEIGHT: 157 LBS

## 2022-07-01 DIAGNOSIS — I10 ESSENTIAL HYPERTENSION: ICD-10-CM

## 2022-07-01 DIAGNOSIS — I49.5 SSS (SICK SINUS SYNDROME) (HCC): ICD-10-CM

## 2022-07-01 DIAGNOSIS — E78.00 HYPERCHOLESTEROLEMIA: ICD-10-CM

## 2022-07-01 DIAGNOSIS — I50.22 CHRONIC SYSTOLIC CONGESTIVE HEART FAILURE (HCC): ICD-10-CM

## 2022-07-01 DIAGNOSIS — N18.4 CKD (CHRONIC KIDNEY DISEASE) STAGE 4, GFR 15-29 ML/MIN (HCC): ICD-10-CM

## 2022-07-01 DIAGNOSIS — Z72.0 TOBACCO ABUSE DISORDER: ICD-10-CM

## 2022-07-01 DIAGNOSIS — I42.0 DILATED CARDIOMYOPATHY (HCC): Primary | ICD-10-CM

## 2022-07-01 DIAGNOSIS — E11.21 TYPE 2 DIABETES WITH NEPHROPATHY (HCC): ICD-10-CM

## 2022-07-01 DIAGNOSIS — I48.0 PAF (PAROXYSMAL ATRIAL FIBRILLATION) (HCC): ICD-10-CM

## 2022-07-01 DIAGNOSIS — I49.3 PVC'S (PREMATURE VENTRICULAR CONTRACTIONS): ICD-10-CM

## 2022-07-01 DIAGNOSIS — Z95.810 AICD (AUTOMATIC CARDIOVERTER/DEFIBRILLATOR) PRESENT: ICD-10-CM

## 2022-07-01 PROCEDURE — G8536 NO DOC ELDER MAL SCRN: HCPCS | Performed by: INTERNAL MEDICINE

## 2022-07-01 PROCEDURE — 93000 ELECTROCARDIOGRAM COMPLETE: CPT | Performed by: INTERNAL MEDICINE

## 2022-07-01 PROCEDURE — G9717 DOC PT DX DEP/BP F/U NT REQ: HCPCS | Performed by: INTERNAL MEDICINE

## 2022-07-01 PROCEDURE — 1101F PT FALLS ASSESS-DOCD LE1/YR: CPT | Performed by: INTERNAL MEDICINE

## 2022-07-01 PROCEDURE — 99214 OFFICE O/P EST MOD 30 MIN: CPT | Performed by: INTERNAL MEDICINE

## 2022-07-01 PROCEDURE — G8427 DOCREV CUR MEDS BY ELIG CLIN: HCPCS | Performed by: INTERNAL MEDICINE

## 2022-07-01 PROCEDURE — 99406 BEHAV CHNG SMOKING 3-10 MIN: CPT | Performed by: INTERNAL MEDICINE

## 2022-07-01 PROCEDURE — G8754 DIAS BP LESS 90: HCPCS | Performed by: INTERNAL MEDICINE

## 2022-07-01 PROCEDURE — 3044F HG A1C LEVEL LT 7.0%: CPT | Performed by: INTERNAL MEDICINE

## 2022-07-01 PROCEDURE — G8752 SYS BP LESS 140: HCPCS | Performed by: INTERNAL MEDICINE

## 2022-07-01 PROCEDURE — G8399 PT W/DXA RESULTS DOCUMENT: HCPCS | Performed by: INTERNAL MEDICINE

## 2022-07-01 PROCEDURE — 1123F ACP DISCUSS/DSCN MKR DOCD: CPT | Performed by: INTERNAL MEDICINE

## 2022-07-01 PROCEDURE — 1090F PRES/ABSN URINE INCON ASSESS: CPT | Performed by: INTERNAL MEDICINE

## 2022-07-01 PROCEDURE — G8417 CALC BMI ABV UP PARAM F/U: HCPCS | Performed by: INTERNAL MEDICINE

## 2022-07-01 RX ORDER — ROSUVASTATIN CALCIUM 5 MG/1
5 TABLET, COATED ORAL DAILY
Qty: 90 TABLET | Refills: 4 | Status: SHIPPED | OUTPATIENT
Start: 2022-07-01 | End: 2022-08-22 | Stop reason: SDUPTHER

## 2022-07-01 NOTE — PROGRESS NOTES
Identified pt with two pt identifiers(name and ). Reviewed record in preparation for visit and have obtained necessary documentation. Chief Complaint   Patient presents with    Cardiomyopathy     follow up    Irregular Heart Beat    Cholesterol Problem    CHF      Vitals:    22 1040 22 1117 22 1119 22 1122   BP: 132/80      Pulse: 78      Resp: 22      Temp: 97 °F (36.1 °C)      TempSrc: Temporal      SpO2: 93% 94% 95% 94%   Weight: 157 lb (71.2 kg)      Height: 5' 6\" (1.676 m)      PainSc:   8      PainLoc: Generalized          Medications reviewed/approved by provider. Health Maintenance Review: Patient reminded of \"due or due soon\" health maintenance. I have asked the patient to contact his/her primary care provider (PCP) for follow-up on his/her health maintenance. Coordination of Care Questionnaire:  :   1) Have you been to an emergency room, urgent care, or hospitalized since your last visit? If yes, where when, and reason for visit? no       2. Have seen or consulted any other health care provider since your last visit? If yes, where when, and reason for visit? NO      Patient is accompanied by daughter I have received verbal consent from Niall Downs to discuss any/all medical information while they are present in the room. Identified pt with two pt identifiers(name and ). Reviewed record in preparation for visit and have obtained necessary documentation. Chief Complaint   Patient presents with    Cardiomyopathy     follow up    Irregular Heart Beat    Cholesterol Problem    CHF      Vitals:    22 1040 22 1117 22 1119 22 1122   BP: 132/80      Pulse: 78      Resp: 22      Temp: 97 °F (36.1 °C)      TempSrc: Temporal      SpO2: 93% 94% 95% 94%   Weight: 157 lb (71.2 kg)      Height: 5' 6\" (1.676 m)      PainSc:   8      PainLoc: Generalized          Medications reviewed/approved by provider.       Health Maintenance Review: Patient reminded of \"due or due soon\" health maintenance. I have asked the patient to contact his/her primary care provider (PCP) for follow-up on his/her health maintenance. Coordination of Care Questionnaire:  :   1) Have you been to an emergency room, urgent care, or hospitalized since your last visit? If yes, where when, and reason for visit? yes [facility] June 2022 for a Kidney infection      2. Have seen or consulted any other health care provider since your last visit? If yes, where when, and reason for visit? YES Dr. Seun Blanco PCP      Patient is accompanied by self I have received verbal consent from Cathi Lemus to discuss any/all medical information while they are present in the room.

## 2022-07-01 NOTE — PROGRESS NOTES
Saurabh Lux is a 79 y.o. female is here for follow up. Pmhx SSS, DM, IBS, HLD, COPD, PAF, CHF. Last seen by nurse practitioner Madhavi Dotson in April 2022. At that time blood pressure was high and hydralazine was added. Today blood pressure is normal.    Previously seen by Dr Bernardo Mercado in 9/2021:  Multiple medical problems including DM II, hypertension, CKD III, dyslipidemia, DJD, lumbar stenosis/radiculopathy, chronic pain, tobacco, leukocytosis, PAF during hospitalization with encephalopathy 10/17 (at [facility]), followed by Kristen Gonzales at [facility]. Hospitalized 9/1-9/7/21 with:  Acute respiratory failure with hypoxia POA- improved s/p pleural tap,off oxygen now  Acute on chronic congestive heart failure proBNP 35,000- cont lasix at 20 mg daily for now due to IVAN for now, outpatient followup with Dr Bernardo eMrcado soon  Echocardiogram 8/20/2021 at [facility] EF of 45 to 50% with severe grade 3 diastolic dysfunction. Troponinemia likely type B demand secondary to fluid overload- remains flat  Leukocytosis with eosinophilia POA- now down to 14k, outpatient followup with Pulm associates as recommended  B/L Pleural effusion POA- s/p R thoracentesis (~1L), neg for cytology, neg for bacteria, neg for AFB, exudative  history of MSSA on Dapto IV till  9/2/2021 at other hospital before transfer here- taken off all Abx per ID, therapy completed now  Chronic kidney disease POA- Cr down to 2.0, resume lower dose of Lasix after 48 hrs as discussed        Today reports stable sob, continues to smoke half a pack per day. No cp. Bp running high. Taking medications  As prescribed. Has some ankle swelling but over all improved. The patient denies chest pain, orthopnea, PND, palpitations, syncope, presyncope or fatigue.        Patient Active Problem List    Diagnosis Date Noted    Palliative care encounter 09/03/2021    Leukocytosis 09/01/2021    Pleural effusion 09/01/2021    CKD (chronic kidney disease) 09/01/2021    Acute CHF (congestive heart failure) ([facility] 09/01/2021    Anemia of infection and chronic disease 08/26/2021    COPD (chronic obstructive pulmonary disease) ([address] 08/26/2021    Depression 08/26/2021    Pneumonia 08/23/2021    Sepsis ([address] 07/16/2021    Type 2 MI (myocardial infarction) ([facility] 72/69/3449    Systolic heart failure, chronic (HCC) 07/16/2021    Moderate episode of recurrent major depressive disorder ([address] 06/14/2021    Dilated cardiomyopathy ([facility] 09/23/2020    SSS (sick sinus syndrome) ([facility] 09/23/2020    Cardiomyopathy ([facility] 09/23/2020    AICD (automatic cardioverter/defibrillator) present 09/23/2020    Respiratory failure ([facility] 09/18/2020    CHF (congestive heart failure) ([facility] 09/18/2020    Hypoxia 09/18/2020    Bilateral pleural effusion 09/18/2020    Elevated troponin 09/18/2020    PAF (paroxysmal atrial fibrillation) ([facility]) 09/10/2020    CKD (chronic kidney disease) stage 3, GFR 30-59 ml/min ([facility]) 06/04/2019    Lymphocytosis 05/07/2018    Type 2 diabetes with nephropathy ([facility] 02/15/2018    Type 2 diabetes mellitus with diabetic neuropathy ([facility] 02/15/2018    Spinal stenosis of lumbar region with neurogenic claudication 11/17/2017    Spondylosis of lumbosacral region without myelopathy or radiculopathy 11/17/2017    Overdose of opiate or related narcotic, accidental or unintentional, subsequent encounter 10/27/2017    Acute left-sided low back pain with sciatica 10/27/2017    Physical debility 10/27/2017    Acute encephalopathy 10/23/2017    Hyperkalemia 10/20/2017    Altered mental status 10/20/2017    Transaminitis 10/20/2017    Acute renal failure (ARF) ([facility] 10/20/2017    Diverticulitis large intestine w/o perforation or abscess w/o bleeding 07/06/2017    SOB (shortness of breath) 06/21/2017    Abdominal pain, generalized 06/21/2017    Diarrhea in adult patient 06/21/2017    Cigarette smoker 97/14/5855    Neutrophilic leukocytosis 06/20/2017    Chronic pain     Hypercholesterolemia     Arthritis     Diabetes [facility]     IBS (irritable bowel syndrome)     Hemorrhoid       Charlotte Tam NP  Past Medical History:   Diagnosis Date    A-fib [facility])     AICD (automatic cardioverter/defibrillator) present 9/23/2020 9/23/2020 [employer] AICD implant    Arthritis     Bilateral pleural effusion 9/18/2020    Chronic pain     Chronic pain     Diabetes ([facility]     Diverticular disease     Elevated troponin 9/18/2020    Hemorrhoid     Hypercholesterolemia     IBS (irritable bowel syndrome)     Lymphocytosis 17/58/0888    Systolic heart failure, chronic ([facility] 7/16/2021    Type 2 MI (myocardial infarction) ([facility] 7/16/2021 [id number]/162021 r/t sepsis      Past Surgical History:   Procedure Laterality Date    COLONOSCOPY N/A 10/31/2019    COLONOSCOPY performed by Yoselyn Carmichael MD at [address] [facility]  10/31/2019         Cecilia Patterson  10/31/2019         HX CATARACT REMOVAL      HX CHOLECYSTECTOMY      HX COLONOSCOPY  2009    HX COLONOSCOPY  2016    2 adenomatous polyp    HX HEMORRHOIDECTOMY  2009    HX HYSTERECTOMY      HX KNEE REPLACEMENT      right    HX ORTHOPAEDIC  12/11/2017    back, laminectomy and decompression    VA INSJ/RPLCMT PERM DFB W/TRNSVNS LDS 1/DUAL CHMBR N/A 9/23/2020    INSERT ICD DUAL performed by Jolynn Young MD at [facility] CARDIAC CATH LAB     Allergies   Allergen Reactions    Cefazolin Rash     Possible kidney failure/AIN    Morphine Anaphylaxis    Ultram [Tramadol] Palpitations      Family History   Problem Relation Age of Onset    Colon Cancer Father     Diabetes Mother       Social History     Socioeconomic History    Marital status:      Spouse name: Not on file    Number of children: Not on file    Years of education: Not on file    Highest education level: Not on file   Occupational History    Occupation: retired     Comment: LPN   Tobacco Use  Smoking status: Current Every Day Smoker     Packs/day: 0.50     Years: 60.00     Pack years: 30.00     Types: Cigarettes    Smokeless tobacco: Never Used   Vaping Use    Vaping Use: Never used   Substance and Sexual Activity    Alcohol use: No    Drug use: Never    Sexual activity: Not Currently   Other Topics Concern     Service No    Blood Transfusions Yes    Caffeine Concern Yes    Occupational Exposure No    Hobby Hazards No    Sleep Concern Yes    Stress Concern Yes    Weight Concern No    Special Diet No    Back Care Yes    Exercise No    Bike Helmet No     Comment: n/a    Seat Belt Yes    Self-Exams Yes   Social History Narrative    Not on file     Social Determinants of Health     Financial Resource Strain:     Difficulty of Paying Living Expenses: Not on file   Food Insecurity:     Worried About Running Out of Food in the Last Year: Not on file    Alycia of Food in the Last Year: Not on file   Transportation Needs:     Lack of Transportation (Medical): Not on file    Lack of Transportation (Non-Medical):  Not on file   Physical Activity:     Days of Exercise per Week: Not on file    Minutes of Exercise per Session: Not on file   Stress:     Feeling of Stress : Not on file   Social Connections:     Frequency of Communication with Friends and Family: Not on file    Frequency of Social Gatherings with Friends and Family: Not on file    Attends [employer] Services: Not on file    Active Member of Clubs or Organizations: Not on file    Attends Club or Organization Meetings: Not on file    Marital Status: Not on file   Intimate Partner Violence:     Fear of Current or Ex-Partner: Not on file    Emotionally Abused: Not on file    Physically Abused: Not on file    Sexually Abused: Not on file   Housing Stability:     Unable to Pay for Housing in the Last Year: Not on file    Number of [place] in the Last Year: Not on file    Unstable Housing in the Last Year: Not on file      Current Outpatient Medications   Medication Sig    Vitamin D2 1,250 mcg (50,000 unit) capsule TAKE 1 CAPSULE BY MOUTH  EVERY 7 DAYS FOR LOW  VITAMIN D LEVELS    carvediloL (COREG) 25 mg tablet TAKE 1 TABLET BY MOUTH  TWICE DAILY    furosemide (LASIX) 40 mg tablet Take 1 Tablet by mouth two (2) times a day.  venlafaxine (EFFEXOR) 75 mg tablet TAKE ONE-HALF TABLET BY  MOUTH TWICE DAILY    hydrALAZINE (APRESOLINE) 10 mg tablet Take 1 Tablet by mouth two (2) times a day.  Lantus Solostar U-100 Insulin 100 unit/mL (3 mL) inpn INJECT SUBCUTANEOUSLY 45  UNITS DAILY    ferrous sulfate 325 mg (65 mg iron) tablet Take 1 Tablet by mouth daily (with breakfast).  b complex vitamins tablet Take 1 Tablet by mouth daily.  nitroglycerin (NITROSTAT) 0.4 mg SL tablet 1 Tab by SubLINGual route every five (5) minutes as needed for Chest Pain. Up to 3 doses.  colestipoL (Colestid) 1 gram tablet Take 1 Tab by mouth two (2) times a day.  aspirin delayed-release 81 mg tablet Take 1 Tab by mouth daily.  B.infantis-B.ani-B.long-B.bifi (PROBIOTIC 4X) 10-15 mg TbEC Take  by mouth daily.  acetaminophen (TYLENOL) 650 mg TbER Take 650 mg by mouth every eight (8) hours.  omega-3 fatty acids-vitamin e 1,000 mg cap Take 1 Cap by mouth two (2) times a day. 32a day     ascorbic acid, vitamin C, (VITAMIN C) 500 mg tablet Take 1,000 mg by mouth daily.  zinc 50 mg tab tablet Take 50 mg by mouth daily. No current facility-administered medications for this visit. Review of Symptoms:    CONST  No weight change. No fever, chills, sweats    ENT No visual changes, URI sx, sore throat    CV  See HPI   RESP  No cough, or sputum, wheezing. Also see HPI   GI  No abdominal pain or change in bowel habits. No heartburn or dysphagia. No melena or rectal bleeding.   No dysuria, urgency, frequency, hematuria   MSKEL  No joint pain, swelling. No muscle pain. SKIN  No rash or lesions. NEURO  No headache, syncope, or seizure. No weakness, loss of sensation, or paresthesias. PSYCH  No low mood or depression  No anxiety. HE/LYMPH  No easy bruising, abnormal bleeding, or enlarged glands. Physical ExamPhysical Exam:    Visit Vitals  /80 (BP 1 Location: Left arm, BP Patient Position: Sitting, BP Cuff Size: Adult)   Pulse 78   Temp 97 °F (36.1 °C) (Temporal)   Resp 22   Ht 5' 6\" (1.676 m)   Wt 157 lb (71.2 kg)   SpO2 93% Comment: RA   BMI 25.34 kg/m²     Gen: NAD  HEENT:  PERRL, throat clear  Neck: no adenopathy, no thyromegaly, no JVD   Heart:  Regular,Nl S1S2,  no murmur, gallop or rub. Lungs:  clear  Abdomen:   Soft, non-tender, bowel sounds are active.    Extremities:  No edema  Pulse: symmetric  Neuro: A&O times 3, No focal neuro deficits    Cardiographics    Labs:   Lab Results   Component Value Date/Time    Sodium 131 (L) 06/01/2022 11:25 PM    Sodium 133 (L) 04/11/2022 03:00 PM    Sodium 135 (L) 04/01/2022 08:54 AM    Sodium 136 09/07/2021 02:16 AM    Sodium 137 09/06/2021 12:46 AM    Potassium 5.1 06/01/2022 11:25 PM    Potassium 4.9 04/11/2022 03:00 PM    Potassium 6.0 (H) 04/01/2022 08:54 AM    Potassium 4.3 09/07/2021 02:16 AM    Potassium 4.8 09/06/2021 12:46 AM    Chloride 99 06/01/2022 11:25 PM    Chloride 102 04/11/2022 03:00 PM    Chloride 104 04/01/2022 08:54 AM    Chloride 106 09/07/2021 02:16 AM    Chloride 108 09/06/2021 12:46 AM    CO2 20 (L) 06/01/2022 11:25 PM    CO2 22 04/11/2022 03:00 PM    CO2 25 04/01/2022 08:54 AM    CO2 22 09/07/2021 02:16 AM    CO2 24 09/06/2021 12:46 AM    Anion gap 12 06/01/2022 11:25 PM    Anion gap 9 04/11/2022 03:00 PM    Anion gap 6 04/01/2022 08:54 AM    Anion gap 8 09/07/2021 02:16 AM    Anion gap 5 09/06/2021 12:46 AM    Glucose 174 (H) 06/01/2022 11:25 PM    Glucose 203 (H) 04/11/2022 03:00 PM    Glucose 128 (H) 04/01/2022 08:54 AM    Glucose 135 (H) 09/07/2021 02:16 AM    Glucose 109 (H) 09/06/2021 12:46 AM    BUN 40 (H) 06/01/2022 11:25 PM    BUN 28 (H) 04/11/2022 03:00 PM    BUN 37 (H) 04/01/2022 08:54 AM    BUN 64 (H) 09/07/2021 02:16 AM    BUN 58 (H) 09/06/2021 12:46 AM    Creatinine 1.86 (H) 06/01/2022 11:25 PM    Creatinine 1.93 (H) 04/11/2022 03:00 PM    Creatinine 1.77 (H) 04/01/2022 08:54 AM    Creatinine 2.08 (H) 09/07/2021 02:16 AM    Creatinine 2.25 (H) 09/06/2021 12:46 AM    BUN/Creatinine ratio 22 (H) 06/01/2022 11:25 PM    BUN/Creatinine ratio 15 04/11/2022 03:00 PM    BUN/Creatinine ratio 21 (H) 04/01/2022 08:54 AM    BUN/Creatinine ratio 31 (H) 09/07/2021 02:16 AM    BUN/Creatinine ratio 26 (H) 09/06/2021 12:46 AM    GFR est AA 31 (L) 06/01/2022 11:25 PM    GFR est AA 30 (L) 04/11/2022 03:00 PM    GFR est AA 33 (L) 04/01/2022 08:54 AM    GFR est AA 28 (L) 09/07/2021 02:16 AM    GFR est AA 25 (L) 09/06/2021 12:46 AM    GFR est non-AA 26 (L) 06/01/2022 11:25 PM    GFR est non-AA 25 (L) 04/11/2022 03:00 PM    GFR est non-AA 27 (L) 04/01/2022 08:54 AM    GFR est non-AA 23 (L) 09/07/2021 02:16 AM    GFR est non-AA 21 (L) 09/06/2021 12:46 AM    Calcium 8.9 06/01/2022 11:25 PM    Calcium 9.6 04/11/2022 03:00 PM    Calcium 10.0 04/01/2022 08:54 AM    Calcium 9.3 09/07/2021 02:16 AM    Calcium 9.3 09/06/2021 12:46 AM    Bilirubin, total 0.3 06/01/2022 11:25 PM    Bilirubin, total 0.3 08/30/2021 07:30 PM    Bilirubin, total 0.1 (L) 08/26/2021 05:21 AM    Bilirubin, total 0.3 08/25/2021 05:45 AM    Bilirubin, total 0.3 08/24/2021 07:22 AM    Alk. phosphatase 79 06/01/2022 11:25 PM    Alk. phosphatase 77 08/30/2021 07:30 PM    Alk. phosphatase 79 08/26/2021 05:21 AM    Alk. phosphatase 78 08/25/2021 05:45 AM    Alk.  phosphatase 79 08/24/2021 07:22 AM    Protein, total 6.8 06/01/2022 11:25 PM    Protein, total 7.6 08/30/2021 07:30 PM    Protein, total 6.9 08/26/2021 05:21 AM    Protein, total 7.0 08/25/2021 05:45 AM    Protein, total 7.2 08/24/2021 07:22 AM    Albumin 2.7 (L) 06/01/2022 11:25 PM    Albumin 2.3 (L) 08/30/2021 07:30 PM    Albumin 1.7 (L) 08/26/2021 05:21 AM    Albumin 1.8 (L) 08/25/2021 05:45 AM    Albumin 2.0 (L) 08/24/2021 07:22 AM    Globulin 4.1 (H) 06/01/2022 11:25 PM    Globulin 5.3 (H) 08/30/2021 07:30 PM    Globulin 5.2 (H) 08/26/2021 05:21 AM    Globulin 5.2 (H) 08/25/2021 05:45 AM    Globulin 5.2 (H) 08/24/2021 07:22 AM    A-G Ratio 0.7 (L) 06/01/2022 11:25 PM    A-G Ratio 0.4 (L) 08/30/2021 07:30 PM    A-G Ratio 0.3 (L) 08/26/2021 05:21 AM    A-G Ratio 0.3 (L) 08/25/2021 05:45 AM    A-G Ratio 0.4 (L) 08/24/2021 07:22 AM    ALT (SGPT) 18 06/01/2022 11:25 PM    ALT (SGPT) 22 08/30/2021 07:30 PM    ALT (SGPT) 26 08/26/2021 05:21 AM    ALT (SGPT) 26 08/25/2021 05:45 AM    ALT (SGPT) 10 (L) 08/24/2021 07:22 AM     Lab Results   Component Value Date/Time    CK 30 08/27/2021 06:29 AM     Lab Results   Component Value Date/Time    Cholesterol, total 243 (H) 06/14/2021 09:17 PM    Cholesterol, total 198 10/29/2020 10:22 AM    Cholesterol, total 185 06/29/2020 12:03 PM    Cholesterol, total 217 (H) 02/03/2020 03:52 PM    Cholesterol, total 169 06/04/2019 03:57 PM    HDL Cholesterol 42 06/14/2021 09:17 PM    HDL Cholesterol 35 (L) 10/29/2020 10:22 AM    HDL Cholesterol 36 (L) 06/29/2020 12:03 PM    HDL Cholesterol 44 02/03/2020 03:52 PM    HDL Cholesterol 37 (L) 06/04/2019 03:57 PM    LDL,Direct 137 (H) 06/14/2021 09:17 PM    LDL, calculated Not calculated due to elevated triglyceride level 06/14/2021 09:17 PM    LDL, calculated 110 (H) 10/29/2020 10:22 AM    LDL, calculated Comment 06/29/2020 12:03 PM    LDL, calculated 106 (H) 02/03/2020 03:52 PM    LDL, calculated 75 06/04/2019 03:57 PM    LDL, calculated 101 (H) 10/03/2018 04:30 PM    Triglyceride 493 (H) 06/14/2021 09:17 PM    Triglyceride 305 (H) 10/29/2020 10:22 AM    Triglyceride 412 (H) 06/29/2020 12:03 PM    Triglyceride 336 (H) 02/03/2020 03:52 PM    Triglyceride 283 (H) 06/04/2019 03:57 PM    CHOL/HDL Ratio 5.8 (H) 06/14/2021 09:17 PM     No results found for this or any previous visit.     Assessment:         Patient Active Problem List    Diagnosis Date Noted    Palliative care encounter 09/03/2021    Leukocytosis 09/01/2021    Pleural effusion 09/01/2021    CKD (chronic kidney disease) 09/01/2021    Acute CHF (congestive heart failure) ([facility] 09/01/2021    Anemia of infection and chronic disease 08/26/2021    COPD (chronic obstructive pulmonary disease) ([address] 08/26/2021    Depression 08/26/2021    Pneumonia 08/23/2021    Sepsis ([address] 07/16/2021    Type 2 MI (myocardial infarction) ([address] 73/72/7657    Systolic heart failure, chronic (HCC) 07/16/2021    Moderate episode of recurrent major depressive disorder ([facility] 06/14/2021    Dilated cardiomyopathy ([facility] 09/23/2020    SSS (sick sinus syndrome) ([facility] 09/23/2020    Cardiomyopathy ([facility] 09/23/2020    AICD (automatic cardioverter/defibrillator) present 09/23/2020    Respiratory failure ([address] 09/18/2020    CHF (congestive heart failure) ([facility] 09/18/2020    Hypoxia 09/18/2020    Bilateral pleural effusion 09/18/2020    Elevated troponin 09/18/2020    PAF (paroxysmal atrial fibrillation) ([facility] 09/10/2020    CKD (chronic kidney disease) stage 3, GFR 30-59 ml/min ([facility] 06/04/2019    Lymphocytosis 05/07/2018    Type 2 diabetes with nephropathy ([address] 02/15/2018    Type 2 diabetes mellitus with diabetic neuropathy ([facility] 02/15/2018    Spinal stenosis of lumbar region with neurogenic claudication 11/17/2017    Spondylosis of lumbosacral region without myelopathy or radiculopathy 11/17/2017    Overdose of opiate or related narcotic, accidental or unintentional, subsequent encounter 10/27/2017    Acute left-sided low back pain with sciatica 10/27/2017    Physical debility 10/27/2017    Acute encephalopathy 10/23/2017    Hyperkalemia 10/20/2017    Altered mental status 10/20/2017    Transaminitis 10/20/2017    Acute renal failure (ARF) ([facility] 10/20/2017    Diverticulitis large intestine w/o perforation or abscess w/o bleeding 07/06/2017    SOB (shortness of breath) 06/21/2017    Abdominal pain, generalized 06/21/2017    Diarrhea in adult patient 06/21/2017    Cigarette smoker 22/34/5462    Neutrophilic leukocytosis 76/64/7695    Chronic pain     Hypercholesterolemia     Arthritis     Diabetes ([facility]     IBS (irritable bowel syndrome)     Hemorrhoid      Multiple medical problems including DM II, hypertension, CKD III, dyslipidemia, DJD, lumbar stenosis/radiculopathy, chronic pain, tobacco, leukocytosis, PAF during hospitalization with encephalopathy 10/17 (at [facility]), followed by Jazzmine Mas at [facility] [address] 6/29/20 with brief intermittent CP--atypical, EKG abnormal with NSST and sent for Lexiscan MPI 7/29/20:  · Baseline ECG: Normal EKG, PACs, non-specific ST-T wave abnormalities. · Inconclusive stress test.  · EKG stress test results correlate with an intermediate risk of inducible myocardial ischemia. · Non-limiting dyspnea, no EKG changes  · Nuclear images reported seperately  · FINDINGS: The rest and stress perfusion images a fixed defect in the inferior wall compatible with infarct. No reversible abnormality is seen to suggest myocardium at ischemic risk. The gated images demonstrate global hypokinesia and inferior akinesia. Left ventricular ejection fraction is 31 %. Impression: Fixed defect in the inferior wall is compatible with infarct. No evidence of myocardium at ischemic risk. Left ventricular ejection fraction is 31 %. Global hypokinesia and inferior akinesia.     +STANTON, some fatigue, brief intermittent chest pain--not clearly exertional.  No recent Echo, had prior Echo 10/17 with LVEF 55%.  No recurrence of afib known since 10/17.   Seen in office here 9/10/20 with CHF--cardizem changed to beta blocker, on ARB, lasix.   Developed worsening SOB, sx of CHF--admitted to [facility], BNP elevated, trop mildly increased.  Seen by Cardiology in hospital, diurested, meds adjusted.  Repeat cardiac testing:      Had AICD/PPM placed by Dr. Shelia Quintero on 6/25/09 without complication.  Seen by Dr Shelia Quintero in f/u on 2/23/21 with PPM check ok., last seen here March 2021. Hospitalized 9/1-9/7/21 with:  Acute respiratory failure with hypoxia POA- improved s/p pleural tap,off oxygen now  Acute on chronic congestive heart failure proBNP 35,000- cont lasix at 20 mg daily for now due to IVAN for now, outpatient followup with Dr Barb Lane soon  Echocardiogram 8/20/2021 at [facility] EF of 45 to 50% with severe grade 3 diastolic dysfunction. Troponinemia likely type B demand secondary to fluid overload- remains flat  Leukocytosis with eosinophilia POA- now down to 14k, outpatient followup with Pulm associates as recommended  B/L Pleural effusion POA- s/p R thoracentesis (~1L), neg for cytology, neg for bacteria, neg for AFB, exudative  history of MSSA on Dapto IV till  9/2/2021 at other hospital before transfer here- taken off all Abx per ID, therapy completed now  Chronic kidney disease POA- Cr down to 2.0, resume lower dose of Lasix after 48 hrs as discussed  Currently stable--mild STANTON. The patient denies chest pain, orthopnea, PND, LE edema, palpitations, syncope, presyncope or fatigue. Joni Lane 9/2021  Doing well with no adverse cardiac symptoms currently  Fu with Pulmonary as planned (bronch/bx)  Fu with PCP as planned  Can take extra lasix 20 if weight up 3-4 labs, edema. Lipids and labs followed by PCP. Plan:       CM  ICD explanted 7/21/21 by Dr Marissa Cifuentes  Hx AICD/PPM placed by Dr. Shelia Quintero on 9/23/20  Most recent EF 45-50% mod dilated LA grade 3 DD per echo in 8/2021, therefore no longer needs ICD-explained why to family today   Prev EF 25-30% mod MR per echo in 9/2020  Continue Carvedilol  Continue Furosemide 40 mg daily  No ace-I/arb d/t IVAN  Consider repeat echo at follow-up in 6 months.     Hx brief intermittent CP--atypical, EKG abnormal with NSST and sent for Lexiscan MPI 7/29/20:  Ariel Rogers 7/2020 and 9/2020 showed  fixed defect in the inferior wall compatible with infarct. Dizziness July 1, 2022: This was clearly worse when she laid down on the table  Orthostatics today negative  Suspect inner ear etiology-I advised her to see ENT    HTN  Previously elevated, continue carvedilol 25 mg BID  Now controlled after adding hydralazine 10 mg BID in March 2022  Prev on losartan but last serum cr was elevated, checking now  The patient will monitor BP at home and call if generally >140/85. Elevated TG/HLD  TG in 6/2021 at 493, unable to calculate LDL d/t to this. Lab Results   Component Value Date/Time    Cholesterol, total 243 (H) 06/14/2021 09:17 PM    HDL Cholesterol 42 06/14/2021 09:17 PM    LDL,Direct 137 (H) 06/14/2021 09:17 PM    LDL, calculated Not calculated due to elevated triglyceride level 06/14/2021 09:17 PM    VLDL, calculated  06/14/2021 09:17 PM     Calculation not valid with this patient's other Lipid values. Triglyceride 493 (H) 06/14/2021 09:17 PM    CHOL/HDL Ratio 5.8 (H) 06/14/2021 09:17 PM     Prev LDL 10/2020 elevated at 110  Not on a statin, for unclear reasons, and with advanced CKD, I will start Crestor 5 mg daily, and repeat labs in 3 months    DM  On Insulin regimen    CKD IV  Serum Cr 2.08 in  9/2021  Improved 6/2022 to 1.8    Current 0.5 PPD smoker  No intention on quitting, but she has made progress, encouraged to use nicotine lozenges-5 minutes spent on smoking cessation.     Continue current care and f/u in 6 mos with nurse practitioner Vane Frazier MD

## 2022-07-01 NOTE — Clinical Note
7/1/2022    Patient: Pedro Curry   YOB: 1940   Date of Visit: 7/1/2022     Earl Oro NP  [place] [address]  [address] [address]  Via [address] [place] [address], RN  [address] [place] [address]  Via     Dear SHARATH Prescott, HO,      Thank you for referring Ms. Jordy Lenz to [place] [address] for evaluation. My notes for this consultation are attached. If you have questions, please do not hesitate to call me. I look forward to following your patient along with you.       Sincerely,    Noel Vu MD

## 2022-07-13 ENCOUNTER — OFFICE VISIT (OUTPATIENT)
Dept: FAMILY MEDICINE CLINIC | Age: 82
End: 2022-07-13
Payer: MEDICARE

## 2022-07-13 VITALS
SYSTOLIC BLOOD PRESSURE: 140 MMHG | BODY MASS INDEX: 25.39 KG/M2 | HEIGHT: 66 IN | OXYGEN SATURATION: 96 % | RESPIRATION RATE: 18 BRPM | TEMPERATURE: 97.4 F | DIASTOLIC BLOOD PRESSURE: 70 MMHG | WEIGHT: 158 LBS | HEART RATE: 77 BPM

## 2022-07-13 DIAGNOSIS — Z79.4 DIABETES MELLITUS DUE TO UNDERLYING CONDITION WITH HYPEROSMOLARITY WITHOUT COMA, WITH LONG-TERM CURRENT USE OF INSULIN (HCC): Primary | ICD-10-CM

## 2022-07-13 DIAGNOSIS — R42 DIZZINESS: ICD-10-CM

## 2022-07-13 DIAGNOSIS — R30.0 DYSURIA: ICD-10-CM

## 2022-07-13 DIAGNOSIS — E08.00 DIABETES MELLITUS DUE TO UNDERLYING CONDITION WITH HYPEROSMOLARITY WITHOUT COMA, WITH LONG-TERM CURRENT USE OF INSULIN (HCC): Primary | ICD-10-CM

## 2022-07-13 LAB
BILIRUB UR QL STRIP: NEGATIVE
GLUCOSE UR-MCNC: NEGATIVE MG/DL
KETONES P FAST UR STRIP-MCNC: NEGATIVE MG/DL
PH UR STRIP: 5.5 [PH] (ref 4.6–8)
PROT UR QL STRIP: NORMAL
SP GR UR STRIP: 1.02 (ref 1–1.03)
UA UROBILINOGEN AMB POC: NORMAL (ref 0.2–1)
URINALYSIS CLARITY POC: CLEAR
URINALYSIS COLOR POC: YELLOW
URINE BLOOD POC: NORMAL
URINE LEUKOCYTES POC: NORMAL
URINE NITRITES POC: NEGATIVE

## 2022-07-13 PROCEDURE — 99214 OFFICE O/P EST MOD 30 MIN: CPT | Performed by: NURSE PRACTITIONER

## 2022-07-13 PROCEDURE — 81001 URINALYSIS AUTO W/SCOPE: CPT | Performed by: NURSE PRACTITIONER

## 2022-07-13 RX ORDER — FLUCONAZOLE 150 MG/1
150 TABLET ORAL DAILY
Qty: 1 TABLET | Refills: 0 | Status: SHIPPED | OUTPATIENT
Start: 2022-07-13 | End: 2022-07-14

## 2022-07-13 NOTE — PROGRESS NOTES
Visit Vitals  BP (!) 140/70 (BP 1 Location: Left arm)   Pulse 77   Temp 97.4 °F (36.3 °C) (Temporal)   Resp 18   Ht 5' 6\" (1.676 m)   Wt 158 lb (71.7 kg)   SpO2 96%   BMI 25.50 kg/m²     Chief Complaint   Patient presents with    Urinary Frequency    Follow-up     1. Have you been to the ER, urgent care clinic since your last visit? Hospitalized since your last visit? [facility] 6/18/22    2. Have you seen or consulted any other health care providers outside of the [address] [place] [address] since your last visit? Include any pap smears or colon screening.  no

## 2022-07-14 LAB
EST. AVERAGE GLUCOSE BLD GHB EST-MCNC: 163 MG/DL
HBA1C MFR BLD: 7.3 % (ref 4–5.6)

## 2022-07-15 NOTE — PROGRESS NOTES
Subjective:      Patient : This patient is a 79 y.o. female that presents today with her  for a requested follow up . She continues to have dysuria (UTI/dysuria). She completed a course of antibiotics. Dizziness; intermittent concern request referral to ENT. Diabetes. Sugars controlled moderately  Hypoglycemia: none  Tolerating current treatment well  Current medications include diet  Insulin dependent type 2 medications listed below. Lab Results   Component Value Date/Time    Hemoglobin A1c 7.3 (H) 07/13/2022 04:59 PM    Hemoglobin A1c 6.5 (H) 01/18/2022 04:53 PM    Hemoglobin A1c 6.6 (H) 09/14/2021 05:03 PM    Glucose 174 (H) 06/01/2022 11:25 PM    Glucose (POC) 152 (H) 09/07/2021 06:53 AM    Microalb/Creat ratio (ug/mg creat.) 1,568.2 (H) 05/07/2018 11:49 AM    MICROALBUMIN,MG/DAY Comment 11/15/2017 12:12 PM    Microalbumin/creat ratio (POC)  06/14/2021 04:31 PM    LDL,Direct 137 (H) 06/14/2021 09:17 PM    LDL, calculated Not calculated due to elevated triglyceride level 06/14/2021 09:17 PM    Creatinine 1.86 (H) 06/01/2022 11:25 PM     Lab Results   Component Value Date/Time    Microalb/Creat ratio (ug/mg creat.) 1,568.2 (H) 05/07/2018 11:49 AM    MICROALBUMIN,MG/DAY Comment 11/15/2017 12:12 PM       Last eye exam performed  less than 1 year ago and was normal.    Last foot exam performed less than 1 year ago. warm, good capillary refill      Objective:     Visit Vitals  BP (!) 140/70 (BP 1 Location: Left arm)   Pulse 77   Temp 97.4 °F (36.3 °C) (Temporal)   Resp 18   Ht 5' 6\" (1.676 m)   Wt 158 lb (71.7 kg)   SpO2 96%   BMI 25.50 kg/m²      Abdomen to include suprapubic tenderness or lack thereof. Inguinal adenopathy present tender or absent. CVA tenderness or absence. Pelvic exam presence or absence of cervical motion tenderness (CMT).      Skin:  warm  HEENT:  HUMERA, TMI:  and Neck  Heart regular rhythm  Lungs: clear to auscultation and percussion throughout both lung fields  CV:normal carotid, radial , femerol, pedal    Abdomen soft, flat, non-tender and no guarding   Extremeties:no clubbing, cyanosis, edema  Neuro alert, oriented x 3 and cranial nerves 2-12 intact      Past Medical History:   Diagnosis Date    A-fib [facility])     AICD (automatic cardioverter/defibrillator) present 9/23/2020 9/23/2020 [employer] AICD implant    Arthritis     Bilateral pleural effusion 9/18/2020    Chronic pain     Chronic pain     Diabetes ([facility]     Diverticular disease     Elevated troponin 9/18/2020    Hemorrhoid     Hypercholesterolemia     IBS (irritable bowel syndrome)     Lymphocytosis 06/94/1950    Systolic heart failure, chronic ([facility] 7/16/2021    Type 2 MI (myocardial infarction) ([facility] 7/16/2021 7/162021 r/t sepsis     Family History   Problem Relation Age of Onset    Colon Cancer Father     Diabetes Mother      Current Outpatient Medications   Medication Sig Dispense Refill    rosuvastatin (CRESTOR) 5 mg tablet Take 1 Tablet by mouth daily. For high cholesterol. 90 Tablet 4    Vitamin D2 1,250 mcg (50,000 unit) capsule TAKE 1 CAPSULE BY MOUTH  EVERY 7 DAYS FOR LOW  VITAMIN D LEVELS 13 Capsule 1    carvediloL (COREG) 25 mg tablet TAKE 1 TABLET BY MOUTH  TWICE DAILY 180 Tablet 3    furosemide (LASIX) 40 mg tablet Take 1 Tablet by mouth two (2) times a day. 180 Tablet 2    venlafaxine (EFFEXOR) 75 mg tablet TAKE ONE-HALF TABLET BY  MOUTH TWICE DAILY 90 Tablet 3    hydrALAZINE (APRESOLINE) 10 mg tablet Take 1 Tablet by mouth two (2) times a day. 180 Tablet 1    Lantus Solostar U-100 Insulin 100 unit/mL (3 mL) inpn INJECT SUBCUTANEOUSLY 45  UNITS DAILY 45 mL 3    ferrous sulfate 325 mg (65 mg iron) tablet Take 1 Tablet by mouth daily (with breakfast). 30 Tablet 0    b complex vitamins tablet Take 1 Tablet by mouth daily.  nitroglycerin (NITROSTAT) 0.4 mg SL tablet 1 Tab by SubLINGual route every five (5) minutes as needed for Chest Pain. Up to 3 doses.  25 Tab 0    colestipoL (Colestid) 1 gram tablet Take 1 Tab by mouth two (2) times a day. 180 Tab 3    aspirin delayed-release 81 mg tablet Take 1 Tab by mouth daily. 90 Tab 1    B.infantis-B.ani-B.long-B.bifi (PROBIOTIC 4X) 10-15 mg TbEC Take  by mouth daily.  acetaminophen (TYLENOL) 650 mg TbER Take 650 mg by mouth every eight (8) hours.  omega-3 fatty acids-vitamin e 1,000 mg cap Take 1 Cap by mouth two (2) times a day. 32a day       ascorbic acid, vitamin C, (VITAMIN C) 500 mg tablet Take 1,000 mg by mouth daily.  zinc 50 mg tab tablet Take 50 mg by mouth daily.        Allergies   Allergen Reactions    Cefazolin Rash     Possible kidney failure/AIN    Morphine Anaphylaxis    Ultram [Tramadol] Palpitations     Social History     Socioeconomic History    Marital status:      Spouse name: Not on file    Number of children: Not on file    Years of education: Not on file    Highest education level: Not on file   Occupational History    Occupation: retired     Comment: LPN   Tobacco Use    Smoking status: Current Every Day Smoker     Packs/day: 0.50     Years: 60.00     Pack years: 30.00     Types: Cigarettes    Smokeless tobacco: Never Used   Vaping Use    Vaping Use: Never used   Substance and Sexual Activity    Alcohol use: No    Drug use: Never    Sexual activity: Not Currently   Other Topics Concern     Service No    Blood Transfusions Yes    Caffeine Concern Yes    Occupational Exposure No    Hobby Hazards No    Sleep Concern Yes    Stress Concern Yes    Weight Concern No    Special Diet No    Back Care Yes    Exercise No    Bike Helmet No     Comment: n/a    Seat Belt Yes    Self-Exams Yes   Social History Narrative    Not on file     Social Determinants of Health     Financial Resource Strain:     Difficulty of Paying Living Expenses: Not on file   Food Insecurity:     Worried About Running Out of Food in the Last Year: Not on file    Alycia of Food in the Last Year: Not on file   Transportation Needs:     Lack of Transportation (Medical): Not on file    Lack of Transportation (Non-Medical): Not on file   Physical Activity:     Days of Exercise per Week: Not on file    Minutes of Exercise per Session: Not on file   Stress:     Feeling of Stress : Not on file   Social Connections:     Frequency of Communication with Friends and Family: Not on file    Frequency of Social Gatherings with Friends and Family: Not on file    Attends Mandaen Services: Not on file    Active Member of [address] [place] [address] [employer] or Organizations: Not on file    Attends Club or Organization Meetings: Not on file    Marital Status: Not on file   Intimate Partner Violence:     Fear of Current or Ex-Partner: Not on file    Emotionally Abused: Not on file    Physically Abused: Not on file    Sexually Abused: Not on file   Housing Stability:     Unable to Pay for Housing in the Last Year: Not on file    Number of [place] in the Last Year: Not on file    Unstable Housing in the Last Year: Not on file           Assessment:   1. Dysuria    - AMB POC URINALYSIS DIP STICK AUTO W/ MICRO  - CULTURE, URINE; Future  - CULTURE, URINE    2. Diabetes mellitus due to underlying condition with hyperosmolarity without coma, with long-term current use of insulin (HCC)    - HEMOGLOBIN A1C WITH EAG; Future  - COLLECTION VENOUS BLOOD,VENIPUNCTURE; Future  - CULTURE, URINE; Future  - HEMOGLOBIN A1C WITH EAG  - COLLECTION VENOUS BLOOD,VENIPUNCTURE  - CULTURE, URINE    3.  Dizziness    - REFERRAL TO ENT-OTOLARYNGOLOGY

## 2022-07-16 LAB
BACTERIA SPEC CULT: NORMAL
CC UR VC: NORMAL
SERVICE CMNT-IMP: NORMAL

## 2022-08-18 ENCOUNTER — HOSPITAL ENCOUNTER (OUTPATIENT)
Dept: LAB | Age: 82
Discharge: HOME | End: 2022-08-18
Payer: MEDICARE

## 2022-08-18 ENCOUNTER — CLINICAL SUPPORT (OUTPATIENT)
Dept: FAMILY MEDICINE CLINIC | Age: 82
End: 2022-08-18
Payer: MEDICARE

## 2022-08-18 DIAGNOSIS — R81 GLYCOSURIA: Primary | ICD-10-CM

## 2022-08-18 PROCEDURE — 87147 CULTURE TYPE IMMUNOLOGIC: CPT

## 2022-08-18 PROCEDURE — 36415 COLL VENOUS BLD VENIPUNCTURE: CPT | Performed by: NURSE PRACTITIONER

## 2022-08-18 PROCEDURE — 87086 URINE CULTURE/COLONY COUNT: CPT

## 2022-08-18 NOTE — PROGRESS NOTES
1:08 pm - Per Nurse Rachel Maciel RN patient presented to the office for outside lab BW via right arm venipuncture, done successfully, tolerated OK, 1 SST and 1 lavender tube sent. The results needs to be faxed to Dr. Sai Bolaños, Urology / [facility].

## 2022-08-19 LAB
BACTERIA SPEC CULT: ABNORMAL
CC UR VC: ABNORMAL
EST. AVERAGE GLUCOSE BLD GHB EST-MCNC: 229 MG/DL
GLUCOSE SERPL-MCNC: 344 MG/DL (ref 65–100)
HBA1C MFR BLD: 9.6 % (ref 4–5.6)
SERVICE CMNT-IMP: ABNORMAL

## 2022-08-22 RX ORDER — ROSUVASTATIN CALCIUM 5 MG/1
5 TABLET, COATED ORAL DAILY
Qty: 90 TABLET | Refills: 3 | Status: SHIPPED | OUTPATIENT
Start: 2022-08-22

## 2022-08-22 NOTE — TELEPHONE ENCOUNTER
PCP: Eliecer Maxwell NP    Last appt: 7/1/2022  Future Appointments   Date Time Provider Marty Bautista   1/11/2023 10:00 AM Maury Sen NP [facility] BS [facility]   1/12/2023  2:20 PM Eliecer Maxwell NP [facility] MAIN  [facility]       Requested Prescriptions     Pending Prescriptions Disp Refills    rosuvastatin (CRESTOR) 5 mg tablet 90 Tablet 3     Sig: Take 1 Tablet by mouth daily. For high cholesterol. Other Comments:  Cancelled RX that was sent to [facility] in July 2022.

## 2022-09-02 ENCOUNTER — HOSPITAL ENCOUNTER (OUTPATIENT)
Dept: LAB | Age: 82
Discharge: HOME | End: 2022-09-02
Payer: MEDICARE

## 2022-09-02 PROCEDURE — 87086 URINE CULTURE/COLONY COUNT: CPT

## 2022-09-03 LAB
BACTERIA SPEC CULT: NORMAL
SERVICE CMNT-IMP: NORMAL

## 2022-09-06 NOTE — PROGRESS NOTES
Care Transitions Initial Call    Call within 2 business days of discharge: Yes     Patient: Raymond Comer Patient : 1940 MRN: [id number]    Last Discharge [address]       Complaint Diagnosis Description Type Department Provider    21  Acute combined systolic and diastolic congestive heart failure ([facility] [address] . .. Admission (Discharged) Shama Herrera MD        Was this an external facility discharge? No      Discharge Facility: n/a    Challenges to be reviewed by the provider   Additional needs identified to be addressed with provider:  yes  - Denies chest pain, denies shortness of breath, denies fever/chills, and denies nausea/vomiting since discharge on 21.  - Patient complains of ongoing fatigue.  - Reports 2 falls in the last 12 months, states she did not sustain traumatic injury with either fall. - Utilizing a regular diet at home, no added salt, states appetite is great. - Patient reports she is no longer taking Ergocalciferol 1,250mcg (50,000 unit) cap, take 50,000 units every 7 days. Ergocalciferol was marked as 'Not Taking' on today's medication reconciliation.  Please consider removing Ergocalciferol from patient's current medication list.         Method of communication with provider : chart routing    Component      Latest Ref Rng & Units 2021           6:53 AM  9:46 PM  4:25 PM 10:51 AM   GLUCOSE,FAST - POC      65 - 117 mg/dL 152 (H) 189 (H) 184 (H) 178 (H)     Component      Latest Ref Rng & Units 2021           6:34 AM  8:51 PM   GLUCOSE,FAST - POC      65 - 117 mg/dL 117 154 (H)     Component      Latest Ref Rng & Units 2021           2:16 AM 12:46 AM  3:49 AM   BUN      6 - 20 MG/DL 64 (H) 58 (H) 52 (H)   Creatinine      0.55 - 1.02 MG/DL 2.08 (H) 2.25 (H) 1.79 (H)   BUN/Creatinine ratio      12 - 20   31 (H) 26 (H) 29 (H)   GFR est AA      >60 ml/min/1.73m2 28 (L) 25 (L) 33 (L)   GFR est non-AA      >60 GBS negative, no abx in labor ml/min/1.73m2 23 (L) 21 (L) 27 (L)     Component      Latest Ref Rng & Units 9/6/2021           3:30 PM   Sed rate, automated      0 - 30 mm/hr 128 (H)     Component      Latest Ref Rng & Units 9/7/2021 9/6/2021 9/6/2021 9/5/2021           2:16 AM 12:46 AM 12:46 AM  3:49 AM   WBC      3.6 - 11.0 K/uL 14.3 (H)  16.6 (H) 15.7 (H)   RBC      3.80 - 5.20 M/uL 3.19 (L)  3.03 (L) 2.69 (L)   HGB      11.5 - 16.0 g/dL 9.1 (L)  8.6 (L) 7.6 (L)   HCT      35.0 - 47.0 % 29.0 (L)  27.4 (L) 24.8 (L)     Component      Latest Ref Rng & Units 9/7/2021 9/6/2021 9/6/2021 9/5/2021           2:16 AM 12:46 AM 12:46 AM  3:49 AM   RDW      11.5 - 14.5 % 17.2 (H)  17.3 (H) 16.8 (H)   PLATELET      565 - 748 K/uL 513 (H)  486 (H) 522 (H)     Component      Latest Ref Rng & Units 9/7/2021 9/6/2021 9/6/2021 9/5/2021           2:16 AM 12:46 AM 12:46 AM  3:49 AM   NEUTROPHILS      32 - 75 % 26 (L)  25 (L) 35     Component      Latest Ref Rng & Units 9/7/2021 9/6/2021 9/6/2021 9/5/2021           2:16 AM 12:46 AM 12:46 AM  3:49 AM   MONOCYTES      5 - 13 % 3 (L)  1 (L) 7   EOSINOPHILS      0 - 7 % 26 (H)  33 (H) 26 (H)     Component      Latest Ref Rng & Units 9/7/2021 9/6/2021 9/6/2021 9/5/2021           2:16 AM 12:46 AM 12:46 AM  3:49 AM   ABS. LYMPHOCYTES      0.8 - 3.5 K/UL 6.4 (H)  6.4 (H) 5.0 (H)     Component      Latest Ref Rng & Units 9/7/2021 9/6/2021 9/6/2021 9/5/2021           2:16 AM 12:46 AM 12:46 AM  3:49 AM   ABS. EOSINOPHILS      0.0 - 0.4 K/UL 3.7 (H)  5.5 (H) 4.1 (H)     COVID-19 related testing was not completed during this admission. Advance Care Planning:   Does patient have an Advance Directive:  yes; reviewed and current     Inpatient Readmission Risk score: Unplanned Readmit Risk Score: 39    Was this a readmission? yes   Patient stated reason for the admission: \"It was breathing, they removed 1 1/2 to 2 liters of fluid from me. \"     Patients top risk factors for readmission: Medical condition - dilated cardiomyopathy, sick sinus syndrome, PAF, CHF, history of MI, chronic systolic heart failure, recent acute CHF, IBS, diabetes with nephropathy and neuropathy, COPD, history of bilateral pleural effusion, recent pneumonia and pleural effusion, CKD stage 3, and hypercholesterolemia per chart. Interventions to address risk factors: Obtained and reviewed discharge summary and/or continuity of care documents and Education of patient/family/caregiver/guardian to support self-management-Education provided regarding signs/symptoms of hypoxia, patient verbalized an understanding. Care Transition Nurse (CTN) contacted the patient by telephone to perform post hospital discharge assessment. Verified name and  with patient as identifiers. Provided introduction to self, and explanation of the CTN role. CTN reviewed discharge instructions, medical action plan and red flags with patient who verbalized understanding. Were discharge instructions available to patient? yes. Reviewed appropriate site of care based on symptoms and resources available to patient including: PCP, Specialist, [address] [place] [address] Deven Casas and When to call 911. Patient given an opportunity to ask questions and does not have any further questions or concerns at this time. The patient agrees to contact the PCP office for questions related to their healthcare. Medication reconciliation was performed with patient, who verbalizes understanding of administration of home medications. Advised obtaining a 90-day supply of all daily and as-needed medications. Referral to Pharm D needed: no     Home Health/Outpatient orders at discharge: [address]: [facility]  Date of initial visit: SN visited on 21 per patient.      Durable Medical Equipment ordered at discharge: None  [address] [facility] [address]: n/a  Durable Medical Equipment received: n/a    Covid Risk Education    Educated patient about risk for severe COVID-19 due to risk factors according to [facility] guidelines. CTN reviewed discharge instructions, medical action plan and red flag symptoms with the patient who verbalized understanding. Discussed COVID vaccination status: yes. Education provided on COVID-19 vaccination as appropriate. Discussed exposure protocols and quarantine with CDC Guidelines. Patient was given an opportunity to verbalize any questions and concerns and agrees to contact CTN or health care provider for questions related to their healthcare. Was patient discharged with a pulse oximeter? no.     Discussed follow-up appointments. If no appointment was previously scheduled, appointment scheduling offered: yes. Is follow up appointment scheduled within 7 days of discharge? yes. [address] follow up appointment(s):   Future Appointments   Date Time Provider Marty Bautista   9/14/2021  4:20 PM [facility]   9/21/2021  2:00 PM Sanam Spicer MD [facility] BS AMB   10/18/2021 10:50 AM Maria Dolores Stevens NP [facility] MAIN BS AMB   3/9/2022  1:15 PM PACEMAKER, RCAM [facility] BS AMB   3/9/2022  1:40 PM Violet Rosales, ANP [facility] BS AMB     Non-BS follow up appointment(s): Please see below for appointments. Plan for follow-up call in 10-14 days based on severity of symptoms and risk factors. Plan for next call: symptom management-Review red flags of hypoxia, and follow up appointment-Evaluate if patient is attending follow-up appointments as scheduled, offer assistance with scheduling as needed. CTN provided contact information for future needs. Goals      Attends follow-up appointments as directed. 9-8-21: Patient has FRANCISCO appointment scheduled with Mata Webster NP/PCP on 9-14-21, cardio follow-up appointment scheduled for 9-21-21 with Dr. Sadia Calloway, and pulmonary follow-up appointment scheduled for 9-30-21 with Dr. Greg Moss. Patient states she continues to drive herself to/from appointments as needed. Lico Padilla Understands red flags post discharge.       9-8-21: Red flags of hypoxia reviewed with patient and patient verbalized an understanding. Patient denies chest pain, denies shortness of breath, denies fever/chills, and denies nausea/vomiting since discharge on 9-7-21. Patient complains of ongoing fatigue. Patient states, \"I'm good, I feel great. \" Care Transitions Nurse will review red flags again on next phone conversation with patient.  Joseph Cabrera

## 2022-09-08 ENCOUNTER — HOSPITAL ENCOUNTER (OUTPATIENT)
Dept: CT IMAGING | Age: 82
Discharge: HOME | End: 2022-09-08
Attending: UROLOGY
Payer: MEDICARE

## 2022-09-08 ENCOUNTER — HOSPITAL ENCOUNTER (OUTPATIENT)
Dept: GENERAL RADIOLOGY | Age: 82
Discharge: HOME | End: 2022-09-08
Attending: UROLOGY
Payer: MEDICARE

## 2022-09-08 DIAGNOSIS — N30.21 CHRONIC CYSTITIS WITH HEMATURIA: ICD-10-CM

## 2022-09-08 DIAGNOSIS — M25.9 DISORDER OF JOINT: ICD-10-CM

## 2022-09-08 PROCEDURE — 74176 CT ABD & PELVIS W/O CONTRAST: CPT

## 2022-09-08 PROCEDURE — 73070 X-RAY EXAM OF ELBOW: CPT

## 2022-09-27 ENCOUNTER — HOSPITAL ENCOUNTER (OUTPATIENT)
Dept: ULTRASOUND IMAGING | Age: 82
Discharge: HOME | End: 2022-09-27
Attending: UROLOGY
Payer: MEDICARE

## 2022-09-27 DIAGNOSIS — R33.9 BLADDER RETENTION OF URINE: ICD-10-CM

## 2022-09-27 PROCEDURE — 51798 US URINE CAPACITY MEASURE: CPT

## 2022-11-02 ENCOUNTER — CLINICAL SUPPORT (OUTPATIENT)
Dept: FAMILY MEDICINE CLINIC | Age: 82
End: 2022-11-02

## 2022-11-02 DIAGNOSIS — Z23 NEEDS FLU SHOT: Primary | ICD-10-CM

## 2022-11-02 NOTE — PROGRESS NOTES
Patient was administered Flu shot in right deltoid via IM. Patient tolerated Flu shot well. Medication information reviewed with patient, patient states understanding. Patient to resume routine medications at home. Patient given copy of AVS and VIIS with medication information and instructions for home. VIIS reviewed with patient and patient states understanding.

## 2022-11-02 NOTE — PATIENT INSTRUCTIONS
Vaccine Information Statement    Influenza (Flu) Vaccine (Inactivated or Recombinant): What You Need to Know    Many vaccine information statements are available in Wolof and other languages. See www.immunize.org/vis. Hojas de información sobre vacunas están disponibles en español y en muchos otros idiomas. Visite www.immunize.org/vis. 1. Why get vaccinated? Influenza vaccine can prevent influenza (flu). Flu is a contagious disease that spreads around the [facility] every year, usually between October and May. Anyone can get the flu, but it is more dangerous for some people. Infants and young children, people 72 years and older, pregnant people, and people with certain health conditions or a weakened immune system are at greatest risk of flu complications. Pneumonia, bronchitis, sinus infections, and ear infections are examples of flu-related complications. If you have a medical condition, such as heart disease, cancer, or diabetes, flu can make it worse. Flu can cause fever and chills, sore throat, muscle aches, fatigue, cough, headache, and runny or stuffy nose. Some people may have vomiting and diarrhea, though this is more common in children than adults. In an average year, thousands of people in the [facility] die from flu, and many more are hospitalized. Flu vaccine prevents millions of illnesses and flu-related visits to the doctor each year. 2. Influenza vaccines     CDC recommends everyone 6 months and older get vaccinated every flu season. Children 6 months through 6years of age may need 2 doses during a single flu season. Everyone else needs only 1 dose each flu season. It takes about 2 weeks for protection to develop after vaccination. There are many flu viruses, and they are always changing. Each year a new flu vaccine is made to protect against the influenza viruses believed to be likely to cause disease in the upcoming flu season.  Even when the vaccine doesnt exactly match these viruses, it may still provide some protection. Influenza vaccine does not cause flu. Influenza vaccine may be given at the same time as other vaccines. 3. Talk with your health care provider    Tell your vaccination provider if the person getting the vaccine:  Has had an allergic reaction after a previous dose of influenza vaccine, or has any severe, life-threatening allergies   Has ever had Guillain-Barré Syndrome (also called GBS)    In some cases, your health care provider may decide to postpone influenza vaccination until a future visit. Influenza vaccine can be administered at any time during pregnancy. People who are or will be pregnant during influenza season should receive inactivated influenza vaccine. People with minor illnesses, such as a cold, may be vaccinated. People who are moderately or severely ill should usually wait until they recover before getting influenza vaccine. Your health care provider can give you more information. 4. Risks of a vaccine reaction    Soreness, redness, and swelling where the shot is given, fever, muscle aches, and headache can happen after influenza vaccination. There may be a very small increased risk of Guillain-Barré Syndrome (GBS) after inactivated influenza vaccine (the flu shot). Christin Hernandez children who get the flu shot along with pneumococcal vaccine (PCV13) and/or DTaP vaccine at the same time might be slightly more likely to have a seizure caused by fever. Tell your health care provider if a child who is getting flu vaccine has ever had a seizure. People sometimes faint after medical procedures, including vaccination. Tell your provider if you feel dizzy or have vision changes or ringing in the ears. As with any medicine, there is a very remote chance of a vaccine causing a severe allergic reaction, other serious injury, or death. 5. What if there is a serious problem?     An allergic reaction could occur after the vaccinated person leaves the clinic. If you see signs of a severe allergic reaction (hives, swelling of the face and throat, difficulty breathing, a fast heartbeat, dizziness, or weakness), call 9-1-1 and get the person to the nearest hospital.    For other signs that concern you, call your health care provider. Adverse reactions should be reported to the Vaccine Adverse Event Reporting System (VAERS). Your health care provider will usually file this report, or you can do it yourself. Visit the VAERS website at www.vaers. [facility].gov or call 9-549.276.1592. VAERS is only for reporting reactions, and VAERS staff members do not give medical advice. 6. The National Vaccine Injury Compensation Program    The [facility] Vaccine Injury Compensation Program (VICP) is a federal program that was created to compensate people who may have been injured by certain vaccines. Claims regarding alleged injury or death due to vaccination have a time limit for filing, which may be as short as two years. Visit the VICP website at www.[facility].gov/vaccinecompensation or call 9-659.836.1209 to learn about the program and about filing a claim. 7. How can I learn more? Ask your health care provider. Call your local or state health department. Visit the website of the Food and Drug Administration (FDA) for vaccine package inserts and additional information at www.fda.gov/vaccines-blood-biologics/vaccines. Contact the Centers for Disease Control and Prevention (CDC): Call 1-456.104.5870 (1-800-CDC-INFO) or  Visit CDCs influenza website at www.cdc.gov/flu. Vaccine Information Statement   Inactivated Influenza Vaccine   8/6/2021  42 MARTHA Wright Minus [id number]   Department of Health and Human Services  Centers for Disease Control and Prevention    Office Use Only

## 2022-11-03 LAB
ALBUMIN SERPL-MCNC: 3.1 G/DL (ref 3.5–5)
ALBUMIN/GLOB SERPL: 0.8 {RATIO} (ref 1.1–2.2)
ALP SERPL-CCNC: 76 U/L (ref 45–117)
ALT SERPL-CCNC: 19 U/L (ref 12–78)
ANION GAP SERPL CALC-SCNC: 7 MMOL/L (ref 5–15)
AST SERPL-CCNC: 11 U/L (ref 15–37)
BILIRUB SERPL-MCNC: 0.2 MG/DL (ref 0.2–1)
BUN SERPL-MCNC: 38 MG/DL (ref 6–20)
BUN/CREAT SERPL: 21 (ref 12–20)
CALCIUM SERPL-MCNC: 9.7 MG/DL (ref 8.5–10.1)
CHLORIDE SERPL-SCNC: 105 MMOL/L (ref 97–108)
CHOLEST SERPL-MCNC: 199 MG/DL
CK SERPL-CCNC: 51 U/L (ref 26–192)
CO2 SERPL-SCNC: 24 MMOL/L (ref 21–32)
COMMENT, HOLDF: NORMAL
CREAT SERPL-MCNC: 1.85 MG/DL (ref 0.55–1.02)
GLOBULIN SER CALC-MCNC: 4.1 G/DL (ref 2–4)
GLUCOSE SERPL-MCNC: 162 MG/DL (ref 65–100)
HDLC SERPL-MCNC: 43 MG/DL
HDLC SERPL: 4.6 {RATIO} (ref 0–5)
LDLC SERPL CALC-MCNC: 111.6 MG/DL (ref 0–100)
POTASSIUM SERPL-SCNC: 5.1 MMOL/L (ref 3.5–5.1)
PROT SERPL-MCNC: 7.2 G/DL (ref 6.4–8.2)
SAMPLES BEING HELD,HOLD: NORMAL
SODIUM SERPL-SCNC: 136 MMOL/L (ref 136–145)
TRIGL SERPL-MCNC: 222 MG/DL (ref ?–150)
VLDLC SERPL CALC-MCNC: 44.4 MG/DL

## 2022-11-03 RX ORDER — INSULIN GLARGINE 100 [IU]/ML
INJECTION, SOLUTION SUBCUTANEOUS
Qty: 45 ML | Refills: 3 | Status: SHIPPED | OUTPATIENT
Start: 2022-11-03

## 2022-11-03 NOTE — PROGRESS NOTES
Lytes/liver fxn stable. Kidney fxn remains elevated but improved. LDL elevated--not sure if taking rosuva 5 mg daily.  If so, increase to 10, if not will discuss at follow up

## 2022-11-14 RX ORDER — ERGOCALCIFEROL 1.25 MG/1
CAPSULE ORAL
Qty: 13 CAPSULE | Refills: 1 | Status: SHIPPED | OUTPATIENT
Start: 2022-11-14

## 2022-11-15 RX ORDER — ROSUVASTATIN CALCIUM 10 MG/1
10 TABLET, COATED ORAL
Qty: 90 TABLET | Refills: 0 | Status: SHIPPED | OUTPATIENT
Start: 2022-11-15

## 2022-11-15 NOTE — TELEPHONE ENCOUNTER
PCP: Kassy Escobar NP    Last appt: 7/1/2022  Future Appointments   Date Time Provider Marty Bautista   1/11/2023 10:00 AM Bethany Espinoza NP [facility]   1/12/2023  2:20 PM Kassy Escobar NP [facility]       Requested Prescriptions     Signed Prescriptions Disp Refills    rosuvastatin (CRESTOR) 10 mg tablet 90 Tablet 0     Sig: Take 1 Tablet by mouth nightly. For high cholesterol. Indications: high cholesterol     Authorizing Provider: Justin Antony     Ordering User: ERIKA BLANC         Other Comments:  Increase rosuvastatin 10 mg daily per milena murdock.  Verbal order per provider. Order (medication, dose, route, frequency, amount, refills) repeated and verified twice.

## 2022-12-12 ENCOUNTER — CLINICAL SUPPORT (OUTPATIENT)
Dept: FAMILY MEDICINE CLINIC | Age: 82
End: 2022-12-12
Payer: MEDICARE

## 2022-12-12 DIAGNOSIS — E11.21 TYPE 2 DIABETES WITH NEPHROPATHY (HCC): ICD-10-CM

## 2022-12-12 DIAGNOSIS — I49.5 SSS (SICK SINUS SYNDROME) (HCC): ICD-10-CM

## 2022-12-12 DIAGNOSIS — Z95.810 AICD (AUTOMATIC CARDIOVERTER/DEFIBRILLATOR) PRESENT: ICD-10-CM

## 2022-12-12 DIAGNOSIS — N18.4 CKD (CHRONIC KIDNEY DISEASE) STAGE 4, GFR 15-29 ML/MIN (HCC): ICD-10-CM

## 2022-12-12 DIAGNOSIS — E78.00 HYPERCHOLESTEROLEMIA: ICD-10-CM

## 2022-12-12 DIAGNOSIS — I10 ESSENTIAL HYPERTENSION: ICD-10-CM

## 2022-12-12 DIAGNOSIS — I48.0 PAF (PAROXYSMAL ATRIAL FIBRILLATION) (HCC): ICD-10-CM

## 2022-12-12 DIAGNOSIS — N39.0 URINARY TRACT INFECTION WITHOUT HEMATURIA, SITE UNSPECIFIED: Primary | ICD-10-CM

## 2022-12-12 DIAGNOSIS — I50.22 CHRONIC SYSTOLIC CONGESTIVE HEART FAILURE (HCC): ICD-10-CM

## 2022-12-12 DIAGNOSIS — I42.0 DILATED CARDIOMYOPATHY (HCC): ICD-10-CM

## 2022-12-12 DIAGNOSIS — I49.3 PVC'S (PREMATURE VENTRICULAR CONTRACTIONS): ICD-10-CM

## 2022-12-12 LAB
BILIRUB UR QL STRIP: NEGATIVE
GLUCOSE UR-MCNC: NORMAL MG/DL
KETONES P FAST UR STRIP-MCNC: NEGATIVE MG/DL
PH UR STRIP: 5.5 [PH] (ref 4.6–8)
PROT UR QL STRIP: NORMAL
SP GR UR STRIP: 1.02 (ref 1–1.03)
UA UROBILINOGEN AMB POC: NORMAL (ref 0.2–1)
URINALYSIS CLARITY POC: NORMAL
URINALYSIS COLOR POC: YELLOW
URINE BLOOD POC: NORMAL
URINE LEUKOCYTES POC: NORMAL
URINE NITRITES POC: NEGATIVE

## 2022-12-12 PROCEDURE — 81003 URINALYSIS AUTO W/O SCOPE: CPT

## 2022-12-12 NOTE — PROGRESS NOTES
Urine dropped off for UA and culture for Daniele David NP [address] Fax # [phone].  UA resulted and faxed to ordering Daniele David NP

## 2022-12-14 LAB
BACTERIA SPEC CULT: ABNORMAL
CC UR VC: ABNORMAL
SERVICE CMNT-IMP: ABNORMAL

## 2023-01-04 PROBLEM — I73.9 PVD (PERIPHERAL VASCULAR DISEASE) (HCC): Status: ACTIVE | Noted: 2023-01-04

## 2023-01-16 ENCOUNTER — HOSPITAL ENCOUNTER (OUTPATIENT)
Dept: GENERAL RADIOLOGY | Age: 83
Discharge: HOME | End: 2023-01-16
Attending: NURSE PRACTITIONER
Payer: MEDICARE

## 2023-01-16 ENCOUNTER — OFFICE VISIT (OUTPATIENT)
Dept: CARDIOLOGY CLINIC | Age: 83
End: 2023-01-16
Payer: MEDICARE

## 2023-01-16 ENCOUNTER — TELEPHONE (OUTPATIENT)
Dept: CARDIOLOGY CLINIC | Age: 83
End: 2023-01-16

## 2023-01-16 VITALS
RESPIRATION RATE: 16 BRPM | BODY MASS INDEX: 25.71 KG/M2 | DIASTOLIC BLOOD PRESSURE: 88 MMHG | OXYGEN SATURATION: 95 % | WEIGHT: 160 LBS | TEMPERATURE: 98.4 F | SYSTOLIC BLOOD PRESSURE: 138 MMHG | HEART RATE: 84 BPM | HEIGHT: 66 IN

## 2023-01-16 DIAGNOSIS — E78.2 MIXED HYPERLIPIDEMIA: ICD-10-CM

## 2023-01-16 DIAGNOSIS — N18.4 CKD (CHRONIC KIDNEY DISEASE), STAGE IV (HCC): ICD-10-CM

## 2023-01-16 DIAGNOSIS — I50.22 CHRONIC SYSTOLIC CONGESTIVE HEART FAILURE (HCC): ICD-10-CM

## 2023-01-16 DIAGNOSIS — M79.89 LEG SWELLING: ICD-10-CM

## 2023-01-16 DIAGNOSIS — R06.02 SOB (SHORTNESS OF BREATH): ICD-10-CM

## 2023-01-16 DIAGNOSIS — I42.0 DILATED CARDIOMYOPATHY (HCC): ICD-10-CM

## 2023-01-16 DIAGNOSIS — I48.0 PAF (PAROXYSMAL ATRIAL FIBRILLATION) (HCC): Primary | ICD-10-CM

## 2023-01-16 DIAGNOSIS — I10 BENIGN ESSENTIAL HTN: ICD-10-CM

## 2023-01-16 PROCEDURE — 1090F PRES/ABSN URINE INCON ASSESS: CPT | Performed by: NURSE PRACTITIONER

## 2023-01-16 PROCEDURE — G8399 PT W/DXA RESULTS DOCUMENT: HCPCS | Performed by: NURSE PRACTITIONER

## 2023-01-16 PROCEDURE — 3075F SYST BP GE 130 - 139MM HG: CPT | Performed by: NURSE PRACTITIONER

## 2023-01-16 PROCEDURE — G8417 CALC BMI ABV UP PARAM F/U: HCPCS | Performed by: NURSE PRACTITIONER

## 2023-01-16 PROCEDURE — G8536 NO DOC ELDER MAL SCRN: HCPCS | Performed by: NURSE PRACTITIONER

## 2023-01-16 PROCEDURE — 99214 OFFICE O/P EST MOD 30 MIN: CPT | Performed by: NURSE PRACTITIONER

## 2023-01-16 PROCEDURE — 1123F ACP DISCUSS/DSCN MKR DOCD: CPT | Performed by: NURSE PRACTITIONER

## 2023-01-16 PROCEDURE — G9717 DOC PT DX DEP/BP F/U NT REQ: HCPCS | Performed by: NURSE PRACTITIONER

## 2023-01-16 PROCEDURE — 3079F DIAST BP 80-89 MM HG: CPT | Performed by: NURSE PRACTITIONER

## 2023-01-16 PROCEDURE — 71046 X-RAY EXAM CHEST 2 VIEWS: CPT

## 2023-01-16 PROCEDURE — 1101F PT FALLS ASSESS-DOCD LE1/YR: CPT | Performed by: NURSE PRACTITIONER

## 2023-01-16 PROCEDURE — G8427 DOCREV CUR MEDS BY ELIG CLIN: HCPCS | Performed by: NURSE PRACTITIONER

## 2023-01-16 RX ORDER — MIRABEGRON 25 MG/1
25 TABLET, FILM COATED, EXTENDED RELEASE ORAL DAILY
COMMUNITY
Start: 2023-01-02

## 2023-01-16 NOTE — PROGRESS NOTES
Identified pt with two pt identifiers(name and ). Reviewed record in preparation for visit and have obtained necessary documentation. Chief Complaint   Patient presents with    Irregular Heart Beat     6 month follow up    Cardiomyopathy    CHF    Cholesterol Problem    Surgical Clearance     Cystoscopy - cardiac clearance needed. [facility]       Vitals:    23 0854   BP: 138/88   Pulse: 84   Resp: 16   Temp: 98.4 °F (36.9 °C)   TempSrc: Temporal   SpO2: 95%   Weight: 160 lb (72.6 kg)   Height: 5' 6\" (1.676 m)   PainSc:   5       Medications reviewed/approved by provider. Health Maintenance Review: Patient reminded of \"due or due soon\" health maintenance. I have asked the patient to contact his/her primary care provider (PCP) for follow-up on his/her health maintenance. Coordination of Care Questionnaire:  :   1) Have you been to an emergency room, urgent care, or hospitalized since your last visit? If yes, where when, and reason for visit? no       2. Have seen or consulted any other health care provider since your last visit? If yes, where when, and reason for visit? NO      Patient is accompanied by adult caretaker I have received verbal consent from Denise Dotson to discuss any/all medical information while they are present in the room.

## 2023-01-16 NOTE — PATIENT INSTRUCTIONS
Will increase Furosemide from 40 mg daily to 40 mg BID x 4 days then go back to daily dosing. Repeat BMP next week.     Check CXR at [facility]  Echo at [facility]

## 2023-01-16 NOTE — PROGRESS NOTES
Lenin Greenwood is a 79 y.o. female is here for follow up. Pmhx SSS, DM, IBS, HLD, COPD, PAF, CHF. Previously seen by Dr Viridiana Hess in 9/2021:  Multiple medical problems including DM II, hypertension, CKD III, dyslipidemia, DJD, lumbar stenosis/radiculopathy, chronic pain, tobacco, leukocytosis, PAF during hospitalization with encephalopathy 10/17 (at [facility]), followed by Radha Tavarez at [facility]. Hospitalized 9/1-9/7/21 with:  Acute respiratory failure with hypoxia POA- improved s/p pleural tap,off oxygen now  Acute on chronic congestive heart failure proBNP 35,000- cont lasix at 20 mg daily for now due to IVAN for now, outpatient followup with Dr Viridiana Hess soon  Echocardiogram 8/20/2021 at [facility]  shows EF of 45 to 50% with severe grade 3 diastolic dysfunction. Troponinemia likely type B demand secondary to fluid overload- remains flat  Leukocytosis with eosinophilia POA- now down to 14k, outpatient followup with Pulm associates as recommended  B/L Pleural effusion POA- s/p R thoracentesis (~1L), neg for cytology, neg for bacteria, neg for AFB, exudative  history of MSSA on Dapto IV till  9/2/2021 at other hospital before transfer here- taken off all Abx per ID, therapy completed now  Chronic kidney disease POA- Cr down to 2.0, resume lower dose of Lasix after 48 hrs as discussed    Seen by me in  in April 2022. At that time blood pressure was high and hydralazine was added. Today blood pressure is normal.    Seen by Dr Julius Gooden 7/1/202  reports stable sob, continues to smoke half a pack per day. No cp. Bp running high. Taking medications  As prescribed. Has some ankle swelling but over all improved. Today    Still smoking about half a pack per day,  reports sob,  slightly worse than 6  mos. She used to be able to walk better 6 mos ago without getting too short winded. Family is concerned about fluid build up around her heart.       C/o bilateral leg swelling, wt gain 3 lbs since July.     The patient denies  orthopnea, PND,  palpitations, syncope, presyncope or fatigue.          Patient Active Problem List    Diagnosis Date Noted    PVD (peripheral vascular disease) ([facility] 01/04/2023    Palliative care encounter 09/03/2021    Leukocytosis 09/01/2021    Pleural effusion 09/01/2021    CKD (chronic kidney disease) 09/01/2021    Acute CHF (congestive heart failure) ([facility] 09/01/2021    Anemia of infection and chronic disease 08/26/2021    COPD (chronic obstructive pulmonary disease) ([facility] 08/26/2021    Depression 08/26/2021    Pneumonia 08/23/2021    Sepsis ([facility] 07/16/2021    Type 2 MI (myocardial infarction) ([facility] 56/51/5744    Systolic heart failure, chronic (HCC) 07/16/2021    Moderate episode of recurrent major depressive disorder ([facility] 06/14/2021    Dilated cardiomyopathy ([facility] 09/23/2020    SSS (sick sinus syndrome) ([facility] 09/23/2020    Cardiomyopathy ([facility] 09/23/2020    AICD (automatic cardioverter/defibrillator) present 09/23/2020    Respiratory failure ([facility] 09/18/2020    CHF (congestive heart failure) ([facility] 09/18/2020    Hypoxia 09/18/2020    Bilateral pleural effusion 09/18/2020    Elevated troponin 09/18/2020    PAF (paroxysmal atrial fibrillation) ([facility] 09/10/2020    CKD (chronic kidney disease) stage 3, GFR 30-59 ml/min ([facility]) 06/04/2019    Lymphocytosis 05/07/2018    Type 2 diabetes with nephropathy ([facility] 02/15/2018    Type 2 diabetes mellitus with diabetic neuropathy ([facility] 02/15/2018    Spinal stenosis of lumbar region with neurogenic claudication 11/17/2017    Spondylosis of lumbosacral region without myelopathy or radiculopathy 11/17/2017    Overdose of opiate or related narcotic, accidental or unintentional, subsequent encounter 10/27/2017    Acute left-sided low back pain with sciatica 10/27/2017    Physical debility 10/27/2017    Acute encephalopathy 10/23/2017    Hyperkalemia 10/20/2017    Altered mental status 10/20/2017    Transaminitis 10/20/2017    Acute renal failure (ARF) ([facility] 10/20/2017    Diverticulitis large intestine w/o perforation or abscess w/o bleeding 07/06/2017    SOB (shortness of breath) 06/21/2017    Abdominal pain, generalized 06/21/2017    Diarrhea in adult patient 06/21/2017    Cigarette smoker 93/36/1804    Neutrophilic leukocytosis 12/28/2100    Chronic pain     Hypercholesterolemia     Arthritis     Diabetes (HCC)     IBS (irritable bowel syndrome)     Hemorrhoid       Cora Blancas NP  Past Medical History:   Diagnosis Date    A-fib [facility])     AICD (automatic cardioverter/defibrillator) present 9/23/2020 9/23/2020 [employer] AICD implant    Arthritis     Bilateral pleural effusion 9/18/2020    Chronic pain     Chronic pain     Diabetes ([facility]     Diverticular disease     Elevated troponin 9/18/2020    Hemorrhoid     Hypercholesterolemia     IBS (irritable bowel syndrome)     Lymphocytosis 99/95/4732    Systolic heart failure, chronic ([facility] 7/16/2021    Type 2 MI (myocardial infarction) ([facility] 7/16/2021 7/162021 r/t sepsis      Past Surgical History:   Procedure Laterality Date    COLONOSCOPY N/A 10/31/2019    COLONOSCOPY performed by Anthony Villareal MD at [address]  10/31/2019         COLONOSCOPY,PAKO Jacobson  10/31/2019         HX CATARACT REMOVAL      HX CHOLECYSTECTOMY      HX COLONOSCOPY  2009    HX COLONOSCOPY  2016    2 adenomatous polyp    HX HEMORRHOIDECTOMY  2009    HX HYSTERECTOMY      HX KNEE REPLACEMENT      right    HX ORTHOPAEDIC  12/11/2017    back, laminectomy and decompression    AL INSJ/RPLCMT PERM DFB W/TRNSVNS LDS 1/DUAL CHMBR N/A 9/23/2020    INSERT ICD DUAL performed by Tanya Tucker MD at [facility] CARDIAC CATH LAB     Allergies   Allergen Reactions    Cefazolin Rash     Possible kidney failure/AIN    Morphine Anaphylaxis    Ultram [Tramadol] Palpitations      Family History   Problem Relation Age of Onset    Colon Cancer Father     Diabetes Mother       Social History     Socioeconomic History    Marital status:      Spouse name: Not on file    Number of children: Not on file    Years of education: Not on file    Highest education level: Not on file   Occupational History    Occupation: retired     Comment: LPN   Tobacco Use    Smoking status: Every Day     Packs/day: 0.50     Years: 60.00     Pack years: 30.00     Types: Cigarettes    Smokeless tobacco: Never   Vaping Use    Vaping Use: Never used   Substance and Sexual Activity    Alcohol use: No    Drug use: Never    Sexual activity: Not Currently   Other Topics Concern     Service No    Blood Transfusions Yes    Caffeine Concern Yes    Occupational Exposure No    Hobby Hazards No    Sleep Concern Yes    Stress Concern Yes    Weight Concern No    Special Diet No    Back Care Yes    Exercise No    Bike Helmet No     Comment: n/a    Seat Belt Yes    Self-Exams Yes   Social History Narrative    Not on file     Social Determinants of Health     Financial Resource Strain: Not on file   Food Insecurity: Not on file   Transportation Needs: Not on file   Physical Activity: Not on file   Stress: Not on file   Social Connections: Not on file   Intimate Partner Violence: Not on file   Housing Stability: Not on file      Current Outpatient Medications   Medication Sig    Myrbetriq 25 mg ER tablet Take 25 mg by mouth daily. rosuvastatin (CRESTOR) 10 mg tablet Take 1 Tablet by mouth nightly. For high cholesterol.   Indications: high cholesterol    Vitamin D2 1,250 mcg (50,000 unit) capsule TAKE 1 CAPSULE BY MOUTH  EVERY 7 DAYS FOR LOW  VITAMIN DAY LEVELS    Lansusan Bautistaar U-100 Insulin 100 unit/mL (3 mL) inpn INJECT SUBCUTANEOUSLY 45  UNITS DAILY    furosemide (LASIX) 40 mg tablet TAKE 1 TABLET BY MOUTH  TWICE DAILY    hydrALAZINE (APRESOLINE) 10 mg tablet TAKE 1 TABLET BY MOUTH  TWICE DAILY    carvediloL (COREG) 25 mg tablet TAKE 1 TABLET BY MOUTH  TWICE DAILY    venlafaxine (EFFEXOR) 75 mg tablet TAKE ONE-HALF TABLET BY  MOUTH TWICE DAILY ferrous sulfate 325 mg (65 mg iron) tablet Take 1 Tablet by mouth daily (with breakfast). b complex vitamins tablet Take 1 Tablet by mouth daily. nitroglycerin (NITROSTAT) 0.4 mg SL tablet 1 Tab by SubLINGual route every five (5) minutes as needed for Chest Pain. Up to 3 doses. colestipoL (Colestid) 1 gram tablet Take 1 Tab by mouth two (2) times a day. aspirin delayed-release 81 mg tablet Take 1 Tab by mouth daily. acetaminophen (TYLENOL) 650 mg TbER Take 650 mg by mouth every eight (8) hours. omega-3 fatty acids-vitamin e 1,000 mg cap Take 1 Cap by mouth two (2) times a day. 32a day     ascorbic acid, vitamin C, (VITAMIN C) 500 mg tablet Take 1,000 mg by mouth daily. zinc 50 mg tab tablet Take 50 mg by mouth daily. No current facility-administered medications for this visit. Review of Symptoms:    CONST  No weight change. No fever, chills, sweats    ENT No visual changes, URI sx, sore throat    CV  See HPI   RESP  No cough, or sputum, wheezing. Also see HPI   GI  No abdominal pain or change in bowel habits. No heartburn or dysphagia. No melena or rectal bleeding.   No dysuria, urgency, frequency, hematuria   MSKEL  No joint pain, swelling. No muscle pain. SKIN  No rash or lesions. NEURO  No headache, syncope, or seizure. No weakness, loss of sensation, or paresthesias. PSYCH  No low mood or depression  No anxiety. HE/LYMPH  No easy bruising, abnormal bleeding, or enlarged glands. Physical ExamPhysical Exam:    Visit Vitals  /88 (BP 1 Location: Left upper arm, BP Patient Position: Sitting, BP Cuff Size: Adult)   Pulse 84   Temp 98.4 °F (36.9 °C) (Temporal)   Resp 16   Ht 5' 6\" (1.676 m)   Wt 160 lb (72.6 kg)   SpO2 95% Comment: ra   BMI 25.82 kg/m²       Gen: NAD  HEENT:  PERRL, throat clear  Neck: no adenopathy, no thyromegaly, no JVD   Heart:  Regular,Nl S1S2,  no murmur, gallop or rub.    Lungs:  bibasilar crackles / wheeze  Abdomen:   Soft, non-tender, bowel sounds are active.    Extremities:  + 2  Pulse: symmetric  Neuro: A&O times 3, No focal neuro deficits    Cardiographics  NSR with 1st degree AV block  Occ PVCs, NS changed    Labs:   Lab Results   Component Value Date/Time    Sodium 136 11/02/2022 07:35 AM    Sodium 131 (L) 06/01/2022 11:25 PM    Sodium 133 (L) 04/11/2022 03:00 PM    Sodium 135 (L) 04/01/2022 08:54 AM    Sodium 136 09/07/2021 02:16 AM    Potassium 5.1 11/02/2022 07:35 AM    Potassium 5.1 06/01/2022 11:25 PM    Potassium 4.9 04/11/2022 03:00 PM    Potassium 6.0 (H) 04/01/2022 08:54 AM    Potassium 4.3 09/07/2021 02:16 AM    Chloride 105 11/02/2022 07:35 AM    Chloride 99 06/01/2022 11:25 PM    Chloride 102 04/11/2022 03:00 PM    Chloride 104 04/01/2022 08:54 AM    Chloride 106 09/07/2021 02:16 AM    CO2 24 11/02/2022 07:35 AM    CO2 20 (L) 06/01/2022 11:25 PM    CO2 22 04/11/2022 03:00 PM    CO2 25 04/01/2022 08:54 AM    CO2 22 09/07/2021 02:16 AM    Anion gap 7 11/02/2022 07:35 AM    Anion gap 12 06/01/2022 11:25 PM    Anion gap 9 04/11/2022 03:00 PM    Anion gap 6 04/01/2022 08:54 AM    Anion gap 8 09/07/2021 02:16 AM    Glucose 162 (H) 11/02/2022 07:35 AM    Glucose 344 (H) 08/18/2022 01:46 PM    Glucose 174 (H) 06/01/2022 11:25 PM    Glucose 203 (H) 04/11/2022 03:00 PM    Glucose 128 (H) 04/01/2022 08:54 AM    BUN 38 (H) 11/02/2022 07:35 AM    BUN 40 (H) 06/01/2022 11:25 PM    BUN 28 (H) 04/11/2022 03:00 PM    BUN 37 (H) 04/01/2022 08:54 AM    BUN 64 (H) 09/07/2021 02:16 AM    Creatinine 1.85 (H) 11/02/2022 07:35 AM    Creatinine 1.86 (H) 06/01/2022 11:25 PM    Creatinine 1.93 (H) 04/11/2022 03:00 PM    Creatinine 1.77 (H) 04/01/2022 08:54 AM    Creatinine 2.08 (H) 09/07/2021 02:16 AM    BUN/Creatinine ratio 21 (H) 11/02/2022 07:35 AM    BUN/Creatinine ratio 22 (H) 06/01/2022 11:25 PM    BUN/Creatinine ratio 15 04/11/2022 03:00 PM    BUN/Creatinine ratio 21 (H) 04/01/2022 08:54 AM    BUN/Creatinine ratio 31 (H) 09/07/2021 02:16 AM    GFR est AA 31 (L) 06/01/2022 11:25 PM    GFR est AA 30 (L) 04/11/2022 03:00 PM    GFR est AA 33 (L) 04/01/2022 08:54 AM    GFR est AA 28 (L) 09/07/2021 02:16 AM    GFR est AA 25 (L) 09/06/2021 12:46 AM    GFR est non-AA 26 (L) 06/01/2022 11:25 PM    GFR est non-AA 25 (L) 04/11/2022 03:00 PM    GFR est non-AA 27 (L) 04/01/2022 08:54 AM    GFR est non-AA 23 (L) 09/07/2021 02:16 AM    GFR est non-AA 21 (L) 09/06/2021 12:46 AM    Calcium 9.7 11/02/2022 07:35 AM    Calcium 8.9 06/01/2022 11:25 PM    Calcium 9.6 04/11/2022 03:00 PM    Calcium 10.0 04/01/2022 08:54 AM    Calcium 9.3 09/07/2021 02:16 AM    Bilirubin, total 0.2 11/02/2022 07:35 AM    Bilirubin, total 0.3 06/01/2022 11:25 PM    Bilirubin, total 0.3 08/30/2021 07:30 PM    Bilirubin, total 0.1 (L) 08/26/2021 05:21 AM    Bilirubin, total 0.3 08/25/2021 05:45 AM    Alk. phosphatase 76 11/02/2022 07:35 AM    Alk. phosphatase 79 06/01/2022 11:25 PM    Alk. phosphatase 77 08/30/2021 07:30 PM    Alk. phosphatase 79 08/26/2021 05:21 AM    Alk.  phosphatase 78 08/25/2021 05:45 AM    Protein, total 7.2 11/02/2022 07:35 AM    Protein, total 6.8 06/01/2022 11:25 PM    Protein, total 7.6 08/30/2021 07:30 PM    Protein, total 6.9 08/26/2021 05:21 AM    Protein, total 7.0 08/25/2021 05:45 AM    Albumin 3.1 (L) 11/02/2022 07:35 AM    Albumin 2.7 (L) 06/01/2022 11:25 PM    Albumin 2.3 (L) 08/30/2021 07:30 PM    Albumin 1.7 (L) 08/26/2021 05:21 AM    Albumin 1.8 (L) 08/25/2021 05:45 AM    Globulin 4.1 (H) 11/02/2022 07:35 AM    Globulin 4.1 (H) 06/01/2022 11:25 PM    Globulin 5.3 (H) 08/30/2021 07:30 PM    Globulin 5.2 (H) 08/26/2021 05:21 AM    Globulin 5.2 (H) 08/25/2021 05:45 AM    A-G Ratio 0.8 (L) 11/02/2022 07:35 AM    A-G Ratio 0.7 (L) 06/01/2022 11:25 PM    A-G Ratio 0.4 (L) 08/30/2021 07:30 PM    A-G Ratio 0.3 (L) 08/26/2021 05:21 AM    A-G Ratio 0.3 (L) 08/25/2021 05:45 AM    ALT (SGPT) 19 11/02/2022 07:35 AM    ALT (SGPT) 18 06/01/2022 11:25 PM    ALT (SGPT) 22 08/30/2021 07:30 PM    ALT (SGPT) 26 08/26/2021 05:21 AM    ALT (SGPT) 26 08/25/2021 05:45 AM     Lab Results   Component Value Date/Time    CK 51 11/02/2022 07:35 AM     Lab Results   Component Value Date/Time    Cholesterol, total 199 11/02/2022 07:35 AM    Cholesterol, total 243 (H) 06/14/2021 09:17 PM    Cholesterol, total 198 10/29/2020 10:22 AM    Cholesterol, total 185 06/29/2020 12:03 PM    Cholesterol, total 217 (H) 02/03/2020 03:52 PM    HDL Cholesterol 43 11/02/2022 07:35 AM    HDL Cholesterol 42 06/14/2021 09:17 PM    HDL Cholesterol 35 (L) 10/29/2020 10:22 AM    HDL Cholesterol 36 (L) 06/29/2020 12:03 PM    HDL Cholesterol 44 02/03/2020 03:52 PM    LDL,Direct 137 (H) 06/14/2021 09:17 PM    LDL, calculated 111.6 (H) 11/02/2022 07:35 AM    LDL, calculated Not calculated due to elevated triglyceride level 06/14/2021 09:17 PM    LDL, calculated 110 (H) 10/29/2020 10:22 AM    LDL, calculated Comment 06/29/2020 12:03 PM    LDL, calculated 106 (H) 02/03/2020 03:52 PM    LDL, calculated 75 06/04/2019 03:57 PM    Triglyceride 222 (H) 11/02/2022 07:35 AM    Triglyceride 493 (H) 06/14/2021 09:17 PM    Triglyceride 305 (H) 10/29/2020 10:22 AM    Triglyceride 412 (H) 06/29/2020 12:03 PM    Triglyceride 336 (H) 02/03/2020 03:52 PM    CHOL/HDL Ratio 4.6 11/02/2022 07:35 AM    CHOL/HDL Ratio 5.8 (H) 06/14/2021 09:17 PM     No results found for this or any previous visit.     Assessment:         Patient Active Problem List    Diagnosis Date Noted    PVD (peripheral vascular disease) ([facility] 01/04/2023    Palliative care encounter 09/03/2021    Leukocytosis 09/01/2021    Pleural effusion 09/01/2021    CKD (chronic kidney disease) 09/01/2021    Acute CHF (congestive heart failure) ([facility] 09/01/2021    Anemia of infection and chronic disease 08/26/2021    COPD (chronic obstructive pulmonary disease) ([facility] 08/26/2021    Depression 08/26/2021    Pneumonia 08/23/2021    Sepsis ([facility] 07/16/2021    Type 2 MI (myocardial infarction) ([facility] 51/67/6458    Systolic heart failure, chronic ([facility] 07/16/2021    Moderate episode of recurrent major depressive disorder ([facility] 06/14/2021    Dilated cardiomyopathy ([facility] 09/23/2020    SSS (sick sinus syndrome) ([facility] 09/23/2020    Cardiomyopathy ([facility] 09/23/2020    AICD (automatic cardioverter/defibrillator) present 09/23/2020    Respiratory failure ([facility] 09/18/2020    CHF (congestive heart failure) ([facility] 09/18/2020    Hypoxia 09/18/2020    Bilateral pleural effusion 09/18/2020    Elevated troponin 09/18/2020    PAF (paroxysmal atrial fibrillation) ([facility] 09/10/2020    CKD (chronic kidney disease) stage 3, GFR 30-59 ml/min ([facility] 06/04/2019    Lymphocytosis 05/07/2018    Type 2 diabetes with nephropathy ([facility] 02/15/2018    Type 2 diabetes mellitus with diabetic neuropathy ([facility] 02/15/2018    Spinal stenosis of lumbar region with neurogenic claudication 11/17/2017    Spondylosis of lumbosacral region without myelopathy or radiculopathy 11/17/2017    Overdose of opiate or related narcotic, accidental or unintentional, subsequent encounter 10/27/2017    Acute left-sided low back pain with sciatica 10/27/2017    Physical debility 10/27/2017    Acute encephalopathy 10/23/2017    Hyperkalemia 10/20/2017    Altered mental status 10/20/2017    Transaminitis 10/20/2017    Acute renal failure (ARF) ([facility] 10/20/2017    Diverticulitis large intestine w/o perforation or abscess w/o bleeding 07/06/2017    SOB (shortness of breath) 06/21/2017    Abdominal pain, generalized 06/21/2017    Diarrhea in adult patient 06/21/2017    Cigarette smoker 45/70/5071    Neutrophilic leukocytosis 57/52/1276    Chronic pain     Hypercholesterolemia     Arthritis     Diabetes (HCC)     IBS (irritable bowel syndrome)     Hemorrhoid      Multiple medical problems including DM II, hypertension, CKD III, dyslipidemia, DJD, lumbar stenosis/radiculopathy, chronic pain, tobacco, leukocytosis, PAF during hospitalization with encephalopathy 10/17 (at [address] [facility]), followed by Andrew Lee at [facility]. Seen OV 6/29/20 with brief intermittent CP--atypical, EKG abnormal with NSST and sent for Lexiscan MPI 7/29/20:  Baseline ECG: Normal EKG, PACs, non-specific ST-T wave abnormalities. Inconclusive stress test.  EKG stress test results correlate with an intermediate risk of inducible myocardial ischemia. Non-limiting dyspnea, no EKG changes  Nuclear images reported seperately  FINDINGS: The rest and stress perfusion images a fixed defect in the inferior wall compatible with infarct. No reversible abnormality is seen to suggest myocardium at ischemic risk. The gated images demonstrate global hypokinesia and inferior akinesia. Left ventricular ejection fraction is 31 %. Impression: Fixed defect in the inferior wall is compatible with infarct. No evidence of myocardium at ischemic risk. Left ventricular ejection fraction is 31 %. Global hypokinesia and inferior akinesia. +STANTNO, some fatigue, brief intermittent chest pain--not clearly exertional.  No recent Echo, had prior Echo 10/17 with LVEF 55%. No recurrence of afib known since 10/17. Seen in office here 9/10/20 with CHF--cardizem changed to beta blocker, on ARB, lasix. Developed worsening SOB, sx of CHF--admitted to [address] [place] [address], BNP elevated, trop mildly increased. Seen by Cardiology in hospital, diurested, meds adjusted. Repeat cardiac testing:      Had AICD/PPM placed by Dr. Edison Galloway on 9/19/41 without complication. Seen by Dr Edison Galloway in f/u on 2/23/21 with PPM check ok., last seen here March 2021. Hospitalized 9/1-9/7/21 with:  Acute respiratory failure with hypoxia POA- improved s/p pleural tap,off oxygen now  Acute on chronic congestive heart failure proBNP 35,000- cont lasix at 20 mg daily for now due to IVAN for now, outpatient followup with Dr Mary Spencer soon  Echocardiogram 8/20/2021 at [facility]  shows EF of 45 to 50% with severe grade 3 diastolic dysfunction.   Troponinemia likely type B demand secondary to fluid overload- remains flat  Leukocytosis with eosinophilia POA- now down to 14k, outpatient followup with Pulm associates as recommended  B/L Pleural effusion POA- s/p R thoracentesis (~1L), neg for cytology, neg for bacteria, neg for AFB, exudative  history of MSSA on Dapto IV till  9/2/2021 at other hospital before transfer here- taken off all Abx per ID, therapy completed now  Chronic kidney disease POA- Cr down to 2.0, resume lower dose of Lasix after 48 hrs as discussed  Currently stable--mild STANTON. The patient denies chest pain, orthopnea, PND, LE edema, palpitations, syncope, presyncope or fatigue. Doc Retia Pipes 9/2021  Doing well with no adverse cardiac symptoms currently  Fu with Pulmonary as planned (bronch/bx)  Fu with PCP as planned  Can take extra lasix 20 if weight up 3-4 labs, edema. Lipids and labs followed by PCP. Plan:       CM  ICD explanted 7/21/21 by Dr Cornelia Paget  Hx AICD/PPM placed by Dr. Jonathan Mabry on 9/23/20  Most recent EF 45-50% mod dilated LA grade 3 DD per echo in 8/2021, therefore no longer needs ICD-explained why to family today   Prev EF 25-30% mod MR per echo in 9/2020  Wt today 163 lbs, + 2 pitting edema  Continue Carvedilol 25 mg BID  Will increase Furosemide 40 mg daily to 40 mg BID x 4 days then go back to daily dosing. Repeat BMP next week. No ace-I/arb d/t IVAN  Repeat echo now  Check CXR today    Hx brief intermittent CP--atypical, EKG abnormal with NSST and sent for Radha Beaulieu MPI 7/29/20:  Jeraldene Ends 7/2020 and 9/2020 showed  fixed defect in the inferior wall compatible with infarct. HTN  Now controlled after adding hydralazine 10 mg BID in March 2022  Continue current therapy  The patient will monitor BP at home and call if generally >140/85.     Elevated TG/HLD  11/2022   Rosuva was increased to 10 mg since  Check labs Feb 2023, will order at follow up    DM  On Insulin regimen    CKD IV  Serum Cr 1.85 in 11/2022  Serum Cr 2.08 in 9/2021  Improved 6/2022 to 1.8  Refer to Dr Karoline Ugarte     COPD  Current 0.5 PPD smoker  No intention on quitting, but she has made progress, encouraged to use nicotine lozenges-5 minutes spent on smoking cessation. Followed by Dr Zi Toussaint    Hx Dizziness July 1, 2022:  Recall This was clearly worse when she laid down on the table.  Orthostatics today negative Suspect inner ear etiology-I advised her to see ENT    Cardiac clearance for cystoscopy  Pending until testing complete    Continue current care and f/u in 2 weeks     Edwar Whitman NP

## 2023-01-16 NOTE — TELEPHONE ENCOUNTER
----- Message from Jaycob Young NP sent at 1/16/2023 11:23 AM EST -----  CXR showed some fluid build up in her lung (right) but cannot completely rule out  Developing pneumonia. She has no fever / no cough etc when seen today so continue with plan of increasing lasix, see if sob improves. Will see her in 2 weeks for follow up. Any fever or increasing sob, will need to go to ED for urgent work up.

## 2023-01-16 NOTE — PROGRESS NOTES
CXR showed some fluid build up in her lung (right) but cannot completely rule out  Developing pneumonia. She has no fever / no cough etc when seen today so continue with plan of increasing lasix, see if sob improves. Will see her in 2 weeks for follow up. Any fever or increasing sob, will need to go to ED for urgent work up.

## 2023-01-20 ENCOUNTER — DOCUMENTATION ONLY (OUTPATIENT)
Dept: CARDIOLOGY CLINIC | Age: 83
End: 2023-01-20

## 2023-01-20 NOTE — PROGRESS NOTES
Spoke with patient and daughter,  taking lasix 40 mg BID. Wt / leg swelling unchanged. Will increase to lasix 80 mg in am and 40 mg in PM x 4 days then go back to 40 mg BID. Will repeat labwork on 1/24/2023.

## 2023-01-21 RX ORDER — ATORVASTATIN CALCIUM 20 MG/1
20 TABLET, FILM COATED ORAL DAILY
Qty: 90 TABLET | Refills: 1 | Status: SHIPPED | OUTPATIENT
Start: 2023-01-21

## 2023-01-24 ENCOUNTER — TELEPHONE (OUTPATIENT)
Dept: CARDIOLOGY CLINIC | Age: 83
End: 2023-01-24

## 2023-01-24 ENCOUNTER — HOSPITAL ENCOUNTER (OUTPATIENT)
Dept: LAB | Age: 83
Discharge: HOME | End: 2023-01-24

## 2023-01-24 ENCOUNTER — APPOINTMENT (OUTPATIENT)
Dept: GENERAL RADIOLOGY | Age: 83
End: 2023-01-24
Attending: EMERGENCY MEDICINE
Payer: MEDICARE

## 2023-01-24 ENCOUNTER — HOSPITAL ENCOUNTER (OUTPATIENT)
Dept: ULTRASOUND IMAGING | Age: 83
Discharge: HOME | End: 2023-01-24
Attending: NURSE PRACTITIONER
Payer: MEDICARE

## 2023-01-24 ENCOUNTER — HOSPITAL ENCOUNTER (EMERGENCY)
Age: 83
Discharge: HOME | End: 2023-01-24
Attending: EMERGENCY MEDICINE
Payer: MEDICARE

## 2023-01-24 VITALS
HEIGHT: 66 IN | RESPIRATION RATE: 16 BRPM | OXYGEN SATURATION: 96 % | DIASTOLIC BLOOD PRESSURE: 89 MMHG | BODY MASS INDEX: 25.88 KG/M2 | HEART RATE: 69 BPM | SYSTOLIC BLOOD PRESSURE: 165 MMHG | WEIGHT: 161 LBS | TEMPERATURE: 97.8 F

## 2023-01-24 DIAGNOSIS — I42.0 DILATED CARDIOMYOPATHY (HCC): ICD-10-CM

## 2023-01-24 DIAGNOSIS — N18.9 CHRONIC KIDNEY DISEASE, UNSPECIFIED CKD STAGE: ICD-10-CM

## 2023-01-24 DIAGNOSIS — M79.89 LEG SWELLING: ICD-10-CM

## 2023-01-24 DIAGNOSIS — I48.0 PAF (PAROXYSMAL ATRIAL FIBRILLATION) (HCC): ICD-10-CM

## 2023-01-24 DIAGNOSIS — R06.02 SOB (SHORTNESS OF BREATH): ICD-10-CM

## 2023-01-24 DIAGNOSIS — I50.42 CHRONIC COMBINED SYSTOLIC AND DIASTOLIC CONGESTIVE HEART FAILURE (HCC): Primary | ICD-10-CM

## 2023-01-24 DIAGNOSIS — E78.2 MIXED HYPERLIPIDEMIA: ICD-10-CM

## 2023-01-24 DIAGNOSIS — N18.4 CKD (CHRONIC KIDNEY DISEASE), STAGE IV (HCC): ICD-10-CM

## 2023-01-24 DIAGNOSIS — I50.22 CHRONIC SYSTOLIC CONGESTIVE HEART FAILURE (HCC): ICD-10-CM

## 2023-01-24 DIAGNOSIS — I10 BENIGN ESSENTIAL HTN: ICD-10-CM

## 2023-01-24 LAB
ANION GAP SERPL CALC-SCNC: 9 MMOL/L (ref 5–15)
BASOPHILS # BLD: 0.1 K/UL (ref 0–0.1)
BASOPHILS NFR BLD: 1 % (ref 0–1)
BNP SERPL-MCNC: ABNORMAL PG/ML (ref 0–450)
BUN SERPL-MCNC: 37 MG/DL (ref 6–20)
BUN/CREAT SERPL: 23 (ref 12–20)
CALCIUM SERPL-MCNC: 9.9 MG/DL (ref 8.5–10.1)
CHLORIDE SERPL-SCNC: 103 MMOL/L (ref 97–108)
CO2 SERPL-SCNC: 26 MMOL/L (ref 21–32)
CREAT SERPL-MCNC: 1.58 MG/DL (ref 0.55–1.02)
DIFFERENTIAL METHOD BLD: ABNORMAL
ECHO AO ROOT DIAM: 3.6 CM
ECHO AV PEAK GRADIENT: 9 MMHG
ECHO AV PEAK VELOCITY: 1.5 M/S
ECHO EST RA PRESSURE: 10 MMHG
ECHO LA DIAMETER: 3.9 CM
ECHO LA TO AORTIC ROOT RATIO: 1.08
ECHO LA VOL 2C: 85 ML (ref 22–52)
ECHO LA VOL 4C: 57 ML (ref 22–52)
ECHO LA VOLUME AREA LENGTH: 76 ML
ECHO LV E' SEPTAL VELOCITY: 3 CM/S
ECHO LV EDV A2C: 179 ML
ECHO LV EDV A4C: 157 ML
ECHO LV EDV BP: 170 ML (ref 56–104)
ECHO LV EJECTION FRACTION A2C: 20 %
ECHO LV EJECTION FRACTION A4C: 19 %
ECHO LV EJECTION FRACTION BIPLANE: 20 % (ref 55–100)
ECHO LV ESV A2C: 143 ML
ECHO LV ESV A4C: 128 ML
ECHO LV ESV BP: 137 ML (ref 19–49)
ECHO LV FRACTIONAL SHORTENING: 14 % (ref 28–44)
ECHO LV INTERNAL DIMENSION DIASTOLIC: 6.3 CM (ref 3.9–5.3)
ECHO LV INTERNAL DIMENSION SYSTOLIC: 5.4 CM
ECHO LV IVSD: 1.2 CM (ref 0.6–0.9)
ECHO LV MASS 2D: 321.8 G (ref 67–162)
ECHO LV POSTERIOR WALL DIASTOLIC: 1.1 CM (ref 0.6–0.9)
ECHO LV RELATIVE WALL THICKNESS RATIO: 0.35
ECHO LVOT AREA: 3.8 CM2
ECHO LVOT DIAM: 2.2 CM
ECHO MV A VELOCITY: 0.74 M/S
ECHO MV E DECELERATION TIME (DT): 203 MS
ECHO MV E VELOCITY: 0.96 M/S
ECHO MV E/A RATIO: 1.3
ECHO MV E/E' SEPTAL: 32
ECHO RIGHT VENTRICULAR SYSTOLIC PRESSURE (RVSP): 45 MMHG
ECHO TV REGURGITANT MAX VELOCITY: 2.94 M/S
ECHO TV REGURGITANT PEAK GRADIENT: 35 MMHG
EOSINOPHIL # BLD: 0.4 K/UL (ref 0–0.4)
EOSINOPHIL NFR BLD: 2 % (ref 0–7)
ERYTHROCYTE [DISTWIDTH] IN BLOOD BY AUTOMATED COUNT: 14.5 % (ref 11.5–14.5)
GLUCOSE SERPL-MCNC: 149 MG/DL (ref 65–100)
HCT VFR BLD AUTO: 37.9 % (ref 35–47)
HGB BLD-MCNC: 12.9 G/DL (ref 11.5–16)
IMM GRANULOCYTES # BLD AUTO: 0.1 K/UL (ref 0–0.04)
IMM GRANULOCYTES NFR BLD AUTO: 0 % (ref 0–0.5)
LYMPHOCYTES # BLD: 2.6 K/UL (ref 0.8–3.5)
LYMPHOCYTES NFR BLD: 16 % (ref 12–49)
MCH RBC QN AUTO: 30.6 PG (ref 26–34)
MCHC RBC AUTO-ENTMCNC: 34 G/DL (ref 30–36.5)
MCV RBC AUTO: 90 FL (ref 80–99)
MONOCYTES # BLD: 0.8 K/UL (ref 0–1)
MONOCYTES NFR BLD: 5 % (ref 5–13)
NEUTS SEG # BLD: 12.1 K/UL (ref 1.8–8)
NEUTS SEG NFR BLD: 76 % (ref 32–75)
NRBC # BLD: 0 K/UL (ref 0–0.01)
NRBC BLD-RTO: 0 PER 100 WBC
PLATELET # BLD AUTO: 415 K/UL (ref 150–400)
PMV BLD AUTO: 9.7 FL (ref 8.9–12.9)
POTASSIUM SERPL-SCNC: 4.6 MMOL/L (ref 3.5–5.1)
RBC # BLD AUTO: 4.21 M/UL (ref 3.8–5.2)
SODIUM SERPL-SCNC: 138 MMOL/L (ref 136–145)
TROPONIN-HIGH SENSITIVITY: 45 NG/L (ref 0–51)
WBC # BLD AUTO: 16 K/UL (ref 3.6–11)

## 2023-01-24 PROCEDURE — 93005 ELECTROCARDIOGRAM TRACING: CPT

## 2023-01-24 PROCEDURE — 93306 TTE W/DOPPLER COMPLETE: CPT

## 2023-01-24 PROCEDURE — 36415 COLL VENOUS BLD VENIPUNCTURE: CPT

## 2023-01-24 PROCEDURE — 85025 COMPLETE CBC W/AUTO DIFF WBC: CPT

## 2023-01-24 PROCEDURE — 93306 TTE W/DOPPLER COMPLETE: CPT | Performed by: INTERNAL MEDICINE

## 2023-01-24 PROCEDURE — 83880 ASSAY OF NATRIURETIC PEPTIDE: CPT

## 2023-01-24 PROCEDURE — 74011250636 HC RX REV CODE- 250/636: Performed by: EMERGENCY MEDICINE

## 2023-01-24 PROCEDURE — 96374 THER/PROPH/DIAG INJ IV PUSH: CPT

## 2023-01-24 PROCEDURE — 99285 EMERGENCY DEPT VISIT HI MDM: CPT

## 2023-01-24 PROCEDURE — 84484 ASSAY OF TROPONIN QUANT: CPT

## 2023-01-24 PROCEDURE — 71045 X-RAY EXAM CHEST 1 VIEW: CPT

## 2023-01-24 PROCEDURE — 80048 BASIC METABOLIC PNL TOTAL CA: CPT

## 2023-01-24 RX ORDER — FUROSEMIDE 10 MG/ML
80 INJECTION INTRAMUSCULAR; INTRAVENOUS
Status: COMPLETED | OUTPATIENT
Start: 2023-01-24 | End: 2023-01-24

## 2023-01-24 RX ADMIN — FUROSEMIDE 80 MG: 10 INJECTION, SOLUTION INTRAMUSCULAR; INTRAVENOUS at 11:57

## 2023-01-24 NOTE — ED TRIAGE NOTES
Pt presents with exertional dyspnea x 2 months. Pt denies any pain. Pt states this issue has been ongoing. Pt had labs done today and an echo ordered by cardiology. Pt smokes 0.5 pack a day for 60 years. Pt is 96% on room while sitting.

## 2023-01-24 NOTE — TELEPHONE ENCOUNTER
----- Message from Latisha Johnson NP sent at 1/21/2023  9:11 AM EST -----  Pls inform pt I will stop rosuvastatin, start atorvastatin 20 mg daily as this is more kidney friendly---that's the general idea lol    Stichert message sent per pts spouse request. RX already sent by NP.

## 2023-01-24 NOTE — ED PROVIDER NOTES
EMERGENCY DEPARTMENT HISTORY AND PHYSICAL EXAM      Date: 1/24/2023  Patient Name: Melonie Cockayne    History of Presenting Illness     Chief Complaint   Patient presents with    Shortness of Breath       History Provided By: Patient and Patient's Daughter    HPI: Melonie Cockayne, 79 y.o. female with PMHx significant for chronic CHF, COPD, cardiomyopathy, DM 2, CKD presents via private vehicle to the ED with cc of dyspnea on exertion. Daughter reports progressively worsening dyspnea on exertion over the past 3 months. Daughter states that 3 months ago she was able to walk from the house down the steps into the car without difficulty. Over the past several weeks she states that she is gotten to the point where she gets short of breath just taking a few steps from room to room in a house. She had a cardiology appointment on January 16 with Hal Méndez, nurse practitioner for cardiology Dr. Karolina Black. They advised her to increase her Lasix to twice a day for the past 4 days. Yesterday was the last day of this double dose. She denies any chest pain. She does report some orthopnea. She denies any nausea vomiting. She does report some increased swelling in her feet and ankles bilaterally. No significant cough. No hemoptysis. No calf pain or swelling. Old records were reviewed in the EHR. Last echo on file in August 2021 with EF of 45 to 50%. She was previously on Lasix 40 twice a day. For the past 4 days she took 80 in the morning and 40 at night. Patient was here today at the facility for an outpatient echo ordered at her cardiology appointment recently. While she was here she decided she would come to the ER for further evaluation. PMHx: Significant for CHF, dilated cardiomyopathy, COPD, DM 2, CKD  PSHx: Significant for AICD placement, hysterectomy, hemorrhoidectomy  Social Hx: Half pack a day smoker for 60 years.     There are no other complaints, changes, or physical findings at this time.    PCP: Bhavik To NP    No current facility-administered medications on file prior to encounter. Current Outpatient Medications on File Prior to Encounter   Medication Sig Dispense Refill    atorvastatin (LIPITOR) 20 mg tablet Take 1 Tablet by mouth daily. 90 Tablet 1    furosemide (LASIX) 40 mg tablet TAKE 1 TABLET BY MOUTH  TWICE DAILY 180 Tablet 0    hydrALAZINE (APRESOLINE) 10 mg tablet TAKE 1 TABLET BY MOUTH  TWICE DAILY 180 Tablet 1    Myrbetriq 25 mg ER tablet Take 25 mg by mouth daily. Vitamin D2 1,250 mcg (50,000 unit) capsule TAKE 1 CAPSULE BY MOUTH  EVERY 7 DAYS FOR LOW  VITAMIN DAY LEVELS 13 Capsule 1    Lantus Solostar U-100 Insulin 100 unit/mL (3 mL) inpn INJECT SUBCUTANEOUSLY 45  UNITS DAILY 45 mL 3    carvediloL (COREG) 25 mg tablet TAKE 1 TABLET BY MOUTH  TWICE DAILY 180 Tablet 3    venlafaxine (EFFEXOR) 75 mg tablet TAKE ONE-HALF TABLET BY  MOUTH TWICE DAILY 90 Tablet 3    ferrous sulfate 325 mg (65 mg iron) tablet Take 1 Tablet by mouth daily (with breakfast). 30 Tablet 0    b complex vitamins tablet Take 1 Tablet by mouth daily. nitroglycerin (NITROSTAT) 0.4 mg SL tablet 1 Tab by SubLINGual route every five (5) minutes as needed for Chest Pain. Up to 3 doses. 25 Tab 0    colestipoL (Colestid) 1 gram tablet Take 1 Tab by mouth two (2) times a day. 180 Tab 3    aspirin delayed-release 81 mg tablet Take 1 Tab by mouth daily. 90 Tab 1    acetaminophen (TYLENOL) 650 mg TbER Take 650 mg by mouth every eight (8) hours. omega-3 fatty acids-vitamin e 1,000 mg cap Take 1 Cap by mouth two (2) times a day. 32a day       ascorbic acid, vitamin C, (VITAMIN C) 500 mg tablet Take 1,000 mg by mouth daily. zinc 50 mg tab tablet Take 50 mg by mouth daily.          Past History     Past Medical History:  Past Medical History:   Diagnosis Date    A-fib [facility])     AICD (automatic cardioverter/defibrillator) present 9/23/2020 9/23/2020 [employer] AICD implant    Arthritis Bilateral pleural effusion 9/18/2020    Chronic pain     Chronic pain     Diabetes ([facility] [address]     Diverticular disease     Elevated troponin 9/18/2020    Hemorrhoid     Hypercholesterolemia     IBS (irritable bowel syndrome)     Lymphocytosis 83/17/7271    Systolic heart failure, chronic ([facility] 7/16/2021    Type 2 MI (myocardial infarction) ([facility] [address] 7/16/2021 7/162021 r/t sepsis       Past Surgical History:  Past Surgical History:   Procedure Laterality Date    COLONOSCOPY N/A 10/31/2019    COLONOSCOPY performed by Porsche Perez MD at [address]  10/31/2019         COLONOSCOPY,REMV Stephan Leonts  10/31/2019         HX CATARACT REMOVAL      HX CHOLECYSTECTOMY      HX COLONOSCOPY  2009    HX COLONOSCOPY  2016    2 adenomatous polyp    HX HEMORRHOIDECTOMY  2009    HX HYSTERECTOMY      HX KNEE REPLACEMENT      right    HX ORTHOPAEDIC  12/11/2017    back, laminectomy and decompression    MD INSJ/RPLCMT PERM DFB W/TRNSVNS LDS 1/DUAL CHMBR N/A 9/23/2020    INSERT ICD DUAL performed by Abel Jordan MD at [facility] CARDIAC C[facility] LAB       Family History:  Family History   Problem Relation Age of Onset    Colon Cancer Father     Diabetes Mother        Social History:  Social History     Tobacco Use    Smoking status: Every Day     Packs/day: 0.50     Years: 60.00     Pack years: 30.00     Types: Cigarettes    Smokeless tobacco: Never   Vaping Use    Vaping Use: Never used   Substance Use Topics    Alcohol use: No    Drug use: Never       Allergies: Allergies   Allergen Reactions    Cefazolin Rash     Possible kidney failure/AIN    Morphine Anaphylaxis    Ultram [Tramadol] Palpitations         Review of Systems   Review of Systems   Constitutional:  Positive for fatigue. Negative for activity change, chills and fever. HENT:  Negative for congestion and sore throat. Eyes:  Negative for pain and redness. Respiratory:  Positive for shortness of breath. Negative for cough and chest tightness. Cardiovascular:  Negative for chest pain and palpitations. Gastrointestinal:  Negative for abdominal pain, diarrhea, nausea and vomiting. Genitourinary:  Negative for dysuria, frequency and urgency. Musculoskeletal:  Negative for back pain and neck pain. Skin:  Negative for rash. Neurological:  Negative for syncope, light-headedness and headaches. Psychiatric/Behavioral:  Negative for confusion. All other systems reviewed and are negative. Physical Exam   Physical Exam  Vitals and nursing note reviewed. Constitutional:       General: She is not in acute distress. Appearance: She is well-developed. She is not diaphoretic. HENT:      Head: Normocephalic. Nose: Nose normal.      Mouth/Throat:      Pharynx: No oropharyngeal exudate. Eyes:      General: No scleral icterus. Conjunctiva/sclera: Conjunctivae normal.      Pupils: Pupils are equal, round, and reactive to light. Neck:      Thyroid: No thyromegaly. Vascular: No JVD. Trachea: No tracheal deviation. Cardiovascular:      Rate and Rhythm: Normal rate and regular rhythm. Heart sounds: No murmur heard. No friction rub. No gallop. Pulmonary:      Effort: Pulmonary effort is normal. No respiratory distress. Breath sounds: Normal breath sounds. No stridor. No wheezing or rales. Abdominal:      General: Bowel sounds are normal. There is no distension. Palpations: Abdomen is soft. Tenderness: There is no abdominal tenderness. There is no guarding or rebound. Musculoskeletal:         General: Normal range of motion. Cervical back: Normal range of motion and neck supple. Right lower leg: Edema present. Left lower leg: Edema present. Comments: 1+ pitting edema of the foot and ankle bilaterally. Calves nontender. Lymphadenopathy:      Cervical: No cervical adenopathy. Skin:     General: Skin is warm and dry. Findings: No erythema or rash.    Neurological:      Mental Status: She is alert and oriented to person, place, and time. Cranial Nerves: No cranial nerve deficit. Motor: No abnormal muscle tone.       Coordination: Coordination normal.   Psychiatric:         Behavior: Behavior normal.           Diagnostic Study Results     Labs -     Recent Results (from the past 12 hour(s))   ECHO ADULT COMPLETE    Collection Time: 01/24/23  9:37 AM   Result Value Ref Range    IVSd 1.2 (A) 0.6 - 0.9 cm    LVIDd 6.3 (A) 3.9 - 5.3 cm    LVIDs 5.4 cm    LVOT Diameter 2.2 cm    LVPWd 1.1 (A) 0.6 - 0.9 cm    EF BP 20 (A) 55 - 100 %    LV Ejection Fraction A2C 20 %    LV Ejection Fraction A4C 19 %    LV EDV A2C 179 mL    LV EDV A4C 157 mL    LV EDV  (A) 56 - 104 mL    LV ESV A2C 143 mL    LV ESV A4C 128 mL    LV ESV  (A) 19 - 49 mL    RVSP 45 mmHg    LA Diameter 3.9 cm    LA Volume A/L 76 mL    LA Volume 2C 85 (A) 22 - 52 mL    LA Volume 4C 57 (A) 22 - 52 mL    Est. RA Pressure 10 mmHg    AV Peak Gradient 9 mmHg    AV Peak Velocity 1.5 m/s    MV A Velocity 0.74 m/s    MV E Wave Deceleration Time 203.0 ms    MV E Velocity 0.96 m/s    LV E' Septal Velocity 3 cm/s    TR Peak Gradient 35 mmHg    TR Max Velocity 2.94 m/s    Aortic Root 3.6 cm    Fractional Shortening 2D 14 28 - 44 %    LV RWT Ratio 0.35     LV Mass 2D 321.8 (A) 67 - 162 g    MV E/A 1.30     E/E' Septal 32.00     LVOT Area 3.8 cm2    LA/AO Root Ratio 1.08    EKG, 12 LEAD, INITIAL    Collection Time: 01/24/23 10:33 AM   Result Value Ref Range    Ventricular Rate 51 BPM    Atrial Rate 51 BPM    P-R Interval 146 ms    QRS Duration 110 ms    Q-T Interval 446 ms    QTC Calculation (Bezet) 411 ms    Calculated P Axis 33 degrees    Calculated R Axis 55 degrees    Calculated T Axis -154 degrees    Diagnosis       Sinus bradycardia with occasional premature ventricular complexes  Marked ST abnormality, possible inferolateral subendocardial injury  Abnormal ECG  When compared with ECG of 01-JUN-2022 23:37,  premature ventricular complexes are now present     CBC WITH AUTOMATED DIFF    Collection Time: 01/24/23 10:38 AM   Result Value Ref Range    WBC 16.0 (H) 3.6 - 11.0 K/uL    RBC 4.21 3.80 - 5.20 M/uL    HGB 12.9 11.5 - 16.0 g/dL    HCT 37.9 35.0 - 47.0 %    MCV 90.0 80.0 - 99.0 FL    MCH 30.6 26.0 - 34.0 PG    MCHC 34.0 30.0 - 36.5 g/dL    RDW 14.5 11.5 - 14.5 %    PLATELET 047 (H) 060 - 400 K/uL    MPV 9.7 8.9 - 12.9 FL    NRBC 0.0 0.0  WBC    ABSOLUTE NRBC 0.00 0.00 - 0.01 K/uL    NEUTROPHILS 76 (H) 32 - 75 %    LYMPHOCYTES 16 12 - 49 %    MONOCYTES 5 5 - 13 %    EOSINOPHILS 2 0 - 7 %    BASOPHILS 1 0 - 1 %    IMMATURE GRANULOCYTES 0 0 - 0.5 %    ABS. NEUTROPHILS 12.1 (H) 1.8 - 8.0 K/UL    ABS. LYMPHOCYTES 2.6 0.8 - 3.5 K/UL    ABS. MONOCYTES 0.8 0.0 - 1.0 K/UL    ABS. EOSINOPHILS 0.4 0.0 - 0.4 K/UL    ABS. BASOPHILS 0.1 0.0 - 0.1 K/UL    ABS. IMM. GRANS. 0.1 (H) 0.00 - 0.04 K/UL    DF AUTOMATED     METABOLIC PANEL, BASIC    Collection Time: 01/24/23 10:38 AM   Result Value Ref Range    Sodium 138 136 - 145 mmol/L    Potassium 4.6 3.5 - 5.1 mmol/L    Chloride 103 97 - 108 mmol/L    CO2 26 21 - 32 mmol/L    Anion gap 9 5 - 15 mmol/L    Glucose 149 (H) 65 - 100 mg/dL    BUN 37 (H) 6 - 20 MG/DL    Creatinine 1.58 (H) 0.55 - 1.02 MG/DL    BUN/Creatinine ratio 23 (H) 12 - 20      eGFR 32 (L) >60 ml/min/1.73m2    Calcium 9.9 8.5 - 10.1 MG/DL   NT-PRO BNP    Collection Time: 01/24/23 10:38 AM   Result Value Ref Range    NT pro-BNP 18,179 (H) 0 - 450 PG/ML   TROPONIN-HIGH SENSITIVITY    Collection Time: 01/24/23 10:38 AM   Result Value Ref Range    Troponin-High Sensitivity 45 0 - 51 ng/L       Radiologic Studies -   XR CHEST SNGL V   Final Result   Persistent small right pleural effusion and right basilar   atelectasis.            CT Results  (Last 48 hours)      None          CXR Results  (Last 48 hours)                 01/24/23 1108  XR CHEST SNGL V Final result    Impression:  Persistent small right pleural effusion and right basilar   atelectasis. Narrative:  INDICATION: Chest pain. Portable AP view of the chest.       Direct comparison made to prior chest x-ray dated January 16, 2023. Cardiomediastinal silhouette is stable. There is a small stable right pleural   effusion and right basilar atelectasis, unchanged compared to prior chest x-ray   dated January 16, 2023. Left lung is clear. Left pleural space is normal.                     Medical Decision Making   I am the first provider for this patient. I reviewed the vital signs, available nursing notes, past medical history, past surgical history, family history and social history. Vital Signs-Reviewed the patient's vital signs. Patient Vitals for the past 12 hrs:   Temp Pulse Resp BP SpO2   01/24/23 1037 -- -- -- -- 98 %   01/24/23 0945 97.8 °F (36.6 °C) 81 17 (!) 145/67 96 %       Pulse Oximetry Analysis - 96% on RA    Cardiac Monitor:   Rate: 81 bpm  Rhythm: Normal Sinus Rhythm        ED EKG interpretation:10:33  Rhythm: sinus bradycardia; and regular . Rate (approx.): 51; Axis: normal; WA Interval: normal; QRS interval: normal ; ST/T wave: non-specific changes; old EKG reviewed. No significant change from EKG on June 1, 2022 this EKG was interpreted by Abby Da Silva MD,ED Provider. Records Reviewed: Nursing Notes, Old Medical Records, and Previous electrocardiograms    Provider Notes (Medical Decision Making):   60-year-old female with history of COPD, CHF with a EF of 45 to 50%. Progressively worsening dyspnea on exertion over the past several months. Patient gets short of breath going from room to room in the house. Good room air sats. No symptoms at rest.  No chest pain. Will obtain chest x-ray and cardiac work-up. EKG unchanged from prior. ED Course:   Initial assessment performed. The patients presenting problems have been discussed, and they are in agreement with the care plan formulated and outlined with them.   I have encouraged them to ask questions as they arise throughout their visit. PROGRESS NOTE    Pt reevaluated. Good room air sats. Only small pleural effusion on chest x-ray. BNP markedly elevated at 18,000 although this is in the setting of CKD which will falsely elevate BNP's. Troponin negative. EKG unchanged from prior. Gave 80 mg IV Lasix here. Recommended she continue her 80 mg in the morning and 40 mg in the evening Lasix dosage and for 3 more days. She agreed to follow-up with cardiology as scheduled. Written by Hayley Mcmahan MD     Progress note:    Pt noted to be feeling better , ready for discharge. Updated pt and/or family on all final lab and imaging findings. Will follow up as instructed. All questions have been answered, pt voiced understanding and agreement with plan. Specific return precautions provided as well as instructions to return to the ED should sx worsen at any time. Vital signs stable for discharge. I have also put together some discharge instructions for them that include: 1) educational information regarding their diagnosis, 2) how to care for their diagnosis at home, as well a 3) list of reasons why they would want to return to the ED prior to their follow-up appointment, should their condition change. Written by Hayley Mcmahan MD        Critical Care Time:   0    Disposition:  Discharge    PLAN:  1. Current Discharge Medication List        2. Follow-up Information       Follow up With Specialties Details Why [address], [place], NP Nurse Practitioner   [phone] [facility] [address] [phone]            Return to ED if worse     Diagnosis     Clinical Impression:   1. Chronic combined systolic and diastolic congestive heart failure ([address]    2. Chronic kidney disease, unspecified CKD stage              Please note that this dictation was completed with [employer], the [employer] voice recognition software.   Quite often unanticipated grammatical, syntax, homophones, and other interpretive errors are inadvertently transcribed by the computer software. Please disregard these errors. Please excuse any errors that have escaped final proofreading.

## 2023-01-25 LAB
ATRIAL RATE: 51 BPM
CALCULATED P AXIS, ECG09: 33 DEGREES
CALCULATED R AXIS, ECG10: 55 DEGREES
CALCULATED T AXIS, ECG11: -154 DEGREES
DIAGNOSIS, 93000: NORMAL
P-R INTERVAL, ECG05: 146 MS
Q-T INTERVAL, ECG07: 446 MS
QRS DURATION, ECG06: 110 MS
QTC CALCULATION (BEZET), ECG08: 411 MS
VENTRICULAR RATE, ECG03: 51 BPM

## 2023-01-25 NOTE — PROGRESS NOTES
EF / heart pumping strength is down---prev 45-50% in 8/2021 to now 20-25%. Will need to repeat stress test to r/o ischemia  or blockage. Noted recent ED visit.   Will need to optimize HF etc.  Pls squeeze her in on Friday to see Dr Lyric Alvarez (as need to decide re ICD etc)

## 2023-01-25 NOTE — PROGRESS NOTES
Pt called. S/w spouse. Verified patient with two patient identifiers. Pt is scheduled for an appt with MD on Friday. Pts spouse confirmed. [employer] message sent as well.

## 2023-01-27 ENCOUNTER — OFFICE VISIT (OUTPATIENT)
Dept: CARDIOLOGY CLINIC | Age: 83
End: 2023-01-27
Payer: MEDICARE

## 2023-01-27 VITALS
TEMPERATURE: 97 F | OXYGEN SATURATION: 93 % | BODY MASS INDEX: 25.07 KG/M2 | HEART RATE: 63 BPM | SYSTOLIC BLOOD PRESSURE: 134 MMHG | DIASTOLIC BLOOD PRESSURE: 62 MMHG | WEIGHT: 156 LBS | RESPIRATION RATE: 18 BRPM | HEIGHT: 66 IN

## 2023-01-27 DIAGNOSIS — M79.89 LEG SWELLING: ICD-10-CM

## 2023-01-27 DIAGNOSIS — I10 BENIGN ESSENTIAL HTN: ICD-10-CM

## 2023-01-27 DIAGNOSIS — I73.9 PVD (PERIPHERAL VASCULAR DISEASE) (HCC): ICD-10-CM

## 2023-01-27 DIAGNOSIS — I42.0 DILATED CARDIOMYOPATHY (HCC): Primary | ICD-10-CM

## 2023-01-27 DIAGNOSIS — I49.3 PVC'S (PREMATURE VENTRICULAR CONTRACTIONS): ICD-10-CM

## 2023-01-27 DIAGNOSIS — E11.21 TYPE 2 DIABETES WITH NEPHROPATHY (HCC): ICD-10-CM

## 2023-01-27 DIAGNOSIS — I48.0 PAF (PAROXYSMAL ATRIAL FIBRILLATION) (HCC): ICD-10-CM

## 2023-01-27 DIAGNOSIS — I50.22 CHRONIC SYSTOLIC CONGESTIVE HEART FAILURE (HCC): ICD-10-CM

## 2023-01-27 DIAGNOSIS — I49.5 SSS (SICK SINUS SYNDROME) (HCC): ICD-10-CM

## 2023-01-27 DIAGNOSIS — E78.2 MIXED HYPERLIPIDEMIA: ICD-10-CM

## 2023-01-27 RX ORDER — FUROSEMIDE 80 MG/1
80 TABLET ORAL 2 TIMES DAILY
Qty: 180 TABLET | Refills: 1 | Status: SHIPPED | OUTPATIENT
Start: 2023-01-27

## 2023-01-27 NOTE — PROGRESS NOTES
[address], [address] [place] [address]  [phone]    [address] [place] [address], [facility]      Subjective:      Diana Gibson is a 79 y.o. female is here for routine f/u. The patient was last seen by Mario Merchant January 16, 2023. At that time, Mercedez Abel increased Lasix to 40 twice daily. Later, her swelling was still getting worse, so she was advised to increase to 80/40. She went for her echo on 1/24/2023 along with some labs, but because of persistent significant shortness of breath she was brought to the emergency department where they gave her IV Lasix. She states she is finally starting to be able to breathe better. Noted that the weight has decreased from 163-156. Today, the patient denies chest pain, orthopnea, PND, LE edema, palpitations, syncope, or presyncope.        Patient Active Problem List    Diagnosis Date Noted    PVD (peripheral vascular disease) ([facility] 01/04/2023    Palliative care encounter 09/03/2021    Leukocytosis 09/01/2021    Pleural effusion 09/01/2021    CKD (chronic kidney disease) 09/01/2021    Acute CHF (congestive heart failure) ([address] 09/01/2021    Anemia of infection and chronic disease 08/26/2021    COPD (chronic obstructive pulmonary disease) ([address] 08/26/2021    Depression 08/26/2021    Pneumonia 08/23/2021    Sepsis ([address] 07/16/2021    Type 2 MI (myocardial infarction) ([address] 48/21/6067    Systolic heart failure, chronic ([address] 07/16/2021    Moderate episode of recurrent major depressive disorder ([address] 06/14/2021    Dilated cardiomyopathy ([address] 09/23/2020    SSS (sick sinus syndrome) ([address] 09/23/2020    Cardiomyopathy ([address] 09/23/2020    AICD (automatic cardioverter/defibrillator) present 09/23/2020    Respiratory failure ([address] 09/18/2020    CHF (congestive heart failure) ([address] 09/18/2020    Hypoxia 09/18/2020    Bilateral pleural effusion 09/18/2020    Elevated troponin 09/18/2020    PAF (paroxysmal atrial fibrillation) ([facility] 09/10/2020    CKD (chronic kidney disease) stage 3, GFR 30-59 ml/min ([facility]) 06/04/2019    Lymphocytosis 05/07/2018    Type 2 diabetes with nephropathy ([address] 02/15/2018    Type 2 diabetes mellitus with diabetic neuropathy ([address] 02/15/2018    Spinal stenosis of lumbar region with neurogenic claudication 11/17/2017    Spondylosis of lumbosacral region without myelopathy or radiculopathy 11/17/2017    Overdose of opiate or related narcotic, accidental or unintentional, subsequent encounter 10/27/2017    Acute left-sided low back pain with sciatica 10/27/2017    Physical debility 10/27/2017    Acute encephalopathy 10/23/2017    Hyperkalemia 10/20/2017    Altered mental status 10/20/2017    Transaminitis 10/20/2017    Acute renal failure (ARF) ([address] 10/20/2017    Diverticulitis large intestine w/o perforation or abscess w/o bleeding 07/06/2017    SOB (shortness of breath) 06/21/2017    Abdominal pain, generalized 06/21/2017    Diarrhea in adult patient 06/21/2017    Cigarette smoker 08/21/2279    Neutrophilic leukocytosis 55/77/6720    Chronic pain     Hypercholesterolemia     Arthritis     Diabetes (HCC)     IBS (irritable bowel syndrome)     Hemorrhoid       Luh Black NP  Past Medical History:   Diagnosis Date    A-fib [facility])     AICD (automatic cardioverter/defibrillator) present 9/23/2020 9/23/2020 [employer] AICD implant    Arthritis     Bilateral pleural effusion 9/18/2020    Chronic pain     Chronic pain     Diabetes ([address]     Diverticular disease     Elevated troponin 9/18/2020    Hemorrhoid     Hypercholesterolemia     IBS (irritable bowel syndrome)     Lymphocytosis 47/94/2081    Systolic heart failure, chronic ([address] 7/16/2021    Type 2 MI (myocardial infarction) ([address] 7/16/2021 7/162021 r/t sepsis      Past Surgical History:   Procedure Laterality Date    COLONOSCOPY N/A 10/31/2019    COLONOSCOPY performed by Benito Hamilton MD at [address]  10/31/2019         COLONOSCOPY,REMV ALINE LEUNG  10/31/2019         HX CATARACT REMOVAL      HX CHOLECYSTECTOMY      HX COLONOSCOPY  2009    HX COLONOSCOPY  2016    2 adenomatous polyp    HX HEMORRHOIDECTOMY  2009    HX HYSTERECTOMY      HX KNEE REPLACEMENT      right    HX ORTHOPAEDIC  12/11/2017    back, laminectomy and decompression    TX INSJ/RPLCMT PERM DFB W/TRNSVNS LDS 1/DUAL CHMBR N/A 9/23/2020    INSERT ICD DUAL performed by Michael Kwan MD at \[facility]\"" CARDIAC CATH LAB     Allergies   Allergen Reactions    Cefazolin Rash     Possible kidney failure/AIN    Morphine Anaphylaxis    Ultram [Tramadol] Palpitations      Family History   Problem Relation Age of Onset    Colon Cancer Father     Diabetes Mother       Social History     Socioeconomic History    Marital status:      Spouse name: Not on file    Number of children: Not on file    Years of education: Not on file    Highest education level: Not on file   Occupational History    Occupation: retired     Comment: LPN   Tobacco Use    Smoking status: Every Day     Packs/day: 0.50     Years: 55.00     Pack years: 27.50     Types: Cigarettes    Smokeless tobacco: Never   Vaping Use    Vaping Use: Never used   Substance and Sexual Activity    Alcohol use: No    Drug use: Never    Sexual activity: Not Currently     Partners: Male     Birth control/protection: Abstinence   Other Topics Concern     Service No    Blood Transfusions Yes    Caffeine Concern Yes    Occupational Exposure No    Hobby Hazards No    Sleep Concern Yes    Stress Concern Yes    Weight Concern No    Special Diet No    Back Care Yes    Exercise No    Bike Helmet No     Comment: n/a    Seat Belt Yes    Self-Exams Yes   Social History Narrative    Not on file     Social Determinants of Health     Financial Resource Strain: Not on file   Food Insecurity: Not on file   Transportation Needs: Not on file   Physical Activity: Not on file   Stress: Not on file   Social Connections: Not on file   Intimate Partner Violence: Not on file   Housing Stability: Not on file      Current Outpatient Medications   Medication Sig    atorvastatin (LIPITOR) 20 mg tablet Take 1 Tablet by mouth daily. furosemide (LASIX) 40 mg tablet TAKE 1 TABLET BY MOUTH  TWICE DAILY    hydrALAZINE (APRESOLINE) 10 mg tablet TAKE 1 TABLET BY MOUTH  TWICE DAILY    Myrbetriq 25 mg ER tablet Take 25 mg by mouth daily. Vitamin D2 1,250 mcg (50,000 unit) capsule TAKE 1 CAPSULE BY MOUTH  EVERY 7 DAYS FOR LOW  VITAMIN DAY LEVELS    Lantus Solostar U-100 Insulin 100 unit/mL (3 mL) inpn INJECT SUBCUTANEOUSLY 45  UNITS DAILY    carvediloL (COREG) 25 mg tablet TAKE 1 TABLET BY MOUTH  TWICE DAILY    venlafaxine (EFFEXOR) 75 mg tablet TAKE ONE-HALF TABLET BY  MOUTH TWICE DAILY    ferrous sulfate 325 mg (65 mg iron) tablet Take 1 Tablet by mouth daily (with breakfast). b complex vitamins tablet Take 1 Tablet by mouth daily. nitroglycerin (NITROSTAT) 0.4 mg SL tablet 1 Tab by SubLINGual route every five (5) minutes as needed for Chest Pain. Up to 3 doses. colestipoL (Colestid) 1 gram tablet Take 1 Tab by mouth two (2) times a day. aspirin delayed-release 81 mg tablet Take 1 Tab by mouth daily. acetaminophen (TYLENOL) 650 mg TbER Take 650 mg by mouth every eight (8) hours. ascorbic acid, vitamin C, (VITAMIN C) 500 mg tablet Take 1,000 mg by mouth daily. omega-3 fatty acids-vitamin e 1,000 mg cap Take 1 Cap by mouth two (2) times a day. 32a day  (Patient not taking: Reported on 1/27/2023)    zinc 50 mg tab tablet Take 50 mg by mouth daily. (Patient not taking: Reported on 1/27/2023)     No current facility-administered medications for this visit.          Review of Symptoms:  11 systems reviewed, negative other than as stated in the HPI    Physical ExamPhysical Exam:    Vitals:    01/27/23 1518   BP: 134/62   Pulse: 63   Resp: 18   Temp: 97 °F (36.1 °C)   TempSrc: Temporal   SpO2: 93%   Weight: 156 lb (70.8 kg)   Height: 5' 6\" (1.676 m)     Body mass index is 25.18 kg/m². General PE  Gen:  NAD  Mental Status - Alert. General Appearance - Not in acute distress. HEENT:  PERRL, no carotid bruits or JVD  Chest and Lung Exam   Inspection: Accessory muscles - No use of accessory muscles in breathing. Auscultation:   Breath sounds: - Normal.   Cardiovascular   Inspection: Jugular vein - Bilateral - Inspection Normal.   Palpation/Percussion:   Apical Impulse: - Normal.   Auscultation: Rhythm - Regular. Heart Sounds - S1 WNL and S2 WNL. No S3 or S4. Murmurs & Other Heart Sounds: Auscultation of the heart reveals - No Murmurs. Peripheral Vascular   Upper Extremity: Inspection - Bilateral - No Cyanotic nailbeds or Digital clubbing. Lower Extremity:   Palpation: Edema - Bilateral - No edema. Abdomen:   Soft, non-tender, bowel sounds are active.   Neuro: A&O times 3, CN and motor grossly WNL    Labs:   Lab Results   Component Value Date/Time    Cholesterol, total 199 11/02/2022 07:35 AM    Cholesterol, total 243 (H) 06/14/2021 09:17 PM    Cholesterol, total 198 10/29/2020 10:22 AM    Cholesterol, total 185 06/29/2020 12:03 PM    Cholesterol, total 217 (H) 02/03/2020 03:52 PM    HDL Cholesterol 43 11/02/2022 07:35 AM    HDL Cholesterol 42 06/14/2021 09:17 PM    HDL Cholesterol 35 (L) 10/29/2020 10:22 AM    HDL Cholesterol 36 (L) 06/29/2020 12:03 PM    HDL Cholesterol 44 02/03/2020 03:52 PM    LDL,Direct 137 (H) 06/14/2021 09:17 PM    LDL, calculated 111.6 (H) 11/02/2022 07:35 AM    LDL, calculated Not calculated due to elevated triglyceride level 06/14/2021 09:17 PM    LDL, calculated 110 (H) 10/29/2020 10:22 AM    LDL, calculated Comment 06/29/2020 12:03 PM    LDL, calculated 106 (H) 02/03/2020 03:52 PM    LDL, calculated 75 06/04/2019 03:57 PM    Triglyceride 222 (H) 11/02/2022 07:35 AM    Triglyceride 493 (H) 06/14/2021 09:17 PM    Triglyceride 305 (H) 10/29/2020 10:22 AM    Triglyceride 412 (H) 06/29/2020 12:03 PM    Triglyceride 336 (H) 02/03/2020 03:52 PM CHOL/HDL Ratio 4.6 11/02/2022 07:35 AM    CHOL/HDL Ratio 5.8 (H) 06/14/2021 09:17 PM     Lab Results   Component Value Date/Time    CK 51 11/02/2022 07:35 AM     Lab Results   Component Value Date/Time    Sodium 138 01/24/2023 10:38 AM    Potassium 4.6 01/24/2023 10:38 AM    Chloride 103 01/24/2023 10:38 AM    CO2 26 01/24/2023 10:38 AM    Anion gap 9 01/24/2023 10:38 AM    Glucose 149 (H) 01/24/2023 10:38 AM    BUN 37 (H) 01/24/2023 10:38 AM    Creatinine 1.58 (H) 01/24/2023 10:38 AM    BUN/Creatinine ratio 23 (H) 01/24/2023 10:38 AM    GFR est AA 31 (L) 06/01/2022 11:25 PM    GFR est non-AA 26 (L) 06/01/2022 11:25 PM    Calcium 9.9 01/24/2023 10:38 AM    Bilirubin, total 0.2 11/02/2022 07:35 AM    Alk. phosphatase 76 11/02/2022 07:35 AM    Protein, total 7.2 11/02/2022 07:35 AM    Albumin 3.1 (L) 11/02/2022 07:35 AM    Globulin 4.1 (H) 11/02/2022 07:35 AM    A-G Ratio 0.8 (L) 11/02/2022 07:35 AM    ALT (SGPT) 19 11/02/2022 07:35 AM       EKG:  NSR, LVH with repolarization abnormalities    01/24/23    ECHO ADULT COMPLETE 01/24/2023 1/24/2023    Interpretation Summary    Left Ventricle: Severely reduced left ventricular systolic function with a visually estimated EF of 20 - 25%. EF by 2D Simpsons Biplane is 20%. Left ventricle is moderately dilated. Mildly increased wall thickness. Severe global hypokinesis present. Normal diastolic function. Left Atrium: Left atrium is moderately dilated. Left atrial volume index is moderately increased (42-48 mL/m2). Aortic Valve: Valve structure is normal. Mild sclerosis of the aortic valve cusp. Mitral Valve: Valve structure is normal. Moderate annular calcification of the mitral valve. Mild regurgitation. Tricuspid Valve: Moderately elevated RVSP. Signed by: Jenn Rider MD on 1/24/2023  8:35 PM     09/18/20    NUCLEAR CARDIAC STRESS TEST 09/22/2020, 09/22/2020 9/22/2020    Interpretation Summary  · Baseline ECG: Normal EKG, rare PVCs.   · There was no ST segment deviation noted during stress. · FINDINGS: The rest and stress perfusion images demonstrate left ventricular dilatation. There is a fixed defect in the inferior wall compatible with infarct. No evidence of reversibility to suggest myocardium at ischemic risk. The gated images demonstrate global hypokinesia and inferior akinesia. Left ventricular ejection fraction is 32 %. Impression: Unchanged fixed defect in the inferior wall compatible with infarct. No evidence to suggest myocardium at ischemic risk. Left ventricular dilatation. Left ventricular ejection fraction is 32 %. Global hypokinesia and inferior akinesia. Signed by: Noel Vu MD on 9/22/2020  2:57 PM, Signed by: Nadia Kramer MD on 9/22/2020  4:25 PM         Assessment:          ICD-10-CM ICD-9-CM    1. Dilated cardiomyopathy ([facility])  I42.0 425.4       2. PVD (peripheral vascular disease) ([facility])  I73.9 443.9       3. Chronic systolic congestive heart failure ([facility])  I50.22 428.22      428.0       4. PAF (paroxysmal atrial fibrillation) ([facility])  I48.0 427.31       5. Leg swelling  M79.89 729.81       6. Benign essential HTN  I10 401.1       7. Mixed hyperlipidemia  E78.2 272.2       8. PVC's (premature ventricular contractions)  I49.3 427.69       9. SSS (sick sinus syndrome) ([facility])  I49.5 427.81       10. Type 2 diabetes with nephropathy ([facility])  E11.21 250.40      583.81           No orders of the defined types were placed in this encounter.        Plan:     CM  ICD explanted 7/21/21 by Dr Jose [place] due to MSSA infection   Hx AICD/PPM placed by Dr. Kaitlynn Rapp on 9/23/20  LVEF 20 to 25% 1/24/2023-will refer back to electrophysiology to consider reimplantation of ICD  EF 45-50% mod dilated LA grade 3 DD per echo in 8/2021   Prev EF 25-30% mod MR per echo in 9/2020  Wt today down from 163-156 lbs, + 2 pitting edema  Continue Carvedilol 25 mg BID, hydralazine  Will increase from 80/40 to 80 mg twice a day on 1/27/2023, repeat labs in 2 weeks  No ace-I/arb d/t IVAN  She reports normal cardiac catheterization approximately 5 years ago (2018? )-She will sign release of information and we will pursue the catheterization report  Will need to repeat will need to repeat ischemic evaluation when better compensated-likely nuclear stress test if we can confirm normal coronaries on catheterization done at [facility] approximately 5 years ago (2018?)       Hx brief intermittent CP--atypical, EKG abnormal with NSST and sent for Yasmeen Prow MPI 7/29/20:  Yasmeen Prow 7/2020 and 9/2020 showed  fixed defect in the inferior wall compatible with infarct. HTN  Now controlled after adding hydralazine 10 mg BID in March 2022  Continue current therapy  The patient will monitor BP at home and call if generally >140/85. Elevated TG/HLD  11/2022   Rosuva was increased to 10 mg since  Need to order repeat labs at follow-up     DM  On Insulin regimen     CKD IV  Lab Results   Component Value Date/Time    Creatinine 1.58 (H) 01/24/2023 10:38 AM    Serum Cr 1.85 in 11/2022  Serum Cr 2.08 in  9/2021  Improved 6/2022 to 1.8  Followed by Dr Carole Marie      COPD  Not addressed January 2023-recall at last visit:   Current 0.5 PPD smoker  No intention on quitting, but she has made progress, encouraged to use nicotine lozenges-5 minutes spent on smoking cessation. Followed by Dr Jhonny Dow Dizziness July 1, 2022:  Recall This was clearly worse when she laid down on the table. Orthostatics previously negative. Patient saw ENT and symptoms resolved with Epley maneuver. She is grateful. Cardiac clearance for cystoscopy  Pending until better compensated    History of vertigo:  Patient states she previously had vertigo could not walk in a straight line and we referred to ENT and with otolith repositioning/Epley maneuvers she had rapid improvement.      Follow-up in 3 to 4 weeks        Bella Beard MD

## 2023-01-27 NOTE — PROGRESS NOTES
Identified pt with two pt identifiers(name and ). Reviewed record in preparation for visit and have obtained necessary documentation. Chief Complaint   Patient presents with    Heart Problem    CHF      Vitals:    23 1518   BP: 134/62   Pulse: 63   Resp: 18   Temp: 97 °F (36.1 °C)   TempSrc: Temporal   SpO2: 93%   Weight: 156 lb (70.8 kg)   Height: 5' 6\" (1.676 m)   PainSc:   0 - No pain       Health Maintenance Review: Patient reminded of \"due or due soon\" health maintenance. I have asked the patient to contact his/her primary care provider (PCP) for follow-up on his/her health maintenance.       Immunization History   Administered Date(s) Administered    COVID-19, MODERNA [place]UL[place] border, Primary or Immunocompromised, (age 18y+), IM, 100 mcg/0.5mL 2021, 04/15/2021    COVID-19, MODERNA Booster BLUE border, (age 18y+), IM, 50mcg/0.25mL 2021, 2022    Influenza High Dose Vaccine PF 10/16/2015, 2016, 10/02/2017, 2020, 09/10/2020, 2021    Influenza Vaccine 10/27/2010, 2012, 2013, 2014    Influenza Vaccine (Tri) Adjuvanted (>65 Yrs FLUAD TRI 78579) 2018, 10/03/2019    Pneumococcal Conjugate (PCV-13) 2016    Pneumococcal Polysaccharide (PPSV-23) 11/15/2017    Tdap 10/03/2017    Zoster Recombinant 2019    Zoster Vaccine, Live 2009       Current Outpatient Medications   Medication Instructions    acetaminophen (TYLENOL) 650 mg, Oral, EVERY 8 HOURS    ascorbic acid (vitamin C) (VITAMIN C) 1,000 mg, Oral, DAILY    aspirin delayed-release 81 mg, Oral, DAILY    atorvastatin (LIPITOR) 20 mg, Oral, DAILY    b complex vitamins tablet 1 Tablet, Oral, DAILY    carvediloL (COREG) 25 mg tablet TAKE 1 TABLET BY MOUTH  TWICE DAILY    colestipoL (COLESTID) 1 g, Oral, 2 TIMES DAILY    ferrous sulfate 325 mg, Oral, DAILY WITH BREAKFAST    furosemide (LASIX) 40 mg tablet TAKE 1 TABLET BY MOUTH  TWICE DAILY    hydrALAZINE (APRESOLINE) 10 mg tablet TAKE 1 TABLET BY MOUTH  TWICE DAILY    Lantus Solostar U-100 Insulin 100 unit/mL (3 mL) inpn INJECT SUBCUTANEOUSLY 45  UNITS DAILY    Myrbetriq 25 mg, Oral, DAILY    nitroglycerin (NITROSTAT) 0.4 mg, SubLINGual, EVERY 5 MIN AS NEEDED, Up to 3 doses.     omega-3 fatty acids-vitamin e 1,000 mg cap 1 Capsule, 2 TIMES DAILY    venlafaxine (EFFEXOR) 75 mg tablet TAKE ONE-HALF TABLET BY  MOUTH TWICE DAILY    Vitamin D2 1,250 mcg (50,000 unit) capsule TAKE 1 CAPSULE BY MOUTH  EVERY 7 DAYS FOR LOW  VITAMIN DAY LEVELS    zinc 50 mg, DAILY       Allergies   Allergen Reactions    Cefazolin Rash     Possible kidney failure/AIN    Morphine Anaphylaxis    Ultram [Tramadol] Palpitations       Immunization History   Administered Date(s) Administered    COVID-19, MODERNA [place] border, Primary or Immunocompromised, (age 18y+), IM, 100 mcg/0.5mL 03/16/2021, 04/15/2021    COVID-19, MODERNA Booster BLUE border, (age 18y+), IM, 50mcg/0.25mL 12/16/2021, 05/11/2022    Influenza High Dose Vaccine PF 10/16/2015, 09/29/2016, 10/02/2017, 09/01/2020, 09/10/2020, 09/14/2021    Influenza Vaccine 10/27/2010, 09/27/2012, 09/17/2013, 09/22/2014    Influenza Vaccine (Tri) Adjuvanted (>65 Yrs FLUAD TRI 28552) 09/12/2018, 10/03/2019    Pneumococcal Conjugate (PCV-13) 02/03/2016    Pneumococcal Polysaccharide (PPSV-23) 11/15/2017    Tdap 10/03/2017    Zoster Recombinant 08/20/2019    Zoster Vaccine, Live 12/02/2009       Past Medical History:   Diagnosis Date    A-fib [facility])     AICD (automatic cardioverter/defibrillator) present 9/23/2020 9/23/2020 [employer] AICD implant    Arthritis     Bilateral pleural effusion 9/18/2020    Chronic pain     Chronic pain     Diabetes ([facility]     Diverticular disease     Elevated troponin 9/18/2020    Hemorrhoid     Hypercholesterolemia     IBS (irritable bowel syndrome)     Lymphocytosis 68/39/0413    Systolic heart failure, chronic ([address] 7/16/2021    Type [facility] 7/16/2021 7/162021 r/t sepsis       1. \"Have you been to the ER, urgent care clinic since your last visit? Hospitalized since your last visit? \" No    2. \"Have you seen or consulted any other health care providers outside of the [address] [place] [address] since your last visit? \" No

## 2023-01-27 NOTE — LETTER
1/29/2023    Patient: Delia Zavaleta   YOB: 1940   Date of Visit: 1/27/2023     Christine Jarvis NP  [place] [address]  [address] [address]  [address] [facility] [address]    Dear Christine Jarvis NP,      Thank you for referring Ms. Jerome Oconnor to [address] [place] [address] for evaluation. My notes for this consultation are attached. If you have questions, please do not hesitate to call me. I look forward to following your patient along with you.       Sincerely,    Trisha Hansen MD

## 2023-01-31 ENCOUNTER — CLINICAL SUPPORT (OUTPATIENT)
Dept: FAMILY MEDICINE CLINIC | Age: 83
End: 2023-01-31
Payer: MEDICARE

## 2023-01-31 VITALS — TEMPERATURE: 97.5 F

## 2023-01-31 DIAGNOSIS — I49.3 PVC'S (PREMATURE VENTRICULAR CONTRACTIONS): ICD-10-CM

## 2023-01-31 DIAGNOSIS — I10 BENIGN ESSENTIAL HTN: ICD-10-CM

## 2023-01-31 DIAGNOSIS — I48.0 PAF (PAROXYSMAL ATRIAL FIBRILLATION) (HCC): ICD-10-CM

## 2023-01-31 DIAGNOSIS — E78.2 MIXED HYPERLIPIDEMIA: ICD-10-CM

## 2023-01-31 DIAGNOSIS — I73.9 PVD (PERIPHERAL VASCULAR DISEASE) (HCC): ICD-10-CM

## 2023-01-31 DIAGNOSIS — I49.5 SSS (SICK SINUS SYNDROME) (HCC): ICD-10-CM

## 2023-01-31 DIAGNOSIS — E11.21 TYPE 2 DIABETES WITH NEPHROPATHY (HCC): ICD-10-CM

## 2023-01-31 DIAGNOSIS — I50.22 CHRONIC SYSTOLIC CONGESTIVE HEART FAILURE (HCC): ICD-10-CM

## 2023-01-31 DIAGNOSIS — M79.89 LEG SWELLING: ICD-10-CM

## 2023-01-31 DIAGNOSIS — I42.0 DILATED CARDIOMYOPATHY (HCC): ICD-10-CM

## 2023-01-31 PROCEDURE — 36415 COLL VENOUS BLD VENIPUNCTURE: CPT | Performed by: NURSE PRACTITIONER

## 2023-01-31 NOTE — PROGRESS NOTES
Pt presents to clinic for lab BW, denies sick sx today.   Labs drawn from Lt arm, 1 attempt,  tolerated well.  2 SST

## 2023-02-01 LAB
ANION GAP SERPL CALC-SCNC: 5 MMOL/L (ref 5–15)
BNP SERPL-MCNC: ABNORMAL PG/ML
BUN SERPL-MCNC: 53 MG/DL (ref 6–20)
BUN/CREAT SERPL: 26 (ref 12–20)
CALCIUM SERPL-MCNC: 9.7 MG/DL (ref 8.5–10.1)
CHLORIDE SERPL-SCNC: 101 MMOL/L (ref 97–108)
CO2 SERPL-SCNC: 27 MMOL/L (ref 21–32)
CREAT SERPL-MCNC: 2.05 MG/DL (ref 0.55–1.02)
GLUCOSE SERPL-MCNC: 254 MG/DL (ref 65–100)
MAGNESIUM SERPL-MCNC: 2.2 MG/DL (ref 1.6–2.4)
POTASSIUM SERPL-SCNC: 3.8 MMOL/L (ref 3.5–5.1)
SODIUM SERPL-SCNC: 133 MMOL/L (ref 136–145)

## 2023-02-06 ENCOUNTER — TELEPHONE (OUTPATIENT)
Dept: CARDIOLOGY CLINIC | Age: 83
End: 2023-02-06

## 2023-02-08 NOTE — TELEPHONE ENCOUNTER
Patient is calling because she is unable to keep the apt for 2/14/23.  Patient would like a call back to be reschedule to see Desiree Lester.    [phone]

## 2023-02-15 ENCOUNTER — TELEPHONE (OUTPATIENT)
Dept: FAMILY MEDICINE CLINIC | Age: 83
End: 2023-02-15

## 2023-02-15 NOTE — TELEPHONE ENCOUNTER
Spoke with Adelaida Rangel about coming in for an appt for her ringworm. She stated she could not get in because she had to rely on her daughter to bring her and she is working. She was told to talk to her daughter and give us a call back when she was able to bring her in.

## 2023-02-17 ENCOUNTER — OFFICE VISIT (OUTPATIENT)
Dept: FAMILY MEDICINE CLINIC | Age: 83
End: 2023-02-17
Payer: MEDICARE

## 2023-02-17 VITALS
DIASTOLIC BLOOD PRESSURE: 60 MMHG | BODY MASS INDEX: 25.39 KG/M2 | SYSTOLIC BLOOD PRESSURE: 130 MMHG | HEART RATE: 86 BPM | OXYGEN SATURATION: 95 % | HEIGHT: 66 IN | WEIGHT: 158 LBS | TEMPERATURE: 98.1 F | RESPIRATION RATE: 17 BRPM

## 2023-02-17 DIAGNOSIS — I10 BENIGN ESSENTIAL HTN: ICD-10-CM

## 2023-02-17 DIAGNOSIS — J44.1 CHRONIC OBSTRUCTIVE PULMONARY DISEASE WITH ACUTE EXACERBATION (HCC): ICD-10-CM

## 2023-02-17 DIAGNOSIS — E11.21 TYPE 2 DIABETES WITH NEPHROPATHY (HCC): ICD-10-CM

## 2023-02-17 DIAGNOSIS — F33.1 MODERATE EPISODE OF RECURRENT MAJOR DEPRESSIVE DISORDER (HCC): ICD-10-CM

## 2023-02-17 DIAGNOSIS — I42.0 DILATED CARDIOMYOPATHY (HCC): ICD-10-CM

## 2023-02-17 DIAGNOSIS — L21.9 SEBORRHEA: Primary | ICD-10-CM

## 2023-02-17 RX ORDER — FUROSEMIDE 40 MG/1
TABLET ORAL
COMMUNITY
Start: 2023-02-05 | End: 2023-02-17

## 2023-02-17 RX ORDER — KETOCONAZOLE 20 MG/G
CREAM TOPICAL DAILY
Qty: 15 G | Refills: 2 | Status: SHIPPED | OUTPATIENT
Start: 2023-02-17

## 2023-02-17 RX ORDER — FUROSEMIDE 80 MG/1
80 TABLET ORAL DAILY
Qty: 30 TABLET | Refills: 0
Start: 2023-02-17

## 2023-02-17 NOTE — PROGRESS NOTES
YES Answers must have Comments  1. \"Have you been to the ER, urgent care clinic since your last visit? Hospitalized since your last visit? \"    [x] YES [facility]  [] NO       2. Have you seen or consulted any other health care providers outside of [address] [place] [address] since your last visit?     [] YES   [x] NO       3. For patients aged 39-70: Have you had a colorectal cancer screening such as a colonoscopy/FIT/Cologuard? Nurse/CMA to request records if not in chart   [x] YES Where [facility]   [] NO   [] NA, based on age    If the patient is female:      4. For female patients aged 41-77: Joseph Power you had a mammogram in the last two years?  Nurse/CMA to request records if not in chart   [x] YES Where [facility]  [] NO   [] NA, based on age    11. For female patients aged 21-65: Joseph Power you had a pap smear?   Nurse/CMA to request records if not in chart   [] YES   [] NO  [x] NA, based on age

## 2023-02-17 NOTE — PROGRESS NOTES
Chief Complaint   Patient presents with    Hair/Scalp Problem       HPI:     is a 79 y.o. female in today with her adult daughter with several issues. T2DM: Controlled on Lantus, Last A1c 9.4% in August 2022. Missed her follow up appt with PCP. CHF: Under the care of NP Laxmi Walker, going to see Dr. Chaz Brumfield next month. She did not receive the message to cut back her Lasix and has been taking 80 mg BID. She needs a BMP today. She reports swelling has greatly improved. Depression: Managed on Effexor, good days and bad days. She is pretty reliant on family members. Her main issue today is a rash on her scalp that is improving with topical OTC antifungals. It is itchy and dry. She has noticed some hair loss. She washes her hair monthly due to the physical challenges associated with showering. Allergies   Allergen Reactions    Cefazolin Rash     Possible kidney failure/AIN    Morphine Anaphylaxis    Ultram [Tramadol] Palpitations       Current Outpatient Medications   Medication Sig    furosemide (LASIX) 80 mg tablet Take 1 Tablet by mouth daily. ketoconazole (NIZORAL) 2 % topical cream Apply  to affected area daily. venlafaxine (EFFEXOR) 75 mg tablet TAKE ONE-HALF TABLET BY  MOUTH TWICE DAILY    atorvastatin (LIPITOR) 20 mg tablet Take 1 Tablet by mouth daily. hydrALAZINE (APRESOLINE) 10 mg tablet TAKE 1 TABLET BY MOUTH  TWICE DAILY    Myrbetriq 25 mg ER tablet Take 25 mg by mouth daily. Vitamin D2 1,250 mcg (50,000 unit) capsule TAKE 1 CAPSULE BY MOUTH  EVERY 7 DAYS FOR LOW  VITAMIN DAY LEVELS    Lantus Solostar U-100 Insulin 100 unit/mL (3 mL) inpn INJECT SUBCUTANEOUSLY 45  UNITS DAILY    carvediloL (COREG) 25 mg tablet TAKE 1 TABLET BY MOUTH  TWICE DAILY    ferrous sulfate 325 mg (65 mg iron) tablet Take 1 Tablet by mouth daily (with breakfast). b complex vitamins tablet Take 1 Tablet by mouth daily.     nitroglycerin (NITROSTAT) 0.4 mg SL tablet 1 Tab by SubLINGual route every five (5) minutes as needed for Chest Pain. Up to 3 doses. colestipoL (Colestid) 1 gram tablet Take 1 Tab by mouth two (2) times a day. aspirin delayed-release 81 mg tablet Take 1 Tab by mouth daily. acetaminophen (TYLENOL) 650 mg TbER Take 650 mg by mouth every eight (8) hours. omega-3 fatty acids-vitamin e 1,000 mg cap Take 1 Capsule by mouth two (2) times a day. 32a day    ascorbic acid, vitamin C, (VITAMIN C) 500 mg tablet Take 1,000 mg by mouth daily. zinc 50 mg tab tablet Take 50 mg by mouth daily. No current facility-administered medications for this visit. Past Medical History:   Diagnosis Date    A-fib [facility])     AICD (automatic cardioverter/defibrillator) present 9/23/2020 9/23/2020 [employer] AICD implant    Arthritis     Bilateral pleural effusion 9/18/2020    Chronic pain     Chronic pain     Diabetes ([facility]     Diverticular disease     Elevated troponin 9/18/2020    Hemorrhoid     Hypercholesterolemia     IBS (irritable bowel syndrome)     Lymphocytosis 57/32/7074    Systolic heart failure, chronic ([facility] 7/16/2021    Type 2 MI (myocardial infarction) ([facility] 7/16/2021 7/162021 r/t sepsis       Family History   Problem Relation Age of Onset    Colon Cancer Father     Diabetes Mother      Review of Systems    A comprehensive review of systems was negative except for that written in the HPI. Patient is not experiencing chest pain radiating to the jaw and/or down the arms.     Physical Examination:    /60 (BP 1 Location: Right arm, BP Patient Position: Sitting, BP Cuff Size: Adult long)   Pulse 86   Temp 98.1 °F (36.7 °C) (Oral)   Resp 17   Ht 5' 6\" (1.676 m)   Wt 158 lb (71.7 kg)   SpO2 95%   BMI 25.50 kg/m²     Wt Readings from Last 3 Encounters:   02/17/23 158 lb (71.7 kg)   01/27/23 156 lb (70.8 kg)   01/24/23 161 lb (73 kg)     Constitutional: WDWN Female in no acute distress, chronically ill  HENT:  NC/AT  EYES: EOMI, PERRL  Neck:  Supple, no JVD, mass or bruit. No thyromegaly. Respiratory:  Respirations even and unlabored without use of accessory muscles, CTA throughout without wheezes, rales, rubs or rhonchi. Symmetrical chest expansion. Cardiac: RRR  Musculoskeletal:  No cyanosis, clubbing of extremities. Generalized weakness, in a wheelchair. 1+ edema bilaterally  Neurologic:  CN 2-12 grossly intact. Skin: scalp with erythematous patches  Lymphadenopathy: no cervical or supraclavicular nodes  Psych: Pleasant and appropriate. Judgment normal. Alert and oriented x 3. ASSESSMENT AND PLAN:       ICD-10-CM ICD-9-CM    1. Seborrhea  L21.9 706.3       2. Dilated cardiomyopathy (HCC)  I42.0 425.4       3. Benign essential HTN  I10 401.1 OK COLLECTION VENOUS BLOOD VENIPUNCTURE      CBC WITH AUTOMATED DIFF      LIPID PANEL      METABOLIC PANEL, COMPREHENSIVE      TSH 3RD GENERATION      TSH 3RD GENERATION      METABOLIC PANEL, COMPREHENSIVE      LIPID PANEL      CBC WITH AUTOMATED DIFF      OK COLLECTION VENOUS BLOOD VENIPUNCTURE      4. Type 2 diabetes with nephropathy ([facility])  E11.21 250.40 OK COLLECTION VENOUS BLOOD VENIPUNCTURE     583.81 HEMOGLOBIN A1C WITH EAG      HEMOGLOBIN A1C WITH EAG      OK COLLECTION VENOUS BLOOD VENIPUNCTURE      5. Chronic obstructive pulmonary disease with acute exacerbation ([facility])  J44.1 491.21       6. Moderate episode of recurrent major depressive disorder ([facility])  F33.1 296.32         Discussed the need to wash hair more frequently, massage scalp. Rx ketoconazole cream as she doesn't wash her hair enough to use the shampoo. Discussed hygiene measures. Check labs today. Anticipate reducing Lasix if renal function remains impaired as previously instructed by cardiology. I stressed the need for routine follow up with PCP. She understands.      Medication Side Effects and Warnings were discussed with patient:  yes  Patient Labs were reviewed:  yes  Patient Past Records were reviewed:  yes    Patient aware of plan of care and verbalized understanding. Questions answered. RTC 3 months, or sooner if needed.     Shama Caceres NP

## 2023-02-18 LAB
ALBUMIN SERPL-MCNC: 3.1 G/DL (ref 3.5–5)
ALBUMIN/GLOB SERPL: 0.8 (ref 1.1–2.2)
ALP SERPL-CCNC: 87 U/L (ref 45–117)
ALT SERPL-CCNC: 21 U/L (ref 12–78)
ANION GAP SERPL CALC-SCNC: 4 MMOL/L (ref 5–15)
AST SERPL-CCNC: 17 U/L (ref 15–37)
BASOPHILS # BLD: 0.1 K/UL (ref 0–0.1)
BASOPHILS NFR BLD: 1 % (ref 0–1)
BILIRUB SERPL-MCNC: 0.2 MG/DL (ref 0.2–1)
BUN SERPL-MCNC: 57 MG/DL (ref 6–20)
BUN/CREAT SERPL: 23 (ref 12–20)
CALCIUM SERPL-MCNC: 9.2 MG/DL (ref 8.5–10.1)
CHLORIDE SERPL-SCNC: 106 MMOL/L (ref 97–108)
CHOLEST SERPL-MCNC: 168 MG/DL
CO2 SERPL-SCNC: 24 MMOL/L (ref 21–32)
CREAT SERPL-MCNC: 2.53 MG/DL (ref 0.55–1.02)
DIFFERENTIAL METHOD BLD: ABNORMAL
EOSINOPHIL # BLD: 0.4 K/UL (ref 0–0.4)
EOSINOPHIL NFR BLD: 4 % (ref 0–7)
ERYTHROCYTE [DISTWIDTH] IN BLOOD BY AUTOMATED COUNT: 13.7 % (ref 11.5–14.5)
EST. AVERAGE GLUCOSE BLD GHB EST-MCNC: 171 MG/DL
GLOBULIN SER CALC-MCNC: 3.9 G/DL (ref 2–4)
GLUCOSE SERPL-MCNC: 347 MG/DL (ref 65–100)
HBA1C MFR BLD: 7.6 % (ref 4–5.6)
HCT VFR BLD AUTO: 36.8 % (ref 35–47)
HDLC SERPL-MCNC: 40 MG/DL
HDLC SERPL: 4.2 (ref 0–5)
HGB BLD-MCNC: 11.7 G/DL (ref 11.5–16)
IMM GRANULOCYTES # BLD AUTO: 0 K/UL (ref 0–0.04)
IMM GRANULOCYTES NFR BLD AUTO: 0 % (ref 0–0.5)
LDLC SERPL CALC-MCNC: 64.8 MG/DL (ref 0–100)
LYMPHOCYTES # BLD: 2.4 K/UL (ref 0.8–3.5)
LYMPHOCYTES NFR BLD: 21 % (ref 12–49)
MCH RBC QN AUTO: 29.8 PG (ref 26–34)
MCHC RBC AUTO-ENTMCNC: 31.8 G/DL (ref 30–36.5)
MCV RBC AUTO: 93.6 FL (ref 80–99)
MONOCYTES # BLD: 0.7 K/UL (ref 0–1)
MONOCYTES NFR BLD: 6 % (ref 5–13)
NEUTS SEG # BLD: 7.8 K/UL (ref 1.8–8)
NEUTS SEG NFR BLD: 68 % (ref 32–75)
NRBC # BLD: 0 K/UL (ref 0–0.01)
NRBC BLD-RTO: 0 PER 100 WBC
PLATELET # BLD AUTO: 398 K/UL (ref 150–400)
PMV BLD AUTO: 10.6 FL (ref 8.9–12.9)
POTASSIUM SERPL-SCNC: 5.3 MMOL/L (ref 3.5–5.1)
PROT SERPL-MCNC: 7 G/DL (ref 6.4–8.2)
RBC # BLD AUTO: 3.93 M/UL (ref 3.8–5.2)
SODIUM SERPL-SCNC: 134 MMOL/L (ref 136–145)
TRIGL SERPL-MCNC: 316 MG/DL (ref ?–150)
TSH SERPL DL<=0.05 MIU/L-ACNC: 1.38 UIU/ML (ref 0.36–3.74)
VLDLC SERPL CALC-MCNC: 63.2 MG/DL
WBC # BLD AUTO: 11.4 K/UL (ref 3.6–11)

## 2023-02-28 PROBLEM — N18.4 CKD (CHRONIC KIDNEY DISEASE) STAGE 4, GFR 15-29 ML/MIN (HCC): Status: ACTIVE | Noted: 2023-02-28

## 2023-03-01 NOTE — PROGRESS NOTES
[address] [place] [address]  [phone]    [address] [place] [address], [facility]      Subjective:      Emy Aguilar is a 79 y.o. female is here for routine f/u. Pmhx SSS, DM, IBS, HLD, COPD, PAF, CHF. Recall OV with me January 16, 2023. increased Lasix to 40 twice daily. Later, her swelling was still getting worse, so she was advised to increase to 80/40. She went for her echo on 1/24/2023 along with some labs, but because of persistent significant shortness of breath she was brought to the emergency department where they gave her IV Lasix. Last seen by Dr Prince Padgett 1/27/2023: She states she is finally starting to be able to breathe better. Noted that the weight has decreased from 163-156. Today    Saw PCP 2/17/2023 and lasix was changed to 40 mg BID, prior to that she was taking 80 mg BID. Wt remains stable at 159 lbs. The patient denies chest pain/ shortness of breath, orthopnea, PND, LE edema, palpitations, syncope, presyncope or fatigue.            Patient Active Problem List    Diagnosis Date Noted    CKD (chronic kidney disease) stage 4, GFR 15-29 ml/min ([facility]) 02/28/2023    PVD (peripheral vascular disease) ([address] 01/04/2023    Palliative care encounter 09/03/2021    Leukocytosis 09/01/2021    Pleural effusion 09/01/2021    CKD (chronic kidney disease) 09/01/2021    Acute CHF (congestive heart failure) ([address] 09/01/2021    Anemia of infection and chronic disease 08/26/2021    COPD (chronic obstructive pulmonary disease) ([address] 08/26/2021    Depression 08/26/2021    Pneumonia 08/23/2021    Sepsis ([address] 07/16/2021    Type 2 MI (myocardial infarction) ([address] 00/42/4562    Systolic heart failure, chronic ([address] 07/16/2021    Moderate episode of recurrent major depressive disorder ([address] 06/14/2021    Dilated cardiomyopathy ([address] 09/23/2020    SSS (sick sinus syndrome) ([facility] 09/23/2020    Cardiomyopathy ([facility] 09/23/2020    AICD (automatic cardioverter/defibrillator) present 09/23/2020    Respiratory failure ([address] 09/18/2020    CHF (congestive heart failure) ([address] 09/18/2020    Hypoxia 09/18/2020    Bilateral pleural effusion 09/18/2020    Elevated troponin 09/18/2020    PAF (paroxysmal atrial fibrillation) ([address] 09/10/2020    CKD (chronic kidney disease) stage 3, GFR 30-59 ml/min ([facility]) 06/04/2019    Lymphocytosis 05/07/2018    Type 2 diabetes with nephropathy ([address] 02/15/2018    Type 2 diabetes mellitus with diabetic neuropathy ([address] 02/15/2018    Spinal stenosis of lumbar region with neurogenic claudication 11/17/2017    Spondylosis of lumbosacral region without myelopathy or radiculopathy 11/17/2017    Overdose of opiate or related narcotic, accidental or unintentional, subsequent encounter 10/27/2017    Acute left-sided low back pain with sciatica 10/27/2017    Physical debility 10/27/2017    Acute encephalopathy 10/23/2017    Hyperkalemia 10/20/2017    Altered mental status 10/20/2017    Transaminitis 10/20/2017    Acute renal failure (ARF) ([address] 10/20/2017    Diverticulitis large intestine w/o perforation or abscess w/o bleeding 07/06/2017    SOB (shortness of breath) 06/21/2017    Abdominal pain, generalized 06/21/2017    Diarrhea in adult patient 06/21/2017    Cigarette smoker 78/98/0676    Neutrophilic leukocytosis 95/75/9639    Chronic pain     Hypercholesterolemia     Arthritis     Diabetes (HCC)     IBS (irritable bowel syndrome)     Hemorrhoid       Cheryl Eagle NP  Past Medical History:   Diagnosis Date    A-fib [facility])     AICD (automatic cardioverter/defibrillator) present 9/23/2020 9/23/2020 [employer] AICD implant    Arthritis     Bilateral pleural effusion 9/18/2020    Chronic pain     Chronic pain     Diabetes ([address]     Diverticular disease     Elevated troponin 9/18/2020    Hemorrhoid     Hypercholesterolemia     IBS (irritable bowel syndrome)     Lymphocytosis 72/74/9554    Systolic heart failure, chronic ([address] 7/16/2021    Type 2 MI (myocardial infarction) ([facility] 7/16/2021 7/162021 r/t sepsis      Past Surgical History:   Procedure Laterality Date    COLONOSCOPY N/A 10/31/2019    COLONOSCOPY performed by You Obando MD at [address]  10/31/2019         COLONOSCOPY,[facility]  10/31/2019         HX CATARACT REMOVAL      HX CHOLECYSTECTOMY      HX COLONOSCOPY  2009    HX COLONOSCOPY  2016    2 adenomatous polyp    HX HEMORRHOIDECTOMY  2009    HX HYSTERECTOMY      HX KNEE REPLACEMENT      right    HX ORTHOPAEDIC  12/11/2017    back, laminectomy and decompression    [place] INSJ/RPLCMT PERM DFB W/TRNSVNS LDS 1/DUAL CHMBR N/A 9/23/2020    INSERT ICD DUAL performed by Jessica Finnegan MD at [facility] CARDIAC CATH LAB     Allergies   Allergen Reactions    Cefazolin Rash     Possible kidney failure/AIN    Morphine Anaphylaxis    Ultram [Tramadol] Palpitations      Family History   Problem Relation Age of Onset    Colon Cancer Father     Diabetes Mother       Social History     Socioeconomic History    Marital status:      Spouse name: Not on file    Number of children: Not on file    Years of education: Not on file    Highest education level: Not on file   Occupational History    Occupation: retired     Comment: LPN   Tobacco Use    Smoking status: Every Day     Packs/day: 0.50     Years: 55.00     Pack years: 27.50     Types: Cigarettes    Smokeless tobacco: Never   Vaping Use    Vaping Use: Never used   Substance and Sexual Activity    Alcohol use: No    Drug use: Never    Sexual activity: Not Currently     Partners: Male     Birth control/protection: Abstinence   Other Topics Concern     Service No    Blood Transfusions Yes    Caffeine Concern Yes    Occupational Exposure No    Hobby Hazards No    Sleep Concern Yes    Stress Concern Yes    Weight Concern No    Special Diet No    Back Care Yes    Exercise No    Bike Helmet No     Comment: n/a    Seat Belt Yes    Self-Exams Yes Social History Narrative    Not on file     Social Determinants of Health     Financial Resource Strain: Not on file   Food Insecurity: Not on file   Transportation Needs: Not on file   Physical Activity: Not on file   Stress: Not on file   Social Connections: Not on file   Intimate Partner Violence: Not on file   Housing Stability: Not on file      Current Outpatient Medications   Medication Sig    carvediloL (COREG) 25 mg tablet TAKE 1 TABLET BY MOUTH  TWICE DAILY    furosemide (LASIX) 40 mg tablet Take 1 Tablet by mouth two (2) times a day.    ketoconazole (NIZORAL) 2 % topical cream Apply  to affected area daily. venlafaxine (EFFEXOR) 75 mg tablet TAKE ONE-HALF TABLET BY  MOUTH TWICE DAILY    atorvastatin (LIPITOR) 20 mg tablet Take 1 Tablet by mouth daily. hydrALAZINE (APRESOLINE) 10 mg tablet TAKE 1 TABLET BY MOUTH  TWICE DAILY    Myrbetriq 25 mg ER tablet Take 25 mg by mouth daily. Vitamin D2 1,250 mcg (50,000 unit) capsule TAKE 1 CAPSULE BY MOUTH  EVERY 7 DAYS FOR LOW  VITAMIN DAY LEVELS    Lantus Solostar U-100 Insulin 100 unit/mL (3 mL) inpn INJECT SUBCUTANEOUSLY 45  UNITS DAILY    ferrous sulfate 325 mg (65 mg iron) tablet Take 1 Tablet by mouth daily (with breakfast). b complex vitamins tablet Take 1 Tablet by mouth daily. nitroglycerin (NITROSTAT) 0.4 mg SL tablet 1 Tab by SubLINGual route every five (5) minutes as needed for Chest Pain. Up to 3 doses. colestipoL (Colestid) 1 gram tablet Take 1 Tab by mouth two (2) times a day. aspirin delayed-release 81 mg tablet Take 1 Tab by mouth daily. acetaminophen (TYLENOL) 650 mg TbER Take 650 mg by mouth every eight (8) hours. omega-3 fatty acids-vitamin e 1,000 mg cap Take 1 Capsule by mouth two (2) times a day. 32a day    ascorbic acid, vitamin C, (VITAMIN C) 500 mg tablet Take 1,000 mg by mouth daily. zinc 50 mg tab tablet Take 50 mg by mouth daily. No current facility-administered medications for this visit. Review of Symptoms:  11 systems reviewed, negative other than as stated in the HPI    Physical ExamPhysical Exam:    Vitals:    03/06/23 1422   BP: 120/64   Pulse: 83   Resp: 22   Temp: 97.2 °F (36.2 °C)   TempSrc: Temporal   SpO2: 91%   Weight: 159 lb (72.1 kg)   Height: 5' 6\" (1.676 m)       Body mass index is 25.66 kg/m². General PE  Gen:  NAD  Mental Status - Alert. General Appearance - Not in acute distress. HEENT:  PERRL, no carotid bruits or JVD  Chest and Lung Exam   Inspection: Accessory muscles - No use of accessory muscles in breathing. Auscultation:   Breath sounds: - Normal.   Cardiovascular   Inspection: Jugular vein - Bilateral - Inspection Normal.   Palpation/Percussion:   Apical Impulse: - Normal.   Auscultation: Rhythm - Regular. Heart Sounds - S1 WNL and S2 WNL. No S3 or S4. Murmurs & Other Heart Sounds: Auscultation of the heart reveals - No Murmurs. Peripheral Vascular   Upper Extremity: Inspection - Bilateral - No Cyanotic nailbeds or Digital clubbing. Lower Extremity:   Palpation: Edema - Bilateral - ankle swelling  Abdomen:   Soft, non-tender, bowel sounds are active.   Neuro: A&O times 3, CN and motor grossly WNL    Labs:   Lab Results   Component Value Date/Time    Cholesterol, total 168 02/17/2023 02:57 PM    Cholesterol, total 199 11/02/2022 07:35 AM    Cholesterol, total 243 (H) 06/14/2021 09:17 PM    Cholesterol, total 198 10/29/2020 10:22 AM    Cholesterol, total 185 06/29/2020 12:03 PM    HDL Cholesterol 40 02/17/2023 02:57 PM    HDL Cholesterol 43 11/02/2022 07:35 AM    HDL Cholesterol 42 06/14/2021 09:17 PM    HDL Cholesterol 35 (L) 10/29/2020 10:22 AM    HDL Cholesterol 36 (L) 06/29/2020 12:03 PM    LDL,Direct 137 (H) 06/14/2021 09:17 PM    LDL, calculated 64.8 02/17/2023 02:57 PM    LDL, calculated 111.6 (H) 11/02/2022 07:35 AM    LDL, calculated Not calculated due to elevated triglyceride level 06/14/2021 09:17 PM    LDL, calculated 110 (H) 10/29/2020 10:22 AM    LDL, calculated Comment 2020 12:03 PM    LDL, calculated 106 (H) 2020 03:52 PM    Triglyceride 316 (H) 2023 02:57 PM    Triglyceride 222 (H) 2022 07:35 AM    Triglyceride 493 (H) 2021 09:17 PM    Triglyceride 305 (H) 10/29/2020 10:22 AM    Triglyceride 412 (H) 2020 12:03 PM    CHOL/HDL Ratio 4.2 2023 02:57 PM    CHOL/HDL Ratio 4.6 2022 07:35 AM    CHOL/HDL Ratio 5.8 (H) 2021 09:17 PM     Lab Results   Component Value Date/Time    CK 51 2022 07:35 AM     Lab Results   Component Value Date/Time    Sodium 134 (L) 2023 02:57 PM    Potassium 5.3 (H) 2023 02:57 PM    Chloride 106 2023 02:57 PM    CO2 24 2023 02:57 PM    Anion gap 4 (L) 2023 02:57 PM    Glucose 347 (H) 2023 02:57 PM    BUN 57 (H) 2023 02:57 PM    Creatinine 2.53 (H) 2023 02:57 PM    BUN/Creatinine ratio 23 (H) 2023 02:57 PM    GFR est AA 31 (L) 2022 11:25 PM    GFR est non-AA 26 (L) 2022 11:25 PM    Calcium 9.2 2023 02:57 PM    Bilirubin, total 0.2 2023 02:57 PM    Alk. phosphatase 87 2023 02:57 PM    Protein, total 7.0 2023 02:57 PM    Albumin 3.1 (L) 2023 02:57 PM    Globulin 3.9 2023 02:57 PM    A-G Ratio 0.8 (L) 2023 02:57 PM    ALT (SGPT) 21 2023 02:57 PM       EK/24/23    ECHO ADULT COMPLETE 2023    Interpretation Summary    Left Ventricle: Severely reduced left ventricular systolic function with a visually estimated EF of 20 - 25%. EF by 2D Simpsons Biplane is 20%. Left ventricle is moderately dilated. Mildly increased wall thickness. Severe global hypokinesis present. Normal diastolic function. Left Atrium: Left atrium is moderately dilated. Left atrial volume index is moderately increased (42-48 mL/m2). Aortic Valve: Valve structure is normal. Mild sclerosis of the aortic valve cusp.     Mitral Valve: Valve structure is normal. Moderate annular calcification of the mitral valve. Mild regurgitation. Tricuspid Valve: Moderately elevated RVSP. Signed by: Renetta Beasley MD on 1/24/2023  8:35 PM     09/18/20    NUCLEAR CARDIAC STRESS TEST 09/22/2020, 09/22/2020 9/22/2020    Interpretation Summary  · Baseline ECG: Normal EKG, rare PVCs. · There was no ST segment deviation noted during stress. · FINDINGS: The rest and stress perfusion images demonstrate left ventricular dilatation. There is a fixed defect in the inferior wall compatible with infarct. No evidence of reversibility to suggest myocardium at ischemic risk. The gated images demonstrate global hypokinesia and inferior akinesia. Left ventricular ejection fraction is 32 %. Impression: Unchanged fixed defect in the inferior wall compatible with infarct. No evidence to suggest myocardium at ischemic risk. Left ventricular dilatation. Left ventricular ejection fraction is 32 %. Global hypokinesia and inferior akinesia. Signed by: Renetta Beasley MD on 9/22/2020  2:57 PM, Signed by: Cesar Lara MD on 9/22/2020  4:25 PM         Assessment:          ICD-10-CM ICD-9-CM    1. Dilated cardiomyopathy ([facility])  I42.0 425.4       2. PAF (paroxysmal atrial fibrillation) ([facility])  I48.0 427.31       3. CKD (chronic kidney disease), stage IV ([facility])  N18.4 585.4       4. Benign essential HTN  I10 401.1       5. PVD (peripheral vascular disease) ([facility])  I73.9 443.9       6. Mixed hyperlipidemia  E78.2 272.2       7. Type 2 diabetes with nephropathy ([facility])  E11.21 250.40      583.81           No orders of the defined types were placed in this encounter.        Plan:     CM  ICD explanted 7/21/21 by Dr Katia Brooke due to MSSA infection   Hx AICD/PPM placed by Dr. Stephanie Greer on 9/23/20  LVEF 20 to 25% 1/24/2023- referred back to electrophysiology to consider reimplantation of ICD --- to see Dr Kiet Horta on March 14 2023  EF 45-50% mod dilated LA grade 3 DD per echo in 8/2021   Prev EF 25-30% mod MR per echo in 9/2020  Wt stable at 159 lbs  Continue Lasix 40 mg BID  Continue Carvedilol 25 mg BID, hydralazine  No ace-I/arb d/t IVAN  She reports normal cardiac catheterization approximately 5/28/2003 performed by Dr Nico Ac 5 years ago   Will need to repeat will need to repeat ischemic evaluation when better compensated-likely nuclear stress test if we can confirm normal coronaries on catheterization done at [facility] brief intermittent CP--atypical, EKG abnormal with NSST and sent for Kuna August [facility] 7/29/20:  Kuna August 7/2020 and 9/2020 showed  fixed defect in the inferior wall compatible with infarct. HTN  Now controlled after adding hydralazine 10 mg BID in March 2022  Continue current therapy  The patient will monitor BP at home and call if generally >140/85. Elevated TG/HLD  2/2023 LDL 64 Continue rosuva 10 mg daily  11/2022   Rosuva was increased to 10 mg since       DM  On Insulin regimen     CKD IV  Serum Cr 2.53 in 2/17/2023 --- cut back on lasix 40 mg BID  Serum Cr 2.05 in 1/31/2023 (labwork after increasing lasix to 80 mg BID so cut back to 80 mg daily)  Serum Cr 1.58 in 1/24/2023 (lasix was increased to 80 BID)  Will see Dr Mario Crespo on April 26      COPD  Not addressed January 2023-recall at last visit:   Current 0.5 PPD smoker  No intention on quitting, but she has made progress, encouraged to use nicotine lozenges-5 minutes spent on smoking cessation. Followed by Dr Andres Dow Dizziness July 1, 2022:  Recall This was clearly worse when she laid down on the table. Orthostatics previously negative. Patient saw ENT and symptoms resolved with Epley maneuver. She is grateful. Cardiac clearance for cystoscopy  Pending until better compensated    History of vertigo:  Patient states she previously had vertigo could not walk in a straight line and we referred to ENT and with otolith repositioning/Epley maneuvers she had rapid improvement.      Follow-up in May         Adrian Dugan NP

## 2023-03-06 ENCOUNTER — OFFICE VISIT (OUTPATIENT)
Dept: CARDIOLOGY CLINIC | Age: 83
End: 2023-03-06
Payer: MEDICARE

## 2023-03-06 VITALS
DIASTOLIC BLOOD PRESSURE: 64 MMHG | HEIGHT: 66 IN | HEART RATE: 83 BPM | TEMPERATURE: 97.2 F | BODY MASS INDEX: 25.55 KG/M2 | RESPIRATION RATE: 22 BRPM | OXYGEN SATURATION: 91 % | WEIGHT: 159 LBS | SYSTOLIC BLOOD PRESSURE: 120 MMHG

## 2023-03-06 DIAGNOSIS — E78.2 MIXED HYPERLIPIDEMIA: ICD-10-CM

## 2023-03-06 DIAGNOSIS — I10 BENIGN ESSENTIAL HTN: ICD-10-CM

## 2023-03-06 DIAGNOSIS — I42.0 DILATED CARDIOMYOPATHY (HCC): Primary | ICD-10-CM

## 2023-03-06 DIAGNOSIS — N18.4 CKD (CHRONIC KIDNEY DISEASE), STAGE IV (HCC): ICD-10-CM

## 2023-03-06 DIAGNOSIS — I48.0 PAF (PAROXYSMAL ATRIAL FIBRILLATION) (HCC): ICD-10-CM

## 2023-03-06 DIAGNOSIS — E11.21 TYPE 2 DIABETES WITH NEPHROPATHY (HCC): ICD-10-CM

## 2023-03-06 DIAGNOSIS — I73.9 PVD (PERIPHERAL VASCULAR DISEASE) (HCC): ICD-10-CM

## 2023-03-06 PROCEDURE — 1090F PRES/ABSN URINE INCON ASSESS: CPT | Performed by: NURSE PRACTITIONER

## 2023-03-06 PROCEDURE — G8427 DOCREV CUR MEDS BY ELIG CLIN: HCPCS | Performed by: NURSE PRACTITIONER

## 2023-03-06 PROCEDURE — 3078F DIAST BP <80 MM HG: CPT | Performed by: NURSE PRACTITIONER

## 2023-03-06 PROCEDURE — 3051F HG A1C>EQUAL 7.0%<8.0%: CPT | Performed by: NURSE PRACTITIONER

## 2023-03-06 PROCEDURE — G8536 NO DOC ELDER MAL SCRN: HCPCS | Performed by: NURSE PRACTITIONER

## 2023-03-06 PROCEDURE — 3074F SYST BP LT 130 MM HG: CPT | Performed by: NURSE PRACTITIONER

## 2023-03-06 PROCEDURE — 1123F ACP DISCUSS/DSCN MKR DOCD: CPT | Performed by: NURSE PRACTITIONER

## 2023-03-06 PROCEDURE — G9717 DOC PT DX DEP/BP F/U NT REQ: HCPCS | Performed by: NURSE PRACTITIONER

## 2023-03-06 PROCEDURE — 1101F PT FALLS ASSESS-DOCD LE1/YR: CPT | Performed by: NURSE PRACTITIONER

## 2023-03-06 PROCEDURE — G8399 PT W/DXA RESULTS DOCUMENT: HCPCS | Performed by: NURSE PRACTITIONER

## 2023-03-06 PROCEDURE — 99214 OFFICE O/P EST MOD 30 MIN: CPT | Performed by: NURSE PRACTITIONER

## 2023-03-06 PROCEDURE — G8417 CALC BMI ABV UP PARAM F/U: HCPCS | Performed by: NURSE PRACTITIONER

## 2023-03-06 NOTE — PROGRESS NOTES
Identified pt with two pt identifiers(name and ). Reviewed record in preparation for visit and have obtained necessary documentation. Chief Complaint   Patient presents with    Irregular Heart Beat     Paroxysmal afib  SSS    CHF    Cardiomyopathy    Heart Problem     Myocardial infarction        Leg Swelling      Vitals:    23 1422   BP: 120/64   Pulse: 83   Resp: 22   Temp: 97.2 °F (36.2 °C)   TempSrc: Temporal   SpO2: 91%   Weight: 159 lb (72.1 kg)   Height: 5' 6\" (1.676 m)   PainSc:   0 - No pain       Medications reviewed/approved by provider. Health Maintenance Review: Patient reminded of \"due or due soon\" health maintenance. I have asked the patient to contact his/her primary care provider (PCP) for follow-up on his/her health maintenance. Coordination of Care Questionnaire:  :   1) Have you been to an emergency room, urgent care, or hospitalized since your last visit? If yes, where when, and reason for visit? no       2. Have seen or consulted any other health care provider since your last visit? If yes, where when, and reason for visit? NO      Patient is accompanied by daughter I have received verbal consent from Byron Pro to discuss any/all medical information while they are present in the room.

## 2023-03-14 ENCOUNTER — OFFICE VISIT (OUTPATIENT)
Dept: CARDIOLOGY CLINIC | Age: 83
End: 2023-03-14

## 2023-03-14 VITALS
HEART RATE: 72 BPM | WEIGHT: 159.6 LBS | SYSTOLIC BLOOD PRESSURE: 112 MMHG | BODY MASS INDEX: 25.65 KG/M2 | DIASTOLIC BLOOD PRESSURE: 70 MMHG | OXYGEN SATURATION: 94 % | RESPIRATION RATE: 16 BRPM | HEIGHT: 66 IN

## 2023-03-14 DIAGNOSIS — E78.00 HYPERCHOLESTEROLEMIA: ICD-10-CM

## 2023-03-14 DIAGNOSIS — I50.22 CHRONIC SYSTOLIC CONGESTIVE HEART FAILURE (HCC): Primary | ICD-10-CM

## 2023-03-14 DIAGNOSIS — J44.9 CHRONIC OBSTRUCTIVE PULMONARY DISEASE, UNSPECIFIED COPD TYPE (HCC): Chronic | ICD-10-CM

## 2023-03-14 DIAGNOSIS — I42.0 DILATED CARDIOMYOPATHY (HCC): Chronic | ICD-10-CM

## 2023-03-14 DIAGNOSIS — N18.4 CKD (CHRONIC KIDNEY DISEASE) STAGE 4, GFR 15-29 ML/MIN (HCC): ICD-10-CM

## 2023-03-14 DIAGNOSIS — E11.40 TYPE 2 DIABETES MELLITUS WITH DIABETIC NEUROPATHY, UNSPECIFIED WHETHER LONG TERM INSULIN USE (HCC): ICD-10-CM

## 2023-03-14 DIAGNOSIS — I49.5 SSS (SICK SINUS SYNDROME) (HCC): Chronic | ICD-10-CM

## 2023-03-14 DIAGNOSIS — I48.0 PAF (PAROXYSMAL ATRIAL FIBRILLATION) (HCC): ICD-10-CM

## 2023-03-14 NOTE — PATIENT INSTRUCTIONS
Schedule for subcutaneous ICD implantation next available with anesthesia at [facility]  FU with site check 7-10 days   FU with Dr. Aristides Mantilla 3 months    Your Subcutaneous ICD procedure has been scheduled for 5/4/23 at 12:00 pm, at [facility].    Please report to Admitting Department by 10:00 am, or 2 hours prior to your scheduled procedure. Please bring a list of your current medications and medication bottles, if able, to the hospital on this day. You will be unable to drive after your procedure so please make sure to bring someone with you to your procedure. You will need to have nothing to eat or drink after midnight, the night prior to your procedure. You may have small sips of water, if needed, to take with your medication. You will need labs drawn prior to your procedure. Please go to have this done AFTER 4/4/23. You should NOT stop your medication prior to your scheduled procedure. After your procedure, you will need to follow up with Dr. Familia Luna nurse for a wound and device check.  Your follow-up appointment has been scheduled for 5/12/23 at 10:00 am.

## 2023-03-14 NOTE — PROGRESS NOTES
Cardiac Electrophysiology OFFICE Consultation Note     Primary Cardiologist: Dr Michelle Vela. Assessment/Plan:   1. Chronic systolic congestive heart failure (HCC)  -     AMB POC EKG ROUTINE W/ 12 LEADS, INTER & REP  2. CKD (chronic kidney disease) stage 4, GFR 15-29 ml/min (HCC)  -     AMB POC EKG ROUTINE W/ 12 LEADS, INTER & REP  3. Chronic obstructive pulmonary disease, unspecified COPD type ([facility]  -     AMB POC EKG ROUTINE W/ 12 LEADS, INTER & REP  4. Dilated cardiomyopathy (HCC)  -     AMB POC EKG ROUTINE W/ 12 LEADS, INTER & REP  5. Hypercholesterolemia  -     AMB POC EKG ROUTINE W/ 12 LEADS, INTER & REP  6. PAF (paroxysmal atrial fibrillation) ([facility]  7. SSS (sick sinus syndrome) ([facility]  8. Type 2 diabetes mellitus with diabetic neuropathy, unspecified whether long term insulin use [facility])     Ms Willy Mendoza is here to discuss implant of ICD. EKG demonstrated , not a candidate for BiV ICD. Per review of previous pacemaker interrogation, she was not using pacing. With her hx of MSSA bacteriemia with subsequent device explant, she is at higher risk for infection. Based on risk of sudden cardiac death, a formal shared decision has been made to proceed with implantation of a cardiac defibrillator for primary prevention of sudden cardiac death using the [address] [place] [address] patient decision aid tool. The patient verbalizes understanding of indication, risks and benefits of ICD procedure. Recommend SubQ ICD. I discussed the risks/benefits/alternatives of the procedure with the patient. Risks include (but are not limited to) bleeding, heart block, infection, cva/mi/tamponade/death. The patient understands and agrees to proceed. - schedule for S-ICD next available with anesthesia  - anticipate 1 night stay at [facility] given she lives several     Subjective:       Jayy Hubbard is a 79 y.o. patient who is seen for evaluation of  CHF and ICD consideration.     She reports recurrent edema in the last 2 mo. Lasix was increased, then lowered due to cr, now on 40 mg bid. She notes being comfortable at this time. History of ICD implantation in 9/23/20 with subsequent MSSA infection s/p explant on 7/21/21. Now LVEF at 20-25% from TTE on 1/24/23. I independently review internal and external records of most recent labs, EKGs, event monitors or Holters, and imaging including most recent echocardiograms and stress test.   TTE on 1/24/23: LVEF of 20-25%, mild LVH, mod LAD, mild AS  Lexiscan MPI 7/29/20:  Ashley Russell 7/2020 and 9/2020 showed  fixed defect in the inferior wall compatible with infarct.     Patient Active Problem List   Diagnosis Code    Hypercholesterolemia E78.00    Arthritis M19.90    Diabetes ([facility] E11.9    IBS (irritable bowel syndrome) K58.9    Hemorrhoid K64.9    Chronic pain D85.72    Neutrophilic leukocytosis Y30.0    SOB (shortness of breath) R06.02    Abdominal pain, generalized R10.84    Diarrhea in adult patient R19.7    Cigarette smoker F17.210    Diverticulitis large intestine w/o perforation or abscess w/o bleeding K57.32    Hyperkalemia E87.5    Altered mental status R41.82    Transaminitis R74.01    Acute renal failure (ARF) ([facility]) N17.9    Acute encephalopathy G93.40    Overdose of opiate or related narcotic, accidental or unintentional, subsequent encounter T40.601D    Acute left-sided low back pain with sciatica M54.40    Physical debility R53.81    Type 2 diabetes with nephropathy ([facility]) E11.21    Type 2 diabetes mellitus with diabetic neuropathy ([facility]) E11.40    Lymphocytosis D72.820    CKD (chronic kidney disease) stage 3, GFR 30-59 ml/min ([facility]) N18.30    PAF (paroxysmal atrial fibrillation) ([facility]) I48.0    Respiratory failure ([facility]) J96.90    CHF (congestive heart failure) ([facility]) I50.9    Hypoxia R09.02    Bilateral pleural effusion J90    Elevated troponin R77.8    Dilated cardiomyopathy ([facility]) I42.0    SSS (sick sinus syndrome) ([facility] I49.5    Cardiomyopathy ([facility] I42.9    AICD (automatic cardioverter/defibrillator) present Z95.810    Spinal stenosis of lumbar region with neurogenic claudication M48.062    Spondylosis of lumbosacral region without myelopathy or radiculopathy M47.817    Moderate episode of recurrent major depressive disorder ([facility]) F33.1    Sepsis ([facility]) A41.9    Type 2 MI (myocardial infarction) ([facility] I21. A1    Systolic heart failure, chronic ([facility]) I50.22    Pneumonia J18.9    Anemia of infection and chronic disease B99.9, D63.8    COPD (chronic obstructive pulmonary disease) ([facility]) J44.9    Depression F32. A    Leukocytosis D72.829    Pleural effusion J90    CKD (chronic kidney disease) N18.9    Acute CHF (congestive heart failure) ([facility]) I50.9    Palliative care encounter Z51.5    PVD (peripheral vascular disease) ([facility]) I73.9    CKD (chronic kidney disease) stage 4, GFR 15-29 ml/min ([facility]) N18.4     Current Outpatient Medications   Medication Sig Dispense Refill    carvediloL (COREG) 25 mg tablet TAKE 1 TABLET BY MOUTH  TWICE DAILY 180 Tablet 0    furosemide (LASIX) 40 mg tablet Take 1 Tablet by mouth two (2) times a day. 180 Tablet 0    ketoconazole (NIZORAL) 2 % topical cream Apply  to affected area daily. 15 g 2    venlafaxine (EFFEXOR) 75 mg tablet TAKE ONE-HALF TABLET BY  MOUTH TWICE DAILY 90 Tablet 3    atorvastatin (LIPITOR) 20 mg tablet Take 1 Tablet by mouth daily. 90 Tablet 1    hydrALAZINE (APRESOLINE) 10 mg tablet TAKE 1 TABLET BY MOUTH  TWICE DAILY 180 Tablet 1    Vitamin D2 1,250 mcg (50,000 unit) capsule TAKE 1 CAPSULE BY MOUTH  EVERY 7 DAYS FOR LOW  VITAMIN DAY LEVELS 13 Capsule 1    Lantus Solostar U-100 Insulin 100 unit/mL (3 mL) inpn INJECT SUBCUTANEOUSLY 45  UNITS DAILY 45 mL 3    ferrous sulfate 325 mg (65 mg iron) tablet Take 1 Tablet by mouth daily (with breakfast). 30 Tablet 0    b complex vitamins tablet Take 1 Tablet by mouth daily.       nitroglycerin (NITROSTAT) 0.4 mg SL tablet 1 Tab by SubLINGual route every five (5) minutes as needed for Chest Pain. Up to 3 doses. 25 Tab 0    colestipoL (Colestid) 1 gram tablet Take 1 Tab by mouth two (2) times a day. 180 Tab 3    aspirin delayed-release 81 mg tablet Take 1 Tab by mouth daily. 90 Tab 1    acetaminophen (TYLENOL) 650 mg TbER Take 650 mg by mouth every eight (8) hours. omega-3 fatty acids-vitamin e 1,000 mg cap Take 1 Capsule by mouth two (2) times a day. 32a day      ascorbic acid, vitamin C, (VITAMIN C) 500 mg tablet Take 1,000 mg by mouth daily. zinc 50 mg tab tablet Take 50 mg by mouth daily.        Allergies   Allergen Reactions    Cefazolin Rash     Possible kidney failure/AIN    Morphine Anaphylaxis    Ultram [Tramadol] Palpitations     Past Medical History:   Diagnosis Date    A-fib [facility])     AICD (automatic cardioverter/defibrillator) present 9/23/2020 9/23/2020 [employer] AICD implant    Arthritis     Bilateral pleural effusion 9/18/2020    Chronic pain     Chronic pain     Diabetes ([facility]     Diverticular disease     Elevated troponin 9/18/2020    Hemorrhoid     Hypercholesterolemia     IBS (irritable bowel syndrome)     Lymphocytosis 99/76/6930    Systolic heart failure, chronic ([address] 7/16/2021    Type 2 MI (myocardial infarction) ([address] 7/16/2021 7/162021 r/t sepsis     Past Surgical History:   Procedure Laterality Date    COLONOSCOPY N/A 10/31/2019    COLONOSCOPY performed by Satnam Aguirre MD at [address]  10/31/2019         [facility]COPY,[facility]  10/31/2019         HX CATARACT REMOVAL      HX CHOLECYSTECTOMY      HX COLONOSCOPY  2009    HX COLONOSCOPY  2016    2 adenomatous polyp    HX HEMORRHOIDECTOMY  2009    HX HYSTERECTOMY      HX KNEE REPLACEMENT      right    HX ORTHOPAEDIC  12/11/2017    back, laminectomy and decompression    GA INSJ/RPLCMT PERM DFB W/TRNSVNS LDS 1/DUAL CHMBR N/A 9/23/2020    INSERT ICD DUAL performed by Donavon Schwartz MD at [facility]     Family History   Problem Relation Age of Onset    Colon Cancer Father     Diabetes Mother      Social History     Tobacco Use    Smoking status: Every Day     Packs/day: 0.50     Years: 55.00     Pack years: 27.50     Types: Cigarettes    Smokeless tobacco: Never   Substance Use Topics    Alcohol use: No        Review of Systems:   12 point review of systems was performed. All negative except for HPI     Objective:   /70 (BP 1 Location: Left upper arm, BP Patient Position: Sitting, BP Cuff Size: Adult)   Pulse 72   Resp 16   Ht 5' 6\" (1.676 m)   Wt 159 lb 9.6 oz (72.4 kg)   SpO2 94%   BMI 25.76 kg/m²     Physical Exam:   General:  Alert and oriented, in no acute distress  Head:  Atraumatic, normocephalic  Eyes:  extraocular muscles intact  Neck:  Supple, normal range of motion  Lungs:  Clear to auscultation bilaterally, no wheezes/rales/rhonchi   Cardiovascular:  Regular rate and rhythm, normal S1-S2, no murmurs/rubs/gallops  Abdomen:  Soft, nontender, nondistended, normoactive bowel sounds  Skin:  Intact, no rash  Extremities:, no clubbing, cyanosis, + lower edema  Musculoskeletal: normal range of motion  Neurological:  Alert and oriented, no focal neurologic deficits  Psychiatric:  Normal mood and affect    Lab Results   Component Value Date/Time    Hemoglobin A1c 7.6 (H) 02/17/2023 02:57 PM     EKG: sinus, diffuse T wave inversion, , Qt 418.   01/24/23    ECHO ADULT COMPLETE 01/24/2023 1/24/2023    Interpretation Summary    Left Ventricle: Severely reduced left ventricular systolic function with a visually estimated EF of 20 - 25%. EF by 2D Simpsons Biplane is 20%. Left ventricle is moderately dilated. Mildly increased wall thickness. Severe global hypokinesis present. Normal diastolic function. Left Atrium: Left atrium is moderately dilated. Left atrial volume index is moderately increased (42-48 mL/m2). Aortic Valve: Valve structure is normal. Mild sclerosis of the aortic valve cusp.     Mitral Valve: Valve structure is normal. Moderate annular calcification of the mitral valve. Mild regurgitation. Tricuspid Valve: Moderately elevated RVSP. Signed by: Robert Brown MD on 1/24/2023  8:35 PM      09/18/20    NUCLEAR CARDIAC STRESS TEST 09/22/2020, 09/22/2020 9/22/2020    Interpretation Summary  · Baseline ECG: Normal EKG, rare PVCs. · There was no ST segment deviation noted during stress. · FINDINGS: The rest and stress perfusion images demonstrate left ventricular dilatation. There is a fixed defect in the inferior wall compatible with infarct. No evidence of reversibility to suggest myocardium at ischemic risk. The gated images demonstrate global hypokinesia and inferior akinesia. Left ventricular ejection fraction is 32 %. Impression: Unchanged fixed defect in the inferior wall compatible with infarct. No evidence to suggest myocardium at ischemic risk. Left ventricular dilatation. Left ventricular ejection fraction is 32 %. Global hypokinesia and inferior akinesia.     Signed by: Robert Brown MD on 9/22/2020  2:57 PM, Signed by: Wally Cerrato MD on 9/22/2020  4:25 PM    Results for orders placed or performed during the hospital encounter of 01/24/23   EKG, 12 LEAD, INITIAL   Result Value Ref Range    Ventricular Rate 51 BPM    Atrial Rate 51 BPM    P-R Interval 146 ms    QRS Duration 110 ms    Q-T Interval 446 ms    QTC Calculation (Bezet) 411 ms    Calculated P Axis 33 degrees    Calculated R Axis 55 degrees    Calculated T Axis -154 degrees    Diagnosis       Sinus bradycardia with occasional premature ventricular complexes  Marked ST abnormality, possible inferolateral subendocardial injury  Abnormal ECG  When compared with ECG of 01-JUN-2022 23:37,  premature ventricular complexes are now present  Confirmed by Nafisa Sykes MD, [id number] on 1/25/2023 2:21:02 PM               Thank you for involving me in this patient's care and please call with further concerns or questions. ________________________________________  An Chivo Martell MD, [facility]  Cardiac Electrophysiology  [facility]  [address] [place] [address], [address] [place] [address] Avenue                             [phone]     [address] [place] [address].  [address] [place] [address] [place], [address] [facility]  [phone]

## 2023-03-23 ENCOUNTER — TELEPHONE (OUTPATIENT)
Dept: FAMILY MEDICINE CLINIC | Age: 83
End: 2023-03-23

## 2023-03-23 ENCOUNTER — OFFICE VISIT (OUTPATIENT)
Dept: FAMILY MEDICINE CLINIC | Age: 83
End: 2023-03-23

## 2023-03-23 VITALS
SYSTOLIC BLOOD PRESSURE: 138 MMHG | HEART RATE: 76 BPM | DIASTOLIC BLOOD PRESSURE: 78 MMHG | RESPIRATION RATE: 24 BRPM | BODY MASS INDEX: 26.49 KG/M2 | OXYGEN SATURATION: 97 % | WEIGHT: 159 LBS | TEMPERATURE: 98 F | HEIGHT: 65 IN

## 2023-03-23 DIAGNOSIS — Z00.00 MEDICARE ANNUAL WELLNESS VISIT, SUBSEQUENT: Primary | ICD-10-CM

## 2023-03-23 NOTE — PATIENT INSTRUCTIONS
Medicare Wellness Visit, Female     The best way to live healthy is to have a lifestyle where you eat a well-balanced diet, exercise regularly, limit alcohol use, and quit all forms of tobacco/nicotine, if applicable. Regular preventive services are another way to keep healthy. Preventive services (vaccines, screening tests, monitoring & exams) can help personalize your care plan, which helps you manage your own care. Screening tests can find health problems at the earliest stages, when they are easiest to treat. [facility] follows the current, evidence-based guidelines published by the [facility] ([facility]) when recommending preventive services for our patients. Because we follow these guidelines, sometimes recommendations change over time as research supports it. (For example, mammograms used to be recommended annually. Even though Medicare will still pay for an annual mammogram, the newer guidelines recommend a mammogram every two years for women of average risk). Of course, you and your doctor may decide to screen more often for some diseases, based on your risk and your co-morbidities (chronic disease you are already diagnosed with). Preventive services for you include:  - Medicare offers their members a free annual wellness visit, which is time for you and your primary care provider to discuss and plan for your preventive service needs.  Take advantage of this benefit every year!    -Over the age of 72 should receive the recommended pneumonia vaccines.    -All adults should have a flu vaccine yearly.  -All adults should have a tetanus vaccine every 10 years.   -Over the age 48 should receive the shingles vaccines.        -All adults should be screened once for Hepatitis C.  -All adults age 38-68 who are overweight should have a diabetes screening test once every three years.   -Other screening tests and preventive services for persons with diabetes include: an eye exam to screen for diabetic retinopathy, a kidney function test, a foot exam, and stricter control over your cholesterol.   -Cardiovascular screening for adults with routine risk involves an electrocardiogram (ECG) at intervals determined by your doctor.     -Colorectal cancer screenings should be done for adults age 39-70 with no increased risk factors for colorectal cancer. There are a number of acceptable methods of screening for this type of cancer. Each test has its own benefits and drawbacks. Discuss with your doctor what is most appropriate for you during your annual wellness visit. The different tests include: colonoscopy (considered the best screening method), a fecal occult blood test, a fecal DNA test, and sigmoidoscopy.    -Lung cancer screening is recommended annually with a low dose CT scan for adults between age 54 and 68, who have smoked at least 30 pack years (equivalent of 1 pack per day for 30 days), and who is a current smoker or quit less than 15 years ago.    -A bone mass density test is recommended when a woman turns 65 to screen for osteoporosis. This test is only recommended one time, as a screening. Some providers will use this same test as a disease monitoring tool if you already have osteoporosis. -Breast cancer screenings are recommended every other year for women of normal risk, age 54-69.    -Cervical cancer screenings for women over age 72 are only recommended with certain risk factors.      Here is a list of your current Health Maintenance items (your personalized list of preventive services) with a due date:  Health Maintenance Due   Topic Date Due    Shingles Vaccine (2 of 2) 10/15/2019    COVID-19 Vaccine (3 - Moderna risk series) 06/08/2022    Yearly Flu Vaccine (1) 08/01/2022

## 2023-03-23 NOTE — PROGRESS NOTES
Chief Complaint   Patient presents with    Annual Wellness Visit     YES Answers must have Comments  1. \"Have you been to the ER, urgent care clinic since your last visit? Hospitalized since your last visit? \"    [] YES   [x] NO       2. Have you seen or consulted any other health care providers outside of [address] [place] [address] since your last visit?     [x] YES  DR Isael Slaughter urologist 12/01/2022 months ago  [] NO       3. For patients aged 39-70: Have you had a colorectal cancer screening such as a colonoscopy/FIT/Cologuard? Nurse/CMA to request records if not in chart   [] YES   [] NO   [x] NA, based on age    If the patient is female:      4. For female patients aged 41-77: Nakul Cam you had a mammogram in the last two years?  Nurse/CMA to request records if not in chart   [] YES   [] NO   [x] NA, based on age    11. For female patients aged 21-65: Nakul Cam you had a pap smear?   Nurse/CMA to request records if not in chart   [] YES   [] NO  [x] NA, based on age

## 2023-03-23 NOTE — PROGRESS NOTES
This is the Subsequent Medicare Annual Wellness Exam, performed 12 months or more after the Initial AWV or the last Subsequent AWV    I have reviewed the patient's medical history in detail and updated the computerized patient record. Visit Vitals  /78 (BP 1 Location: Right arm, BP Patient Position: Sitting, BP Cuff Size: Adult)   Pulse 76   Temp 98 °F (36.7 °C) (Temporal)   Resp 24   Ht 5' 5\" (1.651 m)   Wt 159 lb (72.1 kg)   SpO2 97%   BMI 26.46 kg/m²        Assessment/Plan   Education and counseling provided:  Are appropriate based on today's review and evaluation    1. Medicare annual wellness visit, subsequent     Depression Risk Factor Screening     3 most recent PHQ Screens 3/23/2023   Little interest or pleasure in doing things Nearly every day   Feeling down, depressed, irritable, or hopeless Nearly every day   Total Score PHQ 2 6   Trouble falling or staying asleep, or sleeping too much Nearly every day   Feeling tired or having little energy Nearly every day   Poor appetite, weight loss, or overeating Not at all   Feeling bad about yourself - or that you are a failure or have let yourself or your family down Not at all   Trouble concentrating on things such as school, work, reading, or watching TV Nearly every day   Moving or speaking so slowly that other people could have noticed; or the opposite being so fidgety that others notice Not at all   Thoughts of being better off dead, or hurting yourself in some way Not at all   PHQ 9 Score 15   How difficult have these problems made it for you to do your work, take care of your home and get along with others Somewhat difficult       Alcohol & Drug Abuse Risk Screen    Do you average more than 1 drink per night or more than 7 drinks a week:  No    On any one occasion in the past three months have you have had more than 3 drinks containing alcohol:  No          Functional Ability and Level of Safety    Hearing: Whisper Test done with abnormal results. Activities of Daily Living: The home contains: handrails  Patient needs help with:  transportation, housework, and walking      Ambulation: with mild difficulty     Fall Risk:  Fall Risk Assessment, last 12 mths 3/14/2023   Able to walk? Yes   Fall in past 12 months? 0   Do you feel unsteady? 0   Are you worried about falling 0   Is TUG test greater than 12 seconds? -   Is the gait abnormal? -   Number of falls in past 12 months -   Fall with injury?  -      Abuse Screen:  Patient is not abused       Cognitive Screening    Has your family/caregiver stated any concerns about your memory:  no     Cognitive Screening: Normal - Clock Drawing Test    Health Maintenance Due     Health Maintenance Due   Topic Date Due    Shingles Vaccine (2 of 2) 10/15/2019    COVID-19 Vaccine (3 - Moderna risk series) 06/08/2022    Flu Vaccine (1) 08/01/2022       Patient Care Team   Patient Care Team:  Carson Rosa NP as PCP - General (Nurse Practitioner)  Carson Rosa NP as PCP - [facility] Empaneled Provider  Tay Cross MD (Surgery Physician)  Elliott Piedra MD (Infectious Disease Physician)    History     Patient Active Problem List   Diagnosis Code    Hypercholesterolemia E78.00    Arthritis M19.90    Diabetes ([address] E11.9    IBS (irritable bowel syndrome) K58.9    Hemorrhoid K64.9    Chronic pain I06.52    Neutrophilic leukocytosis Y53.3    SOB (shortness of breath) R06.02    Abdominal pain, generalized R10.84    Diarrhea in adult patient R19.7    Cigarette smoker F17.210    Diverticulitis large intestine w/o perforation or abscess w/o bleeding K57.32    Hyperkalemia E87.5    Altered mental status R41.82    Transaminitis R74.01    Acute renal failure (ARF) ([address] N17.9    Acute encephalopathy G93.40    Overdose of opiate or related narcotic, accidental or unintentional, subsequent encounter T40.601D    Acute left-sided low back pain with sciatica M54.40    Physical debility R53.81    Type 2 diabetes with nephropathy ([address] E11.21    Type 2 diabetes mellitus with diabetic neuropathy ([facility]) E11.40    Lymphocytosis D72.820    CKD (chronic kidney disease) stage 3, GFR 30-59 ml/min ([facility]) N18.30    PAF (paroxysmal atrial fibrillation) ([facility]) I48.0    Respiratory failure ([facility]) J96.90    CHF (congestive heart failure) ([facility]) I50.9    Hypoxia R09.02    Bilateral pleural effusion J90    Elevated troponin R77.8    Dilated cardiomyopathy ([facility]) I42.0    SSS (sick sinus syndrome) ([facility]) I49.5    Cardiomyopathy ([facility]) I42.9    AICD (automatic cardioverter/defibrillator) present Z95.810    Spinal stenosis of lumbar region with neurogenic claudication M48.062    Spondylosis of lumbosacral region without myelopathy or radiculopathy M47.817    Moderate episode of recurrent major depressive disorder ([facility]) F33.1    Sepsis ([facility]) A41.9    Type 2 MI (myocardial infarction) ([facility] I21. A1    Systolic heart failure, chronic ([facility]) I50.22    Pneumonia J18.9    Anemia of infection and chronic disease B99.9, D63.8    COPD (chronic obstructive pulmonary disease) ([facility]) J44.9    Depression F32. A    Leukocytosis D72.829    Pleural effusion J90    CKD (chronic kidney disease) N18.9    Acute CHF (congestive heart failure) ([facility]) I50.9    Palliative care encounter Z51.5    PVD (peripheral vascular disease) ([facility]) I73.9    CKD (chronic kidney disease) stage 4, GFR 15-29 ml/min ([facility]) N18.4     Past Medical History:   Diagnosis Date    A-fib [facility])     AICD (automatic cardioverter/defibrillator) present 9/23/2020 9/23/2020 [employer] AICD implant    Arthritis     Bilateral pleural effusion 9/18/2020    Chronic pain     Chronic pain     Diabetes ([address]     Diverticular disease     Elevated troponin 9/18/2020    Hemorrhoid     Hypercholesterolemia     IBS (irritable bowel syndrome)     Lymphocytosis 77/72/8173    Systolic heart failure, chronic ([address] 7/16/2021    Type 2 MI (myocardial infarction) ([address] 7/16/2021 7/162021 r/t sepsis      Past Surgical History:   Procedure Laterality Date    COLONOSCOPY N/A 10/31/2019    COLONOSCOPY performed by Yulisa Reynolds MD at [facility] ENDOSCOPY    COLONOSCOPY,DIAGNOSTIC  10/31/2019         COLONOSCOPY,PAKO LEUNG,SNARE  10/31/2019         HX CATARACT REMOVAL      HX CHOLECYSTECTOMY      HX COLONOSCOPY  2009    HX COLONOSCOPY  2016    2 adenomatous polyp    HX HEMORRHOIDECTOMY  2009    HX HYSTERECTOMY      HX KNEE REPLACEMENT      right    HX ORTHOPAEDIC  12/11/2017    back, laminectomy and decompression    OK INSJ/RPLCMT PERM DFB W/TRNSVNS LDS 1/DUAL CHMBR N/A 9/23/2020    INSERT ICD DUAL performed by Madelaine Severino MD at [facility] CARDIAC CATH LAB     Current Outpatient Medications   Medication Sig Dispense Refill    carvediloL (COREG) 25 mg tablet TAKE 1 TABLET BY MOUTH  TWICE DAILY 180 Tablet 0    furosemide (LASIX) 40 mg tablet Take 1 Tablet by mouth two (2) times a day. 180 Tablet 0    ketoconazole (NIZORAL) 2 % topical cream Apply  to affected area daily. 15 g 2    venlafaxine (EFFEXOR) 75 mg tablet TAKE ONE-HALF TABLET BY  MOUTH TWICE DAILY 90 Tablet 3    atorvastatin (LIPITOR) 20 mg tablet Take 1 Tablet by mouth daily. 90 Tablet 1    hydrALAZINE (APRESOLINE) 10 mg tablet TAKE 1 TABLET BY MOUTH  TWICE DAILY 180 Tablet 1    Vitamin D2 1,250 mcg (50,000 unit) capsule TAKE 1 CAPSULE BY MOUTH  EVERY 7 DAYS FOR LOW  VITAMIN DAY LEVELS 13 Capsule 1    Lantus Solostar U-100 Insulin 100 unit/mL (3 mL) inpn INJECT SUBCUTANEOUSLY 45  UNITS DAILY 45 mL 3    ferrous sulfate 325 mg (65 mg iron) tablet Take 1 Tablet by mouth daily (with breakfast). 30 Tablet 0    b complex vitamins tablet Take 1 Tablet by mouth daily. nitroglycerin (NITROSTAT) 0.4 mg SL tablet 1 Tab by SubLINGual route every five (5) minutes as needed for Chest Pain. Up to 3 doses. 25 Tab 0    colestipoL (Colestid) 1 gram tablet Take 1 Tab by mouth two (2) times a day. 180 Tab 3    aspirin delayed-release 81 mg tablet Take 1 Tab by mouth daily.  90 Tab 1    acetaminophen (TYLENOL) 650 mg TbER Take 650 mg by mouth every eight (8) hours. omega-3 fatty acids-vitamin e 1,000 mg cap Take 1 Capsule by mouth two (2) times a day. 32a day      ascorbic acid, vitamin C, (VITAMIN C) 500 mg tablet Take 1,000 mg by mouth daily. zinc 50 mg tab tablet Take 50 mg by mouth daily.        Allergies   Allergen Reactions    Cefazolin Rash     Possible kidney failure/AIN    Morphine Anaphylaxis    Ultram [Tramadol] Palpitations       Family History   Problem Relation Age of Onset    Colon Cancer Father     Diabetes Mother      Social History     Tobacco Use    Smoking status: Every Day     Packs/day: 0.50     Years: 55.00     Pack years: 27.50     Types: Cigarettes    Smokeless tobacco: Never   Substance Use Topics    Alcohol use: No         Agustin TANC

## 2023-03-29 RX ORDER — ERGOCALCIFEROL 1.25 MG/1
CAPSULE ORAL
Qty: 13 CAPSULE | Refills: 3 | Status: SHIPPED | OUTPATIENT
Start: 2023-03-29

## 2023-04-05 ENCOUNTER — HOSPITAL ENCOUNTER (OUTPATIENT)
Age: 83
End: 2023-04-05
Attending: EMERGENCY MEDICINE
Payer: MEDICARE

## 2023-04-05 ENCOUNTER — APPOINTMENT (OUTPATIENT)
Dept: GENERAL RADIOLOGY | Age: 83
End: 2023-04-05
Attending: EMERGENCY MEDICINE
Payer: MEDICARE

## 2023-04-05 LAB
ALBUMIN SERPL-MCNC: 3 G/DL (ref 3.5–5)
ALBUMIN/GLOB SERPL: 0.7 (ref 1.1–2.2)
ALP SERPL-CCNC: 92 U/L (ref 45–117)
ALT SERPL-CCNC: 18 U/L (ref 12–78)
ANION GAP SERPL CALC-SCNC: 12 MMOL/L (ref 5–15)
ANION GAP SERPL CALC-SCNC: 9 MMOL/L (ref 5–15)
APPEARANCE UR: CLEAR
AST SERPL-CCNC: 18 U/L (ref 15–37)
BACTERIA URNS QL MICRO: NEGATIVE /HPF
BASOPHILS # BLD: 0.1 K/UL (ref 0–0.1)
BASOPHILS NFR BLD: 1 % (ref 0–1)
BILIRUB SERPL-MCNC: 0.3 MG/DL (ref 0.2–1)
BILIRUB UR QL: NEGATIVE
BNP SERPL-MCNC: ABNORMAL PG/ML (ref 0–450)
BUN SERPL-MCNC: 42 MG/DL (ref 6–20)
BUN SERPL-MCNC: 47 MG/DL (ref 6–20)
BUN/CREAT SERPL: 18 (ref 12–20)
BUN/CREAT SERPL: 21 (ref 12–20)
CALCIUM SERPL-MCNC: 9.3 MG/DL (ref 8.5–10.1)
CALCIUM SERPL-MCNC: 9.5 MG/DL (ref 8.5–10.1)
CHLORIDE SERPL-SCNC: 100 MMOL/L (ref 97–108)
CHLORIDE SERPL-SCNC: 101 MMOL/L (ref 97–108)
CO2 SERPL-SCNC: 20 MMOL/L (ref 21–32)
CO2 SERPL-SCNC: 21 MMOL/L (ref 21–32)
COLOR UR: ABNORMAL
CREAT SERPL-MCNC: 2.28 MG/DL (ref 0.55–1.02)
CREAT SERPL-MCNC: 2.29 MG/DL (ref 0.55–1.02)
DIFFERENTIAL METHOD BLD: ABNORMAL
EOSINOPHIL # BLD: 0.4 K/UL (ref 0–0.4)
EOSINOPHIL NFR BLD: 2 % (ref 0–7)
EPITH CASTS URNS QL MICRO: ABNORMAL /LPF
ERYTHROCYTE [DISTWIDTH] IN BLOOD BY AUTOMATED COUNT: 14 % (ref 11.5–14.5)
GLOBULIN SER CALC-MCNC: 4.6 G/DL (ref 2–4)
GLUCOSE SERPL-MCNC: 197 MG/DL (ref 65–100)
GLUCOSE SERPL-MCNC: 199 MG/DL (ref 65–100)
GLUCOSE UR STRIP.AUTO-MCNC: NEGATIVE MG/DL
HCT VFR BLD AUTO: 34.7 % (ref 35–47)
HGB BLD-MCNC: 11.6 G/DL (ref 11.5–16)
HGB UR QL STRIP: NEGATIVE
IMM GRANULOCYTES # BLD AUTO: 0.1 K/UL (ref 0–0.04)
IMM GRANULOCYTES NFR BLD AUTO: 1 % (ref 0–0.5)
KETONES UR QL STRIP.AUTO: NEGATIVE MG/DL
LEUKOCYTE ESTERASE UR QL STRIP.AUTO: NEGATIVE
LYMPHOCYTES # BLD: 2 K/UL (ref 0.8–3.5)
LYMPHOCYTES NFR BLD: 12 % (ref 12–49)
MCH RBC QN AUTO: 29.2 PG (ref 26–34)
MCHC RBC AUTO-ENTMCNC: 33.4 G/DL (ref 30–36.5)
MCV RBC AUTO: 87.4 FL (ref 80–99)
MONOCYTES # BLD: 0.8 K/UL (ref 0–1)
MONOCYTES NFR BLD: 5 % (ref 5–13)
NEUTS SEG # BLD: 13.3 K/UL (ref 1.8–8)
NEUTS SEG NFR BLD: 79 % (ref 32–75)
NITRITE UR QL STRIP.AUTO: NEGATIVE
NRBC # BLD: 0 K/UL (ref 0–0.01)
NRBC BLD-RTO: 0 PER 100 WBC
PH UR STRIP: 6 (ref 5–8)
PLATELET # BLD AUTO: 396 K/UL (ref 150–400)
PMV BLD AUTO: 9.7 FL (ref 8.9–12.9)
POTASSIUM SERPL-SCNC: 5 MMOL/L (ref 3.5–5.1)
POTASSIUM SERPL-SCNC: 5.7 MMOL/L (ref 3.5–5.1)
PROT SERPL-MCNC: 7.6 G/DL (ref 6.4–8.2)
PROT UR STRIP-MCNC: 100 MG/DL
RBC # BLD AUTO: 3.97 M/UL (ref 3.8–5.2)
RBC #/AREA URNS HPF: ABNORMAL /HPF (ref 0–5)
SODIUM SERPL-SCNC: 131 MMOL/L (ref 136–145)
SODIUM SERPL-SCNC: 132 MMOL/L (ref 136–145)
SP GR UR REFRACTOMETRY: 1.01 (ref 1–1.03)
TROPONIN I SERPL HS-MCNC: 39 NG/L (ref 0–51)
UA: UC IF INDICATED,UAUC: ABNORMAL
UROBILINOGEN UR QL STRIP.AUTO: 0.2 EU/DL (ref 0.2–1)
WBC # BLD AUTO: 16.7 K/UL (ref 3.6–11)
WBC URNS QL MICRO: ABNORMAL /HPF (ref 0–4)

## 2023-04-05 PROCEDURE — 80053 COMPREHEN METABOLIC PANEL: CPT

## 2023-04-05 PROCEDURE — 71045 X-RAY EXAM CHEST 1 VIEW: CPT

## 2023-04-05 PROCEDURE — 83880 ASSAY OF NATRIURETIC PEPTIDE: CPT

## 2023-04-05 PROCEDURE — 74011250637 HC RX REV CODE- 250/637: Performed by: EMERGENCY MEDICINE

## 2023-04-05 PROCEDURE — 81001 URINALYSIS AUTO W/SCOPE: CPT

## 2023-04-05 PROCEDURE — 36415 COLL VENOUS BLD VENIPUNCTURE: CPT

## 2023-04-05 PROCEDURE — 85025 COMPLETE CBC W/AUTO DIFF WBC: CPT

## 2023-04-05 PROCEDURE — 93005 ELECTROCARDIOGRAM TRACING: CPT

## 2023-04-05 PROCEDURE — 74011250636 HC RX REV CODE- 250/636: Performed by: EMERGENCY MEDICINE

## 2023-04-05 PROCEDURE — 84484 ASSAY OF TROPONIN QUANT: CPT

## 2023-04-05 RX ORDER — ACETAMINOPHEN 325 MG/1
650 TABLET ORAL
Status: COMPLETED
Start: 2023-04-05 | End: 2023-04-05

## 2023-04-05 RX ORDER — FUROSEMIDE 10 MG/ML
40 INJECTION INTRAMUSCULAR; INTRAVENOUS
Status: COMPLETED
Start: 2023-04-05 | End: 2023-04-05

## 2023-04-05 RX ADMIN — FUROSEMIDE 40 MG: 10 INJECTION, SOLUTION INTRAMUSCULAR; INTRAVENOUS at 16:06

## 2023-04-05 RX ADMIN — ACETAMINOPHEN 650 MG: 325 TABLET ORAL at 17:44

## 2023-04-05 NOTE — ED PROVIDER NOTES
[address] EMERGENCY DEP  EMERGENCY DEPARTMENT ENCOUNTER       Pt Name: Anayeli Doyle  MRN: [id number]  [place] 1940  Date of evaluation: 4/5/2023  Provider: Ross Carney MD   PCP: Tere Ansari NP  Note Started: 4:43 PM 4/5/23     CHIEF COMPLAINT       Chief Complaint   Patient presents with    Shortness of Breath        HISTORY OF PRESENT ILLNESS: 1 or more elements      History From: patient, History limited by: none     Anayeli Doyle is a 79 y.o. female who presents with a cc of shortness of breath, chronic but worse today. Please See MDM for Additional Details of the HPI/PMH  Nursing Notes were all reviewed and agreed with or any disagreements were addressed in the HPI. REVIEW OF SYSTEMS        Positives and Pertinent negatives as per HPI.     PAST HISTORY     Past Medical History:  Past Medical History:   Diagnosis Date    A-fib [facility])     AICD (automatic cardioverter/defibrillator) present 9/23/2020 9/23/2020 [employer] AICD implant    Arthritis     Bilateral pleural effusion 9/18/2020    Chronic pain     Chronic pain     Diabetes [facility]     Diverticular disease     Elevated troponin 9/18/2020    Hemorrhoid     Hypercholesterolemia     IBS (irritable bowel syndrome)     Lymphocytosis 06/73/7691    Systolic heart failure, chronic ([address] 7/16/2021    Type 2 MI (myocardial infarction) ([facility] 7/16/2021 7/162021 r/t sepsis       Past Surgical History:  Past Surgical History:   Procedure Laterality Date    COLONOSCOPY N/A 10/31/2019    COLONOSCOPY performed by Johanna Castle MD at [address] [facility]  10/31/2019         COLONOSCOPY,PAKO Weldon  10/31/2019         HX CATARACT REMOVAL      HX CHOLECYSTECTOMY      HX COLONOSCOPY  2009    HX COLONOSCOPY  2016    2 adenomatous polyp    HX HEMORRHOIDECTOMY  2009    HX HYSTERECTOMY      HX KNEE REPLACEMENT      right    HX ORTHOPAEDIC  12/11/2017    back, laminectomy and decompression    MO INSJ/RPLCMT PERM DFB W/TRNSVNS LDS 1/DUAL CHMBR N/A 9/23/2020    INSERT ICD DUAL performed by Flynn Gilford, MD at \[facility]\"" CARDIAC CATH LAB       Family History:  Family History   Problem Relation Age of Onset    Colon Cancer Father     Diabetes Mother        Social History:  Social History     Tobacco Use    Smoking status: Every Day     Packs/day: 0.50     Years: 55.00     Pack years: 27.50     Types: Cigarettes    Smokeless tobacco: Never   Vaping Use    Vaping Use: Never used   Substance Use Topics    Alcohol use: No    Drug use: Never       Allergies: Allergies   Allergen Reactions    Cefazolin Rash     Possible kidney failure/AIN    Morphine Anaphylaxis    Ultram [Tramadol] Palpitations       CURRENT MEDICATIONS      Previous Medications    ACETAMINOPHEN (TYLENOL) 650 MG TBER    Take 650 mg by mouth every eight (8) hours. ASCORBIC ACID, VITAMIN C, (VITAMIN C) 500 MG TABLET    Take 1,000 mg by mouth daily. ASPIRIN DELAYED-RELEASE 81 MG TABLET    Take 1 Tab by mouth daily. ATORVASTATIN (LIPITOR) 20 MG TABLET    Take 1 Tablet by mouth daily. B COMPLEX VITAMINS TABLET    Take 1 Tablet by mouth daily. CARVEDILOL (COREG) 25 MG TABLET    TAKE 1 TABLET BY MOUTH  TWICE DAILY    COLESTIPOL (COLESTID) 1 GRAM TABLET    Take 1 Tab by mouth two (2) times a day. FERROUS SULFATE 325 MG (65 MG IRON) TABLET    Take 1 Tablet by mouth daily (with breakfast). FUROSEMIDE (LASIX) 40 MG TABLET    Take 1 Tablet by mouth two (2) times a day. HYDRALAZINE (APRESOLINE) 10 MG TABLET    TAKE 1 TABLET BY MOUTH  TWICE DAILY    KETOCONAZOLE (NIZORAL) 2 % TOPICAL CREAM    Apply  to affected area daily. LANTUS SOLOSTAR U-100 INSULIN 100 UNIT/ML (3 ML) INPN    INJECT SUBCUTANEOUSLY 45  UNITS DAILY    NITROGLYCERIN (NITROSTAT) 0.4 MG SL TABLET    1 Tab by SubLINGual route every five (5) minutes as needed for Chest Pain. Up to 3 doses. OMEGA-3 FATTY ACIDS-VITAMIN E 1,000 MG CAP    Take 1 Capsule by mouth two (2) times a day.  32a day VENLAFAXINE (EFFEXOR) 75 MG TABLET    TAKE ONE-HALF TABLET BY  MOUTH TWICE DAILY    VITAMIN D2 1,250 MCG (50,000 UNIT) CAPSULE    TAKE 1 CAPSULE BY MOUTH EVERY 7  DAYS FOR LOW VITAMIN DAY LEVELS    ZINC 50 MG TAB TABLET    Take 50 mg by mouth daily. SCREENINGS               No data recorded         PHYSICAL EXAM      ED Triage Vitals   ED Encounter Vitals Group      BP 04/05/23 1423 (!) 156/89      Pulse (Heart Rate) 04/05/23 1423 77      Resp Rate 04/05/23 1423 20      Temp 04/05/23 1459 98.2 °F (36.8 °C)      Temp src --       O2 Sat (%) 04/05/23 1423 94 %      Weight --       Height --         Physical Exam     DIAGNOSTIC RESULTS   LABS:     Recent Results (from the past 12 hour(s))   CBC WITH AUTOMATED DIFF    Collection Time: 04/05/23  2:54 PM   Result Value Ref Range    WBC 16.7 (H) 3.6 - 11.0 K/uL    RBC 3.97 3.80 - 5.20 M/uL    HGB 11.6 11.5 - 16.0 g/dL    HCT 34.7 (L) 35.0 - 47.0 %    MCV 87.4 80.0 - 99.0 FL    MCH 29.2 26.0 - 34.0 PG    MCHC 33.4 30.0 - 36.5 g/dL    RDW 14.0 11.5 - 14.5 %    PLATELET 243 917 - 642 K/uL    MPV 9.7 8.9 - 12.9 FL    NRBC 0.0 0  WBC    ABSOLUTE NRBC 0.00 0.00 - 0.01 K/uL    NEUTROPHILS 79 (H) 32 - 75 %    LYMPHOCYTES 12 12 - 49 %    MONOCYTES 5 5 - 13 %    EOSINOPHILS 2 0 - 7 %    BASOPHILS 1 0 - 1 %    IMMATURE GRANULOCYTES 1 (H) 0.0 - 0.5 %    ABS. NEUTROPHILS 13.3 (H) 1.8 - 8.0 K/UL    ABS. LYMPHOCYTES 2.0 0.8 - 3.5 K/UL    ABS. MONOCYTES 0.8 0.0 - 1.0 K/UL    ABS. EOSINOPHILS 0.4 0.0 - 0.4 K/UL    ABS. BASOPHILS 0.1 0.0 - 0.1 K/UL    ABS. IMM.  GRANS. 0.1 (H) 0.00 - 0.04 K/UL    DF AUTOMATED     METABOLIC PANEL, COMPREHENSIVE    Collection Time: 04/05/23  2:54 PM   Result Value Ref Range    Sodium 131 (L) 136 - 145 mmol/L    Potassium 5.7 (H) 3.5 - 5.1 mmol/L    Chloride 101 97 - 108 mmol/L    CO2 21 21 - 32 mmol/L    Anion gap 9 5 - 15 mmol/L    Glucose 199 (H) 65 - 100 mg/dL    BUN 47 (H) 6 - 20 MG/DL    Creatinine 2.29 (H) 0.55 - 1.02 MG/DL    BUN/Creatinine ratio 21 (H) 12 - 20      eGFR 21 (L) >60 ml/min/1.73m2    Calcium 9.3 8.5 - 10.1 MG/DL    Bilirubin, total 0.3 0.2 - 1.0 MG/DL    ALT (SGPT) 18 12 - 78 U/L    AST (SGOT) 18 15 - 37 U/L    Alk. phosphatase 92 45 - 117 U/L    Protein, total 7.6 6.4 - 8.2 g/dL    Albumin 3.0 (L) 3.5 - 5.0 g/dL    Globulin 4.6 (H) 2.0 - 4.0 g/dL    A-G Ratio 0.7 (L) 1.1 - 2.2     TROPONIN-HIGH SENSITIVITY    Collection Time: 04/05/23  2:54 PM   Result Value Ref Range    Troponin-High Sensitivity 39 0 - 51 ng/L   NT-PRO BNP    Collection Time: 04/05/23  2:54 PM   Result Value Ref Range    NT pro-BNP 16,198 (H) 0 - 854 PG/ML   METABOLIC PANEL, BASIC    Collection Time: 04/05/23  6:15 PM   Result Value Ref Range    Sodium 132 (L) 136 - 145 mmol/L    Potassium 5.0 3.5 - 5.1 mmol/L    Chloride 100 97 - 108 mmol/L    CO2 20 (L) 21 - 32 mmol/L    Anion gap 12 5 - 15 mmol/L    Glucose 197 (H) 65 - 100 mg/dL    BUN 42 (H) 6 - 20 MG/DL    Creatinine 2.28 (H) 0.55 - 1.02 MG/DL    BUN/Creatinine ratio 18 12 - 20      eGFR 21 (L) >60 ml/min/1.73m2    Calcium 9.5 8.5 - 10.1 MG/DL        EKG: If performed, independent interpretation documented below in the MDM section     RADIOLOGY:  Non-plain film images such as CT, Ultrasound and MRI are read by the radiologist. Plain radiographic images are visualized and preliminarily interpreted by the ED Provider with the findings documented in the MDM section. Interpretation per the Radiologist below, if available at the time of this note:     XR CHEST SNGL V    Result Date: 4/5/2023  INDICATION:  sob EXAM: Chest single view. COMPARISON: 1/24/2023. FINDINGS: A single frontal view of the chest at 1451 hours shows stable right pleural effusion. Stable to increased diffuse interstitial prominence suggesting edema. .  The heart, mediastinum and pulmonary vasculature are stable with cardiomegaly . The bony thorax is unremarkable for age. .     Mild interstitial edema pattern. Stable right pleural effusion. .  . PROCEDURES   Unless otherwise noted below, none  EKG    Date/Time: 4/5/2023 2:35 PM  Performed by: Sandy Cedeno MD  Authorized by: Sandy Cedeno MD     ECG reviewed by ED Physician in the absence of a cardiologist: yes    Interpretation:     Interpretation: non-specific    Rate:     ECG rate:  79    ECG rate assessment: normal    Rhythm:     Rhythm: sinus rhythm    QRS:     QRS axis:  Normal    QRS intervals:  Normal    QRS conduction: normal    ST segments:     ST segments:  Normal  T waves:     T waves: inverted      Inverted:  II, aVF, V4, V5 and V6  Comments:      No change from prior ekg 1/24/2023     CRITICAL CARE TIME   ***    EMERGENCY DEPARTMENT COURSE and DIFFERENTIAL DIAGNOSIS/MDM   Vitals:    Vitals:    04/05/23 1747 04/05/23 1750 04/05/23 1750 04/05/23 1853   BP: (!) 187/86   (!) 182/89   Pulse: 90   84   Resp: 26   26   Temp:       SpO2: 95%   95%   Weight:  73.9 kg (163 lb)     Height:   5' 5\" (1.651 m)         Patient was given the following medications:  Medications   furosemide (LASIX) injection 40 mg (40 mg IntraVENous Given 4/5/23 1606)   acetaminophen (TYLENOL) tablet 650 mg (650 mg Oral Given 4/5/23 1744)       Medical Decision Making  82yoF with a history of CHF, due for ICD placement in 1mo, who presents with a cc of SOB. Reports ***    Amount and/or Complexity of Data Reviewed  Labs: ordered. Radiology: ordered. ECG/medicine tests: ordered. Risk  OTC drugs. Prescription drug management. ED Course as of 04/05/23 1933 Wed Apr 05, 2023   1705 Lab work notable for hyperkalemia. On discussing this with the patient she was unaware that she had had elevated potassium on her last primary care visit and was told to change her diet. When I discussed the type of food she is eating almost everything she eats is high in potassium. [BHARGAV]   1904 K improved on repeat BMP after diuresis [BHARGAV]   1921 Patient able to ambulate without becoming hypoxic.   States that she was winded but this is her baseline. She does not wish to stay in the hospital.  I advised that she call her cardiologist to discuss any medication changes prior to getting her ICD placed as well as monitoring the potassium intake in her diet. [BHARGAV]      ED Course User Index  Jemal Denis MD         FINAL IMPRESSION     1. Acute on chronic systolic CHF (congestive heart failure) ([address]    2. Acute hyperkalemia          DISPOSITION/PLAN   Francisco Alberto's  results have been reviewed with her. She has been counseled regarding her diagnosis, treatment, and plan. She verbally conveys understanding and agreement of the signs, symptoms, diagnosis, treatment and prognosis and additionally agrees to follow up as discussed. She also agrees with the care-plan and conveys that all of her questions have been answered. I have also provided discharge instructions for her that include: educational information regarding their diagnosis and treatment, and list of reasons why they would want to return to the ED prior to their follow-up appointment, should her condition change. CLINICAL IMPRESSION    {Disposition:15408}     PATIENT REFERRED TO:  Follow-up Information       Follow up With Specialties Details Why Contact Info    Lark Fabry, NP Nurse Practitioner   [facility]  [address] [facility] [id number] [place] [address], Saroj Obrien MD Cardiovascular Disease Physician   [place] [phone]                DISCHARGE MEDICATIONS:  Current Discharge Medication List            DISCONTINUED MEDICATIONS:  Current Discharge Medication List          I am the Primary Clinician of Record. Rachel Aponte MD (electronically signed)    (Please note that parts of this dictation were completed with voice recognition software. Quite often unanticipated grammatical, syntax, homophones, and other interpretive errors are inadvertently transcribed by the computer software.  Please disregards these errors.  Please excuse any errors that have escaped final proofreading.)

## 2023-04-05 NOTE — ED NOTES
Charge nurse to Radha nurse  report given, report included pt failed ambulation trial and awaiting a dispo

## 2023-04-05 NOTE — ED TRIAGE NOTES
Pt arrived by POV for SOB. Pts daughter reports pt is due for a pacemaker next month. Pt reports she has been having increase SOB and today when she was in the shower she became very short of breath. Pt also complaining of increased allergies this season. Pt SpO2 on arrival was 93% on room air. Pt is awake alert and oriented X 4, pt and family educated on ER flow.   Pt declined text  messaging

## 2023-04-05 NOTE — ED NOTES
Pt ambulated in the hallway. Pt's HR reached about 116 and her O2 saturation dropped to 91% on RA. Pt ambulated back to her room and her bed. Pt sitting on the side of the bed dyspneic. Per pt's daughter if pt is to be admitted she would need a nicotine patch as she is addicted to nicotine.

## 2023-04-24 ENCOUNTER — TELEPHONE (OUTPATIENT)
Dept: CARDIOLOGY CLINIC | Age: 83
End: 2023-04-24

## 2023-04-24 NOTE — TELEPHONE ENCOUNTER
Returned patient call, ID verified using two patient identifiers. Spoke with patient's  who states they received a letter in the mail and it says his wife's procedure is at [facility] and he wants to clarify. Advised Mr. Marcos Siddiqui that his wife's procedure is scheduled at [facility].  I confirmed the scheduling in her chart. Patient verbalized understanding and will call with any other questions.       Future Appointments   Date Time Provider Marty Bautista   5/12/2023 10:00 AM WOUND CHECKS, POWER STILL AMB   5/12/2023 10:20 AM PACEMAKER3, POWER STILL AMB   8/3/2023  1:00 PM PACEMAKER3, POWER STILL AMB   8/3/2023  1:20 PM Jackie Diaz MD CAVREY BS AMB

## 2023-04-24 NOTE — TELEPHONE ENCOUNTER
Pt  called and would like to speak to nurse, stated pt surgery supposed to be at [facility] but received a letter saying it's at [facility]. .please advise    Pt # [phone]

## 2023-04-28 RX ORDER — SODIUM CHLORIDE 0.9 % (FLUSH) 0.9 %
5-40 SYRINGE (ML) INJECTION EVERY 8 HOURS
OUTPATIENT
Start: 2023-04-28

## 2023-04-28 RX ORDER — SODIUM CHLORIDE 0.9 % (FLUSH) 0.9 %
5-40 SYRINGE (ML) INJECTION AS NEEDED
OUTPATIENT
Start: 2023-04-28

## 2023-05-04 ENCOUNTER — ANESTHESIA (OUTPATIENT)
Dept: CARDIAC CATH/INVASIVE PROCEDURES | Age: 83
End: 2023-05-04
Payer: MEDICARE

## 2023-05-04 ENCOUNTER — APPOINTMENT (OUTPATIENT)
Dept: GENERAL RADIOLOGY | Age: 83
End: 2023-05-04
Attending: NURSE PRACTITIONER
Payer: MEDICARE

## 2023-05-04 ENCOUNTER — HOSPITAL ENCOUNTER (OUTPATIENT)
Age: 83
Setting detail: OBSERVATION
Discharge: HOME | End: 2023-05-05
Attending: INTERNAL MEDICINE | Admitting: INTERNAL MEDICINE
Payer: MEDICARE

## 2023-05-04 ENCOUNTER — ANESTHESIA EVENT (OUTPATIENT)
Dept: CARDIAC CATH/INVASIVE PROCEDURES | Age: 83
End: 2023-05-04
Payer: MEDICARE

## 2023-05-04 DIAGNOSIS — I42.9 CARDIOMYOPATHY, UNSPECIFIED TYPE (HCC): ICD-10-CM

## 2023-05-04 DIAGNOSIS — Z95.810 S/P ICD (INTERNAL CARDIAC DEFIBRILLATOR) PROCEDURE: Primary | ICD-10-CM

## 2023-05-04 LAB
ANION GAP SERPL CALC-SCNC: 4 MMOL/L (ref 5–15)
BUN SERPL-MCNC: 51 MG/DL (ref 6–20)
BUN/CREAT SERPL: 25 (ref 12–20)
CALCIUM SERPL-MCNC: 10.2 MG/DL (ref 8.5–10.1)
CHLORIDE SERPL-SCNC: 106 MMOL/L (ref 97–108)
CO2 SERPL-SCNC: 24 MMOL/L (ref 21–32)
CREAT SERPL-MCNC: 2.02 MG/DL (ref 0.55–1.02)
ERYTHROCYTE [DISTWIDTH] IN BLOOD BY AUTOMATED COUNT: 15.2 % (ref 11.5–14.5)
GLUCOSE BLD STRIP.AUTO-MCNC: 102 MG/DL (ref 65–117)
GLUCOSE BLD STRIP.AUTO-MCNC: 159 MG/DL (ref 65–117)
GLUCOSE BLD STRIP.AUTO-MCNC: 285 MG/DL (ref 65–117)
GLUCOSE BLD STRIP.AUTO-MCNC: 89 MG/DL (ref 65–117)
GLUCOSE SERPL-MCNC: 147 MG/DL (ref 65–100)
HCT VFR BLD AUTO: 39.9 % (ref 35–47)
HGB BLD-MCNC: 12.5 G/DL (ref 11.5–16)
MCH RBC QN AUTO: 29.7 PG (ref 26–34)
MCHC RBC AUTO-ENTMCNC: 31.3 G/DL (ref 30–36.5)
MCV RBC AUTO: 94.8 FL (ref 80–99)
NRBC # BLD: 0 K/UL (ref 0–0.01)
NRBC BLD-RTO: 0 PER 100 WBC
PLATELET # BLD AUTO: 438 K/UL (ref 150–400)
PMV BLD AUTO: 10.2 FL (ref 8.9–12.9)
POTASSIUM SERPL-SCNC: 4.8 MMOL/L (ref 3.5–5.1)
RBC # BLD AUTO: 4.21 M/UL (ref 3.8–5.2)
SERVICE CMNT-IMP: ABNORMAL
SERVICE CMNT-IMP: ABNORMAL
SERVICE CMNT-IMP: NORMAL
SERVICE CMNT-IMP: NORMAL
SODIUM SERPL-SCNC: 134 MMOL/L (ref 136–145)
WBC # BLD AUTO: 15.5 K/UL (ref 3.6–11)

## 2023-05-04 PROCEDURE — 80048 BASIC METABOLIC PNL TOTAL CA: CPT

## 2023-05-04 PROCEDURE — 77030010507 HC ADH SKN DERMBND J&J -B: Performed by: INTERNAL MEDICINE

## 2023-05-04 PROCEDURE — 33249 INSJ/RPLCMT DEFIB W/LEAD(S): CPT | Performed by: INTERNAL MEDICINE

## 2023-05-04 PROCEDURE — 74011250636 HC RX REV CODE- 250/636: Performed by: NURSE ANESTHETIST, CERTIFIED REGISTERED

## 2023-05-04 PROCEDURE — 74011250636 HC RX REV CODE- 250/636: Performed by: INTERNAL MEDICINE

## 2023-05-04 PROCEDURE — 74011000250 HC RX REV CODE- 250: Performed by: NURSE ANESTHETIST, CERTIFIED REGISTERED

## 2023-05-04 PROCEDURE — 77030008684 HC TU ET CUF COVD -B: Performed by: ANESTHESIOLOGY

## 2023-05-04 PROCEDURE — C1722 AICD, SINGLE CHAMBER: HCPCS | Performed by: INTERNAL MEDICINE

## 2023-05-04 PROCEDURE — 74011000250 HC RX REV CODE- 250: Performed by: NURSE PRACTITIONER

## 2023-05-04 PROCEDURE — 77030033138 HC SUT PGA STRATFX J&J -B: Performed by: INTERNAL MEDICINE

## 2023-05-04 PROCEDURE — 74011636637 HC RX REV CODE- 636/637: Performed by: NURSE PRACTITIONER

## 2023-05-04 PROCEDURE — G0378 HOSPITAL OBSERVATION PER HR: HCPCS

## 2023-05-04 PROCEDURE — 85027 COMPLETE CBC AUTOMATED: CPT

## 2023-05-04 PROCEDURE — 36415 COLL VENOUS BLD VENIPUNCTURE: CPT

## 2023-05-04 PROCEDURE — 76060000034 HC ANESTHESIA 1.5 TO 2 HR: Performed by: INTERNAL MEDICINE

## 2023-05-04 PROCEDURE — 77030041075 HC DRSG AG OPTIFRM MDII -B: Performed by: INTERNAL MEDICINE

## 2023-05-04 PROCEDURE — 71045 X-RAY EXAM CHEST 1 VIEW: CPT

## 2023-05-04 PROCEDURE — C1896 LEAD, AICD, NON SING/DUAL: HCPCS | Performed by: INTERNAL MEDICINE

## 2023-05-04 PROCEDURE — 33270 INS/REP SUBQ DEFIBRILLATOR: CPT | Performed by: INTERNAL MEDICINE

## 2023-05-04 PROCEDURE — 77030002934 HC SUT MCRYL J&J -B: Performed by: INTERNAL MEDICINE

## 2023-05-04 PROCEDURE — 74011250637 HC RX REV CODE- 250/637: Performed by: NURSE PRACTITIONER

## 2023-05-04 PROCEDURE — 77030002996 HC SUT SLK J&J -A: Performed by: INTERNAL MEDICINE

## 2023-05-04 PROCEDURE — 2709999900 HC NON-CHARGEABLE SUPPLY: Performed by: INTERNAL MEDICINE

## 2023-05-04 PROCEDURE — 74011250636 HC RX REV CODE- 250/636: Performed by: NURSE PRACTITIONER

## 2023-05-04 PROCEDURE — 93005 ELECTROCARDIOGRAM TRACING: CPT

## 2023-05-04 PROCEDURE — 82962 GLUCOSE BLOOD TEST: CPT

## 2023-05-04 PROCEDURE — 77030026438 HC STYL ET INTUB CARD -A: Performed by: ANESTHESIOLOGY

## 2023-05-04 PROCEDURE — 74011000250 HC RX REV CODE- 250: Performed by: INTERNAL MEDICINE

## 2023-05-04 DEVICE — SUBCUTANEOUS ELECTRODE
Type: IMPLANTABLE DEVICE | Status: FUNCTIONAL
Brand: EMBLEM™ S-ICD

## 2023-05-04 DEVICE — SUBCUTANEOUS IMPLANTABLE CARDIOVERTER DEFIBRILLATOR
Type: IMPLANTABLE DEVICE | Status: FUNCTIONAL
Brand: EMBLEM™ MRI S-ICD

## 2023-05-04 RX ORDER — SODIUM CHLORIDE 0.9 % (FLUSH) 0.9 %
5-40 SYRINGE (ML) INJECTION AS NEEDED
Status: DISCONTINUED | OUTPATIENT
Start: 2023-05-04 | End: 2023-05-04 | Stop reason: HOSPADM

## 2023-05-04 RX ORDER — ONDANSETRON 2 MG/ML
4 INJECTION INTRAMUSCULAR; INTRAVENOUS
Status: DISCONTINUED | OUTPATIENT
Start: 2023-05-04 | End: 2023-05-05 | Stop reason: HOSPADM

## 2023-05-04 RX ORDER — PROPOFOL 10 MG/ML
INJECTION, EMULSION INTRAVENOUS AS NEEDED
Status: DISCONTINUED | OUTPATIENT
Start: 2023-05-04 | End: 2023-05-04 | Stop reason: HOSPADM

## 2023-05-04 RX ORDER — IBUPROFEN 200 MG
4 TABLET ORAL AS NEEDED
Status: DISCONTINUED | OUTPATIENT
Start: 2023-05-04 | End: 2023-05-05 | Stop reason: HOSPADM

## 2023-05-04 RX ORDER — LIDOCAINE HYDROCHLORIDE 20 MG/ML
INJECTION, SOLUTION EPIDURAL; INFILTRATION; INTRACAUDAL; PERINEURAL AS NEEDED
Status: DISCONTINUED | OUTPATIENT
Start: 2023-05-04 | End: 2023-05-04 | Stop reason: HOSPADM

## 2023-05-04 RX ORDER — ONDANSETRON 2 MG/ML
INJECTION INTRAMUSCULAR; INTRAVENOUS AS NEEDED
Status: DISCONTINUED | OUTPATIENT
Start: 2023-05-04 | End: 2023-05-04 | Stop reason: HOSPADM

## 2023-05-04 RX ORDER — CARVEDILOL 12.5 MG/1
25 TABLET ORAL 2 TIMES DAILY
Status: DISCONTINUED | OUTPATIENT
Start: 2023-05-04 | End: 2023-05-05 | Stop reason: HOSPADM

## 2023-05-04 RX ORDER — OXYCODONE HYDROCHLORIDE 5 MG/1
5 TABLET ORAL
Status: DISCONTINUED | OUTPATIENT
Start: 2023-05-04 | End: 2023-05-05 | Stop reason: HOSPADM

## 2023-05-04 RX ORDER — INSULIN LISPRO 100 [IU]/ML
INJECTION, SOLUTION INTRAVENOUS; SUBCUTANEOUS
Status: DISCONTINUED | OUTPATIENT
Start: 2023-05-04 | End: 2023-05-05 | Stop reason: HOSPADM

## 2023-05-04 RX ORDER — FENTANYL CITRATE 50 UG/ML
INJECTION, SOLUTION INTRAMUSCULAR; INTRAVENOUS AS NEEDED
Status: DISCONTINUED | OUTPATIENT
Start: 2023-05-04 | End: 2023-05-04 | Stop reason: HOSPADM

## 2023-05-04 RX ORDER — ATORVASTATIN CALCIUM 20 MG/1
20 TABLET, FILM COATED ORAL DAILY
Status: DISCONTINUED | OUTPATIENT
Start: 2023-05-05 | End: 2023-05-05 | Stop reason: HOSPADM

## 2023-05-04 RX ORDER — PHENYLEPHRINE HCL IN 0.9% NACL 0.4MG/10ML
SYRINGE (ML) INTRAVENOUS AS NEEDED
Status: DISCONTINUED | OUTPATIENT
Start: 2023-05-04 | End: 2023-05-04 | Stop reason: HOSPADM

## 2023-05-04 RX ORDER — MONTELUKAST SODIUM 4 MG/1
1 TABLET, CHEWABLE ORAL 2 TIMES DAILY
Status: DISCONTINUED | OUTPATIENT
Start: 2023-05-04 | End: 2023-05-05 | Stop reason: HOSPADM

## 2023-05-04 RX ORDER — HYDRALAZINE HYDROCHLORIDE 10 MG/1
10 TABLET, FILM COATED ORAL 2 TIMES DAILY
Status: DISCONTINUED | OUTPATIENT
Start: 2023-05-04 | End: 2023-05-05 | Stop reason: HOSPADM

## 2023-05-04 RX ORDER — BUPIVACAINE HYDROCHLORIDE 2.5 MG/ML
30 INJECTION, SOLUTION EPIDURAL; INFILTRATION; INTRACAUDAL ONCE
Status: DISPENSED | OUTPATIENT
Start: 2023-05-04 | End: 2023-05-05

## 2023-05-04 RX ORDER — ACETAMINOPHEN 325 MG/1
650 TABLET ORAL
Status: DISCONTINUED | OUTPATIENT
Start: 2023-05-04 | End: 2023-05-05 | Stop reason: HOSPADM

## 2023-05-04 RX ORDER — SODIUM CHLORIDE 0.9 % (FLUSH) 0.9 %
5-40 SYRINGE (ML) INJECTION AS NEEDED
Status: DISCONTINUED | OUTPATIENT
Start: 2023-05-04 | End: 2023-05-05 | Stop reason: HOSPADM

## 2023-05-04 RX ORDER — FUROSEMIDE 80 MG/1
80 TABLET ORAL DAILY
COMMUNITY

## 2023-05-04 RX ORDER — VANCOMYCIN HYDROCHLORIDE 1 G/20ML
INJECTION, POWDER, LYOPHILIZED, FOR SOLUTION INTRAVENOUS AS NEEDED
Status: DISCONTINUED | OUTPATIENT
Start: 2023-05-04 | End: 2023-05-04 | Stop reason: HOSPADM

## 2023-05-04 RX ORDER — SODIUM CHLORIDE 0.9 % (FLUSH) 0.9 %
5-40 SYRINGE (ML) INJECTION EVERY 8 HOURS
Status: DISCONTINUED | OUTPATIENT
Start: 2023-05-04 | End: 2023-05-04 | Stop reason: HOSPADM

## 2023-05-04 RX ORDER — LIDOCAINE HYDROCHLORIDE 10 MG/ML
INJECTION INFILTRATION; PERINEURAL AS NEEDED
Status: DISCONTINUED | OUTPATIENT
Start: 2023-05-04 | End: 2023-05-04 | Stop reason: HOSPADM

## 2023-05-04 RX ORDER — VENLAFAXINE 37.5 MG/1
75 TABLET ORAL DAILY
Status: DISCONTINUED | OUTPATIENT
Start: 2023-05-05 | End: 2023-05-05 | Stop reason: HOSPADM

## 2023-05-04 RX ORDER — SODIUM CHLORIDE 9 MG/ML
INJECTION, SOLUTION INTRAVENOUS
Status: DISCONTINUED | OUTPATIENT
Start: 2023-05-04 | End: 2023-05-04 | Stop reason: HOSPADM

## 2023-05-04 RX ORDER — SODIUM CHLORIDE 0.9 % (FLUSH) 0.9 %
5-40 SYRINGE (ML) INJECTION EVERY 8 HOURS
Status: DISCONTINUED | OUTPATIENT
Start: 2023-05-04 | End: 2023-05-05 | Stop reason: HOSPADM

## 2023-05-04 RX ORDER — SUCCINYLCHOLINE CHLORIDE 20 MG/ML
INJECTION INTRAMUSCULAR; INTRAVENOUS AS NEEDED
Status: DISCONTINUED | OUTPATIENT
Start: 2023-05-04 | End: 2023-05-04 | Stop reason: HOSPADM

## 2023-05-04 RX ORDER — ROCURONIUM BROMIDE 10 MG/ML
INJECTION, SOLUTION INTRAVENOUS AS NEEDED
Status: DISCONTINUED | OUTPATIENT
Start: 2023-05-04 | End: 2023-05-04 | Stop reason: HOSPADM

## 2023-05-04 RX ADMIN — PHENYLEPHRINE HYDROCHLORIDE 40 MCG/MIN: 10 INJECTION INTRAVENOUS at 12:53

## 2023-05-04 RX ADMIN — HYDRALAZINE HYDROCHLORIDE 10 MG: 10 TABLET, FILM COATED ORAL at 17:38

## 2023-05-04 RX ADMIN — CARVEDILOL 25 MG: 12.5 TABLET, FILM COATED ORAL at 17:38

## 2023-05-04 RX ADMIN — SUGAMMADEX 200 MG: 100 INJECTION, SOLUTION INTRAVENOUS at 14:19

## 2023-05-04 RX ADMIN — SODIUM CHLORIDE: 900 INJECTION, SOLUTION INTRAVENOUS at 12:35

## 2023-05-04 RX ADMIN — PROPOFOL 100 MG: 10 INJECTION, EMULSION INTRAVENOUS at 12:53

## 2023-05-04 RX ADMIN — ROCURONIUM BROMIDE 10 MG: 10 SOLUTION INTRAVENOUS at 12:53

## 2023-05-04 RX ADMIN — LIDOCAINE HYDROCHLORIDE 40 MG: 20 INJECTION, SOLUTION EPIDURAL; INFILTRATION; INTRACAUDAL; PERINEURAL at 12:53

## 2023-05-04 RX ADMIN — ROCURONIUM BROMIDE 20 MG: 10 SOLUTION INTRAVENOUS at 13:10

## 2023-05-04 RX ADMIN — SODIUM CHLORIDE, PRESERVATIVE FREE 10 ML: 5 INJECTION INTRAVENOUS at 21:35

## 2023-05-04 RX ADMIN — Medication 3 UNITS: at 21:34

## 2023-05-04 RX ADMIN — FENTANYL CITRATE 25 MCG: 50 INJECTION, SOLUTION INTRAMUSCULAR; INTRAVENOUS at 12:53

## 2023-05-04 RX ADMIN — OXYCODONE 5 MG: 5 TABLET ORAL at 21:35

## 2023-05-04 RX ADMIN — FENTANYL CITRATE 25 MCG: 50 INJECTION, SOLUTION INTRAMUSCULAR; INTRAVENOUS at 12:51

## 2023-05-04 RX ADMIN — FENTANYL CITRATE 50 MCG: 50 INJECTION, SOLUTION INTRAMUSCULAR; INTRAVENOUS at 14:13

## 2023-05-04 RX ADMIN — ONDANSETRON HYDROCHLORIDE 4 MG: 2 INJECTION, SOLUTION INTRAMUSCULAR; INTRAVENOUS at 13:55

## 2023-05-04 RX ADMIN — COLESTIPOL HYDROCHLORIDE 1 G: 1 TABLET, FILM COATED ORAL at 17:38

## 2023-05-04 RX ADMIN — VANCOMYCIN HYDROCHLORIDE 1000 MG: 1 INJECTION, POWDER, LYOPHILIZED, FOR SOLUTION INTRAVENOUS at 13:10

## 2023-05-04 RX ADMIN — Medication 80 MCG: at 12:53

## 2023-05-04 RX ADMIN — SUCCINYLCHOLINE CHLORIDE 160 MG: 20 INJECTION, SOLUTION INTRAMUSCULAR; INTRAVENOUS at 12:53

## 2023-05-05 ENCOUNTER — TELEPHONE (OUTPATIENT)
Dept: FAMILY MEDICINE CLINIC | Age: 83
End: 2023-05-05

## 2023-05-05 VITALS
HEIGHT: 66 IN | BODY MASS INDEX: 25.23 KG/M2 | TEMPERATURE: 98.5 F | OXYGEN SATURATION: 94 % | SYSTOLIC BLOOD PRESSURE: 150 MMHG | WEIGHT: 157 LBS | DIASTOLIC BLOOD PRESSURE: 108 MMHG | HEART RATE: 91 BPM | RESPIRATION RATE: 17 BRPM

## 2023-05-05 LAB
ANION GAP SERPL CALC-SCNC: 5 MMOL/L (ref 5–15)
ATRIAL RATE: 68 BPM
BUN SERPL-MCNC: 49 MG/DL (ref 6–20)
BUN/CREAT SERPL: 26 (ref 12–20)
CALCIUM SERPL-MCNC: 9.2 MG/DL (ref 8.5–10.1)
CALCULATED P AXIS, ECG09: 97 DEGREES
CALCULATED R AXIS, ECG10: 59 DEGREES
CALCULATED T AXIS, ECG11: -142 DEGREES
CHLORIDE SERPL-SCNC: 109 MMOL/L (ref 97–108)
CO2 SERPL-SCNC: 24 MMOL/L (ref 21–32)
CREAT SERPL-MCNC: 1.89 MG/DL (ref 0.55–1.02)
DIAGNOSIS, 93000: NORMAL
ERYTHROCYTE [DISTWIDTH] IN BLOOD BY AUTOMATED COUNT: 15.1 % (ref 11.5–14.5)
GLUCOSE BLD STRIP.AUTO-MCNC: 124 MG/DL (ref 65–117)
GLUCOSE BLD STRIP.AUTO-MCNC: 325 MG/DL (ref 65–117)
GLUCOSE SERPL-MCNC: 93 MG/DL (ref 65–100)
HCT VFR BLD AUTO: 36.2 % (ref 35–47)
HGB BLD-MCNC: 11.5 G/DL (ref 11.5–16)
MCH RBC QN AUTO: 29.9 PG (ref 26–34)
MCHC RBC AUTO-ENTMCNC: 31.8 G/DL (ref 30–36.5)
MCV RBC AUTO: 94 FL (ref 80–99)
NRBC # BLD: 0 K/UL (ref 0–0.01)
NRBC BLD-RTO: 0 PER 100 WBC
P-R INTERVAL, ECG05: 190 MS
PLATELET # BLD AUTO: 372 K/UL (ref 150–400)
PMV BLD AUTO: 10.1 FL (ref 8.9–12.9)
POTASSIUM SERPL-SCNC: 4.2 MMOL/L (ref 3.5–5.1)
Q-T INTERVAL, ECG07: 432 MS
QRS DURATION, ECG06: 120 MS
QTC CALCULATION (BEZET), ECG08: 459 MS
RBC # BLD AUTO: 3.85 M/UL (ref 3.8–5.2)
SERVICE CMNT-IMP: ABNORMAL
SERVICE CMNT-IMP: ABNORMAL
SODIUM SERPL-SCNC: 138 MMOL/L (ref 136–145)
VENTRICULAR RATE, ECG03: 68 BPM
WBC # BLD AUTO: 13.2 K/UL (ref 3.6–11)

## 2023-05-05 PROCEDURE — 36415 COLL VENOUS BLD VENIPUNCTURE: CPT

## 2023-05-05 PROCEDURE — 82962 GLUCOSE BLOOD TEST: CPT

## 2023-05-05 PROCEDURE — 74011636637 HC RX REV CODE- 636/637: Performed by: NURSE PRACTITIONER

## 2023-05-05 PROCEDURE — 74011250637 HC RX REV CODE- 250/637: Performed by: NURSE PRACTITIONER

## 2023-05-05 PROCEDURE — 85027 COMPLETE CBC AUTOMATED: CPT

## 2023-05-05 PROCEDURE — 77010033678 HC OXYGEN DAILY

## 2023-05-05 PROCEDURE — 99024 POSTOP FOLLOW-UP VISIT: CPT | Performed by: INTERNAL MEDICINE

## 2023-05-05 PROCEDURE — G0378 HOSPITAL OBSERVATION PER HR: HCPCS

## 2023-05-05 PROCEDURE — 80048 BASIC METABOLIC PNL TOTAL CA: CPT

## 2023-05-05 RX ORDER — OXYCODONE HYDROCHLORIDE 5 MG/1
5 TABLET ORAL
Qty: 10 TABLET | Refills: 0 | Status: SHIPPED | OUTPATIENT
Start: 2023-05-05 | End: 2023-05-08

## 2023-05-05 RX ADMIN — ATORVASTATIN CALCIUM 20 MG: 20 TABLET, FILM COATED ORAL at 09:26

## 2023-05-05 RX ADMIN — Medication 7 UNITS: at 13:18

## 2023-05-05 RX ADMIN — OXYCODONE 5 MG: 5 TABLET ORAL at 05:08

## 2023-05-05 RX ADMIN — VENLAFAXINE 75 MG: 37.5 TABLET ORAL at 09:26

## 2023-05-05 RX ADMIN — HYDRALAZINE HYDROCHLORIDE 10 MG: 10 TABLET, FILM COATED ORAL at 09:26

## 2023-05-05 RX ADMIN — CARVEDILOL 25 MG: 12.5 TABLET, FILM COATED ORAL at 09:25

## 2023-05-05 RX ADMIN — COLESTIPOL HYDROCHLORIDE 1 G: 1 TABLET, FILM COATED ORAL at 09:26

## 2023-05-12 ENCOUNTER — NURSE ONLY (OUTPATIENT)
Age: 83
End: 2023-05-12
Payer: MEDICARE

## 2023-05-12 ENCOUNTER — NURSE ONLY (OUTPATIENT)
Age: 83
End: 2023-05-12

## 2023-05-12 DIAGNOSIS — Z95.810 PRESENCE OF AUTOMATIC (IMPLANTABLE) CARDIAC DEFIBRILLATOR: Primary | ICD-10-CM

## 2023-05-12 DIAGNOSIS — Z95.810 INPLANTABLE CARDIOVERTER-DEFIBRILLATOR IN PLACE, PRE-OPERATIVE CARDIOVASCULAR EXAMINATION: Primary | ICD-10-CM

## 2023-05-12 DIAGNOSIS — Z01.810 INPLANTABLE CARDIOVERTER-DEFIBRILLATOR IN PLACE, PRE-OPERATIVE CARDIOVASCULAR EXAMINATION: Primary | ICD-10-CM

## 2023-05-12 PROCEDURE — 93282 PRGRMG EVAL IMPLANTABLE DFB: CPT | Performed by: INTERNAL MEDICINE

## 2023-05-12 NOTE — PROGRESS NOTES
C/ [employer] SCIENTIFIC PACER CHECK IN CLINIC  Device functioning appropriately as programmed.    See scanned documents

## 2023-05-12 NOTE — PROGRESS NOTES
Cardiac Electrophysiology Wound Check Note      Wound Check   Patient is here for wound check. No fever, drainage   Physical Exam   Constitutional: well-developed and well-nourished. Skin: Left lateral pocket is healing without redness, drainage, hematoma. The wound is intact with steri strips. Inferior sternal incision is healing without redness, drainage, hematoma. Wound is intact with skin glue. ASSESSMENT and PLAN   The incisions are healing without redness, drainage, hematoma.    current treatment plan is effective, no change in therapy   Educated patient regarding signs of infection and asked her to call if they occur    Follow-up Disposition:  Return 3 months I will check via device clinic or remote monitoring in the future

## 2023-05-31 ENCOUNTER — OFFICE VISIT (OUTPATIENT)
Age: 83
End: 2023-05-31
Payer: MEDICARE

## 2023-05-31 VITALS
SYSTOLIC BLOOD PRESSURE: 160 MMHG | HEIGHT: 66 IN | WEIGHT: 159.4 LBS | BODY MASS INDEX: 25.62 KG/M2 | HEART RATE: 63 BPM | OXYGEN SATURATION: 96 % | RESPIRATION RATE: 22 BRPM | TEMPERATURE: 97.8 F | DIASTOLIC BLOOD PRESSURE: 70 MMHG

## 2023-05-31 DIAGNOSIS — Z09 HOSPITAL DISCHARGE FOLLOW-UP: ICD-10-CM

## 2023-05-31 DIAGNOSIS — M54.30 SCIATICA, UNSPECIFIED LATERALITY: ICD-10-CM

## 2023-05-31 DIAGNOSIS — E11.21 TYPE 2 DIABETES MELLITUS WITH DIABETIC NEPHROPATHY, WITHOUT LONG-TERM CURRENT USE OF INSULIN (HCC): Primary | ICD-10-CM

## 2023-05-31 DIAGNOSIS — I50.9 CHRONIC CONGESTIVE HEART FAILURE, UNSPECIFIED HEART FAILURE TYPE (HCC): ICD-10-CM

## 2023-05-31 LAB — HBA1C MFR BLD: 8 %

## 2023-05-31 PROCEDURE — 1123F ACP DISCUSS/DSCN MKR DOCD: CPT | Performed by: NURSE PRACTITIONER

## 2023-05-31 PROCEDURE — 83036 HEMOGLOBIN GLYCOSYLATED A1C: CPT | Performed by: NURSE PRACTITIONER

## 2023-05-31 PROCEDURE — 99214 OFFICE O/P EST MOD 30 MIN: CPT | Performed by: NURSE PRACTITIONER

## 2023-05-31 PROCEDURE — 1111F DSCHRG MED/CURRENT MED MERGE: CPT | Performed by: NURSE PRACTITIONER

## 2023-05-31 PROCEDURE — 3051F HG A1C>EQUAL 7.0%<8.0%: CPT | Performed by: NURSE PRACTITIONER

## 2023-05-31 RX ORDER — DULOXETIN HYDROCHLORIDE 30 MG/1
30 CAPSULE, DELAYED RELEASE ORAL DAILY
Qty: 90 CAPSULE | Refills: 1 | Status: SHIPPED | OUTPATIENT
Start: 2023-05-31

## 2023-05-31 RX ORDER — FUROSEMIDE 80 MG
80 TABLET ORAL DAILY
COMMUNITY

## 2023-05-31 ASSESSMENT — ANXIETY QUESTIONNAIRES
GAD7 TOTAL SCORE: 13
2. NOT BEING ABLE TO STOP OR CONTROL WORRYING: 3
3. WORRYING TOO MUCH ABOUT DIFFERENT THINGS: 3
6. BECOMING EASILY ANNOYED OR IRRITABLE: 1
1. FEELING NERVOUS, ANXIOUS, OR ON EDGE: 3
5. BEING SO RESTLESS THAT IT IS HARD TO SIT STILL: 0
4. TROUBLE RELAXING: 3
7. FEELING AFRAID AS IF SOMETHING AWFUL MIGHT HAPPEN: 0
IF YOU CHECKED OFF ANY PROBLEMS ON THIS QUESTIONNAIRE, HOW DIFFICULT HAVE THESE PROBLEMS MADE IT FOR YOU TO DO YOUR WORK, TAKE CARE OF THINGS AT HOME, OR GET ALONG WITH OTHER PEOPLE: VERY DIFFICULT

## 2023-05-31 ASSESSMENT — PATIENT HEALTH QUESTIONNAIRE - PHQ9
3. TROUBLE FALLING OR STAYING ASLEEP: 2
1. LITTLE INTEREST OR PLEASURE IN DOING THINGS: 3
SUM OF ALL RESPONSES TO PHQ QUESTIONS 1-9: 14
7. TROUBLE CONCENTRATING ON THINGS, SUCH AS READING THE NEWSPAPER OR WATCHING TELEVISION: 0
4. FEELING TIRED OR HAVING LITTLE ENERGY: 3
5. POOR APPETITE OR OVEREATING: 0
8. MOVING OR SPEAKING SO SLOWLY THAT OTHER PEOPLE COULD HAVE NOTICED. OR THE OPPOSITE, BEING SO FIGETY OR RESTLESS THAT YOU HAVE BEEN MOVING AROUND A LOT MORE THAN USUAL: 0
10. IF YOU CHECKED OFF ANY PROBLEMS, HOW DIFFICULT HAVE THESE PROBLEMS MADE IT FOR YOU TO DO YOUR WORK, TAKE CARE OF THINGS AT HOME, OR GET ALONG WITH OTHER PEOPLE: 2
SUM OF ALL RESPONSES TO PHQ QUESTIONS 1-9: 14
SUM OF ALL RESPONSES TO PHQ9 QUESTIONS 1 & 2: 6
SUM OF ALL RESPONSES TO PHQ QUESTIONS 1-9: 14
SUM OF ALL RESPONSES TO PHQ QUESTIONS 1-9: 14
2. FEELING DOWN, DEPRESSED OR HOPELESS: 3
6. FEELING BAD ABOUT YOURSELF - OR THAT YOU ARE A FAILURE OR HAVE LET YOURSELF OR YOUR FAMILY DOWN: 3
9. THOUGHTS THAT YOU WOULD BE BETTER OFF DEAD, OR OF HURTING YOURSELF: 0

## 2023-06-01 ENCOUNTER — TELEPHONE (OUTPATIENT)
Age: 83
End: 2023-06-01

## 2023-06-01 NOTE — TELEPHONE ENCOUNTER
Good afternoon , patient would like to reschedule the appointment 6/1/23 to another time. please call patient.     FR#[phone]

## 2023-06-02 NOTE — PROGRESS NOTES
Chief Complaint   Patient presents with    Congestive Heart Failure     Followup Hospital     YES Answers must have Comments  1. \"Have you been to the ER, urgent care clinic since your last visit? Hospitalized since your last visit? \"    [x] YES 5- CARLOS Chang  [] NO       2. Have you seen or consulted any other health care providers outside of [address] [place] [address] since your last visit?     [] YES   [x] NO       3. For patients aged 39-70: Have you had a colorectal cancer screening such as a colonoscopy/FIT/Cologuard? Nurse/CMA to request records if not in chart   [] YES   [] NO   [x] NA, based on age    If the patient is female:      4. For female patients aged 41-77: Mayela Kang you had a mammogram in the last two years?  Nurse/CMA to request records if not in chart   [] YES   [] NO    NA, based on age    11. For female patients aged 21-65: Mayela Kang you had a pap smear?   Nurse/CMA to request records if not in chart   [] YES   [] NO  [x] NA, based on age
performed by Nate Brooks MD at [facility] CATH LAB    ORTHOPEDIC SURGERY  12/11/2017    back, laminectomy and decompression    TOTAL KNEE ARTHROPLASTY      right       Social History     Tobacco Use   Smoking Status Every Day    Packs/day: 0.50    Types: Cigarettes    Passive exposure: Current   Smokeless Tobacco Never       Social History     Socioeconomic History    Marital status:      Spouse name: None    Number of children: None    Years of education: None    Highest education level: None   Tobacco Use    Smoking status: Every Day     Packs/day: 0.50     Types: Cigarettes     Passive exposure: Current    Smokeless tobacco: Never   Substance and Sexual Activity    Alcohol use: No    Drug use: Never    Sexual activity: Defer     Birth control/protection: Abstinence     Social Determinants of Health     Financial Resource Strain: Low Risk     Difficulty of Paying Living Expenses: Not hard at all   Food Insecurity: No Food Insecurity    Worried About Running Out of Food in the Last Year: Never true    Ran Out of Food in the Last Year: Never true   Transportation Needs: No Transportation Needs    Lack of Transportation (Medical): No    Lack of Transportation (Non-Medical): No   Physical Activity: Inactive    Days of Exercise per Week: 0 days    Minutes of Exercise per Session: 0 min   Stress: Stress Concern Present    Feeling of Stress : Rather much   Social Connections:  Moderately Isolated    Frequency of Communication with Friends and Family: Never    Frequency of Social Gatherings with Friends and Family: More than three times a week    Attends Buddhism Services: Never    Active Member of Clubs or Organizations: No    Attends Club or Organization Meetings: Never    Marital Status:    Intimate Partner Violence: Not At Risk    Fear of Current or Ex-Partner: No    Emotionally Abused: No    Physically Abused: No    Sexually Abused: No   Housing Stability: Low Risk     Unable to Pay for Housing

## 2023-06-09 ENCOUNTER — TELEPHONE (OUTPATIENT)
Age: 83
End: 2023-06-09

## 2023-06-09 RX ORDER — FUROSEMIDE 80 MG
80 TABLET ORAL DAILY
Qty: 90 TABLET | Refills: 0 | Status: SHIPPED | OUTPATIENT
Start: 2023-06-09

## 2023-06-19 RX ORDER — ATORVASTATIN CALCIUM 20 MG/1
20 TABLET, FILM COATED ORAL DAILY
Qty: 90 TABLET | Refills: 0 | Status: SHIPPED | OUTPATIENT
Start: 2023-06-19 | End: 2023-08-08

## 2023-06-20 ENCOUNTER — TELEPHONE (OUTPATIENT)
Age: 83
End: 2023-06-20

## 2023-06-20 ENCOUNTER — HOSPITAL ENCOUNTER (EMERGENCY)
Facility: HOSPITAL | Age: 83
Discharge: ANOTHER HOSPITAL | End: 2023-06-20
Attending: EMERGENCY MEDICINE
Payer: MEDICARE

## 2023-06-20 ENCOUNTER — APPOINTMENT (OUTPATIENT)
Facility: HOSPITAL | Age: 83
End: 2023-06-20
Payer: MEDICARE

## 2023-06-20 VITALS
BODY MASS INDEX: 22.02 KG/M2 | TEMPERATURE: 98 F | OXYGEN SATURATION: 96 % | WEIGHT: 137 LBS | RESPIRATION RATE: 19 BRPM | SYSTOLIC BLOOD PRESSURE: 146 MMHG | HEART RATE: 90 BPM | HEIGHT: 66 IN | DIASTOLIC BLOOD PRESSURE: 96 MMHG

## 2023-06-20 DIAGNOSIS — J96.01 ACUTE RESPIRATORY FAILURE WITH HYPOXIA (HCC): Primary | ICD-10-CM

## 2023-06-20 DIAGNOSIS — I16.1 HYPERTENSIVE EMERGENCY: ICD-10-CM

## 2023-06-20 LAB
ALBUMIN SERPL-MCNC: 3.2 G/DL (ref 3.5–5)
ALBUMIN/GLOB SERPL: 0.8 (ref 1.1–2.2)
ALP SERPL-CCNC: 96 U/L (ref 45–117)
ALT SERPL-CCNC: 17 U/L (ref 12–78)
ANION GAP SERPL CALC-SCNC: 8 MMOL/L (ref 5–15)
APPEARANCE UR: CLEAR
ARTERIAL PATENCY WRIST A: YES
AST SERPL-CCNC: 15 U/L (ref 15–37)
BACTERIA URNS QL MICRO: NEGATIVE /HPF
BASE DEFICIT BLDA-SCNC: 0.9 MMOL/L
BASOPHILS # BLD: 0.1 K/UL (ref 0–0.1)
BASOPHILS NFR BLD: 1 % (ref 0–1)
BDY SITE: ABNORMAL
BILIRUB SERPL-MCNC: 0.4 MG/DL (ref 0.2–1)
BILIRUB UR QL: NEGATIVE
BUN SERPL-MCNC: 31 MG/DL (ref 6–20)
BUN/CREAT SERPL: 20 (ref 12–20)
CALCIUM SERPL-MCNC: 9.3 MG/DL (ref 8.5–10.1)
CHLORIDE SERPL-SCNC: 103 MMOL/L (ref 97–108)
CO2 SERPL-SCNC: 26 MMOL/L (ref 21–32)
COLOR UR: ABNORMAL
CREAT SERPL-MCNC: 1.58 MG/DL (ref 0.55–1.02)
DIFFERENTIAL METHOD BLD: ABNORMAL
EKG ATRIAL RATE: 99 BPM
EKG DIAGNOSIS: NORMAL
EKG P-R INTERVAL: 148 MS
EKG Q-T INTERVAL: 354 MS
EKG QRS DURATION: 114 MS
EKG QTC CALCULATION (BAZETT): 454 MS
EKG R AXIS: 73 DEGREES
EKG T AXIS: 263 DEGREES
EKG VENTRICULAR RATE: 99 BPM
EOSINOPHIL # BLD: 0.3 K/UL (ref 0–0.4)
EOSINOPHIL NFR BLD: 2 % (ref 0–7)
EPITH CASTS URNS QL MICRO: ABNORMAL /LPF
ERYTHROCYTE [DISTWIDTH] IN BLOOD BY AUTOMATED COUNT: 14.1 % (ref 11.5–14.5)
GAS FLOW.O2 O2 DELIVERY SYS: 1 L/MIN
GLOBULIN SER CALC-MCNC: 4.2 G/DL (ref 2–4)
GLUCOSE SERPL-MCNC: 221 MG/DL (ref 65–100)
GLUCOSE UR STRIP.AUTO-MCNC: NEGATIVE MG/DL
HCO3 BLDA-SCNC: 21 MMOL/L (ref 22–26)
HCT VFR BLD AUTO: 39.3 % (ref 35–47)
HGB BLD-MCNC: 12.8 G/DL (ref 11.5–16)
HGB UR QL STRIP: ABNORMAL
IMM GRANULOCYTES # BLD AUTO: 0.1 K/UL (ref 0–0.04)
IMM GRANULOCYTES NFR BLD AUTO: 1 % (ref 0–0.5)
KETONES UR QL STRIP.AUTO: NEGATIVE MG/DL
LEUKOCYTE ESTERASE UR QL STRIP.AUTO: ABNORMAL
LYMPHOCYTES # BLD: 2.3 K/UL (ref 0.8–3.5)
LYMPHOCYTES NFR BLD: 15 % (ref 12–49)
MAGNESIUM SERPL-MCNC: 1.6 MG/DL (ref 1.6–2.4)
MCH RBC QN AUTO: 29.2 PG (ref 26–34)
MCHC RBC AUTO-ENTMCNC: 32.6 G/DL (ref 30–36.5)
MCV RBC AUTO: 89.5 FL (ref 80–99)
MONOCYTES # BLD: 0.8 K/UL (ref 0–1)
MONOCYTES NFR BLD: 5 % (ref 5–13)
NEUTS SEG # BLD: 12 K/UL (ref 1.8–8)
NEUTS SEG NFR BLD: 76 % (ref 32–75)
NITRITE UR QL STRIP.AUTO: NEGATIVE
NRBC # BLD: 0 K/UL (ref 0–0.01)
NRBC BLD-RTO: 0 PER 100 WBC
NT PRO BNP: ABNORMAL PG/ML (ref 0–450)
PCO2 BLDA: 29 MMHG (ref 35–45)
PH BLDA: 7.48 (ref 7.35–7.45)
PH UR STRIP: 6.5 (ref 5–8)
PLATELET # BLD AUTO: 375 K/UL (ref 150–400)
PMV BLD AUTO: 10.1 FL (ref 8.9–12.9)
PO2 BLDA: 67 MMHG (ref 80–100)
POTASSIUM SERPL-SCNC: 4.4 MMOL/L (ref 3.5–5.1)
PROT SERPL-MCNC: 7.4 G/DL (ref 6.4–8.2)
PROT UR STRIP-MCNC: 100 MG/DL
RBC # BLD AUTO: 4.39 M/UL (ref 3.8–5.2)
RBC #/AREA URNS HPF: ABNORMAL /HPF (ref 0–5)
SAO2 % BLD: 95 % (ref 92–97)
SAO2% DEVICE SAO2% SENSOR NAME: ABNORMAL
SODIUM SERPL-SCNC: 137 MMOL/L (ref 136–145)
SP GR UR REFRACTOMETRY: 1.01 (ref 1–1.03)
SPECIMEN SITE: ABNORMAL
TROPONIN I SERPL HS-MCNC: 59 NG/L (ref 0–51)
TROPONIN I SERPL HS-MCNC: 61 NG/L (ref 0–51)
URINE CULTURE IF INDICATED: ABNORMAL
UROBILINOGEN UR QL STRIP.AUTO: 0.2 EU/DL (ref 0.2–1)
WBC # BLD AUTO: 15.5 K/UL (ref 3.6–11)
WBC URNS QL MICRO: ABNORMAL /HPF (ref 0–4)

## 2023-06-20 PROCEDURE — 84145 PROCALCITONIN (PCT): CPT

## 2023-06-20 PROCEDURE — 99285 EMERGENCY DEPT VISIT HI MDM: CPT

## 2023-06-20 PROCEDURE — 81001 URINALYSIS AUTO W/SCOPE: CPT

## 2023-06-20 PROCEDURE — 6370000000 HC RX 637 (ALT 250 FOR IP): Performed by: EMERGENCY MEDICINE

## 2023-06-20 PROCEDURE — 2500000003 HC RX 250 WO HCPCS: Performed by: EMERGENCY MEDICINE

## 2023-06-20 PROCEDURE — 36415 COLL VENOUS BLD VENIPUNCTURE: CPT

## 2023-06-20 PROCEDURE — 85025 COMPLETE CBC W/AUTO DIFF WBC: CPT

## 2023-06-20 PROCEDURE — 96375 TX/PRO/DX INJ NEW DRUG ADDON: CPT

## 2023-06-20 PROCEDURE — 36600 WITHDRAWAL OF ARTERIAL BLOOD: CPT

## 2023-06-20 PROCEDURE — 83735 ASSAY OF MAGNESIUM: CPT

## 2023-06-20 PROCEDURE — 6360000002 HC RX W HCPCS: Performed by: EMERGENCY MEDICINE

## 2023-06-20 PROCEDURE — 83880 ASSAY OF NATRIURETIC PEPTIDE: CPT

## 2023-06-20 PROCEDURE — 96374 THER/PROPH/DIAG INJ IV PUSH: CPT

## 2023-06-20 PROCEDURE — 84484 ASSAY OF TROPONIN QUANT: CPT

## 2023-06-20 PROCEDURE — 71045 X-RAY EXAM CHEST 1 VIEW: CPT

## 2023-06-20 PROCEDURE — 93005 ELECTROCARDIOGRAM TRACING: CPT | Performed by: EMERGENCY MEDICINE

## 2023-06-20 PROCEDURE — 80053 COMPREHEN METABOLIC PANEL: CPT

## 2023-06-20 PROCEDURE — 6370000000 HC RX 637 (ALT 250 FOR IP): Performed by: FAMILY MEDICINE

## 2023-06-20 PROCEDURE — 82803 BLOOD GASES ANY COMBINATION: CPT

## 2023-06-20 RX ORDER — ACETAMINOPHEN 325 MG/1
650 TABLET ORAL
Status: COMPLETED | OUTPATIENT
Start: 2023-06-20 | End: 2023-06-20

## 2023-06-20 RX ORDER — CARVEDILOL 25 MG/1
TABLET ORAL
Qty: 180 TABLET | Refills: 3 | Status: ON HOLD | OUTPATIENT
Start: 2023-06-20

## 2023-06-20 RX ORDER — NITROGLYCERIN 0.4 MG/1
0.4 TABLET SUBLINGUAL ONCE
Status: COMPLETED | OUTPATIENT
Start: 2023-06-20 | End: 2023-06-20

## 2023-06-20 RX ORDER — NITROGLYCERIN 20 MG/100ML
5-200 INJECTION INTRAVENOUS CONTINUOUS
Status: DISCONTINUED | OUTPATIENT
Start: 2023-06-20 | End: 2023-06-21 | Stop reason: HOSPADM

## 2023-06-20 RX ORDER — FUROSEMIDE 10 MG/ML
80 INJECTION INTRAMUSCULAR; INTRAVENOUS ONCE
Status: COMPLETED | OUTPATIENT
Start: 2023-06-20 | End: 2023-06-20

## 2023-06-20 RX ADMIN — NITROGLYCERIN 50 MCG/MIN: 20 INJECTION INTRAVENOUS at 17:32

## 2023-06-20 RX ADMIN — FUROSEMIDE 80 MG: 10 INJECTION, SOLUTION INTRAMUSCULAR; INTRAVENOUS at 17:21

## 2023-06-20 RX ADMIN — ACETAMINOPHEN 650 MG: 325 TABLET ORAL at 19:44

## 2023-06-20 RX ADMIN — NITROGLYCERIN 0.4 MG: 0.4 TABLET, ORALLY DISINTEGRATING SUBLINGUAL at 17:22

## 2023-06-20 ASSESSMENT — PAIN SCALES - GENERAL
PAINLEVEL_OUTOF10: 0
PAINLEVEL_OUTOF10: 8

## 2023-06-20 ASSESSMENT — LIFESTYLE VARIABLES
HOW OFTEN DO YOU HAVE A DRINK CONTAINING ALCOHOL: NEVER
HOW MANY STANDARD DRINKS CONTAINING ALCOHOL DO YOU HAVE ON A TYPICAL DAY: PATIENT DOES NOT DRINK

## 2023-06-20 ASSESSMENT — PAIN - FUNCTIONAL ASSESSMENT: PAIN_FUNCTIONAL_ASSESSMENT: 0-10

## 2023-06-20 ASSESSMENT — PAIN DESCRIPTION - LOCATION: LOCATION: BACK

## 2023-06-20 NOTE — ED NOTES
Received call from outside hospital requesting ED to ED transfer. Transfer center had contacted hospitalist but they would prefer to evaluate the patient in the ED, presumably to evaluate for level of care needs. I will accept in transfer.      Earline Hall MD  06/20/23 6412

## 2023-06-20 NOTE — ED TRIAGE NOTES
Pt arrived by Ems for SOB. Pt has been complaining of SOB for 6-14 days. Pt was recently started on Cymbalta and since starting that medication she has been having increase in SOB. Pt had a defibrillator placed 7 weeks ago for CHF and increases swelling in her legs. Pt takes lasix twice a day 80mg/40mg. Pt noted with FAROOQ after EMS had pt get off stretcher and walked to awaiting ER stretcher, pt SpO2 88% and RR 32. Pt placed back on oxygen and SpO2 increased and RR slowed. Pt with IV placed PTA and .   Pt awake alert and oriented X 4, pt educated on ER flow Hydroxyzine 50 mg po q8hrs prn anxiety

## 2023-06-20 NOTE — PROGRESS NOTES
Michael Moore 68 ED RN advises to arrange ALS transportation from [facility] to [facility] ED. Arranged ALS transport w/[facility] Nadiya Ku) - advised to bring appropiate equipment (cardiac monitor, IVP nitro drip, oxygen, no isolation or precautions noted in chart) - ETA will be called back by [facility] - updated ED staff w/ETA    Anthony Hernandez 149 w/[facility] advised that [facility] informed that they will not have a ALS Truck to transport. 75 Catie Robles called [facility] Transport from [facility] ED#5 to [facility] ED - pt info given to be evaluated - ETA of 2230 given - updated ED staff.

## 2023-06-20 NOTE — ED NOTES
Nursing supervisor to arrange transport to [facility]     Vj Marcial, RN  06/20/23 0954

## 2023-06-20 NOTE — TELEPHONE ENCOUNTER
Daiaan Gutierrez recently started taking Duloxetine 30 mg 1 x daily and she wants to know can this med can contribute to breathing problems. She just started with shallow breathing.

## 2023-06-20 NOTE — ED NOTES
Bedside and Verbal shift change report given to ABEL Joyner RN (oncoming nurse) by Robert Bianchi RN (offgoing nurse). Report included the following information Nurse Handoff Report.         Vj Marcial RN  06/20/23 6049

## 2023-06-20 NOTE — ED NOTES
Charge nurse to Franky nurse bedside report given to Balta,  report included pt going to [facility] ER for resp failure and hypoxia, no ETA for [facility] at this time     James Hillman RN  06/20/23 1933

## 2023-06-20 NOTE — TELEPHONE ENCOUNTER
S/w  was concerned the Duloxetine can cause breathing problems she started to have but she is now in ER being evaluated

## 2023-06-20 NOTE — ED NOTES
Patient is complaining of back pain and leg pain. She is requesting some Tylenol 650 mg. Which the MD stated she will order.       Jessica Anderson RN  06/20/23 1942

## 2023-06-21 ENCOUNTER — APPOINTMENT (OUTPATIENT)
Facility: HOSPITAL | Age: 83
DRG: 291 | End: 2023-06-21
Attending: FAMILY MEDICINE
Payer: MEDICARE

## 2023-06-21 ENCOUNTER — HOSPITAL ENCOUNTER (INPATIENT)
Facility: HOSPITAL | Age: 83
LOS: 6 days | Discharge: HOME | DRG: 291 | End: 2023-06-27
Attending: EMERGENCY MEDICINE | Admitting: FAMILY MEDICINE
Payer: MEDICARE

## 2023-06-21 ENCOUNTER — APPOINTMENT (OUTPATIENT)
Facility: HOSPITAL | Age: 83
DRG: 291 | End: 2023-06-21
Payer: MEDICARE

## 2023-06-21 DIAGNOSIS — I10 ESSENTIAL HYPERTENSION: ICD-10-CM

## 2023-06-21 DIAGNOSIS — R77.8 ELEVATED TROPONIN: ICD-10-CM

## 2023-06-21 DIAGNOSIS — I73.9 PVD (PERIPHERAL VASCULAR DISEASE) (HCC): ICD-10-CM

## 2023-06-21 DIAGNOSIS — I42.0 DILATED CARDIOMYOPATHY (HCC): ICD-10-CM

## 2023-06-21 DIAGNOSIS — I50.20 SYSTOLIC CONGESTIVE HEART FAILURE, UNSPECIFIED HF CHRONICITY (HCC): ICD-10-CM

## 2023-06-21 DIAGNOSIS — G89.29 OTHER CHRONIC PAIN: Primary | ICD-10-CM

## 2023-06-21 PROBLEM — I50.23 ACUTE ON CHRONIC HFREF (HEART FAILURE WITH REDUCED EJECTION FRACTION) (HCC): Status: ACTIVE | Noted: 2023-06-21

## 2023-06-21 LAB
ALBUMIN SERPL-MCNC: 3.1 G/DL (ref 3.5–5)
ALBUMIN/GLOB SERPL: 0.8 (ref 1.1–2.2)
ALP SERPL-CCNC: 82 U/L (ref 45–117)
ALT SERPL-CCNC: 17 U/L (ref 12–78)
ANION GAP SERPL CALC-SCNC: 7 MMOL/L (ref 5–15)
ARTERIAL PATENCY WRIST A: ABNORMAL
ARTERIAL PATENCY WRIST A: POSITIVE
AST SERPL-CCNC: 14 U/L (ref 15–37)
BASE DEFICIT BLD-SCNC: 0.8 MMOL/L
BASE EXCESS BLD CALC-SCNC: 0 MMOL/L
BASOPHILS # BLD: 0.1 K/UL (ref 0–0.1)
BASOPHILS NFR BLD: 1 % (ref 0–1)
BDY SITE: ABNORMAL
BDY SITE: ABNORMAL
BILIRUB SERPL-MCNC: 0.5 MG/DL (ref 0.2–1)
BUN SERPL-MCNC: 33 MG/DL (ref 6–20)
BUN/CREAT SERPL: 19 (ref 12–20)
CALCIUM SERPL-MCNC: 9.8 MG/DL (ref 8.5–10.1)
CHLORIDE SERPL-SCNC: 105 MMOL/L (ref 97–108)
CO2 SERPL-SCNC: 24 MMOL/L (ref 21–32)
COMMENT:: NORMAL
COMMENT:: NORMAL
CREAT SERPL-MCNC: 1.74 MG/DL (ref 0.55–1.02)
DIFFERENTIAL METHOD BLD: ABNORMAL
ECHO AO ASC DIAM: 3.1 CM
ECHO AO ASCENDING AORTA INDEX: 1.82 CM/M2
ECHO AO ROOT DIAM: 3.9 CM
ECHO AO ROOT INDEX: 2.29 CM/M2
ECHO AV AREA PEAK VELOCITY: 1.7 CM2
ECHO AV AREA/BSA PEAK VELOCITY: 1 CM2/M2
ECHO AV PEAK GRADIENT: 12 MMHG
ECHO AV PEAK VELOCITY: 1.7 M/S
ECHO AV VELOCITY RATIO: 0.47
ECHO BSA: 1.7 M2
ECHO LA DIAMETER INDEX: 2.47 CM/M2
ECHO LA DIAMETER: 4.2 CM
ECHO LA TO AORTIC ROOT RATIO: 1.08
ECHO LA VOL 2C: 66 ML (ref 22–52)
ECHO LA VOL 2C: 67 ML (ref 22–52)
ECHO LA VOL 4C: 68 ML (ref 22–52)
ECHO LA VOL 4C: 73 ML (ref 22–52)
ECHO LA VOLUME AREA LENGTH: 72 ML
ECHO LA VOLUME INDEX AREA LENGTH: 42 ML/M2 (ref 16–34)
ECHO LV E' LATERAL VELOCITY: 3 CM/S
ECHO LV E' SEPTAL VELOCITY: 3 CM/S
ECHO LV EDV A4C: 210 ML
ECHO LV EDV INDEX A4C: 124 ML/M2
ECHO LV EJECTION FRACTION A4C: 28 %
ECHO LV ESV A4C: 150 ML
ECHO LV ESV INDEX A4C: 88 ML/M2
ECHO LV FRACTIONAL SHORTENING: 19 % (ref 28–44)
ECHO LV INTERNAL DIMENSION DIASTOLE INDEX: 3.65 CM/M2
ECHO LV INTERNAL DIMENSION DIASTOLIC: 6.2 CM (ref 3.9–5.3)
ECHO LV INTERNAL DIMENSION SYSTOLIC INDEX: 2.94 CM/M2
ECHO LV INTERNAL DIMENSION SYSTOLIC: 5 CM
ECHO LV IVSD: 1.1 CM (ref 0.6–0.9)
ECHO LV MASS 2D: 278 G (ref 67–162)
ECHO LV MASS INDEX 2D: 163.6 G/M2 (ref 43–95)
ECHO LV POSTERIOR WALL DIASTOLIC: 1 CM (ref 0.6–0.9)
ECHO LV RELATIVE WALL THICKNESS RATIO: 0.32
ECHO LVOT AREA: 3.5 CM2
ECHO LVOT DIAM: 2.1 CM
ECHO LVOT PEAK GRADIENT: 3 MMHG
ECHO LVOT PEAK VELOCITY: 0.8 M/S
ECHO MV A VELOCITY: 0.98 M/S
ECHO MV AREA PHT: 3.8 CM2
ECHO MV E DECELERATION TIME (DT): 199.1 MS
ECHO MV E VELOCITY: 1.1 M/S
ECHO MV E/A RATIO: 1.12
ECHO MV E/E' LATERAL: 36.67
ECHO MV E/E' RATIO (AVERAGED): 36.67
ECHO MV E/E' SEPTAL: 36.67
ECHO MV PRESSURE HALF TIME (PHT): 57.7 MS
ECHO RV FREE WALL PEAK S': 11 CM/S
ECHO RV INTERNAL DIMENSION: 2.8 CM
ECHO RV TAPSE: 2.3 CM (ref 1.7–?)
ECHO TV REGURGITANT MAX VELOCITY: 4.09 M/S
ECHO TV REGURGITANT PEAK GRADIENT: 67 MMHG
EKG ATRIAL RATE: 81 BPM
EKG DIAGNOSIS: NORMAL
EKG P AXIS: 90 DEGREES
EKG P-R INTERVAL: 166 MS
EKG Q-T INTERVAL: 410 MS
EKG QRS DURATION: 110 MS
EKG QTC CALCULATION (BAZETT): 476 MS
EKG R AXIS: 77 DEGREES
EKG T AXIS: 229 DEGREES
EKG VENTRICULAR RATE: 81 BPM
EOSINOPHIL # BLD: 0.2 K/UL (ref 0–0.4)
EOSINOPHIL NFR BLD: 1 % (ref 0–7)
ERYTHROCYTE [DISTWIDTH] IN BLOOD BY AUTOMATED COUNT: 14.3 % (ref 11.5–14.5)
EST. AVERAGE GLUCOSE BLD GHB EST-MCNC: 166 MG/DL
GAS FLOW.O2 O2 DELIVERY SYS: ABNORMAL
GAS FLOW.O2 O2 DELIVERY SYS: ABNORMAL
GLOBULIN SER CALC-MCNC: 3.9 G/DL (ref 2–4)
GLUCOSE BLD STRIP.AUTO-MCNC: 196 MG/DL (ref 65–117)
GLUCOSE BLD STRIP.AUTO-MCNC: 217 MG/DL (ref 65–117)
GLUCOSE BLD STRIP.AUTO-MCNC: 241 MG/DL (ref 65–117)
GLUCOSE BLD STRIP.AUTO-MCNC: 291 MG/DL (ref 65–117)
GLUCOSE SERPL-MCNC: 244 MG/DL (ref 65–100)
HBA1C MFR BLD: 7.4 % (ref 4–5.6)
HCO3 BLD-SCNC: 22 MMOL/L (ref 22–26)
HCO3 BLD-SCNC: 23.5 MMOL/L (ref 22–26)
HCT VFR BLD AUTO: 39.1 % (ref 35–47)
HGB BLD-MCNC: 12.8 G/DL (ref 11.5–16)
IMM GRANULOCYTES # BLD AUTO: 0.1 K/UL (ref 0–0.04)
IMM GRANULOCYTES NFR BLD AUTO: 0 % (ref 0–0.5)
LACTATE SERPL-SCNC: 1.6 MMOL/L (ref 0.4–2)
LYMPHOCYTES # BLD: 2.7 K/UL (ref 0.8–3.5)
LYMPHOCYTES NFR BLD: 17 % (ref 12–49)
MCH RBC QN AUTO: 30.1 PG (ref 26–34)
MCHC RBC AUTO-ENTMCNC: 32.7 G/DL (ref 30–36.5)
MCV RBC AUTO: 92 FL (ref 80–99)
MONOCYTES # BLD: 0.9 K/UL (ref 0–1)
MONOCYTES NFR BLD: 6 % (ref 5–13)
NEUTS SEG # BLD: 11.9 K/UL (ref 1.8–8)
NEUTS SEG NFR BLD: 75 % (ref 32–75)
NRBC # BLD: 0 K/UL (ref 0–0.01)
NRBC BLD-RTO: 0 PER 100 WBC
O2/TOTAL GAS SETTING VFR VENT: 4 %
PCO2 BLD: 30 MMHG (ref 35–45)
PCO2 BLD: 33.5 MMHG (ref 35–45)
PH BLD: 7.45 (ref 7.35–7.45)
PH BLD: 7.47 (ref 7.35–7.45)
PLATELET # BLD AUTO: 395 K/UL (ref 150–400)
PMV BLD AUTO: 10.4 FL (ref 8.9–12.9)
PO2 BLD: 80 MMHG (ref 80–100)
PO2 BLD: 89 MMHG (ref 80–100)
POTASSIUM SERPL-SCNC: 4.5 MMOL/L (ref 3.5–5.1)
PROCALCITONIN SERPL-MCNC: <0.05 NG/ML
PROT SERPL-MCNC: 7 G/DL (ref 6.4–8.2)
RBC # BLD AUTO: 4.25 M/UL (ref 3.8–5.2)
SAO2 % BLD: 96.7 % (ref 92–97)
SAO2 % BLD: 97.3 % (ref 92–97)
SERVICE CMNT-IMP: ABNORMAL
SODIUM SERPL-SCNC: 136 MMOL/L (ref 136–145)
SPECIMEN HOLD: NORMAL
SPECIMEN HOLD: NORMAL
SPECIMEN TYPE: ABNORMAL
SPECIMEN TYPE: ABNORMAL
TROPONIN I SERPL HS-MCNC: 64 NG/L (ref 0–37)
TROPONIN I SERPL HS-MCNC: 65 NG/L (ref 0–37)
VAS LEFT CFA PROX PSV: 161.3 CM/S
VAS LEFT PFA PROX PSV: 99.9 CM/S
VAS LEFT POP A DIST PSV: 74.6 CM/S
VAS LEFT POP A PROX PSV: 148.3 CM/S
VAS LEFT POP A PROX VEL RATIO: 1.4
VAS LEFT PTA DIST PSV: 26.8 CM/S
VAS LEFT SFA DIST PSV: 106.3 CM/S
VAS LEFT SFA DIST VEL RATIO: 0.89
VAS LEFT SFA MID PSV: 119.2 CM/S
VAS LEFT SFA MID VEL RATIO: 1.28
VAS LEFT SFA PROX PSV: 93.4 CM/S
VAS LEFT SFA PROX VEL RATIO: 0.58
VAS RIGHT CFA PROX PSV: 229.2 CM/S
VAS RIGHT PFA PROX PSV: 125.9 CM/S
VAS RIGHT POP A DIST PSV: 103.1 CM/S
VAS RIGHT POP A PROX PSV: 125.7 CM/S
VAS RIGHT POP A PROX VEL RATIO: 0.64
VAS RIGHT PTA DIST PSV: 28.7 CM/S
VAS RIGHT SFA DIST PSV: 196.8 CM/S
VAS RIGHT SFA DIST VEL RATIO: 1.65
VAS RIGHT SFA MID PSV: 119.3 CM/S
VAS RIGHT SFA MID VEL RATIO: 0.7
VAS RIGHT SFA PROX PSV: 161.3 CM/S
VAS RIGHT SFA PROX VEL RATIO: 0.7
WBC # BLD AUTO: 15.8 K/UL (ref 3.6–11)

## 2023-06-21 PROCEDURE — 94640 AIRWAY INHALATION TREATMENT: CPT

## 2023-06-21 PROCEDURE — 2580000003 HC RX 258: Performed by: FAMILY MEDICINE

## 2023-06-21 PROCEDURE — 36415 COLL VENOUS BLD VENIPUNCTURE: CPT

## 2023-06-21 PROCEDURE — 83036 HEMOGLOBIN GLYCOSYLATED A1C: CPT

## 2023-06-21 PROCEDURE — 2500000003 HC RX 250 WO HCPCS: Performed by: EMERGENCY MEDICINE

## 2023-06-21 PROCEDURE — 36600 WITHDRAWAL OF ARTERIAL BLOOD: CPT

## 2023-06-21 PROCEDURE — 80053 COMPREHEN METABOLIC PANEL: CPT

## 2023-06-21 PROCEDURE — 6370000000 HC RX 637 (ALT 250 FOR IP): Performed by: NURSE PRACTITIONER

## 2023-06-21 PROCEDURE — 6360000002 HC RX W HCPCS: Performed by: EMERGENCY MEDICINE

## 2023-06-21 PROCEDURE — 83605 ASSAY OF LACTIC ACID: CPT

## 2023-06-21 PROCEDURE — 6370000000 HC RX 637 (ALT 250 FOR IP): Performed by: INTERNAL MEDICINE

## 2023-06-21 PROCEDURE — 87040 BLOOD CULTURE FOR BACTERIA: CPT

## 2023-06-21 PROCEDURE — 71045 X-RAY EXAM CHEST 1 VIEW: CPT

## 2023-06-21 PROCEDURE — 84484 ASSAY OF TROPONIN QUANT: CPT

## 2023-06-21 PROCEDURE — 82962 GLUCOSE BLOOD TEST: CPT

## 2023-06-21 PROCEDURE — 6370000000 HC RX 637 (ALT 250 FOR IP): Performed by: EMERGENCY MEDICINE

## 2023-06-21 PROCEDURE — 6360000002 HC RX W HCPCS: Performed by: FAMILY MEDICINE

## 2023-06-21 PROCEDURE — 99285 EMERGENCY DEPT VISIT HI MDM: CPT

## 2023-06-21 PROCEDURE — 2060000000 HC ICU INTERMEDIATE R&B

## 2023-06-21 PROCEDURE — 6360000002 HC RX W HCPCS: Performed by: PHYSICIAN ASSISTANT

## 2023-06-21 PROCEDURE — 93010 ELECTROCARDIOGRAM REPORT: CPT | Performed by: SPECIALIST

## 2023-06-21 PROCEDURE — 93970 EXTREMITY STUDY: CPT

## 2023-06-21 PROCEDURE — 6370000000 HC RX 637 (ALT 250 FOR IP): Performed by: FAMILY MEDICINE

## 2023-06-21 PROCEDURE — 99222 1ST HOSP IP/OBS MODERATE 55: CPT | Performed by: SPECIALIST

## 2023-06-21 PROCEDURE — 93306 TTE W/DOPPLER COMPLETE: CPT

## 2023-06-21 PROCEDURE — 85025 COMPLETE CBC W/AUTO DIFF WBC: CPT

## 2023-06-21 PROCEDURE — 82803 BLOOD GASES ANY COMBINATION: CPT

## 2023-06-21 PROCEDURE — 87086 URINE CULTURE/COLONY COUNT: CPT

## 2023-06-21 PROCEDURE — 71250 CT THORAX DX C-: CPT

## 2023-06-21 PROCEDURE — 93925 LOWER EXTREMITY STUDY: CPT

## 2023-06-21 PROCEDURE — 93005 ELECTROCARDIOGRAM TRACING: CPT | Performed by: FAMILY MEDICINE

## 2023-06-21 RX ORDER — ASPIRIN 81 MG/1
81 TABLET ORAL DAILY
Status: DISCONTINUED | OUTPATIENT
Start: 2023-06-21 | End: 2023-06-27 | Stop reason: HOSPADM

## 2023-06-21 RX ORDER — HYDRALAZINE HYDROCHLORIDE 10 MG/1
10 TABLET, FILM COATED ORAL 2 TIMES DAILY
Status: DISCONTINUED | OUTPATIENT
Start: 2023-06-21 | End: 2023-06-21

## 2023-06-21 RX ORDER — FUROSEMIDE 10 MG/ML
20 INJECTION INTRAMUSCULAR; INTRAVENOUS ONCE
Status: COMPLETED | OUTPATIENT
Start: 2023-06-21 | End: 2023-06-21

## 2023-06-21 RX ORDER — NITROGLYCERIN 20 MG/100ML
5-200 INJECTION INTRAVENOUS CONTINUOUS
Status: DISCONTINUED | OUTPATIENT
Start: 2023-06-21 | End: 2023-06-22

## 2023-06-21 RX ORDER — ISOSORBIDE DINITRATE 20 MG/1
10 TABLET ORAL 3 TIMES DAILY
Status: DISCONTINUED | OUTPATIENT
Start: 2023-06-21 | End: 2023-06-21

## 2023-06-21 RX ORDER — ACETAMINOPHEN 650 MG/1
650 SUPPOSITORY RECTAL EVERY 6 HOURS PRN
Status: DISCONTINUED | OUTPATIENT
Start: 2023-06-21 | End: 2023-06-27 | Stop reason: HOSPADM

## 2023-06-21 RX ORDER — HEPARIN SODIUM 5000 [USP'U]/ML
5000 INJECTION, SOLUTION INTRAVENOUS; SUBCUTANEOUS EVERY 8 HOURS SCHEDULED
Status: DISCONTINUED | OUTPATIENT
Start: 2023-06-21 | End: 2023-06-27 | Stop reason: HOSPADM

## 2023-06-21 RX ORDER — FUROSEMIDE 40 MG/1
80 TABLET ORAL DAILY
Status: DISCONTINUED | OUTPATIENT
Start: 2023-06-21 | End: 2023-06-22

## 2023-06-21 RX ORDER — DEXTROSE MONOHYDRATE 100 MG/ML
INJECTION, SOLUTION INTRAVENOUS CONTINUOUS PRN
Status: DISCONTINUED | OUTPATIENT
Start: 2023-06-21 | End: 2023-06-27 | Stop reason: HOSPADM

## 2023-06-21 RX ORDER — INSULIN LISPRO 100 [IU]/ML
0-4 INJECTION, SOLUTION INTRAVENOUS; SUBCUTANEOUS NIGHTLY
Status: DISCONTINUED | OUTPATIENT
Start: 2023-06-21 | End: 2023-06-27 | Stop reason: HOSPADM

## 2023-06-21 RX ORDER — ENOXAPARIN SODIUM 100 MG/ML
30 INJECTION SUBCUTANEOUS DAILY
Status: DISCONTINUED | OUTPATIENT
Start: 2023-06-21 | End: 2023-06-21

## 2023-06-21 RX ORDER — CARVEDILOL 12.5 MG/1
25 TABLET ORAL 2 TIMES DAILY WITH MEALS
Status: DISCONTINUED | OUTPATIENT
Start: 2023-06-21 | End: 2023-06-27 | Stop reason: HOSPADM

## 2023-06-21 RX ORDER — FENTANYL CITRATE 50 UG/ML
25 INJECTION, SOLUTION INTRAMUSCULAR; INTRAVENOUS
Status: DISCONTINUED | OUTPATIENT
Start: 2023-06-21 | End: 2023-06-23

## 2023-06-21 RX ORDER — VENLAFAXINE 37.5 MG/1
37.5 TABLET ORAL 2 TIMES DAILY WITH MEALS
Status: DISCONTINUED | OUTPATIENT
Start: 2023-06-21 | End: 2023-06-27 | Stop reason: HOSPADM

## 2023-06-21 RX ORDER — SODIUM CHLORIDE 0.9 % (FLUSH) 0.9 %
5-40 SYRINGE (ML) INJECTION PRN
Status: DISCONTINUED | OUTPATIENT
Start: 2023-06-21 | End: 2023-06-27 | Stop reason: HOSPADM

## 2023-06-21 RX ORDER — FERROUS SULFATE 325(65) MG
325 TABLET ORAL
Status: DISCONTINUED | OUTPATIENT
Start: 2023-06-21 | End: 2023-06-27 | Stop reason: HOSPADM

## 2023-06-21 RX ORDER — POLYETHYLENE GLYCOL 3350 17 G/17G
17 POWDER, FOR SOLUTION ORAL DAILY PRN
Status: DISCONTINUED | OUTPATIENT
Start: 2023-06-21 | End: 2023-06-27 | Stop reason: HOSPADM

## 2023-06-21 RX ORDER — ATORVASTATIN CALCIUM 10 MG/1
20 TABLET, FILM COATED ORAL DAILY
Status: DISCONTINUED | OUTPATIENT
Start: 2023-06-21 | End: 2023-06-21 | Stop reason: SDUPTHER

## 2023-06-21 RX ORDER — BUDESONIDE 0.5 MG/2ML
0.5 INHALANT ORAL 2 TIMES DAILY
Status: DISCONTINUED | OUTPATIENT
Start: 2023-06-21 | End: 2023-06-27 | Stop reason: HOSPADM

## 2023-06-21 RX ORDER — INSULIN GLARGINE 100 [IU]/ML
55 INJECTION, SOLUTION SUBCUTANEOUS EVERY MORNING
Status: DISCONTINUED | OUTPATIENT
Start: 2023-06-21 | End: 2023-06-26

## 2023-06-21 RX ORDER — CARVEDILOL 12.5 MG/1
25 TABLET ORAL 2 TIMES DAILY
Status: DISCONTINUED | OUTPATIENT
Start: 2023-06-21 | End: 2023-06-21 | Stop reason: SDUPTHER

## 2023-06-21 RX ORDER — NICOTINE 21 MG/24HR
1 PATCH, TRANSDERMAL 24 HOURS TRANSDERMAL PRN
Status: DISCONTINUED | OUTPATIENT
Start: 2023-06-21 | End: 2023-06-27 | Stop reason: HOSPADM

## 2023-06-21 RX ORDER — MONTELUKAST SODIUM 4 MG/1
1 TABLET, CHEWABLE ORAL 2 TIMES DAILY
Status: DISCONTINUED | OUTPATIENT
Start: 2023-06-21 | End: 2023-06-27 | Stop reason: HOSPADM

## 2023-06-21 RX ORDER — DULOXETIN HYDROCHLORIDE 30 MG/1
30 CAPSULE, DELAYED RELEASE ORAL DAILY
Status: DISCONTINUED | OUTPATIENT
Start: 2023-06-21 | End: 2023-06-27 | Stop reason: HOSPADM

## 2023-06-21 RX ORDER — IPRATROPIUM BROMIDE AND ALBUTEROL SULFATE 2.5; .5 MG/3ML; MG/3ML
1 SOLUTION RESPIRATORY (INHALATION)
Status: COMPLETED | OUTPATIENT
Start: 2023-06-21 | End: 2023-06-21

## 2023-06-21 RX ORDER — INSULIN LISPRO 100 [IU]/ML
0-8 INJECTION, SOLUTION INTRAVENOUS; SUBCUTANEOUS
Status: DISCONTINUED | OUTPATIENT
Start: 2023-06-21 | End: 2023-06-27 | Stop reason: HOSPADM

## 2023-06-21 RX ORDER — ASCORBIC ACID 500 MG
1000 TABLET ORAL DAILY
Status: DISCONTINUED | OUTPATIENT
Start: 2023-06-21 | End: 2023-06-27 | Stop reason: HOSPADM

## 2023-06-21 RX ORDER — SODIUM CHLORIDE 9 MG/ML
INJECTION, SOLUTION INTRAVENOUS PRN
Status: DISCONTINUED | OUTPATIENT
Start: 2023-06-21 | End: 2023-06-27 | Stop reason: HOSPADM

## 2023-06-21 RX ORDER — ISOSORBIDE DINITRATE 10 MG/1
10 TABLET ORAL 3 TIMES DAILY
Status: DISCONTINUED | OUTPATIENT
Start: 2023-06-21 | End: 2023-06-22

## 2023-06-21 RX ORDER — HYDRALAZINE HYDROCHLORIDE 10 MG/1
10 TABLET, FILM COATED ORAL 2 TIMES DAILY
Status: DISCONTINUED | OUTPATIENT
Start: 2023-06-21 | End: 2023-06-21 | Stop reason: SDUPTHER

## 2023-06-21 RX ORDER — HYDRALAZINE HYDROCHLORIDE 10 MG/1
10 TABLET, FILM COATED ORAL 3 TIMES DAILY
Status: DISCONTINUED | OUTPATIENT
Start: 2023-06-21 | End: 2023-06-22

## 2023-06-21 RX ORDER — ARFORMOTEROL TARTRATE 15 UG/2ML
15 SOLUTION RESPIRATORY (INHALATION) 2 TIMES DAILY
Status: DISCONTINUED | OUTPATIENT
Start: 2023-06-21 | End: 2023-06-27 | Stop reason: HOSPADM

## 2023-06-21 RX ORDER — ACETAMINOPHEN 325 MG/1
650 TABLET ORAL EVERY 6 HOURS PRN
Status: DISCONTINUED | OUTPATIENT
Start: 2023-06-21 | End: 2023-06-27 | Stop reason: HOSPADM

## 2023-06-21 RX ORDER — ACETAMINOPHEN 500 MG
1000 TABLET ORAL
Status: COMPLETED | OUTPATIENT
Start: 2023-06-21 | End: 2023-06-21

## 2023-06-21 RX ORDER — LEVOFLOXACIN 500 MG/1
250 TABLET, FILM COATED ORAL DAILY
Status: DISCONTINUED | OUTPATIENT
Start: 2023-06-21 | End: 2023-06-23

## 2023-06-21 RX ORDER — ATORVASTATIN CALCIUM 20 MG/1
20 TABLET, FILM COATED ORAL NIGHTLY
Status: DISCONTINUED | OUTPATIENT
Start: 2023-06-21 | End: 2023-06-27 | Stop reason: HOSPADM

## 2023-06-21 RX ORDER — ERGOCALCIFEROL 1.25 MG/1
50000 CAPSULE ORAL WEEKLY
Status: DISCONTINUED | OUTPATIENT
Start: 2023-06-26 | End: 2023-06-27 | Stop reason: HOSPADM

## 2023-06-21 RX ORDER — SODIUM CHLORIDE 0.9 % (FLUSH) 0.9 %
5-40 SYRINGE (ML) INJECTION EVERY 12 HOURS SCHEDULED
Status: DISCONTINUED | OUTPATIENT
Start: 2023-06-21 | End: 2023-06-27 | Stop reason: HOSPADM

## 2023-06-21 RX ADMIN — Medication 4 UNITS: at 13:24

## 2023-06-21 RX ADMIN — HYDRALAZINE HYDROCHLORIDE 10 MG: 10 TABLET, FILM COATED ORAL at 19:54

## 2023-06-21 RX ADMIN — ACETAMINOPHEN 1000 MG: 500 TABLET ORAL at 02:14

## 2023-06-21 RX ADMIN — OXYCODONE HYDROCHLORIDE AND ACETAMINOPHEN 1000 MG: 500 TABLET ORAL at 08:54

## 2023-06-21 RX ADMIN — LEVOFLOXACIN 250 MG: 500 TABLET, FILM COATED ORAL at 08:54

## 2023-06-21 RX ADMIN — HEPARIN SODIUM 5000 UNITS: 5000 INJECTION INTRAVENOUS; SUBCUTANEOUS at 21:47

## 2023-06-21 RX ADMIN — ASPIRIN 81 MG: 81 TABLET, COATED ORAL at 08:53

## 2023-06-21 RX ADMIN — COLESTIPOL HYDROCHLORIDE 1 G: 1 TABLET, FILM COATED ORAL at 21:47

## 2023-06-21 RX ADMIN — ARFORMOTEROL TARTRATE 15 MCG: 15 SOLUTION RESPIRATORY (INHALATION) at 19:22

## 2023-06-21 RX ADMIN — VENLAFAXINE 37.5 MG: 37.5 TABLET ORAL at 08:55

## 2023-06-21 RX ADMIN — Medication 2 UNITS: at 17:20

## 2023-06-21 RX ADMIN — IPRATROPIUM BROMIDE AND ALBUTEROL SULFATE 1 DOSE: 2.5; .5 SOLUTION RESPIRATORY (INHALATION) at 17:48

## 2023-06-21 RX ADMIN — Medication 10 ML: at 08:59

## 2023-06-21 RX ADMIN — HYDRALAZINE HYDROCHLORIDE 10 MG: 10 TABLET, FILM COATED ORAL at 13:25

## 2023-06-21 RX ADMIN — HEPARIN SODIUM 5000 UNITS: 5000 INJECTION INTRAVENOUS; SUBCUTANEOUS at 13:25

## 2023-06-21 RX ADMIN — ATORVASTATIN CALCIUM 20 MG: 20 TABLET, FILM COATED ORAL at 19:54

## 2023-06-21 RX ADMIN — CARVEDILOL 25 MG: 12.5 TABLET, FILM COATED ORAL at 09:08

## 2023-06-21 RX ADMIN — Medication 2 UNITS: at 08:55

## 2023-06-21 RX ADMIN — FERROUS SULFATE TAB 325 MG (65 MG ELEMENTAL FE) 325 MG: 325 (65 FE) TAB at 08:55

## 2023-06-21 RX ADMIN — BUDESONIDE 500 MCG: 0.5 INHALANT RESPIRATORY (INHALATION) at 19:22

## 2023-06-21 RX ADMIN — INSULIN GLARGINE 55 UNITS: 100 INJECTION, SOLUTION SUBCUTANEOUS at 13:24

## 2023-06-21 RX ADMIN — FENTANYL CITRATE 25 MCG: 50 INJECTION, SOLUTION INTRAMUSCULAR; INTRAVENOUS at 21:52

## 2023-06-21 RX ADMIN — ISOSORBIDE DINITRATE 10 MG: 20 TABLET ORAL at 18:00

## 2023-06-21 RX ADMIN — COLESTIPOL HYDROCHLORIDE 1 G: 1 TABLET, FILM COATED ORAL at 08:55

## 2023-06-21 RX ADMIN — VENLAFAXINE 37.5 MG: 37.5 TABLET ORAL at 22:23

## 2023-06-21 RX ADMIN — HYDRALAZINE HYDROCHLORIDE 10 MG: 10 TABLET, FILM COATED ORAL at 16:25

## 2023-06-21 RX ADMIN — CARVEDILOL 25 MG: 12.5 TABLET, FILM COATED ORAL at 17:20

## 2023-06-21 RX ADMIN — FENTANYL CITRATE 25 MCG: 50 INJECTION, SOLUTION INTRAMUSCULAR; INTRAVENOUS at 05:15

## 2023-06-21 RX ADMIN — FUROSEMIDE 20 MG: 10 INJECTION, SOLUTION INTRAMUSCULAR; INTRAVENOUS at 04:43

## 2023-06-21 RX ADMIN — ACETAMINOPHEN 650 MG: 325 TABLET ORAL at 19:53

## 2023-06-21 RX ADMIN — NITROGLYCERIN 70 MCG/MIN: 20 INJECTION INTRAVENOUS at 04:44

## 2023-06-21 RX ADMIN — Medication 10 ML: at 20:00

## 2023-06-21 RX ADMIN — FUROSEMIDE 80 MG: 40 TABLET ORAL at 08:54

## 2023-06-21 ASSESSMENT — PAIN SCALES - GENERAL
PAINLEVEL_OUTOF10: 8
PAINLEVEL_OUTOF10: 0
PAINLEVEL_OUTOF10: 8
PAINLEVEL_OUTOF10: 4
PAINLEVEL_OUTOF10: 4

## 2023-06-21 ASSESSMENT — PAIN DESCRIPTION - FREQUENCY: FREQUENCY: CONTINUOUS

## 2023-06-21 ASSESSMENT — PAIN DESCRIPTION - ONSET: ONSET: ON-GOING

## 2023-06-21 ASSESSMENT — PAIN DESCRIPTION - ORIENTATION
ORIENTATION: RIGHT;LEFT;POSTERIOR
ORIENTATION: RIGHT;LEFT;POSTERIOR
ORIENTATION: LEFT
ORIENTATION: RIGHT

## 2023-06-21 ASSESSMENT — PAIN - FUNCTIONAL ASSESSMENT
PAIN_FUNCTIONAL_ASSESSMENT: PREVENTS OR INTERFERES SOME ACTIVE ACTIVITIES AND ADLS
PAIN_FUNCTIONAL_ASSESSMENT: PREVENTS OR INTERFERES SOME ACTIVE ACTIVITIES AND ADLS
PAIN_FUNCTIONAL_ASSESSMENT: ACTIVITIES ARE NOT PREVENTED

## 2023-06-21 ASSESSMENT — PAIN DESCRIPTION - DESCRIPTORS
DESCRIPTORS: CRAMPING
DESCRIPTORS: ACHING
DESCRIPTORS: ACHING;CRAMPING
DESCRIPTORS: ACHING;CRAMPING

## 2023-06-21 ASSESSMENT — ENCOUNTER SYMPTOMS
FACIAL SWELLING: 0
COUGH: 0
SPUTUM PRODUCTION: 0
SHORTNESS OF BREATH: 1
NAUSEA: 0
ABDOMINAL PAIN: 0
SORE THROAT: 0
DIARRHEA: 0
CHEST TIGHTNESS: 0
HEMOPTYSIS: 0
VOMITING: 0
BACK PAIN: 0
EYE PAIN: 0
SWOLLEN GLANDS: 0
EYE DISCHARGE: 0

## 2023-06-21 ASSESSMENT — PAIN DESCRIPTION - LOCATION
LOCATION: BACK;LEG
LOCATION: LEG
LOCATION: FLANK
LOCATION: BACK;LEG

## 2023-06-21 ASSESSMENT — PAIN DESCRIPTION - PAIN TYPE: TYPE: CHRONIC PAIN

## 2023-06-21 NOTE — ED NOTES
Called [facility] and gave report to Mina Taylor RN .      Leonardo Chung RN  06/20/23 6081

## 2023-06-21 NOTE — ED PROVIDER NOTES
respirations, crackles bilaterally  HEART: Regular rate and rhythm. Pitting edema lower extremities  ABDOMEN: Non-distended and non-tender, without guarding or rebound.   SKIN:  warm, dry  EXTREMITIES: Without enderness or deformity, symmetric with normal ROM  NEUROLOGICAL: Alert,       Diagnostic Study Results     Labs -     Recent Results (from the past 12 hour(s))   CBC with Auto Differential    Collection Time: 06/20/23  4:20 PM   Result Value Ref Range    WBC 15.5 (H) 3.6 - 11.0 K/uL    RBC 4.39 3.80 - 5.20 M/uL    Hemoglobin 12.8 11.5 - 16.0 g/dL    Hematocrit 39.3 35.0 - 47.0 %    MCV 89.5 80.0 - 99.0 FL    MCH 29.2 26.0 - 34.0 PG    MCHC 32.6 30.0 - 36.5 g/dL    RDW 14.1 11.5 - 14.5 %    Platelets 334 646 - 820 K/uL    MPV 10.1 8.9 - 12.9 FL    Nucleated RBCs 0.0 0  WBC    nRBC 0.00 0.00 - 0.01 K/uL    Neutrophils % 76 (H) 32 - 75 %    Lymphocytes % 15 12 - 49 %    Monocytes % 5 5 - 13 %    Eosinophils % 2 0 - 7 %    Basophils % 1 0 - 1 %    Immature Granulocytes 1 (H) 0.0 - 0.5 %    Neutrophils Absolute 12.0 (H) 1.8 - 8.0 K/UL    Lymphocytes Absolute 2.3 0.8 - 3.5 K/UL    Monocytes Absolute 0.8 0.0 - 1.0 K/UL    Eosinophils Absolute 0.3 0.0 - 0.4 K/UL    Basophils Absolute 0.1 0.0 - 0.1 K/UL    Absolute Immature Granulocyte 0.1 (H) 0.00 - 0.04 K/UL    Differential Type AUTOMATED     Comprehensive Metabolic Panel    Collection Time: 06/20/23  4:20 PM   Result Value Ref Range    Sodium 137 136 - 145 mmol/L    Potassium 4.4 3.5 - 5.1 mmol/L    Chloride 103 97 - 108 mmol/L    CO2 26 21 - 32 mmol/L    Anion Gap 8 5 - 15 mmol/L    Glucose 221 (H) 65 - 100 mg/dL    BUN 31 (H) 6 - 20 MG/DL    Creatinine 1.58 (H) 0.55 - 1.02 MG/DL    Bun/Cre Ratio 20 12 - 20      Est, Glom Filt Rate 32 (L) >60 ml/min/1.73m2    Calcium 9.3 8.5 - 10.1 MG/DL    Total Bilirubin 0.4 0.2 - 1.0 MG/DL    ALT 17 12 - 78 U/L    AST 15 15 - 37 U/L    Alk Phosphatase 96 45 - 117 U/L    Total Protein 7.4 6.4 - 8.2 g/dL    Albumin 3.2 (L)

## 2023-06-22 LAB
ANION GAP SERPL CALC-SCNC: 7 MMOL/L (ref 5–15)
BACTERIA SPEC CULT: NORMAL
BASOPHILS # BLD: 0.1 K/UL (ref 0–0.1)
BASOPHILS NFR BLD: 0 % (ref 0–1)
BUN SERPL-MCNC: 39 MG/DL (ref 6–20)
BUN/CREAT SERPL: 21 (ref 12–20)
CALCIUM SERPL-MCNC: 8.9 MG/DL (ref 8.5–10.1)
CC UR VC: NORMAL
CHLORIDE SERPL-SCNC: 106 MMOL/L (ref 97–108)
CO2 SERPL-SCNC: 23 MMOL/L (ref 21–32)
CREAT SERPL-MCNC: 1.89 MG/DL (ref 0.55–1.02)
DIFFERENTIAL METHOD BLD: ABNORMAL
EOSINOPHIL # BLD: 0.4 K/UL (ref 0–0.4)
EOSINOPHIL NFR BLD: 3 % (ref 0–7)
ERYTHROCYTE [DISTWIDTH] IN BLOOD BY AUTOMATED COUNT: 14.1 % (ref 11.5–14.5)
GLUCOSE BLD STRIP.AUTO-MCNC: 147 MG/DL (ref 65–117)
GLUCOSE BLD STRIP.AUTO-MCNC: 153 MG/DL (ref 65–117)
GLUCOSE BLD STRIP.AUTO-MCNC: 154 MG/DL (ref 65–117)
GLUCOSE BLD STRIP.AUTO-MCNC: 212 MG/DL (ref 65–117)
GLUCOSE SERPL-MCNC: 221 MG/DL (ref 65–100)
HCT VFR BLD AUTO: 33 % (ref 35–47)
HGB BLD-MCNC: 10.7 G/DL (ref 11.5–16)
IMM GRANULOCYTES # BLD AUTO: 0.1 K/UL (ref 0–0.04)
IMM GRANULOCYTES NFR BLD AUTO: 0 % (ref 0–0.5)
LYMPHOCYTES # BLD: 3.2 K/UL (ref 0.8–3.5)
LYMPHOCYTES NFR BLD: 23 % (ref 12–49)
MAGNESIUM SERPL-MCNC: 2 MG/DL (ref 1.6–2.4)
MCH RBC QN AUTO: 29.8 PG (ref 26–34)
MCHC RBC AUTO-ENTMCNC: 32.4 G/DL (ref 30–36.5)
MCV RBC AUTO: 91.9 FL (ref 80–99)
MONOCYTES # BLD: 0.8 K/UL (ref 0–1)
MONOCYTES NFR BLD: 6 % (ref 5–13)
NEUTS SEG # BLD: 9.3 K/UL (ref 1.8–8)
NEUTS SEG NFR BLD: 68 % (ref 32–75)
NRBC # BLD: 0 K/UL (ref 0–0.01)
NRBC BLD-RTO: 0 PER 100 WBC
PHOSPHATE SERPL-MCNC: 3.2 MG/DL (ref 2.6–4.7)
PLATELET # BLD AUTO: 299 K/UL (ref 150–400)
PMV BLD AUTO: 10.1 FL (ref 8.9–12.9)
POTASSIUM SERPL-SCNC: 4 MMOL/L (ref 3.5–5.1)
RBC # BLD AUTO: 3.59 M/UL (ref 3.8–5.2)
SERVICE CMNT-IMP: ABNORMAL
SERVICE CMNT-IMP: NORMAL
SODIUM SERPL-SCNC: 136 MMOL/L (ref 136–145)
WBC # BLD AUTO: 13.8 K/UL (ref 3.6–11)

## 2023-06-22 PROCEDURE — 80048 BASIC METABOLIC PNL TOTAL CA: CPT

## 2023-06-22 PROCEDURE — 6360000002 HC RX W HCPCS: Performed by: FAMILY MEDICINE

## 2023-06-22 PROCEDURE — 6360000002 HC RX W HCPCS: Performed by: NURSE PRACTITIONER

## 2023-06-22 PROCEDURE — 94640 AIRWAY INHALATION TREATMENT: CPT

## 2023-06-22 PROCEDURE — 6370000000 HC RX 637 (ALT 250 FOR IP): Performed by: FAMILY MEDICINE

## 2023-06-22 PROCEDURE — 2060000000 HC ICU INTERMEDIATE R&B

## 2023-06-22 PROCEDURE — 6370000000 HC RX 637 (ALT 250 FOR IP): Performed by: INTERNAL MEDICINE

## 2023-06-22 PROCEDURE — 6360000002 HC RX W HCPCS: Performed by: HOSPITALIST

## 2023-06-22 PROCEDURE — 84100 ASSAY OF PHOSPHORUS: CPT

## 2023-06-22 PROCEDURE — 85025 COMPLETE CBC W/AUTO DIFF WBC: CPT

## 2023-06-22 PROCEDURE — 6370000000 HC RX 637 (ALT 250 FOR IP): Performed by: NURSE PRACTITIONER

## 2023-06-22 PROCEDURE — 83735 ASSAY OF MAGNESIUM: CPT

## 2023-06-22 PROCEDURE — 2580000003 HC RX 258: Performed by: FAMILY MEDICINE

## 2023-06-22 PROCEDURE — 6360000002 HC RX W HCPCS: Performed by: PHYSICIAN ASSISTANT

## 2023-06-22 PROCEDURE — 82962 GLUCOSE BLOOD TEST: CPT

## 2023-06-22 PROCEDURE — 36415 COLL VENOUS BLD VENIPUNCTURE: CPT

## 2023-06-22 PROCEDURE — 2700000000 HC OXYGEN THERAPY PER DAY

## 2023-06-22 PROCEDURE — 6370000000 HC RX 637 (ALT 250 FOR IP): Performed by: HOSPITALIST

## 2023-06-22 PROCEDURE — 99223 1ST HOSP IP/OBS HIGH 75: CPT | Performed by: SPECIALIST

## 2023-06-22 RX ORDER — HYDRALAZINE HYDROCHLORIDE 50 MG/1
100 TABLET, FILM COATED ORAL 3 TIMES DAILY
Status: DISCONTINUED | OUTPATIENT
Start: 2023-06-22 | End: 2023-06-23

## 2023-06-22 RX ORDER — HYDRALAZINE HYDROCHLORIDE 25 MG/1
25 TABLET, FILM COATED ORAL 3 TIMES DAILY
Status: DISCONTINUED | OUTPATIENT
Start: 2023-06-22 | End: 2023-06-22

## 2023-06-22 RX ORDER — FUROSEMIDE 10 MG/ML
80 INJECTION INTRAMUSCULAR; INTRAVENOUS DAILY
Status: DISCONTINUED | OUTPATIENT
Start: 2023-06-22 | End: 2023-06-22

## 2023-06-22 RX ORDER — HYDRALAZINE HYDROCHLORIDE 20 MG/ML
10 INJECTION INTRAMUSCULAR; INTRAVENOUS EVERY 4 HOURS PRN
Status: DISCONTINUED | OUTPATIENT
Start: 2023-06-22 | End: 2023-06-27 | Stop reason: HOSPADM

## 2023-06-22 RX ORDER — LIDOCAINE 4 G/G
1 PATCH TOPICAL DAILY
Status: DISCONTINUED | OUTPATIENT
Start: 2023-06-22 | End: 2023-06-22

## 2023-06-22 RX ORDER — FUROSEMIDE 10 MG/ML
40 INJECTION INTRAMUSCULAR; INTRAVENOUS 2 TIMES DAILY
Status: DISCONTINUED | OUTPATIENT
Start: 2023-06-22 | End: 2023-06-24

## 2023-06-22 RX ORDER — ISOSORBIDE DINITRATE 20 MG/1
20 TABLET ORAL 3 TIMES DAILY
Status: DISCONTINUED | OUTPATIENT
Start: 2023-06-22 | End: 2023-06-27 | Stop reason: HOSPADM

## 2023-06-22 RX ORDER — FUROSEMIDE 10 MG/ML
40 INJECTION INTRAMUSCULAR; INTRAVENOUS ONCE
Status: COMPLETED | OUTPATIENT
Start: 2023-06-22 | End: 2023-06-22

## 2023-06-22 RX ORDER — LIDOCAINE 4 G/G
1 PATCH TOPICAL DAILY
Status: DISCONTINUED | OUTPATIENT
Start: 2023-06-22 | End: 2023-06-27 | Stop reason: HOSPADM

## 2023-06-22 RX ORDER — HYDRALAZINE HYDROCHLORIDE 50 MG/1
50 TABLET, FILM COATED ORAL 3 TIMES DAILY
Status: DISCONTINUED | OUTPATIENT
Start: 2023-06-22 | End: 2023-06-22

## 2023-06-22 RX ADMIN — ISOSORBIDE DINITRATE 20 MG: 20 TABLET ORAL at 14:38

## 2023-06-22 RX ADMIN — Medication 2 UNITS: at 09:33

## 2023-06-22 RX ADMIN — CARVEDILOL 25 MG: 12.5 TABLET, FILM COATED ORAL at 17:59

## 2023-06-22 RX ADMIN — COLESTIPOL HYDROCHLORIDE 1 G: 1 TABLET, FILM COATED ORAL at 21:31

## 2023-06-22 RX ADMIN — ARFORMOTEROL TARTRATE 15 MCG: 15 SOLUTION RESPIRATORY (INHALATION) at 08:28

## 2023-06-22 RX ADMIN — COLESTIPOL HYDROCHLORIDE 1 G: 1 TABLET, FILM COATED ORAL at 09:30

## 2023-06-22 RX ADMIN — INSULIN GLARGINE 55 UNITS: 100 INJECTION, SOLUTION SUBCUTANEOUS at 09:30

## 2023-06-22 RX ADMIN — FUROSEMIDE 40 MG: 10 INJECTION, SOLUTION INTRAMUSCULAR; INTRAVENOUS at 17:59

## 2023-06-22 RX ADMIN — Medication 10 ML: at 21:35

## 2023-06-22 RX ADMIN — BUDESONIDE 500 MCG: 0.5 INHALANT RESPIRATORY (INHALATION) at 21:40

## 2023-06-22 RX ADMIN — ACETAMINOPHEN 650 MG: 325 TABLET ORAL at 18:04

## 2023-06-22 RX ADMIN — HYDRALAZINE HYDROCHLORIDE 100 MG: 50 TABLET, FILM COATED ORAL at 14:38

## 2023-06-22 RX ADMIN — CARVEDILOL 25 MG: 12.5 TABLET, FILM COATED ORAL at 09:32

## 2023-06-22 RX ADMIN — VENLAFAXINE 37.5 MG: 37.5 TABLET ORAL at 09:31

## 2023-06-22 RX ADMIN — LEVOFLOXACIN 250 MG: 500 TABLET, FILM COATED ORAL at 09:30

## 2023-06-22 RX ADMIN — ACETAMINOPHEN 650 MG: 325 TABLET ORAL at 02:25

## 2023-06-22 RX ADMIN — Medication 10 ML: at 09:35

## 2023-06-22 RX ADMIN — BUDESONIDE 500 MCG: 0.5 INHALANT RESPIRATORY (INHALATION) at 08:28

## 2023-06-22 RX ADMIN — FUROSEMIDE 40 MG: 10 INJECTION, SOLUTION INTRAMUSCULAR; INTRAVENOUS at 09:31

## 2023-06-22 RX ADMIN — ISOSORBIDE DINITRATE 20 MG: 20 TABLET ORAL at 17:59

## 2023-06-22 RX ADMIN — Medication 2 UNITS: at 17:59

## 2023-06-22 RX ADMIN — VENLAFAXINE 37.5 MG: 37.5 TABLET ORAL at 17:59

## 2023-06-22 RX ADMIN — ASPIRIN 81 MG: 81 TABLET, COATED ORAL at 09:32

## 2023-06-22 RX ADMIN — FERROUS SULFATE TAB 325 MG (65 MG ELEMENTAL FE) 325 MG: 325 (65 FE) TAB at 09:32

## 2023-06-22 RX ADMIN — HEPARIN SODIUM 5000 UNITS: 5000 INJECTION INTRAVENOUS; SUBCUTANEOUS at 21:32

## 2023-06-22 RX ADMIN — ATORVASTATIN CALCIUM 20 MG: 20 TABLET, FILM COATED ORAL at 21:32

## 2023-06-22 RX ADMIN — ARFORMOTEROL TARTRATE 15 MCG: 15 SOLUTION RESPIRATORY (INHALATION) at 21:40

## 2023-06-22 RX ADMIN — HEPARIN SODIUM 5000 UNITS: 5000 INJECTION INTRAVENOUS; SUBCUTANEOUS at 14:30

## 2023-06-22 RX ADMIN — HEPARIN SODIUM 5000 UNITS: 5000 INJECTION INTRAVENOUS; SUBCUTANEOUS at 06:35

## 2023-06-22 RX ADMIN — HYDRALAZINE HYDROCHLORIDE 100 MG: 50 TABLET, FILM COATED ORAL at 09:30

## 2023-06-22 RX ADMIN — OXYCODONE HYDROCHLORIDE AND ACETAMINOPHEN 1000 MG: 500 TABLET ORAL at 09:30

## 2023-06-22 ASSESSMENT — PAIN DESCRIPTION - ORIENTATION
ORIENTATION: LEFT;POSTERIOR
ORIENTATION: LOWER

## 2023-06-22 ASSESSMENT — PAIN DESCRIPTION - LOCATION
LOCATION: BACK
LOCATION: BACK
LOCATION: BACK;LEG

## 2023-06-22 ASSESSMENT — PAIN SCALES - GENERAL
PAINLEVEL_OUTOF10: 9
PAINLEVEL_OUTOF10: 0
PAINLEVEL_OUTOF10: 3
PAINLEVEL_OUTOF10: 0
PAINLEVEL_OUTOF10: 2
PAINLEVEL_OUTOF10: 0

## 2023-06-22 ASSESSMENT — PAIN DESCRIPTION - DESCRIPTORS
DESCRIPTORS: ACHING
DESCRIPTORS: ACHING

## 2023-06-22 ASSESSMENT — PAIN - FUNCTIONAL ASSESSMENT: PAIN_FUNCTIONAL_ASSESSMENT: PREVENTS OR INTERFERES SOME ACTIVE ACTIVITIES AND ADLS

## 2023-06-23 ENCOUNTER — TELEPHONE (OUTPATIENT)
Age: 83
End: 2023-06-23

## 2023-06-23 LAB
ALBUMIN SERPL-MCNC: 2.7 G/DL (ref 3.5–5)
ALBUMIN/GLOB SERPL: 0.7 (ref 1.1–2.2)
ALP SERPL-CCNC: 69 U/L (ref 45–117)
ALT SERPL-CCNC: 18 U/L (ref 12–78)
ANION GAP SERPL CALC-SCNC: 6 MMOL/L (ref 5–15)
AST SERPL-CCNC: 23 U/L (ref 15–37)
BILIRUB SERPL-MCNC: 0.2 MG/DL (ref 0.2–1)
BUN SERPL-MCNC: 37 MG/DL (ref 6–20)
BUN/CREAT SERPL: 20 (ref 12–20)
CALCIUM SERPL-MCNC: 9.4 MG/DL (ref 8.5–10.1)
CHLORIDE SERPL-SCNC: 108 MMOL/L (ref 97–108)
CO2 SERPL-SCNC: 25 MMOL/L (ref 21–32)
CREAT SERPL-MCNC: 1.81 MG/DL (ref 0.55–1.02)
ERYTHROCYTE [DISTWIDTH] IN BLOOD BY AUTOMATED COUNT: 13.7 % (ref 11.5–14.5)
GLOBULIN SER CALC-MCNC: 4.1 G/DL (ref 2–4)
GLUCOSE BLD STRIP.AUTO-MCNC: 138 MG/DL (ref 65–117)
GLUCOSE BLD STRIP.AUTO-MCNC: 169 MG/DL (ref 65–117)
GLUCOSE BLD STRIP.AUTO-MCNC: 174 MG/DL (ref 65–117)
GLUCOSE BLD STRIP.AUTO-MCNC: 96 MG/DL (ref 65–117)
GLUCOSE SERPL-MCNC: 93 MG/DL (ref 65–100)
HCT VFR BLD AUTO: 33.6 % (ref 35–47)
HGB BLD-MCNC: 10.9 G/DL (ref 11.5–16)
MAGNESIUM SERPL-MCNC: 2.2 MG/DL (ref 1.6–2.4)
MCH RBC QN AUTO: 29.9 PG (ref 26–34)
MCHC RBC AUTO-ENTMCNC: 32.4 G/DL (ref 30–36.5)
MCV RBC AUTO: 92.3 FL (ref 80–99)
NRBC # BLD: 0 K/UL (ref 0–0.01)
NRBC BLD-RTO: 0 PER 100 WBC
PHOSPHATE SERPL-MCNC: 3.8 MG/DL (ref 2.6–4.7)
PLATELET # BLD AUTO: 308 K/UL (ref 150–400)
PMV BLD AUTO: 10.5 FL (ref 8.9–12.9)
POTASSIUM SERPL-SCNC: 4 MMOL/L (ref 3.5–5.1)
PROT SERPL-MCNC: 6.8 G/DL (ref 6.4–8.2)
RBC # BLD AUTO: 3.64 M/UL (ref 3.8–5.2)
SERVICE CMNT-IMP: ABNORMAL
SERVICE CMNT-IMP: NORMAL
SODIUM SERPL-SCNC: 139 MMOL/L (ref 136–145)
WBC # BLD AUTO: 10.5 K/UL (ref 3.6–11)

## 2023-06-23 PROCEDURE — 6360000002 HC RX W HCPCS: Performed by: PHYSICIAN ASSISTANT

## 2023-06-23 PROCEDURE — 84100 ASSAY OF PHOSPHORUS: CPT

## 2023-06-23 PROCEDURE — 6370000000 HC RX 637 (ALT 250 FOR IP): Performed by: NURSE PRACTITIONER

## 2023-06-23 PROCEDURE — 5A09557 ASSISTANCE WITH RESPIRATORY VENTILATION, GREATER THAN 96 CONSECUTIVE HOURS, CONTINUOUS POSITIVE AIRWAY PRESSURE: ICD-10-PCS | Performed by: HOSPITALIST

## 2023-06-23 PROCEDURE — 2060000000 HC ICU INTERMEDIATE R&B

## 2023-06-23 PROCEDURE — 82962 GLUCOSE BLOOD TEST: CPT

## 2023-06-23 PROCEDURE — 83735 ASSAY OF MAGNESIUM: CPT

## 2023-06-23 PROCEDURE — 80053 COMPREHEN METABOLIC PANEL: CPT

## 2023-06-23 PROCEDURE — 6370000000 HC RX 637 (ALT 250 FOR IP): Performed by: FAMILY MEDICINE

## 2023-06-23 PROCEDURE — 6360000002 HC RX W HCPCS: Performed by: FAMILY MEDICINE

## 2023-06-23 PROCEDURE — 94640 AIRWAY INHALATION TREATMENT: CPT

## 2023-06-23 PROCEDURE — 94660 CPAP INITIATION&MGMT: CPT

## 2023-06-23 PROCEDURE — 36415 COLL VENOUS BLD VENIPUNCTURE: CPT

## 2023-06-23 PROCEDURE — 6360000002 HC RX W HCPCS: Performed by: HOSPITALIST

## 2023-06-23 PROCEDURE — 2580000003 HC RX 258: Performed by: FAMILY MEDICINE

## 2023-06-23 PROCEDURE — 6370000000 HC RX 637 (ALT 250 FOR IP): Performed by: HOSPITALIST

## 2023-06-23 PROCEDURE — 85027 COMPLETE CBC AUTOMATED: CPT

## 2023-06-23 PROCEDURE — 6360000002 HC RX W HCPCS: Performed by: EMERGENCY MEDICINE

## 2023-06-23 PROCEDURE — 6370000000 HC RX 637 (ALT 250 FOR IP): Performed by: INTERNAL MEDICINE

## 2023-06-23 PROCEDURE — 2700000000 HC OXYGEN THERAPY PER DAY

## 2023-06-23 RX ORDER — SENNA AND DOCUSATE SODIUM 50; 8.6 MG/1; MG/1
2 TABLET, FILM COATED ORAL
Status: DISCONTINUED | OUTPATIENT
Start: 2023-06-23 | End: 2023-06-27 | Stop reason: HOSPADM

## 2023-06-23 RX ORDER — CYCLOBENZAPRINE HCL 10 MG
5 TABLET ORAL 3 TIMES DAILY PRN
Status: DISCONTINUED | OUTPATIENT
Start: 2023-06-23 | End: 2023-06-27 | Stop reason: HOSPADM

## 2023-06-23 RX ORDER — HYDRALAZINE HYDROCHLORIDE 50 MG/1
50 TABLET, FILM COATED ORAL 3 TIMES DAILY
Status: DISCONTINUED | OUTPATIENT
Start: 2023-06-23 | End: 2023-06-27

## 2023-06-23 RX ORDER — OXYCODONE HYDROCHLORIDE AND ACETAMINOPHEN 5; 325 MG/1; MG/1
1 TABLET ORAL EVERY 4 HOURS PRN
Status: DISCONTINUED | OUTPATIENT
Start: 2023-06-23 | End: 2023-06-27 | Stop reason: HOSPADM

## 2023-06-23 RX ADMIN — BUDESONIDE 500 MCG: 0.5 INHALANT RESPIRATORY (INHALATION) at 09:09

## 2023-06-23 RX ADMIN — HEPARIN SODIUM 5000 UNITS: 5000 INJECTION INTRAVENOUS; SUBCUTANEOUS at 23:27

## 2023-06-23 RX ADMIN — HEPARIN SODIUM 5000 UNITS: 5000 INJECTION INTRAVENOUS; SUBCUTANEOUS at 14:12

## 2023-06-23 RX ADMIN — FUROSEMIDE 40 MG: 10 INJECTION, SOLUTION INTRAMUSCULAR; INTRAVENOUS at 09:50

## 2023-06-23 RX ADMIN — EMPAGLIFLOZIN 10 MG: 10 TABLET, FILM COATED ORAL at 14:12

## 2023-06-23 RX ADMIN — BUDESONIDE 500 MCG: 0.5 INHALANT RESPIRATORY (INHALATION) at 20:28

## 2023-06-23 RX ADMIN — CARVEDILOL 25 MG: 12.5 TABLET, FILM COATED ORAL at 09:50

## 2023-06-23 RX ADMIN — OXYCODONE HYDROCHLORIDE AND ACETAMINOPHEN 1 TABLET: 5; 325 TABLET ORAL at 15:54

## 2023-06-23 RX ADMIN — LEVOFLOXACIN 250 MG: 500 TABLET, FILM COATED ORAL at 09:49

## 2023-06-23 RX ADMIN — COLESTIPOL HYDROCHLORIDE 1 G: 1 TABLET, FILM COATED ORAL at 09:50

## 2023-06-23 RX ADMIN — HEPARIN SODIUM 5000 UNITS: 5000 INJECTION INTRAVENOUS; SUBCUTANEOUS at 06:19

## 2023-06-23 RX ADMIN — VENLAFAXINE 37.5 MG: 37.5 TABLET ORAL at 09:50

## 2023-06-23 RX ADMIN — ACETAMINOPHEN 650 MG: 325 TABLET ORAL at 12:20

## 2023-06-23 RX ADMIN — Medication 10 ML: at 21:18

## 2023-06-23 RX ADMIN — Medication 10 ML: at 09:51

## 2023-06-23 RX ADMIN — ASPIRIN 81 MG: 81 TABLET, COATED ORAL at 09:50

## 2023-06-23 RX ADMIN — HYDRALAZINE HYDROCHLORIDE 50 MG: 50 TABLET, FILM COATED ORAL at 21:13

## 2023-06-23 RX ADMIN — CARVEDILOL 25 MG: 12.5 TABLET, FILM COATED ORAL at 16:50

## 2023-06-23 RX ADMIN — ISOSORBIDE DINITRATE 20 MG: 20 TABLET ORAL at 09:49

## 2023-06-23 RX ADMIN — COLESTIPOL HYDROCHLORIDE 1 G: 1 TABLET, FILM COATED ORAL at 21:12

## 2023-06-23 RX ADMIN — ARFORMOTEROL TARTRATE 15 MCG: 15 SOLUTION RESPIRATORY (INHALATION) at 20:28

## 2023-06-23 RX ADMIN — FUROSEMIDE 40 MG: 10 INJECTION, SOLUTION INTRAMUSCULAR; INTRAVENOUS at 16:51

## 2023-06-23 RX ADMIN — ARFORMOTEROL TARTRATE 15 MCG: 15 SOLUTION RESPIRATORY (INHALATION) at 09:09

## 2023-06-23 RX ADMIN — FERROUS SULFATE TAB 325 MG (65 MG ELEMENTAL FE) 325 MG: 325 (65 FE) TAB at 09:50

## 2023-06-23 RX ADMIN — SENNOSIDES AND DOCUSATE SODIUM 2 TABLET: 50; 8.6 TABLET ORAL at 21:14

## 2023-06-23 RX ADMIN — ACETAMINOPHEN 650 MG: 325 TABLET ORAL at 00:15

## 2023-06-23 RX ADMIN — OXYCODONE HYDROCHLORIDE AND ACETAMINOPHEN 1 TABLET: 5; 325 TABLET ORAL at 21:13

## 2023-06-23 RX ADMIN — HYDRALAZINE HYDROCHLORIDE 100 MG: 50 TABLET, FILM COATED ORAL at 09:49

## 2023-06-23 RX ADMIN — ATORVASTATIN CALCIUM 20 MG: 20 TABLET, FILM COATED ORAL at 21:14

## 2023-06-23 RX ADMIN — ISOSORBIDE DINITRATE 20 MG: 20 TABLET ORAL at 16:50

## 2023-06-23 RX ADMIN — CYCLOBENZAPRINE 5 MG: 10 TABLET, FILM COATED ORAL at 17:08

## 2023-06-23 RX ADMIN — OXYCODONE HYDROCHLORIDE AND ACETAMINOPHEN 1000 MG: 500 TABLET ORAL at 09:49

## 2023-06-23 RX ADMIN — VENLAFAXINE 37.5 MG: 37.5 TABLET ORAL at 16:50

## 2023-06-23 RX ADMIN — FENTANYL CITRATE 25 MCG: 50 INJECTION, SOLUTION INTRAMUSCULAR; INTRAVENOUS at 14:20

## 2023-06-23 RX ADMIN — INSULIN GLARGINE 55 UNITS: 100 INJECTION, SOLUTION SUBCUTANEOUS at 09:50

## 2023-06-23 RX ADMIN — ISOSORBIDE DINITRATE 20 MG: 20 TABLET ORAL at 14:12

## 2023-06-23 ASSESSMENT — PAIN DESCRIPTION - LOCATION
LOCATION: BACK

## 2023-06-23 ASSESSMENT — PAIN DESCRIPTION - ORIENTATION
ORIENTATION: POSTERIOR
ORIENTATION: LEFT
ORIENTATION: LOWER;RIGHT
ORIENTATION: POSTERIOR
ORIENTATION: LOWER
ORIENTATION: RIGHT;LOWER

## 2023-06-23 ASSESSMENT — PAIN SCALES - GENERAL
PAINLEVEL_OUTOF10: 0
PAINLEVEL_OUTOF10: 0
PAINLEVEL_OUTOF10: 5
PAINLEVEL_OUTOF10: 10
PAINLEVEL_OUTOF10: 9
PAINLEVEL_OUTOF10: 4
PAINLEVEL_OUTOF10: 8
PAINLEVEL_OUTOF10: 8
PAINLEVEL_OUTOF10: 0

## 2023-06-23 ASSESSMENT — PAIN DESCRIPTION - PAIN TYPE: TYPE: CHRONIC PAIN

## 2023-06-23 ASSESSMENT — PAIN DESCRIPTION - DESCRIPTORS
DESCRIPTORS: ACHING
DESCRIPTORS: ACHING
DESCRIPTORS: OTHER (COMMENT)

## 2023-06-23 ASSESSMENT — PAIN - FUNCTIONAL ASSESSMENT: PAIN_FUNCTIONAL_ASSESSMENT: ACTIVITIES ARE NOT PREVENTED

## 2023-06-24 LAB
ALBUMIN SERPL-MCNC: 2.5 G/DL (ref 3.5–5)
ALBUMIN/GLOB SERPL: 0.6 (ref 1.1–2.2)
ALP SERPL-CCNC: 61 U/L (ref 45–117)
ALT SERPL-CCNC: 20 U/L (ref 12–78)
ANION GAP SERPL CALC-SCNC: 6 MMOL/L (ref 5–15)
AST SERPL-CCNC: 18 U/L (ref 15–37)
BILIRUB SERPL-MCNC: 0.2 MG/DL (ref 0.2–1)
BUN SERPL-MCNC: 49 MG/DL (ref 6–20)
BUN/CREAT SERPL: 24 (ref 12–20)
CALCIUM SERPL-MCNC: 9.3 MG/DL (ref 8.5–10.1)
CHLORIDE SERPL-SCNC: 107 MMOL/L (ref 97–108)
CO2 SERPL-SCNC: 24 MMOL/L (ref 21–32)
CREAT SERPL-MCNC: 2.03 MG/DL (ref 0.55–1.02)
ERYTHROCYTE [DISTWIDTH] IN BLOOD BY AUTOMATED COUNT: 14.1 % (ref 11.5–14.5)
GLOBULIN SER CALC-MCNC: 4.2 G/DL (ref 2–4)
GLUCOSE BLD STRIP.AUTO-MCNC: 122 MG/DL (ref 65–117)
GLUCOSE BLD STRIP.AUTO-MCNC: 128 MG/DL (ref 65–117)
GLUCOSE BLD STRIP.AUTO-MCNC: 133 MG/DL (ref 65–117)
GLUCOSE BLD STRIP.AUTO-MCNC: 96 MG/DL (ref 65–117)
GLUCOSE SERPL-MCNC: 101 MG/DL (ref 65–100)
HCT VFR BLD AUTO: 32.5 % (ref 35–47)
HGB BLD-MCNC: 10.4 G/DL (ref 11.5–16)
MAGNESIUM SERPL-MCNC: 2.3 MG/DL (ref 1.6–2.4)
MCH RBC QN AUTO: 29.5 PG (ref 26–34)
MCHC RBC AUTO-ENTMCNC: 32 G/DL (ref 30–36.5)
MCV RBC AUTO: 92.3 FL (ref 80–99)
NRBC # BLD: 0 K/UL (ref 0–0.01)
NRBC BLD-RTO: 0 PER 100 WBC
PLATELET # BLD AUTO: 304 K/UL (ref 150–400)
PMV BLD AUTO: 10.2 FL (ref 8.9–12.9)
POTASSIUM SERPL-SCNC: 4.2 MMOL/L (ref 3.5–5.1)
PROT SERPL-MCNC: 6.7 G/DL (ref 6.4–8.2)
RBC # BLD AUTO: 3.52 M/UL (ref 3.8–5.2)
SERVICE CMNT-IMP: ABNORMAL
SERVICE CMNT-IMP: NORMAL
SODIUM SERPL-SCNC: 137 MMOL/L (ref 136–145)
WBC # BLD AUTO: 10.2 K/UL (ref 3.6–11)

## 2023-06-24 PROCEDURE — 2060000000 HC ICU INTERMEDIATE R&B

## 2023-06-24 PROCEDURE — 83735 ASSAY OF MAGNESIUM: CPT

## 2023-06-24 PROCEDURE — 36415 COLL VENOUS BLD VENIPUNCTURE: CPT

## 2023-06-24 PROCEDURE — 6370000000 HC RX 637 (ALT 250 FOR IP): Performed by: SPECIALIST

## 2023-06-24 PROCEDURE — 2700000000 HC OXYGEN THERAPY PER DAY

## 2023-06-24 PROCEDURE — 94640 AIRWAY INHALATION TREATMENT: CPT

## 2023-06-24 PROCEDURE — 82962 GLUCOSE BLOOD TEST: CPT

## 2023-06-24 PROCEDURE — 6360000002 HC RX W HCPCS: Performed by: FAMILY MEDICINE

## 2023-06-24 PROCEDURE — 85027 COMPLETE CBC AUTOMATED: CPT

## 2023-06-24 PROCEDURE — 99233 SBSQ HOSP IP/OBS HIGH 50: CPT | Performed by: SPECIALIST

## 2023-06-24 PROCEDURE — 97116 GAIT TRAINING THERAPY: CPT

## 2023-06-24 PROCEDURE — 6370000000 HC RX 637 (ALT 250 FOR IP): Performed by: INTERNAL MEDICINE

## 2023-06-24 PROCEDURE — 6370000000 HC RX 637 (ALT 250 FOR IP): Performed by: HOSPITALIST

## 2023-06-24 PROCEDURE — 6360000002 HC RX W HCPCS: Performed by: HOSPITALIST

## 2023-06-24 PROCEDURE — 80053 COMPREHEN METABOLIC PANEL: CPT

## 2023-06-24 PROCEDURE — 6370000000 HC RX 637 (ALT 250 FOR IP): Performed by: NURSE PRACTITIONER

## 2023-06-24 PROCEDURE — 97161 PT EVAL LOW COMPLEX 20 MIN: CPT

## 2023-06-24 PROCEDURE — 6360000002 HC RX W HCPCS: Performed by: PHYSICIAN ASSISTANT

## 2023-06-24 PROCEDURE — 6370000000 HC RX 637 (ALT 250 FOR IP): Performed by: FAMILY MEDICINE

## 2023-06-24 PROCEDURE — 2580000003 HC RX 258: Performed by: FAMILY MEDICINE

## 2023-06-24 PROCEDURE — 94660 CPAP INITIATION&MGMT: CPT

## 2023-06-24 RX ORDER — FUROSEMIDE 40 MG/1
40 TABLET ORAL 2 TIMES DAILY
Status: DISCONTINUED | OUTPATIENT
Start: 2023-06-24 | End: 2023-06-26

## 2023-06-24 RX ADMIN — HYDRALAZINE HYDROCHLORIDE 50 MG: 50 TABLET, FILM COATED ORAL at 10:01

## 2023-06-24 RX ADMIN — CARVEDILOL 25 MG: 12.5 TABLET, FILM COATED ORAL at 10:04

## 2023-06-24 RX ADMIN — ARFORMOTEROL TARTRATE 15 MCG: 15 SOLUTION RESPIRATORY (INHALATION) at 08:12

## 2023-06-24 RX ADMIN — HYDRALAZINE HYDROCHLORIDE 50 MG: 50 TABLET, FILM COATED ORAL at 21:31

## 2023-06-24 RX ADMIN — BUDESONIDE 500 MCG: 0.5 INHALANT RESPIRATORY (INHALATION) at 08:12

## 2023-06-24 RX ADMIN — BUDESONIDE 500 MCG: 0.5 INHALANT RESPIRATORY (INHALATION) at 19:19

## 2023-06-24 RX ADMIN — VENLAFAXINE 37.5 MG: 37.5 TABLET ORAL at 17:29

## 2023-06-24 RX ADMIN — ISOSORBIDE DINITRATE 20 MG: 20 TABLET ORAL at 10:03

## 2023-06-24 RX ADMIN — FUROSEMIDE 40 MG: 40 TABLET ORAL at 12:54

## 2023-06-24 RX ADMIN — ISOSORBIDE DINITRATE 20 MG: 20 TABLET ORAL at 17:29

## 2023-06-24 RX ADMIN — INSULIN GLARGINE 55 UNITS: 100 INJECTION, SOLUTION SUBCUTANEOUS at 09:58

## 2023-06-24 RX ADMIN — HEPARIN SODIUM 5000 UNITS: 5000 INJECTION INTRAVENOUS; SUBCUTANEOUS at 15:11

## 2023-06-24 RX ADMIN — HYDRALAZINE HYDROCHLORIDE 50 MG: 50 TABLET, FILM COATED ORAL at 15:14

## 2023-06-24 RX ADMIN — Medication 10 ML: at 10:05

## 2023-06-24 RX ADMIN — HEPARIN SODIUM 5000 UNITS: 5000 INJECTION INTRAVENOUS; SUBCUTANEOUS at 06:23

## 2023-06-24 RX ADMIN — EMPAGLIFLOZIN 10 MG: 10 TABLET, FILM COATED ORAL at 10:00

## 2023-06-24 RX ADMIN — Medication 10 ML: at 21:44

## 2023-06-24 RX ADMIN — HEPARIN SODIUM 5000 UNITS: 5000 INJECTION INTRAVENOUS; SUBCUTANEOUS at 21:31

## 2023-06-24 RX ADMIN — COLESTIPOL HYDROCHLORIDE 1 G: 1 TABLET, FILM COATED ORAL at 21:37

## 2023-06-24 RX ADMIN — ISOSORBIDE DINITRATE 20 MG: 20 TABLET ORAL at 12:54

## 2023-06-24 RX ADMIN — DULOXETINE 30 MG: 30 CAPSULE, DELAYED RELEASE ORAL at 10:02

## 2023-06-24 RX ADMIN — ACETAMINOPHEN 650 MG: 325 TABLET ORAL at 19:24

## 2023-06-24 RX ADMIN — COLESTIPOL HYDROCHLORIDE 1 G: 1 TABLET, FILM COATED ORAL at 10:02

## 2023-06-24 RX ADMIN — FERROUS SULFATE TAB 325 MG (65 MG ELEMENTAL FE) 325 MG: 325 (65 FE) TAB at 10:03

## 2023-06-24 RX ADMIN — SENNOSIDES AND DOCUSATE SODIUM 2 TABLET: 50; 8.6 TABLET ORAL at 21:31

## 2023-06-24 RX ADMIN — ATORVASTATIN CALCIUM 20 MG: 20 TABLET, FILM COATED ORAL at 21:31

## 2023-06-24 RX ADMIN — CARVEDILOL 25 MG: 12.5 TABLET, FILM COATED ORAL at 17:29

## 2023-06-24 RX ADMIN — ASPIRIN 81 MG: 81 TABLET, COATED ORAL at 10:00

## 2023-06-24 RX ADMIN — OXYCODONE HYDROCHLORIDE AND ACETAMINOPHEN 1000 MG: 500 TABLET ORAL at 10:02

## 2023-06-24 RX ADMIN — VENLAFAXINE 37.5 MG: 37.5 TABLET ORAL at 10:01

## 2023-06-24 RX ADMIN — FUROSEMIDE 40 MG: 40 TABLET ORAL at 17:30

## 2023-06-24 RX ADMIN — OXYCODONE HYDROCHLORIDE AND ACETAMINOPHEN 1 TABLET: 5; 325 TABLET ORAL at 06:23

## 2023-06-24 RX ADMIN — OXYCODONE HYDROCHLORIDE AND ACETAMINOPHEN 1 TABLET: 5; 325 TABLET ORAL at 21:41

## 2023-06-24 RX ADMIN — ARFORMOTEROL TARTRATE 15 MCG: 15 SOLUTION RESPIRATORY (INHALATION) at 19:19

## 2023-06-24 RX ADMIN — FUROSEMIDE 40 MG: 10 INJECTION, SOLUTION INTRAMUSCULAR; INTRAVENOUS at 09:59

## 2023-06-24 ASSESSMENT — PAIN DESCRIPTION - LOCATION
LOCATION: BACK
LOCATION: BACK;TEETH
LOCATION: BACK;TEETH

## 2023-06-24 ASSESSMENT — PAIN SCALES - GENERAL
PAINLEVEL_OUTOF10: 7
PAINLEVEL_OUTOF10: 1
PAINLEVEL_OUTOF10: 8

## 2023-06-24 ASSESSMENT — PAIN DESCRIPTION - ORIENTATION
ORIENTATION: LOWER
ORIENTATION: RIGHT;LOWER

## 2023-06-25 LAB
ANION GAP SERPL CALC-SCNC: 6 MMOL/L (ref 5–15)
BUN SERPL-MCNC: 61 MG/DL (ref 6–20)
BUN/CREAT SERPL: 24 (ref 12–20)
CALCIUM SERPL-MCNC: 9.6 MG/DL (ref 8.5–10.1)
CHLORIDE SERPL-SCNC: 106 MMOL/L (ref 97–108)
CO2 SERPL-SCNC: 24 MMOL/L (ref 21–32)
CREAT SERPL-MCNC: 2.51 MG/DL (ref 0.55–1.02)
GLUCOSE BLD STRIP.AUTO-MCNC: 134 MG/DL (ref 65–117)
GLUCOSE BLD STRIP.AUTO-MCNC: 163 MG/DL (ref 65–117)
GLUCOSE BLD STRIP.AUTO-MCNC: 80 MG/DL (ref 65–117)
GLUCOSE BLD STRIP.AUTO-MCNC: 85 MG/DL (ref 65–117)
GLUCOSE SERPL-MCNC: 62 MG/DL (ref 65–100)
POTASSIUM SERPL-SCNC: 4.2 MMOL/L (ref 3.5–5.1)
SERVICE CMNT-IMP: ABNORMAL
SERVICE CMNT-IMP: ABNORMAL
SERVICE CMNT-IMP: NORMAL
SERVICE CMNT-IMP: NORMAL
SODIUM SERPL-SCNC: 136 MMOL/L (ref 136–145)

## 2023-06-25 PROCEDURE — 97165 OT EVAL LOW COMPLEX 30 MIN: CPT

## 2023-06-25 PROCEDURE — 2700000000 HC OXYGEN THERAPY PER DAY

## 2023-06-25 PROCEDURE — 94660 CPAP INITIATION&MGMT: CPT

## 2023-06-25 PROCEDURE — 6370000000 HC RX 637 (ALT 250 FOR IP): Performed by: NURSE PRACTITIONER

## 2023-06-25 PROCEDURE — 82962 GLUCOSE BLOOD TEST: CPT

## 2023-06-25 PROCEDURE — 36415 COLL VENOUS BLD VENIPUNCTURE: CPT

## 2023-06-25 PROCEDURE — 6360000002 HC RX W HCPCS: Performed by: FAMILY MEDICINE

## 2023-06-25 PROCEDURE — 99233 SBSQ HOSP IP/OBS HIGH 50: CPT | Performed by: SPECIALIST

## 2023-06-25 PROCEDURE — 94640 AIRWAY INHALATION TREATMENT: CPT

## 2023-06-25 PROCEDURE — 6370000000 HC RX 637 (ALT 250 FOR IP): Performed by: INTERNAL MEDICINE

## 2023-06-25 PROCEDURE — 2580000003 HC RX 258: Performed by: FAMILY MEDICINE

## 2023-06-25 PROCEDURE — 6370000000 HC RX 637 (ALT 250 FOR IP): Performed by: FAMILY MEDICINE

## 2023-06-25 PROCEDURE — 80048 BASIC METABOLIC PNL TOTAL CA: CPT

## 2023-06-25 PROCEDURE — 97535 SELF CARE MNGMENT TRAINING: CPT

## 2023-06-25 PROCEDURE — 6360000002 HC RX W HCPCS: Performed by: PHYSICIAN ASSISTANT

## 2023-06-25 PROCEDURE — 2060000000 HC ICU INTERMEDIATE R&B

## 2023-06-25 PROCEDURE — 6370000000 HC RX 637 (ALT 250 FOR IP): Performed by: HOSPITALIST

## 2023-06-25 RX ADMIN — BUDESONIDE 500 MCG: 0.5 INHALANT RESPIRATORY (INHALATION) at 20:21

## 2023-06-25 RX ADMIN — FERROUS SULFATE TAB 325 MG (65 MG ELEMENTAL FE) 325 MG: 325 (65 FE) TAB at 09:13

## 2023-06-25 RX ADMIN — COLESTIPOL HYDROCHLORIDE 1 G: 1 TABLET, FILM COATED ORAL at 21:04

## 2023-06-25 RX ADMIN — HYDRALAZINE HYDROCHLORIDE 50 MG: 50 TABLET, FILM COATED ORAL at 09:13

## 2023-06-25 RX ADMIN — HEPARIN SODIUM 5000 UNITS: 5000 INJECTION INTRAVENOUS; SUBCUTANEOUS at 17:46

## 2023-06-25 RX ADMIN — HYDRALAZINE HYDROCHLORIDE 50 MG: 50 TABLET, FILM COATED ORAL at 14:58

## 2023-06-25 RX ADMIN — VENLAFAXINE 37.5 MG: 37.5 TABLET ORAL at 17:46

## 2023-06-25 RX ADMIN — BUDESONIDE 500 MCG: 0.5 INHALANT RESPIRATORY (INHALATION) at 08:00

## 2023-06-25 RX ADMIN — ISOSORBIDE DINITRATE 20 MG: 20 TABLET ORAL at 17:45

## 2023-06-25 RX ADMIN — OXYCODONE HYDROCHLORIDE AND ACETAMINOPHEN 1000 MG: 500 TABLET ORAL at 09:12

## 2023-06-25 RX ADMIN — Medication 10 ML: at 09:15

## 2023-06-25 RX ADMIN — EMPAGLIFLOZIN 10 MG: 10 TABLET, FILM COATED ORAL at 09:12

## 2023-06-25 RX ADMIN — ASPIRIN 81 MG: 81 TABLET, COATED ORAL at 09:11

## 2023-06-25 RX ADMIN — OXYCODONE HYDROCHLORIDE AND ACETAMINOPHEN 1 TABLET: 5; 325 TABLET ORAL at 03:58

## 2023-06-25 RX ADMIN — CARVEDILOL 25 MG: 12.5 TABLET, FILM COATED ORAL at 09:11

## 2023-06-25 RX ADMIN — VENLAFAXINE 37.5 MG: 37.5 TABLET ORAL at 09:12

## 2023-06-25 RX ADMIN — ARFORMOTEROL TARTRATE 15 MCG: 15 SOLUTION RESPIRATORY (INHALATION) at 08:00

## 2023-06-25 RX ADMIN — SENNOSIDES AND DOCUSATE SODIUM 2 TABLET: 50; 8.6 TABLET ORAL at 21:04

## 2023-06-25 RX ADMIN — DULOXETINE 30 MG: 30 CAPSULE, DELAYED RELEASE ORAL at 09:12

## 2023-06-25 RX ADMIN — COLESTIPOL HYDROCHLORIDE 1 G: 1 TABLET, FILM COATED ORAL at 09:12

## 2023-06-25 RX ADMIN — ARFORMOTEROL TARTRATE 15 MCG: 15 SOLUTION RESPIRATORY (INHALATION) at 20:21

## 2023-06-25 RX ADMIN — INSULIN GLARGINE 55 UNITS: 100 INJECTION, SOLUTION SUBCUTANEOUS at 09:10

## 2023-06-25 RX ADMIN — CARVEDILOL 25 MG: 12.5 TABLET, FILM COATED ORAL at 17:45

## 2023-06-25 RX ADMIN — ACETAMINOPHEN 650 MG: 325 TABLET ORAL at 17:46

## 2023-06-25 RX ADMIN — Medication 10 ML: at 21:05

## 2023-06-25 RX ADMIN — ACETAMINOPHEN 650 MG: 325 TABLET ORAL at 11:44

## 2023-06-25 RX ADMIN — ATORVASTATIN CALCIUM 20 MG: 20 TABLET, FILM COATED ORAL at 21:04

## 2023-06-25 RX ADMIN — HEPARIN SODIUM 5000 UNITS: 5000 INJECTION INTRAVENOUS; SUBCUTANEOUS at 09:08

## 2023-06-25 RX ADMIN — ISOSORBIDE DINITRATE 20 MG: 20 TABLET ORAL at 09:13

## 2023-06-25 RX ADMIN — ISOSORBIDE DINITRATE 20 MG: 20 TABLET ORAL at 13:05

## 2023-06-25 RX ADMIN — HYDRALAZINE HYDROCHLORIDE 50 MG: 50 TABLET, FILM COATED ORAL at 23:40

## 2023-06-25 ASSESSMENT — PAIN SCALES - GENERAL
PAINLEVEL_OUTOF10: 8
PAINLEVEL_OUTOF10: 0
PAINLEVEL_OUTOF10: 3
PAINLEVEL_OUTOF10: 5
PAINLEVEL_OUTOF10: 4
PAINLEVEL_OUTOF10: 5

## 2023-06-25 ASSESSMENT — PAIN DESCRIPTION - LOCATION
LOCATION: BACK

## 2023-06-25 ASSESSMENT — PAIN DESCRIPTION - ORIENTATION
ORIENTATION: RIGHT;LOWER
ORIENTATION: LOWER

## 2023-06-25 ASSESSMENT — PAIN DESCRIPTION - DESCRIPTORS
DESCRIPTORS: OTHER (COMMENT)
DESCRIPTORS: ACHING

## 2023-06-26 ENCOUNTER — APPOINTMENT (OUTPATIENT)
Facility: HOSPITAL | Age: 83
DRG: 291 | End: 2023-06-26
Payer: MEDICARE

## 2023-06-26 PROBLEM — I10 ESSENTIAL HYPERTENSION: Status: ACTIVE | Noted: 2023-06-26

## 2023-06-26 LAB
ANION GAP SERPL CALC-SCNC: 6 MMOL/L (ref 5–15)
BACTERIA SPEC CULT: NORMAL
BACTERIA SPEC CULT: NORMAL
BUN SERPL-MCNC: 58 MG/DL (ref 6–20)
BUN/CREAT SERPL: 26 (ref 12–20)
CALCIUM SERPL-MCNC: 9.2 MG/DL (ref 8.5–10.1)
CHLORIDE SERPL-SCNC: 108 MMOL/L (ref 97–108)
CO2 SERPL-SCNC: 23 MMOL/L (ref 21–32)
CREAT SERPL-MCNC: 2.23 MG/DL (ref 0.55–1.02)
GLUCOSE BLD STRIP.AUTO-MCNC: 100 MG/DL (ref 65–117)
GLUCOSE BLD STRIP.AUTO-MCNC: 127 MG/DL (ref 65–117)
GLUCOSE BLD STRIP.AUTO-MCNC: 208 MG/DL (ref 65–117)
GLUCOSE BLD STRIP.AUTO-MCNC: 78 MG/DL (ref 65–117)
GLUCOSE SERPL-MCNC: 54 MG/DL (ref 65–100)
LDH SERPL L TO P-CCNC: 366 U/L (ref 81–246)
MAGNESIUM SERPL-MCNC: 2.3 MG/DL (ref 1.6–2.4)
NT PRO BNP: 7506 PG/ML
PHOSPHATE SERPL-MCNC: 4.5 MG/DL (ref 2.6–4.7)
POTASSIUM SERPL-SCNC: 4 MMOL/L (ref 3.5–5.1)
PROT SERPL-MCNC: 7 G/DL (ref 6.4–8.2)
SERVICE CMNT-IMP: ABNORMAL
SERVICE CMNT-IMP: ABNORMAL
SERVICE CMNT-IMP: NORMAL
SODIUM SERPL-SCNC: 137 MMOL/L (ref 136–145)

## 2023-06-26 PROCEDURE — 84157 ASSAY OF PROTEIN OTHER: CPT

## 2023-06-26 PROCEDURE — 2700000000 HC OXYGEN THERAPY PER DAY

## 2023-06-26 PROCEDURE — 89050 BODY FLUID CELL COUNT: CPT

## 2023-06-26 PROCEDURE — 83880 ASSAY OF NATRIURETIC PEPTIDE: CPT

## 2023-06-26 PROCEDURE — 6360000002 HC RX W HCPCS: Performed by: FAMILY MEDICINE

## 2023-06-26 PROCEDURE — 36415 COLL VENOUS BLD VENIPUNCTURE: CPT

## 2023-06-26 PROCEDURE — 2060000000 HC ICU INTERMEDIATE R&B

## 2023-06-26 PROCEDURE — 6360000002 HC RX W HCPCS: Performed by: PHYSICIAN ASSISTANT

## 2023-06-26 PROCEDURE — 83735 ASSAY OF MAGNESIUM: CPT

## 2023-06-26 PROCEDURE — 83986 ASSAY PH BODY FLUID NOS: CPT

## 2023-06-26 PROCEDURE — 83615 LACTATE (LD) (LDH) ENZYME: CPT

## 2023-06-26 PROCEDURE — 82962 GLUCOSE BLOOD TEST: CPT

## 2023-06-26 PROCEDURE — 97116 GAIT TRAINING THERAPY: CPT

## 2023-06-26 PROCEDURE — 80048 BASIC METABOLIC PNL TOTAL CA: CPT

## 2023-06-26 PROCEDURE — 87205 SMEAR GRAM STAIN: CPT

## 2023-06-26 PROCEDURE — 6370000000 HC RX 637 (ALT 250 FOR IP): Performed by: INTERNAL MEDICINE

## 2023-06-26 PROCEDURE — 6370000000 HC RX 637 (ALT 250 FOR IP): Performed by: NURSE PRACTITIONER

## 2023-06-26 PROCEDURE — 87015 SPECIMEN INFECT AGNT CONCNTJ: CPT

## 2023-06-26 PROCEDURE — 6370000000 HC RX 637 (ALT 250 FOR IP): Performed by: FAMILY MEDICINE

## 2023-06-26 PROCEDURE — 87070 CULTURE OTHR SPECIMN AEROBIC: CPT

## 2023-06-26 PROCEDURE — 0W993ZZ DRAINAGE OF RIGHT PLEURAL CAVITY, PERCUTANEOUS APPROACH: ICD-10-PCS | Performed by: RADIOLOGY

## 2023-06-26 PROCEDURE — 2580000003 HC RX 258: Performed by: FAMILY MEDICINE

## 2023-06-26 PROCEDURE — 6370000000 HC RX 637 (ALT 250 FOR IP): Performed by: HOSPITALIST

## 2023-06-26 PROCEDURE — 84100 ASSAY OF PHOSPHORUS: CPT

## 2023-06-26 PROCEDURE — 99233 SBSQ HOSP IP/OBS HIGH 50: CPT | Performed by: SPECIALIST

## 2023-06-26 PROCEDURE — 82945 GLUCOSE OTHER FLUID: CPT

## 2023-06-26 PROCEDURE — 94640 AIRWAY INHALATION TREATMENT: CPT

## 2023-06-26 PROCEDURE — 84155 ASSAY OF PROTEIN SERUM: CPT

## 2023-06-26 RX ORDER — INSULIN GLARGINE 100 [IU]/ML
30 INJECTION, SOLUTION SUBCUTANEOUS EVERY MORNING
Status: DISCONTINUED | OUTPATIENT
Start: 2023-06-27 | End: 2023-06-27 | Stop reason: HOSPADM

## 2023-06-26 RX ORDER — INSULIN GLARGINE 100 [IU]/ML
40 INJECTION, SOLUTION SUBCUTANEOUS EVERY MORNING
Status: DISCONTINUED | OUTPATIENT
Start: 2023-06-27 | End: 2023-06-26

## 2023-06-26 RX ORDER — FUROSEMIDE 40 MG/1
40 TABLET ORAL 2 TIMES DAILY
Status: DISCONTINUED | OUTPATIENT
Start: 2023-06-26 | End: 2023-06-27 | Stop reason: HOSPADM

## 2023-06-26 RX ADMIN — COLESTIPOL HYDROCHLORIDE 1 G: 1 TABLET, FILM COATED ORAL at 09:59

## 2023-06-26 RX ADMIN — HEPARIN SODIUM 5000 UNITS: 5000 INJECTION INTRAVENOUS; SUBCUTANEOUS at 02:42

## 2023-06-26 RX ADMIN — COLESTIPOL HYDROCHLORIDE 1 G: 1 TABLET, FILM COATED ORAL at 21:59

## 2023-06-26 RX ADMIN — Medication 10 ML: at 10:00

## 2023-06-26 RX ADMIN — ASPIRIN 81 MG: 81 TABLET, COATED ORAL at 09:59

## 2023-06-26 RX ADMIN — ARFORMOTEROL TARTRATE 15 MCG: 15 SOLUTION RESPIRATORY (INHALATION) at 19:43

## 2023-06-26 RX ADMIN — OXYCODONE HYDROCHLORIDE AND ACETAMINOPHEN 1 TABLET: 5; 325 TABLET ORAL at 22:02

## 2023-06-26 RX ADMIN — INSULIN GLARGINE 55 UNITS: 100 INJECTION, SOLUTION SUBCUTANEOUS at 09:58

## 2023-06-26 RX ADMIN — SENNOSIDES AND DOCUSATE SODIUM 2 TABLET: 50; 8.6 TABLET ORAL at 22:00

## 2023-06-26 RX ADMIN — HYDRALAZINE HYDROCHLORIDE 50 MG: 50 TABLET, FILM COATED ORAL at 09:58

## 2023-06-26 RX ADMIN — VENLAFAXINE 37.5 MG: 37.5 TABLET ORAL at 17:41

## 2023-06-26 RX ADMIN — ISOSORBIDE DINITRATE 20 MG: 20 TABLET ORAL at 17:41

## 2023-06-26 RX ADMIN — FERROUS SULFATE TAB 325 MG (65 MG ELEMENTAL FE) 325 MG: 325 (65 FE) TAB at 09:59

## 2023-06-26 RX ADMIN — VENLAFAXINE 37.5 MG: 37.5 TABLET ORAL at 09:58

## 2023-06-26 RX ADMIN — HYDRALAZINE HYDROCHLORIDE 50 MG: 50 TABLET, FILM COATED ORAL at 15:37

## 2023-06-26 RX ADMIN — ERGOCALCIFEROL 50000 UNITS: 1.25 CAPSULE ORAL at 09:58

## 2023-06-26 RX ADMIN — CARVEDILOL 25 MG: 12.5 TABLET, FILM COATED ORAL at 09:59

## 2023-06-26 RX ADMIN — CARVEDILOL 25 MG: 12.5 TABLET, FILM COATED ORAL at 17:41

## 2023-06-26 RX ADMIN — Medication 10 ML: at 22:00

## 2023-06-26 RX ADMIN — HEPARIN SODIUM 5000 UNITS: 5000 INJECTION INTRAVENOUS; SUBCUTANEOUS at 09:59

## 2023-06-26 RX ADMIN — ACETAMINOPHEN 650 MG: 325 TABLET ORAL at 10:22

## 2023-06-26 RX ADMIN — BUDESONIDE 500 MCG: 0.5 INHALANT RESPIRATORY (INHALATION) at 08:02

## 2023-06-26 RX ADMIN — ARFORMOTEROL TARTRATE 15 MCG: 15 SOLUTION RESPIRATORY (INHALATION) at 08:02

## 2023-06-26 RX ADMIN — FUROSEMIDE 40 MG: 40 TABLET ORAL at 13:22

## 2023-06-26 RX ADMIN — ACETAMINOPHEN 650 MG: 325 TABLET ORAL at 17:41

## 2023-06-26 RX ADMIN — FUROSEMIDE 40 MG: 40 TABLET ORAL at 17:41

## 2023-06-26 RX ADMIN — ISOSORBIDE DINITRATE 20 MG: 20 TABLET ORAL at 13:21

## 2023-06-26 RX ADMIN — HYDRALAZINE HYDROCHLORIDE 50 MG: 50 TABLET, FILM COATED ORAL at 21:59

## 2023-06-26 RX ADMIN — ATORVASTATIN CALCIUM 20 MG: 20 TABLET, FILM COATED ORAL at 21:59

## 2023-06-26 RX ADMIN — DULOXETINE 30 MG: 30 CAPSULE, DELAYED RELEASE ORAL at 09:59

## 2023-06-26 RX ADMIN — HEPARIN SODIUM 5000 UNITS: 5000 INJECTION INTRAVENOUS; SUBCUTANEOUS at 17:41

## 2023-06-26 RX ADMIN — OXYCODONE HYDROCHLORIDE AND ACETAMINOPHEN 1000 MG: 500 TABLET ORAL at 09:59

## 2023-06-26 RX ADMIN — BUDESONIDE 500 MCG: 0.5 INHALANT RESPIRATORY (INHALATION) at 19:43

## 2023-06-26 RX ADMIN — ISOSORBIDE DINITRATE 20 MG: 20 TABLET ORAL at 10:22

## 2023-06-26 ASSESSMENT — PAIN SCALES - GENERAL: PAINLEVEL_OUTOF10: 9

## 2023-06-27 ENCOUNTER — TELEPHONE (OUTPATIENT)
Age: 83
End: 2023-06-27

## 2023-06-27 ENCOUNTER — APPOINTMENT (OUTPATIENT)
Facility: HOSPITAL | Age: 83
DRG: 291 | End: 2023-06-27
Payer: MEDICARE

## 2023-06-27 VITALS
HEART RATE: 68 BPM | HEIGHT: 66 IN | SYSTOLIC BLOOD PRESSURE: 177 MMHG | BODY MASS INDEX: 27.21 KG/M2 | WEIGHT: 169.31 LBS | TEMPERATURE: 97.6 F | DIASTOLIC BLOOD PRESSURE: 62 MMHG | OXYGEN SATURATION: 91 % | RESPIRATION RATE: 20 BRPM

## 2023-06-27 LAB
ALBUMIN SERPL-MCNC: 2.9 G/DL (ref 3.5–5)
ALBUMIN SERPL-MCNC: 3 G/DL (ref 3.5–5)
ALBUMIN/GLOB SERPL: 0.6 (ref 1.1–2.2)
ALBUMIN/GLOB SERPL: 0.6 (ref 1.1–2.2)
ALP SERPL-CCNC: 70 U/L (ref 45–117)
ALP SERPL-CCNC: 71 U/L (ref 45–117)
ALT SERPL-CCNC: 29 U/L (ref 12–78)
ALT SERPL-CCNC: 29 U/L (ref 12–78)
ANION GAP SERPL CALC-SCNC: 6 MMOL/L (ref 5–15)
ANION GAP SERPL CALC-SCNC: 8 MMOL/L (ref 5–15)
APPEARANCE FLD: CLEAR
AST SERPL-CCNC: 23 U/L (ref 15–37)
AST SERPL-CCNC: 27 U/L (ref 15–37)
BILIRUB SERPL-MCNC: 0.2 MG/DL (ref 0.2–1)
BILIRUB SERPL-MCNC: 0.3 MG/DL (ref 0.2–1)
BODY FLD TYPE: NORMAL
BUN SERPL-MCNC: 53 MG/DL (ref 6–20)
BUN SERPL-MCNC: 55 MG/DL (ref 6–20)
BUN/CREAT SERPL: 27 (ref 12–20)
BUN/CREAT SERPL: 28 (ref 12–20)
CALCIUM SERPL-MCNC: 10 MG/DL (ref 8.5–10.1)
CALCIUM SERPL-MCNC: 9.8 MG/DL (ref 8.5–10.1)
CHLORIDE SERPL-SCNC: 108 MMOL/L (ref 97–108)
CHLORIDE SERPL-SCNC: 108 MMOL/L (ref 97–108)
CO2 SERPL-SCNC: 20 MMOL/L (ref 21–32)
CO2 SERPL-SCNC: 24 MMOL/L (ref 21–32)
COLOR FLD: YELLOW
COMMENT:: NORMAL
CREAT SERPL-MCNC: 1.89 MG/DL (ref 0.55–1.02)
CREAT SERPL-MCNC: 2.06 MG/DL (ref 0.55–1.02)
GLOBULIN SER CALC-MCNC: 4.7 G/DL (ref 2–4)
GLOBULIN SER CALC-MCNC: 4.9 G/DL (ref 2–4)
GLUCOSE BLD STRIP.AUTO-MCNC: 116 MG/DL (ref 65–117)
GLUCOSE BLD STRIP.AUTO-MCNC: 188 MG/DL (ref 65–117)
GLUCOSE FLD-MCNC: 143 MG/DL
GLUCOSE SERPL-MCNC: 113 MG/DL (ref 65–100)
GLUCOSE SERPL-MCNC: 94 MG/DL (ref 65–100)
INR PPP: 1 (ref 0.9–1.1)
LDH FLD L TO P-CCNC: 55 U/L
LYMPHOCYTES NFR FLD: 82 %
MONOS+MACROS NFR FLD: 16 %
NEUTROPHILS NFR FLD: 2 %
NUC CELL # FLD: 667 /CU MM
PH FLD: 6.8
POTASSIUM SERPL-SCNC: 4.1 MMOL/L (ref 3.5–5.1)
POTASSIUM SERPL-SCNC: 4.3 MMOL/L (ref 3.5–5.1)
PROT FLD-MCNC: 2 G/DL
PROT SERPL-MCNC: 7.7 G/DL (ref 6.4–8.2)
PROT SERPL-MCNC: 7.8 G/DL (ref 6.4–8.2)
PROTHROMBIN TIME: 10.7 SEC (ref 9–11.1)
RBC # FLD: >100 /CU MM
SERVICE CMNT-IMP: ABNORMAL
SERVICE CMNT-IMP: NORMAL
SODIUM SERPL-SCNC: 136 MMOL/L (ref 136–145)
SODIUM SERPL-SCNC: 138 MMOL/L (ref 136–145)
SPECIMEN HOLD: NORMAL
SPECIMEN SOURCE FLD: ABNORMAL
SPECIMEN SOURCE FLD: NORMAL

## 2023-06-27 PROCEDURE — 6360000002 HC RX W HCPCS: Performed by: PHYSICIAN ASSISTANT

## 2023-06-27 PROCEDURE — 6360000002 HC RX W HCPCS: Performed by: HOSPITALIST

## 2023-06-27 PROCEDURE — 2700000000 HC OXYGEN THERAPY PER DAY

## 2023-06-27 PROCEDURE — 82962 GLUCOSE BLOOD TEST: CPT

## 2023-06-27 PROCEDURE — 94660 CPAP INITIATION&MGMT: CPT

## 2023-06-27 PROCEDURE — 80053 COMPREHEN METABOLIC PANEL: CPT

## 2023-06-27 PROCEDURE — 99233 SBSQ HOSP IP/OBS HIGH 50: CPT | Performed by: SPECIALIST

## 2023-06-27 PROCEDURE — 88112 CYTOPATH CELL ENHANCE TECH: CPT

## 2023-06-27 PROCEDURE — 88305 TISSUE EXAM BY PATHOLOGIST: CPT

## 2023-06-27 PROCEDURE — 6370000000 HC RX 637 (ALT 250 FOR IP): Performed by: NURSE PRACTITIONER

## 2023-06-27 PROCEDURE — 36415 COLL VENOUS BLD VENIPUNCTURE: CPT

## 2023-06-27 PROCEDURE — 2580000003 HC RX 258: Performed by: FAMILY MEDICINE

## 2023-06-27 PROCEDURE — 85610 PROTHROMBIN TIME: CPT

## 2023-06-27 PROCEDURE — 6370000000 HC RX 637 (ALT 250 FOR IP): Performed by: FAMILY MEDICINE

## 2023-06-27 PROCEDURE — 71045 X-RAY EXAM CHEST 1 VIEW: CPT

## 2023-06-27 PROCEDURE — 32555 ASPIRATE PLEURA W/ IMAGING: CPT

## 2023-06-27 PROCEDURE — 6360000002 HC RX W HCPCS: Performed by: FAMILY MEDICINE

## 2023-06-27 PROCEDURE — 2500000003 HC RX 250 WO HCPCS

## 2023-06-27 PROCEDURE — 6370000000 HC RX 637 (ALT 250 FOR IP): Performed by: HOSPITALIST

## 2023-06-27 RX ORDER — INSULIN GLARGINE 100 [IU]/ML
INJECTION, SOLUTION SUBCUTANEOUS
Qty: 5 ADJUSTABLE DOSE PRE-FILLED PEN SYRINGE | Refills: 0 | Status: SHIPPED
Start: 2023-06-27

## 2023-06-27 RX ORDER — HYDRALAZINE HYDROCHLORIDE 100 MG/1
100 TABLET, FILM COATED ORAL 2 TIMES DAILY
Qty: 60 TABLET | Refills: 1 | Status: SHIPPED | OUTPATIENT
Start: 2023-06-27 | End: 2023-06-27 | Stop reason: SDUPTHER

## 2023-06-27 RX ORDER — CYCLOBENZAPRINE HCL 5 MG
5 TABLET ORAL 3 TIMES DAILY PRN
Qty: 30 TABLET | Refills: 0 | Status: SHIPPED | OUTPATIENT
Start: 2023-06-27 | End: 2023-07-07

## 2023-06-27 RX ORDER — LIDOCAINE HYDROCHLORIDE 10 MG/ML
INJECTION, SOLUTION EPIDURAL; INFILTRATION; INTRACAUDAL; PERINEURAL
Status: COMPLETED
Start: 2023-06-27 | End: 2023-06-27

## 2023-06-27 RX ORDER — HYDRALAZINE HYDROCHLORIDE 50 MG/1
50 TABLET, FILM COATED ORAL ONCE
Status: DISCONTINUED | OUTPATIENT
Start: 2023-06-27 | End: 2023-06-27 | Stop reason: HOSPADM

## 2023-06-27 RX ORDER — ISOSORBIDE DINITRATE 20 MG/1
20 TABLET ORAL 3 TIMES DAILY
Qty: 90 TABLET | Refills: 3 | Status: SHIPPED | OUTPATIENT
Start: 2023-06-27

## 2023-06-27 RX ORDER — HYDRALAZINE HYDROCHLORIDE 100 MG/1
100 TABLET, FILM COATED ORAL 3 TIMES DAILY
Qty: 90 TABLET | Refills: 1 | Status: SHIPPED | OUTPATIENT
Start: 2023-06-27

## 2023-06-27 RX ORDER — HYDRALAZINE HYDROCHLORIDE 50 MG/1
100 TABLET, FILM COATED ORAL 3 TIMES DAILY
Status: DISCONTINUED | OUTPATIENT
Start: 2023-06-27 | End: 2023-06-27 | Stop reason: HOSPADM

## 2023-06-27 RX ORDER — SODIUM BICARBONATE 42 MG/ML
INJECTION, SOLUTION INTRAVENOUS
Status: COMPLETED
Start: 2023-06-27 | End: 2023-06-27

## 2023-06-27 RX ADMIN — VENLAFAXINE 37.5 MG: 37.5 TABLET ORAL at 09:15

## 2023-06-27 RX ADMIN — HYDRALAZINE HYDROCHLORIDE 50 MG: 50 TABLET, FILM COATED ORAL at 07:25

## 2023-06-27 RX ADMIN — DULOXETINE 30 MG: 30 CAPSULE, DELAYED RELEASE ORAL at 09:15

## 2023-06-27 RX ADMIN — COLESTIPOL HYDROCHLORIDE 1 G: 1 TABLET, FILM COATED ORAL at 09:15

## 2023-06-27 RX ADMIN — ISOSORBIDE DINITRATE 20 MG: 20 TABLET ORAL at 09:15

## 2023-06-27 RX ADMIN — INSULIN GLARGINE 30 UNITS: 100 INJECTION, SOLUTION SUBCUTANEOUS at 09:15

## 2023-06-27 RX ADMIN — HYDRALAZINE HYDROCHLORIDE 100 MG: 50 TABLET, FILM COATED ORAL at 15:44

## 2023-06-27 RX ADMIN — FUROSEMIDE 40 MG: 40 TABLET ORAL at 07:25

## 2023-06-27 RX ADMIN — ISOSORBIDE DINITRATE 20 MG: 20 TABLET ORAL at 12:48

## 2023-06-27 RX ADMIN — SODIUM BICARBONATE 2.5 MEQ: 42 INJECTION, SOLUTION INTRAVENOUS at 11:45

## 2023-06-27 RX ADMIN — HEPARIN SODIUM 5000 UNITS: 5000 INJECTION INTRAVENOUS; SUBCUTANEOUS at 00:38

## 2023-06-27 RX ADMIN — OXYCODONE HYDROCHLORIDE AND ACETAMINOPHEN 1000 MG: 500 TABLET ORAL at 09:15

## 2023-06-27 RX ADMIN — LIDOCAINE HYDROCHLORIDE 10 ML: 10 INJECTION, SOLUTION EPIDURAL; INFILTRATION; INTRACAUDAL; PERINEURAL at 11:44

## 2023-06-27 RX ADMIN — HYDRALAZINE HYDROCHLORIDE 10 MG: 20 INJECTION INTRAMUSCULAR; INTRAVENOUS at 05:48

## 2023-06-27 RX ADMIN — ACETAMINOPHEN 650 MG: 325 TABLET ORAL at 15:48

## 2023-06-27 RX ADMIN — Medication 10 ML: at 09:17

## 2023-06-27 RX ADMIN — ARFORMOTEROL TARTRATE 15 MCG: 15 SOLUTION RESPIRATORY (INHALATION) at 07:40

## 2023-06-27 RX ADMIN — BUDESONIDE 500 MCG: 0.5 INHALANT RESPIRATORY (INHALATION) at 07:40

## 2023-06-27 RX ADMIN — ASPIRIN 81 MG: 81 TABLET, COATED ORAL at 09:15

## 2023-06-27 RX ADMIN — CARVEDILOL 25 MG: 12.5 TABLET, FILM COATED ORAL at 07:25

## 2023-06-27 RX ADMIN — FERROUS SULFATE TAB 325 MG (65 MG ELEMENTAL FE) 325 MG: 325 (65 FE) TAB at 09:15

## 2023-06-29 ENCOUNTER — TELEPHONE (OUTPATIENT)
Facility: HOSPITAL | Age: 83
End: 2023-06-29

## 2023-07-01 LAB
BACTERIA SPEC CULT: NORMAL
GRAM STN SPEC: NORMAL
GRAM STN SPEC: NORMAL
SERVICE CMNT-IMP: NORMAL

## 2023-07-03 ENCOUNTER — OFFICE VISIT (OUTPATIENT)
Age: 83
End: 2023-07-03

## 2023-07-03 VITALS
DIASTOLIC BLOOD PRESSURE: 80 MMHG | HEIGHT: 60 IN | BODY MASS INDEX: 30.63 KG/M2 | HEART RATE: 78 BPM | RESPIRATION RATE: 22 BRPM | WEIGHT: 156 LBS | SYSTOLIC BLOOD PRESSURE: 136 MMHG | TEMPERATURE: 98.2 F | OXYGEN SATURATION: 94 %

## 2023-07-03 DIAGNOSIS — I10 BENIGN ESSENTIAL HTN: ICD-10-CM

## 2023-07-03 DIAGNOSIS — Z79.4 TYPE 2 DIABETES MELLITUS WITH DIABETIC NEUROPATHY, WITH LONG-TERM CURRENT USE OF INSULIN (HCC): ICD-10-CM

## 2023-07-03 DIAGNOSIS — E11.40 TYPE 2 DIABETES MELLITUS WITH DIABETIC NEUROPATHY, WITH LONG-TERM CURRENT USE OF INSULIN (HCC): ICD-10-CM

## 2023-07-03 DIAGNOSIS — Z95.810 PRESENCE OF AUTOMATIC (IMPLANTABLE) CARDIAC DEFIBRILLATOR: ICD-10-CM

## 2023-07-03 DIAGNOSIS — N18.4 CHRONIC KIDNEY DISEASE, STAGE 4 (SEVERE) (HCC): ICD-10-CM

## 2023-07-03 DIAGNOSIS — E78.2 MIXED HYPERLIPIDEMIA: ICD-10-CM

## 2023-07-03 DIAGNOSIS — I50.22 CHRONIC SYSTOLIC (CONGESTIVE) HEART FAILURE (HCC): Primary | ICD-10-CM

## 2023-07-03 DIAGNOSIS — I49.5 SICK SINUS SYNDROME (HCC): ICD-10-CM

## 2023-07-03 DIAGNOSIS — I48.0 PAROXYSMAL ATRIAL FIBRILLATION (HCC): ICD-10-CM

## 2023-07-05 RX ORDER — HYDRALAZINE HYDROCHLORIDE 10 MG/1
TABLET, FILM COATED ORAL
Qty: 270 TABLET | Refills: 0 | OUTPATIENT
Start: 2023-07-05

## 2023-07-05 RX ORDER — HYDRALAZINE HYDROCHLORIDE 100 MG/1
100 TABLET, FILM COATED ORAL 3 TIMES DAILY
Qty: 270 TABLET | Refills: 1 | Status: SHIPPED | OUTPATIENT
Start: 2023-07-05

## 2023-07-25 ENCOUNTER — NURSE ONLY (OUTPATIENT)
Age: 83
End: 2023-07-25
Payer: MEDICARE

## 2023-07-25 DIAGNOSIS — J44.1 CHRONIC OBSTRUCTIVE PULMONARY DISEASE WITH (ACUTE) EXACERBATION (HCC): ICD-10-CM

## 2023-07-25 DIAGNOSIS — I50.22 CHRONIC SYSTOLIC (CONGESTIVE) HEART FAILURE (HCC): Primary | ICD-10-CM

## 2023-07-25 DIAGNOSIS — E11.21 TYPE 2 DIABETES MELLITUS WITH DIABETIC NEPHROPATHY, WITHOUT LONG-TERM CURRENT USE OF INSULIN (HCC): ICD-10-CM

## 2023-07-25 LAB — HBA1C MFR BLD: 9.5 %

## 2023-07-25 PROCEDURE — 83036 HEMOGLOBIN GLYCOSYLATED A1C: CPT | Performed by: NURSE PRACTITIONER

## 2023-07-25 PROCEDURE — 36415 COLL VENOUS BLD VENIPUNCTURE: CPT | Performed by: NURSE PRACTITIONER

## 2023-07-25 NOTE — PROGRESS NOTES
Labs drawn in Lt arm per Dr. J Carlos [place] orders / [facility], tolerated well 1 SST and 1 lavender. Hgb A1C done per Miranda Ribeiro order.

## 2023-07-26 LAB
ANION GAP SERPL CALC-SCNC: 8 MMOL/L (ref 5–15)
BASOPHILS # BLD: 0.1 K/UL (ref 0–0.1)
BASOPHILS NFR BLD: 1 % (ref 0–1)
BUN SERPL-MCNC: 41 MG/DL (ref 6–20)
BUN/CREAT SERPL: 20 (ref 12–20)
CALCIUM SERPL-MCNC: 9.3 MG/DL (ref 8.5–10.1)
CHLORIDE SERPL-SCNC: 101 MMOL/L (ref 97–108)
CO2 SERPL-SCNC: 25 MMOL/L (ref 21–32)
CREAT SERPL-MCNC: 2.09 MG/DL (ref 0.55–1.02)
DIFFERENTIAL METHOD BLD: ABNORMAL
EOSINOPHIL # BLD: 1.7 K/UL (ref 0–0.4)
EOSINOPHIL NFR BLD: 18 % (ref 0–7)
ERYTHROCYTE [DISTWIDTH] IN BLOOD BY AUTOMATED COUNT: 15.3 % (ref 11.5–14.5)
GLUCOSE SERPL-MCNC: 301 MG/DL (ref 65–100)
HCT VFR BLD AUTO: 40.8 % (ref 35–47)
HGB BLD-MCNC: 12.6 G/DL (ref 11.5–16)
IMM GRANULOCYTES # BLD AUTO: 0 K/UL (ref 0–0.04)
IMM GRANULOCYTES NFR BLD AUTO: 0 % (ref 0–0.5)
LYMPHOCYTES # BLD: 1.5 K/UL (ref 0.8–3.5)
LYMPHOCYTES NFR BLD: 16 % (ref 12–49)
MCH RBC QN AUTO: 29.4 PG (ref 26–34)
MCHC RBC AUTO-ENTMCNC: 30.9 G/DL (ref 30–36.5)
MCV RBC AUTO: 95.1 FL (ref 80–99)
MONOCYTES # BLD: 0.6 K/UL (ref 0–1)
MONOCYTES NFR BLD: 6 % (ref 5–13)
NEUTS SEG # BLD: 5.4 K/UL (ref 1.8–8)
NEUTS SEG NFR BLD: 59 % (ref 32–75)
NRBC # BLD: 0 K/UL (ref 0–0.01)
NRBC BLD-RTO: 0 PER 100 WBC
PLATELET # BLD AUTO: 338 K/UL (ref 150–400)
PMV BLD AUTO: 10.8 FL (ref 8.9–12.9)
POTASSIUM SERPL-SCNC: 3.7 MMOL/L (ref 3.5–5.1)
RBC # BLD AUTO: 4.29 M/UL (ref 3.8–5.2)
RBC MORPH BLD: ABNORMAL
SODIUM SERPL-SCNC: 134 MMOL/L (ref 136–145)
WBC # BLD AUTO: 9.3 K/UL (ref 3.6–11)

## 2023-07-31 RX ORDER — ISOSORBIDE DINITRATE 20 MG/1
20 TABLET ORAL 3 TIMES DAILY
Qty: 90 TABLET | Refills: 3 | Status: SHIPPED | OUTPATIENT
Start: 2023-07-31

## 2023-07-31 NOTE — TELEPHONE ENCOUNTER
Medication Refill Request    [facility] is requesting a refill of the following medication(s):   isosorbide dinitrate (ISORDIL) 20 MG tablet  Please send refill to:        [facility], [address] [place] [address] Ashley Bustos [phone]  [address] [place] [id number]  Phone: [phone] Fax: [phone]

## 2023-08-04 PROBLEM — R74.01 TRANSAMINITIS: Status: RESOLVED | Noted: 2017-10-20 | Resolved: 2023-08-04

## 2023-08-04 PROBLEM — R41.82 ALTERED MENTAL STATUS: Status: RESOLVED | Noted: 2017-10-20 | Resolved: 2023-08-04

## 2023-08-04 PROBLEM — G93.40 ACUTE ENCEPHALOPATHY: Status: RESOLVED | Noted: 2017-10-23 | Resolved: 2023-08-04

## 2023-08-04 PROBLEM — K57.32 DIVERTICULITIS LARGE INTESTINE W/O PERFORATION OR ABSCESS W/O BLEEDING: Status: RESOLVED | Noted: 2017-07-06 | Resolved: 2023-08-04

## 2023-08-04 PROBLEM — Z95.810 S/P ICD (INTERNAL CARDIAC DEFIBRILLATOR) PROCEDURE: Status: RESOLVED | Noted: 2023-05-04 | Resolved: 2023-08-04

## 2023-08-04 PROBLEM — F33.1 MODERATE EPISODE OF RECURRENT MAJOR DEPRESSIVE DISORDER (HCC): Status: RESOLVED | Noted: 2021-06-14 | Resolved: 2023-08-04

## 2023-08-04 PROBLEM — R06.02 SOB (SHORTNESS OF BREATH): Status: RESOLVED | Noted: 2017-06-21 | Resolved: 2023-08-04

## 2023-08-04 PROBLEM — R19.7 DIARRHEA IN ADULT PATIENT: Status: RESOLVED | Noted: 2017-06-21 | Resolved: 2023-08-04

## 2023-08-04 PROBLEM — I21.A1 TYPE 2 MI (MYOCARDIAL INFARCTION) (HCC): Status: RESOLVED | Noted: 2021-07-16 | Resolved: 2023-08-04

## 2023-08-04 PROBLEM — E87.5 HYPERKALEMIA: Status: RESOLVED | Noted: 2017-10-20 | Resolved: 2023-08-04

## 2023-08-04 PROBLEM — I49.5 SSS (SICK SINUS SYNDROME) (HCC): Status: RESOLVED | Noted: 2020-09-23 | Resolved: 2023-08-04

## 2023-08-04 PROBLEM — J18.9 PNEUMONIA: Status: RESOLVED | Noted: 2021-08-23 | Resolved: 2023-08-04

## 2023-08-04 PROBLEM — I42.9 CARDIOMYOPATHY (HCC): Status: RESOLVED | Noted: 2020-09-23 | Resolved: 2023-08-04

## 2023-08-04 PROBLEM — J96.90 RESPIRATORY FAILURE (HCC): Status: RESOLVED | Noted: 2020-09-18 | Resolved: 2023-08-04

## 2023-08-04 PROBLEM — R10.84 ABDOMINAL PAIN, GENERALIZED: Status: RESOLVED | Noted: 2017-06-21 | Resolved: 2023-08-04

## 2023-08-04 PROBLEM — I50.20 HFREF (HEART FAILURE WITH REDUCED EJECTION FRACTION) (HCC): Status: ACTIVE | Noted: 2020-09-18

## 2023-08-04 PROBLEM — D72.820 LYMPHOCYTOSIS: Status: RESOLVED | Noted: 2018-05-07 | Resolved: 2023-08-04

## 2023-08-04 PROBLEM — N18.9 CKD (CHRONIC KIDNEY DISEASE): Status: RESOLVED | Noted: 2021-09-01 | Resolved: 2023-08-04

## 2023-08-04 PROBLEM — J90 BILATERAL PLEURAL EFFUSION: Status: RESOLVED | Noted: 2020-09-18 | Resolved: 2023-08-04

## 2023-08-04 PROBLEM — I50.23 ACUTE ON CHRONIC HFREF (HEART FAILURE WITH REDUCED EJECTION FRACTION) (HCC): Status: RESOLVED | Noted: 2023-06-21 | Resolved: 2023-08-04

## 2023-08-04 PROBLEM — I50.9 ACUTE CHF (CONGESTIVE HEART FAILURE) (HCC): Status: RESOLVED | Noted: 2021-09-01 | Resolved: 2023-08-04

## 2023-08-04 PROBLEM — D72.9 NEUTROPHILIC LEUKOCYTOSIS: Status: RESOLVED | Noted: 2017-06-20 | Resolved: 2023-08-04

## 2023-08-04 PROBLEM — N17.9 ACUTE RENAL FAILURE (ARF) (HCC): Status: RESOLVED | Noted: 2017-10-20 | Resolved: 2023-08-04

## 2023-08-04 PROBLEM — M54.40 ACUTE LEFT-SIDED LOW BACK PAIN WITH SCIATICA: Status: RESOLVED | Noted: 2017-10-27 | Resolved: 2023-08-04

## 2023-08-04 PROBLEM — I50.22 SYSTOLIC HEART FAILURE, CHRONIC (HCC): Status: RESOLVED | Noted: 2021-07-16 | Resolved: 2023-08-04

## 2023-08-04 PROBLEM — R09.02 HYPOXIA: Status: RESOLVED | Noted: 2020-09-18 | Resolved: 2023-08-04

## 2023-08-04 PROBLEM — N18.30 CKD (CHRONIC KIDNEY DISEASE) STAGE 3, GFR 30-59 ML/MIN (HCC): Status: RESOLVED | Noted: 2019-06-04 | Resolved: 2023-08-04

## 2023-08-04 PROBLEM — D72.829 LEUKOCYTOSIS: Status: RESOLVED | Noted: 2021-09-01 | Resolved: 2023-08-04

## 2023-08-04 PROBLEM — D72.828 NEUTROPHILIC LEUKOCYTOSIS: Status: RESOLVED | Noted: 2017-06-20 | Resolved: 2023-08-04

## 2023-08-04 PROBLEM — R79.89 ELEVATED TROPONIN: Status: RESOLVED | Noted: 2020-09-18 | Resolved: 2023-08-04

## 2023-08-04 PROBLEM — A41.9 SEPSIS (HCC): Status: RESOLVED | Noted: 2021-07-16 | Resolved: 2023-08-04

## 2023-08-04 PROBLEM — R77.8 ELEVATED TROPONIN: Status: RESOLVED | Noted: 2020-09-18 | Resolved: 2023-08-04

## 2023-08-04 PROBLEM — T40.601D: Status: RESOLVED | Noted: 2017-10-27 | Resolved: 2023-08-04

## 2023-08-04 RX ORDER — FUROSEMIDE 80 MG
80 TABLET ORAL DAILY
Qty: 90 TABLET | Refills: 0 | Status: SHIPPED | OUTPATIENT
Start: 2023-08-04

## 2023-08-08 ENCOUNTER — OFFICE VISIT (OUTPATIENT)
Age: 83
End: 2023-08-08
Payer: MEDICARE

## 2023-08-08 VITALS
WEIGHT: 159 LBS | RESPIRATION RATE: 24 BRPM | HEIGHT: 60 IN | OXYGEN SATURATION: 96 % | BODY MASS INDEX: 31.22 KG/M2 | SYSTOLIC BLOOD PRESSURE: 140 MMHG | TEMPERATURE: 98.2 F | HEART RATE: 76 BPM | DIASTOLIC BLOOD PRESSURE: 70 MMHG

## 2023-08-08 DIAGNOSIS — N18.4 CKD (CHRONIC KIDNEY DISEASE) STAGE 4, GFR 15-29 ML/MIN (HCC): ICD-10-CM

## 2023-08-08 DIAGNOSIS — I48.0 PAF (PAROXYSMAL ATRIAL FIBRILLATION) (HCC): ICD-10-CM

## 2023-08-08 DIAGNOSIS — Z95.810 AICD (AUTOMATIC CARDIOVERTER/DEFIBRILLATOR) PRESENT: ICD-10-CM

## 2023-08-08 DIAGNOSIS — I42.0 DILATED CARDIOMYOPATHY (HCC): ICD-10-CM

## 2023-08-08 DIAGNOSIS — E78.00 HYPERCHOLESTEROLEMIA: ICD-10-CM

## 2023-08-08 DIAGNOSIS — I10 ESSENTIAL HYPERTENSION: ICD-10-CM

## 2023-08-08 DIAGNOSIS — I50.20 HFREF (HEART FAILURE WITH REDUCED EJECTION FRACTION) (HCC): Primary | ICD-10-CM

## 2023-08-08 DIAGNOSIS — F17.210 CIGARETTE SMOKER: ICD-10-CM

## 2023-08-08 PROCEDURE — 99214 OFFICE O/P EST MOD 30 MIN: CPT | Performed by: INTERNAL MEDICINE

## 2023-08-08 PROCEDURE — 3077F SYST BP >= 140 MM HG: CPT | Performed by: INTERNAL MEDICINE

## 2023-08-08 PROCEDURE — 1123F ACP DISCUSS/DSCN MKR DOCD: CPT | Performed by: INTERNAL MEDICINE

## 2023-08-08 PROCEDURE — 3078F DIAST BP <80 MM HG: CPT | Performed by: INTERNAL MEDICINE

## 2023-08-08 PROCEDURE — 93000 ELECTROCARDIOGRAM COMPLETE: CPT | Performed by: INTERNAL MEDICINE

## 2023-08-08 RX ORDER — ATORVASTATIN CALCIUM 20 MG/1
TABLET, FILM COATED ORAL
Qty: 90 TABLET | Refills: 0 | Status: SHIPPED | OUTPATIENT
Start: 2023-08-08

## 2023-08-08 ASSESSMENT — PATIENT HEALTH QUESTIONNAIRE - PHQ9
2. FEELING DOWN, DEPRESSED OR HOPELESS: 0
1. LITTLE INTEREST OR PLEASURE IN DOING THINGS: 0
SUM OF ALL RESPONSES TO PHQ9 QUESTIONS 1 & 2: 0
SUM OF ALL RESPONSES TO PHQ QUESTIONS 1-9: 0

## 2023-09-01 RX ORDER — INSULIN GLARGINE 100 [IU]/ML
INJECTION, SOLUTION SUBCUTANEOUS
Qty: 45 ML | Refills: 3 | Status: SHIPPED | OUTPATIENT
Start: 2023-09-01

## 2023-09-25 RX ORDER — ATORVASTATIN CALCIUM 20 MG/1
TABLET, FILM COATED ORAL
Qty: 90 TABLET | Refills: 3 | OUTPATIENT
Start: 2023-09-25

## 2023-09-25 RX ORDER — FUROSEMIDE 80 MG
80 TABLET ORAL DAILY
Qty: 90 TABLET | Refills: 3 | OUTPATIENT
Start: 2023-09-25

## 2023-10-03 ENCOUNTER — TELEPHONE (OUTPATIENT)
Age: 83
End: 2023-10-03

## 2023-10-03 NOTE — TELEPHONE ENCOUNTER
Documentation Only:  Verified patient with two patient identifiers. Called patient to reschedule her appointment to 11/13 at 9:00 or 9:20 with Dr. Hugh Kemp. Patient stated she was not coming in early and wanted to push her appointment out to January. Patient has been scheduled.

## 2023-10-25 ENCOUNTER — TELEPHONE (OUTPATIENT)
Age: 83
End: 2023-10-25

## 2023-11-06 NOTE — PROGRESS NOTES
Cardiac Electrophysiology OFFICE Follow Up Note             Assessment/Plan:   1. AICD (automatic cardioverter/defibrillator) present  -     EKG 12 Lead  2. PAF (paroxysmal atrial fibrillation) ([facility])  -     EKG 12 Lead  3. Dilated cardiomyopathy (HCC)  -     EKG 12 Lead  4. HFrEF (heart failure with reduced ejection fraction) ([facility])  -     EKG 12 Lead    [employer] subcutaneous ICD  96% battery left  No events noted  - Continue Q3 month remote device transmissions. Follow-up in the EP clinic in one year, or sooner for any concerns. SVT  She was in NSR when we checked her device. When she was in the exam room she suddenly went into SVT and had chest pressure. Subjective:         Laquetta Romberg is a 79 y.o. patient who is seen for follow up of sICD. History of ICD implantation in 9/23/20 with subsequent MSSA infection s/p explant on 7/21/21. S/p sICD implant 5/4/23. She was in her normal state of health at the beginning of today's visit. Her sICD was checked and showed normal function. NSR underlying. While the nurse was reviewing her medications she suddenly developed chest pressure. EKG showed SVT in the 150s. She felt lightheaded and unwell. BP 88/56. She was wheeled down by our Nurse to the ED for treatment.      Patient Active Problem List   Diagnosis    Spondylosis of lumbosacral region without myelopathy or radiculopathy    Type 2 diabetes mellitus with diabetic neuropathy (HCC)    Pleural effusion    COPD (chronic obstructive pulmonary disease) (HCC)    HFrEF (heart failure with reduced ejection fraction) ([address] )    Palliative care encounter    Depression    Cigarette smoker    Physical debility    Diabetes ([address] )    Type 2 diabetes with nephropathy ([facility])    Hypercholesterolemia    Anemia of infection and chronic disease    PAF (paroxysmal atrial fibrillation) ([facility])    AICD (automatic cardioverter/defibrillator) present    Chronic pain    Arthritis    IBS

## 2023-11-07 ENCOUNTER — HOSPITAL ENCOUNTER (INPATIENT)
Facility: HOSPITAL | Age: 83
LOS: 2 days | Discharge: HOME | DRG: 286 | End: 2023-11-09
Attending: STUDENT IN AN ORGANIZED HEALTH CARE EDUCATION/TRAINING PROGRAM | Admitting: HOSPITALIST
Payer: MEDICARE

## 2023-11-07 ENCOUNTER — PROCEDURE VISIT (OUTPATIENT)
Age: 83
End: 2023-11-07
Payer: MEDICARE

## 2023-11-07 ENCOUNTER — APPOINTMENT (OUTPATIENT)
Dept: VASCULAR SURGERY | Facility: HOSPITAL | Age: 83
DRG: 286 | End: 2023-11-07
Attending: HOSPITALIST
Payer: MEDICARE

## 2023-11-07 ENCOUNTER — OFFICE VISIT (OUTPATIENT)
Age: 83
End: 2023-11-07
Payer: MEDICARE

## 2023-11-07 ENCOUNTER — APPOINTMENT (OUTPATIENT)
Facility: HOSPITAL | Age: 83
DRG: 286 | End: 2023-11-07
Payer: MEDICARE

## 2023-11-07 VITALS
DIASTOLIC BLOOD PRESSURE: 56 MMHG | BODY MASS INDEX: 31.05 KG/M2 | SYSTOLIC BLOOD PRESSURE: 88 MMHG | RESPIRATION RATE: 18 BRPM | HEART RATE: 157 BPM | OXYGEN SATURATION: 94 % | HEIGHT: 60 IN

## 2023-11-07 DIAGNOSIS — Z95.810 AICD (AUTOMATIC CARDIOVERTER/DEFIBRILLATOR) PRESENT: Primary | ICD-10-CM

## 2023-11-07 DIAGNOSIS — R79.89 ELEVATED TROPONIN: ICD-10-CM

## 2023-11-07 DIAGNOSIS — R07.9 CHEST PAIN, UNSPECIFIED TYPE: Primary | ICD-10-CM

## 2023-11-07 DIAGNOSIS — I10 ESSENTIAL HYPERTENSION: ICD-10-CM

## 2023-11-07 DIAGNOSIS — I50.20 HFREF (HEART FAILURE WITH REDUCED EJECTION FRACTION) (HCC): ICD-10-CM

## 2023-11-07 DIAGNOSIS — I20.89 ANGINAL CHEST PAIN AT REST: ICD-10-CM

## 2023-11-07 DIAGNOSIS — Z95.810 PRESENCE OF CARDIOVERTER DEFIBRILLATOR: Primary | ICD-10-CM

## 2023-11-07 DIAGNOSIS — I47.10 SVT (SUPRAVENTRICULAR TACHYCARDIA): ICD-10-CM

## 2023-11-07 DIAGNOSIS — I48.0 PAF (PAROXYSMAL ATRIAL FIBRILLATION) (HCC): ICD-10-CM

## 2023-11-07 DIAGNOSIS — I42.0 DILATED CARDIOMYOPATHY (HCC): ICD-10-CM

## 2023-11-07 LAB
ALBUMIN SERPL-MCNC: 3 G/DL (ref 3.5–5)
ALBUMIN/GLOB SERPL: 0.6 (ref 1.1–2.2)
ALP SERPL-CCNC: 91 U/L (ref 45–117)
ALT SERPL-CCNC: 21 U/L (ref 12–78)
ANION GAP SERPL CALC-SCNC: 7 MMOL/L (ref 5–15)
APTT PPP: 25.6 SEC (ref 22.1–31)
AST SERPL-CCNC: 16 U/L (ref 15–37)
BASOPHILS # BLD: 0.1 K/UL (ref 0–0.1)
BASOPHILS NFR BLD: 1 % (ref 0–1)
BILIRUB SERPL-MCNC: 0.5 MG/DL (ref 0.2–1)
BUN SERPL-MCNC: 41 MG/DL (ref 6–20)
BUN/CREAT SERPL: 19 (ref 12–20)
CALCIUM SERPL-MCNC: 9.9 MG/DL (ref 8.5–10.1)
CHLORIDE SERPL-SCNC: 109 MMOL/L (ref 97–108)
CO2 SERPL-SCNC: 23 MMOL/L (ref 21–32)
COMMENT:: NORMAL
CREAT SERPL-MCNC: 2.2 MG/DL (ref 0.55–1.02)
DIFFERENTIAL METHOD BLD: ABNORMAL
EOSINOPHIL # BLD: 1 K/UL (ref 0–0.4)
EOSINOPHIL NFR BLD: 8 % (ref 0–7)
ERYTHROCYTE [DISTWIDTH] IN BLOOD BY AUTOMATED COUNT: 14.5 % (ref 11.5–14.5)
ERYTHROCYTE [DISTWIDTH] IN BLOOD BY AUTOMATED COUNT: 14.6 % (ref 11.5–14.5)
EST. AVERAGE GLUCOSE BLD GHB EST-MCNC: 180 MG/DL
GLOBULIN SER CALC-MCNC: 5 G/DL (ref 2–4)
GLUCOSE BLD STRIP.AUTO-MCNC: 128 MG/DL (ref 65–117)
GLUCOSE SERPL-MCNC: 170 MG/DL (ref 65–100)
HBA1C MFR BLD: 7.9 % (ref 4–5.6)
HCT VFR BLD AUTO: 34.9 % (ref 35–47)
HCT VFR BLD AUTO: 41.8 % (ref 35–47)
HGB BLD-MCNC: 11.3 G/DL (ref 11.5–16)
HGB BLD-MCNC: 13.7 G/DL (ref 11.5–16)
IMM GRANULOCYTES # BLD AUTO: 0 K/UL (ref 0–0.04)
IMM GRANULOCYTES NFR BLD AUTO: 0 % (ref 0–0.5)
INR PPP: 1.1 (ref 0.9–1.1)
LACTATE SERPL-SCNC: 1 MMOL/L (ref 0.4–2)
LIPASE SERPL-CCNC: 49 U/L (ref 13–75)
LYMPHOCYTES # BLD: 2.9 K/UL (ref 0.8–3.5)
LYMPHOCYTES NFR BLD: 23 % (ref 12–49)
MCH RBC QN AUTO: 29.9 PG (ref 26–34)
MCH RBC QN AUTO: 30 PG (ref 26–34)
MCHC RBC AUTO-ENTMCNC: 32.4 G/DL (ref 30–36.5)
MCHC RBC AUTO-ENTMCNC: 32.8 G/DL (ref 30–36.5)
MCV RBC AUTO: 91.3 FL (ref 80–99)
MCV RBC AUTO: 92.6 FL (ref 80–99)
MONOCYTES # BLD: 0.8 K/UL (ref 0–1)
MONOCYTES NFR BLD: 6 % (ref 5–13)
NEUTS SEG # BLD: 8 K/UL (ref 1.8–8)
NEUTS SEG NFR BLD: 62 % (ref 32–75)
NRBC # BLD: 0 K/UL (ref 0–0.01)
NRBC # BLD: 0 K/UL (ref 0–0.01)
NRBC BLD-RTO: 0 PER 100 WBC
NRBC BLD-RTO: 0 PER 100 WBC
PLATELET # BLD AUTO: 314 K/UL (ref 150–400)
PLATELET # BLD AUTO: 435 K/UL (ref 150–400)
PMV BLD AUTO: 10.1 FL (ref 8.9–12.9)
PMV BLD AUTO: 10.1 FL (ref 8.9–12.9)
POTASSIUM SERPL-SCNC: 4 MMOL/L (ref 3.5–5.1)
PROT SERPL-MCNC: 8 G/DL (ref 6.4–8.2)
PROTHROMBIN TIME: 11.4 SEC (ref 9–11.1)
RBC # BLD AUTO: 3.77 M/UL (ref 3.8–5.2)
RBC # BLD AUTO: 4.58 M/UL (ref 3.8–5.2)
RBC MORPH BLD: ABNORMAL
SERVICE CMNT-IMP: ABNORMAL
SODIUM SERPL-SCNC: 139 MMOL/L (ref 136–145)
SPECIMEN HOLD: NORMAL
THERAPEUTIC RANGE: NORMAL SECS (ref 58–77)
TROPONIN I SERPL HS-MCNC: 179 NG/L (ref 0–51)
TROPONIN I SERPL HS-MCNC: 279 NG/L (ref 0–51)
TROPONIN I SERPL HS-MCNC: 293 NG/L (ref 0–51)
TROPONIN I SERPL HS-MCNC: 43 NG/L (ref 0–51)
UFH PPP CHRO-ACNC: <0.1 IU/ML
WBC # BLD AUTO: 10.8 K/UL (ref 3.6–11)
WBC # BLD AUTO: 12.8 K/UL (ref 3.6–11)
WBC MORPH BLD: ABNORMAL

## 2023-11-07 PROCEDURE — 2700000000 HC OXYGEN THERAPY PER DAY

## 2023-11-07 PROCEDURE — 3074F SYST BP LT 130 MM HG: CPT

## 2023-11-07 PROCEDURE — 85610 PROTHROMBIN TIME: CPT

## 2023-11-07 PROCEDURE — 99285 EMERGENCY DEPT VISIT HI MDM: CPT

## 2023-11-07 PROCEDURE — 6370000000 HC RX 637 (ALT 250 FOR IP): Performed by: HOSPITALIST

## 2023-11-07 PROCEDURE — 85520 HEPARIN ASSAY: CPT

## 2023-11-07 PROCEDURE — 82962 GLUCOSE BLOOD TEST: CPT

## 2023-11-07 PROCEDURE — 96361 HYDRATE IV INFUSION ADD-ON: CPT

## 2023-11-07 PROCEDURE — 84484 ASSAY OF TROPONIN QUANT: CPT

## 2023-11-07 PROCEDURE — 93005 ELECTROCARDIOGRAM TRACING: CPT | Performed by: HOSPITALIST

## 2023-11-07 PROCEDURE — 85730 THROMBOPLASTIN TIME PARTIAL: CPT

## 2023-11-07 PROCEDURE — 36415 COLL VENOUS BLD VENIPUNCTURE: CPT

## 2023-11-07 PROCEDURE — 6360000002 HC RX W HCPCS

## 2023-11-07 PROCEDURE — 93010 ELECTROCARDIOGRAM REPORT: CPT

## 2023-11-07 PROCEDURE — 94640 AIRWAY INHALATION TREATMENT: CPT

## 2023-11-07 PROCEDURE — 93005 ELECTROCARDIOGRAM TRACING: CPT | Performed by: NURSE PRACTITIONER

## 2023-11-07 PROCEDURE — 3078F DIAST BP <80 MM HG: CPT

## 2023-11-07 PROCEDURE — 96375 TX/PRO/DX INJ NEW DRUG ADDON: CPT

## 2023-11-07 PROCEDURE — 6360000002 HC RX W HCPCS: Performed by: STUDENT IN AN ORGANIZED HEALTH CARE EDUCATION/TRAINING PROGRAM

## 2023-11-07 PROCEDURE — 99214 OFFICE O/P EST MOD 30 MIN: CPT

## 2023-11-07 PROCEDURE — 1100000000 HC RM PRIVATE

## 2023-11-07 PROCEDURE — 80053 COMPREHEN METABOLIC PANEL: CPT

## 2023-11-07 PROCEDURE — 83690 ASSAY OF LIPASE: CPT

## 2023-11-07 PROCEDURE — 96374 THER/PROPH/DIAG INJ IV PUSH: CPT

## 2023-11-07 PROCEDURE — 85025 COMPLETE CBC W/AUTO DIFF WBC: CPT

## 2023-11-07 PROCEDURE — 93005 ELECTROCARDIOGRAM TRACING: CPT | Performed by: INTERNAL MEDICINE

## 2023-11-07 PROCEDURE — 93970 EXTREMITY STUDY: CPT

## 2023-11-07 PROCEDURE — 83036 HEMOGLOBIN GLYCOSYLATED A1C: CPT

## 2023-11-07 PROCEDURE — 6360000004 HC RX CONTRAST MEDICATION: Performed by: STUDENT IN AN ORGANIZED HEALTH CARE EDUCATION/TRAINING PROGRAM

## 2023-11-07 PROCEDURE — 99214 OFFICE O/P EST MOD 30 MIN: CPT | Performed by: NURSE PRACTITIONER

## 2023-11-07 PROCEDURE — 6360000002 HC RX W HCPCS: Performed by: HOSPITALIST

## 2023-11-07 PROCEDURE — 1123F ACP DISCUSS/DSCN MKR DOCD: CPT

## 2023-11-07 PROCEDURE — 2580000003 HC RX 258: Performed by: STUDENT IN AN ORGANIZED HEALTH CARE EDUCATION/TRAINING PROGRAM

## 2023-11-07 PROCEDURE — 71275 CT ANGIOGRAPHY CHEST: CPT

## 2023-11-07 PROCEDURE — 93282 PRGRMG EVAL IMPLANTABLE DFB: CPT | Performed by: INTERNAL MEDICINE

## 2023-11-07 PROCEDURE — 83605 ASSAY OF LACTIC ACID: CPT

## 2023-11-07 PROCEDURE — 6370000000 HC RX 637 (ALT 250 FOR IP): Performed by: STUDENT IN AN ORGANIZED HEALTH CARE EDUCATION/TRAINING PROGRAM

## 2023-11-07 PROCEDURE — 85027 COMPLETE CBC AUTOMATED: CPT

## 2023-11-07 RX ORDER — ASPIRIN 81 MG/1
81 TABLET ORAL DAILY
Status: DISCONTINUED | OUTPATIENT
Start: 2023-11-07 | End: 2023-11-07

## 2023-11-07 RX ORDER — ADENOSINE 3 MG/ML
12 INJECTION, SOLUTION INTRAVENOUS ONCE
Status: DISCONTINUED | OUTPATIENT
Start: 2023-11-07 | End: 2023-11-09 | Stop reason: HOSPADM

## 2023-11-07 RX ORDER — HEPARIN SODIUM 10000 [USP'U]/100ML
5-30 INJECTION, SOLUTION INTRAVENOUS CONTINUOUS
Status: DISCONTINUED | OUTPATIENT
Start: 2023-11-07 | End: 2023-11-09

## 2023-11-07 RX ORDER — IPRATROPIUM BROMIDE AND ALBUTEROL SULFATE 2.5; .5 MG/3ML; MG/3ML
1 SOLUTION RESPIRATORY (INHALATION)
Status: DISCONTINUED | OUTPATIENT
Start: 2023-11-07 | End: 2023-11-08

## 2023-11-07 RX ORDER — ONDANSETRON 2 MG/ML
INJECTION INTRAMUSCULAR; INTRAVENOUS
Status: COMPLETED
Start: 2023-11-07 | End: 2023-11-07

## 2023-11-07 RX ORDER — VENLAFAXINE 37.5 MG/1
37.5 TABLET ORAL 2 TIMES DAILY WITH MEALS
Status: DISCONTINUED | OUTPATIENT
Start: 2023-11-07 | End: 2023-11-09 | Stop reason: HOSPADM

## 2023-11-07 RX ORDER — DEXTROSE MONOHYDRATE 100 MG/ML
INJECTION, SOLUTION INTRAVENOUS CONTINUOUS PRN
Status: DISCONTINUED | OUTPATIENT
Start: 2023-11-07 | End: 2023-11-09 | Stop reason: HOSPADM

## 2023-11-07 RX ORDER — INSULIN GLARGINE 100 [IU]/ML
55 INJECTION, SOLUTION SUBCUTANEOUS NIGHTLY
Status: DISCONTINUED | OUTPATIENT
Start: 2023-11-07 | End: 2023-11-09

## 2023-11-07 RX ORDER — 0.9 % SODIUM CHLORIDE 0.9 %
1000 INTRAVENOUS SOLUTION INTRAVENOUS ONCE
Status: COMPLETED | OUTPATIENT
Start: 2023-11-07 | End: 2023-11-07

## 2023-11-07 RX ORDER — ASPIRIN 81 MG/1
324 TABLET, CHEWABLE ORAL
Status: COMPLETED | OUTPATIENT
Start: 2023-11-07 | End: 2023-11-07

## 2023-11-07 RX ORDER — ENOXAPARIN SODIUM 100 MG/ML
30 INJECTION SUBCUTANEOUS DAILY
Status: DISCONTINUED | OUTPATIENT
Start: 2023-11-07 | End: 2023-11-07

## 2023-11-07 RX ORDER — HEPARIN SODIUM 1000 [USP'U]/ML
4000 INJECTION, SOLUTION INTRAVENOUS; SUBCUTANEOUS ONCE
Status: COMPLETED | OUTPATIENT
Start: 2023-11-07 | End: 2023-11-07

## 2023-11-07 RX ORDER — ISOSORBIDE DINITRATE 10 MG/1
20 TABLET ORAL 3 TIMES DAILY
Status: DISCONTINUED | OUTPATIENT
Start: 2023-11-07 | End: 2023-11-09 | Stop reason: HOSPADM

## 2023-11-07 RX ORDER — DULOXETIN HYDROCHLORIDE 30 MG/1
30 CAPSULE, DELAYED RELEASE ORAL DAILY
Status: DISCONTINUED | OUTPATIENT
Start: 2023-11-07 | End: 2023-11-09 | Stop reason: HOSPADM

## 2023-11-07 RX ORDER — HEPARIN SODIUM 1000 [USP'U]/ML
2000 INJECTION, SOLUTION INTRAVENOUS; SUBCUTANEOUS PRN
Status: DISCONTINUED | OUTPATIENT
Start: 2023-11-07 | End: 2023-11-09

## 2023-11-07 RX ORDER — HYDRALAZINE HYDROCHLORIDE 25 MG/1
100 TABLET, FILM COATED ORAL 3 TIMES DAILY
Status: DISCONTINUED | OUTPATIENT
Start: 2023-11-07 | End: 2023-11-09 | Stop reason: HOSPADM

## 2023-11-07 RX ORDER — ONDANSETRON 2 MG/ML
4 INJECTION INTRAMUSCULAR; INTRAVENOUS EVERY 6 HOURS PRN
Status: DISCONTINUED | OUTPATIENT
Start: 2023-11-07 | End: 2023-11-09 | Stop reason: HOSPADM

## 2023-11-07 RX ORDER — HEPARIN SODIUM 1000 [USP'U]/ML
4000 INJECTION, SOLUTION INTRAVENOUS; SUBCUTANEOUS PRN
Status: DISCONTINUED | OUTPATIENT
Start: 2023-11-07 | End: 2023-11-09

## 2023-11-07 RX ORDER — FUROSEMIDE 40 MG/1
80 TABLET ORAL DAILY
Status: DISCONTINUED | OUTPATIENT
Start: 2023-11-08 | End: 2023-11-09 | Stop reason: HOSPADM

## 2023-11-07 RX ORDER — FUROSEMIDE 40 MG/1
40 TABLET ORAL ONCE
Status: COMPLETED | OUTPATIENT
Start: 2023-11-07 | End: 2023-11-07

## 2023-11-07 RX ORDER — ASPIRIN 81 MG/1
81 TABLET ORAL DAILY
Status: DISCONTINUED | OUTPATIENT
Start: 2023-11-08 | End: 2023-11-09 | Stop reason: HOSPADM

## 2023-11-07 RX ORDER — ACETAMINOPHEN 325 MG/1
650 TABLET ORAL EVERY 8 HOURS
Status: DISCONTINUED | OUTPATIENT
Start: 2023-11-07 | End: 2023-11-09 | Stop reason: HOSPADM

## 2023-11-07 RX ORDER — ATORVASTATIN CALCIUM 20 MG/1
20 TABLET, FILM COATED ORAL DAILY
Status: DISCONTINUED | OUTPATIENT
Start: 2023-11-07 | End: 2023-11-09 | Stop reason: HOSPADM

## 2023-11-07 RX ORDER — CARVEDILOL 12.5 MG/1
25 TABLET ORAL 2 TIMES DAILY
Status: DISCONTINUED | OUTPATIENT
Start: 2023-11-07 | End: 2023-11-09 | Stop reason: HOSPADM

## 2023-11-07 RX ORDER — ADENOSINE 3 MG/ML
6 INJECTION, SOLUTION INTRAVENOUS ONCE
Status: COMPLETED | OUTPATIENT
Start: 2023-11-07 | End: 2023-11-07

## 2023-11-07 RX ORDER — MONTELUKAST SODIUM 4 MG/1
1 TABLET, CHEWABLE ORAL 2 TIMES DAILY
Status: DISCONTINUED | OUTPATIENT
Start: 2023-11-07 | End: 2023-11-09 | Stop reason: HOSPADM

## 2023-11-07 RX ADMIN — SODIUM CHLORIDE 1000 ML: 9 INJECTION, SOLUTION INTRAVENOUS at 11:43

## 2023-11-07 RX ADMIN — IPRATROPIUM BROMIDE AND ALBUTEROL SULFATE 1 DOSE: .5; 3 SOLUTION RESPIRATORY (INHALATION) at 21:53

## 2023-11-07 RX ADMIN — ATORVASTATIN CALCIUM 20 MG: 20 TABLET, FILM COATED ORAL at 17:19

## 2023-11-07 RX ADMIN — INSULIN GLARGINE 55 UNITS: 100 INJECTION, SOLUTION SUBCUTANEOUS at 21:11

## 2023-11-07 RX ADMIN — ADENOSINE 6 MG: 3 INJECTION INTRAVENOUS at 11:47

## 2023-11-07 RX ADMIN — IPRATROPIUM BROMIDE AND ALBUTEROL SULFATE 1 DOSE: .5; 3 SOLUTION RESPIRATORY (INHALATION) at 15:27

## 2023-11-07 RX ADMIN — HEPARIN SODIUM 4000 UNITS: 1000 INJECTION INTRAVENOUS; SUBCUTANEOUS at 18:01

## 2023-11-07 RX ADMIN — CARVEDILOL 25 MG: 12.5 TABLET, FILM COATED ORAL at 21:01

## 2023-11-07 RX ADMIN — COLESTIPOL HYDROCHLORIDE 1 G: 1 TABLET, FILM COATED ORAL at 21:01

## 2023-11-07 RX ADMIN — ACETAMINOPHEN 650 MG: 325 TABLET ORAL at 21:01

## 2023-11-07 RX ADMIN — ISOSORBIDE DINITRATE 20 MG: 20 TABLET ORAL at 17:20

## 2023-11-07 RX ADMIN — IOPAMIDOL 75 ML: 755 INJECTION, SOLUTION INTRAVENOUS at 13:15

## 2023-11-07 RX ADMIN — HYDRALAZINE HYDROCHLORIDE 100 MG: 25 TABLET, FILM COATED ORAL at 17:18

## 2023-11-07 RX ADMIN — HEPARIN SODIUM AND DEXTROSE 12 UNITS/KG/HR: 10000; 5 INJECTION INTRAVENOUS at 18:03

## 2023-11-07 RX ADMIN — ACETAMINOPHEN 650 MG: 325 TABLET ORAL at 17:19

## 2023-11-07 RX ADMIN — ONDANSETRON 4 MG: 2 INJECTION INTRAMUSCULAR; INTRAVENOUS at 11:52

## 2023-11-07 RX ADMIN — ASPIRIN 324 MG: 81 TABLET, CHEWABLE ORAL at 13:39

## 2023-11-07 RX ADMIN — FUROSEMIDE 40 MG: 40 TABLET ORAL at 17:19

## 2023-11-07 RX ADMIN — VENLAFAXINE HYDROCHLORIDE 37.5 MG: 37.5 TABLET ORAL at 17:20

## 2023-11-07 RX ADMIN — DULOXETINE HYDROCHLORIDE 30 MG: 30 CAPSULE, DELAYED RELEASE ORAL at 17:19

## 2023-11-07 RX ADMIN — CARVEDILOL 25 MG: 12.5 TABLET, FILM COATED ORAL at 17:19

## 2023-11-07 RX ADMIN — HYDRALAZINE HYDROCHLORIDE 100 MG: 25 TABLET, FILM COATED ORAL at 21:11

## 2023-11-07 ASSESSMENT — PATIENT HEALTH QUESTIONNAIRE - PHQ9
2. FEELING DOWN, DEPRESSED OR HOPELESS: 0
SUM OF ALL RESPONSES TO PHQ QUESTIONS 1-9: 0
1. LITTLE INTEREST OR PLEASURE IN DOING THINGS: 0
SUM OF ALL RESPONSES TO PHQ QUESTIONS 1-9: 0
SUM OF ALL RESPONSES TO PHQ9 QUESTIONS 1 & 2: 0
SUM OF ALL RESPONSES TO PHQ QUESTIONS 1-9: 0
SUM OF ALL RESPONSES TO PHQ QUESTIONS 1-9: 0

## 2023-11-07 ASSESSMENT — PAIN SCALES - GENERAL: PAINLEVEL_OUTOF10: 4

## 2023-11-07 ASSESSMENT — PAIN - FUNCTIONAL ASSESSMENT: PAIN_FUNCTIONAL_ASSESSMENT: 0-10

## 2023-11-07 ASSESSMENT — PAIN DESCRIPTION - LOCATION: LOCATION: CHEST

## 2023-11-07 ASSESSMENT — ENCOUNTER SYMPTOMS
SHORTNESS OF BREATH: 0
ABDOMINAL PAIN: 0

## 2023-11-07 NOTE — ED NOTES
Pt placed on 2L NC at this time oxygen was 88% pt reports has history of COPD and wears oxygen as needed.       Kyler Guerra RN  11/07/23 6282

## 2023-11-07 NOTE — ED NOTES
At bedside to assist with adenosine administration, pt has 18g established in [facility]; pt was placed on life pack with pads pt was placed on 12 lead as well, Dr Lisandro Keene at bedside to explain procedure and medication to patient, pt verbalizes all teaching. Pt tolerated administration of adenosine, converted to a NSR, EKG captured for conversion. Pt blood pressure responded as well. Pt reports some nausea, zofran administered.      Kei Bagley RN  11/07/23 2285

## 2023-11-07 NOTE — ED PROVIDER NOTES
11:28 AM  I have evaluated the patient as the Provider in Rapid Medical Evaluation (RME). I have reviewed her vital signs and the triage nurse assessment. I have talked with the patient and any available family and advised that I am the provider in triage and have ordered the appropriate study to initiate their work up based on the clinical presentation during my assessment. I have advised that the patient will be accommodated in the Main ED as soon as possible. I have also requested to contact the triage nurse or myself immediately if the patient experiences any changes in their condition during this brief waiting period. 59-year-old female brought from cardiology outpatient appointment with complaints of chest pressure, dizziness, heart rate in the 150s. Patient also short of breath. Placed in room. Dr. Blair Casper at bedside. Hypotensive in triage.    EFRAIN Fierro NP, APRN - NP  11/07/23 6104

## 2023-11-07 NOTE — ED PROVIDER NOTES
[facility] EMERGENCY DEPT  EMERGENCY DEPARTMENT ENCOUNTER      Pt Name: Aron Hitchcock  MRN: [id number]  [address] [facility] 1940  Date of evaluation: 11/7/2023  Provider: Lyndsay Zhao       Chief Complaint   Patient presents with    Chest Pain         HISTORY OF PRESENT ILLNESS   (Location/Symptom, Timing/Onset, Context/Setting, Quality, Duration, Modifying Factors, Severity)  Note limiting factors. 79-year-old female presents ED for evaluation of chest discomfort, low blood pressure dizziness. Patient was being seen today for a AICD checkup in the cardiology department and became dizzy was found to be hypotensive and tachycardic and sent to the ED for further evaluation. Patient has had a history of A-fib, ACS and heart failure. .             Review of External Medical Records:     Nursing Notes were reviewed. REVIEW OF SYSTEMS    (2-9 systems for level 4, 10 or more for level 5)     Review of Systems   Constitutional:  Negative for fever. Respiratory:  Negative for shortness of breath. Cardiovascular:  Positive for chest pain and palpitations. Gastrointestinal:  Negative for abdominal pain. Neurological:  Positive for dizziness. Except as noted above the remainder of the review of systems was reviewed and negative.        PAST MEDICAL HISTORY     Past Medical History:   Diagnosis Date    A-fib [facility])     AICD (automatic cardioverter/defibrillator) present 9/23/2020 9/23/2020 [employer] AICD implant    Arthritis     Bilateral pleural effusion 9/18/2020    Chronic pain     Chronic pain     Diabetes ([address])     Diverticular disease     Elevated troponin 9/18/2020    Hemorrhoid     Hypercholesterolemia     IBS (irritable bowel syndrome)     Lymphocytosis 70/87/5243    Systolic heart failure, chronic ([address]) 7/16/2021    Type 2 MI (myocardial infarction) ([address]) 7/16/2021 7/162021 r/t sepsis         SURGICAL HISTORY       Past Surgical History:   Procedure Laterality

## 2023-11-07 NOTE — ED NOTES
Dr Ruby Duffy made aware of troponin level, to repeat EKG and notify cards     Brant Cruz RN  11/07/23 3765       Brant Cruz RN  11/07/23 1640

## 2023-11-07 NOTE — PROGRESS NOTES
Patient was referred to ER from EP clinic after seeing NP today   Initial rhythm showed likely atrial flutter 2:1 av conduction   Office called me while I am at [facility] today so will ask Dr Brittany Matthews and cardiology NP to see.   Troponin is negative

## 2023-11-07 NOTE — H&P
[address] [place] [address]  HISTORY AND PHYSICAL    Name:  Mckenna Villagran  MR#:  [id number]  :  1940  ACCOUNT #:  [de-identified]  ADMIT DATE:  2023    PRESENTING COMPLAINT:  Sent from cardiology office due to chest pain. HISTORY OF PRESENT ILLNESS:      The patient is an 41-year-old  female who has previous history significant for dilated cardiomyopathy, history of AICD implantation, history of paroxysmal atrial fibrillation, hypertension, hyperlipidemia, chronic kidney disease, heavy smoker went to Cardiology office for checkup on her device. While she was there, she complained of chest pain with radiation to the neck. The patient states it lasted for 25 minutes. She describes it as 5/10. The patient was sent to the ER where she was found to be in SVT. The patient was subsequently given one dose of adenosine and she converted to normal sinus rhythm. At this time, she is pain-free. Initially, she was hypotensive when her heart rate was fast.  The patient has chronic leg edema. She tells me she did not take her medications this morning as she drove two hours. The patient states she is supposed to be on oxygen but she does not use it at home. Oxygen is only for emergency use. In the ER, a CTA of the chest showed small to moderate loculated right pleural effusion and subacute right hip fracture. No significant clot was found. The ascending thoracic aorta and arch were normal.    PAST MEDICAL HISTORY:  Significant for;  1. Systolic heart failure. 2.  Dilated cardiomyopathy. Last echo showed ejection fraction of 35-40%. 3.  History of AICD placement. 4.  History of paroxysmal atrial fibrillation. 5.  Hypertension. 6.  Hyperlipidemia. 7.  COPD. 8.  History of nicotine abuse. 9.  Chronic kidney disease. CURRENT MEDICATIONS:  Her current medications are not clear but she thinks she takes;  1. Lipitor 20 mg daily. 2.  Lasix 80 in the morning 40 at night.   3.

## 2023-11-07 NOTE — ED TRIAGE NOTES
Patient sent to ER from outpatient cardiology for chest pressure, dizziness, and hand tingling. Was being seen for a routine check up on her pacemaker that was placed in may.  Patient of Dr. Demi Sharma

## 2023-11-07 NOTE — ED NOTES
Pt requesting nicotine patch, reached out to Dr Killian Anderson, Gregory Lopez, RINA  11/07/23 7248

## 2023-11-07 NOTE — PROGRESS NOTES
Room #: 2    C/o chest pressure up into her neck. EKG done. Chief Complaint   Patient presents with    Device Check    Follow-up    Cardiomyopathy    Atrial Fibrillation       Vitals:    11/07/23 1008   BP: (!) 88/56   Site: Right Upper Arm   Position: Sitting   Cuff Size: Medium Adult   Pulse: (!) 157   Resp: 18   SpO2: 94%   Height: 1.524 m (5')         Chest pain:  YES  Shortness of breath:  YES  Edema: YES  Palpitations, skipped beats, rapid heartbeat:  YES  Dizziness:  NO    1. Have you been to the ER, urgent care clinic since your last visit? Hospitalized since your last visit? NO    2. Have you seen or consulted any other health care providers outside of the [address] [place] [address] since your last visit? Include any pap smears or colon screening.  NO      Refills:  NO

## 2023-11-07 NOTE — ED NOTES
Pt placed on cardiac monitor with defib pads in preparing  for  cardioversion  with adenosine. MD at bedside. RRT nurse present. Pt 140 bpm BP 91/64.       Lotus Rodriges RN  11/07/23 0375

## 2023-11-08 PROBLEM — E11.40 TYPE 2 DIABETES MELLITUS WITH DIABETIC NEUROPATHY (HCC): Status: ACTIVE | Noted: 2018-02-15

## 2023-11-08 PROBLEM — Z95.810 AICD (AUTOMATIC CARDIOVERTER/DEFIBRILLATOR) PRESENT: Status: ACTIVE | Noted: 2020-09-23

## 2023-11-08 PROBLEM — I50.22 CHRONIC HFREF (HEART FAILURE WITH REDUCED EJECTION FRACTION) (HCC): Status: ACTIVE | Noted: 2021-07-16

## 2023-11-08 PROBLEM — J96.21 ACUTE ON CHRONIC RESPIRATORY FAILURE WITH HYPOXIA (HCC): Status: ACTIVE | Noted: 2020-09-18

## 2023-11-08 PROBLEM — R53.81 PHYSICAL DEBILITY: Status: ACTIVE | Noted: 2017-10-27

## 2023-11-08 PROBLEM — J96.01 ACUTE RESPIRATORY FAILURE WITH HYPOXIA (HCC): Status: ACTIVE | Noted: 2020-09-18

## 2023-11-08 PROBLEM — I48.0 PAF (PAROXYSMAL ATRIAL FIBRILLATION) (HCC): Status: ACTIVE | Noted: 2020-09-10

## 2023-11-08 PROBLEM — J44.9 COPD (CHRONIC OBSTRUCTIVE PULMONARY DISEASE) (HCC): Status: ACTIVE | Noted: 2021-08-26

## 2023-11-08 PROBLEM — I20.89 ANGINAL CHEST PAIN AT REST: Status: ACTIVE | Noted: 2023-11-08

## 2023-11-08 LAB
ECHO BSA: 1.83 M2
EKG ATRIAL RATE: 96 BPM
EKG DIAGNOSIS: NORMAL
EKG DIAGNOSIS: NORMAL
EKG P-R INTERVAL: 156 MS
EKG Q-T INTERVAL: 326 MS
EKG Q-T INTERVAL: 368 MS
EKG QRS DURATION: 112 MS
EKG QRS DURATION: 120 MS
EKG QTC CALCULATION (BAZETT): 464 MS
EKG QTC CALCULATION (BAZETT): 506 MS
EKG R AXIS: 51 DEGREES
EKG R AXIS: 51 DEGREES
EKG T AXIS: 215 DEGREES
EKG T AXIS: 224 DEGREES
EKG VENTRICULAR RATE: 145 BPM
EKG VENTRICULAR RATE: 96 BPM
GLUCOSE BLD STRIP.AUTO-MCNC: 114 MG/DL (ref 65–117)
GLUCOSE BLD STRIP.AUTO-MCNC: 159 MG/DL (ref 65–117)
GLUCOSE BLD STRIP.AUTO-MCNC: 85 MG/DL (ref 65–117)
SERVICE CMNT-IMP: ABNORMAL
SERVICE CMNT-IMP: NORMAL
SERVICE CMNT-IMP: NORMAL
TROPONIN I SERPL HS-MCNC: 201 NG/L (ref 0–51)
UFH PPP CHRO-ACNC: 0.28 IU/ML
UFH PPP CHRO-ACNC: 0.33 IU/ML
UFH PPP CHRO-ACNC: 0.39 IU/ML
UFH PPP CHRO-ACNC: 0.39 IU/ML
UFH PPP CHRO-ACNC: 0.59 IU/ML

## 2023-11-08 PROCEDURE — 2700000000 HC OXYGEN THERAPY PER DAY

## 2023-11-08 PROCEDURE — 6370000000 HC RX 637 (ALT 250 FOR IP): Performed by: HOSPITALIST

## 2023-11-08 PROCEDURE — 6360000002 HC RX W HCPCS: Performed by: HOSPITALIST

## 2023-11-08 PROCEDURE — 36415 COLL VENOUS BLD VENIPUNCTURE: CPT

## 2023-11-08 PROCEDURE — 82962 GLUCOSE BLOOD TEST: CPT

## 2023-11-08 PROCEDURE — 93010 ELECTROCARDIOGRAM REPORT: CPT | Performed by: SPECIALIST

## 2023-11-08 PROCEDURE — 85520 HEPARIN ASSAY: CPT

## 2023-11-08 PROCEDURE — 94640 AIRWAY INHALATION TREATMENT: CPT

## 2023-11-08 PROCEDURE — 1100000003 HC PRIVATE W/ TELEMETRY

## 2023-11-08 PROCEDURE — 99223 1ST HOSP IP/OBS HIGH 75: CPT | Performed by: INTERNAL MEDICINE

## 2023-11-08 PROCEDURE — 6370000000 HC RX 637 (ALT 250 FOR IP)

## 2023-11-08 PROCEDURE — 84484 ASSAY OF TROPONIN QUANT: CPT

## 2023-11-08 PROCEDURE — 94761 N-INVAS EAR/PLS OXIMETRY MLT: CPT

## 2023-11-08 RX ORDER — OXYCODONE HYDROCHLORIDE 5 MG/1
5 TABLET ORAL ONCE
Status: COMPLETED | OUTPATIENT
Start: 2023-11-08 | End: 2023-11-08

## 2023-11-08 RX ORDER — IPRATROPIUM BROMIDE AND ALBUTEROL SULFATE 2.5; .5 MG/3ML; MG/3ML
1 SOLUTION RESPIRATORY (INHALATION)
Status: DISCONTINUED | OUTPATIENT
Start: 2023-11-09 | End: 2023-11-09 | Stop reason: HOSPADM

## 2023-11-08 RX ORDER — INSULIN LISPRO 100 [IU]/ML
0-8 INJECTION, SOLUTION INTRAVENOUS; SUBCUTANEOUS
Status: DISCONTINUED | OUTPATIENT
Start: 2023-11-08 | End: 2023-11-09 | Stop reason: HOSPADM

## 2023-11-08 RX ORDER — INSULIN LISPRO 100 [IU]/ML
0-4 INJECTION, SOLUTION INTRAVENOUS; SUBCUTANEOUS NIGHTLY
Status: DISCONTINUED | OUTPATIENT
Start: 2023-11-08 | End: 2023-11-09 | Stop reason: HOSPADM

## 2023-11-08 RX ORDER — IPRATROPIUM BROMIDE AND ALBUTEROL SULFATE 2.5; .5 MG/3ML; MG/3ML
1 SOLUTION RESPIRATORY (INHALATION)
Status: DISCONTINUED | OUTPATIENT
Start: 2023-11-08 | End: 2023-11-08

## 2023-11-08 RX ADMIN — IPRATROPIUM BROMIDE AND ALBUTEROL SULFATE 1 DOSE: .5; 3 SOLUTION RESPIRATORY (INHALATION) at 13:27

## 2023-11-08 RX ADMIN — CARVEDILOL 25 MG: 12.5 TABLET, FILM COATED ORAL at 21:00

## 2023-11-08 RX ADMIN — COLESTIPOL HYDROCHLORIDE 1 G: 1 TABLET, FILM COATED ORAL at 08:23

## 2023-11-08 RX ADMIN — IPRATROPIUM BROMIDE AND ALBUTEROL SULFATE 1 DOSE: .5; 3 SOLUTION RESPIRATORY (INHALATION) at 08:30

## 2023-11-08 RX ADMIN — ISOSORBIDE DINITRATE 20 MG: 20 TABLET ORAL at 14:33

## 2023-11-08 RX ADMIN — COLESTIPOL HYDROCHLORIDE 1 G: 1 TABLET, FILM COATED ORAL at 22:39

## 2023-11-08 RX ADMIN — FUROSEMIDE 80 MG: 40 TABLET ORAL at 09:08

## 2023-11-08 RX ADMIN — ISOSORBIDE DINITRATE 20 MG: 20 TABLET ORAL at 08:27

## 2023-11-08 RX ADMIN — CARVEDILOL 25 MG: 12.5 TABLET, FILM COATED ORAL at 08:19

## 2023-11-08 RX ADMIN — HEPARIN SODIUM 2000 UNITS: 1000 INJECTION INTRAVENOUS; SUBCUTANEOUS at 07:22

## 2023-11-08 RX ADMIN — ATORVASTATIN CALCIUM 20 MG: 20 TABLET, FILM COATED ORAL at 08:18

## 2023-11-08 RX ADMIN — VENLAFAXINE HYDROCHLORIDE 37.5 MG: 37.5 TABLET ORAL at 17:55

## 2023-11-08 RX ADMIN — OXYCODONE HYDROCHLORIDE 5 MG: 5 TABLET ORAL at 19:51

## 2023-11-08 RX ADMIN — ACETAMINOPHEN 650 MG: 325 TABLET ORAL at 06:15

## 2023-11-08 RX ADMIN — ASPIRIN 81 MG: 81 TABLET, COATED ORAL at 08:18

## 2023-11-08 RX ADMIN — ACETAMINOPHEN 650 MG: 325 TABLET ORAL at 14:33

## 2023-11-08 RX ADMIN — DULOXETINE HYDROCHLORIDE 30 MG: 30 CAPSULE, DELAYED RELEASE ORAL at 08:21

## 2023-11-08 RX ADMIN — ISOSORBIDE DINITRATE 20 MG: 20 TABLET ORAL at 17:55

## 2023-11-08 RX ADMIN — VENLAFAXINE HYDROCHLORIDE 37.5 MG: 37.5 TABLET ORAL at 08:26

## 2023-11-08 RX ADMIN — HEPARIN SODIUM AND DEXTROSE 14 UNITS/KG/HR: 10000; 5 INJECTION INTRAVENOUS at 17:30

## 2023-11-08 RX ADMIN — HEPARIN SODIUM AND DEXTROSE 14 UNITS/KG/HR: 10000; 5 INJECTION INTRAVENOUS at 07:22

## 2023-11-08 RX ADMIN — ACETAMINOPHEN 650 MG: 325 TABLET ORAL at 21:00

## 2023-11-08 ASSESSMENT — PAIN SCALES - GENERAL
PAINLEVEL_OUTOF10: 3
PAINLEVEL_OUTOF10: 5
PAINLEVEL_OUTOF10: 6
PAINLEVEL_OUTOF10: 5
PAINLEVEL_OUTOF10: 0

## 2023-11-08 ASSESSMENT — PAIN DESCRIPTION - ORIENTATION: ORIENTATION: LEFT

## 2023-11-08 ASSESSMENT — PAIN DESCRIPTION - LOCATION: LOCATION: BACK

## 2023-11-08 NOTE — ED NOTES
TRANSFER - OUT REPORT:    Verbal report given to [facility] on Vish Beaver  being transferred to [facility] for routine progression of patient care       Report consisted of patient's Situation, Background, Assessment and   Recommendations(SBAR). Information from the following report(s) ED SBAR, MAR, and Recent Results was reviewed with the receiving nurse. [place] Assessment:    Presents to emergency department  because of falls (Syncope, seizure, or loss of consciousness): No  Age > 70: Yes  Altered Mental Status, Intoxication with alcohol or substance confusion (Disorientation, impaired judgment, poor safety awaremess, or inability to follow instructions): No  Impaired Mobility: Ambulates or transfers with assistive devices or assistance; Unable to ambulate or transer.: Yes  Nursing Judgement: Yes          Lines:   Peripheral IV 11/07/23 Left Antecubital (Active)       Peripheral IV 11/07/23 Distal;Right Forearm (Active)   Site Assessment Clean, dry & intact 11/07/23 1800   Line Status Blood return noted 11/07/23 1800   Phlebitis Assessment No symptoms 11/07/23 1800   Infiltration Assessment 0 11/07/23 1800        Opportunity for questions and clarification was provided.       Patient transported with:  Monitor  Oxygen O2           Lane Zabala [place]  11/08/23 9472

## 2023-11-08 NOTE — ED NOTES
Bedside and Verbal shift change report given to  Wil Ortega (oncoming nurse) by Amelia Cardenas (offgoing nurse). Report included the following information Nurse Handoff Report, ED Encounter Summary, MAR, and Recent Results.         Lana Pelletier RN  11/07/23 2550

## 2023-11-08 NOTE — CONSULTS
(2-31). The celiac artery origin is stenotic (301-34); no significant  poststenotic dilation. Chest: Mild passive atelectasis is associated with a small to moderate,  loculated, right pleural effusion. There is a subacute, healing fracture of the  right, lateral, sixth rib, and probably subacute, healing fractures of the  anterolateral seventh and eighth ribs. A fracture of the right clavicular head  (602-70) is not significantly changed from 6/21/2023. The left lung is clear. Centrilobular emphysema is minimal. The central airways  are patent. No pneumothorax. There are multiple mildly enlarged mediastinal  lymph nodes. For example, a pretracheal lymph node measures 13 mm in short axis  (2-86). These have decreased from 6/21/2023, and are of questionable  significance. The left lobe of the thyroid is replaced by nodules. Incidental  note is made of a midline sebaceous cyst superficial to the manubrium. Post  cholecystectomy. Hepatic surface nodularity is likely senescent rather than  cirrhotic. Impression  1. Small to moderate, loculated, right pleural effusion. 2. Subacute right rib fractures. 3. Short segment occlusion of the left vertebral ostium. 4. Celiac artery origin stenosis. 5. Minimal emphysema. 6. Mediastinal lymphadenopathy has decreased and is of questionable  significance.       Lab Results   Component Value Date/Time     11/07/2023 11:45 AM    K 4.0 11/07/2023 11:45 AM     11/07/2023 11:45 AM    CO2 23 11/07/2023 11:45 AM    BUN 41 11/07/2023 11:45 AM    CREATININE 2.20 11/07/2023 11:45 AM    GLUCOSE 170 11/07/2023 11:45 AM    CALCIUM 9.9 11/07/2023 11:45 AM    LABGLOM 22 11/07/2023 11:45 AM      Lab Results   Component Value Date    BNP 16,198 (H) 04/05/2023     Lab Results   Component Value Date    WBC 10.8 11/07/2023    HGB 11.3 (L) 11/07/2023    HCT 34.9 (L) 11/07/2023    MCV 92.6 11/07/2023     11/07/2023        Current meds:    Current Facility-Administered

## 2023-11-08 NOTE — FLOWSHEET NOTE
Repeat trop 293. Will trend to peak. Patient is w/o chest pain. Hep gtt as ordered. Nursing aware and will continue to monitor. ADDENDUM: Repeat trop 201. Hep gtt. Nursing to continue to monitor.

## 2023-11-08 NOTE — ED NOTES
Report given to [facility].       Tulio Mao RN  11/08/23 0669

## 2023-11-09 VITALS
OXYGEN SATURATION: 92 % | DIASTOLIC BLOOD PRESSURE: 70 MMHG | WEIGHT: 166.67 LBS | HEIGHT: 66 IN | RESPIRATION RATE: 22 BRPM | HEART RATE: 75 BPM | SYSTOLIC BLOOD PRESSURE: 146 MMHG | BODY MASS INDEX: 26.79 KG/M2 | TEMPERATURE: 98 F

## 2023-11-09 PROBLEM — I25.10 MULTIPLE VESSEL CORONARY ARTERY DISEASE: Status: ACTIVE | Noted: 2023-11-09

## 2023-11-09 LAB
ANION GAP SERPL CALC-SCNC: 6 MMOL/L (ref 5–15)
BUN SERPL-MCNC: 49 MG/DL (ref 6–20)
BUN/CREAT SERPL: 21 (ref 12–20)
CALCIUM SERPL-MCNC: 8.4 MG/DL (ref 8.5–10.1)
CHLORIDE SERPL-SCNC: 110 MMOL/L (ref 97–108)
CO2 SERPL-SCNC: 23 MMOL/L (ref 21–32)
CREAT SERPL-MCNC: 2.33 MG/DL (ref 0.55–1.02)
ECHO BSA: 1.83 M2
EKG ATRIAL RATE: 69 BPM
EKG ATRIAL RATE: 73 BPM
EKG DIAGNOSIS: NORMAL
EKG DIAGNOSIS: NORMAL
EKG P AXIS: 73 DEGREES
EKG P AXIS: 79 DEGREES
EKG P-R INTERVAL: 180 MS
EKG P-R INTERVAL: 194 MS
EKG Q-T INTERVAL: 420 MS
EKG Q-T INTERVAL: 426 MS
EKG QRS DURATION: 110 MS
EKG QRS DURATION: 122 MS
EKG QTC CALCULATION (BAZETT): 456 MS
EKG QTC CALCULATION (BAZETT): 462 MS
EKG R AXIS: 63 DEGREES
EKG R AXIS: 66 DEGREES
EKG T AXIS: 221 DEGREES
EKG T AXIS: 247 DEGREES
EKG VENTRICULAR RATE: 69 BPM
EKG VENTRICULAR RATE: 73 BPM
ERYTHROCYTE [DISTWIDTH] IN BLOOD BY AUTOMATED COUNT: 14.8 % (ref 11.5–14.5)
GLUCOSE BLD STRIP.AUTO-MCNC: 100 MG/DL (ref 65–117)
GLUCOSE BLD STRIP.AUTO-MCNC: 112 MG/DL (ref 65–117)
GLUCOSE BLD STRIP.AUTO-MCNC: 129 MG/DL (ref 65–117)
GLUCOSE BLD STRIP.AUTO-MCNC: 173 MG/DL (ref 65–117)
GLUCOSE BLD STRIP.AUTO-MCNC: 65 MG/DL (ref 65–117)
GLUCOSE BLD STRIP.AUTO-MCNC: 69 MG/DL (ref 65–117)
GLUCOSE SERPL-MCNC: 48 MG/DL (ref 65–100)
HCT VFR BLD AUTO: 32.1 % (ref 35–47)
HGB BLD-MCNC: 10.1 G/DL (ref 11.5–16)
MCH RBC QN AUTO: 29.7 PG (ref 26–34)
MCHC RBC AUTO-ENTMCNC: 31.5 G/DL (ref 30–36.5)
MCV RBC AUTO: 94.4 FL (ref 80–99)
NRBC # BLD: 0 K/UL (ref 0–0.01)
NRBC BLD-RTO: 0 PER 100 WBC
PLATELET # BLD AUTO: 291 K/UL (ref 150–400)
PMV BLD AUTO: 10 FL (ref 8.9–12.9)
POTASSIUM SERPL-SCNC: 4.5 MMOL/L (ref 3.5–5.1)
RBC # BLD AUTO: 3.4 M/UL (ref 3.8–5.2)
SERVICE CMNT-IMP: ABNORMAL
SERVICE CMNT-IMP: ABNORMAL
SERVICE CMNT-IMP: NORMAL
SODIUM SERPL-SCNC: 139 MMOL/L (ref 136–145)
UFH PPP CHRO-ACNC: 0.43 IU/ML
WBC # BLD AUTO: 11.6 K/UL (ref 3.6–11)

## 2023-11-09 PROCEDURE — 2700000000 HC OXYGEN THERAPY PER DAY

## 2023-11-09 PROCEDURE — 2580000003 HC RX 258: Performed by: STUDENT IN AN ORGANIZED HEALTH CARE EDUCATION/TRAINING PROGRAM

## 2023-11-09 PROCEDURE — 92978 ENDOLUMINL IVUS OCT C 1ST: CPT | Performed by: STUDENT IN AN ORGANIZED HEALTH CARE EDUCATION/TRAINING PROGRAM

## 2023-11-09 PROCEDURE — 99153 MOD SED SAME PHYS/QHP EA: CPT | Performed by: STUDENT IN AN ORGANIZED HEALTH CARE EDUCATION/TRAINING PROGRAM

## 2023-11-09 PROCEDURE — 94761 N-INVAS EAR/PLS OXIMETRY MLT: CPT

## 2023-11-09 PROCEDURE — 6360000004 HC RX CONTRAST MEDICATION: Performed by: STUDENT IN AN ORGANIZED HEALTH CARE EDUCATION/TRAINING PROGRAM

## 2023-11-09 PROCEDURE — 80048 BASIC METABOLIC PNL TOTAL CA: CPT

## 2023-11-09 PROCEDURE — 6370000000 HC RX 637 (ALT 250 FOR IP): Performed by: INTERNAL MEDICINE

## 2023-11-09 PROCEDURE — 82962 GLUCOSE BLOOD TEST: CPT

## 2023-11-09 PROCEDURE — 93458 L HRT ARTERY/VENTRICLE ANGIO: CPT | Performed by: STUDENT IN AN ORGANIZED HEALTH CARE EDUCATION/TRAINING PROGRAM

## 2023-11-09 PROCEDURE — 36415 COLL VENOUS BLD VENIPUNCTURE: CPT

## 2023-11-09 PROCEDURE — 6360000002 HC RX W HCPCS: Performed by: STUDENT IN AN ORGANIZED HEALTH CARE EDUCATION/TRAINING PROGRAM

## 2023-11-09 PROCEDURE — 85520 HEPARIN ASSAY: CPT

## 2023-11-09 PROCEDURE — C1769 GUIDE WIRE: HCPCS | Performed by: STUDENT IN AN ORGANIZED HEALTH CARE EDUCATION/TRAINING PROGRAM

## 2023-11-09 PROCEDURE — 2580000003 HC RX 258: Performed by: NURSE PRACTITIONER

## 2023-11-09 PROCEDURE — 6370000000 HC RX 637 (ALT 250 FOR IP): Performed by: HOSPITALIST

## 2023-11-09 PROCEDURE — 6370000000 HC RX 637 (ALT 250 FOR IP): Performed by: NURSE PRACTITIONER

## 2023-11-09 PROCEDURE — 4A023N7 MEASUREMENT OF CARDIAC SAMPLING AND PRESSURE, LEFT HEART, PERCUTANEOUS APPROACH: ICD-10-PCS | Performed by: STUDENT IN AN ORGANIZED HEALTH CARE EDUCATION/TRAINING PROGRAM

## 2023-11-09 PROCEDURE — 85027 COMPLETE CBC AUTOMATED: CPT

## 2023-11-09 PROCEDURE — B2111ZZ FLUOROSCOPY OF MULTIPLE CORONARY ARTERIES USING LOW OSMOLAR CONTRAST: ICD-10-PCS | Performed by: STUDENT IN AN ORGANIZED HEALTH CARE EDUCATION/TRAINING PROGRAM

## 2023-11-09 PROCEDURE — C1894 INTRO/SHEATH, NON-LASER: HCPCS | Performed by: STUDENT IN AN ORGANIZED HEALTH CARE EDUCATION/TRAINING PROGRAM

## 2023-11-09 PROCEDURE — 99152 MOD SED SAME PHYS/QHP 5/>YRS: CPT | Performed by: STUDENT IN AN ORGANIZED HEALTH CARE EDUCATION/TRAINING PROGRAM

## 2023-11-09 PROCEDURE — C1753 CATH, INTRAVAS ULTRASOUND: HCPCS | Performed by: STUDENT IN AN ORGANIZED HEALTH CARE EDUCATION/TRAINING PROGRAM

## 2023-11-09 PROCEDURE — 2580000003 HC RX 258: Performed by: HOSPITALIST

## 2023-11-09 PROCEDURE — 93005 ELECTROCARDIOGRAM TRACING: CPT | Performed by: NURSE PRACTITIONER

## 2023-11-09 PROCEDURE — 6370000000 HC RX 637 (ALT 250 FOR IP): Performed by: STUDENT IN AN ORGANIZED HEALTH CARE EDUCATION/TRAINING PROGRAM

## 2023-11-09 PROCEDURE — 2500000003 HC RX 250 WO HCPCS: Performed by: STUDENT IN AN ORGANIZED HEALTH CARE EDUCATION/TRAINING PROGRAM

## 2023-11-09 PROCEDURE — C1887 CATHETER, GUIDING: HCPCS | Performed by: STUDENT IN AN ORGANIZED HEALTH CARE EDUCATION/TRAINING PROGRAM

## 2023-11-09 PROCEDURE — 94640 AIRWAY INHALATION TREATMENT: CPT

## 2023-11-09 PROCEDURE — 93010 ELECTROCARDIOGRAM REPORT: CPT | Performed by: INTERNAL MEDICINE

## 2023-11-09 PROCEDURE — 2709999900 HC NON-CHARGEABLE SUPPLY: Performed by: STUDENT IN AN ORGANIZED HEALTH CARE EDUCATION/TRAINING PROGRAM

## 2023-11-09 PROCEDURE — 76937 US GUIDE VASCULAR ACCESS: CPT | Performed by: STUDENT IN AN ORGANIZED HEALTH CARE EDUCATION/TRAINING PROGRAM

## 2023-11-09 RX ORDER — MIDAZOLAM HYDROCHLORIDE 1 MG/ML
INJECTION INTRAMUSCULAR; INTRAVENOUS PRN
Status: DISCONTINUED | OUTPATIENT
Start: 2023-11-09 | End: 2023-11-09 | Stop reason: HOSPADM

## 2023-11-09 RX ORDER — SODIUM CHLORIDE 9 MG/ML
INJECTION, SOLUTION INTRAVENOUS CONTINUOUS
Status: DISCONTINUED | OUTPATIENT
Start: 2023-11-09 | End: 2023-11-09 | Stop reason: HOSPADM

## 2023-11-09 RX ORDER — DEXTROSE MONOHYDRATE 25 G/50ML
25 INJECTION, SOLUTION INTRAVENOUS ONCE
Status: DISCONTINUED | OUTPATIENT
Start: 2023-11-09 | End: 2023-11-09

## 2023-11-09 RX ORDER — SODIUM CHLORIDE 9 MG/ML
INJECTION, SOLUTION INTRAVENOUS PRN
Status: DISCONTINUED | OUTPATIENT
Start: 2023-11-09 | End: 2023-11-09 | Stop reason: HOSPADM

## 2023-11-09 RX ORDER — HEPARIN SODIUM 200 [USP'U]/100ML
INJECTION, SOLUTION INTRAVENOUS PRN
Status: DISCONTINUED | OUTPATIENT
Start: 2023-11-09 | End: 2023-11-09 | Stop reason: HOSPADM

## 2023-11-09 RX ORDER — HEPARIN SODIUM 1000 [USP'U]/ML
INJECTION, SOLUTION INTRAVENOUS; SUBCUTANEOUS PRN
Status: DISCONTINUED | OUTPATIENT
Start: 2023-11-09 | End: 2023-11-09 | Stop reason: HOSPADM

## 2023-11-09 RX ORDER — AMIODARONE HYDROCHLORIDE 200 MG/1
200 TABLET ORAL DAILY
Status: DISCONTINUED | OUTPATIENT
Start: 2023-11-16 | End: 2023-11-09 | Stop reason: HOSPADM

## 2023-11-09 RX ORDER — VERAPAMIL HYDROCHLORIDE 2.5 MG/ML
INJECTION, SOLUTION INTRAVENOUS PRN
Status: DISCONTINUED | OUTPATIENT
Start: 2023-11-09 | End: 2023-11-09 | Stop reason: HOSPADM

## 2023-11-09 RX ORDER — ACETAMINOPHEN 325 MG/1
650 TABLET ORAL EVERY 4 HOURS PRN
Status: DISCONTINUED | OUTPATIENT
Start: 2023-11-09 | End: 2023-11-09 | Stop reason: HOSPADM

## 2023-11-09 RX ORDER — FENTANYL CITRATE 50 UG/ML
INJECTION, SOLUTION INTRAMUSCULAR; INTRAVENOUS PRN
Status: DISCONTINUED | OUTPATIENT
Start: 2023-11-09 | End: 2023-11-09 | Stop reason: HOSPADM

## 2023-11-09 RX ORDER — HEPARIN SODIUM 200 [USP'U]/100ML
INJECTION, SOLUTION INTRAVENOUS CONTINUOUS PRN
Status: COMPLETED | OUTPATIENT
Start: 2023-11-09 | End: 2023-11-09

## 2023-11-09 RX ORDER — SODIUM CHLORIDE 9 MG/ML
INJECTION, SOLUTION INTRAVENOUS CONTINUOUS
Status: DISPENSED | OUTPATIENT
Start: 2023-11-09 | End: 2023-11-09

## 2023-11-09 RX ORDER — INSULIN GLARGINE 100 [IU]/ML
40 INJECTION, SOLUTION SUBCUTANEOUS NIGHTLY
Status: DISCONTINUED | OUTPATIENT
Start: 2023-11-09 | End: 2023-11-09 | Stop reason: HOSPADM

## 2023-11-09 RX ORDER — AMIODARONE HYDROCHLORIDE 200 MG/1
200 TABLET ORAL 2 TIMES DAILY
Status: DISCONTINUED | OUTPATIENT
Start: 2023-11-09 | End: 2023-11-09 | Stop reason: HOSPADM

## 2023-11-09 RX ORDER — SODIUM CHLORIDE 0.9 % (FLUSH) 0.9 %
5-40 SYRINGE (ML) INJECTION EVERY 12 HOURS SCHEDULED
Status: DISCONTINUED | OUTPATIENT
Start: 2023-11-09 | End: 2023-11-09 | Stop reason: HOSPADM

## 2023-11-09 RX ORDER — SODIUM CHLORIDE 0.9 % (FLUSH) 0.9 %
5-40 SYRINGE (ML) INJECTION PRN
Status: DISCONTINUED | OUTPATIENT
Start: 2023-11-09 | End: 2023-11-09 | Stop reason: HOSPADM

## 2023-11-09 RX ORDER — AMIODARONE HYDROCHLORIDE 200 MG/1
400 TABLET ORAL 2 TIMES DAILY
Status: DISCONTINUED | OUTPATIENT
Start: 2023-11-09 | End: 2023-11-09

## 2023-11-09 RX ORDER — DEXTROSE 25 % IN WATER 25 %
10 SYRINGE (ML) INTRAVENOUS ONCE
Status: DISCONTINUED | OUTPATIENT
Start: 2023-11-09 | End: 2023-11-09

## 2023-11-09 RX ORDER — AMIODARONE HYDROCHLORIDE 200 MG/1
200 TABLET ORAL DAILY
Qty: 30 TABLET | Refills: 0 | Status: SHIPPED | OUTPATIENT
Start: 2023-11-16 | End: 2023-12-16

## 2023-11-09 RX ORDER — AMIODARONE HYDROCHLORIDE 200 MG/1
200 TABLET ORAL 2 TIMES DAILY
Qty: 13 TABLET | Refills: 0 | Status: SHIPPED | OUTPATIENT
Start: 2023-11-09 | End: 2023-11-16

## 2023-11-09 RX ADMIN — IPRATROPIUM BROMIDE AND ALBUTEROL SULFATE 1 DOSE: 2.5; .5 SOLUTION RESPIRATORY (INHALATION) at 07:00

## 2023-11-09 RX ADMIN — VENLAFAXINE HYDROCHLORIDE 37.5 MG: 37.5 TABLET ORAL at 07:48

## 2023-11-09 RX ADMIN — DEXTROSE MONOHYDRATE 125 ML: 100 INJECTION, SOLUTION INTRAVENOUS at 07:44

## 2023-11-09 RX ADMIN — ACETAMINOPHEN 650 MG: 325 TABLET ORAL at 05:47

## 2023-11-09 RX ADMIN — ISOSORBIDE DINITRATE 20 MG: 20 TABLET ORAL at 11:01

## 2023-11-09 RX ADMIN — ISOSORBIDE DINITRATE 20 MG: 20 TABLET ORAL at 17:40

## 2023-11-09 RX ADMIN — AMIODARONE HYDROCHLORIDE 400 MG: 200 TABLET ORAL at 11:01

## 2023-11-09 RX ADMIN — ACETAMINOPHEN 650 MG: 325 TABLET ORAL at 14:02

## 2023-11-09 RX ADMIN — ATORVASTATIN CALCIUM 20 MG: 20 TABLET, FILM COATED ORAL at 11:01

## 2023-11-09 RX ADMIN — DULOXETINE HYDROCHLORIDE 30 MG: 30 CAPSULE, DELAYED RELEASE ORAL at 11:01

## 2023-11-09 RX ADMIN — VENLAFAXINE HYDROCHLORIDE 37.5 MG: 37.5 TABLET ORAL at 17:40

## 2023-11-09 RX ADMIN — CARVEDILOL 25 MG: 12.5 TABLET, FILM COATED ORAL at 11:01

## 2023-11-09 RX ADMIN — DEXTROSE MONOHYDRATE 125 ML: 100 INJECTION, SOLUTION INTRAVENOUS at 01:51

## 2023-11-09 RX ADMIN — IPRATROPIUM BROMIDE AND ALBUTEROL SULFATE 1 DOSE: 2.5; .5 SOLUTION RESPIRATORY (INHALATION) at 13:13

## 2023-11-09 RX ADMIN — ASPIRIN 81 MG: 81 TABLET, COATED ORAL at 11:01

## 2023-11-09 RX ADMIN — ACETAMINOPHEN 650 MG: 325 TABLET ORAL at 11:25

## 2023-11-09 RX ADMIN — SODIUM CHLORIDE: 9 INJECTION, SOLUTION INTRAVENOUS at 09:27

## 2023-11-09 RX ADMIN — COLESTIPOL HYDROCHLORIDE 1 G: 1 TABLET, FILM COATED ORAL at 11:01

## 2023-11-09 RX ADMIN — SODIUM CHLORIDE: 9 INJECTION, SOLUTION INTRAVENOUS at 12:37

## 2023-11-09 ASSESSMENT — PAIN DESCRIPTION - DESCRIPTORS: DESCRIPTORS: ACHING

## 2023-11-09 ASSESSMENT — PAIN SCALES - GENERAL
PAINLEVEL_OUTOF10: 4
PAINLEVEL_OUTOF10: 4
PAINLEVEL_OUTOF10: 1
PAINLEVEL_OUTOF10: 2

## 2023-11-09 ASSESSMENT — PAIN - FUNCTIONAL ASSESSMENT: PAIN_FUNCTIONAL_ASSESSMENT: NONE - DENIES PAIN

## 2023-11-09 ASSESSMENT — PAIN DESCRIPTION - LOCATION
LOCATION: BACK
LOCATION: BACK

## 2023-11-09 ASSESSMENT — PAIN DESCRIPTION - ORIENTATION: ORIENTATION: LEFT

## 2023-11-09 NOTE — FLOWSHEET NOTE
Patient w/ episode of hypoglycemia. Patient NPO for cath in AM. Now resolved s/p D10 bolus - 112. Nursing to continue to monitor.

## 2023-11-09 NOTE — PROGRESS NOTES
Physician Progress Note      PATIENT:               Roberta Salas  CSN #:                  [id number]  :                       1940  ADMIT DATE:       2023 11:27 AM  DISCH DATE:  RESPONDING  PROVIDER #:        Abram Kerr MD        QUERY TEXT:    Stage of Chronic Kidney Disease: Please provide further specificity, if known. Clinical indicators include: ckd, bun, creatinine, bnp  Options provided:  -- Chronic kidney disease stage 1  -- Chronic kidney disease stage 2  -- Chronic kidney disease stage 3  -- Chronic kidney disease stage 3a  -- Chronic kidney disease stage 3b  -- Chronic kidney disease stage 4  -- Chronic kidney disease stage 5  -- Chronic kidney disease stage 5, requiring dialysis  -- End stage renal disease  -- Other - I will add my own diagnosis  -- Disagree - Not applicable / Not valid  -- Disagree - Clinically Unable to determine / Unknown        PROVIDER RESPONSE TEXT:    The patient has chronic kidney disease stage 4.       Electronically signed by:  Abram Kerr MD 2023 3:30 PM

## 2023-11-09 NOTE — PROGRESS NOTES
[facility] Note     []Initial Encounter     [x]Follow-up    Patient Name: Felix Ramsey - BQD:[birth year] - IOT:[id number]  Primary Cardiologist: Leon Austin MD  Consulting Cardiologist: Patrick Hopkins MD, Montez Mitchell,      Reason for encounter: chest pain and SVT    HPI:       Felix Ramsey is a 79 y.o. female with PMH significant for chest pain pressure mid chest to both sides of neck  Her rate was fast and she was seeing EP NP in clinic  ECG showed probable atrial flutter vs short RP SVT (AVNRT) with significant ST depression when RVR. She had hx of AF and chronic systolic CHF  LVEF was low and she had transvenous ICD removed due to infection in the past then S ICD with Dr Joshua Sommer  She said pain is second time and she traveled 2 hrs to [facility]  Troponin was negative but then later turned positive  She said 2 times chest pain was heavy and was nothing like she felt before   Dr Elis Tierney had mentioned 2 previous stress test in 2019 and 2020      Assessment and Plan     Angina-typical and was unstable when she first felt it   - Trigger could be tachycardia and increase oxygen demand but she has extensive CAD on chest CT and known wall motion abnormalities were on echo and she is now ruled in for NSTEMI   - She has chronic renal failure so there is risk with contrast nephropathy with IV dye   - S/p [facility] today with multivessel disease, likely medical management for now  - Continue with aspirin and statin and coreg    2. Chronic systolic CHF- not in acute CHF decompensation   May take IV fluid before cath for CKD    3. PSVT/vs atrial flutter: hx of AF  - will add PO amio (200 mg bid x 7 days, then 200 mg daily)   - Will continue with coreg  - not on AC historically, follow EP recs     4. S ICD present     Follow up with EP on discharge and Dr. Elis Tierney, pt may be dc'd from cardiac standpoint whenever otherwise medically ready.  Will sign off

## 2023-11-09 NOTE — PROGRESS NOTES
0025 received call from Lab regading blood glucose being 48. Notified RN and RN notified on-call practitioner.

## 2023-11-09 NOTE — PROGRESS NOTES
1900: Bedside shift change report given to RINA SAUCEDA (oncoming nurse) by Heladio Dasilva RN (offgoing nurse). Report included the following information Nurse Handoff Report, Adult Overview, Surgery Report, Intake/Output, Recent Results, and Cardiac Rhythm NSR .     1936: Patient is having left sided pain. Reached out to on call NP regarding. 2035: Spoke with on call provider regarding holding lantus due to BS sugar being at 85, and patient will be NPO at midnight. Provider held due for procedure. 0140: Received critical lab value for glucose at 48. Patient is A&O x 4 and stable. 0143: Messaged on call NP regarding glucose level. She provided order for patient. 7236: BS rechecked and is now 112. I let on call NP know of results. Fall risk

## 2023-11-09 NOTE — DISCHARGE SUMMARY
[address] [place] [address]  Tel: [phone]    Hospital Medicine Discharge Summary    Patient ID:    Dominique Mart  Age:              79 y.o.    : 1940  MRN:             [id number]     PCP: EFRAIN Gómez NP     Date of Admission: 2023    Date of Discharge:  2023    Discharge Diagnoses:  Principal Problem:    Multiple vessel coronary artery disease  Active Problems:    Type 2 diabetes mellitus with diabetic neuropathy ([facility])    COPD (chronic obstructive pulmonary disease) ([address])    Acute on chronic respiratory failure with hypoxia (HCC)    Physical debility    Chronic HFrEF (heart failure with reduced ejection fraction) ([facility])    Hypercholesterolemia    PAF (paroxysmal atrial fibrillation) ([address])    AICD (automatic cardioverter/defibrillator) present    Chronic pain    HTN (hypertension), malignant    PSVT (paroxysmal supraventricular tachycardia)    Anginal chest pain at rest  Resolved Problems:    * No resolved hospital problems.  *       Reason for admission:    Anginal chest pain at rest [I20.89]  SVT (supraventricular tachycardia) [I47.10]  Essential hypertension [I10]  Elevated troponin [R79.89]  Chest pain, unspecified type [R07.9]    Diagnostic testing:    Laboratory data reviewed and independently interpreted:    Recent Labs     23  1145 23  0022   WBC 12.8* 10.8 11.6*   HGB 13.7 11.3* 10.1*   HCT 41.8 34.9* 32.1*   RBC 4.58 3.77* 3.40*   MCV 91.3 92.6 94.4   MCH 29.9 30.0 29.7   * 314 291     Lab Results   Component Value Date/Time    LACTA 1.4 2022 11:25 PM     Recent Labs     23  1145 23  0022     --  139   K 4.0  --  4.5   *  --  110*   CO2   --     GLUCOSE 170*  --  48*   BUN 41*  --  49*   CREATININE 2.20*  --  2.33*   CALCIUM 9.9  --  8.4*   PROT 8.0  --   --    BILITOT 0.5  --   --
Detail Level: Detailed
Detail Level: Generalized
Detail Level: Zone

## 2023-11-09 NOTE — PROGRESS NOTES
9:00 AM  TRANSFER - IN REPORT:    Verbal report received from RINA Natarajan on Renetta Jensen  being received from cath lab for routine post-op      Report consisted of patient's Situation, Background, Assessment and   Recommendations(SBAR). Information from the following report(s) Nurse Handoff Report was reviewed with the receiving nurse. Opportunity for questions and clarification was provided. Assessment completed upon patient's arrival to unit and care assumed. 10:11 AM  3 ml air released from TR Band. No bleeding or hematoma noted. Radial and Ulnar pulse on R wrist palpable. Pt tolerated well. Will continue to monitor. 10:16 AM  4 ml air released from TR Band. No bleeding or hematoma noted. Radial and Ulnar pulse on R wrist palpable. Pt tolerated well. Will continue to monitor. 10:21 AM  Air release completed. TR Band removed from R wrist. No bleeding or  Hematoma. Dressing applied. Wrist immobilizer in place. Radial and ulnar pulse remain palpable on affected extremity. Pt tolerated well. Instructions given to pt regarding movement and activity restrictions. Pt voiced understanding. 11:15 AM  TRANSFER - OUT REPORT:    Verbal report given to RINA Navarro on Renetta Jensen  being transferred to [facility] for routine progression of patient care       Report consisted of patient's Situation, Background, Assessment and   Recommendations(SBAR). Information from the following report(s) Nurse Handoff Report was reviewed with the receiving nurse. Lines:   Peripheral IV 11/07/23 Left Antecubital (Active)   Site Assessment Clean, dry & intact 11/09/23 0700   Line Status Infusing 11/09/23 0700   Line Care Connections checked and tightened; Chlorhexidine wipes 11/09/23 0700   Phlebitis Assessment No symptoms 11/09/23 0700   Infiltration Assessment 0 11/09/23 0700   Alcohol Cap Used Yes 11/09/23 0700   Dressing Status Clean, dry & intact 11/09/23 0700   Dressing Type Transparent 11/09/23

## 2023-11-09 NOTE — CARE COORDINATION
all demographics with patient. Per patient she lives with spouse (who is WC bound) and daughter José Luis Johnson [phone] in a two story home with first floor living and a ramp to access home. Per patient she is independent in ADL/Iadls, uses RW for ambulation. Patient has DME in home, including O2 though patient can't remember name of provider and typically does not use the O2 at baseline. No hx of PAC. Patient is retired, does not drive. Patient d/c is home once medically stable for d/c. Patient will need transportation home. CM following for needs. PCP: Mikey Montes NP  PCP last visit: last July  Pharmacy: [address] in [facility]  AMD: Yes    ______________________  Gabby MELÉNDEZ, RINA  Care Management  11/9/2023  12:52 PM    Care Management Progress Note:   CM set up transport for patient per spouse request with [facility]. CM received CB from patient's daughter who was upset at cost of transport (private pay). Daughter will now pick patient up from hospital. CM updated patient primary RN Melanie as to dc plan.  ______________________  Gabby MELÉNDEZ RN  Care Management  11/9/2023  4:24 PM     CM spoke with patient to confirm that she has O2 concentrator at home but does not use it. Per patient \"I'm only on oxygen when I'm in the hospital, I don't use it at home. \" Patient is to d/c with family transport and declines to use O2 at that time.  Plan is to d/c home with daughter Carlton Mojica this evening.  ______________________  Gabby MELÉNDEZ, RINA  Care Management  11/9/2023  4:50 PM

## 2023-11-09 NOTE — PLAN OF CARE
Problem: Respiratory - Adult  Goal: Achieves optimal ventilation and oxygenation  Outcome: Progressing     Problem: Safety - Adult  Goal: Free from fall injury  Outcome: Progressing     Problem: Pain  Goal: Verbalizes/displays adequate comfort level or baseline comfort level  Outcome: Progressing     Problem: ABCDS Injury Assessment  Goal: Absence of physical injury  Outcome: Progressing

## 2023-11-09 NOTE — PROGRESS NOTES
I discussed with Dr Sugar Robetr today  Heavily calcified multivessel CAD and not candidate for revascularization   He felt that angina is brought on by tachycardia  I recommend amiodarone and follow up in EP clinic with Dr Gustavo Lopez  She is high risk for ablation without revascularization of her CAD so if this can be controlled with medication without side effects, I recommend medical management as well  I called her  but no answer   She may be discharged home and follow up in office   Appt may be sooner with NP  Future Appointments   Date Time Provider [address] [place] [address]   1/10/2024  2:00 PM MD MARK Bhatia BS AMB   11/7/2024  9:40 AM PACEMAKER3, FADIA RIVERA BS AMB   11/7/2024 10:00 AM Candace Glez MD CAVREY BS AMB

## 2023-11-09 NOTE — PROGRESS NOTES
[phone] In room with HARDIK [place] RN, patient confirmed that she has O2 at home but never uses it. She is declining O2 use at the time of transport when discharged. Daughter is transporting patient back home.

## 2023-11-09 NOTE — PROGRESS NOTES
To assist with discharge process, I have completed AVS Med-updates and added information on new medications to the AVS. Care Plans and Education were completed. AVS printed and placed on chart. Primary nurse updated.

## 2023-11-10 ENCOUNTER — TELEPHONE (OUTPATIENT)
Age: 83
End: 2023-11-10

## 2023-11-10 NOTE — TELEPHONE ENCOUNTER
Care Transitions Initial Follow Up Call    Outreach made within 2 business days of discharge: Yes    Patient: Aron Hitchcock Patient : 1940   MRN: [id number]  Reason for Admission: There are no discharge diagnoses documented for the most recent discharge. Discharge Date: 23       Spoke with: LVM for patient to [facility].  Attempt#1    Discharge department/facility: [facility]    Scheduled appointment with PCP within 7-14 days    Follow Up  Future Appointments   Date Time Provider [facility] [address]   2023  2:20 PM Lety Mcknight APRN - NP HFPR MAIN BS AMB   1/10/2024  2:00 PM MD MARK Roman BS AMB   2024  9:40 AM PACEMAKER3, FADIA RIVERA BS AMB   2024 10:00 AM Candace Glez MD CAVREY BS AMB       Omero Bedoya MA

## 2023-11-17 ENCOUNTER — TELEPHONE (OUTPATIENT)
Age: 83
End: 2023-11-17

## 2023-11-17 NOTE — TELEPHONE ENCOUNTER
Pts  called w/pt in the back ground to discuss issues with her Latitude not working. They stated her box hasnt worked since she had her S-ICD implanted because they do not have good cell service where they live. Pt lives in [place]. They also said they couldn't use the internet connection b/c it is not in the same room she sleeps in. They kept repeating over & over that they were worried she was going to have another heart attack while her remote isnt working. Tried explaining multi times to them that her ICD or remote will not do anything with catching her having a heart attack or not. Explained what the ICD & remote does to pt & . They were still worried she will end up in the hosp again like she did last time she was here in the office. Both kept raising their voices & getting upset. I had to inform them that I am trying to help & to please stop yelling. Informed them that since they live in a area with bad cell service we have 2 options we can do. The 1st is to try to use the internet connection adapter for her Sylvester box & the 2nd is to have her come in to the office at [facility] or  every 3-6 months. They were not happy with either. Explained to them about the internet connection option. They will need to call [employer] & inform them that they want to try the internet adapter so they can send them that equipment. Once they get it then they will need to call [employer] back to have them walk  thru setup. They were still concerned with her not sleeping in the same room. Told them to take her into that room either once a wk or once a month & send a manual transmission. Told them to call for setup only when pt can be in the same room so they can test it. After talking to them for over 15 mins explaining the process again & again they agreed to try it. They will call back to inform us of status once they try it.

## 2023-11-20 RX ORDER — ATORVASTATIN CALCIUM 20 MG/1
20 TABLET, FILM COATED ORAL DAILY
Qty: 90 TABLET | Refills: 0 | Status: SHIPPED | OUTPATIENT
Start: 2023-11-20

## 2023-11-20 NOTE — TELEPHONE ENCOUNTER
PCP: EFRAIN Gant NP    Last appt: 8/8/2023   Future Appointments   Date Time Provider [address]   11/21/2023  2:20 PM EFRAIN Gant NP HFPR MAIN BS AMB   1/10/2024  2:00 PM MD MARK Wayne BS AMB   11/7/2024  9:40 AM PACEMAKER3, FADIA RIVERA BS AMB   11/7/2024 10:00 AM Candace Glez MD CAVREY BS AMB       Requested Prescriptions     Pending Prescriptions Disp Refills    atorvastatin (LIPITOR) 20 MG tablet 90 tablet 0     Sig: Take 1 tablet by mouth daily         Other Comments:  Verbal order per provider. Order (medication, dose, route, frequency, amount, refills) repeated and verified twice. Interventional Placement

## 2023-11-21 ENCOUNTER — OFFICE VISIT (OUTPATIENT)
Age: 83
End: 2023-11-21

## 2023-11-21 VITALS
WEIGHT: 153.4 LBS | OXYGEN SATURATION: 94 % | HEIGHT: 66 IN | BODY MASS INDEX: 24.65 KG/M2 | DIASTOLIC BLOOD PRESSURE: 64 MMHG | SYSTOLIC BLOOD PRESSURE: 160 MMHG | RESPIRATION RATE: 22 BRPM | HEART RATE: 63 BPM | TEMPERATURE: 97.3 F

## 2023-11-21 DIAGNOSIS — E11.21 TYPE 2 DIABETES MELLITUS WITH DIABETIC NEPHROPATHY, WITHOUT LONG-TERM CURRENT USE OF INSULIN (HCC): ICD-10-CM

## 2023-11-21 DIAGNOSIS — Z09 HOSPITAL DISCHARGE FOLLOW-UP: ICD-10-CM

## 2023-11-21 DIAGNOSIS — I50.20 HFREF (HEART FAILURE WITH REDUCED EJECTION FRACTION) (HCC): ICD-10-CM

## 2023-11-21 DIAGNOSIS — I50.22 CHRONIC SYSTOLIC (CONGESTIVE) HEART FAILURE (HCC): ICD-10-CM

## 2023-11-21 DIAGNOSIS — R26.81 GAIT INSTABILITY: ICD-10-CM

## 2023-11-21 DIAGNOSIS — I10 ESSENTIAL (PRIMARY) HYPERTENSION: ICD-10-CM

## 2023-11-21 DIAGNOSIS — Z23 NEEDS FLU SHOT: Primary | ICD-10-CM

## 2023-11-21 ASSESSMENT — PATIENT HEALTH QUESTIONNAIRE - PHQ9
2. FEELING DOWN, DEPRESSED OR HOPELESS: 3
10. IF YOU CHECKED OFF ANY PROBLEMS, HOW DIFFICULT HAVE THESE PROBLEMS MADE IT FOR YOU TO DO YOUR WORK, TAKE CARE OF THINGS AT HOME, OR GET ALONG WITH OTHER PEOPLE: 3
4. FEELING TIRED OR HAVING LITTLE ENERGY: 3
3. TROUBLE FALLING OR STAYING ASLEEP: 1
SUM OF ALL RESPONSES TO PHQ QUESTIONS 1-9: 17
6. FEELING BAD ABOUT YOURSELF - OR THAT YOU ARE A FAILURE OR HAVE LET YOURSELF OR YOUR FAMILY DOWN: 0
SUM OF ALL RESPONSES TO PHQ QUESTIONS 1-9: 17
1. LITTLE INTEREST OR PLEASURE IN DOING THINGS: 3
8. MOVING OR SPEAKING SO SLOWLY THAT OTHER PEOPLE COULD HAVE NOTICED. OR THE OPPOSITE, BEING SO FIGETY OR RESTLESS THAT YOU HAVE BEEN MOVING AROUND A LOT MORE THAN USUAL: 3
SUM OF ALL RESPONSES TO PHQ QUESTIONS 1-9: 17
SUM OF ALL RESPONSES TO PHQ9 QUESTIONS 1 & 2: 6
7. TROUBLE CONCENTRATING ON THINGS, SUCH AS READING THE NEWSPAPER OR WATCHING TELEVISION: 3
5. POOR APPETITE OR OVEREATING: 1
SUM OF ALL RESPONSES TO PHQ QUESTIONS 1-9: 17
9. THOUGHTS THAT YOU WOULD BE BETTER OFF DEAD, OR OF HURTING YOURSELF: 0

## 2023-11-21 ASSESSMENT — COLUMBIA-SUICIDE SEVERITY RATING SCALE - C-SSRS
5. HAVE YOU STARTED TO WORK OUT OR WORKED OUT THE DETAILS OF HOW TO KILL YOURSELF? DO YOU INTEND TO CARRY OUT THIS PLAN?: NO
7. DID THIS OCCUR IN THE LAST THREE MONTHS: NO
4. HAVE YOU HAD THESE THOUGHTS AND HAD SOME INTENTION OF ACTING ON THEM?: NO
3. HAVE YOU BEEN THINKING ABOUT HOW YOU MIGHT KILL YOURSELF?: NO

## 2023-11-22 NOTE — PROGRESS NOTES
Subjective:   Karson Woodardvenusjose f         11/10/23 12:50 PM  Note     Care Transitions Initial Follow Up Call     Outreach made within 2 business days of discharge: Yes     Patient: Hua Bird    Patient : 1940   MRN: [id number]       Reason for Admission: There are no discharge diagnoses documented for the most recent discharge. Discharge Date: 23                        Spoke with: LVM for patient to [facility]. Attempt#1     Discharge department/facility: [address] [place] [address]     Scheduled appointment with PCP within 7-14 days       [address] [place] [address]  Tel: [phone] Discharge Summary  Excerpt from hospital course  y Dr. Jan Melgar  Patient ID:    Hua Bird  Age:              79 y.o.    : 1940  MRN:             [id number]      PCP: EFRAIN Robbins NP      Date of Admission: 2023     Date of Discharge:  2023     Discharge Diagnoses:  Principal Problem:    Multiple vessel coronary artery disease  Active Problems:    Type 2 diabetes mellitus with diabetic neuropathy ([facility])    COPD (chronic obstructive pulmonary disease) ([address] )    Acute on chronic respiratory failure with hypoxia ([facility])    Physical debility    Chronic HFrEF (heart failure with reduced ejection fraction) ([facility])    Hypercholesterolemia    PAF (paroxysmal atrial fibrillation) ([address] )    AICD (automatic cardioverter/defibrillator) present    Chronic pain    HTN (hypertension), malignant    PSVT (paroxysmal supraventricular tachycardia)    Anginal chest pain at rest  Resolved Problems:    * No resolved hospital problems.  *        Reason for admission:    Anginal chest pain at rest [I20.89]  SVT (supraventricular tachycardia) [I47.10]  Essential hypertension [I10]  Elevated troponin [R79.89]  Chest pain, unspecified type [R07.9]     Diagnostic testing:     Laboratory data reviewed and independently interpreted:

## 2023-12-04 NOTE — TELEPHONE ENCOUNTER
PCP: EFRAIN Rodriguez NP    Last appt: 8/8/2023   Future Appointments   Date Time Provider [address]   12/21/2023  1:00 PM EFRAIN Rodriguez NP HFPR MAIN BS AMB   1/10/2024  2:00 PM MD MARK Finnegan BS AMB   11/7/2024  9:40 AM PACEMAKER3, FADIA RIVERA BS AMB   11/7/2024 10:00 AM Candace Glez, MD RIVERA BS AMB       Requested Prescriptions     Pending Prescriptions Disp Refills    furosemide (LASIX) 80 MG tablet 90 tablet 0     Sig: Take 1 tablet by mouth daily         Other Comments:  [facility] requesting refill for lasix 80 mg daily. Last renal panel 11/2023 reflects worsening kidney fx (pt was admitted during that time). Will defer rx to MD.     ----- Message -----  From: Eduin Ovalle MD  Sent: 12/5/2023     To: BRANNON Carlisle MD Kandis Chin, LPN  Would go with 20 milligrams daily as recommended at discharge.           ----- Message -----  From: Nahed Cook LPN  Sent: 13/8/1324   1:55 PM EST  To: Eduin Ovalle MD    Please advise on lasix rx. Last renal panel 11/2023 reflects worsening kidney fx (pt was admitted during that time).

## 2023-12-06 ENCOUNTER — TELEPHONE (OUTPATIENT)
Age: 83
End: 2023-12-06

## 2023-12-06 NOTE — TELEPHONE ENCOUNTER
Called Pt to get her schedule for a Hosp[facility] appt . Pt stated that the hosp. Stay was not from her device so she will not make an appt at this time. She has one with Dr. Castrejon and if he feels that she needs to see Dr. Glez before her scheduled appt then she will call and schedule

## 2023-12-10 RX ORDER — FUROSEMIDE 80 MG
80 TABLET ORAL DAILY
Qty: 90 TABLET | Refills: 0 | Status: SHIPPED | OUTPATIENT
Start: 2023-12-10

## 2023-12-15 RX ORDER — HYDRALAZINE HYDROCHLORIDE 100 MG/1
100 TABLET, FILM COATED ORAL 3 TIMES DAILY
Qty: 270 TABLET | Refills: 0 | Status: SHIPPED | OUTPATIENT
Start: 2023-12-15

## 2023-12-15 NOTE — TELEPHONE ENCOUNTER
PCP: EFRAIN James NP    Last appt: 8/8/2023   Future Appointments   Date Time Provider [address]   12/21/2023  1:00 PM EFRAIN James NP HFPR MAIN [facility]   1/10/2024  2:00 PM MD MARK Giles BS [facility]   11/7/2024  9:40 AM PACEMAKER3, FADIA RIVERA BS [facility]   11/7/2024 10:00 AM Candace Glez MD CAVREY  AMB       Requested Prescriptions     Signed Prescriptions Disp Refills    hydrALAZINE (APRESOLINE) 100 MG tablet 270 tablet 0     Sig: Take 1 tablet by mouth 3 times daily     Authorizing Provider: David Vivas     Ordering User: Marquise NAIK         Other Comments:  Verbal order per provider. Order (medication, dose, route, frequency, amount, refills) repeated and verified twice.

## 2023-12-20 RX ORDER — AMIODARONE HYDROCHLORIDE 200 MG/1
200 TABLET ORAL DAILY
Qty: 90 TABLET | Refills: 1 | Status: SHIPPED | OUTPATIENT
Start: 2023-12-20 | End: 2024-06-17

## 2023-12-20 NOTE — TELEPHONE ENCOUNTER
Medication Refill Request    Venessa Ross is requesting a refill of the following medication(s):   amiodarone (CORDARONE) 200 MG tablet     Please send refill to:       [address] [facility], [address] [place] [address] Georgette Hercules [phone]  [address] [place] [id number]  Phone: [phone] Fax: [phone]

## 2023-12-24 ENCOUNTER — APPOINTMENT (OUTPATIENT)
Facility: HOSPITAL | Age: 83
End: 2023-12-24
Payer: MEDICARE

## 2023-12-24 ENCOUNTER — HOSPITAL ENCOUNTER (EMERGENCY)
Facility: HOSPITAL | Age: 83
Discharge: HOME | End: 2023-12-24
Attending: EMERGENCY MEDICINE
Payer: MEDICARE

## 2023-12-24 VITALS
BODY MASS INDEX: 25.55 KG/M2 | RESPIRATION RATE: 18 BRPM | SYSTOLIC BLOOD PRESSURE: 128 MMHG | DIASTOLIC BLOOD PRESSURE: 69 MMHG | HEIGHT: 66 IN | TEMPERATURE: 98.9 F | WEIGHT: 159 LBS | HEART RATE: 72 BPM | OXYGEN SATURATION: 97 %

## 2023-12-24 DIAGNOSIS — J18.9 COMMUNITY ACQUIRED PNEUMONIA, UNSPECIFIED LATERALITY: Primary | ICD-10-CM

## 2023-12-24 LAB
FLUAV RNA SPEC QL NAA+PROBE: NOT DETECTED
FLUBV RNA SPEC QL NAA+PROBE: NOT DETECTED
SARS-COV-2 RNA RESP QL NAA+PROBE: NOT DETECTED

## 2023-12-24 PROCEDURE — 99284 EMERGENCY DEPT VISIT MOD MDM: CPT

## 2023-12-24 PROCEDURE — 87636 SARSCOV2 & INF A&B AMP PRB: CPT

## 2023-12-24 PROCEDURE — 71045 X-RAY EXAM CHEST 1 VIEW: CPT

## 2023-12-24 RX ORDER — PREDNISONE 20 MG/1
40 TABLET ORAL DAILY
Qty: 10 TABLET | Refills: 0 | Status: SHIPPED | OUTPATIENT
Start: 2023-12-24 | End: 2023-12-29

## 2023-12-24 RX ORDER — ALBUTEROL SULFATE 90 UG/1
2 AEROSOL, METERED RESPIRATORY (INHALATION) 4 TIMES DAILY PRN
Qty: 54 G | Refills: 1 | Status: SHIPPED | OUTPATIENT
Start: 2023-12-24

## 2023-12-24 RX ORDER — AMOXICILLIN AND CLAVULANATE POTASSIUM 500; 125 MG/1; MG/1
1 TABLET, FILM COATED ORAL 2 TIMES DAILY
Qty: 10 TABLET | Refills: 0 | Status: SHIPPED | OUTPATIENT
Start: 2023-12-24 | End: 2023-12-29

## 2023-12-24 RX ORDER — AMOXICILLIN AND CLAVULANATE POTASSIUM 875; 125 MG/1; MG/1
1 TABLET, FILM COATED ORAL 2 TIMES DAILY
Qty: 10 TABLET | Refills: 0 | Status: SHIPPED | OUTPATIENT
Start: 2023-12-24 | End: 2023-12-24

## 2023-12-24 RX ORDER — AZITHROMYCIN 250 MG/1
250 TABLET, FILM COATED ORAL SEE ADMIN INSTRUCTIONS
Qty: 6 TABLET | Refills: 0 | Status: SHIPPED | OUTPATIENT
Start: 2023-12-24 | End: 2023-12-29

## 2023-12-24 NOTE — ED NOTES
Written and verbal discharge instructions reviewed with patient. All discharge medications reviewed and explained. Understanding verbalized, all questions answered.

## 2023-12-25 NOTE — ED PROVIDER NOTES
[id number]  [phone]    If symptoms worsen      Return to ED if worse     Diagnosis     Clinical Impression:   1. Community acquired pneumonia, unspecified laterality          I, Joe Deluca MD am the first provider for this patient and am the attending of record for this patient encounter. Joe Deluca MD        Please note that this dictation was completed with Dragon, computer voice recognition software. Quite often unanticipated grammatical, syntax, homophones, and other interpretive errors are inadvertently transcribed by the computer software. Please disregard these errors. Additionally, please excuse any errors that have escaped final proofreading.           Daphney Navarro MD  12/24/23 [facility]

## 2024-01-03 PROBLEM — Z79.899 HIGH RISK MEDICATION USE: Status: ACTIVE | Noted: 2024-01-03

## 2024-01-03 PROBLEM — I10 ESSENTIAL HYPERTENSION: Status: ACTIVE | Noted: 2024-01-03

## 2024-01-03 PROBLEM — I20.89 ANGINAL CHEST PAIN AT REST: Status: RESOLVED | Noted: 2023-11-08 | Resolved: 2024-01-03

## 2024-01-03 PROBLEM — I10 HTN (HYPERTENSION), MALIGNANT: Status: RESOLVED | Noted: 2023-06-26 | Resolved: 2024-01-03

## 2024-01-03 PROBLEM — I50.20 HFREF (HEART FAILURE WITH REDUCED EJECTION FRACTION) (HCC): Status: RESOLVED | Noted: 2020-09-18 | Resolved: 2024-01-03

## 2024-01-03 PROBLEM — I25.5 ISCHEMIC CARDIOMYOPATHY: Status: ACTIVE | Noted: 2020-09-23

## 2024-01-10 ENCOUNTER — OFFICE VISIT (OUTPATIENT)
Age: 84
End: 2024-01-10
Payer: MEDICARE

## 2024-01-10 VITALS
SYSTOLIC BLOOD PRESSURE: 140 MMHG | HEART RATE: 65 BPM | TEMPERATURE: 98.2 F | HEIGHT: 66 IN | WEIGHT: 151 LBS | RESPIRATION RATE: 18 BRPM | DIASTOLIC BLOOD PRESSURE: 82 MMHG | OXYGEN SATURATION: 92 % | BODY MASS INDEX: 24.27 KG/M2

## 2024-01-10 DIAGNOSIS — E78.00 HYPERCHOLESTEROLEMIA: ICD-10-CM

## 2024-01-10 DIAGNOSIS — F17.210 CIGARETTE SMOKER: ICD-10-CM

## 2024-01-10 DIAGNOSIS — I50.22 CHRONIC HFREF (HEART FAILURE WITH REDUCED EJECTION FRACTION) (HCC): ICD-10-CM

## 2024-01-10 DIAGNOSIS — I48.0 PAF (PAROXYSMAL ATRIAL FIBRILLATION) (HCC): ICD-10-CM

## 2024-01-10 DIAGNOSIS — I25.10 MULTIPLE VESSEL CORONARY ARTERY DISEASE: Primary | ICD-10-CM

## 2024-01-10 DIAGNOSIS — I47.10 PSVT (PAROXYSMAL SUPRAVENTRICULAR TACHYCARDIA): ICD-10-CM

## 2024-01-10 DIAGNOSIS — I10 ESSENTIAL HYPERTENSION: ICD-10-CM

## 2024-01-10 DIAGNOSIS — N18.4 CKD (CHRONIC KIDNEY DISEASE) STAGE 4, GFR 15-29 ML/MIN (HCC): ICD-10-CM

## 2024-01-10 DIAGNOSIS — Z95.810 AICD (AUTOMATIC CARDIOVERTER/DEFIBRILLATOR) PRESENT: ICD-10-CM

## 2024-01-10 DIAGNOSIS — Z79.899 HIGH RISK MEDICATION USE: ICD-10-CM

## 2024-01-10 DIAGNOSIS — I25.5 ISCHEMIC CARDIOMYOPATHY: ICD-10-CM

## 2024-01-10 PROCEDURE — 3079F DIAST BP 80-89 MM HG: CPT | Performed by: INTERNAL MEDICINE

## 2024-01-10 PROCEDURE — 99214 OFFICE O/P EST MOD 30 MIN: CPT | Performed by: INTERNAL MEDICINE

## 2024-01-10 PROCEDURE — 3077F SYST BP >= 140 MM HG: CPT | Performed by: INTERNAL MEDICINE

## 2024-01-10 PROCEDURE — 1123F ACP DISCUSS/DSCN MKR DOCD: CPT | Performed by: INTERNAL MEDICINE

## 2024-01-10 NOTE — PROGRESS NOTES
Identified pt with two pt identifiers(name and ). Reviewed record in preparation for visit and have obtained necessary documentation.  Chief Complaint   Patient presents with    Coronary Artery Disease    Cardiomyopathy    Congestive Heart Failure    Hypertension    Hyperlipidemia      BP (!) 140/82 (Site: Left Upper Arm, Position: Sitting, Cuff Size: Medium Adult)   Pulse 65   Temp 98.2 °F (36.8 °C) (Temporal)   Resp 18   Ht 1.676 m (5' 6\")   Wt 68.5 kg (151 lb)   SpO2 92%   BMI 24.37 kg/m²       Medications reviewed/approved by provider.      Health Maintenance Review: Patient reminded of \"due or due soon\" health maintenance. I have asked the patient to contact his/her primary care provider (PCP) for follow-up on his/her health maintenance.    Coordination of Care Questionnaire:  :   1) Have you been to an emergency room, urgent care, or hospitalized since your last visit?  If yes, where when, and reason for visit? yes - Nov and DEC ([facility] [place]) for chest pain and pneumonia.       2. Have seen or consulted any other health care provider since your last visit?   If yes, where when, and reason for visit?  no      Patient is accompanied by self I have received verbal consent from Haydee Castro to discuss any/all medical information while they are present in the room.

## 2024-01-10 NOTE — PATIENT INSTRUCTIONS
Please discontinue the venlafaxine (Effexor).  I have ordered a thyroid panel for amiodarone baseline.

## 2024-01-15 ENCOUNTER — OFFICE VISIT (OUTPATIENT)
Age: 84
End: 2024-01-15

## 2024-01-15 ENCOUNTER — TELEPHONE (OUTPATIENT)
Age: 84
End: 2024-01-15

## 2024-01-15 VITALS
WEIGHT: 155 LBS | HEART RATE: 66 BPM | RESPIRATION RATE: 22 BRPM | OXYGEN SATURATION: 98 % | DIASTOLIC BLOOD PRESSURE: 50 MMHG | TEMPERATURE: 96.8 F | HEIGHT: 66 IN | SYSTOLIC BLOOD PRESSURE: 94 MMHG | BODY MASS INDEX: 24.91 KG/M2

## 2024-01-15 DIAGNOSIS — I49.9 CARDIAC ARRHYTHMIA, UNSPECIFIED CARDIAC ARRHYTHMIA TYPE: ICD-10-CM

## 2024-01-15 DIAGNOSIS — Z79.4 TYPE 2 DIABETES MELLITUS WITH DIABETIC NEPHROPATHY, WITH LONG-TERM CURRENT USE OF INSULIN (HCC): Primary | ICD-10-CM

## 2024-01-15 DIAGNOSIS — E11.21 TYPE 2 DIABETES MELLITUS WITH DIABETIC NEPHROPATHY, WITH LONG-TERM CURRENT USE OF INSULIN (HCC): Primary | ICD-10-CM

## 2024-01-15 DIAGNOSIS — I95.9 HYPOTENSION, UNSPECIFIED HYPOTENSION TYPE: ICD-10-CM

## 2024-01-15 DIAGNOSIS — Z00.00 ENCOUNTER FOR MEDICARE ANNUAL WELLNESS EXAM: ICD-10-CM

## 2024-01-15 ASSESSMENT — PATIENT HEALTH QUESTIONNAIRE - PHQ9
2. FEELING DOWN, DEPRESSED OR HOPELESS: 0
3. TROUBLE FALLING OR STAYING ASLEEP: 0
1. LITTLE INTEREST OR PLEASURE IN DOING THINGS: 0
9. THOUGHTS THAT YOU WOULD BE BETTER OFF DEAD, OR OF HURTING YOURSELF: 0
4. FEELING TIRED OR HAVING LITTLE ENERGY: 0
SUM OF ALL RESPONSES TO PHQ QUESTIONS 1-9: 0
10. IF YOU CHECKED OFF ANY PROBLEMS, HOW DIFFICULT HAVE THESE PROBLEMS MADE IT FOR YOU TO DO YOUR WORK, TAKE CARE OF THINGS AT HOME, OR GET ALONG WITH OTHER PEOPLE: 0
8. MOVING OR SPEAKING SO SLOWLY THAT OTHER PEOPLE COULD HAVE NOTICED. OR THE OPPOSITE, BEING SO FIGETY OR RESTLESS THAT YOU HAVE BEEN MOVING AROUND A LOT MORE THAN USUAL: 0
7. TROUBLE CONCENTRATING ON THINGS, SUCH AS READING THE NEWSPAPER OR WATCHING TELEVISION: 0
5. POOR APPETITE OR OVEREATING: 0
SUM OF ALL RESPONSES TO PHQ QUESTIONS 1-9: 0
6. FEELING BAD ABOUT YOURSELF - OR THAT YOU ARE A FAILURE OR HAVE LET YOURSELF OR YOUR FAMILY DOWN: 0
SUM OF ALL RESPONSES TO PHQ QUESTIONS 1-9: 0
SUM OF ALL RESPONSES TO PHQ QUESTIONS 1-9: 0
SUM OF ALL RESPONSES TO PHQ9 QUESTIONS 1 & 2: 0

## 2024-01-15 ASSESSMENT — LIFESTYLE VARIABLES
HOW MANY STANDARD DRINKS CONTAINING ALCOHOL DO YOU HAVE ON A TYPICAL DAY: PATIENT DOES NOT DRINK
HOW OFTEN DO YOU HAVE A DRINK CONTAINING ALCOHOL: NEVER

## 2024-01-15 NOTE — TELEPHONE ENCOUNTER
Pt said she went to see her PCP today and she stated that she was wondering if the patient's BP medicine is too strong because the patient's Blood pressure was low.    Please advise     [phone]

## 2024-01-15 NOTE — PROGRESS NOTES
\"Have you been to the ER, urgent care clinic since your last visit?  Hospitalized since your last visit?\"     Yes [facility] ER viral syndrome    “Have you seen or consulted any other health care providers outside of [facility] since your last visit?”    NO

## 2024-01-15 NOTE — PROGRESS NOTES
Chief Complaint   Patient presents with    Medicare AWV       Vitals:    01/15/24 1020   BP: (!) 94/50   Pulse: 66   Resp: 22   Temp: 96.8 °F (36 °C)   SpO2: 98%

## 2024-01-16 ENCOUNTER — TELEPHONE (OUTPATIENT)
Age: 84
End: 2024-01-16

## 2024-01-16 LAB
ALBUMIN SERPL-MCNC: 2.5 G/DL (ref 3.5–5)
ALBUMIN/GLOB SERPL: 0.6 (ref 1.1–2.2)
ALP SERPL-CCNC: 87 U/L (ref 45–117)
ALT SERPL-CCNC: 14 U/L (ref 12–78)
AST SERPL-CCNC: 10 U/L (ref 15–37)
BILIRUB DIRECT SERPL-MCNC: <0.1 MG/DL (ref 0–0.2)
BILIRUB SERPL-MCNC: 0.3 MG/DL (ref 0.2–1)
EST. AVERAGE GLUCOSE BLD GHB EST-MCNC: 148 MG/DL
GLOBULIN SER CALC-MCNC: 3.9 G/DL (ref 2–4)
HBA1C MFR BLD: 6.8 % (ref 4–5.6)
PROT SERPL-MCNC: 6.4 G/DL (ref 6.4–8.2)
TSH SERPL DL<=0.05 MIU/L-ACNC: 0.24 UIU/ML (ref 0.36–3.74)

## 2024-01-16 NOTE — TELEPHONE ENCOUNTER
BP Readings from Last 5 Encounters:  01/15/24 94/50   01/10/24 (!) 140/82   12/24/23 128/69   11/21/23 (!) 160/64   11/09/23 (!) 146/70   11/07/23 (!) 88/56     Due to only one reading reported as hypotensive of 94/50 on 1/15/24 at PCP office, this nurse contacted the patient for todays BP reading.     The patient will report back with BP from this morning.

## 2024-01-16 NOTE — TELEPHONE ENCOUNTER
----- Message from EFRAIN Olivera NP sent at 1/16/2024  2:25 AM EST -----  Regarding: Hypotension  Hi  and Sonali,  I saw Ms. Castro in the office yesterday . She was  hypotensive and dizzy .  Her blood glucose was within normal limits.  I instructed her to contact your office for follow up.  She started amiodarone about a month ago.  Due to her complexity I defer to you to adjust her cardiac medications.   Thank you as always for your support.  Kind regards,  Victorina SELF

## 2024-01-16 NOTE — PROGRESS NOTES
Medicare Annual Wellness Visit    Haydee Castro is here for Medicare AWV    Assessment & Plan   Type 2 diabetes mellitus with diabetic nephropathy, with long-term current use of insulin (HCC)  -     Hemoglobin A1C; Future  -     CO COLLECTION VENOUS BLOOD VENIPUNCTURE  Cardiac arrhythmia, unspecified cardiac arrhythmia type  -     TSH; Future  -     Hepatic Function Panel; Future  -     CO COLLECTION VENOUS BLOOD VENIPUNCTURE  Encounter for Medicare annual wellness exam  Hypotension, unspecified hypotension   Instructed to contact her cardiologist due to low BP .    Recommendations for Preventive Services Due: see orders and patient instructions/AVS.  Recommended screening schedule for the next 5-10 years is provided to the patient in written form: see Patient Instructions/AVS.     No follow-ups on file.     Subjective   The following acute and/or chronic problems were also addressed today: conditions noted above      Patient's complete Health Risk Assessment and screening values have been reviewed and are found in Flowsheets. The following problems were reviewed today and where indicated follow up appointments were made and/or referrals ordered.    Positive Risk Factor Screenings with Interventions:               General HRA Questions:  Select all that apply: (!) New or Increased Fatigue    Fatigue Interventions:  Discuss medication adjustment if indicated  See AVS for additional education material      Activity, Diet, and Weight:          Do you eat balanced/healthy meals regularly?: Yes    Body mass index is 25.02 kg/m².      Inactivity Interventions:  At baseline            Safety:  Do you have non-slip mats or non-slip surfaces or shower bars or grab bars in your shower or bathtub?: (!) No  Interventions:  See AVS for additional education material    ADL's:   Patient reports needing help with:  Select all that apply: (!) Banking/Finances, Shopping, Food Preparation, Transportation, Laundry,

## 2024-01-17 LAB — T4 SERPL-MCNC: 8.5 UG/DL (ref 4.8–13.9)

## 2024-01-19 LAB — T3 SERPL-MCNC: 72 NG/DL (ref 71–180)

## 2024-01-19 NOTE — TELEPHONE ENCOUNTER
This nurse called patient on 1/15 asking that she retake her BP.   This nurse has not heard back from the patient regarding.  L/m.

## 2024-01-20 RX ORDER — METHIMAZOLE 5 MG/1
5 TABLET ORAL DAILY
Qty: 90 TABLET | Refills: 0 | Status: SHIPPED | OUTPATIENT
Start: 2024-01-20 | End: 2024-04-19

## 2024-01-29 RX ORDER — ATORVASTATIN CALCIUM 20 MG/1
20 TABLET, FILM COATED ORAL DAILY
Qty: 90 TABLET | Refills: 0 | Status: SHIPPED | OUTPATIENT
Start: 2024-01-29

## 2024-01-29 NOTE — TELEPHONE ENCOUNTER
Received notice from patient and spouse that BP has stabilized. They will call back with exact numbers.

## 2024-01-29 NOTE — TELEPHONE ENCOUNTER
PCP: Victorina Mukherjee APRN - NP    Last appt: 1/10/2024   Future Appointments   Date Time Provider Department Center   2/15/2024  1:00 PM [facility]   8/13/2024  2:00 PM Tico Castrejon MD RCAR BS [facility]   11/7/2024  9:40 AM PACEMAKER3, FADIA RIVERA BS [facility]   11/7/2024 10:00 AM Candace Glez MD CAVRE[facility] BS AMB       Requested Prescriptions     Signed Prescriptions Disp Refills    atorvastatin (LIPITOR) 20 MG tablet 90 tablet 0     Sig: Take 1 tablet by mouth daily     Authorizing Provider: TICO CASTREJON     Ordering User: ELODIA BEE         Other Comments:  Verbal order per provider.  Order (medication, dose, route, frequency, amount, refills) repeated and verified twice.

## 2024-02-15 ENCOUNTER — TELEPHONE (OUTPATIENT)
Age: 84
End: 2024-02-15

## 2024-02-15 NOTE — TELEPHONE ENCOUNTER
----- Message from Nisha Quintana sent at 2/15/2024 12:12 PM EST -----  Subject: Message to Provider    QUESTIONS  Information for Provider? Pt.  called and he forgot to tell Pt of   appt time. So she missed the appt. Do you want her to RS? Please call Pt.  ---------------------------------------------------------------------------  --------------  CALL BACK INFO  [phone]; Do not leave any message, patient will call back for answer  ---------------------------------------------------------------------------  --------------  SCRIPT ANSWERS  Relationship to Patient? Spouse/Partner  Representative Name?  Ajay  Is the representative on the Communication Release of Information (PEG)   form in Epic? Yes

## 2024-03-12 ENCOUNTER — TELEPHONE (OUTPATIENT)
Age: 84
End: 2024-03-12

## 2024-03-12 NOTE — TELEPHONE ENCOUNTER
Haydee Castro has to cancel her appointment for 3-13 with Victorina. She doesn't have any way to get to her appointments and will call to reschedule when she figures out a way to get here.

## 2024-03-25 RX ORDER — AMIODARONE HYDROCHLORIDE 200 MG/1
200 TABLET ORAL DAILY
Qty: 90 TABLET | Refills: 1 | Status: SHIPPED | OUTPATIENT
Start: 2024-03-25 | End: 2024-03-27 | Stop reason: SDUPTHER

## 2024-03-25 NOTE — TELEPHONE ENCOUNTER
Patient requesting refill on     Requested Prescriptions     Pending Prescriptions Disp Refills    amiodarone (CORDARONE) 200 MG tablet 90 tablet 1     Sig: Take 1 tablet by mouth daily Start 11/16        Last OV Visit date 1/15/24

## 2024-03-27 ENCOUNTER — TELEPHONE (OUTPATIENT)
Age: 84
End: 2024-03-27

## 2024-03-27 RX ORDER — AMIODARONE HYDROCHLORIDE 200 MG/1
200 TABLET ORAL DAILY
Qty: 90 TABLET | Refills: 1 | Status: SHIPPED | OUTPATIENT
Start: 2024-03-27 | End: 2024-09-23

## 2024-03-27 NOTE — TELEPHONE ENCOUNTER
Patient requesting refill on     Requested Prescriptions     Pending Prescriptions Disp Refills    amiodarone (CORDARONE) 200 MG tablet 90 tablet 1     Sig: Take 1 tablet by mouth daily Start 11/16        Last OV 1/15/2024

## 2024-03-27 NOTE — TELEPHONE ENCOUNTER
Ajay states they aren't using [facility] anymore. Can refill be cancelled and sent to [facility]          Medication Refill Request    Haydee Castro is requesting a refill of the following medication(s):   amiodarone (CORDARONE) 200 MG tablet   Please send refill to:     [facility] - [place], [place] - [address] -  [phone] - F [phone]  [address] [address]  Phone: [phone] Fax: [phone]

## 2024-03-31 ENCOUNTER — HOSPITAL ENCOUNTER (EMERGENCY)
Facility: HOSPITAL | Age: 84
Discharge: HOME | End: 2024-03-31
Attending: EMERGENCY MEDICINE
Payer: MEDICARE

## 2024-03-31 VITALS
OXYGEN SATURATION: 96 % | TEMPERATURE: 98.1 F | BODY MASS INDEX: 25.39 KG/M2 | SYSTOLIC BLOOD PRESSURE: 155 MMHG | DIASTOLIC BLOOD PRESSURE: 64 MMHG | WEIGHT: 158 LBS | HEART RATE: 66 BPM | RESPIRATION RATE: 17 BRPM | HEIGHT: 66 IN

## 2024-03-31 DIAGNOSIS — L29.9 PRURITIC DISORDER: Primary | ICD-10-CM

## 2024-03-31 PROCEDURE — 99283 EMERGENCY DEPT VISIT LOW MDM: CPT

## 2024-03-31 PROCEDURE — 6370000000 HC RX 637 (ALT 250 FOR IP): Performed by: EMERGENCY MEDICINE

## 2024-03-31 RX ORDER — CETIRIZINE HYDROCHLORIDE 5 MG/1
5 TABLET ORAL DAILY
Qty: 40 TABLET | Refills: 1 | Status: SHIPPED | OUTPATIENT
Start: 2024-03-31

## 2024-03-31 RX ORDER — HYDROXYZINE PAMOATE 25 MG/1
25 CAPSULE ORAL 3 TIMES DAILY PRN
Qty: 40 CAPSULE | Refills: 0 | Status: SHIPPED | OUTPATIENT
Start: 2024-03-31 | End: 2024-04-14

## 2024-03-31 RX ORDER — CETIRIZINE HYDROCHLORIDE 10 MG/1
10 TABLET ORAL
Status: COMPLETED | OUTPATIENT
Start: 2024-03-31 | End: 2024-03-31

## 2024-03-31 RX ADMIN — CETIRIZINE HYDROCHLORIDE 10 MG: 10 TABLET, FILM COATED ORAL at 18:15

## 2024-03-31 ASSESSMENT — PAIN - FUNCTIONAL ASSESSMENT: PAIN_FUNCTIONAL_ASSESSMENT: NONE - DENIES PAIN

## 2024-03-31 NOTE — ED PROVIDER NOTES
Conjunctiva/sclera: Conjunctivae normal.      Pupils: Pupils are equal, round, and reactive to light.   Cardiovascular:      Rate and Rhythm: Normal rate and regular rhythm.      Pulses: Normal pulses.      Heart sounds: Normal heart sounds.   Pulmonary:      Effort: Pulmonary effort is normal.      Breath sounds: Normal breath sounds.   Abdominal:      Palpations: Abdomen is soft.      Tenderness: There is no abdominal tenderness.   Skin:     General: Skin is warm and dry.      Findings: No rash.      Comments: Patient has numerous seborrheic keratoses on face, upper and lower extremities, abdomen and especially on her back.    There is several small punctate red bumps with excoriation on her trapezius area bilaterally.  No other areas appreciated.   Neurological:      General: No focal deficit present.      Mental Status: She is alert and oriented to person, place, and time.   Psychiatric:         Behavior: Behavior normal.               Diagnostic Study Results     Labs -   No results found for this or any previous visit (from the past 12 hour(s)).    Radiologic Studies -   No orders to display     [unfilled]  [unfilled]      Medical Decision Making   I am the first provider for this patient.    I reviewed the vital signs, available nursing notes, past medical history, past surgical history, family history and social history.    Vital Signs-Reviewed the patient's vital signs.  Patient Vitals for the past 12 hrs:   Temp Pulse Resp BP SpO2   03/31/24 1754 98.1 °F (36.7 °C) 66 17 (!) 182/81 95 %           Records Reviewed: Nursing notes reviewed    Provider Notes (Medical Decision Making):   DDX: 83-year-old female with itchy rash primarily on her back and shoulders.  Unknown cause of symptoms.  Unlikely to amiodarone phototoxicity given that primary is or not sun exposed and patient denies any significant sun exposure.  Recommended patient stop Allerest as this contains phenylephrine which is probably not contributing

## 2024-03-31 NOTE — ED TRIAGE NOTES
Pt presents with itching to her upper back, left shoulder, and chest x 2 weeks. Pt denies any rash prior to the itching.

## 2024-04-01 RX ORDER — HYDRALAZINE HYDROCHLORIDE 100 MG/1
100 TABLET, FILM COATED ORAL 3 TIMES DAILY
Qty: 270 TABLET | Refills: 0 | Status: SHIPPED | OUTPATIENT
Start: 2024-04-01

## 2024-04-01 NOTE — TELEPHONE ENCOUNTER
Requested Prescriptions     Pending Prescriptions Disp Refills    hydrALAZINE (APRESOLINE) 100 MG tablet 270 tablet 0     Sig: Take 1 tablet by mouth 3 times daily

## 2024-04-22 RX ORDER — FUROSEMIDE 80 MG
80 TABLET ORAL DAILY
Qty: 90 TABLET | Refills: 1 | Status: SHIPPED | OUTPATIENT
Start: 2024-04-22

## 2024-04-22 NOTE — TELEPHONE ENCOUNTER
Requested Prescriptions     Signed Prescriptions Disp Refills    furosemide (LASIX) 80 MG tablet 90 tablet 1     Sig: Take 1 tablet by mouth daily     Authorizing Provider: BLANE BECKHAM     Ordering User: SANJUANA MILLAN

## 2024-05-24 ENCOUNTER — TELEPHONE (OUTPATIENT)
Age: 84
End: 2024-05-24

## 2024-05-24 NOTE — TELEPHONE ENCOUNTER
Spoke with the  about Haydee needing an appt in order to get her Dexcom. Haydee was sleeping. He will relay the message and get her to call back.

## 2024-05-28 RX ORDER — HYDRALAZINE HYDROCHLORIDE 100 MG/1
100 TABLET, FILM COATED ORAL 3 TIMES DAILY
Qty: 270 TABLET | Refills: 1 | Status: SHIPPED | OUTPATIENT
Start: 2024-05-28

## 2024-05-28 NOTE — TELEPHONE ENCOUNTER
Requested Prescriptions     Signed Prescriptions Disp Refills    hydrALAZINE (APRESOLINE) 100 MG tablet 270 tablet 1     Sig: Take 1 tablet by mouth 3 times daily     Authorizing Provider: BLANE BECKHAM     Ordering User: SANJUANA MILLAN

## 2024-05-29 RX ORDER — AMIODARONE HYDROCHLORIDE 200 MG/1
200 TABLET ORAL DAILY
Qty: 90 TABLET | Refills: 1 | Status: SHIPPED | OUTPATIENT
Start: 2024-05-29 | End: 2024-11-25

## 2024-06-05 RX ORDER — ATORVASTATIN CALCIUM 20 MG/1
20 TABLET, FILM COATED ORAL DAILY
Qty: 90 TABLET | Refills: 1 | Status: SHIPPED | OUTPATIENT
Start: 2024-06-05

## 2024-06-05 NOTE — TELEPHONE ENCOUNTER
Requested Prescriptions     Signed Prescriptions Disp Refills    atorvastatin (LIPITOR) 20 MG tablet 90 tablet 1     Sig: Take 1 tablet by mouth daily     Authorizing Provider: BLANE BECKHAM     Ordering User: SANJUANA MILLAN

## 2024-07-02 ENCOUNTER — TELEPHONE (OUTPATIENT)
Age: 84
End: 2024-07-02

## 2024-07-02 NOTE — TELEPHONE ENCOUNTER
S/w  , states patient having respiratory distress and needs more oxygen,unable to get off couch except to bathroom. Advised to have patient evaluated in ER,  agreed JAZZY

## 2024-07-12 RX ORDER — ERGOCALCIFEROL 1.25 MG/1
CAPSULE ORAL
Qty: 13 CAPSULE | Refills: 3 | Status: SHIPPED | OUTPATIENT
Start: 2024-07-12

## 2024-07-12 RX ORDER — CARVEDILOL 25 MG/1
25 TABLET ORAL 2 TIMES DAILY
Qty: 180 TABLET | Refills: 1 | Status: SHIPPED | OUTPATIENT
Start: 2024-07-12

## 2024-07-12 NOTE — TELEPHONE ENCOUNTER
PCP: Victorina Mukherjee APRN - NP    Last appt: 1/10/2024   Future Appointments   Date Time Provider Department Center   8/9/2024  1:00 PM Nico Carroll APRN - NP RCAR BS [facility]   11/7/2024  9:40 AM PACEMAKER3, FADIA RIVERA BS [facility]   11/7/2024 10:00 AM Candace Glez, MD RIVERA BS AMB       Requested Prescriptions     Signed Prescriptions Disp Refills    carvedilol (COREG) 25 MG tablet 180 tablet 1     Sig: Take 1 tablet by mouth 2 times daily     Authorizing Provider: BLANE BECKHAM     Ordering User: ELODIA BEE         Other Comments:  Verbal order per provider.  Order (medication, dose, route, frequency, amount, refills) repeated and verified twice.

## 2024-07-24 ENCOUNTER — OFFICE VISIT (OUTPATIENT)
Age: 84
End: 2024-07-24
Payer: MEDICARE

## 2024-07-24 VITALS
HEART RATE: 57 BPM | OXYGEN SATURATION: 93 % | SYSTOLIC BLOOD PRESSURE: 130 MMHG | HEIGHT: 66 IN | DIASTOLIC BLOOD PRESSURE: 78 MMHG | BODY MASS INDEX: 23.46 KG/M2 | TEMPERATURE: 97.3 F | RESPIRATION RATE: 22 BRPM | WEIGHT: 146 LBS

## 2024-07-24 DIAGNOSIS — E78.00 PURE HYPERCHOLESTEROLEMIA, UNSPECIFIED: ICD-10-CM

## 2024-07-24 DIAGNOSIS — I48.0 PAF (PAROXYSMAL ATRIAL FIBRILLATION) (HCC): ICD-10-CM

## 2024-07-24 DIAGNOSIS — J96.21 ACUTE ON CHRONIC RESPIRATORY FAILURE WITH HYPOXIA (HCC): ICD-10-CM

## 2024-07-24 DIAGNOSIS — R21 RASH: ICD-10-CM

## 2024-07-24 DIAGNOSIS — Z79.899 HIGH RISK MEDICATION USE: ICD-10-CM

## 2024-07-24 DIAGNOSIS — I10 ESSENTIAL (PRIMARY) HYPERTENSION: Primary | ICD-10-CM

## 2024-07-24 DIAGNOSIS — E11.21 TYPE 2 DIABETES MELLITUS WITH DIABETIC NEPHROPATHY, WITH LONG-TERM CURRENT USE OF INSULIN (HCC): ICD-10-CM

## 2024-07-24 DIAGNOSIS — F33.0 MAJOR DEPRESSIVE DISORDER, RECURRENT, MILD (HCC): ICD-10-CM

## 2024-07-24 DIAGNOSIS — I50.20 HFREF (HEART FAILURE WITH REDUCED EJECTION FRACTION) (HCC): ICD-10-CM

## 2024-07-24 DIAGNOSIS — Z71.89 ACP (ADVANCE CARE PLANNING): ICD-10-CM

## 2024-07-24 DIAGNOSIS — Z95.810 AICD (AUTOMATIC CARDIOVERTER/DEFIBRILLATOR) PRESENT: ICD-10-CM

## 2024-07-24 DIAGNOSIS — Z79.4 TYPE 2 DIABETES MELLITUS WITH DIABETIC NEPHROPATHY, WITH LONG-TERM CURRENT USE OF INSULIN (HCC): ICD-10-CM

## 2024-07-24 DIAGNOSIS — Z91.81 AT HIGH RISK FOR FALLS: ICD-10-CM

## 2024-07-24 DIAGNOSIS — I73.9 PVD (PERIPHERAL VASCULAR DISEASE) (HCC): ICD-10-CM

## 2024-07-24 DIAGNOSIS — J43.9 PULMONARY EMPHYSEMA, UNSPECIFIED EMPHYSEMA TYPE (HCC): ICD-10-CM

## 2024-07-24 PROCEDURE — 1123F ACP DISCUSS/DSCN MKR DOCD: CPT | Performed by: NURSE PRACTITIONER

## 2024-07-24 PROCEDURE — 99214 OFFICE O/P EST MOD 30 MIN: CPT | Performed by: NURSE PRACTITIONER

## 2024-07-24 PROCEDURE — 36415 COLL VENOUS BLD VENIPUNCTURE: CPT | Performed by: NURSE PRACTITIONER

## 2024-07-24 PROCEDURE — 3078F DIAST BP <80 MM HG: CPT | Performed by: NURSE PRACTITIONER

## 2024-07-24 PROCEDURE — 3075F SYST BP GE 130 - 139MM HG: CPT | Performed by: NURSE PRACTITIONER

## 2024-07-24 PROCEDURE — 3044F HG A1C LEVEL LT 7.0%: CPT | Performed by: NURSE PRACTITIONER

## 2024-07-24 NOTE — PROGRESS NOTES
\"Have you been to the ER, urgent care clinic since your last visit?  Hospitalized since your last visit?\"    NO    “Have you seen or consulted any other health care providers outside of [facility] since your last visit?”    NO            Click Here for Release of Records Request      Chief Complaint   Patient presents with    Diabetes    Fatigue         Vitals:    07/24/24 1050   BP: 130/78   Pulse: 57   Resp: 22   Temp: 97.3 °F (36.3 °C)   SpO2: 93%

## 2024-07-24 NOTE — PATIENT INSTRUCTIONS

## 2024-07-24 NOTE — PROGRESS NOTES
Advance Care Planning   Discussed the patient’s choices for care and treatment preferences in case of a health event that adversely affects decision-making abilities or is life-limiting. Recommended the patient document care preferences in state-specific advance directives. Also reviewed the process of designating a trusted capable adult as an Agent (or Health Care Power of ) to make health care decisions for the patient if the patient becomes unable due to incapacity. Patient was asked to complete advance directive forms, if not already done, and to provide a dated, signed and witnessed (or notarized) copy, per the forms' requirements, to the practice office.    Time spent (minutes): 16 minutes     On the basis of positive falls risk screening, assessment and plan is as follows:  Working with in home care aide.  .    Victorina SELF    BRPBPR constipation

## 2024-07-24 NOTE — PROGRESS NOTES
Subjective:     Haydee Castro is a 84 y.o. female who presents today with the following:  Chief Complaint   Patient presents with    Diabetes    Fatigue       Patient Active Problem List   Diagnosis    Spondylosis of lumbosacral region without myelopathy or radiculopathy    Type 2 diabetes mellitus with diabetic neuropathy ([facility])    Pleural effusion    COPD (chronic obstructive pulmonary disease) ([facility])    Palliative care encounter    Depression    Cigarette smoker    Acute on chronic respiratory failure with hypoxia ([facility])    Physical debility    Chronic HFrEF (heart failure with reduced ejection fraction) ([facility])    Diabetes ([facility])    Type 2 diabetes with nephropathy ([facility])    Hypercholesterolemia    Anemia of infection and chronic disease    PAF (paroxysmal atrial fibrillation) ([facility])    AICD (automatic cardioverter/defibrillator) present    Chronic pain    Arthritis    IBS (irritable bowel syndrome)    Spinal stenosis of lumbar region with neurogenic claudication    Ischemic cardiomyopathy    PVD (peripheral vascular disease) ([facility])    CKD (chronic kidney disease) stage 4, GFR 15-29 ml/min ([facility])    PSVT (paroxysmal supraventricular tachycardia) ([facility])    Multiple vessel coronary artery disease    Essential hypertension    High risk medication use    Major depressive disorder, recurrent, mild     After several attempts fr follow ups in the office she presents fabiano wheelchair with her aid Brandie.     COMPLIANT WITH MEDICATION:   Heart failure ; Denies chest pain, dyspnea, palpitations, headache and blurred vision. Blood pressure normotensive.Ms. Castro followed by Dr. Castrejon cardiology. She endorses she taking amiodarone and has developed several side effect including a skin rash.  On oxygen at home,     Diabetes.  Sugars controlled poorly  Hypoglycemia: mild Fragile cardiac health followed by cardiology. Ms. Castro endorses she is barely eating small meals and get lightheaded .  She is not sure if it is her cardiac

## 2024-07-25 LAB
ALBUMIN SERPL-MCNC: 3.3 G/DL (ref 3.5–5)
ALBUMIN SERPL-MCNC: 3.3 G/DL (ref 3.5–5)
ALBUMIN/GLOB SERPL: 0.9 (ref 1.1–2.2)
ALBUMIN/GLOB SERPL: 0.9 (ref 1.1–2.2)
ALP SERPL-CCNC: 88 U/L (ref 45–117)
ALP SERPL-CCNC: 92 U/L (ref 45–117)
ALT SERPL-CCNC: 19 U/L (ref 12–78)
ALT SERPL-CCNC: 20 U/L (ref 12–78)
ANION GAP SERPL CALC-SCNC: 4 MMOL/L (ref 5–15)
AST SERPL-CCNC: 16 U/L (ref 15–37)
AST SERPL-CCNC: 20 U/L (ref 15–37)
BASOPHILS # BLD: 0.1 K/UL (ref 0–0.1)
BASOPHILS NFR BLD: 1 % (ref 0–1)
BILIRUB DIRECT SERPL-MCNC: <0.1 MG/DL (ref 0–0.2)
BILIRUB SERPL-MCNC: 0.3 MG/DL (ref 0.2–1)
BILIRUB SERPL-MCNC: 0.3 MG/DL (ref 0.2–1)
BUN SERPL-MCNC: 47 MG/DL (ref 6–20)
BUN/CREAT SERPL: 21 (ref 12–20)
CALCIUM SERPL-MCNC: 9.5 MG/DL (ref 8.5–10.1)
CHLORIDE SERPL-SCNC: 106 MMOL/L (ref 97–108)
CHOLEST SERPL-MCNC: 160 MG/DL
CO2 SERPL-SCNC: 28 MMOL/L (ref 21–32)
CREAT SERPL-MCNC: 2.27 MG/DL (ref 0.55–1.02)
DIFFERENTIAL METHOD BLD: ABNORMAL
EOSINOPHIL # BLD: 0.7 K/UL (ref 0–0.4)
EOSINOPHIL NFR BLD: 6 % (ref 0–7)
ERYTHROCYTE [DISTWIDTH] IN BLOOD BY AUTOMATED COUNT: 14.8 % (ref 11.5–14.5)
EST. AVERAGE GLUCOSE BLD GHB EST-MCNC: 120 MG/DL
GLOBULIN SER CALC-MCNC: 3.6 G/DL (ref 2–4)
GLOBULIN SER CALC-MCNC: 3.7 G/DL (ref 2–4)
GLUCOSE SERPL-MCNC: 119 MG/DL (ref 65–100)
HBA1C MFR BLD: 5.8 % (ref 4–5.6)
HCT VFR BLD AUTO: 35.9 % (ref 35–47)
HDLC SERPL-MCNC: 42 MG/DL
HDLC SERPL: 3.8 (ref 0–5)
HGB BLD-MCNC: 10.9 G/DL (ref 11.5–16)
IMM GRANULOCYTES # BLD AUTO: 0 K/UL (ref 0–0.04)
IMM GRANULOCYTES NFR BLD AUTO: 0 % (ref 0–0.5)
LDLC SERPL CALC-MCNC: 79.2 MG/DL (ref 0–100)
LYMPHOCYTES # BLD: 1.2 K/UL (ref 0.8–3.5)
LYMPHOCYTES NFR BLD: 10 % (ref 12–49)
MCH RBC QN AUTO: 30.3 PG (ref 26–34)
MCHC RBC AUTO-ENTMCNC: 30.4 G/DL (ref 30–36.5)
MCV RBC AUTO: 99.7 FL (ref 80–99)
MONOCYTES # BLD: 0.8 K/UL (ref 0–1)
MONOCYTES NFR BLD: 7 % (ref 5–13)
NEUTS SEG # BLD: 8.7 K/UL (ref 1.8–8)
NEUTS SEG NFR BLD: 76 % (ref 32–75)
NRBC # BLD: 0 K/UL (ref 0–0.01)
NRBC BLD-RTO: 0 PER 100 WBC
PLATELET # BLD AUTO: 315 K/UL (ref 150–400)
PMV BLD AUTO: 11 FL (ref 8.9–12.9)
POTASSIUM SERPL-SCNC: 4.5 MMOL/L (ref 3.5–5.1)
PROT SERPL-MCNC: 6.9 G/DL (ref 6.4–8.2)
PROT SERPL-MCNC: 7 G/DL (ref 6.4–8.2)
RBC # BLD AUTO: 3.6 M/UL (ref 3.8–5.2)
SODIUM SERPL-SCNC: 138 MMOL/L (ref 136–145)
T4 FREE SERPL-MCNC: 1.6 NG/DL (ref 0.8–1.5)
TRIGL SERPL-MCNC: 194 MG/DL
TSH SERPL DL<=0.05 MIU/L-ACNC: 0.03 UIU/ML (ref 0.36–3.74)
VLDLC SERPL CALC-MCNC: 38.8 MG/DL
WBC # BLD AUTO: 11.5 K/UL (ref 3.6–11)

## 2024-07-26 ENCOUNTER — TELEPHONE (OUTPATIENT)
Age: 84
End: 2024-07-26

## 2024-07-26 NOTE — TELEPHONE ENCOUNTER
----- Message from EFRAIN López NP sent at 7/26/2024 11:19 AM EDT -----  Regarding: FW: ? reaction/allergies to ameroderone  Please call patient regarding Lindsey's concerns as below. Patient should contact her electrophysiologist Phyllis Kidd NP to discuss amiodarone side effects.    Phyllis Kidd NP  Cardiac Electrophysiology  [facility]  [address] [facility]. [facility] [address] [place] [id number] [phone]    ----- Message -----  From: Tico Castrejon MD  Sent: 7/25/2024   2:08 PM EDT  To: EFRAIN Olivera NP  Subject: RE: ? reaction/allergies to ameroderone          Thanks Esthela.  Some of her symptoms could be due to the amiodarone.  She really needs to follow-up with the EP since they prescribed it for treatment of PSVT.  She may have to decrease or discontinue it altogether.  ----- Message -----  From: Victorina Mukherjee APRN - NP  Sent: 7/24/2024  11:51 AM EDT  To: Tico AVENDAÑO MD  Subject: ? reaction/allergies to ameroderone              Hi Ed,  I am seeing Ms. Castro today.  She is developing a rash generalized ,insomnia, poor appetite, runny nose and eye, itching etc. She basically says she has all of the side effect from amiodarone after looking it up.  Her follow up appointment in your office is 8/9/2024. Please contact her to follow up. I'm not sure if it is the medication.  I will refer to a dermatologist . She is also taking Nervive sometimes taking 2 doses. ? Interaction or causing  this thanks   Esthela

## 2024-07-26 NOTE — TELEPHONE ENCOUNTER
Pts pcp Lonny, NP reports, \"developing a rash generalized ,insomnia, poor appetite, runny nose and eye, itching etc. She basically says she has all of the side effect from amiodarone after looking it up.\"    Dr. Castrejon advised that the concern be directed towards EP since they prescribed it for treatment of PSVT.

## 2024-07-26 NOTE — TELEPHONE ENCOUNTER
Patient called to say that she's having a difficult time with amiodarone. Please advise    Patient # [phone]

## 2024-07-29 DIAGNOSIS — E05.80 AMIODARONE-INDUCED HYPERTHYROIDISM: Primary | ICD-10-CM

## 2024-07-29 DIAGNOSIS — T46.2X5A AMIODARONE-INDUCED HYPERTHYROIDISM: Primary | ICD-10-CM

## 2024-07-31 ENCOUNTER — TELEPHONE (OUTPATIENT)
Age: 84
End: 2024-07-31

## 2024-07-31 RX ORDER — FUROSEMIDE 80 MG
80 TABLET ORAL DAILY
Qty: 90 TABLET | Refills: 0 | Status: SHIPPED | OUTPATIENT
Start: 2024-07-31

## 2024-07-31 NOTE — TELEPHONE ENCOUNTER
PCP: Victorina Mukherjee APRN - NP    Last appt: 1/10/2024   Future Appointments   Date Time Provider Department Center   8/9/2024  1:00 PM Nico Carroll APRN - NP RCAR BS [facility]   11/7/2024  9:40 AM PACEMAKER3, FADIA RIVERA BS [facility]   11/7/2024 10:00 AM Candace Glez, MD RIVERA BS AMB       Requested Prescriptions     Signed Prescriptions Disp Refills    furosemide (LASIX) 80 MG tablet 90 tablet 0     Sig: Take 1 tablet by mouth daily     Authorizing Provider: BLANE BECKHAM     Ordering User: ELODIA BEE         Other Comments:  Verbal order per provider.  Order (medication, dose, route, frequency, amount, refills) repeated and verified twice.

## 2024-07-31 NOTE — TELEPHONE ENCOUNTER
PC GIO Ms Castro, informed of receiving a PA form for a Dexcom machine, but did not see on her current medication list.  Per  Ms Matthew, she had to see Victorina first prior to getting the machine.  She did have an OV with Victorina and an upcoming fu up in August.  I stated to her, I will go ahead with this PA and if additional information is needed, we will gather more from her fu visit, OK of understanding and thanks.  Pt is away for a week on vacation starting today,, should have once she returns.   Put in Victorina's basket for review per Nurse Carvalho.

## 2024-07-31 NOTE — TELEPHONE ENCOUNTER
Pt has been notified. Confirmed her upcoming appt with JAZMIN clark. Verified patient with two patient identifiers.    Pt verbalized understanding.

## 2024-08-08 NOTE — PROGRESS NOTES
ASSESSMENT and PLAN  1. Multiple vessel coronary artery disease  Severe heavily calcified multivessel disease documented at cath 11/9/2023.  Recent non-STEMI 11/7/2023 provoked by SVT.  Otherwise, free of angina.  Determined to not be a candidate for revascularization.  Continue current cardioprotective medications.    2. Ischemic cardiomyopathy  Michael EF 25-30% on echo 9/20/2020, now 35-40% on most recent echo 6/21/2023.  Fixed inferior wall defect with no ischemia on stress myocardial perfusion studies 7/2020 and 9/2020.  Severe multivessel disease at cath 11/9/2023.    3. Chronic HFrEF (heart failure with reduced ejection fraction) ([facility])  She appears euvolemic. Continue current GDMT including carvedilol, hydralazine and isosorbide dinitrate.  Not on ARNI, ARB, ACE, aldosterone antagonist or SGLT2 inhibitor due to advanced kidney disease.     4. PSVT (paroxysmal supraventricular tachycardia)  Probable SVT with provoked ischemia/non-STEMI in the setting of multivessel CAD.  Terminated with adenosine in the ER 11/7/2023.  Self discontinued amiodarone due to myriad of side effects.  Felt to be high risk for ablation without revascularization of her CAD.  Planning to see Dr. Glez in November 2024.    5. PAF (paroxysmal atrial fibrillation) ([facility])  No documented recurrence in years.  Experienced bout of SVT inducing type II NSTEMI in November 2024.  Was on amiodarone but self discontinued in August 2024 due to side effects.  Given significant renal dysfunction, low LVEF, and significant coronary atherosclerosis she is not a good candidate for most antiarrhythmics.  She is adamantly refusing to try another agent.    6. AICD (automatic cardioverter/defibrillator) present  Status post dual-chamber ICD implantation 9/23/2020 with subsequent explantation 7/21/2021 for staph aureus bacteremia.  Status post subcutaneous ICD implantation 5/4/2023.  Continue regular follow-up in EP clinic (Dr. Glez)    7. Essential

## 2024-08-09 ENCOUNTER — OFFICE VISIT (OUTPATIENT)
Age: 84
End: 2024-08-09
Payer: MEDICARE

## 2024-08-09 ENCOUNTER — HOSPITAL ENCOUNTER (OUTPATIENT)
Facility: HOSPITAL | Age: 84
End: 2024-08-09
Payer: MEDICARE

## 2024-08-09 VITALS
WEIGHT: 147 LBS | OXYGEN SATURATION: 91 % | SYSTOLIC BLOOD PRESSURE: 140 MMHG | HEART RATE: 63 BPM | RESPIRATION RATE: 22 BRPM | TEMPERATURE: 98.4 F | DIASTOLIC BLOOD PRESSURE: 62 MMHG | HEIGHT: 66 IN | BODY MASS INDEX: 23.63 KG/M2

## 2024-08-09 DIAGNOSIS — I47.10 PSVT (PAROXYSMAL SUPRAVENTRICULAR TACHYCARDIA) (HCC): ICD-10-CM

## 2024-08-09 DIAGNOSIS — Z79.899 LONG TERM CURRENT USE OF ANTIARRHYTHMIC DRUG: ICD-10-CM

## 2024-08-09 DIAGNOSIS — I48.0 PAF (PAROXYSMAL ATRIAL FIBRILLATION) (HCC): ICD-10-CM

## 2024-08-09 DIAGNOSIS — I25.5 ISCHEMIC CARDIOMYOPATHY: ICD-10-CM

## 2024-08-09 DIAGNOSIS — E05.80 AMIODARONE-INDUCED HYPERTHYROIDISM: ICD-10-CM

## 2024-08-09 DIAGNOSIS — T46.2X5A AMIODARONE-INDUCED HYPERTHYROIDISM: ICD-10-CM

## 2024-08-09 DIAGNOSIS — N18.4 CKD (CHRONIC KIDNEY DISEASE) STAGE 4, GFR 15-29 ML/MIN (HCC): ICD-10-CM

## 2024-08-09 DIAGNOSIS — I25.10 MULTIPLE VESSEL CORONARY ARTERY DISEASE: Primary | ICD-10-CM

## 2024-08-09 DIAGNOSIS — I50.22 CHRONIC HFREF (HEART FAILURE WITH REDUCED EJECTION FRACTION) (HCC): ICD-10-CM

## 2024-08-09 PROCEDURE — 1123F ACP DISCUSS/DSCN MKR DOCD: CPT | Performed by: NURSE PRACTITIONER

## 2024-08-09 PROCEDURE — 93000 ELECTROCARDIOGRAM COMPLETE: CPT | Performed by: NURSE PRACTITIONER

## 2024-08-09 PROCEDURE — 76536 US EXAM OF HEAD AND NECK: CPT

## 2024-08-09 PROCEDURE — 3077F SYST BP >= 140 MM HG: CPT | Performed by: NURSE PRACTITIONER

## 2024-08-09 PROCEDURE — 3078F DIAST BP <80 MM HG: CPT | Performed by: NURSE PRACTITIONER

## 2024-08-09 PROCEDURE — 99214 OFFICE O/P EST MOD 30 MIN: CPT | Performed by: NURSE PRACTITIONER

## 2024-08-09 RX ORDER — ATORVASTATIN CALCIUM 40 MG/1
40 TABLET, FILM COATED ORAL DAILY
Qty: 90 TABLET | Refills: 1 | Status: SHIPPED | OUTPATIENT
Start: 2024-08-09

## 2024-08-09 ASSESSMENT — PATIENT HEALTH QUESTIONNAIRE - PHQ9
1. LITTLE INTEREST OR PLEASURE IN DOING THINGS: NOT AT ALL
SUM OF ALL RESPONSES TO PHQ QUESTIONS 1-9: 0
SUM OF ALL RESPONSES TO PHQ9 QUESTIONS 1 & 2: 0
2. FEELING DOWN, DEPRESSED OR HOPELESS: NOT AT ALL
SUM OF ALL RESPONSES TO PHQ QUESTIONS 1-9: 0

## 2024-08-09 NOTE — TELEPHONE ENCOUNTER
Medication Refill Request    Haydee Castro is requesting a refill of the following medication(s):   isosorbide dinitrate (ISORDIL) 20 MG tablet     cetirizine (ZYRTEC) 5 MG tablet   Please send refill to:       [facility] - [place], [place] - [address] [phone] - F [phone] [address] [address]  Phone: [phone] Fax: [phone]

## 2024-08-12 RX ORDER — CETIRIZINE HYDROCHLORIDE 5 MG/1
5 TABLET ORAL DAILY
Qty: 40 TABLET | Refills: 1 | Status: SHIPPED | OUTPATIENT
Start: 2024-08-12

## 2024-08-12 RX ORDER — ISOSORBIDE DINITRATE 20 MG/1
20 TABLET ORAL 3 TIMES DAILY
Qty: 90 TABLET | Refills: 3 | Status: SHIPPED | OUTPATIENT
Start: 2024-08-12

## 2024-08-29 ENCOUNTER — OFFICE VISIT (OUTPATIENT)
Age: 84
End: 2024-08-29
Payer: MEDICARE

## 2024-08-29 VITALS
RESPIRATION RATE: 22 BRPM | HEART RATE: 78 BPM | TEMPERATURE: 98.1 F | SYSTOLIC BLOOD PRESSURE: 102 MMHG | OXYGEN SATURATION: 98 % | DIASTOLIC BLOOD PRESSURE: 52 MMHG

## 2024-08-29 DIAGNOSIS — E11.21 TYPE 2 DIABETES WITH NEPHROPATHY (HCC): Primary | ICD-10-CM

## 2024-08-29 PROCEDURE — 3078F DIAST BP <80 MM HG: CPT | Performed by: NURSE PRACTITIONER

## 2024-08-29 PROCEDURE — 3044F HG A1C LEVEL LT 7.0%: CPT | Performed by: NURSE PRACTITIONER

## 2024-08-29 PROCEDURE — 1123F ACP DISCUSS/DSCN MKR DOCD: CPT | Performed by: NURSE PRACTITIONER

## 2024-08-29 PROCEDURE — 99213 OFFICE O/P EST LOW 20 MIN: CPT | Performed by: NURSE PRACTITIONER

## 2024-08-29 PROCEDURE — 3074F SYST BP LT 130 MM HG: CPT | Performed by: NURSE PRACTITIONER

## 2024-08-29 SDOH — ECONOMIC STABILITY: FOOD INSECURITY: WITHIN THE PAST 12 MONTHS, YOU WORRIED THAT YOUR FOOD WOULD RUN OUT BEFORE YOU GOT MONEY TO BUY MORE.: NEVER TRUE

## 2024-08-29 SDOH — ECONOMIC STABILITY: INCOME INSECURITY: HOW HARD IS IT FOR YOU TO PAY FOR THE VERY BASICS LIKE FOOD, HOUSING, MEDICAL CARE, AND HEATING?: NOT HARD AT ALL

## 2024-08-29 SDOH — ECONOMIC STABILITY: FOOD INSECURITY: WITHIN THE PAST 12 MONTHS, THE FOOD YOU BOUGHT JUST DIDN'T LAST AND YOU DIDN'T HAVE MONEY TO GET MORE.: NEVER TRUE

## 2024-08-29 SDOH — HEALTH STABILITY: PHYSICAL HEALTH: ON AVERAGE, HOW MANY MINUTES DO YOU ENGAGE IN EXERCISE AT THIS LEVEL?: 0 MIN

## 2024-08-29 SDOH — HEALTH STABILITY: PHYSICAL HEALTH: ON AVERAGE, HOW MANY DAYS PER WEEK DO YOU ENGAGE IN MODERATE TO STRENUOUS EXERCISE (LIKE A BRISK WALK)?: 0 DAYS

## 2024-08-29 ASSESSMENT — PATIENT HEALTH QUESTIONNAIRE - PHQ9
3. TROUBLE FALLING OR STAYING ASLEEP: NOT AT ALL
SUM OF ALL RESPONSES TO PHQ QUESTIONS 1-9: 0
7. TROUBLE CONCENTRATING ON THINGS, SUCH AS READING THE NEWSPAPER OR WATCHING TELEVISION: NOT AT ALL
1. LITTLE INTEREST OR PLEASURE IN DOING THINGS: NOT AT ALL
10. IF YOU CHECKED OFF ANY PROBLEMS, HOW DIFFICULT HAVE THESE PROBLEMS MADE IT FOR YOU TO DO YOUR WORK, TAKE CARE OF THINGS AT HOME, OR GET ALONG WITH OTHER PEOPLE: NOT DIFFICULT AT ALL
6. FEELING BAD ABOUT YOURSELF - OR THAT YOU ARE A FAILURE OR HAVE LET YOURSELF OR YOUR FAMILY DOWN: NOT AT ALL
2. FEELING DOWN, DEPRESSED OR HOPELESS: NOT AT ALL
SUM OF ALL RESPONSES TO PHQ QUESTIONS 1-9: 0
4. FEELING TIRED OR HAVING LITTLE ENERGY: NOT AT ALL
SUM OF ALL RESPONSES TO PHQ9 QUESTIONS 1 & 2: 0
9. THOUGHTS THAT YOU WOULD BE BETTER OFF DEAD, OR OF HURTING YOURSELF: NOT AT ALL
SUM OF ALL RESPONSES TO PHQ QUESTIONS 1-9: 0
5. POOR APPETITE OR OVEREATING: NOT AT ALL
8. MOVING OR SPEAKING SO SLOWLY THAT OTHER PEOPLE COULD HAVE NOTICED. OR THE OPPOSITE, BEING SO FIGETY OR RESTLESS THAT YOU HAVE BEEN MOVING AROUND A LOT MORE THAN USUAL: NOT AT ALL
SUM OF ALL RESPONSES TO PHQ QUESTIONS 1-9: 0

## 2024-08-29 ASSESSMENT — SOCIAL DETERMINANTS OF HEALTH (SDOH)
HOW OFTEN DO YOU ATTENT MEETINGS OF THE CLUB OR ORGANIZATION YOU BELONG TO?: NEVER
HOW OFTEN DO YOU ATTEND CHURCH OR RELIGIOUS SERVICES?: NEVER
IN A TYPICAL WEEK, HOW MANY TIMES DO YOU TALK ON THE PHONE WITH FAMILY, FRIENDS, OR NEIGHBORS?: NEVER
HOW OFTEN DO YOU GET TOGETHER WITH FRIENDS OR RELATIVES?: MORE THAN THREE TIMES A WEEK

## 2024-08-29 NOTE — PROGRESS NOTES
\"Have you been to the ER, urgent care clinic since your last visit?  Hospitalized since your last visit?\"      no  “Have you seen or consulted any other health care providers outside of [facility] since your last visit?”      No            Click Here for Release of Records Request      Chief Complaint   Patient presents with    Diabetes         There were no vitals filed for this visit.

## 2024-08-30 NOTE — PROGRESS NOTES
Connection and Isolation Panel [NHANES]     Frequency of Communication with Friends and Family: Never     Frequency of Social Gatherings with Friends and Family: More than three times a week     Attends Jehovah's witness Services: Never     Attends Club or Organization Meetings: Never     Marital Status:    Intimate Partner Violence: Not At Risk (3/23/2023)    Humiliation, Afraid, Rape, and Kick questionnaire     Fear of Current or Ex-Partner: No     Emotionally Abused: No     Physically Abused: No     Sexually Abused: No   Housing Stability: Unknown (8/29/2024)    Housing Stability Vital Sign     Unable to Pay for Housing in the Last Year: No     Homeless in the Last Year: No       Family History   Problem Relation Age of Onset    Diabetes Mother     Colon Cancer Father          Objective:     BP (!) 102/52 (Site: Right Upper Arm, Position: Sitting, Cuff Size: Medium Adult)   Pulse 78   Temp 98.1 °F (36.7 °C) (Temporal)   Resp 22   SpO2 98%   There is no height or weight on file to calculate BMI.  General: Alert and oriented. No acute distress.  Well nourished  HEENT :  Ears:TMs are normal. Canals are clear.   Eyes: pupils equal, round, react to light and accommodation. Extra ocular movements intact. Nose: patent. Mouth and throat is clear.  Neck:supple full range of motion no thyromegaly. Trachea midline, No carotid bruits. No significant lymphadenopathy  Lungs:: clear to auscultation without wheezes, rales, or rhonchi.  Heart :RRR, S1 & S2 are normal intensity. No murmur; no gallop  Abdomen: bowel sounds active. No tenderness, guarding, rebound, masses, hepatic or spleen enlargement  Back: no CVA tenderness.  Extremities: without clubbing, cyanosis, or edema  Pulses: radial and femoral pulses are normal  Neuro: HMF intact. Cranial nerves II through XII grossly normal.  Motor: is 5 over 5 and symmetrical.   Deep tendon reflexes: +2 equal  Psych:appropriate behavior, mood, affect and judgement.   No results  found for this visit on 08/29/24.    Lab Results   Component Value Date    WBC 11.5 (H) 07/24/2024    HGB 10.9 (L) 07/24/2024    HCT 35.9 07/24/2024     07/24/2024    CHOL 160 07/24/2024    TRIG 194 (H) 07/24/2024    HDL 42 07/24/2024    LDLDIRECT 137 (H) 06/14/2021    ALT 20 07/24/2024    ALT 19 07/24/2024    AST 20 07/24/2024    AST 16 07/24/2024     07/24/2024    K 4.5 07/24/2024     07/24/2024    CREATININE 2.27 (H) 07/24/2024    BUN 47 (H) 07/24/2024    CO2 28 07/24/2024    TSH 0.03 (L) 07/24/2024    INR 1.1 11/07/2023    LABA1C 5.8 (H) 07/24/2024        Assessment/ Plan:     1. Type 2 diabetes with nephropathy (HCC)  Dex com is in process no changes in medication       No orders of the defined types were placed in this encounter.      Medication Side Effects and Warnings were discussed with patient:  yes  Patient Labs were reviewed:  yes  Patient Past Records were reviewed:  yes    On this date I have spent 20 minutes reviewing previous notes, test results and face to face with the patient discussing the diagnosis and importance of compliance with the treatment plan as well as documenting on the day of the visit.  Verbal and written instructions (see AVS) provided.  Patient expresses understanding of diagnosis and treatment plan.    Health Maintenance Due   Topic Date Due    Respiratory Syncytial Virus (RSV) Pregnant or age 60 yrs+ (1 - 1-dose 60+ series) Never done    Shingles vaccine (3 of 3) 10/15/2019    COVID-19 Vaccine (6 - 2023-24 season) 04/01/2024    Flu vaccine (1) 08/01/2024         No follow-up provider specified.      CARMELITA Thompson

## 2024-09-17 RX ORDER — HYDRALAZINE HYDROCHLORIDE 100 MG/1
100 TABLET, FILM COATED ORAL 3 TIMES DAILY
Qty: 270 TABLET | Refills: 1 | Status: SHIPPED | OUTPATIENT
Start: 2024-09-17

## 2024-09-17 RX ORDER — FUROSEMIDE 80 MG
80 TABLET ORAL DAILY
Qty: 90 TABLET | Refills: 3 | Status: SHIPPED | OUTPATIENT
Start: 2024-09-17

## 2024-09-24 RX ORDER — INSULIN GLARGINE 100 [IU]/ML
INJECTION, SOLUTION SUBCUTANEOUS
Qty: 45 ML | Refills: 3 | Status: SHIPPED | OUTPATIENT
Start: 2024-09-24

## 2024-10-14 ENCOUNTER — HOSPITAL ENCOUNTER (EMERGENCY)
Facility: HOSPITAL | Age: 84
Discharge: HOME | End: 2024-10-14
Attending: EMERGENCY MEDICINE
Payer: MEDICARE

## 2024-10-14 ENCOUNTER — APPOINTMENT (OUTPATIENT)
Facility: HOSPITAL | Age: 84
End: 2024-10-14
Payer: MEDICARE

## 2024-10-14 VITALS
HEIGHT: 66 IN | TEMPERATURE: 98.2 F | SYSTOLIC BLOOD PRESSURE: 165 MMHG | OXYGEN SATURATION: 97 % | RESPIRATION RATE: 20 BRPM | WEIGHT: 150 LBS | HEART RATE: 86 BPM | DIASTOLIC BLOOD PRESSURE: 63 MMHG | BODY MASS INDEX: 24.11 KG/M2

## 2024-10-14 DIAGNOSIS — J18.9 PNEUMONIA DUE TO INFECTIOUS ORGANISM, UNSPECIFIED LATERALITY, UNSPECIFIED PART OF LUNG: Primary | ICD-10-CM

## 2024-10-14 LAB
ALBUMIN SERPL-MCNC: 2.8 G/DL (ref 3.5–5)
ALBUMIN/GLOB SERPL: 0.7 (ref 1.1–2.2)
ALP SERPL-CCNC: 70 U/L (ref 45–117)
ALT SERPL-CCNC: 16 U/L (ref 12–78)
ANION GAP SERPL CALC-SCNC: 12 MMOL/L (ref 2–12)
AST SERPL-CCNC: 19 U/L (ref 15–37)
BASOPHILS # BLD: 0.1 K/UL (ref 0–0.1)
BASOPHILS NFR BLD: 1 % (ref 0–1)
BILIRUB SERPL-MCNC: 0.3 MG/DL (ref 0.2–1)
BUN SERPL-MCNC: 41 MG/DL (ref 6–20)
BUN/CREAT SERPL: 22 (ref 12–20)
CALCIUM SERPL-MCNC: 9.6 MG/DL (ref 8.5–10.1)
CHLORIDE SERPL-SCNC: 107 MMOL/L (ref 97–108)
CO2 SERPL-SCNC: 24 MMOL/L (ref 21–32)
CREAT SERPL-MCNC: 1.85 MG/DL (ref 0.55–1.02)
DIFFERENTIAL METHOD BLD: ABNORMAL
EOSINOPHIL # BLD: 0.4 K/UL (ref 0–0.4)
EOSINOPHIL NFR BLD: 3 % (ref 0–7)
ERYTHROCYTE [DISTWIDTH] IN BLOOD BY AUTOMATED COUNT: 14.5 % (ref 11.5–14.5)
GLOBULIN SER CALC-MCNC: 4.2 G/DL (ref 2–4)
GLUCOSE SERPL-MCNC: 109 MG/DL (ref 65–100)
HCT VFR BLD AUTO: 35.5 % (ref 35–47)
HEMOCCULT STL QL: NEGATIVE
HGB BLD-MCNC: 11.6 G/DL (ref 11.5–16)
IMM GRANULOCYTES # BLD AUTO: 0 K/UL (ref 0–0.04)
IMM GRANULOCYTES NFR BLD AUTO: 0 % (ref 0–0.5)
INR PPP: 1.1 (ref 0.9–1.1)
LYMPHOCYTES # BLD: 1.4 K/UL (ref 0.8–3.5)
LYMPHOCYTES NFR BLD: 13 % (ref 12–49)
MCH RBC QN AUTO: 30.1 PG (ref 26–34)
MCHC RBC AUTO-ENTMCNC: 32.7 G/DL (ref 30–36.5)
MCV RBC AUTO: 92 FL (ref 80–99)
MONOCYTES # BLD: 0.6 K/UL (ref 0–1)
MONOCYTES NFR BLD: 6 % (ref 5–13)
NEUTS SEG # BLD: 8.3 K/UL (ref 1.8–8)
NEUTS SEG NFR BLD: 77 % (ref 32–75)
NRBC # BLD: 0 K/UL (ref 0–0.01)
NRBC BLD-RTO: 0 PER 100 WBC
PLATELET # BLD AUTO: 414 K/UL (ref 150–400)
PMV BLD AUTO: 11 FL (ref 8.9–12.9)
POTASSIUM SERPL-SCNC: 3.8 MMOL/L (ref 3.5–5.1)
PROT SERPL-MCNC: 7 G/DL (ref 6.4–8.2)
PROTHROMBIN TIME: 10.7 SEC (ref 9–11.1)
RBC # BLD AUTO: 3.86 M/UL (ref 3.8–5.2)
SODIUM SERPL-SCNC: 143 MMOL/L (ref 136–145)
WBC # BLD AUTO: 10.8 K/UL (ref 3.6–11)

## 2024-10-14 PROCEDURE — 85025 COMPLETE CBC W/AUTO DIFF WBC: CPT

## 2024-10-14 PROCEDURE — 82272 OCCULT BLD FECES 1-3 TESTS: CPT

## 2024-10-14 PROCEDURE — 6370000000 HC RX 637 (ALT 250 FOR IP): Performed by: EMERGENCY MEDICINE

## 2024-10-14 PROCEDURE — 85610 PROTHROMBIN TIME: CPT

## 2024-10-14 PROCEDURE — 71045 X-RAY EXAM CHEST 1 VIEW: CPT

## 2024-10-14 PROCEDURE — 80053 COMPREHEN METABOLIC PANEL: CPT

## 2024-10-14 PROCEDURE — 99285 EMERGENCY DEPT VISIT HI MDM: CPT

## 2024-10-14 RX ORDER — AZITHROMYCIN 250 MG/1
250 TABLET, FILM COATED ORAL DAILY
Qty: 4 TABLET | Refills: 0 | Status: SHIPPED | OUTPATIENT
Start: 2024-10-15 | End: 2024-10-19

## 2024-10-14 RX ORDER — AMOXICILLIN AND CLAVULANATE POTASSIUM 500; 125 MG/1; MG/1
1 TABLET, FILM COATED ORAL
Status: COMPLETED | OUTPATIENT
Start: 2024-10-14 | End: 2024-10-14

## 2024-10-14 RX ORDER — AZITHROMYCIN 250 MG/1
500 TABLET, FILM COATED ORAL
Status: COMPLETED | OUTPATIENT
Start: 2024-10-14 | End: 2024-10-14

## 2024-10-14 RX ADMIN — AZITHROMYCIN DIHYDRATE 500 MG: 250 TABLET ORAL at 14:38

## 2024-10-14 RX ADMIN — AMOXICILLIN AND CLAVULANATE POTASSIUM 1 TABLET: 500; 125 TABLET, FILM COATED ORAL at 14:54

## 2024-10-14 ASSESSMENT — PAIN SCALES - GENERAL
PAINLEVEL_OUTOF10: 0
PAINLEVEL_OUTOF10: 0

## 2024-10-14 ASSESSMENT — PAIN - FUNCTIONAL ASSESSMENT
PAIN_FUNCTIONAL_ASSESSMENT: 0-10
PAIN_FUNCTIONAL_ASSESSMENT: 0-10

## 2024-10-14 NOTE — ED PROVIDER NOTES
allergy).  Recommend follow-up with PCP for further evaluation.  We also discussed return to the emergency department immediately for any blood in stool or black stools.    ED Course as of 10/14/24 1517   Mon Oct 14, 2024   1322 Hemoglobin Quant: 11.6 [PV]   1322 Hematocrit: 35.5 [PV]   1322 INR: 1.1 [PV]   1322 Prothrombin Time: 10.7 [PV]   1322 Creatinine(!): 1.85  Chronic kidney disease [PV]      ED Course User Index  [PV] Racquel Gonzales MD         FINAL IMPRESSION     1. Pneumonia due to infectious organism, unspecified laterality, unspecified part of lung          DISPOSITION/PLAN   Haydee Castro's  results have been reviewed with her.  She has been counseled regarding her diagnosis, treatment, and plan.  She verbally conveys understanding and agreement of the signs, symptoms, diagnosis, treatment and prognosis and additionally agrees to follow up as discussed.  She also agrees with the care-plan and conveys that all of her questions have been answered.  I have also provided discharge instructions for her that include: educational information regarding their diagnosis and treatment, and list of reasons why they would want to return to the ED prior to their follow-up appointment, should her condition change.     CLINICAL IMPRESSION    Discharge Note: The patient is stable for discharge home. The signs, symptoms, diagnosis, and discharge instructions have been discussed, understanding conveyed, and agreed upon. The patient is to follow up as recommended or return to ER should their symptoms worsen.      PATIENT REFERRED TO:  Victorina Mukherjee, APRN - NP  [address] [facility] [address] [phone]    Schedule an appointment as soon as possible for a visit          DISCHARGE MEDICATIONS:     Medication List        START taking these medications      amoxicillin-clavulanate 875-125 MG per tablet  Commonly known as: AUGMENTIN  Take 1 tablet by mouth 2 times daily for 7 days     azithromycin 250

## 2024-10-14 NOTE — ED TRIAGE NOTES
Pt arrived by EMS for black tarry stool.  Per EMS pt has been having black tarry stool for 10 days with decreased appetite.  Pt has been taking Pepto Bismol (1 and 1/2 bottles over 10 days).  Pt reports she has nasal congestion with yellow/green drainage.  Pt is on home O2 as needed 3.5L NC.  Pt is awake alert and oriented X 4, pt educated on ER flow

## 2024-10-14 NOTE — ED NOTES
I have reviewed discharge instructions with the patient and caregiver. The caregiver verbalized understanding. Discharge medications discussed with patient. No questions at this time. Ambulated without difficulty.

## 2024-10-14 NOTE — DISCHARGE INSTRUCTIONS
Please take the biotics as prescribed.  You received the first dose of azithromycin and Augmentin in the emergency department today.    Start taking Augmentin twice a day for 7 days.  Take the first dose tonight.    Start taking azithromycin once a day for 4 days, take the first dose tomorrow morning.    Come back to the emergency department immediately for any new or worsening symptoms.

## 2024-10-18 RX ORDER — ISOSORBIDE DINITRATE 20 MG/1
20 TABLET ORAL 3 TIMES DAILY
Qty: 270 TABLET | Refills: 3 | Status: SHIPPED | OUTPATIENT
Start: 2024-10-18

## 2024-10-18 NOTE — TELEPHONE ENCOUNTER
Patient requesting refill on     Requested Prescriptions     Pending Prescriptions Disp Refills    isosorbide dinitrate (ISORDIL) 20 MG tablet [Pharmacy Med Name: ISOSORBIDE DINITRATE  20MG  TAB] 270 tablet 3     Sig: TAKE 1 TABLET BY MOUTH 3 TIMES  DAILY        Last OV  8/29/24

## 2024-10-21 ENCOUNTER — TELEPHONE (OUTPATIENT)
Age: 84
End: 2024-10-21

## 2024-10-21 ENCOUNTER — APPOINTMENT (OUTPATIENT)
Facility: HOSPITAL | Age: 84
End: 2024-10-21
Payer: MEDICARE

## 2024-10-21 ENCOUNTER — HOSPITAL ENCOUNTER (EMERGENCY)
Facility: HOSPITAL | Age: 84
Discharge: HOME | End: 2024-10-22
Attending: EMERGENCY MEDICINE
Payer: MEDICARE

## 2024-10-21 DIAGNOSIS — R10.11 ACUTE ABDOMINAL PAIN IN RIGHT UPPER QUADRANT: ICD-10-CM

## 2024-10-21 DIAGNOSIS — R74.01 TRANSAMINITIS: ICD-10-CM

## 2024-10-21 DIAGNOSIS — N39.0 URINARY TRACT INFECTION WITHOUT HEMATURIA, SITE UNSPECIFIED: ICD-10-CM

## 2024-10-21 DIAGNOSIS — J90 PLEURAL EFFUSION ON RIGHT: Primary | ICD-10-CM

## 2024-10-21 DIAGNOSIS — N18.4 STAGE 4 CHRONIC KIDNEY DISEASE (HCC): ICD-10-CM

## 2024-10-21 LAB
ALBUMIN SERPL-MCNC: 3.1 G/DL (ref 3.5–5)
ALBUMIN/GLOB SERPL: 0.8 (ref 1.1–2.2)
ALP SERPL-CCNC: 258 U/L (ref 45–117)
ALT SERPL-CCNC: 107 U/L (ref 12–78)
ANION GAP SERPL CALC-SCNC: 12 MMOL/L (ref 2–12)
APPEARANCE UR: ABNORMAL
AST SERPL-CCNC: 199 U/L (ref 15–37)
BACTERIA URNS QL MICRO: ABNORMAL /HPF
BASOPHILS # BLD: 0.1 K/UL (ref 0–0.1)
BASOPHILS NFR BLD: 1 % (ref 0–1)
BILIRUB SERPL-MCNC: 0.4 MG/DL (ref 0.2–1)
BILIRUB UR QL: NEGATIVE
BUN SERPL-MCNC: 30 MG/DL (ref 6–20)
BUN/CREAT SERPL: 18 (ref 12–20)
CALCIUM SERPL-MCNC: 10 MG/DL (ref 8.5–10.1)
CHLORIDE SERPL-SCNC: 106 MMOL/L (ref 97–108)
CO2 SERPL-SCNC: 23 MMOL/L (ref 21–32)
COLOR UR: ABNORMAL
CREAT SERPL-MCNC: 1.67 MG/DL (ref 0.55–1.02)
DIFFERENTIAL METHOD BLD: ABNORMAL
EOSINOPHIL # BLD: 0.4 K/UL (ref 0–0.4)
EOSINOPHIL NFR BLD: 3 % (ref 0–7)
EPITH CASTS URNS QL MICRO: ABNORMAL /LPF
ERYTHROCYTE [DISTWIDTH] IN BLOOD BY AUTOMATED COUNT: 14.4 % (ref 11.5–14.5)
FLUAV RNA SPEC QL NAA+PROBE: NOT DETECTED
FLUBV RNA SPEC QL NAA+PROBE: NOT DETECTED
GLOBULIN SER CALC-MCNC: 3.9 G/DL (ref 2–4)
GLUCOSE SERPL-MCNC: 82 MG/DL (ref 65–100)
GLUCOSE UR STRIP.AUTO-MCNC: NEGATIVE MG/DL
HCT VFR BLD AUTO: 32.7 % (ref 35–47)
HGB BLD-MCNC: 10.8 G/DL (ref 11.5–16)
HGB UR QL STRIP: NEGATIVE
IMM GRANULOCYTES # BLD AUTO: 0 K/UL (ref 0–0.04)
IMM GRANULOCYTES NFR BLD AUTO: 0 % (ref 0–0.5)
KETONES UR QL STRIP.AUTO: NEGATIVE MG/DL
LACTATE SERPL-SCNC: 1.1 MMOL/L (ref 0.4–2)
LEUKOCYTE ESTERASE UR QL STRIP.AUTO: ABNORMAL
LYMPHOCYTES # BLD: 1.8 K/UL (ref 0.8–3.5)
LYMPHOCYTES NFR BLD: 15 % (ref 12–49)
MAGNESIUM SERPL-MCNC: 1.7 MG/DL (ref 1.6–2.4)
MCH RBC QN AUTO: 30.2 PG (ref 26–34)
MCHC RBC AUTO-ENTMCNC: 33 G/DL (ref 30–36.5)
MCV RBC AUTO: 91.3 FL (ref 80–99)
MONOCYTES # BLD: 0.9 K/UL (ref 0–1)
MONOCYTES NFR BLD: 7 % (ref 5–13)
NEUTS SEG # BLD: 9.1 K/UL (ref 1.8–8)
NEUTS SEG NFR BLD: 74 % (ref 32–75)
NITRITE UR QL STRIP.AUTO: NEGATIVE
NRBC # BLD: 0 K/UL (ref 0–0.01)
NRBC BLD-RTO: 0 PER 100 WBC
NT PRO BNP: ABNORMAL PG/ML (ref 0–450)
PH UR STRIP: 6 (ref 5–8)
PLATELET # BLD AUTO: 409 K/UL (ref 150–400)
PMV BLD AUTO: 10.5 FL (ref 8.9–12.9)
POTASSIUM SERPL-SCNC: 4.1 MMOL/L (ref 3.5–5.1)
PROT SERPL-MCNC: 7 G/DL (ref 6.4–8.2)
PROT UR STRIP-MCNC: 100 MG/DL
RBC # BLD AUTO: 3.58 M/UL (ref 3.8–5.2)
RBC #/AREA URNS HPF: ABNORMAL /HPF (ref 0–5)
SARS-COV-2 RNA RESP QL NAA+PROBE: NOT DETECTED
SODIUM SERPL-SCNC: 141 MMOL/L (ref 136–145)
SOURCE: NORMAL
SP GR UR REFRACTOMETRY: >1.03 (ref 1–1.03)
TROPONIN I SERPL HS-MCNC: 172 NG/L (ref 0–51)
TROPONIN I SERPL HS-MCNC: 174 NG/L (ref 0–51)
URINE CULTURE IF INDICATED: ABNORMAL
UROBILINOGEN UR QL STRIP.AUTO: 0.2 EU/DL (ref 0.2–1)
WBC # BLD AUTO: 12.3 K/UL (ref 3.6–11)
WBC URNS QL MICRO: ABNORMAL /HPF (ref 0–4)

## 2024-10-21 PROCEDURE — 71250 CT THORAX DX C-: CPT

## 2024-10-21 PROCEDURE — 83880 ASSAY OF NATRIURETIC PEPTIDE: CPT

## 2024-10-21 PROCEDURE — 87086 URINE CULTURE/COLONY COUNT: CPT

## 2024-10-21 PROCEDURE — 80053 COMPREHEN METABOLIC PANEL: CPT

## 2024-10-21 PROCEDURE — 85025 COMPLETE CBC W/AUTO DIFF WBC: CPT

## 2024-10-21 PROCEDURE — 36415 COLL VENOUS BLD VENIPUNCTURE: CPT

## 2024-10-21 PROCEDURE — 81001 URINALYSIS AUTO W/SCOPE: CPT

## 2024-10-21 PROCEDURE — 84484 ASSAY OF TROPONIN QUANT: CPT

## 2024-10-21 PROCEDURE — 87636 SARSCOV2 & INF A&B AMP PRB: CPT

## 2024-10-21 PROCEDURE — 71045 X-RAY EXAM CHEST 1 VIEW: CPT

## 2024-10-21 PROCEDURE — 93005 ELECTROCARDIOGRAM TRACING: CPT | Performed by: EMERGENCY MEDICINE

## 2024-10-21 PROCEDURE — 99285 EMERGENCY DEPT VISIT HI MDM: CPT

## 2024-10-21 PROCEDURE — 87040 BLOOD CULTURE FOR BACTERIA: CPT

## 2024-10-21 PROCEDURE — 83605 ASSAY OF LACTIC ACID: CPT

## 2024-10-21 PROCEDURE — 83735 ASSAY OF MAGNESIUM: CPT

## 2024-10-21 RX ORDER — FUROSEMIDE 10 MG/ML
80 INJECTION INTRAMUSCULAR; INTRAVENOUS
Status: COMPLETED | OUTPATIENT
Start: 2024-10-22 | End: 2024-10-22

## 2024-10-21 RX ORDER — GRANULES FOR ORAL 3 G/1
3 POWDER ORAL ONCE
Qty: 1 EACH | Refills: 0 | Status: SHIPPED | OUTPATIENT
Start: 2024-10-21 | End: 2024-10-21

## 2024-10-21 RX ORDER — IOPAMIDOL 755 MG/ML
100 INJECTION, SOLUTION INTRAVASCULAR
Status: DISCONTINUED | OUTPATIENT
Start: 2024-10-21 | End: 2024-10-22 | Stop reason: HOSPADM

## 2024-10-21 RX ORDER — IPRATROPIUM BROMIDE AND ALBUTEROL SULFATE 2.5; .5 MG/3ML; MG/3ML
1 SOLUTION RESPIRATORY (INHALATION)
Status: COMPLETED | OUTPATIENT
Start: 2024-10-21 | End: 2024-10-22

## 2024-10-21 ASSESSMENT — PAIN DESCRIPTION - LOCATION: LOCATION: ABDOMEN

## 2024-10-21 ASSESSMENT — PAIN SCALES - GENERAL: PAINLEVEL_OUTOF10: 3

## 2024-10-21 NOTE — TELEPHONE ENCOUNTER
She got her Dexcom. Was seen in ER c/o a pneumon and now  insomnia, black stool, diarrhea and weakness. Offered a OV but can't come in and has no way to do VV. Will make an appointment later when she not too weak

## 2024-10-21 NOTE — TELEPHONE ENCOUNTER
Pt states that she was discharged from the hospital and is still very ill. States she has SOB and diarrhea. Offered to make her a hospital follow up appt for tomorrow,pt refused, stated that she would be unable to make it to the appt because she does not walk and cannot not travel. Asked if she needed to go back to the hospital and stated they sent her home. She just wanted a call back from her PCP and that she needed meds. Please advise.

## 2024-10-22 VITALS
OXYGEN SATURATION: 95 % | BODY MASS INDEX: 23.4 KG/M2 | SYSTOLIC BLOOD PRESSURE: 176 MMHG | HEART RATE: 88 BPM | WEIGHT: 145 LBS | DIASTOLIC BLOOD PRESSURE: 58 MMHG | RESPIRATION RATE: 25 BRPM | TEMPERATURE: 98.9 F

## 2024-10-22 PROCEDURE — 96374 THER/PROPH/DIAG INJ IV PUSH: CPT

## 2024-10-22 PROCEDURE — 6360000002 HC RX W HCPCS: Performed by: EMERGENCY MEDICINE

## 2024-10-22 PROCEDURE — 6370000000 HC RX 637 (ALT 250 FOR IP): Performed by: EMERGENCY MEDICINE

## 2024-10-22 PROCEDURE — 94640 AIRWAY INHALATION TREATMENT: CPT

## 2024-10-22 RX ADMIN — FUROSEMIDE 80 MG: 10 INJECTION, SOLUTION INTRAMUSCULAR; INTRAVENOUS at 00:03

## 2024-10-22 RX ADMIN — IPRATROPIUM BROMIDE AND ALBUTEROL SULFATE 1 DOSE: .5; 2.5 SOLUTION RESPIRATORY (INHALATION) at 00:03

## 2024-10-22 NOTE — ED NOTES
Pt's  Author contacted to notify and contact Kary, the patients caregiver to come and  the patient due to decision to discharge. Author advised this writer that Kary would not be able to pick patient up until an hour from now. Charge nurse and MD aware.

## 2024-10-22 NOTE — ED PROVIDER NOTES
[facility] EMERGENCY DEP  EMERGENCY DEPARTMENT ENCOUNTER       Pt Name: Haydee Castro  MRN: [id number]  Birthdate 1940  Date of evaluation: 10/21/2024  Provider: Kalie Fong MD   PCP: Victorina Mukherjee APRN - JAZMIN  Note Started: 5:44 AM EDT 10/22/24     CHIEF COMPLAINT       Chief Complaint   Patient presents with    Abdominal Pain        HISTORY OF PRESENT ILLNESS: 1 or more elements      History From: Patient  HPI Limitations: None     Haydee Castro is a 84 y.o. female with history of A-fib, s/p AICD, CHF, diabetes, high cholesterol, pleural effusion on right who presents to the ED with chief complaint of right upper quadrant abdominal pain.  Patient reports she was seen in the ED about 2 weeks ago complaining of black stools.  She had lab work done and an x-ray and was diagnosed with pneumonia.  She was treated with Zithromax and Augmentin.  Since then, she has had decreased appetite and occasional vomiting of phlegm..  She is usually on 3 to 4 L nasal cannula oxygen as needed at home and has been having to use it more frequently.  She complains of runny nose and mild cough.  States that she always has swollen legs and takes Lasix.  Admits to smoking occasionally.  Denies fevers, chills, urinary symptoms  REVIEW OF SYSTEMS      Review of Systems     Positives and Pertinent negatives as per HPI.    PAST HISTORY     Past Medical History:  Past Medical History:   Diagnosis Date    A-fib (HCC)     AICD (automatic cardioverter/defibrillator) present 9/23/2020 9/23/2020 [employer] AICD implant    Arthritis     Bilateral pleural effusion 9/18/2020    Chronic pain     Chronic pain     Diabetes (HCC)     Diverticular disease     Elevated troponin 9/18/2020    Hemorrhoid     Hypercholesterolemia     IBS (irritable bowel syndrome)     Lymphocytosis 05/07/2018    Systolic heart failure, chronic (HCC) 7/16/2021    Type 2 MI (myocardial infarction) ([facility]) 7/16/2021 7/162021 r/t sepsis         Past Surgical

## 2024-10-23 LAB
BACTERIA SPEC CULT: NORMAL
EKG ATRIAL RATE: 85 BPM
EKG DIAGNOSIS: NORMAL
EKG P AXIS: 64 DEGREES
EKG P-R INTERVAL: 180 MS
EKG Q-T INTERVAL: 398 MS
EKG QRS DURATION: 122 MS
EKG QTC CALCULATION (BAZETT): 473 MS
EKG R AXIS: 52 DEGREES
EKG T AXIS: 215 DEGREES
EKG VENTRICULAR RATE: 85 BPM
SERVICE CMNT-IMP: NORMAL

## 2024-11-02 ENCOUNTER — HOSPITAL ENCOUNTER (EMERGENCY)
Facility: HOSPITAL | Age: 84
Discharge: HOME | End: 2024-11-03
Attending: FAMILY MEDICINE | Admitting: FAMILY MEDICINE
Payer: MEDICARE

## 2024-11-02 DIAGNOSIS — E16.2 HYPOGLYCEMIA: ICD-10-CM

## 2024-11-02 DIAGNOSIS — R19.7 DIARRHEA, UNSPECIFIED TYPE: Primary | ICD-10-CM

## 2024-11-02 PROCEDURE — 36415 COLL VENOUS BLD VENIPUNCTURE: CPT

## 2024-11-02 PROCEDURE — 99284 EMERGENCY DEPT VISIT MOD MDM: CPT

## 2024-11-02 PROCEDURE — 80053 COMPREHEN METABOLIC PANEL: CPT

## 2024-11-02 PROCEDURE — 85025 COMPLETE CBC W/AUTO DIFF WBC: CPT

## 2024-11-02 PROCEDURE — 83605 ASSAY OF LACTIC ACID: CPT

## 2024-11-03 ENCOUNTER — APPOINTMENT (OUTPATIENT)
Facility: HOSPITAL | Age: 84
End: 2024-11-03
Payer: MEDICARE

## 2024-11-03 VITALS
DIASTOLIC BLOOD PRESSURE: 100 MMHG | RESPIRATION RATE: 18 BRPM | TEMPERATURE: 97.9 F | SYSTOLIC BLOOD PRESSURE: 133 MMHG | HEART RATE: 75 BPM | OXYGEN SATURATION: 98 %

## 2024-11-03 LAB
ALBUMIN SERPL-MCNC: 2.7 G/DL (ref 3.5–5)
ALBUMIN/GLOB SERPL: 0.7 (ref 1.1–2.2)
ALP SERPL-CCNC: 99 U/L (ref 45–117)
ALT SERPL-CCNC: 24 U/L (ref 12–78)
ANION GAP SERPL CALC-SCNC: 12 MMOL/L (ref 2–12)
AST SERPL-CCNC: 49 U/L (ref 15–37)
BASOPHILS # BLD: 0.1 K/UL (ref 0–0.1)
BASOPHILS NFR BLD: 1 % (ref 0–1)
BILIRUB SERPL-MCNC: 0.3 MG/DL (ref 0.2–1)
BUN SERPL-MCNC: 46 MG/DL (ref 6–20)
BUN/CREAT SERPL: 24 (ref 12–20)
CALCIUM SERPL-MCNC: 9.4 MG/DL (ref 8.5–10.1)
CHLORIDE SERPL-SCNC: 105 MMOL/L (ref 97–108)
CO2 SERPL-SCNC: 24 MMOL/L (ref 21–32)
CREAT SERPL-MCNC: 1.93 MG/DL (ref 0.55–1.02)
DIFFERENTIAL METHOD BLD: ABNORMAL
EOSINOPHIL # BLD: 0.5 K/UL (ref 0–0.4)
EOSINOPHIL NFR BLD: 4 % (ref 0–7)
ERYTHROCYTE [DISTWIDTH] IN BLOOD BY AUTOMATED COUNT: 14.6 % (ref 11.5–14.5)
GLOBULIN SER CALC-MCNC: 4.1 G/DL (ref 2–4)
GLUCOSE BLD STRIP.AUTO-MCNC: 131 MG/DL (ref 65–117)
GLUCOSE BLD STRIP.AUTO-MCNC: 66 MG/DL (ref 65–117)
GLUCOSE SERPL-MCNC: 56 MG/DL (ref 65–100)
HCT VFR BLD AUTO: 32.1 % (ref 35–47)
HGB BLD-MCNC: 10.3 G/DL (ref 11.5–16)
IMM GRANULOCYTES # BLD AUTO: 0 K/UL (ref 0–0.04)
IMM GRANULOCYTES NFR BLD AUTO: 0 % (ref 0–0.5)
LACTATE SERPL-SCNC: 0.7 MMOL/L (ref 0.4–2)
LYMPHOCYTES # BLD: 1.7 K/UL (ref 0.8–3.5)
LYMPHOCYTES NFR BLD: 14 % (ref 12–49)
MCH RBC QN AUTO: 30.7 PG (ref 26–34)
MCHC RBC AUTO-ENTMCNC: 32.1 G/DL (ref 30–36.5)
MCV RBC AUTO: 95.5 FL (ref 80–99)
MONOCYTES # BLD: 0.9 K/UL (ref 0–1)
MONOCYTES NFR BLD: 8 % (ref 5–13)
NEUTS SEG # BLD: 8.5 K/UL (ref 1.8–8)
NEUTS SEG NFR BLD: 73 % (ref 32–75)
NRBC # BLD: 0 K/UL (ref 0–0.01)
NRBC BLD-RTO: 0 PER 100 WBC
PLATELET # BLD AUTO: 347 K/UL (ref 150–400)
PMV BLD AUTO: 10.9 FL (ref 8.9–12.9)
POTASSIUM SERPL-SCNC: 4 MMOL/L (ref 3.5–5.1)
PROT SERPL-MCNC: 6.8 G/DL (ref 6.4–8.2)
RBC # BLD AUTO: 3.36 M/UL (ref 3.8–5.2)
SERVICE CMNT-IMP: ABNORMAL
SERVICE CMNT-IMP: NORMAL
SODIUM SERPL-SCNC: 141 MMOL/L (ref 136–145)
WBC # BLD AUTO: 11.5 K/UL (ref 3.6–11)

## 2024-11-03 PROCEDURE — 82962 GLUCOSE BLOOD TEST: CPT

## 2024-11-03 PROCEDURE — 2580000003 HC RX 258: Performed by: FAMILY MEDICINE

## 2024-11-03 PROCEDURE — 96360 HYDRATION IV INFUSION INIT: CPT

## 2024-11-03 PROCEDURE — 71045 X-RAY EXAM CHEST 1 VIEW: CPT

## 2024-11-03 RX ORDER — 0.9 % SODIUM CHLORIDE 0.9 %
1000 INTRAVENOUS SOLUTION INTRAVENOUS ONCE
Status: COMPLETED | OUTPATIENT
Start: 2024-11-03 | End: 2024-11-03

## 2024-11-03 RX ADMIN — SODIUM CHLORIDE 1000 ML: 9 INJECTION, SOLUTION INTRAVENOUS at 02:06

## 2024-11-03 NOTE — DISCHARGE INSTR - COC
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       Isolation/Infection:   Isolation            No Isolation          Patient Infection Status       Infection Onset Added Last Indicated Last Indicated By Review Planned Expiration Resolved Resolved By    C-diff Rule Out 11/03/24 11/03/24 11/03/24 Clostridium Difficile Toxin/Antigen (Ordered) 11/10/24 11/13/24                         Nurse Assessment:  Last Vital Signs: BP (!) 133/100   Pulse 75   Temp 97.9 °F (36.6 °C) (Oral)   Resp 18   SpO2 98%     Last documented pain score (0-10 scale):    Last Weight:   Wt Readings from Last 1 Encounters:   10/21/24 65.8 kg (145 lb)     Mental Status:  {IP PT MENTAL STATUS:20030}    IV Access:  { MARK IV ACCESS:408268366}    Nursing Mobility/ADLs:  Walking   {CHP DME ADLs:611209260}  Transfer  {CHP DME ADLs:274321538}  Bathing  {CHP DME ADLs:489804084}  Dressing  {CHP DME ADLs:435546244}  Toileting  {CHP DME ADLs:080833331}  Feeding  {P DME ADLs:644533288}  Med Admin  {P DME ADLs:759401711}  Med Delivery   { MARK MED Delivery:430580908}    Wound Care Documentation and Therapy:  Wound 11/09/23 Radial Distal;Right Cath Lab (Active)   Number of days: 359       Incision 05/04/23 Abdomen Lateral;Left;Lower (Active)   Number of days: 548        Elimination:  Continence:   Bowel: {YES / NO:19727}  Bladder: {YES / NO:19727}  Urinary Catheter: {Urinary Catheter:230402654}   Colostomy/Ileostomy/Ileal Conduit: {YES / NO:19727}       Date of Last BM: ***    Intake/Output Summary (Last 24 hours) at 11/3/2024 0702  Last data filed at 11/3/2024 0237  Gross per 24 hour

## 2024-11-03 NOTE — ED PROVIDER NOTES
(08612 UT) Caps capsule  Commonly known as: ERGOCALCIFEROL  TAKE 1 CAPSULE BY MOUTH EVERY 7  DAYS FOR LOW VITAMIN D LEVELS                DISCONTINUED MEDICATIONS:  Current Discharge Medication List          I am the Primary Clinician of Record.   Antonia Givens MD (electronically signed)      (Please note that parts of this dictation were completed with voice recognition software. Quite often unanticipated grammatical, syntax, homophones, and other interpretive errors are inadvertently transcribed by the computer software. Please disregards these errors. Please excuse any errors that have escaped final proofreading.)          Antonia Givens MD  11/03/24 0532

## 2024-11-07 ENCOUNTER — APPOINTMENT (OUTPATIENT)
Facility: HOSPITAL | Age: 84
End: 2024-11-07
Payer: MEDICARE

## 2024-11-07 ENCOUNTER — HOSPITAL ENCOUNTER (EMERGENCY)
Facility: HOSPITAL | Age: 84
Discharge: HOME | End: 2024-11-07
Attending: EMERGENCY MEDICINE
Payer: MEDICARE

## 2024-11-07 ENCOUNTER — OFFICE VISIT (OUTPATIENT)
Age: 84
End: 2024-11-07
Payer: MEDICARE

## 2024-11-07 VITALS
BODY MASS INDEX: 24.21 KG/M2 | HEART RATE: 85 BPM | DIASTOLIC BLOOD PRESSURE: 84 MMHG | WEIGHT: 150 LBS | RESPIRATION RATE: 22 BRPM | SYSTOLIC BLOOD PRESSURE: 101 MMHG | TEMPERATURE: 97.8 F | OXYGEN SATURATION: 100 %

## 2024-11-07 VITALS
SYSTOLIC BLOOD PRESSURE: 146 MMHG | WEIGHT: 141 LBS | HEART RATE: 72 BPM | DIASTOLIC BLOOD PRESSURE: 60 MMHG | HEIGHT: 66 IN | BODY MASS INDEX: 22.66 KG/M2 | OXYGEN SATURATION: 89 %

## 2024-11-07 DIAGNOSIS — Z95.810 AICD (AUTOMATIC CARDIOVERTER/DEFIBRILLATOR) PRESENT: ICD-10-CM

## 2024-11-07 DIAGNOSIS — I47.10 PSVT (PAROXYSMAL SUPRAVENTRICULAR TACHYCARDIA) (HCC): ICD-10-CM

## 2024-11-07 DIAGNOSIS — I50.22 CHRONIC HFREF (HEART FAILURE WITH REDUCED EJECTION FRACTION) (HCC): ICD-10-CM

## 2024-11-07 DIAGNOSIS — I25.5 ISCHEMIC CARDIOMYOPATHY: Primary | ICD-10-CM

## 2024-11-07 DIAGNOSIS — J90 PLEURAL EFFUSION: Primary | ICD-10-CM

## 2024-11-07 DIAGNOSIS — I25.10 MULTIPLE VESSEL CORONARY ARTERY DISEASE: ICD-10-CM

## 2024-11-07 DIAGNOSIS — I10 ESSENTIAL HYPERTENSION: ICD-10-CM

## 2024-11-07 LAB
ALBUMIN SERPL-MCNC: 3 G/DL (ref 3.5–5)
ALBUMIN/GLOB SERPL: 0.7 (ref 1.1–2.2)
ALP SERPL-CCNC: 84 U/L (ref 45–117)
ALT SERPL-CCNC: 17 U/L (ref 12–78)
ANION GAP SERPL CALC-SCNC: 2 MMOL/L (ref 2–12)
AST SERPL-CCNC: 8 U/L (ref 15–37)
BASOPHILS # BLD: 0.1 K/UL (ref 0–0.1)
BASOPHILS NFR BLD: 1 % (ref 0–1)
BILIRUB SERPL-MCNC: 0.3 MG/DL (ref 0.2–1)
BUN SERPL-MCNC: 43 MG/DL (ref 6–20)
BUN/CREAT SERPL: 23 (ref 12–20)
CALCIUM SERPL-MCNC: 9.8 MG/DL (ref 8.5–10.1)
CHLORIDE SERPL-SCNC: 112 MMOL/L (ref 97–108)
CO2 SERPL-SCNC: 26 MMOL/L (ref 21–32)
COMMENT:: NORMAL
COMMENT:: NORMAL
CREAT SERPL-MCNC: 1.84 MG/DL (ref 0.55–1.02)
DIFFERENTIAL METHOD BLD: ABNORMAL
EKG ATRIAL RATE: 72 BPM
EKG DIAGNOSIS: NORMAL
EKG P AXIS: 62 DEGREES
EKG P-R INTERVAL: 178 MS
EKG Q-T INTERVAL: 430 MS
EKG QRS DURATION: 106 MS
EKG QTC CALCULATION (BAZETT): 470 MS
EKG R AXIS: 67 DEGREES
EKG T AXIS: 237 DEGREES
EKG VENTRICULAR RATE: 72 BPM
EOSINOPHIL # BLD: 0.8 K/UL (ref 0–0.4)
EOSINOPHIL NFR BLD: 7 % (ref 0–7)
ERYTHROCYTE [DISTWIDTH] IN BLOOD BY AUTOMATED COUNT: 14 % (ref 11.5–14.5)
GLOBULIN SER CALC-MCNC: 4.2 G/DL (ref 2–4)
GLUCOSE SERPL-MCNC: 132 MG/DL (ref 65–100)
HCT VFR BLD AUTO: 31.8 % (ref 35–47)
HGB BLD-MCNC: 10.2 G/DL (ref 11.5–16)
IMM GRANULOCYTES # BLD AUTO: 0 K/UL (ref 0–0.04)
IMM GRANULOCYTES NFR BLD AUTO: 0 % (ref 0–0.5)
LYMPHOCYTES # BLD: 1.6 K/UL (ref 0.8–3.5)
LYMPHOCYTES NFR BLD: 14 % (ref 12–49)
MCH RBC QN AUTO: 30.2 PG (ref 26–34)
MCHC RBC AUTO-ENTMCNC: 32.1 G/DL (ref 30–36.5)
MCV RBC AUTO: 94.1 FL (ref 80–99)
MONOCYTES # BLD: 0.8 K/UL (ref 0–1)
MONOCYTES NFR BLD: 7 % (ref 5–13)
NEUTS SEG # BLD: 8.6 K/UL (ref 1.8–8)
NEUTS SEG NFR BLD: 71 % (ref 32–75)
NRBC # BLD: 0 K/UL (ref 0–0.01)
NRBC BLD-RTO: 0 PER 100 WBC
NT PRO BNP: ABNORMAL PG/ML
PLATELET # BLD AUTO: 344 K/UL (ref 150–400)
PMV BLD AUTO: 10.5 FL (ref 8.9–12.9)
POTASSIUM SERPL-SCNC: 4.2 MMOL/L (ref 3.5–5.1)
PROT SERPL-MCNC: 7.2 G/DL (ref 6.4–8.2)
RBC # BLD AUTO: 3.38 M/UL (ref 3.8–5.2)
SODIUM SERPL-SCNC: 140 MMOL/L (ref 136–145)
SPECIMEN HOLD: NORMAL
SPECIMEN HOLD: NORMAL
TROPONIN I SERPL HS-MCNC: 63 NG/L (ref 0–51)
WBC # BLD AUTO: 12 K/UL (ref 3.6–11)

## 2024-11-07 PROCEDURE — 84484 ASSAY OF TROPONIN QUANT: CPT

## 2024-11-07 PROCEDURE — 99283 EMERGENCY DEPT VISIT LOW MDM: CPT | Performed by: NURSE PRACTITIONER

## 2024-11-07 PROCEDURE — 71045 X-RAY EXAM CHEST 1 VIEW: CPT

## 2024-11-07 PROCEDURE — 99214 OFFICE O/P EST MOD 30 MIN: CPT | Performed by: INTERNAL MEDICINE

## 2024-11-07 PROCEDURE — 83880 ASSAY OF NATRIURETIC PEPTIDE: CPT

## 2024-11-07 PROCEDURE — 99285 EMERGENCY DEPT VISIT HI MDM: CPT

## 2024-11-07 PROCEDURE — 85025 COMPLETE CBC W/AUTO DIFF WBC: CPT

## 2024-11-07 PROCEDURE — 71046 X-RAY EXAM CHEST 2 VIEWS: CPT

## 2024-11-07 PROCEDURE — 93005 ELECTROCARDIOGRAM TRACING: CPT | Performed by: EMERGENCY MEDICINE

## 2024-11-07 PROCEDURE — 36415 COLL VENOUS BLD VENIPUNCTURE: CPT

## 2024-11-07 PROCEDURE — 93005 ELECTROCARDIOGRAM TRACING: CPT | Performed by: INTERNAL MEDICINE

## 2024-11-07 PROCEDURE — 32555 ASPIRATE PLEURA W/ IMAGING: CPT

## 2024-11-07 PROCEDURE — 80053 COMPREHEN METABOLIC PANEL: CPT

## 2024-11-07 RX ORDER — GRANULES FOR ORAL 3 G/1
POWDER ORAL
COMMUNITY
Start: 2024-10-22

## 2024-11-07 ASSESSMENT — ENCOUNTER SYMPTOMS
COLOR CHANGE: 0
ABDOMINAL PAIN: 0
SHORTNESS OF BREATH: 1
CONSTIPATION: 0
DIARRHEA: 0
NAUSEA: 0
BACK PAIN: 0
VOMITING: 0

## 2024-11-07 ASSESSMENT — PATIENT HEALTH QUESTIONNAIRE - PHQ9
SUM OF ALL RESPONSES TO PHQ9 QUESTIONS 1 & 2: 1
SUM OF ALL RESPONSES TO PHQ QUESTIONS 1-9: 1
SUM OF ALL RESPONSES TO PHQ QUESTIONS 1-9: 1
2. FEELING DOWN, DEPRESSED OR HOPELESS: SEVERAL DAYS
SUM OF ALL RESPONSES TO PHQ QUESTIONS 1-9: 1
1. LITTLE INTEREST OR PLEASURE IN DOING THINGS: NOT AT ALL
SUM OF ALL RESPONSES TO PHQ QUESTIONS 1-9: 1

## 2024-11-07 ASSESSMENT — PAIN - FUNCTIONAL ASSESSMENT: PAIN_FUNCTIONAL_ASSESSMENT: NONE - DENIES PAIN

## 2024-11-07 NOTE — ED PROVIDER NOTES
[facility] EMERGENCY DEP  EMERGENCY DEPARTMENT ENCOUNTER      Pt Name: Haydee Castro  MRN: [id number]  Birthdate 1940  Date of evaluation: 11/7/2024  Provider: Leonid Celaya MD    CHIEF COMPLAINT       Chief Complaint   Patient presents with    Shortness of Breath         HISTORY OF PRESENT ILLNESS   (Location/Symptom, Timing/Onset, Context/Setting, Quality, Duration, Modifying Factors, Severity)  Note limiting factors.   Haydee Castro is a 84 y.o. female who presents to the emergency department      The history is provided by the patient and a caregiver. No  was used.   Shortness of Breath  Severity:  Moderate  Onset quality:  Gradual  Timing:  Constant  Progression:  Worsening  Chronicity:  Recurrent  Context: activity    Associated symptoms: chest pain    Associated symptoms: no abdominal pain, no fever, no headaches, no neck pain and no vomiting        Nursing Notes were reviewed.    REVIEW OF SYSTEMS    (2-9 systems for level 4, 10 or more for level 5)     Review of Systems   Constitutional:  Negative for activity change, chills and fever.   HENT:  Negative for nosebleeds.    Eyes:  Negative for visual disturbance.   Respiratory:  Positive for shortness of breath.    Cardiovascular:  Positive for chest pain. Negative for palpitations.   Gastrointestinal:  Negative for abdominal pain, constipation, diarrhea, nausea and vomiting.   Genitourinary:  Negative for difficulty urinating, dysuria, hematuria and urgency.   Musculoskeletal:  Negative for back pain, neck pain and neck stiffness.   Skin:  Negative for color change.   Allergic/Immunologic: Negative for immunocompromised state.   Neurological:  Negative for dizziness, seizures, syncope, weakness, light-headedness, numbness and headaches.   Psychiatric/Behavioral:  Negative for behavioral problems, confusion, hallucinations, self-injury and suicidal ideas.        Except as noted above the remainder of the review of systems was  EKG's are interpreted by the Emergency Department Physician who either signs or Co-signs this chart in the absence of a cardiologist.        RADIOLOGY:   Non-plain film images such as CT, Ultrasound and MRI are read by the radiologist. Plain radiographic images are visualized and preliminarily interpreted by the emergency physician with the below findings:        Interpretation per the Radiologist below, if available at the time of this note:    XR CHEST (2 VW)   Final Result   1. Persistent right pleural effusion has mildly increased in the interval.      Electronically signed by JADA ARRINGTON US CHEST PLEURAL SPACE    (Results Pending)         ED BEDSIDE ULTRASOUND:   Performed by ED Physician - none    LABS:  Labs Reviewed   CBC WITH AUTO DIFFERENTIAL - Abnormal; Notable for the following components:       Result Value    WBC 12.0 (*)     RBC 3.38 (*)     Hemoglobin 10.2 (*)     Hematocrit 31.8 (*)     Neutrophils Absolute 8.6 (*)     Eosinophils Absolute 0.8 (*)     All other components within normal limits   EXTRA TUBES HOLD   COMPREHENSIVE METABOLIC PANEL   BRAIN NATRIURETIC PEPTIDE   TROPONIN       All other labs were within normal range or not returned as of this dictation.    EMERGENCY DEPARTMENT COURSE and DIFFERENTIAL DIAGNOSIS/MDM:   Vitals:    Vitals:    11/07/24 1217   BP: (!) 159/104   Pulse: 87   Resp: 20   Temp: 97.8 °F (36.6 °C)   TempSrc: Oral   SpO2: 92%           Medical Decision Making  Amount and/or Complexity of Data Reviewed  Labs: ordered.  Radiology: ordered.  ECG/medicine tests: ordered.    This is an 84-year-old female with past medical history, review of systems, physical exam as above, presenting with complaints of recurrent shortness of breath, right lower chest pain.  Patient states a history of previous recurrent pleural effusion.  She notes ongoing tobacco use, between 3 and 4 L of oxygen use at home chronically.  Upon arrival she is noted to be hypertensive, afebrile

## 2024-11-07 NOTE — ED TRIAGE NOTES
Pt dx with pneumonia and a pleural effusion weeks ago , completed her antibiotics , they were unable to drain the fluid , pt would like the fluid to be drained, denies fever or cough, denies cp , +sob, no new leg swelling, no resp distress noted in triage

## 2024-11-07 NOTE — CARE COORDINATION
3:55 PM    CM consulted to assist with arranging HF services in the community.      CM met with patient at bedside.  Chart reviewed.  84 year old female, Aox4.  Prior Code Status with ACP on file.  Patient with a medical history significant for A-Fib, AICD, Arthritis, Bilateral Pleural Effusion, Chronic Pain, Diabetes, Diverticultis, Elevated Troponin, Hemorrhoid, IBS, Hypercholesterolemia, Lymphocytosis, Systolic HF, Type 2 MI.    Patient resides with spouse in their own 2 story home with ramp to enter.  Patient resides on 1st level of the home.  Requires some assistance with ADL's and IADL's.  Patient has a personal care aide who provides services 2 days out of the week for 4 hours.  DME utilized: walker, W/C, and oxygen ([employer]APT).  Patient receives SS/Pension as source of income with no reported financial stressors or concerns.  [facility] and [facility] is utilized for prescriptions.  Patient with no history of IPR/HH/SNF.  Insurance verified: [employer] Medicare.  Patient expressed that she has seen PCP in the past 6 months to a year.  Patient does have 3 children, one resides in [place], one in [place], and one in [place].    CM placed follow-up email to identified HF RN Navigators in the community for assistance and awaiting follow-up at this time to assist with coordinating services.    CM available to assist with REA needs as they arise.    PEPE Hall/HARDIK

## 2024-11-07 NOTE — ED NOTES
[facility]  O- [phone]    [facility] ED Geriatric Consult Team  Sonali ZUNIGA-NP   Abiola Goel CM    Patient Name: Haydee Castro  YOB: 1940    Date of Initial Consult: 11/7/2024  Reason for Consult: Assess for home needs, frequent ER visits  Requesting Provider: Dr Celaya      SUMMARY:   Haydee Castro is a 84 y.o. with a past history of A-fib, AICD, arthritis, bilateral pleural effusions, chronic pain, diabetes, diverticular disease, HLD, IBS, lymphocytosis, systolic heart failure chronic, type II MI, who was arrived in the [facility] ED on 11/7/2024 from cardiologist office Dr. Glez With a diagnosis of pleural effusion.     Current medical issues leading to Geriatric Consult  involvement include:   [] SNF Transfer  [x] Prior ER visits within 30 days  -4 separate ER visits since October  [] Geriatric Medication Assessment  [] Complex Medical Conditions  [x] Complex Case Management and/or SDOH  -Lives with elderly spouse, 89 yrs old, wheelchair bound, concern for mild-moderate dementia.  -Private pay caregivers come in 2 x week for 4 hours total, otherwise, no local children provide support.   [x] Goals of Care    GERIATRIC DIAGNOSES:   Debility  Pleural Effusion     PLAN:   Thoracentesis per ER physician, Dr Celaya.   Discharge home with caregiver at the bedside, Kary.  Collaborate with HARDIK Goel to reach out to Care Coordinator to assist in getting in with Heart Failure clinic.   [] Initial consult note routed to primary continuity provider and/or primary health care team members  [x] Communicated plan of care with: ED and/or Hospital Health Care Team  ER physician Stacie Serrano, RN, Abiola Goel, HARDIK  ADVANCE CARE PLANNING / TREATMENT PREFERENCES:     GOALS OF CARE:  []-Comfort   []-Cure   [x]-Prolong life: requiring recurrent thoracentesis from pleural effusion   []-Recovery from acute illness   []-Rehabilitation  []-Other:         TREATMENT PREFERENCES:     Patient and

## 2024-11-07 NOTE — PROGRESS NOTES
1. Have you been to the ER, urgent care clinic since your last visit?  Hospitalized since your last visit?Yes Pain in [facility]  on 11/2/24    2. Have you seen or consulted any other health care providers outside of the [facility] since your last visit?  Include any pap smears or colon screening. No  
[place]  [address] [address]  [place] [address] [phone]

## 2024-11-08 NOTE — CARE COORDINATION
Updates received from HF team informed of need to have order placed for OP Clinic.     CM placed call to HF Clinic and spoke w/ Martita [phone]. Electric communication sent to Dr.Francis Celaya.   Clinic will await order in the system when available and place new patient call to  for scheduling.

## 2024-11-18 ENCOUNTER — OFFICE VISIT (OUTPATIENT)
Age: 84
End: 2024-11-18
Payer: MEDICARE

## 2024-11-18 VITALS
RESPIRATION RATE: 24 BRPM | BODY MASS INDEX: 22.82 KG/M2 | TEMPERATURE: 97.4 F | DIASTOLIC BLOOD PRESSURE: 70 MMHG | HEIGHT: 66 IN | SYSTOLIC BLOOD PRESSURE: 130 MMHG | HEART RATE: 84 BPM | OXYGEN SATURATION: 90 % | WEIGHT: 142 LBS

## 2024-11-18 DIAGNOSIS — Z71.89 ACP (ADVANCE CARE PLANNING): ICD-10-CM

## 2024-11-18 DIAGNOSIS — F33.2 SEVERE EPISODE OF RECURRENT MAJOR DEPRESSIVE DISORDER, WITHOUT PSYCHOTIC FEATURES (HCC): ICD-10-CM

## 2024-11-18 DIAGNOSIS — Z79.4 TYPE 2 DIABETES MELLITUS WITH DIABETIC NEUROPATHY, WITH LONG-TERM CURRENT USE OF INSULIN (HCC): ICD-10-CM

## 2024-11-18 DIAGNOSIS — F33.1 MODERATE EPISODE OF RECURRENT MAJOR DEPRESSIVE DISORDER (HCC): ICD-10-CM

## 2024-11-18 DIAGNOSIS — E11.40 TYPE 2 DIABETES MELLITUS WITH DIABETIC NEUROPATHY, WITH LONG-TERM CURRENT USE OF INSULIN (HCC): ICD-10-CM

## 2024-11-18 DIAGNOSIS — R21 RASH: Primary | ICD-10-CM

## 2024-11-18 DIAGNOSIS — I50.20 HFREF (HEART FAILURE WITH REDUCED EJECTION FRACTION) (HCC): ICD-10-CM

## 2024-11-18 PROCEDURE — 99214 OFFICE O/P EST MOD 30 MIN: CPT | Performed by: NURSE PRACTITIONER

## 2024-11-18 PROCEDURE — 1123F ACP DISCUSS/DSCN MKR DOCD: CPT | Performed by: NURSE PRACTITIONER

## 2024-11-18 PROCEDURE — 3044F HG A1C LEVEL LT 7.0%: CPT | Performed by: NURSE PRACTITIONER

## 2024-11-18 PROCEDURE — 1126F AMNT PAIN NOTED NONE PRSNT: CPT | Performed by: NURSE PRACTITIONER

## 2024-11-18 PROCEDURE — 3075F SYST BP GE 130 - 139MM HG: CPT | Performed by: NURSE PRACTITIONER

## 2024-11-18 PROCEDURE — 3078F DIAST BP <80 MM HG: CPT | Performed by: NURSE PRACTITIONER

## 2024-11-18 RX ORDER — HYDRALAZINE HYDROCHLORIDE 25 MG/1
25 TABLET, FILM COATED ORAL 3 TIMES DAILY
Qty: 90 TABLET | Refills: 3 | Status: SHIPPED | OUTPATIENT
Start: 2024-11-18 | End: 2024-11-20 | Stop reason: DRUGHIGH

## 2024-11-18 ASSESSMENT — COLUMBIA-SUICIDE SEVERITY RATING SCALE - C-SSRS
2. HAVE YOU ACTUALLY HAD ANY THOUGHTS OF KILLING YOURSELF?: YES
3. HAVE YOU BEEN THINKING ABOUT HOW YOU MIGHT KILL YOURSELF?: NO
4. HAVE YOU HAD THESE THOUGHTS AND HAD SOME INTENTION OF ACTING ON THEM?: NO
6. HAVE YOU EVER DONE ANYTHING, STARTED TO DO ANYTHING, OR PREPARED TO DO ANYTHING TO END YOUR LIFE?: NO

## 2024-11-18 ASSESSMENT — PATIENT HEALTH QUESTIONNAIRE - PHQ9
SUM OF ALL RESPONSES TO PHQ QUESTIONS 1-9: 24
2. FEELING DOWN, DEPRESSED OR HOPELESS: NEARLY EVERY DAY
SUM OF ALL RESPONSES TO PHQ9 QUESTIONS 1 & 2: 6
3. TROUBLE FALLING OR STAYING ASLEEP: NEARLY EVERY DAY
10. IF YOU CHECKED OFF ANY PROBLEMS, HOW DIFFICULT HAVE THESE PROBLEMS MADE IT FOR YOU TO DO YOUR WORK, TAKE CARE OF THINGS AT HOME, OR GET ALONG WITH OTHER PEOPLE: EXTREMELY DIFFICULT
SUM OF ALL RESPONSES TO PHQ QUESTIONS 1-9: 27
6. FEELING BAD ABOUT YOURSELF - OR THAT YOU ARE A FAILURE OR HAVE LET YOURSELF OR YOUR FAMILY DOWN: NEARLY EVERY DAY
9. THOUGHTS THAT YOU WOULD BE BETTER OFF DEAD, OR OF HURTING YOURSELF: NEARLY EVERY DAY
SUM OF ALL RESPONSES TO PHQ QUESTIONS 1-9: 27
4. FEELING TIRED OR HAVING LITTLE ENERGY: NEARLY EVERY DAY
7. TROUBLE CONCENTRATING ON THINGS, SUCH AS READING THE NEWSPAPER OR WATCHING TELEVISION: NEARLY EVERY DAY
1. LITTLE INTEREST OR PLEASURE IN DOING THINGS: NEARLY EVERY DAY
SUM OF ALL RESPONSES TO PHQ QUESTIONS 1-9: 27
8. MOVING OR SPEAKING SO SLOWLY THAT OTHER PEOPLE COULD HAVE NOTICED. OR THE OPPOSITE, BEING SO FIGETY OR RESTLESS THAT YOU HAVE BEEN MOVING AROUND A LOT MORE THAN USUAL: NEARLY EVERY DAY
5. POOR APPETITE OR OVEREATING: NEARLY EVERY DAY

## 2024-11-18 NOTE — PATIENT INSTRUCTIONS

## 2024-11-18 NOTE — PROGRESS NOTES
\"Have you been to the ER, urgent care clinic since your last visit?  Hospitalized since your last visit?\"    Yes several times [facility] pleural effusion    “Have you seen or consulted any other health care providers outside of [facility] since your last visit?”    NO            Click Here for Release of Records Request      Chief Complaint   Patient presents with    Rash         Vitals:    11/18/24 0952   BP: 130/70   Pulse: 84   Resp: 24   Temp: 97.4 °F (36.3 °C)   SpO2: 90%

## 2024-11-18 NOTE — PROGRESS NOTES
Face to Face Encounter  Haydee Castro is a 84 y.o. female presenting for/with:    Primary Diagnosis: Heart failure with reduced ejection fx  Date of Face to Face:  11/18/24                          Face to Face Encounter findings are related to primary reason for home care:   yes.     1. I certify that the patient needs intermittent care as follows: skilled nursing care:  skilled observation/assessment, patient education and complex care plan management  physical therapy: strengthening, stretching/ROM, transfer training, gait/stair training, and balance training  occupational therapy:  ROM and pt/caregiver education    2. I certify that this patient is homebound, that is:   1) patient requires the use of a  wheelchair,walker and assistive   device, special transportation, or assistance of another to leave the home;        3) patient has a normal inability to leave the home and leaving the home requires considerable and taxing effort.  Patient may leave the home for infrequent and short duration for medical reasons, and occasional absences for non-medical reasons.    Homebound status is due to the following functional limitations: Patient with strength deficits limiting the performance of all ADL's without caregiver assistance or the use of an assistive device.  Patient with increased shortness of breath with all exertional activity limiting ambulation outside the home. Patient with strength deficits limiting the ability to carry portable O2 for distances outside the home without the assistance of a caregiver.    3. I certify that this patient is under my care and that I, or a nurse practitioner or physician’s assistant, or clinical nurse specialist, or certified nurse midwife, working with me, had a Face-to-Face Encounter that meets the physician Face-to-Face Encounter requirements.  The following are the clinical findings from the Face-to-Face encounter that support the need for skilled services and is a

## 2024-11-20 ENCOUNTER — TELEPHONE (OUTPATIENT)
Age: 84
End: 2024-11-20

## 2024-11-20 RX ORDER — HYDRALAZINE HYDROCHLORIDE 100 MG/1
100 TABLET, FILM COATED ORAL 3 TIMES DAILY
Qty: 270 TABLET | Refills: 1 | Status: SHIPPED | OUTPATIENT
Start: 2024-11-20

## 2024-11-20 NOTE — TELEPHONE ENCOUNTER
S/w patient states she has been taking 100 mg ,1 tab, 3 x a day since June , her cardiologist ordered. But her RX she picked up was for 25 mg. Please advise   home

## 2024-11-20 NOTE — TELEPHONE ENCOUNTER
There is some confusion with the RX hydralazine 25 mg. Ajay wants clarification on the mg and dosage. States she was taking 100 mg 3 x daily. Please  advise.

## 2024-11-26 ENCOUNTER — TELEPHONE (OUTPATIENT)
Age: 84
End: 2024-11-26

## 2024-11-26 DIAGNOSIS — R21 RASH: Primary | ICD-10-CM

## 2024-11-26 RX ORDER — HYDROXYZINE HYDROCHLORIDE 25 MG/1
25 TABLET, FILM COATED ORAL
COMMUNITY
End: 2024-11-26 | Stop reason: SDUPTHER

## 2024-11-26 RX ORDER — HYDROXYZINE HYDROCHLORIDE 25 MG/1
25 TABLET, FILM COATED ORAL EVERY 8 HOURS PRN
Qty: 30 TABLET | Refills: 0 | Status: SHIPPED | OUTPATIENT
Start: 2024-11-26

## 2024-11-26 NOTE — TELEPHONE ENCOUNTER
GIO Adame Matthew informed of her Atarax Rx request has been sent to the pharmacy with confirmation of it being received.  She can give them a PC to check when ready for pickup, OK of understanding and thanks so much.

## 2024-11-26 NOTE — TELEPHONE ENCOUNTER
Patient requesting refill on     Requested Prescriptions     Pending Prescriptions Disp Refills    hydrOXYzine HCl (ATARAX) 25 MG tablet 30 tablet 0     Sig: Take 1 tablet by mouth every 4 hours as needed for Itching        Last OV 11/18/2024

## 2024-11-26 NOTE — TELEPHONE ENCOUNTER
SW Ms Matthew, confirmed the Atarax dosage, but did state the 25 mg was overlooked on my behalf, so will confirm.  Per Ms Castro, yes please get it ordered, has been 8 months now and still is very miserable.  She was informed of Victorina out, will be sent to another provider.

## 2024-11-26 NOTE — TELEPHONE ENCOUNTER
Sonali states Haydee saw Victorina for her rash 11-18. Sonali thinks there was a mix up in the medication called in. It should have been Atarax 25 mg for her itchy rash and not hyrdalazine. Sonali said this had already been prescribed by Dr. Castrejon before. Wants to know if the Atarax can be sent to [facility]. Sonali can be called if needed but would like someone to call Haydee and let her know if med is sent.       [facility] - [place], [place] - [address] [phone] - F [phone]  [address] [address]  Phone: [phone] Fax: [phone]

## 2024-11-26 NOTE — TELEPHONE ENCOUNTER
GIO Jo, [facility] to confirm if they had a previous order on file from Dr. Blum for dosage?  Per Deysi, only have what Victorina sent in.  Stated to her, I will give the daughter a call to confirm if possible.

## 2024-12-05 NOTE — ED NOTES
Bedside and Verbal shift change report given to [address] [place] [address] (oncoming nurse) by Benjie Kirkland RN (offgoing nurse). Report included the following information SBAR, ED Summary, Intake/Output, MAR, Accordion, Recent Results, Med Rec Status, Cardiac Rhythm NSR and Quality Measures.
Knocked on door to announce to pt. Hands washed. Received bedside report from [facility]. Introduced self to pt and updated whiteboard. Explained role of self to pt. ED flow explained to pt. Pt verbalized understanding.
Pt providing urine specimen with bedside commode at this time, to pull call bell when finished.
Pt reports much improvement in breathing, ease of breathing after neb treatment.
RN Supervisor aware of pt admission.
RT called for pt breathing treatment, aware of pt Droplet plus precautions.
Radiology is in ED to obtain imaging of patient.
Respiratory Therapy is in ED to administer treatment for patient.
TRANSFER - OUT REPORT:    Verbal report given to Ever Tobar RN(name) on [facility]  being transferred to [facility](unit) for routine progression of care       Report consisted of patients Situation, Background, Assessment and   Recommendations(SBAR). Information from the following report(s) SBAR, ED Summary, MAR, Recent Results and Med Rec Status was reviewed with the receiving nurse. Lines:   PICC Single Lumen 55/74/84 Right;Basilic (Active)       Peripheral IV 08/23/21 Left Antecubital (Active)        Opportunity for questions and clarification was provided.       Patient transported with:   Monitor  O2 @ 2 liters
Tech reports that telemetry box is working. Pt placed on 2L portable O2 for transport.
no

## 2024-12-11 ENCOUNTER — TELEPHONE (OUTPATIENT)
Age: 84
End: 2024-12-11

## 2024-12-11 NOTE — TELEPHONE ENCOUNTER
Pt had an house call appointment through her insurance and they called to let PCP know that GFR test came back abnormal at 47. States that they will be reaching out to pt as well to make follow up appointment with primary care.

## 2024-12-23 ENCOUNTER — HOSPITAL ENCOUNTER (EMERGENCY)
Facility: HOSPITAL | Age: 84
Discharge: HOME | End: 2024-12-23
Attending: EMERGENCY MEDICINE
Payer: MEDICARE

## 2024-12-23 VITALS
OXYGEN SATURATION: 98 % | TEMPERATURE: 98 F | WEIGHT: 150 LBS | BODY MASS INDEX: 24.11 KG/M2 | HEART RATE: 57 BPM | DIASTOLIC BLOOD PRESSURE: 69 MMHG | SYSTOLIC BLOOD PRESSURE: 151 MMHG | HEIGHT: 66 IN | RESPIRATION RATE: 18 BRPM

## 2024-12-23 DIAGNOSIS — R21 RASH AND OTHER NONSPECIFIC SKIN ERUPTION: Primary | ICD-10-CM

## 2024-12-23 DIAGNOSIS — R21 RASH: ICD-10-CM

## 2024-12-23 LAB
GLUCOSE BLD STRIP.AUTO-MCNC: 124 MG/DL (ref 65–117)
SERVICE CMNT-IMP: ABNORMAL

## 2024-12-23 PROCEDURE — 82962 GLUCOSE BLOOD TEST: CPT

## 2024-12-23 PROCEDURE — 99283 EMERGENCY DEPT VISIT LOW MDM: CPT

## 2024-12-23 RX ORDER — CAMPHOR 0.45 %
GEL (GRAM) TOPICAL
Qty: 103 ML | Refills: 0 | Status: SHIPPED | OUTPATIENT
Start: 2024-12-23

## 2024-12-23 RX ORDER — HYDROXYZINE HYDROCHLORIDE 25 MG/1
25 TABLET, FILM COATED ORAL EVERY 8 HOURS PRN
Qty: 30 TABLET | Refills: 0 | Status: SHIPPED | OUTPATIENT
Start: 2024-12-23 | End: 2025-01-02

## 2024-12-23 RX ORDER — CARVEDILOL 25 MG/1
25 TABLET ORAL 2 TIMES DAILY
Qty: 180 TABLET | Refills: 1 | Status: SHIPPED | OUTPATIENT
Start: 2024-12-23

## 2024-12-23 RX ORDER — HYDROXYZINE HYDROCHLORIDE 25 MG/1
25 TABLET, FILM COATED ORAL EVERY 8 HOURS PRN
Qty: 30 TABLET | Refills: 0 | Status: SHIPPED | OUTPATIENT
Start: 2024-12-23

## 2024-12-23 ASSESSMENT — PAIN DESCRIPTION - DESCRIPTORS: DESCRIPTORS: ITCHING

## 2024-12-23 ASSESSMENT — PAIN DESCRIPTION - LOCATION: LOCATION: GENERALIZED

## 2024-12-23 ASSESSMENT — PAIN SCALES - GENERAL: PAINLEVEL_OUTOF10: 10

## 2024-12-23 ASSESSMENT — PAIN - FUNCTIONAL ASSESSMENT: PAIN_FUNCTIONAL_ASSESSMENT: 0-10

## 2024-12-23 NOTE — TELEPHONE ENCOUNTER
PCP: Victorina Mukherjee, EFRAIN - NP    Last appt: 1/10/2024   Future Appointments   Date Time Provider Department Center   2/7/2025  1:00 PM Nico Carroll APRN - NP [facility]   11/19/2025  9:20 AM [email or web address], [facility]   11/19/2025  9:40 AM Danelle Valdez APRN - CNP [facility]       Requested Prescriptions     Signed Prescriptions Disp Refills    carvedilol (COREG) 25 MG tablet 180 tablet 1     Sig: Take 1 tablet by mouth 2 times daily     Authorizing Provider: BLANE BECKHAM     Ordering User: ELODIA BEE         Other Comments:  Verbal order per provider.  Order (medication, dose, route, frequency, amount, refills) repeated and verified twice.

## 2024-12-23 NOTE — ED TRIAGE NOTES
Pt arrived with complaint of rash.  Pt reports the rash has been going on for month and is due to an allergic reaction to Amiodarone and she is out of the medication(?Atarax ?) her PMD had prescribed and they were to busy today to see her today.  Pt reports she can not take the itching anymore and needs something for it.  Pt is awake and alert  pt and friend educated on ER flow

## 2024-12-23 NOTE — ED PROVIDER NOTES
CHOLECYSTECTOMY      COLONOSCOPY N/A 10/31/2019    COLONOSCOPY performed by Floyd Joiner MD at [facility] ENDOSCOPY    COLONOSCOPY  2016    2 adenomatous polyp    COLONOSCOPY  2009    COLONOSCOPY,BIOPSY  10/31/2019         COLONOSCOPY,REMV CAIT,SNARE  10/31/2019         HEMORRHOID SURGERY  2009    HYSTERECTOMY (CERVIX STATUS UNKNOWN)      INSJ/RPLCMT PERM DFB W/TRNSVNS LDS 1/DUAL CHMBR N/A 9/23/2020    INSERT ICD DUAL performed by Zack Mao MD at [facility] CARDIAC CATH LAB    INVASIVE VASCULAR N/A 11/9/2023    Ultrasound guided vascular access performed by Ty Enriquez DO at [facility] CARDIAC CATH LAB    ORTHOPEDIC SURGERY  12/11/2017    back, laminectomy and decompression    TOTAL KNEE ARTHROPLASTY      right       Family History:  Family History   Problem Relation Age of Onset    Diabetes Mother     Colon Cancer Father        Social History:  Social History     Tobacco Use    Smoking status: Some Days     Current packs/day: 0.50     Types: Cigarettes     Passive exposure: Current    Smokeless tobacco: Never   Vaping Use    Vaping status: Never Used   Substance Use Topics    Alcohol use: No    Drug use: Never       Allergies:  Allergies   Allergen Reactions    Cefazolin Rash     Possible kidney failure/AIN    Morphine Anaphylaxis    Amiodarone Rash and Dizziness or Vertigo     Multiple side effects, adamantly refusing to take again    Tramadol Palpitations       CURRENT MEDICATIONS      Discharge Medication List as of 12/23/2024  2:56 PM        CONTINUE these medications which have NOT CHANGED    Details   carvedilol (COREG) 25 MG tablet Take 1 tablet by mouth 2 times daily, Disp-180 tablet, R-1Requesting 1 year supplyNormal      !! hydrOXYzine HCl (ATARAX) 25 MG tablet TAKE 1 TABLET BY MOUTH EVERY 8 HOURS AS NEEDED FOR ITCHING, Disp-30 tablet, R-0Normal      hydrALAZINE (APRESOLINE) 100 MG tablet Take 1 tablet by mouth 3 times daily, Disp-270 tablet, R-1Normal      fosfomycin tromethamine (MONUROL) 3 g PACK MIX 1 PACKET

## 2025-01-06 DIAGNOSIS — R21 RASH: ICD-10-CM

## 2025-01-06 RX ORDER — HYDROXYZINE HYDROCHLORIDE 25 MG/1
25 TABLET, FILM COATED ORAL EVERY 8 HOURS PRN
Qty: 30 TABLET | Refills: 0 | Status: SHIPPED | OUTPATIENT
Start: 2025-01-06

## 2025-01-27 ENCOUNTER — TELEPHONE (OUTPATIENT)
Age: 85
End: 2025-01-27

## 2025-01-27 ENCOUNTER — OFFICE VISIT (OUTPATIENT)
Age: 85
End: 2025-01-27
Payer: MEDICARE

## 2025-01-27 VITALS
BODY MASS INDEX: 22.18 KG/M2 | HEART RATE: 86 BPM | TEMPERATURE: 98 F | HEIGHT: 66 IN | OXYGEN SATURATION: 95 % | DIASTOLIC BLOOD PRESSURE: 80 MMHG | WEIGHT: 138 LBS | SYSTOLIC BLOOD PRESSURE: 144 MMHG

## 2025-01-27 DIAGNOSIS — L30.9 DERMATITIS: Primary | ICD-10-CM

## 2025-01-27 PROCEDURE — 1159F MED LIST DOCD IN RCRD: CPT

## 2025-01-27 PROCEDURE — 1123F ACP DISCUSS/DSCN MKR DOCD: CPT

## 2025-01-27 PROCEDURE — 3079F DIAST BP 80-89 MM HG: CPT

## 2025-01-27 PROCEDURE — 3077F SYST BP >= 140 MM HG: CPT

## 2025-01-27 PROCEDURE — 99214 OFFICE O/P EST MOD 30 MIN: CPT

## 2025-01-27 SDOH — ECONOMIC STABILITY: FOOD INSECURITY: WITHIN THE PAST 12 MONTHS, YOU WORRIED THAT YOUR FOOD WOULD RUN OUT BEFORE YOU GOT MONEY TO BUY MORE.: NEVER TRUE

## 2025-01-27 SDOH — ECONOMIC STABILITY: FOOD INSECURITY: WITHIN THE PAST 12 MONTHS, THE FOOD YOU BOUGHT JUST DIDN'T LAST AND YOU DIDN'T HAVE MONEY TO GET MORE.: NEVER TRUE

## 2025-01-27 ASSESSMENT — PATIENT HEALTH QUESTIONNAIRE - PHQ9
SUM OF ALL RESPONSES TO PHQ QUESTIONS 1-9: 0
2. FEELING DOWN, DEPRESSED OR HOPELESS: NOT AT ALL
3. TROUBLE FALLING OR STAYING ASLEEP: NOT AT ALL
9. THOUGHTS THAT YOU WOULD BE BETTER OFF DEAD, OR OF HURTING YOURSELF: NOT AT ALL
7. TROUBLE CONCENTRATING ON THINGS, SUCH AS READING THE NEWSPAPER OR WATCHING TELEVISION: NOT AT ALL
5. POOR APPETITE OR OVEREATING: NOT AT ALL
6. FEELING BAD ABOUT YOURSELF - OR THAT YOU ARE A FAILURE OR HAVE LET YOURSELF OR YOUR FAMILY DOWN: NOT AT ALL
SUM OF ALL RESPONSES TO PHQ9 QUESTIONS 1 & 2: 0
10. IF YOU CHECKED OFF ANY PROBLEMS, HOW DIFFICULT HAVE THESE PROBLEMS MADE IT FOR YOU TO DO YOUR WORK, TAKE CARE OF THINGS AT HOME, OR GET ALONG WITH OTHER PEOPLE: NOT DIFFICULT AT ALL
4. FEELING TIRED OR HAVING LITTLE ENERGY: NOT AT ALL
SUM OF ALL RESPONSES TO PHQ QUESTIONS 1-9: 0
8. MOVING OR SPEAKING SO SLOWLY THAT OTHER PEOPLE COULD HAVE NOTICED. OR THE OPPOSITE, BEING SO FIGETY OR RESTLESS THAT YOU HAVE BEEN MOVING AROUND A LOT MORE THAN USUAL: NOT AT ALL
SUM OF ALL RESPONSES TO PHQ QUESTIONS 1-9: 0
1. LITTLE INTEREST OR PLEASURE IN DOING THINGS: NOT AT ALL
SUM OF ALL RESPONSES TO PHQ QUESTIONS 1-9: 0

## 2025-01-27 NOTE — PATIENT INSTRUCTIONS
[facility]--Dr. Kev Martin  [address] [place]  [address] [phone]     Use a fragrance-fee lotion Aquaphor, Lubriderm

## 2025-01-27 NOTE — TELEPHONE ENCOUNTER
Faxed medical records to  [email or web address] regarding patient's Dex Com sensor. Confirmation received.

## 2025-01-27 NOTE — PROGRESS NOTES
Chief Complaint   Patient presents with    Rash     X \"a long time\" worsening        HPI:     is a 84 y.o. female who presents for an acute visit.      She endorses itchy \"rash\" that began over a year ago; she associates the rash with amiodarone, but stopped taking this medication 6+ months ago and rash has worsened.  She saw her PCP for this about two months ago who prescribed Atarax; she was seen in the ER for the rash about 4 weeks ago requesting a refill on Atarax.  She tells me that she has been to the ER for this problem four times in the past year and \"no one has fixed it\"; she has not seen dermatology and tells me that it is \"too far\".  She has been taking OTC Benadryl for itching for the past few days since running out of Atarax and finds that this works better.  She has not tried any topical treatments, including lotion or emollients.    Allergies   Allergen Reactions    Cefazolin Rash     Possible kidney failure/AIN    Morphine Anaphylaxis    Amiodarone Rash and Dizziness or Vertigo     Multiple side effects, adamantly refusing to take again    Tramadol Palpitations       Current Outpatient Medications   Medication Sig Dispense Refill    carvedilol (COREG) 25 MG tablet Take 1 tablet by mouth 2 times daily 180 tablet 1    diphenhydrAMINE HCl, TOPICAL, (BENADRYL ITCH STOPPING) 2 % GEL Apply to skin as needed for itching 103 mL 0    hydrALAZINE (APRESOLINE) 100 MG tablet Take 1 tablet by mouth 3 times daily 270 tablet 1    isosorbide dinitrate (ISORDIL) 20 MG tablet TAKE 1 TABLET BY MOUTH 3 TIMES  DAILY 270 tablet 3    LANTUS SOLOSTAR 100 UNIT/ML injection pen INJECT SUBCUTANEOUSLY 45 UNITS  DAILY 45 mL 3    furosemide (LASIX) 80 MG tablet Take 1 tablet by mouth daily (Patient taking differently: Take 1 tablet by mouth daily 40mg at night) 90 tablet 3    atorvastatin (LIPITOR) 40 MG tablet Take 1 tablet by mouth daily 90 tablet 1    Continuous Glucose Sensor (DEXCOM G7 SENSOR) MISC Dispense

## 2025-03-06 ENCOUNTER — TELEPHONE (OUTPATIENT)
Age: 85
End: 2025-03-06

## 2025-03-06 NOTE — TELEPHONE ENCOUNTER
Haydee states she had an infected tooth. She was asked if she had an appt with her dentist. She replied she couldn't get there this week. Appt was offered to see Victorina, she refused and wanted a note sent back for something to be called in for her because she was too old to make it to the office.      [place], [place] - [address] [facility] [phone] - F [phone]  [address] [facility] [address]  Phone: [phone] Fax: [phone]

## 2025-03-06 NOTE — TELEPHONE ENCOUNTER
Attempted to call patient but phone number rings busy. Patient will need to be seen prior to antibiotics being ordered.

## 2025-03-14 RX ORDER — ATORVASTATIN CALCIUM 40 MG/1
40 TABLET, FILM COATED ORAL DAILY
Qty: 90 TABLET | Refills: 1 | Status: SHIPPED | OUTPATIENT
Start: 2025-03-14

## 2025-03-14 NOTE — TELEPHONE ENCOUNTER
PCP: Victorina Mukherjee, EFRAIN - NP    Last appt: 1/10/2024   Future Appointments   Date Time Provider Department Center   5/16/2025  3:00 PM Nico Carroll APRN - NP [facility] BS [facility]   11/19/2025  9:20 AM [email or web address] AMB   11/19/2025  9:40 AM Danelle Valdez APRN - CNP [facility]       Requested Prescriptions     Signed Prescriptions Disp Refills    atorvastatin (LIPITOR) 40 MG tablet 90 tablet 1     Sig: Take 1 tablet by mouth daily     Authorizing Provider: BLANE BECKHAM     Ordering User: ELODIA BEE         Other Comments:  Verbal order per provider.  Order (medication, dose, route, frequency, amount, refills) repeated and verified twice.

## 2025-04-02 RX ORDER — CARVEDILOL 25 MG/1
25 TABLET ORAL 2 TIMES DAILY
Qty: 180 TABLET | Refills: 0 | Status: SHIPPED | OUTPATIENT
Start: 2025-04-02

## 2025-04-02 NOTE — TELEPHONE ENCOUNTER
PCP: Victorina Mukherjee, EFRAIN - NP    Last appt: 1/10/2024   Future Appointments   Date Time Provider Department Center   5/16/2025  3:00 PM Nico Carroll APRN - NP [facility]   11/19/2025  9:20 AM PACEMAKER3, [facility]   11/19/2025  9:40 AM Danelle Valdez APRN - CNP [facility]       Requested Prescriptions     Pending Prescriptions Disp Refills    carvedilol (COREG) 25 MG tablet 180 tablet 1     Sig: Take 1 tablet by mouth 2 times daily         Other Comments:  Verbal order per provider.  Order (medication, dose, route, frequency, amount, refills) repeated and verified twice.

## 2025-04-30 ENCOUNTER — TELEPHONE (OUTPATIENT)
Age: 85
End: 2025-04-30

## 2025-04-30 NOTE — TELEPHONE ENCOUNTER
Patient called requesting to speak to PCP only for \"an emergency\" not providing any additional information to the front PSR. This nurse then spoke to patient stating that PCP was in clinic and was unable to answer the phone at this time ans asked if I could provide any assistance. Patient states that she is having numbness to her (L) arm that comes and goes over the past few days and is worried she is having a stroke. Denies any FAST symptoms. Speaking full complete sentences. Confirms full strength in all extremities. Denies any facial drooping. Explained S/S of a stroke to patient but informed her that the only definitive way of diagnose a stroke is with emergent imaging and recommended to patient that if she feels she is having a stroke she needed to call 911 and be evaluated in the emergency room. There was no way to diagnosis this through the phone. Patient states that she is unable to get to the ER until she speaks to Victorina Mukherjee. 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 Explained again to patient that Victorina is seeing patients and she would be delayed responding and if patient feels she is having a stroke she should call 911. Patient states \"thanks for nothing\" and hangs up the phone. Message routed to PCP for review.

## 2025-05-22 ENCOUNTER — OFFICE VISIT (OUTPATIENT)
Age: 85
End: 2025-05-22

## 2025-05-22 VITALS
BODY MASS INDEX: 22.33 KG/M2 | RESPIRATION RATE: 28 BRPM | HEART RATE: 70 BPM | TEMPERATURE: 97.2 F | WEIGHT: 134 LBS | SYSTOLIC BLOOD PRESSURE: 104 MMHG | HEIGHT: 65 IN | OXYGEN SATURATION: 94 % | DIASTOLIC BLOOD PRESSURE: 66 MMHG

## 2025-05-22 DIAGNOSIS — L30.9 ECZEMA, UNSPECIFIED TYPE: Primary | ICD-10-CM

## 2025-05-22 DIAGNOSIS — N18.4 STAGE 4 CHRONIC KIDNEY DISEASE (HCC): ICD-10-CM

## 2025-05-22 DIAGNOSIS — J43.9 PULMONARY EMPHYSEMA, UNSPECIFIED EMPHYSEMA TYPE (HCC): ICD-10-CM

## 2025-05-22 DIAGNOSIS — F33.1 MODERATE EPISODE OF RECURRENT MAJOR DEPRESSIVE DISORDER (HCC): ICD-10-CM

## 2025-05-22 DIAGNOSIS — Z79.4 TYPE 2 DIABETES MELLITUS WITH DIABETIC NEUROPATHY, WITH LONG-TERM CURRENT USE OF INSULIN (HCC): ICD-10-CM

## 2025-05-22 DIAGNOSIS — J96.21 ACUTE ON CHRONIC RESPIRATORY FAILURE WITH HYPOXIA (HCC): ICD-10-CM

## 2025-05-22 DIAGNOSIS — Z00.00 ENCOUNTER FOR MEDICARE ANNUAL WELLNESS EXAM: ICD-10-CM

## 2025-05-22 DIAGNOSIS — I50.20 HFREF (HEART FAILURE WITH REDUCED EJECTION FRACTION) (HCC): ICD-10-CM

## 2025-05-22 DIAGNOSIS — I48.0 PAF (PAROXYSMAL ATRIAL FIBRILLATION) (HCC): ICD-10-CM

## 2025-05-22 DIAGNOSIS — Z00.00 MEDICARE ANNUAL WELLNESS VISIT, SUBSEQUENT: ICD-10-CM

## 2025-05-22 DIAGNOSIS — E11.40 TYPE 2 DIABETES MELLITUS WITH DIABETIC NEUROPATHY, WITH LONG-TERM CURRENT USE OF INSULIN (HCC): ICD-10-CM

## 2025-05-22 RX ORDER — HYDROXYZINE HYDROCHLORIDE 50 MG/1
50 TABLET, FILM COATED ORAL EVERY 8 HOURS PRN
Qty: 180 TABLET | Refills: 0 | Status: SHIPPED | OUTPATIENT
Start: 2025-05-22 | End: 2025-08-20

## 2025-05-22 SDOH — ECONOMIC STABILITY: FOOD INSECURITY: WITHIN THE PAST 12 MONTHS, THE FOOD YOU BOUGHT JUST DIDN'T LAST AND YOU DIDN'T HAVE MONEY TO GET MORE.: NEVER TRUE

## 2025-05-22 SDOH — ECONOMIC STABILITY: FOOD INSECURITY: WITHIN THE PAST 12 MONTHS, YOU WORRIED THAT YOUR FOOD WOULD RUN OUT BEFORE YOU GOT MONEY TO BUY MORE.: NEVER TRUE

## 2025-05-22 ASSESSMENT — PATIENT HEALTH QUESTIONNAIRE - PHQ9
SUM OF ALL RESPONSES TO PHQ QUESTIONS 1-9: 6
7. TROUBLE CONCENTRATING ON THINGS, SUCH AS READING THE NEWSPAPER OR WATCHING TELEVISION: NEARLY EVERY DAY
SUM OF ALL RESPONSES TO PHQ QUESTIONS 1-9: 6
10. IF YOU CHECKED OFF ANY PROBLEMS, HOW DIFFICULT HAVE THESE PROBLEMS MADE IT FOR YOU TO DO YOUR WORK, TAKE CARE OF THINGS AT HOME, OR GET ALONG WITH OTHER PEOPLE: EXTREMELY DIFFICULT
3. TROUBLE FALLING OR STAYING ASLEEP: NEARLY EVERY DAY
2. FEELING DOWN, DEPRESSED OR HOPELESS: NEARLY EVERY DAY
5. POOR APPETITE OR OVEREATING: NEARLY EVERY DAY
9. THOUGHTS THAT YOU WOULD BE BETTER OFF DEAD, OR OF HURTING YOURSELF: NOT AT ALL
SUM OF ALL RESPONSES TO PHQ QUESTIONS 1-9: 6
SUM OF ALL RESPONSES TO PHQ QUESTIONS 1-9: 21
8. MOVING OR SPEAKING SO SLOWLY THAT OTHER PEOPLE COULD HAVE NOTICED. OR THE OPPOSITE, BEING SO FIGETY OR RESTLESS THAT YOU HAVE BEEN MOVING AROUND A LOT MORE THAN USUAL: NEARLY EVERY DAY
1. LITTLE INTEREST OR PLEASURE IN DOING THINGS: NEARLY EVERY DAY
4. FEELING TIRED OR HAVING LITTLE ENERGY: NEARLY EVERY DAY
2. FEELING DOWN, DEPRESSED OR HOPELESS: NEARLY EVERY DAY
6. FEELING BAD ABOUT YOURSELF - OR THAT YOU ARE A FAILURE OR HAVE LET YOURSELF OR YOUR FAMILY DOWN: NEARLY EVERY DAY
SUM OF ALL RESPONSES TO PHQ QUESTIONS 1-9: 6

## 2025-05-22 NOTE — PROGRESS NOTES
\"Have you been to the ER, urgent care clinic since your last visit?  Hospitalized since your last visit?\"    NO    “Have you seen or consulted any other health care providers outside of [facility] since your last visit?”    YES dERMATOLOGIST  IN jANUARY            Click Here for Release of Records Request      Chief Complaint   Patient presents with    Medicare AWV    Eczema         Vitals:    05/22/25 1047   BP: 104/66   Pulse: 70   Temp: 97.2 °F (36.2 °C)

## 2025-05-22 NOTE — PATIENT INSTRUCTIONS
[employer]   Care instructions adapted under license by [employer]. If you have questions about a medical condition or this instruction, always ask your healthcare professional. [employer] disclaims any warranty or liability for your use of this information.    Personalized Preventive Plan for Haydee Castro - 5/22/2025  Medicare offers a range of preventive health benefits. Some of the tests and screenings are paid in full while other may be subject to a deductible, co-insurance, and/or copay.  Some of these benefits include a comprehensive review of your medical history including lifestyle, illnesses that may run in your family, and various assessments and screenings as appropriate.  After reviewing your medical record and screening and assessments performed today your provider may have ordered immunizations, labs, imaging, and/or referrals for you.  A list of these orders (if applicable) as well as your Preventive Care list are included within your After Visit Summary for your review.

## 2025-05-22 NOTE — PROGRESS NOTES
Medicare Annual Wellness Visit    Haydee Castro is here for Medicare AWV and Eczema    Assessment & Plan  1. Eczema.  - The current treatment regimen includes Atarax 25 mg and Benadryl, which have not been effective in managing her symptoms.  - A prescription for Atarax 50 mg, to be taken every 8 hours, will be provided through [facility].  - She is advised to continue using Benadryl as needed. If the increased dosage of Atarax proves ineffective, she is instructed to discontinue its use and inform us accordingly.  - She is also encouraged to consult with a dermatologist for potential Dupixent treatment.    2. Arthritis.  - She is currently managing her arthritis with Tylenol.  - No new physical exam findings or test results discussed.  - No additional assessment or counseling provided.  - Continue current management with Tylenol.    3. 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 The CGM is used on a daily basis,  - She is using her glucose monitor regularly and occasionally uses insulin when her blood sugar levels are high.  - No new physical exam findings or test results discussed.  - No additional assessment or counseling provided.  - Continue current management with glucose monitoring and insulin as needed.    4. Depression.  - She reports feeling depressed due to multiple family stressors, including the recent death of her brother and her sister's cancer diagnosis.  - No new physical exam findings or test results discussed.  - She is encouraged to seek support from family and friends and consider counseling if her symptoms persist or worsen.  - No new medications or therapies prescribed for depression. She declines at this time.    Follow-up  The patient will follow up in 1 month if necessary.    Eczema, unspecified type  -     hydrOXYzine HCl (ATARAX) 50 MG tablet; Take 1 tablet by mouth every 8 hours as needed for Itching, Disp-180 tablet, R-0Normal  Stage 4 chronic kidney disease

## 2025-06-16 DIAGNOSIS — L30.9 ECZEMA, UNSPECIFIED TYPE: ICD-10-CM

## 2025-06-17 RX ORDER — HYDROXYZINE HYDROCHLORIDE 50 MG/1
50 TABLET, FILM COATED ORAL EVERY 8 HOURS PRN
Qty: 180 TABLET | Refills: 0 | Status: SHIPPED | OUTPATIENT
Start: 2025-06-17 | End: 2025-09-15

## 2025-06-17 RX ORDER — ERGOCALCIFEROL 1.25 MG/1
CAPSULE, LIQUID FILLED ORAL
Qty: 13 CAPSULE | Refills: 3 | Status: SHIPPED | OUTPATIENT
Start: 2025-06-17

## 2025-07-16 ENCOUNTER — HOSPITAL ENCOUNTER (INPATIENT)
Facility: HOSPITAL | Age: 85
LOS: 1 days | Discharge: ANOTHER HOSPITAL | DRG: 872 | End: 2025-07-20
Attending: FAMILY MEDICINE | Admitting: INTERNAL MEDICINE
Payer: MEDICARE

## 2025-07-16 ENCOUNTER — APPOINTMENT (OUTPATIENT)
Facility: HOSPITAL | Age: 85
DRG: 872 | End: 2025-07-16
Payer: MEDICARE

## 2025-07-16 DIAGNOSIS — R10.84 GENERALIZED ABDOMINAL PAIN: Primary | ICD-10-CM

## 2025-07-16 LAB
ALBUMIN SERPL-MCNC: 3.2 G/DL (ref 3.5–5)
ALBUMIN/GLOB SERPL: 0.8 (ref 1.1–2.2)
ALP SERPL-CCNC: 152 U/L (ref 45–117)
ALT SERPL-CCNC: 21 U/L (ref 12–78)
ANION GAP SERPL CALC-SCNC: 9 MMOL/L (ref 2–12)
APPEARANCE UR: CLEAR
AST SERPL-CCNC: 15 U/L (ref 15–37)
BACTERIA URNS QL MICRO: ABNORMAL /HPF
BASOPHILS # BLD: 0 K/UL (ref 0–0.1)
BASOPHILS NFR BLD: 0 % (ref 0–1)
BILIRUB SERPL-MCNC: 0.3 MG/DL (ref 0.2–1)
BILIRUB UR QL: NEGATIVE
BUN SERPL-MCNC: 75 MG/DL (ref 6–20)
BUN/CREAT SERPL: 32 (ref 12–20)
CALCIUM SERPL-MCNC: 9.5 MG/DL (ref 8.5–10.1)
CHLORIDE SERPL-SCNC: 103 MMOL/L (ref 97–108)
CO2 SERPL-SCNC: 28 MMOL/L (ref 21–32)
COLOR UR: ABNORMAL
CREAT SERPL-MCNC: 2.37 MG/DL (ref 0.55–1.02)
DIFFERENTIAL METHOD BLD: ABNORMAL
EOSINOPHIL # BLD: 0.83 K/UL (ref 0–0.4)
EOSINOPHIL NFR BLD: 7 % (ref 0–0.7)
EPITH CASTS URNS QL MICRO: ABNORMAL /LPF
ERYTHROCYTE [DISTWIDTH] IN BLOOD BY AUTOMATED COUNT: 13.2 % (ref 11.5–14.5)
GLOBULIN SER CALC-MCNC: 4.2 G/DL (ref 2–4)
GLUCOSE BLD STRIP.AUTO-MCNC: 121 MG/DL (ref 65–117)
GLUCOSE BLD STRIP.AUTO-MCNC: 121 MG/DL (ref 65–117)
GLUCOSE BLD STRIP.AUTO-MCNC: 220 MG/DL (ref 65–117)
GLUCOSE BLD STRIP.AUTO-MCNC: 69 MG/DL (ref 65–117)
GLUCOSE BLD STRIP.AUTO-MCNC: 85 MG/DL (ref 65–117)
GLUCOSE BLD STRIP.AUTO-MCNC: 94 MG/DL (ref 65–117)
GLUCOSE SERPL-MCNC: 142 MG/DL (ref 65–100)
GLUCOSE UR STRIP.AUTO-MCNC: NEGATIVE MG/DL
HCT VFR BLD AUTO: 39.8 % (ref 35–47)
HGB BLD-MCNC: 13.1 G/DL (ref 11.5–16)
HGB UR QL STRIP: NEGATIVE
IMM GRANULOCYTES # BLD AUTO: 0 K/UL (ref 0–0.04)
IMM GRANULOCYTES NFR BLD AUTO: 0 % (ref 0–0.5)
KETONES UR QL STRIP.AUTO: NEGATIVE MG/DL
LEUKOCYTE ESTERASE UR QL STRIP.AUTO: ABNORMAL
LYMPHOCYTES # BLD: 1.89 K/UL (ref 0.8–3.5)
LYMPHOCYTES NFR BLD: 16 % (ref 12–49)
MCH RBC QN AUTO: 29.4 PG (ref 26–34)
MCHC RBC AUTO-ENTMCNC: 32.9 G/DL (ref 30–36.5)
MCV RBC AUTO: 89.2 FL (ref 80–99)
MONOCYTES # BLD: 0.83 K/UL (ref 0–1)
MONOCYTES NFR BLD: 7 % (ref 5–13)
NEUTS SEG # BLD: 8.25 K/UL (ref 1.8–8)
NEUTS SEG NFR BLD: 70 % (ref 32–75)
NITRITE UR QL STRIP.AUTO: NEGATIVE
NRBC # BLD: 0 K/UL (ref 0–0.01)
NRBC BLD-RTO: 0 PER 100 WBC
PH UR STRIP: 6 (ref 5–8)
PLATELET # BLD AUTO: 349 K/UL (ref 150–400)
PLATELET COMMENT: ABNORMAL
PMV BLD AUTO: 10.5 FL (ref 8.9–12.9)
POTASSIUM SERPL-SCNC: 4.4 MMOL/L (ref 3.5–5.1)
PROT SERPL-MCNC: 7.4 G/DL (ref 6.4–8.2)
PROT UR STRIP-MCNC: 100 MG/DL
RBC # BLD AUTO: 4.46 M/UL (ref 3.8–5.2)
RBC #/AREA URNS HPF: ABNORMAL /HPF (ref 0–5)
RBC MORPH BLD: ABNORMAL
SERVICE CMNT-IMP: ABNORMAL
SERVICE CMNT-IMP: NORMAL
SODIUM SERPL-SCNC: 140 MMOL/L (ref 136–145)
SP GR UR REFRACTOMETRY: 1.01 (ref 1–1.03)
UROBILINOGEN UR QL STRIP.AUTO: 0.2 EU/DL (ref 0.2–1)
WBC # BLD AUTO: 11.8 K/UL (ref 3.6–11)
WBC URNS QL MICRO: ABNORMAL /HPF (ref 0–4)

## 2025-07-16 PROCEDURE — 82962 GLUCOSE BLOOD TEST: CPT

## 2025-07-16 PROCEDURE — 6360000002 HC RX W HCPCS: Performed by: FAMILY MEDICINE

## 2025-07-16 PROCEDURE — 6370000000 HC RX 637 (ALT 250 FOR IP): Performed by: FAMILY MEDICINE

## 2025-07-16 PROCEDURE — 81001 URINALYSIS AUTO W/SCOPE: CPT

## 2025-07-16 PROCEDURE — 96361 HYDRATE IV INFUSION ADD-ON: CPT

## 2025-07-16 PROCEDURE — 96376 TX/PRO/DX INJ SAME DRUG ADON: CPT

## 2025-07-16 PROCEDURE — 96375 TX/PRO/DX INJ NEW DRUG ADDON: CPT

## 2025-07-16 PROCEDURE — 2580000003 HC RX 258: Performed by: FAMILY MEDICINE

## 2025-07-16 PROCEDURE — 85025 COMPLETE CBC W/AUTO DIFF WBC: CPT

## 2025-07-16 PROCEDURE — 36415 COLL VENOUS BLD VENIPUNCTURE: CPT

## 2025-07-16 PROCEDURE — 74176 CT ABD & PELVIS W/O CONTRAST: CPT

## 2025-07-16 PROCEDURE — 99285 EMERGENCY DEPT VISIT HI MDM: CPT

## 2025-07-16 PROCEDURE — 80053 COMPREHEN METABOLIC PANEL: CPT

## 2025-07-16 RX ORDER — ONDANSETRON 2 MG/ML
4 INJECTION INTRAMUSCULAR; INTRAVENOUS ONCE
Status: COMPLETED | OUTPATIENT
Start: 2025-07-16 | End: 2025-07-16

## 2025-07-16 RX ORDER — DICYCLOMINE HCL 20 MG
20 TABLET ORAL
Status: COMPLETED | OUTPATIENT
Start: 2025-07-16 | End: 2025-07-16

## 2025-07-16 RX ORDER — HYDROCODONE BITARTRATE AND ACETAMINOPHEN 5; 325 MG/1; MG/1
1 TABLET ORAL
Refills: 0 | Status: COMPLETED | OUTPATIENT
Start: 2025-07-16 | End: 2025-07-16

## 2025-07-16 RX ORDER — DEXTROSE MONOHYDRATE 100 MG/ML
INJECTION, SOLUTION INTRAVENOUS CONTINUOUS
Status: DISCONTINUED | OUTPATIENT
Start: 2025-07-16 | End: 2025-07-18

## 2025-07-16 RX ORDER — DEXTROSE MONOHYDRATE 100 MG/ML
INJECTION, SOLUTION INTRAVENOUS CONTINUOUS
Status: DISCONTINUED | OUTPATIENT
Start: 2025-07-16 | End: 2025-07-16

## 2025-07-16 RX ORDER — 0.9 % SODIUM CHLORIDE 0.9 %
1000 INTRAVENOUS SOLUTION INTRAVENOUS ONCE
Status: COMPLETED | OUTPATIENT
Start: 2025-07-16 | End: 2025-07-16

## 2025-07-16 RX ORDER — SODIUM CHLORIDE 9 MG/ML
INJECTION, SOLUTION INTRAVENOUS CONTINUOUS
Status: DISCONTINUED | OUTPATIENT
Start: 2025-07-16 | End: 2025-07-17

## 2025-07-16 RX ADMIN — ONDANSETRON 4 MG: 2 INJECTION, SOLUTION INTRAMUSCULAR; INTRAVENOUS at 23:50

## 2025-07-16 RX ADMIN — ONDANSETRON 4 MG: 2 INJECTION, SOLUTION INTRAMUSCULAR; INTRAVENOUS at 21:55

## 2025-07-16 RX ADMIN — HYDROMORPHONE HYDROCHLORIDE 0.5 MG: 1 INJECTION, SOLUTION INTRAMUSCULAR; INTRAVENOUS; SUBCUTANEOUS at 20:22

## 2025-07-16 RX ADMIN — HYDROMORPHONE HYDROCHLORIDE 0.5 MG: 1 INJECTION, SOLUTION INTRAMUSCULAR; INTRAVENOUS; SUBCUTANEOUS at 23:58

## 2025-07-16 RX ADMIN — DEXTROSE MONOHYDRATE: 100 INJECTION, SOLUTION INTRAVENOUS at 20:25

## 2025-07-16 RX ADMIN — DEXTROSE MONOHYDRATE: 100 INJECTION, SOLUTION INTRAVENOUS at 21:45

## 2025-07-16 RX ADMIN — HYDROCODONE BITARTRATE AND ACETAMINOPHEN 1 TABLET: 5; 325 TABLET ORAL at 22:53

## 2025-07-16 RX ADMIN — ONDANSETRON 4 MG: 2 INJECTION, SOLUTION INTRAMUSCULAR; INTRAVENOUS at 20:21

## 2025-07-16 RX ADMIN — SODIUM CHLORIDE 1000 ML: 0.9 INJECTION, SOLUTION INTRAVENOUS at 19:50

## 2025-07-16 RX ADMIN — DICYCLOMINE HYDROCHLORIDE 20 MG: 20 TABLET ORAL at 21:58

## 2025-07-16 ASSESSMENT — PAIN SCALES - GENERAL: PAINLEVEL_OUTOF10: 4

## 2025-07-16 ASSESSMENT — PAIN - FUNCTIONAL ASSESSMENT: PAIN_FUNCTIONAL_ASSESSMENT: 0-10

## 2025-07-16 ASSESSMENT — PAIN DESCRIPTION - LOCATION: LOCATION: ABDOMEN

## 2025-07-16 NOTE — ED PROVIDER NOTES
[facility] EMERGENCY DEPARTMENT  EMERGENCY DEPARTMENT ENCOUNTER       Pt Name: Haydee Castro  MRN: [id number]  Birthdate 1940  Date of evaluation: 7/16/2025  Provider: Antonia Givens MD   PCP: Victorina Mukherjee APRN - NP  Note Started: 7:06 PM EDT 7/16/25     CHIEF COMPLAINT       Chief Complaint   Patient presents with    Hyperglycemia    Abdominal Pain        HISTORY OF PRESENT ILLNESS: 1 or more elements      History From: Patient  HPI Limitations: None     Haydee Castro is a 84 y.o. female who presents to the ED with hyperglycemia and abdominal pain.     Nursing Notes were all reviewed and agreed with or any disagreements were addressed in the HPI.     REVIEW OF SYSTEMS      Review of Systems     Positives and Pertinent negatives as per HPI.    PAST HISTORY     Past Medical History:  Past Medical History:   Diagnosis Date    A-fib (HCC)     AICD (automatic cardioverter/defibrillator) present 9/23/2020 9/23/2020 [employer] AICD implant    Arthritis     Bilateral pleural effusion 9/18/2020    Chronic pain     Chronic pain     Diabetes (HCC)     Diverticular disease     Elevated troponin 9/18/2020    Hemorrhoid     Hypercholesterolemia     IBS (irritable bowel syndrome)     Lymphocytosis 05/07/2018    Systolic heart failure, chronic (HCC) 7/16/2021    Type 2 MI (myocardial infarction) ([facility]) 7/16/2021 7/162021 r/t sepsis         Past Surgical History:  Past Surgical History:   Procedure Laterality Date    CARDIAC PROCEDURE N/A 11/9/2023    Left heart cath / coronary angiography performed by Ty Enriquez DO at [facility] CARDIAC CATH LAB    CARDIAC PROCEDURE N/A 11/9/2023    Intravascular ultrasound performed by Ty Enriquez DO at [facility] CARDIAC CATH LAB    CATARACT REMOVAL      CHOLECYSTECTOMY      COLONOSCOPY N/A 10/31/2019    COLONOSCOPY performed by Floyd Joiner MD at [facility] ENDOSCOPY    COLONOSCOPY  2016    2 adenomatous polyp    COLONOSCOPY  2009    COLONOSCOPY,BIOPSY  10/31/2019

## 2025-07-16 NOTE — ED TRIAGE NOTES
Pt arrives with c/o abd pain and hyperglycemia. Pt reports that she has taken 45U of long acting insulin 3 times today.

## 2025-07-17 ENCOUNTER — APPOINTMENT (OUTPATIENT)
Facility: HOSPITAL | Age: 85
DRG: 872 | End: 2025-07-17
Payer: MEDICARE

## 2025-07-17 PROBLEM — E16.2 HYPOGLYCEMIA: Status: ACTIVE | Noted: 2025-07-17

## 2025-07-17 LAB
ALBUMIN SERPL-MCNC: 3 G/DL (ref 3.5–5)
ALBUMIN/GLOB SERPL: 0.8 (ref 1.1–2.2)
ALP SERPL-CCNC: 126 U/L (ref 45–117)
ALT SERPL-CCNC: 19 U/L (ref 12–78)
ANION GAP SERPL CALC-SCNC: 8 MMOL/L (ref 2–12)
AST SERPL-CCNC: 16 U/L (ref 15–37)
BASOPHILS # BLD: 0.06 K/UL (ref 0–0.1)
BASOPHILS # BLD: 0.08 K/UL (ref 0–0.1)
BASOPHILS NFR BLD: 0.3 % (ref 0–1)
BASOPHILS NFR BLD: 0.6 % (ref 0–1)
BILIRUB SERPL-MCNC: 0.3 MG/DL (ref 0.2–1)
BUN SERPL-MCNC: 66 MG/DL (ref 6–20)
BUN/CREAT SERPL: 31 (ref 12–20)
CALCIUM SERPL-MCNC: 9.2 MG/DL (ref 8.5–10.1)
CHLORIDE SERPL-SCNC: 105 MMOL/L (ref 97–108)
CO2 SERPL-SCNC: 27 MMOL/L (ref 21–32)
CREAT SERPL-MCNC: 2.11 MG/DL (ref 0.55–1.02)
DIFFERENTIAL METHOD BLD: ABNORMAL
DIFFERENTIAL METHOD BLD: ABNORMAL
EOSINOPHIL # BLD: 0.52 K/UL (ref 0–0.4)
EOSINOPHIL # BLD: 1.11 K/UL (ref 0–0.4)
EOSINOPHIL NFR BLD: 2.4 % (ref 0–0.7)
EOSINOPHIL NFR BLD: 8.3 % (ref 0–0.7)
ERYTHROCYTE [DISTWIDTH] IN BLOOD BY AUTOMATED COUNT: 13.1 % (ref 11.5–14.5)
ERYTHROCYTE [DISTWIDTH] IN BLOOD BY AUTOMATED COUNT: 13.1 % (ref 11.5–14.5)
GLOBULIN SER CALC-MCNC: 3.8 G/DL (ref 2–4)
GLUCOSE BLD STRIP.AUTO-MCNC: 101 MG/DL (ref 65–117)
GLUCOSE BLD STRIP.AUTO-MCNC: 113 MG/DL (ref 65–117)
GLUCOSE BLD STRIP.AUTO-MCNC: 150 MG/DL (ref 65–117)
GLUCOSE BLD STRIP.AUTO-MCNC: 179 MG/DL (ref 65–117)
GLUCOSE BLD STRIP.AUTO-MCNC: 97 MG/DL (ref 65–117)
GLUCOSE SERPL-MCNC: 71 MG/DL (ref 65–100)
HCT VFR BLD AUTO: 38.8 % (ref 35–47)
HCT VFR BLD AUTO: 42.7 % (ref 35–47)
HGB BLD-MCNC: 12.7 G/DL (ref 11.5–16)
HGB BLD-MCNC: 14.2 G/DL (ref 11.5–16)
IMM GRANULOCYTES # BLD AUTO: 0.04 K/UL (ref 0–0.04)
IMM GRANULOCYTES # BLD AUTO: 0.1 K/UL (ref 0–0.04)
IMM GRANULOCYTES NFR BLD AUTO: 0.3 % (ref 0–0.5)
IMM GRANULOCYTES NFR BLD AUTO: 0.5 % (ref 0–0.5)
LACTATE SERPL-SCNC: 1 MMOL/L (ref 0.4–2)
LIPASE SERPL-CCNC: 43 U/L (ref 13–75)
LYMPHOCYTES # BLD: 1 K/UL (ref 0.8–3.5)
LYMPHOCYTES # BLD: 1.9 K/UL (ref 0.8–3.5)
LYMPHOCYTES NFR BLD: 14.3 % (ref 12–49)
LYMPHOCYTES NFR BLD: 4.7 % (ref 12–49)
MCH RBC QN AUTO: 29.4 PG (ref 26–34)
MCH RBC QN AUTO: 29.6 PG (ref 26–34)
MCHC RBC AUTO-ENTMCNC: 32.7 G/DL (ref 30–36.5)
MCHC RBC AUTO-ENTMCNC: 33.3 G/DL (ref 30–36.5)
MCV RBC AUTO: 89 FL (ref 80–99)
MCV RBC AUTO: 89.8 FL (ref 80–99)
MONOCYTES # BLD: 0.82 K/UL (ref 0–1)
MONOCYTES # BLD: 0.94 K/UL (ref 0–1)
MONOCYTES NFR BLD: 4.4 % (ref 5–13)
MONOCYTES NFR BLD: 6.2 % (ref 5–13)
NEUTS SEG # BLD: 18.72 K/UL (ref 1.8–8)
NEUTS SEG # BLD: 9.37 K/UL (ref 1.8–8)
NEUTS SEG NFR BLD: 70.3 % (ref 32–75)
NEUTS SEG NFR BLD: 87.7 % (ref 32–75)
NRBC # BLD: 0 K/UL (ref 0–0.01)
NRBC # BLD: 0 K/UL (ref 0–0.01)
NRBC BLD-RTO: 0 PER 100 WBC
NRBC BLD-RTO: 0 PER 100 WBC
PLATELET # BLD AUTO: 301 K/UL (ref 150–400)
PLATELET # BLD AUTO: 352 K/UL (ref 150–400)
PMV BLD AUTO: 10.6 FL (ref 8.9–12.9)
PMV BLD AUTO: 10.6 FL (ref 8.9–12.9)
POTASSIUM SERPL-SCNC: 4 MMOL/L (ref 3.5–5.1)
PROT SERPL-MCNC: 6.8 G/DL (ref 6.4–8.2)
RBC # BLD AUTO: 4.32 M/UL (ref 3.8–5.2)
RBC # BLD AUTO: 4.8 M/UL (ref 3.8–5.2)
SERVICE CMNT-IMP: ABNORMAL
SERVICE CMNT-IMP: ABNORMAL
SERVICE CMNT-IMP: NORMAL
SODIUM SERPL-SCNC: 140 MMOL/L (ref 136–145)
WBC # BLD AUTO: 13.3 K/UL (ref 3.6–11)
WBC # BLD AUTO: 21.3 K/UL (ref 3.6–11)

## 2025-07-17 PROCEDURE — 96365 THER/PROPH/DIAG IV INF INIT: CPT

## 2025-07-17 PROCEDURE — 94760 N-INVAS EAR/PLS OXIMETRY 1: CPT

## 2025-07-17 PROCEDURE — 96361 HYDRATE IV INFUSION ADD-ON: CPT

## 2025-07-17 PROCEDURE — 83690 ASSAY OF LIPASE: CPT

## 2025-07-17 PROCEDURE — 80053 COMPREHEN METABOLIC PANEL: CPT

## 2025-07-17 PROCEDURE — 83605 ASSAY OF LACTIC ACID: CPT

## 2025-07-17 PROCEDURE — 36415 COLL VENOUS BLD VENIPUNCTURE: CPT

## 2025-07-17 PROCEDURE — 2580000003 HC RX 258: Performed by: FAMILY MEDICINE

## 2025-07-17 PROCEDURE — 85025 COMPLETE CBC W/AUTO DIFF WBC: CPT

## 2025-07-17 PROCEDURE — 2500000003 HC RX 250 WO HCPCS: Performed by: INTERNAL MEDICINE

## 2025-07-17 PROCEDURE — 96366 THER/PROPH/DIAG IV INF ADDON: CPT

## 2025-07-17 PROCEDURE — 96376 TX/PRO/DX INJ SAME DRUG ADON: CPT

## 2025-07-17 PROCEDURE — G0378 HOSPITAL OBSERVATION PER HR: HCPCS

## 2025-07-17 PROCEDURE — 6370000000 HC RX 637 (ALT 250 FOR IP): Performed by: FAMILY MEDICINE

## 2025-07-17 PROCEDURE — 6370000000 HC RX 637 (ALT 250 FOR IP): Performed by: INTERNAL MEDICINE

## 2025-07-17 PROCEDURE — 96368 THER/DIAG CONCURRENT INF: CPT

## 2025-07-17 PROCEDURE — 96375 TX/PRO/DX INJ NEW DRUG ADDON: CPT

## 2025-07-17 PROCEDURE — 6360000002 HC RX W HCPCS: Performed by: INTERNAL MEDICINE

## 2025-07-17 PROCEDURE — 74018 RADEX ABDOMEN 1 VIEW: CPT

## 2025-07-17 PROCEDURE — 6360000002 HC RX W HCPCS: Performed by: FAMILY MEDICINE

## 2025-07-17 PROCEDURE — 82962 GLUCOSE BLOOD TEST: CPT

## 2025-07-17 PROCEDURE — 2580000003 HC RX 258: Performed by: INTERNAL MEDICINE

## 2025-07-17 RX ORDER — DIPHENHYDRAMINE HYDROCHLORIDE 50 MG/ML
25 INJECTION, SOLUTION INTRAMUSCULAR; INTRAVENOUS
Status: COMPLETED | OUTPATIENT
Start: 2025-07-17 | End: 2025-07-17

## 2025-07-17 RX ORDER — LACTULOSE 10 G/15ML
20 SOLUTION ORAL ONCE
Status: COMPLETED | OUTPATIENT
Start: 2025-07-17 | End: 2025-07-17

## 2025-07-17 RX ORDER — ACETAMINOPHEN 325 MG/1
650 TABLET ORAL EVERY 6 HOURS PRN
Status: DISCONTINUED | OUTPATIENT
Start: 2025-07-17 | End: 2025-07-20 | Stop reason: HOSPADM

## 2025-07-17 RX ORDER — SODIUM CHLORIDE 0.9 % (FLUSH) 0.9 %
5-40 SYRINGE (ML) INJECTION PRN
Status: DISCONTINUED | OUTPATIENT
Start: 2025-07-17 | End: 2025-07-20 | Stop reason: HOSPADM

## 2025-07-17 RX ORDER — CARVEDILOL 25 MG/1
25 TABLET ORAL 2 TIMES DAILY
Status: DISCONTINUED | OUTPATIENT
Start: 2025-07-17 | End: 2025-07-20 | Stop reason: HOSPADM

## 2025-07-17 RX ORDER — MAGNESIUM SULFATE IN WATER 40 MG/ML
2000 INJECTION, SOLUTION INTRAVENOUS PRN
Status: DISCONTINUED | OUTPATIENT
Start: 2025-07-17 | End: 2025-07-20 | Stop reason: HOSPADM

## 2025-07-17 RX ORDER — SODIUM CHLORIDE 9 MG/ML
INJECTION, SOLUTION INTRAVENOUS PRN
Status: DISCONTINUED | OUTPATIENT
Start: 2025-07-17 | End: 2025-07-20 | Stop reason: HOSPADM

## 2025-07-17 RX ORDER — HYDROXYZINE PAMOATE 25 MG/1
50 CAPSULE ORAL
Status: COMPLETED | OUTPATIENT
Start: 2025-07-17 | End: 2025-07-17

## 2025-07-17 RX ORDER — DEXTROSE MONOHYDRATE 100 MG/ML
INJECTION, SOLUTION INTRAVENOUS CONTINUOUS PRN
Status: DISCONTINUED | OUTPATIENT
Start: 2025-07-17 | End: 2025-07-20 | Stop reason: HOSPADM

## 2025-07-17 RX ORDER — MAGNESIUM HYDROXIDE/ALUMINUM HYDROXICE/SIMETHICONE 120; 1200; 1200 MG/30ML; MG/30ML; MG/30ML
30 SUSPENSION ORAL EVERY 6 HOURS PRN
Status: DISCONTINUED | OUTPATIENT
Start: 2025-07-17 | End: 2025-07-20 | Stop reason: HOSPADM

## 2025-07-17 RX ORDER — HYDRALAZINE HYDROCHLORIDE 25 MG/1
100 TABLET, FILM COATED ORAL 3 TIMES DAILY
Status: DISCONTINUED | OUTPATIENT
Start: 2025-07-17 | End: 2025-07-17

## 2025-07-17 RX ORDER — CHOLESTYRAMINE LIGHT 4 G/5.7G
4 POWDER, FOR SUSPENSION ORAL DAILY
Status: DISCONTINUED | OUTPATIENT
Start: 2025-07-17 | End: 2025-07-17

## 2025-07-17 RX ORDER — FENTANYL CITRATE 50 UG/ML
50 INJECTION, SOLUTION INTRAMUSCULAR; INTRAVENOUS ONCE
Refills: 0 | Status: COMPLETED | OUTPATIENT
Start: 2025-07-17 | End: 2025-07-17

## 2025-07-17 RX ORDER — POTASSIUM CHLORIDE 7.45 MG/ML
10 INJECTION INTRAVENOUS PRN
Status: DISCONTINUED | OUTPATIENT
Start: 2025-07-17 | End: 2025-07-20 | Stop reason: HOSPADM

## 2025-07-17 RX ORDER — POTASSIUM CHLORIDE 750 MG/1
40 TABLET, EXTENDED RELEASE ORAL PRN
Status: DISCONTINUED | OUTPATIENT
Start: 2025-07-17 | End: 2025-07-20 | Stop reason: HOSPADM

## 2025-07-17 RX ORDER — ATORVASTATIN CALCIUM 40 MG/1
40 TABLET, FILM COATED ORAL DAILY
Status: DISCONTINUED | OUTPATIENT
Start: 2025-07-17 | End: 2025-07-20 | Stop reason: HOSPADM

## 2025-07-17 RX ORDER — ONDANSETRON 2 MG/ML
4 INJECTION INTRAMUSCULAR; INTRAVENOUS EVERY 6 HOURS PRN
Status: DISCONTINUED | OUTPATIENT
Start: 2025-07-17 | End: 2025-07-20 | Stop reason: HOSPADM

## 2025-07-17 RX ORDER — ONDANSETRON 4 MG/1
4 TABLET, ORALLY DISINTEGRATING ORAL EVERY 8 HOURS PRN
Status: DISCONTINUED | OUTPATIENT
Start: 2025-07-17 | End: 2025-07-20 | Stop reason: HOSPADM

## 2025-07-17 RX ORDER — GLUCAGON 1 MG/ML
1 KIT INJECTION PRN
Status: DISCONTINUED | OUTPATIENT
Start: 2025-07-17 | End: 2025-07-20 | Stop reason: HOSPADM

## 2025-07-17 RX ORDER — POLYETHYLENE GLYCOL 3350 17 G/17G
17 POWDER, FOR SOLUTION ORAL DAILY PRN
Status: DISCONTINUED | OUTPATIENT
Start: 2025-07-17 | End: 2025-07-20 | Stop reason: HOSPADM

## 2025-07-17 RX ORDER — ISOSORBIDE DINITRATE 10 MG/1
20 TABLET ORAL 3 TIMES DAILY
Status: DISCONTINUED | OUTPATIENT
Start: 2025-07-17 | End: 2025-07-20 | Stop reason: HOSPADM

## 2025-07-17 RX ORDER — SODIUM CHLORIDE 0.9 % (FLUSH) 0.9 %
5-40 SYRINGE (ML) INJECTION EVERY 12 HOURS SCHEDULED
Status: DISCONTINUED | OUTPATIENT
Start: 2025-07-17 | End: 2025-07-20 | Stop reason: HOSPADM

## 2025-07-17 RX ORDER — ACETAMINOPHEN 650 MG/1
650 SUPPOSITORY RECTAL EVERY 6 HOURS PRN
Status: DISCONTINUED | OUTPATIENT
Start: 2025-07-17 | End: 2025-07-20 | Stop reason: HOSPADM

## 2025-07-17 RX ORDER — SODIUM PHOSPHATE, DIBASIC AND SODIUM PHOSPHATE, MONOBASIC 7; 19 G/230ML; G/230ML
1 ENEMA RECTAL
Status: COMPLETED | OUTPATIENT
Start: 2025-07-17 | End: 2025-07-17

## 2025-07-17 RX ADMIN — PIPERACILLIN AND TAZOBACTAM 3375 MG: 3; .375 INJECTION, POWDER, LYOPHILIZED, FOR SOLUTION INTRAVENOUS at 12:44

## 2025-07-17 RX ADMIN — SODIUM CHLORIDE, PRESERVATIVE FREE 10 ML: 5 INJECTION INTRAVENOUS at 21:39

## 2025-07-17 RX ADMIN — PIPERACILLIN AND TAZOBACTAM 3375 MG: 3; .375 INJECTION, POWDER, LYOPHILIZED, FOR SOLUTION INTRAVENOUS at 00:58

## 2025-07-17 RX ADMIN — ONDANSETRON 4 MG: 2 INJECTION, SOLUTION INTRAMUSCULAR; INTRAVENOUS at 16:12

## 2025-07-17 RX ADMIN — CARVEDILOL 25 MG: 25 TABLET, FILM COATED ORAL at 21:36

## 2025-07-17 RX ADMIN — DEXTROSE MONOHYDRATE: 100 INJECTION, SOLUTION INTRAVENOUS at 02:41

## 2025-07-17 RX ADMIN — HYDROMORPHONE HYDROCHLORIDE 0.5 MG: 1 INJECTION, SOLUTION INTRAMUSCULAR; INTRAVENOUS; SUBCUTANEOUS at 21:36

## 2025-07-17 RX ADMIN — HYDROXYZINE PAMOATE 50 MG: 25 CAPSULE ORAL at 00:52

## 2025-07-17 RX ADMIN — DIPHENHYDRAMINE HYDROCHLORIDE 25 MG: 50 INJECTION INTRAMUSCULAR; INTRAVENOUS at 01:46

## 2025-07-17 RX ADMIN — LACTULOSE 20 G: 20 SOLUTION ORAL at 12:14

## 2025-07-17 RX ADMIN — DEXTROSE MONOHYDRATE 250 ML: 100 INJECTION, SOLUTION INTRAVENOUS at 01:16

## 2025-07-17 RX ADMIN — FENTANYL CITRATE 50 MCG: 50 INJECTION INTRAMUSCULAR; INTRAVENOUS at 01:51

## 2025-07-17 RX ADMIN — DEXTROSE MONOHYDRATE 100 ML/HR: 100 INJECTION, SOLUTION INTRAVENOUS at 06:38

## 2025-07-17 RX ADMIN — HYDROMORPHONE HYDROCHLORIDE 1 MG: 1 INJECTION, SOLUTION INTRAMUSCULAR; INTRAVENOUS; SUBCUTANEOUS at 00:48

## 2025-07-17 RX ADMIN — DEXTROSE MONOHYDRATE: 100 INJECTION, SOLUTION INTRAVENOUS at 00:00

## 2025-07-17 RX ADMIN — SODIUM PHOSPHATE 1 ENEMA: 7; 19 ENEMA RECTAL at 08:49

## 2025-07-17 ASSESSMENT — PAIN SCALES - GENERAL
PAINLEVEL_OUTOF10: 10
PAINLEVEL_OUTOF10: 5
PAINLEVEL_OUTOF10: 10
PAINLEVEL_OUTOF10: 6
PAINLEVEL_OUTOF10: 5
PAINLEVEL_OUTOF10: 9

## 2025-07-17 ASSESSMENT — PAIN SCALES - WONG BAKER
WONGBAKER_NUMERICALRESPONSE: HURTS LITTLE MORE
WONGBAKER_NUMERICALRESPONSE: HURTS LITTLE MORE

## 2025-07-17 ASSESSMENT — PAIN DESCRIPTION - ORIENTATION: ORIENTATION: MID;LOWER

## 2025-07-17 ASSESSMENT — PAIN DESCRIPTION - DESCRIPTORS: DESCRIPTORS: ACHING

## 2025-07-17 ASSESSMENT — PAIN DESCRIPTION - LOCATION
LOCATION: ABDOMEN
LOCATION: ABDOMEN

## 2025-07-17 NOTE — ED NOTES
In to administer scheduled medications, patient states \"don't bother me with pills, I'm in agony!\"   MD aware

## 2025-07-17 NOTE — H&P
1.00 0.80 - 3.50 K/UL    Monocytes Absolute 0.94 0.00 - 1.00 K/UL    Eosinophils Absolute 0.52 (H) 0.00 - 0.40 K/UL    Basophils Absolute 0.06 0.00 - 0.10 K/UL    Immature Granulocytes Absolute 0.10 (H) 0.00 - 0.04 K/UL    Differential Type AUTOMATED     POCT Glucose    Collection Time: 07/17/25 12:21 PM   Result Value Ref Range    POC Glucose 113 65 - 117 mg/dL    Performed by: [employer]          XR ABDOMEN (KUB) (SINGLE AP VIEW)  Result Date: 7/17/2025  EXAM: XR ABDOMEN (KUB) (SINGLE AP VIEW) INDICATION: eval for SBO vs Ileus COMPARISON: None TECHNIQUE: Single AP supine view of the abdomen. FINDINGS: Normal gaseous distention of the visualized bowel. No pneumoperitoneum, within limits of projection. Visualized soft tissues are remarkable for a left-sided subcutaneous implant and surgical clips overlying the right upper quadrant and pelvis.     Nonobstructive bowel gas pattern. Electronically signed by Jarrod Porras    CT ABDOMEN PELVIS WO CONTRAST Additional Contrast? None  Result Date: 7/16/2025  EXAM: CT ABDOMEN PELVIS WO CONTRAST INDICATION: Lower abd pain since last night. SAURABH so no contrast COMPARISON: 10/21/2024 IV CONTRAST: None. ORAL CONTRAST: None TECHNIQUE: Thin axial images were obtained through the abdomen and pelvis. Coronal and sagittal reformats were generated. CT dose reduction was achieved through use of a standardized protocol tailored for this examination and automatic exposure control for dose modulation. The absence of intravenous contrast material reduces the sensitivity for evaluation of the vasculature and solid organs. FINDINGS: LOWER THORAX: Small right pleural effusion. LIVER: No mass. Stable hepatic cyst. BILIARY TREE: Status post cholecystectomy. CBD is not dilated. SPLEEN: within normal limits. PANCREAS: No focal abnormality. ADRENALS: Unremarkable. KIDNEYS/URETERS: No calculus or hydronephrosis. STOMACH: Unremarkable. SMALL BOWEL: Mild diffuse bowel distention without transition

## 2025-07-17 NOTE — PROGRESS NOTES
Patient c/o \"constipation\". On the toilet multiple times to attempt BM. Moaning aloud in pain. Order for fleets obtained and administered.   Of note, no stool felt in rectum. MD ordered KUB.

## 2025-07-17 NOTE — CARE COORDINATION
Care Management Initial Assessment       RUR: n/a obs  Readmission? No  1st IM letter given? No  1st  letter given: No  WILDE refused 25 1011   Service Assessment   Patient Orientation Alert and Oriented;Person;Place;Situation;Self   Cognition Alert   History Provided By Patient   Primary Caregiver Self   Support Systems Spouse/Significant Other;Children   Patient's Healthcare Decision Maker is: Named in Scanned ACP Document   PCP Verified by CM Yes   Last Visit to PCP   (patient stated \"I don't know, y'all should know\")   Prior Functional Level Assistance with the following:;Housework   Current Functional Level Housework;Assistance with the following:   Can patient return to prior living arrangement Yes   Ability to make needs known: Good   Family able to assist with home care needs: Yes   Would you like for me to discuss the discharge plan with any other family members/significant others, and if so, who? No   Financial Resources Medicare       Patient confirmed 2 identifiers (name and ) and the following demographics:    Address: [address] [place], [place] [id number]    Phone: [phone]    Pharmacy: [facility]    Patient confirmed that her , Ajay Castro at [phone], is her emergency contact. When asked when she last saw her PCP, she stated \"I don't know, y'all should know.\" She lives in a house with her medically ill . She is independent with basic ADLs but needs help with home chores and work around the home and for that she has people that complete these tasks. She is an active . When asked about DME she states \"we have it all.\" She does not currently have home services. She does not know who will transport her home at discharge because her  cannot drive. She refused to sign the WILDE, demanding to see the nurse. Will continue to follow with case management until the time of discharge.

## 2025-07-17 NOTE — ED NOTES
Admission SBAR Note  Situation/Background:     Patient is being transferred to Med/surg ([facility]), Room# 112    Patient's Chief Complaint was hyperglycemia/ Abdominal pain and is admitted for generalized abdominal pain. .    CODE STATUS: Full  CSSRS: 0 - No Risk    ISOLATION/PRECAUTIONS: No  ISOLATION TYPE:     Is this a behavioral health patient? No  Has wanding been completed No  Are belongings secure? Yes    Called outstanding consults: No    STAT labs collected: Yes    Repeat Lactic Acid DUE? No  TIME DUE:     All STAT orders are complete: Yes    The following personal items will be sent with the patient during transfer to the floor:     All valuables: none       ASSESSMENT:    NEURO:   NIH SCORE: 0,1-4,5-15,15-20,21-42: 0   TAMIR SWALLOW SCREEN COMPLETE: Yes  ORIENTATION LEVEL: ORIENTATION LEVEL: Person, Place, Time, and Situation  Cognition:  appropriate decision making  Speech: shows no evidence of impairment    Is patient impulsive? No  Is patient oriented? Yes  Do they follow commands? Yes  Is the patient ambulatory? Yes with assistance    FALL RISK? Yes  Interventions: Implemented/recommended use of non-skid footwear and Implemented/recommended environmental changes (remove hazards, lower bed, improve lighting, etc.)    RESPIRATORY:   Is patient on oxygen? No  Oxygen therapy: room air  O2 rate:     CARDIAC:   Is cardiac monitoring ordered? Yes    Last Rhythm: Rhythm including paccardio: Normal Sinus Rhythm   Patient to transfer with tele box on? Yes  Infusions: Meds; iv fluids: none  LINE ACCESS: 20G Peripheral IV , Antecubital and Left , Iv rate: 100 ml/hr       /GI:   Continent Bowel/Bladder? Yes  Urinary Output:   Chronic or Acute:   If Chronic, is it 3 days old, was it changed prior to specimen collection? Yes  Was UA with reflex sent to lab? Yes  If no, collect and send prior to transport to inpatient area.    INTEGUMENTARY:  IS THE

## 2025-07-18 LAB
BASOPHILS # BLD: 0 K/UL (ref 0–0.1)
BASOPHILS NFR BLD: 0 % (ref 0–1)
DIFFERENTIAL METHOD BLD: ABNORMAL
EOSINOPHIL # BLD: 0 K/UL (ref 0–0.4)
EOSINOPHIL NFR BLD: 0 % (ref 0–0.7)
ERYTHROCYTE [DISTWIDTH] IN BLOOD BY AUTOMATED COUNT: 13.6 % (ref 11.5–14.5)
EST. AVERAGE GLUCOSE BLD GHB EST-MCNC: 151 MG/DL
GLUCOSE BLD STRIP.AUTO-MCNC: 212 MG/DL (ref 65–117)
GLUCOSE BLD STRIP.AUTO-MCNC: 62 MG/DL (ref 65–117)
HBA1C MFR BLD: 6.9 % (ref 4–5.6)
HCT VFR BLD AUTO: 40.8 % (ref 35–47)
HGB BLD-MCNC: 14.2 G/DL (ref 11.5–16)
IMM GRANULOCYTES # BLD AUTO: 0 K/UL (ref 0–0.04)
IMM GRANULOCYTES NFR BLD AUTO: 0 % (ref 0–0.5)
LYMPHOCYTES # BLD: 3.13 K/UL (ref 0.8–3.5)
LYMPHOCYTES NFR BLD: 10 % (ref 12–49)
MCH RBC QN AUTO: 30.9 PG (ref 26–34)
MCHC RBC AUTO-ENTMCNC: 34.8 G/DL (ref 30–36.5)
MCV RBC AUTO: 88.9 FL (ref 80–99)
MONOCYTES # BLD: 2.5 K/UL (ref 0–1)
MONOCYTES NFR BLD: 8 % (ref 5–13)
NEUTS BAND NFR BLD MANUAL: 8 %
NEUTS SEG # BLD: 25.67 K/UL (ref 1.8–8)
NEUTS SEG NFR BLD: 74 % (ref 32–75)
NRBC # BLD: 0.02 K/UL (ref 0–0.01)
NRBC BLD-RTO: 0.1 PER 100 WBC
PLATELET # BLD AUTO: 337 K/UL (ref 150–400)
PLATELET COMMENT: ABNORMAL
PROCALCITONIN SERPL-MCNC: 0.77 NG/ML
RBC # BLD AUTO: 4.59 M/UL (ref 3.8–5.2)
RBC MORPH BLD: ABNORMAL
SERVICE CMNT-IMP: ABNORMAL
SERVICE CMNT-IMP: ABNORMAL
WBC # BLD AUTO: 31.3 K/UL (ref 3.6–11)

## 2025-07-18 PROCEDURE — 96366 THER/PROPH/DIAG IV INF ADDON: CPT

## 2025-07-18 PROCEDURE — 96376 TX/PRO/DX INJ SAME DRUG ADON: CPT

## 2025-07-18 PROCEDURE — 94760 N-INVAS EAR/PLS OXIMETRY 1: CPT

## 2025-07-18 PROCEDURE — 83036 HEMOGLOBIN GLYCOSYLATED A1C: CPT

## 2025-07-18 PROCEDURE — 85025 COMPLETE CBC W/AUTO DIFF WBC: CPT

## 2025-07-18 PROCEDURE — 6360000002 HC RX W HCPCS: Performed by: INTERNAL MEDICINE

## 2025-07-18 PROCEDURE — 36415 COLL VENOUS BLD VENIPUNCTURE: CPT

## 2025-07-18 PROCEDURE — 6370000000 HC RX 637 (ALT 250 FOR IP): Performed by: INTERNAL MEDICINE

## 2025-07-18 PROCEDURE — 84145 PROCALCITONIN (PCT): CPT

## 2025-07-18 PROCEDURE — 2580000003 HC RX 258: Performed by: INTERNAL MEDICINE

## 2025-07-18 PROCEDURE — 87040 BLOOD CULTURE FOR BACTERIA: CPT

## 2025-07-18 PROCEDURE — 82962 GLUCOSE BLOOD TEST: CPT

## 2025-07-18 PROCEDURE — G0378 HOSPITAL OBSERVATION PER HR: HCPCS

## 2025-07-18 PROCEDURE — 96361 HYDRATE IV INFUSION ADD-ON: CPT

## 2025-07-18 PROCEDURE — 2500000003 HC RX 250 WO HCPCS: Performed by: INTERNAL MEDICINE

## 2025-07-18 RX ORDER — GABAPENTIN 100 MG/1
200 CAPSULE ORAL ONCE
Status: COMPLETED | OUTPATIENT
Start: 2025-07-18 | End: 2025-07-18

## 2025-07-18 RX ORDER — SODIUM CHLORIDE 9 MG/ML
INJECTION, SOLUTION INTRAVENOUS CONTINUOUS
Status: DISCONTINUED | OUTPATIENT
Start: 2025-07-18 | End: 2025-07-20 | Stop reason: HOSPADM

## 2025-07-18 RX ORDER — SODIUM CHLORIDE 9 MG/ML
INJECTION, SOLUTION INTRAVENOUS CONTINUOUS
Status: DISCONTINUED | OUTPATIENT
Start: 2025-07-18 | End: 2025-07-18

## 2025-07-18 RX ADMIN — ISOSORBIDE DINITRATE 20 MG: 10 TABLET ORAL at 16:59

## 2025-07-18 RX ADMIN — SODIUM CHLORIDE: 0.9 INJECTION, SOLUTION INTRAVENOUS at 05:00

## 2025-07-18 RX ADMIN — SODIUM CHLORIDE, PRESERVATIVE FREE 7 ML: 5 INJECTION INTRAVENOUS at 21:11

## 2025-07-18 RX ADMIN — PIPERACILLIN AND TAZOBACTAM 3375 MG: 3; .375 INJECTION, POWDER, LYOPHILIZED, FOR SOLUTION INTRAVENOUS at 12:28

## 2025-07-18 RX ADMIN — ISOSORBIDE DINITRATE 20 MG: 10 TABLET ORAL at 08:51

## 2025-07-18 RX ADMIN — ATORVASTATIN CALCIUM 40 MG: 40 TABLET, FILM COATED ORAL at 20:54

## 2025-07-18 RX ADMIN — DEXTROSE MONOHYDRATE 250 ML: 100 INJECTION, SOLUTION INTRAVENOUS at 20:50

## 2025-07-18 RX ADMIN — ACETAMINOPHEN 650 MG: 325 TABLET ORAL at 20:54

## 2025-07-18 RX ADMIN — SODIUM CHLORIDE: 9 INJECTION, SOLUTION INTRAVENOUS at 12:26

## 2025-07-18 RX ADMIN — GABAPENTIN 200 MG: 100 CAPSULE ORAL at 16:59

## 2025-07-18 RX ADMIN — ONDANSETRON 4 MG: 2 INJECTION, SOLUTION INTRAMUSCULAR; INTRAVENOUS at 20:32

## 2025-07-18 RX ADMIN — CARVEDILOL 25 MG: 25 TABLET, FILM COATED ORAL at 20:54

## 2025-07-18 RX ADMIN — ACETAMINOPHEN 650 MG: 325 TABLET ORAL at 09:09

## 2025-07-18 RX ADMIN — CARVEDILOL 25 MG: 25 TABLET, FILM COATED ORAL at 08:51

## 2025-07-18 RX ADMIN — PIPERACILLIN AND TAZOBACTAM 3375 MG: 3; .375 INJECTION, POWDER, LYOPHILIZED, FOR SOLUTION INTRAVENOUS at 00:04

## 2025-07-18 ASSESSMENT — PAIN SCALES - GENERAL
PAINLEVEL_OUTOF10: 4
PAINLEVEL_OUTOF10: 7
PAINLEVEL_OUTOF10: 4

## 2025-07-18 ASSESSMENT — PAIN DESCRIPTION - DESCRIPTORS: DESCRIPTORS: ACHING

## 2025-07-18 ASSESSMENT — PAIN - FUNCTIONAL ASSESSMENT: PAIN_FUNCTIONAL_ASSESSMENT: PREVENTS OR INTERFERES SOME ACTIVE ACTIVITIES AND ADLS

## 2025-07-18 ASSESSMENT — PAIN DESCRIPTION - LOCATION: LOCATION: ABDOMEN

## 2025-07-18 ASSESSMENT — PAIN DESCRIPTION - ORIENTATION: ORIENTATION: RIGHT;LEFT

## 2025-07-18 ASSESSMENT — PAIN SCALES - WONG BAKER
WONGBAKER_NUMERICALRESPONSE: HURTS A LITTLE BIT
WONGBAKER_NUMERICALRESPONSE: HURTS LITTLE MORE

## 2025-07-18 NOTE — PROGRESS NOTES
Hospitalist Progress Note    NAME:   Haydee Castro   : 1940   MRN: [id number]     Date/Time: 2025 1:11 PM  Patient PCP: Victorina Mukherjee APRN - NP      Assessment / Plan:    Abdominal pain  History of IBS  -patient notes persistent abdominal pain today, CT abdomen on admission noted mild diffuse small bowel distention without transition point may be related to ileus or enteritis  -a follow up KUB was negative for bowel obstruction  -blood cultures x 2 obtained on admission, empirically placed on IV Zosyn which we will adjust pending final sensitivity results     3.   Chronic HFrEF EF 35-40%  4.   Ischemic cardiomyopathy s/p [employer] ICD  -Not on ARNI, ARB, ACE, aldosterone antagonist or SGLT2 inhibitor due to advanced kidney disease.   -continue outpatient regimen     5.   Paroxysmal supraventricular tachycardia  -continue outpatient carvedilol     6.   Hypertension  -continue outpatient regimen     7.   CKD stage IV  -avoid nephrotoxic agents     8.   DM Type 2  -SSI        Code Status: FULL  DVT Prophylaxis:  SCD's    Subjective:     Chief Complaint / Reason for Physician Visit  This am patient notes persistent abdominal pain.      Objective:     VITALS:   Last 24hrs VS reviewed since prior progress note. Most recent are:  Patient Vitals for the past 24 hrs:   BP Temp Temp src Pulse Resp SpO2   25 1200 -- -- -- 79 -- --   25 1100 (!) 96/52 100.4 °F (38 °C) Oral -- 20 94 %   25 0800 124/80 98.2 °F (36.8 °C) Oral 93 20 100 %   25 0717 -- 98.2 °F (36.8 °C) -- -- 20 --   25 0442 101/77 99.7 °F (37.6 °C) Oral 91 15 95 %   25 0400 -- -- -- 92 -- --   25 0000 -- -- -- (!) 105 -- --   25 2335 117/61 98.6 °F (37 °C) Oral (!) 109 14 93 %   256 -- -- -- -- 18 --   25 (!) 149/71 98.6 °F (37 °C) Oral 98 16 94 %   25 -- -- -- (!) 102 -- --   25 1600 137/60 97.2 °F (36.2 °C) Oral (!) 110 -- 94 %

## 2025-07-18 NOTE — PLAN OF CARE
Problem: Chronic Conditions and Co-morbidities  Goal: Patient's chronic conditions and co-morbidity symptoms are monitored and maintained or improved  Outcome: Progressing     Problem: Pain  Goal: Verbalizes/displays adequate comfort level or baseline comfort level  7/18/2025 1522 by Martine Beck LPN  Outcome: Progressing  7/18/2025 0536 by Nadine Ozuna RN  Outcome: Progressing  Flowsheets (Taken 7/18/2025 0536)  Verbalizes/displays adequate comfort level or baseline comfort level:   Encourage patient to monitor pain and request assistance   Assess pain using appropriate pain scale  Note: Pain is control with PRN pain meds, repositioning with pillows     Problem: Safety - Adult  Goal: Free from fall injury  7/18/2025 1522 by Martine Beck LPN  Outcome: Progressing  7/18/2025 0536 by Nadine Ozuna RN  Outcome: Progressing  Note: Commode at pt bedside, call light within reach, frequent roundings     Problem: Skin/Tissue Integrity  Goal: Skin integrity remains intact  Description: 1.  Monitor for areas of redness and/or skin breakdown  2.  Assess vascular access sites hourly  3.  Every 4-6 hours minimum:  Change oxygen saturation probe site  4.  Every 4-6 hours:  If on nasal continuous positive airway pressure, respiratory therapy assess nares and determine need for appliance change or resting period  Outcome: Progressing

## 2025-07-18 NOTE — PROGRESS NOTES
Orders received, chart reviewed. Attempted to see patient for physical therapy eval but she defers stating she is in too much pain. Will f/u on Sunday if patient still here. Rasheeda Wilkins, PT

## 2025-07-18 NOTE — CARE COORDINATION
Transition of Care Plan:    RUR: n/a obs  Prior Level of Functioning: independent  Disposition: home with hh  DELANEY: 07/21/25  If SNF or IPR: Date FOC offered:   Date FOC received:   Accepting facility:   Date authorization started with reference number:   Date authorization received and expires:   Follow up appointments: PCP  DME needed: no  Transportation at discharge: [facility]  IM/IMM Medicare/[facility] letter given:   Is patient a [place] and connected with [facility]?    If yes, was [place] transfer form completed and [facility] notified?   Caregiver Contact:   Discharge Caregiver contacted prior to discharge?   Care Conference needed?   Barriers to discharge:     Home health offered to patient, patient accepted. Freedom of choice provided. [place] referral sent and accepted. [facility] can transport at discharge. Will continue to follow patient with case management until the time of discharge.

## 2025-07-18 NOTE — PROGRESS NOTES
Pt arrived from the ED on a stretcher, she is alert and oriented x4, on RA with no SOB. She transferred from stretcher to bed with minimal assist. She is on telly for vertigo, admission BP is elevated. MD aware. She has no pain, already walked to the bathroom with stand by assist. Pt is resting comfortably in bed.

## 2025-07-18 NOTE — PLAN OF CARE
Problem: Pain  Goal: Verbalizes/displays adequate comfort level or baseline comfort level  Outcome: Progressing  Flowsheets (Taken 7/18/2025 0536)  Verbalizes/displays adequate comfort level or baseline comfort level:   Encourage patient to monitor pain and request assistance   Assess pain using appropriate pain scale  Note: Pain is control with PRN pain meds, repositioning with pillows     Problem: Safety - Adult  Goal: Free from fall injury  Outcome: Progressing  Note: Commode at pt bedside, call light within reach, frequent roundings     Problem: Chronic Conditions and Co-morbidities  Goal: Patient's chronic conditions and co-morbidity symptoms are monitored and maintained or improved  Recent Flowsheet Documentation  Taken 7/17/2025 2143 by Nadine Ozuna RN  Care Plan - Patient's Chronic Conditions and Co-Morbidity Symptoms are Monitored and Maintained or Improved: Monitor and assess patient's chronic conditions and comorbid symptoms for stability, deterioration, or improvement     Problem: Skin/Tissue Integrity  Goal: Skin integrity remains intact  Description: 1.  Monitor for areas of redness and/or skin breakdown  2.  Assess vascular access sites hourly  3.  Every 4-6 hours minimum:  Change oxygen saturation probe site  4.  Every 4-6 hours:  If on nasal continuous positive airway pressure, respiratory therapy assess nares and determine need for appliance change or resting period  Recent Flowsheet Documentation  Taken 7/17/2025 2143 by Nadine Ozuna, RN  Skin Integrity Remains Intact: Monitor for areas of redness and/or skin breakdown

## 2025-07-18 NOTE — PROGRESS NOTES
Occupational Therapy    Pt recd in bed, pt is moaning in pain. Pt is very short and disgruntled with OT. Reports she is not getting OOB today, no matter what. OT is able to obtain a brief hx from pt that she resides in a story home with a ramp to enter with her disabled spouse. She as a caregiver a few days a week to assist her, however her caregiver is out of town for two weeks. Spouse is wheelchair bound per pt. Pt is refusing to participate with OT at this time. OT will continue to follow.         Thank You,   Martine Munguia, OT

## 2025-07-19 ENCOUNTER — APPOINTMENT (OUTPATIENT)
Facility: HOSPITAL | Age: 85
DRG: 872 | End: 2025-07-19
Payer: MEDICARE

## 2025-07-19 PROBLEM — A41.9 SEPSIS (HCC): Status: ACTIVE | Noted: 2025-07-19

## 2025-07-19 LAB
ANION GAP SERPL CALC-SCNC: 13 MMOL/L (ref 2–12)
BASOPHILS # BLD: 0.07 K/UL (ref 0–0.1)
BASOPHILS NFR BLD: 0.2 % (ref 0–1)
BUN SERPL-MCNC: 72 MG/DL (ref 6–20)
BUN/CREAT SERPL: 25 (ref 12–20)
CALCIUM SERPL-MCNC: 9.2 MG/DL (ref 8.5–10.1)
CHLORIDE SERPL-SCNC: 104 MMOL/L (ref 97–108)
CO2 SERPL-SCNC: 23 MMOL/L (ref 21–32)
CREAT SERPL-MCNC: 2.9 MG/DL (ref 0.55–1.02)
DIFFERENTIAL METHOD BLD: ABNORMAL
EOSINOPHIL # BLD: 0 K/UL (ref 0–0.4)
EOSINOPHIL NFR BLD: 0 % (ref 0–0.7)
ERYTHROCYTE [DISTWIDTH] IN BLOOD BY AUTOMATED COUNT: 13.5 % (ref 11.5–14.5)
GLUCOSE BLD STRIP.AUTO-MCNC: 119 MG/DL (ref 65–117)
GLUCOSE BLD STRIP.AUTO-MCNC: 159 MG/DL (ref 65–117)
GLUCOSE BLD STRIP.AUTO-MCNC: 169 MG/DL (ref 65–117)
GLUCOSE SERPL-MCNC: 107 MG/DL (ref 65–100)
HCT VFR BLD AUTO: 35.3 % (ref 35–47)
HGB BLD-MCNC: 11.8 G/DL (ref 11.5–16)
IMM GRANULOCYTES # BLD AUTO: 0.37 K/UL (ref 0–0.04)
IMM GRANULOCYTES NFR BLD AUTO: 1.1 % (ref 0–0.5)
LACTATE SERPL-SCNC: 1 MMOL/L (ref 0.4–2)
LYMPHOCYTES # BLD: 1.23 K/UL (ref 0.8–3.5)
LYMPHOCYTES NFR BLD: 3.7 % (ref 12–49)
MCH RBC QN AUTO: 29.5 PG (ref 26–34)
MCHC RBC AUTO-ENTMCNC: 33.4 G/DL (ref 30–36.5)
MCV RBC AUTO: 88.3 FL (ref 80–99)
MONOCYTES # BLD: 1.49 K/UL (ref 0–1)
MONOCYTES NFR BLD: 4.5 % (ref 5–13)
NEUTS SEG # BLD: 30.05 K/UL (ref 1.8–8)
NEUTS SEG NFR BLD: 90.5 % (ref 32–75)
NRBC # BLD: 0 K/UL (ref 0–0.01)
NRBC BLD-RTO: 0 PER 100 WBC
PLATELET # BLD AUTO: 305 K/UL (ref 150–400)
PLATELET COMMENT: ABNORMAL
PMV BLD AUTO: 11 FL (ref 8.9–12.9)
POTASSIUM SERPL-SCNC: 4.1 MMOL/L (ref 3.5–5.1)
PROCALCITONIN SERPL-MCNC: 1.5 NG/ML
RBC # BLD AUTO: 4 M/UL (ref 3.8–5.2)
RBC MORPH BLD: ABNORMAL
SERVICE CMNT-IMP: ABNORMAL
SODIUM SERPL-SCNC: 140 MMOL/L (ref 136–145)
WBC # BLD AUTO: 33.2 K/UL (ref 3.6–11)

## 2025-07-19 PROCEDURE — 6370000000 HC RX 637 (ALT 250 FOR IP): Performed by: INTERNAL MEDICINE

## 2025-07-19 PROCEDURE — 2500000003 HC RX 250 WO HCPCS: Performed by: INTERNAL MEDICINE

## 2025-07-19 PROCEDURE — 74018 RADEX ABDOMEN 1 VIEW: CPT

## 2025-07-19 PROCEDURE — 80048 BASIC METABOLIC PNL TOTAL CA: CPT

## 2025-07-19 PROCEDURE — 1100000003 HC PRIVATE W/ TELEMETRY

## 2025-07-19 PROCEDURE — 82962 GLUCOSE BLOOD TEST: CPT

## 2025-07-19 PROCEDURE — 6360000002 HC RX W HCPCS: Performed by: INTERNAL MEDICINE

## 2025-07-19 PROCEDURE — 84145 PROCALCITONIN (PCT): CPT

## 2025-07-19 PROCEDURE — 83605 ASSAY OF LACTIC ACID: CPT

## 2025-07-19 PROCEDURE — 71045 X-RAY EXAM CHEST 1 VIEW: CPT

## 2025-07-19 PROCEDURE — 96376 TX/PRO/DX INJ SAME DRUG ADON: CPT

## 2025-07-19 PROCEDURE — 2580000003 HC RX 258: Performed by: INTERNAL MEDICINE

## 2025-07-19 PROCEDURE — 0D9670Z DRAINAGE OF STOMACH WITH DRAINAGE DEVICE, VIA NATURAL OR ARTIFICIAL OPENING: ICD-10-PCS | Performed by: INTERNAL MEDICINE

## 2025-07-19 PROCEDURE — 96366 THER/PROPH/DIAG IV INF ADDON: CPT

## 2025-07-19 PROCEDURE — 96361 HYDRATE IV INFUSION ADD-ON: CPT

## 2025-07-19 PROCEDURE — 36415 COLL VENOUS BLD VENIPUNCTURE: CPT

## 2025-07-19 PROCEDURE — 71046 X-RAY EXAM CHEST 2 VIEWS: CPT

## 2025-07-19 PROCEDURE — 94760 N-INVAS EAR/PLS OXIMETRY 1: CPT

## 2025-07-19 PROCEDURE — 85025 COMPLETE CBC W/AUTO DIFF WBC: CPT

## 2025-07-19 RX ORDER — METRONIDAZOLE 500 MG/100ML
500 INJECTION, SOLUTION INTRAVENOUS EVERY 8 HOURS
Status: DISCONTINUED | OUTPATIENT
Start: 2025-07-19 | End: 2025-07-20 | Stop reason: HOSPADM

## 2025-07-19 RX ORDER — CIPROFLOXACIN 2 MG/ML
400 INJECTION, SOLUTION INTRAVENOUS EVERY 24 HOURS
Status: DISCONTINUED | OUTPATIENT
Start: 2025-07-19 | End: 2025-07-20 | Stop reason: HOSPADM

## 2025-07-19 RX ORDER — GABAPENTIN 100 MG/1
200 CAPSULE ORAL ONCE
Status: COMPLETED | OUTPATIENT
Start: 2025-07-19 | End: 2025-07-19

## 2025-07-19 RX ORDER — VANCOMYCIN HYDROCHLORIDE 125 MG/1
125 CAPSULE ORAL 4 TIMES DAILY
Status: DISCONTINUED | OUTPATIENT
Start: 2025-07-19 | End: 2025-07-20 | Stop reason: HOSPADM

## 2025-07-19 RX ORDER — CIPROFLOXACIN 2 MG/ML
400 INJECTION, SOLUTION INTRAVENOUS EVERY 12 HOURS
Status: DISCONTINUED | OUTPATIENT
Start: 2025-07-19 | End: 2025-07-19

## 2025-07-19 RX ADMIN — SODIUM CHLORIDE, PRESERVATIVE FREE 10 ML: 5 INJECTION INTRAVENOUS at 21:43

## 2025-07-19 RX ADMIN — SODIUM CHLORIDE: 0.9 INJECTION, SOLUTION INTRAVENOUS at 03:08

## 2025-07-19 RX ADMIN — METRONIDAZOLE 500 MG: 500 INJECTION, SOLUTION INTRAVENOUS at 16:46

## 2025-07-19 RX ADMIN — GABAPENTIN 200 MG: 100 CAPSULE ORAL at 10:48

## 2025-07-19 RX ADMIN — ATORVASTATIN CALCIUM 40 MG: 40 TABLET, FILM COATED ORAL at 21:25

## 2025-07-19 RX ADMIN — METRONIDAZOLE 500 MG: 500 INJECTION, SOLUTION INTRAVENOUS at 09:57

## 2025-07-19 RX ADMIN — SODIUM CHLORIDE: 9 INJECTION, SOLUTION INTRAVENOUS at 09:55

## 2025-07-19 RX ADMIN — PIPERACILLIN AND TAZOBACTAM 3375 MG: 3; .375 INJECTION, POWDER, LYOPHILIZED, FOR SOLUTION INTRAVENOUS at 00:57

## 2025-07-19 RX ADMIN — CARVEDILOL 25 MG: 25 TABLET, FILM COATED ORAL at 21:25

## 2025-07-19 RX ADMIN — VANCOMYCIN HYDROCHLORIDE 125 MG: 125 CAPSULE ORAL at 16:39

## 2025-07-19 RX ADMIN — ONDANSETRON 4 MG: 2 INJECTION, SOLUTION INTRAMUSCULAR; INTRAVENOUS at 05:41

## 2025-07-19 RX ADMIN — CARVEDILOL 25 MG: 25 TABLET, FILM COATED ORAL at 08:46

## 2025-07-19 RX ADMIN — VANCOMYCIN HYDROCHLORIDE 125 MG: 125 CAPSULE ORAL at 21:25

## 2025-07-19 RX ADMIN — ISOSORBIDE DINITRATE 20 MG: 10 TABLET ORAL at 08:46

## 2025-07-19 RX ADMIN — VANCOMYCIN HYDROCHLORIDE 125 MG: 125 CAPSULE ORAL at 10:05

## 2025-07-19 RX ADMIN — CIPROFLOXACIN 400 MG: 2 INJECTION, SOLUTION INTRAVENOUS at 11:12

## 2025-07-19 ASSESSMENT — PAIN SCALES - WONG BAKER: WONGBAKER_NUMERICALRESPONSE: 0;4

## 2025-07-19 ASSESSMENT — PAIN SCALES - GENERAL: PAINLEVEL_OUTOF10: 3

## 2025-07-19 NOTE — PROGRESS NOTES
Writer contacted [facility] and spoke with Guy to arrange ALS transport for this patient to [facility]. Requested information provided (Pt diagnosis, cardiac monitoring, saline lock, room air, informed that patient is a C-Diff rule out, insurance information and demographics already on file with [facility]) ETA 1930-- Med-Surg staff made aware.

## 2025-07-19 NOTE — DISCHARGE SUMMARY
Discharge Summary    Name: Haydee Castro  [id number]  YOB: 1940 (Age: 84 y.o.)   Date of Admission: 7/16/2025  Date of Discharge: 7/19/2025  Attending Physician: Xavier Herrera MD    Discharge Diagnosis:   Sepsis  Abdominal pain  History of IBS  Chronic HFrEF EF 35-40%  Ischemic cardiomyopathy s/p [employer] ICD  Paroxysmal supraventricular tachycardia  Hypertension  CKD stage IV  DM Type 2    Consultations:  IP CONSULT TO CASE MANAGEMENT      Brief Admission History/Reason for Admission Per Maia Jordan MD:   Haydee Castro is a 84 y.o.  female with PMHx significant for IBS, HFrEF, Ischemic cardiomyopathy s/p AICD, CKD stage IV, DM type 2, hypertension and paroxysmal SVT who presented to the ED with complaints of abdominal pain for several days duration.     When she arrived to the ED vital signs were stable and lab data obtained revealed WBC 13k, Lipase 43, Lactic acid 1.0, BUN 66, Creatine 2.11and UA with small leuk esterase.  CT abdomen and pelvis WO contrast noted small right pleural effusion and mild diffuse small bowel distention without transition point may be related to ileus.  She is being admitted under the Hospitalist service for further management.    Brief Hospital Course by Main Problems:   Addendum:  Pt started vomiting, KUB done earlier today showed possible SBO. NG tube placed, 3300ml suctioned. Restarted IVF. NG tube end not visible on CXR, pulled 5 inches and seems in stomach now (formal radiology review pending). I spoke to transfer center to touch base with surgery if pt needed to be transferred to general Surgery. Transfer center spoke to General Surgery and they recommended to proceed with transfer to Hospitalist service and they can be consulted while at [facility]. Transfer center to notify hospitalist for updates.     Sepsis with fever, leukocytosis and tachycardia  Abdominal pain  Enteritis  Diarrhea prior to admission (no

## 2025-07-19 NOTE — PLAN OF CARE
Problem: Chronic Conditions and Co-morbidities  Goal: Patient's chronic conditions and co-morbidity symptoms are monitored and maintained or improved  Outcome: Progressing     Problem: Pain  Goal: Verbalizes/displays adequate comfort level or baseline comfort level  7/19/2025 1021 by Martine Beck LPN  Outcome: Progressing  7/19/2025 0218 by Nadine Ozuna RN  Outcome: Progressing  Flowsheets (Taken 7/19/2025 0218)  Verbalizes/displays adequate comfort level or baseline comfort level: Encourage patient to monitor pain and request assistance     Problem: Safety - Adult  Goal: Free from fall injury  7/19/2025 1021 by Martine Beck LPN  Outcome: Progressing  7/19/2025 0218 by Nadine Ozuna RN  Outcome: Progressing  Note: Call light within reach, frequent rounding,      Problem: Skin/Tissue Integrity  Goal: Skin integrity remains intact  Description: 1.  Monitor for areas of redness and/or skin breakdown  2.  Assess vascular access sites hourly  3.  Every 4-6 hours minimum:  Change oxygen saturation probe site  4.  Every 4-6 hours:  If on nasal continuous positive airway pressure, respiratory therapy assess nares and determine need for appliance change or resting period  Outcome: Progressing     Problem: ABCDS Injury Assessment  Goal: Absence of physical injury  Outcome: Progressing

## 2025-07-19 NOTE — PROGRESS NOTES
NG tube placed w/ assistance of BAMBI Osborne RN and EHSAN Varela RN. Gastric fluid draining dark green bile at 1510. 1532 chest xray done, no sign of NG tube in the lungs. Patient stating her abdomen is beginning to feel much better at this time. 2,675 mL drained from NG tube at this time.    1544: MD notified of amount of fluid being drained, MD ordering NS to re-compensate for fluid loss.    1732: MD requested for nursing to pull NG tube to 50cm at this time. KUB ordered. Dr Herrera confirmed 50cm is good placement, taped in place in the left nare.

## 2025-07-19 NOTE — PLAN OF CARE
Problem: Pain  Goal: Verbalizes/displays adequate comfort level or baseline comfort level  7/19/2025 0218 by Nadine Ozuna RN  Outcome: Progressing  Flowsheets (Taken 7/19/2025 0218)  Verbalizes/displays adequate comfort level or baseline comfort level: Encourage patient to monitor pain and request assistance  7/18/2025 1522 by Martine Beck LPN  Outcome: Progressing     Problem: Safety - Adult  Goal: Free from fall injury  7/19/2025 0218 by Nadine Ozuna RN  Outcome: Progressing  Note: Call light within reach, frequent rounding,   7/18/2025 1522 by Martine Beck LPN  Outcome: Progressing

## 2025-07-19 NOTE — PROGRESS NOTES
Pt vital is within normal range, she has been mooning this morning complaining of nausea, I just administered Zofran. Abdominal pain 4/10, she walk and move pretty good to the bathroom, urine output is at border line. Also, at the beginning of the shift, her blood sugar was 62, MD aware, hypoglycemic protocol follow.

## 2025-07-19 NOTE — PROGRESS NOTES
1st Important Message from Medicare letter explained to patient with an opportunity for questions provided. Patient verbalized understanding. Signed document placed on bedside chart to be scanned in EMR under media tab, copy given to patient.

## 2025-07-20 ENCOUNTER — HOSPITAL ENCOUNTER (INPATIENT)
Facility: HOSPITAL | Age: 85
LOS: 20 days | End: 2025-08-09
Attending: STUDENT IN AN ORGANIZED HEALTH CARE EDUCATION/TRAINING PROGRAM | Admitting: STUDENT IN AN ORGANIZED HEALTH CARE EDUCATION/TRAINING PROGRAM
Payer: MEDICARE

## 2025-07-20 ENCOUNTER — APPOINTMENT (OUTPATIENT)
Facility: HOSPITAL | Age: 85
End: 2025-07-20
Attending: STUDENT IN AN ORGANIZED HEALTH CARE EDUCATION/TRAINING PROGRAM
Payer: MEDICARE

## 2025-07-20 ENCOUNTER — ANESTHESIA EVENT (OUTPATIENT)
Facility: HOSPITAL | Age: 85
End: 2025-07-20
Payer: MEDICARE

## 2025-07-20 ENCOUNTER — ANESTHESIA (OUTPATIENT)
Facility: HOSPITAL | Age: 85
End: 2025-07-20
Payer: MEDICARE

## 2025-07-20 VITALS
SYSTOLIC BLOOD PRESSURE: 162 MMHG | DIASTOLIC BLOOD PRESSURE: 50 MMHG | WEIGHT: 135.8 LBS | RESPIRATION RATE: 18 BRPM | OXYGEN SATURATION: 92 % | HEART RATE: 82 BPM | TEMPERATURE: 98.8 F | HEIGHT: 66 IN | BODY MASS INDEX: 21.83 KG/M2

## 2025-07-20 DIAGNOSIS — J96.01 ACUTE RESPIRATORY FAILURE WITH HYPOXIA (HCC): Primary | ICD-10-CM

## 2025-07-20 PROBLEM — K56.609 SBO (SMALL BOWEL OBSTRUCTION) (HCC): Status: ACTIVE | Noted: 2025-07-20

## 2025-07-20 PROBLEM — R10.9 ABDOMINAL PAIN: Status: ACTIVE | Noted: 2025-07-20

## 2025-07-20 LAB
ALBUMIN SERPL-MCNC: 2.3 G/DL (ref 3.5–5)
ALBUMIN/GLOB SERPL: 0.5 (ref 1.1–2.2)
ALP SERPL-CCNC: 90 U/L (ref 45–117)
ALT SERPL-CCNC: 11 U/L (ref 12–78)
ANION GAP SERPL CALC-SCNC: 9 MMOL/L (ref 2–12)
AST SERPL-CCNC: 26 U/L (ref 15–37)
BILIRUB SERPL-MCNC: 0.5 MG/DL (ref 0.2–1)
BUN SERPL-MCNC: 76 MG/DL (ref 6–20)
BUN/CREAT SERPL: 24 (ref 12–20)
CALCIUM SERPL-MCNC: 8.9 MG/DL (ref 8.5–10.1)
CHLORIDE SERPL-SCNC: 108 MMOL/L (ref 97–108)
CO2 SERPL-SCNC: 22 MMOL/L (ref 21–32)
CREAT SERPL-MCNC: 3.18 MG/DL (ref 0.55–1.02)
CREAT UR-MCNC: 172 MG/DL
ERYTHROCYTE [DISTWIDTH] IN BLOOD BY AUTOMATED COUNT: 13.5 % (ref 11.5–14.5)
GLOBULIN SER CALC-MCNC: 4.3 G/DL (ref 2–4)
GLUCOSE BLD STRIP.AUTO-MCNC: 102 MG/DL (ref 65–117)
GLUCOSE BLD STRIP.AUTO-MCNC: 121 MG/DL (ref 65–117)
GLUCOSE BLD STRIP.AUTO-MCNC: 84 MG/DL (ref 65–117)
GLUCOSE BLD STRIP.AUTO-MCNC: 86 MG/DL (ref 65–117)
GLUCOSE SERPL-MCNC: 86 MG/DL (ref 65–100)
HCT VFR BLD AUTO: 35.2 % (ref 35–47)
HGB BLD-MCNC: 11.6 G/DL (ref 11.5–16)
MAGNESIUM SERPL-MCNC: 2.2 MG/DL (ref 1.6–2.4)
MCH RBC QN AUTO: 29.7 PG (ref 26–34)
MCHC RBC AUTO-ENTMCNC: 33 G/DL (ref 30–36.5)
MCV RBC AUTO: 90.3 FL (ref 80–99)
NRBC # BLD: 0 K/UL (ref 0–0.01)
NRBC BLD-RTO: 0 PER 100 WBC
PHOSPHATE SERPL-MCNC: 3.5 MG/DL (ref 2.6–4.7)
PLATELET # BLD AUTO: 284 K/UL (ref 150–400)
PMV BLD AUTO: 10.8 FL (ref 8.9–12.9)
POTASSIUM SERPL-SCNC: 3.5 MMOL/L (ref 3.5–5.1)
PROT SERPL-MCNC: 6.6 G/DL (ref 6.4–8.2)
RBC # BLD AUTO: 3.9 M/UL (ref 3.8–5.2)
SERVICE CMNT-IMP: ABNORMAL
SERVICE CMNT-IMP: NORMAL
SODIUM SERPL-SCNC: 139 MMOL/L (ref 136–145)
SODIUM UR-SCNC: 27 MMOL/L
WBC # BLD AUTO: 20.3 K/UL (ref 3.6–11)

## 2025-07-20 PROCEDURE — 36415 COLL VENOUS BLD VENIPUNCTURE: CPT

## 2025-07-20 PROCEDURE — 83735 ASSAY OF MAGNESIUM: CPT

## 2025-07-20 PROCEDURE — 6360000004 HC RX CONTRAST MEDICATION: Performed by: STUDENT IN AN ORGANIZED HEALTH CARE EDUCATION/TRAINING PROGRAM

## 2025-07-20 PROCEDURE — 6360000002 HC RX W HCPCS: Performed by: NURSE PRACTITIONER

## 2025-07-20 PROCEDURE — 7100000001 HC PACU RECOVERY - ADDTL 15 MIN: Performed by: SURGERY

## 2025-07-20 PROCEDURE — 84100 ASSAY OF PHOSPHORUS: CPT

## 2025-07-20 PROCEDURE — 2720000010 HC SURG SUPPLY STERILE: Performed by: SURGERY

## 2025-07-20 PROCEDURE — 6360000002 HC RX W HCPCS: Performed by: STUDENT IN AN ORGANIZED HEALTH CARE EDUCATION/TRAINING PROGRAM

## 2025-07-20 PROCEDURE — 74176 CT ABD & PELVIS W/O CONTRAST: CPT

## 2025-07-20 PROCEDURE — 6370000000 HC RX 637 (ALT 250 FOR IP): Performed by: STUDENT IN AN ORGANIZED HEALTH CARE EDUCATION/TRAINING PROGRAM

## 2025-07-20 PROCEDURE — 3700000001 HC ADD 15 MINUTES (ANESTHESIA): Performed by: SURGERY

## 2025-07-20 PROCEDURE — 6370000000 HC RX 637 (ALT 250 FOR IP): Performed by: INTERNAL MEDICINE

## 2025-07-20 PROCEDURE — 6360000002 HC RX W HCPCS

## 2025-07-20 PROCEDURE — 2500000003 HC RX 250 WO HCPCS: Performed by: SURGERY

## 2025-07-20 PROCEDURE — 88307 TISSUE EXAM BY PATHOLOGIST: CPT

## 2025-07-20 PROCEDURE — 3600000014 HC SURGERY LEVEL 4 ADDTL 15MIN: Performed by: SURGERY

## 2025-07-20 PROCEDURE — 6360000002 HC RX W HCPCS: Performed by: SURGERY

## 2025-07-20 PROCEDURE — 3600000004 HC SURGERY LEVEL 4 BASE: Performed by: SURGERY

## 2025-07-20 PROCEDURE — 2580000003 HC RX 258: Performed by: INTERNAL MEDICINE

## 2025-07-20 PROCEDURE — 82570 ASSAY OF URINE CREATININE: CPT

## 2025-07-20 PROCEDURE — 0DT80ZZ RESECTION OF SMALL INTESTINE, OPEN APPROACH: ICD-10-PCS | Performed by: SURGERY

## 2025-07-20 PROCEDURE — 99222 1ST HOSP IP/OBS MODERATE 55: CPT | Performed by: SURGERY

## 2025-07-20 PROCEDURE — 74018 RADEX ABDOMEN 1 VIEW: CPT

## 2025-07-20 PROCEDURE — 7100000000 HC PACU RECOVERY - FIRST 15 MIN: Performed by: SURGERY

## 2025-07-20 PROCEDURE — 44120 REMOVAL OF SMALL INTESTINE: CPT | Performed by: SURGERY

## 2025-07-20 PROCEDURE — 2500000003 HC RX 250 WO HCPCS

## 2025-07-20 PROCEDURE — 6360000002 HC RX W HCPCS: Performed by: INTERNAL MEDICINE

## 2025-07-20 PROCEDURE — 3700000000 HC ANESTHESIA ATTENDED CARE: Performed by: SURGERY

## 2025-07-20 PROCEDURE — 2709999900 HC NON-CHARGEABLE SUPPLY: Performed by: SURGERY

## 2025-07-20 PROCEDURE — 6360000002 HC RX W HCPCS: Performed by: ANESTHESIOLOGY

## 2025-07-20 PROCEDURE — 85027 COMPLETE CBC AUTOMATED: CPT

## 2025-07-20 PROCEDURE — 80053 COMPREHEN METABOLIC PANEL: CPT

## 2025-07-20 PROCEDURE — 82962 GLUCOSE BLOOD TEST: CPT

## 2025-07-20 PROCEDURE — 51798 US URINE CAPACITY MEASURE: CPT

## 2025-07-20 PROCEDURE — P9047 ALBUMIN (HUMAN), 25%, 50ML: HCPCS | Performed by: INTERNAL MEDICINE

## 2025-07-20 PROCEDURE — 2500000003 HC RX 250 WO HCPCS: Performed by: STUDENT IN AN ORGANIZED HEALTH CARE EDUCATION/TRAINING PROGRAM

## 2025-07-20 PROCEDURE — 2000000000 HC ICU R&B

## 2025-07-20 PROCEDURE — P9047 ALBUMIN (HUMAN), 25%, 50ML: HCPCS | Performed by: SURGERY

## 2025-07-20 PROCEDURE — 84300 ASSAY OF URINE SODIUM: CPT

## 2025-07-20 PROCEDURE — 2580000003 HC RX 258

## 2025-07-20 PROCEDURE — 0D9670Z DRAINAGE OF STOMACH WITH DRAINAGE DEVICE, VIA NATURAL OR ARTIFICIAL OPENING: ICD-10-PCS | Performed by: INTERNAL MEDICINE

## 2025-07-20 RX ORDER — FENTANYL CITRATE 50 UG/ML
25 INJECTION, SOLUTION INTRAMUSCULAR; INTRAVENOUS EVERY 4 HOURS PRN
Refills: 0 | Status: DISCONTINUED | OUTPATIENT
Start: 2025-07-20 | End: 2025-07-20

## 2025-07-20 RX ORDER — SUCCINYLCHOLINE/SOD CL,ISO/PF 200MG/10ML
SYRINGE (ML) INTRAVENOUS
Status: DISCONTINUED | OUTPATIENT
Start: 2025-07-20 | End: 2025-07-20 | Stop reason: SDUPTHER

## 2025-07-20 RX ORDER — ISOSORBIDE DINITRATE 10 MG/1
20 TABLET ORAL 3 TIMES DAILY
Status: DISCONTINUED | OUTPATIENT
Start: 2025-07-20 | End: 2025-08-09 | Stop reason: HOSPADM

## 2025-07-20 RX ORDER — HEPARIN SODIUM 5000 [USP'U]/ML
5000 INJECTION, SOLUTION INTRAVENOUS; SUBCUTANEOUS EVERY 8 HOURS SCHEDULED
Status: DISCONTINUED | OUTPATIENT
Start: 2025-07-20 | End: 2025-08-09 | Stop reason: HOSPADM

## 2025-07-20 RX ORDER — SODIUM CHLORIDE 0.9 % (FLUSH) 0.9 %
5-40 SYRINGE (ML) INJECTION PRN
Status: DISCONTINUED | OUTPATIENT
Start: 2025-07-20 | End: 2025-07-20 | Stop reason: HOSPADM

## 2025-07-20 RX ORDER — ACETAMINOPHEN 325 MG/1
650 TABLET ORAL EVERY 6 HOURS PRN
Status: DISCONTINUED | OUTPATIENT
Start: 2025-07-20 | End: 2025-08-09 | Stop reason: HOSPADM

## 2025-07-20 RX ORDER — NICOTINE 21 MG/24HR
1 PATCH, TRANSDERMAL 24 HOURS TRANSDERMAL DAILY
Status: DISCONTINUED | OUTPATIENT
Start: 2025-07-20 | End: 2025-07-23

## 2025-07-20 RX ORDER — FENTANYL CITRATE 50 UG/ML
INJECTION, SOLUTION INTRAMUSCULAR; INTRAVENOUS
Status: COMPLETED
Start: 2025-07-20 | End: 2025-07-20

## 2025-07-20 RX ORDER — ENOXAPARIN SODIUM 100 MG/ML
40 INJECTION SUBCUTANEOUS DAILY
Status: DISCONTINUED | OUTPATIENT
Start: 2025-07-20 | End: 2025-07-20 | Stop reason: CLARIF

## 2025-07-20 RX ORDER — CARVEDILOL 12.5 MG/1
25 TABLET ORAL 2 TIMES DAILY
Status: DISCONTINUED | OUTPATIENT
Start: 2025-07-20 | End: 2025-07-28

## 2025-07-20 RX ORDER — SODIUM CHLORIDE 0.9 % (FLUSH) 0.9 %
5-40 SYRINGE (ML) INJECTION EVERY 12 HOURS SCHEDULED
Status: DISCONTINUED | OUTPATIENT
Start: 2025-07-20 | End: 2025-07-20 | Stop reason: HOSPADM

## 2025-07-20 RX ORDER — ONDANSETRON 2 MG/ML
INJECTION INTRAMUSCULAR; INTRAVENOUS
Status: DISCONTINUED | OUTPATIENT
Start: 2025-07-20 | End: 2025-07-20 | Stop reason: SDUPTHER

## 2025-07-20 RX ORDER — HYDROXYZINE HYDROCHLORIDE 25 MG/1
50 TABLET, FILM COATED ORAL EVERY 8 HOURS PRN
Status: DISCONTINUED | OUTPATIENT
Start: 2025-07-20 | End: 2025-08-09 | Stop reason: HOSPADM

## 2025-07-20 RX ORDER — VANCOMYCIN HYDROCHLORIDE 125 MG/1
125 CAPSULE ORAL EVERY 6 HOURS SCHEDULED
Status: DISCONTINUED | OUTPATIENT
Start: 2025-07-20 | End: 2025-07-22

## 2025-07-20 RX ORDER — FENTANYL CITRATE 50 UG/ML
INJECTION, SOLUTION INTRAMUSCULAR; INTRAVENOUS
Status: DISCONTINUED | OUTPATIENT
Start: 2025-07-20 | End: 2025-07-20 | Stop reason: SDUPTHER

## 2025-07-20 RX ORDER — SODIUM CHLORIDE 0.9 % (FLUSH) 0.9 %
5-40 SYRINGE (ML) INJECTION PRN
Status: DISCONTINUED | OUTPATIENT
Start: 2025-07-20 | End: 2025-08-09 | Stop reason: HOSPADM

## 2025-07-20 RX ORDER — SODIUM CHLORIDE 0.9 % (FLUSH) 0.9 %
5-40 SYRINGE (ML) INJECTION EVERY 12 HOURS SCHEDULED
Status: DISCONTINUED | OUTPATIENT
Start: 2025-07-20 | End: 2025-08-09 | Stop reason: HOSPADM

## 2025-07-20 RX ORDER — HYDRALAZINE HYDROCHLORIDE 50 MG/1
100 TABLET, FILM COATED ORAL 3 TIMES DAILY
Status: DISCONTINUED | OUTPATIENT
Start: 2025-07-20 | End: 2025-07-20

## 2025-07-20 RX ORDER — SODIUM CHLORIDE, SODIUM LACTATE, POTASSIUM CHLORIDE, CALCIUM CHLORIDE 600; 310; 30; 20 MG/100ML; MG/100ML; MG/100ML; MG/100ML
INJECTION, SOLUTION INTRAVENOUS
Status: DISCONTINUED | OUTPATIENT
Start: 2025-07-20 | End: 2025-07-20 | Stop reason: SDUPTHER

## 2025-07-20 RX ORDER — SODIUM CHLORIDE 9 MG/ML
INJECTION, SOLUTION INTRAVENOUS CONTINUOUS
Status: DISCONTINUED | OUTPATIENT
Start: 2025-07-20 | End: 2025-07-22

## 2025-07-20 RX ORDER — ONDANSETRON 4 MG/1
4 TABLET, ORALLY DISINTEGRATING ORAL EVERY 8 HOURS PRN
Status: DISCONTINUED | OUTPATIENT
Start: 2025-07-20 | End: 2025-08-09 | Stop reason: HOSPADM

## 2025-07-20 RX ORDER — BUPIVACAINE HYDROCHLORIDE 5 MG/ML
INJECTION, SOLUTION EPIDURAL; INTRACAUDAL PRN
Status: DISCONTINUED | OUTPATIENT
Start: 2025-07-20 | End: 2025-07-20 | Stop reason: ALTCHOICE

## 2025-07-20 RX ORDER — HYDRALAZINE HYDROCHLORIDE 20 MG/ML
INJECTION INTRAMUSCULAR; INTRAVENOUS
Status: DISCONTINUED | OUTPATIENT
Start: 2025-07-20 | End: 2025-07-20 | Stop reason: SDUPTHER

## 2025-07-20 RX ORDER — METRONIDAZOLE 500 MG/100ML
500 INJECTION, SOLUTION INTRAVENOUS EVERY 8 HOURS
Status: DISCONTINUED | OUTPATIENT
Start: 2025-07-20 | End: 2025-07-22

## 2025-07-20 RX ORDER — THIAMINE HYDROCHLORIDE 100 MG/ML
100 INJECTION, SOLUTION INTRAMUSCULAR; INTRAVENOUS DAILY
Status: DISCONTINUED | OUTPATIENT
Start: 2025-07-20 | End: 2025-08-09 | Stop reason: HOSPADM

## 2025-07-20 RX ORDER — FENTANYL CITRATE 50 UG/ML
25 INJECTION, SOLUTION INTRAMUSCULAR; INTRAVENOUS EVERY 5 MIN PRN
Refills: 0 | Status: COMPLETED | OUTPATIENT
Start: 2025-07-20 | End: 2025-07-20

## 2025-07-20 RX ORDER — HYDROMORPHONE HYDROCHLORIDE 1 MG/ML
0.5 INJECTION, SOLUTION INTRAMUSCULAR; INTRAVENOUS; SUBCUTANEOUS EVERY 5 MIN PRN
Status: DISCONTINUED | OUTPATIENT
Start: 2025-07-20 | End: 2025-07-20 | Stop reason: HOSPADM

## 2025-07-20 RX ORDER — FENTANYL CITRATE 50 UG/ML
50 INJECTION, SOLUTION INTRAMUSCULAR; INTRAVENOUS EVERY 5 MIN PRN
Status: COMPLETED | OUTPATIENT
Start: 2025-07-20 | End: 2025-07-20

## 2025-07-20 RX ORDER — PROCHLORPERAZINE EDISYLATE 5 MG/ML
5 INJECTION INTRAMUSCULAR; INTRAVENOUS
Status: DISCONTINUED | OUTPATIENT
Start: 2025-07-20 | End: 2025-07-20 | Stop reason: HOSPADM

## 2025-07-20 RX ORDER — DEXAMETHASONE SODIUM PHOSPHATE 4 MG/ML
INJECTION, SOLUTION INTRA-ARTICULAR; INTRALESIONAL; INTRAMUSCULAR; INTRAVENOUS; SOFT TISSUE
Status: DISCONTINUED | OUTPATIENT
Start: 2025-07-20 | End: 2025-07-20 | Stop reason: SDUPTHER

## 2025-07-20 RX ORDER — ETOMIDATE 2 MG/ML
INJECTION INTRAVENOUS
Status: DISCONTINUED | OUTPATIENT
Start: 2025-07-20 | End: 2025-07-20 | Stop reason: SDUPTHER

## 2025-07-20 RX ORDER — HYDRALAZINE HYDROCHLORIDE 20 MG/ML
10 INJECTION INTRAMUSCULAR; INTRAVENOUS EVERY 6 HOURS PRN
Status: DISCONTINUED | OUTPATIENT
Start: 2025-07-20 | End: 2025-08-09 | Stop reason: HOSPADM

## 2025-07-20 RX ORDER — ALBUMIN (HUMAN) 12.5 G/50ML
25 SOLUTION INTRAVENOUS EVERY 6 HOURS
Status: COMPLETED | OUTPATIENT
Start: 2025-07-20 | End: 2025-07-21

## 2025-07-20 RX ORDER — ONDANSETRON 2 MG/ML
4 INJECTION INTRAMUSCULAR; INTRAVENOUS EVERY 6 HOURS PRN
Status: DISCONTINUED | OUTPATIENT
Start: 2025-07-20 | End: 2025-08-09 | Stop reason: HOSPADM

## 2025-07-20 RX ORDER — MEPERIDINE HYDROCHLORIDE 25 MG/ML
12.5 INJECTION INTRAMUSCULAR; INTRAVENOUS; SUBCUTANEOUS EVERY 5 MIN PRN
Status: DISCONTINUED | OUTPATIENT
Start: 2025-07-20 | End: 2025-07-20 | Stop reason: HOSPADM

## 2025-07-20 RX ORDER — ERGOCALCIFEROL 1.25 MG/1
50000 CAPSULE, LIQUID FILLED ORAL WEEKLY
Status: DISCONTINUED | OUTPATIENT
Start: 2025-07-20 | End: 2025-08-09 | Stop reason: HOSPADM

## 2025-07-20 RX ORDER — LIDOCAINE HYDROCHLORIDE 20 MG/ML
INJECTION, SOLUTION EPIDURAL; INFILTRATION; INTRACAUDAL; PERINEURAL
Status: DISCONTINUED | OUTPATIENT
Start: 2025-07-20 | End: 2025-07-20 | Stop reason: SDUPTHER

## 2025-07-20 RX ORDER — CIPROFLOXACIN 2 MG/ML
400 INJECTION, SOLUTION INTRAVENOUS EVERY 12 HOURS
Status: DISCONTINUED | OUTPATIENT
Start: 2025-07-20 | End: 2025-07-21

## 2025-07-20 RX ORDER — SODIUM CHLORIDE 9 MG/ML
INJECTION, SOLUTION INTRAVENOUS PRN
Status: DISCONTINUED | OUTPATIENT
Start: 2025-07-20 | End: 2025-08-09 | Stop reason: HOSPADM

## 2025-07-20 RX ORDER — SODIUM CHLORIDE 9 MG/ML
INJECTION, SOLUTION INTRAVENOUS PRN
Status: DISCONTINUED | OUTPATIENT
Start: 2025-07-20 | End: 2025-07-20 | Stop reason: HOSPADM

## 2025-07-20 RX ORDER — FENTANYL CITRATE 50 UG/ML
25 INJECTION, SOLUTION INTRAMUSCULAR; INTRAVENOUS
Refills: 0 | Status: DISCONTINUED | OUTPATIENT
Start: 2025-07-20 | End: 2025-07-20

## 2025-07-20 RX ORDER — ROCURONIUM BROMIDE 10 MG/ML
INJECTION, SOLUTION INTRAVENOUS
Status: DISCONTINUED | OUTPATIENT
Start: 2025-07-20 | End: 2025-07-20 | Stop reason: SDUPTHER

## 2025-07-20 RX ORDER — DEXMEDETOMIDINE HYDROCHLORIDE 100 UG/ML
INJECTION, SOLUTION INTRAVENOUS
Status: DISCONTINUED | OUTPATIENT
Start: 2025-07-20 | End: 2025-07-20 | Stop reason: SDUPTHER

## 2025-07-20 RX ORDER — MAGNESIUM SULFATE HEPTAHYDRATE 40 MG/ML
INJECTION, SOLUTION INTRAVENOUS
Status: DISCONTINUED | OUTPATIENT
Start: 2025-07-20 | End: 2025-07-20 | Stop reason: SDUPTHER

## 2025-07-20 RX ORDER — ONDANSETRON 2 MG/ML
4 INJECTION INTRAMUSCULAR; INTRAVENOUS
Status: DISCONTINUED | OUTPATIENT
Start: 2025-07-20 | End: 2025-07-20 | Stop reason: HOSPADM

## 2025-07-20 RX ORDER — ACETAMINOPHEN 650 MG/1
650 SUPPOSITORY RECTAL EVERY 6 HOURS PRN
Status: DISCONTINUED | OUTPATIENT
Start: 2025-07-20 | End: 2025-08-09 | Stop reason: HOSPADM

## 2025-07-20 RX ORDER — PROPOFOL 10 MG/ML
INJECTION, EMULSION INTRAVENOUS
Status: DISCONTINUED | OUTPATIENT
Start: 2025-07-20 | End: 2025-07-20 | Stop reason: SDUPTHER

## 2025-07-20 RX ADMIN — Medication 100 MG: at 16:18

## 2025-07-20 RX ADMIN — IOHEXOL 50 ML: 240 INJECTION, SOLUTION INTRATHECAL; INTRAVASCULAR; INTRAVENOUS; ORAL at 11:10

## 2025-07-20 RX ADMIN — SODIUM CHLORIDE: 0.9 INJECTION, SOLUTION INTRAVENOUS at 18:21

## 2025-07-20 RX ADMIN — FENTANYL CITRATE 25 MCG: 50 INJECTION, SOLUTION INTRAMUSCULAR; INTRAVENOUS at 16:23

## 2025-07-20 RX ADMIN — SODIUM CHLORIDE, POTASSIUM CHLORIDE, SODIUM LACTATE AND CALCIUM CHLORIDE: 600; 310; 30; 20 INJECTION, SOLUTION INTRAVENOUS at 16:14

## 2025-07-20 RX ADMIN — CIPROFLOXACIN 400 MG: 400 INJECTION, SOLUTION INTRAVENOUS at 22:44

## 2025-07-20 RX ADMIN — Medication 1 AMPULE: at 20:42

## 2025-07-20 RX ADMIN — DEXMEDETOMIDINE 6 MCG: 100 INJECTION, SOLUTION INTRAVENOUS at 16:41

## 2025-07-20 RX ADMIN — FENTANYL CITRATE 25 MCG: 50 INJECTION, SOLUTION INTRAMUSCULAR; INTRAVENOUS at 15:11

## 2025-07-20 RX ADMIN — METRONIDAZOLE 500 MG: 500 INJECTION, SOLUTION INTRAVENOUS at 01:10

## 2025-07-20 RX ADMIN — ACETAMINOPHEN 650 MG: 325 TABLET ORAL at 04:20

## 2025-07-20 RX ADMIN — FENTANYL CITRATE 25 MCG: 50 INJECTION, SOLUTION INTRAMUSCULAR; INTRAVENOUS at 15:21

## 2025-07-20 RX ADMIN — PROPOFOL 20 MG: 10 INJECTION, EMULSION INTRAVENOUS at 16:13

## 2025-07-20 RX ADMIN — FENTANYL CITRATE 25 MCG: 50 INJECTION INTRAMUSCULAR; INTRAVENOUS at 09:14

## 2025-07-20 RX ADMIN — FENTANYL CITRATE 25 MCG: 50 INJECTION, SOLUTION INTRAMUSCULAR; INTRAVENOUS at 16:17

## 2025-07-20 RX ADMIN — DEXAMETHASONE SODIUM PHOSPHATE 4 MG: 4 INJECTION, SOLUTION INTRAMUSCULAR; INTRAVENOUS at 16:36

## 2025-07-20 RX ADMIN — LIDOCAINE HYDROCHLORIDE 80 MG: 20 INJECTION, SOLUTION EPIDURAL; INFILTRATION; INTRACAUDAL; PERINEURAL at 16:17

## 2025-07-20 RX ADMIN — PROPOFOL 20 MG: 10 INJECTION, EMULSION INTRAVENOUS at 16:11

## 2025-07-20 RX ADMIN — DEXMEDETOMIDINE 4 MCG: 100 INJECTION, SOLUTION INTRAVENOUS at 16:44

## 2025-07-20 RX ADMIN — FENTANYL CITRATE 25 MCG: 50 INJECTION, SOLUTION INTRAMUSCULAR; INTRAVENOUS at 15:36

## 2025-07-20 RX ADMIN — FENTANYL CITRATE 25 MCG: 50 INJECTION, SOLUTION INTRAMUSCULAR; INTRAVENOUS at 15:31

## 2025-07-20 RX ADMIN — FENTANYL CITRATE 50 MCG: 50 INJECTION, SOLUTION INTRAMUSCULAR; INTRAVENOUS at 17:36

## 2025-07-20 RX ADMIN — SODIUM CHLORIDE: 0.9 INJECTION, SOLUTION INTRAVENOUS at 08:40

## 2025-07-20 RX ADMIN — SUGAMMADEX 200 MG: 100 INJECTION, SOLUTION INTRAVENOUS at 17:30

## 2025-07-20 RX ADMIN — SODIUM CHLORIDE, PRESERVATIVE FREE 10 ML: 5 INJECTION INTRAVENOUS at 10:30

## 2025-07-20 RX ADMIN — SODIUM CHLORIDE: 0.9 INJECTION, SOLUTION INTRAVENOUS at 02:06

## 2025-07-20 RX ADMIN — MAGNESIUM SULFATE IN WATER 2000 MG: 40 INJECTION, SOLUTION INTRAVENOUS at 16:34

## 2025-07-20 RX ADMIN — HYDROMORPHONE HYDROCHLORIDE 0.5 MG: 1 INJECTION, SOLUTION INTRAMUSCULAR; INTRAVENOUS; SUBCUTANEOUS at 20:40

## 2025-07-20 RX ADMIN — FENTANYL CITRATE 50 MCG: 50 INJECTION, SOLUTION INTRAMUSCULAR; INTRAVENOUS at 18:07

## 2025-07-20 RX ADMIN — DEXMEDETOMIDINE 10 MCG: 100 INJECTION, SOLUTION INTRAVENOUS at 17:56

## 2025-07-20 RX ADMIN — HEPARIN SODIUM 5000 UNITS: 5000 INJECTION INTRAVENOUS; SUBCUTANEOUS at 10:30

## 2025-07-20 RX ADMIN — SODIUM CHLORIDE, PRESERVATIVE FREE 10 ML: 5 INJECTION INTRAVENOUS at 20:39

## 2025-07-20 RX ADMIN — ROCURONIUM BROMIDE 35 MG: 10 INJECTION INTRAVENOUS at 16:21

## 2025-07-20 RX ADMIN — FENTANYL CITRATE 50 MCG: 50 INJECTION, SOLUTION INTRAMUSCULAR; INTRAVENOUS at 16:38

## 2025-07-20 RX ADMIN — ALBUMIN (HUMAN) 25 G: 0.25 INJECTION, SOLUTION INTRAVENOUS at 12:03

## 2025-07-20 RX ADMIN — ROCURONIUM BROMIDE 5 MG: 10 INJECTION INTRAVENOUS at 16:17

## 2025-07-20 RX ADMIN — ROCURONIUM BROMIDE 10 MG: 10 INJECTION INTRAVENOUS at 16:30

## 2025-07-20 RX ADMIN — FENTANYL CITRATE 50 MCG: 50 INJECTION, SOLUTION INTRAMUSCULAR; INTRAVENOUS at 18:22

## 2025-07-20 RX ADMIN — ETOMIDATE 50 MG: 40 INJECTION, SOLUTION INTRAVENOUS at 16:17

## 2025-07-20 RX ADMIN — FENTANYL CITRATE 25 MCG: 50 INJECTION INTRAMUSCULAR; INTRAVENOUS at 18:59

## 2025-07-20 RX ADMIN — ALBUMIN (HUMAN) 25 G: 0.25 INJECTION, SOLUTION INTRAVENOUS at 20:35

## 2025-07-20 RX ADMIN — FENTANYL CITRATE 25 MCG: 50 INJECTION, SOLUTION INTRAMUSCULAR; INTRAVENOUS at 15:41

## 2025-07-20 RX ADMIN — FENTANYL CITRATE 25 MCG: 50 INJECTION, SOLUTION INTRAMUSCULAR; INTRAVENOUS at 15:16

## 2025-07-20 RX ADMIN — FENTANYL CITRATE 25 MCG: 50 INJECTION, SOLUTION INTRAMUSCULAR; INTRAVENOUS at 15:26

## 2025-07-20 RX ADMIN — METRONIDAZOLE 500 MG: 500 INJECTION, SOLUTION INTRAVENOUS at 10:58

## 2025-07-20 RX ADMIN — FENTANYL CITRATE 25 MCG: 50 INJECTION INTRAMUSCULAR; INTRAVENOUS at 13:33

## 2025-07-20 RX ADMIN — HYDROMORPHONE HYDROCHLORIDE 0.5 MG: 1 INJECTION, SOLUTION INTRAMUSCULAR; INTRAVENOUS; SUBCUTANEOUS at 15:12

## 2025-07-20 RX ADMIN — FENTANYL CITRATE 25 MCG: 50 INJECTION INTRAMUSCULAR; INTRAVENOUS at 11:27

## 2025-07-20 RX ADMIN — FENTANYL CITRATE 25 MCG: 50 INJECTION, SOLUTION INTRAMUSCULAR; INTRAVENOUS at 15:06

## 2025-07-20 RX ADMIN — FENTANYL CITRATE 50 MCG: 50 INJECTION, SOLUTION INTRAMUSCULAR; INTRAVENOUS at 17:51

## 2025-07-20 RX ADMIN — FENTANYL CITRATE 50 MCG: 50 INJECTION, SOLUTION INTRAMUSCULAR; INTRAVENOUS at 18:17

## 2025-07-20 RX ADMIN — THIAMINE HYDROCHLORIDE 100 MG: 100 INJECTION, SOLUTION INTRAMUSCULAR; INTRAVENOUS at 20:29

## 2025-07-20 RX ADMIN — FENTANYL CITRATE 50 MCG: 50 INJECTION, SOLUTION INTRAMUSCULAR; INTRAVENOUS at 18:12

## 2025-07-20 RX ADMIN — ONDANSETRON 4 MG: 2 INJECTION, SOLUTION INTRAMUSCULAR; INTRAVENOUS at 17:18

## 2025-07-20 RX ADMIN — DEXMEDETOMIDINE 10 MCG: 100 INJECTION, SOLUTION INTRAVENOUS at 17:54

## 2025-07-20 RX ADMIN — HYDRALAZINE HYDROCHLORIDE 8 MG: 20 INJECTION INTRAMUSCULAR; INTRAVENOUS at 18:05

## 2025-07-20 RX ADMIN — METRONIDAZOLE 500 MG: 500 INJECTION, SOLUTION INTRAVENOUS at 16:28

## 2025-07-20 RX ADMIN — FENTANYL CITRATE 50 MCG: 50 INJECTION, SOLUTION INTRAMUSCULAR; INTRAVENOUS at 16:09

## 2025-07-20 ASSESSMENT — PAIN SCALES - GENERAL
PAINLEVEL_OUTOF10: 9
PAINLEVEL_OUTOF10: 10
PAINLEVEL_OUTOF10: 6
PAINLEVEL_OUTOF10: 10
PAINLEVEL_OUTOF10: 8
PAINLEVEL_OUTOF10: 10
PAINLEVEL_OUTOF10: 9
PAINLEVEL_OUTOF10: 7
PAINLEVEL_OUTOF10: 7
PAINLEVEL_OUTOF10: 10
PAINLEVEL_OUTOF10: 6
PAINLEVEL_OUTOF10: 8
PAINLEVEL_OUTOF10: 5
PAINLEVEL_OUTOF10: 10
PAINLEVEL_OUTOF10: 10

## 2025-07-20 ASSESSMENT — PAIN DESCRIPTION - DESCRIPTORS
DESCRIPTORS: ACHING
DESCRIPTORS: ACHING
DESCRIPTORS: ACHING;DISCOMFORT
DESCRIPTORS: ACHING;SHARP
DESCRIPTORS: ACHING
DESCRIPTORS: ACHING;SORE;SHARP;STABBING
DESCRIPTORS: ACHING
DESCRIPTORS: SHARP;ACHING
DESCRIPTORS: SORE;SHARP
DESCRIPTORS: STABBING

## 2025-07-20 ASSESSMENT — PAIN DESCRIPTION - ORIENTATION
ORIENTATION: ANTERIOR;MID
ORIENTATION: ANTERIOR
ORIENTATION: MID;ANTERIOR
ORIENTATION: RIGHT;LEFT
ORIENTATION: RIGHT;LEFT
ORIENTATION: OTHER (COMMENT)
ORIENTATION: OTHER (COMMENT)
ORIENTATION: MID

## 2025-07-20 ASSESSMENT — PAIN DESCRIPTION - LOCATION
LOCATION: GENERALIZED
LOCATION: ABDOMEN
LOCATION: GENERALIZED
LOCATION: ABDOMEN
LOCATION: GENERALIZED
LOCATION: GENERALIZED

## 2025-07-20 ASSESSMENT — PAIN DESCRIPTION - PAIN TYPE
TYPE: SURGICAL PAIN

## 2025-07-20 ASSESSMENT — PAIN SCALES - WONG BAKER: WONGBAKER_NUMERICALRESPONSE: HURTS A LITTLE BIT

## 2025-07-20 ASSESSMENT — PAIN - FUNCTIONAL ASSESSMENT
PAIN_FUNCTIONAL_ASSESSMENT: PREVENTS OR INTERFERES WITH MANY ACTIVE NOT PASSIVE ACTIVITIES
PAIN_FUNCTIONAL_ASSESSMENT: ACTIVITIES ARE NOT PREVENTED
PAIN_FUNCTIONAL_ASSESSMENT: ACTIVITIES ARE NOT PREVENTED

## 2025-07-20 NOTE — ANESTHESIA PRE PROCEDURE
Vascular:          Other Findings:         Anesthesia Plan      general     ASA 4 - emergent       Induction: rapid sequence.    MIPS: Postoperative opioids intended and Prophylactic antiemetics administered.  Anesthetic plan and risks discussed with patient.                    Gyu Unger MD   7/20/2025             unknown

## 2025-07-20 NOTE — PROGRESS NOTES
NGT is till clamped, she get meds per NGT. She has no nausea, fever or chills, but complaining generalize body aches.

## 2025-07-21 LAB
ALBUMIN SERPL-MCNC: 3 G/DL (ref 3.5–5)
ALBUMIN/GLOB SERPL: 1 (ref 1.1–2.2)
ALP SERPL-CCNC: 55 U/L (ref 45–117)
ALT SERPL-CCNC: 12 U/L (ref 12–78)
ANION GAP SERPL CALC-SCNC: 14 MMOL/L (ref 2–12)
AST SERPL-CCNC: 23 U/L (ref 15–37)
BILIRUB SERPL-MCNC: 0.6 MG/DL (ref 0.2–1)
BUN SERPL-MCNC: 72 MG/DL (ref 6–20)
BUN/CREAT SERPL: 26 (ref 12–20)
CALCIUM SERPL-MCNC: 8.2 MG/DL (ref 8.5–10.1)
CHLORIDE SERPL-SCNC: 106 MMOL/L (ref 97–108)
CO2 SERPL-SCNC: 17 MMOL/L (ref 21–32)
CREAT SERPL-MCNC: 2.77 MG/DL (ref 0.55–1.02)
ERYTHROCYTE [DISTWIDTH] IN BLOOD BY AUTOMATED COUNT: 13.6 % (ref 11.5–14.5)
GLOBULIN SER CALC-MCNC: 2.9 G/DL (ref 2–4)
GLUCOSE BLD STRIP.AUTO-MCNC: 139 MG/DL (ref 65–117)
GLUCOSE BLD STRIP.AUTO-MCNC: 176 MG/DL (ref 65–117)
GLUCOSE BLD STRIP.AUTO-MCNC: 183 MG/DL (ref 65–117)
GLUCOSE SERPL-MCNC: 177 MG/DL (ref 65–100)
HCT VFR BLD AUTO: 28.9 % (ref 35–47)
HGB BLD-MCNC: 9.2 G/DL (ref 11.5–16)
MAGNESIUM SERPL-MCNC: 2.6 MG/DL (ref 1.6–2.4)
MCH RBC QN AUTO: 29.6 PG (ref 26–34)
MCHC RBC AUTO-ENTMCNC: 31.8 G/DL (ref 30–36.5)
MCV RBC AUTO: 92.9 FL (ref 80–99)
NRBC # BLD: 0 K/UL (ref 0–0.01)
NRBC BLD-RTO: 0 PER 100 WBC
PHOSPHATE SERPL-MCNC: 4.7 MG/DL (ref 2.6–4.7)
PLATELET # BLD AUTO: 252 K/UL (ref 150–400)
PMV BLD AUTO: 10.4 FL (ref 8.9–12.9)
POTASSIUM SERPL-SCNC: 3.9 MMOL/L (ref 3.5–5.1)
PROT SERPL-MCNC: 5.9 G/DL (ref 6.4–8.2)
RBC # BLD AUTO: 3.11 M/UL (ref 3.8–5.2)
SERVICE CMNT-IMP: ABNORMAL
SODIUM SERPL-SCNC: 137 MMOL/L (ref 136–145)
WBC # BLD AUTO: 18.1 K/UL (ref 3.6–11)

## 2025-07-21 PROCEDURE — 83735 ASSAY OF MAGNESIUM: CPT

## 2025-07-21 PROCEDURE — 6370000000 HC RX 637 (ALT 250 FOR IP): Performed by: INTERNAL MEDICINE

## 2025-07-21 PROCEDURE — 2700000000 HC OXYGEN THERAPY PER DAY

## 2025-07-21 PROCEDURE — 6360000002 HC RX W HCPCS: Performed by: SURGERY

## 2025-07-21 PROCEDURE — 6370000000 HC RX 637 (ALT 250 FOR IP): Performed by: SURGERY

## 2025-07-21 PROCEDURE — 2500000003 HC RX 250 WO HCPCS: Performed by: INTERNAL MEDICINE

## 2025-07-21 PROCEDURE — 84100 ASSAY OF PHOSPHORUS: CPT

## 2025-07-21 PROCEDURE — 1100000003 HC PRIVATE W/ TELEMETRY

## 2025-07-21 PROCEDURE — 36415 COLL VENOUS BLD VENIPUNCTURE: CPT

## 2025-07-21 PROCEDURE — 6360000002 HC RX W HCPCS: Performed by: NURSE PRACTITIONER

## 2025-07-21 PROCEDURE — 2580000003 HC RX 258: Performed by: SURGERY

## 2025-07-21 PROCEDURE — 82962 GLUCOSE BLOOD TEST: CPT

## 2025-07-21 PROCEDURE — 97116 GAIT TRAINING THERAPY: CPT

## 2025-07-21 PROCEDURE — 6370000000 HC RX 637 (ALT 250 FOR IP): Performed by: STUDENT IN AN ORGANIZED HEALTH CARE EDUCATION/TRAINING PROGRAM

## 2025-07-21 PROCEDURE — 2500000003 HC RX 250 WO HCPCS: Performed by: SURGERY

## 2025-07-21 PROCEDURE — 97535 SELF CARE MNGMENT TRAINING: CPT

## 2025-07-21 PROCEDURE — 80053 COMPREHEN METABOLIC PANEL: CPT

## 2025-07-21 PROCEDURE — 97161 PT EVAL LOW COMPLEX 20 MIN: CPT

## 2025-07-21 PROCEDURE — 85027 COMPLETE CBC AUTOMATED: CPT

## 2025-07-21 PROCEDURE — 97165 OT EVAL LOW COMPLEX 30 MIN: CPT

## 2025-07-21 PROCEDURE — 6360000002 HC RX W HCPCS: Performed by: STUDENT IN AN ORGANIZED HEALTH CARE EDUCATION/TRAINING PROGRAM

## 2025-07-21 PROCEDURE — P9047 ALBUMIN (HUMAN), 25%, 50ML: HCPCS | Performed by: SURGERY

## 2025-07-21 RX ORDER — CASTOR OIL AND BALSAM, PERU 788; 87 MG/G; MG/G
OINTMENT TOPICAL 2 TIMES DAILY
Status: DISCONTINUED | OUTPATIENT
Start: 2025-07-21 | End: 2025-08-09 | Stop reason: HOSPADM

## 2025-07-21 RX ORDER — POLYETHYLENE GLYCOL 3350 17 G/17G
17 POWDER, FOR SOLUTION ORAL DAILY
Status: DISCONTINUED | OUTPATIENT
Start: 2025-07-21 | End: 2025-07-22

## 2025-07-21 RX ORDER — CIPROFLOXACIN 2 MG/ML
400 INJECTION, SOLUTION INTRAVENOUS EVERY 24 HOURS
Status: DISCONTINUED | OUTPATIENT
Start: 2025-07-22 | End: 2025-07-28

## 2025-07-21 RX ORDER — HYDROMORPHONE HYDROCHLORIDE 1 MG/ML
1 INJECTION, SOLUTION INTRAMUSCULAR; INTRAVENOUS; SUBCUTANEOUS
Status: DISCONTINUED | OUTPATIENT
Start: 2025-07-21 | End: 2025-08-09

## 2025-07-21 RX ORDER — BISACODYL 10 MG
10 SUPPOSITORY, RECTAL RECTAL DAILY PRN
Status: DISCONTINUED | OUTPATIENT
Start: 2025-07-21 | End: 2025-08-09 | Stop reason: HOSPADM

## 2025-07-21 RX ORDER — INDOMETHACIN 25 MG/1
100 CAPSULE ORAL ONCE
Status: COMPLETED | OUTPATIENT
Start: 2025-07-21 | End: 2025-07-21

## 2025-07-21 RX ORDER — SENNOSIDES 8.6 MG/1
2 TABLET ORAL NIGHTLY
Status: DISCONTINUED | OUTPATIENT
Start: 2025-07-21 | End: 2025-07-22

## 2025-07-21 RX ORDER — ATORVASTATIN CALCIUM 40 MG/1
40 TABLET, FILM COATED ORAL NIGHTLY
Status: DISCONTINUED | OUTPATIENT
Start: 2025-07-21 | End: 2025-08-09 | Stop reason: HOSPADM

## 2025-07-21 RX ADMIN — Medication: at 09:12

## 2025-07-21 RX ADMIN — HYDROMORPHONE HYDROCHLORIDE 1 MG: 1 INJECTION, SOLUTION INTRAMUSCULAR; INTRAVENOUS; SUBCUTANEOUS at 13:17

## 2025-07-21 RX ADMIN — SODIUM CHLORIDE, PRESERVATIVE FREE 10 ML: 5 INJECTION INTRAVENOUS at 09:11

## 2025-07-21 RX ADMIN — Medication: at 01:30

## 2025-07-21 RX ADMIN — ATORVASTATIN CALCIUM 40 MG: 40 TABLET, FILM COATED ORAL at 22:15

## 2025-07-21 RX ADMIN — HYDROMORPHONE HYDROCHLORIDE 0.5 MG: 1 INJECTION, SOLUTION INTRAMUSCULAR; INTRAVENOUS; SUBCUTANEOUS at 09:02

## 2025-07-21 RX ADMIN — SENNOSIDES 17.2 MG: 8.6 TABLET, FILM COATED ORAL at 22:14

## 2025-07-21 RX ADMIN — HYDROMORPHONE HYDROCHLORIDE 0.5 MG: 1 INJECTION, SOLUTION INTRAMUSCULAR; INTRAVENOUS; SUBCUTANEOUS at 00:55

## 2025-07-21 RX ADMIN — SODIUM CHLORIDE: 0.9 INJECTION, SOLUTION INTRAVENOUS at 13:23

## 2025-07-21 RX ADMIN — SODIUM BICARBONATE 100 MEQ: 84 INJECTION, SOLUTION INTRAVENOUS at 10:25

## 2025-07-21 RX ADMIN — HYDROMORPHONE HYDROCHLORIDE 0.5 MG: 1 INJECTION, SOLUTION INTRAMUSCULAR; INTRAVENOUS; SUBCUTANEOUS at 04:49

## 2025-07-21 RX ADMIN — Medication: at 22:16

## 2025-07-21 RX ADMIN — METRONIDAZOLE 500 MG: 500 INJECTION, SOLUTION INTRAVENOUS at 10:39

## 2025-07-21 RX ADMIN — THIAMINE HYDROCHLORIDE 100 MG: 100 INJECTION, SOLUTION INTRAMUSCULAR; INTRAVENOUS at 10:35

## 2025-07-21 RX ADMIN — METRONIDAZOLE 500 MG: 500 INJECTION, SOLUTION INTRAVENOUS at 19:11

## 2025-07-21 RX ADMIN — SODIUM CHLORIDE 5 ML/HR: 0.9 INJECTION, SOLUTION INTRAVENOUS at 10:35

## 2025-07-21 RX ADMIN — ISOSORBIDE DINITRATE 20 MG: 10 TABLET ORAL at 13:16

## 2025-07-21 RX ADMIN — ALBUMIN (HUMAN) 25 G: 0.25 INJECTION, SOLUTION INTRAVENOUS at 00:10

## 2025-07-21 RX ADMIN — VANCOMYCIN HYDROCHLORIDE 125 MG: 125 CAPSULE ORAL at 19:04

## 2025-07-21 RX ADMIN — CIPROFLOXACIN 400 MG: 400 INJECTION, SOLUTION INTRAVENOUS at 10:24

## 2025-07-21 RX ADMIN — ISOSORBIDE DINITRATE 20 MG: 10 TABLET ORAL at 19:04

## 2025-07-21 RX ADMIN — HYDROMORPHONE HYDROCHLORIDE 0.5 MG: 1 INJECTION, SOLUTION INTRAMUSCULAR; INTRAVENOUS; SUBCUTANEOUS at 00:03

## 2025-07-21 RX ADMIN — MELATONIN 3 MG: at 22:15

## 2025-07-21 RX ADMIN — Medication 1 AMPULE: at 09:10

## 2025-07-21 RX ADMIN — VANCOMYCIN HYDROCHLORIDE 125 MG: 125 CAPSULE ORAL at 13:16

## 2025-07-21 RX ADMIN — CARVEDILOL 25 MG: 12.5 TABLET, FILM COATED ORAL at 22:15

## 2025-07-21 RX ADMIN — METRONIDAZOLE 500 MG: 500 INJECTION, SOLUTION INTRAVENOUS at 03:28

## 2025-07-21 ASSESSMENT — PAIN DESCRIPTION - ORIENTATION
ORIENTATION: MID
ORIENTATION: MID
ORIENTATION: ANTERIOR
ORIENTATION: MID
ORIENTATION: MID
ORIENTATION: RIGHT

## 2025-07-21 ASSESSMENT — PAIN DESCRIPTION - DESCRIPTORS
DESCRIPTORS: DISCOMFORT
DESCRIPTORS: ACHING;DISCOMFORT
DESCRIPTORS: ACHING
DESCRIPTORS: ACHING
DESCRIPTORS: ACHING;DISCOMFORT
DESCRIPTORS: ACHING;DISCOMFORT

## 2025-07-21 ASSESSMENT — PAIN SCALES - GENERAL
PAINLEVEL_OUTOF10: 7
PAINLEVEL_OUTOF10: 9
PAINLEVEL_OUTOF10: 6
PAINLEVEL_OUTOF10: 3
PAINLEVEL_OUTOF10: 0
PAINLEVEL_OUTOF10: 8
PAINLEVEL_OUTOF10: 7
PAINLEVEL_OUTOF10: 10
PAINLEVEL_OUTOF10: 7
PAINLEVEL_OUTOF10: 10

## 2025-07-21 ASSESSMENT — PAIN - FUNCTIONAL ASSESSMENT
PAIN_FUNCTIONAL_ASSESSMENT: PREVENTS OR INTERFERES SOME ACTIVE ACTIVITIES AND ADLS
PAIN_FUNCTIONAL_ASSESSMENT: PREVENTS OR INTERFERES SOME ACTIVE ACTIVITIES AND ADLS
PAIN_FUNCTIONAL_ASSESSMENT: PREVENTS OR INTERFERES WITH MANY ACTIVE NOT PASSIVE ACTIVITIES
PAIN_FUNCTIONAL_ASSESSMENT: PREVENTS OR INTERFERES WITH MANY ACTIVE NOT PASSIVE ACTIVITIES

## 2025-07-21 ASSESSMENT — PAIN DESCRIPTION - LOCATION
LOCATION: GENERALIZED
LOCATION: ABDOMEN
LOCATION: GENERALIZED
LOCATION: ABDOMEN
LOCATION: GENERALIZED
LOCATION: ABDOMEN

## 2025-07-22 LAB
ALBUMIN SERPL-MCNC: 2.7 G/DL (ref 3.5–5)
ALBUMIN/GLOB SERPL: 0.9 (ref 1.1–2.2)
ALP SERPL-CCNC: 58 U/L (ref 45–117)
ALT SERPL-CCNC: 18 U/L (ref 12–78)
ANION GAP SERPL CALC-SCNC: 11 MMOL/L (ref 2–12)
AST SERPL-CCNC: 39 U/L (ref 15–37)
BILIRUB SERPL-MCNC: 0.6 MG/DL (ref 0.2–1)
BUN SERPL-MCNC: 75 MG/DL (ref 6–20)
BUN/CREAT SERPL: 27 (ref 12–20)
CALCIUM SERPL-MCNC: 8.6 MG/DL (ref 8.5–10.1)
CHLORIDE SERPL-SCNC: 111 MMOL/L (ref 97–108)
CO2 SERPL-SCNC: 26 MMOL/L (ref 21–32)
CREAT SERPL-MCNC: 2.74 MG/DL (ref 0.55–1.02)
ERYTHROCYTE [DISTWIDTH] IN BLOOD BY AUTOMATED COUNT: 13.3 % (ref 11.5–14.5)
GLOBULIN SER CALC-MCNC: 3 G/DL (ref 2–4)
GLUCOSE BLD STRIP.AUTO-MCNC: 169 MG/DL (ref 65–117)
GLUCOSE SERPL-MCNC: 170 MG/DL (ref 65–100)
HCT VFR BLD AUTO: 29.6 % (ref 35–47)
HGB BLD-MCNC: 9.6 G/DL (ref 11.5–16)
MAGNESIUM SERPL-MCNC: 2.5 MG/DL (ref 1.6–2.4)
MCH RBC QN AUTO: 29.4 PG (ref 26–34)
MCHC RBC AUTO-ENTMCNC: 32.4 G/DL (ref 30–36.5)
MCV RBC AUTO: 90.5 FL (ref 80–99)
NRBC # BLD: 0 K/UL (ref 0–0.01)
NRBC BLD-RTO: 0 PER 100 WBC
PHOSPHATE SERPL-MCNC: 3.5 MG/DL (ref 2.6–4.7)
PLATELET # BLD AUTO: 295 K/UL (ref 150–400)
PMV BLD AUTO: 10.3 FL (ref 8.9–12.9)
POTASSIUM SERPL-SCNC: 3 MMOL/L (ref 3.5–5.1)
PROT SERPL-MCNC: 5.7 G/DL (ref 6.4–8.2)
RBC # BLD AUTO: 3.27 M/UL (ref 3.8–5.2)
SERVICE CMNT-IMP: ABNORMAL
SODIUM SERPL-SCNC: 148 MMOL/L (ref 136–145)
WBC # BLD AUTO: 16.8 K/UL (ref 3.6–11)

## 2025-07-22 PROCEDURE — 2500000003 HC RX 250 WO HCPCS: Performed by: SURGERY

## 2025-07-22 PROCEDURE — 85027 COMPLETE CBC AUTOMATED: CPT

## 2025-07-22 PROCEDURE — 80053 COMPREHEN METABOLIC PANEL: CPT

## 2025-07-22 PROCEDURE — 6370000000 HC RX 637 (ALT 250 FOR IP): Performed by: STUDENT IN AN ORGANIZED HEALTH CARE EDUCATION/TRAINING PROGRAM

## 2025-07-22 PROCEDURE — 6360000002 HC RX W HCPCS: Performed by: SURGERY

## 2025-07-22 PROCEDURE — 94760 N-INVAS EAR/PLS OXIMETRY 1: CPT

## 2025-07-22 PROCEDURE — 2580000003 HC RX 258: Performed by: SURGERY

## 2025-07-22 PROCEDURE — 6370000000 HC RX 637 (ALT 250 FOR IP): Performed by: SURGERY

## 2025-07-22 PROCEDURE — 6360000002 HC RX W HCPCS: Performed by: NURSE PRACTITIONER

## 2025-07-22 PROCEDURE — 6360000002 HC RX W HCPCS: Performed by: INTERNAL MEDICINE

## 2025-07-22 PROCEDURE — 83735 ASSAY OF MAGNESIUM: CPT

## 2025-07-22 PROCEDURE — 1100000003 HC PRIVATE W/ TELEMETRY

## 2025-07-22 PROCEDURE — 82962 GLUCOSE BLOOD TEST: CPT

## 2025-07-22 PROCEDURE — 6360000002 HC RX W HCPCS: Performed by: STUDENT IN AN ORGANIZED HEALTH CARE EDUCATION/TRAINING PROGRAM

## 2025-07-22 PROCEDURE — 84100 ASSAY OF PHOSPHORUS: CPT

## 2025-07-22 PROCEDURE — 6370000000 HC RX 637 (ALT 250 FOR IP): Performed by: INTERNAL MEDICINE

## 2025-07-22 PROCEDURE — 36415 COLL VENOUS BLD VENIPUNCTURE: CPT

## 2025-07-22 PROCEDURE — 2700000000 HC OXYGEN THERAPY PER DAY

## 2025-07-22 RX ORDER — METRONIDAZOLE 500 MG/100ML
500 INJECTION, SOLUTION INTRAVENOUS EVERY 8 HOURS
Status: DISCONTINUED | OUTPATIENT
Start: 2025-07-22 | End: 2025-07-28

## 2025-07-22 RX ORDER — POTASSIUM CHLORIDE 1500 MG/1
40 TABLET, EXTENDED RELEASE ORAL EVERY 4 HOURS
Status: DISPENSED | OUTPATIENT
Start: 2025-07-22 | End: 2025-07-22

## 2025-07-22 RX ORDER — ALVIMOPAN 12 MG/1
12 CAPSULE ORAL 2 TIMES DAILY
Status: DISCONTINUED | OUTPATIENT
Start: 2025-07-22 | End: 2025-07-22

## 2025-07-22 RX ORDER — SODIUM CHLORIDE AND POTASSIUM CHLORIDE 150; 450 MG/100ML; MG/100ML
INJECTION, SOLUTION INTRAVENOUS CONTINUOUS
Status: DISCONTINUED | OUTPATIENT
Start: 2025-07-22 | End: 2025-07-23

## 2025-07-22 RX ADMIN — HYDROMORPHONE HYDROCHLORIDE 1 MG: 1 INJECTION, SOLUTION INTRAMUSCULAR; INTRAVENOUS; SUBCUTANEOUS at 17:22

## 2025-07-22 RX ADMIN — ISOSORBIDE DINITRATE 20 MG: 10 TABLET ORAL at 18:49

## 2025-07-22 RX ADMIN — POTASSIUM CHLORIDE 10 MEQ: 1500 TABLET, EXTENDED RELEASE ORAL at 11:40

## 2025-07-22 RX ADMIN — Medication: at 08:53

## 2025-07-22 RX ADMIN — HEPARIN SODIUM 5000 UNITS: 5000 INJECTION INTRAVENOUS; SUBCUTANEOUS at 14:54

## 2025-07-22 RX ADMIN — ATORVASTATIN CALCIUM 40 MG: 40 TABLET, FILM COATED ORAL at 22:10

## 2025-07-22 RX ADMIN — POTASSIUM CHLORIDE AND SODIUM CHLORIDE: 450; 150 INJECTION, SOLUTION INTRAVENOUS at 08:51

## 2025-07-22 RX ADMIN — VANCOMYCIN HYDROCHLORIDE 125 MG: 125 CAPSULE ORAL at 06:53

## 2025-07-22 RX ADMIN — MELATONIN 3 MG: at 22:10

## 2025-07-22 RX ADMIN — SODIUM CHLORIDE, PRESERVATIVE FREE 10 ML: 5 INJECTION INTRAVENOUS at 01:42

## 2025-07-22 RX ADMIN — SODIUM CHLORIDE, PRESERVATIVE FREE 10 ML: 5 INJECTION INTRAVENOUS at 09:01

## 2025-07-22 RX ADMIN — SODIUM CHLORIDE, PRESERVATIVE FREE 10 ML: 5 INJECTION INTRAVENOUS at 22:11

## 2025-07-22 RX ADMIN — SODIUM CHLORIDE: 0.9 INJECTION, SOLUTION INTRAVENOUS at 05:40

## 2025-07-22 RX ADMIN — CIPROFLOXACIN 400 MG: 400 INJECTION, SOLUTION INTRAVENOUS at 11:33

## 2025-07-22 RX ADMIN — POTASSIUM CHLORIDE AND SODIUM CHLORIDE: 450; 150 INJECTION, SOLUTION INTRAVENOUS at 17:27

## 2025-07-22 RX ADMIN — HYDROMORPHONE HYDROCHLORIDE 1 MG: 1 INJECTION, SOLUTION INTRAMUSCULAR; INTRAVENOUS; SUBCUTANEOUS at 11:50

## 2025-07-22 RX ADMIN — Medication 1 AMPULE: at 01:41

## 2025-07-22 RX ADMIN — HYDROMORPHONE HYDROCHLORIDE 1 MG: 1 INJECTION, SOLUTION INTRAMUSCULAR; INTRAVENOUS; SUBCUTANEOUS at 22:28

## 2025-07-22 RX ADMIN — METRONIDAZOLE 500 MG: 500 INJECTION, SOLUTION INTRAVENOUS at 17:29

## 2025-07-22 RX ADMIN — ISOSORBIDE DINITRATE 20 MG: 10 TABLET ORAL at 14:53

## 2025-07-22 RX ADMIN — CARVEDILOL 25 MG: 12.5 TABLET, FILM COATED ORAL at 22:10

## 2025-07-22 RX ADMIN — METRONIDAZOLE 500 MG: 500 INJECTION, SOLUTION INTRAVENOUS at 03:54

## 2025-07-22 RX ADMIN — METRONIDAZOLE 500 MG: 500 INJECTION, SOLUTION INTRAVENOUS at 09:08

## 2025-07-22 RX ADMIN — Medication: at 22:20

## 2025-07-22 RX ADMIN — THIAMINE HYDROCHLORIDE 100 MG: 100 INJECTION, SOLUTION INTRAMUSCULAR; INTRAVENOUS at 09:00

## 2025-07-22 RX ADMIN — HEPARIN SODIUM 5000 UNITS: 5000 INJECTION INTRAVENOUS; SUBCUTANEOUS at 22:10

## 2025-07-22 RX ADMIN — VANCOMYCIN HYDROCHLORIDE 125 MG: 125 CAPSULE ORAL at 01:42

## 2025-07-22 ASSESSMENT — PAIN SCALES - GENERAL
PAINLEVEL_OUTOF10: 0
PAINLEVEL_OUTOF10: 9
PAINLEVEL_OUTOF10: 6
PAINLEVEL_OUTOF10: 6
PAINLEVEL_OUTOF10: 9
PAINLEVEL_OUTOF10: 4
PAINLEVEL_OUTOF10: 6

## 2025-07-22 ASSESSMENT — PAIN DESCRIPTION - ORIENTATION
ORIENTATION: RIGHT;LEFT;UPPER;LOWER
ORIENTATION: RIGHT;LOWER
ORIENTATION: POSTERIOR
ORIENTATION: RIGHT;LOWER

## 2025-07-22 ASSESSMENT — PAIN DESCRIPTION - LOCATION
LOCATION: ABDOMEN
LOCATION: ABDOMEN;BACK
LOCATION: ABDOMEN;BACK
LOCATION: ABDOMEN

## 2025-07-22 ASSESSMENT — PAIN - FUNCTIONAL ASSESSMENT
PAIN_FUNCTIONAL_ASSESSMENT: ACTIVITIES ARE NOT PREVENTED
PAIN_FUNCTIONAL_ASSESSMENT: PREVENTS OR INTERFERES SOME ACTIVE ACTIVITIES AND ADLS
PAIN_FUNCTIONAL_ASSESSMENT: PREVENTS OR INTERFERES SOME ACTIVE ACTIVITIES AND ADLS

## 2025-07-22 ASSESSMENT — PAIN DESCRIPTION - DESCRIPTORS
DESCRIPTORS: STABBING
DESCRIPTORS: SHARP;ACHING
DESCRIPTORS: STABBING
DESCRIPTORS: SHARP

## 2025-07-22 NOTE — ANESTHESIA POSTPROCEDURE EVALUATION
Department of Anesthesiology  Postprocedure Note    Patient: Haydee Castro  MRN: [id number]  YOB: 1940  Date of evaluation: 7/22/2025    Procedure Summary       Date: 07/20/25 Room / Location: [facility] MAIN OR  / [facility] MAIN OR    Anesthesia Start: 1609 Anesthesia Stop: 1757    Procedure: LAPAROSCOPY CONVERTED TO SMALL BOWEL RESECTION (Abdomen) Diagnosis:       SBO (small bowel obstruction) (HCC)      (SBO (small bowel obstruction) (HCC) [K56.609])    Providers: Brayan Leon MD Responsible Provider: Guy Unger MD    Anesthesia Type: General ASA Status: 4 - Emergent            Anesthesia Type: General    Laura Phase I: Laura Score: 9    Laura Phase II:      Anesthesia Post Evaluation    Patient location during evaluation: PACU  Patient participation: complete - patient participated  Level of consciousness: sleepy but conscious and responsive to verbal stimuli  Airway patency: patent  Nausea & Vomiting: no vomiting and no nausea  Cardiovascular status: blood pressure returned to baseline and hemodynamically stable  Respiratory status: acceptable  Hydration status: stable    No notable events documented.

## 2025-07-23 LAB
ALBUMIN SERPL-MCNC: 2.4 G/DL (ref 3.5–5)
ALBUMIN/GLOB SERPL: 0.8 (ref 1.1–2.2)
ALP SERPL-CCNC: 55 U/L (ref 45–117)
ALT SERPL-CCNC: 16 U/L (ref 12–78)
ANION GAP SERPL CALC-SCNC: 6 MMOL/L (ref 2–12)
AST SERPL-CCNC: 26 U/L (ref 15–37)
BILIRUB SERPL-MCNC: 0.7 MG/DL (ref 0.2–1)
BUN SERPL-MCNC: 69 MG/DL (ref 6–20)
BUN/CREAT SERPL: 29 (ref 12–20)
CALCIUM SERPL-MCNC: 8.7 MG/DL (ref 8.5–10.1)
CHLORIDE SERPL-SCNC: 110 MMOL/L (ref 97–108)
CO2 SERPL-SCNC: 33 MMOL/L (ref 21–32)
CREAT SERPL-MCNC: 2.36 MG/DL (ref 0.55–1.02)
ERYTHROCYTE [DISTWIDTH] IN BLOOD BY AUTOMATED COUNT: 13.6 % (ref 11.5–14.5)
GLOBULIN SER CALC-MCNC: 3.2 G/DL (ref 2–4)
GLUCOSE BLD STRIP.AUTO-MCNC: 166 MG/DL (ref 65–117)
GLUCOSE BLD STRIP.AUTO-MCNC: 168 MG/DL (ref 65–117)
GLUCOSE BLD STRIP.AUTO-MCNC: 172 MG/DL (ref 65–117)
GLUCOSE BLD STRIP.AUTO-MCNC: 217 MG/DL (ref 65–117)
GLUCOSE SERPL-MCNC: 174 MG/DL (ref 65–100)
HCT VFR BLD AUTO: 29.1 % (ref 35–47)
HGB BLD-MCNC: 9.3 G/DL (ref 11.5–16)
MAGNESIUM SERPL-MCNC: 2.3 MG/DL (ref 1.6–2.4)
MCH RBC QN AUTO: 29.2 PG (ref 26–34)
MCHC RBC AUTO-ENTMCNC: 32 G/DL (ref 30–36.5)
MCV RBC AUTO: 91.5 FL (ref 80–99)
NRBC # BLD: 0 K/UL (ref 0–0.01)
NRBC BLD-RTO: 0 PER 100 WBC
PHOSPHATE SERPL-MCNC: 2.5 MG/DL (ref 2.6–4.7)
PLATELET # BLD AUTO: 303 K/UL (ref 150–400)
PMV BLD AUTO: 10.7 FL (ref 8.9–12.9)
POTASSIUM SERPL-SCNC: 3.2 MMOL/L (ref 3.5–5.1)
PROT SERPL-MCNC: 5.6 G/DL (ref 6.4–8.2)
RBC # BLD AUTO: 3.18 M/UL (ref 3.8–5.2)
SERVICE CMNT-IMP: ABNORMAL
SODIUM SERPL-SCNC: 149 MMOL/L (ref 136–145)
WBC # BLD AUTO: 15.8 K/UL (ref 3.6–11)

## 2025-07-23 PROCEDURE — 36415 COLL VENOUS BLD VENIPUNCTURE: CPT

## 2025-07-23 PROCEDURE — 6370000000 HC RX 637 (ALT 250 FOR IP): Performed by: SURGERY

## 2025-07-23 PROCEDURE — 82962 GLUCOSE BLOOD TEST: CPT

## 2025-07-23 PROCEDURE — 2580000003 HC RX 258: Performed by: INTERNAL MEDICINE

## 2025-07-23 PROCEDURE — 6370000000 HC RX 637 (ALT 250 FOR IP): Performed by: STUDENT IN AN ORGANIZED HEALTH CARE EDUCATION/TRAINING PROGRAM

## 2025-07-23 PROCEDURE — 2700000000 HC OXYGEN THERAPY PER DAY

## 2025-07-23 PROCEDURE — 99024 POSTOP FOLLOW-UP VISIT: CPT | Performed by: NURSE PRACTITIONER

## 2025-07-23 PROCEDURE — 2500000003 HC RX 250 WO HCPCS: Performed by: SURGERY

## 2025-07-23 PROCEDURE — 97116 GAIT TRAINING THERAPY: CPT

## 2025-07-23 PROCEDURE — 6360000002 HC RX W HCPCS: Performed by: SURGERY

## 2025-07-23 PROCEDURE — 97535 SELF CARE MNGMENT TRAINING: CPT

## 2025-07-23 PROCEDURE — 94760 N-INVAS EAR/PLS OXIMETRY 1: CPT

## 2025-07-23 PROCEDURE — 6370000000 HC RX 637 (ALT 250 FOR IP): Performed by: INTERNAL MEDICINE

## 2025-07-23 PROCEDURE — 6360000002 HC RX W HCPCS: Performed by: INTERNAL MEDICINE

## 2025-07-23 PROCEDURE — 1100000003 HC PRIVATE W/ TELEMETRY

## 2025-07-23 PROCEDURE — 84100 ASSAY OF PHOSPHORUS: CPT

## 2025-07-23 PROCEDURE — 6360000002 HC RX W HCPCS: Performed by: STUDENT IN AN ORGANIZED HEALTH CARE EDUCATION/TRAINING PROGRAM

## 2025-07-23 PROCEDURE — 93005 ELECTROCARDIOGRAM TRACING: CPT | Performed by: STUDENT IN AN ORGANIZED HEALTH CARE EDUCATION/TRAINING PROGRAM

## 2025-07-23 PROCEDURE — 6360000002 HC RX W HCPCS: Performed by: NURSE PRACTITIONER

## 2025-07-23 PROCEDURE — 80053 COMPREHEN METABOLIC PANEL: CPT

## 2025-07-23 PROCEDURE — 85027 COMPLETE CBC AUTOMATED: CPT

## 2025-07-23 PROCEDURE — 97530 THERAPEUTIC ACTIVITIES: CPT

## 2025-07-23 PROCEDURE — 83735 ASSAY OF MAGNESIUM: CPT

## 2025-07-23 RX ORDER — ASPIRIN 81 MG/1
81 TABLET ORAL DAILY
Status: DISCONTINUED | OUTPATIENT
Start: 2025-07-23 | End: 2025-08-09 | Stop reason: HOSPADM

## 2025-07-23 RX ORDER — SODIUM CHLORIDE, SODIUM LACTATE, POTASSIUM CHLORIDE, CALCIUM CHLORIDE 600; 310; 30; 20 MG/100ML; MG/100ML; MG/100ML; MG/100ML
INJECTION, SOLUTION INTRAVENOUS CONTINUOUS
Status: DISCONTINUED | OUTPATIENT
Start: 2025-07-23 | End: 2025-07-27

## 2025-07-23 RX ORDER — LABETALOL HYDROCHLORIDE 5 MG/ML
10 INJECTION, SOLUTION INTRAVENOUS EVERY 6 HOURS PRN
Status: DISCONTINUED | OUTPATIENT
Start: 2025-07-23 | End: 2025-08-09 | Stop reason: HOSPADM

## 2025-07-23 RX ORDER — POTASSIUM CHLORIDE 7.45 MG/ML
10 INJECTION INTRAVENOUS
Status: DISPENSED | OUTPATIENT
Start: 2025-07-23 | End: 2025-07-23

## 2025-07-23 RX ORDER — OXYCODONE HYDROCHLORIDE 5 MG/1
2.5 TABLET ORAL EVERY 6 HOURS PRN
Status: DISCONTINUED | OUTPATIENT
Start: 2025-07-23 | End: 2025-08-08

## 2025-07-23 RX ORDER — NICOTINE 21 MG/24HR
1 PATCH, TRANSDERMAL 24 HOURS TRANSDERMAL DAILY
Status: DISCONTINUED | OUTPATIENT
Start: 2025-07-23 | End: 2025-08-09 | Stop reason: HOSPADM

## 2025-07-23 RX ADMIN — OXYCODONE HYDROCHLORIDE 2.5 MG: 5 TABLET ORAL at 17:42

## 2025-07-23 RX ADMIN — METRONIDAZOLE 500 MG: 500 INJECTION, SOLUTION INTRAVENOUS at 00:13

## 2025-07-23 RX ADMIN — METRONIDAZOLE 500 MG: 500 INJECTION, SOLUTION INTRAVENOUS at 10:03

## 2025-07-23 RX ADMIN — HYDROMORPHONE HYDROCHLORIDE 1 MG: 1 INJECTION, SOLUTION INTRAMUSCULAR; INTRAVENOUS; SUBCUTANEOUS at 13:02

## 2025-07-23 RX ADMIN — ATORVASTATIN CALCIUM 40 MG: 40 TABLET, FILM COATED ORAL at 21:08

## 2025-07-23 RX ADMIN — SODIUM CHLORIDE, PRESERVATIVE FREE 10 ML: 5 INJECTION INTRAVENOUS at 10:05

## 2025-07-23 RX ADMIN — SODIUM CHLORIDE, SODIUM LACTATE, POTASSIUM CHLORIDE, AND CALCIUM CHLORIDE: .6; .31; .03; .02 INJECTION, SOLUTION INTRAVENOUS at 11:22

## 2025-07-23 RX ADMIN — CARVEDILOL 25 MG: 12.5 TABLET, FILM COATED ORAL at 21:08

## 2025-07-23 RX ADMIN — Medication 250 MG: at 12:29

## 2025-07-23 RX ADMIN — HEPARIN SODIUM 5000 UNITS: 5000 INJECTION INTRAVENOUS; SUBCUTANEOUS at 22:20

## 2025-07-23 RX ADMIN — HYDROMORPHONE HYDROCHLORIDE 1 MG: 1 INJECTION, SOLUTION INTRAMUSCULAR; INTRAVENOUS; SUBCUTANEOUS at 09:44

## 2025-07-23 RX ADMIN — MELATONIN 3 MG: at 21:08

## 2025-07-23 RX ADMIN — HYDROMORPHONE HYDROCHLORIDE 1 MG: 1 INJECTION, SOLUTION INTRAMUSCULAR; INTRAVENOUS; SUBCUTANEOUS at 21:13

## 2025-07-23 RX ADMIN — ISOSORBIDE DINITRATE 20 MG: 10 TABLET ORAL at 12:29

## 2025-07-23 RX ADMIN — HYDROMORPHONE HYDROCHLORIDE 1 MG: 1 INJECTION, SOLUTION INTRAMUSCULAR; INTRAVENOUS; SUBCUTANEOUS at 06:42

## 2025-07-23 RX ADMIN — ISOSORBIDE DINITRATE 20 MG: 10 TABLET ORAL at 09:45

## 2025-07-23 RX ADMIN — POTASSIUM CHLORIDE 10 MEQ: 10 INJECTION, SOLUTION INTRAVENOUS at 13:42

## 2025-07-23 RX ADMIN — ASPIRIN 81 MG: 81 TABLET, DELAYED RELEASE ORAL at 14:54

## 2025-07-23 RX ADMIN — HYDROMORPHONE HYDROCHLORIDE 1 MG: 1 INJECTION, SOLUTION INTRAMUSCULAR; INTRAVENOUS; SUBCUTANEOUS at 16:00

## 2025-07-23 RX ADMIN — THIAMINE HYDROCHLORIDE 100 MG: 100 INJECTION, SOLUTION INTRAMUSCULAR; INTRAVENOUS at 09:45

## 2025-07-23 RX ADMIN — ISOSORBIDE DINITRATE 20 MG: 10 TABLET ORAL at 17:30

## 2025-07-23 RX ADMIN — Medication: at 11:10

## 2025-07-23 RX ADMIN — SODIUM CHLORIDE, PRESERVATIVE FREE 10 ML: 5 INJECTION INTRAVENOUS at 21:08

## 2025-07-23 RX ADMIN — POTASSIUM CHLORIDE 10 MEQ: 10 INJECTION, SOLUTION INTRAVENOUS at 14:57

## 2025-07-23 RX ADMIN — CIPROFLOXACIN 400 MG: 400 INJECTION, SOLUTION INTRAVENOUS at 11:09

## 2025-07-23 RX ADMIN — CARVEDILOL 25 MG: 12.5 TABLET, FILM COATED ORAL at 09:45

## 2025-07-23 RX ADMIN — Medication: at 21:19

## 2025-07-23 RX ADMIN — POTASSIUM CHLORIDE 10 MEQ: 7.46 INJECTION, SOLUTION INTRAVENOUS at 17:30

## 2025-07-23 RX ADMIN — POTASSIUM CHLORIDE 10 MEQ: 10 INJECTION, SOLUTION INTRAVENOUS at 12:31

## 2025-07-23 RX ADMIN — POTASSIUM CHLORIDE AND SODIUM CHLORIDE: 450; 150 INJECTION, SOLUTION INTRAVENOUS at 06:44

## 2025-07-23 RX ADMIN — HEPARIN SODIUM 5000 UNITS: 5000 INJECTION INTRAVENOUS; SUBCUTANEOUS at 13:02

## 2025-07-23 RX ADMIN — METRONIDAZOLE 500 MG: 500 INJECTION, SOLUTION INTRAVENOUS at 14:56

## 2025-07-23 ASSESSMENT — PAIN SCALES - WONG BAKER
WONGBAKER_NUMERICALRESPONSE: NO HURT
WONGBAKER_NUMERICALRESPONSE: NO HURT

## 2025-07-23 ASSESSMENT — PAIN DESCRIPTION - ORIENTATION
ORIENTATION: ANTERIOR;OTHER (COMMENT)
ORIENTATION: ANTERIOR
ORIENTATION: ANTERIOR
ORIENTATION: RIGHT;LEFT
ORIENTATION: ANTERIOR
ORIENTATION: ANTERIOR

## 2025-07-23 ASSESSMENT — PAIN DESCRIPTION - LOCATION
LOCATION: ABDOMEN;GENERALIZED
LOCATION: ABDOMEN
LOCATION: ABDOMEN
LOCATION: ABDOMEN;FLANK
LOCATION: ABDOMEN;INCISION
LOCATION: ABDOMEN

## 2025-07-23 ASSESSMENT — PAIN SCALES - GENERAL
PAINLEVEL_OUTOF10: 10
PAINLEVEL_OUTOF10: 10
PAINLEVEL_OUTOF10: 8
PAINLEVEL_OUTOF10: 6
PAINLEVEL_OUTOF10: 9
PAINLEVEL_OUTOF10: 10
PAINLEVEL_OUTOF10: 6
PAINLEVEL_OUTOF10: 7
PAINLEVEL_OUTOF10: 9
PAINLEVEL_OUTOF10: 10
PAINLEVEL_OUTOF10: 8
PAINLEVEL_OUTOF10: 9
PAINLEVEL_OUTOF10: 9
PAINLEVEL_OUTOF10: 6

## 2025-07-23 ASSESSMENT — PAIN DESCRIPTION - DESCRIPTORS
DESCRIPTORS: SHARP
DESCRIPTORS: ACHING
DESCRIPTORS: ACHING;SHARP
DESCRIPTORS: SHARP
DESCRIPTORS: ACHING;CRAMPING
DESCRIPTORS: SHARP

## 2025-07-23 ASSESSMENT — PAIN - FUNCTIONAL ASSESSMENT
PAIN_FUNCTIONAL_ASSESSMENT: PREVENTS OR INTERFERES SOME ACTIVE ACTIVITIES AND ADLS
PAIN_FUNCTIONAL_ASSESSMENT: ACTIVITIES ARE NOT PREVENTED
PAIN_FUNCTIONAL_ASSESSMENT: ACTIVITIES ARE NOT PREVENTED
PAIN_FUNCTIONAL_ASSESSMENT: PREVENTS OR INTERFERES SOME ACTIVE ACTIVITIES AND ADLS

## 2025-07-24 ENCOUNTER — APPOINTMENT (OUTPATIENT)
Facility: HOSPITAL | Age: 85
End: 2025-07-24
Attending: STUDENT IN AN ORGANIZED HEALTH CARE EDUCATION/TRAINING PROGRAM
Payer: MEDICARE

## 2025-07-24 LAB
ANION GAP SERPL CALC-SCNC: 4 MMOL/L (ref 2–12)
BASOPHILS # BLD: 0.06 K/UL (ref 0–0.1)
BASOPHILS NFR BLD: 0.4 % (ref 0–1)
BUN SERPL-MCNC: 58 MG/DL (ref 6–20)
BUN/CREAT SERPL: 28 (ref 12–20)
CALCIUM SERPL-MCNC: 8.4 MG/DL (ref 8.5–10.1)
CHLORIDE SERPL-SCNC: 110 MMOL/L (ref 97–108)
CO2 SERPL-SCNC: 33 MMOL/L (ref 21–32)
CREAT SERPL-MCNC: 2.08 MG/DL (ref 0.55–1.02)
DIFFERENTIAL METHOD BLD: ABNORMAL
EOSINOPHIL # BLD: 0.66 K/UL (ref 0–0.4)
EOSINOPHIL NFR BLD: 4.5 % (ref 0–7)
ERYTHROCYTE [DISTWIDTH] IN BLOOD BY AUTOMATED COUNT: 13.7 % (ref 11.5–14.5)
GLUCOSE BLD STRIP.AUTO-MCNC: 167 MG/DL (ref 65–117)
GLUCOSE BLD STRIP.AUTO-MCNC: 169 MG/DL (ref 65–117)
GLUCOSE BLD STRIP.AUTO-MCNC: 172 MG/DL (ref 65–117)
GLUCOSE BLD STRIP.AUTO-MCNC: 184 MG/DL (ref 65–117)
GLUCOSE BLD STRIP.AUTO-MCNC: 201 MG/DL (ref 65–117)
GLUCOSE SERPL-MCNC: 165 MG/DL (ref 65–100)
HCT VFR BLD AUTO: 31.2 % (ref 35–47)
HGB BLD-MCNC: 9.7 G/DL (ref 11.5–16)
IMM GRANULOCYTES # BLD AUTO: 0.09 K/UL (ref 0–0.04)
IMM GRANULOCYTES NFR BLD AUTO: 0.6 % (ref 0–0.5)
LYMPHOCYTES # BLD: 2.05 K/UL (ref 0.8–3.5)
LYMPHOCYTES NFR BLD: 14.1 % (ref 12–49)
MCH RBC QN AUTO: 28.7 PG (ref 26–34)
MCHC RBC AUTO-ENTMCNC: 31.1 G/DL (ref 30–36.5)
MCV RBC AUTO: 92.3 FL (ref 80–99)
MONOCYTES # BLD: 1.42 K/UL (ref 0–1)
MONOCYTES NFR BLD: 9.8 % (ref 5–13)
NEUTS SEG # BLD: 10.27 K/UL (ref 1.8–8)
NEUTS SEG NFR BLD: 70.6 % (ref 32–75)
NRBC # BLD: 0 K/UL (ref 0–0.01)
NRBC BLD-RTO: 0 PER 100 WBC
PLATELET # BLD AUTO: 316 K/UL (ref 150–400)
PMV BLD AUTO: 10.4 FL (ref 8.9–12.9)
POTASSIUM SERPL-SCNC: 3.3 MMOL/L (ref 3.5–5.1)
RBC # BLD AUTO: 3.38 M/UL (ref 3.8–5.2)
SERVICE CMNT-IMP: ABNORMAL
SODIUM SERPL-SCNC: 147 MMOL/L (ref 136–145)
WBC # BLD AUTO: 14.6 K/UL (ref 3.6–11)

## 2025-07-24 PROCEDURE — 1100000003 HC PRIVATE W/ TELEMETRY

## 2025-07-24 PROCEDURE — 6360000002 HC RX W HCPCS: Performed by: NURSE PRACTITIONER

## 2025-07-24 PROCEDURE — 6360000002 HC RX W HCPCS: Performed by: SURGERY

## 2025-07-24 PROCEDURE — 74018 RADEX ABDOMEN 1 VIEW: CPT

## 2025-07-24 PROCEDURE — 82962 GLUCOSE BLOOD TEST: CPT

## 2025-07-24 PROCEDURE — 3E0436Z INTRODUCTION OF NUTRITIONAL SUBSTANCE INTO CENTRAL VEIN, PERCUTANEOUS APPROACH: ICD-10-PCS | Performed by: SURGERY

## 2025-07-24 PROCEDURE — 71045 X-RAY EXAM CHEST 1 VIEW: CPT

## 2025-07-24 PROCEDURE — 85025 COMPLETE CBC W/AUTO DIFF WBC: CPT

## 2025-07-24 PROCEDURE — 6370000000 HC RX 637 (ALT 250 FOR IP): Performed by: INTERNAL MEDICINE

## 2025-07-24 PROCEDURE — 2580000003 HC RX 258: Performed by: INTERNAL MEDICINE

## 2025-07-24 PROCEDURE — 02HV33Z INSERTION OF INFUSION DEVICE INTO SUPERIOR VENA CAVA, PERCUTANEOUS APPROACH: ICD-10-PCS | Performed by: INTERNAL MEDICINE

## 2025-07-24 PROCEDURE — 2700000000 HC OXYGEN THERAPY PER DAY

## 2025-07-24 PROCEDURE — 36415 COLL VENOUS BLD VENIPUNCTURE: CPT

## 2025-07-24 PROCEDURE — 6370000000 HC RX 637 (ALT 250 FOR IP): Performed by: STUDENT IN AN ORGANIZED HEALTH CARE EDUCATION/TRAINING PROGRAM

## 2025-07-24 PROCEDURE — 97530 THERAPEUTIC ACTIVITIES: CPT | Performed by: PHYSICAL THERAPIST

## 2025-07-24 PROCEDURE — 6360000002 HC RX W HCPCS: Performed by: STUDENT IN AN ORGANIZED HEALTH CARE EDUCATION/TRAINING PROGRAM

## 2025-07-24 PROCEDURE — 97530 THERAPEUTIC ACTIVITIES: CPT | Performed by: OCCUPATIONAL THERAPIST

## 2025-07-24 PROCEDURE — 80048 BASIC METABOLIC PNL TOTAL CA: CPT

## 2025-07-24 PROCEDURE — 2500000003 HC RX 250 WO HCPCS: Performed by: SURGERY

## 2025-07-24 PROCEDURE — 36569 INSJ PICC 5 YR+ W/O IMAGING: CPT

## 2025-07-24 PROCEDURE — 6360000002 HC RX W HCPCS: Performed by: INTERNAL MEDICINE

## 2025-07-24 PROCEDURE — 2580000003 HC RX 258: Performed by: SURGERY

## 2025-07-24 PROCEDURE — 97535 SELF CARE MNGMENT TRAINING: CPT | Performed by: OCCUPATIONAL THERAPIST

## 2025-07-24 PROCEDURE — 6370000000 HC RX 637 (ALT 250 FOR IP): Performed by: SURGERY

## 2025-07-24 RX ORDER — POTASSIUM CHLORIDE 7.45 MG/ML
10 INJECTION INTRAVENOUS
Status: COMPLETED | OUTPATIENT
Start: 2025-07-24 | End: 2025-07-24

## 2025-07-24 RX ADMIN — SODIUM CHLORIDE, SODIUM LACTATE, POTASSIUM CHLORIDE, AND CALCIUM CHLORIDE: .6; .31; .03; .02 INJECTION, SOLUTION INTRAVENOUS at 22:58

## 2025-07-24 RX ADMIN — METRONIDAZOLE 500 MG: 500 INJECTION, SOLUTION INTRAVENOUS at 15:37

## 2025-07-24 RX ADMIN — SODIUM CHLORIDE, PRESERVATIVE FREE 10 ML: 5 INJECTION INTRAVENOUS at 22:42

## 2025-07-24 RX ADMIN — CALCIUM GLUCONATE: 98 INJECTION, SOLUTION INTRAVENOUS at 22:58

## 2025-07-24 RX ADMIN — CIPROFLOXACIN 400 MG: 400 INJECTION, SOLUTION INTRAVENOUS at 09:09

## 2025-07-24 RX ADMIN — ISOSORBIDE DINITRATE 20 MG: 10 TABLET ORAL at 14:22

## 2025-07-24 RX ADMIN — HYDROMORPHONE HYDROCHLORIDE 1 MG: 1 INJECTION, SOLUTION INTRAMUSCULAR; INTRAVENOUS; SUBCUTANEOUS at 14:22

## 2025-07-24 RX ADMIN — ASPIRIN 81 MG: 81 TABLET, DELAYED RELEASE ORAL at 09:02

## 2025-07-24 RX ADMIN — OXYCODONE HYDROCHLORIDE 2.5 MG: 5 TABLET ORAL at 09:02

## 2025-07-24 RX ADMIN — HYDROMORPHONE HYDROCHLORIDE 1 MG: 1 INJECTION, SOLUTION INTRAMUSCULAR; INTRAVENOUS; SUBCUTANEOUS at 06:33

## 2025-07-24 RX ADMIN — CARVEDILOL 25 MG: 12.5 TABLET, FILM COATED ORAL at 22:37

## 2025-07-24 RX ADMIN — SODIUM CHLORIDE, PRESERVATIVE FREE 10 ML: 5 INJECTION INTRAVENOUS at 09:04

## 2025-07-24 RX ADMIN — SODIUM CHLORIDE, SODIUM LACTATE, POTASSIUM CHLORIDE, AND CALCIUM CHLORIDE: .6; .31; .03; .02 INJECTION, SOLUTION INTRAVENOUS at 01:05

## 2025-07-24 RX ADMIN — METRONIDAZOLE 500 MG: 500 INJECTION, SOLUTION INTRAVENOUS at 01:06

## 2025-07-24 RX ADMIN — ATORVASTATIN CALCIUM 40 MG: 40 TABLET, FILM COATED ORAL at 22:36

## 2025-07-24 RX ADMIN — HYDROMORPHONE HYDROCHLORIDE 1 MG: 1 INJECTION, SOLUTION INTRAMUSCULAR; INTRAVENOUS; SUBCUTANEOUS at 22:41

## 2025-07-24 RX ADMIN — POTASSIUM CHLORIDE 10 MEQ: 10 INJECTION, SOLUTION INTRAVENOUS at 12:52

## 2025-07-24 RX ADMIN — THIAMINE HYDROCHLORIDE 100 MG: 100 INJECTION, SOLUTION INTRAMUSCULAR; INTRAVENOUS at 09:03

## 2025-07-24 RX ADMIN — POTASSIUM CHLORIDE 10 MEQ: 10 INJECTION, SOLUTION INTRAVENOUS at 14:23

## 2025-07-24 RX ADMIN — ONDANSETRON 4 MG: 2 INJECTION, SOLUTION INTRAMUSCULAR; INTRAVENOUS at 06:33

## 2025-07-24 RX ADMIN — POTASSIUM CHLORIDE 10 MEQ: 10 INJECTION, SOLUTION INTRAVENOUS at 11:09

## 2025-07-24 RX ADMIN — HEPARIN SODIUM 5000 UNITS: 5000 INJECTION INTRAVENOUS; SUBCUTANEOUS at 22:39

## 2025-07-24 RX ADMIN — MELATONIN 3 MG: at 22:36

## 2025-07-24 RX ADMIN — HEPARIN SODIUM 5000 UNITS: 5000 INJECTION INTRAVENOUS; SUBCUTANEOUS at 06:33

## 2025-07-24 RX ADMIN — ISOSORBIDE DINITRATE 20 MG: 10 TABLET ORAL at 09:03

## 2025-07-24 RX ADMIN — SODIUM CHLORIDE, SODIUM LACTATE, POTASSIUM CHLORIDE, AND CALCIUM CHLORIDE: .6; .31; .03; .02 INJECTION, SOLUTION INTRAVENOUS at 12:55

## 2025-07-24 RX ADMIN — CARVEDILOL 25 MG: 12.5 TABLET, FILM COATED ORAL at 09:03

## 2025-07-24 RX ADMIN — HYDROMORPHONE HYDROCHLORIDE 1 MG: 1 INJECTION, SOLUTION INTRAMUSCULAR; INTRAVENOUS; SUBCUTANEOUS at 11:07

## 2025-07-24 RX ADMIN — Medication: at 22:38

## 2025-07-24 RX ADMIN — OXYCODONE HYDROCHLORIDE 2.5 MG: 5 TABLET ORAL at 01:07

## 2025-07-24 RX ADMIN — METRONIDAZOLE 500 MG: 500 INJECTION, SOLUTION INTRAVENOUS at 09:06

## 2025-07-24 RX ADMIN — Medication: at 09:04

## 2025-07-24 RX ADMIN — POTASSIUM CHLORIDE 10 MEQ: 10 INJECTION, SOLUTION INTRAVENOUS at 16:41

## 2025-07-24 ASSESSMENT — PAIN DESCRIPTION - LOCATION
LOCATION: ABDOMEN
LOCATION: ABDOMEN
LOCATION: ABDOMEN;INCISION

## 2025-07-24 ASSESSMENT — PAIN SCALES - GENERAL
PAINLEVEL_OUTOF10: 1
PAINLEVEL_OUTOF10: 6
PAINLEVEL_OUTOF10: 7
PAINLEVEL_OUTOF10: 8
PAINLEVEL_OUTOF10: 9
PAINLEVEL_OUTOF10: 10
PAINLEVEL_OUTOF10: 7
PAINLEVEL_OUTOF10: 6
PAINLEVEL_OUTOF10: 7
PAINLEVEL_OUTOF10: 4

## 2025-07-24 ASSESSMENT — PAIN DESCRIPTION - DESCRIPTORS
DESCRIPTORS: ACHING;SHARP
DESCRIPTORS: PRESSURE;SHARP
DESCRIPTORS: ACHING;SHARP

## 2025-07-24 ASSESSMENT — PAIN - FUNCTIONAL ASSESSMENT
PAIN_FUNCTIONAL_ASSESSMENT: ACTIVITIES ARE NOT PREVENTED
PAIN_FUNCTIONAL_ASSESSMENT: ACTIVITIES ARE NOT PREVENTED

## 2025-07-24 ASSESSMENT — PAIN DESCRIPTION - ORIENTATION
ORIENTATION: MID
ORIENTATION: OUTER
ORIENTATION: ANTERIOR

## 2025-07-25 ENCOUNTER — APPOINTMENT (OUTPATIENT)
Facility: HOSPITAL | Age: 85
End: 2025-07-25
Attending: STUDENT IN AN ORGANIZED HEALTH CARE EDUCATION/TRAINING PROGRAM
Payer: MEDICARE

## 2025-07-25 LAB
ANION GAP SERPL CALC-SCNC: 3 MMOL/L (ref 2–12)
BACTERIA SPEC CULT: NORMAL
BACTERIA SPEC CULT: NORMAL
BASOPHILS # BLD: 0.07 K/UL (ref 0–0.1)
BASOPHILS NFR BLD: 0.5 % (ref 0–1)
BUN SERPL-MCNC: 66 MG/DL (ref 6–20)
BUN/CREAT SERPL: 26 (ref 12–20)
CALCIUM SERPL-MCNC: 8.1 MG/DL (ref 8.5–10.1)
CHLORIDE SERPL-SCNC: 111 MMOL/L (ref 97–108)
CO2 SERPL-SCNC: 33 MMOL/L (ref 21–32)
CREAT SERPL-MCNC: 2.58 MG/DL (ref 0.55–1.02)
DIFFERENTIAL METHOD BLD: ABNORMAL
EKG ATRIAL RATE: 86 BPM
EKG DIAGNOSIS: NORMAL
EKG P AXIS: 83 DEGREES
EKG P-R INTERVAL: 140 MS
EKG Q-T INTERVAL: 422 MS
EKG QRS DURATION: 114 MS
EKG QTC CALCULATION (BAZETT): 504 MS
EKG R AXIS: 49 DEGREES
EKG T AXIS: 254 DEGREES
EKG VENTRICULAR RATE: 86 BPM
EOSINOPHIL # BLD: 0.93 K/UL (ref 0–0.4)
EOSINOPHIL NFR BLD: 6.9 % (ref 0–7)
ERYTHROCYTE [DISTWIDTH] IN BLOOD BY AUTOMATED COUNT: 13.9 % (ref 11.5–14.5)
GLUCOSE BLD STRIP.AUTO-MCNC: 158 MG/DL (ref 65–117)
GLUCOSE BLD STRIP.AUTO-MCNC: 176 MG/DL (ref 65–117)
GLUCOSE BLD STRIP.AUTO-MCNC: 212 MG/DL (ref 65–117)
GLUCOSE BLD STRIP.AUTO-MCNC: 270 MG/DL (ref 65–117)
GLUCOSE SERPL-MCNC: 167 MG/DL (ref 65–100)
HCT VFR BLD AUTO: 27.9 % (ref 35–47)
HGB BLD-MCNC: 8.7 G/DL (ref 11.5–16)
IMM GRANULOCYTES # BLD AUTO: 0.1 K/UL (ref 0–0.04)
IMM GRANULOCYTES NFR BLD AUTO: 0.7 % (ref 0–0.5)
LACTATE SERPL-SCNC: 1.1 MMOL/L (ref 0.4–2)
LYMPHOCYTES # BLD: 2.61 K/UL (ref 0.8–3.5)
LYMPHOCYTES NFR BLD: 19.5 % (ref 12–49)
MAGNESIUM SERPL-MCNC: 2.1 MG/DL (ref 1.6–2.4)
MCH RBC QN AUTO: 29.4 PG (ref 26–34)
MCHC RBC AUTO-ENTMCNC: 31.2 G/DL (ref 30–36.5)
MCV RBC AUTO: 94.3 FL (ref 80–99)
MONOCYTES # BLD: 1.33 K/UL (ref 0–1)
MONOCYTES NFR BLD: 9.9 % (ref 5–13)
NEUTS SEG # BLD: 8.36 K/UL (ref 1.8–8)
NEUTS SEG NFR BLD: 62.5 % (ref 32–75)
NRBC # BLD: 0 K/UL (ref 0–0.01)
NRBC BLD-RTO: 0 PER 100 WBC
PHOSPHATE SERPL-MCNC: 3.3 MG/DL (ref 2.6–4.7)
PLATELET # BLD AUTO: 290 K/UL (ref 150–400)
PMV BLD AUTO: 10.2 FL (ref 8.9–12.9)
POTASSIUM SERPL-SCNC: 4 MMOL/L (ref 3.5–5.1)
RBC # BLD AUTO: 2.96 M/UL (ref 3.8–5.2)
SERVICE CMNT-IMP: ABNORMAL
SERVICE CMNT-IMP: NORMAL
SERVICE CMNT-IMP: NORMAL
SODIUM SERPL-SCNC: 147 MMOL/L (ref 136–145)
WBC # BLD AUTO: 13.4 K/UL (ref 3.6–11)

## 2025-07-25 PROCEDURE — 85025 COMPLETE CBC W/AUTO DIFF WBC: CPT

## 2025-07-25 PROCEDURE — 84100 ASSAY OF PHOSPHORUS: CPT

## 2025-07-25 PROCEDURE — 83735 ASSAY OF MAGNESIUM: CPT

## 2025-07-25 PROCEDURE — 74176 CT ABD & PELVIS W/O CONTRAST: CPT

## 2025-07-25 PROCEDURE — 6360000002 HC RX W HCPCS: Performed by: INTERNAL MEDICINE

## 2025-07-25 PROCEDURE — 80048 BASIC METABOLIC PNL TOTAL CA: CPT

## 2025-07-25 PROCEDURE — 6360000002 HC RX W HCPCS: Performed by: NURSE PRACTITIONER

## 2025-07-25 PROCEDURE — 2580000003 HC RX 258: Performed by: INTERNAL MEDICINE

## 2025-07-25 PROCEDURE — 6360000002 HC RX W HCPCS: Performed by: SURGERY

## 2025-07-25 PROCEDURE — 82962 GLUCOSE BLOOD TEST: CPT

## 2025-07-25 PROCEDURE — 36415 COLL VENOUS BLD VENIPUNCTURE: CPT

## 2025-07-25 PROCEDURE — 1100000003 HC PRIVATE W/ TELEMETRY

## 2025-07-25 PROCEDURE — 6370000000 HC RX 637 (ALT 250 FOR IP): Performed by: INTERNAL MEDICINE

## 2025-07-25 PROCEDURE — 2500000003 HC RX 250 WO HCPCS: Performed by: INTERNAL MEDICINE

## 2025-07-25 PROCEDURE — 2500000003 HC RX 250 WO HCPCS: Performed by: SURGERY

## 2025-07-25 PROCEDURE — 6360000004 HC RX CONTRAST MEDICATION: Performed by: SURGERY

## 2025-07-25 PROCEDURE — 83605 ASSAY OF LACTIC ACID: CPT

## 2025-07-25 PROCEDURE — 6370000000 HC RX 637 (ALT 250 FOR IP): Performed by: STUDENT IN AN ORGANIZED HEALTH CARE EDUCATION/TRAINING PROGRAM

## 2025-07-25 PROCEDURE — 6370000000 HC RX 637 (ALT 250 FOR IP): Performed by: SURGERY

## 2025-07-25 PROCEDURE — 2700000000 HC OXYGEN THERAPY PER DAY

## 2025-07-25 PROCEDURE — 6360000002 HC RX W HCPCS: Performed by: STUDENT IN AN ORGANIZED HEALTH CARE EDUCATION/TRAINING PROGRAM

## 2025-07-25 PROCEDURE — 94760 N-INVAS EAR/PLS OXIMETRY 1: CPT

## 2025-07-25 RX ORDER — GLUCAGON 1 MG/ML
1 KIT INJECTION PRN
Status: DISCONTINUED | OUTPATIENT
Start: 2025-07-25 | End: 2025-08-09 | Stop reason: HOSPADM

## 2025-07-25 RX ORDER — DEXTROSE MONOHYDRATE 100 MG/ML
INJECTION, SOLUTION INTRAVENOUS CONTINUOUS PRN
Status: DISCONTINUED | OUTPATIENT
Start: 2025-07-25 | End: 2025-08-09 | Stop reason: HOSPADM

## 2025-07-25 RX ORDER — DIATRIZOATE MEGLUMINE AND DIATRIZOATE SODIUM 660; 100 MG/ML; MG/ML
30 SOLUTION ORAL; RECTAL
Status: DISCONTINUED | OUTPATIENT
Start: 2025-07-25 | End: 2025-08-09 | Stop reason: HOSPADM

## 2025-07-25 RX ADMIN — CARVEDILOL 25 MG: 12.5 TABLET, FILM COATED ORAL at 09:25

## 2025-07-25 RX ADMIN — Medication: at 21:19

## 2025-07-25 RX ADMIN — CIPROFLOXACIN 400 MG: 400 INJECTION, SOLUTION INTRAVENOUS at 11:11

## 2025-07-25 RX ADMIN — CALCIUM GLUCONATE: 98 INJECTION, SOLUTION INTRAVENOUS at 17:28

## 2025-07-25 RX ADMIN — SMOFLIPID 250 ML: 6; 6; 5; 3 INJECTION, EMULSION INTRAVENOUS at 17:29

## 2025-07-25 RX ADMIN — SODIUM CHLORIDE, SODIUM LACTATE, POTASSIUM CHLORIDE, AND CALCIUM CHLORIDE: .6; .31; .03; .02 INJECTION, SOLUTION INTRAVENOUS at 21:18

## 2025-07-25 RX ADMIN — ISOSORBIDE DINITRATE 20 MG: 10 TABLET ORAL at 09:25

## 2025-07-25 RX ADMIN — METRONIDAZOLE 500 MG: 500 INJECTION, SOLUTION INTRAVENOUS at 15:12

## 2025-07-25 RX ADMIN — HEPARIN SODIUM 5000 UNITS: 5000 INJECTION INTRAVENOUS; SUBCUTANEOUS at 21:09

## 2025-07-25 RX ADMIN — HEPARIN SODIUM 5000 UNITS: 5000 INJECTION INTRAVENOUS; SUBCUTANEOUS at 07:03

## 2025-07-25 RX ADMIN — Medication: at 08:51

## 2025-07-25 RX ADMIN — METRONIDAZOLE 500 MG: 500 INJECTION, SOLUTION INTRAVENOUS at 00:38

## 2025-07-25 RX ADMIN — CARVEDILOL 25 MG: 12.5 TABLET, FILM COATED ORAL at 21:07

## 2025-07-25 RX ADMIN — THIAMINE HYDROCHLORIDE 100 MG: 100 INJECTION, SOLUTION INTRAMUSCULAR; INTRAVENOUS at 09:25

## 2025-07-25 RX ADMIN — SODIUM CHLORIDE, PRESERVATIVE FREE 10 ML: 5 INJECTION INTRAVENOUS at 09:28

## 2025-07-25 RX ADMIN — METRONIDAZOLE 500 MG: 500 INJECTION, SOLUTION INTRAVENOUS at 09:38

## 2025-07-25 RX ADMIN — HYDROMORPHONE HYDROCHLORIDE 1 MG: 1 INJECTION, SOLUTION INTRAMUSCULAR; INTRAVENOUS; SUBCUTANEOUS at 07:04

## 2025-07-25 RX ADMIN — ATORVASTATIN CALCIUM 40 MG: 40 TABLET, FILM COATED ORAL at 21:06

## 2025-07-25 RX ADMIN — DIATRIZOATE MEGLUMINE AND DIATRIZOATE SODIUM 30 ML: 660; 100 LIQUID ORAL; RECTAL at 09:12

## 2025-07-25 RX ADMIN — HYDROMORPHONE HYDROCHLORIDE 1 MG: 1 INJECTION, SOLUTION INTRAMUSCULAR; INTRAVENOUS; SUBCUTANEOUS at 12:39

## 2025-07-25 RX ADMIN — HEPARIN SODIUM 5000 UNITS: 5000 INJECTION INTRAVENOUS; SUBCUTANEOUS at 12:38

## 2025-07-25 RX ADMIN — SODIUM CHLORIDE, PRESERVATIVE FREE 10 ML: 5 INJECTION INTRAVENOUS at 21:20

## 2025-07-25 RX ADMIN — MELATONIN 3 MG: at 21:07

## 2025-07-25 RX ADMIN — ISOSORBIDE DINITRATE 20 MG: 10 TABLET ORAL at 12:37

## 2025-07-25 RX ADMIN — ASPIRIN 81 MG: 81 TABLET, DELAYED RELEASE ORAL at 09:25

## 2025-07-25 ASSESSMENT — PAIN DESCRIPTION - DESCRIPTORS
DESCRIPTORS: ACHING
DESCRIPTORS: SHARP

## 2025-07-25 ASSESSMENT — PAIN DESCRIPTION - ORIENTATION
ORIENTATION: MID;LOWER
ORIENTATION: MID;LOWER

## 2025-07-25 ASSESSMENT — PAIN SCALES - GENERAL
PAINLEVEL_OUTOF10: 2
PAINLEVEL_OUTOF10: 5
PAINLEVEL_OUTOF10: 8
PAINLEVEL_OUTOF10: 0
PAINLEVEL_OUTOF10: 10

## 2025-07-25 ASSESSMENT — PAIN DESCRIPTION - LOCATION
LOCATION: ABDOMEN

## 2025-07-26 LAB
ANION GAP SERPL CALC-SCNC: 4 MMOL/L (ref 2–12)
BUN SERPL-MCNC: 68 MG/DL (ref 6–20)
BUN/CREAT SERPL: 29 (ref 12–20)
CALCIUM SERPL-MCNC: 8.3 MG/DL (ref 8.5–10.1)
CHLORIDE SERPL-SCNC: 105 MMOL/L (ref 97–108)
CO2 SERPL-SCNC: 36 MMOL/L (ref 21–32)
CREAT SERPL-MCNC: 2.31 MG/DL (ref 0.55–1.02)
ERYTHROCYTE [DISTWIDTH] IN BLOOD BY AUTOMATED COUNT: 13.9 % (ref 11.5–14.5)
GLUCOSE BLD STRIP.AUTO-MCNC: 188 MG/DL (ref 65–117)
GLUCOSE BLD STRIP.AUTO-MCNC: 209 MG/DL (ref 65–117)
GLUCOSE BLD STRIP.AUTO-MCNC: 222 MG/DL (ref 65–117)
GLUCOSE BLD STRIP.AUTO-MCNC: 237 MG/DL (ref 65–117)
GLUCOSE SERPL-MCNC: 207 MG/DL (ref 65–100)
HCT VFR BLD AUTO: 28.2 % (ref 35–47)
HGB BLD-MCNC: 8.8 G/DL (ref 11.5–16)
MAGNESIUM SERPL-MCNC: 2 MG/DL (ref 1.6–2.4)
MCH RBC QN AUTO: 29.3 PG (ref 26–34)
MCHC RBC AUTO-ENTMCNC: 31.2 G/DL (ref 30–36.5)
MCV RBC AUTO: 94 FL (ref 80–99)
NRBC # BLD: 0.02 K/UL (ref 0–0.01)
NRBC BLD-RTO: 0.1 PER 100 WBC
PHOSPHATE SERPL-MCNC: 2.9 MG/DL (ref 2.6–4.7)
PLATELET # BLD AUTO: 326 K/UL (ref 150–400)
PMV BLD AUTO: 10.6 FL (ref 8.9–12.9)
POTASSIUM SERPL-SCNC: 4 MMOL/L (ref 3.5–5.1)
RBC # BLD AUTO: 3 M/UL (ref 3.8–5.2)
SERVICE CMNT-IMP: ABNORMAL
SODIUM SERPL-SCNC: 145 MMOL/L (ref 136–145)
WBC # BLD AUTO: 19.1 K/UL (ref 3.6–11)

## 2025-07-26 PROCEDURE — 1100000003 HC PRIVATE W/ TELEMETRY

## 2025-07-26 PROCEDURE — 2580000003 HC RX 258: Performed by: INTERNAL MEDICINE

## 2025-07-26 PROCEDURE — 83735 ASSAY OF MAGNESIUM: CPT

## 2025-07-26 PROCEDURE — 36415 COLL VENOUS BLD VENIPUNCTURE: CPT

## 2025-07-26 PROCEDURE — 6360000002 HC RX W HCPCS: Performed by: STUDENT IN AN ORGANIZED HEALTH CARE EDUCATION/TRAINING PROGRAM

## 2025-07-26 PROCEDURE — 2500000003 HC RX 250 WO HCPCS: Performed by: SURGERY

## 2025-07-26 PROCEDURE — 82962 GLUCOSE BLOOD TEST: CPT

## 2025-07-26 PROCEDURE — 85027 COMPLETE CBC AUTOMATED: CPT

## 2025-07-26 PROCEDURE — 84100 ASSAY OF PHOSPHORUS: CPT

## 2025-07-26 PROCEDURE — 6370000000 HC RX 637 (ALT 250 FOR IP): Performed by: STUDENT IN AN ORGANIZED HEALTH CARE EDUCATION/TRAINING PROGRAM

## 2025-07-26 PROCEDURE — 6370000000 HC RX 637 (ALT 250 FOR IP): Performed by: SURGERY

## 2025-07-26 PROCEDURE — 6360000002 HC RX W HCPCS: Performed by: NURSE PRACTITIONER

## 2025-07-26 PROCEDURE — 6360000002 HC RX W HCPCS: Performed by: SURGERY

## 2025-07-26 PROCEDURE — 80048 BASIC METABOLIC PNL TOTAL CA: CPT

## 2025-07-26 PROCEDURE — 2500000003 HC RX 250 WO HCPCS: Performed by: INTERNAL MEDICINE

## 2025-07-26 PROCEDURE — 6370000000 HC RX 637 (ALT 250 FOR IP): Performed by: INTERNAL MEDICINE

## 2025-07-26 PROCEDURE — 6360000002 HC RX W HCPCS: Performed by: INTERNAL MEDICINE

## 2025-07-26 PROCEDURE — 94760 N-INVAS EAR/PLS OXIMETRY 1: CPT

## 2025-07-26 PROCEDURE — 2700000000 HC OXYGEN THERAPY PER DAY

## 2025-07-26 RX ADMIN — HYDROMORPHONE HYDROCHLORIDE 1 MG: 1 INJECTION, SOLUTION INTRAMUSCULAR; INTRAVENOUS; SUBCUTANEOUS at 21:18

## 2025-07-26 RX ADMIN — MELATONIN 3 MG: at 21:15

## 2025-07-26 RX ADMIN — HEPARIN SODIUM 5000 UNITS: 5000 INJECTION INTRAVENOUS; SUBCUTANEOUS at 21:18

## 2025-07-26 RX ADMIN — CARVEDILOL 25 MG: 12.5 TABLET, FILM COATED ORAL at 09:35

## 2025-07-26 RX ADMIN — HEPARIN SODIUM 5000 UNITS: 5000 INJECTION INTRAVENOUS; SUBCUTANEOUS at 06:20

## 2025-07-26 RX ADMIN — SODIUM CHLORIDE, PRESERVATIVE FREE 10 ML: 5 INJECTION INTRAVENOUS at 21:27

## 2025-07-26 RX ADMIN — METRONIDAZOLE 500 MG: 500 INJECTION, SOLUTION INTRAVENOUS at 09:35

## 2025-07-26 RX ADMIN — ASPIRIN 81 MG: 81 TABLET, DELAYED RELEASE ORAL at 09:37

## 2025-07-26 RX ADMIN — METRONIDAZOLE 500 MG: 500 INJECTION, SOLUTION INTRAVENOUS at 00:34

## 2025-07-26 RX ADMIN — ISOSORBIDE DINITRATE 20 MG: 10 TABLET ORAL at 18:13

## 2025-07-26 RX ADMIN — ISOSORBIDE DINITRATE 20 MG: 10 TABLET ORAL at 09:36

## 2025-07-26 RX ADMIN — THIAMINE HYDROCHLORIDE 100 MG: 100 INJECTION, SOLUTION INTRAMUSCULAR; INTRAVENOUS at 09:36

## 2025-07-26 RX ADMIN — METRONIDAZOLE 500 MG: 500 INJECTION, SOLUTION INTRAVENOUS at 17:56

## 2025-07-26 RX ADMIN — ATORVASTATIN CALCIUM 40 MG: 40 TABLET, FILM COATED ORAL at 21:15

## 2025-07-26 RX ADMIN — SODIUM CHLORIDE, SODIUM LACTATE, POTASSIUM CHLORIDE, AND CALCIUM CHLORIDE: .6; .31; .03; .02 INJECTION, SOLUTION INTRAVENOUS at 22:27

## 2025-07-26 RX ADMIN — CARVEDILOL 25 MG: 12.5 TABLET, FILM COATED ORAL at 21:14

## 2025-07-26 RX ADMIN — Medication: at 21:27

## 2025-07-26 RX ADMIN — CIPROFLOXACIN 400 MG: 400 INJECTION, SOLUTION INTRAVENOUS at 11:21

## 2025-07-26 RX ADMIN — Medication: at 09:37

## 2025-07-26 RX ADMIN — SMOFLIPID 250 ML: 6; 6; 5; 3 INJECTION, EMULSION INTRAVENOUS at 18:08

## 2025-07-26 RX ADMIN — CALCIUM GLUCONATE: 98 INJECTION, SOLUTION INTRAVENOUS at 18:12

## 2025-07-26 ASSESSMENT — PAIN SCALES - WONG BAKER: WONGBAKER_NUMERICALRESPONSE: HURTS A LITTLE BIT

## 2025-07-26 ASSESSMENT — PAIN DESCRIPTION - DESCRIPTORS: DESCRIPTORS: ACHING

## 2025-07-26 ASSESSMENT — PAIN SCALES - GENERAL
PAINLEVEL_OUTOF10: 3
PAINLEVEL_OUTOF10: 7

## 2025-07-26 ASSESSMENT — PAIN DESCRIPTION - LOCATION: LOCATION: ABDOMEN;GENERALIZED

## 2025-07-27 ENCOUNTER — APPOINTMENT (OUTPATIENT)
Facility: HOSPITAL | Age: 85
End: 2025-07-27
Attending: STUDENT IN AN ORGANIZED HEALTH CARE EDUCATION/TRAINING PROGRAM
Payer: MEDICARE

## 2025-07-27 LAB
ANION GAP SERPL CALC-SCNC: 6 MMOL/L (ref 2–12)
BASOPHILS # BLD: 0.07 K/UL (ref 0–0.1)
BASOPHILS NFR BLD: 0.3 % (ref 0–1)
BUN SERPL-MCNC: 58 MG/DL (ref 6–20)
BUN/CREAT SERPL: 30 (ref 12–20)
CALCIUM SERPL-MCNC: 8.6 MG/DL (ref 8.5–10.1)
CHLORIDE SERPL-SCNC: 104 MMOL/L (ref 97–108)
CO2 SERPL-SCNC: 31 MMOL/L (ref 21–32)
CREAT SERPL-MCNC: 1.92 MG/DL (ref 0.55–1.02)
DIFFERENTIAL METHOD BLD: ABNORMAL
EOSINOPHIL # BLD: 0.68 K/UL (ref 0–0.4)
EOSINOPHIL NFR BLD: 3.4 % (ref 0–7)
ERYTHROCYTE [DISTWIDTH] IN BLOOD BY AUTOMATED COUNT: 13.5 % (ref 11.5–14.5)
GLUCOSE BLD STRIP.AUTO-MCNC: 263 MG/DL (ref 65–117)
GLUCOSE BLD STRIP.AUTO-MCNC: 292 MG/DL (ref 65–117)
GLUCOSE BLD STRIP.AUTO-MCNC: 298 MG/DL (ref 65–117)
GLUCOSE BLD STRIP.AUTO-MCNC: 333 MG/DL (ref 65–117)
GLUCOSE SERPL-MCNC: 290 MG/DL (ref 65–100)
HCT VFR BLD AUTO: 29 % (ref 35–47)
HGB BLD-MCNC: 9.3 G/DL (ref 11.5–16)
IMM GRANULOCYTES # BLD AUTO: 0.24 K/UL (ref 0–0.04)
IMM GRANULOCYTES NFR BLD AUTO: 1.2 % (ref 0–0.5)
LYMPHOCYTES # BLD: 1.3 K/UL (ref 0.8–3.5)
LYMPHOCYTES NFR BLD: 6.4 % (ref 12–49)
MAGNESIUM SERPL-MCNC: 1.9 MG/DL (ref 1.6–2.4)
MCH RBC QN AUTO: 29.4 PG (ref 26–34)
MCHC RBC AUTO-ENTMCNC: 32.1 G/DL (ref 30–36.5)
MCV RBC AUTO: 91.8 FL (ref 80–99)
MONOCYTES # BLD: 1.19 K/UL (ref 0–1)
MONOCYTES NFR BLD: 5.9 % (ref 5–13)
NEUTS SEG # BLD: 16.7 K/UL (ref 1.8–8)
NEUTS SEG NFR BLD: 82.8 % (ref 32–75)
NRBC # BLD: 0 K/UL (ref 0–0.01)
NRBC BLD-RTO: 0 PER 100 WBC
PHOSPHATE SERPL-MCNC: 2.6 MG/DL (ref 2.6–4.7)
PLATELET # BLD AUTO: 360 K/UL (ref 150–400)
PMV BLD AUTO: 10.6 FL (ref 8.9–12.9)
POTASSIUM SERPL-SCNC: 3.8 MMOL/L (ref 3.5–5.1)
RBC # BLD AUTO: 3.16 M/UL (ref 3.8–5.2)
SERVICE CMNT-IMP: ABNORMAL
SODIUM SERPL-SCNC: 141 MMOL/L (ref 136–145)
WBC # BLD AUTO: 20.2 K/UL (ref 3.6–11)

## 2025-07-27 PROCEDURE — 6360000002 HC RX W HCPCS: Performed by: NURSE PRACTITIONER

## 2025-07-27 PROCEDURE — 2500000003 HC RX 250 WO HCPCS: Performed by: SURGERY

## 2025-07-27 PROCEDURE — 6360000002 HC RX W HCPCS: Performed by: STUDENT IN AN ORGANIZED HEALTH CARE EDUCATION/TRAINING PROGRAM

## 2025-07-27 PROCEDURE — 36415 COLL VENOUS BLD VENIPUNCTURE: CPT

## 2025-07-27 PROCEDURE — 2580000003 HC RX 258: Performed by: INTERNAL MEDICINE

## 2025-07-27 PROCEDURE — 84100 ASSAY OF PHOSPHORUS: CPT

## 2025-07-27 PROCEDURE — 1100000003 HC PRIVATE W/ TELEMETRY

## 2025-07-27 PROCEDURE — 6370000000 HC RX 637 (ALT 250 FOR IP): Performed by: SURGERY

## 2025-07-27 PROCEDURE — 85025 COMPLETE CBC W/AUTO DIFF WBC: CPT

## 2025-07-27 PROCEDURE — 6370000000 HC RX 637 (ALT 250 FOR IP): Performed by: INTERNAL MEDICINE

## 2025-07-27 PROCEDURE — 6370000000 HC RX 637 (ALT 250 FOR IP): Performed by: STUDENT IN AN ORGANIZED HEALTH CARE EDUCATION/TRAINING PROGRAM

## 2025-07-27 PROCEDURE — 83735 ASSAY OF MAGNESIUM: CPT

## 2025-07-27 PROCEDURE — 2500000003 HC RX 250 WO HCPCS: Performed by: INTERNAL MEDICINE

## 2025-07-27 PROCEDURE — 80048 BASIC METABOLIC PNL TOTAL CA: CPT

## 2025-07-27 PROCEDURE — 2700000000 HC OXYGEN THERAPY PER DAY

## 2025-07-27 PROCEDURE — 6360000002 HC RX W HCPCS: Performed by: SURGERY

## 2025-07-27 PROCEDURE — 82962 GLUCOSE BLOOD TEST: CPT

## 2025-07-27 PROCEDURE — 94760 N-INVAS EAR/PLS OXIMETRY 1: CPT

## 2025-07-27 PROCEDURE — 6360000002 HC RX W HCPCS: Performed by: INTERNAL MEDICINE

## 2025-07-27 PROCEDURE — 71250 CT THORAX DX C-: CPT

## 2025-07-27 RX ORDER — FUROSEMIDE 10 MG/ML
40 INJECTION INTRAMUSCULAR; INTRAVENOUS ONCE
Status: COMPLETED | OUTPATIENT
Start: 2025-07-27 | End: 2025-07-27

## 2025-07-27 RX ORDER — INSULIN LISPRO 100 [IU]/ML
0-4 INJECTION, SOLUTION INTRAVENOUS; SUBCUTANEOUS
Status: DISCONTINUED | OUTPATIENT
Start: 2025-07-27 | End: 2025-08-09 | Stop reason: HOSPADM

## 2025-07-27 RX ORDER — FUROSEMIDE 10 MG/ML
40 INJECTION INTRAMUSCULAR; INTRAVENOUS 2 TIMES DAILY
Status: DISCONTINUED | OUTPATIENT
Start: 2025-07-27 | End: 2025-07-30

## 2025-07-27 RX ADMIN — SODIUM CHLORIDE, PRESERVATIVE FREE 10 ML: 5 INJECTION INTRAVENOUS at 20:41

## 2025-07-27 RX ADMIN — CARVEDILOL 25 MG: 12.5 TABLET, FILM COATED ORAL at 20:34

## 2025-07-27 RX ADMIN — METRONIDAZOLE 500 MG: 500 INJECTION, SOLUTION INTRAVENOUS at 08:18

## 2025-07-27 RX ADMIN — HEPARIN SODIUM 5000 UNITS: 5000 INJECTION INTRAVENOUS; SUBCUTANEOUS at 21:54

## 2025-07-27 RX ADMIN — HEPARIN SODIUM 5000 UNITS: 5000 INJECTION INTRAVENOUS; SUBCUTANEOUS at 06:20

## 2025-07-27 RX ADMIN — HYDROMORPHONE HYDROCHLORIDE 1 MG: 1 INJECTION, SOLUTION INTRAMUSCULAR; INTRAVENOUS; SUBCUTANEOUS at 11:09

## 2025-07-27 RX ADMIN — FUROSEMIDE 40 MG: 10 INJECTION, SOLUTION INTRAMUSCULAR; INTRAVENOUS at 08:47

## 2025-07-27 RX ADMIN — FUROSEMIDE 40 MG: 10 INJECTION, SOLUTION INTRAMUSCULAR; INTRAVENOUS at 17:47

## 2025-07-27 RX ADMIN — METRONIDAZOLE 500 MG: 500 INJECTION, SOLUTION INTRAVENOUS at 00:05

## 2025-07-27 RX ADMIN — ISOSORBIDE DINITRATE 20 MG: 10 TABLET ORAL at 17:47

## 2025-07-27 RX ADMIN — HYDRALAZINE HYDROCHLORIDE 10 MG: 20 INJECTION INTRAMUSCULAR; INTRAVENOUS at 08:10

## 2025-07-27 RX ADMIN — SMOFLIPID 250 ML: 6; 6; 5; 3 INJECTION, EMULSION INTRAVENOUS at 18:10

## 2025-07-27 RX ADMIN — CALCIUM GLUCONATE: 98 INJECTION, SOLUTION INTRAVENOUS at 18:15

## 2025-07-27 RX ADMIN — HYDROMORPHONE HYDROCHLORIDE 1 MG: 1 INJECTION, SOLUTION INTRAMUSCULAR; INTRAVENOUS; SUBCUTANEOUS at 20:35

## 2025-07-27 RX ADMIN — HYDROMORPHONE HYDROCHLORIDE 1 MG: 1 INJECTION, SOLUTION INTRAMUSCULAR; INTRAVENOUS; SUBCUTANEOUS at 17:48

## 2025-07-27 RX ADMIN — THIAMINE HYDROCHLORIDE 100 MG: 100 INJECTION, SOLUTION INTRAMUSCULAR; INTRAVENOUS at 08:16

## 2025-07-27 RX ADMIN — CIPROFLOXACIN 400 MG: 400 INJECTION, SOLUTION INTRAVENOUS at 11:13

## 2025-07-27 RX ADMIN — SODIUM CHLORIDE, PRESERVATIVE FREE 10 ML: 5 INJECTION INTRAVENOUS at 08:16

## 2025-07-27 RX ADMIN — HYDROMORPHONE HYDROCHLORIDE 1 MG: 1 INJECTION, SOLUTION INTRAMUSCULAR; INTRAVENOUS; SUBCUTANEOUS at 08:10

## 2025-07-27 RX ADMIN — Medication: at 20:41

## 2025-07-27 RX ADMIN — MELATONIN 3 MG: at 20:34

## 2025-07-27 RX ADMIN — METRONIDAZOLE 500 MG: 500 INJECTION, SOLUTION INTRAVENOUS at 16:01

## 2025-07-27 RX ADMIN — ATORVASTATIN CALCIUM 40 MG: 40 TABLET, FILM COATED ORAL at 20:34

## 2025-07-27 ASSESSMENT — PAIN DESCRIPTION - ORIENTATION
ORIENTATION: MID
ORIENTATION: MID;RIGHT;LEFT
ORIENTATION: MID;UPPER
ORIENTATION: MID;UPPER
ORIENTATION: MID

## 2025-07-27 ASSESSMENT — PAIN SCALES - GENERAL
PAINLEVEL_OUTOF10: 3
PAINLEVEL_OUTOF10: 6
PAINLEVEL_OUTOF10: 0
PAINLEVEL_OUTOF10: 0
PAINLEVEL_OUTOF10: 7
PAINLEVEL_OUTOF10: 7
PAINLEVEL_OUTOF10: 6
PAINLEVEL_OUTOF10: 7

## 2025-07-27 ASSESSMENT — PAIN DESCRIPTION - LOCATION
LOCATION: ABDOMEN

## 2025-07-27 ASSESSMENT — PAIN DESCRIPTION - DESCRIPTORS
DESCRIPTORS: DULL
DESCRIPTORS: ACHING;TIGHTNESS
DESCRIPTORS: DULL
DESCRIPTORS: TIGHTNESS;DULL
DESCRIPTORS: DULL

## 2025-07-27 ASSESSMENT — PAIN - FUNCTIONAL ASSESSMENT: PAIN_FUNCTIONAL_ASSESSMENT: PREVENTS OR INTERFERES SOME ACTIVE ACTIVITIES AND ADLS

## 2025-07-27 ASSESSMENT — PAIN SCALES - WONG BAKER: WONGBAKER_NUMERICALRESPONSE: HURTS A LITTLE BIT

## 2025-07-28 ENCOUNTER — APPOINTMENT (OUTPATIENT)
Facility: HOSPITAL | Age: 85
End: 2025-07-28
Attending: STUDENT IN AN ORGANIZED HEALTH CARE EDUCATION/TRAINING PROGRAM
Payer: MEDICARE

## 2025-07-28 LAB
ANION GAP SERPL CALC-SCNC: 6 MMOL/L (ref 2–12)
ARTERIAL PATENCY WRIST A: ABNORMAL
BASE EXCESS BLDA CALC-SCNC: 5.9 MMOL/L
BASOPHILS # BLD: 0.09 K/UL (ref 0–0.1)
BASOPHILS NFR BLD: 0.4 % (ref 0–1)
BDY SITE: ABNORMAL
BREATHS.SPONTANEOUS ON VENT: 24
BUN SERPL-MCNC: 61 MG/DL (ref 6–20)
BUN/CREAT SERPL: 33 (ref 12–20)
CALCIUM SERPL-MCNC: 8.5 MG/DL (ref 8.5–10.1)
CHLORIDE SERPL-SCNC: 104 MMOL/L (ref 97–108)
CO2 SERPL-SCNC: 31 MMOL/L (ref 21–32)
CREAT SERPL-MCNC: 1.85 MG/DL (ref 0.55–1.02)
DIFFERENTIAL METHOD BLD: ABNORMAL
EOSINOPHIL # BLD: 1.44 K/UL (ref 0–0.4)
EOSINOPHIL NFR BLD: 6.5 % (ref 0–7)
ERYTHROCYTE [DISTWIDTH] IN BLOOD BY AUTOMATED COUNT: 13.8 % (ref 11.5–14.5)
GAS FLOW.O2 O2 DELIVERY SYS: 6 L/MIN
GLUCOSE BLD STRIP.AUTO-MCNC: 203 MG/DL (ref 65–117)
GLUCOSE BLD STRIP.AUTO-MCNC: 251 MG/DL (ref 65–117)
GLUCOSE BLD STRIP.AUTO-MCNC: 285 MG/DL (ref 65–117)
GLUCOSE BLD STRIP.AUTO-MCNC: 285 MG/DL (ref 65–117)
GLUCOSE BLD STRIP.AUTO-MCNC: 292 MG/DL (ref 65–117)
GLUCOSE SERPL-MCNC: 249 MG/DL (ref 65–100)
HCO3 BLDA-SCNC: 30 MMOL/L (ref 22–26)
HCT VFR BLD AUTO: 28.5 % (ref 35–47)
HGB BLD-MCNC: 9.3 G/DL (ref 11.5–16)
IMM GRANULOCYTES # BLD AUTO: 0.24 K/UL (ref 0–0.04)
IMM GRANULOCYTES NFR BLD AUTO: 1.1 % (ref 0–0.5)
LYMPHOCYTES # BLD: 1.38 K/UL (ref 0.8–3.5)
LYMPHOCYTES NFR BLD: 6.2 % (ref 12–49)
MAGNESIUM SERPL-MCNC: 1.7 MG/DL (ref 1.6–2.4)
MCH RBC QN AUTO: 30.6 PG (ref 26–34)
MCHC RBC AUTO-ENTMCNC: 32.6 G/DL (ref 30–36.5)
MCV RBC AUTO: 93.8 FL (ref 80–99)
MONOCYTES # BLD: 1.38 K/UL (ref 0–1)
MONOCYTES NFR BLD: 6.2 % (ref 5–13)
NEUTS SEG # BLD: 17.67 K/UL (ref 1.8–8)
NEUTS SEG NFR BLD: 79.6 % (ref 32–75)
NRBC # BLD: 0 K/UL (ref 0–0.01)
NRBC BLD-RTO: 0 PER 100 WBC
NT PRO BNP: ABNORMAL PG/ML
PCO2 BLDA: 42 MMHG (ref 35–45)
PH BLDA: 7.47 (ref 7.35–7.45)
PHOSPHATE SERPL-MCNC: 3.1 MG/DL (ref 2.6–4.7)
PLATELET # BLD AUTO: 385 K/UL (ref 150–400)
PMV BLD AUTO: 11.1 FL (ref 8.9–12.9)
PO2 BLDA: 51 MMHG (ref 80–100)
POTASSIUM SERPL-SCNC: 3.5 MMOL/L (ref 3.5–5.1)
RBC # BLD AUTO: 3.04 M/UL (ref 3.8–5.2)
RBC MORPH BLD: ABNORMAL
SAO2 % BLD: 89 % (ref 92–97)
SAO2% DEVICE SAO2% SENSOR NAME: ABNORMAL
SERVICE CMNT-IMP: ABNORMAL
SODIUM SERPL-SCNC: 141 MMOL/L (ref 136–145)
SPECIMEN SITE: ABNORMAL
WBC # BLD AUTO: 22.2 K/UL (ref 3.6–11)

## 2025-07-28 PROCEDURE — 2580000003 HC RX 258: Performed by: INTERNAL MEDICINE

## 2025-07-28 PROCEDURE — 6370000000 HC RX 637 (ALT 250 FOR IP): Performed by: INTERNAL MEDICINE

## 2025-07-28 PROCEDURE — 2700000000 HC OXYGEN THERAPY PER DAY

## 2025-07-28 PROCEDURE — 6370000000 HC RX 637 (ALT 250 FOR IP): Performed by: STUDENT IN AN ORGANIZED HEALTH CARE EDUCATION/TRAINING PROGRAM

## 2025-07-28 PROCEDURE — 80048 BASIC METABOLIC PNL TOTAL CA: CPT

## 2025-07-28 PROCEDURE — 6360000002 HC RX W HCPCS: Performed by: STUDENT IN AN ORGANIZED HEALTH CARE EDUCATION/TRAINING PROGRAM

## 2025-07-28 PROCEDURE — 83735 ASSAY OF MAGNESIUM: CPT

## 2025-07-28 PROCEDURE — 5A0935A ASSISTANCE WITH RESPIRATORY VENTILATION, LESS THAN 24 CONSECUTIVE HOURS, HIGH NASAL FLOW/VELOCITY: ICD-10-PCS

## 2025-07-28 PROCEDURE — 85025 COMPLETE CBC W/AUTO DIFF WBC: CPT

## 2025-07-28 PROCEDURE — 82962 GLUCOSE BLOOD TEST: CPT

## 2025-07-28 PROCEDURE — 71045 X-RAY EXAM CHEST 1 VIEW: CPT

## 2025-07-28 PROCEDURE — 36415 COLL VENOUS BLD VENIPUNCTURE: CPT

## 2025-07-28 PROCEDURE — 36600 WITHDRAWAL OF ARTERIAL BLOOD: CPT

## 2025-07-28 PROCEDURE — 99497 ADVNCD CARE PLAN 30 MIN: CPT

## 2025-07-28 PROCEDURE — 82803 BLOOD GASES ANY COMBINATION: CPT

## 2025-07-28 PROCEDURE — 6360000002 HC RX W HCPCS: Performed by: NURSE PRACTITIONER

## 2025-07-28 PROCEDURE — 99223 1ST HOSP IP/OBS HIGH 75: CPT

## 2025-07-28 PROCEDURE — 6370000000 HC RX 637 (ALT 250 FOR IP): Performed by: SURGERY

## 2025-07-28 PROCEDURE — 84100 ASSAY OF PHOSPHORUS: CPT

## 2025-07-28 PROCEDURE — 6360000002 HC RX W HCPCS: Performed by: INTERNAL MEDICINE

## 2025-07-28 PROCEDURE — 6360000002 HC RX W HCPCS: Performed by: SURGERY

## 2025-07-28 PROCEDURE — 2500000003 HC RX 250 WO HCPCS: Performed by: SURGERY

## 2025-07-28 PROCEDURE — 6370000000 HC RX 637 (ALT 250 FOR IP)

## 2025-07-28 PROCEDURE — 2500000003 HC RX 250 WO HCPCS: Performed by: INTERNAL MEDICINE

## 2025-07-28 PROCEDURE — 83880 ASSAY OF NATRIURETIC PEPTIDE: CPT

## 2025-07-28 PROCEDURE — 2580000003 HC RX 258: Performed by: SURGERY

## 2025-07-28 PROCEDURE — 2060000000 HC ICU INTERMEDIATE R&B

## 2025-07-28 RX ORDER — LEVOFLOXACIN 5 MG/ML
750 INJECTION, SOLUTION INTRAVENOUS
Status: DISCONTINUED | OUTPATIENT
Start: 2025-07-28 | End: 2025-07-28

## 2025-07-28 RX ORDER — FLUCONAZOLE 2 MG/ML
200 INJECTION, SOLUTION INTRAVENOUS EVERY 24 HOURS
Status: DISCONTINUED | OUTPATIENT
Start: 2025-07-28 | End: 2025-08-05

## 2025-07-28 RX ORDER — CARVEDILOL 12.5 MG/1
12.5 TABLET ORAL 2 TIMES DAILY
Status: DISCONTINUED | OUTPATIENT
Start: 2025-07-28 | End: 2025-08-09 | Stop reason: HOSPADM

## 2025-07-28 RX ORDER — SENNA AND DOCUSATE SODIUM 50; 8.6 MG/1; MG/1
2 TABLET, FILM COATED ORAL NIGHTLY
Status: DISCONTINUED | OUTPATIENT
Start: 2025-07-28 | End: 2025-07-29

## 2025-07-28 RX ORDER — POLYETHYLENE GLYCOL 3350 17 G/17G
17 POWDER, FOR SOLUTION ORAL DAILY PRN
Status: DISCONTINUED | OUTPATIENT
Start: 2025-07-28 | End: 2025-08-09 | Stop reason: HOSPADM

## 2025-07-28 RX ADMIN — ISOSORBIDE DINITRATE 20 MG: 10 TABLET ORAL at 09:18

## 2025-07-28 RX ADMIN — INSULIN LISPRO 2 UNITS: 100 INJECTION, SOLUTION INTRAVENOUS; SUBCUTANEOUS at 00:32

## 2025-07-28 RX ADMIN — ISOSORBIDE DINITRATE 20 MG: 10 TABLET ORAL at 18:21

## 2025-07-28 RX ADMIN — HEPARIN SODIUM 5000 UNITS: 5000 INJECTION INTRAVENOUS; SUBCUTANEOUS at 22:07

## 2025-07-28 RX ADMIN — Medication: at 09:32

## 2025-07-28 RX ADMIN — HEPARIN SODIUM 5000 UNITS: 5000 INJECTION INTRAVENOUS; SUBCUTANEOUS at 06:08

## 2025-07-28 RX ADMIN — ATORVASTATIN CALCIUM 40 MG: 40 TABLET, FILM COATED ORAL at 21:33

## 2025-07-28 RX ADMIN — HYDROMORPHONE HYDROCHLORIDE 1 MG: 1 INJECTION, SOLUTION INTRAMUSCULAR; INTRAVENOUS; SUBCUTANEOUS at 14:59

## 2025-07-28 RX ADMIN — INSULIN LISPRO 1 UNITS: 100 INJECTION, SOLUTION INTRAVENOUS; SUBCUTANEOUS at 21:33

## 2025-07-28 RX ADMIN — SODIUM CHLORIDE: 0.9 INJECTION, SOLUTION INTRAVENOUS at 11:21

## 2025-07-28 RX ADMIN — HYDROMORPHONE HYDROCHLORIDE 1 MG: 1 INJECTION, SOLUTION INTRAMUSCULAR; INTRAVENOUS; SUBCUTANEOUS at 11:10

## 2025-07-28 RX ADMIN — HEPARIN SODIUM 5000 UNITS: 5000 INJECTION INTRAVENOUS; SUBCUTANEOUS at 15:01

## 2025-07-28 RX ADMIN — THIAMINE HYDROCHLORIDE 100 MG: 100 INJECTION, SOLUTION INTRAMUSCULAR; INTRAVENOUS at 09:21

## 2025-07-28 RX ADMIN — MELATONIN 3 MG: at 21:34

## 2025-07-28 RX ADMIN — SODIUM CHLORIDE: 0.9 INJECTION, SOLUTION INTRAVENOUS at 11:24

## 2025-07-28 RX ADMIN — INSULIN LISPRO 2 UNITS: 100 INJECTION, SOLUTION INTRAVENOUS; SUBCUTANEOUS at 12:43

## 2025-07-28 RX ADMIN — SMOFLIPID 250 ML: 6; 6; 5; 3 INJECTION, EMULSION INTRAVENOUS at 18:23

## 2025-07-28 RX ADMIN — SODIUM CHLORIDE, PRESERVATIVE FREE 10 ML: 5 INJECTION INTRAVENOUS at 21:34

## 2025-07-28 RX ADMIN — SODIUM CHLORIDE, PRESERVATIVE FREE 10 ML: 5 INJECTION INTRAVENOUS at 09:26

## 2025-07-28 RX ADMIN — FLUCONAZOLE 200 MG: 2 INJECTION, SOLUTION INTRAVENOUS at 11:24

## 2025-07-28 RX ADMIN — METRONIDAZOLE 500 MG: 500 INJECTION, SOLUTION INTRAVENOUS at 00:34

## 2025-07-28 RX ADMIN — CARVEDILOL 25 MG: 12.5 TABLET, FILM COATED ORAL at 09:17

## 2025-07-28 RX ADMIN — HYDROMORPHONE HYDROCHLORIDE 1 MG: 1 INJECTION, SOLUTION INTRAMUSCULAR; INTRAVENOUS; SUBCUTANEOUS at 21:34

## 2025-07-28 RX ADMIN — CARVEDILOL 12.5 MG: 12.5 TABLET, FILM COATED ORAL at 21:33

## 2025-07-28 RX ADMIN — SODIUM CHLORIDE: 0.9 INJECTION, SOLUTION INTRAVENOUS at 09:37

## 2025-07-28 RX ADMIN — SENNOSIDES AND DOCUSATE SODIUM 2 TABLET: 8.6; 5 TABLET ORAL at 21:33

## 2025-07-28 RX ADMIN — PIPERACILLIN AND TAZOBACTAM 3375 MG: 3; .375 INJECTION, POWDER, FOR SOLUTION INTRAVENOUS at 18:27

## 2025-07-28 RX ADMIN — FUROSEMIDE 40 MG: 10 INJECTION, SOLUTION INTRAMUSCULAR; INTRAVENOUS at 09:21

## 2025-07-28 RX ADMIN — Medication: at 21:35

## 2025-07-28 RX ADMIN — PIPERACILLIN AND TAZOBACTAM 4500 MG: 4; .5 INJECTION, POWDER, LYOPHILIZED, FOR SOLUTION INTRAVENOUS at 11:30

## 2025-07-28 RX ADMIN — ISOSORBIDE DINITRATE 20 MG: 10 TABLET ORAL at 15:03

## 2025-07-28 RX ADMIN — FUROSEMIDE 40 MG: 10 INJECTION, SOLUTION INTRAMUSCULAR; INTRAVENOUS at 18:20

## 2025-07-28 RX ADMIN — VANCOMYCIN HYDROCHLORIDE 1750 MG: 10 INJECTION, POWDER, LYOPHILIZED, FOR SOLUTION INTRAVENOUS at 15:51

## 2025-07-28 RX ADMIN — ASPIRIN 81 MG: 81 TABLET, DELAYED RELEASE ORAL at 09:17

## 2025-07-28 RX ADMIN — METRONIDAZOLE 500 MG: 500 INJECTION, SOLUTION INTRAVENOUS at 09:40

## 2025-07-28 RX ADMIN — INSULIN LISPRO 2 UNITS: 100 INJECTION, SOLUTION INTRAVENOUS; SUBCUTANEOUS at 18:19

## 2025-07-28 RX ADMIN — INSULIN LISPRO 2 UNITS: 100 INJECTION, SOLUTION INTRAVENOUS; SUBCUTANEOUS at 09:26

## 2025-07-28 RX ADMIN — HYDROMORPHONE HYDROCHLORIDE 1 MG: 1 INJECTION, SOLUTION INTRAMUSCULAR; INTRAVENOUS; SUBCUTANEOUS at 02:24

## 2025-07-28 ASSESSMENT — PAIN SCALES - GENERAL
PAINLEVEL_OUTOF10: 8
PAINLEVEL_OUTOF10: 7
PAINLEVEL_OUTOF10: 8
PAINLEVEL_OUTOF10: 5
PAINLEVEL_OUTOF10: 9
PAINLEVEL_OUTOF10: 8

## 2025-07-28 ASSESSMENT — PAIN - FUNCTIONAL ASSESSMENT
PAIN_FUNCTIONAL_ASSESSMENT: ACTIVITIES ARE NOT PREVENTED
PAIN_FUNCTIONAL_ASSESSMENT: ACTIVITIES ARE NOT PREVENTED
PAIN_FUNCTIONAL_ASSESSMENT: PREVENTS OR INTERFERES WITH MANY ACTIVE NOT PASSIVE ACTIVITIES
PAIN_FUNCTIONAL_ASSESSMENT: ACTIVITIES ARE NOT PREVENTED
PAIN_FUNCTIONAL_ASSESSMENT: ACTIVITIES ARE NOT PREVENTED

## 2025-07-28 ASSESSMENT — PAIN DESCRIPTION - DESCRIPTORS
DESCRIPTORS: STABBING
DESCRIPTORS: ACHING;CRAMPING
DESCRIPTORS: STABBING
DESCRIPTORS: STABBING

## 2025-07-28 ASSESSMENT — PAIN DESCRIPTION - LOCATION
LOCATION: ABDOMEN

## 2025-07-28 ASSESSMENT — PAIN DESCRIPTION - ORIENTATION
ORIENTATION: MID;LOWER
ORIENTATION: RIGHT
ORIENTATION: MID;RIGHT

## 2025-07-28 ASSESSMENT — PAIN DESCRIPTION - PAIN TYPE
TYPE: SURGICAL PAIN
TYPE: SURGICAL PAIN

## 2025-07-29 ENCOUNTER — APPOINTMENT (OUTPATIENT)
Facility: HOSPITAL | Age: 85
End: 2025-07-29
Attending: STUDENT IN AN ORGANIZED HEALTH CARE EDUCATION/TRAINING PROGRAM
Payer: MEDICARE

## 2025-07-29 PROBLEM — N17.9 AKI (ACUTE KIDNEY INJURY): Status: ACTIVE | Noted: 2017-10-20

## 2025-07-29 PROBLEM — R06.02 SHORTNESS OF BREATH: Status: ACTIVE | Noted: 2017-06-21

## 2025-07-29 PROBLEM — Z66 DNR (DO NOT RESUSCITATE): Status: ACTIVE | Noted: 2025-07-29

## 2025-07-29 PROBLEM — R53.1 GENERALIZED WEAKNESS: Status: ACTIVE | Noted: 2017-10-27

## 2025-07-29 PROBLEM — Z71.89 GOALS OF CARE, COUNSELING/DISCUSSION: Status: ACTIVE | Noted: 2025-07-29

## 2025-07-29 LAB
ANION GAP SERPL CALC-SCNC: 6 MMOL/L (ref 2–12)
APPEARANCE FLD: ABNORMAL
BASOPHILS # BLD: 0.1 K/UL (ref 0–0.1)
BASOPHILS NFR BLD: 0.6 % (ref 0–1)
BUN SERPL-MCNC: 57 MG/DL (ref 6–20)
BUN/CREAT SERPL: 30 (ref 12–20)
CALCIUM SERPL-MCNC: 8.7 MG/DL (ref 8.5–10.1)
CHLORIDE SERPL-SCNC: 103 MMOL/L (ref 97–108)
CO2 SERPL-SCNC: 30 MMOL/L (ref 21–32)
COLOR FLD: YELLOW
CREAT SERPL-MCNC: 1.87 MG/DL (ref 0.55–1.02)
DIFFERENTIAL METHOD BLD: ABNORMAL
EOSINOPHIL # BLD: 1.4 K/UL (ref 0–0.4)
EOSINOPHIL NFR BLD: 8.5 % (ref 0–7)
EOSINOPHIL NFR FLD MANUAL: 2 %
ERYTHROCYTE [DISTWIDTH] IN BLOOD BY AUTOMATED COUNT: 13.8 % (ref 11.5–14.5)
GLUCOSE BLD STRIP.AUTO-MCNC: 175 MG/DL (ref 65–117)
GLUCOSE BLD STRIP.AUTO-MCNC: 190 MG/DL (ref 65–117)
GLUCOSE BLD STRIP.AUTO-MCNC: 197 MG/DL (ref 65–117)
GLUCOSE BLD STRIP.AUTO-MCNC: 211 MG/DL (ref 65–117)
GLUCOSE FLD-MCNC: 206 MG/DL
GLUCOSE SERPL-MCNC: 166 MG/DL (ref 65–100)
HCT VFR BLD AUTO: 31 % (ref 35–47)
HGB BLD-MCNC: 9.8 G/DL (ref 11.5–16)
IMM GRANULOCYTES # BLD AUTO: 0.13 K/UL (ref 0–0.04)
IMM GRANULOCYTES NFR BLD AUTO: 0.8 % (ref 0–0.5)
LDH SERPL L TO P-CCNC: 222 U/L (ref 135–246)
LYMPHOCYTES # BLD: 0.99 K/UL (ref 0.8–3.5)
LYMPHOCYTES NFR BLD: 6 % (ref 12–49)
LYMPHOCYTES NFR FLD: 52 %
MAGNESIUM SERPL-MCNC: 1.7 MG/DL (ref 1.6–2.4)
MCH RBC QN AUTO: 29.6 PG (ref 26–34)
MCHC RBC AUTO-ENTMCNC: 31.6 G/DL (ref 30–36.5)
MCV RBC AUTO: 93.7 FL (ref 80–99)
MESOTHL CELL NFR FLD: 1 %
MONOCYTES # BLD: 1.2 K/UL (ref 0–1)
MONOCYTES NFR BLD: 7.3 % (ref 5–13)
MONOS+MACROS NFR FLD: 31 %
NEUTROPHILS NFR FLD: 14 %
NEUTS SEG # BLD: 12.67 K/UL (ref 1.8–8)
NEUTS SEG NFR BLD: 76.8 % (ref 32–75)
NRBC # BLD: 0 K/UL (ref 0–0.01)
NRBC BLD-RTO: 0 PER 100 WBC
NUC CELL # FLD: 101 /CU MM
PHOSPHATE SERPL-MCNC: 2.9 MG/DL (ref 2.6–4.7)
PLATELET # BLD AUTO: 412 K/UL (ref 150–400)
PMV BLD AUTO: 11 FL (ref 8.9–12.9)
POTASSIUM SERPL-SCNC: 3.5 MMOL/L (ref 3.5–5.1)
PROCALCITONIN SERPL-MCNC: 0.21 NG/ML
PROT SERPL-MCNC: 4.9 G/DL (ref 6.4–8.3)
RBC # BLD AUTO: 3.31 M/UL (ref 3.8–5.2)
RBC # FLD: >100 /CU MM
RBC MORPH BLD: ABNORMAL
SERVICE CMNT-IMP: ABNORMAL
SODIUM SERPL-SCNC: 139 MMOL/L (ref 136–145)
SPECIMEN SOURCE FLD: ABNORMAL
SPECIMEN SOURCE FLD: NORMAL
VANCOMYCIN SERPL-MCNC: 11.3 UG/ML
WBC # BLD AUTO: 16.5 K/UL (ref 3.6–11)

## 2025-07-29 PROCEDURE — 6360000002 HC RX W HCPCS: Performed by: NURSE PRACTITIONER

## 2025-07-29 PROCEDURE — 88305 TISSUE EXAM BY PATHOLOGIST: CPT

## 2025-07-29 PROCEDURE — 88112 CYTOPATH CELL ENHANCE TECH: CPT

## 2025-07-29 PROCEDURE — 2060000000 HC ICU INTERMEDIATE R&B

## 2025-07-29 PROCEDURE — 6360000002 HC RX W HCPCS: Performed by: SURGERY

## 2025-07-29 PROCEDURE — 6370000000 HC RX 637 (ALT 250 FOR IP): Performed by: SURGERY

## 2025-07-29 PROCEDURE — 80048 BASIC METABOLIC PNL TOTAL CA: CPT

## 2025-07-29 PROCEDURE — 6370000000 HC RX 637 (ALT 250 FOR IP): Performed by: INTERNAL MEDICINE

## 2025-07-29 PROCEDURE — 84100 ASSAY OF PHOSPHORUS: CPT

## 2025-07-29 PROCEDURE — 36415 COLL VENOUS BLD VENIPUNCTURE: CPT

## 2025-07-29 PROCEDURE — 2709999900 US THORACENTESIS

## 2025-07-29 PROCEDURE — 89050 BODY FLUID CELL COUNT: CPT

## 2025-07-29 PROCEDURE — 84145 PROCALCITONIN (PCT): CPT

## 2025-07-29 PROCEDURE — 99223 1ST HOSP IP/OBS HIGH 75: CPT | Performed by: INTERNAL MEDICINE

## 2025-07-29 PROCEDURE — 2580000003 HC RX 258: Performed by: INTERNAL MEDICINE

## 2025-07-29 PROCEDURE — 84157 ASSAY OF PROTEIN OTHER: CPT

## 2025-07-29 PROCEDURE — 83615 LACTATE (LD) (LDH) ENZYME: CPT

## 2025-07-29 PROCEDURE — 6360000002 HC RX W HCPCS: Performed by: INTERNAL MEDICINE

## 2025-07-29 PROCEDURE — 6370000000 HC RX 637 (ALT 250 FOR IP): Performed by: STUDENT IN AN ORGANIZED HEALTH CARE EDUCATION/TRAINING PROGRAM

## 2025-07-29 PROCEDURE — 2700000000 HC OXYGEN THERAPY PER DAY

## 2025-07-29 PROCEDURE — 87070 CULTURE OTHR SPECIMN AEROBIC: CPT

## 2025-07-29 PROCEDURE — 71045 X-RAY EXAM CHEST 1 VIEW: CPT

## 2025-07-29 PROCEDURE — 83735 ASSAY OF MAGNESIUM: CPT

## 2025-07-29 PROCEDURE — 82962 GLUCOSE BLOOD TEST: CPT

## 2025-07-29 PROCEDURE — 0W993ZZ DRAINAGE OF RIGHT PLEURAL CAVITY, PERCUTANEOUS APPROACH: ICD-10-PCS | Performed by: RADIOLOGY

## 2025-07-29 PROCEDURE — 80202 ASSAY OF VANCOMYCIN: CPT

## 2025-07-29 PROCEDURE — 82945 GLUCOSE OTHER FLUID: CPT

## 2025-07-29 PROCEDURE — 85025 COMPLETE CBC W/AUTO DIFF WBC: CPT

## 2025-07-29 PROCEDURE — 84155 ASSAY OF PROTEIN SERUM: CPT

## 2025-07-29 PROCEDURE — 97530 THERAPEUTIC ACTIVITIES: CPT

## 2025-07-29 PROCEDURE — 2500000003 HC RX 250 WO HCPCS: Performed by: SURGERY

## 2025-07-29 PROCEDURE — 97535 SELF CARE MNGMENT TRAINING: CPT

## 2025-07-29 PROCEDURE — 99232 SBSQ HOSP IP/OBS MODERATE 35: CPT

## 2025-07-29 PROCEDURE — 6360000002 HC RX W HCPCS

## 2025-07-29 PROCEDURE — 2500000003 HC RX 250 WO HCPCS: Performed by: INTERNAL MEDICINE

## 2025-07-29 PROCEDURE — 87205 SMEAR GRAM STAIN: CPT

## 2025-07-29 PROCEDURE — 94640 AIRWAY INHALATION TREATMENT: CPT

## 2025-07-29 RX ORDER — SENNA AND DOCUSATE SODIUM 50; 8.6 MG/1; MG/1
1 TABLET, FILM COATED ORAL NIGHTLY
Status: DISCONTINUED | OUTPATIENT
Start: 2025-07-29 | End: 2025-08-09 | Stop reason: HOSPADM

## 2025-07-29 RX ADMIN — THIAMINE HYDROCHLORIDE 100 MG: 100 INJECTION, SOLUTION INTRAMUSCULAR; INTRAVENOUS at 08:51

## 2025-07-29 RX ADMIN — PIPERACILLIN AND TAZOBACTAM 3375 MG: 3; .375 INJECTION, POWDER, FOR SOLUTION INTRAVENOUS at 09:09

## 2025-07-29 RX ADMIN — Medication: at 08:56

## 2025-07-29 RX ADMIN — ISOSORBIDE DINITRATE 20 MG: 10 TABLET ORAL at 12:06

## 2025-07-29 RX ADMIN — PIPERACILLIN AND TAZOBACTAM 3375 MG: 3; .375 INJECTION, POWDER, FOR SOLUTION INTRAVENOUS at 17:17

## 2025-07-29 RX ADMIN — INSULIN LISPRO 1 UNITS: 100 INJECTION, SOLUTION INTRAVENOUS; SUBCUTANEOUS at 12:03

## 2025-07-29 RX ADMIN — HEPARIN SODIUM 5000 UNITS: 5000 INJECTION INTRAVENOUS; SUBCUTANEOUS at 21:29

## 2025-07-29 RX ADMIN — ISOSORBIDE DINITRATE 20 MG: 10 TABLET ORAL at 17:27

## 2025-07-29 RX ADMIN — ARFORMOTEROL TARTRATE: 15 SOLUTION RESPIRATORY (INHALATION) at 19:21

## 2025-07-29 RX ADMIN — Medication: at 21:29

## 2025-07-29 RX ADMIN — SODIUM CHLORIDE, PRESERVATIVE FREE 10 ML: 5 INJECTION INTRAVENOUS at 08:53

## 2025-07-29 RX ADMIN — MELATONIN 3 MG: at 21:25

## 2025-07-29 RX ADMIN — IPRATROPIUM BROMIDE 0.5 MG: 0.5 SOLUTION RESPIRATORY (INHALATION) at 19:21

## 2025-07-29 RX ADMIN — ASPIRIN 81 MG: 81 TABLET, DELAYED RELEASE ORAL at 08:47

## 2025-07-29 RX ADMIN — FUROSEMIDE 40 MG: 10 INJECTION, SOLUTION INTRAMUSCULAR; INTRAVENOUS at 17:27

## 2025-07-29 RX ADMIN — FLUCONAZOLE 200 MG: 2 INJECTION, SOLUTION INTRAVENOUS at 12:03

## 2025-07-29 RX ADMIN — FUROSEMIDE 40 MG: 10 INJECTION, SOLUTION INTRAMUSCULAR; INTRAVENOUS at 08:57

## 2025-07-29 RX ADMIN — ISOSORBIDE DINITRATE 20 MG: 10 TABLET ORAL at 08:47

## 2025-07-29 RX ADMIN — HEPARIN SODIUM 5000 UNITS: 5000 INJECTION INTRAVENOUS; SUBCUTANEOUS at 14:42

## 2025-07-29 RX ADMIN — HEPARIN SODIUM 5000 UNITS: 5000 INJECTION INTRAVENOUS; SUBCUTANEOUS at 06:06

## 2025-07-29 RX ADMIN — PIPERACILLIN AND TAZOBACTAM 3375 MG: 3; .375 INJECTION, POWDER, FOR SOLUTION INTRAVENOUS at 01:13

## 2025-07-29 RX ADMIN — OXYCODONE HYDROCHLORIDE 2.5 MG: 5 TABLET ORAL at 08:47

## 2025-07-29 RX ADMIN — VANCOMYCIN HYDROCHLORIDE 500 MG: 500 INJECTION, POWDER, LYOPHILIZED, FOR SOLUTION INTRAVENOUS at 16:05

## 2025-07-29 RX ADMIN — SMOFLIPID 250 ML: 6; 6; 5; 3 INJECTION, EMULSION INTRAVENOUS at 17:35

## 2025-07-29 RX ADMIN — CARVEDILOL 12.5 MG: 12.5 TABLET, FILM COATED ORAL at 08:47

## 2025-07-29 RX ADMIN — SODIUM CHLORIDE, PRESERVATIVE FREE 10 ML: 5 INJECTION INTRAVENOUS at 21:25

## 2025-07-29 RX ADMIN — CARVEDILOL 12.5 MG: 12.5 TABLET, FILM COATED ORAL at 21:24

## 2025-07-29 RX ADMIN — INSULIN LISPRO 1 UNITS: 100 INJECTION, SOLUTION INTRAVENOUS; SUBCUTANEOUS at 17:24

## 2025-07-29 RX ADMIN — ATORVASTATIN CALCIUM 40 MG: 40 TABLET, FILM COATED ORAL at 21:25

## 2025-07-29 ASSESSMENT — PAIN SCALES - GENERAL: PAINLEVEL_OUTOF10: 8

## 2025-07-29 ASSESSMENT — PAIN DESCRIPTION - DESCRIPTORS: DESCRIPTORS: DISCOMFORT

## 2025-07-29 ASSESSMENT — PAIN DESCRIPTION - PAIN TYPE: TYPE: SURGICAL PAIN

## 2025-07-29 ASSESSMENT — PAIN DESCRIPTION - ORIENTATION: ORIENTATION: RIGHT;LOWER

## 2025-07-29 ASSESSMENT — PAIN DESCRIPTION - FREQUENCY: FREQUENCY: CONTINUOUS

## 2025-07-29 ASSESSMENT — PAIN - FUNCTIONAL ASSESSMENT: PAIN_FUNCTIONAL_ASSESSMENT: PREVENTS OR INTERFERES SOME ACTIVE ACTIVITIES AND ADLS

## 2025-07-29 ASSESSMENT — PAIN DESCRIPTION - LOCATION: LOCATION: ABDOMEN

## 2025-07-29 ASSESSMENT — PAIN DESCRIPTION - ONSET: ONSET: ON-GOING

## 2025-07-30 PROBLEM — J90 PLEURAL EFFUSION, RIGHT: Status: ACTIVE | Noted: 2025-07-30

## 2025-07-30 PROBLEM — E11.8 CONTROLLED DIABETES MELLITUS TYPE 2 WITH COMPLICATIONS (HCC): Status: ACTIVE | Noted: 2025-07-30

## 2025-07-30 PROBLEM — J96.01 ACUTE RESPIRATORY FAILURE WITH HYPOXIA (HCC): Status: ACTIVE | Noted: 2025-07-30

## 2025-07-30 PROBLEM — K56.609 SMALL BOWEL OBSTRUCTION (HCC): Status: ACTIVE | Noted: 2025-07-30

## 2025-07-30 PROBLEM — I50.9 CONGESTIVE HEART FAILURE (HCC): Status: ACTIVE | Noted: 2025-07-30

## 2025-07-30 PROBLEM — I42.9 CARDIOMYOPATHY (HCC): Status: ACTIVE | Noted: 2020-09-23

## 2025-07-30 PROBLEM — D72.829 LEUKOCYTOSIS: Status: ACTIVE | Noted: 2021-09-01

## 2025-07-30 PROBLEM — R91.8 BILATERAL PULMONARY INFILTRATES: Status: ACTIVE | Noted: 2025-07-30

## 2025-07-30 PROBLEM — B99.9 INTRA-ABDOMINAL INFECTION: Status: ACTIVE | Noted: 2025-07-30

## 2025-07-30 PROBLEM — J81.0 ACUTE PULMONARY EDEMA (HCC): Status: ACTIVE | Noted: 2025-07-30

## 2025-07-30 LAB
ANION GAP SERPL CALC-SCNC: 10 MMOL/L (ref 2–14)
BASOPHILS # BLD: 0 K/UL (ref 0–0.1)
BASOPHILS # BLD: 0.08 K/UL (ref 0–0.1)
BASOPHILS NFR BLD: 0 % (ref 0–1)
BASOPHILS NFR BLD: 0.7 % (ref 0–1)
BUN SERPL-MCNC: 51 MG/DL (ref 8–23)
BUN/CREAT SERPL: 27 (ref 12–20)
CALCIUM SERPL-MCNC: 7.8 MG/DL (ref 8.8–10.2)
CHLORIDE SERPL-SCNC: 103 MMOL/L (ref 98–107)
CO2 SERPL-SCNC: 26 MMOL/L (ref 20–29)
CREAT SERPL-MCNC: 1.92 MG/DL (ref 0.6–1)
CYTOLOGY-NON GYN: NORMAL
DIFFERENTIAL METHOD BLD: ABNORMAL
DIFFERENTIAL METHOD BLD: ABNORMAL
EOSINOPHIL # BLD: 0.93 K/UL (ref 0–0.4)
EOSINOPHIL # BLD: 1.45 K/UL (ref 0–0.4)
EOSINOPHIL NFR BLD: 12 % (ref 0–7)
EOSINOPHIL NFR BLD: 7.7 % (ref 0–7)
ERYTHROCYTE [DISTWIDTH] IN BLOOD BY AUTOMATED COUNT: 14.2 % (ref 11.5–14.5)
ERYTHROCYTE [DISTWIDTH] IN BLOOD BY AUTOMATED COUNT: 14.2 % (ref 11.5–14.5)
GLUCOSE BLD STRIP.AUTO-MCNC: 122 MG/DL (ref 65–117)
GLUCOSE BLD STRIP.AUTO-MCNC: 163 MG/DL (ref 65–117)
GLUCOSE BLD STRIP.AUTO-MCNC: 193 MG/DL (ref 65–117)
GLUCOSE BLD STRIP.AUTO-MCNC: 207 MG/DL (ref 65–117)
GLUCOSE SERPL-MCNC: 124 MG/DL (ref 65–100)
HCT VFR BLD AUTO: 23.7 % (ref 35–47)
HCT VFR BLD AUTO: 23.9 % (ref 35–47)
HGB BLD-MCNC: 7.3 G/DL (ref 11.5–16)
HGB BLD-MCNC: 7.5 G/DL (ref 11.5–16)
IMM GRANULOCYTES # BLD AUTO: 0 K/UL (ref 0–0.04)
IMM GRANULOCYTES # BLD AUTO: 0.07 K/UL (ref 0–0.04)
IMM GRANULOCYTES NFR BLD AUTO: 0 % (ref 0–0.5)
IMM GRANULOCYTES NFR BLD AUTO: 0.6 % (ref 0–0.5)
LDH FLD L TO P-CCNC: 47 U/L
LYMPHOCYTES # BLD: 0.96 K/UL (ref 0.8–3.5)
LYMPHOCYTES # BLD: 1.33 K/UL (ref 0.8–3.5)
LYMPHOCYTES NFR BLD: 11 % (ref 12–49)
LYMPHOCYTES NFR BLD: 8 % (ref 12–49)
MAGNESIUM SERPL-MCNC: 1.6 MG/DL (ref 1.6–2.4)
MCH RBC QN AUTO: 28.6 PG (ref 26–34)
MCH RBC QN AUTO: 29.2 PG (ref 26–34)
MCHC RBC AUTO-ENTMCNC: 30.8 G/DL (ref 30–36.5)
MCHC RBC AUTO-ENTMCNC: 31.4 G/DL (ref 30–36.5)
MCV RBC AUTO: 92.9 FL (ref 80–99)
MCV RBC AUTO: 93 FL (ref 80–99)
MONOCYTES # BLD: 0.73 K/UL (ref 0–1)
MONOCYTES # BLD: 1.09 K/UL (ref 0–1)
MONOCYTES NFR BLD: 6 % (ref 5–13)
MONOCYTES NFR BLD: 9.1 % (ref 5–13)
NEUTS BAND NFR BLD MANUAL: 1 %
NEUTS SEG # BLD: 8.59 K/UL (ref 1.8–8)
NEUTS SEG # BLD: 8.88 K/UL (ref 1.8–8)
NEUTS SEG NFR BLD: 70 % (ref 32–75)
NEUTS SEG NFR BLD: 73.9 % (ref 32–75)
NRBC # BLD: 0 K/UL (ref 0–0.01)
NRBC # BLD: 0 K/UL (ref 0–0.01)
NRBC BLD-RTO: 0 PER 100 WBC
NRBC BLD-RTO: 0 PER 100 WBC
PHOSPHATE SERPL-MCNC: 3.3 MG/DL (ref 2.5–4.5)
PLATELET # BLD AUTO: 329 K/UL (ref 150–400)
PLATELET # BLD AUTO: 375 K/UL (ref 150–400)
PMV BLD AUTO: 11 FL (ref 8.9–12.9)
PMV BLD AUTO: 11.1 FL (ref 8.9–12.9)
POTASSIUM SERPL-SCNC: 3.4 MMOL/L (ref 3.5–5.1)
RBC # BLD AUTO: 2.55 M/UL (ref 3.8–5.2)
RBC # BLD AUTO: 2.57 M/UL (ref 3.8–5.2)
RBC MORPH BLD: ABNORMAL
SERVICE CMNT-IMP: ABNORMAL
SODIUM SERPL-SCNC: 140 MMOL/L (ref 136–145)
SPECIMEN SOURCE FLD: NORMAL
WBC # BLD AUTO: 12 K/UL (ref 3.6–11)
WBC # BLD AUTO: 12.1 K/UL (ref 3.6–11)

## 2025-07-30 PROCEDURE — 82962 GLUCOSE BLOOD TEST: CPT

## 2025-07-30 PROCEDURE — 36415 COLL VENOUS BLD VENIPUNCTURE: CPT

## 2025-07-30 PROCEDURE — 80053 COMPREHEN METABOLIC PANEL: CPT

## 2025-07-30 PROCEDURE — 99233 SBSQ HOSP IP/OBS HIGH 50: CPT | Performed by: INTERNAL MEDICINE

## 2025-07-30 PROCEDURE — 6370000000 HC RX 637 (ALT 250 FOR IP): Performed by: SURGERY

## 2025-07-30 PROCEDURE — 6370000000 HC RX 637 (ALT 250 FOR IP): Performed by: INTERNAL MEDICINE

## 2025-07-30 PROCEDURE — 2700000000 HC OXYGEN THERAPY PER DAY

## 2025-07-30 PROCEDURE — 85025 COMPLETE CBC W/AUTO DIFF WBC: CPT

## 2025-07-30 PROCEDURE — 2500000003 HC RX 250 WO HCPCS: Performed by: SURGERY

## 2025-07-30 PROCEDURE — 51798 US URINE CAPACITY MEASURE: CPT

## 2025-07-30 PROCEDURE — 6360000002 HC RX W HCPCS: Performed by: INTERNAL MEDICINE

## 2025-07-30 PROCEDURE — 6370000000 HC RX 637 (ALT 250 FOR IP): Performed by: STUDENT IN AN ORGANIZED HEALTH CARE EDUCATION/TRAINING PROGRAM

## 2025-07-30 PROCEDURE — 2060000000 HC ICU INTERMEDIATE R&B

## 2025-07-30 PROCEDURE — 6360000002 HC RX W HCPCS

## 2025-07-30 PROCEDURE — 2580000003 HC RX 258: Performed by: INTERNAL MEDICINE

## 2025-07-30 PROCEDURE — 97530 THERAPEUTIC ACTIVITIES: CPT

## 2025-07-30 PROCEDURE — 99232 SBSQ HOSP IP/OBS MODERATE 35: CPT

## 2025-07-30 PROCEDURE — 6360000002 HC RX W HCPCS: Performed by: SURGERY

## 2025-07-30 PROCEDURE — 6360000002 HC RX W HCPCS: Performed by: NURSE PRACTITIONER

## 2025-07-30 PROCEDURE — 83735 ASSAY OF MAGNESIUM: CPT

## 2025-07-30 PROCEDURE — 2500000003 HC RX 250 WO HCPCS: Performed by: INTERNAL MEDICINE

## 2025-07-30 PROCEDURE — 97116 GAIT TRAINING THERAPY: CPT

## 2025-07-30 PROCEDURE — 84100 ASSAY OF PHOSPHORUS: CPT

## 2025-07-30 PROCEDURE — 94640 AIRWAY INHALATION TREATMENT: CPT

## 2025-07-30 PROCEDURE — 97535 SELF CARE MNGMENT TRAINING: CPT

## 2025-07-30 RX ORDER — FUROSEMIDE 10 MG/ML
40 INJECTION INTRAMUSCULAR; INTRAVENOUS DAILY
Status: DISCONTINUED | OUTPATIENT
Start: 2025-07-30 | End: 2025-07-30

## 2025-07-30 RX ORDER — FUROSEMIDE 10 MG/ML
40 INJECTION INTRAMUSCULAR; INTRAVENOUS 2 TIMES DAILY
Status: DISCONTINUED | OUTPATIENT
Start: 2025-07-30 | End: 2025-07-31

## 2025-07-30 RX ORDER — POTASSIUM CHLORIDE 1500 MG/1
20 TABLET, EXTENDED RELEASE ORAL DAILY
Status: DISCONTINUED | OUTPATIENT
Start: 2025-07-30 | End: 2025-08-09 | Stop reason: HOSPADM

## 2025-07-30 RX ADMIN — ASPIRIN 81 MG: 81 TABLET, DELAYED RELEASE ORAL at 09:14

## 2025-07-30 RX ADMIN — PIPERACILLIN AND TAZOBACTAM 3375 MG: 3; .375 INJECTION, POWDER, FOR SOLUTION INTRAVENOUS at 03:16

## 2025-07-30 RX ADMIN — HEPARIN SODIUM 5000 UNITS: 5000 INJECTION INTRAVENOUS; SUBCUTANEOUS at 14:09

## 2025-07-30 RX ADMIN — ISOSORBIDE DINITRATE 20 MG: 10 TABLET ORAL at 14:09

## 2025-07-30 RX ADMIN — OXYCODONE HYDROCHLORIDE 2.5 MG: 5 TABLET ORAL at 11:22

## 2025-07-30 RX ADMIN — Medication: at 09:39

## 2025-07-30 RX ADMIN — ISOSORBIDE DINITRATE 20 MG: 10 TABLET ORAL at 17:58

## 2025-07-30 RX ADMIN — THIAMINE HYDROCHLORIDE 100 MG: 100 INJECTION, SOLUTION INTRAMUSCULAR; INTRAVENOUS at 09:23

## 2025-07-30 RX ADMIN — PIPERACILLIN AND TAZOBACTAM 3375 MG: 3; .375 INJECTION, POWDER, FOR SOLUTION INTRAVENOUS at 17:19

## 2025-07-30 RX ADMIN — Medication: at 20:52

## 2025-07-30 RX ADMIN — MELATONIN 3 MG: at 20:51

## 2025-07-30 RX ADMIN — FUROSEMIDE 40 MG: 10 INJECTION, SOLUTION INTRAMUSCULAR; INTRAVENOUS at 17:58

## 2025-07-30 RX ADMIN — IPRATROPIUM BROMIDE 0.5 MG: 0.5 SOLUTION RESPIRATORY (INHALATION) at 07:14

## 2025-07-30 RX ADMIN — VANCOMYCIN HYDROCHLORIDE 500 MG: 500 INJECTION, POWDER, LYOPHILIZED, FOR SOLUTION INTRAVENOUS at 15:00

## 2025-07-30 RX ADMIN — IPRATROPIUM BROMIDE 0.5 MG: 0.5 SOLUTION RESPIRATORY (INHALATION) at 14:27

## 2025-07-30 RX ADMIN — SODIUM CHLORIDE, PRESERVATIVE FREE 10 ML: 5 INJECTION INTRAVENOUS at 20:51

## 2025-07-30 RX ADMIN — SMOFLIPID 250 ML: 6; 6; 5; 3 INJECTION, EMULSION INTRAVENOUS at 17:55

## 2025-07-30 RX ADMIN — IPRATROPIUM BROMIDE 0.5 MG: 0.5 SOLUTION RESPIRATORY (INHALATION) at 19:45

## 2025-07-30 RX ADMIN — FLUCONAZOLE 200 MG: 2 INJECTION, SOLUTION INTRAVENOUS at 11:27

## 2025-07-30 RX ADMIN — CARVEDILOL 12.5 MG: 12.5 TABLET, FILM COATED ORAL at 20:50

## 2025-07-30 RX ADMIN — HEPARIN SODIUM 5000 UNITS: 5000 INJECTION INTRAVENOUS; SUBCUTANEOUS at 06:46

## 2025-07-30 RX ADMIN — CARVEDILOL 12.5 MG: 12.5 TABLET, FILM COATED ORAL at 09:14

## 2025-07-30 RX ADMIN — ISOSORBIDE DINITRATE 20 MG: 10 TABLET ORAL at 09:14

## 2025-07-30 RX ADMIN — IPRATROPIUM BROMIDE 0.5 MG: 0.5 SOLUTION RESPIRATORY (INHALATION) at 11:43

## 2025-07-30 RX ADMIN — PIPERACILLIN AND TAZOBACTAM 3375 MG: 3; .375 INJECTION, POWDER, FOR SOLUTION INTRAVENOUS at 09:26

## 2025-07-30 RX ADMIN — ARFORMOTEROL TARTRATE: 15 SOLUTION RESPIRATORY (INHALATION) at 07:14

## 2025-07-30 RX ADMIN — INSULIN LISPRO 1 UNITS: 100 INJECTION, SOLUTION INTRAVENOUS; SUBCUTANEOUS at 20:50

## 2025-07-30 RX ADMIN — SODIUM CHLORIDE, PRESERVATIVE FREE 10 ML: 5 INJECTION INTRAVENOUS at 09:27

## 2025-07-30 RX ADMIN — FUROSEMIDE 40 MG: 10 INJECTION, SOLUTION INTRAMUSCULAR; INTRAVENOUS at 09:23

## 2025-07-30 RX ADMIN — INSULIN LISPRO 1 UNITS: 100 INJECTION, SOLUTION INTRAVENOUS; SUBCUTANEOUS at 17:16

## 2025-07-30 RX ADMIN — ARFORMOTEROL TARTRATE: 15 SOLUTION RESPIRATORY (INHALATION) at 19:44

## 2025-07-30 RX ADMIN — HEPARIN SODIUM 5000 UNITS: 5000 INJECTION INTRAVENOUS; SUBCUTANEOUS at 22:44

## 2025-07-30 RX ADMIN — POTASSIUM CHLORIDE 20 MEQ: 1500 TABLET, EXTENDED RELEASE ORAL at 09:23

## 2025-07-30 RX ADMIN — ATORVASTATIN CALCIUM 40 MG: 40 TABLET, FILM COATED ORAL at 20:51

## 2025-07-30 ASSESSMENT — PAIN DESCRIPTION - LOCATION: LOCATION: ABDOMEN

## 2025-07-30 ASSESSMENT — PAIN DESCRIPTION - DESCRIPTORS: DESCRIPTORS: DULL

## 2025-07-30 ASSESSMENT — PAIN DESCRIPTION - ORIENTATION: ORIENTATION: MID

## 2025-07-30 ASSESSMENT — PAIN SCALES - GENERAL: PAINLEVEL_OUTOF10: 7

## 2025-07-31 LAB
ALBUMIN SERPL-MCNC: 1.7 G/DL (ref 3.5–5.2)
ALBUMIN/GLOB SERPL: 0.6 (ref 1.1–2.2)
ALP SERPL-CCNC: 58 U/L (ref 35–104)
ALT SERPL-CCNC: 7 U/L (ref 10–35)
ANION GAP SERPL CALC-SCNC: 11 MMOL/L (ref 2–14)
AST SERPL-CCNC: 12 U/L (ref 10–35)
BILIRUB SERPL-MCNC: 0.3 MG/DL (ref 0–1.2)
BUN SERPL-MCNC: 55 MG/DL (ref 8–23)
BUN/CREAT SERPL: 26 (ref 12–20)
CALCIUM SERPL-MCNC: 8 MG/DL (ref 8.8–10.2)
CHLORIDE SERPL-SCNC: 105 MMOL/L (ref 98–107)
CO2 SERPL-SCNC: 25 MMOL/L (ref 20–29)
CREAT SERPL-MCNC: 2.08 MG/DL (ref 0.6–1)
GLOBULIN SER CALC-MCNC: 3 G/DL (ref 2–4)
GLUCOSE BLD STRIP.AUTO-MCNC: 210 MG/DL (ref 65–117)
GLUCOSE BLD STRIP.AUTO-MCNC: 239 MG/DL (ref 65–117)
GLUCOSE BLD STRIP.AUTO-MCNC: 274 MG/DL (ref 65–117)
GLUCOSE BLD STRIP.AUTO-MCNC: 295 MG/DL (ref 65–117)
GLUCOSE SERPL-MCNC: 186 MG/DL (ref 65–100)
MAGNESIUM SERPL-MCNC: 1.5 MG/DL (ref 1.6–2.4)
PHOSPHATE SERPL-MCNC: 3 MG/DL (ref 2.5–4.5)
POTASSIUM SERPL-SCNC: 3.6 MMOL/L (ref 3.5–5.1)
PROT FLD-MCNC: 0.8 G/DL
PROT SERPL-MCNC: 4.7 G/DL (ref 6.4–8.3)
SERVICE CMNT-IMP: ABNORMAL
SODIUM SERPL-SCNC: 141 MMOL/L (ref 136–145)
SPECIMEN SOURCE FLD: NORMAL
VANCOMYCIN SERPL-MCNC: 10.8 UG/ML

## 2025-07-31 PROCEDURE — 2580000003 HC RX 258: Performed by: INTERNAL MEDICINE

## 2025-07-31 PROCEDURE — 6370000000 HC RX 637 (ALT 250 FOR IP): Performed by: INTERNAL MEDICINE

## 2025-07-31 PROCEDURE — 82962 GLUCOSE BLOOD TEST: CPT

## 2025-07-31 PROCEDURE — 6360000002 HC RX W HCPCS

## 2025-07-31 PROCEDURE — 5A09357 ASSISTANCE WITH RESPIRATORY VENTILATION, LESS THAN 24 CONSECUTIVE HOURS, CONTINUOUS POSITIVE AIRWAY PRESSURE: ICD-10-PCS | Performed by: INTERNAL MEDICINE

## 2025-07-31 PROCEDURE — 6360000002 HC RX W HCPCS: Performed by: INTERNAL MEDICINE

## 2025-07-31 PROCEDURE — 6370000000 HC RX 637 (ALT 250 FOR IP): Performed by: STUDENT IN AN ORGANIZED HEALTH CARE EDUCATION/TRAINING PROGRAM

## 2025-07-31 PROCEDURE — 6370000000 HC RX 637 (ALT 250 FOR IP): Performed by: SURGERY

## 2025-07-31 PROCEDURE — 6360000002 HC RX W HCPCS: Performed by: NURSE PRACTITIONER

## 2025-07-31 PROCEDURE — 2060000000 HC ICU INTERMEDIATE R&B

## 2025-07-31 PROCEDURE — 80202 ASSAY OF VANCOMYCIN: CPT

## 2025-07-31 PROCEDURE — 36415 COLL VENOUS BLD VENIPUNCTURE: CPT

## 2025-07-31 PROCEDURE — 99233 SBSQ HOSP IP/OBS HIGH 50: CPT | Performed by: INTERNAL MEDICINE

## 2025-07-31 PROCEDURE — 94660 CPAP INITIATION&MGMT: CPT

## 2025-07-31 PROCEDURE — 2700000000 HC OXYGEN THERAPY PER DAY

## 2025-07-31 PROCEDURE — 6360000002 HC RX W HCPCS: Performed by: SURGERY

## 2025-07-31 PROCEDURE — 6370000000 HC RX 637 (ALT 250 FOR IP)

## 2025-07-31 PROCEDURE — 94640 AIRWAY INHALATION TREATMENT: CPT

## 2025-07-31 PROCEDURE — APPNB30 APP NON BILLABLE TIME 0-30 MINS: Performed by: NURSE PRACTITIONER

## 2025-07-31 PROCEDURE — 2500000003 HC RX 250 WO HCPCS: Performed by: INTERNAL MEDICINE

## 2025-07-31 PROCEDURE — 2500000003 HC RX 250 WO HCPCS: Performed by: SURGERY

## 2025-07-31 RX ORDER — MAGNESIUM SULFATE 1 G/100ML
1000 INJECTION INTRAVENOUS ONCE
Status: COMPLETED | OUTPATIENT
Start: 2025-07-31 | End: 2025-07-31

## 2025-07-31 RX ORDER — FUROSEMIDE 10 MG/ML
40 INJECTION INTRAMUSCULAR; INTRAVENOUS DAILY
Status: DISCONTINUED | OUTPATIENT
Start: 2025-08-01 | End: 2025-08-02

## 2025-07-31 RX ADMIN — ISOSORBIDE DINITRATE 20 MG: 10 TABLET ORAL at 14:01

## 2025-07-31 RX ADMIN — INSULIN LISPRO 1 UNITS: 100 INJECTION, SOLUTION INTRAVENOUS; SUBCUTANEOUS at 21:06

## 2025-07-31 RX ADMIN — THIAMINE HYDROCHLORIDE 100 MG: 100 INJECTION, SOLUTION INTRAMUSCULAR; INTRAVENOUS at 09:46

## 2025-07-31 RX ADMIN — IPRATROPIUM BROMIDE 0.5 MG: 0.5 SOLUTION RESPIRATORY (INHALATION) at 19:42

## 2025-07-31 RX ADMIN — IPRATROPIUM BROMIDE 0.5 MG: 0.5 SOLUTION RESPIRATORY (INHALATION) at 14:11

## 2025-07-31 RX ADMIN — INSULIN LISPRO 2 UNITS: 100 INJECTION, SOLUTION INTRAVENOUS; SUBCUTANEOUS at 18:47

## 2025-07-31 RX ADMIN — MAGNESIUM SULFATE HEPTAHYDRATE 1000 MG: 1 INJECTION, SOLUTION INTRAVENOUS at 01:06

## 2025-07-31 RX ADMIN — Medication: at 21:02

## 2025-07-31 RX ADMIN — FLUCONAZOLE 200 MG: 2 INJECTION, SOLUTION INTRAVENOUS at 11:56

## 2025-07-31 RX ADMIN — ATORVASTATIN CALCIUM 40 MG: 40 TABLET, FILM COATED ORAL at 21:02

## 2025-07-31 RX ADMIN — ARFORMOTEROL TARTRATE: 15 SOLUTION RESPIRATORY (INHALATION) at 09:57

## 2025-07-31 RX ADMIN — CARVEDILOL 12.5 MG: 12.5 TABLET, FILM COATED ORAL at 21:02

## 2025-07-31 RX ADMIN — VANCOMYCIN HYDROCHLORIDE 500 MG: 500 INJECTION, POWDER, LYOPHILIZED, FOR SOLUTION INTRAVENOUS at 14:45

## 2025-07-31 RX ADMIN — ISOSORBIDE DINITRATE 20 MG: 10 TABLET ORAL at 09:45

## 2025-07-31 RX ADMIN — SMOFLIPID 250 ML: 6; 6; 5; 3 INJECTION, EMULSION INTRAVENOUS at 18:47

## 2025-07-31 RX ADMIN — Medication: at 09:46

## 2025-07-31 RX ADMIN — ONDANSETRON 4 MG: 4 TABLET, ORALLY DISINTEGRATING ORAL at 14:01

## 2025-07-31 RX ADMIN — HEPARIN SODIUM 5000 UNITS: 5000 INJECTION INTRAVENOUS; SUBCUTANEOUS at 21:11

## 2025-07-31 RX ADMIN — PIPERACILLIN AND TAZOBACTAM 3375 MG: 3; .375 INJECTION, POWDER, FOR SOLUTION INTRAVENOUS at 21:02

## 2025-07-31 RX ADMIN — ASPIRIN 81 MG: 81 TABLET, DELAYED RELEASE ORAL at 09:45

## 2025-07-31 RX ADMIN — INSULIN LISPRO 2 UNITS: 100 INJECTION, SOLUTION INTRAVENOUS; SUBCUTANEOUS at 11:57

## 2025-07-31 RX ADMIN — SODIUM CHLORIDE, PRESERVATIVE FREE 10 ML: 5 INJECTION INTRAVENOUS at 09:47

## 2025-07-31 RX ADMIN — HEPARIN SODIUM 5000 UNITS: 5000 INJECTION INTRAVENOUS; SUBCUTANEOUS at 14:01

## 2025-07-31 RX ADMIN — PIPERACILLIN AND TAZOBACTAM 3375 MG: 3; .375 INJECTION, POWDER, FOR SOLUTION INTRAVENOUS at 10:02

## 2025-07-31 RX ADMIN — PIPERACILLIN AND TAZOBACTAM 3375 MG: 3; .375 INJECTION, POWDER, FOR SOLUTION INTRAVENOUS at 00:57

## 2025-07-31 RX ADMIN — IPRATROPIUM BROMIDE 0.5 MG: 0.5 SOLUTION RESPIRATORY (INHALATION) at 09:50

## 2025-07-31 RX ADMIN — MELATONIN 3 MG: at 21:02

## 2025-07-31 RX ADMIN — POTASSIUM CHLORIDE 20 MEQ: 1500 TABLET, EXTENDED RELEASE ORAL at 09:45

## 2025-07-31 RX ADMIN — SENNOSIDES AND DOCUSATE SODIUM 1 TABLET: 8.6; 5 TABLET ORAL at 21:02

## 2025-07-31 RX ADMIN — INSULIN LISPRO 1 UNITS: 100 INJECTION, SOLUTION INTRAVENOUS; SUBCUTANEOUS at 09:46

## 2025-07-31 RX ADMIN — ARFORMOTEROL TARTRATE: 15 SOLUTION RESPIRATORY (INHALATION) at 19:42

## 2025-07-31 RX ADMIN — CARVEDILOL 12.5 MG: 12.5 TABLET, FILM COATED ORAL at 09:45

## 2025-07-31 RX ADMIN — ISOSORBIDE DINITRATE 20 MG: 10 TABLET ORAL at 18:47

## 2025-08-01 ENCOUNTER — APPOINTMENT (OUTPATIENT)
Facility: HOSPITAL | Age: 85
End: 2025-08-01
Attending: STUDENT IN AN ORGANIZED HEALTH CARE EDUCATION/TRAINING PROGRAM
Payer: MEDICARE

## 2025-08-01 LAB
ANION GAP SERPL CALC-SCNC: 11 MMOL/L (ref 2–14)
BASOPHILS # BLD: 0.08 K/UL (ref 0–0.1)
BASOPHILS NFR BLD: 0.7 % (ref 0–1)
BUN SERPL-MCNC: 53 MG/DL (ref 8–23)
BUN/CREAT SERPL: 26 (ref 12–20)
CALCIUM SERPL-MCNC: 8.3 MG/DL (ref 8.8–10.2)
CHLORIDE SERPL-SCNC: 105 MMOL/L (ref 98–107)
CO2 SERPL-SCNC: 24 MMOL/L (ref 20–29)
CREAT SERPL-MCNC: 2.07 MG/DL (ref 0.6–1)
DIFFERENTIAL METHOD BLD: ABNORMAL
EOSINOPHIL # BLD: 0.95 K/UL (ref 0–0.4)
EOSINOPHIL NFR BLD: 8.1 % (ref 0–7)
ERYTHROCYTE [DISTWIDTH] IN BLOOD BY AUTOMATED COUNT: 14.5 % (ref 11.5–14.5)
GLUCOSE BLD STRIP.AUTO-MCNC: 205 MG/DL (ref 65–117)
GLUCOSE BLD STRIP.AUTO-MCNC: 250 MG/DL (ref 65–117)
GLUCOSE BLD STRIP.AUTO-MCNC: 290 MG/DL (ref 65–117)
GLUCOSE SERPL-MCNC: 298 MG/DL (ref 65–100)
HCT VFR BLD AUTO: 26 % (ref 35–47)
HGB BLD-MCNC: 8.2 G/DL (ref 11.5–16)
IMM GRANULOCYTES # BLD AUTO: 0.07 K/UL (ref 0–0.04)
IMM GRANULOCYTES NFR BLD AUTO: 0.6 % (ref 0–0.5)
LYMPHOCYTES # BLD: 0.71 K/UL (ref 0.8–3.5)
LYMPHOCYTES NFR BLD: 6 % (ref 12–49)
MCH RBC QN AUTO: 29.6 PG (ref 26–34)
MCHC RBC AUTO-ENTMCNC: 31.5 G/DL (ref 30–36.5)
MCV RBC AUTO: 93.9 FL (ref 80–99)
MONOCYTES # BLD: 1 K/UL (ref 0–1)
MONOCYTES NFR BLD: 8.5 % (ref 5–13)
NEUTS SEG # BLD: 8.99 K/UL (ref 1.8–8)
NEUTS SEG NFR BLD: 76.1 % (ref 32–75)
NRBC # BLD: 0 K/UL (ref 0–0.01)
NRBC BLD-RTO: 0 PER 100 WBC
PLATELET # BLD AUTO: 385 K/UL (ref 150–400)
PMV BLD AUTO: 11.2 FL (ref 8.9–12.9)
POTASSIUM SERPL-SCNC: 3.9 MMOL/L (ref 3.5–5.1)
RBC # BLD AUTO: 2.77 M/UL (ref 3.8–5.2)
SERVICE CMNT-IMP: ABNORMAL
SODIUM SERPL-SCNC: 140 MMOL/L (ref 136–145)
WBC # BLD AUTO: 11.8 K/UL (ref 3.6–11)

## 2025-08-01 PROCEDURE — 2580000003 HC RX 258: Performed by: INTERNAL MEDICINE

## 2025-08-01 PROCEDURE — 6370000000 HC RX 637 (ALT 250 FOR IP): Performed by: STUDENT IN AN ORGANIZED HEALTH CARE EDUCATION/TRAINING PROGRAM

## 2025-08-01 PROCEDURE — 80048 BASIC METABOLIC PNL TOTAL CA: CPT

## 2025-08-01 PROCEDURE — 6370000000 HC RX 637 (ALT 250 FOR IP): Performed by: INTERNAL MEDICINE

## 2025-08-01 PROCEDURE — 85025 COMPLETE CBC W/AUTO DIFF WBC: CPT

## 2025-08-01 PROCEDURE — 2500000003 HC RX 250 WO HCPCS: Performed by: INTERNAL MEDICINE

## 2025-08-01 PROCEDURE — 2500000003 HC RX 250 WO HCPCS: Performed by: SURGERY

## 2025-08-01 PROCEDURE — 6360000002 HC RX W HCPCS: Performed by: INTERNAL MEDICINE

## 2025-08-01 PROCEDURE — 5A09457 ASSISTANCE WITH RESPIRATORY VENTILATION, 24-96 CONSECUTIVE HOURS, CONTINUOUS POSITIVE AIRWAY PRESSURE: ICD-10-PCS | Performed by: INTERNAL MEDICINE

## 2025-08-01 PROCEDURE — 36415 COLL VENOUS BLD VENIPUNCTURE: CPT

## 2025-08-01 PROCEDURE — 2700000000 HC OXYGEN THERAPY PER DAY

## 2025-08-01 PROCEDURE — 82962 GLUCOSE BLOOD TEST: CPT

## 2025-08-01 PROCEDURE — 71045 X-RAY EXAM CHEST 1 VIEW: CPT

## 2025-08-01 PROCEDURE — 6360000002 HC RX W HCPCS: Performed by: NURSE PRACTITIONER

## 2025-08-01 PROCEDURE — 99233 SBSQ HOSP IP/OBS HIGH 50: CPT | Performed by: INTERNAL MEDICINE

## 2025-08-01 PROCEDURE — 2060000000 HC ICU INTERMEDIATE R&B

## 2025-08-01 PROCEDURE — 6370000000 HC RX 637 (ALT 250 FOR IP): Performed by: SURGERY

## 2025-08-01 PROCEDURE — 6360000002 HC RX W HCPCS: Performed by: SURGERY

## 2025-08-01 PROCEDURE — 94660 CPAP INITIATION&MGMT: CPT

## 2025-08-01 PROCEDURE — 6360000002 HC RX W HCPCS

## 2025-08-01 PROCEDURE — 6370000000 HC RX 637 (ALT 250 FOR IP)

## 2025-08-01 PROCEDURE — 94640 AIRWAY INHALATION TREATMENT: CPT

## 2025-08-01 RX ORDER — ISOSORBIDE DINITRATE 20 MG/1
20 TABLET ORAL 3 TIMES DAILY
Qty: 270 TABLET | Refills: 3 | Status: SHIPPED | OUTPATIENT
Start: 2025-08-01

## 2025-08-01 RX ADMIN — SENNOSIDES AND DOCUSATE SODIUM 1 TABLET: 8.6; 5 TABLET ORAL at 21:00

## 2025-08-01 RX ADMIN — FLUCONAZOLE 200 MG: 2 INJECTION, SOLUTION INTRAVENOUS at 13:13

## 2025-08-01 RX ADMIN — INSULIN LISPRO 2 UNITS: 100 INJECTION, SOLUTION INTRAVENOUS; SUBCUTANEOUS at 18:27

## 2025-08-01 RX ADMIN — ISOSORBIDE DINITRATE 20 MG: 10 TABLET ORAL at 11:09

## 2025-08-01 RX ADMIN — INSULIN LISPRO 2 UNITS: 100 INJECTION, SOLUTION INTRAVENOUS; SUBCUTANEOUS at 10:13

## 2025-08-01 RX ADMIN — SODIUM CHLORIDE, PRESERVATIVE FREE 10 ML: 5 INJECTION INTRAVENOUS at 20:59

## 2025-08-01 RX ADMIN — POTASSIUM CHLORIDE 20 MEQ: 1500 TABLET, EXTENDED RELEASE ORAL at 10:13

## 2025-08-01 RX ADMIN — SODIUM CHLORIDE, PRESERVATIVE FREE 10 ML: 5 INJECTION INTRAVENOUS at 10:14

## 2025-08-01 RX ADMIN — FUROSEMIDE 40 MG: 10 INJECTION, SOLUTION INTRAMUSCULAR; INTRAVENOUS at 10:12

## 2025-08-01 RX ADMIN — ARFORMOTEROL TARTRATE: 15 SOLUTION RESPIRATORY (INHALATION) at 20:08

## 2025-08-01 RX ADMIN — CARVEDILOL 12.5 MG: 12.5 TABLET, FILM COATED ORAL at 10:13

## 2025-08-01 RX ADMIN — ISOSORBIDE DINITRATE 20 MG: 10 TABLET ORAL at 18:27

## 2025-08-01 RX ADMIN — MELATONIN 3 MG: at 21:00

## 2025-08-01 RX ADMIN — HEPARIN SODIUM 5000 UNITS: 5000 INJECTION INTRAVENOUS; SUBCUTANEOUS at 14:36

## 2025-08-01 RX ADMIN — VANCOMYCIN HYDROCHLORIDE 750 MG: 750 INJECTION, POWDER, LYOPHILIZED, FOR SOLUTION INTRAVENOUS at 10:09

## 2025-08-01 RX ADMIN — THIAMINE HYDROCHLORIDE 100 MG: 100 INJECTION, SOLUTION INTRAMUSCULAR; INTRAVENOUS at 10:13

## 2025-08-01 RX ADMIN — IPRATROPIUM BROMIDE 0.5 MG: 0.5 SOLUTION RESPIRATORY (INHALATION) at 13:27

## 2025-08-01 RX ADMIN — Medication: at 10:14

## 2025-08-01 RX ADMIN — INSULIN LISPRO 1 UNITS: 100 INJECTION, SOLUTION INTRAVENOUS; SUBCUTANEOUS at 20:59

## 2025-08-01 RX ADMIN — ARFORMOTEROL TARTRATE: 15 SOLUTION RESPIRATORY (INHALATION) at 07:45

## 2025-08-01 RX ADMIN — SMOFLIPID 250 ML: 6; 6; 5; 3 INJECTION, EMULSION INTRAVENOUS at 18:32

## 2025-08-01 RX ADMIN — PIPERACILLIN AND TAZOBACTAM 3375 MG: 3; .375 INJECTION, POWDER, FOR SOLUTION INTRAVENOUS at 11:13

## 2025-08-01 RX ADMIN — Medication: at 21:00

## 2025-08-01 RX ADMIN — HEPARIN SODIUM 5000 UNITS: 5000 INJECTION INTRAVENOUS; SUBCUTANEOUS at 20:59

## 2025-08-01 RX ADMIN — ASPIRIN 81 MG: 81 TABLET, DELAYED RELEASE ORAL at 10:13

## 2025-08-01 RX ADMIN — IPRATROPIUM BROMIDE 0.5 MG: 0.5 SOLUTION RESPIRATORY (INHALATION) at 20:03

## 2025-08-01 RX ADMIN — HEPARIN SODIUM 5000 UNITS: 5000 INJECTION INTRAVENOUS; SUBCUTANEOUS at 07:02

## 2025-08-01 RX ADMIN — CARVEDILOL 12.5 MG: 12.5 TABLET, FILM COATED ORAL at 21:00

## 2025-08-01 RX ADMIN — ATORVASTATIN CALCIUM 40 MG: 40 TABLET, FILM COATED ORAL at 21:00

## 2025-08-01 RX ADMIN — IPRATROPIUM BROMIDE 0.5 MG: 0.5 SOLUTION RESPIRATORY (INHALATION) at 07:45

## 2025-08-01 ASSESSMENT — PAIN SCALES - GENERAL
PAINLEVEL_OUTOF10: 0

## 2025-08-01 ASSESSMENT — PAIN SCALES - WONG BAKER
WONGBAKER_NUMERICALRESPONSE: NO HURT

## 2025-08-02 LAB
ANION GAP SERPL CALC-SCNC: 9 MMOL/L (ref 2–14)
BACTERIA SPEC CULT: NORMAL
BACTERIA SPEC CULT: NORMAL
BASOPHILS # BLD: 0.1 K/UL (ref 0–0.1)
BASOPHILS NFR BLD: 0.7 % (ref 0–1)
BUN SERPL-MCNC: 47 MG/DL (ref 8–23)
BUN/CREAT SERPL: 27 (ref 12–20)
CALCIUM SERPL-MCNC: 8.6 MG/DL (ref 8.8–10.2)
CHLORIDE SERPL-SCNC: 107 MMOL/L (ref 98–107)
CO2 SERPL-SCNC: 25 MMOL/L (ref 20–29)
CREAT SERPL-MCNC: 1.72 MG/DL (ref 0.6–1)
DIFFERENTIAL METHOD BLD: ABNORMAL
EOSINOPHIL # BLD: 0.8 K/UL (ref 0–0.4)
EOSINOPHIL NFR BLD: 5.8 % (ref 0–7)
ERYTHROCYTE [DISTWIDTH] IN BLOOD BY AUTOMATED COUNT: 14.3 % (ref 11.5–14.5)
GLUCOSE BLD STRIP.AUTO-MCNC: 163 MG/DL (ref 65–117)
GLUCOSE BLD STRIP.AUTO-MCNC: 201 MG/DL (ref 65–117)
GLUCOSE BLD STRIP.AUTO-MCNC: 213 MG/DL (ref 65–117)
GLUCOSE BLD STRIP.AUTO-MCNC: 228 MG/DL (ref 65–117)
GLUCOSE SERPL-MCNC: 211 MG/DL (ref 65–100)
GRAM STN SPEC: NORMAL
GRAM STN SPEC: NORMAL
HCT VFR BLD AUTO: 24.9 % (ref 35–47)
HGB BLD-MCNC: 8 G/DL (ref 11.5–16)
IMM GRANULOCYTES # BLD AUTO: 0.08 K/UL (ref 0–0.04)
IMM GRANULOCYTES NFR BLD AUTO: 0.6 % (ref 0–0.5)
LYMPHOCYTES # BLD: 1.2 K/UL (ref 0.8–3.5)
LYMPHOCYTES NFR BLD: 8.7 % (ref 12–49)
MCH RBC QN AUTO: 29.7 PG (ref 26–34)
MCHC RBC AUTO-ENTMCNC: 32.1 G/DL (ref 30–36.5)
MCV RBC AUTO: 92.6 FL (ref 80–99)
MONOCYTES # BLD: 0.74 K/UL (ref 0–1)
MONOCYTES NFR BLD: 5.4 % (ref 5–13)
NEUTS SEG # BLD: 10.81 K/UL (ref 1.8–8)
NEUTS SEG NFR BLD: 78.8 % (ref 32–75)
NRBC # BLD: 0 K/UL (ref 0–0.01)
NRBC BLD-RTO: 0 PER 100 WBC
PLATELET # BLD AUTO: 430 K/UL (ref 150–400)
PMV BLD AUTO: 11.1 FL (ref 8.9–12.9)
POTASSIUM SERPL-SCNC: 4.4 MMOL/L (ref 3.5–5.1)
RBC # BLD AUTO: 2.69 M/UL (ref 3.8–5.2)
SERVICE CMNT-IMP: ABNORMAL
SERVICE CMNT-IMP: NORMAL
SODIUM SERPL-SCNC: 140 MMOL/L (ref 136–145)
WBC # BLD AUTO: 13.7 K/UL (ref 3.6–11)

## 2025-08-02 PROCEDURE — 94640 AIRWAY INHALATION TREATMENT: CPT

## 2025-08-02 PROCEDURE — 6360000002 HC RX W HCPCS

## 2025-08-02 PROCEDURE — 36415 COLL VENOUS BLD VENIPUNCTURE: CPT

## 2025-08-02 PROCEDURE — 82962 GLUCOSE BLOOD TEST: CPT

## 2025-08-02 PROCEDURE — 6360000002 HC RX W HCPCS: Performed by: NURSE PRACTITIONER

## 2025-08-02 PROCEDURE — 6370000000 HC RX 637 (ALT 250 FOR IP): Performed by: INTERNAL MEDICINE

## 2025-08-02 PROCEDURE — 2580000003 HC RX 258: Performed by: INTERNAL MEDICINE

## 2025-08-02 PROCEDURE — 6370000000 HC RX 637 (ALT 250 FOR IP): Performed by: SURGERY

## 2025-08-02 PROCEDURE — 6370000000 HC RX 637 (ALT 250 FOR IP)

## 2025-08-02 PROCEDURE — 6360000002 HC RX W HCPCS: Performed by: INTERNAL MEDICINE

## 2025-08-02 PROCEDURE — 6370000000 HC RX 637 (ALT 250 FOR IP): Performed by: STUDENT IN AN ORGANIZED HEALTH CARE EDUCATION/TRAINING PROGRAM

## 2025-08-02 PROCEDURE — 6360000002 HC RX W HCPCS: Performed by: SURGERY

## 2025-08-02 PROCEDURE — 2060000000 HC ICU INTERMEDIATE R&B

## 2025-08-02 PROCEDURE — 2500000003 HC RX 250 WO HCPCS: Performed by: SURGERY

## 2025-08-02 PROCEDURE — 80048 BASIC METABOLIC PNL TOTAL CA: CPT

## 2025-08-02 PROCEDURE — 85025 COMPLETE CBC W/AUTO DIFF WBC: CPT

## 2025-08-02 PROCEDURE — 94660 CPAP INITIATION&MGMT: CPT

## 2025-08-02 PROCEDURE — 2700000000 HC OXYGEN THERAPY PER DAY

## 2025-08-02 RX ORDER — BUMETANIDE 0.25 MG/ML
1 INJECTION, SOLUTION INTRAMUSCULAR; INTRAVENOUS 2 TIMES DAILY
Status: DISCONTINUED | OUTPATIENT
Start: 2025-08-02 | End: 2025-08-09 | Stop reason: HOSPADM

## 2025-08-02 RX ADMIN — IPRATROPIUM BROMIDE 0.5 MG: 0.5 SOLUTION RESPIRATORY (INHALATION) at 07:26

## 2025-08-02 RX ADMIN — PIPERACILLIN AND TAZOBACTAM 3375 MG: 3; .375 INJECTION, POWDER, FOR SOLUTION INTRAVENOUS at 10:33

## 2025-08-02 RX ADMIN — HEPARIN SODIUM 5000 UNITS: 5000 INJECTION INTRAVENOUS; SUBCUTANEOUS at 15:27

## 2025-08-02 RX ADMIN — HEPARIN SODIUM 5000 UNITS: 5000 INJECTION INTRAVENOUS; SUBCUTANEOUS at 07:05

## 2025-08-02 RX ADMIN — MELATONIN 3 MG: at 21:07

## 2025-08-02 RX ADMIN — HYDRALAZINE HYDROCHLORIDE 10 MG: 20 INJECTION INTRAMUSCULAR; INTRAVENOUS at 19:02

## 2025-08-02 RX ADMIN — BUMETANIDE 1 MG: 0.25 INJECTION INTRAMUSCULAR; INTRAVENOUS at 18:54

## 2025-08-02 RX ADMIN — IPRATROPIUM BROMIDE 0.5 MG: 0.5 SOLUTION RESPIRATORY (INHALATION) at 13:53

## 2025-08-02 RX ADMIN — SODIUM CHLORIDE, PRESERVATIVE FREE 10 ML: 5 INJECTION INTRAVENOUS at 21:08

## 2025-08-02 RX ADMIN — FLUCONAZOLE 200 MG: 2 INJECTION, SOLUTION INTRAVENOUS at 15:25

## 2025-08-02 RX ADMIN — ASPIRIN 81 MG: 81 TABLET, DELAYED RELEASE ORAL at 08:56

## 2025-08-02 RX ADMIN — ISOSORBIDE DINITRATE 20 MG: 10 TABLET ORAL at 08:56

## 2025-08-02 RX ADMIN — PIPERACILLIN AND TAZOBACTAM 3375 MG: 3; .375 INJECTION, POWDER, FOR SOLUTION INTRAVENOUS at 21:13

## 2025-08-02 RX ADMIN — POTASSIUM CHLORIDE 20 MEQ: 1500 TABLET, EXTENDED RELEASE ORAL at 08:56

## 2025-08-02 RX ADMIN — HEPARIN SODIUM 5000 UNITS: 5000 INJECTION INTRAVENOUS; SUBCUTANEOUS at 21:07

## 2025-08-02 RX ADMIN — VANCOMYCIN HYDROCHLORIDE 750 MG: 750 INJECTION, POWDER, LYOPHILIZED, FOR SOLUTION INTRAVENOUS at 08:58

## 2025-08-02 RX ADMIN — INSULIN LISPRO 1 UNITS: 100 INJECTION, SOLUTION INTRAVENOUS; SUBCUTANEOUS at 12:22

## 2025-08-02 RX ADMIN — CARVEDILOL 12.5 MG: 12.5 TABLET, FILM COATED ORAL at 08:56

## 2025-08-02 RX ADMIN — IPRATROPIUM BROMIDE 0.5 MG: 0.5 SOLUTION RESPIRATORY (INHALATION) at 19:34

## 2025-08-02 RX ADMIN — ISOSORBIDE DINITRATE 20 MG: 10 TABLET ORAL at 12:19

## 2025-08-02 RX ADMIN — ATORVASTATIN CALCIUM 40 MG: 40 TABLET, FILM COATED ORAL at 21:07

## 2025-08-02 RX ADMIN — THIAMINE HYDROCHLORIDE 100 MG: 100 INJECTION, SOLUTION INTRAMUSCULAR; INTRAVENOUS at 08:56

## 2025-08-02 RX ADMIN — INSULIN LISPRO 1 UNITS: 100 INJECTION, SOLUTION INTRAVENOUS; SUBCUTANEOUS at 19:08

## 2025-08-02 RX ADMIN — Medication: at 21:07

## 2025-08-02 RX ADMIN — SENNOSIDES AND DOCUSATE SODIUM 1 TABLET: 8.6; 5 TABLET ORAL at 21:07

## 2025-08-02 RX ADMIN — CARVEDILOL 12.5 MG: 12.5 TABLET, FILM COATED ORAL at 21:07

## 2025-08-02 RX ADMIN — ARFORMOTEROL TARTRATE: 15 SOLUTION RESPIRATORY (INHALATION) at 07:26

## 2025-08-02 RX ADMIN — Medication: at 08:56

## 2025-08-02 RX ADMIN — SODIUM CHLORIDE, PRESERVATIVE FREE 10 ML: 5 INJECTION INTRAVENOUS at 09:02

## 2025-08-02 RX ADMIN — ARFORMOTEROL TARTRATE: 15 SOLUTION RESPIRATORY (INHALATION) at 19:33

## 2025-08-02 RX ADMIN — FUROSEMIDE 40 MG: 10 INJECTION, SOLUTION INTRAMUSCULAR; INTRAVENOUS at 08:56

## 2025-08-02 ASSESSMENT — PAIN SCALES - WONG BAKER
WONGBAKER_NUMERICALRESPONSE: NO HURT

## 2025-08-02 ASSESSMENT — PAIN SCALES - GENERAL
PAINLEVEL_OUTOF10: 0

## 2025-08-03 ENCOUNTER — APPOINTMENT (OUTPATIENT)
Facility: HOSPITAL | Age: 85
End: 2025-08-03
Attending: STUDENT IN AN ORGANIZED HEALTH CARE EDUCATION/TRAINING PROGRAM
Payer: MEDICARE

## 2025-08-03 LAB
GLUCOSE BLD STRIP.AUTO-MCNC: 138 MG/DL (ref 65–117)
GLUCOSE BLD STRIP.AUTO-MCNC: 202 MG/DL (ref 65–117)
GLUCOSE BLD STRIP.AUTO-MCNC: 216 MG/DL (ref 65–117)
GLUCOSE BLD STRIP.AUTO-MCNC: 263 MG/DL (ref 65–117)
SERVICE CMNT-IMP: ABNORMAL

## 2025-08-03 PROCEDURE — 6370000000 HC RX 637 (ALT 250 FOR IP): Performed by: INTERNAL MEDICINE

## 2025-08-03 PROCEDURE — 2060000000 HC ICU INTERMEDIATE R&B

## 2025-08-03 PROCEDURE — 82962 GLUCOSE BLOOD TEST: CPT

## 2025-08-03 PROCEDURE — 2500000003 HC RX 250 WO HCPCS: Performed by: SURGERY

## 2025-08-03 PROCEDURE — 6360000002 HC RX W HCPCS

## 2025-08-03 PROCEDURE — 6370000000 HC RX 637 (ALT 250 FOR IP): Performed by: STUDENT IN AN ORGANIZED HEALTH CARE EDUCATION/TRAINING PROGRAM

## 2025-08-03 PROCEDURE — 71045 X-RAY EXAM CHEST 1 VIEW: CPT

## 2025-08-03 PROCEDURE — 94660 CPAP INITIATION&MGMT: CPT

## 2025-08-03 PROCEDURE — 6360000002 HC RX W HCPCS: Performed by: SURGERY

## 2025-08-03 PROCEDURE — 2580000003 HC RX 258: Performed by: INTERNAL MEDICINE

## 2025-08-03 PROCEDURE — 2700000000 HC OXYGEN THERAPY PER DAY

## 2025-08-03 PROCEDURE — 94640 AIRWAY INHALATION TREATMENT: CPT

## 2025-08-03 PROCEDURE — 6360000002 HC RX W HCPCS: Performed by: STUDENT IN AN ORGANIZED HEALTH CARE EDUCATION/TRAINING PROGRAM

## 2025-08-03 PROCEDURE — 6370000000 HC RX 637 (ALT 250 FOR IP): Performed by: SURGERY

## 2025-08-03 PROCEDURE — 6360000002 HC RX W HCPCS: Performed by: INTERNAL MEDICINE

## 2025-08-03 PROCEDURE — 6360000002 HC RX W HCPCS: Performed by: NURSE PRACTITIONER

## 2025-08-03 RX ADMIN — SODIUM CHLORIDE, PRESERVATIVE FREE 10 ML: 5 INJECTION INTRAVENOUS at 20:37

## 2025-08-03 RX ADMIN — Medication: at 10:22

## 2025-08-03 RX ADMIN — ARFORMOTEROL TARTRATE: 15 SOLUTION RESPIRATORY (INHALATION) at 19:51

## 2025-08-03 RX ADMIN — PIPERACILLIN AND TAZOBACTAM 3375 MG: 3; .375 INJECTION, POWDER, FOR SOLUTION INTRAVENOUS at 20:55

## 2025-08-03 RX ADMIN — FLUCONAZOLE 200 MG: 2 INJECTION, SOLUTION INTRAVENOUS at 14:02

## 2025-08-03 RX ADMIN — INSULIN LISPRO 1 UNITS: 100 INJECTION, SOLUTION INTRAVENOUS; SUBCUTANEOUS at 12:16

## 2025-08-03 RX ADMIN — HYDROMORPHONE HYDROCHLORIDE 1 MG: 1 INJECTION, SOLUTION INTRAMUSCULAR; INTRAVENOUS; SUBCUTANEOUS at 20:32

## 2025-08-03 RX ADMIN — ISOSORBIDE DINITRATE 20 MG: 10 TABLET ORAL at 12:16

## 2025-08-03 RX ADMIN — VANCOMYCIN HYDROCHLORIDE 750 MG: 750 INJECTION, POWDER, LYOPHILIZED, FOR SOLUTION INTRAVENOUS at 09:57

## 2025-08-03 RX ADMIN — CARVEDILOL 12.5 MG: 12.5 TABLET, FILM COATED ORAL at 10:01

## 2025-08-03 RX ADMIN — MELATONIN 3 MG: at 20:26

## 2025-08-03 RX ADMIN — BUMETANIDE 1 MG: 0.25 INJECTION INTRAMUSCULAR; INTRAVENOUS at 18:00

## 2025-08-03 RX ADMIN — THIAMINE HYDROCHLORIDE 100 MG: 100 INJECTION, SOLUTION INTRAMUSCULAR; INTRAVENOUS at 10:00

## 2025-08-03 RX ADMIN — ISOSORBIDE DINITRATE 20 MG: 10 TABLET ORAL at 10:01

## 2025-08-03 RX ADMIN — ERGOCALCIFEROL 50000 UNITS: 1.25 CAPSULE ORAL at 10:02

## 2025-08-03 RX ADMIN — BUMETANIDE 1 MG: 0.25 INJECTION INTRAMUSCULAR; INTRAVENOUS at 09:59

## 2025-08-03 RX ADMIN — CARVEDILOL 12.5 MG: 12.5 TABLET, FILM COATED ORAL at 20:35

## 2025-08-03 RX ADMIN — INSULIN LISPRO 2 UNITS: 100 INJECTION, SOLUTION INTRAVENOUS; SUBCUTANEOUS at 18:08

## 2025-08-03 RX ADMIN — IPRATROPIUM BROMIDE 0.5 MG: 0.5 SOLUTION RESPIRATORY (INHALATION) at 07:24

## 2025-08-03 RX ADMIN — HEPARIN SODIUM 5000 UNITS: 5000 INJECTION INTRAVENOUS; SUBCUTANEOUS at 05:57

## 2025-08-03 RX ADMIN — IPRATROPIUM BROMIDE 0.5 MG: 0.5 SOLUTION RESPIRATORY (INHALATION) at 14:16

## 2025-08-03 RX ADMIN — ASPIRIN 81 MG: 81 TABLET, DELAYED RELEASE ORAL at 10:01

## 2025-08-03 RX ADMIN — HEPARIN SODIUM 5000 UNITS: 5000 INJECTION INTRAVENOUS; SUBCUTANEOUS at 20:57

## 2025-08-03 RX ADMIN — ATORVASTATIN CALCIUM 40 MG: 40 TABLET, FILM COATED ORAL at 20:35

## 2025-08-03 RX ADMIN — ISOSORBIDE DINITRATE 20 MG: 10 TABLET ORAL at 18:00

## 2025-08-03 RX ADMIN — HYDRALAZINE HYDROCHLORIDE 10 MG: 20 INJECTION INTRAMUSCULAR; INTRAVENOUS at 19:53

## 2025-08-03 RX ADMIN — ARFORMOTEROL TARTRATE: 15 SOLUTION RESPIRATORY (INHALATION) at 07:24

## 2025-08-03 RX ADMIN — POTASSIUM CHLORIDE 20 MEQ: 1500 TABLET, EXTENDED RELEASE ORAL at 10:01

## 2025-08-03 RX ADMIN — IPRATROPIUM BROMIDE 0.5 MG: 0.5 SOLUTION RESPIRATORY (INHALATION) at 19:43

## 2025-08-03 RX ADMIN — INSULIN LISPRO 1 UNITS: 100 INJECTION, SOLUTION INTRAVENOUS; SUBCUTANEOUS at 20:34

## 2025-08-03 RX ADMIN — LABETALOL HYDROCHLORIDE 10 MG: 5 INJECTION, SOLUTION INTRAVENOUS at 21:19

## 2025-08-03 RX ADMIN — HEPARIN SODIUM 5000 UNITS: 5000 INJECTION INTRAVENOUS; SUBCUTANEOUS at 14:04

## 2025-08-03 RX ADMIN — OXYCODONE HYDROCHLORIDE 2.5 MG: 5 TABLET ORAL at 12:22

## 2025-08-03 ASSESSMENT — PAIN - FUNCTIONAL ASSESSMENT
PAIN_FUNCTIONAL_ASSESSMENT: PREVENTS OR INTERFERES SOME ACTIVE ACTIVITIES AND ADLS
PAIN_FUNCTIONAL_ASSESSMENT: PREVENTS OR INTERFERES SOME ACTIVE ACTIVITIES AND ADLS

## 2025-08-03 ASSESSMENT — PAIN SCALES - GENERAL
PAINLEVEL_OUTOF10: 0
PAINLEVEL_OUTOF10: 0
PAINLEVEL_OUTOF10: 10
PAINLEVEL_OUTOF10: 8
PAINLEVEL_OUTOF10: 2

## 2025-08-03 ASSESSMENT — PAIN DESCRIPTION - DESCRIPTORS
DESCRIPTORS: ACHING
DESCRIPTORS: ACHING

## 2025-08-03 ASSESSMENT — PAIN DESCRIPTION - LOCATION
LOCATION: GENERALIZED

## 2025-08-03 ASSESSMENT — PAIN DESCRIPTION - ONSET
ONSET: GRADUAL
ONSET: GRADUAL

## 2025-08-03 ASSESSMENT — PAIN SCALES - WONG BAKER: WONGBAKER_NUMERICALRESPONSE: NO HURT

## 2025-08-03 ASSESSMENT — PAIN DESCRIPTION - FREQUENCY
FREQUENCY: INTERMITTENT
FREQUENCY: INTERMITTENT

## 2025-08-03 ASSESSMENT — PAIN DESCRIPTION - PAIN TYPE
TYPE: ACUTE PAIN
TYPE: ACUTE PAIN

## 2025-08-03 ASSESSMENT — PAIN DESCRIPTION - ORIENTATION
ORIENTATION: POSTERIOR
ORIENTATION: POSTERIOR

## 2025-08-04 ENCOUNTER — APPOINTMENT (OUTPATIENT)
Facility: HOSPITAL | Age: 85
End: 2025-08-04
Attending: STUDENT IN AN ORGANIZED HEALTH CARE EDUCATION/TRAINING PROGRAM
Payer: MEDICARE

## 2025-08-04 LAB
ALBUMIN SERPL-MCNC: 1.5 G/DL (ref 3.5–5.2)
COMMENT:: NORMAL
GLUCOSE BLD STRIP.AUTO-MCNC: 153 MG/DL (ref 65–117)
GLUCOSE BLD STRIP.AUTO-MCNC: 157 MG/DL (ref 65–117)
GLUCOSE BLD STRIP.AUTO-MCNC: 186 MG/DL (ref 65–117)
GLUCOSE BLD STRIP.AUTO-MCNC: 189 MG/DL (ref 65–117)
GLUCOSE BLD STRIP.AUTO-MCNC: 291 MG/DL (ref 65–117)
SERVICE CMNT-IMP: ABNORMAL
SPECIMEN HOLD: NORMAL

## 2025-08-04 PROCEDURE — 2500000003 HC RX 250 WO HCPCS: Performed by: SURGERY

## 2025-08-04 PROCEDURE — 6360000002 HC RX W HCPCS: Performed by: INTERNAL MEDICINE

## 2025-08-04 PROCEDURE — 88305 TISSUE EXAM BY PATHOLOGIST: CPT

## 2025-08-04 PROCEDURE — 80048 BASIC METABOLIC PNL TOTAL CA: CPT

## 2025-08-04 PROCEDURE — 6370000000 HC RX 637 (ALT 250 FOR IP): Performed by: STUDENT IN AN ORGANIZED HEALTH CARE EDUCATION/TRAINING PROGRAM

## 2025-08-04 PROCEDURE — 6370000000 HC RX 637 (ALT 250 FOR IP): Performed by: INTERNAL MEDICINE

## 2025-08-04 PROCEDURE — 0W9930Z DRAINAGE OF RIGHT PLEURAL CAVITY WITH DRAINAGE DEVICE, PERCUTANEOUS APPROACH: ICD-10-PCS | Performed by: STUDENT IN AN ORGANIZED HEALTH CARE EDUCATION/TRAINING PROGRAM

## 2025-08-04 PROCEDURE — 2580000003 HC RX 258: Performed by: INTERNAL MEDICINE

## 2025-08-04 PROCEDURE — 71045 X-RAY EXAM CHEST 1 VIEW: CPT

## 2025-08-04 PROCEDURE — 6360000002 HC RX W HCPCS: Performed by: STUDENT IN AN ORGANIZED HEALTH CARE EDUCATION/TRAINING PROGRAM

## 2025-08-04 PROCEDURE — 6360000002 HC RX W HCPCS

## 2025-08-04 PROCEDURE — 36415 COLL VENOUS BLD VENIPUNCTURE: CPT

## 2025-08-04 PROCEDURE — 94640 AIRWAY INHALATION TREATMENT: CPT

## 2025-08-04 PROCEDURE — 82040 ASSAY OF SERUM ALBUMIN: CPT

## 2025-08-04 PROCEDURE — 2580000003 HC RX 258: Performed by: SURGERY

## 2025-08-04 PROCEDURE — 2060000000 HC ICU INTERMEDIATE R&B

## 2025-08-04 PROCEDURE — 6370000000 HC RX 637 (ALT 250 FOR IP): Performed by: PHYSICIAN ASSISTANT

## 2025-08-04 PROCEDURE — 2700000000 HC OXYGEN THERAPY PER DAY

## 2025-08-04 PROCEDURE — 88112 CYTOPATH CELL ENHANCE TECH: CPT

## 2025-08-04 PROCEDURE — 6370000000 HC RX 637 (ALT 250 FOR IP): Performed by: SURGERY

## 2025-08-04 PROCEDURE — 6360000002 HC RX W HCPCS: Performed by: NURSE PRACTITIONER

## 2025-08-04 PROCEDURE — C1729 CATH, DRAINAGE: HCPCS

## 2025-08-04 PROCEDURE — 6360000002 HC RX W HCPCS: Performed by: SURGERY

## 2025-08-04 PROCEDURE — 82962 GLUCOSE BLOOD TEST: CPT

## 2025-08-04 PROCEDURE — 94660 CPAP INITIATION&MGMT: CPT

## 2025-08-04 RX ORDER — FUROSEMIDE 80 MG/1
80 TABLET ORAL DAILY
Qty: 90 TABLET | Refills: 3 | Status: CANCELLED | OUTPATIENT
Start: 2025-08-04

## 2025-08-04 RX ORDER — CARVEDILOL 25 MG/1
25 TABLET ORAL 2 TIMES DAILY
Qty: 180 TABLET | Refills: 0 | Status: CANCELLED | OUTPATIENT
Start: 2025-08-04

## 2025-08-04 RX ORDER — CALCIUM CARBONATE 500 MG/1
500 TABLET, CHEWABLE ORAL 3 TIMES DAILY
Status: COMPLETED | OUTPATIENT
Start: 2025-08-04 | End: 2025-08-04

## 2025-08-04 RX ORDER — POTASSIUM CHLORIDE 1500 MG/1
40 TABLET, EXTENDED RELEASE ORAL PRN
Status: DISCONTINUED | OUTPATIENT
Start: 2025-08-04 | End: 2025-08-07

## 2025-08-04 RX ORDER — LIDOCAINE HYDROCHLORIDE 10 MG/ML
10 INJECTION, SOLUTION EPIDURAL; INFILTRATION; INTRACAUDAL; PERINEURAL ONCE
Status: COMPLETED | OUTPATIENT
Start: 2025-08-04 | End: 2025-08-04

## 2025-08-04 RX ORDER — POTASSIUM CHLORIDE 1500 MG/1
40 TABLET, EXTENDED RELEASE ORAL ONCE
Status: COMPLETED | OUTPATIENT
Start: 2025-08-04 | End: 2025-08-04

## 2025-08-04 RX ORDER — POTASSIUM CHLORIDE 7.45 MG/ML
10 INJECTION INTRAVENOUS PRN
Status: DISCONTINUED | OUTPATIENT
Start: 2025-08-04 | End: 2025-08-07

## 2025-08-04 RX ADMIN — BUMETANIDE 1 MG: 0.25 INJECTION INTRAMUSCULAR; INTRAVENOUS at 17:00

## 2025-08-04 RX ADMIN — SODIUM CHLORIDE, PRESERVATIVE FREE 10 ML: 5 INJECTION INTRAVENOUS at 09:17

## 2025-08-04 RX ADMIN — ARFORMOTEROL TARTRATE: 15 SOLUTION RESPIRATORY (INHALATION) at 20:58

## 2025-08-04 RX ADMIN — FLUCONAZOLE 200 MG: 2 INJECTION, SOLUTION INTRAVENOUS at 11:15

## 2025-08-04 RX ADMIN — POTASSIUM CHLORIDE 20 MEQ: 1500 TABLET, EXTENDED RELEASE ORAL at 09:15

## 2025-08-04 RX ADMIN — LABETALOL HYDROCHLORIDE 10 MG: 5 INJECTION, SOLUTION INTRAVENOUS at 18:30

## 2025-08-04 RX ADMIN — Medication: at 09:17

## 2025-08-04 RX ADMIN — INSULIN LISPRO 2 UNITS: 100 INJECTION, SOLUTION INTRAVENOUS; SUBCUTANEOUS at 16:29

## 2025-08-04 RX ADMIN — ATORVASTATIN CALCIUM 40 MG: 40 TABLET, FILM COATED ORAL at 21:29

## 2025-08-04 RX ADMIN — LIDOCAINE HYDROCHLORIDE 10 ML: 10 INJECTION, SOLUTION EPIDURAL; INFILTRATION; INTRACAUDAL; PERINEURAL at 13:46

## 2025-08-04 RX ADMIN — HYDROMORPHONE HYDROCHLORIDE 1 MG: 1 INJECTION, SOLUTION INTRAMUSCULAR; INTRAVENOUS; SUBCUTANEOUS at 05:40

## 2025-08-04 RX ADMIN — ISOSORBIDE DINITRATE 20 MG: 10 TABLET ORAL at 14:55

## 2025-08-04 RX ADMIN — MELATONIN 3 MG: at 21:29

## 2025-08-04 RX ADMIN — THIAMINE HYDROCHLORIDE 100 MG: 100 INJECTION, SOLUTION INTRAMUSCULAR; INTRAVENOUS at 09:16

## 2025-08-04 RX ADMIN — CALCIUM CARBONATE (ANTACID) CHEW TAB 500 MG 500 MG: 500 CHEW TAB at 21:29

## 2025-08-04 RX ADMIN — OXYCODONE HYDROCHLORIDE 2.5 MG: 5 TABLET ORAL at 16:59

## 2025-08-04 RX ADMIN — SODIUM CHLORIDE, PRESERVATIVE FREE 10 ML: 5 INJECTION INTRAVENOUS at 21:30

## 2025-08-04 RX ADMIN — CARVEDILOL 12.5 MG: 12.5 TABLET, FILM COATED ORAL at 21:28

## 2025-08-04 RX ADMIN — ARFORMOTEROL TARTRATE: 15 SOLUTION RESPIRATORY (INHALATION) at 07:26

## 2025-08-04 RX ADMIN — INSULIN LISPRO 1 UNITS: 100 INJECTION, SOLUTION INTRAVENOUS; SUBCUTANEOUS at 21:29

## 2025-08-04 RX ADMIN — HYDROMORPHONE HYDROCHLORIDE 1 MG: 1 INJECTION, SOLUTION INTRAMUSCULAR; INTRAVENOUS; SUBCUTANEOUS at 18:22

## 2025-08-04 RX ADMIN — ISOSORBIDE DINITRATE 20 MG: 10 TABLET ORAL at 09:15

## 2025-08-04 RX ADMIN — BUMETANIDE 1 MG: 0.25 INJECTION INTRAMUSCULAR; INTRAVENOUS at 09:16

## 2025-08-04 RX ADMIN — PIPERACILLIN AND TAZOBACTAM 3375 MG: 3; .375 INJECTION, POWDER, FOR SOLUTION INTRAVENOUS at 04:58

## 2025-08-04 RX ADMIN — IPRATROPIUM BROMIDE 0.5 MG: 0.5 SOLUTION RESPIRATORY (INHALATION) at 20:53

## 2025-08-04 RX ADMIN — OXYCODONE HYDROCHLORIDE 2.5 MG: 5 TABLET ORAL at 21:29

## 2025-08-04 RX ADMIN — CARVEDILOL 12.5 MG: 12.5 TABLET, FILM COATED ORAL at 09:15

## 2025-08-04 RX ADMIN — HYDRALAZINE HYDROCHLORIDE 10 MG: 20 INJECTION INTRAMUSCULAR; INTRAVENOUS at 11:23

## 2025-08-04 RX ADMIN — IPRATROPIUM BROMIDE 0.5 MG: 0.5 SOLUTION RESPIRATORY (INHALATION) at 07:26

## 2025-08-04 RX ADMIN — VANCOMYCIN HYDROCHLORIDE 750 MG: 750 INJECTION, POWDER, LYOPHILIZED, FOR SOLUTION INTRAVENOUS at 09:23

## 2025-08-04 RX ADMIN — PIPERACILLIN AND TAZOBACTAM 3375 MG: 3; .375 INJECTION, POWDER, FOR SOLUTION INTRAVENOUS at 11:13

## 2025-08-04 RX ADMIN — POTASSIUM CHLORIDE 40 MEQ: 1500 TABLET, EXTENDED RELEASE ORAL at 11:15

## 2025-08-04 RX ADMIN — CALCIUM CARBONATE (ANTACID) CHEW TAB 500 MG 500 MG: 500 CHEW TAB at 14:55

## 2025-08-04 RX ADMIN — CALCIUM CARBONATE (ANTACID) CHEW TAB 500 MG 500 MG: 500 CHEW TAB at 09:15

## 2025-08-04 RX ADMIN — PIPERACILLIN AND TAZOBACTAM 3375 MG: 3; .375 INJECTION, POWDER, FOR SOLUTION INTRAVENOUS at 21:27

## 2025-08-04 RX ADMIN — ISOSORBIDE DINITRATE 20 MG: 10 TABLET ORAL at 17:00

## 2025-08-04 RX ADMIN — ASPIRIN 81 MG: 81 TABLET, DELAYED RELEASE ORAL at 09:15

## 2025-08-04 RX ADMIN — SODIUM CHLORIDE: 0.9 INJECTION, SOLUTION INTRAVENOUS at 21:26

## 2025-08-04 RX ADMIN — HEPARIN SODIUM 5000 UNITS: 5000 INJECTION INTRAVENOUS; SUBCUTANEOUS at 04:58

## 2025-08-04 ASSESSMENT — PAIN SCALES - GENERAL
PAINLEVEL_OUTOF10: 6
PAINLEVEL_OUTOF10: 10
PAINLEVEL_OUTOF10: 0
PAINLEVEL_OUTOF10: 6
PAINLEVEL_OUTOF10: 0
PAINLEVEL_OUTOF10: 4
PAINLEVEL_OUTOF10: 8
PAINLEVEL_OUTOF10: 8
PAINLEVEL_OUTOF10: 6

## 2025-08-04 ASSESSMENT — PAIN - FUNCTIONAL ASSESSMENT
PAIN_FUNCTIONAL_ASSESSMENT: NONE - DENIES PAIN

## 2025-08-04 ASSESSMENT — PAIN DESCRIPTION - LOCATION
LOCATION: ABDOMEN
LOCATION: BACK;ABDOMEN
LOCATION: GENERALIZED
LOCATION: ABDOMEN;BACK

## 2025-08-04 ASSESSMENT — PAIN DESCRIPTION - ORIENTATION
ORIENTATION: ANTERIOR
ORIENTATION: ANTERIOR

## 2025-08-04 ASSESSMENT — PAIN DESCRIPTION - DESCRIPTORS
DESCRIPTORS: ACHING;CRAMPING
DESCRIPTORS: SHARP;CRAMPING

## 2025-08-05 ENCOUNTER — APPOINTMENT (OUTPATIENT)
Facility: HOSPITAL | Age: 85
End: 2025-08-05
Attending: STUDENT IN AN ORGANIZED HEALTH CARE EDUCATION/TRAINING PROGRAM
Payer: MEDICARE

## 2025-08-05 LAB
ANION GAP SERPL CALC-SCNC: 6 MMOL/L (ref 2–14)
BASOPHILS # BLD: 0.11 K/UL (ref 0–0.1)
BASOPHILS NFR BLD: 1 % (ref 0–1)
BUN SERPL-MCNC: 33 MG/DL (ref 8–23)
BUN/CREAT SERPL: 27 (ref 12–20)
CALCIUM SERPL-MCNC: 6.2 MG/DL (ref 8.8–10.2)
CHLORIDE SERPL-SCNC: 117 MMOL/L (ref 98–107)
CO2 SERPL-SCNC: 18 MMOL/L (ref 20–29)
CREAT SERPL-MCNC: 1.22 MG/DL (ref 0.6–1)
DIFFERENTIAL METHOD BLD: ABNORMAL
EOSINOPHIL # BLD: 0.91 K/UL (ref 0–0.4)
EOSINOPHIL NFR BLD: 8 % (ref 0–7)
ERYTHROCYTE [DISTWIDTH] IN BLOOD BY AUTOMATED COUNT: 14.7 % (ref 11.5–14.5)
GLUCOSE BLD STRIP.AUTO-MCNC: 129 MG/DL (ref 65–117)
GLUCOSE BLD STRIP.AUTO-MCNC: 131 MG/DL (ref 65–117)
GLUCOSE BLD STRIP.AUTO-MCNC: 213 MG/DL (ref 65–117)
GLUCOSE BLD STRIP.AUTO-MCNC: 288 MG/DL (ref 65–117)
GLUCOSE SERPL-MCNC: 129 MG/DL (ref 65–100)
HCT VFR BLD AUTO: 25.3 % (ref 35–47)
HGB BLD-MCNC: 8 G/DL (ref 11.5–16)
IMM GRANULOCYTES # BLD AUTO: 0 K/UL (ref 0–0.04)
IMM GRANULOCYTES NFR BLD AUTO: 0 % (ref 0–0.5)
LYMPHOCYTES # BLD: 0.91 K/UL (ref 0.8–3.5)
LYMPHOCYTES NFR BLD: 8 % (ref 12–49)
MCH RBC QN AUTO: 30 PG (ref 26–34)
MCHC RBC AUTO-ENTMCNC: 31.6 G/DL (ref 30–36.5)
MCV RBC AUTO: 94.8 FL (ref 80–99)
MONOCYTES # BLD: 0.68 K/UL (ref 0–1)
MONOCYTES NFR BLD: 6 % (ref 5–13)
NEUTS BAND NFR BLD MANUAL: 1 %
NEUTS SEG # BLD: 8.79 K/UL (ref 1.8–8)
NEUTS SEG NFR BLD: 76 % (ref 32–75)
NRBC # BLD: 0 K/UL (ref 0–0.01)
NRBC BLD-RTO: 0 PER 100 WBC
PLATELET # BLD AUTO: 464 K/UL (ref 150–400)
PMV BLD AUTO: 10.7 FL (ref 8.9–12.9)
POTASSIUM SERPL-SCNC: 3.4 MMOL/L (ref 3.5–5.1)
RBC # BLD AUTO: 2.67 M/UL (ref 3.8–5.2)
RBC MORPH BLD: ABNORMAL
SERVICE CMNT-IMP: ABNORMAL
SODIUM SERPL-SCNC: 141 MMOL/L (ref 136–145)
WBC # BLD AUTO: 11.4 K/UL (ref 3.6–11)

## 2025-08-05 PROCEDURE — 6360000002 HC RX W HCPCS

## 2025-08-05 PROCEDURE — 36415 COLL VENOUS BLD VENIPUNCTURE: CPT

## 2025-08-05 PROCEDURE — 6370000000 HC RX 637 (ALT 250 FOR IP): Performed by: SURGERY

## 2025-08-05 PROCEDURE — 32551 INSERTION OF CHEST TUBE: CPT

## 2025-08-05 PROCEDURE — 6370000000 HC RX 637 (ALT 250 FOR IP): Performed by: INTERNAL MEDICINE

## 2025-08-05 PROCEDURE — 6360000002 HC RX W HCPCS: Performed by: INTERNAL MEDICINE

## 2025-08-05 PROCEDURE — 71250 CT THORAX DX C-: CPT

## 2025-08-05 PROCEDURE — 2500000003 HC RX 250 WO HCPCS: Performed by: SURGERY

## 2025-08-05 PROCEDURE — 71045 X-RAY EXAM CHEST 1 VIEW: CPT

## 2025-08-05 PROCEDURE — 2060000000 HC ICU INTERMEDIATE R&B

## 2025-08-05 PROCEDURE — 6370000000 HC RX 637 (ALT 250 FOR IP)

## 2025-08-05 PROCEDURE — 80048 BASIC METABOLIC PNL TOTAL CA: CPT

## 2025-08-05 PROCEDURE — 85025 COMPLETE CBC W/AUTO DIFF WBC: CPT

## 2025-08-05 PROCEDURE — 82962 GLUCOSE BLOOD TEST: CPT

## 2025-08-05 PROCEDURE — 2700000000 HC OXYGEN THERAPY PER DAY

## 2025-08-05 PROCEDURE — 94660 CPAP INITIATION&MGMT: CPT

## 2025-08-05 PROCEDURE — 2580000003 HC RX 258: Performed by: INTERNAL MEDICINE

## 2025-08-05 PROCEDURE — 94640 AIRWAY INHALATION TREATMENT: CPT

## 2025-08-05 PROCEDURE — 6370000000 HC RX 637 (ALT 250 FOR IP): Performed by: STUDENT IN AN ORGANIZED HEALTH CARE EDUCATION/TRAINING PROGRAM

## 2025-08-05 PROCEDURE — 6360000002 HC RX W HCPCS: Performed by: SURGERY

## 2025-08-05 RX ADMIN — HYDRALAZINE HYDROCHLORIDE 10 MG: 20 INJECTION INTRAMUSCULAR; INTRAVENOUS at 05:55

## 2025-08-05 RX ADMIN — INSULIN LISPRO 2 UNITS: 100 INJECTION, SOLUTION INTRAVENOUS; SUBCUTANEOUS at 17:38

## 2025-08-05 RX ADMIN — ARFORMOTEROL TARTRATE: 15 SOLUTION RESPIRATORY (INHALATION) at 19:40

## 2025-08-05 RX ADMIN — OXYCODONE HYDROCHLORIDE 2.5 MG: 5 TABLET ORAL at 18:29

## 2025-08-05 RX ADMIN — ATORVASTATIN CALCIUM 40 MG: 40 TABLET, FILM COATED ORAL at 21:26

## 2025-08-05 RX ADMIN — SODIUM CHLORIDE, PRESERVATIVE FREE 10 ML: 5 INJECTION INTRAVENOUS at 21:28

## 2025-08-05 RX ADMIN — SODIUM CHLORIDE, PRESERVATIVE FREE 10 ML: 5 INJECTION INTRAVENOUS at 09:00

## 2025-08-05 RX ADMIN — PIPERACILLIN AND TAZOBACTAM 3375 MG: 3; .375 INJECTION, POWDER, FOR SOLUTION INTRAVENOUS at 12:34

## 2025-08-05 RX ADMIN — ISOSORBIDE DINITRATE 20 MG: 10 TABLET ORAL at 17:38

## 2025-08-05 RX ADMIN — IPRATROPIUM BROMIDE 0.5 MG: 0.5 SOLUTION RESPIRATORY (INHALATION) at 14:33

## 2025-08-05 RX ADMIN — THIAMINE HYDROCHLORIDE 100 MG: 100 INJECTION, SOLUTION INTRAMUSCULAR; INTRAVENOUS at 09:00

## 2025-08-05 RX ADMIN — CARVEDILOL 12.5 MG: 12.5 TABLET, FILM COATED ORAL at 08:59

## 2025-08-05 RX ADMIN — CARVEDILOL 12.5 MG: 12.5 TABLET, FILM COATED ORAL at 21:26

## 2025-08-05 RX ADMIN — BUMETANIDE 1 MG: 0.25 INJECTION INTRAMUSCULAR; INTRAVENOUS at 08:59

## 2025-08-05 RX ADMIN — IPRATROPIUM BROMIDE 0.5 MG: 0.5 SOLUTION RESPIRATORY (INHALATION) at 07:15

## 2025-08-05 RX ADMIN — OXYCODONE HYDROCHLORIDE 2.5 MG: 5 TABLET ORAL at 05:02

## 2025-08-05 RX ADMIN — ASPIRIN 81 MG: 81 TABLET, DELAYED RELEASE ORAL at 08:59

## 2025-08-05 RX ADMIN — ISOSORBIDE DINITRATE 20 MG: 10 TABLET ORAL at 08:59

## 2025-08-05 RX ADMIN — IPRATROPIUM BROMIDE 0.5 MG: 0.5 SOLUTION RESPIRATORY (INHALATION) at 19:40

## 2025-08-05 RX ADMIN — Medication: at 21:27

## 2025-08-05 RX ADMIN — MELATONIN 3 MG: at 21:26

## 2025-08-05 RX ADMIN — SENNOSIDES AND DOCUSATE SODIUM 1 TABLET: 8.6; 5 TABLET ORAL at 21:26

## 2025-08-05 RX ADMIN — Medication: at 09:01

## 2025-08-05 RX ADMIN — VANCOMYCIN HYDROCHLORIDE 750 MG: 750 INJECTION, POWDER, LYOPHILIZED, FOR SOLUTION INTRAVENOUS at 09:11

## 2025-08-05 RX ADMIN — ARFORMOTEROL TARTRATE: 15 SOLUTION RESPIRATORY (INHALATION) at 07:06

## 2025-08-05 RX ADMIN — BUMETANIDE 1 MG: 0.25 INJECTION INTRAMUSCULAR; INTRAVENOUS at 17:38

## 2025-08-05 RX ADMIN — ISOSORBIDE DINITRATE 20 MG: 10 TABLET ORAL at 12:28

## 2025-08-05 RX ADMIN — FLUCONAZOLE 200 MG: 2 INJECTION, SOLUTION INTRAVENOUS at 12:31

## 2025-08-05 RX ADMIN — INSULIN LISPRO 1 UNITS: 100 INJECTION, SOLUTION INTRAVENOUS; SUBCUTANEOUS at 21:31

## 2025-08-05 RX ADMIN — PIPERACILLIN AND TAZOBACTAM 3375 MG: 3; .375 INJECTION, POWDER, FOR SOLUTION INTRAVENOUS at 04:58

## 2025-08-05 RX ADMIN — POTASSIUM CHLORIDE 20 MEQ: 1500 TABLET, EXTENDED RELEASE ORAL at 08:59

## 2025-08-05 ASSESSMENT — PAIN SCALES - GENERAL
PAINLEVEL_OUTOF10: 0
PAINLEVEL_OUTOF10: 4
PAINLEVEL_OUTOF10: 2
PAINLEVEL_OUTOF10: 7
PAINLEVEL_OUTOF10: 7

## 2025-08-05 ASSESSMENT — PAIN DESCRIPTION - ORIENTATION
ORIENTATION: ANTERIOR
ORIENTATION: RIGHT

## 2025-08-05 ASSESSMENT — PAIN SCALES - WONG BAKER
WONGBAKER_NUMERICALRESPONSE: NO HURT
WONGBAKER_NUMERICALRESPONSE: NO HURT

## 2025-08-05 ASSESSMENT — PAIN DESCRIPTION - DESCRIPTORS: DESCRIPTORS: ACHING;CRAMPING

## 2025-08-05 ASSESSMENT — PAIN DESCRIPTION - LOCATION
LOCATION: FLANK
LOCATION: ABDOMEN

## 2025-08-06 ENCOUNTER — APPOINTMENT (OUTPATIENT)
Facility: HOSPITAL | Age: 85
End: 2025-08-06
Attending: STUDENT IN AN ORGANIZED HEALTH CARE EDUCATION/TRAINING PROGRAM
Payer: MEDICARE

## 2025-08-06 ENCOUNTER — APPOINTMENT (OUTPATIENT)
Facility: HOSPITAL | Age: 85
End: 2025-08-06
Attending: INTERNAL MEDICINE
Payer: MEDICARE

## 2025-08-06 LAB
ANION GAP SERPL CALC-SCNC: 10 MMOL/L (ref 2–14)
ANION GAP SERPL CALC-SCNC: 8 MMOL/L (ref 2–14)
BASOPHILS # BLD: 0.09 K/UL (ref 0–0.1)
BASOPHILS NFR BLD: 0.8 % (ref 0–1)
BUN SERPL-MCNC: 36 MG/DL (ref 8–23)
BUN SERPL-MCNC: 37 MG/DL (ref 8–23)
BUN/CREAT SERPL: 20 (ref 12–20)
BUN/CREAT SERPL: 21 (ref 12–20)
CALCIUM SERPL-MCNC: 8.7 MG/DL (ref 8.8–10.2)
CALCIUM SERPL-MCNC: 9.2 MG/DL (ref 8.8–10.2)
CHLORIDE SERPL-SCNC: 106 MMOL/L (ref 98–107)
CHLORIDE SERPL-SCNC: 108 MMOL/L (ref 98–107)
CO2 SERPL-SCNC: 22 MMOL/L (ref 20–29)
CO2 SERPL-SCNC: 23 MMOL/L (ref 20–29)
CREAT SERPL-MCNC: 1.72 MG/DL (ref 0.6–1)
CREAT SERPL-MCNC: 1.85 MG/DL (ref 0.6–1)
CYTOLOGY-NON GYN: NORMAL
DIFFERENTIAL METHOD BLD: ABNORMAL
EOSINOPHIL # BLD: 0.9 K/UL (ref 0–0.4)
EOSINOPHIL NFR BLD: 8.1 % (ref 0–7)
ERYTHROCYTE [DISTWIDTH] IN BLOOD BY AUTOMATED COUNT: 15.4 % (ref 11.5–14.5)
GLUCOSE BLD STRIP.AUTO-MCNC: 117 MG/DL (ref 65–117)
GLUCOSE BLD STRIP.AUTO-MCNC: 176 MG/DL (ref 65–117)
GLUCOSE BLD STRIP.AUTO-MCNC: 247 MG/DL (ref 65–117)
GLUCOSE BLD STRIP.AUTO-MCNC: 270 MG/DL (ref 65–117)
GLUCOSE SERPL-MCNC: 112 MG/DL (ref 65–100)
GLUCOSE SERPL-MCNC: 120 MG/DL (ref 65–100)
HCT VFR BLD AUTO: 24.3 % (ref 35–47)
HGB BLD-MCNC: 7.6 G/DL (ref 11.5–16)
IMM GRANULOCYTES # BLD AUTO: 0.06 K/UL (ref 0–0.04)
IMM GRANULOCYTES NFR BLD AUTO: 0.5 % (ref 0–0.5)
LYMPHOCYTES # BLD: 1.43 K/UL (ref 0.8–3.5)
LYMPHOCYTES NFR BLD: 12.9 % (ref 12–49)
MCH RBC QN AUTO: 29.5 PG (ref 26–34)
MCHC RBC AUTO-ENTMCNC: 31.3 G/DL (ref 30–36.5)
MCV RBC AUTO: 94.2 FL (ref 80–99)
MONOCYTES # BLD: 0.81 K/UL (ref 0–1)
MONOCYTES NFR BLD: 7.3 % (ref 5–13)
NEUTS SEG # BLD: 7.81 K/UL (ref 1.8–8)
NEUTS SEG NFR BLD: 70.4 % (ref 32–75)
NRBC # BLD: 0 K/UL (ref 0–0.01)
NRBC BLD-RTO: 0 PER 100 WBC
PLATELET # BLD AUTO: 442 K/UL (ref 150–400)
PLATELET COMMENT: ABNORMAL
PMV BLD AUTO: 10.9 FL (ref 8.9–12.9)
POTASSIUM SERPL-SCNC: 4.7 MMOL/L (ref 3.5–5.1)
POTASSIUM SERPL-SCNC: 4.8 MMOL/L (ref 3.5–5.1)
RBC # BLD AUTO: 2.58 M/UL (ref 3.8–5.2)
RBC MORPH BLD: ABNORMAL
SERVICE CMNT-IMP: ABNORMAL
SERVICE CMNT-IMP: NORMAL
SODIUM SERPL-SCNC: 136 MMOL/L (ref 136–145)
SODIUM SERPL-SCNC: 140 MMOL/L (ref 136–145)
WBC # BLD AUTO: 11.1 K/UL (ref 3.6–11)

## 2025-08-06 PROCEDURE — 94640 AIRWAY INHALATION TREATMENT: CPT

## 2025-08-06 PROCEDURE — 36415 COLL VENOUS BLD VENIPUNCTURE: CPT

## 2025-08-06 PROCEDURE — 82962 GLUCOSE BLOOD TEST: CPT

## 2025-08-06 PROCEDURE — 85025 COMPLETE CBC W/AUTO DIFF WBC: CPT

## 2025-08-06 PROCEDURE — 6370000000 HC RX 637 (ALT 250 FOR IP): Performed by: INTERNAL MEDICINE

## 2025-08-06 PROCEDURE — 2700000000 HC OXYGEN THERAPY PER DAY

## 2025-08-06 PROCEDURE — 80048 BASIC METABOLIC PNL TOTAL CA: CPT

## 2025-08-06 PROCEDURE — 6360000002 HC RX W HCPCS: Performed by: SURGERY

## 2025-08-06 PROCEDURE — 6370000000 HC RX 637 (ALT 250 FOR IP): Performed by: SURGERY

## 2025-08-06 PROCEDURE — 6360000002 HC RX W HCPCS: Performed by: STUDENT IN AN ORGANIZED HEALTH CARE EDUCATION/TRAINING PROGRAM

## 2025-08-06 PROCEDURE — 6360000002 HC RX W HCPCS

## 2025-08-06 PROCEDURE — 71045 X-RAY EXAM CHEST 1 VIEW: CPT

## 2025-08-06 PROCEDURE — 97535 SELF CARE MNGMENT TRAINING: CPT

## 2025-08-06 PROCEDURE — 97530 THERAPEUTIC ACTIVITIES: CPT

## 2025-08-06 PROCEDURE — 6360000002 HC RX W HCPCS: Performed by: INTERNAL MEDICINE

## 2025-08-06 PROCEDURE — 2500000003 HC RX 250 WO HCPCS: Performed by: SURGERY

## 2025-08-06 PROCEDURE — 93306 TTE W/DOPPLER COMPLETE: CPT

## 2025-08-06 PROCEDURE — 6370000000 HC RX 637 (ALT 250 FOR IP): Performed by: STUDENT IN AN ORGANIZED HEALTH CARE EDUCATION/TRAINING PROGRAM

## 2025-08-06 PROCEDURE — 2060000000 HC ICU INTERMEDIATE R&B

## 2025-08-06 PROCEDURE — 94660 CPAP INITIATION&MGMT: CPT

## 2025-08-06 RX ADMIN — THIAMINE HYDROCHLORIDE 100 MG: 100 INJECTION, SOLUTION INTRAMUSCULAR; INTRAVENOUS at 09:02

## 2025-08-06 RX ADMIN — BUMETANIDE 1 MG: 0.25 INJECTION INTRAMUSCULAR; INTRAVENOUS at 09:02

## 2025-08-06 RX ADMIN — ARFORMOTEROL TARTRATE: 15 SOLUTION RESPIRATORY (INHALATION) at 07:43

## 2025-08-06 RX ADMIN — CARVEDILOL 12.5 MG: 12.5 TABLET, FILM COATED ORAL at 09:02

## 2025-08-06 RX ADMIN — ISOSORBIDE DINITRATE 20 MG: 10 TABLET ORAL at 09:02

## 2025-08-06 RX ADMIN — ISOSORBIDE DINITRATE 20 MG: 10 TABLET ORAL at 17:14

## 2025-08-06 RX ADMIN — POTASSIUM CHLORIDE 20 MEQ: 1500 TABLET, EXTENDED RELEASE ORAL at 09:02

## 2025-08-06 RX ADMIN — ISOSORBIDE DINITRATE 20 MG: 10 TABLET ORAL at 12:19

## 2025-08-06 RX ADMIN — OXYCODONE HYDROCHLORIDE 2.5 MG: 5 TABLET ORAL at 17:14

## 2025-08-06 RX ADMIN — BUMETANIDE 1 MG: 0.25 INJECTION INTRAMUSCULAR; INTRAVENOUS at 17:13

## 2025-08-06 RX ADMIN — INSULIN LISPRO 1 UNITS: 100 INJECTION, SOLUTION INTRAVENOUS; SUBCUTANEOUS at 20:36

## 2025-08-06 RX ADMIN — HYDROMORPHONE HYDROCHLORIDE 1 MG: 1 INJECTION, SOLUTION INTRAMUSCULAR; INTRAVENOUS; SUBCUTANEOUS at 10:39

## 2025-08-06 RX ADMIN — CARVEDILOL 12.5 MG: 12.5 TABLET, FILM COATED ORAL at 20:36

## 2025-08-06 RX ADMIN — MELATONIN 3 MG: at 20:36

## 2025-08-06 RX ADMIN — Medication: at 20:40

## 2025-08-06 RX ADMIN — IPRATROPIUM BROMIDE 0.5 MG: 0.5 SOLUTION RESPIRATORY (INHALATION) at 14:21

## 2025-08-06 RX ADMIN — INSULIN LISPRO 2 UNITS: 100 INJECTION, SOLUTION INTRAVENOUS; SUBCUTANEOUS at 17:13

## 2025-08-06 RX ADMIN — OXYCODONE HYDROCHLORIDE 2.5 MG: 5 TABLET ORAL at 09:09

## 2025-08-06 RX ADMIN — IPRATROPIUM BROMIDE 0.5 MG: 0.5 SOLUTION RESPIRATORY (INHALATION) at 07:47

## 2025-08-06 RX ADMIN — ACETAMINOPHEN 650 MG: 325 TABLET ORAL at 12:19

## 2025-08-06 RX ADMIN — Medication: at 09:04

## 2025-08-06 RX ADMIN — SODIUM CHLORIDE, PRESERVATIVE FREE 10 ML: 5 INJECTION INTRAVENOUS at 09:03

## 2025-08-06 RX ADMIN — SODIUM CHLORIDE, PRESERVATIVE FREE 10 ML: 5 INJECTION INTRAVENOUS at 20:39

## 2025-08-06 RX ADMIN — ATORVASTATIN CALCIUM 40 MG: 40 TABLET, FILM COATED ORAL at 20:36

## 2025-08-06 RX ADMIN — ARFORMOTEROL TARTRATE: 15 SOLUTION RESPIRATORY (INHALATION) at 19:33

## 2025-08-06 RX ADMIN — ASPIRIN 81 MG: 81 TABLET, DELAYED RELEASE ORAL at 09:02

## 2025-08-06 ASSESSMENT — PAIN SCALES - GENERAL
PAINLEVEL_OUTOF10: 0
PAINLEVEL_OUTOF10: 9
PAINLEVEL_OUTOF10: 4
PAINLEVEL_OUTOF10: 7
PAINLEVEL_OUTOF10: 9
PAINLEVEL_OUTOF10: 4
PAINLEVEL_OUTOF10: 3
PAINLEVEL_OUTOF10: 5
PAINLEVEL_OUTOF10: 0
PAINLEVEL_OUTOF10: 10
PAINLEVEL_OUTOF10: 7
PAINLEVEL_OUTOF10: 2
PAINLEVEL_OUTOF10: 6

## 2025-08-06 ASSESSMENT — PAIN DESCRIPTION - ORIENTATION
ORIENTATION: MID

## 2025-08-06 ASSESSMENT — PAIN DESCRIPTION - LOCATION
LOCATION: ABDOMEN
LOCATION: ABDOMEN;BACK

## 2025-08-06 ASSESSMENT — PAIN SCALES - WONG BAKER: WONGBAKER_NUMERICALRESPONSE: NO HURT

## 2025-08-06 ASSESSMENT — PAIN DESCRIPTION - DESCRIPTORS
DESCRIPTORS: ACHING;SHARP
DESCRIPTORS: ACHING
DESCRIPTORS: ACHING;SHARP
DESCRIPTORS: ACHING;SHARP

## 2025-08-07 ENCOUNTER — APPOINTMENT (OUTPATIENT)
Facility: HOSPITAL | Age: 85
End: 2025-08-07
Attending: STUDENT IN AN ORGANIZED HEALTH CARE EDUCATION/TRAINING PROGRAM
Payer: MEDICARE

## 2025-08-07 LAB
ANION GAP SERPL CALC-SCNC: 10 MMOL/L (ref 2–14)
BASOPHILS # BLD: 0.07 K/UL (ref 0–0.1)
BASOPHILS NFR BLD: 0.7 % (ref 0–1)
BUN SERPL-MCNC: 35 MG/DL (ref 8–23)
BUN/CREAT SERPL: 19 (ref 12–20)
CALCIUM SERPL-MCNC: 8.7 MG/DL (ref 8.8–10.2)
CHLORIDE SERPL-SCNC: 104 MMOL/L (ref 98–107)
CO2 SERPL-SCNC: 22 MMOL/L (ref 20–29)
CREAT SERPL-MCNC: 1.84 MG/DL (ref 0.6–1)
DIFFERENTIAL METHOD BLD: ABNORMAL
ECHO AO ASC DIAM: 4 CM
ECHO AO ASCENDING AORTA INDEX: 2.3 CM/M2
ECHO AO ROOT DIAM: 3.5 CM
ECHO AO ROOT INDEX: 2.01 CM/M2
ECHO AV AREA PEAK VELOCITY: 1.1 CM2
ECHO AV AREA PEAK VELOCITY: 1.2 CM2
ECHO AV AREA VTI: 1.2 CM2
ECHO AV AREA/BSA VTI: 0.7 CM2/M2
ECHO AV MEAN GRADIENT: 7 MMHG
ECHO AV MEAN VELOCITY: 1.2 M/S
ECHO AV PEAK GRADIENT: 11 MMHG
ECHO AV PEAK GRADIENT: 13 MMHG
ECHO AV PEAK VELOCITY: 1.7 M/S
ECHO AV PEAK VELOCITY: 1.8 M/S
ECHO AV VTI: 48.7 CM
ECHO LA DIAMETER INDEX: 2.36 CM/M2
ECHO LA DIAMETER: 4.1 CM
ECHO LA TO AORTIC ROOT RATIO: 1.17
ECHO LV E' LATERAL VELOCITY: 3.91 CM/S
ECHO LV E' SEPTAL VELOCITY: 1.98 CM/S
ECHO LV EF PHYSICIAN: 52 %
ECHO LV FRACTIONAL SHORTENING: 33 % (ref 28–44)
ECHO LV INTERNAL DIMENSION DIASTOLE INDEX: 2.64 CM/M2
ECHO LV INTERNAL DIMENSION DIASTOLIC: 4.6 CM (ref 3.9–5.3)
ECHO LV INTERNAL DIMENSION SYSTOLIC INDEX: 1.78 CM/M2
ECHO LV INTERNAL DIMENSION SYSTOLIC: 3.1 CM
ECHO LV IVSD: 1.3 CM (ref 0.6–0.9)
ECHO LV MASS 2D: 230.2 G (ref 67–162)
ECHO LV MASS INDEX 2D: 132.3 G/M2 (ref 43–95)
ECHO LV POSTERIOR WALL DIASTOLIC: 1.3 CM (ref 0.6–0.9)
ECHO LV RELATIVE WALL THICKNESS RATIO: 0.57
ECHO LVOT AREA: 3.5 CM2
ECHO LVOT AV VTI INDEX: 0.36
ECHO LVOT DIAM: 2.1 CM
ECHO LVOT MEAN GRADIENT: 1 MMHG
ECHO LVOT PEAK GRADIENT: 2 MMHG
ECHO LVOT PEAK VELOCITY: 0.6 M/S
ECHO LVOT STROKE VOLUME INDEX: 34.8 ML/M2
ECHO LVOT SV: 60.6 ML
ECHO LVOT VTI: 17.5 CM
ECHO MV A VELOCITY: 0.99 M/S
ECHO MV AREA VTI: 1.6 CM2
ECHO MV E DECELERATION TIME (DT): 186 MS
ECHO MV E VELOCITY: 1.2 M/S
ECHO MV E/A RATIO: 1.21
ECHO MV E/E' LATERAL: 30.69
ECHO MV E/E' RATIO (AVERAGED): 45.65
ECHO MV E/E' SEPTAL: 60.61
ECHO MV LVOT VTI INDEX: 2.19
ECHO MV MAX VELOCITY: 1.5 M/S
ECHO MV MEAN GRADIENT: 4 MMHG
ECHO MV MEAN VELOCITY: 0.9 M/S
ECHO MV PEAK GRADIENT: 9 MMHG
ECHO MV REGURGITANT PEAK GRADIENT: 14 MMHG
ECHO MV REGURGITANT PEAK VELOCITY: 1.9 M/S
ECHO MV VTI: 38.3 CM
ECHO PV MAX VELOCITY: 1.1 M/S
ECHO PV PEAK GRADIENT: 5 MMHG
ECHO RV FREE WALL PEAK S': 7.7 CM/S
ECHO RV TAPSE: 1.5 CM (ref 1.7–?)
ECHO TV REGURGITANT MAX VELOCITY: 2.46 M/S
ECHO TV REGURGITANT PEAK GRADIENT: 24 MMHG
EOSINOPHIL # BLD: 0.79 K/UL (ref 0–0.4)
EOSINOPHIL NFR BLD: 7.9 % (ref 0–7)
ERYTHROCYTE [DISTWIDTH] IN BLOOD BY AUTOMATED COUNT: 15.8 % (ref 11.5–14.5)
GLUCOSE BLD STRIP.AUTO-MCNC: 140 MG/DL (ref 65–117)
GLUCOSE BLD STRIP.AUTO-MCNC: 175 MG/DL (ref 65–117)
GLUCOSE BLD STRIP.AUTO-MCNC: 176 MG/DL (ref 65–117)
GLUCOSE BLD STRIP.AUTO-MCNC: 181 MG/DL (ref 65–117)
GLUCOSE SERPL-MCNC: 137 MG/DL (ref 65–100)
HCT VFR BLD AUTO: 24.5 % (ref 35–47)
HGB BLD-MCNC: 7.5 G/DL (ref 11.5–16)
IMM GRANULOCYTES # BLD AUTO: 0.04 K/UL (ref 0–0.04)
IMM GRANULOCYTES NFR BLD AUTO: 0.4 % (ref 0–0.5)
LYMPHOCYTES # BLD: 1.46 K/UL (ref 0.8–3.5)
LYMPHOCYTES NFR BLD: 14.5 % (ref 12–49)
MCH RBC QN AUTO: 28.8 PG (ref 26–34)
MCHC RBC AUTO-ENTMCNC: 30.6 G/DL (ref 30–36.5)
MCV RBC AUTO: 94.2 FL (ref 80–99)
MONOCYTES # BLD: 0.71 K/UL (ref 0–1)
MONOCYTES NFR BLD: 7.1 % (ref 5–13)
NEUTS SEG # BLD: 6.98 K/UL (ref 1.8–8)
NEUTS SEG NFR BLD: 69.4 % (ref 32–75)
NRBC # BLD: 0 K/UL (ref 0–0.01)
NRBC BLD-RTO: 0 PER 100 WBC
PLATELET # BLD AUTO: 439 K/UL (ref 150–400)
PMV BLD AUTO: 10.9 FL (ref 8.9–12.9)
POTASSIUM SERPL-SCNC: 5.1 MMOL/L (ref 3.5–5.1)
RBC # BLD AUTO: 2.6 M/UL (ref 3.8–5.2)
SERVICE CMNT-IMP: ABNORMAL
SODIUM SERPL-SCNC: 136 MMOL/L (ref 136–145)
WBC # BLD AUTO: 10.1 K/UL (ref 3.6–11)

## 2025-08-07 PROCEDURE — 71045 X-RAY EXAM CHEST 1 VIEW: CPT

## 2025-08-07 PROCEDURE — 6370000000 HC RX 637 (ALT 250 FOR IP): Performed by: STUDENT IN AN ORGANIZED HEALTH CARE EDUCATION/TRAINING PROGRAM

## 2025-08-07 PROCEDURE — 6370000000 HC RX 637 (ALT 250 FOR IP): Performed by: SURGERY

## 2025-08-07 PROCEDURE — 85025 COMPLETE CBC W/AUTO DIFF WBC: CPT

## 2025-08-07 PROCEDURE — 6370000000 HC RX 637 (ALT 250 FOR IP): Performed by: INTERNAL MEDICINE

## 2025-08-07 PROCEDURE — 6360000002 HC RX W HCPCS: Performed by: STUDENT IN AN ORGANIZED HEALTH CARE EDUCATION/TRAINING PROGRAM

## 2025-08-07 PROCEDURE — 6360000002 HC RX W HCPCS: Performed by: INTERNAL MEDICINE

## 2025-08-07 PROCEDURE — 6370000000 HC RX 637 (ALT 250 FOR IP)

## 2025-08-07 PROCEDURE — 2060000000 HC ICU INTERMEDIATE R&B

## 2025-08-07 PROCEDURE — 36415 COLL VENOUS BLD VENIPUNCTURE: CPT

## 2025-08-07 PROCEDURE — 6360000002 HC RX W HCPCS: Performed by: HOSPITALIST

## 2025-08-07 PROCEDURE — 82962 GLUCOSE BLOOD TEST: CPT

## 2025-08-07 PROCEDURE — 2700000000 HC OXYGEN THERAPY PER DAY

## 2025-08-07 PROCEDURE — 99232 SBSQ HOSP IP/OBS MODERATE 35: CPT

## 2025-08-07 PROCEDURE — 6360000002 HC RX W HCPCS: Performed by: SURGERY

## 2025-08-07 PROCEDURE — 80048 BASIC METABOLIC PNL TOTAL CA: CPT

## 2025-08-07 PROCEDURE — 94640 AIRWAY INHALATION TREATMENT: CPT

## 2025-08-07 PROCEDURE — 6360000002 HC RX W HCPCS

## 2025-08-07 PROCEDURE — 2500000003 HC RX 250 WO HCPCS: Performed by: SURGERY

## 2025-08-07 RX ORDER — DIAZEPAM 10 MG/2ML
2.5 INJECTION, SOLUTION INTRAMUSCULAR; INTRAVENOUS EVERY 4 HOURS PRN
Status: DISCONTINUED | OUTPATIENT
Start: 2025-08-07 | End: 2025-08-09 | Stop reason: HOSPADM

## 2025-08-07 RX ADMIN — ARFORMOTEROL TARTRATE: 15 SOLUTION RESPIRATORY (INHALATION) at 20:16

## 2025-08-07 RX ADMIN — ARFORMOTEROL TARTRATE: 15 SOLUTION RESPIRATORY (INHALATION) at 07:28

## 2025-08-07 RX ADMIN — SODIUM CHLORIDE, PRESERVATIVE FREE 10 ML: 5 INJECTION INTRAVENOUS at 09:13

## 2025-08-07 RX ADMIN — Medication: at 09:15

## 2025-08-07 RX ADMIN — SODIUM CHLORIDE, PRESERVATIVE FREE 10 ML: 5 INJECTION INTRAVENOUS at 21:11

## 2025-08-07 RX ADMIN — HEPARIN SODIUM 5000 UNITS: 5000 INJECTION INTRAVENOUS; SUBCUTANEOUS at 21:10

## 2025-08-07 RX ADMIN — OXYCODONE HYDROCHLORIDE 2.5 MG: 5 TABLET ORAL at 00:17

## 2025-08-07 RX ADMIN — ISOSORBIDE DINITRATE 20 MG: 10 TABLET ORAL at 16:33

## 2025-08-07 RX ADMIN — ACETAMINOPHEN 650 MG: 325 TABLET ORAL at 13:18

## 2025-08-07 RX ADMIN — OXYCODONE HYDROCHLORIDE 2.5 MG: 5 TABLET ORAL at 09:13

## 2025-08-07 RX ADMIN — ATORVASTATIN CALCIUM 40 MG: 40 TABLET, FILM COATED ORAL at 21:10

## 2025-08-07 RX ADMIN — ASPIRIN 81 MG: 81 TABLET, DELAYED RELEASE ORAL at 09:13

## 2025-08-07 RX ADMIN — Medication: at 21:09

## 2025-08-07 RX ADMIN — SENNOSIDES AND DOCUSATE SODIUM 1 TABLET: 8.6; 5 TABLET ORAL at 21:10

## 2025-08-07 RX ADMIN — CARVEDILOL 12.5 MG: 12.5 TABLET, FILM COATED ORAL at 09:13

## 2025-08-07 RX ADMIN — ISOSORBIDE DINITRATE 20 MG: 10 TABLET ORAL at 13:16

## 2025-08-07 RX ADMIN — THIAMINE HYDROCHLORIDE 100 MG: 100 INJECTION, SOLUTION INTRAMUSCULAR; INTRAVENOUS at 09:13

## 2025-08-07 RX ADMIN — POTASSIUM CHLORIDE 20 MEQ: 1500 TABLET, EXTENDED RELEASE ORAL at 09:13

## 2025-08-07 RX ADMIN — MELATONIN 3 MG: at 21:10

## 2025-08-07 RX ADMIN — ISOSORBIDE DINITRATE 20 MG: 10 TABLET ORAL at 09:13

## 2025-08-07 RX ADMIN — HYDROMORPHONE HYDROCHLORIDE 1 MG: 1 INJECTION, SOLUTION INTRAMUSCULAR; INTRAVENOUS; SUBCUTANEOUS at 10:33

## 2025-08-07 RX ADMIN — HEPARIN SODIUM 5000 UNITS: 5000 INJECTION INTRAVENOUS; SUBCUTANEOUS at 13:16

## 2025-08-07 RX ADMIN — BUMETANIDE 1 MG: 0.25 INJECTION INTRAMUSCULAR; INTRAVENOUS at 09:13

## 2025-08-07 RX ADMIN — CARVEDILOL 12.5 MG: 12.5 TABLET, FILM COATED ORAL at 21:10

## 2025-08-07 RX ADMIN — INSULIN LISPRO 1 UNITS: 100 INJECTION, SOLUTION INTRAVENOUS; SUBCUTANEOUS at 16:46

## 2025-08-07 RX ADMIN — IPRATROPIUM BROMIDE 0.5 MG: 0.5 SOLUTION RESPIRATORY (INHALATION) at 07:28

## 2025-08-07 RX ADMIN — HYDROMORPHONE HYDROCHLORIDE 1 MG: 1 INJECTION, SOLUTION INTRAMUSCULAR; INTRAVENOUS; SUBCUTANEOUS at 21:18

## 2025-08-07 RX ADMIN — BUMETANIDE 1 MG: 0.25 INJECTION INTRAMUSCULAR; INTRAVENOUS at 16:33

## 2025-08-07 RX ADMIN — IPRATROPIUM BROMIDE 0.5 MG: 0.5 SOLUTION RESPIRATORY (INHALATION) at 20:11

## 2025-08-07 RX ADMIN — OXYCODONE HYDROCHLORIDE 2.5 MG: 5 TABLET ORAL at 16:32

## 2025-08-07 ASSESSMENT — PAIN DESCRIPTION - DESCRIPTORS
DESCRIPTORS: ACHING;SHARP
DESCRIPTORS: OTHER (COMMENT)
DESCRIPTORS: ACHING
DESCRIPTORS: SHARP
DESCRIPTORS: ACHING;SHARP
DESCRIPTORS: ACHING;SHARP

## 2025-08-07 ASSESSMENT — PAIN SCALES - GENERAL
PAINLEVEL_OUTOF10: 4
PAINLEVEL_OUTOF10: 0
PAINLEVEL_OUTOF10: 0
PAINLEVEL_OUTOF10: 4
PAINLEVEL_OUTOF10: 10
PAINLEVEL_OUTOF10: 7
PAINLEVEL_OUTOF10: 1
PAINLEVEL_OUTOF10: 7
PAINLEVEL_OUTOF10: 7
PAINLEVEL_OUTOF10: 6

## 2025-08-07 ASSESSMENT — PAIN DESCRIPTION - ORIENTATION
ORIENTATION: RIGHT;LEFT
ORIENTATION: RIGHT;LEFT
ORIENTATION: MID;RIGHT;LEFT
ORIENTATION: RIGHT;LEFT
ORIENTATION: LOWER

## 2025-08-07 ASSESSMENT — PAIN DESCRIPTION - LOCATION
LOCATION: BACK;ABDOMEN
LOCATION: ABDOMEN;RIB CAGE
LOCATION: ABDOMEN;BACK
LOCATION: RIB CAGE
LOCATION: BACK
LOCATION: RIB CAGE
LOCATION: RIB CAGE

## 2025-08-08 ENCOUNTER — APPOINTMENT (OUTPATIENT)
Facility: HOSPITAL | Age: 85
End: 2025-08-08
Attending: STUDENT IN AN ORGANIZED HEALTH CARE EDUCATION/TRAINING PROGRAM
Payer: MEDICARE

## 2025-08-08 LAB
ALBUMIN SERPL-MCNC: 2.2 G/DL (ref 3.5–5.2)
ALBUMIN/GLOB SERPL: 0.5 (ref 1.1–2.2)
ALP SERPL-CCNC: 70 U/L (ref 35–104)
ALT SERPL-CCNC: <5 U/L (ref 10–35)
ANION GAP SERPL CALC-SCNC: 11 MMOL/L (ref 2–14)
AST SERPL-CCNC: 9 U/L (ref 10–35)
BASOPHILS # BLD: 0.08 K/UL (ref 0–0.1)
BASOPHILS NFR BLD: 0.7 % (ref 0–1)
BILIRUB SERPL-MCNC: 0.4 MG/DL (ref 0–1.2)
BUN SERPL-MCNC: 33 MG/DL (ref 8–23)
BUN/CREAT SERPL: 18 (ref 12–20)
CALCIUM SERPL-MCNC: 9.1 MG/DL (ref 8.8–10.2)
CHLORIDE SERPL-SCNC: 102 MMOL/L (ref 98–107)
CO2 SERPL-SCNC: 21 MMOL/L (ref 20–29)
CREAT SERPL-MCNC: 1.84 MG/DL (ref 0.6–1)
DIFFERENTIAL METHOD BLD: ABNORMAL
EKG DIAGNOSIS: NORMAL
EKG Q-T INTERVAL: 310 MS
EKG QRS DURATION: 108 MS
EKG QTC CALCULATION (BAZETT): 492 MS
EKG R AXIS: 51 DEGREES
EKG T AXIS: 213 DEGREES
EKG VENTRICULAR RATE: 152 BPM
EOSINOPHIL # BLD: 0.32 K/UL (ref 0–0.4)
EOSINOPHIL NFR BLD: 2.9 % (ref 0–7)
ERYTHROCYTE [DISTWIDTH] IN BLOOD BY AUTOMATED COUNT: 15.9 % (ref 11.5–14.5)
GLOBULIN SER CALC-MCNC: 4.2 G/DL (ref 2–4)
GLUCOSE BLD STRIP.AUTO-MCNC: 112 MG/DL (ref 65–117)
GLUCOSE BLD STRIP.AUTO-MCNC: 117 MG/DL (ref 65–117)
GLUCOSE BLD STRIP.AUTO-MCNC: 238 MG/DL (ref 65–117)
GLUCOSE SERPL-MCNC: 155 MG/DL (ref 65–100)
HCT VFR BLD AUTO: 29.3 % (ref 35–47)
HGB BLD-MCNC: 8.9 G/DL (ref 11.5–16)
IMM GRANULOCYTES # BLD AUTO: 0.07 K/UL (ref 0–0.04)
IMM GRANULOCYTES NFR BLD AUTO: 0.6 % (ref 0–0.5)
LYMPHOCYTES # BLD: 0.95 K/UL (ref 0.8–3.5)
LYMPHOCYTES NFR BLD: 8.6 % (ref 12–49)
MAGNESIUM SERPL-MCNC: 1.8 MG/DL (ref 1.6–2.4)
MCH RBC QN AUTO: 28.9 PG (ref 26–34)
MCHC RBC AUTO-ENTMCNC: 30.4 G/DL (ref 30–36.5)
MCV RBC AUTO: 95.1 FL (ref 80–99)
MONOCYTES # BLD: 0.75 K/UL (ref 0–1)
MONOCYTES NFR BLD: 6.8 % (ref 5–13)
NEUTS SEG # BLD: 8.89 K/UL (ref 1.8–8)
NEUTS SEG NFR BLD: 80.4 % (ref 32–75)
NRBC # BLD: 0 K/UL (ref 0–0.01)
NRBC BLD-RTO: 0 PER 100 WBC
NT PRO BNP: ABNORMAL PG/ML (ref 0–450)
PLATELET # BLD AUTO: 480 K/UL (ref 150–400)
PMV BLD AUTO: 10.7 FL (ref 8.9–12.9)
POTASSIUM SERPL-SCNC: 5.2 MMOL/L (ref 3.5–5.1)
PROT SERPL-MCNC: 6.4 G/DL (ref 6.4–8.3)
RBC # BLD AUTO: 3.08 M/UL (ref 3.8–5.2)
SERVICE CMNT-IMP: ABNORMAL
SERVICE CMNT-IMP: NORMAL
SERVICE CMNT-IMP: NORMAL
SODIUM SERPL-SCNC: 134 MMOL/L (ref 136–145)
TROPONIN T SERPL HS-MCNC: 97.9 NG/L (ref 0–14)
WBC # BLD AUTO: 11.1 K/UL (ref 3.6–11)

## 2025-08-08 PROCEDURE — 2500000003 HC RX 250 WO HCPCS: Performed by: SURGERY

## 2025-08-08 PROCEDURE — P9047 ALBUMIN (HUMAN), 25%, 50ML: HCPCS | Performed by: INTERNAL MEDICINE

## 2025-08-08 PROCEDURE — 99232 SBSQ HOSP IP/OBS MODERATE 35: CPT

## 2025-08-08 PROCEDURE — 82962 GLUCOSE BLOOD TEST: CPT

## 2025-08-08 PROCEDURE — 85025 COMPLETE CBC W/AUTO DIFF WBC: CPT

## 2025-08-08 PROCEDURE — 83735 ASSAY OF MAGNESIUM: CPT

## 2025-08-08 PROCEDURE — 94640 AIRWAY INHALATION TREATMENT: CPT

## 2025-08-08 PROCEDURE — 6370000000 HC RX 637 (ALT 250 FOR IP): Performed by: INTERNAL MEDICINE

## 2025-08-08 PROCEDURE — 6360000002 HC RX W HCPCS: Performed by: INTERNAL MEDICINE

## 2025-08-08 PROCEDURE — 6360000002 HC RX W HCPCS: Performed by: HOSPITALIST

## 2025-08-08 PROCEDURE — 84484 ASSAY OF TROPONIN QUANT: CPT

## 2025-08-08 PROCEDURE — 6370000000 HC RX 637 (ALT 250 FOR IP): Performed by: STUDENT IN AN ORGANIZED HEALTH CARE EDUCATION/TRAINING PROGRAM

## 2025-08-08 PROCEDURE — 6370000000 HC RX 637 (ALT 250 FOR IP): Performed by: SURGERY

## 2025-08-08 PROCEDURE — 2580000003 HC RX 258: Performed by: INTERNAL MEDICINE

## 2025-08-08 PROCEDURE — 6360000002 HC RX W HCPCS: Performed by: SURGERY

## 2025-08-08 PROCEDURE — 2060000000 HC ICU INTERMEDIATE R&B

## 2025-08-08 PROCEDURE — 36415 COLL VENOUS BLD VENIPUNCTURE: CPT

## 2025-08-08 PROCEDURE — 6360000002 HC RX W HCPCS

## 2025-08-08 PROCEDURE — 71045 X-RAY EXAM CHEST 1 VIEW: CPT

## 2025-08-08 PROCEDURE — 80053 COMPREHEN METABOLIC PANEL: CPT

## 2025-08-08 PROCEDURE — 6360000002 HC RX W HCPCS: Performed by: STUDENT IN AN ORGANIZED HEALTH CARE EDUCATION/TRAINING PROGRAM

## 2025-08-08 PROCEDURE — 2700000000 HC OXYGEN THERAPY PER DAY

## 2025-08-08 PROCEDURE — 83880 ASSAY OF NATRIURETIC PEPTIDE: CPT

## 2025-08-08 RX ORDER — DILTIAZEM HYDROCHLORIDE 5 MG/ML
INJECTION INTRAVENOUS
Status: DISPENSED
Start: 2025-08-08 | End: 2025-08-09

## 2025-08-08 RX ORDER — GLYCOPYRROLATE 0.2 MG/ML
0.2 INJECTION INTRAMUSCULAR; INTRAVENOUS EVERY 4 HOURS PRN
Status: DISCONTINUED | OUTPATIENT
Start: 2025-08-08 | End: 2025-08-09 | Stop reason: HOSPADM

## 2025-08-08 RX ORDER — ALBUMIN HUMAN 50 G/1000ML
25 SOLUTION INTRAVENOUS ONCE
Status: DISCONTINUED | OUTPATIENT
Start: 2025-08-08 | End: 2025-08-08

## 2025-08-08 RX ORDER — ALBUMIN HUMAN 50 G/1000ML
SOLUTION INTRAVENOUS
Status: DISCONTINUED
Start: 2025-08-08 | End: 2025-08-08 | Stop reason: WASHOUT

## 2025-08-08 RX ORDER — DILTIAZEM HYDROCHLORIDE 100 MG/1
INJECTION, POWDER, LYOPHILIZED, FOR SOLUTION INTRAVENOUS
Status: DISPENSED
Start: 2025-08-08 | End: 2025-08-09

## 2025-08-08 RX ORDER — ALBUMIN (HUMAN) 12.5 G/50ML
25 SOLUTION INTRAVENOUS ONCE
Status: COMPLETED | OUTPATIENT
Start: 2025-08-08 | End: 2025-08-08

## 2025-08-08 RX ORDER — DIGOXIN 0.25 MG/ML
250 INJECTION INTRAMUSCULAR; INTRAVENOUS ONCE
Status: DISCONTINUED | OUTPATIENT
Start: 2025-08-08 | End: 2025-08-09 | Stop reason: HOSPADM

## 2025-08-08 RX ORDER — LORAZEPAM 2 MG/ML
1 CONCENTRATE ORAL
Status: DISCONTINUED | OUTPATIENT
Start: 2025-08-08 | End: 2025-08-09 | Stop reason: HOSPADM

## 2025-08-08 RX ORDER — 0.9 % SODIUM CHLORIDE 0.9 %
250 INTRAVENOUS SOLUTION INTRAVENOUS ONCE
Status: COMPLETED | OUTPATIENT
Start: 2025-08-08 | End: 2025-08-08

## 2025-08-08 RX ORDER — OXYCODONE HCL 20 MG/ML
10 CONCENTRATE, ORAL ORAL
Refills: 0 | Status: DISCONTINUED | OUTPATIENT
Start: 2025-08-08 | End: 2025-08-09 | Stop reason: HOSPADM

## 2025-08-08 RX ADMIN — INSULIN LISPRO 2 UNITS: 100 INJECTION, SOLUTION INTRAVENOUS; SUBCUTANEOUS at 11:32

## 2025-08-08 RX ADMIN — THIAMINE HYDROCHLORIDE 100 MG: 100 INJECTION, SOLUTION INTRAMUSCULAR; INTRAVENOUS at 10:58

## 2025-08-08 RX ADMIN — Medication: at 10:58

## 2025-08-08 RX ADMIN — OXYCODONE HYDROCHLORIDE 10 MG: 20 SOLUTION ORAL at 23:37

## 2025-08-08 RX ADMIN — SODIUM CHLORIDE 250 ML: 0.9 INJECTION, SOLUTION INTRAVENOUS at 19:09

## 2025-08-08 RX ADMIN — SODIUM CHLORIDE 5 MG/HR: 900 INJECTION, SOLUTION INTRAVENOUS at 18:08

## 2025-08-08 RX ADMIN — ONDANSETRON 4 MG: 2 INJECTION, SOLUTION INTRAMUSCULAR; INTRAVENOUS at 11:47

## 2025-08-08 RX ADMIN — ALBUMIN (HUMAN) 25 G: 0.25 INJECTION, SOLUTION INTRAVENOUS at 18:06

## 2025-08-08 RX ADMIN — LORAZEPAM 1 MG: 2 SOLUTION, CONCENTRATE ORAL at 23:37

## 2025-08-08 RX ADMIN — ISOSORBIDE DINITRATE 20 MG: 10 TABLET ORAL at 13:43

## 2025-08-08 RX ADMIN — DIAZEPAM 2.5 MG: 5 INJECTION, SOLUTION INTRAMUSCULAR; INTRAVENOUS at 11:16

## 2025-08-08 RX ADMIN — OXYCODONE HYDROCHLORIDE 10 MG: 20 SOLUTION ORAL at 21:11

## 2025-08-08 RX ADMIN — HYDROMORPHONE HYDROCHLORIDE 1 MG: 1 INJECTION, SOLUTION INTRAMUSCULAR; INTRAVENOUS; SUBCUTANEOUS at 17:10

## 2025-08-08 RX ADMIN — SODIUM CHLORIDE, PRESERVATIVE FREE 10 ML: 5 INJECTION INTRAVENOUS at 10:58

## 2025-08-08 RX ADMIN — CARVEDILOL 12.5 MG: 12.5 TABLET, FILM COATED ORAL at 10:57

## 2025-08-08 RX ADMIN — HYDRALAZINE HYDROCHLORIDE 10 MG: 20 INJECTION INTRAMUSCULAR; INTRAVENOUS at 10:58

## 2025-08-08 RX ADMIN — DIAZEPAM 2.5 MG: 5 INJECTION, SOLUTION INTRAMUSCULAR; INTRAVENOUS at 18:58

## 2025-08-08 RX ADMIN — IPRATROPIUM BROMIDE 0.5 MG: 0.5 SOLUTION RESPIRATORY (INHALATION) at 07:28

## 2025-08-08 RX ADMIN — ISOSORBIDE DINITRATE 20 MG: 10 TABLET ORAL at 10:57

## 2025-08-08 RX ADMIN — HEPARIN SODIUM 5000 UNITS: 5000 INJECTION INTRAVENOUS; SUBCUTANEOUS at 13:43

## 2025-08-08 RX ADMIN — BUMETANIDE 1 MG: 0.25 INJECTION INTRAMUSCULAR; INTRAVENOUS at 17:03

## 2025-08-08 RX ADMIN — HYDROMORPHONE HYDROCHLORIDE 1 MG: 1 INJECTION, SOLUTION INTRAMUSCULAR; INTRAVENOUS; SUBCUTANEOUS at 04:26

## 2025-08-08 RX ADMIN — BUMETANIDE 1 MG: 0.25 INJECTION INTRAMUSCULAR; INTRAVENOUS at 10:58

## 2025-08-08 RX ADMIN — ASPIRIN 81 MG: 81 TABLET, DELAYED RELEASE ORAL at 10:57

## 2025-08-08 RX ADMIN — LORAZEPAM 1 MG: 2 SOLUTION, CONCENTRATE ORAL at 21:18

## 2025-08-08 RX ADMIN — ARFORMOTEROL TARTRATE: 15 SOLUTION RESPIRATORY (INHALATION) at 07:28

## 2025-08-08 RX ADMIN — HEPARIN SODIUM 5000 UNITS: 5000 INJECTION INTRAVENOUS; SUBCUTANEOUS at 04:30

## 2025-08-08 RX ADMIN — ALBUMIN (HUMAN) 25 G: 0.25 INJECTION, SOLUTION INTRAVENOUS at 18:49

## 2025-08-08 RX ADMIN — ISOSORBIDE DINITRATE 20 MG: 10 TABLET ORAL at 17:03

## 2025-08-08 ASSESSMENT — PAIN DESCRIPTION - LOCATION
LOCATION: ABDOMEN;BACK
LOCATION: BACK;ABDOMEN
LOCATION: ABDOMEN;BACK
LOCATION: BACK;ABDOMEN

## 2025-08-08 ASSESSMENT — PAIN - FUNCTIONAL ASSESSMENT
PAIN_FUNCTIONAL_ASSESSMENT: ACTIVITIES ARE NOT PREVENTED

## 2025-08-08 ASSESSMENT — PAIN DESCRIPTION - DESCRIPTORS
DESCRIPTORS: ACHING

## 2025-08-08 ASSESSMENT — PAIN SCALES - GENERAL
PAINLEVEL_OUTOF10: 6
PAINLEVEL_OUTOF10: 2
PAINLEVEL_OUTOF10: 9

## 2025-08-08 ASSESSMENT — PAIN SCALES - WONG BAKER: WONGBAKER_NUMERICALRESPONSE: NO HURT

## 2025-08-09 VITALS
BODY MASS INDEX: 23.21 KG/M2 | HEART RATE: 96 BPM | HEIGHT: 66 IN | TEMPERATURE: 101 F | OXYGEN SATURATION: 97 % | RESPIRATION RATE: 24 BRPM | DIASTOLIC BLOOD PRESSURE: 38 MMHG | SYSTOLIC BLOOD PRESSURE: 101 MMHG | WEIGHT: 144.4 LBS

## 2025-08-09 PROCEDURE — 6370000000 HC RX 637 (ALT 250 FOR IP): Performed by: INTERNAL MEDICINE

## 2025-08-09 RX ORDER — HYDROMORPHONE HYDROCHLORIDE 1 MG/ML
1 INJECTION, SOLUTION INTRAMUSCULAR; INTRAVENOUS; SUBCUTANEOUS
Status: DISCONTINUED | OUTPATIENT
Start: 2025-08-09 | End: 2025-08-09 | Stop reason: HOSPADM

## 2025-08-09 RX ADMIN — OXYCODONE HYDROCHLORIDE 10 MG: 20 SOLUTION ORAL at 01:39

## 2025-08-09 RX ADMIN — OXYCODONE HYDROCHLORIDE 10 MG: 20 SOLUTION ORAL at 03:41

## 2025-08-09 RX ADMIN — LORAZEPAM 1 MG: 2 SOLUTION, CONCENTRATE ORAL at 05:49

## 2025-08-09 RX ADMIN — LORAZEPAM 1 MG: 2 SOLUTION, CONCENTRATE ORAL at 03:42

## 2025-08-09 RX ADMIN — OXYCODONE HYDROCHLORIDE 10 MG: 20 SOLUTION ORAL at 05:49

## 2025-08-09 RX ADMIN — LORAZEPAM 1 MG: 2 SOLUTION, CONCENTRATE ORAL at 08:07

## 2025-08-09 RX ADMIN — OXYCODONE HYDROCHLORIDE 10 MG: 20 SOLUTION ORAL at 08:07

## 2025-08-09 RX ADMIN — LORAZEPAM 1 MG: 2 SOLUTION, CONCENTRATE ORAL at 01:41

## 2025-08-09 ASSESSMENT — PAIN SCALES - WONG BAKER
WONGBAKER_NUMERICALRESPONSE: NO HURT

## 2025-08-09 ASSESSMENT — PAIN SCALES - GENERAL: PAINLEVEL_OUTOF10: 0

## 2025-08-09 ASSESSMENT — PAIN DESCRIPTION - DESCRIPTORS: DESCRIPTORS: ACHING

## 2025-08-11 LAB
EKG DIAGNOSIS: NORMAL
EKG Q-T INTERVAL: 310 MS
EKG QRS DURATION: 108 MS
EKG QTC CALCULATION (BAZETT): 492 MS
EKG R AXIS: 51 DEGREES
EKG T AXIS: 213 DEGREES
EKG VENTRICULAR RATE: 152 BPM

## (undated) LAB
ATRIAL RATE: 79 BPM
CALCULATED P AXIS, ECG09: 86 DEGREES
CALCULATED R AXIS, ECG10: 58 DEGREES
CALCULATED T AXIS, ECG11: -138 DEGREES
DIAGNOSIS, 93000: NORMAL
P-R INTERVAL, ECG05: 180 MS
Q-T INTERVAL, ECG07: 400 MS
QRS DURATION, ECG06: 114 MS
QTC CALCULATION (BEZET), ECG08: 458 MS
VENTRICULAR RATE, ECG03: 79 BPM

## (undated) DEVICE — AIRLIFE™  ADULT CUSHION NASAL CANNULA WITH 7 FOOT (2.1 M) CRUSH-RESISTANT OXYGEN TUBING, AND U/CONNECT-IT ADAPTER: Brand: AIRLIFE™

## (undated) DEVICE — NON-REM POLYHESIVE PATIENT RETURN ELECTRODE: Brand: VALLEYLAB

## (undated) DEVICE — COVER LT HNDL BLU PLAS

## (undated) DEVICE — Device

## (undated) DEVICE — MEDI-VAC YANK SUCT HNDL W/TPRD BULBOUS TIP: Brand: CARDINAL HEALTH

## (undated) DEVICE — 3M™ IOBAN™ 2 ANTIMICROBIAL INCISE DRAPE 6650EZ: Brand: IOBAN™ 2

## (undated) DEVICE — NEEDLE HYPO 18GA L1.5IN PNK S STL HUB POLYPR SHLD REG BVL

## (undated) DEVICE — LIMB HOLDER, WRIST/ANKLE: Brand: DEROYAL

## (undated) DEVICE — STAPLER INT L60MM REG TISS BLU B FRM 8 FIRING 2 ROW AUTO

## (undated) DEVICE — SUTURE ABSORBABLE BRAIDED 2-0 CT-1 27 IN UD VICRYL J259H

## (undated) DEVICE — GLIDESHEATH SLENDER STAINLESS STEEL KIT: Brand: GLIDESHEATH SLENDER

## (undated) DEVICE — SUTURE PERMAHAND SZ 3-0 L30IN NONABSORBABLE BLK L26MM SH C017D

## (undated) DEVICE — TRAP SPEC COLL POLYP POLYSTYR --

## (undated) DEVICE — PLASMABLADE PS200-040 4.0: Brand: PLASMABLADE™

## (undated) DEVICE — DRAPE FLD WRM W44XL66IN C6L FOR INTRATEMP SYS THERMABASIN

## (undated) DEVICE — ZIP 8I SURGICAL SKIN CLOSURE DEVICE: Brand: ZIP 8I SURGICAL SKIN CLOSURE DEVICE

## (undated) DEVICE — LEAD DEFIB 64CM MODEL 0273 -- ENDOTAK RELIANCE S
Type: IMPLANTABLE DEVICE | Status: NON-FUNCTIONAL
Removed: 2021-07-21

## (undated) DEVICE — BLADE ES ELASTOMERIC COAT INSUL DURABLE BEND UPTO 90DEG

## (undated) DEVICE — DRSG GENTL AG SA POSTOP 4X6IN -- OPTIFOAM

## (undated) DEVICE — SNARE ENDOSCP M L240CM W27MM SHTH DIA2.4MM CHN 2.8MM OVL

## (undated) DEVICE — REM POLYHESIVE ADULT PATIENT RETURN ELECTRODE: Brand: VALLEYLAB

## (undated) DEVICE — CONTAINER SPEC 20 ML LID NEUT BUFF FORMALIN 10 % POLYPR STS

## (undated) DEVICE — IMPLANTABLE CARDIOVERTER DEFIBRILLATOR DR
Type: IMPLANTABLE DEVICE | Status: NON-FUNCTIONAL
Brand: VIGILANT™ EL ICD DR
Removed: 2021-07-21

## (undated) DEVICE — TRAY,IRRIGATION,PISTON SYRINGE,60ML,STRL: Brand: MEDLINE

## (undated) DEVICE — SYRINGE IRRIG 60ML SFT PLIABLE BLB EZ TO GRP 1 HND USE W/

## (undated) DEVICE — TUBING PRSS MON L12IN PVC RIG NONEXPANDING M TO FEM CONN

## (undated) DEVICE — SUTURE STRATAFIX SPRL MCRYL + SZ 4-0 L12IN ABSRB UD PS-2 SXMP1B117

## (undated) DEVICE — SUTURE VCRL SZ 2-0 L36IN ABSRB UD L40MM CT 1/2 CIR J957H

## (undated) DEVICE — SUTURE MNFLMNT SH 26 MM 1/2 CI CRCLE TPR PNT SYMTRC PD PLU

## (undated) DEVICE — FORCEPS BX L240CM JAW DIA2.8MM L CAP W/ NDL MIC MESH TOOTH

## (undated) DEVICE — BLADE ES L4IN INSUL EDGE

## (undated) DEVICE — RELOAD STPL L55MM OPN H3.8MM CLS H1.5MM WIRE DIA0.2MM REG

## (undated) DEVICE — SUPPORT WRST AD W3.5XL9IN DIA14.5IN ART SFT ADJ HK AND LOOP

## (undated) DEVICE — SYRINGE MED 10ML LUERLOCK TIP W/O SFTY DISP

## (undated) DEVICE — ADHESIVE DERMABOND TOPICAL SKIN MINI .36ML

## (undated) DEVICE — PACE/SENSE LEAD
Type: IMPLANTABLE DEVICE | Status: NON-FUNCTIONAL
Brand: INGEVITY™+
Removed: 2021-07-21

## (undated) DEVICE — GLOVE SURG SZ 75 CRM LTX FREE POLYISOPRENE POLYMER BEAD ANTI

## (undated) DEVICE — Z DISCONTINUED PER MEDLINE LINE GAS SAMPLING O2/CO2 LNG AD 13 FT NSL W/ TBNG FILTERLINE

## (undated) DEVICE — CATH IV AUTOGRD BC BLU 22GA 25 -- INSYTE

## (undated) DEVICE — SUTURE PERMA-HAND SZ 0 L30IN NONABSORBABLE BLK L36MM CT-1 424H

## (undated) DEVICE — CATHETER GUID JL3.5 5 FRX100 CM SM ATRAUM SFT CONVEY

## (undated) DEVICE — ADHESIVE SKN CLSR HI VISC 2-O --

## (undated) DEVICE — SUTURE PDS II SZ 1 L36IN ABSRB VLT L48MM CTX 1/2 CIR Z371T

## (undated) DEVICE — ROYAL SILK SURGICAL GOWN, XXL: Brand: CONVERTORS

## (undated) DEVICE — KIT ACCS INTRO 4FR L10CM NDL 21GA L7CM GWIRE L40CM

## (undated) DEVICE — SUTURE VCRL SZ 4-0 L18IN ABSRB UD L19MM PS-2 3/8 CIR PRIM J496H

## (undated) DEVICE — SUTURE VICRYL + SZ 0 L27IN ABSRB VLT L26MM UR-6 5/8 CIR VCP603H

## (undated) DEVICE — CATHETER DIAG 5FR L100CM LUMN ID0.047IN JL3.5 CRV 0 SIDE H

## (undated) DEVICE — ELECTRODE,RADIOTRANSLUCENT,FOAM,5PK: Brand: MEDLINE

## (undated) DEVICE — DRESSING FOAM W10XL15CM VISCOSE POLY SAFETAC NONWOVEN PERF

## (undated) DEVICE — PACK PACEMAKER

## (undated) DEVICE — INTENDED FOR TISSUE SEPARATION, AND OTHER PROCEDURES THAT REQUIRE A SHARP SURGICAL BLADE TO PUNCTURE OR CUT.: Brand: BARD-PARKER ®  SAFETY SCALPED

## (undated) DEVICE — CV INCISE SHEET: Brand: CONVERTORS

## (undated) DEVICE — SUTURE PERMAHAND SZ 3-0 L30IN NONABSORBABLE BLK SILK BRAID A304H

## (undated) DEVICE — GLOVE SURG SZ 8 L12IN FNGR THK79MIL GRN LTX FREE

## (undated) DEVICE — TOWEL 4 PLY TISS 19X30 SUE WHT

## (undated) DEVICE — SYR 10ML LUER LOK 1/5ML GRAD --

## (undated) DEVICE — STRIP SKIN CLSR W0.25XL4IN WHT SPUNBOUND FBR NYL HI ADH

## (undated) DEVICE — 1200 GUARD II KIT W/5MM TUBE W/O VAC TUBE: Brand: GUARDIAN

## (undated) DEVICE — BLADE CLIPPER GEN PURP NS

## (undated) DEVICE — BASIN EMSIS 16OZ GRAPHITE PLAS KID SHP MOLD GRAD FOR ORAL

## (undated) DEVICE — NEONATAL-ADULT SPO2 SENSOR: Brand: NELLCOR

## (undated) DEVICE — PROVE COVER: Brand: UNBRANDED

## (undated) DEVICE — Device: Brand: EAGLE EYE PLATINUM RX DIGITAL IVUS CATHETER

## (undated) DEVICE — ANGIOGRAPHIC CATHETER: Brand: IMPULSE™

## (undated) DEVICE — PACK ANGIOPLASTY /MERIT

## (undated) DEVICE — SUTURE PERMAHAND SZ 2-0 L30IN NONABSORBABLE BLK SILK W/O A305H

## (undated) DEVICE — ADHESIVE SKIN CLOSURE HI VISC MIC 0.5 CC PREMIERPRO EXOFIN

## (undated) DEVICE — SYSTEM BLLN L100MM DIA12MM BLNT TIP KII

## (undated) DEVICE — SEALER ENDOSCP NANO COAT OPN DIV CRV L JAW LIGASURE IMPACT

## (undated) DEVICE — MEDI-TRACE CADENCE ADULT, DEFIBRILLATION ELECTRODE -RTS  (10 PR/PK) - PHILIPS: Brand: MEDI-TRACE CADENCE

## (undated) DEVICE — TRAY CATH CATH OD16FR 200ML URIN M SIL CNTR ENTRY F

## (undated) DEVICE — SUTURE ABSORBABLE MONOFILAMENT 2-0 CT1 12 IN UD MONOCRYL + SXMP1B412

## (undated) DEVICE — APPLICATOR MEDICATED 10.5 CC SOLUTION CLR STRL CHLORAPREP

## (undated) DEVICE — HEART CATH-SFMC: Brand: MEDLINE INDUSTRIES, INC.

## (undated) DEVICE — SOLIDIFIER MEDC 1200ML -- CONVERT TO 356117

## (undated) DEVICE — SUTURE VCRL 2-0 L27IN ABSRB UD PS-2 L19MM 1/2 CIR J428H

## (undated) DEVICE — SUT SLK 0 30IN SH BLK --

## (undated) DEVICE — SYSTEM INTRO 10.5FR L13CM STD WHT CAP HEMSTAT SPLITTABLE

## (undated) DEVICE — SYR 3ML LL TIP 1/10ML GRAD --

## (undated) DEVICE — GUIDEWIRE VASC L180CM DIA0.035IN 3MM PTFE J TIP EXCHG FIX

## (undated) DEVICE — SUTURE MONOCRYL SZ 5-0 L18IN ABSRB UD L19MM PS-2 3/8 CIR PRIM Y495G

## (undated) DEVICE — PACEMAKER PACK: Brand: MEDLINE INDUSTRIES, INC.

## (undated) DEVICE — SYSTEM POS SZ 36 X 20 X 1 IN INTEGR ADJ ARM PROTECTOR LIFT

## (undated) DEVICE — INTRO SHTH 7FR 13X20CM -- TEARAWAY

## (undated) DEVICE — SYRINGE MED 10ML RED POLYCARB BRL FIX M LUER CONN FLAT GRP

## (undated) DEVICE — NEEDLE SAFETY MONOJECT 25GX1-1/2

## (undated) DEVICE — BAND COMPR L24CM REG CLR PLAS HEMSTAT EXT HK AND LOOP RETEN

## (undated) DEVICE — SET ADMIN 16ML TBNG L100IN 2 Y INJ SITE IV PIGGY BK DISP

## (undated) DEVICE — BAG SPEC BIOHZRD 10 X 10 IN --

## (undated) DEVICE — GAUZE,SPONGE,AVANT,4"X4",4PLY,STRL,10/TR: Brand: MEDLINE

## (undated) DEVICE — MEDI-TRACE CADENCE ADULT, DEFIBRILLATION ELECTRODE -RTS  (10 PR/PK) - PHYSIO-CONTROL: Brand: MEDI-TRACE CADENCE

## (undated) DEVICE — STAPLER INT L55MM CUT LN L53MM STPL LN L57MM BLU B FRM 8

## (undated) DEVICE — RUNTHROUGH NS EXTRA FLOPPY PTCA GUIDEWIRE: Brand: RUNTHROUGH